# Patient Record
Sex: MALE | Race: OTHER | HISPANIC OR LATINO | URBAN - METROPOLITAN AREA
[De-identification: names, ages, dates, MRNs, and addresses within clinical notes are randomized per-mention and may not be internally consistent; named-entity substitution may affect disease eponyms.]

---

## 2017-04-18 ENCOUNTER — EMERGENCY (EMERGENCY)
Facility: HOSPITAL | Age: 62
LOS: 1 days | Discharge: HOME | End: 2017-04-18
Attending: EMERGENCY MEDICINE | Admitting: EMERGENCY MEDICINE
Payer: MEDICAID

## 2017-04-18 VITALS
DIASTOLIC BLOOD PRESSURE: 79 MMHG | RESPIRATION RATE: 19 BRPM | TEMPERATURE: 98 F | HEART RATE: 78 BPM | OXYGEN SATURATION: 97 % | SYSTOLIC BLOOD PRESSURE: 194 MMHG

## 2017-04-18 VITALS
RESPIRATION RATE: 17 BRPM | HEART RATE: 61 BPM | SYSTOLIC BLOOD PRESSURE: 187 MMHG | DIASTOLIC BLOOD PRESSURE: 89 MMHG | OXYGEN SATURATION: 98 % | TEMPERATURE: 99 F

## 2017-04-18 DIAGNOSIS — R10.13 EPIGASTRIC PAIN: ICD-10-CM

## 2017-04-18 DIAGNOSIS — E11.9 TYPE 2 DIABETES MELLITUS WITHOUT COMPLICATIONS: ICD-10-CM

## 2017-04-18 DIAGNOSIS — Z79.899 OTHER LONG TERM (CURRENT) DRUG THERAPY: ICD-10-CM

## 2017-04-18 DIAGNOSIS — R31.9 HEMATURIA, UNSPECIFIED: ICD-10-CM

## 2017-04-18 DIAGNOSIS — I10 ESSENTIAL (PRIMARY) HYPERTENSION: ICD-10-CM

## 2017-04-18 LAB
ALBUMIN SERPL ELPH-MCNC: 3.9 G/DL — SIGNIFICANT CHANGE UP (ref 3.4–5)
ALP SERPL-CCNC: 74 U/L — SIGNIFICANT CHANGE UP (ref 40–120)
ALT FLD-CCNC: 27 U/L — SIGNIFICANT CHANGE UP (ref 12–42)
ANION GAP SERPL CALC-SCNC: 7 MMOL/L — LOW (ref 9–16)
APPEARANCE UR: CLEAR — SIGNIFICANT CHANGE UP
AST SERPL-CCNC: 34 U/L — SIGNIFICANT CHANGE UP (ref 15–37)
BACTERIA # UR AUTO: PRESENT /HPF
BASOPHILS NFR BLD AUTO: 0.2 % — SIGNIFICANT CHANGE UP (ref 0–2)
BILIRUB SERPL-MCNC: 0.8 MG/DL — SIGNIFICANT CHANGE UP (ref 0.2–1.2)
BILIRUB UR-MCNC: NEGATIVE — SIGNIFICANT CHANGE UP
BUN SERPL-MCNC: 26 MG/DL — HIGH (ref 7–23)
CALCIUM SERPL-MCNC: 9.5 MG/DL — SIGNIFICANT CHANGE UP (ref 8.5–10.5)
CHLORIDE SERPL-SCNC: 100 MMOL/L — SIGNIFICANT CHANGE UP (ref 96–108)
CO2 SERPL-SCNC: 28 MMOL/L — SIGNIFICANT CHANGE UP (ref 22–31)
COLOR SPEC: YELLOW — SIGNIFICANT CHANGE UP
COMMENT - URINE: SIGNIFICANT CHANGE UP
COMMENT - URINE: SIGNIFICANT CHANGE UP
CREAT SERPL-MCNC: 1.03 MG/DL — SIGNIFICANT CHANGE UP (ref 0.5–1.3)
DIFF PNL FLD: (no result)
EOSINOPHIL NFR BLD AUTO: 0 % — SIGNIFICANT CHANGE UP (ref 0–6)
EPI CELLS # UR: SIGNIFICANT CHANGE UP /HPF
GLUCOSE SERPL-MCNC: 294 MG/DL — HIGH (ref 70–99)
GLUCOSE UR QL: >=1000
HCT VFR BLD CALC: 34.6 % — LOW (ref 39–50)
HGB BLD-MCNC: 11.7 G/DL — LOW (ref 13–17)
IMM GRANULOCYTES NFR BLD AUTO: 0.3 % — SIGNIFICANT CHANGE UP (ref 0–1.5)
KETONES UR-MCNC: NEGATIVE — SIGNIFICANT CHANGE UP
LACTATE SERPL-SCNC: 1.8 MMOL/L — SIGNIFICANT CHANGE UP (ref 0.4–2)
LEUKOCYTE ESTERASE UR-ACNC: NEGATIVE — SIGNIFICANT CHANGE UP
LIDOCAIN IGE QN: 46 U/L — LOW (ref 73–393)
LYMPHOCYTES # BLD AUTO: 12.1 % — LOW (ref 13–44)
MCHC RBC-ENTMCNC: 32.3 PG — SIGNIFICANT CHANGE UP (ref 27–34)
MCHC RBC-ENTMCNC: 33.8 G/DL — SIGNIFICANT CHANGE UP (ref 32–36)
MCV RBC AUTO: 95.6 FL — SIGNIFICANT CHANGE UP (ref 80–100)
MONOCYTES NFR BLD AUTO: 8.2 % — SIGNIFICANT CHANGE UP (ref 2–14)
NEUTROPHILS NFR BLD AUTO: 79.2 % — HIGH (ref 43–77)
NITRITE UR-MCNC: NEGATIVE — SIGNIFICANT CHANGE UP
PCO2 BLDV: 48 MMHG — SIGNIFICANT CHANGE UP (ref 41–51)
PH BLDV: 7.4 — SIGNIFICANT CHANGE UP (ref 7.32–7.43)
PH UR: 6 — SIGNIFICANT CHANGE UP (ref 5–8)
PLATELET # BLD AUTO: 131 K/UL — LOW (ref 150–400)
PO2 BLDV: 18 MMHG — LOW (ref 35–40)
POTASSIUM SERPL-MCNC: 4.1 MMOL/L — SIGNIFICANT CHANGE UP (ref 3.5–5.3)
POTASSIUM SERPL-SCNC: 4.1 MMOL/L — SIGNIFICANT CHANGE UP (ref 3.5–5.3)
PROT SERPL-MCNC: 8 G/DL — SIGNIFICANT CHANGE UP (ref 6.4–8.2)
PROT UR-MCNC: >=300 MG/DL
RBC # BLD: 3.62 M/UL — LOW (ref 4.2–5.8)
RBC # FLD: 12.9 % — SIGNIFICANT CHANGE UP (ref 10.3–16.9)
RBC CASTS # UR COMP ASSIST: (no result) /HPF
SAO2 % BLDV: 26 % — SIGNIFICANT CHANGE UP
SODIUM SERPL-SCNC: 135 MMOL/L — SIGNIFICANT CHANGE UP (ref 132–145)
SP GR SPEC: >=1.03 — SIGNIFICANT CHANGE UP (ref 1–1.03)
UROBILINOGEN FLD QL: 0.2 E.U./DL — SIGNIFICANT CHANGE UP
WBC # BLD: 12.4 K/UL — HIGH (ref 3.8–10.5)
WBC # FLD AUTO: 12.4 K/UL — HIGH (ref 3.8–10.5)

## 2017-04-18 PROCEDURE — 74177 CT ABD & PELVIS W/CONTRAST: CPT | Mod: 26

## 2017-04-18 PROCEDURE — 99285 EMERGENCY DEPT VISIT HI MDM: CPT

## 2017-04-18 RX ORDER — ONDANSETRON 8 MG/1
1 TABLET, FILM COATED ORAL
Qty: 9 | Refills: 0
Start: 2017-04-18 | End: 2017-04-21

## 2017-04-18 RX ORDER — CIPROFLOXACIN LACTATE 400MG/40ML
1 VIAL (ML) INTRAVENOUS
Qty: 10 | Refills: 0
Start: 2017-04-18 | End: 2017-04-23

## 2017-04-18 RX ORDER — SODIUM CHLORIDE 9 MG/ML
2000 INJECTION INTRAMUSCULAR; INTRAVENOUS; SUBCUTANEOUS ONCE
Qty: 0 | Refills: 0 | Status: COMPLETED | OUTPATIENT
Start: 2017-04-18 | End: 2017-04-18

## 2017-04-18 RX ORDER — MORPHINE SULFATE 50 MG/1
4 CAPSULE, EXTENDED RELEASE ORAL ONCE
Qty: 0 | Refills: 0 | Status: DISCONTINUED | OUTPATIENT
Start: 2017-04-18 | End: 2017-04-18

## 2017-04-18 RX ORDER — FAMOTIDINE 10 MG/ML
1 INJECTION INTRAVENOUS
Qty: 14 | Refills: 0
Start: 2017-04-18 | End: 2017-04-25

## 2017-04-18 RX ORDER — IOHEXOL 300 MG/ML
50 INJECTION, SOLUTION INTRAVENOUS ONCE
Qty: 0 | Refills: 0 | Status: COMPLETED | OUTPATIENT
Start: 2017-04-18 | End: 2017-04-18

## 2017-04-18 RX ORDER — GABAPENTIN 400 MG/1
800 CAPSULE ORAL ONCE
Qty: 0 | Refills: 0 | Status: COMPLETED | OUTPATIENT
Start: 2017-04-18 | End: 2017-04-18

## 2017-04-18 RX ORDER — METOCLOPRAMIDE HCL 10 MG
10 TABLET ORAL ONCE
Qty: 0 | Refills: 0 | Status: COMPLETED | OUTPATIENT
Start: 2017-04-18 | End: 2017-04-18

## 2017-04-18 RX ADMIN — SODIUM CHLORIDE 2000 MILLILITER(S): 9 INJECTION INTRAMUSCULAR; INTRAVENOUS; SUBCUTANEOUS at 15:20

## 2017-04-18 RX ADMIN — MORPHINE SULFATE 4 MILLIGRAM(S): 50 CAPSULE, EXTENDED RELEASE ORAL at 15:19

## 2017-04-18 RX ADMIN — Medication 10 MILLIGRAM(S): at 15:19

## 2017-04-18 RX ADMIN — MORPHINE SULFATE 4 MILLIGRAM(S): 50 CAPSULE, EXTENDED RELEASE ORAL at 15:46

## 2017-04-18 RX ADMIN — MORPHINE SULFATE 4 MILLIGRAM(S): 50 CAPSULE, EXTENDED RELEASE ORAL at 15:20

## 2017-04-18 RX ADMIN — IOHEXOL 50 MILLILITER(S): 300 INJECTION, SOLUTION INTRAVENOUS at 15:19

## 2017-04-18 NOTE — ED PROVIDER NOTE - OBJECTIVE STATEMENT
61y m with h/o neuropathy, HTN, DM, but no past abd surgeries presents with cramping epigastric abd pain since yesterday with n/v/d. Notes poor PO intake. As per fiance, abd has been intermittent for years but worsened since last night. Denies fever, chills. No sick contacts. On gabapentin, lisinopril and insulin injections.

## 2017-04-18 NOTE — ED ADULT TRIAGE NOTE - CHIEF COMPLAINT QUOTE
Pt. c/o abdominal pain, nausea, vomiting and diarrhea since yesterday. Pt. denies any blood in stool or vomit.

## 2017-04-18 NOTE — ED PROVIDER NOTE - NS ED MD SCRIBE ATTENDING SCRIBE SECTIONS
HIV/REVIEW OF SYSTEMS/RESULTS/VITAL SIGNS( Pullset)/PROGRESS NOTE/HISTORY OF PRESENT ILLNESS/PAST MEDICAL/SURGICAL/SOCIAL HISTORY/INTAKE ASSESSMENT/SCREENINGS/DISPOSITION/PHYSICAL EXAM

## 2017-04-18 NOTE — SBIRT NOTE. - NSSBIRTSERVICES_GEN_A_ED_FT
Provided SBIRT services. Full Screen Positive. Brief Intervention Performed. Screening results were reviewed with the patient and patient was provided information about healthy guidelines and potential negative consequences associated with level of risk.   Motivation and readiness to reduce or stop use was discussed and goals and activities to make changes were suggested/offered.    Audit Score :0    Dast Score :2    Duration : 15 minutes

## 2017-04-18 NOTE — ED PROVIDER NOTE - PROGRESS NOTE DETAILS
resolved abd pain, tolerating po, no distress, unremarkable CT and blood work. UA w some hematuria (has been told before by PMD he has it) w some bacteria so will cover with po abxs and encouraged f/u w PMD. No evidence of kidney stones on CT. Abd pain may be related to stomach dysfunction - recommend GI F/U

## 2017-06-22 ENCOUNTER — EMERGENCY (EMERGENCY)
Facility: HOSPITAL | Age: 62
LOS: 1 days | Discharge: HOME | End: 2017-06-22
Attending: EMERGENCY MEDICINE | Admitting: EMERGENCY MEDICINE
Payer: MEDICAID

## 2017-06-22 VITALS — RESPIRATION RATE: 21 BRPM | TEMPERATURE: 99 F | HEART RATE: 89 BPM | OXYGEN SATURATION: 99 %

## 2017-06-22 DIAGNOSIS — Z79.899 OTHER LONG TERM (CURRENT) DRUG THERAPY: ICD-10-CM

## 2017-06-22 DIAGNOSIS — Z79.2 LONG TERM (CURRENT) USE OF ANTIBIOTICS: ICD-10-CM

## 2017-06-22 DIAGNOSIS — R10.84 GENERALIZED ABDOMINAL PAIN: ICD-10-CM

## 2017-06-22 DIAGNOSIS — Z79.4 LONG TERM (CURRENT) USE OF INSULIN: ICD-10-CM

## 2017-06-22 DIAGNOSIS — K52.9 NONINFECTIVE GASTROENTERITIS AND COLITIS, UNSPECIFIED: ICD-10-CM

## 2017-06-22 LAB
ALBUMIN SERPL ELPH-MCNC: 4.2 G/DL — SIGNIFICANT CHANGE UP (ref 3.4–5)
ALP SERPL-CCNC: 103 U/L — SIGNIFICANT CHANGE UP (ref 40–120)
ALT FLD-CCNC: 25 U/L — SIGNIFICANT CHANGE UP (ref 12–42)
ANION GAP SERPL CALC-SCNC: 12 MMOL/L — SIGNIFICANT CHANGE UP (ref 9–16)
AST SERPL-CCNC: 23 U/L — SIGNIFICANT CHANGE UP (ref 15–37)
BASOPHILS NFR BLD AUTO: 0.3 % — SIGNIFICANT CHANGE UP (ref 0–2)
BILIRUB SERPL-MCNC: 0.8 MG/DL — SIGNIFICANT CHANGE UP (ref 0.2–1.2)
BUN SERPL-MCNC: 18 MG/DL — SIGNIFICANT CHANGE UP (ref 7–23)
CALCIUM SERPL-MCNC: 9.8 MG/DL — SIGNIFICANT CHANGE UP (ref 8.5–10.5)
CHLORIDE SERPL-SCNC: 105 MMOL/L — SIGNIFICANT CHANGE UP (ref 96–108)
CO2 SERPL-SCNC: 24 MMOL/L — SIGNIFICANT CHANGE UP (ref 22–31)
CREAT SERPL-MCNC: 1.04 MG/DL — SIGNIFICANT CHANGE UP (ref 0.5–1.3)
EOSINOPHIL NFR BLD AUTO: 0 % — SIGNIFICANT CHANGE UP (ref 0–6)
GLUCOSE SERPL-MCNC: 379 MG/DL — HIGH (ref 70–99)
HCT VFR BLD CALC: 37.9 % — LOW (ref 39–50)
HGB BLD-MCNC: 12.9 G/DL — LOW (ref 13–17)
IMM GRANULOCYTES NFR BLD AUTO: 0.3 % — SIGNIFICANT CHANGE UP (ref 0–1.5)
LIDOCAIN IGE QN: 91 U/L — SIGNIFICANT CHANGE UP (ref 73–393)
LYMPHOCYTES # BLD AUTO: 6.8 % — LOW (ref 13–44)
MCHC RBC-ENTMCNC: 32 PG — SIGNIFICANT CHANGE UP (ref 27–34)
MCHC RBC-ENTMCNC: 34 G/DL — SIGNIFICANT CHANGE UP (ref 32–36)
MCV RBC AUTO: 94 FL — SIGNIFICANT CHANGE UP (ref 80–100)
MONOCYTES NFR BLD AUTO: 3.7 % — SIGNIFICANT CHANGE UP (ref 2–14)
NEUTROPHILS NFR BLD AUTO: 88.9 % — HIGH (ref 43–77)
PLATELET # BLD AUTO: 145 K/UL — LOW (ref 150–400)
POTASSIUM SERPL-MCNC: 4 MMOL/L — SIGNIFICANT CHANGE UP (ref 3.5–5.3)
POTASSIUM SERPL-SCNC: 4 MMOL/L — SIGNIFICANT CHANGE UP (ref 3.5–5.3)
PROT SERPL-MCNC: 8.8 G/DL — HIGH (ref 6.4–8.2)
RBC # BLD: 4.03 M/UL — LOW (ref 4.2–5.8)
RBC # FLD: 12.2 % — SIGNIFICANT CHANGE UP (ref 10.3–16.9)
SODIUM SERPL-SCNC: 141 MMOL/L — SIGNIFICANT CHANGE UP (ref 132–145)
WBC # BLD: 14.7 K/UL — HIGH (ref 3.8–10.5)
WBC # FLD AUTO: 14.7 K/UL — HIGH (ref 3.8–10.5)

## 2017-06-22 PROCEDURE — 99284 EMERGENCY DEPT VISIT MOD MDM: CPT | Mod: 25

## 2017-06-22 PROCEDURE — 93010 ELECTROCARDIOGRAM REPORT: CPT

## 2017-06-22 RX ORDER — SODIUM CHLORIDE 9 MG/ML
1000 INJECTION INTRAMUSCULAR; INTRAVENOUS; SUBCUTANEOUS ONCE
Qty: 0 | Refills: 0 | Status: COMPLETED | OUTPATIENT
Start: 2017-06-22 | End: 2017-06-22

## 2017-06-22 RX ORDER — FAMOTIDINE 10 MG/ML
20 INJECTION INTRAVENOUS ONCE
Qty: 0 | Refills: 0 | Status: COMPLETED | OUTPATIENT
Start: 2017-06-22 | End: 2017-06-22

## 2017-06-22 RX ORDER — ONDANSETRON 8 MG/1
4 TABLET, FILM COATED ORAL ONCE
Qty: 0 | Refills: 0 | Status: COMPLETED | OUTPATIENT
Start: 2017-06-22 | End: 2017-06-22

## 2017-06-22 RX ADMIN — FAMOTIDINE 20 MILLIGRAM(S): 10 INJECTION INTRAVENOUS at 20:34

## 2017-06-22 RX ADMIN — Medication 2 MILLIGRAM(S): at 20:34

## 2017-06-22 RX ADMIN — Medication 204 MILLIGRAM(S): at 22:54

## 2017-06-22 RX ADMIN — SODIUM CHLORIDE 1000 MILLILITER(S): 9 INJECTION INTRAMUSCULAR; INTRAVENOUS; SUBCUTANEOUS at 20:35

## 2017-06-22 RX ADMIN — SODIUM CHLORIDE 1000 MILLILITER(S): 9 INJECTION INTRAMUSCULAR; INTRAVENOUS; SUBCUTANEOUS at 22:54

## 2017-06-22 RX ADMIN — ONDANSETRON 4 MILLIGRAM(S): 8 TABLET, FILM COATED ORAL at 20:34

## 2017-06-22 RX ADMIN — Medication 20 MILLIGRAM(S): at 23:29

## 2017-06-22 NOTE — ED PROVIDER NOTE - OBJECTIVE STATEMENT
Patient with pmhx dm, htn, presents with diffuse abdominal cramping, N/V/D after eating halal meat. denies headache, neck pain, denies cp, or sob. patient unable to speak and moaning secondary to diffuse abdominal cramping. denies drugs or etoh.

## 2017-06-22 NOTE — ED ADULT NURSE NOTE - OBJECTIVE STATEMENT
Patient is a 62 y/o male presenting to the ED with sudden onset of abdominal pain after halal food earlier today. Patient reports N/V/D with associated abdominal pain. Patient denies CP, SOB, trouble breathing, urinary s/s.

## 2017-06-23 VITALS
SYSTOLIC BLOOD PRESSURE: 132 MMHG | HEART RATE: 74 BPM | TEMPERATURE: 98 F | RESPIRATION RATE: 18 BRPM | DIASTOLIC BLOOD PRESSURE: 74 MMHG | OXYGEN SATURATION: 98 %

## 2017-06-23 LAB
APPEARANCE UR: CLEAR — SIGNIFICANT CHANGE UP
BILIRUB UR-MCNC: NEGATIVE — SIGNIFICANT CHANGE UP
COLOR SPEC: YELLOW — SIGNIFICANT CHANGE UP
DIFF PNL FLD: (no result)
GLUCOSE UR QL: >=1000
KETONES UR-MCNC: 40 MG/DL
LEUKOCYTE ESTERASE UR-ACNC: NEGATIVE — SIGNIFICANT CHANGE UP
NITRITE UR-MCNC: NEGATIVE — SIGNIFICANT CHANGE UP
PH UR: 6 — SIGNIFICANT CHANGE UP (ref 5–8)
PROT UR-MCNC: 100 MG/DL
SP GR SPEC: 1.02 — SIGNIFICANT CHANGE UP (ref 1–1.03)
UROBILINOGEN FLD QL: 0.2 E.U./DL — SIGNIFICANT CHANGE UP

## 2017-11-23 ENCOUNTER — EMERGENCY (EMERGENCY)
Facility: HOSPITAL | Age: 62
LOS: 1 days | Discharge: HOME | End: 2017-11-23
Admitting: EMERGENCY MEDICINE
Payer: MEDICAID

## 2017-11-23 VITALS
SYSTOLIC BLOOD PRESSURE: 199 MMHG | OXYGEN SATURATION: 98 % | RESPIRATION RATE: 16 BRPM | HEART RATE: 74 BPM | TEMPERATURE: 98 F | DIASTOLIC BLOOD PRESSURE: 98 MMHG

## 2017-11-23 VITALS
RESPIRATION RATE: 18 BRPM | DIASTOLIC BLOOD PRESSURE: 101 MMHG | TEMPERATURE: 98 F | HEART RATE: 88 BPM | OXYGEN SATURATION: 100 % | SYSTOLIC BLOOD PRESSURE: 207 MMHG

## 2017-11-23 DIAGNOSIS — I10 ESSENTIAL (PRIMARY) HYPERTENSION: ICD-10-CM

## 2017-11-23 DIAGNOSIS — Z79.2 LONG TERM (CURRENT) USE OF ANTIBIOTICS: ICD-10-CM

## 2017-11-23 DIAGNOSIS — R10.84 GENERALIZED ABDOMINAL PAIN: ICD-10-CM

## 2017-11-23 DIAGNOSIS — E11.9 TYPE 2 DIABETES MELLITUS WITHOUT COMPLICATIONS: ICD-10-CM

## 2017-11-23 DIAGNOSIS — Z79.4 LONG TERM (CURRENT) USE OF INSULIN: ICD-10-CM

## 2017-11-23 DIAGNOSIS — F17.200 NICOTINE DEPENDENCE, UNSPECIFIED, UNCOMPLICATED: ICD-10-CM

## 2017-11-23 DIAGNOSIS — R19.7 DIARRHEA, UNSPECIFIED: ICD-10-CM

## 2017-11-23 DIAGNOSIS — Z79.899 OTHER LONG TERM (CURRENT) DRUG THERAPY: ICD-10-CM

## 2017-11-23 LAB
ALBUMIN SERPL ELPH-MCNC: 3.7 G/DL — SIGNIFICANT CHANGE UP (ref 3.4–5)
ALP SERPL-CCNC: 80 U/L — SIGNIFICANT CHANGE UP (ref 40–120)
ALT FLD-CCNC: 19 U/L — SIGNIFICANT CHANGE UP (ref 12–42)
ANION GAP SERPL CALC-SCNC: 9 MMOL/L — SIGNIFICANT CHANGE UP (ref 9–16)
APPEARANCE UR: CLEAR — SIGNIFICANT CHANGE UP
AST SERPL-CCNC: 19 U/L — SIGNIFICANT CHANGE UP (ref 15–37)
BILIRUB SERPL-MCNC: 0.5 MG/DL — SIGNIFICANT CHANGE UP (ref 0.2–1.2)
BILIRUB UR-MCNC: NEGATIVE — SIGNIFICANT CHANGE UP
BUN SERPL-MCNC: 25 MG/DL — HIGH (ref 7–23)
CALCIUM SERPL-MCNC: 9.2 MG/DL — SIGNIFICANT CHANGE UP (ref 8.5–10.5)
CHLORIDE SERPL-SCNC: 106 MMOL/L — SIGNIFICANT CHANGE UP (ref 96–108)
CO2 SERPL-SCNC: 24 MMOL/L — SIGNIFICANT CHANGE UP (ref 22–31)
COLOR SPEC: YELLOW — SIGNIFICANT CHANGE UP
CREAT SERPL-MCNC: 0.9 MG/DL — SIGNIFICANT CHANGE UP (ref 0.5–1.3)
DIFF PNL FLD: (no result)
GLUCOSE SERPL-MCNC: 281 MG/DL — HIGH (ref 70–99)
GLUCOSE UR QL: 500
HCT VFR BLD CALC: 37 % — LOW (ref 39–50)
HGB BLD-MCNC: 12.2 G/DL — LOW (ref 13–17)
KETONES UR-MCNC: 15 MG/DL
LACTATE SERPL-SCNC: 1.4 MMOL/L — SIGNIFICANT CHANGE UP (ref 0.4–2)
LEUKOCYTE ESTERASE UR-ACNC: NEGATIVE — SIGNIFICANT CHANGE UP
LIDOCAIN IGE QN: 264 U/L — SIGNIFICANT CHANGE UP (ref 73–393)
MCHC RBC-ENTMCNC: 31.2 PG — SIGNIFICANT CHANGE UP (ref 27–34)
MCHC RBC-ENTMCNC: 33 G/DL — SIGNIFICANT CHANGE UP (ref 32–36)
MCV RBC AUTO: 94.6 FL — SIGNIFICANT CHANGE UP (ref 80–100)
NITRITE UR-MCNC: NEGATIVE — SIGNIFICANT CHANGE UP
PCP SPEC-MCNC: SIGNIFICANT CHANGE UP
PH UR: 5.5 — SIGNIFICANT CHANGE UP (ref 5–8)
PLATELET # BLD AUTO: 211 K/UL — SIGNIFICANT CHANGE UP (ref 150–400)
POTASSIUM SERPL-MCNC: 4 MMOL/L — SIGNIFICANT CHANGE UP (ref 3.5–5.3)
POTASSIUM SERPL-SCNC: 4 MMOL/L — SIGNIFICANT CHANGE UP (ref 3.5–5.3)
PROT SERPL-MCNC: 8 G/DL — SIGNIFICANT CHANGE UP (ref 6.4–8.2)
PROT UR-MCNC: 100 MG/DL
RBC # BLD: 3.91 M/UL — LOW (ref 4.2–5.8)
RBC # FLD: 12.3 % — SIGNIFICANT CHANGE UP (ref 10.3–16.9)
SODIUM SERPL-SCNC: 139 MMOL/L — SIGNIFICANT CHANGE UP (ref 132–145)
SP GR SPEC: >=1.03 — SIGNIFICANT CHANGE UP (ref 1–1.03)
UROBILINOGEN FLD QL: 0.2 E.U./DL — SIGNIFICANT CHANGE UP
WBC # BLD: 9 K/UL — SIGNIFICANT CHANGE UP (ref 3.8–10.5)
WBC # FLD AUTO: 9 K/UL — SIGNIFICANT CHANGE UP (ref 3.8–10.5)

## 2017-11-23 PROCEDURE — 74020: CPT | Mod: 26

## 2017-11-23 PROCEDURE — 99285 EMERGENCY DEPT VISIT HI MDM: CPT | Mod: 25

## 2017-11-23 PROCEDURE — 93010 ELECTROCARDIOGRAM REPORT: CPT

## 2017-11-23 PROCEDURE — 74177 CT ABD & PELVIS W/CONTRAST: CPT | Mod: 26

## 2017-11-23 RX ORDER — SODIUM CHLORIDE 9 MG/ML
1000 INJECTION INTRAMUSCULAR; INTRAVENOUS; SUBCUTANEOUS ONCE
Qty: 0 | Refills: 0 | Status: COMPLETED | OUTPATIENT
Start: 2017-11-23 | End: 2017-11-23

## 2017-11-23 RX ORDER — PANTOPRAZOLE SODIUM 20 MG/1
1 TABLET, DELAYED RELEASE ORAL
Qty: 15 | Refills: 0 | OUTPATIENT
Start: 2017-11-23

## 2017-11-23 RX ORDER — FAMOTIDINE 10 MG/ML
20 INJECTION INTRAVENOUS ONCE
Qty: 0 | Refills: 0 | Status: COMPLETED | OUTPATIENT
Start: 2017-11-23 | End: 2017-11-23

## 2017-11-23 RX ORDER — TRAMADOL HYDROCHLORIDE AND ACETAMINOPHEN 37.5; 325 MG/1; MG/1
1 TABLET ORAL
Qty: 10 | Refills: 0
Start: 2017-11-23

## 2017-11-23 RX ORDER — KETOROLAC TROMETHAMINE 30 MG/ML
30 SYRINGE (ML) INJECTION ONCE
Qty: 0 | Refills: 0 | Status: DISCONTINUED | OUTPATIENT
Start: 2017-11-23 | End: 2017-11-23

## 2017-11-23 RX ORDER — ONDANSETRON 8 MG/1
4 TABLET, FILM COATED ORAL ONCE
Qty: 0 | Refills: 0 | Status: COMPLETED | OUTPATIENT
Start: 2017-11-23 | End: 2017-11-23

## 2017-11-23 RX ORDER — MORPHINE SULFATE 50 MG/1
4 CAPSULE, EXTENDED RELEASE ORAL ONCE
Qty: 0 | Refills: 0 | Status: DISCONTINUED | OUTPATIENT
Start: 2017-11-23 | End: 2017-11-23

## 2017-11-23 RX ORDER — ACETAMINOPHEN 500 MG
650 TABLET ORAL ONCE
Qty: 0 | Refills: 0 | Status: COMPLETED | OUTPATIENT
Start: 2017-11-23 | End: 2017-11-23

## 2017-11-23 RX ORDER — ONDANSETRON 8 MG/1
1 TABLET, FILM COATED ORAL
Qty: 12 | Refills: 0
Start: 2017-11-23

## 2017-11-23 RX ORDER — METOCLOPRAMIDE HCL 10 MG
10 TABLET ORAL ONCE
Qty: 0 | Refills: 0 | Status: COMPLETED | OUTPATIENT
Start: 2017-11-23 | End: 2017-11-23

## 2017-11-23 RX ADMIN — ONDANSETRON 4 MILLIGRAM(S): 8 TABLET, FILM COATED ORAL at 14:42

## 2017-11-23 RX ADMIN — SODIUM CHLORIDE 2000 MILLILITER(S): 9 INJECTION INTRAMUSCULAR; INTRAVENOUS; SUBCUTANEOUS at 14:42

## 2017-11-23 RX ADMIN — Medication 650 MILLIGRAM(S): at 14:41

## 2017-11-23 RX ADMIN — Medication 1.25 MILLIGRAM(S): at 16:44

## 2017-11-23 RX ADMIN — FAMOTIDINE 20 MILLIGRAM(S): 10 INJECTION INTRAVENOUS at 14:41

## 2017-11-23 RX ADMIN — Medication 10 MILLIGRAM(S): at 16:07

## 2017-11-23 RX ADMIN — Medication 30 MILLIGRAM(S): at 14:41

## 2017-11-23 RX ADMIN — MORPHINE SULFATE 4 MILLIGRAM(S): 50 CAPSULE, EXTENDED RELEASE ORAL at 16:07

## 2017-11-23 NOTE — ED PROVIDER NOTE - PROGRESS NOTE DETAILS
started having a couple episodes of gagging and dry heaving in the ED, pain improved with toradol and IVF but still writhing around intermittently, xray with possible air fluid levels, abd soft and non rigid, will proceed with CT A/P to r/o obstruction/stones

## 2017-11-23 NOTE — ED PROVIDER NOTE - OBJECTIVE STATEMENT
63 yo M with PMHx of HTN, DM, BIBA c/o diffuse abdominal pain and diarrhea since yesterday.  Pt reports having some street food and noted diffuse abdominal cramps with associated loose watery diarrhea for the past 24hr.  Denies fever, chills, N/V/C, melena, hematochezia, hematuria, change in urinary function, dysuria, d/c, flank pain, HA, dizziness, focal weakness, CP, SOB, palpitations, cough, and malaise. No recent travel or sick contact noted

## 2017-11-23 NOTE — ED ADULT NURSE NOTE - GASTROINTESTINAL WDL
Abdomen soft, tender, nondistended, bowel sounds present in all 4 quadrants. c/o generalized abd pain and diarrhea

## 2017-11-23 NOTE — ED PROVIDER NOTE - PHYSICAL EXAMINATION
Gen - WDWN M, writhing in pain, speaking in full sentences   Skin - warm, dry, intact  HEENT - AT/NC, PERRL, EOMI, airway patent, neck supple, no JVD, no palpable nodes   CV - S1S2, R/R/R  Resp - respiration non-labored, CTAB, symmetric bs b/l, no r/r/w  GI - NABS, soft, ND, diffuse abd tenderness, no guarding or rebound, no CVAT b/l    MS - w/w/p, no c/c/e, calves supple and NT, distal pulses symmetric b/l   Neuro - AxOx3, ambulatory without gait disturbance

## 2017-11-23 NOTE — ED PROVIDER NOTE - MEDICAL DECISION MAKING DETAILS
AFVSS at time of d/c, pt non-toxic appearing and hemodynamically stable, results, ddx, and f/u plans discussed with pt at bedside, d/c'd home to f/u with PMD and GI, strict return precautions discussed, prompt return to ER for any worsening or new sx, pt verbalized understanding.

## 2019-03-17 ENCOUNTER — EMERGENCY (EMERGENCY)
Facility: HOSPITAL | Age: 64
LOS: 1 days | Discharge: HOME | End: 2019-03-17
Attending: EMERGENCY MEDICINE | Admitting: EMERGENCY MEDICINE
Payer: MEDICAID

## 2019-03-17 VITALS — SYSTOLIC BLOOD PRESSURE: 221 MMHG | HEART RATE: 71 BPM | DIASTOLIC BLOOD PRESSURE: 104 MMHG

## 2019-03-17 VITALS
TEMPERATURE: 99 F | DIASTOLIC BLOOD PRESSURE: 82 MMHG | RESPIRATION RATE: 20 BRPM | OXYGEN SATURATION: 96 % | SYSTOLIC BLOOD PRESSURE: 154 MMHG | HEART RATE: 73 BPM

## 2019-03-17 PROBLEM — E11.9 TYPE 2 DIABETES MELLITUS WITHOUT COMPLICATIONS: Chronic | Status: ACTIVE | Noted: 2017-04-18

## 2019-03-17 PROBLEM — I10 ESSENTIAL (PRIMARY) HYPERTENSION: Chronic | Status: ACTIVE | Noted: 2017-11-23

## 2019-03-17 LAB
ALBUMIN SERPL ELPH-MCNC: 3.9 G/DL — SIGNIFICANT CHANGE UP (ref 3.4–5)
ALP SERPL-CCNC: 93 U/L — SIGNIFICANT CHANGE UP (ref 40–120)
ALT FLD-CCNC: 28 U/L — SIGNIFICANT CHANGE UP (ref 12–42)
ANION GAP SERPL CALC-SCNC: 11 MMOL/L — SIGNIFICANT CHANGE UP (ref 9–16)
AST SERPL-CCNC: 34 U/L — SIGNIFICANT CHANGE UP (ref 15–37)
B-OH-BUTYR SERPL-SCNC: 0.6 MMOL/L — HIGH
BASOPHILS NFR BLD AUTO: 0.3 % — SIGNIFICANT CHANGE UP (ref 0–2)
BILIRUB SERPL-MCNC: 1.1 MG/DL — SIGNIFICANT CHANGE UP (ref 0.2–1.2)
BUN SERPL-MCNC: 33 MG/DL — HIGH (ref 7–23)
CALCIUM SERPL-MCNC: 9.4 MG/DL — SIGNIFICANT CHANGE UP (ref 8.5–10.5)
CHLORIDE SERPL-SCNC: 98 MMOL/L — SIGNIFICANT CHANGE UP (ref 96–108)
CO2 SERPL-SCNC: 26 MMOL/L — SIGNIFICANT CHANGE UP (ref 22–31)
CREAT SERPL-MCNC: 1.14 MG/DL — SIGNIFICANT CHANGE UP (ref 0.5–1.3)
EOSINOPHIL NFR BLD AUTO: 0 % — SIGNIFICANT CHANGE UP (ref 0–6)
GLUCOSE SERPL-MCNC: 250 MG/DL — HIGH (ref 70–99)
HCT VFR BLD CALC: 42.6 % — SIGNIFICANT CHANGE UP (ref 39–50)
HGB BLD-MCNC: 14 G/DL — SIGNIFICANT CHANGE UP (ref 13–17)
IMM GRANULOCYTES NFR BLD AUTO: 0.4 % — SIGNIFICANT CHANGE UP (ref 0–1.5)
LIDOCAIN IGE QN: 87 U/L — SIGNIFICANT CHANGE UP (ref 73–393)
LYMPHOCYTES # BLD AUTO: 12.5 % — LOW (ref 13–44)
MCHC RBC-ENTMCNC: 30.8 PG — SIGNIFICANT CHANGE UP (ref 27–34)
MCHC RBC-ENTMCNC: 32.9 G/DL — SIGNIFICANT CHANGE UP (ref 32–36)
MCV RBC AUTO: 93.8 FL — SIGNIFICANT CHANGE UP (ref 80–100)
MONOCYTES NFR BLD AUTO: 6.6 % — SIGNIFICANT CHANGE UP (ref 2–14)
NEUTROPHILS NFR BLD AUTO: 80.2 % — HIGH (ref 43–77)
PCO2 BLDV: 49 MMHG — SIGNIFICANT CHANGE UP (ref 41–51)
PH BLDV: 7.37 — SIGNIFICANT CHANGE UP (ref 7.32–7.43)
PLATELET # BLD AUTO: 191 K/UL — SIGNIFICANT CHANGE UP (ref 150–400)
PO2 BLDV: 28 MMHG — LOW (ref 35–40)
POTASSIUM SERPL-MCNC: 3.9 MMOL/L — SIGNIFICANT CHANGE UP (ref 3.5–5.3)
POTASSIUM SERPL-SCNC: 3.9 MMOL/L — SIGNIFICANT CHANGE UP (ref 3.5–5.3)
PROT SERPL-MCNC: 8.3 G/DL — HIGH (ref 6.4–8.2)
RBC # BLD: 4.54 M/UL — SIGNIFICANT CHANGE UP (ref 4.2–5.8)
RBC # FLD: 12.6 % — SIGNIFICANT CHANGE UP (ref 10.3–14.5)
SAO2 % BLDV: 50 % — SIGNIFICANT CHANGE UP
SODIUM SERPL-SCNC: 135 MMOL/L — SIGNIFICANT CHANGE UP (ref 132–145)
WBC # BLD: 10.8 K/UL — HIGH (ref 3.8–10.5)
WBC # FLD AUTO: 10.8 K/UL — HIGH (ref 3.8–10.5)

## 2019-03-17 PROCEDURE — 99284 EMERGENCY DEPT VISIT MOD MDM: CPT

## 2019-03-17 RX ORDER — LIDOCAINE 4 G/100G
10 CREAM TOPICAL ONCE
Qty: 0 | Refills: 0 | Status: COMPLETED | OUTPATIENT
Start: 2019-03-17 | End: 2019-03-17

## 2019-03-17 RX ORDER — FAMOTIDINE 10 MG/ML
20 INJECTION INTRAVENOUS ONCE
Qty: 0 | Refills: 0 | Status: COMPLETED | OUTPATIENT
Start: 2019-03-17 | End: 2019-03-17

## 2019-03-17 RX ORDER — PANTOPRAZOLE SODIUM 20 MG/1
1 TABLET, DELAYED RELEASE ORAL
Qty: 1 | Refills: 2
Start: 2019-03-17 | End: 2019-06-14

## 2019-03-17 RX ORDER — LISINOPRIL 2.5 MG/1
40 TABLET ORAL ONCE
Qty: 0 | Refills: 0 | Status: COMPLETED | OUTPATIENT
Start: 2019-03-17 | End: 2019-03-17

## 2019-03-17 RX ORDER — SODIUM CHLORIDE 9 MG/ML
2000 INJECTION INTRAMUSCULAR; INTRAVENOUS; SUBCUTANEOUS ONCE
Qty: 0 | Refills: 0 | Status: COMPLETED | OUTPATIENT
Start: 2019-03-17 | End: 2019-03-17

## 2019-03-17 RX ORDER — MORPHINE SULFATE 50 MG/1
4 CAPSULE, EXTENDED RELEASE ORAL ONCE
Qty: 0 | Refills: 0 | Status: DISCONTINUED | OUTPATIENT
Start: 2019-03-17 | End: 2019-03-17

## 2019-03-17 RX ORDER — ONDANSETRON 8 MG/1
4 TABLET, FILM COATED ORAL ONCE
Qty: 0 | Refills: 0 | Status: COMPLETED | OUTPATIENT
Start: 2019-03-17 | End: 2019-03-17

## 2019-03-17 RX ADMIN — MORPHINE SULFATE 4 MILLIGRAM(S): 50 CAPSULE, EXTENDED RELEASE ORAL at 15:28

## 2019-03-17 RX ADMIN — Medication 30 MILLILITER(S): at 15:27

## 2019-03-17 RX ADMIN — FAMOTIDINE 20 MILLIGRAM(S): 10 INJECTION INTRAVENOUS at 15:27

## 2019-03-17 RX ADMIN — SODIUM CHLORIDE 2000 MILLILITER(S): 9 INJECTION INTRAMUSCULAR; INTRAVENOUS; SUBCUTANEOUS at 15:28

## 2019-03-17 RX ADMIN — LIDOCAINE 10 MILLILITER(S): 4 CREAM TOPICAL at 15:27

## 2019-03-17 RX ADMIN — LISINOPRIL 40 MILLIGRAM(S): 2.5 TABLET ORAL at 17:45

## 2019-03-17 RX ADMIN — ONDANSETRON 4 MILLIGRAM(S): 8 TABLET, FILM COATED ORAL at 15:28

## 2019-03-17 NOTE — ED PROVIDER NOTE - CONSTITUTIONAL, MLM
normal... Distressed appearing, well nourished, awake, alert, oriented to person, place, time/situation.

## 2019-03-17 NOTE — ED ADULT NURSE REASSESSMENT NOTE - NS ED NURSE REASSESS COMMENT FT1
Pt states didn't take BP meds since Friday. Denies symptoms related to high BP. States will resume taking BP meds once he gets home. Dr Page informed and authorized pt's discharge.

## 2019-03-17 NOTE — ED ADULT NURSE NOTE - NSIMPLEMENTINTERV_GEN_ALL_ED
Implemented All Universal Safety Interventions:  [place] to call system. Call bell, personal items and telephone within reach. Instruct patient to call for assistance. Room bathroom lighting operational. Non-slip footwear when patient is off stretcher. Physically safe environment: no spills, clutter or unnecessary equipment. Stretcher in lowest position, wheels locked, appropriate side rails in place.

## 2019-03-17 NOTE — ED PROVIDER NOTE - NSFOLLOWUPINSTRUCTIONS_ED_ALL_ED_FT
Log Out.    [employer] CareNotes®     :  [facility]             GASTRITIS - AfterCare(R) Instructions(ER/ED)     Gastritis    WHAT YOU NEED TO KNOW:    Gastritis is inflammation or irritation of the lining of your stomach. Abdominal Organs         DISCHARGE INSTRUCTIONS:    Call 911 for any of the following:     You develop chest pain or shortness of breath.        Return to the emergency department if:     You vomit blood.      You have black or bloody bowel movements.      You have severe stomach or back pain.    Contact your healthcare provider if:     You have a fever.      You have new or worsening symptoms, even after treatment.      You have questions or concerns about your condition or care.    Medicines:     Medicines may be given to help treat a bacterial infection or decrease stomach acid.       Take your medicine as directed. Contact your healthcare provider if you think your medicine is not helping or if you have side effects. Tell him or her if you are allergic to any medicine. Keep a list of the medicines, vitamins, and herbs you take. Include the amounts, and when and why you take them. Bring the list or the pill bottles to follow-up visits. Carry your medicine list with you in case of an emergency.    Manage or prevent gastritis:     Do not smoke. Nicotine and other chemicals in cigarettes and cigars can make your symptoms worse and cause lung damage. Ask your healthcare provider for information if you currently smoke and need help to quit. E-cigarettes or smokeless tobacco still contain nicotine. Talk to your healthcare provider before you use these products.       Do not drink alcohol. Alcohol can prevent healing and make your gastritis worse. Talk to your healthcare provider if you need help to stop drinking.      Do not take NSAIDs or aspirin unless directed. These and similar medicines can cause irritation. If your healthcare provider says it is okay to take NSAIDs, take them with food.       Do not eat foods that cause irritation. Foods such as oranges and salsa can cause burning or pain. Eat a variety of healthy foods. Examples include fruits (not citrus), vegetables, low-fat dairy products, beans, whole-grain breads, and lean meats and fish. Try to eat small meals, and drink water with your meals. Do not eat for at least 3 hours before you go to bed.      Find ways to relax and decrease stress. Stress can increase stomach acid and make gastritis worse. Activities such as yoga, meditation, or listening to music can help you relax. Spend time with friends, or do things you enjoy.    Follow up with your healthcare provider as directed: You may need ongoing tests or treatment, or referral to a gastroenterologist. Write down your questions so you remember to ask them during your visits.

## 2019-03-17 NOTE — ED PROVIDER NOTE - OBJECTIVE STATEMENT
63 y o male with PMHx of IDDM and HTN presents to ED for worsening focal epigastric pain x3 days. States that on Friday he had onset of NBNB vomiting and watery diarrhea, unresolved throughout the night, followed by abdominal pain. Pt has performed f/o endoscopy with a GI specialist and denies dx of gastric ulcers. Pt last seen in ED several times in 2017 for abdominal cramping + N/V/D. Admits to not taking insulin today. Denies etoh use or smoking. No bloody stool, urinary sx, fever, chills, or other sx.

## 2019-03-17 NOTE — ED PROVIDER NOTE - CARE PROVIDER_API CALL
Kameron Zapata)  [facility] at [address] [place] [address], [address]  [place], [place] [address]  Phone: [phone]  Fax: [phone]  Follow Up Time:

## 2019-03-17 NOTE — ED PROVIDER NOTE - PRIOR HOSPITAL/ED VISITS SUMMARY FREE TEXT FOR MDM OBTAINED AND REVIEWED OLD RECORDS QUESTION
Pt seen several times in 2017 for similar complaints of abdominal pain/cramping, sometimes accompanied by N/V/D.

## 2019-03-17 NOTE — ED PROVIDER NOTE - CLINICAL SUMMARY MEDICAL DECISION MAKING FREE TEXT BOX
63 y o male presents with c/o epigastric pain + V/D x3 days. Suspected gastritis. Pt in acute distress on exam. Give IV fluids, morphine, and Zofran. Check labs. Reassess. 63 y o male presents with c/o epigastric pain + V/D x3 days. Suspected gastritis. Pt in acute distress on exam though with reassuring abd exam, soft ntnd. Give IV fluids, morphine, and Zofran. Check labs. Reassess.    Pain well controlled, tolerating PO (water, crackers), HDS. Abd soft ntnd on re-exam. Safe to d/c home on PPI, given GI f/u and return precautions.

## 2019-03-21 DIAGNOSIS — Z79.891 LONG TERM (CURRENT) USE OF OPIATE ANALGESIC: ICD-10-CM

## 2019-03-21 DIAGNOSIS — I10 ESSENTIAL (PRIMARY) HYPERTENSION: ICD-10-CM

## 2019-03-21 DIAGNOSIS — R10.9 UNSPECIFIED ABDOMINAL PAIN: ICD-10-CM

## 2019-03-21 DIAGNOSIS — E11.9 TYPE 2 DIABETES MELLITUS WITHOUT COMPLICATIONS: ICD-10-CM

## 2019-03-21 DIAGNOSIS — Z79.2 LONG TERM (CURRENT) USE OF ANTIBIOTICS: ICD-10-CM

## 2019-03-21 DIAGNOSIS — Z79.4 LONG TERM (CURRENT) USE OF INSULIN: ICD-10-CM

## 2019-03-21 DIAGNOSIS — Z79.899 OTHER LONG TERM (CURRENT) DRUG THERAPY: ICD-10-CM

## 2019-03-21 DIAGNOSIS — K29.50 UNSPECIFIED CHRONIC GASTRITIS WITHOUT BLEEDING: ICD-10-CM

## 2020-09-14 NOTE — ED PROVIDER NOTE - CROS ED MUSC ALL NEG
[Fully active, able to carry on all pre-disease performance without restriction] : Status 0 - Fully active, able to carry on all pre-disease performance without restriction [Normal] : grossly intact negative...

## 2023-06-07 NOTE — ED PROVIDER NOTE - MDM ORDERS SUBMITTED SELECTION
Instructions: This plan will send the code FBSE to the PM system.  DO NOT or CHANGE the price. Detail Level: Simple Price (Do Not Change): 0.00 Medications/Labs

## 2023-10-01 ENCOUNTER — INPATIENT (INPATIENT)
Facility: HOSPITAL | Age: 68
LOS: 1 days | Discharge: HOME HEALTH | DRG: 638 | End: 2023-10-03
Attending: INTERNAL MEDICINE | Admitting: STUDENT IN AN ORGANIZED HEALTH CARE EDUCATION/TRAINING PROGRAM
Payer: MEDICARE

## 2023-10-01 VITALS
SYSTOLIC BLOOD PRESSURE: 143 MMHG | DIASTOLIC BLOOD PRESSURE: 87 MMHG | RESPIRATION RATE: 16 BRPM | HEART RATE: 89 BPM | TEMPERATURE: 99 F | OXYGEN SATURATION: 98 %

## 2023-10-01 DIAGNOSIS — E11.9 TYPE 2 DIABETES MELLITUS WITHOUT COMPLICATIONS: ICD-10-CM

## 2023-10-01 DIAGNOSIS — T14.8XXA OTHER INJURY OF UNSPECIFIED BODY REGION, INITIAL ENCOUNTER: ICD-10-CM

## 2023-10-01 DIAGNOSIS — I10 ESSENTIAL (PRIMARY) HYPERTENSION: ICD-10-CM

## 2023-10-01 DIAGNOSIS — Z29.9 ENCOUNTER FOR PROPHYLACTIC MEASURES, UNSPECIFIED: ICD-10-CM

## 2023-10-01 LAB
ALBUMIN SERPL ELPH-MCNC: 2.8 G/DL — LOW (ref 3.4–5)
ALP SERPL-CCNC: 96 U/L — SIGNIFICANT CHANGE UP (ref 40–120)
ALT FLD-CCNC: 20 U/L — SIGNIFICANT CHANGE UP (ref 12–42)
ANION GAP SERPL CALC-SCNC: 2 MMOL/L — LOW (ref 9–16)
ANION GAP SERPL CALC-SCNC: 5 MMOL/L — LOW (ref 9–16)
AST SERPL-CCNC: 15 U/L — SIGNIFICANT CHANGE UP (ref 15–37)
BASOPHILS # BLD AUTO: 0.05 K/UL — SIGNIFICANT CHANGE UP (ref 0–0.2)
BASOPHILS NFR BLD AUTO: 0.3 % — SIGNIFICANT CHANGE UP (ref 0–2)
BILIRUB SERPL-MCNC: 0.4 MG/DL — SIGNIFICANT CHANGE UP (ref 0.2–1.2)
BUN SERPL-MCNC: 17 MG/DL — SIGNIFICANT CHANGE UP (ref 7–23)
BUN SERPL-MCNC: 18 MG/DL — SIGNIFICANT CHANGE UP (ref 7–23)
CALCIUM SERPL-MCNC: 8.7 MG/DL — SIGNIFICANT CHANGE UP (ref 8.5–10.5)
CALCIUM SERPL-MCNC: 8.8 MG/DL — SIGNIFICANT CHANGE UP (ref 8.5–10.5)
CHLORIDE SERPL-SCNC: 100 MMOL/L — SIGNIFICANT CHANGE UP (ref 96–108)
CHLORIDE SERPL-SCNC: 106 MMOL/L — SIGNIFICANT CHANGE UP (ref 96–108)
CO2 SERPL-SCNC: 31 MMOL/L — SIGNIFICANT CHANGE UP (ref 22–31)
CO2 SERPL-SCNC: 34 MMOL/L — HIGH (ref 22–31)
CREAT SERPL-MCNC: 1.01 MG/DL — SIGNIFICANT CHANGE UP (ref 0.5–1.3)
CREAT SERPL-MCNC: 1.25 MG/DL — SIGNIFICANT CHANGE UP (ref 0.5–1.3)
EGFR: 64 ML/MIN/1.73M2 — SIGNIFICANT CHANGE UP
EGFR: 83 ML/MIN/1.73M2 — SIGNIFICANT CHANGE UP
EOSINOPHIL # BLD AUTO: 0.02 K/UL — SIGNIFICANT CHANGE UP (ref 0–0.5)
EOSINOPHIL NFR BLD AUTO: 0.1 % — SIGNIFICANT CHANGE UP (ref 0–6)
GLUCOSE BLDC GLUCOMTR-MCNC: 113 MG/DL — HIGH (ref 70–99)
GLUCOSE BLDC GLUCOMTR-MCNC: 115 MG/DL — HIGH (ref 70–99)
GLUCOSE BLDC GLUCOMTR-MCNC: 155 MG/DL — HIGH (ref 70–99)
GLUCOSE BLDC GLUCOMTR-MCNC: 229 MG/DL — HIGH (ref 70–99)
GLUCOSE BLDC GLUCOMTR-MCNC: 377 MG/DL — HIGH (ref 70–99)
GLUCOSE BLDC GLUCOMTR-MCNC: 456 MG/DL — CRITICAL HIGH (ref 70–99)
GLUCOSE BLDC GLUCOMTR-MCNC: 75 MG/DL — SIGNIFICANT CHANGE UP (ref 70–99)
GLUCOSE BLDC GLUCOMTR-MCNC: 94 MG/DL — SIGNIFICANT CHANGE UP (ref 70–99)
GLUCOSE SERPL-MCNC: 42 MG/DL — CRITICAL LOW (ref 70–99)
GLUCOSE SERPL-MCNC: 442 MG/DL — HIGH (ref 70–99)
HCT VFR BLD CALC: 35 % — LOW (ref 39–50)
HGB BLD-MCNC: 11.5 G/DL — LOW (ref 13–17)
IMM GRANULOCYTES NFR BLD AUTO: 0.4 % — SIGNIFICANT CHANGE UP (ref 0–0.9)
LACTATE BLDV-MCNC: 1 MMOL/L — SIGNIFICANT CHANGE UP (ref 0.5–2)
LYMPHOCYTES # BLD AUTO: 1.32 K/UL — SIGNIFICANT CHANGE UP (ref 1–3.3)
LYMPHOCYTES # BLD AUTO: 8.2 % — LOW (ref 13–44)
MCHC RBC-ENTMCNC: 32.1 PG — SIGNIFICANT CHANGE UP (ref 27–34)
MCHC RBC-ENTMCNC: 32.9 GM/DL — SIGNIFICANT CHANGE UP (ref 32–36)
MCV RBC AUTO: 97.8 FL — SIGNIFICANT CHANGE UP (ref 80–100)
MONOCYTES # BLD AUTO: 1.07 K/UL — HIGH (ref 0–0.9)
MONOCYTES NFR BLD AUTO: 6.6 % — SIGNIFICANT CHANGE UP (ref 2–14)
NEUTROPHILS # BLD AUTO: 13.64 K/UL — HIGH (ref 1.8–7.4)
NEUTROPHILS NFR BLD AUTO: 84.4 % — HIGH (ref 43–77)
NRBC # BLD: 0 /100 WBCS — SIGNIFICANT CHANGE UP (ref 0–0)
PCO2 BLDV: 47 MMHG — SIGNIFICANT CHANGE UP (ref 42–55)
PH BLDV: 7.42 — SIGNIFICANT CHANGE UP (ref 7.32–7.43)
PLATELET # BLD AUTO: 191 K/UL — SIGNIFICANT CHANGE UP (ref 150–400)
PO2 BLDV: 38 MMHG — SIGNIFICANT CHANGE UP (ref 25–45)
POTASSIUM SERPL-MCNC: 3.6 MMOL/L — SIGNIFICANT CHANGE UP (ref 3.5–5.3)
POTASSIUM SERPL-MCNC: 3.9 MMOL/L — SIGNIFICANT CHANGE UP (ref 3.5–5.3)
POTASSIUM SERPL-SCNC: 3.6 MMOL/L — SIGNIFICANT CHANGE UP (ref 3.5–5.3)
POTASSIUM SERPL-SCNC: 3.9 MMOL/L — SIGNIFICANT CHANGE UP (ref 3.5–5.3)
PROT SERPL-MCNC: 7.1 G/DL — SIGNIFICANT CHANGE UP (ref 6.4–8.2)
RBC # BLD: 3.58 M/UL — LOW (ref 4.2–5.8)
RBC # FLD: 13.2 % — SIGNIFICANT CHANGE UP (ref 10.3–14.5)
SAO2 % BLDV: 69.3 % — SIGNIFICANT CHANGE UP (ref 67–88)
SODIUM SERPL-SCNC: 136 MMOL/L — SIGNIFICANT CHANGE UP (ref 132–145)
SODIUM SERPL-SCNC: 142 MMOL/L — SIGNIFICANT CHANGE UP (ref 132–145)
WBC # BLD: 16.16 K/UL — HIGH (ref 3.8–10.5)
WBC # FLD AUTO: 16.16 K/UL — HIGH (ref 3.8–10.5)

## 2023-10-01 PROCEDURE — 73660 X-RAY EXAM OF TOE(S): CPT | Mod: 26,RT

## 2023-10-01 PROCEDURE — 99222 1ST HOSP IP/OBS MODERATE 55: CPT | Mod: GC

## 2023-10-01 PROCEDURE — 71045 X-RAY EXAM CHEST 1 VIEW: CPT | Mod: 26

## 2023-10-01 PROCEDURE — 99285 EMERGENCY DEPT VISIT HI MDM: CPT

## 2023-10-01 RX ORDER — GLUCAGON INJECTION, SOLUTION 0.5 MG/.1ML
1 INJECTION, SOLUTION SUBCUTANEOUS ONCE
Refills: 0 | Status: DISCONTINUED | OUTPATIENT
Start: 2023-10-01 | End: 2023-10-03

## 2023-10-01 RX ORDER — ACETAMINOPHEN 500 MG
650 TABLET ORAL EVERY 6 HOURS
Refills: 0 | Status: DISCONTINUED | OUTPATIENT
Start: 2023-10-01 | End: 2023-10-03

## 2023-10-01 RX ORDER — INSULIN LISPRO 100/ML
VIAL (ML) SUBCUTANEOUS
Refills: 0 | Status: DISCONTINUED | OUTPATIENT
Start: 2023-10-01 | End: 2023-10-03

## 2023-10-01 RX ORDER — DEXTROSE 50 % IN WATER 50 %
25 SYRINGE (ML) INTRAVENOUS ONCE
Refills: 0 | Status: DISCONTINUED | OUTPATIENT
Start: 2023-10-01 | End: 2023-10-03

## 2023-10-01 RX ORDER — ASPIRIN/CALCIUM CARB/MAGNESIUM 324 MG
81 TABLET ORAL DAILY
Refills: 0 | Status: DISCONTINUED | OUTPATIENT
Start: 2023-10-01 | End: 2023-10-03

## 2023-10-01 RX ORDER — INSULIN LISPRO 100/ML
10 VIAL (ML) SUBCUTANEOUS
Refills: 0 | Status: DISCONTINUED | OUTPATIENT
Start: 2023-10-01 | End: 2023-10-02

## 2023-10-01 RX ORDER — OXYCODONE HYDROCHLORIDE 5 MG/1
5 TABLET ORAL EVERY 6 HOURS
Refills: 0 | Status: DISCONTINUED | OUTPATIENT
Start: 2023-10-01 | End: 2023-10-03

## 2023-10-01 RX ORDER — GABAPENTIN 400 MG/1
1600 CAPSULE ORAL THREE TIMES A DAY
Refills: 0 | Status: DISCONTINUED | OUTPATIENT
Start: 2023-10-01 | End: 2023-10-03

## 2023-10-01 RX ORDER — INSULIN HUMAN 100 [IU]/ML
10 INJECTION, SOLUTION SUBCUTANEOUS ONCE
Refills: 0 | Status: COMPLETED | OUTPATIENT
Start: 2023-10-01 | End: 2023-10-01

## 2023-10-01 RX ORDER — LISINOPRIL 2.5 MG/1
40 TABLET ORAL EVERY 24 HOURS
Refills: 0 | Status: DISCONTINUED | OUTPATIENT
Start: 2023-10-01 | End: 2023-10-03

## 2023-10-01 RX ORDER — LISINOPRIL 2.5 MG/1
0 TABLET ORAL
Qty: 0 | Refills: 0 | DISCHARGE

## 2023-10-01 RX ORDER — HYDROMORPHONE HYDROCHLORIDE 2 MG/ML
0.5 INJECTION INTRAMUSCULAR; INTRAVENOUS; SUBCUTANEOUS ONCE
Refills: 0 | Status: DISCONTINUED | OUTPATIENT
Start: 2023-10-01 | End: 2023-10-01

## 2023-10-01 RX ORDER — VANCOMYCIN HCL 1 G
1250 VIAL (EA) INTRAVENOUS ONCE
Refills: 0 | Status: COMPLETED | OUTPATIENT
Start: 2023-10-01 | End: 2023-10-01

## 2023-10-01 RX ORDER — GABAPENTIN 400 MG/1
2 CAPSULE ORAL
Qty: 0 | Refills: 0 | DISCHARGE

## 2023-10-01 RX ORDER — SODIUM CHLORIDE 9 MG/ML
1000 INJECTION, SOLUTION INTRAVENOUS
Refills: 0 | Status: DISCONTINUED | OUTPATIENT
Start: 2023-10-01 | End: 2023-10-03

## 2023-10-01 RX ORDER — MORPHINE SULFATE 50 MG/1
4 CAPSULE, EXTENDED RELEASE ORAL ONCE
Refills: 0 | Status: DISCONTINUED | OUTPATIENT
Start: 2023-10-01 | End: 2023-10-01

## 2023-10-01 RX ORDER — SODIUM CHLORIDE 9 MG/ML
1000 INJECTION INTRAMUSCULAR; INTRAVENOUS; SUBCUTANEOUS ONCE
Refills: 0 | Status: COMPLETED | OUTPATIENT
Start: 2023-10-01 | End: 2023-10-01

## 2023-10-01 RX ORDER — CEFAZOLIN SODIUM 1 G
1000 VIAL (EA) INJECTION EVERY 8 HOURS
Refills: 0 | Status: DISCONTINUED | OUTPATIENT
Start: 2023-10-01 | End: 2023-10-02

## 2023-10-01 RX ORDER — MORPHINE SULFATE 50 MG/1
2 CAPSULE, EXTENDED RELEASE ORAL ONCE
Refills: 0 | Status: DISCONTINUED | OUTPATIENT
Start: 2023-10-01 | End: 2023-10-01

## 2023-10-01 RX ORDER — DEXTROSE 50 % IN WATER 50 %
15 SYRINGE (ML) INTRAVENOUS ONCE
Refills: 0 | Status: DISCONTINUED | OUTPATIENT
Start: 2023-10-01 | End: 2023-10-03

## 2023-10-01 RX ORDER — DEXTROSE 50 % IN WATER 50 %
12.5 SYRINGE (ML) INTRAVENOUS ONCE
Refills: 0 | Status: DISCONTINUED | OUTPATIENT
Start: 2023-10-01 | End: 2023-10-03

## 2023-10-01 RX ORDER — ACETAMINOPHEN 500 MG
1000 TABLET ORAL ONCE
Refills: 0 | Status: COMPLETED | OUTPATIENT
Start: 2023-10-01 | End: 2023-10-01

## 2023-10-01 RX ADMIN — Medication 100 MILLIGRAM(S): at 12:54

## 2023-10-01 RX ADMIN — Medication 100 MILLIGRAM(S): at 21:17

## 2023-10-01 RX ADMIN — MORPHINE SULFATE 2 MILLIGRAM(S): 50 CAPSULE, EXTENDED RELEASE ORAL at 19:11

## 2023-10-01 RX ADMIN — Medication 166.67 MILLIGRAM(S): at 03:03

## 2023-10-01 RX ADMIN — HYDROMORPHONE HYDROCHLORIDE 0.5 MILLIGRAM(S): 2 INJECTION INTRAMUSCULAR; INTRAVENOUS; SUBCUTANEOUS at 08:55

## 2023-10-01 RX ADMIN — Medication 10: at 12:51

## 2023-10-01 RX ADMIN — MORPHINE SULFATE 2 MILLIGRAM(S): 50 CAPSULE, EXTENDED RELEASE ORAL at 23:38

## 2023-10-01 RX ADMIN — Medication 1000 MILLIGRAM(S): at 18:43

## 2023-10-01 RX ADMIN — Medication 10 UNIT(S): at 12:51

## 2023-10-01 RX ADMIN — GABAPENTIN 1600 MILLIGRAM(S): 400 CAPSULE ORAL at 13:07

## 2023-10-01 RX ADMIN — OXYCODONE HYDROCHLORIDE 5 MILLIGRAM(S): 5 TABLET ORAL at 22:17

## 2023-10-01 RX ADMIN — MORPHINE SULFATE 4 MILLIGRAM(S): 50 CAPSULE, EXTENDED RELEASE ORAL at 08:47

## 2023-10-01 RX ADMIN — Medication 10 UNIT(S): at 18:02

## 2023-10-01 RX ADMIN — Medication 400 MILLIGRAM(S): at 18:24

## 2023-10-01 RX ADMIN — GABAPENTIN 1600 MILLIGRAM(S): 400 CAPSULE ORAL at 21:17

## 2023-10-01 RX ADMIN — MORPHINE SULFATE 2 MILLIGRAM(S): 50 CAPSULE, EXTENDED RELEASE ORAL at 18:48

## 2023-10-01 RX ADMIN — MORPHINE SULFATE 4 MILLIGRAM(S): 50 CAPSULE, EXTENDED RELEASE ORAL at 03:02

## 2023-10-01 RX ADMIN — SODIUM CHLORIDE 1000 MILLILITER(S): 9 INJECTION INTRAMUSCULAR; INTRAVENOUS; SUBCUTANEOUS at 08:47

## 2023-10-01 RX ADMIN — OXYCODONE HYDROCHLORIDE 5 MILLIGRAM(S): 5 TABLET ORAL at 21:17

## 2023-10-01 RX ADMIN — Medication 1250 MILLIGRAM(S): at 08:47

## 2023-10-01 RX ADMIN — INSULIN HUMAN 10 UNIT(S): 100 INJECTION, SOLUTION SUBCUTANEOUS at 04:29

## 2023-10-01 RX ADMIN — LISINOPRIL 40 MILLIGRAM(S): 2.5 TABLET ORAL at 16:22

## 2023-10-01 RX ADMIN — MORPHINE SULFATE 4 MILLIGRAM(S): 50 CAPSULE, EXTENDED RELEASE ORAL at 05:40

## 2023-10-01 RX ADMIN — SODIUM CHLORIDE 1000 MILLILITER(S): 9 INJECTION INTRAMUSCULAR; INTRAVENOUS; SUBCUTANEOUS at 03:03

## 2023-10-01 NOTE — CONSULT NOTE ADULT - SUBJECTIVE AND OBJECTIVE BOX
Attending: Dr. Camacho     Patient is a 67y old  Male who presents with a chief complaint of infected toe wound (01 Oct 2023 11:00)      HPI:  67M PMH HTN, IDDM with peripheral neuropathy, CAD s/p PCI with 2 stents (3 yrs ago at [facility]) who presents to [facility] with right toe pain. Patient reports he sustained a mechanical fall on September 2nd where he fell down some stairs and struck his knees and his right foot, he subsequently went to [facility] where he had xrays done that confirmed no fractures however did sustain a laceration to his right 4th and 5th digits. Since then he reports the wound did not heal well and then he started to experience pain Thursday night (3 days prior to admission) in his right toes which got progressively worse to the point he was unable to ambulate and prompted him to go to the ED. He reports associated purplish discoloration on the 4th and 5th toes but denies any swelling. Also reports a fluid -filled blister that formed on the 4th toe last night which he popped himself and expressed clear-whitish discharge. He has been unable to bear weight on his right foot due to the pain. He has not taken any abx recently. He denies any fevers/chills at home, Reports associated numbness and tingling of his 4th and 5th right toes however no edema.   Podiatry addendum: Podiatry consulted to evaluate R toe wound. Pt states he noticed a blister between his 4th and 5th digits 3 days ago in the shower which he popped. He states he noticed clear drainage from the blister. Denies any purulence or malodor. Also states he noticed his 3rd and 4th interspace on the R looked macerated. Denies any other drainage. States he usually checks his feet every night and hadn't noticed any open wounds. Denies any pedal trauma or wounds to his R foot after his injury last month. Pt follows with podiatrist, Dr. Burrows at a clinic [place] for diabetic routine care when needed. States he last saw her 1 month ago. Denies any recent antibiotic use. Does complain of pain to R 4th digit and occasional numbness and tingling. Denies any redness or swelling.     Review of systems negative except per HPI and as stated below  General:	 no weakness; no fevers, no chills  Skin/Breast: no rash  Respiratory and Thorax: no SOB, no cough  Cardiovascular:	No chest pain  Gastrointestinal:	 no nausea, vomiting , diarrhea  Genitourinary:	no dysuria, no difficulty urinating, no hematuria  Musculoskeletal:	no weakness, no joint swelling/pain  Neurological:	no focal weakness/numbness  Endocrine:	no polyuria, no polydipsia    PAST MEDICAL & SURGICAL HISTORY:  IDDM (insulin dependent diabetes mellitus)      HTN (hypertension)      CAD S/P percutaneous coronary angioplasty      Neuropathy      No significant past surgical history        Home Medications:  lisinopril 40 mg oral tablet: 1 tab(s) orally once a day (01 Oct 2023 11:14)  Neurontin 800 mg oral tablet: 2 tab(s) orally 3 times a day (01 Oct 2023 11:14)  NovoLOG 100 units/mL subcutaneous solution: 11 unit(s) subcutaneous 3 times a day (01 Oct 2023 11:14)    Allergies    No Known Allergies    Intolerances      FAMILY HISTORY:    Social History:       LABS                        11.5   16.16 )-----------( 191      ( 01 Oct 2023 02:47 )             35.0     10-01    142  |  106  |  17  ----------------------------<  42<LL>  3.6   |  34<H>  |  1.01    Ca    8.7      01 Oct 2023 06:32    TPro  7.1  /  Alb  2.8<L>  /  TBili  0.4  /  DBili  x   /  AST  15  /  ALT  20  /  AlkPhos  96  10-01        Vital Signs Last 24 Hrs  T(C): 37.1 (01 Oct 2023 09:55), Max: 37.2 (01 Oct 2023 01:46)  T(F): 98.8 (01 Oct 2023 09:55), Max: 99 (01 Oct 2023 01:46)  HR: 71 (01 Oct 2023 09:55) (69 - 89)  BP: 153/86 (01 Oct 2023 09:55) (143/87 - 168/85)  BP(mean): --  RR: 16 (01 Oct 2023 09:55) (16 - 17)  SpO2: 97% (01 Oct 2023 09:55) (97% - 99%)    Parameters below as of 01 Oct 2023 09:55  Patient On (Oxygen Delivery Method): room air        PHYSICAL EXAM  General: NAD, AA0x3    Lower Extremity Focused:  Vasc: DP/PT 2/4 b/l, erythema to R 4th digit extending to forefoot, darkening of skin of R 4th digit around middle and proximal phalanx  Derm: Eschar on Right foot lateral 4th digit middle phalanx well-adhered to skin with overlying blister, no current drainage, does not probe, no tracking/tunneling, no fluctuance, Macerated 3rd & 4th interspaces on R foot, no open wounds left   Neuro: protective sensation slightly diminished  MSK: +TTP to R 4th proximal and middle phalanx     RADIOLOGY  < from: Xray Toes, Right Foot (10.01.23 @ 04:59) >  IMPRESSION: No evidence of acute fracture or dislocation. No radiographic   evidence of osteomyelitis  < end of copied text >

## 2023-10-01 NOTE — ED PROVIDER NOTE - PROGRESS NOTE DETAILS
called [facility] for admission to medicine, hospitalist will call me back. accepted for admission to medicine by dr Law

## 2023-10-01 NOTE — ED ADULT NURSE REASSESSMENT NOTE - NS ED NURSE REASSESS COMMENT FT1
rec'd report from RN Kirill-pt given breakfast requesting pain medication prior to leaving for transportation uptown to [facility]- pt VSS. Will monitor, requests nothing at this time

## 2023-10-01 NOTE — H&P ADULT - ATTENDING COMMENTS
67M PMH HTN, IDDM with peripheral neuropathy, CAD s/p PCI with 2 stents (3 yrs ago at [facility]) who presents to [facility] with right toe pain, admitted with right lower extremity toe wound.     Vital signs stable  Exam notable for ulcerated wound in between 1st and 2nd right lower extremity toes with swelling and tenderness without any purulence or any evidence of bone  Lab work noted  Xray without evidence of osteomyelitis    Plan:  #Right lower extremity toe wound, suspect diabetic foot ulcer - start IV Cefazolin, follow up cultures/ESR/CRP, follow up podiatry for local care recommendations including any need for debridement, continue pain control  #DM2 - continue pre-meal insulin and sliding scale, follow up A1c and if >10 would consider endocrinology consult   #CAD s/p PCI 3 years ago - add ASA 81mg daily     Dispo: pending podiatry recommendations, anticipate possible discharge in 24-48H

## 2023-10-01 NOTE — CONSULT NOTE ADULT - ASSESSMENT
67M PMH HTN, IDDM with peripheral neuropathy, CAD s/p PCI with 2 stents (3 yrs ago at [facility]) who presents to [facility] with right toe pain. Podiatry consulted to evaluate R toe wound. Pt states present for 3 days, denies any purulence or malodor. X-rays with no evidence of om or fracture. At time of consult, VSS, WBC 16.16, ESR/CRP pending. Clinically, macerated interspaces and stable eschar - erythema present, no other obvious signs of infection.     Plan:   - Recommend CT to evaluate for underlying abscess due to leukocytosis   - F/u ESR/CRP  - Local wound care - dressed wound with with betadine DSD  - C/w IV antibiotics   - Monitor erythema   - Pt can WBAT to RLE  - Rest of care per primary team     Plan d/w Attending. Podiatry following.  67M PMH HTN, IDDM with peripheral neuropathy, CAD s/p PCI with 2 stents (3 yrs ago at [facility]) who presents to [facility] with right toe pain. Podiatry consulted to evaluate R toe wound. Pt states present for 3 days, denies any purulence or malodor. X-rays with no evidence of om or fracture. At time of consult, VSS, WBC 16.16, ESR/CRP pending. Clinically, macerated interspaces and stable eschar - erythema present, no other obvious signs of infection.     Plan:   - Recommend CT w/ IV contrast to evaluate for underlying abscess due to leukocytosis   - F/u ESR/CRP  - Local wound care - dressed wound with with betadine DSD  - C/w IV antibiotics   - Monitor erythema   - Pt can WBAT to RLE  - Rest of care per primary team     Plan d/w Attending. Podiatry following.  67M PMH HTN, IDDM with peripheral neuropathy, CAD s/p PCI with 2 stents (3 yrs ago at [facility]) who presents to [facility] with right toe pain. Podiatry consulted to evaluate R toe wound. Pt states present for 3 days, denies any purulence or malodor. X-rays with no evidence of om or fracture. At time of consult, VSS, WBC 16.16, ESR/CRP pending. Clinically, macerated interspaces and stable eschar on lateral 4th digit - erythema present, no other obvious signs of infection.     Plan:   - Recommend CT w/ IV contrast to evaluate for underlying abscess due to leukocytosis   - F/u ESR/CRP  - Local wound care - dressed wound with with betadine DSD  - C/w IV antibiotics   - Monitor erythema   - Pt can WBAT to RLE  - Rest of care per primary team     Plan d/w Attending. Podiatry following.

## 2023-10-01 NOTE — H&P ADULT - NSICDXPASTMEDICALHX_GEN_ALL_CORE_FT
PAST MEDICAL HISTORY:  CAD S/P percutaneous coronary angioplasty     HTN (hypertension)     IDDM (insulin dependent diabetes mellitus)     Neuropathy

## 2023-10-01 NOTE — ED ADULT NURSE NOTE - OBJECTIVE STATEMENT
Pt is a 67y male c/o toe pain. pt c/o pain to R 4th and 5th toes with difficulty ambulating. R 4th appears to have blue discoloration with dark wound present. Pt states discoloration started last Monday. PMH neuropathy and diabetes.

## 2023-10-01 NOTE — H&P ADULT - NSHPLABSRESULTS_GEN_ALL_CORE
.  LABS:                         11.5   16.16 )-----------( 191      ( 01 Oct 2023 02:47 )             35.0     10-01    142  |  106  |  17  ----------------------------<  42<LL>  3.6   |  34<H>  |  1.01    Ca    8.7      01 Oct 2023 06:32    TPro  7.1  /  Alb  2.8<L>  /  TBili  0.4  /  DBili  x   /  AST  15  /  ALT  20  /  AlkPhos  96  10-01      Urinalysis Basic - ( 01 Oct 2023 06:32 )    Color: x / Appearance: x / SG: x / pH: x  Gluc: 42 mg/dL / Ketone: x  / Bili: x / Urobili: x   Blood: x / Protein: x / Nitrite: x   Leuk Esterase: x / RBC: x / WBC x   Sq Epi: x / Non Sq Epi: x / Bacteria: x            RADIOLOGY, EKG & ADDITIONAL TESTS: Reviewed.

## 2023-10-01 NOTE — ED PROVIDER NOTE - OBJECTIVE STATEMENT
67-year-old male with history of insulin-dependent diabetes, hypertension, with complaints of right fourth toe pain with ulceration and change in color, that he noticed over the last few days, he states he has a history of neuropathy related to his diabetes, pain has been getting worse and he is noted a color change in his with a nonhealing ulceration of both sides.  He had some purulent drainage from the right fourth toe as well along with pain with range of motion and redness of the dorsum of the right foot tracking proximally from the toe.  Subjective fever, pain is not relieved with over-the-counter medications. 67-year-old male with history of insulin-dependent diabetes, hypertension, with complaints of right fourth toe pain with ulceration and change in color, that he noticed over the last few days, he states he has a history of neuropathy related to his diabetes, pain has been getting worse and he is noted a color change in his with a nonhealing ulceration of both sides of the right 4th toe.  He had some purulent drainage from the right fourth toe as well along with pain with range of motion and redness of the dorsum of the right foot tracking proximally from the toe.  Subjective fever, pain is not relieved with over-the-counter medications.

## 2023-10-01 NOTE — ED ADULT NURSE REASSESSMENT NOTE - NS ED NURSE REASSESS COMMENT FT1
Pt blood glucose normalized after PO sugar intake. MD murrell made aware of improvement of blood glucose. Pt in NAD, denies complaints. Pending admission.

## 2023-10-01 NOTE — ED ADULT TRIAGE NOTE - CHIEF COMPLAINT QUOTE
Pt BIBA c/o R fourth and fifth toe pain. Hx of neuropathy. Normally takes gabapentin, has been having difficulty ambulating due to pain. Pt BIBA c/o R fourth and fifth toe pain. Hx of neuropathy. Normally takes gabapentin, has been having difficulty ambulating due to pain. Discoloration noted to R fourth toe.

## 2023-10-01 NOTE — PATIENT PROFILE ADULT - DEAF OR HARD OF HEARING?
Love Baldwin is a 24 year old female presenting for   Chief Complaint   Patient presents with   • Follow-up     axillary, right. Almost completly resolved     Denies Latex allergy or sensitivity.    Medication verified, no changes  Refills needed today: No    Health Maintenance Summary     Topic Due On Due Status Completed On    IMMUNIZATION - HPV  Completed Dec 29, 2014    PAP SMEAR - CERVICAL CANCER SCREENING Jul 29, 2019 Not Due Jul 29, 2016    Immunization - TDAP Pregnancy  Hidden     IMMUNIZATION - DTaP/Tdap/Td Oct 1, 2009 Overdue Sep 30, 2009    Immunization-Influenza Sep 1, 2018 Not Due     Depression Screening Apr 24, 2019 Not Due Apr 24, 2018          History   Smoking Status   • Never Smoker   Smokeless Tobacco   • Never Used                                no

## 2023-10-01 NOTE — ED ADULT NURSE REASSESSMENT NOTE - NS ED NURSE REASSESS COMMENT FT1
Received patient by previous RN, aonu resting comfortably in stretcher and in no acute pain or distress. Examined by MD and currently awaiting orders and further disposition.

## 2023-10-01 NOTE — H&P ADULT - NSHPPHYSICALEXAM_GEN_ALL_CORE
VITALS:   T(C): 37.1 (10-01-23 @ 09:55), Max: 37.2 (10-01-23 @ 01:46)  HR: 71 (10-01-23 @ 09:55) (69 - 89)  BP: 153/86 (10-01-23 @ 09:55) (143/87 - 168/85)  RR: 16 (10-01-23 @ 09:55) (16 - 17)  SpO2: 97% (10-01-23 @ 09:55) (97% - 99%)    GENERAL: NAD, lying in bed comfortably  HEAD:  Atraumatic, normocephalic  EYES: EOMI, PERRLA, conjunctiva and sclera clear  ENT: Moist mucous membranes  NECK: Supple, no JVD  HEART: Regular rate and rhythm, +systolic murmur   LUNGS: Unlabored respirations.  Clear to auscultation bilaterally, no crackles, wheezing, or rhonchi  ABDOMEN: Soft, nontender, nondistended, +BS  EXTREMITIES: 2+ peripheral pulses bilaterally. No clubbing, cyanosis, or edema. Right 4th and 5th toes with necrotic and ecchymotic wound on inside of lateral surfaces. No pus or drainable fluid collection. No erythema or edema. 2+ DP pulses b/l.   NERVOUS SYSTEM:  A&Ox3, no focal deficits

## 2023-10-01 NOTE — ED ADULT NURSE NOTE - NSFALLRISKINTERV_ED_ALL_ED

## 2023-10-01 NOTE — PATIENT PROFILE ADULT - FALL HARM RISK - HARM RISK INTERVENTIONS
Pt presents with N/V chills and fever. Pt placed in JC 1
Assistance with ambulation/Assistance OOB with selected safe patient handling equipment/Communicate Risk of Fall with Harm to all staff/Discuss with provider need for PT consult/Monitor gait and stability/Provide patient with walking aids - walker, cane, crutches/Reinforce activity limits and safety measures with patient and family/Tailored Fall Risk Interventions/Visual Cue: Yellow wristband and red socks/Bed in lowest position, wheels locked, appropriate side rails in place/Call bell, personal items and telephone in reach/Instruct patient to call for assistance before getting out of bed or chair/Non-slip footwear when patient is out of bed/[place] to call system/Physically safe environment - no spills, clutter or unnecessary equipment/Purposeful Proactive Rounding/Room/bathroom lighting operational, light cord in reach

## 2023-10-01 NOTE — H&P ADULT - HISTORY OF PRESENT ILLNESS
67M PMH HTN, IDDM with peripheral neuropathy, CAD s/p PCI with 2 stents (3 yrs ago at [facility]) who presents to [facility] with right toe pain. Patient reports he sustained a mechanical fall on September 2nd where he fell down some stairs and struck his knees and his right foot, he subsequently went to [facility] where he had xrays done that confirmed no fractures however did sustain a laceration to his right 4th and 5th digits. Since then he reports the wound did not heal well and then he started to experience pain Thursday night (3 days prior to admission) in his right toes which got progressively worse to the point he was unable to ambulate and prompted him to go to the ED. He reports associated purplish discoloration on the 4th and 5th toes but denies any swelling. Also reports a fluid -filled blister that formed on the 4th toe last night which he popped himself and expressed clear-whitish discharge. He has been unable to bear weight on his right foot due to the pain. He has not taken any abx recently. He denies any fevers/chills at home, Reports associated numbness and tingling of his 4th and 5th right toes however no edema.     In the ED:  Initial vitals: Temp 99F, HR 89, /87, RR 16, O2 98% RA  Labs significant for WBC 16.16, hgb 11.5, glucose 456  CXR clear   Right toes xray: No evidence of acute fracture or dislocation. No radiographic evidence of osteomyelitis.  Interventions: morphine 4mg IVP x2, dilaudid 0.5mg IVP x1, 10 units regular insulin, vancomycin 1250mg IVP x1, 1LNS

## 2023-10-01 NOTE — H&P ADULT - PROBLEM SELECTOR PLAN 1
Patient presenting with worsening right 4th and 5th toe pain and discoloration in setting of non-healing wound after sustaining fall. Afebrile and hemodynamically stable however with leukocytosis.   -right toe xrays done in ED showing no evidence of fracture or osteomyelitis   -s/p 1 dose vancomycin in ED   -tylenol prn fevers and mild pain   -podiatry consult   -c/w Patient presenting with worsening right 4th and 5th toe pain and discoloration in setting of non-healing wound after sustaining fall. Afebrile and hemodynamically stable however with leukocytosis.   -right toe xrays done in ED showing no evidence of fracture or osteomyelitis   -s/p 1 dose vancomycin in ED   -tylenol prn fevers and mild pain   -podiatry consult   -c/w  -f/u BCs Patient presenting with worsening right 4th and 5th toe pain and discoloration in setting of non-healing wound after sustaining fall. Afebrile and hemodynamically stable however with leukocytosis.   -right toe xrays done in ED showing no evidence of fracture or osteomyelitis   -s/p 1 dose vancomycin in ED   -tylenol prn fevers and mild pain   -oxycodone 5mg PO q8hrs prn severe pain  -podiatry consulted, f/u recs   -will treat with cefazolin IV 1g q8hrs , monitor for improvement   -f/u BCs

## 2023-10-01 NOTE — ED ADULT NURSE NOTE - CHIEF COMPLAINT QUOTE
Pt BIBA c/o R fourth and fifth toe pain. Hx of neuropathy. Normally takes gabapentin, has been having difficulty ambulating due to pain. Discoloration noted to R fourth toe.

## 2023-10-01 NOTE — ED PROVIDER NOTE - PHYSICAL EXAMINATION
VSS in NAD non toxic appearing   NCAT EOMI PERRL OP clear  heart RRR no murmur   lungs CTA no wheezing no rales no rhonchi   abd soft NT ND no CVAT no guarding no rebound   normal neuro exam CN I-XII grossly intact, no gross motor or sensory deficits  no peripheral c/c/e

## 2023-10-01 NOTE — H&P ADULT - PROBLEM SELECTOR PLAN 3
On home lisinopril 40mg qd   -c/w home lisinopril   -monitor BPs On home lisinopril 40mg qd   -c/w home lisinopril   -monitor BPs    #CAD s/p 2 stents   -patient reports he had 2 stents placed in his heart 3 years ago at [facility] but does not know location. Reports he does not take aspirin or plavix/brillinta.  -aspirin while inpatient   -collateral from PCP

## 2023-10-01 NOTE — H&P ADULT - PROBLEM SELECTOR PLAN 2
History of IDDM on home novolog 11 units TID pre-meal. Denies taking any basal insulin. Glucose 456 on admission without evidence of DKA as no AG. C/b neuropathy for which he reports taking gabapentin 1600mg TID.   -c/w home pre-meal insulin   -monitor blood sugars and adjust regimen/add basal insulin if needed   -f/u A1C  -collateral from PCP   -CC diet History of IDDM on home novolog 11 units TID pre-meal. Denies taking any basal insulin. Glucose 456 on admission without evidence of DKA as no AG. C/b neuropathy for which he reports taking gabapentin 1600mg TID (confirmed with pharmacy).  -c/w home pre-meal insulin   -monitor blood sugars and adjust regimen/add basal insulin if needed   -f/u A1C  -collateral from PCP   -CC diet  -c/w home gabapentin

## 2023-10-02 DIAGNOSIS — E11.628 TYPE 2 DIABETES MELLITUS WITH OTHER SKIN COMPLICATIONS: ICD-10-CM

## 2023-10-02 LAB
A1C WITH ESTIMATED AVERAGE GLUCOSE RESULT: 12.6 % — HIGH (ref 4–5.6)
ALBUMIN SERPL ELPH-MCNC: 2.5 G/DL — LOW (ref 3.3–5)
ALP SERPL-CCNC: 84 U/L — SIGNIFICANT CHANGE UP (ref 40–120)
ALT FLD-CCNC: 9 U/L — LOW (ref 10–45)
AST SERPL-CCNC: 17 U/L — SIGNIFICANT CHANGE UP (ref 10–40)
BASOPHILS # BLD AUTO: 0.03 K/UL — SIGNIFICANT CHANGE UP (ref 0–0.2)
BASOPHILS NFR BLD AUTO: 0.2 % — SIGNIFICANT CHANGE UP (ref 0–2)
BILIRUB SERPL-MCNC: 0.3 MG/DL — SIGNIFICANT CHANGE UP (ref 0.2–1.2)
BUN SERPL-MCNC: 19 MG/DL — SIGNIFICANT CHANGE UP (ref 7–23)
CALCIUM SERPL-MCNC: 8.6 MG/DL — SIGNIFICANT CHANGE UP (ref 8.4–10.5)
CHLORIDE SERPL-SCNC: 100 MMOL/L — SIGNIFICANT CHANGE UP (ref 96–108)
CO2 SERPL-SCNC: 22 MMOL/L — SIGNIFICANT CHANGE UP (ref 22–31)
CREAT SERPL-MCNC: 0.84 MG/DL — SIGNIFICANT CHANGE UP (ref 0.5–1.3)
CRP SERPL-MCNC: 49.6 MG/L — HIGH (ref 0–4)
EGFR: 97 ML/MIN/1.73M2 — SIGNIFICANT CHANGE UP
EOSINOPHIL # BLD AUTO: 0.03 K/UL — SIGNIFICANT CHANGE UP (ref 0–0.5)
EOSINOPHIL NFR BLD AUTO: 0.2 % — SIGNIFICANT CHANGE UP (ref 0–6)
ERYTHROCYTE [SEDIMENTATION RATE] IN BLOOD: 85 MM/HR — HIGH
ESTIMATED AVERAGE GLUCOSE: 315 MG/DL — HIGH (ref 68–114)
GLUCOSE BLDC GLUCOMTR-MCNC: 153 MG/DL — HIGH (ref 70–99)
GLUCOSE BLDC GLUCOMTR-MCNC: 261 MG/DL — HIGH (ref 70–99)
GLUCOSE BLDC GLUCOMTR-MCNC: 265 MG/DL — HIGH (ref 70–99)
GLUCOSE BLDC GLUCOMTR-MCNC: 77 MG/DL — SIGNIFICANT CHANGE UP (ref 70–99)
GLUCOSE SERPL-MCNC: 145 MG/DL — HIGH (ref 70–99)
HCT VFR BLD CALC: 32 % — LOW (ref 39–50)
HCV AB S/CO SERPL IA: 52.66 S/CO — HIGH
HCV AB SERPL-IMP: REACTIVE
HGB BLD-MCNC: 10.5 G/DL — LOW (ref 13–17)
IMM GRANULOCYTES NFR BLD AUTO: 0.5 % — SIGNIFICANT CHANGE UP (ref 0–0.9)
LYMPHOCYTES # BLD AUTO: 1.83 K/UL — SIGNIFICANT CHANGE UP (ref 1–3.3)
LYMPHOCYTES # BLD AUTO: 11.1 % — LOW (ref 13–44)
MAGNESIUM SERPL-MCNC: 1.8 MG/DL — SIGNIFICANT CHANGE UP (ref 1.6–2.6)
MCHC RBC-ENTMCNC: 32 PG — SIGNIFICANT CHANGE UP (ref 27–34)
MCHC RBC-ENTMCNC: 32.8 GM/DL — SIGNIFICANT CHANGE UP (ref 32–36)
MCV RBC AUTO: 97.6 FL — SIGNIFICANT CHANGE UP (ref 80–100)
MONOCYTES # BLD AUTO: 1.26 K/UL — HIGH (ref 0–0.9)
MONOCYTES NFR BLD AUTO: 7.7 % — SIGNIFICANT CHANGE UP (ref 2–14)
NEUTROPHILS # BLD AUTO: 13.23 K/UL — HIGH (ref 1.8–7.4)
NEUTROPHILS NFR BLD AUTO: 80.3 % — HIGH (ref 43–77)
NRBC # BLD: 0 /100 WBCS — SIGNIFICANT CHANGE UP (ref 0–0)
PHOSPHATE SERPL-MCNC: 2.5 MG/DL — SIGNIFICANT CHANGE UP (ref 2.5–4.5)
PLATELET # BLD AUTO: 163 K/UL — SIGNIFICANT CHANGE UP (ref 150–400)
POTASSIUM SERPL-MCNC: 4 MMOL/L — SIGNIFICANT CHANGE UP (ref 3.5–5.3)
POTASSIUM SERPL-SCNC: 4 MMOL/L — SIGNIFICANT CHANGE UP (ref 3.5–5.3)
PROT SERPL-MCNC: 6.2 G/DL — SIGNIFICANT CHANGE UP (ref 6–8.3)
RBC # BLD: 3.28 M/UL — LOW (ref 4.2–5.8)
RBC # FLD: 13.3 % — SIGNIFICANT CHANGE UP (ref 10.3–14.5)
SODIUM SERPL-SCNC: 132 MMOL/L — LOW (ref 135–145)
WBC # BLD: 16.47 K/UL — HIGH (ref 3.8–10.5)
WBC # FLD AUTO: 16.47 K/UL — HIGH (ref 3.8–10.5)

## 2023-10-02 PROCEDURE — 99232 SBSQ HOSP IP/OBS MODERATE 35: CPT

## 2023-10-02 PROCEDURE — 73701 CT LOWER EXTREMITY W/DYE: CPT | Mod: 26,RT

## 2023-10-02 PROCEDURE — 99222 1ST HOSP IP/OBS MODERATE 55: CPT | Mod: GC

## 2023-10-02 RX ORDER — VANCOMYCIN HCL 1 G
1000 VIAL (EA) INTRAVENOUS EVERY 12 HOURS
Refills: 0 | Status: DISCONTINUED | OUTPATIENT
Start: 2023-10-02 | End: 2023-10-03

## 2023-10-02 RX ORDER — SODIUM CHLORIDE 9 MG/ML
1000 INJECTION, SOLUTION INTRAVENOUS
Refills: 0 | Status: DISCONTINUED | OUTPATIENT
Start: 2023-10-02 | End: 2023-10-03

## 2023-10-02 RX ORDER — PIPERACILLIN AND TAZOBACTAM 4; .5 G/20ML; G/20ML
4.5 INJECTION, POWDER, LYOPHILIZED, FOR SOLUTION INTRAVENOUS ONCE
Refills: 0 | Status: COMPLETED | OUTPATIENT
Start: 2023-10-02 | End: 2023-10-02

## 2023-10-02 RX ORDER — MAGNESIUM SULFATE 500 MG/ML
2 VIAL (ML) INJECTION ONCE
Refills: 0 | Status: COMPLETED | OUTPATIENT
Start: 2023-10-02 | End: 2023-10-02

## 2023-10-02 RX ORDER — INSULIN LISPRO 100/ML
8 VIAL (ML) SUBCUTANEOUS
Refills: 0 | Status: DISCONTINUED | OUTPATIENT
Start: 2023-10-02 | End: 2023-10-03

## 2023-10-02 RX ORDER — INSULIN GLARGINE 100 [IU]/ML
15 INJECTION, SOLUTION SUBCUTANEOUS AT BEDTIME
Refills: 0 | Status: DISCONTINUED | OUTPATIENT
Start: 2023-10-02 | End: 2023-10-03

## 2023-10-02 RX ORDER — MORPHINE SULFATE 50 MG/1
2 CAPSULE, EXTENDED RELEASE ORAL ONCE
Refills: 0 | Status: DISCONTINUED | OUTPATIENT
Start: 2023-10-02 | End: 2023-10-02

## 2023-10-02 RX ORDER — HEPARIN SODIUM 5000 [USP'U]/ML
5000 INJECTION INTRAVENOUS; SUBCUTANEOUS EVERY 8 HOURS
Refills: 0 | Status: DISCONTINUED | OUTPATIENT
Start: 2023-10-02 | End: 2023-10-03

## 2023-10-02 RX ORDER — PIPERACILLIN AND TAZOBACTAM 4; .5 G/20ML; G/20ML
4.5 INJECTION, POWDER, LYOPHILIZED, FOR SOLUTION INTRAVENOUS EVERY 8 HOURS
Refills: 0 | Status: DISCONTINUED | OUTPATIENT
Start: 2023-10-02 | End: 2023-10-03

## 2023-10-02 RX ORDER — PIPERACILLIN AND TAZOBACTAM 4; .5 G/20ML; G/20ML
4.5 INJECTION, POWDER, LYOPHILIZED, FOR SOLUTION INTRAVENOUS EVERY 8 HOURS
Refills: 0 | Status: DISCONTINUED | OUTPATIENT
Start: 2023-10-02 | End: 2023-10-02

## 2023-10-02 RX ORDER — PIPERACILLIN AND TAZOBACTAM 4; .5 G/20ML; G/20ML
3.38 INJECTION, POWDER, LYOPHILIZED, FOR SOLUTION INTRAVENOUS ONCE
Refills: 0 | Status: DISCONTINUED | OUTPATIENT
Start: 2023-10-02 | End: 2023-10-02

## 2023-10-02 RX ORDER — PIPERACILLIN AND TAZOBACTAM 4; .5 G/20ML; G/20ML
3.38 INJECTION, POWDER, LYOPHILIZED, FOR SOLUTION INTRAVENOUS EVERY 6 HOURS
Refills: 0 | Status: DISCONTINUED | OUTPATIENT
Start: 2023-10-02 | End: 2023-10-02

## 2023-10-02 RX ADMIN — OXYCODONE HYDROCHLORIDE 5 MILLIGRAM(S): 5 TABLET ORAL at 04:31

## 2023-10-02 RX ADMIN — PIPERACILLIN AND TAZOBACTAM 200 GRAM(S): 4; .5 INJECTION, POWDER, LYOPHILIZED, FOR SOLUTION INTRAVENOUS at 10:21

## 2023-10-02 RX ADMIN — Medication 250 MILLIGRAM(S): at 19:30

## 2023-10-02 RX ADMIN — INSULIN GLARGINE 15 UNIT(S): 100 INJECTION, SOLUTION SUBCUTANEOUS at 22:43

## 2023-10-02 RX ADMIN — MORPHINE SULFATE 2 MILLIGRAM(S): 50 CAPSULE, EXTENDED RELEASE ORAL at 00:08

## 2023-10-02 RX ADMIN — OXYCODONE HYDROCHLORIDE 5 MILLIGRAM(S): 5 TABLET ORAL at 03:31

## 2023-10-02 RX ADMIN — Medication 650 MILLIGRAM(S): at 22:17

## 2023-10-02 RX ADMIN — Medication 8 UNIT(S): at 18:02

## 2023-10-02 RX ADMIN — GABAPENTIN 1600 MILLIGRAM(S): 400 CAPSULE ORAL at 14:27

## 2023-10-02 RX ADMIN — Medication 81 MILLIGRAM(S): at 13:01

## 2023-10-02 RX ADMIN — Medication 6: at 22:44

## 2023-10-02 RX ADMIN — OXYCODONE HYDROCHLORIDE 5 MILLIGRAM(S): 5 TABLET ORAL at 10:25

## 2023-10-02 RX ADMIN — GABAPENTIN 1600 MILLIGRAM(S): 400 CAPSULE ORAL at 05:11

## 2023-10-02 RX ADMIN — HEPARIN SODIUM 5000 UNIT(S): 5000 INJECTION INTRAVENOUS; SUBCUTANEOUS at 22:18

## 2023-10-02 RX ADMIN — LISINOPRIL 40 MILLIGRAM(S): 2.5 TABLET ORAL at 10:04

## 2023-10-02 RX ADMIN — MORPHINE SULFATE 2 MILLIGRAM(S): 50 CAPSULE, EXTENDED RELEASE ORAL at 14:09

## 2023-10-02 RX ADMIN — Medication 250 MILLIGRAM(S): at 10:21

## 2023-10-02 RX ADMIN — Medication 650 MILLIGRAM(S): at 12:54

## 2023-10-02 RX ADMIN — Medication 10 UNIT(S): at 10:02

## 2023-10-02 RX ADMIN — PIPERACILLIN AND TAZOBACTAM 25 GRAM(S): 4; .5 INJECTION, POWDER, LYOPHILIZED, FOR SOLUTION INTRAVENOUS at 19:45

## 2023-10-02 RX ADMIN — OXYCODONE HYDROCHLORIDE 5 MILLIGRAM(S): 5 TABLET ORAL at 19:29

## 2023-10-02 RX ADMIN — OXYCODONE HYDROCHLORIDE 5 MILLIGRAM(S): 5 TABLET ORAL at 09:32

## 2023-10-02 RX ADMIN — GABAPENTIN 1600 MILLIGRAM(S): 400 CAPSULE ORAL at 22:17

## 2023-10-02 RX ADMIN — Medication 2: at 10:02

## 2023-10-02 RX ADMIN — Medication 650 MILLIGRAM(S): at 14:09

## 2023-10-02 RX ADMIN — Medication 25 GRAM(S): at 12:46

## 2023-10-02 RX ADMIN — MORPHINE SULFATE 2 MILLIGRAM(S): 50 CAPSULE, EXTENDED RELEASE ORAL at 15:00

## 2023-10-02 RX ADMIN — SODIUM CHLORIDE 75 MILLILITER(S): 9 INJECTION, SOLUTION INTRAVENOUS at 10:21

## 2023-10-02 RX ADMIN — Medication 6: at 18:02

## 2023-10-02 RX ADMIN — Medication 100 MILLIGRAM(S): at 05:11

## 2023-10-02 NOTE — DIETITIAN INITIAL EVALUATION ADULT - OTHER INFO
- Currently ordered for lactated ringers   - ISS, A1C: 12.6%, POCT ,  - Nutritionally Pertinent labs 10/2 NA: 132 (L)  - Hyponatremic; received .9% IV NaCl   - Peripheral neuropathy

## 2023-10-02 NOTE — CONSULT NOTE ADULT - ATTENDING COMMENTS
Pt seen on rounds this afternoon.  67-yo man with insulin-requiring DM (says that he is type 1, but it appears that he thinks that all pts on insulin are type 1) admitted with diabetic foot infection involving the 4-5th toes of his R foot.  Infection apparently dates back to an injury he sustained at the beginning of September, which has progressed since that time and at some point involved drainage from the 4th toe.  CT scan was negative for collection, and no surgical intervention is planned by Podiatry.  He has been diabetic for 10 years, is on an unusual regimen of 70/30 insulin 3X/day but does not usually take more than one or two doses.  Describes fingersticks in the AM of "150" but readings later in the day of over 200.  A1c level markedly elevated at 12.6%  As noted by Dr. Alexis, almost impossible to get a complete diet recall from the pt, who Is extremely irritable, says because of the pain  Unable to examine the foot due to dressing.  It is Kerlex-wrapped, with serosanguinous drainage on the dressing.  He has no increased warmth above the ankle, but reports pain in the ankle.  Glucose was 456 on admission, but has come down to the 100-150 range with correction doses.  --Can start standing insulin regimen with Lantus 15 units/premeal lispro 8 units  --Check C-peptide level to see if pt a type 1 diabetic  --Would obtain MRI of the foot given the extremely high ESR and CRP  --Would also strongly consider VAscular Consult given his known atherosclerotic disease, plus the Podiatry description of a dusky appearance of the 4th toe and the eschar.

## 2023-10-02 NOTE — DIETITIAN INITIAL EVALUATION ADULT - PROBLEM SELECTOR PLAN 3
On home lisinopril 40mg qd   -c/w home lisinopril   -monitor BPs    #CAD s/p 2 stents   -patient reports he had 2 stents placed in his heart 3 years ago at [facility] but does not know location. Reports he does not take aspirin or plavix/brillinta.  -aspirin while inpatient   -collateral from PCP

## 2023-10-02 NOTE — DIETITIAN INITIAL EVALUATION ADULT - NSFNSADHERENCEPTAFT_GEN_A_CORE
Per H&P: Pt manages blood glucose with Novolog. A1C: 12.6% on 10/2. Indicates poor glycemic control at home.

## 2023-10-02 NOTE — DIETITIAN INITIAL EVALUATION ADULT - NSFNSGIASSESSMENTFT_GEN_A_CORE
No issues with nausea, vomiting, diarrhea, constipation reported at time of visit. Pt reports having a BM yesterday 10/1. No documented BM in flow sheets. Pt is not on a bowel regimen.

## 2023-10-02 NOTE — PROGRESS NOTE ADULT - ASSESSMENT
67M PMH HTN, IDDM with peripheral neuropathy, CAD s/p PCI with 2 stents (3 yrs ago at [facility]) who presents to [facility] with right toe pain. Podiatry consulted to evaluate R toe wound. Pt states present for 3 days, denies any purulence or malodor. X-rays with no evidence of om or fracture. At time of consult, VSS, WBC 16.16, ESR/CRP pending. Clinically, macerated interspaces and stable eschar on lateral 4th digit - erythema present, no other obvious signs of infection.     Plan:   - Recommend CT w/ IV contrast to evaluate for underlying abscess due to leukocytosis   - F/u ESR/CRP  - Local wound care - dressed wound with with betadine DSD  - C/w IV antibiotics   - Monitor erythema   - Pt can WBAT to RLE  - Rest of care per primary team     Plan d/w Attending. Podiatry following.    67M PMH HTN, IDDM with peripheral neuropathy, CAD s/p PCI with 2 stents (3 yrs ago at [facility]) who presents to [facility] with right toe pain. Podiatry consulted to evaluate R toe wound. Pt states present for 3 days, denies any purulence or malodor. X-rays with no evidence of om or fracture. At time of consult, VSS, WBC 16.16, ESR/CRP pending. Clinically, macerated interspaces and stable eschar on lateral 4th digit - erythema present, no other obvious signs of infection. CT with final read of no soft tissue abscess. ESR and CRP below threshold of OM. Low concern for OM.     Plan:   - Local wound care - dressed wound with betadine DSD  - C/w IV antibiotics, pt can be transitioned to PO abx for discharge.   - Monitor erythema   - Pt can WBAT to RLE  - Rest of care per primary team       Discharge Instructions   - Complete prescribed abx   - Local wound care: Change dressing daily. Cleanse with NS. Apply betadine DSD to 4th digit and 4rd interspace.   - Patient should follow up with Dr. Tom Camacho within 1 week of discharge.    Office information:          [place] Address- [address], [place] [address] Phone: [phone]         [place] Address- [address], [place] [address] Phone: [phone]    Plan d/w Attending. Podiatry following.

## 2023-10-02 NOTE — DIETITIAN INITIAL EVALUATION ADULT - OTHER CALCULATIONS
Based on Standards of Care pt within % IBW (87%) thus actual body weight (130 pounds) used for all calculations. Needs adjusted for advanced age and for healing toe wound.

## 2023-10-02 NOTE — PROGRESS NOTE ADULT - SUBJECTIVE AND OBJECTIVE BOX
*****INCOMPLETE NOTE*****    INTERVAL HPI/OVERNIGHT EVENTS:    SUBJECTIVE: Patient seen and examined at bedside, resting comfortably in bed, and does not appear to be in any acute distress. Patient states  Patient denies any recent fevers, chills, nausea, vomiting, headache, acute sob, chest pain, abdominal pain, genitourinary sx, extremity pain or swelling.     ANTIBIOTICS/RELEVANT:    MEDICATIONS  (STANDING):  aspirin enteric coated 81 milliGRAM(s) Oral daily  ceFAZolin   IVPB 1000 milliGRAM(s) IV Intermittent every 8 hours  dextrose 5%. 1000 milliLiter(s) (50 mL/Hr) IV Continuous <Continuous>  dextrose 5%. 1000 milliLiter(s) (100 mL/Hr) IV Continuous <Continuous>  dextrose 50% Injectable 25 Gram(s) IV Push once  dextrose 50% Injectable 12.5 Gram(s) IV Push once  dextrose 50% Injectable 25 Gram(s) IV Push once  gabapentin 1600 milliGRAM(s) Oral three times a day  glucagon  Injectable 1 milliGRAM(s) IntraMuscular once  insulin lispro (ADMELOG) corrective regimen sliding scale   SubCutaneous Before meals and at bedtime  insulin lispro Injectable (ADMELOG) 10 Unit(s) SubCutaneous three times a day before meals  lisinopril 40 milliGRAM(s) Oral every 24 hours    MEDICATIONS  (PRN):  acetaminophen     Tablet .. 650 milliGRAM(s) Oral every 6 hours PRN Temp greater or equal to 38C (100.4F), Mild Pain (1 - 3)  dextrose Oral Gel 15 Gram(s) Oral once PRN Blood Glucose LESS THAN 70 milliGRAM(s)/deciliter  oxyCODONE    IR 5 milliGRAM(s) Oral every 6 hours PRN Severe Pain (7 - 10)      Vital Signs Last 24 Hrs  T(C): 37.4 (02 Oct 2023 06:23), Max: 37.4 (01 Oct 2023 16:19)  T(F): 99.3 (02 Oct 2023 06:23), Max: 99.4 (01 Oct 2023 16:19)  HR: 64 (02 Oct 2023 06:23) (59 - 71)  BP: 168/73 (02 Oct 2023 06:23) (135/64 - 168/85)  BP(mean): 88 (01 Oct 2023 16:19) (88 - 88)  RR: 19 (02 Oct 2023 06:23) (16 - 19)  SpO2: 97% (02 Oct 2023 06:23) (97% - 99%)    Parameters below as of 02 Oct 2023 06:23  Patient On (Oxygen Delivery Method): room air        PHYSICAL EXAM:  General: in no acute distress  Eyes: EOMI intact bilaterally. Anicteric sclerae, moist conjunctivae  HENT: Moist mucous membranes  Neck: Trachea midline, supple  Lungs: CTA B/L. No wheezes, rales, or rhonchi  Cardiovascular: RRR. No murmurs, rubs, or gallops  Abdomen: Soft, non-tender non-distended; No rebound or guarding  Extremities: WWP, No clubbing, cyanosis or edema  Neurological: Alert and oriented  Skin: Warm and dry. No obvious rash     LABS:                        10.5   16.47 )-----------( 163      ( 02 Oct 2023 05:30 )             32.0     10-01    142  |  106  |  17  ----------------------------<  42<LL>  3.6   |  34<H>  |  1.01    Ca    8.7      01 Oct 2023 06:32    TPro  7.1  /  Alb  2.8<L>  /  TBili  0.4  /  DBili  x   /  AST  15  /  ALT  20  /  AlkPhos  96  10-01      Urinalysis Basic - ( 01 Oct 2023 06:32 )    Color: x / Appearance: x / SG: x / pH: x  Gluc: 42 mg/dL / Ketone: x  / Bili: x / Urobili: x   Blood: x / Protein: x / Nitrite: x   Leuk Esterase: x / RBC: x / WBC x   Sq Epi: x / Non Sq Epi: x / Bacteria: x        MICROBIOLOGY:    RADIOLOGY & ADDITIONAL STUDIES: INTERVAL HPI/OVERNIGHT EVENTS: breakthrough pain, given morphine 2mg IV x1    SUBJECTIVE: Patient seen and examined at bedside, resting comfortably in bed, and does not appear to be in any acute distress. Patient states he has continued pain of his right foot. States that he has mild nausea secondary to the morphine last night. Denies any shortness of breath, chest pain, abdominal pain, or gu sxs.    MEDICATIONS  (STANDING):  aspirin enteric coated 81 milliGRAM(s) Oral daily  ceFAZolin   IVPB 1000 milliGRAM(s) IV Intermittent every 8 hours  dextrose 5%. 1000 milliLiter(s) (50 mL/Hr) IV Continuous <Continuous>  dextrose 5%. 1000 milliLiter(s) (100 mL/Hr) IV Continuous <Continuous>  dextrose 50% Injectable 25 Gram(s) IV Push once  dextrose 50% Injectable 12.5 Gram(s) IV Push once  dextrose 50% Injectable 25 Gram(s) IV Push once  gabapentin 1600 milliGRAM(s) Oral three times a day  glucagon  Injectable 1 milliGRAM(s) IntraMuscular once  insulin lispro (ADMELOG) corrective regimen sliding scale   SubCutaneous Before meals and at bedtime  insulin lispro Injectable (ADMELOG) 10 Unit(s) SubCutaneous three times a day before meals  lisinopril 40 milliGRAM(s) Oral every 24 hours    MEDICATIONS  (PRN):  acetaminophen     Tablet .. 650 milliGRAM(s) Oral every 6 hours PRN Temp greater or equal to 38C (100.4F), Mild Pain (1 - 3)  dextrose Oral Gel 15 Gram(s) Oral once PRN Blood Glucose LESS THAN 70 milliGRAM(s)/deciliter  oxyCODONE    IR 5 milliGRAM(s) Oral every 6 hours PRN Severe Pain (7 - 10)      Vital Signs Last 24 Hrs  T(C): 37.4 (02 Oct 2023 06:23), Max: 37.4 (01 Oct 2023 16:19)  T(F): 99.3 (02 Oct 2023 06:23), Max: 99.4 (01 Oct 2023 16:19)  HR: 64 (02 Oct 2023 06:23) (59 - 71)  BP: 168/73 (02 Oct 2023 06:23) (135/64 - 168/85)  BP(mean): 88 (01 Oct 2023 16:19) (88 - 88)  RR: 19 (02 Oct 2023 06:23) (16 - 19)  SpO2: 97% (02 Oct 2023 06:23) (97% - 99%)    Parameters below as of 02 Oct 2023 06:23  Patient On (Oxygen Delivery Method): room air        PHYSICAL EXAM:  General: in no acute distress  Eyes: EOMI intact bilaterally  HENT: Moist mucous membranes  Neck: Trachea midline, supple  Lungs: CTA B/L  Cardiovascular: RRR  Abdomen: Firm, non-tender non-distended  Extremities: WWP, peripheral pulses intact bilateral, Right 4th and 5th toe with necrotic ecchymotic wound, without pus.  Neurological: Alert and oriented  Skin: Warm and dry    LABS:                        10.5   16.47 )-----------( 163      ( 02 Oct 2023 05:30 )             32.0     10-01    142  |  106  |  17  ----------------------------<  42<LL>  3.6   |  34<H>  |  1.01    Ca    8.7      01 Oct 2023 06:32    TPro  7.1  /  Alb  2.8<L>  /  TBili  0.4  /  DBili  x   /  AST  15  /  ALT  20  /  AlkPhos  96  10-01      Urinalysis Basic - ( 01 Oct 2023 06:32 )    Color: x / Appearance: x / SG: x / pH: x  Gluc: 42 mg/dL / Ketone: x  / Bili: x / Urobili: x   Blood: x / Protein: x / Nitrite: x   Leuk Esterase: x / RBC: x / WBC x   Sq Epi: x / Non Sq Epi: x / Bacteria: x        MICROBIOLOGY:    RADIOLOGY & ADDITIONAL STUDIES:

## 2023-10-02 NOTE — PROGRESS NOTE ADULT - SUBJECTIVE AND OBJECTIVE BOX
Patient is a 67y old  Male who presents with a chief complaint of infected toe wound (01 Oct 2023 12:48)      INTERVAL HPI/ OVERNIGHT EVENTS: Pt evaluated this morning with attending. Pt states that he removed the dressing overnight due to pain. Currently no other complaints. Pending CT scan.       LABS                        11.5   16.16 )-----------( 191      ( 01 Oct 2023 02:47 )             35.0     10-01    142  |  106  |  17  ----------------------------<  42<LL>  3.6   |  34<H>  |  1.01    Ca    8.7      01 Oct 2023 06:32    TPro  7.1  /  Alb  2.8<L>  /  TBili  0.4  /  DBili  x   /  AST  15  /  ALT  20  /  AlkPhos  96  10-01        ICU Vital Signs Last 24 Hrs  T(C): 37.4 (02 Oct 2023 06:23), Max: 37.4 (01 Oct 2023 16:19)  T(F): 99.3 (02 Oct 2023 06:23), Max: 99.4 (01 Oct 2023 16:19)  HR: 64 (02 Oct 2023 06:23) (59 - 71)  BP: 168/73 (02 Oct 2023 06:23) (135/64 - 168/85)  BP(mean): 88 (01 Oct 2023 16:19) (88 - 88)  ABP: --  ABP(mean): --  RR: 19 (02 Oct 2023 06:23) (16 - 19)  SpO2: 97% (02 Oct 2023 06:23) (97% - 99%)    O2 Parameters below as of 02 Oct 2023 06:23  Patient On (Oxygen Delivery Method): room air            Lower Extremity Focused:  Vasc: DP/PT 2/4 b/l, erythema to R 4th digit extending to forefoot, darkening of skin of R 4th digit around middle and proximal phalanx  Derm: Eschar on Right foot lateral 4th digit middle phalanx well-adhered to skin with overlying blister, no current drainage, does not probe, no tracking/tunneling, no fluctuance, Macerated 3rd & 4th interspaces on R foot, no open wounds left   Neuro: protective sensation slightly diminished  MSK: +TTP to R 4th proximal and middle phalanx     RADIOLOGY  < from: Xray Toes, Right Foot (10.01.23 @ 04:59) >  IMPRESSION: No evidence of acute fracture or dislocation. No radiographic   evidence of osteomyelitis  < end of copied text > Patient is a 67y old  Male who presents with a chief complaint of infected toe wound (01 Oct 2023 12:48)      INTERVAL HPI/ OVERNIGHT EVENTS: Pt evaluated this morning with attending. Pt states that he removed the dressing overnight due to pain. Currently no other complaints. Pending CT scan.       LABS                        11.5   16.16 )-----------( 191      ( 01 Oct 2023 02:47 )             35.0     10-01    142  |  106  |  17  ----------------------------<  42<LL>  3.6   |  34<H>  |  1.01    Ca    8.7      01 Oct 2023 06:32    TPro  7.1  /  Alb  2.8<L>  /  TBili  0.4  /  DBili  x   /  AST  15  /  ALT  20  /  AlkPhos  96  10-01        ICU Vital Signs Last 24 Hrs  T(C): 37.4 (02 Oct 2023 06:23), Max: 37.4 (01 Oct 2023 16:19)  T(F): 99.3 (02 Oct 2023 06:23), Max: 99.4 (01 Oct 2023 16:19)  HR: 64 (02 Oct 2023 06:23) (59 - 71)  BP: 168/73 (02 Oct 2023 06:23) (135/64 - 168/85)  BP(mean): 88 (01 Oct 2023 16:19) (88 - 88)  ABP: --  ABP(mean): --  RR: 19 (02 Oct 2023 06:23) (16 - 19)  SpO2: 97% (02 Oct 2023 06:23) (97% - 99%)    O2 Parameters below as of 02 Oct 2023 06:23  Patient On (Oxygen Delivery Method): room air            Lower Extremity Focused:  Vasc: DP/PT 2/4 b/l, erythema to R 4th digit extending to forefoot, darkening of skin of R 4th digit around middle and proximal phalanx  Derm: Eschar on Right foot lateral 4th digit middle phalanx well-adhered to skin , no current drainage, does not probe, no tracking/tunneling, no fluctuance, Macerated 3rd & 4th interspaces on R foot, no open wounds left   Neuro: protective sensation slightly diminished. Mild dusky appearance of the 4th digit.   MSK: +TTP to R 4th proximal and middle phalanx     RADIOLOGY  < from: Xray Toes, Right Foot (10.01.23 @ 04:59) >  IMPRESSION: No evidence of acute fracture or dislocation. No radiographic   evidence of osteomyelitis  < end of copied text >

## 2023-10-02 NOTE — DIETITIAN INITIAL EVALUATION ADULT - REASON FOR ADMISSION
"67M PMH HTN, IDDM with peripheral neuropathy, CAD s/p PCI with 2 stents (3 yrs ago at [facility]) who presents to [facility] with right toe pain, admitted for treatment of infected non-healing right toe wound."

## 2023-10-02 NOTE — DIETITIAN INITIAL EVALUATION ADULT - ORAL INTAKE PTA/DIET HISTORY
Visited pt at bedside on 4UR. Pt reports having a good appetite at home. States that he does not follow any therapeutic diet at home. When trying to obtain diet information/DM adherence/management PTA, pt show frustration and states that did not want to discuss any further as pt is in pain. No cultural, ethnic, L[employer]-day food preferences noted. Confirms No known food allergies.

## 2023-10-02 NOTE — CONSULT NOTE ADULT - SUBJECTIVE AND OBJECTIVE BOX
HISTORY OF PRESENT ILLNESS  JOE BRUNSON is a 67y Male with a past medical history of     67M PMH HTN, IDDM with peripheral neuropathy, CAD s/p PCI with 2 stents (3 yrs ago at [facility]) who presents to [facility] with right toe pain, hep c. Patient reports he sustained a mechanical fall on September 2nd where he fell down some stairs and struck his knees and his right foot, he subsequently went to [place] ED where he had xrays done that confirmed no fractures however did sustain a laceration to his right 4th and 5th digits. Since then he reports the wound did not heal well and then he started to experience pain Thursday night (3 days prior to admission) in his right toes which got progressively worse to the point he was unable to ambulate and prompted him to go to the ED. He reports associated purplish discoloration on the 4th and 5th toes but denies any swelling. Also reports a fluid -filled blister that formed on the 4th toe last night which he popped himself and expressed clear-whitish discharge. He has been unable to bear weight on his right foot due to the pain. He has not taken any abx recently. He denies any fevers/chills at home, Reports associated numbness and tingling of his 4th and 5th right toes however no edema.     On admission, pt has been hemodynamically stable.  Labs revealed WBC 16, Na 136, glucose 442,Cr 1.25, a1c 12.6.  CT right foot revealed   No tracking soft tissue emphysema drainable mature abscess of right foot      Endocrinology has been consulted for Diabetes Management.    DIABETES HISTORY  - Age at diagnosis:   - Symptoms at time of diagnosis:   - Current Therapy:  - History of other regimens:   - History of hypoglycemia:   - History of DKA/HHS:   - Diabetic Complications:   - Home FSG:        > Fasting: *** mg/dL.        > Before meals: *** mg/dL.        > Bedtime: *** mg/dL.  - Diet:          > Breakfast:         > Lunch:        > Dinner:        > Snacks:  - Physical activity:    - Diabetes managed outpatient by:    FAMILY HISTORY  - Diabetes:  - Thyroid:  - Autoimmune:  - Other:    SOCIAL HISTORY  - Work:  - Alcohol:  - Smoking:  - Recreational Drugs:    ALLERGIES  No Known Allergies      CURRENT MEDICATIONS  acetaminophen     Tablet .. 650 milliGRAM(s) Oral every 6 hours PRN  aspirin enteric coated 81 milliGRAM(s) Oral daily  dextrose 5%. 1000 milliLiter(s) IV Continuous <Continuous>  dextrose 5%. 1000 milliLiter(s) IV Continuous <Continuous>  dextrose 50% Injectable 25 Gram(s) IV Push once  dextrose 50% Injectable 12.5 Gram(s) IV Push once  dextrose 50% Injectable 25 Gram(s) IV Push once  dextrose Oral Gel 15 Gram(s) Oral once PRN  gabapentin 1600 milliGRAM(s) Oral three times a day  glucagon  Injectable 1 milliGRAM(s) IntraMuscular once  insulin lispro (ADMELOG) corrective regimen sliding scale   SubCutaneous Before meals and at bedtime  insulin lispro Injectable (ADMELOG) 10 Unit(s) SubCutaneous three times a day before meals  lactated ringers. 1000 milliLiter(s) IV Continuous <Continuous>  lisinopril 40 milliGRAM(s) Oral every 24 hours  magnesium sulfate  IVPB 2 Gram(s) IV Intermittent once  oxyCODONE    IR 5 milliGRAM(s) Oral every 6 hours PRN  piperacillin/tazobactam IVPB.- 4.5 Gram(s) IV Intermittent once  piperacillin/tazobactam IVPB.. 4.5 Gram(s) IV Intermittent every 8 hours  vancomycin  IVPB 1000 milliGRAM(s) IV Intermittent every 12 hours      REVIEW OF SYSTEMS  Constitutional:  Negative fever, chills or loss of appetite.  Eyes:  Negative blurry vision or double vision.  Cardiovascular:  Negative for chest pain or palpitations.  Respiratory:  Negative for cough, wheezing, or shortness of breath.   Gastrointestinal:  Negative for nausea, vomiting, diarrhea, constipation, or abdominal pain.  Genitourinary:  Negative frequency, urgency or dysuria.  Neurologic:  No headache, confusion, dizziness, lightheadedness.    PHYSICAL EXAM  Vital Signs Last 24 Hrs  T(C): 37.3 (02 Oct 2023 09:46), Max: 37.4 (01 Oct 2023 16:19)  T(F): 99.1 (02 Oct 2023 09:46), Max: 99.4 (01 Oct 2023 16:19)  HR: 65 (02 Oct 2023 09:46) (59 - 65)  BP: 178/86 (02 Oct 2023 09:46) (135/64 - 178/86)  BP(mean): 88 (01 Oct 2023 16:19) (88 - 88)  RR: 18 (02 Oct 2023 09:46) (16 - 19)  SpO2: 96% (02 Oct 2023 09:46) (96% - 99%)    Parameters below as of 02 Oct 2023 09:46  Patient On (Oxygen Delivery Method): room air    Constitutional: Awake, alert, in no acute distress.   HEENT: Normocephalic, atraumatic, RAGHAV, no proptosis or lid retraction.   Neck: supple, no acanthosis, no thyromegaly or palpable thyroid nodules.  Respiratory: Lungs clear to ausculation bilaterally.   Cardiovascular: regular rhythm, normal S1 and S2, no audible murmurs.   GI: soft, non-tender, non-distended, bowel sounds present, no masses appreciated.  Extremities: No lower extremity edema, peripheral pulses present.   Skin: no rashes.   Psychiatric: AAO x 3. Normal affect/mood.     LABS  CBC - WBC/HGB/HTC/PLT: 16.47/10.5/32.0/163 (10-02-23)  BMP: Na/K/Cl/Bicarb/BUN/Cr/Gluc: 132/4.0/100/22/19/0.84/145 (10-02-23)  Anion Gap: -- (10-02-23)  eGFR: 97 (10-02-23)  Calcium: 8.6 (10-02-23)  Phosphorus: 2.5 (10-02-23)  Magnesium: 1.8 (10-02-23)  LFT - Alb/Tprot/Tbili/Dbili/AlkPhos/ALT/AST: 2.5/--/0.3/--/84/9/17 (10-02-23)      CBC - WBC/HGB/HTC/PLT: 16.47/10.5/32.0/163 (10-02-23)BMP: Na/K/Cl/Bicarb/BUN/Cr/Gluc: 132/4.0/100/22/19/0.84/145 (10-02-23)  Anion Gap: -- (10-02-23)  eGFR: 97 (10-02-23)  Calcium: 8.6 (10-02-23)  Phosphorus: 2.5 (10-02-23)  Magnesium: 1.8 (10-02-23)    CAPILLARY BLOOD GLUCOSE & INSULIN RECEIVED  442 mg/dL (10-01 @ 02:47)  456 mg/dL (10-01 @ 04:28) - regular 10  94 mg/dL (10-01 @ 06:18)  42 mg/dL (10-01 @ 06:32)  75 mg/dL (10-01 @ 06:46)  155 mg/dL (10-01 @ 07:23)  229 mg/dL (10-01 @ 10:43)  377 mg/dL (10-01 @ 12:39) - lispro 10 + lispro 10  115 mg/dL (10-01 @ 17:33) - lispro 10  113 mg/dL (10-01 @ 22:22)  145 mg/dL (10-02 @ 05:30)  153 mg/dL (10-02 @ 09:26) - lispro 10 + 2u lispro            ASSESSMENT / RECOMMENDATIONS    A1C: 12.6 %  BUN: 19  Creatinine: 0.84  GFR: 97  Weight: 59  BMI: 20.4  EF:     # Type 2 diabetes mellitus with hyperglycemia  - Please keep lantus  units at bedtime.   - Keep lispro  units before each meal.  - Keep lispro moderate dose sliding scale before meals and at bedtime.  - Patient's fingerstick glucose goal is 100-180 mg/dL.    - Discharge recommendations to be discussed.   - Patient can follow up at discharge with [facility] by calling [phone] to make an appointment.      Case discussed with Dr. Huertas. Primary team updated.       Christa Alexis  Endocrinology Fellow    Service Pager: [phone]  HISTORY OF PRESENT ILLNESS  JOE BRUNSON is a 67y Male with a past medical history of Type 1 DM, peripheral neuropathy, CAD s/p stent placement, presented with worsening right toe lesion.   Pt reportedly had a mechanical fall on  and retained laceration on right 4th and 5th digits.   Xray after the wall did not show fractures.  The lesions got worsened that became fluid filled and the 4th toe lesion drained whitish discharge as he popped.  The pain prompted him to come to [facility].       On admission, pt has been hemodynamically stable.  Labs revealed WBC 16, Na 136, glucose 442,Cr 1.25, a1c 12.6, ESR 85, CRP 49.6.  CT right foot revealed no tracking soft tissue emphysema drainable mature abscess of right foot.    Endocrinology has been consulted for Diabetes Management.    DIABETES HISTORY  - Age at diagnosis: 10 years ago  - Current Therapy:  Novolin 70/30 11 u 3 times daily  - Diabetic Complications:  neuropathy  - Home FSG:        > Fastin mg/dL.        > Before meals: 200-300 mg/dL.        > Bedtime: 140 mg/dL.  - Diet:  Pt was frustrated when asked about diet history, claimed that diet is the same as here in the hospital.  He no longer wishes to discuss, claimed he was in alot of pain        > Snacks: eats bananas and oragnes and became frustrated when asked about the quantifiable amount  - Diabetes managed outpatient by: endocrinologist, unsure of the name    FAMILY HISTORY  - Diabetes: mother    SOCIAL HISTORY  - Work: not wroking  - Alcohol: denies  - Smoking: denies    ALLERGIES  No Known Allergies      CURRENT MEDICATIONS  acetaminophen     Tablet .. 650 milliGRAM(s) Oral every 6 hours PRN  aspirin enteric coated 81 milliGRAM(s) Oral daily  dextrose 5%. 1000 milliLiter(s) IV Continuous <Continuous>  dextrose 5%. 1000 milliLiter(s) IV Continuous <Continuous>  dextrose 50% Injectable 25 Gram(s) IV Push once  dextrose 50% Injectable 12.5 Gram(s) IV Push once  dextrose 50% Injectable 25 Gram(s) IV Push once  dextrose Oral Gel 15 Gram(s) Oral once PRN  gabapentin 1600 milliGRAM(s) Oral three times a day  glucagon  Injectable 1 milliGRAM(s) IntraMuscular once  insulin lispro (ADMELOG) corrective regimen sliding scale   SubCutaneous Before meals and at bedtime  insulin lispro Injectable (ADMELOG) 10 Unit(s) SubCutaneous three times a day before meals  lactated ringers. 1000 milliLiter(s) IV Continuous <Continuous>  lisinopril 40 milliGRAM(s) Oral every 24 hours  magnesium sulfate  IVPB 2 Gram(s) IV Intermittent once  oxyCODONE    IR 5 milliGRAM(s) Oral every 6 hours PRN  piperacillin/tazobactam IVPB.- 4.5 Gram(s) IV Intermittent once  piperacillin/tazobactam IVPB.. 4.5 Gram(s) IV Intermittent every 8 hours  vancomycin  IVPB 1000 milliGRAM(s) IV Intermittent every 12 hours      REVIEW OF SYSTEMS  Constitutional:  Negative fever, chills or loss of appetite.  Eyes:  Negative blurry vision or double vision.  Cardiovascular:  Negative for chest pain or palpitations.  Respiratory:  Negative for cough, wheezing, or shortness of breath.   Gastrointestinal:  Negative for nausea, vomiting, diarrhea, constipation, or abdominal pain.  Genitourinary:  Negative frequency, urgency or dysuria.  Neurologic:  No headache, confusion, dizziness, lightheadedness.    PHYSICAL EXAM  Vital Signs Last 24 Hrs  T(C): 37.3 (02 Oct 2023 09:46), Max: 37.4 (01 Oct 2023 16:19)  T(F): 99.1 (02 Oct 2023 09:46), Max: 99.4 (01 Oct 2023 16:19)  HR: 65 (02 Oct 2023 09:46) (59 - 65)  BP: 178/86 (02 Oct 2023 09:46) (135/64 - 178/86)  BP(mean): 88 (01 Oct 2023 16:19) (88 - 88)  RR: 18 (02 Oct 2023 09:46) (16 - 19)  SpO2: 96% (02 Oct 2023 09:46) (96% - 99%)    Parameters below as of 02 Oct 2023 09:46  Patient On (Oxygen Delivery Method): room air    Constitutional: Awake, alert, in no acute distress.   HEENT: Normocephalic, atraumatic, RAGHAV, no proptosis or lid retraction.   Neck: supple, no acanthosis, no thyromegaly or palpable thyroid nodules.  Respiratory: Lungs clear to ausculation bilaterally.   Cardiovascular: regular rhythm, normal S1 and S2, no audible murmurs.   GI: soft, non-tender, non-distended, bowel sounds present, no masses appreciated.  Extremities: No lower extremity edema, peripheral pulses present.   Skin: no rashes.   Psychiatric: AAO x 3. Normal affect/mood.     LABS  CBC - WBC/HGB/HTC/PLT: 16.47/10.5/32.0/163 (10-02-23)  BMP: Na/K/Cl/Bicarb/BUN/Cr/Gluc: 132/4.0/100/22/19/0.84/145 (10-02-23)  Anion Gap: -- (10-02-23)  eGFR: 97 (10-02-23)  Calcium: 8.6 (10-02-23)  Phosphorus: 2.5 (10-02-23)  Magnesium: 1.8 (10-02-23)  LFT - Alb/Tprot/Tbili/Dbili/AlkPhos/ALT/AST: 2.5/--/0.3/--/84/ (10-02-23)      CBC - WBC/HGB/HTC/PLT: 16.47/10.5/32.0/163 (10-02-23)BMP: Na/K/Cl/Bicarb/BUN/Cr/Gluc: 132/4.0/100/22/19/0.84/145 (10-02-23)  Anion Gap: -- (10-02-23)  eGFR: 97 (10-02-23)  Calcium: 8.6 (10-02-23)  Phosphorus: 2.5 (10-02-23)  Magnesium: 1.8 (10-02-23)    CAPILLARY BLOOD GLUCOSE & INSULIN RECEIVED  442 mg/dL (10-01 @ 02:47)  456 mg/dL (10-01 @ 04:28) - regular 10  94 mg/dL (10-01 @ 06:18)  42 mg/dL (10-01 @ 06:32)  75 mg/dL (10-01 @ 06:46)  155 mg/dL (10-01 @ 07:23)  229 mg/dL (10-01 @ 10:43)  377 mg/dL (10-01 @ 12:39) - lispro 10 + lispro 10  115 mg/dL (10-01 @ 17:33) - lispro 10  113 mg/dL (10-01 @ 22:22)  145 mg/dL (10-02 @ 05:30)  153 mg/dL (10-02 @ 09:26) - lispro 10 + 2u lispro            ASSESSMENT / RECOMMENDATIONS    67y Male with a past medical history of Type 1 DM, peripheral neuropathy, CAD s/p stent placement, presented with worsening right toe lesion.   Pt reportedly had a mechanical fall on  and retained laceration on right 4th and 5th digits. Pt admitted for cellulitis. Endocrinology has been consulted for Diabetes Management.    A1C: 12.6 %  BUN: 19  Creatinine: 0.84  GFR: 97  Weight: 59  BMI: 20.4  EF:     # Type 2 diabetes mellitus with hyperglycemia  - Please keep lantus  units at bedtime.   - Keep lispro  units before each meal.  - Keep lispro moderate dose sliding scale before meals and at bedtime.  - Patient's fingerstick glucose goal is 100-180 mg/dL.    - Discharge recommendations to be discussed.   - Patient can follow up at discharge with [facility] by calling [phone] to make an appointment.      Case discussed with Dr. Huertas. Primary team updated.       Christa Alexis  Endocrinology Fellow    Service Pager: [phone]  HISTORY OF PRESENT ILLNESS  JOE BRUNSON is a 67y Male with a past medical history of Type 1 DM, peripheral neuropathy, CAD s/p stent placement, presented with worsening right toe lesion.   Pt reportedly had a mechanical fall on  and retained laceration on right 4th and 5th digits.   Xray after the wall did not show fractures.  The lesions got worsened that became fluid filled and the 4th toe lesion drained whitish discharge as he popped.  The pain prompted him to come to [facility].       On admission, pt has been hemodynamically stable.  Labs revealed WBC 16, Na 136, glucose 442,Cr 1.25, a1c 12.6, ESR 85, CRP 49.6.  CT right foot revealed no tracking soft tissue emphysema drainable mature abscess of right foot.    Endocrinology has been consulted for Diabetes Management.    DIABETES HISTORY  - Age at diagnosis: 10 years ago  - Current Therapy:  Novolin 70/30 11 u 3 times daily  - Diabetic Complications:  neuropathy  - Home FSG:        > Fastin mg/dL.        > Before meals: 200-300 mg/dL.        > Bedtime: 140 mg/dL.  - Diet:  Pt was frustrated when asked about diet history, claimed that diet is the same as here in the hospital.  He no longer wishes to discuss, claimed he was in alot of pain        > Snacks: eats bananas and oragnes and became frustrated when asked about the quantifiable amount  - Diabetes managed outpatient by: endocrinologist, unsure of the name  - pt states sometimes he would forget to take insulin, instead of taking 3 times daily, sometimes would only take two times daily    FAMILY HISTORY  - Diabetes: mother    SOCIAL HISTORY  - Work: not wroking  - Alcohol: denies  - Smoking: denies    ALLERGIES  No Known Allergies      CURRENT MEDICATIONS  acetaminophen     Tablet .. 650 milliGRAM(s) Oral every 6 hours PRN  aspirin enteric coated 81 milliGRAM(s) Oral daily  dextrose 5%. 1000 milliLiter(s) IV Continuous <Continuous>  dextrose 5%. 1000 milliLiter(s) IV Continuous <Continuous>  dextrose 50% Injectable 25 Gram(s) IV Push once  dextrose 50% Injectable 12.5 Gram(s) IV Push once  dextrose 50% Injectable 25 Gram(s) IV Push once  dextrose Oral Gel 15 Gram(s) Oral once PRN  gabapentin 1600 milliGRAM(s) Oral three times a day  glucagon  Injectable 1 milliGRAM(s) IntraMuscular once  insulin lispro (ADMELOG) corrective regimen sliding scale   SubCutaneous Before meals and at bedtime  insulin lispro Injectable (ADMELOG) 10 Unit(s) SubCutaneous three times a day before meals  lactated ringers. 1000 milliLiter(s) IV Continuous <Continuous>  lisinopril 40 milliGRAM(s) Oral every 24 hours  magnesium sulfate  IVPB 2 Gram(s) IV Intermittent once  oxyCODONE    IR 5 milliGRAM(s) Oral every 6 hours PRN  piperacillin/tazobactam IVPB.- 4.5 Gram(s) IV Intermittent once  piperacillin/tazobactam IVPB.. 4.5 Gram(s) IV Intermittent every 8 hours  vancomycin  IVPB 1000 milliGRAM(s) IV Intermittent every 12 hours      REVIEW OF SYSTEMS  Constitutional:  Negative fever, chills or loss of appetite.  Eyes:  Negative blurry vision or double vision.  Cardiovascular:  Negative for chest pain or palpitations.  Respiratory:  Negative for cough, wheezing, or shortness of breath.   Gastrointestinal:  Negative for nausea, vomiting, diarrhea, constipation, or abdominal pain.  Genitourinary:  Negative frequency, urgency or dysuria.  Neurologic:  No headache, confusion, dizziness, lightheadedness.    PHYSICAL EXAM  Vital Signs Last 24 Hrs  T(C): 37.3 (02 Oct 2023 09:46), Max: 37.4 (01 Oct 2023 16:19)  T(F): 99.1 (02 Oct 2023 09:46), Max: 99.4 (01 Oct 2023 16:19)  HR: 65 (02 Oct 2023 09:46) (59 - 65)  BP: 178/86 (02 Oct 2023 09:46) (135/64 - 178/86)  BP(mean): 88 (01 Oct 2023 16:19) (88 - 88)  RR: 18 (02 Oct 2023 09:46) (16 - 19)  SpO2: 96% (02 Oct 2023 09:46) (96% - 99%)    Parameters below as of 02 Oct 2023 09:46  Patient On (Oxygen Delivery Method): room air    Constitutional: Awake, alert, in no acute distress.   HEENT: Normocephalic, atraumatic, RAGHAV, no proptosis or lid retraction.   Neck: supple, no acanthosis, no thyromegaly or palpable thyroid nodules.  Respiratory: Lungs clear to ausculation bilaterally.   Cardiovascular: regular rhythm, normal S1 and S2, no audible murmurs.   GI: soft, non-tender, non-distended, bowel sounds present, no masses appreciated.  Extremities: right foot wrapped in dressing  Skin: no rashes.   Psychiatric: AAO x 3. Normal affect/mood.     LABS  CBC - WBC/HGB/HTC/PLT: 16.47/10.5/32.0/163 (10-02-23)  BMP: Na/K/Cl/Bicarb/BUN/Cr/Gluc: 132/4.0/100/22/19/0.84/145 (10-02-23)  Anion Gap: -- (10-02-23)  eGFR: 97 (10-02-23)  Calcium: 8.6 (10-02-23)  Phosphorus: 2.5 (10-02-23)  Magnesium: 1.8 (10-02-23)  LFT - Alb/Tprot/Tbili/Dbili/AlkPhos/ALT/AST: 2.5/--/0.3/--/84/9/17 (10-02-23)      CBC - WBC/HGB/HTC/PLT: 16.47/10.5/32.0/163 (10-02-23)BMP: Na/K/Cl/Bicarb/BUN/Cr/Gluc: 132/4.0/100/22/19/0.84/145 (10-02-23)  Anion Gap: -- (10-02-23)  eGFR: 97 (10-02-23)  Calcium: 8.6 (10-02-23)  Phosphorus: 2.5 (10-02-23)  Magnesium: 1.8 (10-02-23)    CAPILLARY BLOOD GLUCOSE & INSULIN RECEIVED  442 mg/dL (10-01 @ 02:47)  456 mg/dL (10-01 @ 04:28) - regular 10  94 mg/dL (10-01 @ 06:18)  42 mg/dL (10-01 @ 06:32)  75 mg/dL (10-01 @ 06:46)  155 mg/dL (10-01 @ 07:23)  229 mg/dL (10-01 @ 10:43)  377 mg/dL (10-01 @ 12:39) - lispro 10 + lispro 10  115 mg/dL (10-01 @ 17:33) - lispro 10  113 mg/dL (10-01 @ 22:22)  145 mg/dL (10-02 @ 05:30)  153 mg/dL (10-02 @ 09:26) - lispro 10 + 2u lispro            ASSESSMENT / RECOMMENDATIONS    67y Male with a past medical history of Type 1 DM, peripheral neuropathy, CAD s/p stent placement, presented with worsening right toe lesion.   Pt reportedly had a mechanical fall on  and retained laceration on right 4th and 5th digits. Pt admitted for cellulitis. Endocrinology has been consulted for Diabetes Management.    A1C: 12.6 %  BUN: 19  Creatinine: 0.84  GFR: 97  Weight: 59  BMI: 20.4  EF:     # Type 2 diabetes mellitus with hyperglycemia  - suspicion for non-compliance given pt states he forget to take insulin sometimes  - Please keep lantus  units at bedtime.   - Keep lispro  units before each meal.  - Keep lispro moderate dose sliding scale before meals and at bedtime.  - Patient's fingerstick glucose goal is 100-180 mg/dL.    - Discharge recommendations to be discussed.   - Patient can follow up at discharge with [facility] by calling [phone] to make an appointment.      Case discussed with Dr. Huertas. Primary team updated.       Christa Alexis  Endocrinology Fellow    Service Pager: [phone]  HISTORY OF PRESENT ILLNESS  JOE BRUNSON is a 67y Male with a past medical history of Type 1 DM, peripheral neuropathy, CAD s/p stent placement, presented with worsening right toe lesion.   Pt reportedly had a mechanical fall on  and retained laceration on right 4th and 5th digits.   Xray after the wall did not show fractures.  The lesions got worsened that became fluid filled and the 4th toe lesion drained whitish discharge as he popped.  The pain prompted him to come to [facility].       On admission, pt has been hemodynamically stable.  Labs revealed WBC 16, Na 136, glucose 442,Cr 1.25, a1c 12.6, ESR 85, CRP 49.6.  CT right foot revealed no tracking soft tissue emphysema drainable mature abscess of right foot.    Endocrinology has been consulted for Diabetes Management.    DIABETES HISTORY  - Age at diagnosis: 10 years ago  - Current Therapy:  Novolin 70/30 11 u 3 times daily  - Diabetic Complications:  neuropathy  - Home FSG:        > Fastin mg/dL.        > Before meals: 200-300 mg/dL.        > Bedtime: 140 mg/dL.  - Diet:  Pt was frustrated when asked about diet history, claimed that diet is the same as here in the hospital.  He no longer wishes to discuss, claimed he was in alot of pain        > Snacks: eats bananas and oragnes and became frustrated when asked about the quantifiable amount  - Diabetes managed outpatient by: endocrinologist, unsure of the name  - pt states sometimes he would forget to take insulin, instead of taking 3 times daily, sometimes would only take two times daily    FAMILY HISTORY  - Diabetes: mother    SOCIAL HISTORY  - Work: not wroking  - Alcohol: denies  - Smoking: denies    ALLERGIES  No Known Allergies      CURRENT MEDICATIONS  acetaminophen     Tablet .. 650 milliGRAM(s) Oral every 6 hours PRN  aspirin enteric coated 81 milliGRAM(s) Oral daily  dextrose 5%. 1000 milliLiter(s) IV Continuous <Continuous>  dextrose 5%. 1000 milliLiter(s) IV Continuous <Continuous>  dextrose 50% Injectable 25 Gram(s) IV Push once  dextrose 50% Injectable 12.5 Gram(s) IV Push once  dextrose 50% Injectable 25 Gram(s) IV Push once  dextrose Oral Gel 15 Gram(s) Oral once PRN  gabapentin 1600 milliGRAM(s) Oral three times a day  glucagon  Injectable 1 milliGRAM(s) IntraMuscular once  insulin lispro (ADMELOG) corrective regimen sliding scale   SubCutaneous Before meals and at bedtime  insulin lispro Injectable (ADMELOG) 10 Unit(s) SubCutaneous three times a day before meals  lactated ringers. 1000 milliLiter(s) IV Continuous <Continuous>  lisinopril 40 milliGRAM(s) Oral every 24 hours  magnesium sulfate  IVPB 2 Gram(s) IV Intermittent once  oxyCODONE    IR 5 milliGRAM(s) Oral every 6 hours PRN  piperacillin/tazobactam IVPB.- 4.5 Gram(s) IV Intermittent once  piperacillin/tazobactam IVPB.. 4.5 Gram(s) IV Intermittent every 8 hours  vancomycin  IVPB 1000 milliGRAM(s) IV Intermittent every 12 hours      REVIEW OF SYSTEMS  Constitutional:  Negative fever, chills or loss of appetite.  Eyes:  Negative blurry vision or double vision.  Cardiovascular:  Negative for chest pain or palpitations.  Respiratory:  Negative for cough, wheezing, or shortness of breath.   Gastrointestinal:  Negative for nausea, vomiting, diarrhea, constipation, or abdominal pain.  Genitourinary:  Negative frequency, urgency or dysuria.  Neurologic:  No headache, confusion, dizziness, lightheadedness.    PHYSICAL EXAM  Vital Signs Last 24 Hrs  T(C): 37.3 (02 Oct 2023 09:46), Max: 37.4 (01 Oct 2023 16:19)  T(F): 99.1 (02 Oct 2023 09:46), Max: 99.4 (01 Oct 2023 16:19)  HR: 65 (02 Oct 2023 09:46) (59 - 65)  BP: 178/86 (02 Oct 2023 09:46) (135/64 - 178/86)  BP(mean): 88 (01 Oct 2023 16:19) (88 - 88)  RR: 18 (02 Oct 2023 09:46) (16 - 19)  SpO2: 96% (02 Oct 2023 09:46) (96% - 99%)    Parameters below as of 02 Oct 2023 09:46  Patient On (Oxygen Delivery Method): room air    Constitutional: Awake, alert, in no acute distress.   HEENT: Normocephalic, atraumatic, RAGHAV, no proptosis or lid retraction.   Neck: supple, no acanthosis, no thyromegaly or palpable thyroid nodules.  Respiratory: Lungs clear to ausculation bilaterally.   Cardiovascular: regular rhythm, normal S1 and S2, no audible murmurs.   GI: soft, non-tender, non-distended, bowel sounds present, no masses appreciated.  Extremities: right foot wrapped in dressing  Skin: no rashes.   Psychiatric: AAO x 3. Normal affect/mood.     LABS  CBC - WBC/HGB/HTC/PLT: 16.47/10.5/32.0/163 (10-02-23)  BMP: Na/K/Cl/Bicarb/BUN/Cr/Gluc: 132/4.0/100/22/19/0.84/145 (10-02-23)  Anion Gap: -- (10-02-23)  eGFR: 97 (10-02-23)  Calcium: 8.6 (10-02-23)  Phosphorus: 2.5 (10-02-23)  Magnesium: 1.8 (10-02-23)  LFT - Alb/Tprot/Tbili/Dbili/AlkPhos/ALT/AST: 2.5/--/0.3/--/84/9/17 (10-02-23)      CBC - WBC/HGB/HTC/PLT: 16.47/10.5/32.0/163 (10-02-23)BMP: Na/K/Cl/Bicarb/BUN/Cr/Gluc: 132/4.0/100/22/19/0.84/145 (10-02-23)  Anion Gap: -- (10-02-23)  eGFR: 97 (10-02-23)  Calcium: 8.6 (10-02-23)  Phosphorus: 2.5 (10-02-23)  Magnesium: 1.8 (10-02-23)    CAPILLARY BLOOD GLUCOSE & INSULIN RECEIVED  442 mg/dL (10-01 @ 02:47)  456 mg/dL (10-01 @ 04:28) - regular 10  94 mg/dL (10-01 @ 06:18)  42 mg/dL (10-01 @ 06:32)  75 mg/dL (10-01 @ 06:46)  155 mg/dL (10-01 @ 07:23)  229 mg/dL (10-01 @ 10:43)  377 mg/dL (10-01 @ 12:39) - lispro 10 + lispro 10  115 mg/dL (10-01 @ 17:33) - lispro 10  113 mg/dL (10-01 @ 22:22)  145 mg/dL (10-02 @ 05:30)  153 mg/dL (10-02 @ 09:26) - lispro 10 + 2u lispro            ASSESSMENT / RECOMMENDATIONS    67y Male with a past medical history of Type 1 DM, peripheral neuropathy, CAD s/p stent placement, presented with worsening right toe lesion.   Pt reportedly had a mechanical fall on  and retained laceration on right 4th and 5th digits. Pt admitted for cellulitis. Endocrinology has been consulted for Diabetes Management.    A1C: 12.6 %  BUN: 19  Creatinine: 0.84  GFR: 97  Weight: 59  BMI: 20.4  EF:     # Type 1 diabetes mellitus with hyperglycemia  - pt endorsed he is type 1, high suspicion that his diabetes course behaves more as type 2, please check c-peptide and antibodies for diabetes  - suspicion for non-compliance given pt states he forget to take insulin sometimes  - given some low glucose readings, Please keep lantus 15 units at bedtime.   - Keep lispro 8  units before each meal.  - Keep lispro moderate dose sliding scale before meals and at bedtime.  - Patient's fingerstick glucose goal is 100-180 mg/dL.    - Discharge recommendations to be discussed.   - Patient can follow up at discharge with [facility] by calling [phone] to make an appointment.      Case discussed with Dr. Huertas. Primary team updated.       Christa Alexis  Endocrinology Fellow    Service Pager: [phone]

## 2023-10-02 NOTE — DIETITIAN INITIAL EVALUATION ADULT - ADD RECOMMEND
1. Continue current diet as tolerated: Consistent Carbohydrate w/ No Evening Snack, DASH/TLC. Defer diet texture/fluid consistencies to team.   2. Recommend multivitamin for aid in wound healing unless otherwise contraindicated.   3. Provide ongoing education as needed.   4. Monitor PO intake, diet tolerance, weight trends, labs, and skin integrity and bowel movement regularity.   5. Encourage PO intake and honor food preferences as able unless otherwise contraindicated.   6. RD remains available upon request and will follow-up per protocol.  1. Continue current diet as tolerated: Consistent Carbohydrate w/ No Evening Snack, DASH/TLC. Defer diet texture/fluid consistencies to team.   2. Recommend multivitamin and Antoine (Provides 80 Kcal, 14g protein/packet) for aid in wound healing unless otherwise contraindicated.   3. Provide ongoing education as needed.   4. Monitor PO intake, diet tolerance, weight trends, labs, and skin integrity and bowel movement regularity.   5. Encourage PO intake and honor food preferences as able unless otherwise contraindicated.   6. RD remains available upon request and will follow-up per protocol.

## 2023-10-02 NOTE — DIETITIAN INITIAL EVALUATION ADULT - PROBLEM SELECTOR PLAN 2
History of IDDM on home novolog 11 units TID pre-meal. Denies taking any basal insulin. Glucose 456 on admission without evidence of DKA as no AG. C/b neuropathy for which he reports taking gabapentin 1600mg TID (confirmed with pharmacy).  -c/w home pre-meal insulin   -monitor blood sugars and adjust regimen/add basal insulin if needed   -f/u A1C  -collateral from PCP   -CC diet  -c/w home gabapentin

## 2023-10-02 NOTE — DIETITIAN INITIAL EVALUATION ADULT - NSFNSNUTRHOMESUPPLEMENTFT_GEN_A_CORE
Pt states that he takes a multivitamin at home. Reports no additional use of oral nutritional supplement.

## 2023-10-02 NOTE — PROGRESS NOTE ADULT - ASSESSMENT
67M PMH HTN, IDDM with peripheral neuropathy, CAD s/p PCI with 2 stents (3 yrs ago at [facility]) who presents to [facility] with right toe pain, admitted for treatment of infected non-healing right toe wound.

## 2023-10-02 NOTE — DIETITIAN INITIAL EVALUATION ADULT - EDUCATION DIETARY MODIFICATIONS
Pt not receptive to diet edu at this time. Unable to obtain information of baseline DM education as pt started to be frustrated and stated that he was in pain. RD to educate on follow-up or as able./(0) unable to meet; needs instruction

## 2023-10-02 NOTE — DIETITIAN INITIAL EVALUATION ADULT - PHYSCIAL ASSESSMENT
Weights:  10/1 130 pounds   UBW: 130 pounds per pt   IBW: 148 pounds    No new updated weights per flow sheets. Weights appear to be stable; RD to continue ot monitor weights.

## 2023-10-02 NOTE — DIETITIAN INITIAL EVALUATION ADULT - PERTINENT MEDS FT
MEDICATIONS  (STANDING):  aspirin enteric coated 81 milliGRAM(s) Oral daily  dextrose 5%. 1000 milliLiter(s) (50 mL/Hr) IV Continuous <Continuous>  dextrose 5%. 1000 milliLiter(s) (100 mL/Hr) IV Continuous <Continuous>  dextrose 50% Injectable 25 Gram(s) IV Push once  dextrose 50% Injectable 12.5 Gram(s) IV Push once  dextrose 50% Injectable 25 Gram(s) IV Push once  gabapentin 1600 milliGRAM(s) Oral three times a day  glucagon  Injectable 1 milliGRAM(s) IntraMuscular once  insulin lispro (ADMELOG) corrective regimen sliding scale   SubCutaneous Before meals and at bedtime  lactated ringers. 1000 milliLiter(s) (75 mL/Hr) IV Continuous <Continuous>  lisinopril 40 milliGRAM(s) Oral every 24 hours  vancomycin  IVPB 1000 milliGRAM(s) IV Intermittent every 12 hours    MEDICATIONS  (PRN):  acetaminophen     Tablet .. 650 milliGRAM(s) Oral every 6 hours PRN Temp greater or equal to 38C (100.4F), Mild Pain (1 - 3)  dextrose Oral Gel 15 Gram(s) Oral once PRN Blood Glucose LESS THAN 70 milliGRAM(s)/deciliter  oxyCODONE    IR 5 milliGRAM(s) Oral every 6 hours PRN Severe Pain (7 - 10)

## 2023-10-02 NOTE — PROGRESS NOTE ADULT - PROBLEM SELECTOR PLAN 2
History of IDDM on home novolog 11 units TID pre-meal. Denies taking any basal insulin. Glucose 456 on admission without evidence of DKA as no AG. C/b neuropathy for which he reports taking gabapentin 1600mg TID (confirmed with pharmacy).  -c/w home pre-meal insulin   -monitor blood sugars and adjust regimen/add basal insulin if needed   -f/u A1C  -collateral from PCP   -CC diet  -c/w home gabapentin History of IDDM on home novolog 11 units TID pre-meal. Denies taking any basal insulin. Glucose 456 on admission without evidence of DKA as no AG. C/b neuropathy for which he reports taking gabapentin 1600mg TID (confirmed with pharmacy).    - endocrinology consulted, f/u recs  - per endo, recommended 15 lantus, 8 u lispro premeal  - f/u c-peptide and type 1 dm antibodies    - CC diet  - c/w home gabapentin

## 2023-10-02 NOTE — DIETITIAN INITIAL EVALUATION ADULT - PERTINENT LABORATORY DATA
10-02    132<L>  |  100  |  19  ----------------------------<  145<H>  4.0   |  22  |  0.84    Ca    8.6      02 Oct 2023 05:30  Phos  2.5     10-02  Mg     1.8     10-02    TPro  6.2  /  Alb  2.5<L>  /  TBili  0.3  /  DBili  x   /  AST  17  /  ALT  9<L>  /  AlkPhos  84  10-02  POCT Blood Glucose.: 77 mg/dL (10-02-23 @ 12:59)  A1C with Estimated Average Glucose Result: 12.6 % (10-02-23 @ 05:30)

## 2023-10-02 NOTE — PROGRESS NOTE ADULT - PROBLEM SELECTOR PLAN 1
Patient presenting with worsening right 4th and 5th toe pain and discoloration in setting of non-healing wound after sustaining fall. Afebrile and hemodynamically stable however with leukocytosis.   -right toe xrays done in ED showing no evidence of fracture or osteomyelitis   -s/p 1 dose vancomycin in ED   -tylenol prn fevers and mild pain   -oxycodone 5mg PO q8hrs prn severe pain  -podiatry consulted, f/u recs   -will treat with cefazolin IV 1g q8hrs , monitor for improvement   -f/u BCs Patient presenting with worsening right 4th and 5th toe pain and discoloration in setting of non-healing wound after sustaining fall. Afebrile and hemodynamically stable however with leukocytosis.   Xrays done in ED showing no evidence of fracture or osteomyelitis  s/p 1 dose vancomycin in ED   Ct of the right foot: no evidence of soft tissue emphysema, or abscess    - c/w vancomycin 1mg q12 (end date 10/9)  - zosyn 4.5mg q4 (end date 10/12)  - tylenol prn fevers and mild pain   - oxycodone 5mg PO q8hrs prn severe pain  - consider oxycodone 10mg po q8 hrs for severe pain, if requiring more pain control  - morphine 1mg IV prn, one time dose if requiring more pain control  - podiatry consulted, f/u recs   - will treat with cefazolin IV 1g q8hrs , monitor for improvement   - f/u BCs Patient presenting with worsening right 4th and 5th toe pain and discoloration in setting of non-healing wound after sustaining fall. Afebrile and hemodynamically stable however with leukocytosis.   Xrays done in ED showing no evidence of fracture or osteomyelitis  s/p 1 dose vancomycin in ED   Ct of the right foot: no evidence of soft tissue emphysema, or abscess    - c/w vancomycin 1mg q12 (end date 10/9)  - c/w zosyn 4.5mg q4 (end date 10/12)  - tylenol prn fevers and mild pain   - oxycodone 5mg PO q8hrs prn severe pain  - consider oxycodone 10mg po q8 hrs for severe pain, if requiring more pain control  - morphine 2 mg IV prn, one time dose if requiring more pain control  - podiatry consulted, f/u recs   - will treat with cefazolin IV 1g q8hrs , monitor for improvement   - f/u BCs Patient presenting with worsening right 4th and 5th toe pain and discoloration in setting of non-healing wound after sustaining fall. Afebrile and hemodynamically stable however with leukocytosis.   Xrays done in ED showing no evidence of fracture or osteomyelitis  s/p 1 dose vancomycin in ED   Ct of the right foot: no evidence of soft tissue emphysema, or abscess    - podiatry consulted, f/u recs   - c/w vancomycin 1mg q12 (end date 10/9)  - c/w zosyn 4.5mg q4 (end date 10/12)  - tylenol prn fevers and mild pain   - oxycodone 5mg PO q8hrs prn severe pain  - consider oxycodone 10mg po q8 hrs for severe pain, if requiring more pain control  - morphine 2 mg IV prn, one time dose if requiring more pain control  - f/u BCs

## 2023-10-02 NOTE — DIETITIAN INITIAL EVALUATION ADULT - PROBLEM SELECTOR PLAN 1
Patient presenting with worsening right 4th and 5th toe pain and discoloration in setting of non-healing wound after sustaining fall. Afebrile and hemodynamically stable however with leukocytosis.   -right toe xrays done in ED showing no evidence of fracture or osteomyelitis   -s/p 1 dose vancomycin in ED   -tylenol prn fevers and mild pain   -oxycodone 5mg PO q8hrs prn severe pain  -podiatry consulted, f/u recs   -will treat with cefazolin IV 1g q8hrs , monitor for improvement   -f/u BCs

## 2023-10-02 NOTE — DIETITIAN INITIAL EVALUATION ADULT - NS FNS DIET ORDER
Diet, Consistent Carbohydrate/No Snacks:   DASH/TLC {Sodium & Cholesterol Restricted} (DASH) (10-01-23 @ 10:20) [Active]

## 2023-10-03 ENCOUNTER — TRANSCRIPTION ENCOUNTER (OUTPATIENT)
Age: 68
End: 2023-10-03

## 2023-10-03 VITALS — DIASTOLIC BLOOD PRESSURE: 77 MMHG | HEART RATE: 61 BPM | SYSTOLIC BLOOD PRESSURE: 132 MMHG

## 2023-10-03 LAB
ALBUMIN SERPL ELPH-MCNC: 2.7 G/DL — LOW (ref 3.3–5)
ALP SERPL-CCNC: 81 U/L — SIGNIFICANT CHANGE UP (ref 40–120)
ALT FLD-CCNC: 7 U/L — LOW (ref 10–45)
ANION GAP SERPL CALC-SCNC: 6 MMOL/L — SIGNIFICANT CHANGE UP (ref 5–17)
AST SERPL-CCNC: 17 U/L — SIGNIFICANT CHANGE UP (ref 10–40)
BASOPHILS # BLD AUTO: 0.04 K/UL — SIGNIFICANT CHANGE UP (ref 0–0.2)
BASOPHILS NFR BLD AUTO: 0.3 % — SIGNIFICANT CHANGE UP (ref 0–2)
BILIRUB SERPL-MCNC: 0.4 MG/DL — SIGNIFICANT CHANGE UP (ref 0.2–1.2)
BUN SERPL-MCNC: 20 MG/DL — SIGNIFICANT CHANGE UP (ref 7–23)
C PEPTIDE SERPL-MCNC: 0.5 NG/ML — LOW (ref 1.1–4.4)
CALCIUM SERPL-MCNC: 8.4 MG/DL — SIGNIFICANT CHANGE UP (ref 8.4–10.5)
CHLORIDE SERPL-SCNC: 100 MMOL/L — SIGNIFICANT CHANGE UP (ref 96–108)
CO2 SERPL-SCNC: 26 MMOL/L — SIGNIFICANT CHANGE UP (ref 22–31)
CREAT SERPL-MCNC: 0.93 MG/DL — SIGNIFICANT CHANGE UP (ref 0.5–1.3)
CRP SERPL-MCNC: 50 MG/L — HIGH (ref 0–4)
EGFR: 91 ML/MIN/1.73M2 — SIGNIFICANT CHANGE UP
EOSINOPHIL # BLD AUTO: 0.05 K/UL — SIGNIFICANT CHANGE UP (ref 0–0.5)
EOSINOPHIL NFR BLD AUTO: 0.4 % — SIGNIFICANT CHANGE UP (ref 0–6)
GLUCOSE BLDC GLUCOMTR-MCNC: 123 MG/DL — HIGH (ref 70–99)
GLUCOSE BLDC GLUCOMTR-MCNC: 137 MG/DL — HIGH (ref 70–99)
GLUCOSE BLDC GLUCOMTR-MCNC: 75 MG/DL — SIGNIFICANT CHANGE UP (ref 70–99)
GLUCOSE BLDC GLUCOMTR-MCNC: 78 MG/DL — SIGNIFICANT CHANGE UP (ref 70–99)
GLUCOSE SERPL-MCNC: 138 MG/DL — HIGH (ref 70–99)
HCT VFR BLD CALC: 29.9 % — LOW (ref 39–50)
HCV RNA FLD QL NAA+PROBE: SIGNIFICANT CHANGE UP
HCV RNA SPEC QL PROBE+SIG AMP: DETECTED
HGB BLD-MCNC: 9.7 G/DL — LOW (ref 13–17)
IMM GRANULOCYTES NFR BLD AUTO: 0.4 % — SIGNIFICANT CHANGE UP (ref 0–0.9)
LYMPHOCYTES # BLD AUTO: 1.37 K/UL — SIGNIFICANT CHANGE UP (ref 1–3.3)
LYMPHOCYTES # BLD AUTO: 9.9 % — LOW (ref 13–44)
MAGNESIUM SERPL-MCNC: 2.2 MG/DL — SIGNIFICANT CHANGE UP (ref 1.6–2.6)
MCHC RBC-ENTMCNC: 32 PG — SIGNIFICANT CHANGE UP (ref 27–34)
MCHC RBC-ENTMCNC: 32.4 GM/DL — SIGNIFICANT CHANGE UP (ref 32–36)
MCV RBC AUTO: 98.7 FL — SIGNIFICANT CHANGE UP (ref 80–100)
MONOCYTES # BLD AUTO: 1.48 K/UL — HIGH (ref 0–0.9)
MONOCYTES NFR BLD AUTO: 10.7 % — SIGNIFICANT CHANGE UP (ref 2–14)
NEUTROPHILS # BLD AUTO: 10.82 K/UL — HIGH (ref 1.8–7.4)
NEUTROPHILS NFR BLD AUTO: 78.3 % — HIGH (ref 43–77)
NRBC # BLD: 0 /100 WBCS — SIGNIFICANT CHANGE UP (ref 0–0)
PHOSPHATE SERPL-MCNC: 3.1 MG/DL — SIGNIFICANT CHANGE UP (ref 2.5–4.5)
PLATELET # BLD AUTO: 158 K/UL — SIGNIFICANT CHANGE UP (ref 150–400)
POTASSIUM SERPL-MCNC: 4 MMOL/L — SIGNIFICANT CHANGE UP (ref 3.5–5.3)
POTASSIUM SERPL-SCNC: 4 MMOL/L — SIGNIFICANT CHANGE UP (ref 3.5–5.3)
PROT SERPL-MCNC: 5.9 G/DL — LOW (ref 6–8.3)
RBC # BLD: 3.03 M/UL — LOW (ref 4.2–5.8)
RBC # FLD: 13.5 % — SIGNIFICANT CHANGE UP (ref 10.3–14.5)
SODIUM SERPL-SCNC: 132 MMOL/L — LOW (ref 135–145)
WBC # BLD: 13.82 K/UL — HIGH (ref 3.8–10.5)
WBC # FLD AUTO: 13.82 K/UL — HIGH (ref 3.8–10.5)

## 2023-10-03 PROCEDURE — 97161 PT EVAL LOW COMPLEX 20 MIN: CPT

## 2023-10-03 PROCEDURE — 82962 GLUCOSE BLOOD TEST: CPT

## 2023-10-03 PROCEDURE — 82803 BLOOD GASES ANY COMBINATION: CPT

## 2023-10-03 PROCEDURE — 96366 THER/PROPH/DIAG IV INF ADDON: CPT

## 2023-10-03 PROCEDURE — 86803 HEPATITIS C AB TEST: CPT

## 2023-10-03 PROCEDURE — 85025 COMPLETE CBC W/AUTO DIFF WBC: CPT

## 2023-10-03 PROCEDURE — 83036 HEMOGLOBIN GLYCOSYLATED A1C: CPT

## 2023-10-03 PROCEDURE — 99239 HOSP IP/OBS DSCHRG MGMT >30: CPT | Mod: GC

## 2023-10-03 PROCEDURE — 80053 COMPREHEN METABOLIC PANEL: CPT

## 2023-10-03 PROCEDURE — 96372 THER/PROPH/DIAG INJ SC/IM: CPT

## 2023-10-03 PROCEDURE — 73701 CT LOWER EXTREMITY W/DYE: CPT

## 2023-10-03 PROCEDURE — 87040 BLOOD CULTURE FOR BACTERIA: CPT

## 2023-10-03 PROCEDURE — 96376 TX/PRO/DX INJ SAME DRUG ADON: CPT

## 2023-10-03 PROCEDURE — 83605 ASSAY OF LACTIC ACID: CPT

## 2023-10-03 PROCEDURE — 87521 HEPATITIS C PROBE&RVRS TRNSC: CPT

## 2023-10-03 PROCEDURE — 73660 X-RAY EXAM OF TOE(S): CPT

## 2023-10-03 PROCEDURE — 36415 COLL VENOUS BLD VENIPUNCTURE: CPT

## 2023-10-03 PROCEDURE — 99232 SBSQ HOSP IP/OBS MODERATE 35: CPT | Mod: GC

## 2023-10-03 PROCEDURE — 96365 THER/PROPH/DIAG IV INF INIT: CPT

## 2023-10-03 PROCEDURE — 84100 ASSAY OF PHOSPHORUS: CPT

## 2023-10-03 PROCEDURE — 84681 ASSAY OF C-PEPTIDE: CPT

## 2023-10-03 PROCEDURE — 96375 TX/PRO/DX INJ NEW DRUG ADDON: CPT

## 2023-10-03 PROCEDURE — 80048 BASIC METABOLIC PNL TOTAL CA: CPT

## 2023-10-03 PROCEDURE — 86140 C-REACTIVE PROTEIN: CPT

## 2023-10-03 PROCEDURE — 99285 EMERGENCY DEPT VISIT HI MDM: CPT | Mod: 25

## 2023-10-03 PROCEDURE — 71045 X-RAY EXAM CHEST 1 VIEW: CPT

## 2023-10-03 PROCEDURE — 83735 ASSAY OF MAGNESIUM: CPT

## 2023-10-03 PROCEDURE — 85652 RBC SED RATE AUTOMATED: CPT

## 2023-10-03 PROCEDURE — 86341 ISLET CELL ANTIBODY: CPT

## 2023-10-03 RX ORDER — OXYCODONE HYDROCHLORIDE 5 MG/1
1 TABLET ORAL
Qty: 9 | Refills: 0
Start: 2023-10-03 | End: 2023-10-05

## 2023-10-03 RX ORDER — CEPHALEXIN 500 MG
1 CAPSULE ORAL
Qty: 0 | Refills: 0 | DISCHARGE
Start: 2023-10-03

## 2023-10-03 RX ORDER — GABAPENTIN 400 MG/1
1600 CAPSULE ORAL ONCE
Refills: 0 | Status: COMPLETED | OUTPATIENT
Start: 2023-10-03 | End: 2023-10-03

## 2023-10-03 RX ORDER — INSULIN LISPRO 100/ML
7 VIAL (ML) SUBCUTANEOUS
Qty: 1 | Refills: 0
Start: 2023-10-03 | End: 2023-11-01

## 2023-10-03 RX ORDER — CEPHALEXIN 500 MG
1 CAPSULE ORAL
Qty: 28 | Refills: 0
Start: 2023-10-03 | End: 2023-10-09

## 2023-10-03 RX ORDER — CEPHALEXIN 500 MG
500 CAPSULE ORAL
Refills: 0 | Status: DISCONTINUED | OUTPATIENT
Start: 2023-10-03 | End: 2023-10-03

## 2023-10-03 RX ORDER — INSULIN GLARGINE 100 [IU]/ML
11 INJECTION, SOLUTION SUBCUTANEOUS
Qty: 1 | Refills: 0
Start: 2023-10-03 | End: 2023-11-01

## 2023-10-03 RX ADMIN — Medication 500 MILLIGRAM(S): at 12:11

## 2023-10-03 RX ADMIN — PIPERACILLIN AND TAZOBACTAM 25 GRAM(S): 4; .5 INJECTION, POWDER, LYOPHILIZED, FOR SOLUTION INTRAVENOUS at 03:41

## 2023-10-03 RX ADMIN — LISINOPRIL 40 MILLIGRAM(S): 2.5 TABLET ORAL at 10:04

## 2023-10-03 RX ADMIN — GABAPENTIN 1600 MILLIGRAM(S): 400 CAPSULE ORAL at 14:12

## 2023-10-03 RX ADMIN — HEPARIN SODIUM 5000 UNIT(S): 5000 INJECTION INTRAVENOUS; SUBCUTANEOUS at 14:12

## 2023-10-03 RX ADMIN — HEPARIN SODIUM 5000 UNIT(S): 5000 INJECTION INTRAVENOUS; SUBCUTANEOUS at 07:01

## 2023-10-03 RX ADMIN — PIPERACILLIN AND TAZOBACTAM 25 GRAM(S): 4; .5 INJECTION, POWDER, LYOPHILIZED, FOR SOLUTION INTRAVENOUS at 11:08

## 2023-10-03 RX ADMIN — OXYCODONE HYDROCHLORIDE 5 MILLIGRAM(S): 5 TABLET ORAL at 07:12

## 2023-10-03 RX ADMIN — GABAPENTIN 1600 MILLIGRAM(S): 400 CAPSULE ORAL at 05:02

## 2023-10-03 RX ADMIN — Medication 8 UNIT(S): at 09:42

## 2023-10-03 RX ADMIN — OXYCODONE HYDROCHLORIDE 5 MILLIGRAM(S): 5 TABLET ORAL at 01:12

## 2023-10-03 RX ADMIN — Medication 250 MILLIGRAM(S): at 07:01

## 2023-10-03 RX ADMIN — Medication 81 MILLIGRAM(S): at 11:08

## 2023-10-03 NOTE — DISCHARGE NOTE PROVIDER - NSDCFUADDAPPT_GEN_ALL_CORE_FT
Please schedule an appointment with Dr. Tom Camacho, podiatry, within 1 week. Please schedule an appointment with Dr. Tom aCmacho, podiatry, within 1 week.    Please schedule an appointment with you primary care provider within 2 weeks.

## 2023-10-03 NOTE — DISCHARGE NOTE NURSING/CASE MANAGEMENT/SOCIAL WORK - PATIENT PORTAL LINK FT
You can access the FollowMyHealth Patient Portal offered by [facility] by registering at the following website: [email or web address]. By joining [employer]’s FollowMyHealth portal, you will also be able to view your health information using other applications (apps) compatible with our system.

## 2023-10-03 NOTE — PHYSICAL THERAPY INITIAL EVALUATION ADULT - GENERAL OBSERVATIONS, REHAB EVAL
Pt received semi supine in bed with +hep-lock, + dressing R foot, C/D/I, NAd. Pt left as found, NAD, call tong in reach, RN Merry elizalde.

## 2023-10-03 NOTE — PROGRESS NOTE ADULT - SUBJECTIVE AND OBJECTIVE BOX
SUBJECTIVE / INTERVAL HPI: Patient was seen and examined this morning.     Overnight events:    Endocrine course  10/2: lantus 15 + lispro 8    CAPILLARY BLOOD GLUCOSE & INSULIN RECEIVED  261 mg/dL (10-02 @ 17:28) - lispro 8 + lispro 6  265 mg/dL (10-02 @ 22:13) - lantus 15  78 mg/dL (10-03 @ 04:20)  137 mg/dL (10-03 @ 05:09)      REVIEW OF SYSTEMS  Constitutional:  Negative fever, chills or loss of appetite.  Eyes:  Negative blurry vision or double vision.  Cardiovascular:  Negative for chest pain or palpitations.  Respiratory:  Negative for cough, wheezing, or shortness of breath.   Gastrointestinal:  Negative for nausea, vomiting, diarrhea, constipation, or abdominal pain.  Genitourinary:  Negative frequency, urgency or dysuria.  Neurologic:  No headache, confusion, dizziness, lightheadedness.    PHYSICAL EXAM  Vital Signs Last 24 Hrs  T(C): 37.4 (03 Oct 2023 05:47), Max: 37.4 (03 Oct 2023 05:47)  T(F): 99.3 (03 Oct 2023 05:47), Max: 99.3 (03 Oct 2023 05:47)  HR: 63 (03 Oct 2023 05:47) (61 - 71)  BP: 165/79 (03 Oct 2023 05:47) (165/76 - 178/86)  BP(mean): --  RR: 17 (03 Oct 2023 05:47) (17 - 18)  SpO2: 93% (03 Oct 2023 05:47) (93% - 97%)    Parameters below as of 03 Oct 2023 05:47  Patient On (Oxygen Delivery Method): room air    Constitutional: Awake, alert, in no acute distress.   HEENT: Normocephalic, atraumatic, RAGHAV, no proptosis or lid retraction.   Neck: supple, no acanthosis, no thyromegaly or palpable thyroid nodules.  Respiratory: Lungs clear to ausculation bilaterally.   Cardiovascular: regular rhythm, normal S1 and S2, no audible murmurs.   GI: soft, non-tender, non-distended, bowel sounds present, no masses appreciated.  Extremities: right foot wrapped in dressing  Skin: no rashes.   Psychiatric: AAO x 3. Normal affect/mood.     LABS  CBC - WBC/HGB/HTC/PLT: 13.82/9.7/29.9/158 (10-03-23)  BMP - Na/K/Cl/Bicarb/BUN/Cr/Gluc/AG/eGFR: --/--/--/--/--/--/--/--/-- (10-03-23)  Ca - -- (10-03-23)  Phos - 3.1 (10-03-23)  Mg - 2.2 (10-03-23)  LFT - Alb/Tprot/Tbili/Dbili/AlkPhos/ALT/AST: 2.5/--/0.3/--/84/9/17 (10-02-23)            MEDICATIONS  MEDICATIONS  (STANDING):  aspirin enteric coated 81 milliGRAM(s) Oral daily  dextrose 5%. 1000 milliLiter(s) (50 mL/Hr) IV Continuous <Continuous>  dextrose 5%. 1000 milliLiter(s) (100 mL/Hr) IV Continuous <Continuous>  dextrose 50% Injectable 25 Gram(s) IV Push once  dextrose 50% Injectable 12.5 Gram(s) IV Push once  dextrose 50% Injectable 25 Gram(s) IV Push once  gabapentin 1600 milliGRAM(s) Oral three times a day  glucagon  Injectable 1 milliGRAM(s) IntraMuscular once  heparin   Injectable 5000 Unit(s) SubCutaneous every 8 hours  insulin glargine Injectable (LANTUS) 15 Unit(s) SubCutaneous at bedtime  insulin lispro (ADMELOG) corrective regimen sliding scale   SubCutaneous Before meals and at bedtime  insulin lispro Injectable (ADMELOG) 8 Unit(s) SubCutaneous three times a day before meals  lactated ringers. 1000 milliLiter(s) (75 mL/Hr) IV Continuous <Continuous>  lisinopril 40 milliGRAM(s) Oral every 24 hours  piperacillin/tazobactam IVPB.. 4.5 Gram(s) IV Intermittent every 8 hours  vancomycin  IVPB 1000 milliGRAM(s) IV Intermittent every 12 hours    MEDICATIONS  (PRN):  acetaminophen     Tablet .. 650 milliGRAM(s) Oral every 6 hours PRN Temp greater or equal to 38C (100.4F), Mild Pain (1 - 3)  dextrose Oral Gel 15 Gram(s) Oral once PRN Blood Glucose LESS THAN 70 milliGRAM(s)/deciliter  oxyCODONE    IR 5 milliGRAM(s) Oral every 6 hours PRN Severe Pain (7 - 10)    ASSESSMENT / RECOMMENDATIONS    67y Male with a past medical history of Type 1 DM, peripheral neuropathy, CAD s/p stent placement, presented with worsening right toe lesion.   Pt reportedly had a mechanical fall on 09/02 and retained laceration on right 4th and 5th digits. Pt admitted for cellulitis. Endocrinology has been consulted for Diabetes Management.    A1C: 12.6 %  BUN: 19  Creatinine: 0.84  GFR: 97  Weight: 59  BMI: 20.4    # Type 1 diabetes mellitus with hyperglycemia  - pt endorsed he is type 1, high suspicion that his diabetes course behaves more as type 2, please check c-peptide and antibodies for diabetes  - suspicion for non-compliance given pt states he forget to take insulin sometimes  - given some low glucose readings, Please keep lantus 15 units at bedtime.   - Keep lispro 8  units before each meal.  - Keep lispro moderate dose sliding scale before meals and at bedtime.  - Patient's fingerstick glucose goal is 100-180 mg/dL.    - Discharge recommendations to be discussed.   - Patient can follow up at discharge with [facility] by calling [phone] to make an appointment.       Case discussed with Dr. Huertas. Primary team updated.       Christa Alexis  Endocrinology Fellow    Service Pager: [phone]    SUBJECTIVE / INTERVAL HPI: Patient was seen and examined this morning.     Overnight events:  pt reports his pain is improving  he had chicken, mashed pototes, carrots for dinner  he had scrambled eggs, coffee, [place] toast for breakfast    Endocrine course  10/2: lantus 15 + lispro 8    CAPILLARY BLOOD GLUCOSE & INSULIN RECEIVED  261 mg/dL (10-02 @ 17:28) - lispro 8 + lispro 6  265 mg/dL (10-02 @ 22:13) - lantus 15  78 mg/dL (10-03 @ 04:20)  137 mg/dL (10-03 @ 05:09) - lispro 8  75 for noon        REVIEW OF SYSTEMS  Constitutional:  Negative fever, chills or loss of appetite.  Eyes:  Negative blurry vision or double vision.  Cardiovascular:  Negative for chest pain or palpitations.  Respiratory:  Negative for cough, wheezing, or shortness of breath.   Gastrointestinal:  Negative for nausea, vomiting, diarrhea, constipation, or abdominal pain.  Genitourinary:  Negative frequency, urgency or dysuria.  Neurologic:  No headache, confusion, dizziness, lightheadedness.    PHYSICAL EXAM  Vital Signs Last 24 Hrs  T(C): 37.4 (03 Oct 2023 05:47), Max: 37.4 (03 Oct 2023 05:47)  T(F): 99.3 (03 Oct 2023 05:47), Max: 99.3 (03 Oct 2023 05:47)  HR: 63 (03 Oct 2023 05:47) (61 - 71)  BP: 165/79 (03 Oct 2023 05:47) (165/76 - 178/86)  BP(mean): --  RR: 17 (03 Oct 2023 05:47) (17 - 18)  SpO2: 93% (03 Oct 2023 05:47) (93% - 97%)    Parameters below as of 03 Oct 2023 05:47  Patient On (Oxygen Delivery Method): room air    Constitutional: Awake, alert, in no acute distress.   HEENT: Normocephalic, atraumatic, RAGHAV, no proptosis or lid retraction.   Neck: supple, no acanthosis, no thyromegaly or palpable thyroid nodules.  Respiratory: Lungs clear to ausculation bilaterally.   Cardiovascular: regular rhythm, normal S1 and S2, no audible murmurs.   GI: soft, non-tender, non-distended, bowel sounds present, no masses appreciated.  Extremities: right foot wrapped in dressing  Skin: no rashes.   Psychiatric: AAO x 3. Normal affect/mood.     LABS  CBC - WBC/HGB/HTC/PLT: 13.82/9.7/29.9/158 (10-03-23)  BMP - Na/K/Cl/Bicarb/BUN/Cr/Gluc/AG/eGFR: --/--/--/--/--/--/--/--/-- (10-03-23)  Ca - -- (10-03-23)  Phos - 3.1 (10-03-23)  Mg - 2.2 (10-03-23)  LFT - Alb/Tprot/Tbili/Dbili/AlkPhos/ALT/AST: 2.5/--/0.3/--/84/9/17 (10-02-23)            MEDICATIONS  MEDICATIONS  (STANDING):  aspirin enteric coated 81 milliGRAM(s) Oral daily  dextrose 5%. 1000 milliLiter(s) (50 mL/Hr) IV Continuous <Continuous>  dextrose 5%. 1000 milliLiter(s) (100 mL/Hr) IV Continuous <Continuous>  dextrose 50% Injectable 25 Gram(s) IV Push once  dextrose 50% Injectable 12.5 Gram(s) IV Push once  dextrose 50% Injectable 25 Gram(s) IV Push once  gabapentin 1600 milliGRAM(s) Oral three times a day  glucagon  Injectable 1 milliGRAM(s) IntraMuscular once  heparin   Injectable 5000 Unit(s) SubCutaneous every 8 hours  insulin glargine Injectable (LANTUS) 15 Unit(s) SubCutaneous at bedtime  insulin lispro (ADMELOG) corrective regimen sliding scale   SubCutaneous Before meals and at bedtime  insulin lispro Injectable (ADMELOG) 8 Unit(s) SubCutaneous three times a day before meals  lactated ringers. 1000 milliLiter(s) (75 mL/Hr) IV Continuous <Continuous>  lisinopril 40 milliGRAM(s) Oral every 24 hours  piperacillin/tazobactam IVPB.. 4.5 Gram(s) IV Intermittent every 8 hours  vancomycin  IVPB 1000 milliGRAM(s) IV Intermittent every 12 hours    MEDICATIONS  (PRN):  acetaminophen     Tablet .. 650 milliGRAM(s) Oral every 6 hours PRN Temp greater or equal to 38C (100.4F), Mild Pain (1 - 3)  dextrose Oral Gel 15 Gram(s) Oral once PRN Blood Glucose LESS THAN 70 milliGRAM(s)/deciliter  oxyCODONE    IR 5 milliGRAM(s) Oral every 6 hours PRN Severe Pain (7 - 10)    ASSESSMENT / RECOMMENDATIONS    67y Male with a past medical history of Type 1 DM, peripheral neuropathy, CAD s/p stent placement, presented with worsening right toe lesion.   Pt reportedly had a mechanical fall on 09/02 and retained laceration on right 4th and 5th digits. Pt admitted for cellulitis. Endocrinology has been consulted for Diabetes Management.    A1C: 12.6 %  BUN: 19  Creatinine: 0.84  GFR: 97  Weight: 59  BMI: 20.4    # Type 1 diabetes mellitus with hyperglycemia  - c-peptide 10/03 0.5  - suspicion for non-compliance given pt states he forget to take insulin sometimes  - given some low glucose readings, Please keep lantus  units at bedtime.   - Keep lispro  units before each meal.  - Keep lispro moderate dose sliding scale before meals and at bedtime.  - Patient's fingerstick glucose goal is 100-180 mg/dL.    - Discharge recommendations to be discussed.   - Patient can follow up at discharge with [facility] by calling [phone] to make an appointment.       Case discussed with Dr. Huertas. Primary team updated.       Christa Alexis  Endocrinology Fellow    Service Pager: [phone]    SUBJECTIVE / INTERVAL HPI: Patient was seen and examined this morning.     Overnight events:  pt reports his pain is improving  he had chicken, mashed pototes, carrots for dinner  he had scrambled eggs, coffee, [place] toast for breakfast    Endocrine course  10/2: lantus 15 + lispro 8    CAPILLARY BLOOD GLUCOSE & INSULIN RECEIVED  261 mg/dL (10-02 @ 17:28) - lispro 8 + lispro 6  265 mg/dL (10-02 @ 22:13) - lantus 15  78 mg/dL (10-03 @ 04:20)  137 mg/dL (10-03 @ 05:09) - lispro 8  75 for noon        REVIEW OF SYSTEMS  Constitutional:  Negative fever, chills or loss of appetite.  Eyes:  Negative blurry vision or double vision.  Cardiovascular:  Negative for chest pain or palpitations.  Respiratory:  Negative for cough, wheezing, or shortness of breath.   Gastrointestinal:  Negative for nausea, vomiting, diarrhea, constipation, or abdominal pain.  Genitourinary:  Negative frequency, urgency or dysuria.  Neurologic:  No headache, confusion, dizziness, lightheadedness.    PHYSICAL EXAM  Vital Signs Last 24 Hrs  T(C): 37.4 (03 Oct 2023 05:47), Max: 37.4 (03 Oct 2023 05:47)  T(F): 99.3 (03 Oct 2023 05:47), Max: 99.3 (03 Oct 2023 05:47)  HR: 63 (03 Oct 2023 05:47) (61 - 71)  BP: 165/79 (03 Oct 2023 05:47) (165/76 - 178/86)  BP(mean): --  RR: 17 (03 Oct 2023 05:47) (17 - 18)  SpO2: 93% (03 Oct 2023 05:47) (93% - 97%)    Parameters below as of 03 Oct 2023 05:47  Patient On (Oxygen Delivery Method): room air    Constitutional: Awake, alert, in no acute distress.   HEENT: Normocephalic, atraumatic, RAGHAV, no proptosis or lid retraction.   Neck: supple, no acanthosis, no thyromegaly or palpable thyroid nodules.  Respiratory: Lungs clear to ausculation bilaterally.   Cardiovascular: regular rhythm, normal S1 and S2, no audible murmurs.   GI: soft, non-tender, non-distended, bowel sounds present, no masses appreciated.  Extremities: right foot wrapped in dressing  Skin: no rashes.   Psychiatric: AAO x 3. Normal affect/mood.     LABS  CBC - WBC/HGB/HTC/PLT: 13.82/9.7/29.9/158 (10-03-23)  BMP - Na/K/Cl/Bicarb/BUN/Cr/Gluc/AG/eGFR: --/--/--/--/--/--/--/--/-- (10-03-23)  Ca - -- (10-03-23)  Phos - 3.1 (10-03-23)  Mg - 2.2 (10-03-23)  LFT - Alb/Tprot/Tbili/Dbili/AlkPhos/ALT/AST: 2.5/--/0.3/--/84/9/17 (10-02-23)            MEDICATIONS  MEDICATIONS  (STANDING):  aspirin enteric coated 81 milliGRAM(s) Oral daily  dextrose 5%. 1000 milliLiter(s) (50 mL/Hr) IV Continuous <Continuous>  dextrose 5%. 1000 milliLiter(s) (100 mL/Hr) IV Continuous <Continuous>  dextrose 50% Injectable 25 Gram(s) IV Push once  dextrose 50% Injectable 12.5 Gram(s) IV Push once  dextrose 50% Injectable 25 Gram(s) IV Push once  gabapentin 1600 milliGRAM(s) Oral three times a day  glucagon  Injectable 1 milliGRAM(s) IntraMuscular once  heparin   Injectable 5000 Unit(s) SubCutaneous every 8 hours  insulin glargine Injectable (LANTUS) 15 Unit(s) SubCutaneous at bedtime  insulin lispro (ADMELOG) corrective regimen sliding scale   SubCutaneous Before meals and at bedtime  insulin lispro Injectable (ADMELOG) 8 Unit(s) SubCutaneous three times a day before meals  lactated ringers. 1000 milliLiter(s) (75 mL/Hr) IV Continuous <Continuous>  lisinopril 40 milliGRAM(s) Oral every 24 hours  piperacillin/tazobactam IVPB.. 4.5 Gram(s) IV Intermittent every 8 hours  vancomycin  IVPB 1000 milliGRAM(s) IV Intermittent every 12 hours    MEDICATIONS  (PRN):  acetaminophen     Tablet .. 650 milliGRAM(s) Oral every 6 hours PRN Temp greater or equal to 38C (100.4F), Mild Pain (1 - 3)  dextrose Oral Gel 15 Gram(s) Oral once PRN Blood Glucose LESS THAN 70 milliGRAM(s)/deciliter  oxyCODONE    IR 5 milliGRAM(s) Oral every 6 hours PRN Severe Pain (7 - 10)    ASSESSMENT / RECOMMENDATIONS    67y Male with a past medical history of Type DM (pt endorsed type 1 but appears more as type 2), peripheral neuropathy, CAD s/p stent placement, presented with worsening right toe lesion.   Pt reportedly had a mechanical fall on 09/02 and retained laceration on right 4th and 5th digits. Pt admitted for cellulitis. Endocrinology has been consulted for Diabetes Management.    A1C: 12.6 %  BUN: 19  Creatinine: 0.84  GFR: 97  Weight: 59  BMI: 20.4    # Type 2 diabetes mellitus with hyperglycemia  - c-peptide 10/03 0.5 - indicates pt has some insulin, unlikely type 1  - suspicion for non-compliance given pt states he forget to take insulin sometimes  - given some low glucose readings, Please keep lantus 11  units at bedtime.   - Keep lispro 7  units before each meal.  - Keep lispro moderate dose sliding scale before meals and at bedtime.  - Patient's fingerstick glucose goal is 100-180 mg/dL.    - Discharge recommendations: lantus 11 u nightly + lispro 7 u with meals  - Patient can follow up at discharge with [facility] by calling [phone] to make an appointment.       Case discussed with Dr. Huertas. Primary team updated.       Christa Alexis  Endocrinology Fellow    Service Pager: [phone]    SUBJECTIVE / INTERVAL HPI: Patient was seen and examined this morning.     Overnight events:  pt reports his pain is improving  he had chicken, mashed pototes, carrots for dinner  he had scrambled eggs, coffee, [place] toast for breakfast    Endocrine course  10/2: lantus 15 + lispro 8    CAPILLARY BLOOD GLUCOSE & INSULIN RECEIVED  261 mg/dL (10-02 @ 17:28) - lispro 8 + lispro 6  265 mg/dL (10-02 @ 22:13) - lantus 15  78 mg/dL (10-03 @ 04:20)  137 mg/dL (10-03 @ 05:09) - lispro 8  75 for noon        REVIEW OF SYSTEMS  Constitutional:  Negative fever, chills or loss of appetite.  Eyes:  Negative blurry vision or double vision.  Cardiovascular:  Negative for chest pain or palpitations.  Respiratory:  Negative for cough, wheezing, or shortness of breath.   Gastrointestinal:  Negative for nausea, vomiting, diarrhea, constipation, or abdominal pain.  Genitourinary:  Negative frequency, urgency or dysuria.  Neurologic:  No headache, confusion, dizziness, lightheadedness.    PHYSICAL EXAM  Vital Signs Last 24 Hrs  T(C): 37.4 (03 Oct 2023 05:47), Max: 37.4 (03 Oct 2023 05:47)  T(F): 99.3 (03 Oct 2023 05:47), Max: 99.3 (03 Oct 2023 05:47)  HR: 63 (03 Oct 2023 05:47) (61 - 71)  BP: 165/79 (03 Oct 2023 05:47) (165/76 - 178/86)  BP(mean): --  RR: 17 (03 Oct 2023 05:47) (17 - 18)  SpO2: 93% (03 Oct 2023 05:47) (93% - 97%)    Parameters below as of 03 Oct 2023 05:47  Patient On (Oxygen Delivery Method): room air    Constitutional: Awake, alert, in no acute distress.   HEENT: Normocephalic, atraumatic, RAGHAV, no proptosis or lid retraction.   Neck: supple, no acanthosis, no thyromegaly or palpable thyroid nodules.  Respiratory: Lungs clear to ausculation bilaterally.   Cardiovascular: regular rhythm, normal S1 and S2, no audible murmurs.   GI: soft, non-tender, non-distended, bowel sounds present, no masses appreciated.  Extremities: right foot wrapped in dressing  Skin: no rashes.   Psychiatric: AAO x 3. Normal affect/mood.     LABS  CBC - WBC/HGB/HTC/PLT: 13.82/9.7/29.9/158 (10-03-23)  BMP - Na/K/Cl/Bicarb/BUN/Cr/Gluc/AG/eGFR: --/--/--/--/--/--/--/--/-- (10-03-23)  Ca - -- (10-03-23)  Phos - 3.1 (10-03-23)  Mg - 2.2 (10-03-23)  LFT - Alb/Tprot/Tbili/Dbili/AlkPhos/ALT/AST: 2.5/--/0.3/--/84/9/17 (10-02-23)            MEDICATIONS  MEDICATIONS  (STANDING):  aspirin enteric coated 81 milliGRAM(s) Oral daily  dextrose 5%. 1000 milliLiter(s) (50 mL/Hr) IV Continuous <Continuous>  dextrose 5%. 1000 milliLiter(s) (100 mL/Hr) IV Continuous <Continuous>  dextrose 50% Injectable 25 Gram(s) IV Push once  dextrose 50% Injectable 12.5 Gram(s) IV Push once  dextrose 50% Injectable 25 Gram(s) IV Push once  gabapentin 1600 milliGRAM(s) Oral three times a day  glucagon  Injectable 1 milliGRAM(s) IntraMuscular once  heparin   Injectable 5000 Unit(s) SubCutaneous every 8 hours  insulin glargine Injectable (LANTUS) 15 Unit(s) SubCutaneous at bedtime  insulin lispro (ADMELOG) corrective regimen sliding scale   SubCutaneous Before meals and at bedtime  insulin lispro Injectable (ADMELOG) 8 Unit(s) SubCutaneous three times a day before meals  lactated ringers. 1000 milliLiter(s) (75 mL/Hr) IV Continuous <Continuous>  lisinopril 40 milliGRAM(s) Oral every 24 hours  piperacillin/tazobactam IVPB.. 4.5 Gram(s) IV Intermittent every 8 hours  vancomycin  IVPB 1000 milliGRAM(s) IV Intermittent every 12 hours    MEDICATIONS  (PRN):  acetaminophen     Tablet .. 650 milliGRAM(s) Oral every 6 hours PRN Temp greater or equal to 38C (100.4F), Mild Pain (1 - 3)  dextrose Oral Gel 15 Gram(s) Oral once PRN Blood Glucose LESS THAN 70 milliGRAM(s)/deciliter  oxyCODONE    IR 5 milliGRAM(s) Oral every 6 hours PRN Severe Pain (7 - 10)    ASSESSMENT / RECOMMENDATIONS    67y Male with a past medical history of Type DM (pt endorsed type 1 but appears more as type 2), peripheral neuropathy, CAD s/p stent placement, presented with worsening right toe lesion.   Pt reportedly had a mechanical fall on 09/02 and retained laceration on right 4th and 5th digits. Pt admitted for cellulitis. Endocrinology has been consulted for Diabetes Management.    A1C: 12.6 %  BUN: 19  Creatinine: 0.84  GFR: 97  Weight: 59  BMI: 20.4    # Type 2 diabetes mellitus with hyperglycemia  - c-peptide 10/03 0.5 - indicates pt has some insulin, unlikely type 1  - suspicion for non-compliance given pt states he forget to take insulin sometimes  - given some low glucose readings, Please keep lantus 11  units at bedtime.   - Keep lispro 7  units before each meal.  - Keep lispro low dose sliding scale before meals and at bedtime.  - Patient's fingerstick glucose goal is 100-180 mg/dL.    - Discharge recommendations: lantus 11 u nightly + lispro 7 u with meals  - Patient can follow up at discharge with [facility] by calling [phone] to make an appointment.       Case discussed with Dr. Huertas. Primary team updated.       Christa Alexis  Endocrinology Fellow    Service Pager: [phone]

## 2023-10-03 NOTE — PROGRESS NOTE ADULT - SUBJECTIVE AND OBJECTIVE BOX
*****INCOMPLETE NOTE*****    INTERVAL HPI/OVERNIGHT EVENTS:    SUBJECTIVE: Patient seen and examined at bedside, resting comfortably in bed, and does not appear to be in any acute distress. Patient states  Patient denies any recent fevers, chills, nausea, vomiting, headache, acute sob, chest pain, abdominal pain, genitourinary sx, extremity pain or swelling.     ANTIBIOTICS/RELEVANT:    MEDICATIONS  (STANDING):  aspirin enteric coated 81 milliGRAM(s) Oral daily  dextrose 5%. 1000 milliLiter(s) (50 mL/Hr) IV Continuous <Continuous>  dextrose 5%. 1000 milliLiter(s) (100 mL/Hr) IV Continuous <Continuous>  dextrose 50% Injectable 25 Gram(s) IV Push once  dextrose 50% Injectable 25 Gram(s) IV Push once  dextrose 50% Injectable 12.5 Gram(s) IV Push once  gabapentin 1600 milliGRAM(s) Oral three times a day  glucagon  Injectable 1 milliGRAM(s) IntraMuscular once  heparin   Injectable 5000 Unit(s) SubCutaneous every 8 hours  insulin glargine Injectable (LANTUS) 15 Unit(s) SubCutaneous at bedtime  insulin lispro (ADMELOG) corrective regimen sliding scale   SubCutaneous Before meals and at bedtime  insulin lispro Injectable (ADMELOG) 8 Unit(s) SubCutaneous three times a day before meals  lactated ringers. 1000 milliLiter(s) (75 mL/Hr) IV Continuous <Continuous>  lisinopril 40 milliGRAM(s) Oral every 24 hours  piperacillin/tazobactam IVPB.. 4.5 Gram(s) IV Intermittent every 8 hours  vancomycin  IVPB 1000 milliGRAM(s) IV Intermittent every 12 hours    MEDICATIONS  (PRN):  acetaminophen     Tablet .. 650 milliGRAM(s) Oral every 6 hours PRN Temp greater or equal to 38C (100.4F), Mild Pain (1 - 3)  dextrose Oral Gel 15 Gram(s) Oral once PRN Blood Glucose LESS THAN 70 milliGRAM(s)/deciliter  oxyCODONE    IR 5 milliGRAM(s) Oral every 6 hours PRN Severe Pain (7 - 10)      Vital Signs Last 24 Hrs  T(C): 37.2 (02 Oct 2023 21:16), Max: 37.4 (02 Oct 2023 06:23)  T(F): 99 (02 Oct 2023 21:16), Max: 99.3 (02 Oct 2023 06:23)  HR: 71 (02 Oct 2023 21:16) (61 - 71)  BP: 166/73 (02 Oct 2023 21:16) (165/76 - 178/86)  BP(mean): --  RR: 18 (02 Oct 2023 21:16) (17 - 19)  SpO2: 97% (02 Oct 2023 21:16) (96% - 97%)    Parameters below as of 02 Oct 2023 21:16  Patient On (Oxygen Delivery Method): room air        PHYSICAL EXAM:  General: in no acute distress  Eyes: EOMI intact bilaterally. Anicteric sclerae, moist conjunctivae  HENT: Moist mucous membranes  Neck: Trachea midline, supple  Lungs: CTA B/L. No wheezes, rales, or rhonchi  Cardiovascular: RRR. No murmurs, rubs, or gallops  Abdomen: Soft, non-tender non-distended; No rebound or guarding  Extremities: WWP, No clubbing, cyanosis or edema  Neurological: Alert and oriented  Skin: Warm and dry. No obvious rash     LABS:                        10.5   16.47 )-----------( 163      ( 02 Oct 2023 05:30 )             32.0     10-02    132<L>  |  100  |  19  ----------------------------<  145<H>  4.0   |  22  |  0.84    Ca    8.6      02 Oct 2023 05:30  Phos  2.5     10-02  Mg     1.8     10-02    TPro  6.2  /  Alb  2.5<L>  /  TBili  0.3  /  DBili  x   /  AST  17  /  ALT  9<L>  /  AlkPhos  84  10-02      Urinalysis Basic - ( 02 Oct 2023 05:30 )    Color: x / Appearance: x / SG: x / pH: x  Gluc: 145 mg/dL / Ketone: x  / Bili: x / Urobili: x   Blood: x / Protein: x / Nitrite: x   Leuk Esterase: x / RBC: x / WBC x   Sq Epi: x / Non Sq Epi: x / Bacteria: x        MICROBIOLOGY:    RADIOLOGY & ADDITIONAL STUDIES: *****INCOMPLETE NOTE*****  Hospital Course:  67M PMH HTN, IDDM with peripheral neuropathy, CAD s/p PCI with 2 stents (3 yrs ago at [facility]) who presents to [facility] with right toe pain. Podiatry consulted in the ED. CT ruled out soft tissue abscess, ESR and CRP below threshold of OM. Pt received vancomycin 1mg q12, and zosyn 4.5g q8 during admission. Pt initially require IV morphine for pain control, then transitioned to oral oxycodone. Leukocytes downtrending, pt clinically improved and stable for discharge. Pt will be discharged with XXX. Pt will follow up with PCP outpatient.     INTERVAL HPI/OVERNIGHT EVENTS:    SUBJECTIVE: Patient seen and examined at bedside, resting comfortably in bed, and does not appear to be in any acute distress. Patient states  Patient denies any recent fevers, chills, nausea, vomiting, headache, acute sob, chest pain, abdominal pain, genitourinary sx, extremity pain or swelling.     ANTIBIOTICS/RELEVANT:    MEDICATIONS  (STANDING):  aspirin enteric coated 81 milliGRAM(s) Oral daily  dextrose 5%. 1000 milliLiter(s) (50 mL/Hr) IV Continuous <Continuous>  dextrose 5%. 1000 milliLiter(s) (100 mL/Hr) IV Continuous <Continuous>  dextrose 50% Injectable 25 Gram(s) IV Push once  dextrose 50% Injectable 25 Gram(s) IV Push once  dextrose 50% Injectable 12.5 Gram(s) IV Push once  gabapentin 1600 milliGRAM(s) Oral three times a day  glucagon  Injectable 1 milliGRAM(s) IntraMuscular once  heparin   Injectable 5000 Unit(s) SubCutaneous every 8 hours  insulin glargine Injectable (LANTUS) 15 Unit(s) SubCutaneous at bedtime  insulin lispro (ADMELOG) corrective regimen sliding scale   SubCutaneous Before meals and at bedtime  insulin lispro Injectable (ADMELOG) 8 Unit(s) SubCutaneous three times a day before meals  lactated ringers. 1000 milliLiter(s) (75 mL/Hr) IV Continuous <Continuous>  lisinopril 40 milliGRAM(s) Oral every 24 hours  piperacillin/tazobactam IVPB.. 4.5 Gram(s) IV Intermittent every 8 hours  vancomycin  IVPB 1000 milliGRAM(s) IV Intermittent every 12 hours    MEDICATIONS  (PRN):  acetaminophen     Tablet .. 650 milliGRAM(s) Oral every 6 hours PRN Temp greater or equal to 38C (100.4F), Mild Pain (1 - 3)  dextrose Oral Gel 15 Gram(s) Oral once PRN Blood Glucose LESS THAN 70 milliGRAM(s)/deciliter  oxyCODONE    IR 5 milliGRAM(s) Oral every 6 hours PRN Severe Pain (7 - 10)      Vital Signs Last 24 Hrs  T(C): 37.2 (02 Oct 2023 21:16), Max: 37.4 (02 Oct 2023 06:23)  T(F): 99 (02 Oct 2023 21:16), Max: 99.3 (02 Oct 2023 06:23)  HR: 71 (02 Oct 2023 21:16) (61 - 71)  BP: 166/73 (02 Oct 2023 21:16) (165/76 - 178/86)  BP(mean): --  RR: 18 (02 Oct 2023 21:16) (17 - 19)  SpO2: 97% (02 Oct 2023 21:16) (96% - 97%)    Parameters below as of 02 Oct 2023 21:16  Patient On (Oxygen Delivery Method): room air        PHYSICAL EXAM:  General: in no acute distress  Eyes: EOMI intact bilaterally. Anicteric sclerae, moist conjunctivae  HENT: Moist mucous membranes  Neck: Trachea midline, supple  Lungs: CTA B/L. No wheezes, rales, or rhonchi  Cardiovascular: RRR. No murmurs, rubs, or gallops  Abdomen: Soft, non-tender non-distended; No rebound or guarding  Extremities: WWP, No clubbing, cyanosis or edema  Neurological: Alert and oriented  Skin: Warm and dry. No obvious rash     LABS:                        10.5   16.47 )-----------( 163      ( 02 Oct 2023 05:30 )             32.0     10-02    132<L>  |  100  |  19  ----------------------------<  145<H>  4.0   |  22  |  0.84    Ca    8.6      02 Oct 2023 05:30  Phos  2.5     10-02  Mg     1.8     10-02    TPro  6.2  /  Alb  2.5<L>  /  TBili  0.3  /  DBili  x   /  AST  17  /  ALT  9<L>  /  AlkPhos  84  10-02      Urinalysis Basic - ( 02 Oct 2023 05:30 )    Color: x / Appearance: x / SG: x / pH: x  Gluc: 145 mg/dL / Ketone: x  / Bili: x / Urobili: x   Blood: x / Protein: x / Nitrite: x   Leuk Esterase: x / RBC: x / WBC x   Sq Epi: x / Non Sq Epi: x / Bacteria: x        MICROBIOLOGY:    RADIOLOGY & ADDITIONAL STUDIES:

## 2023-10-03 NOTE — PROGRESS NOTE ADULT - ASSESSMENT
67M PMH HTN, IDDM with peripheral neuropathy, CAD s/p PCI with 2 stents (3 yrs ago at [facility]) who presents to [facility] with right toe pain, admitted for treatment of infected non-healing right toe wound.

## 2023-10-03 NOTE — CONSULT NOTE ADULT - ASSESSMENT
I M    67 y o M PMH HTN, IDDM with peripheral neuropathy, CAD s/p PCI with 2 stents (3 yrs ago at [facility]) who presents to [facility] with right toe pain, admitted for treatment of infected non-healing right toe wound.     Problem/Plan - 1:  ·  Problem: Diabetic foot infection.   ·  Plan: Patient presenting with worsening right 4th and 5th toe pain and discoloration in setting of non-healing wound after sustaining fall. Afebrile and hemodynamically stable however with leukocytosis.   Xrays done in ED showing no evidence of fracture or osteomyelitis  s/p 1 dose vancomycin in ED   Ct of the right foot: no evidence of soft tissue emphysema, or abscess    - podiatry consulted, f/u recs   - c/w vancomycin 1mg q12 (end date 10/9)  - c/w zosyn 4.5mg q4 (end date 10/12)  - tylenol prn fevers and mild pain   - oxycodone 5mg PO q8hrs prn severe pain  - consider oxycodone 10mg po q8 hrs for severe pain, if requiring more pain control  - morphine 2 mg IV prn, one time dose if requiring more pain control  - f/u BCs.    Problem/Plan - 2:  ·  Problem: Diabetes mellitus.   ·  Plan: History of IDDM on home novolog 11 units TID pre-meal. Denies taking any basal insulin. Glucose 456 on admission without evidence of DKA as no AG. C/b neuropathy for which he reports taking gabapentin 1600mg TID (confirmed with pharmacy).    - endocrinology consulted, f/u recs  - per endo, recommended 15 lantus, 8 u lispro premeal  - f/u c-peptide and type 1 dm antibodies    - CC diet  - c/w home gabapentin.    Problem/Plan - 3:  ·  Problem: HTN (hypertension).   ·  Plan: On home lisinopril 40mg qd   -c/w home lisinopril   -monitor BPs    #CAD s/p 2 stents   -patient reports he had 2 stents placed in his heart 3 years ago at [facility] but does not know location. Reports he does not take aspirin or plavix/brillinta.  -aspirin while inpatient   -collateral from PCP.    Problem/Plan - 4:  ·  Problem: Prophylactic measure.   ·  Plan: F: none   E: replete as needed  N: CC diet   DVT ppx: lovenox   Dispo: [facility].

## 2023-10-03 NOTE — PROGRESS NOTE ADULT - SUBJECTIVE AND OBJECTIVE BOX
Patient is a 67y old  Male who presents with a chief complaint of infected toe wound (03 Oct 2023 05:24)      INTERVAL HPI/ OVERNIGHT EVENTS: No acute events overnight. Pt states of mild pain on palpation but reduced from before.       LABS                        10.5   16.47 )-----------( 163      ( 02 Oct 2023 05:30 )             32.0     10-02    132<L>  |  100  |  19  ----------------------------<  145<H>  4.0   |  22  |  0.84    Ca    8.6      02 Oct 2023 05:30  Phos  2.5     10-02  Mg     1.8     10-02    TPro  6.2  /  Alb  2.5<L>  /  TBili  0.3  /  DBili  x   /  AST  17  /  ALT  9<L>  /  AlkPhos  84  10-02        ICU Vital Signs Last 24 Hrs  T(C): 37.4 (03 Oct 2023 05:47), Max: 37.4 (03 Oct 2023 05:47)  T(F): 99.3 (03 Oct 2023 05:47), Max: 99.3 (03 Oct 2023 05:47)  HR: 63 (03 Oct 2023 05:47) (61 - 71)  BP: 165/79 (03 Oct 2023 05:47) (165/76 - 178/86)  BP(mean): --  ABP: --  ABP(mean): --  RR: 17 (03 Oct 2023 05:47) (17 - 18)  SpO2: 93% (03 Oct 2023 05:47) (93% - 97%)    O2 Parameters below as of 03 Oct 2023 05:47  Patient On (Oxygen Delivery Method): room air        Lower Extremity Focused:  Vasc: DP/PT 2/4 b/l, erythema to R 4th digit extending to forefoot, darkening of skin of R 4th digit around middle and proximal phalanx  Derm: Eschar on Right foot lateral 4th digit middle phalanx well-adhered to skin , no current drainage, does not probe, no tracking/tunneling, no fluctuance, Macerated 3rd & 4th interspaces on R foot, no open wounds left   Neuro: protective sensation slightly diminished. Mild dusky appearance of the 4th digit.   MSK: +TTP to R 4th proximal and middle phalanx     RADIOLOGY  < from: CT Foot w/ IV Cont, Right (10.02.23 @ 11:17) >  IMPRESSION:    No tracking soft tissue emphysema drainable mature abscess of right foot.    < end of copied text >      < from: Xray Toes, Right Foot (10.01.23 @ 04:59) >  IMPRESSION: No evidence of acute fracture or dislocation. No radiographic   evidence of osteomyelitis  < end of copied text >'

## 2023-10-03 NOTE — DISCHARGE NOTE PROVIDER - NSDCCPCAREPLAN_GEN_ALL_CORE_FT
PRINCIPAL DISCHARGE DIAGNOSIS  Diagnosis: Toe infection  Assessment and Plan of Treatment: A puncture wound is an injury that is caused by a sharp, thin object that penetrates your skin. Usually, a puncture wound does not leave a large opening in your skin, so it may not bleed a lot. However, when you get a puncture wound, dirt or other materials (foreign bodies) can be forced into your wound and can break off inside. This increases the chance of infection, such as tetanus. The cause of your wound was not found. However imaging of your wound did not show any signs of osteomyleitis. You received antibiotics during your admission. You will need to complete your antibiotics at home. You will need to take doxycycline twice a day for 7 days. You will also need to take keflex four times a day for 7 days. Please follow up with your podiatry appointment within one week. If you have worsening pain, swelling or fevers please return to the emergency department immediately.      SECONDARY DISCHARGE DIAGNOSES  Diagnosis: Diabetes mellitus  Assessment and Plan of Treatment: Type 1 diabetes is a disease that affects how your body makes insulin and uses glucose (sugar). Normally, when the blood sugar level increases, the pancreas makes more insulin. Insulin helps move sugar out of the blood so it can be used for energy. Type 1 diabetes develops because the immune system destroys cells in the pancreas that make insulin. The pancreas cannot make enough insulin, so the blood sugar level continues to rise. A family history of type 1 diabetes may increase your risk. Type 1 diabetes is treated with insulin. You will need to take your 11 units of lantus in the morning, and 7 units of lispro three times a day before your meals. Please follow up with your primary care provider outpatient.

## 2023-10-03 NOTE — DISCHARGE NOTE NURSING/CASE MANAGEMENT/SOCIAL WORK - NSDCPEFALRISK_GEN_ALL_CORE
For information on Fall & Injury Prevention, visit: [email or web address] OR  [email or web address] OR  https://www.cdc.gov/steadi/patient.html

## 2023-10-03 NOTE — PROGRESS NOTE ADULT - PROBLEM SELECTOR PLAN 1
Patient presenting with worsening right 4th and 5th toe pain and discoloration in setting of non-healing wound after sustaining fall. Afebrile and hemodynamically stable however with leukocytosis.   Xrays done in ED showing no evidence of fracture or osteomyelitis  s/p 1 dose vancomycin in ED   Ct of the right foot: no evidence of soft tissue emphysema, or abscess    - podiatry consulted, f/u recs   - c/w vancomycin 1mg q12 (end date 10/9)  - c/w zosyn 4.5mg q4 (end date 10/12)  - tylenol prn fevers and mild pain   - oxycodone 5mg PO q8hrs prn severe pain  - consider oxycodone 10mg po q8 hrs for severe pain, if requiring more pain control  - morphine 2 mg IV prn, one time dose if requiring more pain control  - f/u BCs

## 2023-10-03 NOTE — PHYSICAL THERAPY INITIAL EVALUATION ADULT - PERTINENT HX OF CURRENT PROBLEM, REHAB EVAL
Pt is a 66 yo male who presents to [facility] with right toe pain, admitted for treatment of infected non-healing right toe wound.

## 2023-10-03 NOTE — PHYSICAL THERAPY INITIAL EVALUATION ADULT - GAIT DEVIATIONS NOTED, PT EVAL
fairly steady, slightly increased lateral sway, no lose of balance, decreased heel strike and hip flexion on RLE.

## 2023-10-03 NOTE — DISCHARGE NOTE PROVIDER - NSDCMRMEDTOKEN_GEN_ALL_CORE_FT
lisinopril 40 mg oral tablet: 1 tab(s) orally once a day  Neurontin 800 mg oral tablet: 2 tab(s) orally 3 times a day  NovoLOG 100 units/mL subcutaneous solution: 11 unit(s) subcutaneous 3 times a day   cephalexin 500 mg oral capsule: 1 cap(s) orally 4 times a day  cephalexin 500 mg oral capsule: 1 cap(s) orally 4 times a day Please take 1 capsule 4 times a day, starting tomorrow 10/4, and stop on 10/10.  doxycycline hyclate 100 mg oral tablet: 1 tab(s) orally 2 times a day Please begin taking one tablet twice a day beginning 10/4, and continue until 10/10 and stop.  doxycycline monohydrate 50 mg oral capsule: 2 cap(s) orally every 12 hours  lisinopril 40 mg oral tablet: 1 tab(s) orally once a day  Neurontin 800 mg oral tablet: 2 tab(s) orally 3 times a day  NovoLOG 100 units/mL subcutaneous solution: 11 unit(s) subcutaneous 3 times a day   cephalexin 500 mg oral capsule: 1 cap(s) orally 4 times a day Please take 1 capsule 4 times a day, starting tomorrow 10/4, and stop on 10/10.  cephalexin 500 mg oral capsule: 1 cap(s) orally 4 times a day  doxycycline hyclate 100 mg oral tablet: 1 tab(s) orally 2 times a day Please begin taking one tablet twice a day beginning 10/4, and continue until 10/10 and stop.  doxycycline monohydrate 50 mg oral capsule: 2 cap(s) orally every 12 hours  HumaLOG 100 units/mL injectable solution: 7 international unit(s) injectable 3 times a day Please take 7 units before meals every day.  Lantus 100 units/mL subcutaneous solution: 11 international unit(s) subcutaneous once a day Please take 11 units in the morning daily  lisinopril 40 mg oral tablet: 1 tab(s) orally once a day  Neurontin 800 mg oral tablet: 2 tab(s) orally 3 times a day  NovoLOG 100 units/mL subcutaneous solution: 11 unit(s) subcutaneous 3 times a day  oxyCODONE 5 mg oral tablet: 1 tab(s) orally every 8 hours as needed for  severe pain MDD: 3 tablet

## 2023-10-03 NOTE — PROGRESS NOTE ADULT - PROBLEM SELECTOR PLAN 2
History of IDDM on home novolog 11 units TID pre-meal. Denies taking any basal insulin. Glucose 456 on admission without evidence of DKA as no AG. C/b neuropathy for which he reports taking gabapentin 1600mg TID (confirmed with pharmacy).    - endocrinology consulted, f/u recs  - per endo, recommended 15 lantus, 8 u lispro premeal  - f/u c-peptide and type 1 dm antibodies    - CC diet  - c/w home gabapentin

## 2023-10-03 NOTE — PROGRESS NOTE ADULT - ATTENDING COMMENTS
Pt seen on rounds this afternoon.  Says that his pain has decreased, and was more willing to speak to us today about the diabetes.  Explained to him that his C-peptide of 0.5 ng/ml is above the range for a type 1 diabetic, but that he is very insulinopenic.  As a lean, insulinopenic pt, he likely has a disproportionate prandial insulin requirement relative to basal needs.  This was evident last night, where the 15 units of Lantus dropped him to 78 mg% this morning.  His post-prandials are improved, and he also dropped to 75 after breakfast today, which included a reasonable amount of carbohydrate.  Given this pattern, I emphasized to him that the 70/30 insulin regimen would be even more inappropriate than usual, since it would be providing much more long-acting insulin (tierra overnight) than meal-related insulin.  He accepted this, but I am not sure whether he will follow through with a basal/bolus regimen.  For now, would decrease the premeal lispro to 7 units, the Lantus to 11 units  His foot was wrapped in only a single layer of gauze, but I could not feel a DP pulse on exam.  I was unable to examine the toes, but I remain concerned about the vascular status of his foot.  I discussed this with Podiatry this morning and with the team when we made rounds this afternoon.  He would ideally be seen by Vascular surgery or at least have arterial Dopplers
Patient with improved pain, WBC improving. Denies other complaints.  General: AOX3, NAD, lying in bed, speaking in full sentences, no labored breathing on RA  HEENT: AT/NC, no facial asymmetry  Lungs: poor inspiration, no crackles, no wheezes  Heart: RRR  Abdomen: soft, non-tender  Extremities:  right foot in dressing, no edema, no tenderness, no focal deficit    Labs reviewed    Patient with improving leucocytosis, appreciate Podiatry, will transition to PO ATB. Visiting nurse for wound care, wound care per podiatry. Will follow-up within one week  Endocrinology follow-up for discharge regimen. Early PCP follow-up  Rest per above  DC time>30 minutes
Patient reports pain in right foot, reports morphine helping. Denies N/V, tolerating diet. CT leg done. No other complaints or events reported.  General: AOX3, NAD, lying in bed, speaking in full sentences, no labored breathing on RA  HEENT: AT/NC, no facial asymmetry  Lungs: no crackles, no wheezes  Heart: RRR  Abdomen: soft, non-tender, no guarding, + BS  Extremities:  right foot in dressing refusing to unwrap, no tenderness to palpation, no visible edema, no focal deficit    Labs, imaging reviewed    Diabetic foot infection  IDDM with neuropathy  HTN  CAD     Patient with persistent Leucocytosis, will broaden coverage to Vanc/zosyn. Trend ESR/CRP, podiatry follow-up, follow-up CT foot. No other source of infection. Wound care per podiatry  A1C 12 with fluctuating FS, endocrinology evaluation

## 2023-10-03 NOTE — PROGRESS NOTE ADULT - PROBLEM SELECTOR PLAN 4
F: none   E: replete as needed  N: CC diet   DVT ppx: lovenox   Dispo: F
F: none   E: replete as needed  N: CC diet   DVT ppx: lovenox   Dispo: F

## 2023-10-03 NOTE — DISCHARGE NOTE NURSING/CASE MANAGEMENT/SOCIAL WORK - NSDCFUADDAPPT_GEN_ALL_CORE_FT
Please schedule an appointment with Dr. Tom Camacho, podiatry, within 1 week.    Please schedule an appointment with you primary care provider within 2 weeks.

## 2023-10-03 NOTE — CONSULT NOTE ADULT - SUBJECTIVE AND OBJECTIVE BOX
Patient is a 67y old  Male who presents with a chief complaint of infected toe wound (03 Oct 2023 11:49)      HPI:  67M PMH HTN, IDDM with peripheral neuropathy, CAD s/p PCI with 2 stents (3 yrs ago at [facility]) who presents to [facility] with right toe pain. Patient reports he sustained a mechanical fall on September 2nd where he fell down some stairs and struck his knees and his right foot, he subsequently went to [facility] where he had xrays done that confirmed no fractures however did sustain a laceration to his right 4th and 5th digits. Since then he reports the wound did not heal well and then he started to experience pain Thursday night (3 days prior to admission) in his right toes which got progressively worse to the point he was unable to ambulate and prompted him to go to the ED. He reports associated purplish discoloration on the 4th and 5th toes but denies any swelling. Also reports a fluid -filled blister that formed on the 4th toe last night which he popped himself and expressed clear-whitish discharge. He has been unable to bear weight on his right foot due to the pain. He has not taken any abx recently. He denies any fevers/chills at home, Reports associated numbness and tingling of his 4th and 5th right toes however no edema.     In the ED:  Initial vitals: Temp 99F, HR 89, /87, RR 16, O2 98% RA  Labs significant for WBC 16.16, hgb 11.5, glucose 456  CXR clear   Right toes xray: No evidence of acute fracture or dislocation. No radiographic evidence of osteomyelitis.  Interventions: morphine 4mg IVP x2, dilaudid 0.5mg IVP x1, 10 units regular insulin, vancomycin 1250mg IVP x1, 1LNS    (01 Oct 2023 11:00)    PAST MEDICAL & SURGICAL HISTORY:  IDDM (insulin dependent diabetes mellitus)      HTN (hypertension)      CAD S/P percutaneous coronary angioplasty      Neuropathy      No significant past surgical history        MEDICATIONS  (STANDING):  aspirin enteric coated 81 milliGRAM(s) Oral daily  cephalexin 500 milliGRAM(s) Oral four times a day  dextrose 5%. 1000 milliLiter(s) (100 mL/Hr) IV Continuous <Continuous>  dextrose 5%. 1000 milliLiter(s) (50 mL/Hr) IV Continuous <Continuous>  dextrose 50% Injectable 12.5 Gram(s) IV Push once  dextrose 50% Injectable 25 Gram(s) IV Push once  dextrose 50% Injectable 25 Gram(s) IV Push once  doxycycline monohydrate Capsule 100 milliGRAM(s) Oral every 12 hours  gabapentin 1600 milliGRAM(s) Oral three times a day  glucagon  Injectable 1 milliGRAM(s) IntraMuscular once  heparin   Injectable 5000 Unit(s) SubCutaneous every 8 hours  insulin glargine Injectable (LANTUS) 15 Unit(s) SubCutaneous at bedtime  insulin lispro (ADMELOG) corrective regimen sliding scale   SubCutaneous Before meals and at bedtime  insulin lispro Injectable (ADMELOG) 8 Unit(s) SubCutaneous three times a day before meals  lactated ringers. 1000 milliLiter(s) (75 mL/Hr) IV Continuous <Continuous>  lisinopril 40 milliGRAM(s) Oral every 24 hours    MEDICATIONS  (PRN):  acetaminophen     Tablet .. 650 milliGRAM(s) Oral every 6 hours PRN Temp greater or equal to 38C (100.4F), Mild Pain (1 - 3)  dextrose Oral Gel 15 Gram(s) Oral once PRN Blood Glucose LESS THAN 70 milliGRAM(s)/deciliter  oxyCODONE    IR 5 milliGRAM(s) Oral every 6 hours PRN Severe Pain (7 - 10)          FAMILY HISTORY:      CBC Full  -  ( 03 Oct 2023 05:30 )  WBC Count : 13.82 K/uL  RBC Count : 3.03 M/uL  Hemoglobin : 9.7 g/dL  Hematocrit : 29.9 %  Platelet Count - Automated : 158 K/uL  Mean Cell Volume : 98.7 fl  Mean Cell Hemoglobin : 32.0 pg  Mean Cell Hemoglobin Concentration : 32.4 gm/dL  Auto Neutrophil # : 10.82 K/uL  Auto Lymphocyte # : 1.37 K/uL  Auto Monocyte # : 1.48 K/uL  Auto Eosinophil # : 0.05 K/uL  Auto Basophil # : 0.04 K/uL  Auto Neutrophil % : 78.3 %  Auto Lymphocyte % : 9.9 %  Auto Monocyte % : 10.7 %  Auto Eosinophil % : 0.4 %  Auto Basophil % : 0.3 %      10-03    132<L>  |  100  |  20  ----------------------------<  138<H>  4.0   |  26  |  0.93    Ca    8.4      03 Oct 2023 05:30  Phos  3.1     10-03  Mg     2.2     10-03    TPro  5.9<L>  /  Alb  2.7<L>  /  TBili  0.4  /  DBili  x   /  AST  17  /  ALT  7<L>  /  AlkPhos  81  10-03      Urinalysis Basic - ( 03 Oct 2023 05:30 )    Color: x / Appearance: x / SG: x / pH: x  Gluc: 138 mg/dL / Ketone: x  / Bili: x / Urobili: x   Blood: x / Protein: x / Nitrite: x   Leuk Esterase: x / RBC: x / WBC x   Sq Epi: x / Non Sq Epi: x / Bacteria: x        Radiology :     < from: CT Foot w/ IV Cont, Right (10.02.23 @ 11:17) >  ACC: [id number] EXAM:  CT FOOT ONLY IC RT   ORDERED BY: SERAFIN WORTHINGTON     PROCEDURE DATE:  10/02/2023          INTERPRETATION:  CT OF THE RIGHT FOOT    CLINICAL INFORMATION: Abscess evaluation.    COMPARISON: Radiograph dated 10/1/2023. No prior CT available    TECHNIQUE: Axial CT images were obtained of the right foot following   administration of 96 cc Isovue-370 intravenous contrast. Sagittal and   coronal reconstructions were provided.    FINDINGS:  Osseous: No acute fracture or dislocation. No definite focal cortical   erosion or periosteal reaction. Mild to moderate arthrosis, most advanced   at the MTP joint of the great toe Mineralization within normal limits. No   aggressive osseous neoplasm.    Soft tissues: No sizable hyperattenuating hematoma or drainable   encapsulated fluid collection. No tracking emphysema. No radiopaque   foreign body.    IMPRESSION:    No tracking soft tissue emphysema drainable mature abscess of right foot.    < end of copied text >          Review of Systems : per HPI         Vital Signs Last 24 Hrs  T(C): 37.4 (03 Oct 2023 05:47), Max: 37.4 (03 Oct 2023 05:47)  T(F): 99.3 (03 Oct 2023 05:47), Max: 99.3 (03 Oct 2023 05:47)  HR: 61 (03 Oct 2023 11:11) (61 - 71)  BP: 132/77 (03 Oct 2023 11:11) (132/77 - 182/88)  BP(mean): --  RR: 17 (03 Oct 2023 05:47) (17 - 18)  SpO2: 93% (03 Oct 2023 05:47) (93% - 97%)    Parameters below as of 03 Oct 2023 05:47  Patient On (Oxygen Delivery Method): room air            Physical Exam :  67 y o man lying comfortably in semi Mcclellan's position , awake , alert , no acute complaints     Head : normocephalic , atraumatic    Eyes : PERRLA , EOMI , no nystagmus , sclera anicteric    ENT / FACE : neg nasal discharge , uvula midline , no oropharyngeal erythema / exudate    Neck : supple , negative JVD , negative carotid bruits , no thyromegaly    Chest : CTA bilaterally , neg wheeze / rhonchi / rales / crackles / egophany    Cardiovascular : regular rate and rhythm , neg murmurs / rubs / gallops    Abdomen : soft , non distended , non tender to palpation in all 4 quadrants ,  normal bowel sounds     Extremities : R foot dressing    Neurologic Exam :     Alert and oriented  x 3        Motor Exam:                Right UE:                     no focal weakness ,  > 3+/5 throughout     Left UE:                       no focal weakness ,  > 3+/5 throughout          Right LE:          no focal weakness ,  > 3+/5 throughout          Left LE:          no focal weakness ,  > 3+/5 throughout           Sensation :         intact to light touch x 4 extremities                                 DTR :           biceps/brachioradialis : equal                            patella/ankle : equal               Gait :  not tested          PM&R Impression : admitted for  non-healing right toe wound.          Recommendations / Plan :       1) Physical / Occupational therapy focusing on therapeutic exercises , equipment evaluation , bed mobility/transfer out of bed evaluation , progressive ambulation with assistive devices prn .    2) Current disposition plan recommendation  :   probable d/c home

## 2023-10-03 NOTE — PROGRESS NOTE ADULT - ASSESSMENT
67M PMH HTN, IDDM with peripheral neuropathy, CAD s/p PCI with 2 stents (3 yrs ago at [facility]) who presents to [facility] with right toe pain. Podiatry consulted to evaluate R toe wound. Pt states present for 3 days, denies any purulence or malodor. X-rays with no evidence of om or fracture. At time of consult, VSS, WBC 16.16, ESR/CRP pending. Clinically, macerated interspaces and stable eschar on lateral 4th digit - erythema present, no other obvious signs of infection. CT with final read of no soft tissue abscess. ESR and CRP below threshold of OM. Low concern for OM. Concern for dusky dark discoloration of 4th digit.     Plan:   - Local wound care - dressed wound with betadine DSD  - C/w IV antibiotics, pt can be transitioned to PO abx for discharge. Will monitor dusky discoloration of R 4th digit outpt.   - Monitor erythema   - Pt can WBAT to RLE  - Rest of care per primary team       Discharge Instructions   - Complete prescribed abx   - Local wound care: Change dressing daily. Cleanse with NS. Apply betadine DSD to 4th digit and 4rd interspace.   - Patient should follow up with Dr. Tom Camacho within 1 week of discharge.    Office information:          [facility] Address- [address], [place] [address] Phone: [phone]         [place] Address- [address] [address] Phone: [phone]    Plan d/w Attending. Podiatry following.

## 2023-10-03 NOTE — PROGRESS NOTE ADULT - PROBLEM SELECTOR PLAN 3
On home lisinopril 40mg qd   -c/w home lisinopril   -monitor BPs    #CAD s/p 2 stents   -patient reports he had 2 stents placed in his heart 3 years ago at [facility] but does not know location. Reports he does not take aspirin or plavix/brillinta.  -aspirin while inpatient   -collateral from PCP
On home lisinopril 40mg qd   -c/w home lisinopril   -monitor BPs    #CAD s/p 2 stents   -patient reports he had 2 stents placed in his heart 3 years ago at [facility] but does not know location. Reports he does not take aspirin or plavix/brillinta.  -aspirin while inpatient   -collateral from PCP

## 2023-10-03 NOTE — PHYSICAL THERAPY INITIAL EVALUATION ADULT - ADDITIONAL COMMENTS
Pt lives with mother in an apt with elevator. Prior to admission, pt ambulated with cane if needed. Pt also has a rolling walker at home.

## 2023-10-05 LAB — GAD65 AB SER-MCNC: 0 NMOL/L — SIGNIFICANT CHANGE UP

## 2023-10-06 LAB
CULTURE RESULTS: SIGNIFICANT CHANGE UP
CULTURE RESULTS: SIGNIFICANT CHANGE UP
ISLET CELL512 AB SER-ACNC: SIGNIFICANT CHANGE UP
SPECIMEN SOURCE: SIGNIFICANT CHANGE UP
SPECIMEN SOURCE: SIGNIFICANT CHANGE UP

## 2023-10-12 DIAGNOSIS — L03.115 CELLULITIS OF RIGHT LOWER LIMB: ICD-10-CM

## 2023-10-12 DIAGNOSIS — I25.10 ATHEROSCLEROTIC HEART DISEASE OF NATIVE CORONARY ARTERY WITHOUT ANGINA PECTORIS: ICD-10-CM

## 2023-10-12 DIAGNOSIS — S91.114S: ICD-10-CM

## 2023-10-12 DIAGNOSIS — T38.3X6A UNDERDOSING OF INSULIN AND ORAL HYPOGLYCEMIC [ANTIDIABETIC] DRUGS, INITIAL ENCOUNTER: ICD-10-CM

## 2023-10-12 DIAGNOSIS — E11.65 TYPE 2 DIABETES MELLITUS WITH HYPERGLYCEMIA: ICD-10-CM

## 2023-10-12 DIAGNOSIS — L97.511 NON-PRESSURE CHRONIC ULCER OF OTHER PART OF RIGHT FOOT LIMITED TO BREAKDOWN OF SKIN: ICD-10-CM

## 2023-10-12 DIAGNOSIS — Z79.4 LONG TERM (CURRENT) USE OF INSULIN: ICD-10-CM

## 2023-10-12 DIAGNOSIS — F12.90 CANNABIS USE, UNSPECIFIED, UNCOMPLICATED: ICD-10-CM

## 2023-10-12 DIAGNOSIS — E11.42 TYPE 2 DIABETES MELLITUS WITH DIABETIC POLYNEUROPATHY: ICD-10-CM

## 2023-10-12 DIAGNOSIS — E11.621 TYPE 2 DIABETES MELLITUS WITH FOOT ULCER: ICD-10-CM

## 2023-10-12 DIAGNOSIS — B19.20 UNSPECIFIED VIRAL HEPATITIS C WITHOUT HEPATIC COMA: ICD-10-CM

## 2023-10-12 DIAGNOSIS — Z95.5 PRESENCE OF CORONARY ANGIOPLASTY IMPLANT AND GRAFT: ICD-10-CM

## 2023-10-12 DIAGNOSIS — L03.031 CELLULITIS OF RIGHT TOE: ICD-10-CM

## 2023-10-12 DIAGNOSIS — I10 ESSENTIAL (PRIMARY) HYPERTENSION: ICD-10-CM

## 2023-10-12 DIAGNOSIS — E11.628 TYPE 2 DIABETES MELLITUS WITH OTHER SKIN COMPLICATIONS: ICD-10-CM

## 2023-10-12 DIAGNOSIS — Z91.148 PATIENT'S OTHER NONCOMPLIANCE WITH MEDICATION REGIMEN FOR OTHER REASON: ICD-10-CM

## 2023-10-21 ENCOUNTER — INPATIENT (INPATIENT)
Facility: HOSPITAL | Age: 68
LOS: 9 days | Discharge: HOME HEALTH | DRG: 854 | End: 2023-10-31
Attending: HOSPITALIST | Admitting: STUDENT IN AN ORGANIZED HEALTH CARE EDUCATION/TRAINING PROGRAM
Payer: MEDICARE

## 2023-10-21 VITALS
WEIGHT: 143.3 LBS | HEART RATE: 82 BPM | TEMPERATURE: 100 F | SYSTOLIC BLOOD PRESSURE: 175 MMHG | DIASTOLIC BLOOD PRESSURE: 91 MMHG | OXYGEN SATURATION: 96 % | HEIGHT: 67 IN | RESPIRATION RATE: 18 BRPM

## 2023-10-21 DIAGNOSIS — E11.9 TYPE 2 DIABETES MELLITUS WITHOUT COMPLICATIONS: ICD-10-CM

## 2023-10-21 DIAGNOSIS — A41.9 SEPSIS, UNSPECIFIED ORGANISM: ICD-10-CM

## 2023-10-21 DIAGNOSIS — I10 ESSENTIAL (PRIMARY) HYPERTENSION: ICD-10-CM

## 2023-10-21 DIAGNOSIS — Z29.9 ENCOUNTER FOR PROPHYLACTIC MEASURES, UNSPECIFIED: ICD-10-CM

## 2023-10-21 DIAGNOSIS — R74.01 ELEVATION OF LEVELS OF LIVER TRANSAMINASE LEVELS: ICD-10-CM

## 2023-10-21 DIAGNOSIS — S91.109A UNSPECIFIED OPEN WOUND OF UNSPECIFIED TOE(S) WITHOUT DAMAGE TO NAIL, INITIAL ENCOUNTER: ICD-10-CM

## 2023-10-21 DIAGNOSIS — I16.0 HYPERTENSIVE URGENCY: ICD-10-CM

## 2023-10-21 PROBLEM — I25.10 ATHEROSCLEROTIC HEART DISEASE OF NATIVE CORONARY ARTERY WITHOUT ANGINA PECTORIS: Chronic | Status: ACTIVE | Noted: 2023-10-01

## 2023-10-21 PROBLEM — G62.9 POLYNEUROPATHY, UNSPECIFIED: Chronic | Status: ACTIVE | Noted: 2023-10-01

## 2023-10-21 LAB
ALBUMIN SERPL ELPH-MCNC: 2.3 G/DL — LOW (ref 3.4–5)
ALBUMIN SERPL ELPH-MCNC: 2.3 G/DL — LOW (ref 3.4–5)
ALP SERPL-CCNC: 137 U/L — HIGH (ref 40–120)
ALP SERPL-CCNC: 137 U/L — HIGH (ref 40–120)
ALT FLD-CCNC: 26 U/L — SIGNIFICANT CHANGE UP (ref 12–42)
ALT FLD-CCNC: 26 U/L — SIGNIFICANT CHANGE UP (ref 12–42)
ANION GAP SERPL CALC-SCNC: 6 MMOL/L — LOW (ref 9–16)
ANION GAP SERPL CALC-SCNC: 6 MMOL/L — LOW (ref 9–16)
APTT BLD: 31.2 SEC — SIGNIFICANT CHANGE UP (ref 24.5–35.6)
APTT BLD: 31.2 SEC — SIGNIFICANT CHANGE UP (ref 24.5–35.6)
AST SERPL-CCNC: 23 U/L — SIGNIFICANT CHANGE UP (ref 15–37)
AST SERPL-CCNC: 23 U/L — SIGNIFICANT CHANGE UP (ref 15–37)
BASOPHILS # BLD AUTO: 0.06 K/UL — SIGNIFICANT CHANGE UP (ref 0–0.2)
BASOPHILS # BLD AUTO: 0.06 K/UL — SIGNIFICANT CHANGE UP (ref 0–0.2)
BASOPHILS NFR BLD AUTO: 0.3 % — SIGNIFICANT CHANGE UP (ref 0–2)
BASOPHILS NFR BLD AUTO: 0.3 % — SIGNIFICANT CHANGE UP (ref 0–2)
BILIRUB SERPL-MCNC: 0.5 MG/DL — SIGNIFICANT CHANGE UP (ref 0.2–1.2)
BILIRUB SERPL-MCNC: 0.5 MG/DL — SIGNIFICANT CHANGE UP (ref 0.2–1.2)
BUN SERPL-MCNC: 26 MG/DL — HIGH (ref 7–23)
BUN SERPL-MCNC: 26 MG/DL — HIGH (ref 7–23)
CALCIUM SERPL-MCNC: 9.2 MG/DL — SIGNIFICANT CHANGE UP (ref 8.5–10.5)
CALCIUM SERPL-MCNC: 9.2 MG/DL — SIGNIFICANT CHANGE UP (ref 8.5–10.5)
CHLORIDE SERPL-SCNC: 101 MMOL/L — SIGNIFICANT CHANGE UP (ref 96–108)
CHLORIDE SERPL-SCNC: 101 MMOL/L — SIGNIFICANT CHANGE UP (ref 96–108)
CO2 SERPL-SCNC: 28 MMOL/L — SIGNIFICANT CHANGE UP (ref 22–31)
CO2 SERPL-SCNC: 28 MMOL/L — SIGNIFICANT CHANGE UP (ref 22–31)
CREAT SERPL-MCNC: 0.95 MG/DL — SIGNIFICANT CHANGE UP (ref 0.5–1.3)
CREAT SERPL-MCNC: 0.95 MG/DL — SIGNIFICANT CHANGE UP (ref 0.5–1.3)
CRP SERPL-MCNC: 113 MG/L — HIGH (ref 0–9)
CRP SERPL-MCNC: 113 MG/L — HIGH (ref 0–9)
EGFR: 88 ML/MIN/1.73M2 — SIGNIFICANT CHANGE UP
EGFR: 88 ML/MIN/1.73M2 — SIGNIFICANT CHANGE UP
EOSINOPHIL # BLD AUTO: 0.05 K/UL — SIGNIFICANT CHANGE UP (ref 0–0.5)
EOSINOPHIL # BLD AUTO: 0.05 K/UL — SIGNIFICANT CHANGE UP (ref 0–0.5)
EOSINOPHIL NFR BLD AUTO: 0.3 % — SIGNIFICANT CHANGE UP (ref 0–6)
EOSINOPHIL NFR BLD AUTO: 0.3 % — SIGNIFICANT CHANGE UP (ref 0–6)
FLUAV AG NPH QL: SIGNIFICANT CHANGE UP
FLUAV AG NPH QL: SIGNIFICANT CHANGE UP
FLUBV AG NPH QL: SIGNIFICANT CHANGE UP
FLUBV AG NPH QL: SIGNIFICANT CHANGE UP
GLUCOSE BLDC GLUCOMTR-MCNC: 280 MG/DL — HIGH (ref 70–99)
GLUCOSE BLDC GLUCOMTR-MCNC: 280 MG/DL — HIGH (ref 70–99)
GLUCOSE SERPL-MCNC: 144 MG/DL — HIGH (ref 70–99)
GLUCOSE SERPL-MCNC: 144 MG/DL — HIGH (ref 70–99)
HCT VFR BLD CALC: 29.9 % — LOW (ref 39–50)
HCT VFR BLD CALC: 29.9 % — LOW (ref 39–50)
HGB BLD-MCNC: 9.7 G/DL — LOW (ref 13–17)
HGB BLD-MCNC: 9.7 G/DL — LOW (ref 13–17)
IMM GRANULOCYTES NFR BLD AUTO: 0.5 % — SIGNIFICANT CHANGE UP (ref 0–0.9)
IMM GRANULOCYTES NFR BLD AUTO: 0.5 % — SIGNIFICANT CHANGE UP (ref 0–0.9)
INR BLD: 1 — SIGNIFICANT CHANGE UP (ref 0.85–1.18)
INR BLD: 1 — SIGNIFICANT CHANGE UP (ref 0.85–1.18)
LACTATE BLDV-MCNC: 0.6 MMOL/L — SIGNIFICANT CHANGE UP (ref 0.5–2)
LACTATE BLDV-MCNC: 0.6 MMOL/L — SIGNIFICANT CHANGE UP (ref 0.5–2)
LYMPHOCYTES # BLD AUTO: 1.19 K/UL — SIGNIFICANT CHANGE UP (ref 1–3.3)
LYMPHOCYTES # BLD AUTO: 1.19 K/UL — SIGNIFICANT CHANGE UP (ref 1–3.3)
LYMPHOCYTES # BLD AUTO: 6 % — LOW (ref 13–44)
LYMPHOCYTES # BLD AUTO: 6 % — LOW (ref 13–44)
MCHC RBC-ENTMCNC: 32 PG — SIGNIFICANT CHANGE UP (ref 27–34)
MCHC RBC-ENTMCNC: 32 PG — SIGNIFICANT CHANGE UP (ref 27–34)
MCHC RBC-ENTMCNC: 32.4 GM/DL — SIGNIFICANT CHANGE UP (ref 32–36)
MCHC RBC-ENTMCNC: 32.4 GM/DL — SIGNIFICANT CHANGE UP (ref 32–36)
MCV RBC AUTO: 98.7 FL — SIGNIFICANT CHANGE UP (ref 80–100)
MCV RBC AUTO: 98.7 FL — SIGNIFICANT CHANGE UP (ref 80–100)
MONOCYTES # BLD AUTO: 1.86 K/UL — HIGH (ref 0–0.9)
MONOCYTES # BLD AUTO: 1.86 K/UL — HIGH (ref 0–0.9)
MONOCYTES NFR BLD AUTO: 9.4 % — SIGNIFICANT CHANGE UP (ref 2–14)
MONOCYTES NFR BLD AUTO: 9.4 % — SIGNIFICANT CHANGE UP (ref 2–14)
NEUTROPHILS # BLD AUTO: 16.45 K/UL — HIGH (ref 1.8–7.4)
NEUTROPHILS # BLD AUTO: 16.45 K/UL — HIGH (ref 1.8–7.4)
NEUTROPHILS NFR BLD AUTO: 83.5 % — HIGH (ref 43–77)
NEUTROPHILS NFR BLD AUTO: 83.5 % — HIGH (ref 43–77)
NRBC # BLD: 0 /100 WBCS — SIGNIFICANT CHANGE UP (ref 0–0)
NRBC # BLD: 0 /100 WBCS — SIGNIFICANT CHANGE UP (ref 0–0)
PLATELET # BLD AUTO: 230 K/UL — SIGNIFICANT CHANGE UP (ref 150–400)
PLATELET # BLD AUTO: 230 K/UL — SIGNIFICANT CHANGE UP (ref 150–400)
POTASSIUM SERPL-MCNC: 3.6 MMOL/L — SIGNIFICANT CHANGE UP (ref 3.5–5.3)
POTASSIUM SERPL-MCNC: 3.6 MMOL/L — SIGNIFICANT CHANGE UP (ref 3.5–5.3)
POTASSIUM SERPL-SCNC: 3.6 MMOL/L — SIGNIFICANT CHANGE UP (ref 3.5–5.3)
POTASSIUM SERPL-SCNC: 3.6 MMOL/L — SIGNIFICANT CHANGE UP (ref 3.5–5.3)
PROT SERPL-MCNC: 7.1 G/DL — SIGNIFICANT CHANGE UP (ref 6.4–8.2)
PROT SERPL-MCNC: 7.1 G/DL — SIGNIFICANT CHANGE UP (ref 6.4–8.2)
PROTHROM AB SERPL-ACNC: 11.3 SEC — SIGNIFICANT CHANGE UP (ref 9.5–13)
PROTHROM AB SERPL-ACNC: 11.3 SEC — SIGNIFICANT CHANGE UP (ref 9.5–13)
RBC # BLD: 3.03 M/UL — LOW (ref 4.2–5.8)
RBC # BLD: 3.03 M/UL — LOW (ref 4.2–5.8)
RBC # FLD: 13.9 % — SIGNIFICANT CHANGE UP (ref 10.3–14.5)
RBC # FLD: 13.9 % — SIGNIFICANT CHANGE UP (ref 10.3–14.5)
RSV RNA NPH QL NAA+NON-PROBE: SIGNIFICANT CHANGE UP
RSV RNA NPH QL NAA+NON-PROBE: SIGNIFICANT CHANGE UP
SARS-COV-2 RNA SPEC QL NAA+PROBE: SIGNIFICANT CHANGE UP
SARS-COV-2 RNA SPEC QL NAA+PROBE: SIGNIFICANT CHANGE UP
SODIUM SERPL-SCNC: 135 MMOL/L — SIGNIFICANT CHANGE UP (ref 132–145)
SODIUM SERPL-SCNC: 135 MMOL/L — SIGNIFICANT CHANGE UP (ref 132–145)
WBC # BLD: 19.71 K/UL — HIGH (ref 3.8–10.5)
WBC # BLD: 19.71 K/UL — HIGH (ref 3.8–10.5)
WBC # FLD AUTO: 19.71 K/UL — HIGH (ref 3.8–10.5)
WBC # FLD AUTO: 19.71 K/UL — HIGH (ref 3.8–10.5)

## 2023-10-21 PROCEDURE — 99223 1ST HOSP IP/OBS HIGH 75: CPT

## 2023-10-21 PROCEDURE — 73630 X-RAY EXAM OF FOOT: CPT | Mod: 26,RT

## 2023-10-21 PROCEDURE — 99285 EMERGENCY DEPT VISIT HI MDM: CPT

## 2023-10-21 RX ORDER — LISINOPRIL 2.5 MG/1
40 TABLET ORAL DAILY
Refills: 0 | Status: DISCONTINUED | OUTPATIENT
Start: 2023-10-21 | End: 2023-10-24

## 2023-10-21 RX ORDER — LISINOPRIL 2.5 MG/1
40 TABLET ORAL DAILY
Refills: 0 | Status: DISCONTINUED | OUTPATIENT
Start: 2023-10-21 | End: 2023-10-21

## 2023-10-21 RX ORDER — MORPHINE SULFATE 50 MG/1
4 CAPSULE, EXTENDED RELEASE ORAL ONCE
Refills: 0 | Status: DISCONTINUED | OUTPATIENT
Start: 2023-10-21 | End: 2023-10-21

## 2023-10-21 RX ORDER — MORPHINE SULFATE 50 MG/1
2 CAPSULE, EXTENDED RELEASE ORAL ONCE
Refills: 0 | Status: DISCONTINUED | OUTPATIENT
Start: 2023-10-21 | End: 2023-10-21

## 2023-10-21 RX ORDER — INSULIN LISPRO 100/ML
VIAL (ML) SUBCUTANEOUS
Refills: 0 | Status: DISCONTINUED | OUTPATIENT
Start: 2023-10-21 | End: 2023-10-23

## 2023-10-21 RX ORDER — VANCOMYCIN HCL 1 G
1000 VIAL (EA) INTRAVENOUS EVERY 12 HOURS
Refills: 0 | Status: COMPLETED | OUTPATIENT
Start: 2023-10-22 | End: 2023-10-22

## 2023-10-21 RX ORDER — ACETAMINOPHEN 500 MG
975 TABLET ORAL ONCE
Refills: 0 | Status: COMPLETED | OUTPATIENT
Start: 2023-10-21 | End: 2023-10-21

## 2023-10-21 RX ORDER — VANCOMYCIN HCL 1 G
1000 VIAL (EA) INTRAVENOUS ONCE
Refills: 0 | Status: COMPLETED | OUTPATIENT
Start: 2023-10-21 | End: 2023-10-21

## 2023-10-21 RX ORDER — PIPERACILLIN AND TAZOBACTAM 4; .5 G/20ML; G/20ML
3.38 INJECTION, POWDER, LYOPHILIZED, FOR SOLUTION INTRAVENOUS ONCE
Refills: 0 | Status: COMPLETED | OUTPATIENT
Start: 2023-10-21 | End: 2023-10-21

## 2023-10-21 RX ORDER — SODIUM CHLORIDE 9 MG/ML
1000 INJECTION, SOLUTION INTRAVENOUS
Refills: 0 | Status: DISCONTINUED | OUTPATIENT
Start: 2023-10-21 | End: 2023-10-23

## 2023-10-21 RX ORDER — DEXTROSE 50 % IN WATER 50 %
15 SYRINGE (ML) INTRAVENOUS ONCE
Refills: 0 | Status: DISCONTINUED | OUTPATIENT
Start: 2023-10-21 | End: 2023-10-23

## 2023-10-21 RX ORDER — ACETAMINOPHEN 500 MG
650 TABLET ORAL EVERY 6 HOURS
Refills: 0 | Status: DISCONTINUED | OUTPATIENT
Start: 2023-10-21 | End: 2023-10-23

## 2023-10-21 RX ORDER — HYDROMORPHONE HYDROCHLORIDE 2 MG/ML
1 INJECTION INTRAMUSCULAR; INTRAVENOUS; SUBCUTANEOUS ONCE
Refills: 0 | Status: DISCONTINUED | OUTPATIENT
Start: 2023-10-21 | End: 2023-10-21

## 2023-10-21 RX ORDER — GLUCAGON INJECTION, SOLUTION 0.5 MG/.1ML
1 INJECTION, SOLUTION SUBCUTANEOUS ONCE
Refills: 0 | Status: DISCONTINUED | OUTPATIENT
Start: 2023-10-21 | End: 2023-10-23

## 2023-10-21 RX ORDER — SODIUM CHLORIDE 9 MG/ML
2000 INJECTION INTRAMUSCULAR; INTRAVENOUS; SUBCUTANEOUS ONCE
Refills: 0 | Status: COMPLETED | OUTPATIENT
Start: 2023-10-21 | End: 2023-10-21

## 2023-10-21 RX ORDER — DEXTROSE 50 % IN WATER 50 %
12.5 SYRINGE (ML) INTRAVENOUS ONCE
Refills: 0 | Status: DISCONTINUED | OUTPATIENT
Start: 2023-10-21 | End: 2023-10-23

## 2023-10-21 RX ORDER — OXYCODONE HYDROCHLORIDE 5 MG/1
5 TABLET ORAL EVERY 6 HOURS
Refills: 0 | Status: DISCONTINUED | OUTPATIENT
Start: 2023-10-21 | End: 2023-10-23

## 2023-10-21 RX ORDER — GABAPENTIN 400 MG/1
1600 CAPSULE ORAL THREE TIMES A DAY
Refills: 0 | Status: DISCONTINUED | OUTPATIENT
Start: 2023-10-21 | End: 2023-10-31

## 2023-10-21 RX ORDER — PIPERACILLIN AND TAZOBACTAM 4; .5 G/20ML; G/20ML
4.5 INJECTION, POWDER, LYOPHILIZED, FOR SOLUTION INTRAVENOUS EVERY 8 HOURS
Refills: 0 | Status: DISCONTINUED | OUTPATIENT
Start: 2023-10-21 | End: 2023-10-23

## 2023-10-21 RX ORDER — DEXTROSE 50 % IN WATER 50 %
25 SYRINGE (ML) INTRAVENOUS ONCE
Refills: 0 | Status: DISCONTINUED | OUTPATIENT
Start: 2023-10-21 | End: 2023-10-23

## 2023-10-21 RX ORDER — INSULIN ASPART 100 [IU]/ML
11 INJECTION, SOLUTION SUBCUTANEOUS
Refills: 0 | DISCHARGE

## 2023-10-21 RX ADMIN — HYDROMORPHONE HYDROCHLORIDE 1 MILLIGRAM(S): 2 INJECTION INTRAMUSCULAR; INTRAVENOUS; SUBCUTANEOUS at 21:43

## 2023-10-21 RX ADMIN — OXYCODONE HYDROCHLORIDE 5 MILLIGRAM(S): 5 TABLET ORAL at 23:35

## 2023-10-21 RX ADMIN — MORPHINE SULFATE 4 MILLIGRAM(S): 50 CAPSULE, EXTENDED RELEASE ORAL at 17:01

## 2023-10-21 RX ADMIN — MORPHINE SULFATE 2 MILLIGRAM(S): 50 CAPSULE, EXTENDED RELEASE ORAL at 20:03

## 2023-10-21 RX ADMIN — GABAPENTIN 1600 MILLIGRAM(S): 400 CAPSULE ORAL at 22:40

## 2023-10-21 RX ADMIN — MORPHINE SULFATE 2 MILLIGRAM(S): 50 CAPSULE, EXTENDED RELEASE ORAL at 13:08

## 2023-10-21 RX ADMIN — Medication 1000 MILLIGRAM(S): at 16:11

## 2023-10-21 RX ADMIN — Medication 975 MILLIGRAM(S): at 12:29

## 2023-10-21 RX ADMIN — PIPERACILLIN AND TAZOBACTAM 3.38 GRAM(S): 4; .5 INJECTION, POWDER, LYOPHILIZED, FOR SOLUTION INTRAVENOUS at 13:09

## 2023-10-21 RX ADMIN — SODIUM CHLORIDE 2000 MILLILITER(S): 9 INJECTION INTRAMUSCULAR; INTRAVENOUS; SUBCUTANEOUS at 12:59

## 2023-10-21 RX ADMIN — PIPERACILLIN AND TAZOBACTAM 200 GRAM(S): 4; .5 INJECTION, POWDER, LYOPHILIZED, FOR SOLUTION INTRAVENOUS at 12:26

## 2023-10-21 RX ADMIN — Medication 250 MILLIGRAM(S): at 12:58

## 2023-10-21 RX ADMIN — MORPHINE SULFATE 2 MILLIGRAM(S): 50 CAPSULE, EXTENDED RELEASE ORAL at 19:48

## 2023-10-21 RX ADMIN — HYDROMORPHONE HYDROCHLORIDE 1 MILLIGRAM(S): 2 INJECTION INTRAMUSCULAR; INTRAVENOUS; SUBCUTANEOUS at 21:58

## 2023-10-21 RX ADMIN — SODIUM CHLORIDE 2000 MILLILITER(S): 9 INJECTION INTRAMUSCULAR; INTRAVENOUS; SUBCUTANEOUS at 16:11

## 2023-10-21 RX ADMIN — PIPERACILLIN AND TAZOBACTAM 25 GRAM(S): 4; .5 INJECTION, POWDER, LYOPHILIZED, FOR SOLUTION INTRAVENOUS at 21:45

## 2023-10-21 RX ADMIN — MORPHINE SULFATE 4 MILLIGRAM(S): 50 CAPSULE, EXTENDED RELEASE ORAL at 11:40

## 2023-10-21 NOTE — H&P ADULT - HISTORY OF PRESENT ILLNESS
67M PMH HTN, IDDM with peripheral neuropathy, CAD s/p PCI with 2 stents (3 yrs ago at [facility]), recent admission 10/3 for diabetic ssti (CT r/o absecess, no OM) discharged on doxycycline and keflex, presenting with CC of r. foot pain.  After discharge patient endorses completing antibiotics but on Wednesday the pain started getting unbearable, not responding to his usual neurontin for nerve pain. H edid not notice drainage from the wound. He came into the hospital due ot the pain. He denies any fever, chills, shortness of breath, cough, diarrhea, constipation.    ED Course:   T98 HR 86 /80 RR 19 Sat 97% on RA.   Labs: WBC 19 Hb 9.7 BUN 26   Interventions: Vanc, zosyn, morphine x 14units, 2LNS.   XR R.Foot: 3 views of the right foot demonstrates no evidence of acute fracture or   dislocation. No radiographic evidence of osteomyelitis. Calcaneal   spurring. Vascular calcifications noted.   67M PMH HTN, IDDM with peripheral neuropathy, CAD s/p PCI with 2 stents (3 yrs ago at [facility]), recent admission 10/3 for diabetic ssti (CT r/o absecess, no OM) discharged on doxycycline and keflex, presenting with CC of r. foot pain.  After discharge patient endorses completing antibiotics but on Wednesday the pain started getting unbearable, not responding to his usual neurontin for nerve pain. H edid not notice drainage from the wound. He came into the hospital due ot the pain. He denies any fever, chills, shortness of breath, cough, diarrhea, constipation.    ED Course:   T101 HR 86 /80 RR 19 Sat 97% on RA.   Labs: WBC 19 Hb 9.7 BUN 26   Interventions: Vanc, zosyn, morphine x 14units, 2LNS.   XR R.Foot: 3 views of the right foot demonstrates no evidence of acute fracture or   dislocation. No radiographic evidence of osteomyelitis. Calcaneal   spurring. Vascular calcifications noted.

## 2023-10-21 NOTE — H&P ADULT - PROBLEM SELECTOR PLAN 6
Home regimen should be clarified, was discharged on lantus 11u, and humalog 7u premeal.  - ISS while inpatient  - FSG q6 hours  - A1C in AM.

## 2023-10-21 NOTE — ED PROVIDER NOTE - CLINICAL SUMMARY MEDICAL DECISION MAKING FREE TEXT BOX
67 yo male with right foot pain and 4th toe gangrene, diabetic foot, febrile, wbc 19k, IV antibiotics ordered. discussed with hospitalist Dr. Carpenter, he wanted me to discuss with podiatry, discussed with podiatry Dr. Barry, admit to medicine, podiatry will consult on floor. admit to medicine under Dr. Joy as per Dr. Carpenter. patient agrees with plan.

## 2023-10-21 NOTE — CONSULT NOTE ADULT - ASSESSMENT
68M PMH HTN, IDDM with peripheral neuropathy, CAD s/p PCI with 2 stents (3 yrs ago at [facility]) who presents to [facility] with right toe pain. Podiatry consulted to evaluate R toe wound. Pt with prior admission on 10/1-10/3 with early ischemic signs of R 4th digit, and was discharged with PO abx (keflex,doxy). States of completing medication but has not followed up out pt with any provider. In the ED, pt febrile with elevated WBC to 19.71. At time of consult on the floors, pt is now afebrile with VSS. 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 Pain on palpation of the R digit, but at times pt began to cry uncontrollably with no relation to the R foot palpation     Plan   - pt evaluated and chart reviewed  - Rec broad spec IV abx   - Rec trend ESR/CRP levels   - Rec MRI W/ contrast to r/o abscess, extent of OM  - Local wound care: Cleansed with NS. Betadine soaked gauze applied to R 4th digit, 3rd and 4th interspaces Advised pt to keep dressing intact dry and clean   - WBAT to b/l feet  - Due to pt being emotional and crying, will defer surgical intervention discussion until Wes 10/22 AM  - Pain control per primary team  - Rest of care per primary team     Podiatry Following, plan discussed with attending

## 2023-10-21 NOTE — H&P ADULT - ATTENDING COMMENTS
67 yo M with PMHx HTN, DM (c/b peripheral neuropathy), CAD (s/p PCI), recent admission for similar presentation diabetic foot infection (discharged 10/3 with PO abx)  BIBEMS from home to [facility] with 1-week hx of worsening R 4th toe pain admitted to [facility] for sepsis 2/2 suspected osteomyelitis vs gangrene.     #Sepsis – Meeting 2/4 SIRS (T, WBC). Likely in setting of known R toe ulcer with clinical concern for OM. R toe with necrosis. Peripheral pulses otherwise intact. . R foot XR showing no radiographic evidence of osteomyelitis. Podiatry consulted, will see pt in AM. Continue broad-spectrum abx. F/U blood cx. F/U MR R foot. Pain control PRN.     Agree with remainder of resident plan as above.

## 2023-10-21 NOTE — H&P ADULT - PROBLEM SELECTOR PLAN 7
F: s/p 2L NS  E: Replete as necessary K>4 Mg>2  N: CC diet   DVT Prophylaxis: Lovenox 40mg daily   GI prophylaxis: None   CODE STATUS: FULL  DISPO: [facility]

## 2023-10-21 NOTE — H&P ADULT - ASSESSMENT
67M PMH HTN, IDDM with peripheral neuropathy, CAD s/p PCI with 2 stents (3 yrs ago at [facility]), recent admission 10/3 for diabetic ssti (CT r/o absecess, no OM) discharged on doxycycline and keflex, presenting with CC of r. foot pain, a/f management of foot infection.  67M PMH HTN, IDDM with peripheral neuropathy, CAD s/p PCI with 2 stents (3 yrs ago at [facility]), recent admission 10/3 for diabetic ssti (CT r/o absecess, no OM) discharged on doxycycline and keflex, presenting with CC of r. foot pain, found to meet SIRS crtieria with suspected source infectious of r.toe, a/f management.

## 2023-10-21 NOTE — H&P ADULT - PROBLEM SELECTOR PLAN 2
Patient presenting with worsening right 4th toe pain and discoloratio s/p course of IV and oral antibtiocs: Keflex and doxycycline until 10/10 endorses compliance. On presentation, he is hemodynamically stable however with leukoctyosis to 19 and fever to 101.   -right toe xrays done in ED showing no evidence of fracture or osteomyelitis   -s/p 1 dose vancomycin in ED and zosyn in ED.   -podiatry consulted, f/u recs   -f/u BCxs.   Pain management  Tylenol 975 q6 hours for mild pain, Oxy 5 q6 hours for moderate, oxy [id number] for severe  - 1x 0.5 dilaudid for severe breakthrough pain. Patient presenting with worsening right 4th toe pain and discoloratio s/p course of IV and oral antibtiocs: Keflex and doxycycline until 10/10 endorses compliance. On presentation, he is hemodynamically stable however with leukoctyosis to 19 and fever to 101.   -right toe xrays done in ED showing no evidence of fracture or osteomyelitis   -s/p 1 dose vancomycin in ED and zosyn in ED.   -podiatry consulted, f/u recs   -f/u BCxs.   Pain management  Tylenol 975 q6 hours for mild pain, Oxy 5 q6 hours for moderate, oxy [id number] for severe  - 1x 0.5 dilaudid for severe breakthrough pain.  - Local wound care: Cleansed with NS. Betadine soaked gauze applied to R 4th digit, 3rd and 4th interspaces Advised pt to keep dressing intact dry and clean

## 2023-10-21 NOTE — ED PROVIDER NOTE - OBJECTIVE STATEMENT
69 yo male PMH HTN, IDDM with peripheral neuropathy, CAD s/p PCI with 2 stents (3 yrs ago at [facility]) c/o right foot pain worsening over the past week. c/o right 4th toe is black. admitted to [facility] early October 2023 for right diabetic foot s/p IV antibiotics, he reports he finished po antibiotics after discharge. denies fever/chills/drainage.

## 2023-10-21 NOTE — ED PROVIDER NOTE - INPATIENT RESIDENT/ACP NOTIFIED
Dr. Carpenter Nsaids Pregnancy And Lactation Text: These medications are considered safe up to 30 weeks gestation. It is excreted in breast milk.

## 2023-10-21 NOTE — ED PROVIDER NOTE - PHYSICAL EXAMINATION
CONSTITUTIONAL: Well-appearing; well-nourished; in no apparent distress.   	HEAD: Normocephalic; atraumatic.   	EYES:  conjunctiva and sclera clear  	ENT: normal nose; no rhinorrhea; normal pharynx with no erythema or lesions.   	NECK: Supple; non-tender;   	CARDIOVASCULAR: Normal S1, S2; no murmurs, rubs, or gallops. Regular rate and rhythm.   	RESPIRATORY: Breathing easily; breath sounds clear and equal bilaterally; no wheezes, rhonchi, or rales.  	GI: Soft; non-distended; non-tender  	RLE: 4th toe wrapped in gauze, gauze removed, small amount of dried yellow discharge on gauze when removed from toe, 4th toe necrosis with mild foot swelling and erythema to midfoot. dpi. soft compartments.   	SKIN: Normal for age and race; warm; dry; good turgor; no apparent lesions or rash.   	NEURO: A & O x 3; face symmetric; grossly unremarkable.   PSYCHOLOGICAL: The patient’s mood and manner are appropriate.

## 2023-10-21 NOTE — H&P ADULT - PROBLEM SELECTOR PLAN 3
Patient presenting with SBP to 191/80, Alkphos elevated at this time. Likely due to pain from foot wound, endorses compliance with medication.  - Restart lisinopril as below  - 1x hydralazine 5mg   - Pain control as above.   - Trend BP. Patient presenting with SBP to 191/80, Alkphos elevated at this time. Likely due to pain from foot wound, endorses compliance with medication.  - Restart lisinopril as below  - Pain control as above.   - Trend BP.

## 2023-10-21 NOTE — H&P ADULT - PROBLEM SELECTOR PLAN 1
Patient presenting meeting 2/4 SIRS (T101, Leukocytosis), source likely SSTI vs Osteomyelitis of R. foot wound. No purulence or erythema on examination however extremely tender to palpation and at rest. XR w/o evidence of osteo however lower sensivity. s/p 1 dose vanc and zosyn in ED. Prior was on Keflex and doxycycline x 10 days (finished 10/10), completed course. Last BCx w/o growth.   - s/p 2L NS in ED  - f/u BCx, UCx   - c/w vancoymycin - mrsa  - start ceftriaxone for empiric tx of osteo.  - c/w zosyn with pseudomonal dosing   - Tylenol q6 prn for fever.   - f/u podiatry recs as below. Patient presenting meeting 2/4 SIRS (T101, Leukocytosis), source likely Gangrenous toe vs Osteomyelitis of R. foot wound. No purulence or erythema on examination however extremely tender to palpation and at rest. XR w/o evidence of osteo however lower sensivity. s/p 1 dose vanc and zosyn in ED. Prior was on Keflex and doxycycline x 10 days (finished 10/10), completed course. Last BCx w/o growth.   - s/p 2L NS in ED  - f/u BCx, UCx   - c/w vancoymycin 1g q12 hours empirically  - c/w zosyn with pseudomonal dosing   - Tylenol q6 prn for fever.   - MRI with contrast to r.o abscess.   - f/u podiatry recs as below.

## 2023-10-21 NOTE — H&P ADULT - PROBLEM SELECTOR PLAN 4
Patient with isolated elevated alkphos, no RUQ pain, not seen on prior labs. Low suspicion for bile duct obstruction at this time.   - Trend in AM  - Obtain RUQ US if still elevated or uptrending. - c/w home lisinopril 40mgqd.

## 2023-10-21 NOTE — H&P ADULT - PROBLEM SELECTOR PLAN 5
- c/w home lisinopril 40mgqd. Patient with isolated elevated alkphos, no RUQ pain, not seen on prior labs. Low suspicion for bile duct obstruction at this time.   - Trend in AM  - Obtain RUQ US if still elevated or uptrending.

## 2023-10-21 NOTE — PATIENT PROFILE ADULT - SAFE PLACE TO LIVE
Frances presents today for her OB visit.    Patient would like communication of their results via:   Cell Phone:   Telephone Information:   Mobile [phone]     Okay to leave a message containing results? Yes    Patient's current [facility] status: Active.     Chaperone needed:  Yes    [employer] - Patient is on [employer] network Platform Scheduling.          
Pt reports fetal movement and she denies any problem. Repeat C/Section with BTL scheduled. Follow up in 2 weeks for OB check.   
no

## 2023-10-21 NOTE — H&P ADULT - NSHPPHYSICALEXAM_GEN_ALL_CORE
PHYSICAL EXAM:  Constitutional:SItting on edge of bed, restless, compalining and sobbing of pain of his foot. .  HEENT: NC/AT, PERRLA, EOMI, no conjunctival pallor or scleral icterus, DMM  Neck: Supple, no JVD  Respiratory: CTA B/L. No w/r/r.   Cardiovascular: RRR, normal S1 and S2, no m/r/g.   Gastrointestinal: +BS, soft NTND, no guarding or rebound tenderness, no palpable masses   Extremities: wwp; no cyanosis, clubbing or edema. R 4th toe blackedned, wound, but no drainable fluid, nonerythematous.   Neurological: AAOx3, no CN deficits, strength and sensation intact throughout.   Skin: No gross skin abnormalities or rashes PHYSICAL EXAM:  Constitutional:SItting on edge of bed, restless, complaining and sobbing of pain of his foot. .  HEENT: NC/AT, PERRLA, EOMI, no conjunctival pallor or scleral icterus, DMM  Neck: Supple, no JVD  Respiratory: CTA B/L. No w/r/r.   Cardiovascular: RRR, normal S1 and S2, no m/r/g.   Gastrointestinal: +BS, soft NTND, no guarding or rebound tenderness, no palpable masses   Extremities: wwp; no cyanosis, clubbing or edema. R 4th toe blackened, wound gangrenous appearing, but no drainable fluid, nonerythematous.   Neurological: AAOx3, no CN deficits, strength and sensation intact throughout.   Skin: No gross skin abnormalities or rashes

## 2023-10-21 NOTE — ED ADULT NURSE NOTE - OBJECTIVE STATEMENT
Pt BIBEMS from home complaining of R 4th toe pain. Toe appear black in triage. Pt with + edema to b/l feet. Pt was discharged 10/3.

## 2023-10-21 NOTE — ED ADULT TRIAGE NOTE - CHIEF COMPLAINT QUOTE
Pt BIBEMS from home complaining of R 4th toe pain. Toe appear black in triage. Pt with + edema to b/l feet. Pt was discharged 10/3

## 2023-10-21 NOTE — CONSULT NOTE ADULT - SUBJECTIVE AND OBJECTIVE BOX
Attending: Tom Stevens     Patient is a 68y old  Male who presents with a chief complaint of R foot infection     HPI:  67M PMH HTN, IDDM with peripheral neuropathy, CAD s/p PCI with 2 stents (3 yrs ago at [facility]), recent admission 10/3 for diabetic ssti (CT r/o absecess, no OM) discharged on doxycycline and keflex, presenting with CC of r. foot pain.  After discharge patient endorses completing antibiotics but on Wednesday the pain started getting unbearable, not responding to his usual neurontin for nerve pain. H edid not notice drainage from the wound. He came into the hospital due ot the pain. He denies any fever, chills, shortness of breath, cough, diarrhea, constipation.    ED Course:   T101 HR 86 /80 RR 19 Sat 97% on RA.   Labs: WBC 19 Hb 9.7 BUN 26   Interventions: Vanc, zosyn, morphine x 14units, 2LNS.   XR R.Foot: 3 views of the right foot demonstrates no evidence of acute fracture or   dislocation. No radiographic evidence of osteomyelitis. Calcaneal   spurring. Vascular calcifications noted.   (21 Oct 2023 20:24)    Podiatry Addendum: 67M PMH HTN, IDDM with peripheral neuropathy, CAD s/p PCI with 2 stents (3 yrs ago at [facility]), recent admission 10/3 for diabetic ssti (CT r/o absecess, no OM) discharged on doxycycline and keflex, presenting with CC of r. foot pain. Podiatry followed pt during prior stay(10/1-10/3) and rec PO abx on discharge and close outpt follow up for demarcation. Pt states that he completed the prescribed PO abx but did not follow up with any doctor. Denies keep the foot covered. Currently complains of continuous pain on palpation and ambulation.  Denies any other complaints or concerns.       Review of systems negative except per HPI   PAST MEDICAL & SURGICAL HISTORY:  IDDM (insulin dependent diabetes mellitus)      HTN (hypertension)      CAD S/P percutaneous coronary angioplasty      Neuropathy      No significant past surgical history        Home Medications:  lisinopril 40 mg oral tablet: 1 tab(s) orally once a day (21 Oct 2023 20:30)  Neurontin 800 mg oral tablet: 2 tab(s) orally 3 times a day (21 Oct 2023 20:30)    Allergies    No Known Allergies    Intolerances      FAMILY HISTORY:    Social History:       LABS                        9.7    19.71 )-----------( 230      ( 21 Oct 2023 11:31 )             29.9     10-21    135  |  101  |  26<H>  ----------------------------<  144<H>  3.6   |  28  |  0.95    Ca    9.2      21 Oct 2023 11:31    TPro  7.1  /  Alb  2.3<L>  /  TBili  0.5  /  DBili  x   /  AST  23  /  ALT  26  /  AlkPhos  137<H>  10-21    PT/INR - ( 21 Oct 2023 11:31 )   PT: 11.3 sec;   INR: 1.00          PTT - ( 21 Oct 2023 11:31 )  PTT:31.2 sec    Vital Signs Last 24 Hrs  T(C): 37.9 (21 Oct 2023 21:07), Max: 38.3 (21 Oct 2023 12:07)  T(F): 100.2 (21 Oct 2023 21:07), Max: 101 (21 Oct 2023 12:07)  HR: 80 (21 Oct 2023 21:07) (63 - 86)  BP: 161/79 (21 Oct 2023 21:07) (156/80 - 191/80)  BP(mean): --  RR: 18 (21 Oct 2023 21:07) (18 - 19)  SpO2: 97% (21 Oct 2023 21:07) (96% - 98%)    Parameters below as of 21 Oct 2023 21:07  Patient On (Oxygen Delivery Method): room air    PHYSICAL EXAM  General: NAD, AA0x3    Lower Extremity Focused:  Vasc: DP/PT 2/4 b/l, erythema and edema to the R forefoot and midfoot, darkening of skin of R 4th digit up to the MPJ.   Derm: R 4th digit with gangrenous dark discoloration largely dry except for the 3rd and 4th interspaces. Fibrotic slough noted in the 3rd and 4th interspace.  no current drainage, does not probe, no tracking/tunneling, no fluctuance no open wounds left   Neuro: protective sensation slightly diminished  MSK: +TTP to R 4th digit.     RADIOLOGY  < from: Xray Foot AP + Lateral + Oblique, Right (10.21.23 @ 13:09) >    IMPRESSION: No evidence of acute fracture. No radiographic evidence of   osteomyelitis.    < end of copied text >

## 2023-10-21 NOTE — PATIENT PROFILE ADULT - FALL HARM RISK - HARM RISK INTERVENTIONS

## 2023-10-21 NOTE — H&P ADULT - NSCORESITESY/N_GEN_A_CORE_RD
Problem: Infection  Goal: Will remain free from infection    Intervention: Assess signs and symptoms of infection  IV antibiotics given as ordered. Pending culture of eye drainage. Afebrile.       Problem: Bowel/Gastric:  Goal: Normal bowel function is maintained or improved  Denies n/v. Eating with good appetite.        No

## 2023-10-21 NOTE — ED ADULT NURSE NOTE - NSFALLUNIVINTERV_ED_ALL_ED
Bed/Stretcher in lowest position, wheels locked, appropriate side rails in place/Call bell, personal items and telephone in reach/Instruct patient to call for assistance before getting out of bed/chair/stretcher/Non-slip footwear applied when patient is off stretcher/[place] to call system/Physically safe environment - no spills, clutter or unnecessary equipment/Purposeful proactive rounding/Room/bathroom lighting operational, light cord in reach

## 2023-10-21 NOTE — H&P ADULT - NSHPLABSRESULTS_GEN_ALL_CORE
LABS:                         9.7    19.71 )-----------( 230      ( 21 Oct 2023 11:31 )             29.9     10-21    135  |  101  |  26<H>  ----------------------------<  144<H>  3.6   |  28  |  0.95    Ca    9.2      21 Oct 2023 11:31    TPro  7.1  /  Alb  2.3<L>  /  TBili  0.5  /  DBili  x   /  AST  23  /  ALT  26  /  AlkPhos  137<H>  10-21    PT/INR - ( 21 Oct 2023 11:31 )   PT: 11.3 sec;   INR: 1.00          PTT - ( 21 Oct 2023 11:31 )  PTT:31.2 sec  Urinalysis Basic - ( 21 Oct 2023 11:31 )    Color: x / Appearance: x / SG: x / pH: x  Gluc: 144 mg/dL / Ketone: x  / Bili: x / Urobili: x   Blood: x / Protein: x / Nitrite: x   Leuk Esterase: x / RBC: x / WBC x   Sq Epi: x / Non Sq Epi: x / Bacteria: x                RADIOLOGY, EKG & ADDITIONAL TESTS:

## 2023-10-22 LAB
ALBUMIN SERPL ELPH-MCNC: 2.1 G/DL — LOW (ref 3.3–5)
ALBUMIN SERPL ELPH-MCNC: 2.1 G/DL — LOW (ref 3.3–5)
ALP SERPL-CCNC: 129 U/L — HIGH (ref 40–120)
ALP SERPL-CCNC: 129 U/L — HIGH (ref 40–120)
ALT FLD-CCNC: 15 U/L — SIGNIFICANT CHANGE UP (ref 10–45)
ALT FLD-CCNC: 15 U/L — SIGNIFICANT CHANGE UP (ref 10–45)
ANION GAP SERPL CALC-SCNC: 8 MMOL/L — SIGNIFICANT CHANGE UP (ref 5–17)
ANION GAP SERPL CALC-SCNC: 8 MMOL/L — SIGNIFICANT CHANGE UP (ref 5–17)
ANISOCYTOSIS BLD QL: SLIGHT — SIGNIFICANT CHANGE UP
ANISOCYTOSIS BLD QL: SLIGHT — SIGNIFICANT CHANGE UP
APTT BLD: 29.2 SEC — SIGNIFICANT CHANGE UP (ref 24.5–35.6)
APTT BLD: 29.2 SEC — SIGNIFICANT CHANGE UP (ref 24.5–35.6)
AST SERPL-CCNC: 15 U/L — SIGNIFICANT CHANGE UP (ref 10–40)
AST SERPL-CCNC: 15 U/L — SIGNIFICANT CHANGE UP (ref 10–40)
BASOPHILS # BLD AUTO: 0 K/UL — SIGNIFICANT CHANGE UP (ref 0–0.2)
BASOPHILS # BLD AUTO: 0 K/UL — SIGNIFICANT CHANGE UP (ref 0–0.2)
BASOPHILS NFR BLD AUTO: 0 % — SIGNIFICANT CHANGE UP (ref 0–2)
BASOPHILS NFR BLD AUTO: 0 % — SIGNIFICANT CHANGE UP (ref 0–2)
BILIRUB SERPL-MCNC: 0.5 MG/DL — SIGNIFICANT CHANGE UP (ref 0.2–1.2)
BILIRUB SERPL-MCNC: 0.5 MG/DL — SIGNIFICANT CHANGE UP (ref 0.2–1.2)
BLD GP AB SCN SERPL QL: NEGATIVE — SIGNIFICANT CHANGE UP
BLD GP AB SCN SERPL QL: NEGATIVE — SIGNIFICANT CHANGE UP
BUN SERPL-MCNC: 22 MG/DL — SIGNIFICANT CHANGE UP (ref 7–23)
BUN SERPL-MCNC: 22 MG/DL — SIGNIFICANT CHANGE UP (ref 7–23)
CALCIUM SERPL-MCNC: 8.8 MG/DL — SIGNIFICANT CHANGE UP (ref 8.4–10.5)
CALCIUM SERPL-MCNC: 8.8 MG/DL — SIGNIFICANT CHANGE UP (ref 8.4–10.5)
CHLORIDE SERPL-SCNC: 101 MMOL/L — SIGNIFICANT CHANGE UP (ref 96–108)
CHLORIDE SERPL-SCNC: 101 MMOL/L — SIGNIFICANT CHANGE UP (ref 96–108)
CK SERPL-CCNC: 84 U/L — SIGNIFICANT CHANGE UP (ref 30–200)
CK SERPL-CCNC: 84 U/L — SIGNIFICANT CHANGE UP (ref 30–200)
CO2 SERPL-SCNC: 24 MMOL/L — SIGNIFICANT CHANGE UP (ref 22–31)
CO2 SERPL-SCNC: 24 MMOL/L — SIGNIFICANT CHANGE UP (ref 22–31)
CREAT SERPL-MCNC: 0.9 MG/DL — SIGNIFICANT CHANGE UP (ref 0.5–1.3)
CREAT SERPL-MCNC: 0.9 MG/DL — SIGNIFICANT CHANGE UP (ref 0.5–1.3)
CRP SERPL-MCNC: 160.4 MG/L — HIGH (ref 0–4)
CRP SERPL-MCNC: 160.4 MG/L — HIGH (ref 0–4)
EGFR: 94 ML/MIN/1.73M2 — SIGNIFICANT CHANGE UP
EGFR: 94 ML/MIN/1.73M2 — SIGNIFICANT CHANGE UP
EOSINOPHIL # BLD AUTO: 0 K/UL — SIGNIFICANT CHANGE UP (ref 0–0.5)
EOSINOPHIL # BLD AUTO: 0 K/UL — SIGNIFICANT CHANGE UP (ref 0–0.5)
EOSINOPHIL NFR BLD AUTO: 0 % — SIGNIFICANT CHANGE UP (ref 0–6)
EOSINOPHIL NFR BLD AUTO: 0 % — SIGNIFICANT CHANGE UP (ref 0–6)
GLUCOSE BLDC GLUCOMTR-MCNC: 276 MG/DL — HIGH (ref 70–99)
GLUCOSE BLDC GLUCOMTR-MCNC: 276 MG/DL — HIGH (ref 70–99)
GLUCOSE BLDC GLUCOMTR-MCNC: 294 MG/DL — HIGH (ref 70–99)
GLUCOSE BLDC GLUCOMTR-MCNC: 294 MG/DL — HIGH (ref 70–99)
GLUCOSE BLDC GLUCOMTR-MCNC: 380 MG/DL — HIGH (ref 70–99)
GLUCOSE BLDC GLUCOMTR-MCNC: 380 MG/DL — HIGH (ref 70–99)
GLUCOSE SERPL-MCNC: 384 MG/DL — HIGH (ref 70–99)
GLUCOSE SERPL-MCNC: 384 MG/DL — HIGH (ref 70–99)
HCT VFR BLD CALC: 28.3 % — LOW (ref 39–50)
HCT VFR BLD CALC: 28.3 % — LOW (ref 39–50)
HGB BLD-MCNC: 9 G/DL — LOW (ref 13–17)
HGB BLD-MCNC: 9 G/DL — LOW (ref 13–17)
HYPOCHROMIA BLD QL: SLIGHT — SIGNIFICANT CHANGE UP
HYPOCHROMIA BLD QL: SLIGHT — SIGNIFICANT CHANGE UP
INR BLD: 1.1 — SIGNIFICANT CHANGE UP (ref 0.85–1.18)
INR BLD: 1.1 — SIGNIFICANT CHANGE UP (ref 0.85–1.18)
LYMPHOCYTES # BLD AUTO: 1.86 K/UL — SIGNIFICANT CHANGE UP (ref 1–3.3)
LYMPHOCYTES # BLD AUTO: 1.86 K/UL — SIGNIFICANT CHANGE UP (ref 1–3.3)
LYMPHOCYTES # BLD AUTO: 7.8 % — LOW (ref 13–44)
LYMPHOCYTES # BLD AUTO: 7.8 % — LOW (ref 13–44)
MAGNESIUM SERPL-MCNC: 1.9 MG/DL — SIGNIFICANT CHANGE UP (ref 1.6–2.6)
MAGNESIUM SERPL-MCNC: 1.9 MG/DL — SIGNIFICANT CHANGE UP (ref 1.6–2.6)
MANUAL SMEAR VERIFICATION: SIGNIFICANT CHANGE UP
MANUAL SMEAR VERIFICATION: SIGNIFICANT CHANGE UP
MCHC RBC-ENTMCNC: 31.8 GM/DL — LOW (ref 32–36)
MCHC RBC-ENTMCNC: 31.8 GM/DL — LOW (ref 32–36)
MCHC RBC-ENTMCNC: 31.8 PG — SIGNIFICANT CHANGE UP (ref 27–34)
MCHC RBC-ENTMCNC: 31.8 PG — SIGNIFICANT CHANGE UP (ref 27–34)
MCV RBC AUTO: 100 FL — SIGNIFICANT CHANGE UP (ref 80–100)
MCV RBC AUTO: 100 FL — SIGNIFICANT CHANGE UP (ref 80–100)
MONOCYTES # BLD AUTO: 1.67 K/UL — HIGH (ref 0–0.9)
MONOCYTES # BLD AUTO: 1.67 K/UL — HIGH (ref 0–0.9)
MONOCYTES NFR BLD AUTO: 7 % — SIGNIFICANT CHANGE UP (ref 2–14)
MONOCYTES NFR BLD AUTO: 7 % — SIGNIFICANT CHANGE UP (ref 2–14)
NEUTROPHILS # BLD AUTO: 20.32 K/UL — HIGH (ref 1.8–7.4)
NEUTROPHILS # BLD AUTO: 20.32 K/UL — HIGH (ref 1.8–7.4)
NEUTROPHILS NFR BLD AUTO: 85.2 % — HIGH (ref 43–77)
NEUTROPHILS NFR BLD AUTO: 85.2 % — HIGH (ref 43–77)
OVALOCYTES BLD QL SMEAR: SLIGHT — SIGNIFICANT CHANGE UP
OVALOCYTES BLD QL SMEAR: SLIGHT — SIGNIFICANT CHANGE UP
PHOSPHATE SERPL-MCNC: 3.3 MG/DL — SIGNIFICANT CHANGE UP (ref 2.5–4.5)
PHOSPHATE SERPL-MCNC: 3.3 MG/DL — SIGNIFICANT CHANGE UP (ref 2.5–4.5)
PLAT MORPH BLD: NORMAL — SIGNIFICANT CHANGE UP
PLAT MORPH BLD: NORMAL — SIGNIFICANT CHANGE UP
PLATELET # BLD AUTO: 195 K/UL — SIGNIFICANT CHANGE UP (ref 150–400)
PLATELET # BLD AUTO: 195 K/UL — SIGNIFICANT CHANGE UP (ref 150–400)
POIKILOCYTOSIS BLD QL AUTO: SLIGHT — SIGNIFICANT CHANGE UP
POIKILOCYTOSIS BLD QL AUTO: SLIGHT — SIGNIFICANT CHANGE UP
POLYCHROMASIA BLD QL SMEAR: SLIGHT — SIGNIFICANT CHANGE UP
POLYCHROMASIA BLD QL SMEAR: SLIGHT — SIGNIFICANT CHANGE UP
POTASSIUM SERPL-MCNC: 4.5 MMOL/L — SIGNIFICANT CHANGE UP (ref 3.5–5.3)
POTASSIUM SERPL-MCNC: 4.5 MMOL/L — SIGNIFICANT CHANGE UP (ref 3.5–5.3)
POTASSIUM SERPL-SCNC: 4.5 MMOL/L — SIGNIFICANT CHANGE UP (ref 3.5–5.3)
POTASSIUM SERPL-SCNC: 4.5 MMOL/L — SIGNIFICANT CHANGE UP (ref 3.5–5.3)
PROT SERPL-MCNC: 6.2 G/DL — SIGNIFICANT CHANGE UP (ref 6–8.3)
PROT SERPL-MCNC: 6.2 G/DL — SIGNIFICANT CHANGE UP (ref 6–8.3)
PROTHROM AB SERPL-ACNC: 12.5 SEC — SIGNIFICANT CHANGE UP (ref 9.5–13)
PROTHROM AB SERPL-ACNC: 12.5 SEC — SIGNIFICANT CHANGE UP (ref 9.5–13)
RBC # BLD: 2.83 M/UL — LOW (ref 4.2–5.8)
RBC # BLD: 2.83 M/UL — LOW (ref 4.2–5.8)
RBC # FLD: 14 % — SIGNIFICANT CHANGE UP (ref 10.3–14.5)
RBC # FLD: 14 % — SIGNIFICANT CHANGE UP (ref 10.3–14.5)
RBC BLD AUTO: ABNORMAL
RBC BLD AUTO: ABNORMAL
RH IG SCN BLD-IMP: POSITIVE — SIGNIFICANT CHANGE UP
RH IG SCN BLD-IMP: POSITIVE — SIGNIFICANT CHANGE UP
SCHISTOCYTES BLD QL AUTO: SLIGHT — SIGNIFICANT CHANGE UP
SCHISTOCYTES BLD QL AUTO: SLIGHT — SIGNIFICANT CHANGE UP
SODIUM SERPL-SCNC: 133 MMOL/L — LOW (ref 135–145)
SODIUM SERPL-SCNC: 133 MMOL/L — LOW (ref 135–145)
WBC # BLD: 23.85 K/UL — HIGH (ref 3.8–10.5)
WBC # BLD: 23.85 K/UL — HIGH (ref 3.8–10.5)
WBC # FLD AUTO: 23.85 K/UL — HIGH (ref 3.8–10.5)
WBC # FLD AUTO: 23.85 K/UL — HIGH (ref 3.8–10.5)

## 2023-10-22 PROCEDURE — 73720 MRI LWR EXTREMITY W/O&W/DYE: CPT | Mod: 26,RT

## 2023-10-22 PROCEDURE — 99233 SBSQ HOSP IP/OBS HIGH 50: CPT | Mod: GC

## 2023-10-22 RX ORDER — MORPHINE SULFATE 50 MG/1
2 CAPSULE, EXTENDED RELEASE ORAL ONCE
Refills: 0 | Status: DISCONTINUED | OUTPATIENT
Start: 2023-10-22 | End: 2023-10-22

## 2023-10-22 RX ORDER — HEPARIN SODIUM 5000 [USP'U]/ML
5000 INJECTION INTRAVENOUS; SUBCUTANEOUS EVERY 8 HOURS
Refills: 0 | Status: DISCONTINUED | OUTPATIENT
Start: 2023-10-22 | End: 2023-10-22

## 2023-10-22 RX ORDER — MORPHINE SULFATE 50 MG/1
2 CAPSULE, EXTENDED RELEASE ORAL EVERY 6 HOURS
Refills: 0 | Status: DISCONTINUED | OUTPATIENT
Start: 2023-10-22 | End: 2023-10-23

## 2023-10-22 RX ADMIN — PIPERACILLIN AND TAZOBACTAM 25 GRAM(S): 4; .5 INJECTION, POWDER, LYOPHILIZED, FOR SOLUTION INTRAVENOUS at 06:24

## 2023-10-22 RX ADMIN — OXYCODONE HYDROCHLORIDE 5 MILLIGRAM(S): 5 TABLET ORAL at 22:30

## 2023-10-22 RX ADMIN — MORPHINE SULFATE 2 MILLIGRAM(S): 50 CAPSULE, EXTENDED RELEASE ORAL at 17:07

## 2023-10-22 RX ADMIN — OXYCODONE HYDROCHLORIDE 5 MILLIGRAM(S): 5 TABLET ORAL at 05:34

## 2023-10-22 RX ADMIN — GABAPENTIN 1600 MILLIGRAM(S): 400 CAPSULE ORAL at 13:13

## 2023-10-22 RX ADMIN — LISINOPRIL 40 MILLIGRAM(S): 2.5 TABLET ORAL at 07:17

## 2023-10-22 RX ADMIN — MORPHINE SULFATE 2 MILLIGRAM(S): 50 CAPSULE, EXTENDED RELEASE ORAL at 13:21

## 2023-10-22 RX ADMIN — MORPHINE SULFATE 2 MILLIGRAM(S): 50 CAPSULE, EXTENDED RELEASE ORAL at 23:25

## 2023-10-22 RX ADMIN — GABAPENTIN 1600 MILLIGRAM(S): 400 CAPSULE ORAL at 21:38

## 2023-10-22 RX ADMIN — OXYCODONE HYDROCHLORIDE 5 MILLIGRAM(S): 5 TABLET ORAL at 21:38

## 2023-10-22 RX ADMIN — OXYCODONE HYDROCHLORIDE 5 MILLIGRAM(S): 5 TABLET ORAL at 00:30

## 2023-10-22 RX ADMIN — MORPHINE SULFATE 2 MILLIGRAM(S): 50 CAPSULE, EXTENDED RELEASE ORAL at 13:40

## 2023-10-22 RX ADMIN — MORPHINE SULFATE 2 MILLIGRAM(S): 50 CAPSULE, EXTENDED RELEASE ORAL at 23:10

## 2023-10-22 RX ADMIN — Medication 250 MILLIGRAM(S): at 01:56

## 2023-10-22 RX ADMIN — MORPHINE SULFATE 2 MILLIGRAM(S): 50 CAPSULE, EXTENDED RELEASE ORAL at 17:35

## 2023-10-22 RX ADMIN — Medication 650 MILLIGRAM(S): at 18:38

## 2023-10-22 RX ADMIN — Medication 3: at 14:23

## 2023-10-22 RX ADMIN — Medication 250 MILLIGRAM(S): at 13:19

## 2023-10-22 RX ADMIN — GABAPENTIN 1600 MILLIGRAM(S): 400 CAPSULE ORAL at 06:25

## 2023-10-22 RX ADMIN — Medication 5: at 10:22

## 2023-10-22 RX ADMIN — OXYCODONE HYDROCHLORIDE 5 MILLIGRAM(S): 5 TABLET ORAL at 06:30

## 2023-10-22 RX ADMIN — Medication 650 MILLIGRAM(S): at 19:42

## 2023-10-22 RX ADMIN — Medication 100 MILLIGRAM(S): at 21:45

## 2023-10-22 RX ADMIN — PIPERACILLIN AND TAZOBACTAM 25 GRAM(S): 4; .5 INJECTION, POWDER, LYOPHILIZED, FOR SOLUTION INTRAVENOUS at 23:10

## 2023-10-22 NOTE — PROGRESS NOTE ADULT - PROBLEM SELECTOR PLAN 3
Patient presenting with SBP to 191/80, Alkphos elevated at this time. Likely due to pain from foot wound, endorses compliance with medication.  - Restart lisinopril as below  - Pain control as above.   - Trend BP. Patient presenting with SBP to 191/80, Alkphos elevated at this time. Likely due to pain from foot wound, endorses compliance with medication.    Plan:  - Restart lisinopril as below  - Pain control as above.   - Trend BP.

## 2023-10-22 NOTE — PROGRESS NOTE ADULT - PROBLEM SELECTOR PLAN 7
F: s/p 2L NS  E: Replete as necessary K>4 Mg>2  N: CC diet   DVT Prophylaxis: Lovenox 40mg daily   GI prophylaxis: None   CODE STATUS: FULL  DISPO: [facility] F: s/p 2L NS  E: Replete as necessary K>4 Mg>2  N: CC diet   DVT Prophylaxis: heparin sq  GI prophylaxis: None   CODE STATUS: FULL  DISPO: F

## 2023-10-22 NOTE — PROGRESS NOTE ADULT - ASSESSMENT
68M PMH HTN, IDDM with peripheral neuropathy, CAD s/p PCI with 2 stents (3 yrs ago at [facility]) who presents to [facility] with right toe pain. Podiatry consulted to evaluate R toe wound. Pt with prior admission on 10/1-10/3 with early ischemic signs of R 4th digit, and was discharged with PO abx (keflex,doxy). States of completing medication but has not followed up out pt with any provider. In the ED, pt febrile with elevated WBC to 19.71. At time of consult on the floors, pt is now afebrile with VSS. 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     Plan  - c/w broad spec IV abx   - Rec trend ESR/CRP levels   - Rec MRI W/ contrast to r/o abscess, extent of OM  - Local wound care: Cleansed with NS. Betadine soaked gauze applied to R 4th digit, 3rd and 4th interspaces Advised pt to keep dressing intact dry and clean   - WBAT to b/l feet  - Surgical intervention pending MRI findings   - Pain control per primary team  - Rest of care per primary team     Podiatry Following, plan discussed with attending

## 2023-10-22 NOTE — PROGRESS NOTE ADULT - SUBJECTIVE AND OBJECTIVE BOX
Patient is a 68y old  Male who presents with a chief complaint of R foot infection     INTERVAL HPI/ OVERNIGHT EVENTS: Pt admitted overnight. Pain controlled this morning. Dressing dry intact and clean to the R foot.       LABS                        9.7    19.71 )-----------( 230      ( 21 Oct 2023 11:31 )             29.9     10-21    135  |  101  |  26<H>  ----------------------------<  144<H>  3.6   |  28  |  0.95    Ca    9.2      21 Oct 2023 11:31    TPro  7.1  /  Alb  2.3<L>  /  TBili  0.5  /  DBili  x   /  AST  23  /  ALT  26  /  AlkPhos  137<H>  10-21    PT/INR - ( 21 Oct 2023 11:31 )   PT: 11.3 sec;   INR: 1.00          PTT - ( 21 Oct 2023 11:31 )  PTT:31.2 sec    ICU Vital Signs Last 24 Hrs  T(C): 37.5 (22 Oct 2023 07:09), Max: 38.3 (21 Oct 2023 12:07)  T(F): 99.5 (22 Oct 2023 07:09), Max: 101 (21 Oct 2023 12:07)  HR: 84 (22 Oct 2023 07:09) (63 - 86)  BP: 189/93 (22 Oct 2023 07:09) (156/80 - 191/80)  BP(mean): 124 (22 Oct 2023 07:09) (124 - 124)  ABP: --  ABP(mean): --  RR: 18 (22 Oct 2023 07:09) (18 - 19)  SpO2: 98% (22 Oct 2023 07:09) (96% - 98%)    O2 Parameters below as of 22 Oct 2023 07:09  Patient On (Oxygen Delivery Method): room air            RADIOLOGY  < from: Xray Foot AP + Lateral + Oblique, Right (10.21.23 @ 13:09) >    IMPRESSION: No evidence of acute fracture. No radiographic evidence of   osteomyelitis.    < end of copied text >    MICROBIOLOGY  Blood cx pending     PHYSICAL EXAM   Lower Extremity Focused:  Vasc: DP/PT 2/4 b/l, erythema and edema to the R forefoot and midfoot, darkening of skin of R 4th digit up to the MPJ.   Derm: R 4th digit with gangrenous dark discoloration largely dry except for the 3rd and 4th interspaces. Fibrotic slough noted in the 3rd and 4th interspace.  no current drainage, does not probe, no tracking/tunneling, no fluctuance no open wounds left   Neuro: protective sensation slightly diminished  MSK: +TTP to R 4th digit.

## 2023-10-22 NOTE — PROGRESS NOTE ADULT - PROBLEM SELECTOR PLAN 2
Patient presenting with worsening right 4th toe pain and discoloratio s/p course of IV and oral antibtiocs: Keflex and doxycycline until 10/10 endorses compliance. On presentation, he is hemodynamically stable however with leukoctyosis to 19 and fever to 101.   -right toe xrays done in ED showing no evidence of fracture or osteomyelitis   -s/p 1 dose vancomycin in ED and zosyn in ED.   -podiatry consulted, f/u recs   -f/u BCxs.   Pain management  Tylenol 975 q6 hours for mild pain, Oxy 5 q6 hours for moderate, oxy [id number] for severe  - 1x 0.5 dilaudid for severe breakthrough pain.  - Local wound care: Cleansed with NS. Betadine soaked gauze applied to R 4th digit, 3rd and 4th interspaces Advised pt to keep dressing intact dry and clean Patient presenting with worsening right 4th toe pain and discoloratio s/p course of IV and oral antibtiocs: Keflex and doxycycline until 10/10 endorses compliance. On presentation, he is hemodynamically stable however with leukoctyosis to 19 and fever to 101.   Right toe xrays done in ED showing no evidence of fracture or osteomyelitis   S/p 1 dose vancomycin in ED and zosyn in ED.   BCx: no growth at 1 day    Plan:  -Podiatry consulted, f/u recs   -f/u BCxs.  - Local wound care: Cleansed with NS. Betadine soaked gauze applied to R 4th digit, 3rd and 4th interspaces Advised pt to keep dressing intact dry and clean  - Pain management: Tylenol 975 q6 hours for mild pain, Oxy 5 q6 hours for moderate, morphine [id number] for severe  - 1x 0.5 dilaudid for severe breakthrough pain.

## 2023-10-22 NOTE — PROGRESS NOTE ADULT - ATTENDING COMMENTS
Patient was seen and examined at bedside on 10/22/2023 at 11 am. Patient reports terrible pain at his foot. Reports new cough. Denies SOB, CP. ROS is otherwise negative. Vitals, labwork and pertinent imaging reviewed. Exam - mild distress 2/2 pain, AAO x 4, PERRLA, EOMI, MMM, supple neck, chest - CTA b/l, CV - rrr, s1s2, no m/r/g, abd - soft, NTND, + BS, ext - wwp, + 2 pitting edema b/l, R foot bandaged, dry gangrene on 4th toe, psych - normal affect    Plan:  -C/w broad empiric coverage  -Pain control  -Endocrine consult  -MRI as per podiatry recommendations although xray without evidence of OM  -PT/OT Patient was seen and examined at bedside on 10/22/2023 at 11 am. Patient reports terrible pain at his foot. Reports new cough. Denies SOB, CP. ROS is otherwise negative. Vitals, labwork and pertinent imaging reviewed. Exam - mild distress 2/2 pain, AAO x 4, PERRLA, EOMI, MMM, supple neck, chest - CTA b/l, CV - rrr, s1s2, no m/r/g, abd - soft, NTND, + BS, ext - wwp, + 2 pitting edema b/l, R foot bandaged, dry gangrene on 4th toe, psych - normal affect    Plan:  -C/w broad empiric coverage, low grade fever persists and leukocytosis is uptrending, will consider adding Clindamycin  -Pain control, add IV Morphine PRN for severe pain  -Endocrine consult given recent HbA1C ~ 13  -MRI as per podiatry recommendations although xray without evidence of OM  -PT/OT  -BP is elevated, can c/w Lisinopril but probably also elevated in setting of pain  -Nutrition consult

## 2023-10-22 NOTE — PROGRESS NOTE ADULT - SUBJECTIVE AND OBJECTIVE BOX
O/N Events: no acute events over night    Subjective/ROS: Patient seen and examined at bedside. Patient complains of coughing he stated gets better with O2. Patient complains of foot, leg pain that feels sharp.    Denies Fever/Chills, HA, CP, SOB, n/v, changes in bowel/urinary habits.  12pt ROS otherwise negative.    VITALS  Vital Signs Last 24 Hrs  T(C): 37.5 (22 Oct 2023 07:09), Max: 38.3 (21 Oct 2023 12:07)  T(F): 99.5 (22 Oct 2023 07:09), Max: 101 (21 Oct 2023 12:07)  HR: 84 (22 Oct 2023 07:09) (63 - 86)  BP: 189/93 (22 Oct 2023 07:09) (156/80 - 191/80)  BP(mean): 124 (22 Oct 2023 07:09) (124 - 124)  RR: 18 (22 Oct 2023 07:09) (18 - 19)  SpO2: 98% (22 Oct 2023 07:09) (96% - 98%)    Parameters below as of 22 Oct 2023 07:09  Patient On (Oxygen Delivery Method): room air        CAPILLARY BLOOD GLUCOSE      POCT Blood Glucose.: 380 mg/dL (22 Oct 2023 09:34)  POCT Blood Glucose.: 280 mg/dL (21 Oct 2023 21:56)      PHYSICAL EXAM  General: NAD  Head: NC/AT; MMM; PERRL; EOMI;  Neck: Supple; no JVD  Respiratory: CTAB; no wheezes/rales/rhonchi  Cardiovascular: Regular rhythm/rate; S1/S2+, no murmurs, rubs gallops   Gastrointestinal: Soft; NTND; bowel sounds normal and present  Extremities: WWP; no edema/cyanosis  Neurological: A&Ox3, CNII-XII grossly intact; no obvious focal deficits    Antimicrobials:  MEDICATIONS  (STANDING):  piperacillin/tazobactam IVPB.. 4.5 Gram(s) IV Intermittent every 8 hours  vancomycin  IVPB 1000 milliGRAM(s) IV Intermittent every 12 hours      Standing Medications:  MEDICATIONS  (STANDING):  dextrose 5%. 1000 milliLiter(s) (100 mL/Hr) IV Continuous <Continuous>  dextrose 5%. 1000 milliLiter(s) (50 mL/Hr) IV Continuous <Continuous>  dextrose 50% Injectable 25 Gram(s) IV Push once  dextrose 50% Injectable 12.5 Gram(s) IV Push once  dextrose 50% Injectable 25 Gram(s) IV Push once  gabapentin 1600 milliGRAM(s) Oral three times a day  glucagon  Injectable 1 milliGRAM(s) IntraMuscular once  insulin lispro (ADMELOG) corrective regimen sliding scale   SubCutaneous three times a day before meals  lisinopril 40 milliGRAM(s) Oral daily  piperacillin/tazobactam IVPB.. 4.5 Gram(s) IV Intermittent every 8 hours  vancomycin  IVPB 1000 milliGRAM(s) IV Intermittent every 12 hours      PRN Medications:  MEDICATIONS  (PRN):  acetaminophen     Tablet .. 650 milliGRAM(s) Oral every 6 hours PRN Temp greater or equal to 38C (100.4F), Mild Pain (1 - 3)  dextrose Oral Gel 15 Gram(s) Oral once PRN Blood Glucose LESS THAN 70 milliGRAM(s)/deciliter  oxyCODONE    IR 5 milliGRAM(s) Oral every 6 hours PRN Moderate Pain (4 - 6)      No Known Allergies      LABS                        9.7    19.71 )-----------( 230      ( 21 Oct 2023 11:31 )             29.9     10-21    135  |  101  |  26<H>  ----------------------------<  144<H>  3.6   |  28  |  0.95    Ca    9.2      21 Oct 2023 11:31    TPro  7.1  /  Alb  2.3<L>  /  TBili  0.5  /  DBili  x   /  AST  23  /  ALT  26  /  AlkPhos  137<H>  10-21    PT/INR - ( 21 Oct 2023 11:31 )   PT: 11.3 sec;   INR: 1.00          PTT - ( 21 Oct 2023 11:31 )  PTT:31.2 sec  Urinalysis Basic - ( 21 Oct 2023 11:31 )    Color: x / Appearance: x / SG: x / pH: x  Gluc: 144 mg/dL / Ketone: x  / Bili: x / Urobili: x   Blood: x / Protein: x / Nitrite: x   Leuk Esterase: x / RBC: x / WBC x   Sq Epi: x / Non Sq Epi: x / Bacteria: x              IMAGING/EKG/ETC

## 2023-10-22 NOTE — PROGRESS NOTE ADULT - PROBLEM SELECTOR PLAN 6
Home regimen should be clarified, was discharged on lantus 11u, and humalog 7u premeal.  - ISS while inpatient  - FSG q6 hours  - A1C in AM. Home regimen should be clarified, was discharged on lantus 11u, and humalog 7u premeal.  A1c 12.6    Plan:  - ISS while inpatient  - FSG q6 hours

## 2023-10-22 NOTE — PROGRESS NOTE ADULT - PROBLEM SELECTOR PLAN 1
Patient presenting meeting 2/4 SIRS (T101, Leukocytosis), source likely Gangrenous toe vs Osteomyelitis of R. foot wound. No purulence or erythema on examination however extremely tender to palpation and at rest. XR w/o evidence of osteo however lower sensivity. s/p 1 dose vanc and zosyn in ED. Prior was on Keflex and doxycycline x 10 days (finished 10/10), completed course. Last BCx w/o growth.   - s/p 2L NS in ED  - f/u BCx, UCx   - c/w vancoymycin 1g q12 hours empirically  - c/w zosyn with pseudomonal dosing   - Tylenol q6 prn for fever.   - MRI with contrast to r.o abscess.   - f/u podiatry recs as below. Patient presenting meeting 2/4 SIRS (T101, Leukocytosis), source likely Gangrenous toe vs Osteomyelitis of R. foot wound. No purulence or erythema on examination however extremely tender to palpation and at rest. XR w/o evidence of osteo however lower sensivity. s/p 1 dose vanc and zosyn in ED. Prior was on Keflex and doxycycline x 10 days (finished 10/10), completed course. Last BCx w/o growth.   s/p 2L NS in ED    Plan:  - f/u BCx, UCx   - c/w vancoymycin 1g q12 hours empirically (re-dose 1 AM 10/23)  - c/w zosyn with pseudomonal dosing   - Tylenol q6 prn for fever.   - MRI with contrast to r.o abscess.   - f/u podiatry recs as below.

## 2023-10-22 NOTE — PROGRESS NOTE ADULT - PROBLEM SELECTOR PLAN 5
Patient with isolated elevated alkphos, no RUQ pain, not seen on prior labs. Low suspicion for bile duct obstruction at this time.   - Trend in AM  - Obtain RUQ US if still elevated or uptrending. Patient with isolated elevated alkphos, no RUQ pain, not seen on prior labs. Low suspicion for bile duct obstruction at this time.     Plan:  - Trend in AM  - Obtain RUQ US if still elevated or uptrending.

## 2023-10-22 NOTE — PROGRESS NOTE ADULT - ASSESSMENT
67M PMH HTN, IDDM with peripheral neuropathy, CAD s/p PCI with 2 stents (3 yrs ago at [facility]), recent admission 10/3 for diabetic ssti (CT r/o absecess, no OM) discharged on doxycycline and keflex, presenting with CC of r. foot pain, found to meet SIRS crtieria with suspected source infectious of r.toe, a/f management.

## 2023-10-23 ENCOUNTER — TRANSCRIPTION ENCOUNTER (OUTPATIENT)
Age: 68
End: 2023-10-23

## 2023-10-23 DIAGNOSIS — E11.65 TYPE 2 DIABETES MELLITUS WITH HYPERGLYCEMIA: ICD-10-CM

## 2023-10-23 DIAGNOSIS — E11.628 TYPE 2 DIABETES MELLITUS WITH OTHER SKIN COMPLICATIONS: ICD-10-CM

## 2023-10-23 LAB
ALBUMIN SERPL ELPH-MCNC: 2.4 G/DL — LOW (ref 3.3–5)
ALBUMIN SERPL ELPH-MCNC: 2.4 G/DL — LOW (ref 3.3–5)
ALP SERPL-CCNC: 156 U/L — HIGH (ref 40–120)
ALP SERPL-CCNC: 156 U/L — HIGH (ref 40–120)
ALT FLD-CCNC: 17 U/L — SIGNIFICANT CHANGE UP (ref 10–45)
ALT FLD-CCNC: 17 U/L — SIGNIFICANT CHANGE UP (ref 10–45)
ANION GAP SERPL CALC-SCNC: 9 MMOL/L — SIGNIFICANT CHANGE UP (ref 5–17)
ANION GAP SERPL CALC-SCNC: 9 MMOL/L — SIGNIFICANT CHANGE UP (ref 5–17)
AST SERPL-CCNC: 20 U/L — SIGNIFICANT CHANGE UP (ref 10–40)
AST SERPL-CCNC: 20 U/L — SIGNIFICANT CHANGE UP (ref 10–40)
BASOPHILS # BLD AUTO: 0.03 K/UL — SIGNIFICANT CHANGE UP (ref 0–0.2)
BASOPHILS # BLD AUTO: 0.03 K/UL — SIGNIFICANT CHANGE UP (ref 0–0.2)
BASOPHILS NFR BLD AUTO: 0.1 % — SIGNIFICANT CHANGE UP (ref 0–2)
BASOPHILS NFR BLD AUTO: 0.1 % — SIGNIFICANT CHANGE UP (ref 0–2)
BILIRUB SERPL-MCNC: 0.6 MG/DL — SIGNIFICANT CHANGE UP (ref 0.2–1.2)
BILIRUB SERPL-MCNC: 0.6 MG/DL — SIGNIFICANT CHANGE UP (ref 0.2–1.2)
BLD GP AB SCN SERPL QL: NEGATIVE — SIGNIFICANT CHANGE UP
BLD GP AB SCN SERPL QL: NEGATIVE — SIGNIFICANT CHANGE UP
BUN SERPL-MCNC: 22 MG/DL — SIGNIFICANT CHANGE UP (ref 7–23)
BUN SERPL-MCNC: 22 MG/DL — SIGNIFICANT CHANGE UP (ref 7–23)
CALCIUM SERPL-MCNC: 9 MG/DL — SIGNIFICANT CHANGE UP (ref 8.4–10.5)
CALCIUM SERPL-MCNC: 9 MG/DL — SIGNIFICANT CHANGE UP (ref 8.4–10.5)
CHLORIDE SERPL-SCNC: 98 MMOL/L — SIGNIFICANT CHANGE UP (ref 96–108)
CHLORIDE SERPL-SCNC: 98 MMOL/L — SIGNIFICANT CHANGE UP (ref 96–108)
CO2 SERPL-SCNC: 25 MMOL/L — SIGNIFICANT CHANGE UP (ref 22–31)
CO2 SERPL-SCNC: 25 MMOL/L — SIGNIFICANT CHANGE UP (ref 22–31)
CREAT SERPL-MCNC: 0.96 MG/DL — SIGNIFICANT CHANGE UP (ref 0.5–1.3)
CREAT SERPL-MCNC: 0.96 MG/DL — SIGNIFICANT CHANGE UP (ref 0.5–1.3)
EGFR: 87 ML/MIN/1.73M2 — SIGNIFICANT CHANGE UP
EGFR: 87 ML/MIN/1.73M2 — SIGNIFICANT CHANGE UP
EOSINOPHIL # BLD AUTO: 0.01 K/UL — SIGNIFICANT CHANGE UP (ref 0–0.5)
EOSINOPHIL # BLD AUTO: 0.01 K/UL — SIGNIFICANT CHANGE UP (ref 0–0.5)
EOSINOPHIL NFR BLD AUTO: 0 % — SIGNIFICANT CHANGE UP (ref 0–6)
EOSINOPHIL NFR BLD AUTO: 0 % — SIGNIFICANT CHANGE UP (ref 0–6)
GLUCOSE BLDC GLUCOMTR-MCNC: 134 MG/DL — HIGH (ref 70–99)
GLUCOSE BLDC GLUCOMTR-MCNC: 134 MG/DL — HIGH (ref 70–99)
GLUCOSE BLDC GLUCOMTR-MCNC: 141 MG/DL — HIGH (ref 70–99)
GLUCOSE BLDC GLUCOMTR-MCNC: 141 MG/DL — HIGH (ref 70–99)
GLUCOSE BLDC GLUCOMTR-MCNC: 161 MG/DL — HIGH (ref 70–99)
GLUCOSE BLDC GLUCOMTR-MCNC: 161 MG/DL — HIGH (ref 70–99)
GLUCOSE BLDC GLUCOMTR-MCNC: 339 MG/DL — HIGH (ref 70–99)
GLUCOSE BLDC GLUCOMTR-MCNC: 339 MG/DL — HIGH (ref 70–99)
GLUCOSE SERPL-MCNC: 275 MG/DL — HIGH (ref 70–99)
GLUCOSE SERPL-MCNC: 275 MG/DL — HIGH (ref 70–99)
HCT VFR BLD CALC: 28.6 % — LOW (ref 39–50)
HCT VFR BLD CALC: 28.6 % — LOW (ref 39–50)
HGB BLD-MCNC: 9.4 G/DL — LOW (ref 13–17)
HGB BLD-MCNC: 9.4 G/DL — LOW (ref 13–17)
IMM GRANULOCYTES NFR BLD AUTO: 0.8 % — SIGNIFICANT CHANGE UP (ref 0–0.9)
IMM GRANULOCYTES NFR BLD AUTO: 0.8 % — SIGNIFICANT CHANGE UP (ref 0–0.9)
LYMPHOCYTES # BLD AUTO: 0.85 K/UL — LOW (ref 1–3.3)
LYMPHOCYTES # BLD AUTO: 0.85 K/UL — LOW (ref 1–3.3)
LYMPHOCYTES # BLD AUTO: 3.7 % — LOW (ref 13–44)
LYMPHOCYTES # BLD AUTO: 3.7 % — LOW (ref 13–44)
MAGNESIUM SERPL-MCNC: 1.9 MG/DL — SIGNIFICANT CHANGE UP (ref 1.6–2.6)
MAGNESIUM SERPL-MCNC: 1.9 MG/DL — SIGNIFICANT CHANGE UP (ref 1.6–2.6)
MCHC RBC-ENTMCNC: 31.5 PG — SIGNIFICANT CHANGE UP (ref 27–34)
MCHC RBC-ENTMCNC: 31.5 PG — SIGNIFICANT CHANGE UP (ref 27–34)
MCHC RBC-ENTMCNC: 32.9 GM/DL — SIGNIFICANT CHANGE UP (ref 32–36)
MCHC RBC-ENTMCNC: 32.9 GM/DL — SIGNIFICANT CHANGE UP (ref 32–36)
MCV RBC AUTO: 96 FL — SIGNIFICANT CHANGE UP (ref 80–100)
MCV RBC AUTO: 96 FL — SIGNIFICANT CHANGE UP (ref 80–100)
MONOCYTES # BLD AUTO: 1.93 K/UL — HIGH (ref 0–0.9)
MONOCYTES # BLD AUTO: 1.93 K/UL — HIGH (ref 0–0.9)
MONOCYTES NFR BLD AUTO: 8.5 % — SIGNIFICANT CHANGE UP (ref 2–14)
MONOCYTES NFR BLD AUTO: 8.5 % — SIGNIFICANT CHANGE UP (ref 2–14)
NEUTROPHILS # BLD AUTO: 19.78 K/UL — HIGH (ref 1.8–7.4)
NEUTROPHILS # BLD AUTO: 19.78 K/UL — HIGH (ref 1.8–7.4)
NEUTROPHILS NFR BLD AUTO: 86.9 % — HIGH (ref 43–77)
NEUTROPHILS NFR BLD AUTO: 86.9 % — HIGH (ref 43–77)
NRBC # BLD: 0 /100 WBCS — SIGNIFICANT CHANGE UP (ref 0–0)
NRBC # BLD: 0 /100 WBCS — SIGNIFICANT CHANGE UP (ref 0–0)
PHOSPHATE SERPL-MCNC: 2.8 MG/DL — SIGNIFICANT CHANGE UP (ref 2.5–4.5)
PHOSPHATE SERPL-MCNC: 2.8 MG/DL — SIGNIFICANT CHANGE UP (ref 2.5–4.5)
PLATELET # BLD AUTO: 213 K/UL — SIGNIFICANT CHANGE UP (ref 150–400)
PLATELET # BLD AUTO: 213 K/UL — SIGNIFICANT CHANGE UP (ref 150–400)
POTASSIUM SERPL-MCNC: 3.8 MMOL/L — SIGNIFICANT CHANGE UP (ref 3.5–5.3)
POTASSIUM SERPL-MCNC: 3.8 MMOL/L — SIGNIFICANT CHANGE UP (ref 3.5–5.3)
POTASSIUM SERPL-SCNC: 3.8 MMOL/L — SIGNIFICANT CHANGE UP (ref 3.5–5.3)
POTASSIUM SERPL-SCNC: 3.8 MMOL/L — SIGNIFICANT CHANGE UP (ref 3.5–5.3)
PROT SERPL-MCNC: 6.6 G/DL — SIGNIFICANT CHANGE UP (ref 6–8.3)
PROT SERPL-MCNC: 6.6 G/DL — SIGNIFICANT CHANGE UP (ref 6–8.3)
RBC # BLD: 2.98 M/UL — LOW (ref 4.2–5.8)
RBC # BLD: 2.98 M/UL — LOW (ref 4.2–5.8)
RBC # FLD: 13.7 % — SIGNIFICANT CHANGE UP (ref 10.3–14.5)
RBC # FLD: 13.7 % — SIGNIFICANT CHANGE UP (ref 10.3–14.5)
RH IG SCN BLD-IMP: POSITIVE — SIGNIFICANT CHANGE UP
RH IG SCN BLD-IMP: POSITIVE — SIGNIFICANT CHANGE UP
SODIUM SERPL-SCNC: 132 MMOL/L — LOW (ref 135–145)
SODIUM SERPL-SCNC: 132 MMOL/L — LOW (ref 135–145)
VANCOMYCIN TROUGH SERPL-MCNC: 13.9 UG/ML — SIGNIFICANT CHANGE UP (ref 10–20)
VANCOMYCIN TROUGH SERPL-MCNC: 13.9 UG/ML — SIGNIFICANT CHANGE UP (ref 10–20)
WBC # BLD: 22.79 K/UL — HIGH (ref 3.8–10.5)
WBC # BLD: 22.79 K/UL — HIGH (ref 3.8–10.5)
WBC # FLD AUTO: 22.79 K/UL — HIGH (ref 3.8–10.5)
WBC # FLD AUTO: 22.79 K/UL — HIGH (ref 3.8–10.5)

## 2023-10-23 PROCEDURE — 99222 1ST HOSP IP/OBS MODERATE 55: CPT

## 2023-10-23 PROCEDURE — 99233 SBSQ HOSP IP/OBS HIGH 50: CPT | Mod: GC

## 2023-10-23 PROCEDURE — 93306 TTE W/DOPPLER COMPLETE: CPT | Mod: 26

## 2023-10-23 PROCEDURE — 71045 X-RAY EXAM CHEST 1 VIEW: CPT | Mod: 26

## 2023-10-23 PROCEDURE — 99221 1ST HOSP IP/OBS SF/LOW 40: CPT

## 2023-10-23 RX ORDER — DEXTROSE 50 % IN WATER 50 %
25 SYRINGE (ML) INTRAVENOUS ONCE
Refills: 0 | Status: DISCONTINUED | OUTPATIENT
Start: 2023-10-23 | End: 2023-10-25

## 2023-10-23 RX ORDER — DEXTROSE 50 % IN WATER 50 %
25 SYRINGE (ML) INTRAVENOUS ONCE
Refills: 0 | Status: DISCONTINUED | OUTPATIENT
Start: 2023-10-23 | End: 2023-10-23

## 2023-10-23 RX ORDER — INSULIN LISPRO 100/ML
5 VIAL (ML) SUBCUTANEOUS
Refills: 0 | Status: DISCONTINUED | OUTPATIENT
Start: 2023-10-23 | End: 2023-10-23

## 2023-10-23 RX ORDER — OXYCODONE HYDROCHLORIDE 5 MG/1
10 TABLET ORAL EVERY 6 HOURS
Refills: 0 | Status: DISCONTINUED | OUTPATIENT
Start: 2023-10-23 | End: 2023-10-26

## 2023-10-23 RX ORDER — DEXTROSE 50 % IN WATER 50 %
15 SYRINGE (ML) INTRAVENOUS ONCE
Refills: 0 | Status: DISCONTINUED | OUTPATIENT
Start: 2023-10-23 | End: 2023-10-25

## 2023-10-23 RX ORDER — SODIUM CHLORIDE 9 MG/ML
1000 INJECTION, SOLUTION INTRAVENOUS
Refills: 0 | Status: DISCONTINUED | OUTPATIENT
Start: 2023-10-23 | End: 2023-10-25

## 2023-10-23 RX ORDER — DEXTROSE 50 % IN WATER 50 %
12.5 SYRINGE (ML) INTRAVENOUS ONCE
Refills: 0 | Status: DISCONTINUED | OUTPATIENT
Start: 2023-10-23 | End: 2023-10-23

## 2023-10-23 RX ORDER — GLUCAGON INJECTION, SOLUTION 0.5 MG/.1ML
1 INJECTION, SOLUTION SUBCUTANEOUS ONCE
Refills: 0 | Status: DISCONTINUED | OUTPATIENT
Start: 2023-10-23 | End: 2023-10-23

## 2023-10-23 RX ORDER — INSULIN LISPRO 100/ML
5 VIAL (ML) SUBCUTANEOUS
Refills: 0 | Status: DISCONTINUED | OUTPATIENT
Start: 2023-10-23 | End: 2023-10-25

## 2023-10-23 RX ORDER — GLUCAGON INJECTION, SOLUTION 0.5 MG/.1ML
1 INJECTION, SOLUTION SUBCUTANEOUS ONCE
Refills: 0 | Status: DISCONTINUED | OUTPATIENT
Start: 2023-10-23 | End: 2023-10-25

## 2023-10-23 RX ORDER — INSULIN LISPRO 100/ML
VIAL (ML) SUBCUTANEOUS
Refills: 0 | Status: DISCONTINUED | OUTPATIENT
Start: 2023-10-23 | End: 2023-10-23

## 2023-10-23 RX ORDER — SODIUM CHLORIDE 9 MG/ML
1000 INJECTION, SOLUTION INTRAVENOUS
Refills: 0 | Status: DISCONTINUED | OUTPATIENT
Start: 2023-10-23 | End: 2023-10-23

## 2023-10-23 RX ORDER — INSULIN LISPRO 100/ML
VIAL (ML) SUBCUTANEOUS
Refills: 0 | Status: DISCONTINUED | OUTPATIENT
Start: 2023-10-23 | End: 2023-10-25

## 2023-10-23 RX ORDER — INSULIN LISPRO 100/ML
VIAL (ML) SUBCUTANEOUS AT BEDTIME
Refills: 0 | Status: DISCONTINUED | OUTPATIENT
Start: 2023-10-23 | End: 2023-10-23

## 2023-10-23 RX ORDER — INSULIN LISPRO 100/ML
VIAL (ML) SUBCUTANEOUS AT BEDTIME
Refills: 0 | Status: DISCONTINUED | OUTPATIENT
Start: 2023-10-23 | End: 2023-10-25

## 2023-10-23 RX ORDER — ONDANSETRON 8 MG/1
4 TABLET, FILM COATED ORAL ONCE
Refills: 0 | Status: COMPLETED | OUTPATIENT
Start: 2023-10-23 | End: 2023-10-23

## 2023-10-23 RX ORDER — MORPHINE SULFATE 50 MG/1
2 CAPSULE, EXTENDED RELEASE ORAL EVERY 4 HOURS
Refills: 0 | Status: DISCONTINUED | OUTPATIENT
Start: 2023-10-23 | End: 2023-10-26

## 2023-10-23 RX ORDER — VANCOMYCIN HCL 1 G
1250 VIAL (EA) INTRAVENOUS ONCE
Refills: 0 | Status: COMPLETED | OUTPATIENT
Start: 2023-10-23 | End: 2023-10-23

## 2023-10-23 RX ORDER — POTASSIUM CHLORIDE 20 MEQ
20 PACKET (EA) ORAL ONCE
Refills: 0 | Status: COMPLETED | OUTPATIENT
Start: 2023-10-23 | End: 2023-10-23

## 2023-10-23 RX ORDER — INSULIN GLARGINE 100 [IU]/ML
13 INJECTION, SOLUTION SUBCUTANEOUS AT BEDTIME
Refills: 0 | Status: DISCONTINUED | OUTPATIENT
Start: 2023-10-23 | End: 2023-10-23

## 2023-10-23 RX ORDER — VANCOMYCIN HCL 1 G
1250 VIAL (EA) INTRAVENOUS EVERY 12 HOURS
Refills: 0 | Status: DISCONTINUED | OUTPATIENT
Start: 2023-10-23 | End: 2023-10-23

## 2023-10-23 RX ORDER — DEXTROSE 50 % IN WATER 50 %
12.5 SYRINGE (ML) INTRAVENOUS ONCE
Refills: 0 | Status: DISCONTINUED | OUTPATIENT
Start: 2023-10-23 | End: 2023-10-25

## 2023-10-23 RX ORDER — ACETAMINOPHEN 500 MG
650 TABLET ORAL EVERY 6 HOURS
Refills: 0 | Status: DISCONTINUED | OUTPATIENT
Start: 2023-10-23 | End: 2023-10-31

## 2023-10-23 RX ORDER — DEXTROSE 50 % IN WATER 50 %
15 SYRINGE (ML) INTRAVENOUS ONCE
Refills: 0 | Status: DISCONTINUED | OUTPATIENT
Start: 2023-10-23 | End: 2023-10-23

## 2023-10-23 RX ORDER — PIPERACILLIN AND TAZOBACTAM 4; .5 G/20ML; G/20ML
4.5 INJECTION, POWDER, LYOPHILIZED, FOR SOLUTION INTRAVENOUS EVERY 8 HOURS
Refills: 0 | Status: DISCONTINUED | OUTPATIENT
Start: 2023-10-23 | End: 2023-10-26

## 2023-10-23 RX ORDER — VANCOMYCIN HCL 1 G
1000 VIAL (EA) INTRAVENOUS ONCE
Refills: 0 | Status: COMPLETED | OUTPATIENT
Start: 2023-10-24 | End: 2023-10-24

## 2023-10-23 RX ORDER — INSULIN GLARGINE 100 [IU]/ML
10 INJECTION, SOLUTION SUBCUTANEOUS AT BEDTIME
Refills: 0 | Status: DISCONTINUED | OUTPATIENT
Start: 2023-10-23 | End: 2023-10-25

## 2023-10-23 RX ORDER — AMLODIPINE BESYLATE 2.5 MG/1
10 TABLET ORAL EVERY 24 HOURS
Refills: 0 | Status: DISCONTINUED | OUTPATIENT
Start: 2023-10-23 | End: 2023-10-24

## 2023-10-23 RX ADMIN — OXYCODONE HYDROCHLORIDE 10 MILLIGRAM(S): 5 TABLET ORAL at 18:32

## 2023-10-23 RX ADMIN — GABAPENTIN 1600 MILLIGRAM(S): 400 CAPSULE ORAL at 13:34

## 2023-10-23 RX ADMIN — OXYCODONE HYDROCHLORIDE 10 MILLIGRAM(S): 5 TABLET ORAL at 13:00

## 2023-10-23 RX ADMIN — GABAPENTIN 1600 MILLIGRAM(S): 400 CAPSULE ORAL at 06:09

## 2023-10-23 RX ADMIN — ONDANSETRON 4 MILLIGRAM(S): 8 TABLET, FILM COATED ORAL at 09:22

## 2023-10-23 RX ADMIN — Medication 4: at 09:42

## 2023-10-23 RX ADMIN — Medication 100 MILLIGRAM(S): at 07:40

## 2023-10-23 RX ADMIN — PIPERACILLIN AND TAZOBACTAM 25 GRAM(S): 4; .5 INJECTION, POWDER, LYOPHILIZED, FOR SOLUTION INTRAVENOUS at 22:09

## 2023-10-23 RX ADMIN — Medication 650 MILLIGRAM(S): at 15:15

## 2023-10-23 RX ADMIN — Medication 166.67 MILLIGRAM(S): at 16:51

## 2023-10-23 RX ADMIN — PIPERACILLIN AND TAZOBACTAM 25 GRAM(S): 4; .5 INJECTION, POWDER, LYOPHILIZED, FOR SOLUTION INTRAVENOUS at 08:36

## 2023-10-23 RX ADMIN — Medication 650 MILLIGRAM(S): at 01:00

## 2023-10-23 RX ADMIN — OXYCODONE HYDROCHLORIDE 5 MILLIGRAM(S): 5 TABLET ORAL at 05:00

## 2023-10-23 RX ADMIN — MORPHINE SULFATE 2 MILLIGRAM(S): 50 CAPSULE, EXTENDED RELEASE ORAL at 06:03

## 2023-10-23 RX ADMIN — MORPHINE SULFATE 2 MILLIGRAM(S): 50 CAPSULE, EXTENDED RELEASE ORAL at 20:57

## 2023-10-23 RX ADMIN — PIPERACILLIN AND TAZOBACTAM 25 GRAM(S): 4; .5 INJECTION, POWDER, LYOPHILIZED, FOR SOLUTION INTRAVENOUS at 13:34

## 2023-10-23 RX ADMIN — Medication 166.67 MILLIGRAM(S): at 05:45

## 2023-10-23 RX ADMIN — Medication 20 MILLIEQUIVALENT(S): at 09:41

## 2023-10-23 RX ADMIN — LISINOPRIL 40 MILLIGRAM(S): 2.5 TABLET ORAL at 06:09

## 2023-10-23 RX ADMIN — OXYCODONE HYDROCHLORIDE 10 MILLIGRAM(S): 5 TABLET ORAL at 12:05

## 2023-10-23 RX ADMIN — INSULIN GLARGINE 10 UNIT(S): 100 INJECTION, SOLUTION SUBCUTANEOUS at 22:09

## 2023-10-23 RX ADMIN — MORPHINE SULFATE 2 MILLIGRAM(S): 50 CAPSULE, EXTENDED RELEASE ORAL at 14:55

## 2023-10-23 RX ADMIN — MORPHINE SULFATE 2 MILLIGRAM(S): 50 CAPSULE, EXTENDED RELEASE ORAL at 21:15

## 2023-10-23 RX ADMIN — Medication 650 MILLIGRAM(S): at 22:08

## 2023-10-23 RX ADMIN — MORPHINE SULFATE 2 MILLIGRAM(S): 50 CAPSULE, EXTENDED RELEASE ORAL at 06:18

## 2023-10-23 RX ADMIN — Medication 5 UNIT(S): at 18:37

## 2023-10-23 RX ADMIN — MORPHINE SULFATE 2 MILLIGRAM(S): 50 CAPSULE, EXTENDED RELEASE ORAL at 14:38

## 2023-10-23 RX ADMIN — OXYCODONE HYDROCHLORIDE 10 MILLIGRAM(S): 5 TABLET ORAL at 18:06

## 2023-10-23 RX ADMIN — Medication 5 UNIT(S): at 13:12

## 2023-10-23 RX ADMIN — Medication 650 MILLIGRAM(S): at 14:38

## 2023-10-23 RX ADMIN — OXYCODONE HYDROCHLORIDE 5 MILLIGRAM(S): 5 TABLET ORAL at 04:04

## 2023-10-23 RX ADMIN — Medication 650 MILLIGRAM(S): at 23:15

## 2023-10-23 RX ADMIN — Medication 1: at 13:13

## 2023-10-23 RX ADMIN — Medication 650 MILLIGRAM(S): at 09:22

## 2023-10-23 RX ADMIN — AMLODIPINE BESYLATE 10 MILLIGRAM(S): 2.5 TABLET ORAL at 16:58

## 2023-10-23 RX ADMIN — OXYCODONE HYDROCHLORIDE 10 MILLIGRAM(S): 5 TABLET ORAL at 23:44

## 2023-10-23 NOTE — DIETITIAN INITIAL EVALUATION ADULT - PROBLEM SELECTOR PLAN 5
Patient with isolated elevated alkphos, no RUQ pain, not seen on prior labs. Low suspicion for bile duct obstruction at this time.   - Trend in AM  - Obtain RUQ US if still elevated or uptrending.

## 2023-10-23 NOTE — CONSULT NOTE ADULT - SUBJECTIVE AND OBJECTIVE BOX
Vascular Attending:  Dr. Oneal      HPI:  67M PMH HTN, IDDM with peripheral neuropathy, CAD s/p PCI with 2 stents (3 yrs ago at [facility]), recent admission 10/3 for diabetic ssti (CT r/o absecess, no OM) discharged on doxycycline and keflex, presenting with complain of Right 4th toe pain. Podiatry followed pt during prior stay(10/1-10/3) and rec PO abx on discharge and close outpt follow up for demarcation. Pt states that he completed the prescribed PO abx but did not follow up with any doctor. Denies keep the foot covered. Currently complains of continuous pain on palpation and ambulation.  Denies any other complaints or concerns.     Vascular surgery consulted for an evaluation. Patient denies any history of claudication or rest pain. Never smoker.    ED Course:   T101 HR 86 /80 RR 19 Sat 97% on RA.   Labs: WBC 19 Hb 9.7 BUN 26   Interventions: Vanc, zosyn, morphine x 14units, 2LNS.   XR R.Foot: 3 views of the right foot demonstrates no evidence of acute fracture or   dislocation. No radiographic evidence of osteomyelitis. Calcaneal   spurring. Vascular calcifications noted.   (21 Oct 2023 20:24)      PAST MEDICAL & SURGICAL HISTORY:  IDDM (insulin dependent diabetes mellitus)      HTN (hypertension)      CAD S/P percutaneous coronary angioplasty      Neuropathy      No significant past surgical history      MEDICATIONS  (STANDING):  acetaminophen     Tablet .. 650 milliGRAM(s) Oral every 6 hours  amLODIPine   Tablet 10 milliGRAM(s) Oral every 24 hours  dextrose 5%. 1000 milliLiter(s) (50 mL/Hr) IV Continuous <Continuous>  dextrose 5%. 1000 milliLiter(s) (100 mL/Hr) IV Continuous <Continuous>  dextrose 50% Injectable 25 Gram(s) IV Push once  dextrose 50% Injectable 12.5 Gram(s) IV Push once  dextrose 50% Injectable 25 Gram(s) IV Push once  gabapentin 1600 milliGRAM(s) Oral three times a day  glucagon  Injectable 1 milliGRAM(s) IntraMuscular once  insulin glargine Injectable (LANTUS) 13 Unit(s) SubCutaneous at bedtime  insulin lispro (ADMELOG) corrective regimen sliding scale   SubCutaneous three times a day before meals  insulin lispro (ADMELOG) corrective regimen sliding scale   SubCutaneous at bedtime  insulin lispro Injectable (ADMELOG) 5 Unit(s) SubCutaneous three times a day before meals  lisinopril 40 milliGRAM(s) Oral daily  oxyCODONE    IR 10 milliGRAM(s) Oral every 6 hours  piperacillin/tazobactam IVPB.. 4.5 Gram(s) IV Intermittent every 8 hours  vancomycin  IVPB 1250 milliGRAM(s) IV Intermittent every 12 hours    MEDICATIONS  (PRN):  aluminum hydroxide/magnesium hydroxide/simethicone Suspension 30 milliLiter(s) Oral every 6 hours PRN Dyspepsia  dextrose Oral Gel 15 Gram(s) Oral once PRN Blood Glucose LESS THAN 70 milliGRAM(s)/deciliter  morphine  - Injectable 2 milliGRAM(s) IV Push every 4 hours PRN Moderate Pain (4 - 6)      Allergies    No Known Allergies    Intolerances        SOCIAL HISTORY:    FAMILY HISTORY:      Vital Signs Last 24 Hrs  T(C): 37.3 (23 Oct 2023 09:53), Max: 37.3 (23 Oct 2023 05:49)  T(F): 99.2 (23 Oct 2023 09:53), Max: 99.2 (23 Oct 2023 05:49)  HR: 89 (23 Oct 2023 13:00) (79 - 91)  BP: 192/98 (23 Oct 2023 13:00) (173/84 - 213/103)  BP(mean): 121 (22 Oct 2023 21:11) (121 - 121)  RR: 18 (23 Oct 2023 09:53) (18 - 18)  SpO2: 97% (23 Oct 2023 09:53) (96% - 97%)    Parameters below as of 23 Oct 2023 09:53  Patient On (Oxygen Delivery Method): room air        PHYSICAL EXAM:      Constitutional: alert and awake, NAD    Respiratory: no respiratory distress    Cardiovascular: RRR    Extremities: right 4th toe with dry gangrene, with surrounding erythema, no active drainage, no fluctuance, tender to palpation    Vascular: RLE: 2+FEM/2+POP/triphasic DP/biphasic PT. LLE: 2+FEM/2+POP/biphasic DP/biphasic PT      LABS:                        9.4    22.79 )-----------( 213      ( 23 Oct 2023 04:00 )             28.6     10-23    132<L>  |  98  |  22  ----------------------------<  275<H>  3.8   |  25  |  0.96    Ca    9.0      23 Oct 2023 04:00  Phos  2.8     10-23  Mg     1.9     10-23    TPro  6.6  /  Alb  2.4<L>  /  TBili  0.6  /  DBili  x   /  AST  20  /  ALT  17  /  AlkPhos  156<H>  10-23    PT/INR - ( 22 Oct 2023 20:36 )   PT: 12.5 sec;   INR: 1.10          PTT - ( 22 Oct 2023 20:36 )  PTT:29.2 sec  Urinalysis Basic - ( 23 Oct 2023 04:00 )    Color: x / Appearance: x / SG: x / pH: x  Gluc: 275 mg/dL / Ketone: x  / Bili: x / Urobili: x   Blood: x / Protein: x / Nitrite: x   Leuk Esterase: x / RBC: x / WBC x   Sq Epi: x / Non Sq Epi: x / Bacteria: x      RADIOLOGY & ADDITIONAL STUDIES

## 2023-10-23 NOTE — PROGRESS NOTE ADULT - PROBLEM SELECTOR PLAN 5
Patient with isolated elevated alkphos, no RUQ pain, not seen on prior labs. Low suspicion for bile duct obstruction at this time.     Plan:  - Trend in AM  - Obtain RUQ US if still elevated or uptrending.

## 2023-10-23 NOTE — PROGRESS NOTE ADULT - PROBLEM SELECTOR PLAN 1
Patient presenting meeting 2/4 SIRS (T101, Leukocytosis), source likely Gangrenous toe vs Osteomyelitis of R. foot wound. No purulence or erythema on examination however extremely tender to palpation and at rest. XR w/o evidence of osteo however lower sensivity. s/p 1 dose vanc and zosyn in ED. Prior was on Keflex and doxycycline x 10 days (finished 10/10), completed course. Last BCx w/o growth.   s/p 2L NS in ED    Plan:  - f/u BCx, UCx   - c/w vancoymycin 1g q12 hours empirically (re-dose 1 AM 10/23)  - c/w zosyn with pseudomonal dosing   - Tylenol q6 prn for fever.   - MRI with contrast to r.o abscess.   - f/u podiatry recs as below. Patient presenting meeting 2/4 SIRS (T101, Leukocytosis), source likely Gangrenous toe vs Osteomyelitis of R. foot wound. No purulence or erythema on examination however extremely tender to palpation and at rest. XR w/o evidence of osteo however lower sensivity. s/p 1 dose vanc and zosyn in ED. Prior was on Keflex and doxycycline x 10 days (finished 10/10), completed course. Last BCx w/o growth.   s/p 2L NS in ED  BCx: no growth at 1 day  MRI: Soft tissue atrophy/wound of the 4th toe with underlying marrow changes of the 4th proximal phalanx head, middle phalanx, and distal phalanx that may be reactive in the absence of significant T1 marrow replacement, however, early infection may also be considered.    Plan:  - f/u BCx, UCx   - c/w vancomycin 1g q12 hours empirically (re-dose 1 AM 10/23)  - c/w zosyn with pseudomonal dosing   - Tylenol q6 prn for fever.   - f/u podiatry recs as below.

## 2023-10-23 NOTE — CONSULT NOTE ADULT - ASSESSMENT
67M PMH HTN, IDDM with peripheral neuropathy, CAD s/p PCI with 2 stents (3 yrs ago at [facility]), admitted for worsening right 4th toe gangrene with cellulitis. Patient with good doppler signals in the foot, no acute intervention indicated at this time.    -Continue vanco and zosyn  -Wound care by podiatry  -Plan for OR with podiatry for toe amputation tomorrow 10/24/23    INCOMPLETE 67M PMH HTN, IDDM with peripheral neuropathy, CAD s/p PCI with 2 stents (3 yrs ago at [facility]), admitted for worsening right 4th toe gangrene with cellulitis and no palpable pulses.    -Continue vanco and zosyn  -Wound care by podiatry  -OR with podiatry for toe amputation tomorrow 10/24/23  -Some concern for PAD, will most likely need an angiogram on this admission, will discuss timing after podiatry's procedure.        Case d/w chief resident on call.

## 2023-10-23 NOTE — DIETITIAN INITIAL EVALUATION ADULT - PERTINENT MEDS FT
MEDICATIONS  (STANDING):  acetaminophen     Tablet .. 650 milliGRAM(s) Oral every 6 hours  amLODIPine   Tablet 10 milliGRAM(s) Oral every 24 hours  dextrose 5%. 1000 milliLiter(s) (50 mL/Hr) IV Continuous <Continuous>  dextrose 5%. 1000 milliLiter(s) (100 mL/Hr) IV Continuous <Continuous>  dextrose 50% Injectable 25 Gram(s) IV Push once  dextrose 50% Injectable 12.5 Gram(s) IV Push once  dextrose 50% Injectable 25 Gram(s) IV Push once  gabapentin 1600 milliGRAM(s) Oral three times a day  glucagon  Injectable 1 milliGRAM(s) IntraMuscular once  insulin glargine Injectable (LANTUS) 10 Unit(s) SubCutaneous at bedtime  insulin lispro (ADMELOG) corrective regimen sliding scale   SubCutaneous three times a day before meals  insulin lispro (ADMELOG) corrective regimen sliding scale   SubCutaneous at bedtime  insulin lispro Injectable (ADMELOG) 5 Unit(s) SubCutaneous three times a day before meals  lisinopril 40 milliGRAM(s) Oral daily  oxyCODONE    IR 10 milliGRAM(s) Oral every 6 hours  piperacillin/tazobactam IVPB.. 4.5 Gram(s) IV Intermittent every 8 hours  vancomycin  IVPB 1250 milliGRAM(s) IV Intermittent every 12 hours    MEDICATIONS  (PRN):  aluminum hydroxide/magnesium hydroxide/simethicone Suspension 30 milliLiter(s) Oral every 6 hours PRN Dyspepsia  dextrose Oral Gel 15 Gram(s) Oral once PRN Blood Glucose LESS THAN 70 milliGRAM(s)/deciliter  morphine  - Injectable 2 milliGRAM(s) IV Push every 4 hours PRN Moderate Pain (4 - 6)

## 2023-10-23 NOTE — DIETITIAN INITIAL EVALUATION ADULT - PERTINENT LABORATORY DATA
10-23    132<L>  |  98  |  22  ----------------------------<  275<H>  3.8   |  25  |  0.96    Ca    9.0      23 Oct 2023 04:00  Phos  2.8     10-23  Mg     1.9     10-23    TPro  6.6  /  Alb  2.4<L>  /  TBili  0.6  /  DBili  x   /  AST  20  /  ALT  17  /  AlkPhos  156<H>  10-23  POCT Blood Glucose.: 161 mg/dL (10-23-23 @ 12:22)  A1C with Estimated Average Glucose Result: 12.6 % (10-02-23 @ 05:30)

## 2023-10-23 NOTE — DIETITIAN INITIAL EVALUATION ADULT - ORAL INTAKE PTA/DIET HISTORY
Writer attempted to see patient but patient was at procedure for day, unable to converse with patient. Writer spoke extensively with nurse who reported no N/V/D/C and no intake at breakfast or dinner last night as he was reportedly concerned about his elevated BS levels. Skin intact per nurse except for 4th right toe with infection/wound. No chewing or swallowing problems known per nurse. Cannot confirm weight status or change but per record review, previous weight 10/5/23: 130# with noted edema at that admission. Ht: 67 inches, 10/21/23 weight: 143.5#. Has room service assist to obtain preferences at bedside. Writer will f/u with lucilat tomorrow after procedure to complete assessment with patient interview.

## 2023-10-23 NOTE — PROGRESS NOTE ADULT - PROBLEM SELECTOR PLAN 2
Patient presenting with worsening right 4th toe pain and discoloratio s/p course of IV and oral antibtiocs: Keflex and doxycycline until 10/10 endorses compliance. On presentation, he is hemodynamically stable however with leukoctyosis to 19 and fever to 101.   Right toe xrays done in ED showing no evidence of fracture or osteomyelitis   S/p 1 dose vancomycin in ED and zosyn in ED.   BCx: no growth at 1 day    Plan:  -Podiatry consulted, f/u recs   -f/u BCxs.  - Local wound care: Cleansed with NS. Betadine soaked gauze applied to R 4th digit, 3rd and 4th interspaces Advised pt to keep dressing intact dry and clean  - Pain management: Tylenol 975 q6 hours for mild pain, Oxy 5 q6 hours for moderate, morphine [id number] for severe  - 1x 0.5 dilaudid for severe breakthrough pain. Patient presenting with worsening right 4th toe pain and discoloratio s/p course of IV and oral antibtiocs: Keflex and doxycycline until 10/10 endorses compliance. On presentation, he is hemodynamically stable however with leukoctyosis to 19 and fever to 101.   Right toe xrays done in ED showing no evidence of fracture or osteomyelitis   S/p 1 dose vancomycin in ED and zosyn in ED.   BCx: no growth at 1 day    Plan:  - Podiatry consulted, f/u recs   - vascular consulted  - Local wound care: Cleansed with NS. Betadine soaked gauze applied to R 4th digit, 3rd and 4th interspaces Advised pt to keep dressing intact dry and clean  - Pain management: Tylenol 650 q6h standing, Oxy 10 q6h standing, morphine 2mg q6h PRN for severe  - 1x 0.5 dilaudid for severe breakthrough pain.

## 2023-10-23 NOTE — CONSULT NOTE ADULT - ASSESSMENT
I M    67 y o M PMH HTN, IDDM with peripheral neuropathy, CAD s/p PCI with 2 stents (3 yrs ago at [facility]), recent admission 10/3 for diabetic ssti (CT r/o absecess, no OM) discharged on doxycycline and keflex, presenting with CC of r. foot pain, found to meet SIRS crtieria with suspected source infectious of r.toe, a/f management.     Problem/Plan - 1:  ·  Problem: Sepsis.   ·  Plan: Patient presenting meeting 2/4 SIRS (T101, Leukocytosis), source likely Gangrenous toe vs Osteomyelitis of R. foot wound. No purulence or erythema on examination however extremely tender to palpation and at rest. XR w/o evidence of osteo however lower sensivity. s/p 1 dose vanc and zosyn in ED. Prior was on Keflex and doxycycline x 10 days (finished 10/10), completed course. Last BCx w/o growth.   s/p 2L NS in ED    Plan:  - f/u BCx, UCx   - c/w vancoymycin 1g q12 hours empirically (re-dose 1 AM 10/23)  - c/w zosyn with pseudomonal dosing   - Tylenol q6 prn for fever.   - MRI with contrast to r.o abscess.   - f/u podiatry recs as below.    Problem/Plan - 2:  ·  Problem: Open wound of toe of right foot.   ·  Plan: Patient presenting with worsening right 4th toe pain and discoloratio s/p course of IV and oral antibtiocs: Keflex and doxycycline until 10/10 endorses compliance. On presentation, he is hemodynamically stable however with leukoctyosis to 19 and fever to 101.   Right toe xrays done in ED showing no evidence of fracture or osteomyelitis   S/p 1 dose vancomycin in ED and zosyn in ED.   BCx: no growth at 1 day    Plan:  -Podiatry consulted, f/u recs   -f/u BCxs.  - Local wound care: Cleansed with NS. Betadine soaked gauze applied to R 4th digit, 3rd and 4th interspaces Advised pt to keep dressing intact dry and clean  - Pain management: Tylenol 975 q6 hours for mild pain, Oxy 5 q6 hours for moderate, morphine [id number] for severe  - 1x 0.5 dilaudid for severe breakthrough pain.    Problem/Plan - 3:  ·  Problem: Hypertensive urgency.   ·  Plan: Patient presenting with SBP to 191/80, Alkphos elevated at this time. Likely due to pain from foot wound, endorses compliance with medication.    Plan:  - Restart lisinopril as below  - Pain control as above.   - Trend BP.    Problem/Plan - 4:  ·  Problem: HTN (hypertension).   ·  Plan: Plan:  - c/w home lisinopril 40mgqd.    Problem/Plan - 5:  ·  Problem: Transaminitis.   ·  Plan: Patient with isolated elevated alkphos, no RUQ pain, not seen on prior labs. Low suspicion for bile duct obstruction at this time.     Plan:  - Trend in AM  - Obtain RUQ US if still elevated or uptrending.    Problem/Plan - 6:  ·  Problem: Diabetes.   ·  Plan: Home regimen should be clarified, was discharged on lantus 11u, and humalog 7u premeal.  A1c 12.6    Plan:  - ISS while inpatient  - FSG q6 hours.    Problem/Plan - 7:  ·  Problem: Prophylactic measure.   ·  Plan: F: s/p 2L NS  E: Replete as necessary K>4 Mg>2  N: CC diet   DVT Prophylaxis: heparin sq  GI prophylaxis: None   CODE STATUS: FULL  DISPO: RMF.

## 2023-10-23 NOTE — DIETITIAN INITIAL EVALUATION ADULT - NSFNSADHERENCEPTAFT_GEN_A_CORE
Per chart review (10/5/23) patient previously reported that he does not follow any particular diet restriction or plan.

## 2023-10-23 NOTE — PROGRESS NOTE ADULT - ATTENDING COMMENTS
Patient was seen and examined at bedside on 10/23/2023 at 1130 am. Patient reports pain at his foot has improved but is still present and currently rated 10/10. Denies SOB, CP. ROS is otherwise negative. Vitals, labwork and pertinent imaging reviewed. Exam - NAD, AAO x 4, PERRLA, EOMI, MMM, supple neck, chest - CTA b/l, CV - rrr, s1s2, no m/r/g, abd - soft, NTND, + BS, ext - wwp, + 2 pitting edema b/l, R foot bandaged, dry gangrene on 4th toe, psych - normal affect    Plan:  -C/w broad empiric coverage, low grade fever and leukocytosis persists, consult ID in AM  -Pain control, started on Oxycodone 10 mg PO q6 standing with IV Morphine PRN for severe pain  -Endocrine consult given recent HbA1C ~ 13, will start Lantus 10 units qHS and 5 units premeal  -MRI performed, plan for OR with Podiatry on 10/24  -Vascular consulted - pt will likely need angiogram   -Echo done showing hypokinesis and dysfunction, consult Cardiology for newly diagnosed HF and preoperative optimization  -PT/OT  -BP is elevated, c/w Lisinopril, control pain and start Norvasc 10 mg PO qd

## 2023-10-23 NOTE — PROGRESS NOTE ADULT - SUBJECTIVE AND OBJECTIVE BOX
O/N Events:    Subjective/ROS: Patient seen and examined at bedside.     Denies Fever/Chills, HA, CP, SOB, n/v, changes in bowel/urinary habits.  12pt ROS otherwise negative.    VITALS  Vital Signs Last 24 Hrs  T(C): 37.3 (23 Oct 2023 05:49), Max: 37.3 (23 Oct 2023 05:49)  T(F): 99.2 (23 Oct 2023 05:49), Max: 99.2 (23 Oct 2023 05:49)  HR: 90 (23 Oct 2023 09:53) (79 - 91)  BP: 213/103 (23 Oct 2023 09:53) (173/84 - 213/103)  BP(mean): 121 (22 Oct 2023 21:11) (121 - 121)  RR: 18 (23 Oct 2023 09:53) (18 - 18)  SpO2: 97% (23 Oct 2023 09:53) (96% - 97%)    Parameters below as of 23 Oct 2023 09:53  Patient On (Oxygen Delivery Method): room air        CAPILLARY BLOOD GLUCOSE      POCT Blood Glucose.: 339 mg/dL (23 Oct 2023 09:22)  POCT Blood Glucose.: 276 mg/dL (22 Oct 2023 21:57)  POCT Blood Glucose.: 294 mg/dL (22 Oct 2023 13:28)      PHYSICAL EXAM  General: NAD  Head: NC/AT; MMM; PERRL; EOMI;  Neck: Supple; no JVD  Respiratory: CTAB; no wheezes/rales/rhonchi  Cardiovascular: Regular rhythm/rate; S1/S2+, no murmurs, rubs gallops   Gastrointestinal: Soft; NTND; bowel sounds normal and present  Extremities: WWP; no edema/cyanosis  Neurological: A&Ox3, CNII-XII grossly intact; no obvious focal deficits    Antimicrobials:  MEDICATIONS  (STANDING):  clindamycin IVPB 900 milliGRAM(s) IV Intermittent every 8 hours  piperacillin/tazobactam IVPB.. 4.5 Gram(s) IV Intermittent every 8 hours  vancomycin  IVPB 1250 milliGRAM(s) IV Intermittent every 12 hours      Standing Medications:  MEDICATIONS  (STANDING):  acetaminophen     Tablet .. 650 milliGRAM(s) Oral every 6 hours  clindamycin IVPB 900 milliGRAM(s) IV Intermittent every 8 hours  dextrose 5%. 1000 milliLiter(s) (100 mL/Hr) IV Continuous <Continuous>  dextrose 5%. 1000 milliLiter(s) (50 mL/Hr) IV Continuous <Continuous>  dextrose 50% Injectable 25 Gram(s) IV Push once  dextrose 50% Injectable 25 Gram(s) IV Push once  dextrose 50% Injectable 12.5 Gram(s) IV Push once  gabapentin 1600 milliGRAM(s) Oral three times a day  glucagon  Injectable 1 milliGRAM(s) IntraMuscular once  insulin lispro (ADMELOG) corrective regimen sliding scale   SubCutaneous three times a day before meals  lisinopril 40 milliGRAM(s) Oral daily  piperacillin/tazobactam IVPB.. 4.5 Gram(s) IV Intermittent every 8 hours  vancomycin  IVPB 1250 milliGRAM(s) IV Intermittent every 12 hours      PRN Medications:  MEDICATIONS  (PRN):  aluminum hydroxide/magnesium hydroxide/simethicone Suspension 30 milliLiter(s) Oral every 6 hours PRN Dyspepsia  dextrose Oral Gel 15 Gram(s) Oral once PRN Blood Glucose LESS THAN 70 milliGRAM(s)/deciliter  morphine  - Injectable 2 milliGRAM(s) IV Push every 6 hours PRN Severe Pain (7 - 10)  oxyCODONE    IR 5 milliGRAM(s) Oral every 6 hours PRN Moderate Pain (4 - 6)      No Known Allergies      LABS                        9.4    22.79 )-----------( 213      ( 23 Oct 2023 04:00 )             28.6     10-23    132<L>  |  98  |  22  ----------------------------<  275<H>  3.8   |  25  |  0.96    Ca    9.0      23 Oct 2023 04:00  Phos  2.8     10-23  Mg     1.9     10-23    TPro  6.6  /  Alb  2.4<L>  /  TBili  0.6  /  DBili  x   /  AST  20  /  ALT  17  /  AlkPhos  156<H>  10-23    PT/INR - ( 22 Oct 2023 20:36 )   PT: 12.5 sec;   INR: 1.10          PTT - ( 22 Oct 2023 20:36 )  PTT:29.2 sec  Urinalysis Basic - ( 23 Oct 2023 04:00 )    Color: x / Appearance: x / SG: x / pH: x  Gluc: 275 mg/dL / Ketone: x  / Bili: x / Urobili: x   Blood: x / Protein: x / Nitrite: x   Leuk Esterase: x / RBC: x / WBC x   Sq Epi: x / Non Sq Epi: x / Bacteria: x      CARDIAC MARKERS ( 22 Oct 2023 12:00 )  x     / x     / 84 U/L / x     / x              IMAGING/EKG/ETC   O/N Events: in early AM patient htn 210 systolic before medication    Subjective/ROS: Patient seen and examined at bedside. Patient is requesting additional pain control. Patient complains of HA, cough (chest pain and SOB with cough).     Denies Fever/Chills, HA, CP, SOB, n/v, changes in bowel/urinary habits.  12pt ROS otherwise negative.    VITALS  Vital Signs Last 24 Hrs  T(C): 37.3 (23 Oct 2023 05:49), Max: 37.3 (23 Oct 2023 05:49)  T(F): 99.2 (23 Oct 2023 05:49), Max: 99.2 (23 Oct 2023 05:49)  HR: 90 (23 Oct 2023 09:53) (79 - 91)  BP: 213/103 (23 Oct 2023 09:53) (173/84 - 213/103)  BP(mean): 121 (22 Oct 2023 21:11) (121 - 121)  RR: 18 (23 Oct 2023 09:53) (18 - 18)  SpO2: 97% (23 Oct 2023 09:53) (96% - 97%)    Parameters below as of 23 Oct 2023 09:53  Patient On (Oxygen Delivery Method): room air        CAPILLARY BLOOD GLUCOSE      POCT Blood Glucose.: 339 mg/dL (23 Oct 2023 09:22)  POCT Blood Glucose.: 276 mg/dL (22 Oct 2023 21:57)  POCT Blood Glucose.: 294 mg/dL (22 Oct 2023 13:28)      PHYSICAL EXAM  General: NAD  Head: NC/AT; MMM; PERRL; EOMI;  Neck: Supple; no JVD  Respiratory: CTAB; no wheezes/rales/rhonchi  Cardiovascular: Regular rhythm/rate; S1/S2+, no murmurs, rubs gallops   Gastrointestinal: Soft; NTND; bowel sounds normal and present  Extremities: WWP; no edema/cyanosis  Neurological: A&Ox3, CNII-XII grossly intact; no obvious focal deficits    Antimicrobials:  MEDICATIONS  (STANDING):  clindamycin IVPB 900 milliGRAM(s) IV Intermittent every 8 hours  piperacillin/tazobactam IVPB.. 4.5 Gram(s) IV Intermittent every 8 hours  vancomycin  IVPB 1250 milliGRAM(s) IV Intermittent every 12 hours      Standing Medications:  MEDICATIONS  (STANDING):  acetaminophen     Tablet .. 650 milliGRAM(s) Oral every 6 hours  clindamycin IVPB 900 milliGRAM(s) IV Intermittent every 8 hours  dextrose 5%. 1000 milliLiter(s) (100 mL/Hr) IV Continuous <Continuous>  dextrose 5%. 1000 milliLiter(s) (50 mL/Hr) IV Continuous <Continuous>  dextrose 50% Injectable 25 Gram(s) IV Push once  dextrose 50% Injectable 25 Gram(s) IV Push once  dextrose 50% Injectable 12.5 Gram(s) IV Push once  gabapentin 1600 milliGRAM(s) Oral three times a day  glucagon  Injectable 1 milliGRAM(s) IntraMuscular once  insulin lispro (ADMELOG) corrective regimen sliding scale   SubCutaneous three times a day before meals  lisinopril 40 milliGRAM(s) Oral daily  piperacillin/tazobactam IVPB.. 4.5 Gram(s) IV Intermittent every 8 hours  vancomycin  IVPB 1250 milliGRAM(s) IV Intermittent every 12 hours      PRN Medications:  MEDICATIONS  (PRN):  aluminum hydroxide/magnesium hydroxide/simethicone Suspension 30 milliLiter(s) Oral every 6 hours PRN Dyspepsia  dextrose Oral Gel 15 Gram(s) Oral once PRN Blood Glucose LESS THAN 70 milliGRAM(s)/deciliter  morphine  - Injectable 2 milliGRAM(s) IV Push every 6 hours PRN Severe Pain (7 - 10)  oxyCODONE    IR 5 milliGRAM(s) Oral every 6 hours PRN Moderate Pain (4 - 6)      No Known Allergies      LABS                        9.4    22.79 )-----------( 213      ( 23 Oct 2023 04:00 )             28.6     10-23    132<L>  |  98  |  22  ----------------------------<  275<H>  3.8   |  25  |  0.96    Ca    9.0      23 Oct 2023 04:00  Phos  2.8     10-23  Mg     1.9     10-23    TPro  6.6  /  Alb  2.4<L>  /  TBili  0.6  /  DBili  x   /  AST  20  /  ALT  17  /  AlkPhos  156<H>  10-23    PT/INR - ( 22 Oct 2023 20:36 )   PT: 12.5 sec;   INR: 1.10          PTT - ( 22 Oct 2023 20:36 )  PTT:29.2 sec  Urinalysis Basic - ( 23 Oct 2023 04:00 )    Color: x / Appearance: x / SG: x / pH: x  Gluc: 275 mg/dL / Ketone: x  / Bili: x / Urobili: x   Blood: x / Protein: x / Nitrite: x   Leuk Esterase: x / RBC: x / WBC x   Sq Epi: x / Non Sq Epi: x / Bacteria: x      CARDIAC MARKERS ( 22 Oct 2023 12:00 )  x     / x     / 84 U/L / x     / x              IMAGING/EKG/ETC

## 2023-10-23 NOTE — DIETITIAN INITIAL EVALUATION ADULT - PROBLEM SELECTOR PLAN 2
Patient presenting with worsening right 4th toe pain and discoloratio s/p course of IV and oral antibtiocs: Keflex and doxycycline until 10/10 endorses compliance. On presentation, he is hemodynamically stable however with leukoctyosis to 19 and fever to 101.   -right toe xrays done in ED showing no evidence of fracture or osteomyelitis   -s/p 1 dose vancomycin in ED and zosyn in ED.   -podiatry consulted, f/u recs   -f/u BCxs.   Pain management  Tylenol 975 q6 hours for mild pain, Oxy 5 q6 hours for moderate, oxy [id number] for severe  - 1x 0.5 dilaudid for severe breakthrough pain.  - Local wound care: Cleansed with NS. Betadine soaked gauze applied to R 4th digit, 3rd and 4th interspaces Advised pt to keep dressing intact dry and clean

## 2023-10-23 NOTE — PROGRESS NOTE ADULT - PROBLEM SELECTOR PLAN 6
Home regimen should be clarified, was discharged on lantus 11u, and humalog 7u premeal.  A1c 12.6    Plan:  - ISS while inpatient  - FSG q6 hours Home regimen should be clarified, was discharged on lantus 11u, and humalog 7u premeal.  A1c 12.6    Plan:  - endocrine recs appreciated: Lantus 10 at night, lispro 5 before meals low iss  - FSG q6 hours

## 2023-10-23 NOTE — CONSULT NOTE ADULT - ASSESSMENT
67M PMH HTN, IDDM with peripheral neuropathy, CAD s/p PCI with 2 stents (3 yrs ago at [facility]), recent admission 10/3 for diabetic ssti (CT r/o absecess, no OM) discharged on doxycycline and keflex, presenting with CC of r. foot pain, found to meet SIRS crtieria with suspected source infectious of r.toe, a/f management.     A1C: 12.6 %  BUN: 22  Creatinine: 0.96  GFR: 87  Weight: 65  BMI: 22.4  EF:  67M PMH HTN, IDDM with peripheral neuropathy, CAD s/p PCI with 2 stents (3 yrs ago at [facility]), recent admission 10/3 for diabetic ssti (CT r/o absecess, no OM) discharged on doxycycline and keflex, presenting with CC of r. foot pain, found to meet SIRS crtieria with suspected source infectious of r.toe, a/f management.     A1C: 12.6 %  C-peptide 0.5 with , JAMIR and ICA negative (Oct 3, 2023)  BUN: 22  Creatinine: 0.96  GFR: 87  Weight: 65  BMI: 22.4   67M PMH HTN, IDDM with peripheral neuropathy, CAD s/p PCI with 2 stents (3 yrs ago at [facility]), recent admission 10/3 for diabetic ssti (CT r/o absecess, no OM) discharged on doxycycline and keflex, presenting with right foot pain scheduled for OR Tuesday 10/24 afternoon for amputation.    A1C: 12.6 %  C-peptide 0.5 with , JAMIR and ICA negative (Oct 3, 2023)  BUN: 22  Creatinine: 0.96  GFR: 87  Weight: 65  BMI: 22.4

## 2023-10-23 NOTE — PROGRESS NOTE ADULT - PROBLEM SELECTOR PLAN 7
F: s/p 2L NS  E: Replete as necessary K>4 Mg>2  N: CC diet   DVT Prophylaxis: heparin sq  GI prophylaxis: None   CODE STATUS: FULL  DISPO: F

## 2023-10-23 NOTE — DIETITIAN INITIAL EVALUATION ADULT - PROBLEM SELECTOR PLAN 1
Patient presenting meeting 2/4 SIRS (T101, Leukocytosis), source likely Gangrenous toe vs Osteomyelitis of R. foot wound. No purulence or erythema on examination however extremely tender to palpation and at rest. XR w/o evidence of osteo however lower sensivity. s/p 1 dose vanc and zosyn in ED. Prior was on Keflex and doxycycline x 10 days (finished 10/10), completed course. Last BCx w/o growth.   - s/p 2L NS in ED  - f/u BCx, UCx   - c/w vancoymycin 1g q12 hours empirically  - c/w zosyn with pseudomonal dosing   - Tylenol q6 prn for fever.   - MRI with contrast to r.o abscess.   - f/u podiatry recs as below.

## 2023-10-23 NOTE — DIETITIAN INITIAL EVALUATION ADULT - PROBLEM SELECTOR PLAN 3
Patient presenting with SBP to 191/80, Alkphos elevated at this time. Likely due to pain from foot wound, endorses compliance with medication.  - Restart lisinopril as below  - Pain control as above.   - Trend BP.

## 2023-10-23 NOTE — CONSULT NOTE ADULT - PROBLEM SELECTOR RECOMMENDATION 9
# Type 2 diabetes mellitus with hyperglycemia  - Please continue lantus *** units at bedtime.   - Continue lispro *** units before each meal.  - Continue lispro moderate / low dose sliding scale before meals and at bedtime.  - Patient's fingerstick glucose goal is 100-180 mg/dL.    - For discharge, patient can ***.    - Patient can follow up at discharge with [facility] by calling [phone] to make an appointment.      Case discussed with Dr. Huertas. Primary team updated. # Type 2 diabetes mellitus with hyperglycemia  - Please continue lantus *** units at bedtime.   - Continue lispro *** units before each meal.  - Continue lispro moderate  dose sliding scale before meals and at bedtime.  - Patient's fingerstick glucose goal is 100-180 mg/dL.    - For discharge, patient can ***.    - Patient can follow up at discharge with [facility] by calling [phone] to make an appointment.      Case discussed with Dr. Huertas. Primary team updated. # Type 2 diabetes mellitus with hyperglycemia  - Please give lantus 10 units at bedtime; even in the setting of NPO at [facility].  - Continue lispro 5 units before each meal.  - Continue lispro low dose sliding scale before meals and at bedtime.  - Patient's fingerstick glucose goal is 100-180 mg/dL.    - For discharge: TBD  - Patient can follow up at discharge with [facility] by calling [phone] to make an appointment.      Case discussed with Dr. Huertas. Primary team updated.

## 2023-10-23 NOTE — DIETITIAN INITIAL EVALUATION ADULT - PROBLEM SELECTOR PLAN 7
F: s/p 2L NS  E: Replete as necessary K>4 Mg>2  N: CC diet   DVT Prophylaxis: Lovenox 40mg daily   GI prophylaxis: None   CODE STATUS: FULL  DISPO: [facility]

## 2023-10-23 NOTE — CONSULT NOTE ADULT - SUBJECTIVE AND OBJECTIVE BOX
Patient is a 68y old  Male who presents with a chief complaint of Foot pain (23 Oct 2023 10:45)      HPI:  67M PMH HTN, IDDM with peripheral neuropathy, CAD s/p PCI with 2 stents (3 yrs ago at [facility]), recent admission 10/3 for diabetic ssti (CT r/o absecess, no OM) discharged on doxycycline and keflex, presenting with CC of r. foot pain.  After discharge patient endorses completing antibiotics but on Wednesday the pain started getting unbearable, not responding to his usual neurontin for nerve pain. H edid not notice drainage from the wound. He came into the hospital due ot the pain. He denies any fever, chills, shortness of breath, cough, diarrhea, constipation.    ED Course:   T101 HR 86 /80 RR 19 Sat 97% on RA.   Labs: WBC 19 Hb 9.7 BUN 26   Interventions: Vanc, zosyn, morphine x 14units, 2LNS.   XR R.Foot: 3 views of the right foot demonstrates no evidence of acute fracture or   dislocation. No radiographic evidence of osteomyelitis. Calcaneal   spurring. Vascular calcifications noted.   (21 Oct 2023 20:24)    PAST MEDICAL & SURGICAL HISTORY:  IDDM (insulin dependent diabetes mellitus)      HTN (hypertension)      CAD S/P percutaneous coronary angioplasty      Neuropathy      No significant past surgical history        MEDICATIONS  (STANDING):  acetaminophen     Tablet .. 650 milliGRAM(s) Oral every 6 hours  dextrose 5%. 1000 milliLiter(s) (50 mL/Hr) IV Continuous <Continuous>  dextrose 5%. 1000 milliLiter(s) (100 mL/Hr) IV Continuous <Continuous>  dextrose 50% Injectable 25 Gram(s) IV Push once  dextrose 50% Injectable 12.5 Gram(s) IV Push once  dextrose 50% Injectable 25 Gram(s) IV Push once  gabapentin 1600 milliGRAM(s) Oral three times a day  glucagon  Injectable 1 milliGRAM(s) IntraMuscular once  insulin glargine Injectable (LANTUS) 13 Unit(s) SubCutaneous at bedtime  insulin lispro (ADMELOG) corrective regimen sliding scale   SubCutaneous three times a day before meals  insulin lispro (ADMELOG) corrective regimen sliding scale   SubCutaneous at bedtime  insulin lispro Injectable (ADMELOG) 5 Unit(s) SubCutaneous three times a day before meals  lisinopril 40 milliGRAM(s) Oral daily  oxyCODONE    IR 10 milliGRAM(s) Oral every 6 hours  piperacillin/tazobactam IVPB.. 4.5 Gram(s) IV Intermittent every 8 hours  vancomycin  IVPB 1250 milliGRAM(s) IV Intermittent every 12 hours    MEDICATIONS  (PRN):  aluminum hydroxide/magnesium hydroxide/simethicone Suspension 30 milliLiter(s) Oral every 6 hours PRN Dyspepsia  dextrose Oral Gel 15 Gram(s) Oral once PRN Blood Glucose LESS THAN 70 milliGRAM(s)/deciliter  morphine  - Injectable 2 milliGRAM(s) IV Push every 4 hours PRN Moderate Pain (4 - 6)              FAMILY HISTORY:      CBC Full  -  ( 23 Oct 2023 04:00 )  WBC Count : 22.79 K/uL  RBC Count : 2.98 M/uL  Hemoglobin : 9.4 g/dL  Hematocrit : 28.6 %  Platelet Count - Automated : 213 K/uL  Mean Cell Volume : 96.0 fl  Mean Cell Hemoglobin : 31.5 pg  Mean Cell Hemoglobin Concentration : 32.9 gm/dL  Auto Neutrophil # : 19.78 K/uL  Auto Lymphocyte # : 0.85 K/uL  Auto Monocyte # : 1.93 K/uL  Auto Eosinophil # : 0.01 K/uL  Auto Basophil # : 0.03 K/uL  Auto Neutrophil % : 86.9 %  Auto Lymphocyte % : 3.7 %  Auto Monocyte % : 8.5 %  Auto Eosinophil % : 0.0 %  Auto Basophil % : 0.1 %      10-23    132<L>  |  98  |  22  ----------------------------<  275<H>  3.8   |  25  |  0.96    Ca    9.0      23 Oct 2023 04:00  Phos  2.8     10-23  Mg     1.9     10-23    TPro  6.6  /  Alb  2.4<L>  /  TBili  0.6  /  DBili  x   /  AST  20  /  ALT  17  /  AlkPhos  156<H>  10-23      Urinalysis Basic - ( 23 Oct 2023 04:00 )    Color: x / Appearance: x / SG: x / pH: x  Gluc: 275 mg/dL / Ketone: x  / Bili: x / Urobili: x   Blood: x / Protein: x / Nitrite: x   Leuk Esterase: x / RBC: x / WBC x   Sq Epi: x / Non Sq Epi: x / Bacteria: x        Radiology :     < from: Xray Foot AP + Lateral + Oblique, Right (10.21.23 @ 13:09) >  ACC: [id number] EXAM:  XR FOOT COMP MIN 3 VIEWS RT   ORDERED BY: JONATHAN DEGROOT     PROCEDURE DATE:  10/21/2023          INTERPRETATION:  Clinical History: Gangrene    3 views of the right foot demonstrates no evidence of acute fracture or   dislocation. No radiographic evidence of osteomyelitis. Calcaneal   spurring. Vascular calcifications noted.    IMPRESSION: No evidence of acute fracture. No radiographic evidence of   osteomyelitis.            Review of Systems : per HPI         Vital Signs Last 24 Hrs  T(C): 37.3 (23 Oct 2023 09:53), Max: 37.3 (23 Oct 2023 05:49)  T(F): 99.2 (23 Oct 2023 09:53), Max: 99.2 (23 Oct 2023 05:49)  HR: 90 (23 Oct 2023 09:53) (79 - 91)  BP: 213/103 (23 Oct 2023 09:53) (173/84 - 213/103)  BP(mean): 121 (22 Oct 2023 21:11) (121 - 121)  RR: 18 (23 Oct 2023 09:53) (18 - 18)  SpO2: 97% (23 Oct 2023 09:53) (96% - 97%)    Parameters below as of 23 Oct 2023 09:53  Patient On (Oxygen Delivery Method): room air            Physical Exam :   68 y o man lying comfortably in semi Mcclellan's position , awake , alert , no acute complaints     Head : normocephalic , atraumatic    Eyes : PERRLA , EOMI , no nystagmus , sclera anicteric    ENT / FACE : neg nasal discharge , uvula midline , no oropharyngeal erythema / exudate    Neck : supple , negative JVD , negative carotid bruits , no thyromegaly    Chest : CTA bilaterally , neg wheeze / rhonchi / rales / crackles / egophany    Cardiovascular : regular rate and rhythm , neg murmurs / rubs / gallops    Abdomen : soft , non distended , non tender to palpation in all 4 quadrants ,  normal bowel sounds     Extremities : R LE :   R 4th digit with mostly dry gangrenous dark discoloration     Neurologic Exam :     Alert and oriented x 3        Motor Exam:                Right UE:                     no focal weakness ,  > 3+/5 throughout      Left UE:                       no focal weakness ,  > 3+/5 throughout          Right LE:          no focal weakness ,  > 3+/5 throughout          Left LE:          no focal weakness ,  > 3+/5 throughout           Sensation :        dec LT distal LE's     DTR :           biceps/brachioradialis : equal                            patella/ankle : equal        Gait :  not tested          PM&R Impression : admitted for c/o R toe pain       Recommendations / Plan :       1) Physical / Occupational therapy focusing on therapeutic exercises , equipment evaluation , bed mobility/transfer out of bed evaluation , progressive ambulation with assistive devices prn .    2) Current disposition plan recommendation  :    pending functional progress

## 2023-10-23 NOTE — PROGRESS NOTE ADULT - SUBJECTIVE AND OBJECTIVE BOX
Patient is a 68y old  Male who presents with a chief complaint of Foot pain (22 Oct 2023 06:50)      INTERVAL HPI/ OVERNIGHT EVENTS: Pt evaluated this morning with attending. Discussed surgical intervention with pt, and pt is amenable. Will be scheduled for the OR for 10/24 for R foot partial 4th ray resection.       LABS                        9.4    22.79 )-----------( 213      ( 23 Oct 2023 04:00 )             28.6     10-23    132<L>  |  98  |  22  ----------------------------<  275<H>  3.8   |  25  |  0.96    Ca    9.0      23 Oct 2023 04:00  Phos  2.8     10-23  Mg     1.9     10-23    TPro  6.6  /  Alb  2.4<L>  /  TBili  0.6  /  DBili  x   /  AST  20  /  ALT  17  /  AlkPhos  156<H>  10-23    PT/INR - ( 22 Oct 2023 20:36 )   PT: 12.5 sec;   INR: 1.10          PTT - ( 22 Oct 2023 20:36 )  PTT:29.2 sec    ICU Vital Signs Last 24 Hrs  T(C): 37.3 (23 Oct 2023 05:49), Max: 37.3 (23 Oct 2023 05:49)  T(F): 99.2 (23 Oct 2023 05:49), Max: 99.2 (23 Oct 2023 05:49)  HR: 82 (23 Oct 2023 05:49) (79 - 91)  BP: 211/98 (23 Oct 2023 05:49) (173/84 - 211/98)  BP(mean): 121 (22 Oct 2023 21:11) (121 - 121)  ABP: --  ABP(mean): --  RR: 18 (23 Oct 2023 05:49) (18 - 18)  SpO2: 97% (23 Oct 2023 05:49) (96% - 97%)    O2 Parameters below as of 22 Oct 2023 21:11  Patient On (Oxygen Delivery Method): room air            RADIOLOGY  < from: MR Foot w/wo IV Cont, Right (10.22.23 @ 19:32) >  IMPRESSION:  1.  Soft tissue atrophy/wound of the 4th toe with underlying marrow   changes of the 4th proximal phalanx head, middle phalanx, and distal   phalanx that may be reactive in the absence of significant T1 marrow   replacement, however, early infection may also be considered.  2.  Consider follow-up MRI to assess stability as warranted.  3.  Preliminary report was provided by Lucille.    < end of copied text >      < from: Xray Foot AP + Lateral + Oblique, Right (10.21.23 @ 13:09) >    IMPRESSION: No evidence of acute fracture. No radiographic evidence of   osteomyelitis.    < end of copied text >    MICROBIOLOGY  Blood cx pending     PHYSICAL EXAM   Lower Extremity Focused:  Vasc: DP/PT 2/4 b/l, erythema and edema to the R forefoot and midfoot, darkening of skin of R 4th digit up to the MPJ.   Derm: R 4th digit with gangrenous dark discoloration largely dry except for the 3rd and 4th interspaces. Fibrotic slough noted in the 3rd and 4th interspace.  no current drainage, does not probe, no tracking/tunneling, no fluctuance no open wounds left   Neuro: protective sensation slightly diminished  MSK: +TTP to R 4th digit.

## 2023-10-23 NOTE — PROGRESS NOTE ADULT - ASSESSMENT
68M PMH HTN, IDDM with peripheral neuropathy, CAD s/p PCI with 2 stents (3 yrs ago at [facility]) who presents to [facility] with right toe pain. Podiatry consulted to evaluate R toe wound. Pt with prior admission on 10/1-10/3 with early ischemic signs of R 4th digit, and was discharged with PO abx (keflex,doxy). States of completing medication but has not followed up out pt with any provider. In the ED, pt febrile with elevated WBC to 19.71. At time of consult on the floors, pt is now afebrile with VSS. 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 Pt will be scheduled for OR, Tuesday 10/24 for R foot partial 4th ray resection.     Plan  - c/w broad spec IV abx   - Rec trend ESR/CRP levels   - Local wound care: Cleansed with NS. Betadine soaked gauze applied to R 4th digit, 3rd and 4th interspaces Advised pt to keep dressing intact dry and clean   - WBAT to b/l feet  - Pt will be scheduled for OR Tuesday 10/24 afternoon. Please optimize pt for OR. Pre-op labs (CBC, BMP, Coags, 2 T&S, EKG, CXR). NPO midnight Monday 10/23.   - Pain control per primary team  - Rest of care per primary team     Podiatry Following, plan discussed with attending

## 2023-10-23 NOTE — PROGRESS NOTE ADULT - PROBLEM SELECTOR PLAN 3
Patient presenting with SBP to 191/80, Alkphos elevated at this time. Likely due to pain from foot wound, endorses compliance with medication.    Plan:  - Restart lisinopril as below  - Pain control as above.   - Trend BP.

## 2023-10-24 ENCOUNTER — RESULT REVIEW (OUTPATIENT)
Age: 68
End: 2023-10-24

## 2023-10-24 ENCOUNTER — TRANSCRIPTION ENCOUNTER (OUTPATIENT)
Age: 68
End: 2023-10-24

## 2023-10-24 LAB
ALBUMIN SERPL ELPH-MCNC: 2.3 G/DL — LOW (ref 3.3–5)
ALBUMIN SERPL ELPH-MCNC: 2.3 G/DL — LOW (ref 3.3–5)
ALP SERPL-CCNC: 160 U/L — HIGH (ref 40–120)
ALP SERPL-CCNC: 160 U/L — HIGH (ref 40–120)
ALT FLD-CCNC: 16 U/L — SIGNIFICANT CHANGE UP (ref 10–45)
ALT FLD-CCNC: 16 U/L — SIGNIFICANT CHANGE UP (ref 10–45)
ANION GAP SERPL CALC-SCNC: 6 MMOL/L — SIGNIFICANT CHANGE UP (ref 5–17)
ANION GAP SERPL CALC-SCNC: 6 MMOL/L — SIGNIFICANT CHANGE UP (ref 5–17)
APTT BLD: 28.3 SEC — SIGNIFICANT CHANGE UP (ref 24.5–35.6)
APTT BLD: 28.3 SEC — SIGNIFICANT CHANGE UP (ref 24.5–35.6)
AST SERPL-CCNC: 18 U/L — SIGNIFICANT CHANGE UP (ref 10–40)
AST SERPL-CCNC: 18 U/L — SIGNIFICANT CHANGE UP (ref 10–40)
BASOPHILS # BLD AUTO: 0.04 K/UL — SIGNIFICANT CHANGE UP (ref 0–0.2)
BASOPHILS # BLD AUTO: 0.04 K/UL — SIGNIFICANT CHANGE UP (ref 0–0.2)
BASOPHILS NFR BLD AUTO: 0.2 % — SIGNIFICANT CHANGE UP (ref 0–2)
BASOPHILS NFR BLD AUTO: 0.2 % — SIGNIFICANT CHANGE UP (ref 0–2)
BILIRUB SERPL-MCNC: 0.4 MG/DL — SIGNIFICANT CHANGE UP (ref 0.2–1.2)
BILIRUB SERPL-MCNC: 0.4 MG/DL — SIGNIFICANT CHANGE UP (ref 0.2–1.2)
BUN SERPL-MCNC: 18 MG/DL — SIGNIFICANT CHANGE UP (ref 7–23)
BUN SERPL-MCNC: 18 MG/DL — SIGNIFICANT CHANGE UP (ref 7–23)
CALCIUM SERPL-MCNC: 9 MG/DL — SIGNIFICANT CHANGE UP (ref 8.4–10.5)
CALCIUM SERPL-MCNC: 9 MG/DL — SIGNIFICANT CHANGE UP (ref 8.4–10.5)
CHLORIDE SERPL-SCNC: 100 MMOL/L — SIGNIFICANT CHANGE UP (ref 96–108)
CHLORIDE SERPL-SCNC: 100 MMOL/L — SIGNIFICANT CHANGE UP (ref 96–108)
CO2 SERPL-SCNC: 27 MMOL/L — SIGNIFICANT CHANGE UP (ref 22–31)
CO2 SERPL-SCNC: 27 MMOL/L — SIGNIFICANT CHANGE UP (ref 22–31)
CREAT SERPL-MCNC: 1.02 MG/DL — SIGNIFICANT CHANGE UP (ref 0.5–1.3)
CREAT SERPL-MCNC: 1.02 MG/DL — SIGNIFICANT CHANGE UP (ref 0.5–1.3)
EGFR: 81 ML/MIN/1.73M2 — SIGNIFICANT CHANGE UP
EGFR: 81 ML/MIN/1.73M2 — SIGNIFICANT CHANGE UP
EOSINOPHIL # BLD AUTO: 0.03 K/UL — SIGNIFICANT CHANGE UP (ref 0–0.5)
EOSINOPHIL # BLD AUTO: 0.03 K/UL — SIGNIFICANT CHANGE UP (ref 0–0.5)
EOSINOPHIL NFR BLD AUTO: 0.1 % — SIGNIFICANT CHANGE UP (ref 0–6)
EOSINOPHIL NFR BLD AUTO: 0.1 % — SIGNIFICANT CHANGE UP (ref 0–6)
GLUCOSE BLDC GLUCOMTR-MCNC: 102 MG/DL — HIGH (ref 70–99)
GLUCOSE BLDC GLUCOMTR-MCNC: 102 MG/DL — HIGH (ref 70–99)
GLUCOSE BLDC GLUCOMTR-MCNC: 106 MG/DL — HIGH (ref 70–99)
GLUCOSE BLDC GLUCOMTR-MCNC: 106 MG/DL — HIGH (ref 70–99)
GLUCOSE BLDC GLUCOMTR-MCNC: 132 MG/DL — HIGH (ref 70–99)
GLUCOSE BLDC GLUCOMTR-MCNC: 132 MG/DL — HIGH (ref 70–99)
GLUCOSE BLDC GLUCOMTR-MCNC: 208 MG/DL — HIGH (ref 70–99)
GLUCOSE BLDC GLUCOMTR-MCNC: 208 MG/DL — HIGH (ref 70–99)
GLUCOSE BLDC GLUCOMTR-MCNC: 62 MG/DL — LOW (ref 70–99)
GLUCOSE BLDC GLUCOMTR-MCNC: 62 MG/DL — LOW (ref 70–99)
GLUCOSE BLDC GLUCOMTR-MCNC: 69 MG/DL — LOW (ref 70–99)
GLUCOSE BLDC GLUCOMTR-MCNC: 69 MG/DL — LOW (ref 70–99)
GLUCOSE BLDC GLUCOMTR-MCNC: 91 MG/DL — SIGNIFICANT CHANGE UP (ref 70–99)
GLUCOSE BLDC GLUCOMTR-MCNC: 91 MG/DL — SIGNIFICANT CHANGE UP (ref 70–99)
GLUCOSE SERPL-MCNC: 89 MG/DL — SIGNIFICANT CHANGE UP (ref 70–99)
GLUCOSE SERPL-MCNC: 89 MG/DL — SIGNIFICANT CHANGE UP (ref 70–99)
GRAM STN FLD: SIGNIFICANT CHANGE UP
GRAM STN FLD: SIGNIFICANT CHANGE UP
HCT VFR BLD CALC: 28.4 % — LOW (ref 39–50)
HCT VFR BLD CALC: 28.4 % — LOW (ref 39–50)
HGB BLD-MCNC: 9.2 G/DL — LOW (ref 13–17)
HGB BLD-MCNC: 9.2 G/DL — LOW (ref 13–17)
IMM GRANULOCYTES NFR BLD AUTO: 0.8 % — SIGNIFICANT CHANGE UP (ref 0–0.9)
IMM GRANULOCYTES NFR BLD AUTO: 0.8 % — SIGNIFICANT CHANGE UP (ref 0–0.9)
INR BLD: 1.04 — SIGNIFICANT CHANGE UP (ref 0.85–1.18)
INR BLD: 1.04 — SIGNIFICANT CHANGE UP (ref 0.85–1.18)
LYMPHOCYTES # BLD AUTO: 1.28 K/UL — SIGNIFICANT CHANGE UP (ref 1–3.3)
LYMPHOCYTES # BLD AUTO: 1.28 K/UL — SIGNIFICANT CHANGE UP (ref 1–3.3)
LYMPHOCYTES # BLD AUTO: 6.2 % — LOW (ref 13–44)
LYMPHOCYTES # BLD AUTO: 6.2 % — LOW (ref 13–44)
MAGNESIUM SERPL-MCNC: 2 MG/DL — SIGNIFICANT CHANGE UP (ref 1.6–2.6)
MAGNESIUM SERPL-MCNC: 2 MG/DL — SIGNIFICANT CHANGE UP (ref 1.6–2.6)
MCHC RBC-ENTMCNC: 30.8 PG — SIGNIFICANT CHANGE UP (ref 27–34)
MCHC RBC-ENTMCNC: 30.8 PG — SIGNIFICANT CHANGE UP (ref 27–34)
MCHC RBC-ENTMCNC: 32.4 GM/DL — SIGNIFICANT CHANGE UP (ref 32–36)
MCHC RBC-ENTMCNC: 32.4 GM/DL — SIGNIFICANT CHANGE UP (ref 32–36)
MCV RBC AUTO: 95 FL — SIGNIFICANT CHANGE UP (ref 80–100)
MCV RBC AUTO: 95 FL — SIGNIFICANT CHANGE UP (ref 80–100)
MONOCYTES # BLD AUTO: 2.21 K/UL — HIGH (ref 0–0.9)
MONOCYTES # BLD AUTO: 2.21 K/UL — HIGH (ref 0–0.9)
MONOCYTES NFR BLD AUTO: 10.7 % — SIGNIFICANT CHANGE UP (ref 2–14)
MONOCYTES NFR BLD AUTO: 10.7 % — SIGNIFICANT CHANGE UP (ref 2–14)
NEUTROPHILS # BLD AUTO: 16.93 K/UL — HIGH (ref 1.8–7.4)
NEUTROPHILS # BLD AUTO: 16.93 K/UL — HIGH (ref 1.8–7.4)
NEUTROPHILS NFR BLD AUTO: 82 % — HIGH (ref 43–77)
NEUTROPHILS NFR BLD AUTO: 82 % — HIGH (ref 43–77)
NRBC # BLD: 0 /100 WBCS — SIGNIFICANT CHANGE UP (ref 0–0)
NRBC # BLD: 0 /100 WBCS — SIGNIFICANT CHANGE UP (ref 0–0)
PHOSPHATE SERPL-MCNC: 2.5 MG/DL — SIGNIFICANT CHANGE UP (ref 2.5–4.5)
PHOSPHATE SERPL-MCNC: 2.5 MG/DL — SIGNIFICANT CHANGE UP (ref 2.5–4.5)
PLATELET # BLD AUTO: 212 K/UL — SIGNIFICANT CHANGE UP (ref 150–400)
PLATELET # BLD AUTO: 212 K/UL — SIGNIFICANT CHANGE UP (ref 150–400)
POTASSIUM SERPL-MCNC: 3.6 MMOL/L — SIGNIFICANT CHANGE UP (ref 3.5–5.3)
POTASSIUM SERPL-MCNC: 3.6 MMOL/L — SIGNIFICANT CHANGE UP (ref 3.5–5.3)
POTASSIUM SERPL-SCNC: 3.6 MMOL/L — SIGNIFICANT CHANGE UP (ref 3.5–5.3)
POTASSIUM SERPL-SCNC: 3.6 MMOL/L — SIGNIFICANT CHANGE UP (ref 3.5–5.3)
PROT SERPL-MCNC: 6.5 G/DL — SIGNIFICANT CHANGE UP (ref 6–8.3)
PROT SERPL-MCNC: 6.5 G/DL — SIGNIFICANT CHANGE UP (ref 6–8.3)
PROTHROM AB SERPL-ACNC: 11.8 SEC — SIGNIFICANT CHANGE UP (ref 9.5–13)
PROTHROM AB SERPL-ACNC: 11.8 SEC — SIGNIFICANT CHANGE UP (ref 9.5–13)
RBC # BLD: 2.99 M/UL — LOW (ref 4.2–5.8)
RBC # BLD: 2.99 M/UL — LOW (ref 4.2–5.8)
RBC # FLD: 13.7 % — SIGNIFICANT CHANGE UP (ref 10.3–14.5)
RBC # FLD: 13.7 % — SIGNIFICANT CHANGE UP (ref 10.3–14.5)
SODIUM SERPL-SCNC: 133 MMOL/L — LOW (ref 135–145)
SODIUM SERPL-SCNC: 133 MMOL/L — LOW (ref 135–145)
SPECIMEN SOURCE: SIGNIFICANT CHANGE UP
SPECIMEN SOURCE: SIGNIFICANT CHANGE UP
WBC # BLD: 20.65 K/UL — HIGH (ref 3.8–10.5)
WBC # BLD: 20.65 K/UL — HIGH (ref 3.8–10.5)
WBC # FLD AUTO: 20.65 K/UL — HIGH (ref 3.8–10.5)
WBC # FLD AUTO: 20.65 K/UL — HIGH (ref 3.8–10.5)

## 2023-10-24 PROCEDURE — 88305 TISSUE EXAM BY PATHOLOGIST: CPT | Mod: 26

## 2023-10-24 PROCEDURE — 99233 SBSQ HOSP IP/OBS HIGH 50: CPT | Mod: GC

## 2023-10-24 PROCEDURE — 88304 TISSUE EXAM BY PATHOLOGIST: CPT | Mod: 26

## 2023-10-24 PROCEDURE — 88311 DECALCIFY TISSUE: CPT | Mod: 26

## 2023-10-24 PROCEDURE — 99221 1ST HOSP IP/OBS SF/LOW 40: CPT

## 2023-10-24 PROCEDURE — 73620 X-RAY EXAM OF FOOT: CPT | Mod: 26,RT

## 2023-10-24 RX ORDER — DEXTROSE 50 % IN WATER 50 %
12.5 SYRINGE (ML) INTRAVENOUS ONCE
Refills: 0 | Status: DISCONTINUED | OUTPATIENT
Start: 2023-10-24 | End: 2023-10-25

## 2023-10-24 RX ORDER — POTASSIUM CHLORIDE 20 MEQ
40 PACKET (EA) ORAL ONCE
Refills: 0 | Status: COMPLETED | OUTPATIENT
Start: 2023-10-24 | End: 2023-10-24

## 2023-10-24 RX ADMIN — INSULIN GLARGINE 10 UNIT(S): 100 INJECTION, SOLUTION SUBCUTANEOUS at 22:51

## 2023-10-24 RX ADMIN — OXYCODONE HYDROCHLORIDE 10 MILLIGRAM(S): 5 TABLET ORAL at 11:16

## 2023-10-24 RX ADMIN — OXYCODONE HYDROCHLORIDE 10 MILLIGRAM(S): 5 TABLET ORAL at 07:43

## 2023-10-24 RX ADMIN — MORPHINE SULFATE 2 MILLIGRAM(S): 50 CAPSULE, EXTENDED RELEASE ORAL at 05:44

## 2023-10-24 RX ADMIN — MORPHINE SULFATE 2 MILLIGRAM(S): 50 CAPSULE, EXTENDED RELEASE ORAL at 21:31

## 2023-10-24 RX ADMIN — MORPHINE SULFATE 2 MILLIGRAM(S): 50 CAPSULE, EXTENDED RELEASE ORAL at 13:18

## 2023-10-24 RX ADMIN — MORPHINE SULFATE 2 MILLIGRAM(S): 50 CAPSULE, EXTENDED RELEASE ORAL at 13:33

## 2023-10-24 RX ADMIN — OXYCODONE HYDROCHLORIDE 10 MILLIGRAM(S): 5 TABLET ORAL at 06:45

## 2023-10-24 RX ADMIN — Medication 650 MILLIGRAM(S): at 22:30

## 2023-10-24 RX ADMIN — OXYCODONE HYDROCHLORIDE 10 MILLIGRAM(S): 5 TABLET ORAL at 19:09

## 2023-10-24 RX ADMIN — Medication 650 MILLIGRAM(S): at 21:31

## 2023-10-24 RX ADMIN — PIPERACILLIN AND TAZOBACTAM 25 GRAM(S): 4; .5 INJECTION, POWDER, LYOPHILIZED, FOR SOLUTION INTRAVENOUS at 07:19

## 2023-10-24 RX ADMIN — MORPHINE SULFATE 2 MILLIGRAM(S): 50 CAPSULE, EXTENDED RELEASE ORAL at 09:16

## 2023-10-24 RX ADMIN — MORPHINE SULFATE 2 MILLIGRAM(S): 50 CAPSULE, EXTENDED RELEASE ORAL at 09:31

## 2023-10-24 RX ADMIN — Medication 650 MILLIGRAM(S): at 03:28

## 2023-10-24 RX ADMIN — Medication 250 MILLIGRAM(S): at 05:44

## 2023-10-24 RX ADMIN — LISINOPRIL 40 MILLIGRAM(S): 2.5 TABLET ORAL at 06:45

## 2023-10-24 RX ADMIN — MORPHINE SULFATE 2 MILLIGRAM(S): 50 CAPSULE, EXTENDED RELEASE ORAL at 06:00

## 2023-10-24 RX ADMIN — Medication 650 MILLIGRAM(S): at 04:30

## 2023-10-24 RX ADMIN — Medication 650 MILLIGRAM(S): at 09:16

## 2023-10-24 RX ADMIN — PIPERACILLIN AND TAZOBACTAM 25 GRAM(S): 4; .5 INJECTION, POWDER, LYOPHILIZED, FOR SOLUTION INTRAVENOUS at 19:08

## 2023-10-24 RX ADMIN — OXYCODONE HYDROCHLORIDE 10 MILLIGRAM(S): 5 TABLET ORAL at 00:45

## 2023-10-24 RX ADMIN — GABAPENTIN 1600 MILLIGRAM(S): 400 CAPSULE ORAL at 06:46

## 2023-10-24 RX ADMIN — MORPHINE SULFATE 2 MILLIGRAM(S): 50 CAPSULE, EXTENDED RELEASE ORAL at 01:23

## 2023-10-24 RX ADMIN — MORPHINE SULFATE 2 MILLIGRAM(S): 50 CAPSULE, EXTENDED RELEASE ORAL at 21:45

## 2023-10-24 RX ADMIN — Medication 40 MILLIEQUIVALENT(S): at 19:09

## 2023-10-24 RX ADMIN — MORPHINE SULFATE 2 MILLIGRAM(S): 50 CAPSULE, EXTENDED RELEASE ORAL at 01:40

## 2023-10-24 NOTE — PROGRESS NOTE ADULT - SUBJECTIVE AND OBJECTIVE BOX
S: Patient     O: Patient tearful     ROS: Denies headache, blurred vision, chest pain, SOB, abdominal pain, nausea or vomiting.         piperacillin/tazobactam IVPB.. 4.5  amLODIPine   Tablet 10  lisinopril 40  piperacillin/tazobactam IVPB.. 4.5      Allergies    No Known Allergies    Intolerances        Vital Signs Last 24 Hrs  T(C): 37.5 (24 Oct 2023 10:08), Max: 37.5 (24 Oct 2023 10:08)  T(F): 99.5 (24 Oct 2023 10:08), Max: 99.5 (24 Oct 2023 10:08)  HR: 80 (24 Oct 2023 10:08) (62 - 80)  BP: 174/96 (24 Oct 2023 10:08) (134/80 - 174/96)  BP(mean): --  RR: 16 (24 Oct 2023 10:08) (16 - 17)  SpO2: 95% (24 Oct 2023 10:08) (95% - 97%)    Parameters below as of 24 Oct 2023 10:08  Patient On (Oxygen Delivery Method): room air      I&O's Summary      Physical Exam:  General: alert and awake, NAD  Pulmonary: no respiratory distress  Cardiovascular: RRR  Abdominal: soft  Extremities:   Pulses:       LABS:                        9.2    20.65 )-----------( 212      ( 24 Oct 2023 05:30 )             28.4     10-24    133<L>  |  100  |  18  ----------------------------<  89  3.6   |  27  |  1.02    Ca    9.0      24 Oct 2023 05:30  Phos  2.5     10-24  Mg     2.0     10-24    TPro  6.5  /  Alb  2.3<L>  /  TBili  0.4  /  DBili  x   /  AST  18  /  ALT  16  /  AlkPhos  160<H>  10-24    PT/INR - ( 24 Oct 2023 05:30 )   PT: 11.8 sec;   INR: 1.04          PTT - ( 24 Oct 2023 05:30 )  PTT:28.3 sec      A/P  67M PMH HTN, IDDM with peripheral neuropathy, CAD s/p PCI with 2 stents (3 yrs ago at [facility]), admitted for worsening right 4th toe gangrene with cellulitis and no palpable pulses.    -Continue vanco and zosyn  -Wound care by podiatry  -OR with podiatry for toe amputation today   -Plan for RLE angiogram tomorrow. Please obtain pre-op labs and NPO @ [facility].    S/O: Patient tearful about upcoming podiatry procedure.     ROS: Denies headache, blurred vision, chest pain, SOB, abdominal pain, nausea or vomiting.         piperacillin/tazobactam IVPB.. 4.5  amLODIPine   Tablet 10  lisinopril 40  piperacillin/tazobactam IVPB.. 4.5      Allergies    No Known Allergies    Intolerances        Vital Signs Last 24 Hrs  T(C): 37.5 (24 Oct 2023 10:08), Max: 37.5 (24 Oct 2023 10:08)  T(F): 99.5 (24 Oct 2023 10:08), Max: 99.5 (24 Oct 2023 10:08)  HR: 80 (24 Oct 2023 10:08) (62 - 80)  BP: 174/96 (24 Oct 2023 10:08) (134/80 - 174/96)  BP(mean): --  RR: 16 (24 Oct 2023 10:08) (16 - 17)  SpO2: 95% (24 Oct 2023 10:08) (95% - 97%)    Parameters below as of 24 Oct 2023 10:08  Patient On (Oxygen Delivery Method): room air      I&O's Summary      Physical Exam:  General: alert and awake, NAD  Pulmonary: no respiratory distress  Cardiovascular: RRR  Abdominal: soft  Extremities:   Pulses:       LABS:                        9.2    20.65 )-----------( 212      ( 24 Oct 2023 05:30 )             28.4     10-24    133<L>  |  100  |  18  ----------------------------<  89  3.6   |  27  |  1.02    Ca    9.0      24 Oct 2023 05:30  Phos  2.5     10-24  Mg     2.0     10-24    TPro  6.5  /  Alb  2.3<L>  /  TBili  0.4  /  DBili  x   /  AST  18  /  ALT  16  /  AlkPhos  160<H>  10-24    PT/INR - ( 24 Oct 2023 05:30 )   PT: 11.8 sec;   INR: 1.04          PTT - ( 24 Oct 2023 05:30 )  PTT:28.3 sec      A/P  67M PMH HTN, IDDM with peripheral neuropathy, CAD s/p PCI with 2 stents (3 yrs ago at [facility]), admitted for worsening right 4th toe gangrene with cellulitis and no palpable pulses.    -Continue vanco and zosyn  -Wound care by podiatry  -OR with podiatry for toe amputation today   -Plan for RLE angiogram tomorrow. Please obtain pre-op labs and NPO @ [facility].

## 2023-10-24 NOTE — PROGRESS NOTE ADULT - PROBLEM SELECTOR PLAN 6
Home regimen should be clarified, was discharged on lantus 11u, and humalog 7u premeal.  A1c 12.6    Plan:  - endocrine recs appreciated: Lantus 10 at night, lispro 5 before meals low iss  - FSG q6 hours

## 2023-10-24 NOTE — CHART NOTE - NSCHARTNOTEFT_GEN_A_CORE
Planned for OR this afternoon. NPO yesterday and today. Glucose and insulin admin reviewed. No changes to regimen.    CAPILLARY BLOOD GLUCOSE  POCT Blood Glucose.: 91 mg/dL (24 Oct 2023 12:04)  POCT Blood Glucose.: 132 mg/dL (24 Oct 2023 09:29)  POCT Blood Glucose.: 134 mg/dL (23 Oct 2023 22:04)  POCT Blood Glucose.: 141 mg/dL (23 Oct 2023 18:20)      Assessment and Recommendation:   · Assessment	  67M PMH HTN, IDDM with peripheral neuropathy, CAD s/p PCI with 2 stents (3 yrs ago at [facility]), recent admission 10/3 for diabetic ssti (CT r/o absecess, no OM) discharged on doxycycline and keflex, presenting with right foot pain scheduled for OR Tuesday 10/24 afternoon for amputation.    A1C: 12.6 %  C-peptide 0.5 with , JAMIR and ICA negative (Oct 3, 2023)  BUN: 22  Creatinine: 0.96  GFR: 87  Weight: 65  BMI: 22.4         Problem/Recommendation - 1:  ·  Problem: Diabetes.   ·  Recommendation: # Type 2 diabetes mellitus with hyperglycemia  - Please give lantus 10 units at bedtime; even in the setting of NPO at [facility].  - Continue lispro 5 units before each meal.  - Continue lispro low dose sliding scale before meals and at bedtime.  - Patient's fingerstick glucose goal is 100-180 mg/dL.    - For discharge: TBD  - Patient can follow up at discharge with [facility] by calling [phone] to make an appointment.      Case discussed with Dr. Huerats. Primary team updated.

## 2023-10-24 NOTE — CHART NOTE - NSCHARTNOTEFT_GEN_A_CORE
Writer attempted to assess patient again but patient at surgery/unavailable to interview or examine. Will f/u as able.

## 2023-10-24 NOTE — PROGRESS NOTE ADULT - SUBJECTIVE AND OBJECTIVE BOX
O/N Events: TTE completed over night    Subjective/ROS: Patient seen and examined at bedside. Patient states that his only complaint is foot pain. States that he has not have cough today.    Denies Fever/Chills, HA, CP, SOB, n/v, changes in bowel/urinary habits.  12pt ROS otherwise negative.    VITALS  Vital Signs Last 24 Hrs  T(C): 36.9 (24 Oct 2023 16:05), Max: 37.5 (24 Oct 2023 10:08)  T(F): 98.4 (24 Oct 2023 16:05), Max: 99.5 (24 Oct 2023 10:08)  HR: 68 (24 Oct 2023 17:05) (62 - 80)  BP: 137/70 (24 Oct 2023 17:05) (121/73 - 174/96)  BP(mean): 99 (24 Oct 2023 17:05) (90 - 121)  RR: 11 (24 Oct 2023 17:05) (10 - 17)  SpO2: 100% (24 Oct 2023 17:05) (95% - 100%)    Parameters below as of 24 Oct 2023 17:05  Patient On (Oxygen Delivery Method): mask, nonrebreather  O2 Flow (L/min): 2      CAPILLARY BLOOD GLUCOSE      POCT Blood Glucose.: 62 mg/dL (24 Oct 2023 17:06)  POCT Blood Glucose.: 91 mg/dL (24 Oct 2023 12:04)  POCT Blood Glucose.: 132 mg/dL (24 Oct 2023 09:29)  POCT Blood Glucose.: 134 mg/dL (23 Oct 2023 22:04)  POCT Blood Glucose.: 141 mg/dL (23 Oct 2023 18:20)      PHYSICAL EXAM  General: NAD  Head: NC/AT; MMM; EOMI;  Neck: Supple; no JVD  Respiratory: CTAB; no wheezes/rales/rhonchi  Cardiovascular: Regular rhythm/rate; S1/S2+, + murmur  Gastrointestinal: Soft; NTND;  Extremities: WWP; no edema/cyanosis, bandage in place on right foot with black 4th right toe visible  Neurological: A&Ox3, CNII-XII grossly intact; no obvious focal deficits    Antimicrobials:  MEDICATIONS  (STANDING):  piperacillin/tazobactam IVPB.. 4.5 Gram(s) IV Intermittent every 8 hours      Standing Medications:  MEDICATIONS  (STANDING):  acetaminophen     Tablet .. 650 milliGRAM(s) Oral every 6 hours  amLODIPine   Tablet 10 milliGRAM(s) Oral every 24 hours  dextrose 5%. 1000 milliLiter(s) (50 mL/Hr) IV Continuous <Continuous>  dextrose 5%. 1000 milliLiter(s) (100 mL/Hr) IV Continuous <Continuous>  dextrose 50% Injectable 25 Gram(s) IV Push once  dextrose 50% Injectable 12.5 Gram(s) IV Push once  dextrose 50% Injectable 25 Gram(s) IV Push once  gabapentin 1600 milliGRAM(s) Oral three times a day  glucagon  Injectable 1 milliGRAM(s) IntraMuscular once  insulin glargine Injectable (LANTUS) 10 Unit(s) SubCutaneous at bedtime  insulin lispro (ADMELOG) corrective regimen sliding scale   SubCutaneous three times a day before meals  insulin lispro (ADMELOG) corrective regimen sliding scale   SubCutaneous at bedtime  insulin lispro Injectable (ADMELOG) 5 Unit(s) SubCutaneous three times a day before meals  oxyCODONE    IR 10 milliGRAM(s) Oral every 6 hours  piperacillin/tazobactam IVPB.. 4.5 Gram(s) IV Intermittent every 8 hours      PRN Medications:  MEDICATIONS  (PRN):  aluminum hydroxide/magnesium hydroxide/simethicone Suspension 30 milliLiter(s) Oral every 6 hours PRN Dyspepsia  dextrose Oral Gel 15 Gram(s) Oral once PRN Blood Glucose LESS THAN 70 milliGRAM(s)/deciliter  morphine  - Injectable 2 milliGRAM(s) IV Push every 4 hours PRN Moderate Pain (4 - 6)      No Known Allergies      LABS                        9.2    20.65 )-----------( 212      ( 24 Oct 2023 05:30 )             28.4     10-24    133<L>  |  100  |  18  ----------------------------<  89  3.6   |  27  |  1.02    Ca    9.0      24 Oct 2023 05:30  Phos  2.5     10-24  Mg     2.0     10-24    TPro  6.5  /  Alb  2.3<L>  /  TBili  0.4  /  DBili  x   /  AST  18  /  ALT  16  /  AlkPhos  160<H>  10-24    PT/INR - ( 24 Oct 2023 05:30 )   PT: 11.8 sec;   INR: 1.04          PTT - ( 24 Oct 2023 05:30 )  PTT:28.3 sec  Urinalysis Basic - ( 24 Oct 2023 05:30 )    Color: x / Appearance: x / SG: x / pH: x  Gluc: 89 mg/dL / Ketone: x  / Bili: x / Urobili: x   Blood: x / Protein: x / Nitrite: x   Leuk Esterase: x / RBC: x / WBC x   Sq Epi: x / Non Sq Epi: x / Bacteria: x              IMAGING/EKG/ETC

## 2023-10-24 NOTE — CONSULT NOTE ADULT - SUBJECTIVE AND OBJECTIVE BOX
HPI:  67M PMH HTN, IDDM with peripheral neuropathy, CAD s/p PCI with 2 stents (3 yrs ago at [facility]), recent admission 10/3 for diabetic ssti (CT r/o absecess, no OM) discharged on doxycycline and keflex, presenting with CC of r. foot pain.  After discharge patient endorses completing antibiotics but on Wednesday the pain started getting unbearable, not responding to his usual neurontin for nerve pain. H edid not notice drainage from the wound. He came into the hospital due ot the pain. He denies any fever, chills, shortness of breath, cough, diarrhea, constipation.    ED Course:   T101 HR 86 /80 RR 19 Sat 97% on RA.   Labs: WBC 19 Hb 9.7 BUN 26   Interventions: Vanc, zosyn, morphine x 14units, 2LNS.   XR R.Foot: 3 views of the right foot demonstrates no evidence of acute fracture or   dislocation. No radiographic evidence of osteomyelitis. Calcaneal   spurring. Vascular calcifications noted.   (21 Oct 2023 20:24)      ROS: A 10-point review of systems was otherwise negative.    PAST MEDICAL & SURGICAL HISTORY:  IDDM (insulin dependent diabetes mellitus)      HTN (hypertension)      CAD S/P percutaneous coronary angioplasty      Neuropathy      No significant past surgical history          SOCIAL HISTORY:  FAMILY HISTORY:      ALLERGIES: 	  No Known Allergies            MEDICATIONS:  acetaminophen     Tablet .. 650 milliGRAM(s) Oral every 6 hours  aluminum hydroxide/magnesium hydroxide/simethicone Suspension 30 milliLiter(s) Oral every 6 hours PRN  amLODIPine   Tablet 10 milliGRAM(s) Oral every 24 hours  dextrose 5%. 1000 milliLiter(s) IV Continuous <Continuous>  dextrose 5%. 1000 milliLiter(s) IV Continuous <Continuous>  dextrose 50% Injectable 25 Gram(s) IV Push once  dextrose 50% Injectable 12.5 Gram(s) IV Push once  dextrose 50% Injectable 25 Gram(s) IV Push once  dextrose Oral Gel 15 Gram(s) Oral once PRN  gabapentin 1600 milliGRAM(s) Oral three times a day  glucagon  Injectable 1 milliGRAM(s) IntraMuscular once  insulin glargine Injectable (LANTUS) 10 Unit(s) SubCutaneous at bedtime  insulin lispro (ADMELOG) corrective regimen sliding scale   SubCutaneous at bedtime  insulin lispro (ADMELOG) corrective regimen sliding scale   SubCutaneous three times a day before meals  insulin lispro Injectable (ADMELOG) 5 Unit(s) SubCutaneous three times a day before meals  lisinopril 40 milliGRAM(s) Oral daily  morphine  - Injectable 2 milliGRAM(s) IV Push every 4 hours PRN  oxyCODONE    IR 10 milliGRAM(s) Oral every 6 hours  piperacillin/tazobactam IVPB.. 4.5 Gram(s) IV Intermittent every 8 hours      PHYSICAL EXAM:  T(C): 37.1 (10-24-23 @ 06:07), Max: 37.3 (10-23-23 @ 09:53)  HR: 80 (10-24-23 @ 06:07) (62 - 90)  BP: 149/86 (10-24-23 @ 06:07) (134/80 - 213/103)  RR: 17 (10-24-23 @ 06:07) (16 - 18)  SpO2: 97% (10-24-23 @ 06:07) (95% - 97%)  Wt(kg): --    GEN: Awake, comfortable. NAD.   HEENT: NCAT, PERRL, EOMI. Mucosa moist. No JVD.   RESP: CTA b/l  CV: RRR, normal s1/s2. No m/r/g.  ABD: Soft, NTND. BS+  EXT: Warm. No edema, clubbing, or cyanosis.   NEURO: AAOx3. No focal deficits.    I&O's Summary      	  LABS:	 	    CARDIAC MARKERS:                            9.4    22.79 )-----------( 213      ( 23 Oct 2023 04:00 )             28.6     10-23    132<L>  |  98  |  22  ----------------------------<  275<H>  3.8   |  25  |  0.96    Ca    9.0      23 Oct 2023 04:00  Phos  2.8     10-23  Mg     1.9     10-23    TPro  6.6  /  Alb  2.4<L>  /  TBili  0.6  /  DBili  x   /  AST  20  /  ALT  17  /  AlkPhos  156<H>  10-23    proBNP:   Lipid Profile:   HgA1c:   TSH:     TELEMETRY: 	    ECG:  	  RADIOLOGY:   ECHO:  STRESS:  CATH:   HPI:  67M PMH HTN, IDDM with peripheral neuropathy, CAD s/p PCI with 2 stents (3 yrs ago at [facility]), recent admission 10/3 for diabetic ssti (CT r/o absecess, no OM) discharged on doxycycline and keflex, presenting with CC of r. foot pain. fter discharge patient endorses completing antibiotics but on Wednesday the pain started getting unbearable, not responding to his usual neurontin for nerve pain. H edid not notice drainage from the wound. He came into the hospital due ot the pain. He denies any fever, chills, shortness of breath, cough, diarrhea, constipation.    ED Course:   T101 HR 86 /80 RR 19 Sat 97% on RA.   Labs: WBC 19 Hb 9.7 BUN 26   Interventions: Vanc, zosyn, morphine x 14units, 2LNS.   XR R.Foot: 3 views of the right foot demonstrates no evidence of acute fracture or dislocation. No radiographic evidence of osteomyelitis. Calcaneal spurring. Vascular calcifications noted.    Consultant subjective:  Patient evaluated at bedside where he was found stable and in NAD. Did not report previous MI; he reported that previous PCI performed in 2020 was done after an exercise stress test. Since PCI does not report episodes of chest pain, SOB, CANSECO, orthopnea, chronic cough, or palpitations. Of note he reported bilateral leg swelling of 2 months of onset     ROS: A 10-point review of systems was otherwise negative.    PAST MEDICAL & SURGICAL HISTORY:  IDDM (insulin dependent diabetes mellitus)      HTN (hypertension)      CAD S/P percutaneous coronary angioplasty      Neuropathy      No significant past surgical history          SOCIAL HISTORY:  FAMILY HISTORY:      ALLERGIES: 	  No Known Allergies            MEDICATIONS:  acetaminophen     Tablet .. 650 milliGRAM(s) Oral every 6 hours  aluminum hydroxide/magnesium hydroxide/simethicone Suspension 30 milliLiter(s) Oral every 6 hours PRN  amLODIPine   Tablet 10 milliGRAM(s) Oral every 24 hours  dextrose 5%. 1000 milliLiter(s) IV Continuous <Continuous>  dextrose 5%. 1000 milliLiter(s) IV Continuous <Continuous>  dextrose 50% Injectable 25 Gram(s) IV Push once  dextrose 50% Injectable 12.5 Gram(s) IV Push once  dextrose 50% Injectable 25 Gram(s) IV Push once  dextrose Oral Gel 15 Gram(s) Oral once PRN  gabapentin 1600 milliGRAM(s) Oral three times a day  glucagon  Injectable 1 milliGRAM(s) IntraMuscular once  insulin glargine Injectable (LANTUS) 10 Unit(s) SubCutaneous at bedtime  insulin lispro (ADMELOG) corrective regimen sliding scale   SubCutaneous at bedtime  insulin lispro (ADMELOG) corrective regimen sliding scale   SubCutaneous three times a day before meals  insulin lispro Injectable (ADMELOG) 5 Unit(s) SubCutaneous three times a day before meals  lisinopril 40 milliGRAM(s) Oral daily  morphine  - Injectable 2 milliGRAM(s) IV Push every 4 hours PRN  oxyCODONE    IR 10 milliGRAM(s) Oral every 6 hours  piperacillin/tazobactam IVPB.. 4.5 Gram(s) IV Intermittent every 8 hours      PHYSICAL EXAM:  T(C): 37.1 (10-24-23 @ 06:07), Max: 37.3 (10-23-23 @ 09:53)  HR: 80 (10-24-23 @ 06:07) (62 - 90)  BP: 149/86 (10-24-23 @ 06:07) (134/80 - 213/103)  RR: 17 (10-24-23 @ 06:07) (16 - 18)  SpO2: 97% (10-24-23 @ 06:07) (95% - 97%)  Wt(kg): --    GEN: Awake, comfortable. NAD.   HEENT: NCAT, PERRL, EOMI. Mucosa moist. No JVD.   RESP: CTA b/l  CV: RRR, normal s1/s2. No m/r/g.  ABD: Soft, NTND. BS+  EXT: Warm. No edema, clubbing, or cyanosis.   NEURO: AAOx3. No focal deficits.    I&O's Summary      	  LABS:	 	    CARDIAC MARKERS:                            9.4    22.79 )-----------( 213      ( 23 Oct 2023 04:00 )             28.6     10-23    132<L>  |  98  |  22  ----------------------------<  275<H>  3.8   |  25  |  0.96    Ca    9.0      23 Oct 2023 04:00  Phos  2.8     10-23  Mg     1.9     10-23    TPro  6.6  /  Alb  2.4<L>  /  TBili  0.6  /  DBili  x   /  AST  20  /  ALT  17  /  AlkPhos  156<H>  10-23    proBNP:   Lipid Profile:   HgA1c:   TSH:     TELEMETRY: 	    ECG:  	  RADIOLOGY:   ECHO:  STRESS:  CATH:   HPI:  67M PMH HTN, IDDM with peripheral neuropathy, CAD s/p PCI with 2 stents (3 yrs ago at [facility]), recent admission 10/3 for diabetic ssti (CT r/o absecess, no OM) discharged on doxycycline and keflex, presenting with CC of r. foot pain. fter discharge patient endorses completing antibiotics but on Wednesday the pain started getting unbearable, not responding to his usual neurontin for nerve pain. H edid not notice drainage from the wound. He came into the hospital due ot the pain. He denies any fever, chills, shortness of breath, cough, diarrhea, constipation.    ED Course:   T101 HR 86 /80 RR 19 Sat 97% on RA.   Labs: WBC 19 Hb 9.7 BUN 26   Interventions: Vanc, zosyn, morphine x 14units, 2LNS.   XR R.Foot: 3 views of the right foot demonstrates no evidence of acute fracture or dislocation. No radiographic evidence of osteomyelitis. Calcaneal spurring. Vascular calcifications noted.    Consultant subjective:  Patient evaluated at bedside where he was found stable and in NAD. Complaining of continued right foot pain; responding well to pain medications. Did not report previous MI; he reported that previous PCI performed in 2020 was done after an exercise stress test; reported 12 months of antiplatelet therapy which has since been discontinued. Follows outpatient with general cardiologist but does not remember the name and has had poor follow-up since. Since PCI patient reports being asymptomatic until approximately 2 months ago when he started to have bilateral pedal edema and chest pain with emotional stress; does not report episodes of SOB, CANSECO, orthopnea, chronic cough, or palpitations. Does not report known past history of structural heart disease.    ROS: A 10-point review of systems was otherwise negative.    PAST MEDICAL & SURGICAL HISTORY:  IDDM (insulin dependent diabetes mellitus)  HTN (hypertension)  CAD S/P percutaneous coronary angioplasty  Neuropathy  No significant past surgical history  SOCIAL HISTORY:  FAMILY HISTORY:  ALLERGIES: 	  No Known Allergies    MEDICATIONS:  acetaminophen     Tablet .. 650 milliGRAM(s) Oral every 6 hours  aluminum hydroxide/magnesium hydroxide/simethicone Suspension 30 milliLiter(s) Oral every 6 hours PRN  amLODIPine   Tablet 10 milliGRAM(s) Oral every 24 hours  dextrose 5%. 1000 milliLiter(s) IV Continuous <Continuous>  dextrose 5%. 1000 milliLiter(s) IV Continuous <Continuous>  dextrose 50% Injectable 25 Gram(s) IV Push once  dextrose 50% Injectable 12.5 Gram(s) IV Push once  dextrose 50% Injectable 25 Gram(s) IV Push once  dextrose Oral Gel 15 Gram(s) Oral once PRN  gabapentin 1600 milliGRAM(s) Oral three times a day  glucagon  Injectable 1 milliGRAM(s) IntraMuscular once  insulin glargine Injectable (LANTUS) 10 Unit(s) SubCutaneous at bedtime  insulin lispro (ADMELOG) corrective regimen sliding scale   SubCutaneous at bedtime  insulin lispro (ADMELOG) corrective regimen sliding scale   SubCutaneous three times a day before meals  insulin lispro Injectable (ADMELOG) 5 Unit(s) SubCutaneous three times a day before meals  lisinopril 40 milliGRAM(s) Oral daily  morphine  - Injectable 2 milliGRAM(s) IV Push every 4 hours PRN  oxyCODONE    IR 10 milliGRAM(s) Oral every 6 hours  piperacillin/tazobactam IVPB.. 4.5 Gram(s) IV Intermittent every 8 hours      PHYSICAL EXAM:  T(C): 37.1 (10-24-23 @ 06:07), Max: 37.3 (10-23-23 @ 09:53)  HR: 80 (10-24-23 @ 06:07) (62 - 90)  BP: 149/86 (10-24-23 @ 06:07) (134/80 - 213/103)  RR: 17 (10-24-23 @ 06:07) (16 - 18)  SpO2: 97% (10-24-23 @ 06:07) (95% - 97%)  Wt(kg): --    PHYSICAL EXAM:  Constitutional: Sitting on bed in NAD  HEENT: NC/AT, PERRLA, EOMI, no conjunctival pallor or scleral icterus, DMM  Neck: Supple, no JVD or HJR  Respiratory: CTA B/L. No w/r/r.   Cardiovascular: RRR, normal S1 and S2, 3/6 systolic crescendo-decresendo pereceptible at all cardiac foci but strongest in aortic focus  Gastrointestinal: +BS, soft NTND, no guarding or rebound tenderness, no palpable masses   Extremities: wwp; b/l pedal edema upt to mid tibia. R 4th toe blackened, wound gangrenous appearing, but no drainable fluid, nonerythematous.   Neurological: AAOx3, no CN deficits, strength and sensation intact throughout.   Skin: No gross skin abnormalities or rashes      I&O's Summary      	  LABS:	 	    CARDIAC MARKERS:                            9.4    22.79 )-----------( 213      ( 23 Oct 2023 04:00 )             28.6     10-23    132<L>  |  98  |  22  ----------------------------<  275<H>  3.8   |  25  |  0.96    Ca    9.0      23 Oct 2023 04:00  Phos  2.8     10-23  Mg     1.9     10-23    TPro  6.6  /  Alb  2.4<L>  /  TBili  0.6  /  DBili  x   /  AST  20  /  ALT  17  /  AlkPhos  156<H>  10-23    proBNP:   Lipid Profile:   HgA1c:   TSH:     TELEMETRY: 	    ECG:  	  RADIOLOGY:   ECHO:  STRESS:  CATH:

## 2023-10-24 NOTE — PROGRESS NOTE ADULT - ASSESSMENT
68M PMH HTN, IDDM with peripheral neuropathy, CAD s/p PCI with 2 stents (3 yrs ago at [facility]) who presents to [facility] with right toe pain. Podiatry consulted to evaluate R toe wound. Pt with prior admission on 10/1-10/3 with early ischemic signs of R 4th digit, and was discharged with PO abx (keflex,doxy). States of completing medication but has not followed up out pt with any provider. In the ED, pt febrile with elevated WBC to 19.71. At time of consult on the floors, pt is now afebrile with VSS. 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 Pt is post op day 0 for R foot partial 4th ray resection. Patient tolerated procedure well.       Plan:    -c/w IV abx  -f/u intra-operative cultures & path  -Please restart DVT ppx after AM podiatry rounds   -WB status: Pt is to be NWB to R lower extremity   -R lower extremity to remain elevated while in bed  -f/u post-op x-rays  -Please place pt on an adequate pain management regimen   -Daily dressing changes and surgical site monitoring  -Pt dressed with Packing ,4x8 gauze, and kerlix  -Rest of care up to primary team    Podiatry following, Plan d/w attending

## 2023-10-24 NOTE — PROGRESS NOTE ADULT - PROBLEM SELECTOR PLAN 4
Plan:  - c/w home lisinopril 40mg qd. Plan:  - holding home lisinopril 40mg qd for angio 10/25  - c/w amlodipine 10 mg qd Echo: Left ventricular systolic function is mildly reduced with a calculated ejection fraction of 45% with regional wall motion abnormalities.     Plan:  - holding home lisinopril 40mg qd for angio 10/25  - c/w amlodipine 10 mg qd

## 2023-10-24 NOTE — BRIEF OPERATIVE NOTE - OPERATION/FINDINGS
Pt brought to OR and placed on the OR table and prepped and drapped in Aspetic Manner.  Pt was placed under MAC by anesthesiologist. Pt was injected with pre-op local block of 10 CC of 1% Lidocaine plain and _0.5% Marcaine plain  1:1 mix in a local block fashion. Using a #15 blade, a raquet type incision was made at 4th MPJ down to bone and disarticulated at MPJ. Specimen then sent for Pathology. The 4th met head was removed via sagittal saw which was sent for culture and path.. The surgical area was then copiously irrigated via pulse lavage with  0.5 L  NS. A clean bone margin was then taken from 4th met using a rongeur and sent for culture and path. The surgical site was left open. Wound was packed and dressed with sterile dressing. Pt tolerated the procedure well. Pt's RLE were noted to have adequate vascular flow compared to baseline and transferred from the OR to the PACU. Post op block of 0.5% Marcaine 7CC was used as a post op local block. Pt brought to OR and placed on the OR table and prepped and drapped in Aspetic Manner.  Pt was placed under MAC by anesthesiologist. Pt was injected with pre-op local block of 10 CC of 1% Lidocaine plain and _0.5% Marcaine plain  1:1 mix in a local block fashion. Using a #15 blade, a raquet type incision was made at 4th MPJ down to bone and disarticulated at MPJ. Specimen then sent for Pathology. The 4th met head was removed via sagittal saw which was sent for culture and path.. The surgical area was then copiously irrigated via pulse lavage with  0.5 L NS. A clean bone margin was then taken from 4th met using a rongeur and sent for culture and path. The surgical site was left open. Wound was packed and dressed with sterile dressing. Pt tolerated the procedure well. Pt's RLE were noted to have adequate vascular flow compared to baseline and transferred from the OR to the PACU. Post op block of 0.5% Marcaine 7CC was used as a post op local block.

## 2023-10-24 NOTE — CONSULT NOTE ADULT - ASSESSMENT
67M PMH HTN, IDDM with peripheral neuropathy, CAD s/p PCI with 2 stents (3 yrs ago at [facility]), recent admission 10/3 for diabetic ssti (CT r/o absecess, no OM) discharged on doxycycline and keflex, presenting with CC of r. foot pain, found to meet SIRS crtieria with suspected source infectious of r.toe pending partial amputation. Cardiology team was consulted for pre-op clearance.    Review of studies:  EKG (10/24/2023): NSR; possible Q wave on aVL which could represent old lateral infarct in the context of past CAD s/p PCI  TTE (10/23/2023): EF 45%; Mild-to-moderate concentric left ventricular hypertrophy; Left ventricular systolic function is mildly reduced with regional wall motion abnormalities; indeterminate left ventricular diastolic function and filling pressure. Right ventricular normal size and function; TAPSE 2.38cm. Mild dilation of left atrium; Right atrium normal in size. Evidence of moderate aortic valve stenosis; AV area 1.27, peak transvalvular velocity 2.47, mean transvalvular gradient is 16.00 mmHg, and the LVOT/AV velocity ratio is 0.40.     #Pre-op clearance  - Patient is a 66 y/o male with IDDM and CAD s/p PCI with 2 stents; RCRI: 2 points/Class III risk/10.1% risk of 30-day mortality, MI, or cardiac arrest. Patient reports normal functional status with METs 4 or more; only recently limited due to right foot gangrene but no cardiorespiratory limitations.     -Since PCI asymptomatic until ~2 months ago when he presented bilateral pedal edema and chest pain with emotional stress.     - Physical exam remarkable for 3/6 systolic murmur and b/l pedal edema up to mid tibia. TTE on admission remarkable with moderate AS and EF 45% suggestive of HFmrEF; patient does not report know history of structural heart disease. EKG NSR with evidence of possible old lateral wall infarct.     - From a cardiology perspective patient is clear for intermediate risk procedure given benefits of amputation of gangrenous digit. There is evidence of structural heart disease with HFmrEF likely 2/2 to known CAD and newly evidenced moderate AS but patient currently compensated and in no need to delay procedure for further workup or optimization. Will benefit from outpatient follow-up for further evaluation of AS and optimization of HFmrEF.     #Aortic stenosis  - See above for clinical assessment  - F/u outpatient with cardiologist    #HFmrEF  - See above for clinical assessment  - F/u outpatient with cardiologist   67M PMH HTN, IDDM with peripheral neuropathy, CAD s/p PCI with 2 stents (3 yrs ago at [facility]), recent admission 10/3 for diabetic ssti (CT r/o absecess, no OM) discharged on doxycycline and keflex, presenting with CC of r. foot pain, found to meet SIRS crtieria with suspected source infectious of r.toe pending partial amputation. Cardiology team was consulted for pre-op clearance.    Review of studies:  EKG (10/24/2023): NSR; possible Q wave on aVL which could represent old lateral infarct in the context of past CAD s/p PCI  TTE (10/23/2023): EF 45%; Mild-to-moderate concentric left ventricular hypertrophy; Left ventricular systolic function is mildly reduced with regional wall motion abnormalities; indeterminate left ventricular diastolic function and filling pressure. Right ventricular normal size and function; TAPSE 2.38cm. Mild dilation of left atrium; Right atrium normal in size. Evidence of moderate aortic valve stenosis; AV area 1.27, peak transvalvular velocity 2.47, mean transvalvular gradient is 16.00 mmHg, and the LVOT/AV velocity ratio is 0.40. IVC compressible to <50%.    #Pre-op clearance  - Patient is a 66 y/o male with IDDM and CAD s/p PCI with 2 stents; RCRI: 2 points/Class III risk/10.1% risk of 30-day mortality, MI, or cardiac arrest. Patient reports normal functional status with METs 4 or more; only recently limited due to right foot gangrene but no cardiorespiratory limitations.     -Since PCI asymptomatic until ~2 months ago when he presented bilateral pedal edema and chest pain with emotional stress.     - Physical exam remarkable for 3/6 systolic murmur and b/l pedal edema up to mid tibia. TTE on admission remarkable with moderate AS and EF 45% suggestive of HFmrEF; patient does not report know history of structural heart disease. EKG NSR with evidence of possible old lateral wall infarct.     - From a cardiology perspective patient is clear for intermediate risk procedure given benefits of amputation of gangrenous digit. There is evidence of structural heart disease with HFmrEF likely 2/2 to known CAD and newly evidenced moderate AS but patient currently compensated and in no need to delay procedure for further workup or optimization. Will benefit from outpatient follow-up for further evaluation of AS and optimization of HFmrEF.     #Aortic stenosis  - See above for clinical assessment  - F/u outpatient with cardiologist    #HFmrEF  - See above for clinical assessment  - F/u outpatient with cardiologist    Recommendations above are preliminary pending discussion with an attending cardiologist  Plan was discussed with primary team  We'll continue to follow, thank you for the consultation      Petey Conteh  Medical Resident PGY-1

## 2023-10-24 NOTE — CONSULT NOTE ADULT - ATTENDING COMMENTS
Patient is a 67M PMH of CAD s/p PCI (2020), HTN, IDDM with peripheral neuropathy, recent admission 10/3 for diabetic Soft tissues infection, discharged on doxycycline and keflex, presenting with Complaint of right foot pain, found to have worsening right 4th toe gangrene with cellulitis and without palpable pulses.. Cardiology team  consulted for pre-operative Cardiovascular Optimization.    Review of studies:  - EKG (10/24/2023): NSR;   - TTE (10/23/2023): EF 45%; Mild-to-moderate concentric left ventricular hypertrophy; Left ventricular systolic function is mildly reduced with regional wall motion abnormalities; indeterminate left ventricular diastolic function and filling pressure. Right ventricular normal size and function; TAPSE 2.38cm. Mild dilation of left atrium; Right atrium normal in size. Evidence of moderate aortic valve stenosis; AV area 1.27, peak transvalvular velocity 2.47, mean transvalvular gradient is 16.00 mmHg, and the LVOT/AV velocity ratio is 0.40. IVC compressible to <50%.    #Pre-Operative Cardiovascular Optimization  - Patient with known ASCVD with CAD and PCI placed in setting of abnormal stress test.   - He reports excellent performance status, reportedly playing basketball a week prior to hospitalization. ROS is negative for chest pain, SOB or other anginal symptoms  - Clinically patient is well compensated, warm and well perfused. Physical exam is notable for 3/6 systolic murmur heard trough out the pericardium  - Echo reviewed with Moderate LVH, mildly reduced LV systolic function with RWMA. Baseline echo unknown  - Currently, there are no active CV contraindications to proceeding with amputation and Lower extremity angiogram. Patient not in decompensated HF, ACS or unstable angina  - He is considered at intermediate risk for an intermediate risk procedure  - No further CV testing needed  - Please resume ASAa 81 mg po daily and start with Lipitor 80 mg.   - Please send A1C, TSH and lipid profile with am labs  - Please obtain collaterals from [facility] about prior workup (Echo; [facility])    # Moderate Aortic stenosis  - Patient with Moderate Aortic stenosis with AV area 1.27, peak transvalvular velocity 2.47, mean transvalvular gradient is 16.00 mmHg, and the LVOT/AV velocity ratio is 0.40 with an EF of 45%  - Would like to obtain outpatient Echo for comparison, progression of disease  - Patient will need to obtain repeat Echo in 1 year or sooner if clinically indicated    #HFmrEF  - Known CAD with HFmrEF.  - Cont lisinopril 40 mg po daily  - Recommend discontinuing Norvasc 10 and initiating Coreg 12.5 mg PO BID post operatively on 10/25 am  with goal to uptitrate as tolerated for GDMT  - Plan to initiate Spironolactone starting 10/25 am    Cardiology will continue to follow, please call with any question

## 2023-10-24 NOTE — PROGRESS NOTE ADULT - PROBLEM SELECTOR PLAN 1
Patient presenting meeting 2/4 SIRS (T101, Leukocytosis), source likely Gangrenous toe vs Osteomyelitis of R. foot wound. No purulence or erythema on examination however extremely tender to palpation and at rest. XR w/o evidence of osteo however lower sensivity. s/p 1 dose vanc and zosyn in ED. Prior was on Keflex and doxycycline x 10 days (finished 10/10), completed course. Last BCx w/o growth.   s/p 2L NS in ED  BCx: no growth at 1 day  MRI: Soft tissue atrophy/wound of the 4th toe with underlying marrow changes of the 4th proximal phalanx head, middle phalanx, and distal phalanx that may be reactive in the absence of significant T1 marrow replacement, however, early infection may also be considered.    Plan:  - f/u BCx, UCx   - c/w vancomycin 1g q12 hours empirically (re-dose 1 AM 10/23)  - c/w zosyn with pseudomonal dosing   - Tylenol q6 prn for fever.   - f/u podiatry recs as below.

## 2023-10-24 NOTE — PROGRESS NOTE ADULT - ATTENDING COMMENTS
Patient was seen and examined at bedside on 10/24/2023 at 1130 am. Patient reports pain at his foot has improved but is still present and currently rated 10/10. Denies SOB, CP. ROS is otherwise negative. Vitals, labwork and pertinent imaging reviewed. Exam - NAD, AAO x 4, PERRLA, EOMI, MMM, supple neck, chest - CTA b/l, CV - rrr, s1s2, no m/r/g, abd - soft, NTND, + BS, ext - wwp, + 2 pitting edema b/l, R foot bandaged, dry gangrene on 4th toe, psych - normal affect    Plan:  -C/w broad empiric coverage, low grade fever and leukocytosis persists, consult ID in AM  -Pain control, c/w Oxycodone 10 mg PO q6 standing with IV Morphine PRN for severe pain  -Endocrine consult given recent HbA1C ~ 13, c/w Lantus 10 units qHS and 5 units premeal  -MRI performed, plan for OR with Podiatry on 10/24  -Vascular consulted - pt planned for angiogram 10/25  -Echo done showing hypokinesis and dysfunction, Cardiology consulted for HF and preoperative optimization  -PT/OT s/p OR  -BP is better controlled - c/w Lisinopril, will change Norvasc 10 mg PO qd to Coreg and Spironolactone given evidence of HF  -Will need to start ASA in AM if cleared by Podiatry/Vascular sx

## 2023-10-24 NOTE — PROGRESS NOTE ADULT - SUBJECTIVE AND OBJECTIVE BOX
PRE-OP NOTE    Pre-Op Diagnosis: R 4th digit Gangrene  Planned Procedure: R partial 4th ray amp  Scheduled Date / Time: 10/24/23 1:PM  Indication: R 4th digit gangrene  Labs:                       9.4    22.79 )-----------( 213      ( 23 Oct 2023 04:00 )             28.6   10-23    132<L>  |  98  |  22  ----------------------------<  275<H>  3.8   |  25  |  0.96    Ca    9.0      23 Oct 2023 04:00  Phos  2.8     10-23  Mg     1.9     10-23    TPro  6.6  /  Alb  2.4<L>  /  TBili  0.6  /  DBili  x   /  AST  20  /  ALT  17  /  AlkPhos  156<H>  10-23  LIVER FUNCTIONS - ( 23 Oct 2023 04:00 )  Alb: 2.4 g/dL / Pro: 6.6 g/dL / ALK PHOS: 156 U/L / ALT: 17 U/L / AST: 20 U/L / GGT: x           Vitals: Vital Signs Last 24 Hrs  T(C): 37.1 (24 Oct 2023 06:07), Max: 37.3 (23 Oct 2023 09:53)  T(F): 98.7 (24 Oct 2023 06:07), Max: 99.2 (23 Oct 2023 09:53)  HR: 80 (24 Oct 2023 06:07) (62 - 90)  BP: 149/86 (24 Oct 2023 06:07) (134/80 - 213/103)  BP(mean): --  RR: 17 (24 Oct 2023 06:07) (16 - 18)  SpO2: 97% (24 Oct 2023 06:07) (95% - 97%)    Parameters below as of 23 Oct 2023 20:47  Patient On (Oxygen Delivery Method): room air      PE:   Lower Extremity Focused:  Vasc: DP/PT 2/4 b/l, erythema and edema to the R forefoot and midfoot, darkening of skin of R 4th digit up to the MPJ.   Derm: R 4th digit with gangrenous dark discoloration largely dry except for the 3rd and 4th interspaces. Fibrotic slough noted in the 3rd and 4th interspace.  no current drainage, does not probe, no tracking/tunneling, no fluctuance no open wounds left   Neuro: protective sensation slightly diminished  MSK: +TTP to R 4th digit.       Official CXR reading: (On Chart)  Official EKG reading: (On Chart)  Type and Cross/Screen:   NPO after MN  Antibiotics: Vanc/Zosyn/Levo  Anesthesia evaluation (on chart)  Operative Consent (on chart)

## 2023-10-24 NOTE — PROGRESS NOTE ADULT - ASSESSMENT
67M PMH HTN, IDDM with peripheral neuropathy, CAD s/p PCI with 2 stents (3 yrs ago at [facility]), recent admission 10/3 for diabetic ssti (CT r/o absecess, no OM) discharged on doxycycline and keflex, presenting with CC of r. foot pain, found to meet SIRS crtieria with suspected source infectious of r.toe, a/f management.

## 2023-10-24 NOTE — PROGRESS NOTE ADULT - PROBLEM SELECTOR PLAN 2
Patient presenting with worsening right 4th toe pain and discoloratio s/p course of IV and oral antibtiocs: Keflex and doxycycline until 10/10 endorses compliance. On presentation, he is hemodynamically stable however with leukoctyosis to 19 and fever to 101.   Right toe xrays done in ED showing no evidence of fracture or osteomyelitis   S/p 1 dose vancomycin in ED and zosyn in ED.   BCx: no growth at 1 day    Plan:  - Podiatry consulted, f/u recs   - vascular consulted  - Local wound care: Cleansed with NS. Betadine soaked gauze applied to R 4th digit, 3rd and 4th interspaces Advised pt to keep dressing intact dry and clean  - Pain management: Tylenol 650 q6h standing, Oxy 10 q6h standing, morphine 2mg q6h PRN for severe  - 1x 0.5 dilaudid for severe breakthrough pain. Patient presenting with worsening right 4th toe pain and discoloratio s/p course of IV and oral antibtiocs: Keflex and doxycycline until 10/10 endorses compliance. On presentation, he is hemodynamically stable however with leukoctyosis to 19 and fever to 101.   Right toe xrays done in ED showing no evidence of fracture or osteomyelitis   S/p 1 dose vancomycin in ED and zosyn in ED.   BCx: no growth at 1 day  Patient went to surgery for toe amputation today    Plan:  - Podiatry consulted, f/u recs   - vascular consulted: rec angio tomorrow 10/25  - Local wound care: Cleansed with NS. Betadine soaked gauze applied to R 4th digit, 3rd and 4th interspaces Advised pt to keep dressing intact dry and clean  - Pain management: Tylenol 650 q6h standing, Oxy 10 q6h standing, morphine 2mg q6h PRN for severe  - 1x 0.5 dilaudid for severe breakthrough pain.

## 2023-10-24 NOTE — PRE-ANESTHESIA EVALUATION ADULT - NSANTHPMHFT_GEN_ALL_CORE
67M PMH HTN, IDDM with peripheral neuropathy, CAD s/p PCI with 2 stents (3 yrs ago at [facility]), recent admission 10/3 for diabetic ssti (CT r/o absecess, no OM) discharged on doxycycline and keflex, presenting with CC of r. foot pain.  After discharge patient endorses completing antibiotics but on Wednesday the pain started getting unbearable, not responding to his usual neurontin for nerve pain. H edid not notice drainage from the wound. He came into the hospital due ot the pain. He denies any fever, chills, shortness of breath, cough, diarrhea, constipation.

## 2023-10-24 NOTE — PROGRESS NOTE ADULT - SUBJECTIVE AND OBJECTIVE BOX
POST OP NOTE    JOE BRUNSON  MRN-[id number]    Procedure: Partial R 4th ray amputation  Surgeon: Dr. Camacho  Assistants: Dr. Lopez    Patient tolerated procedure well without incident.  Patient transferred to PACU in stable condition.       PE / Post Op Check:       GEN: NAD, resting comfortably with pain controlled      LE Focused: CFT shows adequate perfusion b/l with no signs of ischemic compromise.  No strikethrough is appreciated on surgical dressings.  Dressings were dry, clean, and intact.  No neuromuscular deficits appreciated.

## 2023-10-25 DIAGNOSIS — I25.10 ATHEROSCLEROTIC HEART DISEASE OF NATIVE CORONARY ARTERY WITHOUT ANGINA PECTORIS: ICD-10-CM

## 2023-10-25 LAB
ALBUMIN SERPL ELPH-MCNC: 2.2 G/DL — LOW (ref 3.3–5)
ALBUMIN SERPL ELPH-MCNC: 2.2 G/DL — LOW (ref 3.3–5)
ALP SERPL-CCNC: 206 U/L — HIGH (ref 40–120)
ALP SERPL-CCNC: 206 U/L — HIGH (ref 40–120)
ALT FLD-CCNC: 17 U/L — SIGNIFICANT CHANGE UP (ref 10–45)
ALT FLD-CCNC: 17 U/L — SIGNIFICANT CHANGE UP (ref 10–45)
ANION GAP SERPL CALC-SCNC: 9 MMOL/L — SIGNIFICANT CHANGE UP (ref 5–17)
ANION GAP SERPL CALC-SCNC: 9 MMOL/L — SIGNIFICANT CHANGE UP (ref 5–17)
ANISOCYTOSIS BLD QL: SLIGHT — SIGNIFICANT CHANGE UP
ANISOCYTOSIS BLD QL: SLIGHT — SIGNIFICANT CHANGE UP
APTT BLD: 28.9 SEC — SIGNIFICANT CHANGE UP (ref 24.5–35.6)
APTT BLD: 28.9 SEC — SIGNIFICANT CHANGE UP (ref 24.5–35.6)
AST SERPL-CCNC: 32 U/L — SIGNIFICANT CHANGE UP (ref 10–40)
AST SERPL-CCNC: 32 U/L — SIGNIFICANT CHANGE UP (ref 10–40)
BASOPHILS # BLD AUTO: 0 K/UL — SIGNIFICANT CHANGE UP (ref 0–0.2)
BASOPHILS # BLD AUTO: 0 K/UL — SIGNIFICANT CHANGE UP (ref 0–0.2)
BASOPHILS NFR BLD AUTO: 0 % — SIGNIFICANT CHANGE UP (ref 0–2)
BASOPHILS NFR BLD AUTO: 0 % — SIGNIFICANT CHANGE UP (ref 0–2)
BILIRUB SERPL-MCNC: 0.4 MG/DL — SIGNIFICANT CHANGE UP (ref 0.2–1.2)
BILIRUB SERPL-MCNC: 0.4 MG/DL — SIGNIFICANT CHANGE UP (ref 0.2–1.2)
BLD GP AB SCN SERPL QL: NEGATIVE — SIGNIFICANT CHANGE UP
BLD GP AB SCN SERPL QL: NEGATIVE — SIGNIFICANT CHANGE UP
BUN SERPL-MCNC: 16 MG/DL — SIGNIFICANT CHANGE UP (ref 7–23)
BUN SERPL-MCNC: 16 MG/DL — SIGNIFICANT CHANGE UP (ref 7–23)
CALCIUM SERPL-MCNC: 9.1 MG/DL — SIGNIFICANT CHANGE UP (ref 8.4–10.5)
CALCIUM SERPL-MCNC: 9.1 MG/DL — SIGNIFICANT CHANGE UP (ref 8.4–10.5)
CHLORIDE SERPL-SCNC: 101 MMOL/L — SIGNIFICANT CHANGE UP (ref 96–108)
CHLORIDE SERPL-SCNC: 101 MMOL/L — SIGNIFICANT CHANGE UP (ref 96–108)
CO2 SERPL-SCNC: 26 MMOL/L — SIGNIFICANT CHANGE UP (ref 22–31)
CO2 SERPL-SCNC: 26 MMOL/L — SIGNIFICANT CHANGE UP (ref 22–31)
CREAT SERPL-MCNC: 1.08 MG/DL — SIGNIFICANT CHANGE UP (ref 0.5–1.3)
CREAT SERPL-MCNC: 1.08 MG/DL — SIGNIFICANT CHANGE UP (ref 0.5–1.3)
EGFR: 76 ML/MIN/1.73M2 — SIGNIFICANT CHANGE UP
EGFR: 76 ML/MIN/1.73M2 — SIGNIFICANT CHANGE UP
EOSINOPHIL # BLD AUTO: 0 K/UL — SIGNIFICANT CHANGE UP (ref 0–0.5)
EOSINOPHIL # BLD AUTO: 0 K/UL — SIGNIFICANT CHANGE UP (ref 0–0.5)
EOSINOPHIL NFR BLD AUTO: 0 % — SIGNIFICANT CHANGE UP (ref 0–6)
EOSINOPHIL NFR BLD AUTO: 0 % — SIGNIFICANT CHANGE UP (ref 0–6)
GGT SERPL-CCNC: 99 U/L — HIGH (ref 9–50)
GGT SERPL-CCNC: 99 U/L — HIGH (ref 9–50)
GLUCOSE BLDC GLUCOMTR-MCNC: 119 MG/DL — HIGH (ref 70–99)
GLUCOSE BLDC GLUCOMTR-MCNC: 119 MG/DL — HIGH (ref 70–99)
GLUCOSE BLDC GLUCOMTR-MCNC: 165 MG/DL — HIGH (ref 70–99)
GLUCOSE BLDC GLUCOMTR-MCNC: 165 MG/DL — HIGH (ref 70–99)
GLUCOSE BLDC GLUCOMTR-MCNC: 64 MG/DL — LOW (ref 70–99)
GLUCOSE BLDC GLUCOMTR-MCNC: 64 MG/DL — LOW (ref 70–99)
GLUCOSE BLDC GLUCOMTR-MCNC: 69 MG/DL — LOW (ref 70–99)
GLUCOSE BLDC GLUCOMTR-MCNC: 69 MG/DL — LOW (ref 70–99)
GLUCOSE BLDC GLUCOMTR-MCNC: 80 MG/DL — SIGNIFICANT CHANGE UP (ref 70–99)
GLUCOSE BLDC GLUCOMTR-MCNC: 80 MG/DL — SIGNIFICANT CHANGE UP (ref 70–99)
GLUCOSE BLDC GLUCOMTR-MCNC: 91 MG/DL — SIGNIFICANT CHANGE UP (ref 70–99)
GLUCOSE BLDC GLUCOMTR-MCNC: 91 MG/DL — SIGNIFICANT CHANGE UP (ref 70–99)
GLUCOSE SERPL-MCNC: 55 MG/DL — LOW (ref 70–99)
GLUCOSE SERPL-MCNC: 55 MG/DL — LOW (ref 70–99)
GRAM STN FLD: ABNORMAL
GRAM STN FLD: ABNORMAL
HCT VFR BLD CALC: 29 % — LOW (ref 39–50)
HCT VFR BLD CALC: 29 % — LOW (ref 39–50)
HGB BLD-MCNC: 9.3 G/DL — LOW (ref 13–17)
HGB BLD-MCNC: 9.3 G/DL — LOW (ref 13–17)
HYPOCHROMIA BLD QL: SLIGHT — SIGNIFICANT CHANGE UP
HYPOCHROMIA BLD QL: SLIGHT — SIGNIFICANT CHANGE UP
INR BLD: 1.02 — SIGNIFICANT CHANGE UP (ref 0.85–1.18)
INR BLD: 1.02 — SIGNIFICANT CHANGE UP (ref 0.85–1.18)
LYMPHOCYTES # BLD AUTO: 1.54 K/UL — SIGNIFICANT CHANGE UP (ref 1–3.3)
LYMPHOCYTES # BLD AUTO: 1.54 K/UL — SIGNIFICANT CHANGE UP (ref 1–3.3)
LYMPHOCYTES # BLD AUTO: 6.1 % — LOW (ref 13–44)
LYMPHOCYTES # BLD AUTO: 6.1 % — LOW (ref 13–44)
MAGNESIUM SERPL-MCNC: 2 MG/DL — SIGNIFICANT CHANGE UP (ref 1.6–2.6)
MAGNESIUM SERPL-MCNC: 2 MG/DL — SIGNIFICANT CHANGE UP (ref 1.6–2.6)
MANUAL SMEAR VERIFICATION: SIGNIFICANT CHANGE UP
MANUAL SMEAR VERIFICATION: SIGNIFICANT CHANGE UP
MCHC RBC-ENTMCNC: 31 PG — SIGNIFICANT CHANGE UP (ref 27–34)
MCHC RBC-ENTMCNC: 31 PG — SIGNIFICANT CHANGE UP (ref 27–34)
MCHC RBC-ENTMCNC: 32.1 GM/DL — SIGNIFICANT CHANGE UP (ref 32–36)
MCHC RBC-ENTMCNC: 32.1 GM/DL — SIGNIFICANT CHANGE UP (ref 32–36)
MCV RBC AUTO: 96.7 FL — SIGNIFICANT CHANGE UP (ref 80–100)
MCV RBC AUTO: 96.7 FL — SIGNIFICANT CHANGE UP (ref 80–100)
MICROCYTES BLD QL: SLIGHT — SIGNIFICANT CHANGE UP
MICROCYTES BLD QL: SLIGHT — SIGNIFICANT CHANGE UP
MONOCYTES # BLD AUTO: 1.74 K/UL — HIGH (ref 0–0.9)
MONOCYTES # BLD AUTO: 1.74 K/UL — HIGH (ref 0–0.9)
MONOCYTES NFR BLD AUTO: 6.9 % — SIGNIFICANT CHANGE UP (ref 2–14)
MONOCYTES NFR BLD AUTO: 6.9 % — SIGNIFICANT CHANGE UP (ref 2–14)
NEUTROPHILS # BLD AUTO: 21.91 K/UL — HIGH (ref 1.8–7.4)
NEUTROPHILS # BLD AUTO: 21.91 K/UL — HIGH (ref 1.8–7.4)
NEUTROPHILS NFR BLD AUTO: 87 % — HIGH (ref 43–77)
NEUTROPHILS NFR BLD AUTO: 87 % — HIGH (ref 43–77)
PHOSPHATE SERPL-MCNC: 2.7 MG/DL — SIGNIFICANT CHANGE UP (ref 2.5–4.5)
PHOSPHATE SERPL-MCNC: 2.7 MG/DL — SIGNIFICANT CHANGE UP (ref 2.5–4.5)
PLAT MORPH BLD: NORMAL — SIGNIFICANT CHANGE UP
PLAT MORPH BLD: NORMAL — SIGNIFICANT CHANGE UP
PLATELET # BLD AUTO: 239 K/UL — SIGNIFICANT CHANGE UP (ref 150–400)
PLATELET # BLD AUTO: 239 K/UL — SIGNIFICANT CHANGE UP (ref 150–400)
POTASSIUM SERPL-MCNC: 3.8 MMOL/L — SIGNIFICANT CHANGE UP (ref 3.5–5.3)
POTASSIUM SERPL-MCNC: 3.8 MMOL/L — SIGNIFICANT CHANGE UP (ref 3.5–5.3)
POTASSIUM SERPL-SCNC: 3.8 MMOL/L — SIGNIFICANT CHANGE UP (ref 3.5–5.3)
POTASSIUM SERPL-SCNC: 3.8 MMOL/L — SIGNIFICANT CHANGE UP (ref 3.5–5.3)
PROT SERPL-MCNC: 6.6 G/DL — SIGNIFICANT CHANGE UP (ref 6–8.3)
PROT SERPL-MCNC: 6.6 G/DL — SIGNIFICANT CHANGE UP (ref 6–8.3)
PROTHROM AB SERPL-ACNC: 11.6 SEC — SIGNIFICANT CHANGE UP (ref 9.5–13)
PROTHROM AB SERPL-ACNC: 11.6 SEC — SIGNIFICANT CHANGE UP (ref 9.5–13)
RBC # BLD: 3 M/UL — LOW (ref 4.2–5.8)
RBC # BLD: 3 M/UL — LOW (ref 4.2–5.8)
RBC # FLD: 13.7 % — SIGNIFICANT CHANGE UP (ref 10.3–14.5)
RBC # FLD: 13.7 % — SIGNIFICANT CHANGE UP (ref 10.3–14.5)
RBC BLD AUTO: ABNORMAL
RBC BLD AUTO: ABNORMAL
RH IG SCN BLD-IMP: POSITIVE — SIGNIFICANT CHANGE UP
RH IG SCN BLD-IMP: POSITIVE — SIGNIFICANT CHANGE UP
SODIUM SERPL-SCNC: 136 MMOL/L — SIGNIFICANT CHANGE UP (ref 135–145)
SODIUM SERPL-SCNC: 136 MMOL/L — SIGNIFICANT CHANGE UP (ref 135–145)
SPHEROCYTES BLD QL SMEAR: SLIGHT — SIGNIFICANT CHANGE UP
SPHEROCYTES BLD QL SMEAR: SLIGHT — SIGNIFICANT CHANGE UP
WBC # BLD: 25.18 K/UL — HIGH (ref 3.8–10.5)
WBC # BLD: 25.18 K/UL — HIGH (ref 3.8–10.5)
WBC # FLD AUTO: 25.18 K/UL — HIGH (ref 3.8–10.5)
WBC # FLD AUTO: 25.18 K/UL — HIGH (ref 3.8–10.5)

## 2023-10-25 PROCEDURE — 99233 SBSQ HOSP IP/OBS HIGH 50: CPT | Mod: GC

## 2023-10-25 PROCEDURE — 99232 SBSQ HOSP IP/OBS MODERATE 35: CPT

## 2023-10-25 PROCEDURE — 99232 SBSQ HOSP IP/OBS MODERATE 35: CPT | Mod: GC

## 2023-10-25 PROCEDURE — 76705 ECHO EXAM OF ABDOMEN: CPT | Mod: 26

## 2023-10-25 PROCEDURE — 99222 1ST HOSP IP/OBS MODERATE 55: CPT

## 2023-10-25 RX ORDER — INSULIN LISPRO 100/ML
5 VIAL (ML) SUBCUTANEOUS
Refills: 0 | Status: DISCONTINUED | OUTPATIENT
Start: 2023-10-25 | End: 2023-10-26

## 2023-10-25 RX ORDER — ATORVASTATIN CALCIUM 80 MG/1
80 TABLET, FILM COATED ORAL AT BEDTIME
Refills: 0 | Status: DISCONTINUED | OUTPATIENT
Start: 2023-10-25 | End: 2023-10-31

## 2023-10-25 RX ORDER — DEXTROSE 50 % IN WATER 50 %
15 SYRINGE (ML) INTRAVENOUS ONCE
Refills: 0 | Status: DISCONTINUED | OUTPATIENT
Start: 2023-10-25 | End: 2023-10-26

## 2023-10-25 RX ORDER — DEXTROSE 50 % IN WATER 50 %
12.5 SYRINGE (ML) INTRAVENOUS ONCE
Refills: 0 | Status: DISCONTINUED | OUTPATIENT
Start: 2023-10-25 | End: 2023-10-26

## 2023-10-25 RX ORDER — SODIUM CHLORIDE 9 MG/ML
1000 INJECTION, SOLUTION INTRAVENOUS
Refills: 0 | Status: DISCONTINUED | OUTPATIENT
Start: 2023-10-25 | End: 2023-10-26

## 2023-10-25 RX ORDER — LISINOPRIL 2.5 MG/1
40 TABLET ORAL EVERY 24 HOURS
Refills: 0 | Status: DISCONTINUED | OUTPATIENT
Start: 2023-10-25 | End: 2023-10-25

## 2023-10-25 RX ORDER — DEXTROSE 50 % IN WATER 50 %
25 SYRINGE (ML) INTRAVENOUS ONCE
Refills: 0 | Status: DISCONTINUED | OUTPATIENT
Start: 2023-10-25 | End: 2023-10-26

## 2023-10-25 RX ORDER — INSULIN GLARGINE 100 [IU]/ML
6 INJECTION, SOLUTION SUBCUTANEOUS AT BEDTIME
Refills: 0 | Status: DISCONTINUED | OUTPATIENT
Start: 2023-10-25 | End: 2023-10-26

## 2023-10-25 RX ORDER — DEXTROSE 50 % IN WATER 50 %
25 SYRINGE (ML) INTRAVENOUS ONCE
Refills: 0 | Status: COMPLETED | OUTPATIENT
Start: 2023-10-25 | End: 2023-10-25

## 2023-10-25 RX ORDER — GLUCAGON INJECTION, SOLUTION 0.5 MG/.1ML
1 INJECTION, SOLUTION SUBCUTANEOUS ONCE
Refills: 0 | Status: DISCONTINUED | OUTPATIENT
Start: 2023-10-25 | End: 2023-10-26

## 2023-10-25 RX ORDER — POTASSIUM CHLORIDE 20 MEQ
20 PACKET (EA) ORAL ONCE
Refills: 0 | Status: COMPLETED | OUTPATIENT
Start: 2023-10-25 | End: 2023-10-25

## 2023-10-25 RX ORDER — INSULIN LISPRO 100/ML
4 VIAL (ML) SUBCUTANEOUS ONCE
Refills: 0 | Status: COMPLETED | OUTPATIENT
Start: 2023-10-25 | End: 2023-10-25

## 2023-10-25 RX ORDER — INSULIN LISPRO 100/ML
VIAL (ML) SUBCUTANEOUS
Refills: 0 | Status: DISCONTINUED | OUTPATIENT
Start: 2023-10-25 | End: 2023-10-26

## 2023-10-25 RX ORDER — LISINOPRIL 2.5 MG/1
40 TABLET ORAL EVERY 24 HOURS
Refills: 0 | Status: DISCONTINUED | OUTPATIENT
Start: 2023-10-26 | End: 2023-10-31

## 2023-10-25 RX ORDER — INSULIN LISPRO 100/ML
VIAL (ML) SUBCUTANEOUS AT BEDTIME
Refills: 0 | Status: DISCONTINUED | OUTPATIENT
Start: 2023-10-25 | End: 2023-10-26

## 2023-10-25 RX ORDER — SPIRONOLACTONE 25 MG/1
25 TABLET, FILM COATED ORAL DAILY
Refills: 0 | Status: DISCONTINUED | OUTPATIENT
Start: 2023-10-25 | End: 2023-10-30

## 2023-10-25 RX ORDER — ASPIRIN/CALCIUM CARB/MAGNESIUM 324 MG
81 TABLET ORAL DAILY
Refills: 0 | Status: DISCONTINUED | OUTPATIENT
Start: 2023-10-25 | End: 2023-10-31

## 2023-10-25 RX ORDER — CARVEDILOL PHOSPHATE 80 MG/1
12.5 CAPSULE, EXTENDED RELEASE ORAL EVERY 12 HOURS
Refills: 0 | Status: DISCONTINUED | OUTPATIENT
Start: 2023-10-25 | End: 2023-10-31

## 2023-10-25 RX ADMIN — CARVEDILOL PHOSPHATE 12.5 MILLIGRAM(S): 80 CAPSULE, EXTENDED RELEASE ORAL at 17:37

## 2023-10-25 RX ADMIN — Medication 20 MILLIEQUIVALENT(S): at 11:25

## 2023-10-25 RX ADMIN — PIPERACILLIN AND TAZOBACTAM 25 GRAM(S): 4; .5 INJECTION, POWDER, LYOPHILIZED, FOR SOLUTION INTRAVENOUS at 19:23

## 2023-10-25 RX ADMIN — ATORVASTATIN CALCIUM 80 MILLIGRAM(S): 80 TABLET, FILM COATED ORAL at 22:36

## 2023-10-25 RX ADMIN — Medication 650 MILLIGRAM(S): at 03:15

## 2023-10-25 RX ADMIN — Medication 650 MILLIGRAM(S): at 14:36

## 2023-10-25 RX ADMIN — Medication 650 MILLIGRAM(S): at 22:36

## 2023-10-25 RX ADMIN — MORPHINE SULFATE 2 MILLIGRAM(S): 50 CAPSULE, EXTENDED RELEASE ORAL at 13:42

## 2023-10-25 RX ADMIN — OXYCODONE HYDROCHLORIDE 10 MILLIGRAM(S): 5 TABLET ORAL at 07:30

## 2023-10-25 RX ADMIN — Medication 650 MILLIGRAM(S): at 09:13

## 2023-10-25 RX ADMIN — Medication 4 UNIT(S): at 14:31

## 2023-10-25 RX ADMIN — Medication 81 MILLIGRAM(S): at 17:34

## 2023-10-25 RX ADMIN — PIPERACILLIN AND TAZOBACTAM 25 GRAM(S): 4; .5 INJECTION, POWDER, LYOPHILIZED, FOR SOLUTION INTRAVENOUS at 11:23

## 2023-10-25 RX ADMIN — GABAPENTIN 1600 MILLIGRAM(S): 400 CAPSULE ORAL at 13:41

## 2023-10-25 RX ADMIN — MORPHINE SULFATE 2 MILLIGRAM(S): 50 CAPSULE, EXTENDED RELEASE ORAL at 19:18

## 2023-10-25 RX ADMIN — OXYCODONE HYDROCHLORIDE 10 MILLIGRAM(S): 5 TABLET ORAL at 23:43

## 2023-10-25 RX ADMIN — OXYCODONE HYDROCHLORIDE 10 MILLIGRAM(S): 5 TABLET ORAL at 01:30

## 2023-10-25 RX ADMIN — OXYCODONE HYDROCHLORIDE 10 MILLIGRAM(S): 5 TABLET ORAL at 00:25

## 2023-10-25 RX ADMIN — MORPHINE SULFATE 2 MILLIGRAM(S): 50 CAPSULE, EXTENDED RELEASE ORAL at 08:15

## 2023-10-25 RX ADMIN — MORPHINE SULFATE 2 MILLIGRAM(S): 50 CAPSULE, EXTENDED RELEASE ORAL at 07:52

## 2023-10-25 RX ADMIN — Medication 650 MILLIGRAM(S): at 02:19

## 2023-10-25 RX ADMIN — MORPHINE SULFATE 2 MILLIGRAM(S): 50 CAPSULE, EXTENDED RELEASE ORAL at 02:19

## 2023-10-25 RX ADMIN — OXYCODONE HYDROCHLORIDE 10 MILLIGRAM(S): 5 TABLET ORAL at 17:35

## 2023-10-25 RX ADMIN — Medication 650 MILLIGRAM(S): at 22:42

## 2023-10-25 RX ADMIN — MORPHINE SULFATE 2 MILLIGRAM(S): 50 CAPSULE, EXTENDED RELEASE ORAL at 13:55

## 2023-10-25 RX ADMIN — GABAPENTIN 1600 MILLIGRAM(S): 400 CAPSULE ORAL at 22:36

## 2023-10-25 RX ADMIN — LISINOPRIL 40 MILLIGRAM(S): 2.5 TABLET ORAL at 13:42

## 2023-10-25 RX ADMIN — MORPHINE SULFATE 2 MILLIGRAM(S): 50 CAPSULE, EXTENDED RELEASE ORAL at 02:35

## 2023-10-25 RX ADMIN — Medication 650 MILLIGRAM(S): at 10:00

## 2023-10-25 RX ADMIN — GABAPENTIN 1600 MILLIGRAM(S): 400 CAPSULE ORAL at 06:36

## 2023-10-25 RX ADMIN — OXYCODONE HYDROCHLORIDE 10 MILLIGRAM(S): 5 TABLET ORAL at 23:32

## 2023-10-25 RX ADMIN — OXYCODONE HYDROCHLORIDE 10 MILLIGRAM(S): 5 TABLET ORAL at 12:44

## 2023-10-25 RX ADMIN — OXYCODONE HYDROCHLORIDE 10 MILLIGRAM(S): 5 TABLET ORAL at 06:28

## 2023-10-25 RX ADMIN — SPIRONOLACTONE 25 MILLIGRAM(S): 25 TABLET, FILM COATED ORAL at 17:37

## 2023-10-25 RX ADMIN — PIPERACILLIN AND TAZOBACTAM 25 GRAM(S): 4; .5 INJECTION, POWDER, LYOPHILIZED, FOR SOLUTION INTRAVENOUS at 02:20

## 2023-10-25 RX ADMIN — OXYCODONE HYDROCHLORIDE 10 MILLIGRAM(S): 5 TABLET ORAL at 13:30

## 2023-10-25 RX ADMIN — OXYCODONE HYDROCHLORIDE 10 MILLIGRAM(S): 5 TABLET ORAL at 18:30

## 2023-10-25 RX ADMIN — Medication 650 MILLIGRAM(S): at 15:15

## 2023-10-25 RX ADMIN — MORPHINE SULFATE 2 MILLIGRAM(S): 50 CAPSULE, EXTENDED RELEASE ORAL at 19:35

## 2023-10-25 NOTE — PROGRESS NOTE ADULT - ASSESSMENT
67M PMH HTN, IDDM with peripheral neuropathy, CAD s/p PCI with 2 stents (3 yrs ago at [facility]), recent admission 10/3 for diabetic ssti (CT r/o absecess, no OM) discharged on doxycycline and keflex, presenting with CC of r. foot pain, found to meet SIRS crtieria with suspected source infectious of r.toe, a/f management.

## 2023-10-25 NOTE — PROGRESS NOTE ADULT - PROBLEM SELECTOR PLAN 6
Home regimen should be clarified, was discharged on lantus 11u, and humalog 7u premeal.  A1c 12.6    Plan:  - endocrine recs appreciated: Lantus 10 at night, lispro 5 before meals low iss  - FSG q6 hours Home regimen should be clarified, was discharged on lantus 11u, and humalog 7u premeal.  A1c 12.6    Plan:  - endocrine recs appreciated: Lantus 6 at night, lispro 5 before meals low iss  - FSG q6 hours

## 2023-10-25 NOTE — PROGRESS NOTE ADULT - PROBLEM SELECTOR PLAN 3
Patient presenting with SBP to 191/80, Alkphos elevated at this time. Likely due to pain from foot wound, endorses compliance with medication.    Plan:  - Restart lisinopril as below  - Pain control as above.   - Trend BP. Patient presenting with SBP to 191/80, Alkphos elevated at this time. Likely due to pain from foot wound, endorses compliance with medication.    Plan:  - Cards rec: Lisinopril 40 mg qd, spironolactone 25 mg qd, coreg 12.5 BID uptitrate as tolerated per GDMT  - Pain control as above.   - Trend BP.

## 2023-10-25 NOTE — PHYSICAL THERAPY INITIAL EVALUATION ADULT - ADDITIONAL COMMENTS
Pt currently resides alone in elevator apt, ramp access to enter. Primarily amb indep w/o AD however has SC in home. Indep w/ showering and dressing tasks.

## 2023-10-25 NOTE — OCCUPATIONAL THERAPY INITIAL EVALUATION ADULT - PERTINENT HX OF CURRENT PROBLEM, REHAB EVAL
67M PMH HTN, IDDM with peripheral neuropathy, CAD s/p PCI with 2 stents (3 yrs ago at [facility]), recent admission 10/3 for diabetic ssti (CT r/o absecess, no OM) discharged on doxycycline and keflex, presenting with CC of r. foot pain      Pt. s/p R partial 4th toe amputation on 10/24/23

## 2023-10-25 NOTE — PROGRESS NOTE ADULT - ASSESSMENT
67M PMH HTN, IDDM with peripheral neuropathy, CAD s/p PCI with 2 stents (3 yrs ago at [facility]), recent admission 10/3 for diabetic ssti (CT r/o absecess, no OM) discharged on doxycycline and keflex, presenting with CC of r. foot pain, found to meet SIRS crtieria with suspected source infectious of r.toe pending partial amputation. Cardiology team was consulted for pre-op clearance.    Review of studies:  EKG (10/24/2023): NSR; possible Q wave on aVL which could represent old lateral infarct in the context of past CAD s/p PCI  TTE (10/23/2023): EF 45%; Mild-to-moderate concentric left ventricular hypertrophy; Left ventricular systolic function is mildly reduced with regional wall motion abnormalities; indeterminate left ventricular diastolic function and filling pressure. Right ventricular normal size and function; TAPSE 2.38cm. Mild dilation of left atrium; Right atrium normal in size. Evidence of moderate aortic valve stenosis; AV area 1.27, peak transvalvular velocity 2.47, mean transvalvular gradient is 16.00 mmHg, and the LVOT/AV velocity ratio is 0.40. IVC compressible to <50%.    #Pre-op clearance  - Patient tolerating procedure well with no post-operative complaints of episodes of SOB, CANSECO, orthopnea, chronic cough, or palpitations.    #HFmrEF  - CAD s/p PCI x2 in 2020; TTE evidencing HFmrEF  - Start spironolactone 25mg PO QD  - C/w lisinopril 50mg PO QD  - C/w coreg 12.5mg PO BID uptitrate as tolerated per GDMT     #CAD  - Patient reports 12 months of antiplatelet therapy s/p PCI in 2020 but discontinued after  - Start aspirin 81mg PO QD  - Start lipitor 80mg PO QD    #Aortic stenosis  - Patient currently clinically stable; would benefit from comparison of TTE performed during admission with outpatient echo for assessment of progression  - Outpatient follow-up with established cardiologist; should obtain TTE in the next 12 months for assessment of progression    Recommendations above are preliminary pending discussion with an attending cardiologist  Plan was discussed with primary team  We'll continue to follow, thank you for the consultation    Petey Conteh  Medical Resident PGY-1   67M PMH HTN, IDDM with peripheral neuropathy, CAD s/p PCI with 2 stents (3 yrs ago at [facility]), recent admission 10/3 for diabetic ssti (CT r/o absecess, no OM) discharged on doxycycline and keflex, presenting with CC of r. foot pain, found to meet SIRS crtieria with suspected source infectious of r.toe pending partial amputation. Cardiology team was consulted for pre-op clearance.    Review of studies:  EKG (10/24/2023): NSR; possible Q wave on aVL which could represent old lateral infarct in the context of past CAD s/p PCI  TTE (10/23/2023): EF 45%; Mild-to-moderate concentric left ventricular hypertrophy; Left ventricular systolic function is mildly reduced with regional wall motion abnormalities; indeterminate left ventricular diastolic function and filling pressure. Right ventricular normal size and function; TAPSE 2.38cm. Mild dilation of left atrium; Right atrium normal in size. Evidence of moderate aortic valve stenosis; AV area 1.27, peak transvalvular velocity 2.47, mean transvalvular gradient is 16.00 mmHg, and the LVOT/AV velocity ratio is 0.40. IVC compressible to <50%.    #Pre-op clearance  - Patient tolerating procedure well with no post-operative complaints of episodes of SOB, CANSECO, orthopnea, chronic cough, or palpitations.    #HFmrEF  - CAD s/p PCI x2 in 2020; TTE evidencing HFmrEF  - Start spironolactone 25mg PO QD  - C/w lisinopril 50mg PO QD  - C/w coreg 12.5mg PO BID uptitrate as tolerated per GDMT  - Encourage mobilization to improve b/l LE edema     #CAD  - Patient reports 12 months of antiplatelet therapy s/p PCI in 2020 but discontinued after  - Start aspirin 81mg PO QD  - Start lipitor 80mg PO QD    #Aortic stenosis  - Patient currently clinically stable; would benefit from comparison of TTE performed during admission with outpatient echo for assessment of progression  - Outpatient follow-up with established cardiologist; should obtain TTE in the next 12 months for assessment of progression    Recommendations above are preliminary pending discussion with an attending cardiologist  Plan was discussed with primary team  We'll continue to follow, thank you for the consultation    Peety Conteh  Medical Resident PGY-1

## 2023-10-25 NOTE — PROGRESS NOTE ADULT - SUBJECTIVE AND OBJECTIVE BOX
HPI:  67M PMH HTN, IDDM with peripheral neuropathy, CAD s/p PCI with 2 stents (3 yrs ago at [facility]), recent admission 10/3 for diabetic ssti (CT r/o absecess, no OM) discharged on doxycycline and keflex, presenting with CC of r. foot pain. fter discharge patient endorses completing antibiotics but on Wednesday the pain started getting unbearable, not responding to his usual neurontin for nerve pain. He did not notice drainage from the wound. He came into the hospital due ot the pain. He denies any fever, chills, shortness of breath, cough, diarrhea, constipation.    Consultant subjective:  Patient evaluated at bedside where he was found stable and in NAD. Complaining of continued but improved right foot pain after procedure. Post-operatively does not report episodes of SOB, CANSECO, orthopnea, chronic cough, or palpitations.     ROS: A 10-point review of systems was otherwise negative.    PAST MEDICAL & SURGICAL HISTORY:  IDDM (insulin dependent diabetes mellitus)  HTN (hypertension)  CAD S/P percutaneous coronary angioplasty  Neuropathy  No significant past surgical history  SOCIAL HISTORY:  FAMILY HISTORY:  ALLERGIES: 	  No Known Allergies    MEDICATIONS:  acetaminophen     Tablet .. 650 milliGRAM(s) Oral every 6 hours  aluminum hydroxide/magnesium hydroxide/simethicone Suspension 30 milliLiter(s) Oral every 6 hours PRN  amLODIPine   Tablet 10 milliGRAM(s) Oral every 24 hours  dextrose 5%. 1000 milliLiter(s) IV Continuous <Continuous>  dextrose 5%. 1000 milliLiter(s) IV Continuous <Continuous>  dextrose 50% Injectable 25 Gram(s) IV Push once  dextrose 50% Injectable 12.5 Gram(s) IV Push once  dextrose 50% Injectable 25 Gram(s) IV Push once  dextrose Oral Gel 15 Gram(s) Oral once PRN  gabapentin 1600 milliGRAM(s) Oral three times a day  glucagon  Injectable 1 milliGRAM(s) IntraMuscular once  insulin glargine Injectable (LANTUS) 10 Unit(s) SubCutaneous at bedtime  insulin lispro (ADMELOG) corrective regimen sliding scale   SubCutaneous at bedtime  insulin lispro (ADMELOG) corrective regimen sliding scale   SubCutaneous three times a day before meals  insulin lispro Injectable (ADMELOG) 5 Unit(s) SubCutaneous three times a day before meals  lisinopril 40 milliGRAM(s) Oral daily  morphine  - Injectable 2 milliGRAM(s) IV Push every 4 hours PRN  oxyCODONE    IR 10 milliGRAM(s) Oral every 6 hours  piperacillin/tazobactam IVPB.. 4.5 Gram(s) IV Intermittent every 8 hours      PHYSICAL EXAM:  T(C): 37.1 (10-24-23 @ 06:07), Max: 37.3 (10-23-23 @ 09:53)  HR: 80 (10-24-23 @ 06:07) (62 - 90)  BP: 149/86 (10-24-23 @ 06:07) (134/80 - 213/103)  RR: 17 (10-24-23 @ 06:07) (16 - 18)  SpO2: 97% (10-24-23 @ 06:07) (95% - 97%)  Wt(kg): --    PHYSICAL EXAM:  Constitutional: Sitting on bed in NAD  HEENT: NC/AT, PERRLA, EOMI, no conjunctival pallor or scleral icterus, DMM  Neck: Supple, no JVD or HJR  Respiratory: CTA B/L. No w/r/r.   Cardiovascular: RRR, normal S1 and S2, 3/6 systolic crescendo-decresendo pereceptible at all cardiac foci but strongest in aortic focus  Gastrointestinal: +BS, soft NTND, no guarding or rebound tenderness, no palpable masses   Extremities: wwp; b/l pedal edema upt to mid tibia. R 4th toe blackened, wound gangrenous appearing, but no drainable fluid, nonerythematous.   Neurological: AAOx3, no CN deficits, strength and sensation intact throughout.   Skin: No gross skin abnormalities or rashes      I&O's Summary      	  LABS:	 	    CARDIAC MARKERS:                            9.4    22.79 )-----------( 213      ( 23 Oct 2023 04:00 )             28.6     10-23    132<L>  |  98  |  22  ----------------------------<  275<H>  3.8   |  25  |  0.96    Ca    9.0      23 Oct 2023 04:00  Phos  2.8     10-23  Mg     1.9     10-23    TPro  6.6  /  Alb  2.4<L>  /  TBili  0.6  /  DBili  x   /  AST  20  /  ALT  17  /  AlkPhos  156<H>  10-23    proBNP:   Lipid Profile:   HgA1c:   TSH:     TELEMETRY: 	    ECG:  	  RADIOLOGY:   ECHO:  STRESS:  CATH:   HPI:  67M PMH HTN, IDDM with peripheral neuropathy, CAD s/p PCI with 2 stents (3 yrs ago at [facility]), recent admission 10/3 for diabetic ssti (CT r/o absecess, no OM) discharged on doxycycline and keflex, presenting with CC of r. foot pain. fter discharge patient endorses completing antibiotics but on Wednesday the pain started getting unbearable, not responding to his usual neurontin for nerve pain. He did not notice drainage from the wound. He came into the hospital due ot the pain. He denies any fever, chills, shortness of breath, cough, diarrhea, constipation.    Consultant subjective:  Patient evaluated at bedside where he was found stable and in NAD. Complaining of continued but improved right foot pain after procedure. Post-operatively does not report episodes of SOB, CANSECO, orthopnea, chronic cough, or palpitations.     ROS: A 10-point review of systems was otherwise negative.    PAST MEDICAL & SURGICAL HISTORY:  IDDM (insulin dependent diabetes mellitus)  HTN (hypertension)  CAD S/P percutaneous coronary angioplasty  Neuropathy  No significant past surgical history  SOCIAL HISTORY:  FAMILY HISTORY:  ALLERGIES: 	  No Known Allergies    MEDICATIONS:  acetaminophen     Tablet .. 650 milliGRAM(s) Oral every 6 hours  aluminum hydroxide/magnesium hydroxide/simethicone Suspension 30 milliLiter(s) Oral every 6 hours PRN  amLODIPine   Tablet 10 milliGRAM(s) Oral every 24 hours  dextrose 5%. 1000 milliLiter(s) IV Continuous <Continuous>  dextrose 5%. 1000 milliLiter(s) IV Continuous <Continuous>  dextrose 50% Injectable 25 Gram(s) IV Push once  dextrose 50% Injectable 12.5 Gram(s) IV Push once  dextrose 50% Injectable 25 Gram(s) IV Push once  dextrose Oral Gel 15 Gram(s) Oral once PRN  gabapentin 1600 milliGRAM(s) Oral three times a day  glucagon  Injectable 1 milliGRAM(s) IntraMuscular once  insulin glargine Injectable (LANTUS) 10 Unit(s) SubCutaneous at bedtime  insulin lispro (ADMELOG) corrective regimen sliding scale   SubCutaneous at bedtime  insulin lispro (ADMELOG) corrective regimen sliding scale   SubCutaneous three times a day before meals  insulin lispro Injectable (ADMELOG) 5 Unit(s) SubCutaneous three times a day before meals  lisinopril 40 milliGRAM(s) Oral daily  morphine  - Injectable 2 milliGRAM(s) IV Push every 4 hours PRN  oxyCODONE    IR 10 milliGRAM(s) Oral every 6 hours  piperacillin/tazobactam IVPB.. 4.5 Gram(s) IV Intermittent every 8 hours      PHYSICAL EXAM:  T(C): 37.1 (10-24-23 @ 06:07), Max: 37.3 (10-23-23 @ 09:53)  HR: 80 (10-24-23 @ 06:07) (62 - 90)  BP: 149/86 (10-24-23 @ 06:07) (134/80 - 213/103)  RR: 17 (10-24-23 @ 06:07) (16 - 18)  SpO2: 97% (10-24-23 @ 06:07) (95% - 97%)  Wt(kg): --    PHYSICAL EXAM:  Constitutional: Sitting on bed in NAD  HEENT: NC/AT, PERRLA, EOMI, no conjunctival pallor or scleral icterus, DMM  Neck: Supple, no JVD or HJR  Respiratory: CTA B/L. No w/r/r.   Cardiovascular: RRR, normal S1 and S2, 3/6 systolic crescendo-decresendo pereceptible at all cardiac foci but strongest in aortic focus  Gastrointestinal: +BS, soft NTND, no guarding or rebound tenderness, no palpable masses   Extremities: wwp; b/l lower extremity edema up to proximal tibia. Right foot bandaged post-amputation with no serosanguineous or purulent saturation on dressing  Neurological: AAOx3, no CN deficits, strength and sensation intact throughout.   Skin: No gross skin abnormalities or rashes      I&O's Summary      	  LABS:	 	    CARDIAC MARKERS:                            9.4    22.79 )-----------( 213      ( 23 Oct 2023 04:00 )             28.6     10-23    132<L>  |  98  |  22  ----------------------------<  275<H>  3.8   |  25  |  0.96    Ca    9.0      23 Oct 2023 04:00  Phos  2.8     10-23  Mg     1.9     10-23    TPro  6.6  /  Alb  2.4<L>  /  TBili  0.6  /  DBili  x   /  AST  20  /  ALT  17  /  AlkPhos  156<H>  10-23    proBNP:   Lipid Profile:   HgA1c:   TSH:     TELEMETRY: 	    ECG:  	  RADIOLOGY:   ECHO:  STRESS:  CATH:

## 2023-10-25 NOTE — OCCUPATIONAL THERAPY INITIAL EVALUATION ADULT - ADDITIONAL COMMENTS
Pt. resides alone in an apartment with 4 AMY and ramp access. Pt. reports independence prior in ADLs and functional mob, owns a SC and RW.

## 2023-10-25 NOTE — PROGRESS NOTE ADULT - ASSESSMENT
68M PMH HTN, IDDM with peripheral neuropathy, CAD s/p PCI with 2 stents (3 yrs ago at [facility]) who presents to [facility] with right toe pain. Podiatry consulted to evaluate R toe wound. Pt with prior admission on 10/1-10/3 with early ischemic signs of R 4th digit, and was discharged with PO abx (keflex,doxy). States of completing medication but has not followed up out pt with any provider. In the ED, pt febrile with elevated WBC to 19.71. At time of consult on the floors, pt is now afebrile with VSS. 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 Pt is s/p R foot partial 4th ray resection (DOS 10/24). Patient tolerated procedure well.       Plan:    -c/w IV abx  -f/u intra-operative cultures & path  - can restart DVT prophylaxis   -WB status: WBAT to the R heel  -R lower extremity to remain elevated while in bed  -Please place pt on an adequate pain management regimen   -Surgical site cleansed with NS. Applied 1/4 inch plain packing. Betadine soaked gauze applied w/ kerlix  -Rest of care up to primary team    Podiatry following, Plan d/w attending

## 2023-10-25 NOTE — PROGRESS NOTE ADULT - SUBJECTIVE AND OBJECTIVE BOX
Patient is a 68y old  Male who presents with a chief complaint of Foot pain (25 Oct 2023 07:07)      INTERVAL HPI/ OVERNIGHT EVENTS: No acute events. Pt is s/p R partial 4th ray amputation. Dressing noted to be dry intact and clean.       LABS                        9.3    25.18 )-----------( 239      ( 25 Oct 2023 05:30 )             29.0     10-24    133<L>  |  100  |  18  ----------------------------<  89  3.6   |  27  |  1.02    Ca    9.0      24 Oct 2023 05:30  Phos  2.5     10-24  Mg     2.0     10-24    TPro  6.5  /  Alb  2.3<L>  /  TBili  0.4  /  DBili  x   /  AST  18  /  ALT  16  /  AlkPhos  160<H>  10-24    PT/INR - ( 25 Oct 2023 05:30 )   PT: 11.6 sec;   INR: 1.02          PTT - ( 25 Oct 2023 05:30 )  PTT:28.9 sec    ICU Vital Signs Last 24 Hrs  T(C): 37.5 (25 Oct 2023 06:10), Max: 37.6 (24 Oct 2023 18:46)  T(F): 99.5 (25 Oct 2023 06:10), Max: 99.6 (24 Oct 2023 18:46)  HR: 86 (25 Oct 2023 06:10) (68 - 100)  BP: 165/85 (25 Oct 2023 06:10) (116/77 - 174/96)  BP(mean): 102 (24 Oct 2023 18:00) (90 - 121)  ABP: --  ABP(mean): --  RR: 18 (25 Oct 2023 06:10) (10 - 23)  SpO2: 98% (25 Oct 2023 06:10) (92% - 100%)    O2 Parameters below as of 24 Oct 2023 21:00  Patient On (Oxygen Delivery Method): room air      MICROBIOLOGY   Culture - Tissue with Gram Stain (10.24.23 @ 15:30)   Gram Stain:   No organisms seen   No White blood cells  Specimen Source: .Tissue right 4th metatarsal clean margin or spec      RADIOLOGY  < from: MR Foot w/wo IV Cont, Right (10.22.23 @ 19:32) >  IMPRESSION:  1.  Soft tissue atrophy/wound of the 4th toe with underlying marrow   changes of the 4th proximal phalanx head, middle phalanx, and distal   phalanx that may be reactive in the absence of significant T1 marrow   replacement, however, early infection may also be considered.  2.  Consider follow-up MRI to assess stability as warranted.  3.  Preliminary report was provided by [facility].    < end of copied text >      < from: Xray Foot AP + Lateral + Oblique, Right (10.21.23 @ 13:09) >    IMPRESSION: No evidence of acute fracture. No radiographic evidence of   osteomyelitis.    < end of copied text >      PHYSICAL EXAM   Lower Extremity Focused:  Vasc: DP/PT 2/4 b/l, erythema and edema to the R forefoot and midfoot, darkening of skin of R 4th digit up to the MPJ.   Derm:  R foot surgical site wound at the distal 4th ray. Minimal sanguinous drainage. Granular wound bed. Plantar skin just proximal to the surgical with eraly signs of dark discoloration. R 5th digits, plantar 4th skin and 3rd digit cold on palpation.    Neuro: protective sensation slightly diminished  MSK: 5/5 muscle strength in all compartments

## 2023-10-25 NOTE — PROGRESS NOTE ADULT - ATTENDING COMMENTS
Patient is a 67M PMH of CAD s/p PCI (2020), HTN, IDDM with peripheral neuropathy, recent admission 10/3 for diabetic Soft tissues infection, discharged on doxycycline and keflex, presenting with Complaint of right foot pain, found to have worsening right 4th toe gangrene with cellulitis and without palpable pulses. Cardiology team  originally consulted for pre-operative Cardiovascular Optimization.    Review of studies:  - EKG (10/24/2023): NSR;   - TTE (10/23/2023): EF 45%; Mild-to-moderate concentric left ventricular hypertrophy; Left ventricular systolic function is mildly reduced with regional wall motion abnormalities; indeterminate left ventricular diastolic function and filling pressure. Right ventricular normal size and function; TAPSE 2.38cm. Mild dilation of left atrium; Right atrium normal in size. Evidence of moderate aortic valve stenosis; AV area 1.27, peak transvalvular velocity 2.47, mean transvalvular gradient is 16.00 mmHg, and the LVOT/AV velocity ratio is 0.40. IVC compressible to <50%.    #CAD s/p PCI   - Patient with known ASCVD with CAD and PCI placed in setting of abnormal stress test.   - He reports excellent performance status, reportedly playing basketball a week prior to hospitalization. ROS is negative for chest pain, SOB or other anginal symptoms  - Per outside [facility] records, patient underwent LHC in 2020 had 2 Vessel CAD with discussion for PCI vs CABG; unclear if patient was subsequent revascularized. He later underwent PTCA and atherectomy in 2021 of the Right Ant Tibial atery after which he was started on DAPT (ASA and brilinta)  - Clinically patient remains well compensated, warm and well perfused. Physical exam is notable for 3/6 systolic murmur heard trough out the pericardium  - Echo reviewed with Moderate LVH, mildly reduced LV systolic function with RWMA. Last known EF was 60% in 12/2020  - Please resume ASA 81 mg po daily and cont with Lipitor 80 mg.   - Please obtain official collaterals from [facility] about prior workup (Echo; LHC) as ideally would prefer plavix monotherapy over ASA monotherapy given concurrent PAD and CAD and unclear if patient was revascularized  - Cont Coreg 12.5 mg po BID    # Moderate Aortic stenosis  - Patient with Moderate Aortic stenosis with AV area 1.27, peak transvalvular velocity 2.47, mean transvalvular gradient is 16.00 mmHg, and the LVOT/AV velocity ratio is 0.40 with an EF of 45%  - Would like to obtain outpatient Echo for comparison, progression of disease  - Patient will need to obtain repeat Echo in 1 year or sooner if clinically indicated  - Cont Coreg as avove    #HFmrEF  - Known CAD with HFmrEF.  - Cont lisinopril 40 mg po daily  - Cont  Coreg 12.5 mg PO BID  with goal to uptitrate as tolerated for GDMT  - Cont Spironolactone  - Will defer SGLT2 given PAD with poor wound healing   - no need for diuretics for now    Please ensure patient has outpatient follow up with Cardiologist Dr Anni Taylor ([phone]) at [facility] within 2 weeks of DC

## 2023-10-25 NOTE — OCCUPATIONAL THERAPY INITIAL EVALUATION ADULT - GENERAL OBSERVATIONS, REHAB EVAL
OT IE complete. RN Osmar clearing pt. for session. Pt. received semi-supine in bed, +IV (heplocked), + R foot gauze dressing C/D/I in NAD agreeable to therapy session

## 2023-10-25 NOTE — PROGRESS NOTE ADULT - ATTENDING COMMENTS
Pt seen on rounds this afternoon.  Underwent partial R 4th ray amputation yesterday.  Cultures taken, bone sent for pathology to determine if the margins were clear of infection.  There was apparently almost no bleeding at the time of the amputation, suggesting that there may well be arterial insufficiency contributing to non-response to antibiotics.  Wound was apparently not closed.  Pt reports only mild pain at the surgical site.  Foot is wrapped in Kerlex.  There is 1+ edema above the ankle but no increased warmth.    Glucose spiked to 208 at bedtime, almost certainly due to a late supper without any premeal insulin.  He then dropped to 64 this morning with only 10 units Lantus, no coverage.  Will decrease the Lantus to 6 units, keep the premeal at 5 units. 6 MONTHS

## 2023-10-25 NOTE — PROGRESS NOTE ADULT - ASSESSMENT
I M    67 y o M PMH HTN, IDDM with peripheral neuropathy, CAD s/p PCI with 2 stents (3 yrs ago at [facility]), recent admission 10/3 for diabetic ssti (CT r/o absecess, no OM) discharged on doxycycline and keflex, presenting with CC of r. foot pain, found to meet SIRS crtieria with suspected source infectious of r.toe, a/f management.     Problem/Plan - 1:  ·  Problem: Sepsis.   ·  Plan: Patient presenting meeting 2/4 SIRS (T101, Leukocytosis), source likely Gangrenous toe vs Osteomyelitis of R. foot wound. No purulence or erythema on examination however extremely tender to palpation and at rest. XR w/o evidence of osteo however lower sensivity. s/p 1 dose vanc and zosyn in ED. Prior was on Keflex and doxycycline x 10 days (finished 10/10), completed course. Last BCx w/o growth.   s/p 2L NS in ED  BCx: no growth at 1 day  MRI: Soft tissue atrophy/wound of the 4th toe with underlying marrow changes of the 4th proximal phalanx head, middle phalanx, and distal phalanx that may be reactive in the absence of significant T1 marrow replacement, however, early infection may also be considered.    Plan:  - f/u BCx, UCx   - c/w vancomycin 1g q12 hours empirically (re-dose 1 AM 10/23)  - c/w zosyn with pseudomonal dosing   - Tylenol q6 prn for fever.   - f/u podiatry recs as below.    Problem/Plan - 2:  ·  Problem: Diabetic foot infection.   ·  Plan: Patient presenting with worsening right 4th toe pain and discoloratio s/p course of IV and oral antibtiocs: Keflex and doxycycline until 10/10 endorses compliance. On presentation, he is hemodynamically stable however with leukoctyosis to 19 and fever to 101.   Right toe xrays done in ED showing no evidence of fracture or osteomyelitis   S/p 1 dose vancomycin in ED and zosyn in ED.   BCx: no growth at 1 day  Patient went to surgery for toe amputation today    Plan:  - Podiatry consulted, f/u recs   - vascular consulted: rec angio tomorrow 10/25  - Local wound care: Cleansed with NS. Betadine soaked gauze applied to R 4th digit, 3rd and 4th interspaces Advised pt to keep dressing intact dry and clean  - Pain management: Tylenol 650 q6h standing, Oxy 10 q6h standing, morphine 2mg q6h PRN for severe  - 1x 0.5 dilaudid for severe breakthrough pain.    Problem/Plan - 3:  ·  Problem: Hypertensive urgency.   ·  Plan: Patient presenting with SBP to 191/80, Alkphos elevated at this time. Likely due to pain from foot wound, endorses compliance with medication.    Plan:  - Restart lisinopril as below  - Pain control as above.   - Trend BP.    Problem/Plan - 4:  ·  Problem: HTN (hypertension).   ·  Plan: Echo: Left ventricular systolic function is mildly reduced with a calculated ejection fraction of 45% with regional wall motion abnormalities.     Plan:  - holding home lisinopril 40mg qd for angio 10/25  - c/w amlodipine 10 mg qd.    Problem/Plan - 5:  ·  Problem: Transaminitis.   ·  Plan: Patient with isolated elevated alkphos, no RUQ pain, not seen on prior labs. Low suspicion for bile duct obstruction at this time.     Plan:  - Trend in AM  - Obtain RUQ US if still elevated or uptrending.    Problem/Plan - 6:  ·  Problem: Type 2 diabetes mellitus with hyperglycemia.   ·  Plan: Home regimen should be clarified, was discharged on lantus 11u, and humalog 7u premeal.  A1c 12.6    Plan:  - endocrine recs appreciated: Lantus 10 at night, lispro 5 before meals low iss  - FSG q6 hours.    Problem/Plan - 7:  ·  Problem: Prophylactic measure.   ·  Plan: F: s/p 2L NS  E: Replete as necessary K>4 Mg>2  N: CC diet   DVT Prophylaxis: heparin sq  GI prophylaxis: None   CODE STATUS: FULL  DISPO: RMF.

## 2023-10-25 NOTE — PROGRESS NOTE ADULT - SUBJECTIVE AND OBJECTIVE BOX
S/O: Patient examined in bed. Admits to mild R foot soreness post-op    ROS: Denies headache, blurred vision, chest pain, SOB, abdominal pain, nausea or vomiting.         piperacillin/tazobactam IVPB.. 4.5  carvedilol 12.5  lisinopril 40  piperacillin/tazobactam IVPB.. 4.5      Allergies    No Known Allergies    Intolerances        Vital Signs Last 24 Hrs  T(C): 37.6 (25 Oct 2023 09:16), Max: 37.6 (24 Oct 2023 18:46)  T(F): 99.7 (25 Oct 2023 09:16), Max: 99.7 (25 Oct 2023 09:16)  HR: 89 (25 Oct 2023 09:16) (68 - 100)  BP: 179/87 (25 Oct 2023 09:16) (116/77 - 179/87)  BP(mean): 102 (24 Oct 2023 18:00) (90 - 121)  RR: 18 (25 Oct 2023 09:16) (10 - 23)  SpO2: 94% (25 Oct 2023 09:16) (92% - 100%)    Parameters below as of 25 Oct 2023 09:16  Patient On (Oxygen Delivery Method): room air      I&O's Summary      PHYSICAL EXAM:    Constitutional: alert and awake, NAD  Respiratory: no respiratory distress  Cardiovascular: RRR  Extremities: right foot sp 4th ray amputation  Vascular: RLE: 2+FEM/2+POP/triphasic DP/biphasic PT. LLE: 2+FEM/2+POP/biphasic DP/biphasic PT      LABS:                        9.3    25.18 )-----------( 239      ( 25 Oct 2023 05:30 )             29.0     10-25    136  |  101  |  16  ----------------------------<  55<L>  3.8   |  26  |  1.08    Ca    9.1      25 Oct 2023 05:30  Phos  2.7     10-25  Mg     2.0     10-25    TPro  6.6  /  Alb  2.2<L>  /  TBili  0.4  /  DBili  x   /  AST  32  /  ALT  17  /  AlkPhos  206<H>  10-25    PT/INR - ( 25 Oct 2023 05:30 )   PT: 11.6 sec;   INR: 1.02          PTT - ( 25 Oct 2023 05:30 )  PTT:28.9 sec      A/P  67M PMH HTN, IDDM with peripheral neuropathy, CAD s/p PCI with 2 stents (3 yrs ago at [facility]), admitted for worsening right 4th toe gangrene with cellulitis and no palpable pulses.    -Continue vanco and zosyn  -S/p R 4th digit amputation. Wound care by podiatry  - RLE angiogram rescheduled for Friday. Please obtain pre-op labs (CBC, BMP, Mg, Phos, Coags, Type & Screen) and NPO @ [facility] on Thursday.

## 2023-10-25 NOTE — PROGRESS NOTE ADULT - SUBJECTIVE AND OBJECTIVE BOX
O/N Events: no acute events over night    Subjective/ROS: Patient seen and examined at bedside. Patient complains that he is hungry after being NPO for procedure. States that he will not go for angio and that he would rather eat. Of note open package of biscuits near bed. States that he has diarrhea due to abx    Denies Fever/Chills, HA, CP, SOB, n/v, changes in urinary habits.  12pt ROS otherwise negative.    VITALS  Vital Signs Last 24 Hrs  T(C): 37.7 (25 Oct 2023 16:49), Max: 37.7 (25 Oct 2023 16:49)  T(F): 99.8 (25 Oct 2023 16:49), Max: 99.8 (25 Oct 2023 16:49)  HR: 83 (25 Oct 2023 16:49) (70 - 100)  BP: 173/94 (25 Oct 2023 16:49) (116/77 - 180/89)  BP(mean): 102 (24 Oct 2023 18:00) (100 - 107)  RR: 18 (25 Oct 2023 16:49) (13 - 23)  SpO2: 97% (25 Oct 2023 16:49) (92% - 100%)    Parameters below as of 25 Oct 2023 16:49  Patient On (Oxygen Delivery Method): room air        CAPILLARY BLOOD GLUCOSE      POCT Blood Glucose.: 91 mg/dL (25 Oct 2023 14:12)  POCT Blood Glucose.: 165 mg/dL (25 Oct 2023 11:21)  POCT Blood Glucose.: 64 mg/dL (25 Oct 2023 09:25)  POCT Blood Glucose.: 208 mg/dL (24 Oct 2023 22:15)  POCT Blood Glucose.: 106 mg/dL (24 Oct 2023 18:53)  POCT Blood Glucose.: 102 mg/dL (24 Oct 2023 18:01)  POCT Blood Glucose.: 69 mg/dL (24 Oct 2023 17:30)      PHYSICAL EXAM  General: NAD, lying comfortably in bed  Head: NC/AT; MMM; EOMI;  Neck: Supple; no JVD  Respiratory: CTAB; no wheezes/rales/rhonchi  Cardiovascular: Regular rhythm/rate; S1/S2+, + murmur  Gastrointestinal: Soft; NTND;  Extremities: WWP; no edema/cyanosis, clean bandage in place on right foot.  Neurological: A&Ox3, CNII-XII grossly intact; no obvious focal deficits    Antimicrobials:  MEDICATIONS  (STANDING):  piperacillin/tazobactam IVPB.. 4.5 Gram(s) IV Intermittent every 8 hours      Standing Medications:  MEDICATIONS  (STANDING):  acetaminophen     Tablet .. 650 milliGRAM(s) Oral every 6 hours  aspirin enteric coated 81 milliGRAM(s) Oral daily  atorvastatin 80 milliGRAM(s) Oral at bedtime  carvedilol 12.5 milliGRAM(s) Oral every 12 hours  dextrose 5%. 1000 milliLiter(s) (50 mL/Hr) IV Continuous <Continuous>  dextrose 5%. 1000 milliLiter(s) (100 mL/Hr) IV Continuous <Continuous>  dextrose 50% Injectable 25 Gram(s) IV Push once  dextrose 50% Injectable 25 Gram(s) IV Push once  dextrose 50% Injectable 12.5 Gram(s) IV Push once  gabapentin 1600 milliGRAM(s) Oral three times a day  glucagon  Injectable 1 milliGRAM(s) IntraMuscular once  insulin glargine Injectable (LANTUS) 6 Unit(s) SubCutaneous at bedtime  insulin lispro (ADMELOG) corrective regimen sliding scale   SubCutaneous three times a day before meals  insulin lispro (ADMELOG) corrective regimen sliding scale   SubCutaneous at bedtime  insulin lispro Injectable (ADMELOG) 5 Unit(s) SubCutaneous three times a day before meals  oxyCODONE    IR 10 milliGRAM(s) Oral every 6 hours  piperacillin/tazobactam IVPB.. 4.5 Gram(s) IV Intermittent every 8 hours  spironolactone 25 milliGRAM(s) Oral daily      PRN Medications:  MEDICATIONS  (PRN):  aluminum hydroxide/magnesium hydroxide/simethicone Suspension 30 milliLiter(s) Oral every 6 hours PRN Dyspepsia  dextrose Oral Gel 15 Gram(s) Oral once PRN Blood Glucose LESS THAN 70 milliGRAM(s)/deciliter  morphine  - Injectable 2 milliGRAM(s) IV Push every 4 hours PRN Moderate Pain (4 - 6)      No Known Allergies      LABS                        9.3    25.18 )-----------( 239      ( 25 Oct 2023 05:30 )             29.0     10-25    136  |  101  |  16  ----------------------------<  55<L>  3.8   |  26  |  1.08    Ca    9.1      25 Oct 2023 05:30  Phos  2.7     10-25  Mg     2.0     10-25    TPro  6.6  /  Alb  2.2<L>  /  TBili  0.4  /  DBili  x   /  AST  32  /  ALT  17  /  AlkPhos  206<H>  10-25    PT/INR - ( 25 Oct 2023 05:30 )   PT: 11.6 sec;   INR: 1.02          PTT - ( 25 Oct 2023 05:30 )  PTT:28.9 sec  Urinalysis Basic - ( 25 Oct 2023 05:30 )    Color: x / Appearance: x / SG: x / pH: x  Gluc: 55 mg/dL / Ketone: x  / Bili: x / Urobili: x   Blood: x / Protein: x / Nitrite: x   Leuk Esterase: x / RBC: x / WBC x   Sq Epi: x / Non Sq Epi: x / Bacteria: x              IMAGING/EKG/ETC

## 2023-10-25 NOTE — PHYSICAL THERAPY INITIAL EVALUATION ADULT - PERTINENT HX OF CURRENT PROBLEM, REHAB EVAL
67M PMH HTN, IDDM with peripheral neuropathy, CAD s/p PCI with 2 stents (3 yrs ago at [facility]), recent admission 10/3 for diabetic ssti (CT r/o absecess, no OM) discharged on doxycycline and keflex, presenting with CC of r. foot pain.  After discharge patient endorses completing antibiotics but on Wednesday the pain started getting unbearable, not responding to his usual neurontin for nerve pain. H edid not notice drainage from the wound. He came into the hospital due ot the pain. He denies any fever, chills, shortness of breath, cough, diarrhea, constipation.

## 2023-10-25 NOTE — CHART NOTE - NSCHARTNOTEFT_GEN_A_CORE
Writer met with patient at bedside; patient  presented as alert and able to easily feed and express self. Intake per his report is 100% , no weight change or decline in intake reported prior ot admission. No N/V/C a bit of D per antibiotics per his report but he informed writer that nurse was made aware. No specific food preferences, receives room service assist ot obtain preferences and he expressed satisfaction with that. No chewing or swallowing problems reported, no allergies confirmed. Writer briefly educated patient per diet with handout (My Plate for diabetes) given to reinforce education; patient reports being very tired and did not want more education at the time of interview. No pressure ulcers. Needs as estimated are appropriate:  Height for BMI (FEET)	5 Feet  Height for BMI (INCHES)	7 Inch(s)  Height for BMI (CENTIMETERS)	170.18 Centimeter(s)  Weight for BMI (lbs)	143.5 lb  Weight for BMI (kg)	65.1 kg  Body Mass Index	22.4  GI/IO	no N/V/D/C per nurse's report   ASSESSMENT:  Nutriton Focused Physical Exam	no...  Reason	patient not available as in procedure. will f/u tomorrow  Fluid Accumulation	unknown  Skin	no pressure ulcers per chart review  Weight used for calculations	IBW  Estimated Energy Needs Weight (lbs)	135 lb  Estimated Energy Needs Weight (kg)	61.2 kg  Estimated Energy Needs From (carrie/kg)	30  Estimated Energy Needs To (carrie/kg)	35  Estimated Energy Needs Calculated From (carrie/kg)	1836  Estimated Energy Needs Calculated To (carrie/kg)	2142  Weight used for calculations	IBW  Estimated Protein Needs Weight (lbs)	135 lb  Estimated Protein Needs Weight (kg)	61.2 kg  Estimated Protein Needs From (g/kg)	1.25  Estimated Protein Needs To (g/kg)	1.6  Estimated Protein Needs Calculated From (g/kg)	76.5  Estimated Protein Needs Calculated To (g/kg)	97.92  Estimated Fluid Needs Weight (lbs)	135 lb  Estimated Fluid Needs Weight (kg)	61.2 kg  Estimated Fluid Needs From (ml/kg)	25  Estimated Fluid Needs To (ml/kg)	30  Estimated Fluid Needs Calculated From (ml/kg)	1530  Estimated Fluid Needs Calculated To (ml/kg)	1836  Other Calculations	estimated per IBW as >120% IBW  Recommend continue POC and patient expressed agreement to continue POC. Writer  remains available and will f/u.

## 2023-10-25 NOTE — PHYSICAL THERAPY INITIAL EVALUATION ADULT - GAIT DEVIATIONS NOTED, PT EVAL
Pt w/ appropriate bisi/step length, steady gait, no LOB/knee buckling noted; good negotiation through hallway obstacles without gait disturbances noted. Good aerobic endurance, no SOB observed.

## 2023-10-25 NOTE — OCCUPATIONAL THERAPY INITIAL EVALUATION ADULT - DIAGNOSIS, OT EVAL
Pt. is POD#1 (10/24/23) s/p R partial 4th toe ambulation. Upon assessment, pt. has returned to his functional baseline of independent and MI in ADLs and functional mob/transfers. No further skilled OT needs at this time, f/u if change in status is noted

## 2023-10-25 NOTE — PROGRESS NOTE ADULT - SUBJECTIVE AND OBJECTIVE BOX
SUBJECTIVE / INTERVAL HPI: Patient was seen and examined this morning.     Overnight events: Pt is s/p Day 1 Partial R 4th ray amputation    CAPILLARY BLOOD GLUCOSE & INSULIN RECEIVED  132 mg/dL (10-24 @ 09:29)  91 mg/dL (10-24 @ 12:04)  62 mg/dL (10-24 @ 17:06)  69 mg/dL (10-24 @ 17:30)  102 mg/dL (10-24 @ 18:01)  106 mg/dL (10-24 @ 18:53)  208 mg/dL (10-24 @ 22:15)- Lantus 10  64 mg/dL (10-25 @ 09:25)  165 mg/dL (10-25 @ 11:21)      REVIEW OF SYSTEMS  Constitutional:  Negative fever, chills or loss of appetite.  Eyes:  Negative blurry vision or double vision.  Cardiovascular:  Negative for chest pain or palpitations.  Respiratory:  Negative for cough, wheezing, or shortness of breath.    Gastrointestinal:  Negative for nausea, vomiting, diarrhea, constipation, or abdominal pain.  Genitourinary:  Negative frequency, urgency or dysuria.  Neurologic:  No headache, confusion, dizziness, lightheadedness.    PHYSICAL EXAM  HEENT: NC/AT, PERRLA, EOMI, no conjunctival pallor or scleral icterus, DMM  Neck: Supple, no JVD  Respiratory: CTA B/L. No w/r/r.   Cardiovascular: RRR, normal S1 and S2, no m/r/g.   Gastrointestinal: +BS, soft NTND, no guarding or rebound tenderness, no palpable masses   Extremities: wwp; no cyanosis, clubbing or edema. R 4th toe amputated with dressing  Neurological: AAOx3, no CN deficits, strength and sensation intact throughout.   Skin: No gross skin abnormalities or rashes        Vital Signs Last 24 Hrs  T(C): 37.6 (25 Oct 2023 09:16), Max: 37.6 (24 Oct 2023 18:46)  T(F): 99.7 (25 Oct 2023 09:16), Max: 99.7 (25 Oct 2023 09:16)  HR: 89 (25 Oct 2023 09:16) (68 - 100)  BP: 179/87 (25 Oct 2023 09:16) (116/77 - 179/87)  BP(mean): 102 (24 Oct 2023 18:00) (90 - 121)  RR: 18 (25 Oct 2023 09:16) (10 - 23)  SpO2: 94% (25 Oct 2023 09:16) (92% - 100%)    Parameters below as of 25 Oct 2023 09:16  Patient On (Oxygen Delivery Method): room air        Constitutional: Awake, alert, in no acute distress.   HEENT: Normocephalic, atraumatic, RAGHAV.  Respiratory: Lungs clear to ausculation bilaterally.   Cardiovascular: regular rhythm, normal S1 and S2, no audible murmurs.   GI: soft, non-tender, non-distended, bowel sounds present.  Extremities: No lower extremity edema.  Psychiatric: AAO x 3. Normal affect/mood.     LABS  CBC - WBC/HGB/HTC/PLT: 25.18/9.3/29.0/239 (10-25-23)  BMP - Na/K/Cl/Bicarb/BUN/Cr/Gluc/AG/eGFR: 136/3.8/101/26/16/1.08/55/9/76 (10-25-23)  Ca - 9.1 (10-25-23)  Phos - 2.7 (10-25-23)  Mg - 2.0 (10-25-23)  LFT - Alb/Tprot/Tbili/Dbili/AlkPhos/ALT/AST: 2.2/--/0.4/--/206/17/32 (10-25-23)  PT/aPTT/INR: 11.6/28.9/1.02 (10-25-23)           MEDICATIONS  MEDICATIONS  (STANDING):  acetaminophen     Tablet .. 650 milliGRAM(s) Oral every 6 hours  dextrose 5%. 1000 milliLiter(s) (50 mL/Hr) IV Continuous <Continuous>  dextrose 5%. 1000 milliLiter(s) (100 mL/Hr) IV Continuous <Continuous>  dextrose 50% Injectable 12.5 Gram(s) IV Push once  dextrose 50% Injectable 25 Gram(s) IV Push once  dextrose 50% Injectable 12.5 Gram(s) IV Push once  dextrose 50% Injectable 25 Gram(s) IV Push once  gabapentin 1600 milliGRAM(s) Oral three times a day  glucagon  Injectable 1 milliGRAM(s) IntraMuscular once  insulin glargine Injectable (LANTUS) 10 Unit(s) SubCutaneous at bedtime  insulin lispro (ADMELOG) corrective regimen sliding scale   SubCutaneous three times a day before meals  insulin lispro (ADMELOG) corrective regimen sliding scale   SubCutaneous at bedtime  insulin lispro Injectable (ADMELOG) 5 Unit(s) SubCutaneous three times a day before meals  oxyCODONE    IR 10 milliGRAM(s) Oral every 6 hours  piperacillin/tazobactam IVPB.. 4.5 Gram(s) IV Intermittent every 8 hours    MEDICATIONS  (PRN):  aluminum hydroxide/magnesium hydroxide/simethicone Suspension 30 milliLiter(s) Oral every 6 hours PRN Dyspepsia  dextrose Oral Gel 15 Gram(s) Oral once PRN Blood Glucose LESS THAN 70 milliGRAM(s)/deciliter  morphine  - Injectable 2 milliGRAM(s) IV Push every 4 hours PRN Moderate Pain (4 - 6)    ASSESSMENT / RECOMMENDATIONS    A1C: 12.6 %  BUN: 16  Creatinine: 1.08  GFR: 76  Weight: 65  BMI: 22.4    # Type 2 diabetes mellitus with hyperglycemia  - Please keep lantus  units at bedtime.   - keep lispro   units before each meal.  - Continue lispro moderate dose sliding scale before meals and at bedtime.  - Patient's fingerstick glucose goal is 100-180 mg/dL.    - Discharge recommendations to be discussed.   - Patient can follow up at discharge with [facility] by calling [phone] to make an appointment.      Case discussed with Dr. Huertas. Primary team updated.      SUBJECTIVE / INTERVAL HPI: Patient was seen and examined this morning.     Overnight events: Pt is s/p Day 1 Partial R 4th ray amputation    CAPILLARY BLOOD GLUCOSE & INSULIN RECEIVED  132 mg/dL (10-24 @ 09:29)  91 mg/dL (10-24 @ 12:04)  62 mg/dL (10-24 @ 17:06)  69 mg/dL (10-24 @ 17:30)  102 mg/dL (10-24 @ 18:01)  106 mg/dL (10-24 @ 18:53)  208 mg/dL (10-24 @ 22:15)- Lantus 10  64 mg/dL (10-25 @ 09:25)  165 mg/dL (10-25 @ 11:21)      REVIEW OF SYSTEMS  Constitutional:  Negative fever, chills or loss of appetite.  Eyes:  Negative blurry vision or double vision.  Cardiovascular:  Negative for chest pain or palpitations.  Respiratory:  Negative for cough, wheezing, or shortness of breath.    Gastrointestinal:  Negative for nausea, vomiting, diarrhea, constipation, or abdominal pain.  Genitourinary:  Negative frequency, urgency or dysuria.  Neurologic:  No headache, confusion, dizziness, lightheadedness.    PHYSICAL EXAM  HEENT: NC/AT, PERRLA, EOMI, no conjunctival pallor or scleral icterus, DMM  Neck: Supple, no JVD  Respiratory: CTA B/L. No w/r/r.   Cardiovascular: RRR, normal S1 and S2, no m/r/g.   Gastrointestinal: +BS, soft NTND, no guarding or rebound tenderness, no palpable masses   Extremities: wwp; no cyanosis, clubbing or edema. R 4th toe amputated with dressing  Neurological: AAOx3, no CN deficits, strength and sensation intact throughout.   Skin: No gross skin abnormalities or rashes        Vital Signs Last 24 Hrs  T(C): 37.6 (25 Oct 2023 09:16), Max: 37.6 (24 Oct 2023 18:46)  T(F): 99.7 (25 Oct 2023 09:16), Max: 99.7 (25 Oct 2023 09:16)  HR: 89 (25 Oct 2023 09:16) (68 - 100)  BP: 179/87 (25 Oct 2023 09:16) (116/77 - 179/87)  BP(mean): 102 (24 Oct 2023 18:00) (90 - 121)  RR: 18 (25 Oct 2023 09:16) (10 - 23)  SpO2: 94% (25 Oct 2023 09:16) (92% - 100%)    Parameters below as of 25 Oct 2023 09:16  Patient On (Oxygen Delivery Method): room air        Constitutional: Awake, alert, in no acute distress.   HEENT: Normocephalic, atraumatic, RAGHAV.  Respiratory: Lungs clear to ausculation bilaterally.   Cardiovascular: regular rhythm, normal S1 and S2, no audible murmurs.   GI: soft, non-tender, non-distended, bowel sounds present.  Extremities: No lower extremity edema.  Psychiatric: AAO x 3. Normal affect/mood.     LABS  CBC - WBC/HGB/HTC/PLT: 25.18/9.3/29.0/239 (10-25-23)  BMP - Na/K/Cl/Bicarb/BUN/Cr/Gluc/AG/eGFR: 136/3.8/101/26/16/1.08/55/9/76 (10-25-23)  Ca - 9.1 (10-25-23)  Phos - 2.7 (10-25-23)  Mg - 2.0 (10-25-23)  LFT - Alb/Tprot/Tbili/Dbili/AlkPhos/ALT/AST: 2.2/--/0.4/--/206/17/32 (10-25-23)  PT/aPTT/INR: 11.6/28.9/1.02 (10-25-23)           MEDICATIONS  MEDICATIONS  (STANDING):  acetaminophen     Tablet .. 650 milliGRAM(s) Oral every 6 hours  dextrose 5%. 1000 milliLiter(s) (50 mL/Hr) IV Continuous <Continuous>  dextrose 5%. 1000 milliLiter(s) (100 mL/Hr) IV Continuous <Continuous>  dextrose 50% Injectable 12.5 Gram(s) IV Push once  dextrose 50% Injectable 25 Gram(s) IV Push once  dextrose 50% Injectable 12.5 Gram(s) IV Push once  dextrose 50% Injectable 25 Gram(s) IV Push once  gabapentin 1600 milliGRAM(s) Oral three times a day  glucagon  Injectable 1 milliGRAM(s) IntraMuscular once  insulin glargine Injectable (LANTUS) 10 Unit(s) SubCutaneous at bedtime  insulin lispro (ADMELOG) corrective regimen sliding scale   SubCutaneous three times a day before meals  insulin lispro (ADMELOG) corrective regimen sliding scale   SubCutaneous at bedtime  insulin lispro Injectable (ADMELOG) 5 Unit(s) SubCutaneous three times a day before meals  oxyCODONE    IR 10 milliGRAM(s) Oral every 6 hours  piperacillin/tazobactam IVPB.. 4.5 Gram(s) IV Intermittent every 8 hours    MEDICATIONS  (PRN):  aluminum hydroxide/magnesium hydroxide/simethicone Suspension 30 milliLiter(s) Oral every 6 hours PRN Dyspepsia  dextrose Oral Gel 15 Gram(s) Oral once PRN Blood Glucose LESS THAN 70 milliGRAM(s)/deciliter  morphine  - Injectable 2 milliGRAM(s) IV Push every 4 hours PRN Moderate Pain (4 - 6)    ASSESSMENT / RECOMMENDATIONS    A1C: 12.6 %  BUN: 16  Creatinine: 1.08  GFR: 76  Weight: 65  BMI: 22.4    # Type 2 diabetes mellitus with hyperglycemia  - Please keep lantus 6 units at bedtime.   - keep lispro 5 units before each meal.  - Continue lispro moderate dose sliding scale before meals and at bedtime.  - Patient's fingerstick glucose goal is 100-180 mg/dL.    - Discharge recommendations to be discussed.   - Patient can follow up at discharge with [facility] by calling [phone] to make an appointment.      Case discussed with Dr. Huertas. Primary team updated.

## 2023-10-25 NOTE — PROGRESS NOTE ADULT - PROBLEM SELECTOR PLAN 7
F: s/p 2L NS  E: Replete as necessary K>4 Mg>2  N: CC diet   DVT Prophylaxis: heparin sq  GI prophylaxis: None   CODE STATUS: FULL  DISPO: F Patient follow with outpatient cards Dr. Li on [address] Next appointment 11/7/23.  Patient reports 12 months of antiplatelet therapy s/p PCI in 2020 but discontinued after    Plan:  - Start aspirin 81mg PO QD  - Start lipitor 80mg PO QD

## 2023-10-25 NOTE — CONSULT NOTE ADULT - SUBJECTIVE AND OBJECTIVE BOX
HPI:  67M PMH HTN, IDDM with peripheral neuropathy, CAD s/p PCI with 2 stents (3 yrs ago at [facility]), recent admission 10/3 for diabetic ssti (CT r/o absecess, no OM) discharged on doxycycline and keflex, presenting with CC of r. foot pain.  After discharge patient endorses completing antibiotics but on Wednesday the pain started getting unbearable, not responding to his usual neurontin for nerve pain. H edid not notice drainage from the wound. He came into the hospital due ot the pain. He denies any fever, chills, shortness of breath, cough, diarrhea, constipation.    ED Course:   T101 HR 86 /80 RR 19 Sat 97% on RA.   Labs: WBC 19 Hb 9.7 BUN 26   Interventions: Vanc, zosyn, morphine x 14units, 2LNS.   XR R.Foot: 3 views of the right foot demonstrates no evidence of acute fracture or   dislocation. No radiographic evidence of osteomyelitis. Calcaneal   spurring. Vascular calcifications noted.    Patient went to the OR on 10/24 for removal of 4th metatarsal head.  He has been on zosyn since 10/21.  Patient reports continued pain at the amputation site.        PAST MEDICAL & SURGICAL HISTORY:  IDDM (insulin dependent diabetes mellitus)      HTN (hypertension)      CAD S/P percutaneous coronary angioplasty      Neuropathy      No significant past surgical history            REVIEW OF SYSTEMS:    General:	 no weakness; no fevers, no chills  Skin/Breast: no rash  Respiratory and Thorax: no SOB, no cough  Cardiovascular:	No chest pain  Gastrointestinal:	 no nausea, vomiting , diarrhea  Genitourinary:	no dysuria, no difficulty urinating, no hematuria  Musculoskeletal:	no weakness, no joint swelling/pain  Neurological:	no focal weakness/numbness  Endocrine:	no polyuria, no polydipsia      ANTIBIOTICS:  MEDICATIONS  (STANDING):  acetaminophen     Tablet .. 650 milliGRAM(s) Oral every 6 hours  carvedilol 12.5 milliGRAM(s) Oral every 12 hours  dextrose 5%. 1000 milliLiter(s) (50 mL/Hr) IV Continuous <Continuous>  dextrose 5%. 1000 milliLiter(s) (100 mL/Hr) IV Continuous <Continuous>  dextrose 50% Injectable 25 Gram(s) IV Push once  dextrose 50% Injectable 25 Gram(s) IV Push once  dextrose 50% Injectable 12.5 Gram(s) IV Push once  gabapentin 1600 milliGRAM(s) Oral three times a day  glucagon  Injectable 1 milliGRAM(s) IntraMuscular once  insulin glargine Injectable (LANTUS) 6 Unit(s) SubCutaneous at bedtime  insulin lispro (ADMELOG) corrective regimen sliding scale   SubCutaneous three times a day before meals  insulin lispro (ADMELOG) corrective regimen sliding scale   SubCutaneous at bedtime  insulin lispro Injectable (ADMELOG) 5 Unit(s) SubCutaneous three times a day before meals  lisinopril 40 milliGRAM(s) Oral every 24 hours  oxyCODONE    IR 10 milliGRAM(s) Oral every 6 hours  piperacillin/tazobactam IVPB.. 4.5 Gram(s) IV Intermittent every 8 hours  spironolactone 25 milliGRAM(s) Oral daily    MEDICATIONS  (PRN):  aluminum hydroxide/magnesium hydroxide/simethicone Suspension 30 milliLiter(s) Oral every 6 hours PRN Dyspepsia  dextrose Oral Gel 15 Gram(s) Oral once PRN Blood Glucose LESS THAN 70 milliGRAM(s)/deciliter  morphine  - Injectable 2 milliGRAM(s) IV Push every 4 hours PRN Moderate Pain (4 - 6)      Allergies    No Known Allergies    Intolerances        SOCIAL HISTORY:    FAMILY HISTORY:      Vital Signs Last 24 Hrs  T(C): 37.6 (25 Oct 2023 09:16), Max: 37.6 (24 Oct 2023 18:46)  T(F): 99.7 (25 Oct 2023 09:16), Max: 99.7 (25 Oct 2023 09:16)  HR: 86 (25 Oct 2023 13:40) (68 - 100)  BP: 180/89 (25 Oct 2023 13:40) (116/77 - 180/89)  BP(mean): 102 (24 Oct 2023 18:00) (96 - 107)  RR: 18 (25 Oct 2023 09:16) (10 - 23)  SpO2: 94% (25 Oct 2023 09:16) (92% - 100%)    Parameters below as of 25 Oct 2023 09:16  Patient On (Oxygen Delivery Method): room air        PHYSICAL EXAM:  Constitutional:Well-developed, well nourished  Eyes:RAGHAV, EOMI  Ear/Nose/Throat: no oral lesion, no sinus tenderness on percussion	  Neck:no JVD, no lymphadenopathy, supple  Respiratory: CTA gabbie  Cardiovascular: S1S2 RRR, no murmurs  Gastrointestinal:soft, (+) BS, no HSM  Extremities:  right foot s/p 4th toe amputation, + erythema, warmth, tenderness on dorsum of the foot   Vascular: DP Pulse:	right normal; left normal            LABS:                        9.3    25.18 )-----------( 239      ( 25 Oct 2023 05:30 )             29.0     10-25    136  |  101  |  16  ----------------------------<  55<L>  3.8   |  26  |  1.08    Ca    9.1      25 Oct 2023 05:30  Phos  2.7     10-25  Mg     2.0     10-25    TPro  6.6  /  Alb  2.2<L>  /  TBili  0.4  /  DBili  x   /  AST  32  /  ALT  17  /  AlkPhos  206<H>  10-25    PT/INR - ( 25 Oct 2023 05:30 )   PT: 11.6 sec;   INR: 1.02          PTT - ( 25 Oct 2023 05:30 )  PTT:28.9 sec  Urinalysis Basic - ( 25 Oct 2023 05:30 )    Color: x / Appearance: x / SG: x / pH: x  Gluc: 55 mg/dL / Ketone: x  / Bili: x / Urobili: x   Blood: x / Protein: x / Nitrite: x   Leuk Esterase: x / RBC: x / WBC x   Sq Epi: x / Non Sq Epi: x / Bacteria: x        MICROBIOLOGY:    Culture - Tissue with Gram Stain (10.24.23 @ 15:30)    Gram Stain:   No organisms seen  No White blood cells   Specimen Source: .Tissue right 4th metatarsal clean margin  or spec   Culture Results:   Few Escherichia coli  Few Pluralibacter gergoviae  Culture in progress      RADIOLOGY & ADDITIONAL STUDIES:    Culture - Blood (10.21.23 @ 11:33)    Specimen Source: .Blood Blood   Culture Results:   No growth at 72 Hours     HPI:  67M PMH HTN, IDDM with peripheral neuropathy, CAD s/p PCI with 2 stents (3 yrs ago at [facility]), recent admission 10/3 for diabetic ssti (CT r/o absecess, no OM) discharged on doxycycline and keflex, presenting with CC of r. foot pain.  After discharge patient endorses completing antibiotics but on Wednesday the pain started getting unbearable, not responding to his usual neurontin for nerve pain. H edid not notice drainage from the wound. He came into the hospital due ot the pain. He denies any fever, chills, shortness of breath, cough, diarrhea, constipation.    ED Course:   T101 HR 86 /80 RR 19 Sat 97% on RA.   Labs: WBC 19 Hb 9.7 BUN 26   Interventions: Vanc, zosyn, morphine x 14units, 2LNS.   XR R.Foot: 3 views of the right foot demonstrates no evidence of acute fracture or   dislocation. No radiographic evidence of osteomyelitis. Calcaneal   spurring. Vascular calcifications noted.    Patient went to the OR on 10/24 for removal of 4th metatarsal head.  He has been on zosyn since 10/21.  Patient reports continued pain at the amputation site.        PAST MEDICAL & SURGICAL HISTORY:  IDDM (insulin dependent diabetes mellitus)      HTN (hypertension)      CAD S/P percutaneous coronary angioplasty      Neuropathy      No significant past surgical history            REVIEW OF SYSTEMS:    General:	 no weakness; no fevers, no chills  Skin/Breast: no rash  Respiratory and Thorax: no SOB, no cough  Cardiovascular:	No chest pain  Gastrointestinal:	 no nausea, vomiting , diarrhea  Genitourinary:	no dysuria, no difficulty urinating, no hematuria  Musculoskeletal:	no weakness, no joint swelling/pain  Neurological:	no focal weakness/numbness  Endocrine:	no polyuria, no polydipsia      ANTIBIOTICS:  MEDICATIONS  (STANDING):  acetaminophen     Tablet .. 650 milliGRAM(s) Oral every 6 hours  carvedilol 12.5 milliGRAM(s) Oral every 12 hours  dextrose 5%. 1000 milliLiter(s) (50 mL/Hr) IV Continuous <Continuous>  dextrose 5%. 1000 milliLiter(s) (100 mL/Hr) IV Continuous <Continuous>  dextrose 50% Injectable 25 Gram(s) IV Push once  dextrose 50% Injectable 25 Gram(s) IV Push once  dextrose 50% Injectable 12.5 Gram(s) IV Push once  gabapentin 1600 milliGRAM(s) Oral three times a day  glucagon  Injectable 1 milliGRAM(s) IntraMuscular once  insulin glargine Injectable (LANTUS) 6 Unit(s) SubCutaneous at bedtime  insulin lispro (ADMELOG) corrective regimen sliding scale   SubCutaneous three times a day before meals  insulin lispro (ADMELOG) corrective regimen sliding scale   SubCutaneous at bedtime  insulin lispro Injectable (ADMELOG) 5 Unit(s) SubCutaneous three times a day before meals  lisinopril 40 milliGRAM(s) Oral every 24 hours  oxyCODONE    IR 10 milliGRAM(s) Oral every 6 hours  piperacillin/tazobactam IVPB.. 4.5 Gram(s) IV Intermittent every 8 hours  spironolactone 25 milliGRAM(s) Oral daily    MEDICATIONS  (PRN):  aluminum hydroxide/magnesium hydroxide/simethicone Suspension 30 milliLiter(s) Oral every 6 hours PRN Dyspepsia  dextrose Oral Gel 15 Gram(s) Oral once PRN Blood Glucose LESS THAN 70 milliGRAM(s)/deciliter  morphine  - Injectable 2 milliGRAM(s) IV Push every 4 hours PRN Moderate Pain (4 - 6)      Allergies    No Known Allergies    Intolerances        SOCIAL HISTORY:    FAMILY HISTORY:      Vital Signs Last 24 Hrs  T(C): 37.6 (25 Oct 2023 09:16), Max: 37.6 (24 Oct 2023 18:46)  T(F): 99.7 (25 Oct 2023 09:16), Max: 99.7 (25 Oct 2023 09:16)  HR: 86 (25 Oct 2023 13:40) (68 - 100)  BP: 180/89 (25 Oct 2023 13:40) (116/77 - 180/89)  BP(mean): 102 (24 Oct 2023 18:00) (96 - 107)  RR: 18 (25 Oct 2023 09:16) (10 - 23)  SpO2: 94% (25 Oct 2023 09:16) (92% - 100%)    Parameters below as of 25 Oct 2023 09:16  Patient On (Oxygen Delivery Method): room air        PHYSICAL EXAM:  Constitutional:Well-developed, well nourished  Eyes:RAGHAV, EOMI  Ear/Nose/Throat: no oral lesion, no sinus tenderness on percussion	  Neck:no JVD, no lymphadenopathy, supple  Respiratory: CTA gabbie  Cardiovascular: S1S2 RRR, no murmurs  Gastrointestinal:soft, (+) BS, no HSM  Extremities:  right foot s/p 4th toe amputation, + erythema, warmth, tenderness on dorsum of the foot   Vascular: DP Pulse:	right normal; left normal            LABS:                        9.3    25.18 )-----------( 239      ( 25 Oct 2023 05:30 )             29.0     10-25    136  |  101  |  16  ----------------------------<  55<L>  3.8   |  26  |  1.08    Ca    9.1      25 Oct 2023 05:30  Phos  2.7     10-25  Mg     2.0     10-25    TPro  6.6  /  Alb  2.2<L>  /  TBili  0.4  /  DBili  x   /  AST  32  /  ALT  17  /  AlkPhos  206<H>  10-25    PT/INR - ( 25 Oct 2023 05:30 )   PT: 11.6 sec;   INR: 1.02          PTT - ( 25 Oct 2023 05:30 )  PTT:28.9 sec  Urinalysis Basic - ( 25 Oct 2023 05:30 )    Color: x / Appearance: x / SG: x / pH: x  Gluc: 55 mg/dL / Ketone: x  / Bili: x / Urobili: x   Blood: x / Protein: x / Nitrite: x   Leuk Esterase: x / RBC: x / WBC x   Sq Epi: x / Non Sq Epi: x / Bacteria: x        MICROBIOLOGY:    Culture - Tissue with Gram Stain (10.24.23 @ 15:30)    Gram Stain:   No organisms seen  No White blood cells   Specimen Source: .Tissue right 4th metatarsal clean margin  or spec   Culture Results:   Few Escherichia coli  Few Pluralibacter gergoviae  Culture in progress      RADIOLOGY & ADDITIONAL STUDIES:    < from: MR Foot w/wo IV Cont, Right (10.22.23 @ 19:32) >  IMPRESSION:  1.  Soft tissue atrophy/wound of the 4th toe with underlying marrow   changes of the 4th proximal phalanx head, middle phalanx, and distal   phalanx that may be reactive in the absence of significant T1 marrow   replacement, however, early infection may also be considered.  2.  Consider follow-up MRI to assess stability as warranted.  3.  Preliminary report was provided by Lucille.    --- End of Report ---    < end of copied text >

## 2023-10-25 NOTE — CONSULT NOTE ADULT - CONSULT REQUESTED DATE/TIME
23-Oct-2023 10:10
24-Oct-2023 07:27
21-Oct-2023 22:32
23-Oct-2023 10:21
23-Oct-2023 15:03
25-Oct-2023 16:25

## 2023-10-25 NOTE — CONSULT NOTE ADULT - CONSULT REASON
R foot infection
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
Pre-operative consult
PM&R evaluation
type 2 diabetes with hyperglycemia
Diabetic foot Infection

## 2023-10-25 NOTE — PROGRESS NOTE ADULT - PROBLEM SELECTOR PLAN 4
Echo: Left ventricular systolic function is mildly reduced with a calculated ejection fraction of 45% with regional wall motion abnormalities.     Plan:  - holding home lisinopril 40mg qd for angio 10/25  - c/w amlodipine 10 mg qd Echo: Left ventricular systolic function is mildly reduced with a calculated ejection fraction of 45% with regional wall motion abnormalities.     Plan:  - Cards rec: Lisinopril 40 mg qd, spironolactone 25 mg qd, coreg 12.5 BID uptitrate as tolerated per GDMT

## 2023-10-25 NOTE — PROGRESS NOTE ADULT - PROBLEM SELECTOR PLAN 2
Patient presenting with worsening right 4th toe pain and discoloratio s/p course of IV and oral antibtiocs: Keflex and doxycycline until 10/10 endorses compliance. On presentation, he is hemodynamically stable however with leukoctyosis to 19 and fever to 101.   Right toe xrays done in ED showing no evidence of fracture or osteomyelitis   S/p 1 dose vancomycin in ED and zosyn in ED.   BCx: no growth at 1 day  Patient went to surgery for toe amputation today    Plan:  - Podiatry consulted, f/u recs   - vascular consulted: rec angio tomorrow 10/25  - Local wound care: Cleansed with NS. Betadine soaked gauze applied to R 4th digit, 3rd and 4th interspaces Advised pt to keep dressing intact dry and clean  - Pain management: Tylenol 650 q6h standing, Oxy 10 q6h standing, morphine 2mg q6h PRN for severe  - 1x 0.5 dilaudid for severe breakthrough pain. Patient presenting with worsening right 4th toe pain and discoloratio s/p course of IV and oral antibtiocs: Keflex and doxycycline until 10/10 endorses compliance. On presentation, he is hemodynamically stable however with leukoctyosis to 19 and fever to 101.   Right toe xrays done in ED showing no evidence of fracture or osteomyelitis   S/p 1 dose vancomycin in ED and zosyn in ED.   BCx: no growth at 1 day  Patient went to surgery for toe amputation 10/24    Plan:  - Podiatry consulted, f/u recs   - vascular consulted: rec angio 10/27  - Local wound care: Cleansed with NS. Betadine soaked gauze applied to R 4th digit, 3rd and 4th interspaces Advised pt to keep dressing intact dry and clean  - Pain management: Tylenol 650 q6h standing, Oxy 10 q6h standing, morphine 2mg q6h PRN for severe  - 1x 0.5 dilaudid for severe breakthrough pain.

## 2023-10-25 NOTE — PROGRESS NOTE ADULT - SUBJECTIVE AND OBJECTIVE BOX
Physical Medicine and Rehabilitation Progress Note :       Patient is a 68y old  Male who presents with a chief complaint of Foot pain (25 Oct 2023 07:07)      HPI:  67M PMH HTN, IDDM with peripheral neuropathy, CAD s/p PCI with 2 stents (3 yrs ago at [facility]), recent admission 10/3 for diabetic ssti (CT r/o absecess, no OM) discharged on doxycycline and keflex, presenting with CC of r. foot pain.  After discharge patient endorses completing antibiotics but on Wednesday the pain started getting unbearable, not responding to his usual neurontin for nerve pain. H edid not notice drainage from the wound. He came into the hospital due ot the pain. He denies any fever, chills, shortness of breath, cough, diarrhea, constipation.    ED Course:   T101 HR 86 /80 RR 19 Sat 97% on RA.   Labs: WBC 19 Hb 9.7 BUN 26   Interventions: Vanc, zosyn, morphine x 14units, 2LNS.   XR R.Foot: 3 views of the right foot demonstrates no evidence of acute fracture or   dislocation. No radiographic evidence of osteomyelitis. Calcaneal   spurring. Vascular calcifications noted.   (21 Oct 2023 20:24)                            9.3    25.18 )-----------( 239      ( 25 Oct 2023 05:30 )             29.0       10-25    136  |  101  |  16  ----------------------------<  55<L>  3.8   |  26  |  1.08    Ca    9.1      25 Oct 2023 05:30  Phos  2.7     10-25  Mg     2.0     10-25    TPro  6.6  /  Alb  2.2<L>  /  TBili  0.4  /  DBili  x   /  AST  32  /  ALT  17  /  AlkPhos  206<H>  10-25    Vital Signs Last 24 Hrs  T(C): 37.6 (25 Oct 2023 09:16), Max: 37.6 (24 Oct 2023 18:46)  T(F): 99.7 (25 Oct 2023 09:16), Max: 99.7 (25 Oct 2023 09:16)  HR: 89 (25 Oct 2023 09:16) (68 - 100)  BP: 179/87 (25 Oct 2023 09:16) (116/77 - 179/87)  BP(mean): 102 (24 Oct 2023 18:00) (90 - 121)  RR: 18 (25 Oct 2023 09:16) (10 - 23)  SpO2: 94% (25 Oct 2023 09:16) (92% - 100%)    Parameters below as of 25 Oct 2023 09:16  Patient On (Oxygen Delivery Method): room air        MEDICATIONS  (STANDING):  acetaminophen     Tablet .. 650 milliGRAM(s) Oral every 6 hours  carvedilol 12.5 milliGRAM(s) Oral every 12 hours  dextrose 5%. 1000 milliLiter(s) (50 mL/Hr) IV Continuous <Continuous>  dextrose 5%. 1000 milliLiter(s) (100 mL/Hr) IV Continuous <Continuous>  dextrose 50% Injectable 25 Gram(s) IV Push once  dextrose 50% Injectable 12.5 Gram(s) IV Push once  dextrose 50% Injectable 25 Gram(s) IV Push once  dextrose 50% Injectable 12.5 Gram(s) IV Push once  gabapentin 1600 milliGRAM(s) Oral three times a day  glucagon  Injectable 1 milliGRAM(s) IntraMuscular once  insulin glargine Injectable (LANTUS) 10 Unit(s) SubCutaneous at bedtime  insulin lispro (ADMELOG) corrective regimen sliding scale   SubCutaneous at bedtime  insulin lispro (ADMELOG) corrective regimen sliding scale   SubCutaneous three times a day before meals  insulin lispro Injectable (ADMELOG) 5 Unit(s) SubCutaneous three times a day before meals  lisinopril 40 milliGRAM(s) Oral every 24 hours  oxyCODONE    IR 10 milliGRAM(s) Oral every 6 hours  piperacillin/tazobactam IVPB.. 4.5 Gram(s) IV Intermittent every 8 hours    MEDICATIONS  (PRN):  aluminum hydroxide/magnesium hydroxide/simethicone Suspension 30 milliLiter(s) Oral every 6 hours PRN Dyspepsia  dextrose Oral Gel 15 Gram(s) Oral once PRN Blood Glucose LESS THAN 70 milliGRAM(s)/deciliter  morphine  - Injectable 2 milliGRAM(s) IV Push every 4 hours PRN Moderate Pain (4 - 6)          Initial Functional Status Assessment :       Previous Level of Function:     · Bed Mobility/Transfers	independent  · Bathing	independent  · Upper Body Dressing	independent  · Lower Body Dressing	independent  · Grooming	independent  · Toileting	independent  · Eating	independent  · Home Management Skills	independent  · Additional Comments	Pt. resides alone in an apartment with 4 AMY and ramp access. Pt. reports independence prior in ADLs and functional mob, owns a SC and RW.    · Ambulatory Devices Needed	no  · Adaptive Equipment Needed	no    Cognitive Status Examination:   · Level of Consciousness	alert  · Orientation	oriented to person, place, time and situation  · Follow Commands/Answers Questions	100% of the time  · Personal Safety and Judgment	intact  · Short Term Memory	intact  · Long Term Memory	intact    Range of Motion Exam:   · Range of Motion Examination, Upper Extremity	bilateral UE Active ROM was WNL (within normal limits)  · Range of Motion Examination, Lower Extremity	bilateral LE Active ROM was WNL (within normal limits); bilateral LE Passive ROM was WNL (within normal limits)    Manual Muscle Testing:   · Manual Muscle Testing Results	grossly assessed due to    Muscle Tone Assessment:   · Muscle Tone Assessment	normal    Bed Mobility: Scooting/Bridging:     · Level of Rock	independent    Transfer: Sit to Stand:     · Level of [place]	independent  · Weight-Bearing Restrictions	heel WB-RLE  · Assistive Device	axillary crutches    Transfer: Stand to Sit:     · Level of [place]	independent  · Weight-Bearing Restrictions	heel WB- RLE  · Assistive Device	axillary crutches    Transfer: Toilet Transfer:     · Level of [place]	independent  · Assistive Device	grab bars    Balance Skills Assessment:     · Sitting Balance: Static	normal balance  · Sitting Balance: Dynamic	normal balance  · Sit-to-Stand Balance	good balance  · Standing Balance: Static	good balance  · Standing Balance: Dynamic	good balance    Sensory Examination:     Grossly Intact:   · Gross Sensory Examination	Grossly Intact    · Balance Skills	Pt. performed functional mob in hallway with MI (use of axillary crutches), no LOB      Lower Body Dressing Training:     · Level of [place]	independent    Toilet Hygiene Training:     · Level of [place]	independent        PM&R Impression : as above    Current disposition plan recommendation :     d/c home with home with no PT/OT needs

## 2023-10-25 NOTE — CONSULT NOTE ADULT - ASSESSMENT
IMPRESSION:  Diabetic foot infection here with sepsis secondary to infected 4th toe s/p amputation.  He still has a marked leukocytosis (on chart review, he's had a leukocytosis many times in the past suggesting chronic leukocytosis, however his current value is much higher now).  His foot is also red, tender and warm.  This suggest the amputation was not curative    Recommend:  1.  Continue Zosyn 4.5 grams IV q8hrs.  If no pseudomonas in the cultures, it's ok to deescalate this to 3.375 grams IV q8hrs  2. Follow up tissue culture results  3.  Monitor WBC    ID team 2 will follow.  Dr. Jiménez will take over tomorrow  IMPRESSION:  Diabetic foot infection here with sepsis secondary to infected 4th toe s/p amputation.  He still has a marked leukocytosis (on chart review, he's had a leukocytosis many times in the past suggesting chronic leukocytosis, however his current value is much higher now).  His foot is also red, tender and warm.  This suggest the amputation was not curative    Recommend:  1.  Continue Zosyn 4.5 grams IV q8hrs.  If no pseudomonas in the cultures, it's ok to deescalate this to 3.375 grams IV q8hrs  2. Follow up tissue culture results  3.  Monitor WBC  4. Follow up pathology report     ID team 2 will follow.  Dr. Jiménez will take over tomorrow

## 2023-10-25 NOTE — PROGRESS NOTE ADULT - PROBLEM SELECTOR PLAN 1
Patient presenting meeting 2/4 SIRS (T101, Leukocytosis), source likely Gangrenous toe vs Osteomyelitis of R. foot wound. No purulence or erythema on examination however extremely tender to palpation and at rest. XR w/o evidence of osteo however lower sensivity. s/p 1 dose vanc and zosyn in ED. Prior was on Keflex and doxycycline x 10 days (finished 10/10), completed course. Last BCx w/o growth.   s/p 2L NS in ED  BCx: no growth at 1 day  MRI: Soft tissue atrophy/wound of the 4th toe with underlying marrow changes of the 4th proximal phalanx head, middle phalanx, and distal phalanx that may be reactive in the absence of significant T1 marrow replacement, however, early infection may also be considered.    Plan:  - f/u BCx, UCx   - c/w vancomycin 1g q12 hours empirically (re-dose 1 AM 10/23)  - c/w zosyn with pseudomonal dosing   - Tylenol q6 prn for fever.   - f/u podiatry recs as below. Patient presenting meeting 2/4 SIRS (T101, Leukocytosis), source likely Gangrenous toe vs Osteomyelitis of R. foot wound. No purulence or erythema on examination however extremely tender to palpation and at rest. XR w/o evidence of osteo however lower sensivity. s/p 1 dose vanc and zosyn in ED. Prior was on Keflex and doxycycline x 10 days (finished 10/10), completed course. Last BCx w/o growth.   s/p 2L NS in ED  BCx: no growth at 1 day  MRI: Soft tissue atrophy/wound of the 4th toe with underlying marrow changes of the 4th proximal phalanx head, middle phalanx, and distal phalanx that may be reactive in the absence of significant T1 marrow replacement, however, early infection may also be considered.    Plan:  - f/u BCx, UCx   - c/w zosyn with pseudomonal dosing   - Tylenol q6 prn for fever.   - f/u podiatry recs as below.

## 2023-10-25 NOTE — PROGRESS NOTE ADULT - ATTENDING COMMENTS
Patient was seen and examined at bedside on 10/25/2023 at 1130 am. Patient reports pain at his foot has improved currently 0/10. Denies SOB, CP. ROS is otherwise negative. Vitals, labwork and pertinent imaging reviewed. Exam - NAD, AAO x 4, PERRLA, EOMI, MMM, supple neck, chest - CTA b/l, CV - rrr, s1s2, no m/r/g, abd - soft, NTND, + BS, ext - wwp, + 2 pitting edema b/l, R foot bandaged, s/p amputation of 4th toe, psych - normal affect    Plan:  -C/w broad empiric coverage, leukocytosis persists, bone culture + for bacteria, ID consulted  -Pain control, c/w Oxycodone 10 mg PO q6 standing with IV Morphine PRN for severe pain  -Endocrine consult given recent HbA1C ~ 13, c/w Lantus 6 units qHS and 5 units premeal given relative hypoglycemia  -Vascular consulted - pt planned for angiogram 10/25 but refused and now tentatively scheduled for 10/27  -Echo done showing hypokinesis and dysfunction, Cardiology consulted for HF and preoperative optimization  -PT/OT rec no skilled PT needs  -BP is better controlled - c/w Lisinopril, will change Norvasc 10 mg PO qd to Coreg and Spironolactone given evidence of HF  -Started on ASA

## 2023-10-26 ENCOUNTER — TRANSCRIPTION ENCOUNTER (OUTPATIENT)
Age: 68
End: 2023-10-26

## 2023-10-26 LAB
-  AMPICILLIN/SULBACTAM: SIGNIFICANT CHANGE UP
-  AMPICILLIN: SIGNIFICANT CHANGE UP
-  CEFAZOLIN: SIGNIFICANT CHANGE UP
-  CEFTRIAXONE: SIGNIFICANT CHANGE UP
-  CIPROFLOXACIN: SIGNIFICANT CHANGE UP
-  ERTAPENEM: SIGNIFICANT CHANGE UP
-  GENTAMICIN: SIGNIFICANT CHANGE UP
-  PIPERACILLIN/TAZOBACTAM: SIGNIFICANT CHANGE UP
-  TOBRAMYCIN: SIGNIFICANT CHANGE UP
-  TRIMETHOPRIM/SULFAMETHOXAZOLE: SIGNIFICANT CHANGE UP
ALBUMIN SERPL ELPH-MCNC: 2.1 G/DL — LOW (ref 3.3–5)
ALBUMIN SERPL ELPH-MCNC: 2.1 G/DL — LOW (ref 3.3–5)
ALP SERPL-CCNC: 193 U/L — HIGH (ref 40–120)
ALP SERPL-CCNC: 193 U/L — HIGH (ref 40–120)
ALT FLD-CCNC: 16 U/L — SIGNIFICANT CHANGE UP (ref 10–45)
ALT FLD-CCNC: 16 U/L — SIGNIFICANT CHANGE UP (ref 10–45)
ANION GAP SERPL CALC-SCNC: 8 MMOL/L — SIGNIFICANT CHANGE UP (ref 5–17)
ANION GAP SERPL CALC-SCNC: 8 MMOL/L — SIGNIFICANT CHANGE UP (ref 5–17)
APTT BLD: 29.2 SEC — SIGNIFICANT CHANGE UP (ref 24.5–35.6)
APTT BLD: 29.2 SEC — SIGNIFICANT CHANGE UP (ref 24.5–35.6)
AST SERPL-CCNC: 25 U/L — SIGNIFICANT CHANGE UP (ref 10–40)
AST SERPL-CCNC: 25 U/L — SIGNIFICANT CHANGE UP (ref 10–40)
BASOPHILS # BLD AUTO: 0.03 K/UL — SIGNIFICANT CHANGE UP (ref 0–0.2)
BASOPHILS # BLD AUTO: 0.03 K/UL — SIGNIFICANT CHANGE UP (ref 0–0.2)
BASOPHILS NFR BLD AUTO: 0.1 % — SIGNIFICANT CHANGE UP (ref 0–2)
BASOPHILS NFR BLD AUTO: 0.1 % — SIGNIFICANT CHANGE UP (ref 0–2)
BILIRUB SERPL-MCNC: 0.4 MG/DL — SIGNIFICANT CHANGE UP (ref 0.2–1.2)
BILIRUB SERPL-MCNC: 0.4 MG/DL — SIGNIFICANT CHANGE UP (ref 0.2–1.2)
BLD GP AB SCN SERPL QL: NEGATIVE — SIGNIFICANT CHANGE UP
BLD GP AB SCN SERPL QL: NEGATIVE — SIGNIFICANT CHANGE UP
BUN SERPL-MCNC: 17 MG/DL — SIGNIFICANT CHANGE UP (ref 7–23)
BUN SERPL-MCNC: 17 MG/DL — SIGNIFICANT CHANGE UP (ref 7–23)
CALCIUM SERPL-MCNC: 8.7 MG/DL — SIGNIFICANT CHANGE UP (ref 8.4–10.5)
CALCIUM SERPL-MCNC: 8.7 MG/DL — SIGNIFICANT CHANGE UP (ref 8.4–10.5)
CHLORIDE SERPL-SCNC: 100 MMOL/L — SIGNIFICANT CHANGE UP (ref 96–108)
CHLORIDE SERPL-SCNC: 100 MMOL/L — SIGNIFICANT CHANGE UP (ref 96–108)
CO2 SERPL-SCNC: 27 MMOL/L — SIGNIFICANT CHANGE UP (ref 22–31)
CO2 SERPL-SCNC: 27 MMOL/L — SIGNIFICANT CHANGE UP (ref 22–31)
CREAT SERPL-MCNC: 1.12 MG/DL — SIGNIFICANT CHANGE UP (ref 0.5–1.3)
CREAT SERPL-MCNC: 1.12 MG/DL — SIGNIFICANT CHANGE UP (ref 0.5–1.3)
CULTURE RESULTS: SIGNIFICANT CHANGE UP
EGFR: 72 ML/MIN/1.73M2 — SIGNIFICANT CHANGE UP
EGFR: 72 ML/MIN/1.73M2 — SIGNIFICANT CHANGE UP
EOSINOPHIL # BLD AUTO: 0.03 K/UL — SIGNIFICANT CHANGE UP (ref 0–0.5)
EOSINOPHIL # BLD AUTO: 0.03 K/UL — SIGNIFICANT CHANGE UP (ref 0–0.5)
EOSINOPHIL NFR BLD AUTO: 0.1 % — SIGNIFICANT CHANGE UP (ref 0–6)
EOSINOPHIL NFR BLD AUTO: 0.1 % — SIGNIFICANT CHANGE UP (ref 0–6)
GLUCOSE BLDC GLUCOMTR-MCNC: 105 MG/DL — HIGH (ref 70–99)
GLUCOSE BLDC GLUCOMTR-MCNC: 105 MG/DL — HIGH (ref 70–99)
GLUCOSE BLDC GLUCOMTR-MCNC: 125 MG/DL — HIGH (ref 70–99)
GLUCOSE BLDC GLUCOMTR-MCNC: 125 MG/DL — HIGH (ref 70–99)
GLUCOSE BLDC GLUCOMTR-MCNC: 152 MG/DL — HIGH (ref 70–99)
GLUCOSE BLDC GLUCOMTR-MCNC: 152 MG/DL — HIGH (ref 70–99)
GLUCOSE BLDC GLUCOMTR-MCNC: 156 MG/DL — HIGH (ref 70–99)
GLUCOSE BLDC GLUCOMTR-MCNC: 156 MG/DL — HIGH (ref 70–99)
GLUCOSE BLDC GLUCOMTR-MCNC: 90 MG/DL — SIGNIFICANT CHANGE UP (ref 70–99)
GLUCOSE BLDC GLUCOMTR-MCNC: 90 MG/DL — SIGNIFICANT CHANGE UP (ref 70–99)
GLUCOSE SERPL-MCNC: 153 MG/DL — HIGH (ref 70–99)
GLUCOSE SERPL-MCNC: 153 MG/DL — HIGH (ref 70–99)
HCT VFR BLD CALC: 28 % — LOW (ref 39–50)
HCT VFR BLD CALC: 28 % — LOW (ref 39–50)
HGB BLD-MCNC: 9.1 G/DL — LOW (ref 13–17)
HGB BLD-MCNC: 9.1 G/DL — LOW (ref 13–17)
IMM GRANULOCYTES NFR BLD AUTO: 0.7 % — SIGNIFICANT CHANGE UP (ref 0–0.9)
IMM GRANULOCYTES NFR BLD AUTO: 0.7 % — SIGNIFICANT CHANGE UP (ref 0–0.9)
INR BLD: 1.08 — SIGNIFICANT CHANGE UP (ref 0.85–1.18)
INR BLD: 1.08 — SIGNIFICANT CHANGE UP (ref 0.85–1.18)
LYMPHOCYTES # BLD AUTO: 1.26 K/UL — SIGNIFICANT CHANGE UP (ref 1–3.3)
LYMPHOCYTES # BLD AUTO: 1.26 K/UL — SIGNIFICANT CHANGE UP (ref 1–3.3)
LYMPHOCYTES # BLD AUTO: 5.9 % — LOW (ref 13–44)
LYMPHOCYTES # BLD AUTO: 5.9 % — LOW (ref 13–44)
MAGNESIUM SERPL-MCNC: 2.2 MG/DL — SIGNIFICANT CHANGE UP (ref 1.6–2.6)
MAGNESIUM SERPL-MCNC: 2.2 MG/DL — SIGNIFICANT CHANGE UP (ref 1.6–2.6)
MCHC RBC-ENTMCNC: 31.5 PG — SIGNIFICANT CHANGE UP (ref 27–34)
MCHC RBC-ENTMCNC: 31.5 PG — SIGNIFICANT CHANGE UP (ref 27–34)
MCHC RBC-ENTMCNC: 32.5 GM/DL — SIGNIFICANT CHANGE UP (ref 32–36)
MCHC RBC-ENTMCNC: 32.5 GM/DL — SIGNIFICANT CHANGE UP (ref 32–36)
MCV RBC AUTO: 96.9 FL — SIGNIFICANT CHANGE UP (ref 80–100)
MCV RBC AUTO: 96.9 FL — SIGNIFICANT CHANGE UP (ref 80–100)
METHOD TYPE: SIGNIFICANT CHANGE UP
MONOCYTES # BLD AUTO: 1.77 K/UL — HIGH (ref 0–0.9)
MONOCYTES # BLD AUTO: 1.77 K/UL — HIGH (ref 0–0.9)
MONOCYTES NFR BLD AUTO: 8.3 % — SIGNIFICANT CHANGE UP (ref 2–14)
MONOCYTES NFR BLD AUTO: 8.3 % — SIGNIFICANT CHANGE UP (ref 2–14)
NEUTROPHILS # BLD AUTO: 18.15 K/UL — HIGH (ref 1.8–7.4)
NEUTROPHILS # BLD AUTO: 18.15 K/UL — HIGH (ref 1.8–7.4)
NEUTROPHILS NFR BLD AUTO: 84.9 % — HIGH (ref 43–77)
NEUTROPHILS NFR BLD AUTO: 84.9 % — HIGH (ref 43–77)
NRBC # BLD: 0 /100 WBCS — SIGNIFICANT CHANGE UP (ref 0–0)
NRBC # BLD: 0 /100 WBCS — SIGNIFICANT CHANGE UP (ref 0–0)
PHOSPHATE SERPL-MCNC: 3.5 MG/DL — SIGNIFICANT CHANGE UP (ref 2.5–4.5)
PHOSPHATE SERPL-MCNC: 3.5 MG/DL — SIGNIFICANT CHANGE UP (ref 2.5–4.5)
PLATELET # BLD AUTO: 237 K/UL — SIGNIFICANT CHANGE UP (ref 150–400)
PLATELET # BLD AUTO: 237 K/UL — SIGNIFICANT CHANGE UP (ref 150–400)
POTASSIUM SERPL-MCNC: 4 MMOL/L — SIGNIFICANT CHANGE UP (ref 3.5–5.3)
POTASSIUM SERPL-MCNC: 4 MMOL/L — SIGNIFICANT CHANGE UP (ref 3.5–5.3)
POTASSIUM SERPL-SCNC: 4 MMOL/L — SIGNIFICANT CHANGE UP (ref 3.5–5.3)
POTASSIUM SERPL-SCNC: 4 MMOL/L — SIGNIFICANT CHANGE UP (ref 3.5–5.3)
PROT SERPL-MCNC: 6 G/DL — SIGNIFICANT CHANGE UP (ref 6–8.3)
PROT SERPL-MCNC: 6 G/DL — SIGNIFICANT CHANGE UP (ref 6–8.3)
PROTHROM AB SERPL-ACNC: 12.3 SEC — SIGNIFICANT CHANGE UP (ref 9.5–13)
PROTHROM AB SERPL-ACNC: 12.3 SEC — SIGNIFICANT CHANGE UP (ref 9.5–13)
RBC # BLD: 2.89 M/UL — LOW (ref 4.2–5.8)
RBC # BLD: 2.89 M/UL — LOW (ref 4.2–5.8)
RBC # FLD: 13.8 % — SIGNIFICANT CHANGE UP (ref 10.3–14.5)
RBC # FLD: 13.8 % — SIGNIFICANT CHANGE UP (ref 10.3–14.5)
RH IG SCN BLD-IMP: POSITIVE — SIGNIFICANT CHANGE UP
RH IG SCN BLD-IMP: POSITIVE — SIGNIFICANT CHANGE UP
SODIUM SERPL-SCNC: 135 MMOL/L — SIGNIFICANT CHANGE UP (ref 135–145)
SODIUM SERPL-SCNC: 135 MMOL/L — SIGNIFICANT CHANGE UP (ref 135–145)
SPECIMEN SOURCE: SIGNIFICANT CHANGE UP
WBC # BLD: 21.39 K/UL — HIGH (ref 3.8–10.5)
WBC # BLD: 21.39 K/UL — HIGH (ref 3.8–10.5)
WBC # FLD AUTO: 21.39 K/UL — HIGH (ref 3.8–10.5)
WBC # FLD AUTO: 21.39 K/UL — HIGH (ref 3.8–10.5)

## 2023-10-26 PROCEDURE — 99232 SBSQ HOSP IP/OBS MODERATE 35: CPT

## 2023-10-26 PROCEDURE — 99232 SBSQ HOSP IP/OBS MODERATE 35: CPT | Mod: GC

## 2023-10-26 PROCEDURE — 99232 SBSQ HOSP IP/OBS MODERATE 35: CPT | Mod: GC,57,25

## 2023-10-26 RX ORDER — SODIUM CHLORIDE 9 MG/ML
1000 INJECTION, SOLUTION INTRAVENOUS
Refills: 0 | Status: DISCONTINUED | OUTPATIENT
Start: 2023-10-26 | End: 2023-10-31

## 2023-10-26 RX ORDER — DAPTOMYCIN 500 MG/10ML
500 INJECTION, POWDER, LYOPHILIZED, FOR SOLUTION INTRAVENOUS EVERY 24 HOURS
Refills: 0 | Status: DISCONTINUED | OUTPATIENT
Start: 2023-10-26 | End: 2023-10-30

## 2023-10-26 RX ORDER — INSULIN LISPRO 100/ML
4 VIAL (ML) SUBCUTANEOUS
Refills: 0 | Status: DISCONTINUED | OUTPATIENT
Start: 2023-10-26 | End: 2023-10-30

## 2023-10-26 RX ORDER — OXYCODONE HYDROCHLORIDE 5 MG/1
10 TABLET ORAL EVERY 6 HOURS
Refills: 0 | Status: DISCONTINUED | OUTPATIENT
Start: 2023-10-26 | End: 2023-10-26

## 2023-10-26 RX ORDER — GLUCAGON INJECTION, SOLUTION 0.5 MG/.1ML
1 INJECTION, SOLUTION SUBCUTANEOUS ONCE
Refills: 0 | Status: DISCONTINUED | OUTPATIENT
Start: 2023-10-26 | End: 2023-10-31

## 2023-10-26 RX ORDER — INSULIN GLARGINE 100 [IU]/ML
4 INJECTION, SOLUTION SUBCUTANEOUS AT BEDTIME
Refills: 0 | Status: DISCONTINUED | OUTPATIENT
Start: 2023-10-26 | End: 2023-10-27

## 2023-10-26 RX ORDER — MORPHINE SULFATE 50 MG/1
2 CAPSULE, EXTENDED RELEASE ORAL EVERY 4 HOURS
Refills: 0 | Status: DISCONTINUED | OUTPATIENT
Start: 2023-10-26 | End: 2023-10-27

## 2023-10-26 RX ORDER — PIPERACILLIN AND TAZOBACTAM 4; .5 G/20ML; G/20ML
3.38 INJECTION, POWDER, LYOPHILIZED, FOR SOLUTION INTRAVENOUS EVERY 8 HOURS
Refills: 0 | Status: DISCONTINUED | OUTPATIENT
Start: 2023-10-26 | End: 2023-10-30

## 2023-10-26 RX ORDER — INSULIN LISPRO 100/ML
VIAL (ML) SUBCUTANEOUS AT BEDTIME
Refills: 0 | Status: DISCONTINUED | OUTPATIENT
Start: 2023-10-26 | End: 2023-10-30

## 2023-10-26 RX ORDER — DEXTROSE 50 % IN WATER 50 %
15 SYRINGE (ML) INTRAVENOUS ONCE
Refills: 0 | Status: DISCONTINUED | OUTPATIENT
Start: 2023-10-26 | End: 2023-10-31

## 2023-10-26 RX ORDER — SODIUM CHLORIDE 9 MG/ML
1000 INJECTION, SOLUTION INTRAVENOUS
Refills: 0 | Status: DISCONTINUED | OUTPATIENT
Start: 2023-10-26 | End: 2023-10-27

## 2023-10-26 RX ORDER — INSULIN LISPRO 100/ML
VIAL (ML) SUBCUTANEOUS
Refills: 0 | Status: DISCONTINUED | OUTPATIENT
Start: 2023-10-26 | End: 2023-10-30

## 2023-10-26 RX ORDER — DEXTROSE 50 % IN WATER 50 %
25 SYRINGE (ML) INTRAVENOUS ONCE
Refills: 0 | Status: DISCONTINUED | OUTPATIENT
Start: 2023-10-26 | End: 2023-10-31

## 2023-10-26 RX ORDER — DEXTROSE 50 % IN WATER 50 %
12.5 SYRINGE (ML) INTRAVENOUS ONCE
Refills: 0 | Status: DISCONTINUED | OUTPATIENT
Start: 2023-10-26 | End: 2023-10-31

## 2023-10-26 RX ORDER — OXYCODONE HYDROCHLORIDE 5 MG/1
10 TABLET ORAL EVERY 6 HOURS
Refills: 0 | Status: DISCONTINUED | OUTPATIENT
Start: 2023-10-26 | End: 2023-10-31

## 2023-10-26 RX ORDER — ENOXAPARIN SODIUM 100 MG/ML
40 INJECTION SUBCUTANEOUS ONCE
Refills: 0 | Status: COMPLETED | OUTPATIENT
Start: 2023-10-26 | End: 2023-10-26

## 2023-10-26 RX ADMIN — ENOXAPARIN SODIUM 40 MILLIGRAM(S): 100 INJECTION SUBCUTANEOUS at 12:07

## 2023-10-26 RX ADMIN — Medication 5 UNIT(S): at 09:48

## 2023-10-26 RX ADMIN — MORPHINE SULFATE 2 MILLIGRAM(S): 50 CAPSULE, EXTENDED RELEASE ORAL at 01:07

## 2023-10-26 RX ADMIN — OXYCODONE HYDROCHLORIDE 10 MILLIGRAM(S): 5 TABLET ORAL at 05:57

## 2023-10-26 RX ADMIN — LISINOPRIL 40 MILLIGRAM(S): 2.5 TABLET ORAL at 12:32

## 2023-10-26 RX ADMIN — MORPHINE SULFATE 2 MILLIGRAM(S): 50 CAPSULE, EXTENDED RELEASE ORAL at 22:34

## 2023-10-26 RX ADMIN — GABAPENTIN 1600 MILLIGRAM(S): 400 CAPSULE ORAL at 22:33

## 2023-10-26 RX ADMIN — OXYCODONE HYDROCHLORIDE 10 MILLIGRAM(S): 5 TABLET ORAL at 06:03

## 2023-10-26 RX ADMIN — MORPHINE SULFATE 2 MILLIGRAM(S): 50 CAPSULE, EXTENDED RELEASE ORAL at 18:38

## 2023-10-26 RX ADMIN — MORPHINE SULFATE 2 MILLIGRAM(S): 50 CAPSULE, EXTENDED RELEASE ORAL at 18:19

## 2023-10-26 RX ADMIN — Medication 650 MILLIGRAM(S): at 03:25

## 2023-10-26 RX ADMIN — SODIUM CHLORIDE 50 MILLILITER(S): 9 INJECTION, SOLUTION INTRAVENOUS at 19:06

## 2023-10-26 RX ADMIN — Medication 650 MILLIGRAM(S): at 03:00

## 2023-10-26 RX ADMIN — Medication 650 MILLIGRAM(S): at 23:19

## 2023-10-26 RX ADMIN — Medication 650 MILLIGRAM(S): at 22:33

## 2023-10-26 RX ADMIN — PIPERACILLIN AND TAZOBACTAM 25 GRAM(S): 4; .5 INJECTION, POWDER, LYOPHILIZED, FOR SOLUTION INTRAVENOUS at 05:57

## 2023-10-26 RX ADMIN — Medication 1: at 19:02

## 2023-10-26 RX ADMIN — MORPHINE SULFATE 2 MILLIGRAM(S): 50 CAPSULE, EXTENDED RELEASE ORAL at 13:58

## 2023-10-26 RX ADMIN — OXYCODONE HYDROCHLORIDE 10 MILLIGRAM(S): 5 TABLET ORAL at 13:05

## 2023-10-26 RX ADMIN — Medication 1: at 09:48

## 2023-10-26 RX ADMIN — MORPHINE SULFATE 2 MILLIGRAM(S): 50 CAPSULE, EXTENDED RELEASE ORAL at 08:58

## 2023-10-26 RX ADMIN — INSULIN GLARGINE 4 UNIT(S): 100 INJECTION, SOLUTION SUBCUTANEOUS at 22:34

## 2023-10-26 RX ADMIN — GABAPENTIN 1600 MILLIGRAM(S): 400 CAPSULE ORAL at 05:58

## 2023-10-26 RX ADMIN — OXYCODONE HYDROCHLORIDE 10 MILLIGRAM(S): 5 TABLET ORAL at 12:33

## 2023-10-26 RX ADMIN — Medication 5 UNIT(S): at 12:35

## 2023-10-26 RX ADMIN — MORPHINE SULFATE 2 MILLIGRAM(S): 50 CAPSULE, EXTENDED RELEASE ORAL at 08:43

## 2023-10-26 RX ADMIN — DAPTOMYCIN 120 MILLIGRAM(S): 500 INJECTION, POWDER, LYOPHILIZED, FOR SOLUTION INTRAVENOUS at 19:49

## 2023-10-26 RX ADMIN — MORPHINE SULFATE 2 MILLIGRAM(S): 50 CAPSULE, EXTENDED RELEASE ORAL at 22:47

## 2023-10-26 RX ADMIN — Medication 4 UNIT(S): at 19:02

## 2023-10-26 RX ADMIN — OXYCODONE HYDROCHLORIDE 10 MILLIGRAM(S): 5 TABLET ORAL at 19:49

## 2023-10-26 RX ADMIN — CARVEDILOL PHOSPHATE 12.5 MILLIGRAM(S): 80 CAPSULE, EXTENDED RELEASE ORAL at 18:19

## 2023-10-26 RX ADMIN — ATORVASTATIN CALCIUM 80 MILLIGRAM(S): 80 TABLET, FILM COATED ORAL at 22:33

## 2023-10-26 RX ADMIN — Medication 81 MILLIGRAM(S): at 12:32

## 2023-10-26 RX ADMIN — GABAPENTIN 1600 MILLIGRAM(S): 400 CAPSULE ORAL at 13:44

## 2023-10-26 RX ADMIN — MORPHINE SULFATE 2 MILLIGRAM(S): 50 CAPSULE, EXTENDED RELEASE ORAL at 13:43

## 2023-10-26 RX ADMIN — PIPERACILLIN AND TAZOBACTAM 25 GRAM(S): 4; .5 INJECTION, POWDER, LYOPHILIZED, FOR SOLUTION INTRAVENOUS at 13:58

## 2023-10-26 RX ADMIN — SPIRONOLACTONE 25 MILLIGRAM(S): 25 TABLET, FILM COATED ORAL at 05:58

## 2023-10-26 RX ADMIN — MORPHINE SULFATE 2 MILLIGRAM(S): 50 CAPSULE, EXTENDED RELEASE ORAL at 00:52

## 2023-10-26 RX ADMIN — PIPERACILLIN AND TAZOBACTAM 25 GRAM(S): 4; .5 INJECTION, POWDER, LYOPHILIZED, FOR SOLUTION INTRAVENOUS at 22:33

## 2023-10-26 RX ADMIN — CARVEDILOL PHOSPHATE 12.5 MILLIGRAM(S): 80 CAPSULE, EXTENDED RELEASE ORAL at 05:57

## 2023-10-26 RX ADMIN — OXYCODONE HYDROCHLORIDE 10 MILLIGRAM(S): 5 TABLET ORAL at 20:27

## 2023-10-26 NOTE — PROGRESS NOTE ADULT - SUBJECTIVE AND OBJECTIVE BOX
INFECTIOUS DISEASES CONSULT FOLLOW-UP NOTE    INTERVAL HPI/OVERNIGHT EVENTS:  Afebrile, WBC down to 21.4k  No new symptoms, improving R foot pain. Tolerating abx without any side effects other than very mild loose BMs    ROS:   Constitutional, eyes, ENT, cardiovascular, respiratory, gastrointestinal, genitourinary, integumentary, neurological, psychiatric and heme/lymph are otherwise negative other than noted above       ANTIBIOTICS/RELEVANT:    MEDICATIONS  (STANDING):  acetaminophen     Tablet .. 650 milliGRAM(s) Oral every 6 hours  aspirin enteric coated 81 milliGRAM(s) Oral daily  atorvastatin 80 milliGRAM(s) Oral at bedtime  carvedilol 12.5 milliGRAM(s) Oral every 12 hours  dextrose 5%. 1000 milliLiter(s) (50 mL/Hr) IV Continuous <Continuous>  dextrose 5%. 1000 milliLiter(s) (100 mL/Hr) IV Continuous <Continuous>  dextrose 50% Injectable 25 Gram(s) IV Push once  dextrose 50% Injectable 25 Gram(s) IV Push once  dextrose 50% Injectable 12.5 Gram(s) IV Push once  gabapentin 1600 milliGRAM(s) Oral three times a day  glucagon  Injectable 1 milliGRAM(s) IntraMuscular once  insulin glargine Injectable (LANTUS) 6 Unit(s) SubCutaneous at bedtime  insulin lispro (ADMELOG) corrective regimen sliding scale   SubCutaneous three times a day before meals  insulin lispro (ADMELOG) corrective regimen sliding scale   SubCutaneous at bedtime  insulin lispro Injectable (ADMELOG) 5 Unit(s) SubCutaneous three times a day before meals  lisinopril 40 milliGRAM(s) Oral every 24 hours  piperacillin/tazobactam IVPB.. 3.375 Gram(s) IV Intermittent every 8 hours  spironolactone 25 milliGRAM(s) Oral daily    MEDICATIONS  (PRN):  aluminum hydroxide/magnesium hydroxide/simethicone Suspension 30 milliLiter(s) Oral every 6 hours PRN Dyspepsia  dextrose Oral Gel 15 Gram(s) Oral once PRN Blood Glucose LESS THAN 70 milliGRAM(s)/deciliter  morphine  - Injectable 2 milliGRAM(s) IV Push every 4 hours PRN Severe Pain (7 - 10)  oxyCODONE    IR 10 milliGRAM(s) Oral every 6 hours PRN Moderate Pain (4 - 6)        Vital Signs Last 24 Hrs  T(C): 37.2 (26 Oct 2023 08:40), Max: 37.7 (25 Oct 2023 16:49)  T(F): 98.9 (26 Oct 2023 08:40), Max: 99.8 (25 Oct 2023 16:49)  HR: 73 (26 Oct 2023 08:40) (72 - 88)  BP: 159/88 (26 Oct 2023 08:40) (152/83 - 182/87)  BP(mean): --  RR: 17 (26 Oct 2023 08:40) (17 - 18)  SpO2: 99% (26 Oct 2023 08:40) (96% - 99%)    Parameters below as of 26 Oct 2023 08:40  Patient On (Oxygen Delivery Method): room air        PHYSICAL EXAM:  Constitutional: alert, NAD  Eyes: the sclera and conjunctiva were normal.   ENT: the ears and nose were normal in appearance.   Neck: the appearance of the neck was normal and the neck was supple.   Pulmonary: no respiratory distress and lungs were clear to auscultation bilaterally.   Heart: heart rate was normal and rhythm regular, normal S1 and S2  Vascular:. there was no peripheral edema  Abdomen: normal bowel sounds, soft, non-tender  Neurological: no focal deficits.   Psychiatric: the affect was normal  MSK: R foot dressing c/d/i, in boot        LABS:                        9.1    21.39 )-----------( 237      ( 26 Oct 2023 05:30 )             28.0     10-26    135  |  100  |  17  ----------------------------<  153<H>  4.0   |  27  |  1.12    Ca    8.7      26 Oct 2023 05:30  Phos  3.5     10-26  Mg     2.2     10-26    TPro  6.0  /  Alb  2.1<L>  /  TBili  0.4  /  DBili  x   /  AST  25  /  ALT  16  /  AlkPhos  193<H>  10-26    PT/INR - ( 26 Oct 2023 05:30 )   PT: 12.3 sec;   INR: 1.08          PTT - ( 26 Oct 2023 05:30 )  PTT:29.2 sec  Urinalysis Basic - ( 26 Oct 2023 05:30 )    Color: x / Appearance: x / SG: x / pH: x  Gluc: 153 mg/dL / Ketone: x  / Bili: x / Urobili: x   Blood: x / Protein: x / Nitrite: x   Leuk Esterase: x / RBC: x / WBC x   Sq Epi: x / Non Sq Epi: x / Bacteria: x        MICROBIOLOGY:  Reviewed    RADIOLOGY & ADDITIONAL STUDIES:  Reviewed

## 2023-10-26 NOTE — PROGRESS NOTE ADULT - SUBJECTIVE AND OBJECTIVE BOX
SUBJECTIVE / INTERVAL HPI: Patient was seen and examined this morning.     Overnight events: No acute overnight events. Pt refused to participate in medicines ROS and Physical examination today. Also removed bandage last night. Pt also refusing RLE angiogram at this time after he had complaints of being NPO.     CAPILLARY BLOOD GLUCOSE & INSULIN RECEIVED  64 mg/dL (10-25 @ 09:25)  165 mg/dL (10-25 @ 11:21)  91 mg/dL (10-25 @ 14:12)  69 mg/dL (10-25 @ 17:35)  119 mg/dL (10-25 @ 18:41)  80 mg/dL (10-25 @ 22:07)  152 mg/dL (10-26 @ 09:25)- 5+1      REVIEW OF SYSTEMS  Constitutional:  Negative fever, chills or loss of appetite.  Eyes:  Negative blurry vision or double vision.  Cardiovascular:  Negative for chest pain or palpitations.  Respiratory:  Negative for cough, wheezing, or shortness of breath.    Gastrointestinal:  Negative for nausea, vomiting, diarrhea, constipation, or abdominal pain.  Genitourinary:  Negative frequency, urgency or dysuria.  Neurologic:  No headache, confusion, dizziness, lightheadedness.    PHYSICAL EXAM      Vital Signs Last 24 Hrs  T(C): 37.2 (26 Oct 2023 08:40), Max: 37.7 (25 Oct 2023 16:49)  T(F): 98.9 (26 Oct 2023 08:40), Max: 99.8 (25 Oct 2023 16:49)  HR: 73 (26 Oct 2023 08:40) (72 - 88)  BP: 159/88 (26 Oct 2023 08:40) (152/83 - 182/87)  BP(mean): --  RR: 17 (26 Oct 2023 08:40) (17 - 18)  SpO2: 99% (26 Oct 2023 08:40) (96% - 99%)    Parameters below as of 26 Oct 2023 08:40  Patient On (Oxygen Delivery Method): room air        Constitutional: Awake, alert, in no acute distress.   HEENT: Normocephalic, atraumatic, RAGHAV.  Respiratory: Lungs clear to ausculation bilaterally.   Cardiovascular: regular rhythm, normal S1 and S2, no audible murmurs.   GI: soft, non-tender, non-distended, bowel sounds present.  Extremities: No lower extremity edema.  Psychiatric: AAO x 3. Normal affect/mood.     LABS  CBC - WBC/HGB/HTC/PLT: 21.39/9.1/28.0/237 (10-26-23)  BMP - Na/K/Cl/Bicarb/BUN/Cr/Gluc/AG/eGFR: 135/4.0/100/27/17/1.12/153/8/72 (10-26-23)  Ca - 8.7 (10-26-23)  Phos - 3.5 (10-26-23)  Mg - 2.2 (10-26-23)  LFT - Alb/Tprot/Tbili/Dbili/AlkPhos/ALT/AST: 2.1/--/0.4/--/193/16/25 (10-26-23)  PT/aPTT/INR: 12.3/29.2/1.08 (10-26-23)           MEDICATIONS  MEDICATIONS  (STANDING):  acetaminophen     Tablet .. 650 milliGRAM(s) Oral every 6 hours  aspirin enteric coated 81 milliGRAM(s) Oral daily  atorvastatin 80 milliGRAM(s) Oral at bedtime  carvedilol 12.5 milliGRAM(s) Oral every 12 hours  dextrose 5%. 1000 milliLiter(s) (100 mL/Hr) IV Continuous <Continuous>  dextrose 5%. 1000 milliLiter(s) (50 mL/Hr) IV Continuous <Continuous>  dextrose 50% Injectable 25 Gram(s) IV Push once  dextrose 50% Injectable 25 Gram(s) IV Push once  dextrose 50% Injectable 12.5 Gram(s) IV Push once  gabapentin 1600 milliGRAM(s) Oral three times a day  glucagon  Injectable 1 milliGRAM(s) IntraMuscular once  insulin glargine Injectable (LANTUS) 6 Unit(s) SubCutaneous at bedtime  insulin lispro (ADMELOG) corrective regimen sliding scale   SubCutaneous three times a day before meals  insulin lispro (ADMELOG) corrective regimen sliding scale   SubCutaneous at bedtime  insulin lispro Injectable (ADMELOG) 5 Unit(s) SubCutaneous three times a day before meals  lisinopril 40 milliGRAM(s) Oral every 24 hours  piperacillin/tazobactam IVPB.. 3.375 Gram(s) IV Intermittent every 8 hours  spironolactone 25 milliGRAM(s) Oral daily    MEDICATIONS  (PRN):  aluminum hydroxide/magnesium hydroxide/simethicone Suspension 30 milliLiter(s) Oral every 6 hours PRN Dyspepsia  dextrose Oral Gel 15 Gram(s) Oral once PRN Blood Glucose LESS THAN 70 milliGRAM(s)/deciliter  morphine  - Injectable 2 milliGRAM(s) IV Push every 4 hours PRN Severe Pain (7 - 10)  oxyCODONE    IR 10 milliGRAM(s) Oral every 6 hours PRN Moderate Pain (4 - 6)    ASSESSMENT / RECOMMENDATIONS    A1C: 12.6 %  BUN: 17  Creatinine: 1.12  GFR: 72  Weight: 65  BMI: 22.4    # Type 2 diabetes mellitus with hyperglycemia  - Please keep lantus  units at bedtime.   - keep lispro   units before each meal.  - Continue lispro moderate dose sliding scale before meals and at bedtime.  - Patient's fingerstick glucose goal is 100-180 mg/dL.    - Discharge recommendations to be discussed.   - Patient can follow up at discharge with [facility] by calling [phone] to make an appointment.         SUBJECTIVE / INTERVAL HPI: Patient was seen and examined this morning.     Overnight events: No acute overnight events. Pt refused to participate in medicines ROS and Physical examination today. Also removed bandage last night. Pt also refusing RLE angiogram at this time after he had complaints of being NPO.     CAPILLARY BLOOD GLUCOSE & INSULIN RECEIVED  64 mg/dL (10-25 @ 09:25)  165 mg/dL (10-25 @ 11:21)  91 mg/dL (10-25 @ 14:12)  69 mg/dL (10-25 @ 17:35)  119 mg/dL (10-25 @ 18:41)  80 mg/dL (10-25 @ 22:07)  152 mg/dL (10-26 @ 09:25)- 5+1      REVIEW OF SYSTEMS  Constitutional:  Negative fever, chills or loss of appetite.  Eyes:  Negative blurry vision or double vision.  Cardiovascular:  Negative for chest pain or palpitations.  Respiratory:  Negative for cough, wheezing, or shortness of breath.    Gastrointestinal:  Negative for nausea, vomiting, diarrhea, constipation, or abdominal pain.  Genitourinary:  Negative frequency, urgency or dysuria.  Neurologic:  No headache, confusion, dizziness, lightheadedness.    PHYSICAL EXAM      Vital Signs Last 24 Hrs  T(C): 37.2 (26 Oct 2023 08:40), Max: 37.7 (25 Oct 2023 16:49)  T(F): 98.9 (26 Oct 2023 08:40), Max: 99.8 (25 Oct 2023 16:49)  HR: 73 (26 Oct 2023 08:40) (72 - 88)  BP: 159/88 (26 Oct 2023 08:40) (152/83 - 182/87)  BP(mean): --  RR: 17 (26 Oct 2023 08:40) (17 - 18)  SpO2: 99% (26 Oct 2023 08:40) (96% - 99%)    Parameters below as of 26 Oct 2023 08:40  Patient On (Oxygen Delivery Method): room air        Constitutional: Awake, alert, in no acute distress.   HEENT: Normocephalic, atraumatic, RAGHAV.  Respiratory: Lungs clear to ausculation bilaterally.   Cardiovascular: regular rhythm, normal S1 and S2, no audible murmurs.   GI: soft, non-tender, non-distended, bowel sounds present.  Extremities: No lower extremity edema.  Psychiatric: AAO x 3. Normal affect/mood.     LABS  CBC - WBC/HGB/HTC/PLT: 21.39/9.1/28.0/237 (10-26-23)  BMP - Na/K/Cl/Bicarb/BUN/Cr/Gluc/AG/eGFR: 135/4.0/100/27/17/1.12/153/8/72 (10-26-23)  Ca - 8.7 (10-26-23)  Phos - 3.5 (10-26-23)  Mg - 2.2 (10-26-23)  LFT - Alb/Tprot/Tbili/Dbili/AlkPhos/ALT/AST: 2.1/--/0.4/--/193/16/25 (10-26-23)  PT/aPTT/INR: 12.3/29.2/1.08 (10-26-23)           MEDICATIONS  MEDICATIONS  (STANDING):  acetaminophen     Tablet .. 650 milliGRAM(s) Oral every 6 hours  aspirin enteric coated 81 milliGRAM(s) Oral daily  atorvastatin 80 milliGRAM(s) Oral at bedtime  carvedilol 12.5 milliGRAM(s) Oral every 12 hours  dextrose 5%. 1000 milliLiter(s) (100 mL/Hr) IV Continuous <Continuous>  dextrose 5%. 1000 milliLiter(s) (50 mL/Hr) IV Continuous <Continuous>  dextrose 50% Injectable 25 Gram(s) IV Push once  dextrose 50% Injectable 25 Gram(s) IV Push once  dextrose 50% Injectable 12.5 Gram(s) IV Push once  gabapentin 1600 milliGRAM(s) Oral three times a day  glucagon  Injectable 1 milliGRAM(s) IntraMuscular once  insulin glargine Injectable (LANTUS) 6 Unit(s) SubCutaneous at bedtime  insulin lispro (ADMELOG) corrective regimen sliding scale   SubCutaneous three times a day before meals  insulin lispro (ADMELOG) corrective regimen sliding scale   SubCutaneous at bedtime  insulin lispro Injectable (ADMELOG) 5 Unit(s) SubCutaneous three times a day before meals  lisinopril 40 milliGRAM(s) Oral every 24 hours  piperacillin/tazobactam IVPB.. 3.375 Gram(s) IV Intermittent every 8 hours  spironolactone 25 milliGRAM(s) Oral daily    MEDICATIONS  (PRN):  aluminum hydroxide/magnesium hydroxide/simethicone Suspension 30 milliLiter(s) Oral every 6 hours PRN Dyspepsia  dextrose Oral Gel 15 Gram(s) Oral once PRN Blood Glucose LESS THAN 70 milliGRAM(s)/deciliter  morphine  - Injectable 2 milliGRAM(s) IV Push every 4 hours PRN Severe Pain (7 - 10)  oxyCODONE    IR 10 milliGRAM(s) Oral every 6 hours PRN Moderate Pain (4 - 6)    ASSESSMENT / RECOMMENDATIONS    A1C: 12.6 %  BUN: 17  Creatinine: 1.12  GFR: 72  Weight: 65  BMI: 22.4    # Type 2 diabetes mellitus with hyperglycemia  - Please keep lantus 5 units at bedtime.   - keep lispro 4 units before each meal.  - Continue lispro moderate dose sliding scale before meals and at bedtime.  - Patient's fingerstick glucose goal is 100-180 mg/dL.    - Discharge recommendations to be discussed.   - Patient can follow up at discharge with [facility] by calling [phone] to make an appointment.         SUBJECTIVE / INTERVAL HPI: Patient was seen and examined this morning.     Overnight events: No acute overnight events. Pt refused to participate in medicines ROS and Physical examination today. Also removed bandage last night. Pt also refusing RLE angiogram at this time after he had complaints of being NPO.     CAPILLARY BLOOD GLUCOSE & INSULIN RECEIVED  64 mg/dL (10-25 @ 09:25)  165 mg/dL (10-25 @ 11:21)  91 mg/dL (10-25 @ 14:12)  69 mg/dL (10-25 @ 17:35)  119 mg/dL (10-25 @ 18:41)  80 mg/dL (10-25 @ 22:07)  152 mg/dL (10-26 @ 09:25)- 5+1      REVIEW OF SYSTEMS  Constitutional:  Negative fever, chills or loss of appetite.  Eyes:  Negative blurry vision or double vision.  Cardiovascular:  Negative for chest pain or palpitations.  Respiratory:  Negative for cough, wheezing, or shortness of breath.    Gastrointestinal:  Negative for nausea, vomiting, diarrhea, constipation, or abdominal pain.  Genitourinary:  Negative frequency, urgency or dysuria.  Neurologic:  No headache, confusion, dizziness, lightheadedness.    PHYSICAL EXAM  HEENT: NC/AT, PERRLA, EOMI, no conjunctival pallor or scleral icterus, DMM  Neck: Supple, no JVD  Respiratory: CTA B/L. No w/r/r.   Cardiovascular: RRR, normal S1 and S2, no m/r/g.   Gastrointestinal: +BS, soft NTND, no guarding or rebound tenderness, no palpable masses   Extremities: wwp; no cyanosis, clubbing or edema. R 4th toe amputated with dressing  Neurological: AAOx3, no CN deficits, strength and sensation intact throughout.   Skin: No gross skin abnormalities or rashe    Vital Signs Last 24 Hrs  T(C): 37.2 (26 Oct 2023 08:40), Max: 37.7 (25 Oct 2023 16:49)  T(F): 98.9 (26 Oct 2023 08:40), Max: 99.8 (25 Oct 2023 16:49)  HR: 73 (26 Oct 2023 08:40) (72 - 88)  BP: 159/88 (26 Oct 2023 08:40) (152/83 - 182/87)  BP(mean): --  RR: 17 (26 Oct 2023 08:40) (17 - 18)  SpO2: 99% (26 Oct 2023 08:40) (96% - 99%)    Parameters below as of 26 Oct 2023 08:40  Patient On (Oxygen Delivery Method): room air          LABS  CBC - WBC/HGB/HTC/PLT: 21.39/9.1/28.0/237 (10-26-23)  BMP - Na/K/Cl/Bicarb/BUN/Cr/Gluc/AG/eGFR: 135/4.0/100/27/17/1.12/153/8/72 (10-26-23)  Ca - 8.7 (10-26-23)  Phos - 3.5 (10-26-23)  Mg - 2.2 (10-26-23)  LFT - Alb/Tprot/Tbili/Dbili/AlkPhos/ALT/AST: 2.1/--/0.4/--/193/16/25 (10-26-23)  PT/aPTT/INR: 12.3/29.2/1.08 (10-26-23)           MEDICATIONS  MEDICATIONS  (STANDING):  acetaminophen     Tablet .. 650 milliGRAM(s) Oral every 6 hours  aspirin enteric coated 81 milliGRAM(s) Oral daily  atorvastatin 80 milliGRAM(s) Oral at bedtime  carvedilol 12.5 milliGRAM(s) Oral every 12 hours  dextrose 5%. 1000 milliLiter(s) (100 mL/Hr) IV Continuous <Continuous>  dextrose 5%. 1000 milliLiter(s) (50 mL/Hr) IV Continuous <Continuous>  dextrose 50% Injectable 25 Gram(s) IV Push once  dextrose 50% Injectable 25 Gram(s) IV Push once  dextrose 50% Injectable 12.5 Gram(s) IV Push once  gabapentin 1600 milliGRAM(s) Oral three times a day  glucagon  Injectable 1 milliGRAM(s) IntraMuscular once  insulin glargine Injectable (LANTUS) 6 Unit(s) SubCutaneous at bedtime  insulin lispro (ADMELOG) corrective regimen sliding scale   SubCutaneous three times a day before meals  insulin lispro (ADMELOG) corrective regimen sliding scale   SubCutaneous at bedtime  insulin lispro Injectable (ADMELOG) 5 Unit(s) SubCutaneous three times a day before meals  lisinopril 40 milliGRAM(s) Oral every 24 hours  piperacillin/tazobactam IVPB.. 3.375 Gram(s) IV Intermittent every 8 hours  spironolactone 25 milliGRAM(s) Oral daily    MEDICATIONS  (PRN):  aluminum hydroxide/magnesium hydroxide/simethicone Suspension 30 milliLiter(s) Oral every 6 hours PRN Dyspepsia  dextrose Oral Gel 15 Gram(s) Oral once PRN Blood Glucose LESS THAN 70 milliGRAM(s)/deciliter  morphine  - Injectable 2 milliGRAM(s) IV Push every 4 hours PRN Severe Pain (7 - 10)  oxyCODONE    IR 10 milliGRAM(s) Oral every 6 hours PRN Moderate Pain (4 - 6)    ASSESSMENT / RECOMMENDATIONS    A1C: 12.6 %  BUN: 17  Creatinine: 1.12  GFR: 72  Weight: 65  BMI: 22.4    # Type 2 diabetes mellitus with hyperglycemia  - Please keep lantus 5 units at bedtime.   - keep lispro 4 units before each meal.  - Continue lispro moderate dose sliding scale before meals and at bedtime.  - Patient's fingerstick glucose goal is 100-180 mg/dL.    - Discharge recommendations to be discussed.   - Patient can follow up at discharge with [facility] by calling [phone] to make an appointment.         SUBJECTIVE / INTERVAL HPI: Patient was seen and examined this morning.     Overnight events: No acute overnight events. Pt refused to participate in medicines ROS and Physical examination today. Also removed bandage last night. Pt also refusing RLE angiogram at this time after he had complaints of being NPO.     CAPILLARY BLOOD GLUCOSE & INSULIN RECEIVED  64 mg/dL (10-25 @ 09:25)  165 mg/dL (10-25 @ 11:21)  91 mg/dL (10-25 @ 14:12)  69 mg/dL (10-25 @ 17:35)  119 mg/dL (10-25 @ 18:41)  80 mg/dL (10-25 @ 22:07)  152 mg/dL (10-26 @ 09:25)- 5+1      REVIEW OF SYSTEMS  Constitutional:  Negative fever, chills or loss of appetite.  Eyes:  Negative blurry vision or double vision.  Cardiovascular:  Negative for chest pain or palpitations.  Respiratory:  Negative for cough, wheezing, or shortness of breath.    Gastrointestinal:  Negative for nausea, vomiting, diarrhea, constipation, or abdominal pain.  Genitourinary:  Negative frequency, urgency or dysuria.  Neurologic:  No headache, confusion, dizziness, lightheadedness.    PHYSICAL EXAM  HEENT: NC/AT, PERRLA, EOMI, no conjunctival pallor or scleral icterus, DMM  Neck: Supple, no JVD  Respiratory: CTA B/L. No w/r/r.   Cardiovascular: RRR, normal S1 and S2, no m/r/g.   Gastrointestinal: +BS, soft NTND, no guarding or rebound tenderness, no palpable masses   Extremities: wwp; no cyanosis, clubbing or edema. R 4th toe amputated with dressing  Neurological: AAOx3, no CN deficits, strength and sensation intact throughout.   Skin: No gross skin abnormalities or rashe    Vital Signs Last 24 Hrs  T(C): 37.2 (26 Oct 2023 08:40), Max: 37.7 (25 Oct 2023 16:49)  T(F): 98.9 (26 Oct 2023 08:40), Max: 99.8 (25 Oct 2023 16:49)  HR: 73 (26 Oct 2023 08:40) (72 - 88)  BP: 159/88 (26 Oct 2023 08:40) (152/83 - 182/87)  BP(mean): --  RR: 17 (26 Oct 2023 08:40) (17 - 18)  SpO2: 99% (26 Oct 2023 08:40) (96% - 99%)    Parameters below as of 26 Oct 2023 08:40  Patient On (Oxygen Delivery Method): room air          LABS  CBC - WBC/HGB/HTC/PLT: 21.39/9.1/28.0/237 (10-26-23)  BMP - Na/K/Cl/Bicarb/BUN/Cr/Gluc/AG/eGFR: 135/4.0/100/27/17/1.12/153/8/72 (10-26-23)  Ca - 8.7 (10-26-23)  Phos - 3.5 (10-26-23)  Mg - 2.2 (10-26-23)  LFT - Alb/Tprot/Tbili/Dbili/AlkPhos/ALT/AST: 2.1/--/0.4/--/193/16/25 (10-26-23)  PT/aPTT/INR: 12.3/29.2/1.08 (10-26-23)           MEDICATIONS  MEDICATIONS  (STANDING):  acetaminophen     Tablet .. 650 milliGRAM(s) Oral every 6 hours  aspirin enteric coated 81 milliGRAM(s) Oral daily  atorvastatin 80 milliGRAM(s) Oral at bedtime  carvedilol 12.5 milliGRAM(s) Oral every 12 hours  dextrose 5%. 1000 milliLiter(s) (100 mL/Hr) IV Continuous <Continuous>  dextrose 5%. 1000 milliLiter(s) (50 mL/Hr) IV Continuous <Continuous>  dextrose 50% Injectable 25 Gram(s) IV Push once  dextrose 50% Injectable 25 Gram(s) IV Push once  dextrose 50% Injectable 12.5 Gram(s) IV Push once  gabapentin 1600 milliGRAM(s) Oral three times a day  glucagon  Injectable 1 milliGRAM(s) IntraMuscular once  insulin glargine Injectable (LANTUS) 6 Unit(s) SubCutaneous at bedtime  insulin lispro (ADMELOG) corrective regimen sliding scale   SubCutaneous three times a day before meals  insulin lispro (ADMELOG) corrective regimen sliding scale   SubCutaneous at bedtime  insulin lispro Injectable (ADMELOG) 5 Unit(s) SubCutaneous three times a day before meals  lisinopril 40 milliGRAM(s) Oral every 24 hours  piperacillin/tazobactam IVPB.. 3.375 Gram(s) IV Intermittent every 8 hours  spironolactone 25 milliGRAM(s) Oral daily    MEDICATIONS  (PRN):  aluminum hydroxide/magnesium hydroxide/simethicone Suspension 30 milliLiter(s) Oral every 6 hours PRN Dyspepsia  dextrose Oral Gel 15 Gram(s) Oral once PRN Blood Glucose LESS THAN 70 milliGRAM(s)/deciliter  morphine  - Injectable 2 milliGRAM(s) IV Push every 4 hours PRN Severe Pain (7 - 10)  oxyCODONE    IR 10 milliGRAM(s) Oral every 6 hours PRN Moderate Pain (4 - 6)    ASSESSMENT / RECOMMENDATIONS    A1C: 12.6 %  BUN: 17  Creatinine: 1.12  GFR: 72  Weight: 65  BMI: 22.4    # Type 2 diabetes mellitus with hyperglycemia  - Please keep lantus 4 units at bedtime.   - keep lispro 4 units before each meal.  - Continue lispro moderate dose sliding scale before meals and at bedtime.  - Patient's fingerstick glucose goal is 100-180 mg/dL.    - Discharge recommendations to be discussed.   - Patient can follow up at discharge with [facility] by calling [phone] to make an appointment.

## 2023-10-26 NOTE — PROGRESS NOTE ADULT - PROBLEM SELECTOR PLAN 7
Patient follow with outpatient cards Dr. Li on [address] Next appointment 11/7/23.  Patient reports 12 months of antiplatelet therapy s/p PCI in 2020 but discontinued after    Plan:  - Start aspirin 81mg PO QD  - Start lipitor 80mg PO QD Patient follow with outpatient cards Dr. Li on [address] Next appointment 11/7/23.  Patient reports 12 months of antiplatelet therapy s/p PCI in 2020 but discontinued after    Plan:  - c/w aspirin 81mg PO QD  - c/w lipitor 80mg PO QD

## 2023-10-26 NOTE — PROGRESS NOTE ADULT - PROBLEM SELECTOR PLAN 3
Patient presenting with SBP to 191/80, Alkphos elevated at this time. Likely due to pain from foot wound, endorses compliance with medication.    Plan:  - Cards rec: Lisinopril 40 mg qd, spironolactone 25 mg qd, coreg 12.5 BID uptitrate as tolerated per GDMT  - Pain control as above.   - Trend BP.

## 2023-10-26 NOTE — PROGRESS NOTE ADULT - ASSESSMENT
68M w/  DM (A1c 12.6%), HTN and CAD w/ PCI x2, with recent admission 10/1 - 10/3 for R 4th/5th toe cellulitis treated with vanc/zosyn -> doxy/keflex to complete 7 days, then presented 10/21 with worsening R fourth toe pain, was febrile 101, WBC 19.7k, MRI with possible 4th digit early OM up to prox phalanx s/p partial R 4th ray amputation on 10/24, proximal bone cx with E.coli, Pluralibacter gergoviae, S.epi, and Corynebacterium amycolatum. Pending RLE angiogram if patient is amenable. I stresed the importance of this with patient, for both wound healing and antibiotic delivery.    Recommendations:  -Decrease zosyn to 3.375g IV q8h EI. Will likely switch this to ceftriaxone on discharge pending cultures. Note that pluralibacter was previously classified as enterobacter, and some isolates may be highly resistant, but it does not appear that this organism has an inducible amp-C ([email or web address])  -Start daptomycin 500mg IV q24h. Please check CK. Noted concomitant statin but this is for secondary prevention and would not recommend interruption of this.  -Plan will be for 6 weeks IV abx, although may consider stopping gram positive coverage after 3 weeks given emerging data that 3 weeks for residual OM post-debridement is adequate, and the gram positive orgs only grew in rare quantity ([email or web address]).  -Follow pending cultures and path  -Please place PICC  -Please check HCV quant and genotype. Will require outpatient management for HCV    ID Team 1 will follow.  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 Pending RLE angiogram if patient is amenable. I stresed the importance of this with patient, for both wound healing and antibiotic delivery.    Recommendations:  -Decrease zosyn to 3.375g IV q8h EI. Will likely switch this to ceftriaxone on discharge pending cultures. Note that pluralibacter was previously classified as enterobacter, and some isolates may be highly resistant, but it does not appear that this organism has an inducible amp-C ([email or web address])  -Start daptomycin 500mg IV q24h. Please check CK. Noted concomitant statin but this is for secondary prevention and would not recommend interruption of this.  -Plan will be for 6 weeks IV abx, although may consider stopping gram positive coverage after 3 weeks given emerging data that 3 weeks for residual OM post-debridement is adequate, and the gram positive orgs only grew in rare quantity ([email or web address]).  -Follow pending cultures and path  -Please place PICC  -Please check HCV quant and genotype. Will require outpatient management for HCV    ID Team 1 will follow.  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 Pending RLE angiogram if patient is amenable. I stresed the importance of this with patient, for both wound healing and antibiotic delivery.    Recommendations:  -Decrease zosyn to 3.375g IV q8h EI. Will likely switch this to ceftriaxone on discharge pending cultures. Note that pluralibacter was previously classified as enterobacter, and some isolates may be highly resistant, but it does not appear that this organism has an inducible amp-C ([id number])  -Start daptomycin 500mg IV q24h. Please check CK. Noted concomitant statin but this is for secondary prevention and would not recommend interruption of this.  -Plan will be for 6 weeks IV abx, although may consider stopping gram positive coverage after 3 weeks given emerging data that 3 weeks for residual OM post-debridement is adequate, and the gram positive orgs only grew in rare quantity ([email or web address]).  -Follow pending cultures and path  -Please place PICC  -Please check HCV quant and genotype. Will require outpatient management for HCV    ID Team 2 will follow.

## 2023-10-26 NOTE — PROGRESS NOTE ADULT - SUBJECTIVE AND OBJECTIVE BOX
O/N Events: no acute events overngiht    Subjective/ROS: Patient seen and examined at bedside. Patient's right foot bandage was removed by patient due to saturation. Patient complained about being NPO for procedure. Refused to participated in ROS and physical exam.      VITALS  Vital Signs Last 24 Hrs  T(C): 37.2 (26 Oct 2023 08:40), Max: 37.7 (25 Oct 2023 16:49)  T(F): 98.9 (26 Oct 2023 08:40), Max: 99.8 (25 Oct 2023 16:49)  HR: 73 (26 Oct 2023 08:40) (72 - 89)  BP: 159/88 (26 Oct 2023 08:40) (152/83 - 182/87)  BP(mean): --  RR: 17 (26 Oct 2023 08:40) (17 - 18)  SpO2: 99% (26 Oct 2023 08:40) (94% - 99%)    Parameters below as of 26 Oct 2023 08:40  Patient On (Oxygen Delivery Method): room air        CAPILLARY BLOOD GLUCOSE      POCT Blood Glucose.: 80 mg/dL (25 Oct 2023 22:07)  POCT Blood Glucose.: 119 mg/dL (25 Oct 2023 18:41)  POCT Blood Glucose.: 69 mg/dL (25 Oct 2023 17:35)  POCT Blood Glucose.: 91 mg/dL (25 Oct 2023 14:12)  POCT Blood Glucose.: 165 mg/dL (25 Oct 2023 11:21)  POCT Blood Glucose.: 64 mg/dL (25 Oct 2023 09:25)      PHYSICAL EXAM- patient refused examination  General: NAD  Head: NC/AT; MMM; EOMI;  Neck: Supple; no JVD  Respiratory: full sentences room air  Extremities: bandage on right foot removed by patient  Neurological: A&Ox3, CNII-XII grossly intact; no obvious focal deficits    Antimicrobials:  MEDICATIONS  (STANDING):  piperacillin/tazobactam IVPB.. 4.5 Gram(s) IV Intermittent every 8 hours      Standing Medications:  MEDICATIONS  (STANDING):  acetaminophen     Tablet .. 650 milliGRAM(s) Oral every 6 hours  aspirin enteric coated 81 milliGRAM(s) Oral daily  atorvastatin 80 milliGRAM(s) Oral at bedtime  carvedilol 12.5 milliGRAM(s) Oral every 12 hours  dextrose 5%. 1000 milliLiter(s) (100 mL/Hr) IV Continuous <Continuous>  dextrose 5%. 1000 milliLiter(s) (50 mL/Hr) IV Continuous <Continuous>  dextrose 50% Injectable 25 Gram(s) IV Push once  dextrose 50% Injectable 25 Gram(s) IV Push once  dextrose 50% Injectable 12.5 Gram(s) IV Push once  gabapentin 1600 milliGRAM(s) Oral three times a day  glucagon  Injectable 1 milliGRAM(s) IntraMuscular once  insulin glargine Injectable (LANTUS) 6 Unit(s) SubCutaneous at bedtime  insulin lispro (ADMELOG) corrective regimen sliding scale   SubCutaneous three times a day before meals  insulin lispro (ADMELOG) corrective regimen sliding scale   SubCutaneous at bedtime  insulin lispro Injectable (ADMELOG) 5 Unit(s) SubCutaneous three times a day before meals  lisinopril 40 milliGRAM(s) Oral every 24 hours  oxyCODONE    IR 10 milliGRAM(s) Oral every 6 hours  piperacillin/tazobactam IVPB.. 4.5 Gram(s) IV Intermittent every 8 hours  spironolactone 25 milliGRAM(s) Oral daily      PRN Medications:  MEDICATIONS  (PRN):  aluminum hydroxide/magnesium hydroxide/simethicone Suspension 30 milliLiter(s) Oral every 6 hours PRN Dyspepsia  dextrose Oral Gel 15 Gram(s) Oral once PRN Blood Glucose LESS THAN 70 milliGRAM(s)/deciliter  morphine  - Injectable 2 milliGRAM(s) IV Push every 4 hours PRN Moderate Pain (4 - 6)      No Known Allergies      LABS                        9.1    21.39 )-----------( 237      ( 26 Oct 2023 05:30 )             28.0     10-26    135  |  100  |  x   ----------------------------<  x   4.0   |  x   |  x     Ca    9.1      25 Oct 2023 05:30  Phos  3.5     10-26  Mg     2.2     10-26    TPro  6.6  /  Alb  2.2<L>  /  TBili  0.4  /  DBili  x   /  AST  32  /  ALT  17  /  AlkPhos  206<H>  10-25    PT/INR - ( 26 Oct 2023 05:30 )   PT: 12.3 sec;   INR: 1.08          PTT - ( 26 Oct 2023 05:30 )  PTT:29.2 sec  Urinalysis Basic - ( 25 Oct 2023 05:30 )    Color: x / Appearance: x / SG: x / pH: x  Gluc: 55 mg/dL / Ketone: x  / Bili: x / Urobili: x   Blood: x / Protein: x / Nitrite: x   Leuk Esterase: x / RBC: x / WBC x   Sq Epi: x / Non Sq Epi: x / Bacteria: x              IMAGING/EKG/ETC   O/N Events: no acute events over night    Subjective/ROS: Patient seen and examined at bedside. Patient's right foot bandage was removed by patient due to saturation. Patient complained about being NPO for procedure. Refused to participated in ROS and physical exam.      VITALS  Vital Signs Last 24 Hrs  T(C): 37.2 (26 Oct 2023 08:40), Max: 37.7 (25 Oct 2023 16:49)  T(F): 98.9 (26 Oct 2023 08:40), Max: 99.8 (25 Oct 2023 16:49)  HR: 73 (26 Oct 2023 08:40) (72 - 89)  BP: 159/88 (26 Oct 2023 08:40) (152/83 - 182/87)  BP(mean): --  RR: 17 (26 Oct 2023 08:40) (17 - 18)  SpO2: 99% (26 Oct 2023 08:40) (94% - 99%)    Parameters below as of 26 Oct 2023 08:40  Patient On (Oxygen Delivery Method): room air        CAPILLARY BLOOD GLUCOSE      POCT Blood Glucose.: 80 mg/dL (25 Oct 2023 22:07)  POCT Blood Glucose.: 119 mg/dL (25 Oct 2023 18:41)  POCT Blood Glucose.: 69 mg/dL (25 Oct 2023 17:35)  POCT Blood Glucose.: 91 mg/dL (25 Oct 2023 14:12)  POCT Blood Glucose.: 165 mg/dL (25 Oct 2023 11:21)  POCT Blood Glucose.: 64 mg/dL (25 Oct 2023 09:25)      PHYSICAL EXAM- patient refused examination  General: NAD  Head: NC/AT; MMM; EOMI;  Neck: Supple; no JVD  Respiratory: full sentences room air  Extremities: bandage on right foot removed by patient  Neurological: A&Ox3, CNII-XII grossly intact; no obvious focal deficits    Antimicrobials:  MEDICATIONS  (STANDING):  piperacillin/tazobactam IVPB.. 4.5 Gram(s) IV Intermittent every 8 hours      Standing Medications:  MEDICATIONS  (STANDING):  acetaminophen     Tablet .. 650 milliGRAM(s) Oral every 6 hours  aspirin enteric coated 81 milliGRAM(s) Oral daily  atorvastatin 80 milliGRAM(s) Oral at bedtime  carvedilol 12.5 milliGRAM(s) Oral every 12 hours  dextrose 5%. 1000 milliLiter(s) (100 mL/Hr) IV Continuous <Continuous>  dextrose 5%. 1000 milliLiter(s) (50 mL/Hr) IV Continuous <Continuous>  dextrose 50% Injectable 25 Gram(s) IV Push once  dextrose 50% Injectable 25 Gram(s) IV Push once  dextrose 50% Injectable 12.5 Gram(s) IV Push once  gabapentin 1600 milliGRAM(s) Oral three times a day  glucagon  Injectable 1 milliGRAM(s) IntraMuscular once  insulin glargine Injectable (LANTUS) 6 Unit(s) SubCutaneous at bedtime  insulin lispro (ADMELOG) corrective regimen sliding scale   SubCutaneous three times a day before meals  insulin lispro (ADMELOG) corrective regimen sliding scale   SubCutaneous at bedtime  insulin lispro Injectable (ADMELOG) 5 Unit(s) SubCutaneous three times a day before meals  lisinopril 40 milliGRAM(s) Oral every 24 hours  oxyCODONE    IR 10 milliGRAM(s) Oral every 6 hours  piperacillin/tazobactam IVPB.. 4.5 Gram(s) IV Intermittent every 8 hours  spironolactone 25 milliGRAM(s) Oral daily      PRN Medications:  MEDICATIONS  (PRN):  aluminum hydroxide/magnesium hydroxide/simethicone Suspension 30 milliLiter(s) Oral every 6 hours PRN Dyspepsia  dextrose Oral Gel 15 Gram(s) Oral once PRN Blood Glucose LESS THAN 70 milliGRAM(s)/deciliter  morphine  - Injectable 2 milliGRAM(s) IV Push every 4 hours PRN Moderate Pain (4 - 6)      No Known Allergies      LABS                        9.1    21.39 )-----------( 237      ( 26 Oct 2023 05:30 )             28.0     10-26    135  |  100  |  x   ----------------------------<  x   4.0   |  x   |  x     Ca    9.1      25 Oct 2023 05:30  Phos  3.5     10-26  Mg     2.2     10-26    TPro  6.6  /  Alb  2.2<L>  /  TBili  0.4  /  DBili  x   /  AST  32  /  ALT  17  /  AlkPhos  206<H>  10-25    PT/INR - ( 26 Oct 2023 05:30 )   PT: 12.3 sec;   INR: 1.08          PTT - ( 26 Oct 2023 05:30 )  PTT:29.2 sec  Urinalysis Basic - ( 25 Oct 2023 05:30 )    Color: x / Appearance: x / SG: x / pH: x  Gluc: 55 mg/dL / Ketone: x  / Bili: x / Urobili: x   Blood: x / Protein: x / Nitrite: x   Leuk Esterase: x / RBC: x / WBC x   Sq Epi: x / Non Sq Epi: x / Bacteria: x              IMAGING/EKG/ETC

## 2023-10-26 NOTE — PROGRESS NOTE ADULT - SUBJECTIVE AND OBJECTIVE BOX
S: Patient does not have any complaints    O: Examined in bed resting comfortably     ROS: Denies headache, blurred vision, chest pain, SOB, abdominal pain, nausea or vomiting.         piperacillin/tazobactam IVPB.. 4.5  aspirin enteric coated 81  carvedilol 12.5  lisinopril 40  piperacillin/tazobactam IVPB.. 4.5  spironolactone 25      Allergies    No Known Allergies    Intolerances        Vital Signs Last 24 Hrs  T(C): 36.9 (26 Oct 2023 06:29), Max: 37.7 (25 Oct 2023 16:49)  T(F): 98.4 (26 Oct 2023 06:29), Max: 99.8 (25 Oct 2023 16:49)  HR: 78 (26 Oct 2023 06:29) (72 - 89)  BP: 182/87 (26 Oct 2023 06:29) (152/83 - 182/87)  BP(mean): --  RR: 18 (26 Oct 2023 06:29) (18 - 18)  SpO2: 98% (26 Oct 2023 06:29) (94% - 99%)    Parameters below as of 25 Oct 2023 22:40  Patient On (Oxygen Delivery Method): room air      I&O's Summary      Physical Exam:  General: alert and awake, NAD  Pulmonary: no respiratory distress  Cardiovascular: RRR  Abdominal: soft  Extremities: R foot sp 4th digit amputation, mild dependent edema on foot, warm  Pulses: R- triphasic PT, biphasic DP      LABS:                        9.3    25.18 )-----------( 239      ( 25 Oct 2023 05:30 )             29.0     10-25    136  |  101  |  16  ----------------------------<  55<L>  3.8   |  26  |  1.08    Ca    9.1      25 Oct 2023 05:30  Phos  2.7     10-25  Mg     2.0     10-25    TPro  6.6  /  Alb  2.2<L>  /  TBili  0.4  /  DBili  x   /  AST  32  /  ALT  17  /  AlkPhos  206<H>  10-25    PT/INR - ( 25 Oct 2023 05:30 )   PT: 11.6 sec;   INR: 1.02          PTT - ( 25 Oct 2023 05:30 )  PTT:28.9 sec        A/P  67M PMH HTN, IDDM with peripheral neuropathy, CAD s/p PCI with 2 stents (3 yrs ago at [facility]), admitted for worsening right 4th toe gangrene with cellulitis and no palpable pulses. Now sp R fourth digit amputation by podiatry. At this time patient is deferring a RLE angiogram. Patient states that he has already underwent RLE angiogram at [facility] two years ago. Patient was explained the risk of his incision not healing if there is not adequate blood flow supplying the foot. Patient understood risks and would still like to defer at this time.        S: Patient does not have any complaints    O: Examined in bed resting comfortably     ROS: Denies headache, blurred vision, chest pain, SOB, abdominal pain, nausea or vomiting.         piperacillin/tazobactam IVPB.. 4.5  aspirin enteric coated 81  carvedilol 12.5  lisinopril 40  piperacillin/tazobactam IVPB.. 4.5  spironolactone 25      Allergies    No Known Allergies    Intolerances        Vital Signs Last 24 Hrs  T(C): 36.9 (26 Oct 2023 06:29), Max: 37.7 (25 Oct 2023 16:49)  T(F): 98.4 (26 Oct 2023 06:29), Max: 99.8 (25 Oct 2023 16:49)  HR: 78 (26 Oct 2023 06:29) (72 - 89)  BP: 182/87 (26 Oct 2023 06:29) (152/83 - 182/87)  BP(mean): --  RR: 18 (26 Oct 2023 06:29) (18 - 18)  SpO2: 98% (26 Oct 2023 06:29) (94% - 99%)    Parameters below as of 25 Oct 2023 22:40  Patient On (Oxygen Delivery Method): room air      I&O's Summary      Physical Exam:  General: alert and awake, NAD  Pulmonary: no respiratory distress  Cardiovascular: RRR  Abdominal: soft  Extremities: R foot sp 4th digit amputation, mild dependent edema on foot, warm  Pulses: R- triphasic PT, biphasic DP      LABS:                        9.3    25.18 )-----------( 239      ( 25 Oct 2023 05:30 )             29.0     10-25    136  |  101  |  16  ----------------------------<  55<L>  3.8   |  26  |  1.08    Ca    9.1      25 Oct 2023 05:30  Phos  2.7     10-25  Mg     2.0     10-25    TPro  6.6  /  Alb  2.2<L>  /  TBili  0.4  /  DBili  x   /  AST  32  /  ALT  17  /  AlkPhos  206<H>  10-25    PT/INR - ( 25 Oct 2023 05:30 )   PT: 11.6 sec;   INR: 1.02          PTT - ( 25 Oct 2023 05:30 )  PTT:28.9 sec        A/P  67M PMH HTN, IDDM with peripheral neuropathy, CAD s/p PCI with 2 stents (3 yrs ago at [facility]), admitted for worsening right 4th toe gangrene with cellulitis and no palpable pulses. Now sp R fourth digit amputation by podiatry. At this time patient is deferring a RLE angiogram. Patient states that he has already underwent RLE angiogram at [facility] two years ago. Patient was explained the risk of his incision not healing if there is not adequate blood flow supplying the foot, including the risk of future infection and further amputations. Patient understood risks and would like time to think about intervention.    -Cont IV abx  -Wound care per podiatry  -Team 3c will continue to follow

## 2023-10-26 NOTE — PROGRESS NOTE ADULT - ASSESSMENT
68M PMH HTN, IDDM with peripheral neuropathy, CAD s/p PCI with 2 stents (3 yrs ago at [facility]) who presents to [facility] with right toe pain. Podiatry consulted to evaluate R toe wound. Pt with prior admission on 10/1-10/3 with early ischemic signs of R 4th digit, and was discharged with PO abx (keflex,doxy). States of completing medication but has not followed up out pt with any provider. In the ED, pt febrile with elevated WBC to 19.71. At time of consult on the floors, pt is now afebrile with VSS. 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 Pt is s/p R foot partial 4th ray resection (DOS 10/24). Patient tolerated procedure well.     10/26: Pt expressed verbally that he no longer wants any more surgery or procedures done by any service. Explained to pt the importance of adequate blood flow for R foot wound healing and the delivery of IV abx. Pt understands risks and states he will only agree to further intervention if hes symptomatic       Plan:  -c/w IV abx  -f/u intra-operative cultures & path  -WB status: WBAT to the R heel  -R lower extremity to remain elevated while in bed  -Please place pt on an adequate pain management regimen   -Surgical site cleansed with NS. Applied 1/4 inch plain packing. Betadine soaked gauze applied w/ kerlix  -Rest of care up to primary team    Podiatry following, Plan d/w attending   68M PMH HTN, IDDM with peripheral neuropathy, CAD s/p PCI with 2 stents (3 yrs ago at [facility]) who presents to [facility] with right toe pain. Podiatry consulted to evaluate R toe wound. Pt with prior admission on 10/1-10/3 with early ischemic signs of R 4th digit, and was discharged with PO abx (keflex,doxy). States of completing medication but has not followed up out pt with any provider. In the ED, pt febrile with elevated WBC to 19.71. At time of consult on the floors, pt is now afebrile with VSS. 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 Pt is s/p R foot partial 4th ray resection (DOS 10/24). Patient tolerated procedure well.     10/26: Pt expressed verbally that he no longer wants any more surgery or procedures done by any service. Explained to pt the importance of adequate blood flow for R foot wound healing and the delivery of IV abx. The plantar aspect of the R foot is showing some early signs of ischemia, with cool digits. Pt is aware that if blood flow is not restored he may need a TMA or a BKA in the future. Pt understands and stated that he will think of any intervention when time comes.        Plan:  -c/w IV abx  -f/u intra-operative cultures & path  -WB status: WBAT to the R heel  -R lower extremity to remain elevated while in bed  -Please place pt on an adequate pain management regimen   -Surgical site cleansed with NS. Applied 1/4 inch plain packing. Betadine soaked gauze applied w/ kerlix  -Rest of care up to primary team    Podiatry following, Plan d/w attending

## 2023-10-26 NOTE — PROGRESS NOTE ADULT - PROBLEM SELECTOR PLAN 2
Patient presenting with worsening right 4th toe pain and discoloratio s/p course of IV and oral antibtiocs: Keflex and doxycycline until 10/10 endorses compliance. On presentation, he is hemodynamically stable however with leukoctyosis to 19 and fever to 101.   Right toe xrays done in ED showing no evidence of fracture or osteomyelitis   S/p 1 dose vancomycin in ED and zosyn in ED.   BCx: no growth at 1 day  Patient went to surgery for toe amputation 10/24    Plan:  - Podiatry consulted, f/u recs   - vascular consulted: rec angio 10/27  - Local wound care: Cleansed with NS. Betadine soaked gauze applied to R 4th digit, 3rd and 4th interspaces Advised pt to keep dressing intact dry and clean  - Pain management: Tylenol 650 q6h standing, Oxy 10 q6h standing, morphine 2mg q6h PRN for severe  - 1x 0.5 dilaudid for severe breakthrough pain. Patient presenting with worsening right 4th toe pain and discoloratio s/p course of IV and oral antibtiocs: Keflex and doxycycline until 10/10 endorses compliance. On presentation, he is hemodynamically stable however with leukoctyosis to 19 and fever to 101.   Right toe xrays done in ED showing no evidence of fracture or osteomyelitis   S/p 1 dose vancomycin in ED and zosyn in ED.   Patient went to surgery for toe amputation 10/24    Plan:  - Podiatry consulted, f/u recs   - vascular consulted: rec angio 10/27  - Local wound care: Cleansed with NS. Betadine soaked gauze applied to R 4th digit, 3rd and 4th interspaces Advised pt to keep dressing intact dry and clean  - Pain management: Tylenol 650 q6h standing, Oxy 10 q6h standing, morphine 2mg q6h PRN for severe  - 1x 0.5 dilaudid for severe breakthrough pain. Patient presenting with worsening right 4th toe pain and discoloratio s/p course of IV and oral antibtiocs: Keflex and doxycycline until 10/10 endorses compliance. On presentation, he is hemodynamically stable however with leukoctyosis to 19 and fever to 101.   Right toe xrays done in ED showing no evidence of fracture or osteomyelitis   S/p 1 dose vancomycin in ED and zosyn in ED.   Patient went to surgery for toe amputation 10/24    Plan:  - Podiatry consulted, f/u recs   - vascular consulted: rec angio 10/27  - Local wound care: Cleansed with NS. Betadine soaked gauze applied to R 4th digit, 3rd and 4th interspaces Advised pt to keep dressing intact dry and clean  - Pain management: Tylenol 650 q6h standing, Oxy 10 q6h PRN, morphine 2mg q6h PRN for severe  - 1x 0.5 dilaudid for severe breakthrough pain.

## 2023-10-26 NOTE — PROGRESS NOTE ADULT - PROBLEM SELECTOR PLAN 4
Echo: Left ventricular systolic function is mildly reduced with a calculated ejection fraction of 45% with regional wall motion abnormalities.     Plan:  - Cards rec: Lisinopril 40 mg qd, spironolactone 25 mg qd, coreg 12.5 BID uptitrate as tolerated per GDMT

## 2023-10-26 NOTE — PROGRESS NOTE ADULT - ATTENDING COMMENTS
Pt seen on rounds this afternoon.  Although he was irritable and refusing angio when seen by Dr. Lopez this morning, he had changed his mind by the time we arrived on rounds in the late afternoon.  The change may have been provoked by increased pain in the foot and ankle, which is almost certainly ischemic, and requiring morphine for relief.  Explained to him that the pain was likely ischemic, and reminded him that he had actually complained of ankle pain during his last admission.  Glucoses have been quite low overall, though jose luis from 80 at bedtime to 152 this morning (without any Lantus last night)  Pre-lunch was 90 (after total of 6 units lispro at breakfast).  Foot was again dressed.  The lower leg above the ankles with mildly tender but warm  Will decrease the Lantus to 4 units, the premeal lispro to 4 units

## 2023-10-26 NOTE — PROGRESS NOTE ADULT - ASSESSMENT
67M PMH HTN, IDDM with peripheral neuropathy, CAD s/p PCI with 2 stents (3 yrs ago at [facility]), recent admission 10/3 for diabetic ssti (CT r/o absecess, no OM) discharged on doxycycline and keflex, presenting with CC of r. foot pain, found to meet SIRS crtieria with suspected source infectious of r.toe, a/f management.

## 2023-10-26 NOTE — PROGRESS NOTE ADULT - PROBLEM SELECTOR PLAN 6
Home regimen should be clarified, was discharged on lantus 11u, and humalog 7u premeal.  A1c 12.6    Plan:  - endocrine recs appreciated: Lantus 6 at night, lispro 5 before meals low iss  - FSG q6 hours

## 2023-10-26 NOTE — PROGRESS NOTE ADULT - PROBLEM SELECTOR PLAN 1
Patient presenting meeting 2/4 SIRS (T101, Leukocytosis), source likely Gangrenous toe vs Osteomyelitis of R. foot wound. No purulence or erythema on examination however extremely tender to palpation and at rest. XR w/o evidence of osteo however lower sensivity. s/p 1 dose vanc and zosyn in ED. Prior was on Keflex and doxycycline x 10 days (finished 10/10), completed course. Last BCx w/o growth.   s/p 2L NS in ED  BCx: no growth at 1 day  MRI: Soft tissue atrophy/wound of the 4th toe with underlying marrow changes of the 4th proximal phalanx head, middle phalanx, and distal phalanx that may be reactive in the absence of significant T1 marrow replacement, however, early infection may also be considered.    Plan:  - f/u BCx, UCx   - c/w zosyn with pseudomonal dosing   - Tylenol q6 prn for fever.   - f/u podiatry recs as below. Patient presenting meeting 2/4 SIRS (T101, Leukocytosis), source likely Gangrenous toe vs Osteomyelitis of R. foot wound. No purulence or erythema on examination however extremely tender to palpation and at rest. XR w/o evidence of osteo however lower sensivity. s/p 1 dose vanc and zosyn in ED. Prior was on Keflex and doxycycline x 10 days (finished 10/10), completed course. Last BCx w/o growth.   s/p 2L NS in ED  BCx: no growth at 4 day  MRI: Soft tissue atrophy/wound of the 4th toe with underlying marrow changes of the 4th proximal phalanx head, middle phalanx, and distal phalanx that may be reactive in the absence of significant T1 marrow replacement, however, early infection may also be considered.    Plan:  - f/u BCx  - c/w zosyn with pseudomonal dosing   - Tylenol q6 prn for fever.   - f/u podiatry recs as below. Patient presenting meeting 2/4 SIRS (T101, Leukocytosis), source likely Gangrenous toe vs Osteomyelitis of R. foot wound. No purulence or erythema on examination however extremely tender to palpation and at rest. XR w/o evidence of osteo however lower sensivity. s/p 1 dose vanc and zosyn in ED. Prior was on Keflex and doxycycline x 10 days (finished 10/10), completed course. Last BCx w/o growth.   s/p 2L NS in ED  BCx: no growth at 4 day  MRI: Soft tissue atrophy/wound of the 4th toe with underlying marrow changes of the 4th proximal phalanx head, middle phalanx, and distal phalanx that may be reactive in the absence of significant T1 marrow replacement, however, early infection may also be considered.  Tissue culture: e.coli, pluralibacter    Plan:  - f/u BCx  - c/w zosyn with pseudomonal dosing   - Tylenol q6 prn for fever.   - f/u podiatry recs as below. Patient presenting meeting 2/4 SIRS (T101, Leukocytosis), source likely Gangrenous toe vs Osteomyelitis of R. foot wound. No purulence or erythema on examination however extremely tender to palpation and at rest. XR w/o evidence of osteo however lower sensivity. s/p 1 dose vanc and zosyn in ED. Prior was on Keflex and doxycycline x 10 days (finished 10/10), completed course. Last BCx w/o growth.   s/p 2L NS in ED  BCx: no growth at 4 day  MRI: Soft tissue atrophy/wound of the 4th toe with underlying marrow changes of the 4th proximal phalanx head, middle phalanx, and distal phalanx that may be reactive in the absence of significant T1 marrow replacement, however, early infection may also be considered.  Tissue culture: e.coli, pluralibacter    Plan:  - f/u BCx  - zosyn 3.375 mg q8h and daptomycin 500 q24h  - f/u CK level  - Tylenol q6 prn for fever.   - f/u podiatry recs as below.

## 2023-10-26 NOTE — PROGRESS NOTE ADULT - ATTENDING COMMENTS
Patient was seen and examined at bedside on 10/26/2023 at 1130 am. Patient reports pain at his foot has improved currently 0/10. Denies SOB, CP. ROS is otherwise negative. Vitals, labwork and pertinent imaging reviewed. Exam - NAD, AAO x 4, PERRLA, EOMI, MMM, supple neck, chest - CTA b/l, CV - rrr, s1s2, no m/r/g, abd - soft, NTND, + BS, ext - wwp, + 2 pitting edema b/l, R foot bandaged, s/p amputation of 4th toe, psych - normal affect    Plan:  -C/w broad empiric coverage, leukocytosis persists, bone culture + for bacteria, ID consulted  -Pain control, c/w Oxycodone 10 mg PO q6 PRN with IV Morphine PRN for severe pain  -Endocrine consult given recent HbA1C ~ 13, c/w Lantus 6 units qHS and 5 units premeal given relative hypoglycemia  -Vascular consulted - pt planned for angiogram 10/25 but refused and now tentatively scheduled for 10/27  -Echo done showing hypokinesis and dysfunction, Cardiology consulted for HF and preoperative optimization  -PT/OT rec no skilled PT needs  -BP is better controlled - c/w Lisinopril, will change Norvasc 10 mg PO qd to Coreg and Spironolactone given evidence of HF  -Started on ASA

## 2023-10-26 NOTE — PROGRESS NOTE ADULT - PROBLEM SELECTOR PLAN 8
F: s/p 2L NS  E: Replete as necessary K>4 Mg>2  N: CC diet   DVT Prophylaxis: heparin sq  GI prophylaxis: None   CODE STATUS: FULL  DISPO: F F: s/p 2L NS  E: Replete as necessary K>4 Mg>2  N: CC diet   DVT Prophylaxis: aspirin  GI prophylaxis: None   CODE STATUS: FULL  DISPO: F

## 2023-10-26 NOTE — PROGRESS NOTE ADULT - SUBJECTIVE AND OBJECTIVE BOX
Patient is a 68y old  Male who presents with a chief complaint of Foot pain (26 Oct 2023 06:36)      INTERVAL HPI/ OVERNIGHT EVENTS: No acute events overnight. Pain well controlled. Dressing noted to be reinforced by nursing staff.       LABS                        9.1    21.39 )-----------( 237      ( 26 Oct 2023 05:30 )             28.0     10-25    136  |  101  |  16  ----------------------------<  55<L>  3.8   |  26  |  1.08    Ca    9.1      25 Oct 2023 05:30  Phos  2.7     10-25  Mg     2.0     10-25    TPro  6.6  /  Alb  2.2<L>  /  TBili  0.4  /  DBili  x   /  AST  32  /  ALT  17  /  AlkPhos  206<H>  10-25    PT/INR - ( 26 Oct 2023 05:30 )   PT: 12.3 sec;   INR: 1.08          PTT - ( 26 Oct 2023 05:30 )  PTT:29.2 sec    ICU Vital Signs Last 24 Hrs  T(C): 37.2 (26 Oct 2023 08:40), Max: 37.7 (25 Oct 2023 16:49)  T(F): 98.9 (26 Oct 2023 08:40), Max: 99.8 (25 Oct 2023 16:49)  HR: 73 (26 Oct 2023 08:40) (72 - 89)  BP: 159/88 (26 Oct 2023 08:40) (152/83 - 182/87)  BP(mean): --  ABP: --  ABP(mean): --  RR: 17 (26 Oct 2023 08:40) (17 - 18)  SpO2: 99% (26 Oct 2023 08:40) (94% - 99%)    O2 Parameters below as of 26 Oct 2023 08:40  Patient On (Oxygen Delivery Method): room air    MICROBIOLOGY  Culture - Tissue with Gram Stain (10.24.23 @ 15:30)   Gram Stain:   No organisms seen   No White blood cells  Specimen Source: .Tissue right 4th metatarsal clean margin or spec  Culture Results:   Few Escherichia coli   Few Pluralibacter gergoviae   Culture in progress      RADIOLOGY  < from: MR Foot w/wo IV Cont, Right (10.22.23 @ 19:32) >  IMPRESSION:  1.  Soft tissue atrophy/wound of the 4th toe with underlying marrow   changes of the 4th proximal phalanx head, middle phalanx, and distal   phalanx that may be reactive in the absence of significant T1 marrow   replacement, however, early infection may also be considered.  2.  Consider follow-up MRI to assess stability as warranted.  3.  Preliminary report was provided by [facility].    < end of copied text >      < from: Xray Foot AP + Lateral + Oblique, Right (10.21.23 @ 13:09) >    IMPRESSION: No evidence of acute fracture. No radiographic evidence of   osteomyelitis.    < end of copied text >      PHYSICAL EXAM   Lower Extremity Focused:  Vasc: DP/PT 2/4 b/l, erythema and edema to the R forefoot and midfoot, darkening of skin of R 4th digit up to the MPJ.   Derm:  R foot surgical site wound at the distal 4th ray. Minimal sanguinous drainage. Granular wound bed. Plantar skin just proximal to the surgical with eraly signs of dark discoloration. R 5th digits, plantar 4th skin and 3rd digit cold on palpation.    Neuro: protective sensation slightly diminished  MSK: 5/5 muscle strength in all compartments  Patient is a 68y old  Male who presents with a chief complaint of Foot pain (26 Oct 2023 06:36)      INTERVAL HPI/ OVERNIGHT EVENTS: No acute events overnight. Pain well controlled. Dressing noted to be reinforced by nursing staff.       LABS                        9.1    21.39 )-----------( 237      ( 26 Oct 2023 05:30 )             28.0     10-25    136  |  101  |  16  ----------------------------<  55<L>  3.8   |  26  |  1.08    Ca    9.1      25 Oct 2023 05:30  Phos  2.7     10-25  Mg     2.0     10-25    TPro  6.6  /  Alb  2.2<L>  /  TBili  0.4  /  DBili  x   /  AST  32  /  ALT  17  /  AlkPhos  206<H>  10-25    PT/INR - ( 26 Oct 2023 05:30 )   PT: 12.3 sec;   INR: 1.08          PTT - ( 26 Oct 2023 05:30 )  PTT:29.2 sec    ICU Vital Signs Last 24 Hrs  T(C): 37.2 (26 Oct 2023 08:40), Max: 37.7 (25 Oct 2023 16:49)  T(F): 98.9 (26 Oct 2023 08:40), Max: 99.8 (25 Oct 2023 16:49)  HR: 73 (26 Oct 2023 08:40) (72 - 89)  BP: 159/88 (26 Oct 2023 08:40) (152/83 - 182/87)  BP(mean): --  ABP: --  ABP(mean): --  RR: 17 (26 Oct 2023 08:40) (17 - 18)  SpO2: 99% (26 Oct 2023 08:40) (94% - 99%)    O2 Parameters below as of 26 Oct 2023 08:40  Patient On (Oxygen Delivery Method): room air    MICROBIOLOGY  Culture - Tissue with Gram Stain (10.24.23 @ 15:30)   Gram Stain:   No organisms seen   No White blood cells  Specimen Source: .Tissue right 4th metatarsal clean margin or spec  Culture Results:   Few Escherichia coli   Few Pluralibacter gergoviae   Culture in progress      RADIOLOGY  < from: MR Foot w/wo IV Cont, Right (10.22.23 @ 19:32) >  IMPRESSION:  1.  Soft tissue atrophy/wound of the 4th toe with underlying marrow   changes of the 4th proximal phalanx head, middle phalanx, and distal   phalanx that may be reactive in the absence of significant T1 marrow   replacement, however, early infection may also be considered.  2.  Consider follow-up MRI to assess stability as warranted.  3.  Preliminary report was provided by [facility].    < end of copied text >      < from: Xray Foot AP + Lateral + Oblique, Right (10.21.23 @ 13:09) >    IMPRESSION: No evidence of acute fracture. No radiographic evidence of   osteomyelitis.    < end of copied text >      PHYSICAL EXAM   Lower Extremity Focused:  Vasc: DP/PT 2/4 b/l, erythema and edema to the R forefoot and midfoot, darkening of skin of R 4th digit up to the MPJ.   Derm:  R foot surgical site wound at the distal 4th ray. Minimal sanguinous drainage. Fibrotic 60% granular 40% wound bed. Plantar skin just proximal to the surgical with early signs of dark discoloration. R 5th digits, plantar 4th skin and 3rd digit cold on palpation.    Neuro: protective sensation slightly diminished  MSK: 5/5 muscle strength in all compartments

## 2023-10-26 NOTE — PROGRESS NOTE ADULT - ASSESSMENT
67M PMH HTN, IDDM with peripheral neuropathy, CAD s/p PCI with 2 stents (3 yrs ago at [facility]), recent admission 10/3 for diabetic ssti (CT r/o absecess, no OM) discharged on doxycycline and keflex, presenting with right foot 4th digit gangrene. Pt is s/p Partial 4th ray amp and Angiogram

## 2023-10-27 ENCOUNTER — TRANSCRIPTION ENCOUNTER (OUTPATIENT)
Age: 68
End: 2023-10-27

## 2023-10-27 LAB
-  CLINDAMYCIN: SIGNIFICANT CHANGE UP
-  CLINDAMYCIN: SIGNIFICANT CHANGE UP
-  ERYTHROMYCIN: SIGNIFICANT CHANGE UP
-  ERYTHROMYCIN: SIGNIFICANT CHANGE UP
-  LINEZOLID: SIGNIFICANT CHANGE UP
-  LINEZOLID: SIGNIFICANT CHANGE UP
-  OXACILLIN: SIGNIFICANT CHANGE UP
-  OXACILLIN: SIGNIFICANT CHANGE UP
-  RIFAMPIN: SIGNIFICANT CHANGE UP
-  RIFAMPIN: SIGNIFICANT CHANGE UP
-  TRIMETHOPRIM/SULFAMETHOXAZOLE: SIGNIFICANT CHANGE UP
-  TRIMETHOPRIM/SULFAMETHOXAZOLE: SIGNIFICANT CHANGE UP
-  VANCOMYCIN: SIGNIFICANT CHANGE UP
-  VANCOMYCIN: SIGNIFICANT CHANGE UP
ANION GAP SERPL CALC-SCNC: 9 MMOL/L — SIGNIFICANT CHANGE UP (ref 5–17)
ANION GAP SERPL CALC-SCNC: 9 MMOL/L — SIGNIFICANT CHANGE UP (ref 5–17)
APTT BLD: 30.5 SEC — SIGNIFICANT CHANGE UP (ref 24.5–35.6)
APTT BLD: 30.5 SEC — SIGNIFICANT CHANGE UP (ref 24.5–35.6)
BUN SERPL-MCNC: 24 MG/DL — HIGH (ref 7–23)
BUN SERPL-MCNC: 24 MG/DL — HIGH (ref 7–23)
CALCIUM SERPL-MCNC: 8.7 MG/DL — SIGNIFICANT CHANGE UP (ref 8.4–10.5)
CALCIUM SERPL-MCNC: 8.7 MG/DL — SIGNIFICANT CHANGE UP (ref 8.4–10.5)
CHLORIDE SERPL-SCNC: 100 MMOL/L — SIGNIFICANT CHANGE UP (ref 96–108)
CHLORIDE SERPL-SCNC: 100 MMOL/L — SIGNIFICANT CHANGE UP (ref 96–108)
CK SERPL-CCNC: 111 U/L — SIGNIFICANT CHANGE UP (ref 30–200)
CK SERPL-CCNC: 111 U/L — SIGNIFICANT CHANGE UP (ref 30–200)
CO2 SERPL-SCNC: 26 MMOL/L — SIGNIFICANT CHANGE UP (ref 22–31)
CO2 SERPL-SCNC: 26 MMOL/L — SIGNIFICANT CHANGE UP (ref 22–31)
CREAT SERPL-MCNC: 1.08 MG/DL — SIGNIFICANT CHANGE UP (ref 0.5–1.3)
CREAT SERPL-MCNC: 1.08 MG/DL — SIGNIFICANT CHANGE UP (ref 0.5–1.3)
EGFR: 76 ML/MIN/1.73M2 — SIGNIFICANT CHANGE UP
EGFR: 76 ML/MIN/1.73M2 — SIGNIFICANT CHANGE UP
GLUCOSE BLDC GLUCOMTR-MCNC: 146 MG/DL — HIGH (ref 70–99)
GLUCOSE BLDC GLUCOMTR-MCNC: 146 MG/DL — HIGH (ref 70–99)
GLUCOSE BLDC GLUCOMTR-MCNC: 203 MG/DL — HIGH (ref 70–99)
GLUCOSE BLDC GLUCOMTR-MCNC: 203 MG/DL — HIGH (ref 70–99)
GLUCOSE BLDC GLUCOMTR-MCNC: 86 MG/DL — SIGNIFICANT CHANGE UP (ref 70–99)
GLUCOSE BLDC GLUCOMTR-MCNC: 86 MG/DL — SIGNIFICANT CHANGE UP (ref 70–99)
GLUCOSE BLDC GLUCOMTR-MCNC: 88 MG/DL — SIGNIFICANT CHANGE UP (ref 70–99)
GLUCOSE BLDC GLUCOMTR-MCNC: 88 MG/DL — SIGNIFICANT CHANGE UP (ref 70–99)
GLUCOSE SERPL-MCNC: 94 MG/DL — SIGNIFICANT CHANGE UP (ref 70–99)
GLUCOSE SERPL-MCNC: 94 MG/DL — SIGNIFICANT CHANGE UP (ref 70–99)
HCT VFR BLD CALC: 31 % — LOW (ref 39–50)
HCT VFR BLD CALC: 31 % — LOW (ref 39–50)
HCV RNA SERPL NAA DL=5-ACNC: SIGNIFICANT CHANGE UP IU/ML
HCV RNA SERPL NAA DL=5-ACNC: SIGNIFICANT CHANGE UP IU/ML
HCV RNA SPEC NAA+PROBE-LOG IU: 6.17 LOGIU/ML — SIGNIFICANT CHANGE UP
HCV RNA SPEC NAA+PROBE-LOG IU: 6.17 LOGIU/ML — SIGNIFICANT CHANGE UP
HCV RNA SPEC NAA+PROBE-LOG IU: DETECTED
HCV RNA SPEC NAA+PROBE-LOG IU: DETECTED
HGB BLD-MCNC: 9.7 G/DL — LOW (ref 13–17)
HGB BLD-MCNC: 9.7 G/DL — LOW (ref 13–17)
INR BLD: 0.99 — SIGNIFICANT CHANGE UP (ref 0.85–1.18)
INR BLD: 0.99 — SIGNIFICANT CHANGE UP (ref 0.85–1.18)
MAGNESIUM SERPL-MCNC: 2.2 MG/DL — SIGNIFICANT CHANGE UP (ref 1.6–2.6)
MAGNESIUM SERPL-MCNC: 2.2 MG/DL — SIGNIFICANT CHANGE UP (ref 1.6–2.6)
MCHC RBC-ENTMCNC: 30.7 PG — SIGNIFICANT CHANGE UP (ref 27–34)
MCHC RBC-ENTMCNC: 30.7 PG — SIGNIFICANT CHANGE UP (ref 27–34)
MCHC RBC-ENTMCNC: 31.3 GM/DL — LOW (ref 32–36)
MCHC RBC-ENTMCNC: 31.3 GM/DL — LOW (ref 32–36)
MCV RBC AUTO: 98.1 FL — SIGNIFICANT CHANGE UP (ref 80–100)
MCV RBC AUTO: 98.1 FL — SIGNIFICANT CHANGE UP (ref 80–100)
METHOD TYPE: SIGNIFICANT CHANGE UP
METHOD TYPE: SIGNIFICANT CHANGE UP
NRBC # BLD: 0 /100 WBCS — SIGNIFICANT CHANGE UP (ref 0–0)
NRBC # BLD: 0 /100 WBCS — SIGNIFICANT CHANGE UP (ref 0–0)
PHOSPHATE SERPL-MCNC: 3.7 MG/DL — SIGNIFICANT CHANGE UP (ref 2.5–4.5)
PHOSPHATE SERPL-MCNC: 3.7 MG/DL — SIGNIFICANT CHANGE UP (ref 2.5–4.5)
PLATELET # BLD AUTO: 292 K/UL — SIGNIFICANT CHANGE UP (ref 150–400)
PLATELET # BLD AUTO: 292 K/UL — SIGNIFICANT CHANGE UP (ref 150–400)
POTASSIUM SERPL-MCNC: 4.4 MMOL/L — SIGNIFICANT CHANGE UP (ref 3.5–5.3)
POTASSIUM SERPL-MCNC: 4.4 MMOL/L — SIGNIFICANT CHANGE UP (ref 3.5–5.3)
POTASSIUM SERPL-SCNC: 4.4 MMOL/L — SIGNIFICANT CHANGE UP (ref 3.5–5.3)
POTASSIUM SERPL-SCNC: 4.4 MMOL/L — SIGNIFICANT CHANGE UP (ref 3.5–5.3)
PROTHROM AB SERPL-ACNC: 11.3 SEC — SIGNIFICANT CHANGE UP (ref 9.5–13)
PROTHROM AB SERPL-ACNC: 11.3 SEC — SIGNIFICANT CHANGE UP (ref 9.5–13)
RBC # BLD: 3.16 M/UL — LOW (ref 4.2–5.8)
RBC # BLD: 3.16 M/UL — LOW (ref 4.2–5.8)
RBC # FLD: 13.9 % — SIGNIFICANT CHANGE UP (ref 10.3–14.5)
RBC # FLD: 13.9 % — SIGNIFICANT CHANGE UP (ref 10.3–14.5)
SODIUM SERPL-SCNC: 135 MMOL/L — SIGNIFICANT CHANGE UP (ref 135–145)
SODIUM SERPL-SCNC: 135 MMOL/L — SIGNIFICANT CHANGE UP (ref 135–145)
WBC # BLD: 19.87 K/UL — HIGH (ref 3.8–10.5)
WBC # BLD: 19.87 K/UL — HIGH (ref 3.8–10.5)
WBC # FLD AUTO: 19.87 K/UL — HIGH (ref 3.8–10.5)
WBC # FLD AUTO: 19.87 K/UL — HIGH (ref 3.8–10.5)

## 2023-10-27 PROCEDURE — 75710 ARTERY X-RAYS ARM/LEG: CPT | Mod: 26,GC,59

## 2023-10-27 PROCEDURE — 99232 SBSQ HOSP IP/OBS MODERATE 35: CPT

## 2023-10-27 PROCEDURE — 76937 US GUIDE VASCULAR ACCESS: CPT | Mod: 26

## 2023-10-27 PROCEDURE — 75625 CONTRAST EXAM ABDOMINL AORTA: CPT | Mod: 26,GC

## 2023-10-27 PROCEDURE — 99232 SBSQ HOSP IP/OBS MODERATE 35: CPT | Mod: GC

## 2023-10-27 PROCEDURE — 36246 INS CATH ABD/L-EXT ART 2ND: CPT | Mod: 59

## 2023-10-27 PROCEDURE — 37232: CPT | Mod: GC,RT

## 2023-10-27 PROCEDURE — 37228: CPT | Mod: GC,RT

## 2023-10-27 RX ORDER — INSULIN GLARGINE 100 [IU]/ML
3 INJECTION, SOLUTION SUBCUTANEOUS AT BEDTIME
Refills: 0 | Status: DISCONTINUED | OUTPATIENT
Start: 2023-10-27 | End: 2023-10-30

## 2023-10-27 RX ORDER — CLOPIDOGREL BISULFATE 75 MG/1
75 TABLET, FILM COATED ORAL DAILY
Refills: 0 | Status: DISCONTINUED | OUTPATIENT
Start: 2023-10-28 | End: 2023-10-31

## 2023-10-27 RX ORDER — CLOPIDOGREL BISULFATE 75 MG/1
75 TABLET, FILM COATED ORAL DAILY
Refills: 0 | Status: DISCONTINUED | OUTPATIENT
Start: 2023-10-27 | End: 2023-10-27

## 2023-10-27 RX ORDER — CLOPIDOGREL BISULFATE 75 MG/1
300 TABLET, FILM COATED ORAL ONCE
Refills: 0 | Status: COMPLETED | OUTPATIENT
Start: 2023-10-27 | End: 2023-10-27

## 2023-10-27 RX ORDER — CLOPIDOGREL BISULFATE 75 MG/1
300 TABLET, FILM COATED ORAL ONCE
Refills: 0 | Status: DISCONTINUED | OUTPATIENT
Start: 2023-10-27 | End: 2023-10-27

## 2023-10-27 RX ADMIN — GABAPENTIN 1600 MILLIGRAM(S): 400 CAPSULE ORAL at 06:32

## 2023-10-27 RX ADMIN — PIPERACILLIN AND TAZOBACTAM 25 GRAM(S): 4; .5 INJECTION, POWDER, LYOPHILIZED, FOR SOLUTION INTRAVENOUS at 21:38

## 2023-10-27 RX ADMIN — CARVEDILOL PHOSPHATE 12.5 MILLIGRAM(S): 80 CAPSULE, EXTENDED RELEASE ORAL at 06:32

## 2023-10-27 RX ADMIN — GABAPENTIN 1600 MILLIGRAM(S): 400 CAPSULE ORAL at 21:38

## 2023-10-27 RX ADMIN — SPIRONOLACTONE 25 MILLIGRAM(S): 25 TABLET, FILM COATED ORAL at 06:32

## 2023-10-27 RX ADMIN — SODIUM CHLORIDE 50 MILLILITER(S): 9 INJECTION, SOLUTION INTRAVENOUS at 06:31

## 2023-10-27 RX ADMIN — MORPHINE SULFATE 2 MILLIGRAM(S): 50 CAPSULE, EXTENDED RELEASE ORAL at 06:47

## 2023-10-27 RX ADMIN — PIPERACILLIN AND TAZOBACTAM 25 GRAM(S): 4; .5 INJECTION, POWDER, LYOPHILIZED, FOR SOLUTION INTRAVENOUS at 15:32

## 2023-10-27 RX ADMIN — Medication 650 MILLIGRAM(S): at 09:36

## 2023-10-27 RX ADMIN — Medication 650 MILLIGRAM(S): at 10:12

## 2023-10-27 RX ADMIN — CARVEDILOL PHOSPHATE 12.5 MILLIGRAM(S): 80 CAPSULE, EXTENDED RELEASE ORAL at 17:51

## 2023-10-27 RX ADMIN — OXYCODONE HYDROCHLORIDE 10 MILLIGRAM(S): 5 TABLET ORAL at 21:37

## 2023-10-27 RX ADMIN — DAPTOMYCIN 120 MILLIGRAM(S): 500 INJECTION, POWDER, LYOPHILIZED, FOR SOLUTION INTRAVENOUS at 18:16

## 2023-10-27 RX ADMIN — OXYCODONE HYDROCHLORIDE 10 MILLIGRAM(S): 5 TABLET ORAL at 16:30

## 2023-10-27 RX ADMIN — INSULIN GLARGINE 3 UNIT(S): 100 INJECTION, SOLUTION SUBCUTANEOUS at 22:56

## 2023-10-27 RX ADMIN — OXYCODONE HYDROCHLORIDE 10 MILLIGRAM(S): 5 TABLET ORAL at 15:31

## 2023-10-27 RX ADMIN — CLOPIDOGREL BISULFATE 300 MILLIGRAM(S): 75 TABLET, FILM COATED ORAL at 14:44

## 2023-10-27 RX ADMIN — Medication 650 MILLIGRAM(S): at 22:08

## 2023-10-27 RX ADMIN — Medication 650 MILLIGRAM(S): at 21:38

## 2023-10-27 RX ADMIN — PIPERACILLIN AND TAZOBACTAM 25 GRAM(S): 4; .5 INJECTION, POWDER, LYOPHILIZED, FOR SOLUTION INTRAVENOUS at 06:31

## 2023-10-27 RX ADMIN — OXYCODONE HYDROCHLORIDE 10 MILLIGRAM(S): 5 TABLET ORAL at 08:44

## 2023-10-27 RX ADMIN — ATORVASTATIN CALCIUM 80 MILLIGRAM(S): 80 TABLET, FILM COATED ORAL at 21:37

## 2023-10-27 RX ADMIN — GABAPENTIN 1600 MILLIGRAM(S): 400 CAPSULE ORAL at 15:31

## 2023-10-27 RX ADMIN — Medication 4 UNIT(S): at 18:17

## 2023-10-27 RX ADMIN — OXYCODONE HYDROCHLORIDE 10 MILLIGRAM(S): 5 TABLET ORAL at 22:08

## 2023-10-27 RX ADMIN — MORPHINE SULFATE 2 MILLIGRAM(S): 50 CAPSULE, EXTENDED RELEASE ORAL at 06:32

## 2023-10-27 RX ADMIN — LISINOPRIL 40 MILLIGRAM(S): 2.5 TABLET ORAL at 15:31

## 2023-10-27 RX ADMIN — OXYCODONE HYDROCHLORIDE 10 MILLIGRAM(S): 5 TABLET ORAL at 09:00

## 2023-10-27 RX ADMIN — Medication 81 MILLIGRAM(S): at 09:36

## 2023-10-27 NOTE — PRE-ANESTHESIA EVALUATION ADULT - NSANTHPMHFT_GEN_ALL_CORE
67M PMH HTN, IDDM with peripheral neuropathy, CAD s/p PCI with 2 stents (3 yrs ago at [facility]), recent admission 10/3 for diabetic ssti (CT r/o absecess, no OM) Partial amputation of fourth ray of right foot by open approach 24-Oct-2023 67M PMH HTN, IDDM with peripheral neuropathy, CAD s/p PCI with 2 stents (3 yrs ago at [facility]), recent admission 10/3 for diabetic ssti (CT r/o absecess, no OM) Partial amputation of fourth ray of right foot by open approach 24-Oct-2023    cervical neck surgery

## 2023-10-27 NOTE — BRIEF OPERATIVE NOTE - NSICDXBRIEFPREOP_GEN_ALL_CORE_FT
PRE-OP DIAGNOSIS:  Gangrene of digit 24-Oct-2023 15:57:48  Carter Lopez  
PRE-OP DIAGNOSIS:  PAD (peripheral artery disease) 27-Oct-2023 12:22:34  Rajat Lyons

## 2023-10-27 NOTE — PROGRESS NOTE ADULT - SUBJECTIVE AND OBJECTIVE BOX
INFECTIOUS DISEASES CONSULT FOLLOW-UP NOTE    INTERVAL HPI/OVERNIGHT EVENTS:      ROS:   Constitutional, eyes, ENT, cardiovascular, respiratory, gastrointestinal, genitourinary, integumentary, neurological, psychiatric and heme/lymph are otherwise negative other than noted above       ANTIBIOTICS/RELEVANT:    MEDICATIONS  (STANDING):  acetaminophen     Tablet .. 650 milliGRAM(s) Oral every 6 hours  aspirin enteric coated 81 milliGRAM(s) Oral daily  atorvastatin 80 milliGRAM(s) Oral at bedtime  carvedilol 12.5 milliGRAM(s) Oral every 12 hours  clopidogrel Tablet 300 milliGRAM(s) Oral once  DAPTOmycin IVPB 500 milliGRAM(s) IV Intermittent every 24 hours  dextrose 5%. 1000 milliLiter(s) (100 mL/Hr) IV Continuous <Continuous>  dextrose 5%. 1000 milliLiter(s) (50 mL/Hr) IV Continuous <Continuous>  dextrose 50% Injectable 12.5 Gram(s) IV Push once  dextrose 50% Injectable 25 Gram(s) IV Push once  dextrose 50% Injectable 25 Gram(s) IV Push once  gabapentin 1600 milliGRAM(s) Oral three times a day  glucagon  Injectable 1 milliGRAM(s) IntraMuscular once  insulin glargine Injectable (LANTUS) 4 Unit(s) SubCutaneous at bedtime  insulin lispro (ADMELOG) corrective regimen sliding scale   SubCutaneous at bedtime  insulin lispro (ADMELOG) corrective regimen sliding scale   SubCutaneous three times a day before meals  insulin lispro Injectable (ADMELOG) 4 Unit(s) SubCutaneous three times a day before meals  lactated ringers. 1000 milliLiter(s) (50 mL/Hr) IV Continuous <Continuous>  lisinopril 40 milliGRAM(s) Oral every 24 hours  piperacillin/tazobactam IVPB.. 3.375 Gram(s) IV Intermittent every 8 hours  spironolactone 25 milliGRAM(s) Oral daily    MEDICATIONS  (PRN):  aluminum hydroxide/magnesium hydroxide/simethicone Suspension 30 milliLiter(s) Oral every 6 hours PRN Dyspepsia  dextrose Oral Gel 15 Gram(s) Oral once PRN Blood Glucose LESS THAN 70 milliGRAM(s)/deciliter  oxyCODONE    IR 10 milliGRAM(s) Oral every 6 hours PRN Moderate Pain (4 - 6)        Vital Signs Last 24 Hrs  T(C): 36.8 (27 Oct 2023 09:38), Max: 37.6 (26 Oct 2023 16:08)  T(F): 98.2 (27 Oct 2023 08:33), Max: 99.6 (26 Oct 2023 16:08)  HR: 61 (27 Oct 2023 09:38) (61 - 77)  BP: 175/90 (27 Oct 2023 09:38) (128/78 - 187/95)  BP(mean): 102 (27 Oct 2023 09:38) (102 - 102)  RR: 18 (27 Oct 2023 09:38) (18 - 18)  SpO2: 96% (27 Oct 2023 09:38) (96% - 97%)    Parameters below as of 27 Oct 2023 09:38    O2 Flow (L/min): 2      PHYSICAL EXAM:  Constitutional: alert, NAD  Eyes: the sclera and conjunctiva were normal.   ENT: the ears and nose were normal in appearance.   Neck: the appearance of the neck was normal and the neck was supple.   Pulmonary: no respiratory distress and lungs were clear to auscultation bilaterally.   Heart: heart rate was normal and rhythm regular, normal S1 and S2  Vascular:. there was no peripheral edema  Abdomen: normal bowel sounds, soft, non-tender  Neurological: no focal deficits.   Psychiatric: the affect was normal        LABS:                        9.7    19.87 )-----------( 292      ( 27 Oct 2023 07:36 )             31.0     10-27    135  |  100  |  24<H>  ----------------------------<  94  4.4   |  26  |  1.08    Ca    8.7      27 Oct 2023 07:36  Phos  3.7     10-27  Mg     2.2     10-27    TPro  6.0  /  Alb  2.1<L>  /  TBili  0.4  /  DBili  x   /  AST  25  /  ALT  16  /  AlkPhos  193<H>  10-26    PT/INR - ( 27 Oct 2023 07:36 )   PT: 11.3 sec;   INR: 0.99          PTT - ( 27 Oct 2023 07:36 )  PTT:30.5 sec  Urinalysis Basic - ( 27 Oct 2023 07:36 )    Color: x / Appearance: x / SG: x / pH: x  Gluc: 94 mg/dL / Ketone: x  / Bili: x / Urobili: x   Blood: x / Protein: x / Nitrite: x   Leuk Esterase: x / RBC: x / WBC x   Sq Epi: x / Non Sq Epi: x / Bacteria: x        MICROBIOLOGY:      RADIOLOGY & ADDITIONAL STUDIES:  Reviewed INFECTIOUS DISEASES CONSULT FOLLOW-UP NOTE    INTERVAL HPI/OVERNIGHT EVENTS:    Patient seen and examined at bedside. YEISON. Afebrile. S/p RLE angiogram and shockwave lithotripsy performed of AT and peroneal ballooned. States R foot pain is well controlled. Having 1-2 soft stools daily       ROS:   Constitutional, eyes, ENT, cardiovascular, respiratory, gastrointestinal, genitourinary, integumentary, neurological, psychiatric and heme/lymph are otherwise negative other than noted above       ANTIBIOTICS/RELEVANT:    MEDICATIONS  (STANDING):  acetaminophen     Tablet .. 650 milliGRAM(s) Oral every 6 hours  aspirin enteric coated 81 milliGRAM(s) Oral daily  atorvastatin 80 milliGRAM(s) Oral at bedtime  carvedilol 12.5 milliGRAM(s) Oral every 12 hours  clopidogrel Tablet 300 milliGRAM(s) Oral once  DAPTOmycin IVPB 500 milliGRAM(s) IV Intermittent every 24 hours  dextrose 5%. 1000 milliLiter(s) (100 mL/Hr) IV Continuous <Continuous>  dextrose 5%. 1000 milliLiter(s) (50 mL/Hr) IV Continuous <Continuous>  dextrose 50% Injectable 12.5 Gram(s) IV Push once  dextrose 50% Injectable 25 Gram(s) IV Push once  dextrose 50% Injectable 25 Gram(s) IV Push once  gabapentin 1600 milliGRAM(s) Oral three times a day  glucagon  Injectable 1 milliGRAM(s) IntraMuscular once  insulin glargine Injectable (LANTUS) 4 Unit(s) SubCutaneous at bedtime  insulin lispro (ADMELOG) corrective regimen sliding scale   SubCutaneous at bedtime  insulin lispro (ADMELOG) corrective regimen sliding scale   SubCutaneous three times a day before meals  insulin lispro Injectable (ADMELOG) 4 Unit(s) SubCutaneous three times a day before meals  lactated ringers. 1000 milliLiter(s) (50 mL/Hr) IV Continuous <Continuous>  lisinopril 40 milliGRAM(s) Oral every 24 hours  piperacillin/tazobactam IVPB.. 3.375 Gram(s) IV Intermittent every 8 hours  spironolactone 25 milliGRAM(s) Oral daily    MEDICATIONS  (PRN):  aluminum hydroxide/magnesium hydroxide/simethicone Suspension 30 milliLiter(s) Oral every 6 hours PRN Dyspepsia  dextrose Oral Gel 15 Gram(s) Oral once PRN Blood Glucose LESS THAN 70 milliGRAM(s)/deciliter  oxyCODONE    IR 10 milliGRAM(s) Oral every 6 hours PRN Moderate Pain (4 - 6)        Vital Signs Last 24 Hrs  T(C): 36.8 (27 Oct 2023 09:38), Max: 37.6 (26 Oct 2023 16:08)  T(F): 98.2 (27 Oct 2023 08:33), Max: 99.6 (26 Oct 2023 16:08)  HR: 61 (27 Oct 2023 09:38) (61 - 77)  BP: 175/90 (27 Oct 2023 09:38) (128/78 - 187/95)  BP(mean): 102 (27 Oct 2023 09:38) (102 - 102)  RR: 18 (27 Oct 2023 09:38) (18 - 18)  SpO2: 96% (27 Oct 2023 09:38) (96% - 97%)    Parameters below as of 27 Oct 2023 09:38    O2 Flow (L/min): 2      PHYSICAL EXAM:  Constitutional: alert, NAD  Eyes: the sclera and conjunctiva were normal.   ENT: the ears and nose were normal in appearance.   Neck: the appearance of the neck was normal and the neck was supple.   Pulmonary: no respiratory distress and lungs were clear to auscultation bilaterally.   Heart: heart rate was normal and rhythm regular, normal S1 and S2  Vascular:. there was no peripheral edema  Abdomen: normal bowel sounds, soft, non-tender  RLE: Foot wrapped in gauze and in surgical shoe  Neurological: no focal deficits.   Psychiatric: the affect was normal        LABS:                        9.7    19.87 )-----------( 292      ( 27 Oct 2023 07:36 )             31.0     10-27    135  |  100  |  24<H>  ----------------------------<  94  4.4   |  26  |  1.08    Ca    8.7      27 Oct 2023 07:36  Phos  3.7     10-27  Mg     2.2     10-27    TPro  6.0  /  Alb  2.1<L>  /  TBili  0.4  /  DBili  x   /  AST  25  /  ALT  16  /  AlkPhos  193<H>  10-26    PT/INR - ( 27 Oct 2023 07:36 )   PT: 11.3 sec;   INR: 0.99          PTT - ( 27 Oct 2023 07:36 )  PTT:30.5 sec  Urinalysis Basic - ( 27 Oct 2023 07:36 )    Color: x / Appearance: x / SG: x / pH: x  Gluc: 94 mg/dL / Ketone: x  / Bili: x / Urobili: x   Blood: x / Protein: x / Nitrite: x   Leuk Esterase: x / RBC: x / WBC x   Sq Epi: x / Non Sq Epi: x / Bacteria: x        MICROBIOLOGY:  reviewed    RADIOLOGY & ADDITIONAL STUDIES:  Reviewed INFECTIOUS DISEASES CONSULT FOLLOW-UP NOTE    INTERVAL HPI/OVERNIGHT EVENTS:    Patient seen and examined at bedside. YEISON. Afebrile. S/p RLE angiogram with AT and peroneal lithotripsy and balloon angioplasty. States R foot pain is well controlled. Having 1-2 soft stools daily       ROS:   Constitutional, eyes, ENT, cardiovascular, respiratory, gastrointestinal, genitourinary, integumentary, neurological, psychiatric and heme/lymph are otherwise negative other than noted above       ANTIBIOTICS/RELEVANT:    MEDICATIONS  (STANDING):  acetaminophen     Tablet .. 650 milliGRAM(s) Oral every 6 hours  aspirin enteric coated 81 milliGRAM(s) Oral daily  atorvastatin 80 milliGRAM(s) Oral at bedtime  carvedilol 12.5 milliGRAM(s) Oral every 12 hours  clopidogrel Tablet 300 milliGRAM(s) Oral once  DAPTOmycin IVPB 500 milliGRAM(s) IV Intermittent every 24 hours  dextrose 5%. 1000 milliLiter(s) (100 mL/Hr) IV Continuous <Continuous>  dextrose 5%. 1000 milliLiter(s) (50 mL/Hr) IV Continuous <Continuous>  dextrose 50% Injectable 12.5 Gram(s) IV Push once  dextrose 50% Injectable 25 Gram(s) IV Push once  dextrose 50% Injectable 25 Gram(s) IV Push once  gabapentin 1600 milliGRAM(s) Oral three times a day  glucagon  Injectable 1 milliGRAM(s) IntraMuscular once  insulin glargine Injectable (LANTUS) 4 Unit(s) SubCutaneous at bedtime  insulin lispro (ADMELOG) corrective regimen sliding scale   SubCutaneous at bedtime  insulin lispro (ADMELOG) corrective regimen sliding scale   SubCutaneous three times a day before meals  insulin lispro Injectable (ADMELOG) 4 Unit(s) SubCutaneous three times a day before meals  lactated ringers. 1000 milliLiter(s) (50 mL/Hr) IV Continuous <Continuous>  lisinopril 40 milliGRAM(s) Oral every 24 hours  piperacillin/tazobactam IVPB.. 3.375 Gram(s) IV Intermittent every 8 hours  spironolactone 25 milliGRAM(s) Oral daily    MEDICATIONS  (PRN):  aluminum hydroxide/magnesium hydroxide/simethicone Suspension 30 milliLiter(s) Oral every 6 hours PRN Dyspepsia  dextrose Oral Gel 15 Gram(s) Oral once PRN Blood Glucose LESS THAN 70 milliGRAM(s)/deciliter  oxyCODONE    IR 10 milliGRAM(s) Oral every 6 hours PRN Moderate Pain (4 - 6)        Vital Signs Last 24 Hrs  T(C): 36.8 (27 Oct 2023 09:38), Max: 37.6 (26 Oct 2023 16:08)  T(F): 98.2 (27 Oct 2023 08:33), Max: 99.6 (26 Oct 2023 16:08)  HR: 61 (27 Oct 2023 09:38) (61 - 77)  BP: 175/90 (27 Oct 2023 09:38) (128/78 - 187/95)  BP(mean): 102 (27 Oct 2023 09:38) (102 - 102)  RR: 18 (27 Oct 2023 09:38) (18 - 18)  SpO2: 96% (27 Oct 2023 09:38) (96% - 97%)    Parameters below as of 27 Oct 2023 09:38    O2 Flow (L/min): 2      PHYSICAL EXAM:  Constitutional: alert, NAD  Eyes: the sclera and conjunctiva were normal.   ENT: the ears and nose were normal in appearance.   Neck: the appearance of the neck was normal and the neck was supple.   Pulmonary: no respiratory distress and lungs were clear to auscultation bilaterally.   Heart: heart rate was normal and rhythm regular, normal S1 and S2  Vascular:. there was no peripheral edema  Abdomen: normal bowel sounds, soft, non-tender  RLE: Foot wrapped in gauze and in surgical shoe  Neurological: no focal deficits.   Psychiatric: the affect was normal        LABS:                        9.7    19.87 )-----------( 292      ( 27 Oct 2023 07:36 )             31.0     10-27    135  |  100  |  24<H>  ----------------------------<  94  4.4   |  26  |  1.08    Ca    8.7      27 Oct 2023 07:36  Phos  3.7     10-27  Mg     2.2     10-27    TPro  6.0  /  Alb  2.1<L>  /  TBili  0.4  /  DBili  x   /  AST  25  /  ALT  16  /  AlkPhos  193<H>  10-26    PT/INR - ( 27 Oct 2023 07:36 )   PT: 11.3 sec;   INR: 0.99          PTT - ( 27 Oct 2023 07:36 )  PTT:30.5 sec  Urinalysis Basic - ( 27 Oct 2023 07:36 )    Color: x / Appearance: x / SG: x / pH: x  Gluc: 94 mg/dL / Ketone: x  / Bili: x / Urobili: x   Blood: x / Protein: x / Nitrite: x   Leuk Esterase: x / RBC: x / WBC x   Sq Epi: x / Non Sq Epi: x / Bacteria: x        MICROBIOLOGY:  reviewed    RADIOLOGY & ADDITIONAL STUDIES:  Reviewed

## 2023-10-27 NOTE — DISCHARGE NOTE PROVIDER - NPI NUMBER (FOR [employer] USE ONLY) :
[[id number]] [[id number]],[[id number]] [[id number]],[UNKNOWN],[[id number]] [[id number]],[[id number]],[UNKNOWN] [[id number]],[[id number]],[[id number]],[UNKNOWN],[[id number]] [[id number]],[[id number]],[[id number]],[UNKNOWN],[[id number]] [[id number]],[[id number]],[[id number]],[[id number]],[UNKNOWN],[UNKNOWN]

## 2023-10-27 NOTE — DISCHARGE NOTE PROVIDER - NSDCMRMEDTOKEN_GEN_ALL_CORE_FT
lisinopril 40 mg oral tablet: 1 tab(s) orally once a day  Neurontin 800 mg oral tablet: 2 tab(s) orally 3 times a day   Ceftriaxone 2g IV Q24H: For 6 weeks, 10/24/2023 to 12/5/2023  Daptomycin 500mg IV Q24H: for 6 weeks, 10/24/2023 to 12/5/2023  lisinopril 40 mg oral tablet: 1 tab(s) orally once a day  Normal Saline 0.9% 10mL pre/post infusion: Heparin 10units/3mL post infusion  Weekly CMP, CBC with diff, ESR, CRP, total CK: Faxed to Dr. Jiménez [phone]   aspirin 81 mg oral delayed release tablet: 1 tab(s) orally once a day  atorvastatin 80 mg oral tablet: 1 tab(s) orally once a day (at bedtime)  carvedilol 12.5 mg oral tablet: 1 tab(s) orally every 12 hours  Ceftriaxone 2g IV Q24H: For 6 weeks, 10/24/2023 to 12/5/2023  clopidogrel 75 mg oral tablet: 1 tab(s) orally once a day  Daptomycin 500mg IV Q24H: for 6 weeks, 10/24/2023 to 12/5/2023  gabapentin 800 mg oral tablet: 2 tab(s) orally 3 times a day  hydrALAZINE 25 mg oral tablet: 1 tab(s) orally every 8 hours  insulin glargine 100 units/mL subcutaneous solution: 6 unit(s) subcutaneous once a day (at bedtime)  lisinopril 40 mg oral tablet: 1 tab(s) orally once a day  Normal Saline 0.9% 10mL pre/post infusion: Heparin 10units/3mL post infusion  oxyCODONE 10 mg oral tablet: 1 tab(s) orally every 6 hours as needed for Moderate Pain (4 - 6) MDD: 4  spironolactone 25 mg oral tablet: 1 tab(s) orally once a day  Weekly CMP, CBC with diff, ESR, CRP, total CK: Faxed to Dr. Jiménez [phone]   aspirin 81 mg oral delayed release tablet: 1 tab(s) orally once a day  atorvastatin 80 mg oral tablet: 1 tab(s) orally once a day (at bedtime)  carvedilol 12.5 mg oral tablet: 1 tab(s) orally every 12 hours  Ceftriaxone 2g IV Q24H: For 6 weeks, 10/24/2023 to 12/5/2023  clopidogrel 75 mg oral tablet: 1 tab(s) orally once a day  Daptomycin 500mg IV Q24H: for 6 weeks, 10/24/2023 to 12/5/2023  gabapentin 800 mg oral tablet: 2 tab(s) orally 3 times a day  hydrALAZINE 25 mg oral tablet: 1 tab(s) orally every 8 hours  insulin glargine 100 units/mL subcutaneous solution: 6 unit(s) subcutaneous once a day (at bedtime)  insulin lispro 100 units/mL injectable solution: 5 injectable 3 times a day (before meals)  lisinopril 40 mg oral tablet: 1 tab(s) orally once a day  Normal Saline 0.9% 10mL pre/post infusion: Heparin 10units/3mL post infusion  oxyCODONE 10 mg oral tablet: 1 tab(s) orally every 6 hours as needed for Moderate Pain (4 - 6) MDD: 4  spironolactone 25 mg oral tablet: 1 tab(s) orally once a day  Weekly CMP, CBC with diff, ESR, CRP, total CK: Faxed to Dr. Jiménez [phone]

## 2023-10-27 NOTE — PROGRESS NOTE ADULT - ASSESSMENT
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 Patient undergoing RLE angiogram today    Recommendations:  -Continue zosyn 3.375g IV q8h EI. Will likely switch this to ceftriaxone on discharge pending cultures. Note that pluralibacter was previously classified as enterobacter, and some isolates may be highly resistant, but it does not appear that this organism has an inducible amp-C ([email or web address])  -Continue daptomycin 500mg IV q24h. Please check CK. Noted concomitant statin but this is for secondary prevention and would not recommend interruption of this.  -Plan will be for 6 weeks IV abx, although may consider stopping gram positive coverage after 3 weeks given emerging data that 3 weeks for residual OM post-debridement is adequate, and the gram positive orgs only grew in rare quantity ([email or web address]).  -Follow pending cultures and path  -Please place PICC  -F/u HCV quant and genotype. 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 S/p RLE angiogram and shockwave lithotripsy performed of AT and peroneal ballooned. States R foot pain is well controlled. Having 1-2 soft stools daily     Recommendations:  -Continue zosyn 3.375g IV q8h EI. Will likely switch this to ceftriaxone on discharge pending cultures. Note that pluralibacter was previously classified as enterobacter, and some isolates may be highly resistant, but it does not appear that this organism has an inducible amp-C ([id number])  -Continue daptomycin 500mg IV q24h. Please check CK. Noted concomitant statin but this is for secondary prevention and would not recommend interruption of this.  -Plan will be for 6 weeks IV abx, although may consider stopping gram positive coverage after 3 weeks given emerging data that 3 weeks for residual OM post-debridement is adequate, and the gram positive orgs only grew in rare quantity ([email or web address]).  -Follow pending cultures and path  -Please place PICC  -F/u HCV quant and genotype. Will require outpatient management for HCV    Team 2 will follow you.    Case d/w primary team.  Final recommendation pending attending note.    Josiane Boyle, Infectious Diseases PA  Please reach out for any questions 9 am-5pm. For evenings and weekends, please call the ID physician on call.  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 S/p RLE angiogram with AT and peroneal lithotripsy and balloon angioplasty today    Suggest:  -Continue zosyn 3.375g IV q8h EI.          -Will likely switch this to ceftriaxone on discharge pending cultures. Note that pluralibacter was previously classified as enterobacter, and some isolates may be highly resistant, but it does not appear that this organism has an inducible amp-C ([email or web address]/[id number])  -Continue daptomycin 500mg IV q24h. Please check CK.          -Noted concomitant statin but this is for secondary prevention and would not recommend interruption of this.  -Plan will be for 6 weeks IV abx, although may consider stopping gram positive coverage after 3 weeks given emerging data that 3 weeks for residual OM post-debridement is adequate, and the gram positive orgs only grew in rare quantity ([email or web address]).  -Follow pending cultures and path  -Please place PICC  -F/u HCV quant and genotype. Will require outpatient management for HCV    Team 2 will follow you.    Case d/w primary team.  Final recommendation pending attending note.    Josiane Boyle, Infectious Diseases PA  Please reach out for any questions 9 am-5pm. For evenings and weekends, please call the ID physician on call.  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 S/p RLE angiogram with AT and peroneal lithotripsy and balloon angioplasty today    Suggest:  -Continue zosyn 3.375g IV q8h EI.          -Will likely switch this to ceftriaxone on discharge pending cultures. Note that pluralibacter was previously classified as enterobacter, and some isolates may be highly resistant, but it does not appear that this organism has an inducible amp-C ([id number])  -Continue daptomycin 500mg IV q24h. Please check CK.          -Noted concomitant statin but this is for secondary prevention and would not recommend interruption of this.  -Plan will be for 6 weeks IV abx, although may consider stopping gram positive coverage after 3 weeks given emerging data that 3 weeks for residual OM post-debridement is adequate, and the gram positive orgs only grew in rare quantity ([email or web address]).  -Follow pending cultures and path  -Please place PICC  -F/u HCV quant and genotype. Will require outpatient management for HCV    Team 2 will follow you.  Dr. Covington will cover over the weekend.  Case d/w primary team.      Josiane Boyle, Infectious Diseases PA  Please reach out for any questions 9 am-5pm. For evenings and weekends, please call the ID physician on call.  Work cell: [phone]

## 2023-10-27 NOTE — DISCHARGE NOTE PROVIDER - NSDCCPCAREPLAN_GEN_ALL_CORE_FT
PRINCIPAL DISCHARGE DIAGNOSIS  Diagnosis: Gangrene of toe  Assessment and Plan of Treatment: Gangrene is a dangerous and potentially deadly health problem. It happens when the blood flow to an area of tissue is cut off. This causes the tissue to break down and die. Gangrene often turns the affected skin a greenish-black color. The word gangrene is not related to the color green but to the condition itself.  Treatment of gangrene will often consist of one or more of these steps:  1. Antibiotics. These medicines can be used to kill bacteria in the affected area. They are used only to treat wet gangrene.  2. Surgery to remove the dead tissue. When dead tissue is limited to a specific part of the body, removing the dead tissue and leaving healthy surrounding tissue is called debridement. It can help keep the gangrene from spreading to healthy tissues nearby and from getting infected. In cases where the gangrene is advanced, widespread, and not able to be cured otherwise, a finger, toe, foot, or more may need to be amputated.  3. Vascular surgery. If your gangrene is caused by poor blood flow, your healthcare provider may recommend surgery to improve your circulation. People whose gangrene is a result of a blocked artery, for example, may have bypass surgery or an angioplasty to fix the problem.      SECONDARY DISCHARGE DIAGNOSES  Diagnosis: HTN (hypertension)  Assessment and Plan of Treatment: You have a known history of high blood pressure prior to your admission. To manage this you are on a medication called ____. High blood pressure can cause damage to your heart and kidneys and increases your risk of heart attack and stroke. To avoid this, It is important that you continue to take this medication when you are discharged so that you can continue to control your blood pressure. Additionally be sure to follow up with your primary care physician on a regular basis to make sure your blood pressure continues to be well controlled. If you experience symptoms such as but not limited to: sudden onset blurry vision, nausea, vomiting, chest pain, shortness of breath, or palpitations, please go to the nearest emergency room.

## 2023-10-27 NOTE — BRIEF OPERATIVE NOTE - NSICDXBRIEFPROCEDURE_GEN_ALL_CORE_FT
PROCEDURES:  Partial amputation of fourth ray of right foot by open approach 24-Oct-2023 15:58:08  Carter Lopez  
PROCEDURES:  Angiogram, extremity, right 27-Oct-2023 12:21:54  Rajat Lyons  Shockwave lithotripsy 27-Oct-2023 12:22:09  Rajat Lyons

## 2023-10-27 NOTE — BRIEF OPERATIVE NOTE - NSICDXBRIEFPOSTOP_GEN_ALL_CORE_FT
POST-OP DIAGNOSIS:  PAD (peripheral artery disease) 27-Oct-2023 12:22:41  Rajat Lyons  
POST-OP DIAGNOSIS:  Gangrene 24-Oct-2023 19:04:56  Carter Lopez

## 2023-10-27 NOTE — PROGRESS NOTE ADULT - PROBLEM SELECTOR PLAN 7
Patient follow with outpatient cards Dr. Li on [address] Next appointment 11/7/23.  Patient reports 12 months of antiplatelet therapy s/p PCI in 2020 but discontinued after    Plan:  - c/w aspirin 81mg PO QD  - c/w lipitor 80mg PO QD

## 2023-10-27 NOTE — PROGRESS NOTE ADULT - ASSESSMENT
67M PMH HTN, IDDM with peripheral neuropathy, CAD s/p PCI with 2 stents (3 yrs ago at [facility]), recent admission 10/3 for diabetic ssti (CT r/o absecess, no OM) discharged on doxycycline and keflex, presenting with CC of r. foot pain, found to meet SIRS crtieria with suspected source infectious of r.toe, a/f management.

## 2023-10-27 NOTE — PRE-ANESTHESIA EVALUATION ADULT - NSANTHPEFT_GEN_ALL_CORE
NAD  CTABL  RRR  +BS
General: Appearance is consistent with chronological age. No abnormal facies.  EENT: Anicteric sclera; oropharynx clear, moist mucus membranes  Cardiovascular:  Rate and rhythm evaluated  Respiratory: Unlabored breathing  Neurological: Awake and alert, moves all extremities  Constitutional: ambulatory

## 2023-10-27 NOTE — PROGRESS NOTE ADULT - SUBJECTIVE AND OBJECTIVE BOX
O/N Events: was NPO over night for angio in the AM    Subjective/ROS: Patient seen and examined at bedside. Denies any swelling in the groin or pain post procedure. Patient non-cooperative with ROS but allowed limited physical examination. States that he gets all his out patient care at a comprehensive clinic on [address]      VITALS  Vital Signs Last 24 Hrs  T(C): 36.6 (27 Oct 2023 15:15), Max: 36.8 (27 Oct 2023 08:33)  T(F): 97.9 (27 Oct 2023 15:15), Max: 98.2 (27 Oct 2023 08:33)  HR: 60 (27 Oct 2023 15:15) (60 - 70)  BP: 164/93 (27 Oct 2023 15:15) (128/78 - 187/95)  BP(mean): 102 (27 Oct 2023 09:38) (102 - 102)  RR: 17 (27 Oct 2023 15:15) (17 - 18)  SpO2: 98% (27 Oct 2023 15:15) (95% - 98%)    Parameters below as of 27 Oct 2023 15:15  Patient On (Oxygen Delivery Method): room air        CAPILLARY BLOOD GLUCOSE      POCT Blood Glucose.: 88 mg/dL (27 Oct 2023 12:24)  POCT Blood Glucose.: 86 mg/dL (27 Oct 2023 08:57)  POCT Blood Glucose.: 125 mg/dL (26 Oct 2023 21:58)  POCT Blood Glucose.: 105 mg/dL (26 Oct 2023 21:13)  POCT Blood Glucose.: 156 mg/dL (26 Oct 2023 18:20)      PHYSICAL EXAM  General: NAD  Head: NC/AT; MMM; EOMI;  Neck: Supple; no JVD  Respiratory: CTAB; no wheezes  Cardiovascular: Regular rhythm/rate; S1/S2+, + murmur  Extremities: right foot bandage in place  Neurological: A&Ox3,no obvious focal deficits    Antimicrobials:  MEDICATIONS  (STANDING):  DAPTOmycin IVPB 500 milliGRAM(s) IV Intermittent every 24 hours  piperacillin/tazobactam IVPB.. 3.375 Gram(s) IV Intermittent every 8 hours      Standing Medications:  MEDICATIONS  (STANDING):  acetaminophen     Tablet .. 650 milliGRAM(s) Oral every 6 hours  aspirin enteric coated 81 milliGRAM(s) Oral daily  atorvastatin 80 milliGRAM(s) Oral at bedtime  carvedilol 12.5 milliGRAM(s) Oral every 12 hours  DAPTOmycin IVPB 500 milliGRAM(s) IV Intermittent every 24 hours  dextrose 5%. 1000 milliLiter(s) (50 mL/Hr) IV Continuous <Continuous>  dextrose 5%. 1000 milliLiter(s) (100 mL/Hr) IV Continuous <Continuous>  dextrose 50% Injectable 12.5 Gram(s) IV Push once  dextrose 50% Injectable 25 Gram(s) IV Push once  dextrose 50% Injectable 25 Gram(s) IV Push once  gabapentin 1600 milliGRAM(s) Oral three times a day  glucagon  Injectable 1 milliGRAM(s) IntraMuscular once  insulin glargine Injectable (LANTUS) 4 Unit(s) SubCutaneous at bedtime  insulin lispro (ADMELOG) corrective regimen sliding scale   SubCutaneous three times a day before meals  insulin lispro (ADMELOG) corrective regimen sliding scale   SubCutaneous at bedtime  insulin lispro Injectable (ADMELOG) 4 Unit(s) SubCutaneous three times a day before meals  lactated ringers. 1000 milliLiter(s) (50 mL/Hr) IV Continuous <Continuous>  lisinopril 40 milliGRAM(s) Oral every 24 hours  piperacillin/tazobactam IVPB.. 3.375 Gram(s) IV Intermittent every 8 hours  spironolactone 25 milliGRAM(s) Oral daily      PRN Medications:  MEDICATIONS  (PRN):  aluminum hydroxide/magnesium hydroxide/simethicone Suspension 30 milliLiter(s) Oral every 6 hours PRN Dyspepsia  dextrose Oral Gel 15 Gram(s) Oral once PRN Blood Glucose LESS THAN 70 milliGRAM(s)/deciliter  oxyCODONE    IR 10 milliGRAM(s) Oral every 6 hours PRN Moderate Pain (4 - 6)      No Known Allergies      LABS                        9.7    19.87 )-----------( 292      ( 27 Oct 2023 07:36 )             31.0     10-27    135  |  100  |  24<H>  ----------------------------<  94  4.4   |  26  |  1.08    Ca    8.7      27 Oct 2023 07:36  Phos  3.7     10-27  Mg     2.2     10-27    TPro  6.0  /  Alb  2.1<L>  /  TBili  0.4  /  DBili  x   /  AST  25  /  ALT  16  /  AlkPhos  193<H>  10-26    PT/INR - ( 27 Oct 2023 07:36 )   PT: 11.3 sec;   INR: 0.99          PTT - ( 27 Oct 2023 07:36 )  PTT:30.5 sec  Urinalysis Basic - ( 27 Oct 2023 07:36 )    Color: x / Appearance: x / SG: x / pH: x  Gluc: 94 mg/dL / Ketone: x  / Bili: x / Urobili: x   Blood: x / Protein: x / Nitrite: x   Leuk Esterase: x / RBC: x / WBC x   Sq Epi: x / Non Sq Epi: x / Bacteria: x      CARDIAC MARKERS ( 27 Oct 2023 07:36 )  x     / x     / 111 U/L / x     / x              IMAGING/EKG/ETC

## 2023-10-27 NOTE — PROGRESS NOTE ADULT - PROBLEM SELECTOR PLAN 8
F: s/p 2L NS  E: Replete as necessary K>4 Mg>2  N: CC diet   DVT Prophylaxis: aspirin  GI prophylaxis: None   CODE STATUS: FULL  DISPO: F

## 2023-10-27 NOTE — DISCHARGE NOTE PROVIDER - CARE PROVIDER_API CALL
Anni Taylor  [facility]  [address], [address],   [place], [address]  Alternate phone: [phone]  Phone: [phone]  Fax: (   )    -  Follow Up Time: 2 weeks   Anni Taylor  [facility]  [address], [address],   [place], [address]  Alternate phone: [phone]  Phone: [phone]  Fax: (   )    -  Follow Up Time: 2 weeks    Tom Camacho  Podiatric Medicine and Surgery  [address], [facility] [address]  [place] [address]  Phone: [phone]  Fax: [phone]  Established Patient  Follow Up Time: 2 weeks   Tom Camacho  Podiatric Medicine and Surgery  [address], [address]  [place] [address]  Phone: [phone]  Fax: [phone]  Established Patient  Follow Up Time: 2 weeks    Anni Taylor  [facility]  [address], [address],   [place], [address]  Alternate phone: [phone]  Phone: [phone]  Fax: (   )    -  Follow Up Time: 2 weeks    Gordo Jiménez  Infectious Disease  [address] [address]  Phone: [phone]  Fax: [phone]  Follow Up Time: 1 week   Tom Camacho  Podiatric Medicine and Surgery  [address] [facility], [address]  [place] [address]  Phone: [phone]  Fax: [phone]  Established Patient  Scheduled Appointment: 11/06/2023 10:30 AM    Gordo Jiménez  Infectious Disease  [address] [address]  Phone: [phone]  Fax: [phone]  Follow Up Time: 1 week    Anni Taylor  [address], [facility] [address],   [place], [address]  Alternate phone: [phone]  Phone: [phone]  Fax: (   )    -  Follow Up Time: 2 weeks   Tom Camacho  Podiatric Medicine and Surgery  [address] [facility], [address]  [place] [address]  Phone: [phone]  Fax: [phone]  Scheduled Appointment: 11/06/2023 10:30 AM    Gordo Jiménez  Infectious Disease  [address] [address]  Phone: [phone]  Fax: [phone]  Follow Up Time: 1 week    Gary Eduardo)  Internal Medicine  [address], 2nd [place] [address]  Phone: [phone]  Fax: [phone]  Scheduled Appointment: 11/06/2023 01:30 PM    Anni Taylor  [facility]  [address] [facility], 12th Floor  (Between 26th and 27[address])  [place] [address]  Phone: [phone]  Fax: (   )    -  Established Patient  Scheduled Appointment: 11/08/2023 09:30 AM    Perico Whitt  Endocrinology/Metab/Diabetes  [address], [address]  [place] [address]  Phone: [phone]  Fax: [phone]  Scheduled Appointment: 11/14/2023 11:00 AM   Tom Camacho  Podiatric Medicine and Surgery  [address] [facility], [address]  [place] [address]  Phone: [phone]  Fax: [phone]  Scheduled Appointment: 11/06/2023 10:30 AM    Gordo Jiménez  Infectious Disease  [address] [address]  Phone: [phone]  Fax: [phone]  Follow Up Time: 1 week    Gary Eduardo)  Internal Medicine  [address], 2nd [place] [address]  Phone: [phone]  Fax: [phone]  Scheduled Appointment: 11/06/2023 01:30 PM    Perico Whitt  Endocrinology/Metab/Diabetes  [address], [address]  [place] [address]  Phone: [phone]  Fax: [phone]  Scheduled Appointment: 11/14/2023 11:00 AM    Anni Taylor  [facility]  [address] [facility], 12th Floor  (Between 26th and 27th[place] [address]  Phone: [phone]  Fax: (   )    -  Established Patient  Scheduled Appointment: 11/08/2023 09:30 AM    Corky Oneal  Vascular Surgery  [address]  [place] [address]  Phone: [phone]  Fax: [phone]  Scheduled Appointment: 11/13/2023 10:15 AM   Tom Camacho  Podiatric Medicine and Surgery  [address] [facility], [address]  [place] [address]  Phone: [phone]  Fax: [phone]  Scheduled Appointment: 11/06/2023 10:30 AM    Gary Eduardo)  Internal Medicine  [address], 2nd [place] [address]  Phone: [phone]  Fax: [phone]  Scheduled Appointment: 11/06/2023 01:30 PM    Perico Whitt  Endocrinology/Metab/Diabetes  [address], [address]  [place] [address]  Phone: [phone]  Fax: [phone]  Scheduled Appointment: 11/14/2023 11:00 AM    Corky Oneal  Vascular Surgery  [address]  [place] [address]  Phone: [phone]  Fax: [phone]  Scheduled Appointment: 11/13/2023 10:15 AM    Anni Taylor  [facility]  [address] [place] [address], 12th Floor  (Between 26th and 27th)  [place] [address]  Phone: [phone]  Fax: (   )    -  Scheduled Appointment: 11/08/2023 09:30 AM    Gordo Jiménez  Infectious Disease  [address], 4th [place] [address]  Phone: [phone]  Fax: (   )    -  Scheduled Appointment: 11/17/2023 04:00 PM

## 2023-10-27 NOTE — DISCHARGE NOTE PROVIDER - NSDCFUADDAPPT_GEN_ALL_CORE_FT
Halina Mcgraw  Comprehensive Health Program  [facility]  T: [phone]  Email: [email or web address]    Comprehensive [facility] Program at [facility]  Address:  [address] [place], [place] [address]     [facility] [phone]     [facility] for primary care [facility] [phone]     Please bring your Insurance card, Photo ID and Discharge paperwork to the following appointments:    (1) Please follow up with your Podiatry Provider, Dr. Tom Camacho at [address] [facility], [facility] [address] [id number] on 11/06/2023 at 10:30am.    Appointment was scheduled by Ms. BRAULIO Ventura, Referral Coordinator.    (2) Please follow up with [facility] with Dr. Elsy Lopez on behalf of Dr. Gary Eduardo at [address] [place] [address], [address] [place] [address] on 11/06/2023 at 1:30pm.    Appointment was scheduled by Ms. BRAULIO Ventura, Referral Coordinator.    (3) Please follow up with your Cardiology Provider, Dr. Anni Taylor at [address] [place] [address], [address] [place] [address] on 11/08/2023 at 9:30am.    Appointment was scheduled by Ms. BRAULIO Ventura, Referral Coordinator.   Please bring your Insurance card, Photo ID and Discharge paperwork to the following appointments:    (1) Please follow up with your Podiatry Provider, Dr. Tmo Camacho at [address] [facility], [facility] [place] [address] on 11/06/2023 at 10:30am.    Appointment was scheduled by Ms. BRAULIO Ventura, Referral Coordinator.    (2) Please follow up with [facility] with Dr. Elsy Lopez on behalf of Dr. Gary Eduardo at [address], [place] [address] on 11/06/2023 at 1:30pm.    Appointment was scheduled by Ms. BRAULIO Ventura, Referral Coordinator.    (3) Please follow up with your Cardiology Provider, Dr. Anni Taylor at [address] [facility], [address] [place] [address] on 11/08/2023 at 9:30am.    Appointment was scheduled by Ms. BRAULIO Ventura, Referral Coordinator.    (4) Please follow up with your Endocrinology Provider, Dr. Perico Stewart at [address] [place] [address], [address] [address] on 11/14/2023 at 11:00am.    Appointment was scheduled by Ms. BRAULIO Ventura, Referral Coordinator.    (5) Please note that the office of your Infectious Disease Provider, Dr. Gordo Jiménez will call you from [phone] to schedule an appointment following your discharge.    Appointment was facilitated by Ms. BRAULIO Ventura, Referral Coordinator.   Please bring your Insurance card, Photo ID and Discharge paperwork to the following appointments:    (1) Please follow up with your Podiatry Provider, Dr. Tom Camacho at [address] [facility], [facility] [place] [address] on 11/06/2023 at 10:30am.    Appointment was scheduled by Ms. BRAULIO Ventura, Referral Coordinator.    (2) Please follow up with [facility] with Dr. Elsy Lopez on behalf of Dr. Gary Eduardo at [address], [place] [address] on 11/06/2023 at 1:30pm.    Appointment was scheduled by Ms. BRAULIO Ventura, Referral Coordinator.    (3) Please follow up with your Cardiology Provider, Dr. Anni Taylor at [address] [place] [address], [address] [place] [address] on 11/08/2023 at 9:30am.    Appointment was scheduled by Ms. BRAULIO Ventura, Referral Coordinator.    (4) Please follow up with your Endocrinology Provider, Dr. Perico Stewart at [address], [facility] [place] [address] on 11/14/2023 at 11:00am.    Appointment was scheduled by Ms. BRAULIO Ventura, Referral Coordinator.    (5) Please follow up your Infectious Disease Provider, Dr. Gordo Jiménez at [address], [address] [place] [address] on 11/17/2023 at 2:00pm.    Appointment was scheduled by Ms. BRAUILO Ventura, Referral Coordinator.    Appointment was facilitated by Ms. BRAULIO Ventura Referral Coordinator.

## 2023-10-27 NOTE — PROGRESS NOTE ADULT - ASSESSMENT
67M PMH HTN, T2DM with peripheral neuropathy, CAD s/p PCI with 2 stents (3 yrs ago at [facility]), recent admission 10/3 for diabetic ssti (CT r/o absecess, no OM) discharged on doxycycline and keflex, presenting with right foot 4th digit gangrene. Pt is s/p Partial 4th ray amp on 10/26 and Angiogram on 10/27.    A1C: 12.6 %  BUN: 24  Creatinine: 1.08  GFR: 76  Weight: 65  BMI: 22.4

## 2023-10-27 NOTE — DISCHARGE NOTE PROVIDER - NSDCHHNEEDSERVICE_GEN_ALL_CORE
Central venous access care/Observation and assessment/Teaching and training/Wound care and assessment

## 2023-10-27 NOTE — PROGRESS NOTE ADULT - ATTENDING COMMENTS
Patient was seen and examined at bedside on 10/27/2023 at 630 am. Patient reports pain at his foot has improved currently 0/10. Denies SOB, CP. ROS is otherwise negative. Vitals, labwork and pertinent imaging reviewed. Exam - NAD, AAO x 4, PERRLA, EOMI, MMM, supple neck, chest - CTA b/l, CV - rrr, s1s2, no m/r/g, abd - soft, NTND, + BS, ext - wwp, + 2 pitting edema b/l, R foot bandaged, s/p amputation of 4th toe, psych - normal affect    Plan:  -C/w Daptomycin and Zosyn, leukocytosis persists, bone culture + for bacteria, ID following  -Pain control, c/w Oxycodone 10 mg PO q6 PRN with IV Morphine PRN for severe pain  -Endocrine consult given recent HbA1C ~ 13, c/w Lantus 6 units qHS and 5 units premeal  -Vascular consulted - pt planned for angiogram 10/25 but refused and now tentatively scheduled for today 10/27  -Echo done showing hypokinesis and dysfunction, Cardiology consulted for HF and preoperative optimization  -PT/OT rec no skilled PT needs  -BP is better controlled - c/w Lisinopril, will change Norvasc 10 mg PO qd to Coreg and Spironolactone given evidence of HF  -C/w ASA, Plavix s/p angio  -Place PICC line, awaiting final cultures

## 2023-10-27 NOTE — DISCHARGE NOTE PROVIDER - ATTENDING DISCHARGE PHYSICAL EXAMINATION:
Patient was seen and examined at bedside on 10/31/2023 at 930 am. Patient reports that his b/l LE are very swollen. Denies SOB, CP. ROS is otherwise negative. Vitals, labwork and pertinent imaging reviewed. Exam - NAD, AAO x 4, PERRLA, EOMI, MMM, supple neck, chest - CTA b/l, CV - rrr, s1s2, no m/r/g, abd - soft, NTND, + BS, ext - wwp, + 2 pitting edema b/l (decreased), R foot bandaged, s/p amputation of 4th toe, psych - normal affect    Plan:  -C/w Daptomycin and CTX, bone culture + for bacteria, ID following - will need 6 weeks of IV abx  -Pain control, c/w Oxycodone 10 mg PO q6 PRN with IV Morphine PRN for severe pain  -Endocrine consult given recent HbA1C ~ 13, c/w Lantus 6 units qHS and 5 units premeal  -Vascular consulted - s/p angiogram on 10/27 w/ balloon angioplasty  -Echo done showing hypokinesis and dysfunction, Cardiology consulted for HF and preoperative optimization  -PT/OT rec no skilled PT needs  -BP is better controlled - c/w Lisinopril, will change Norvasc 10 mg PO qd to Coreg and Spironolactone given evidence of HF  -C/w ASA, Plavix s/p angio  -Place PICC line, awaiting final cultures\  B/l LE dopplers and CT A/P unremarkable  -Swelling has improved w/ albumin  -Patient is medically ready for d/c

## 2023-10-27 NOTE — PROGRESS NOTE ADULT - SUBJECTIVE AND OBJECTIVE BOX
Patient is a 68y old  Male who presents with a chief complaint of Foot infection (26 Oct 2023 13:33)      INTERVAL HPI/ OVERNIGHT EVENTS: Pt evaluated with attending this morning. Pt is now amenable to vascular intervention and has been NPO since midnight. Denies any pain to the R foot. Dressing dry intact and clean.       LABS                        9.1    21.39 )-----------( 237      ( 26 Oct 2023 05:30 )             28.0     10-26    135  |  100  |  17  ----------------------------<  153<H>  4.0   |  27  |  1.12    Ca    8.7      26 Oct 2023 05:30  Phos  3.5     10-26  Mg     2.2     10-26    TPro  6.0  /  Alb  2.1<L>  /  TBili  0.4  /  DBili  x   /  AST  25  /  ALT  16  /  AlkPhos  193<H>  10-26    PT/INR - ( 26 Oct 2023 05:30 )   PT: 12.3 sec;   INR: 1.08          PTT - ( 26 Oct 2023 05:30 )  PTT:29.2 sec    ICU Vital Signs Last 24 Hrs  T(C): 36.7 (27 Oct 2023 05:51), Max: 37.6 (26 Oct 2023 16:08)  T(F): 98 (27 Oct 2023 05:51), Max: 99.6 (26 Oct 2023 16:08)  HR: 70 (27 Oct 2023 05:51) (65 - 77)  BP: 187/95 (27 Oct 2023 05:51) (128/78 - 187/95)  BP(mean): --  ABP: --  ABP(mean): --  RR: 18 (27 Oct 2023 05:51) (17 - 18)  SpO2: 96% (27 Oct 2023 05:51) (96% - 99%)    O2 Parameters below as of 26 Oct 2023 22:30  Patient On (Oxygen Delivery Method): room air          MICROBIOLOGY  Culture - Tissue with Gram Stain (10.24.23 @ 15:30)   Gram Stain:   No organisms seen   No White blood cells  Specimen Source: .Tissue right 4th metatarsal clean margin or spec  Culture Results:   Few Escherichia coli   Few Pluralibacter gergoviae   Culture in progress      RADIOLOGY  < from: MR Foot w/wo IV Cont, Right (10.22.23 @ 19:32) >  IMPRESSION:  1.  Soft tissue atrophy/wound of the 4th toe with underlying marrow   changes of the 4th proximal phalanx head, middle phalanx, and distal   phalanx that may be reactive in the absence of significant T1 marrow   replacement, however, early infection may also be considered.  2.  Consider follow-up MRI to assess stability as warranted.  3.  Preliminary report was provided by Lucille.    < end of copied text >      < from: Xray Foot AP + Lateral + Oblique, Right (10.21.23 @ 13:09) >    IMPRESSION: No evidence of acute fracture. No radiographic evidence of   osteomyelitis.    < end of copied text >      PHYSICAL EXAM   Lower Extremity Focused:  Vasc: DP/PT 2/4 b/l, erythema and edema to the R forefoot and midfoot, darkening of skin of R 4th digit up to the MPJ.   Derm:  R foot surgical site wound at the distal 4th ray. Minimal sanguinous drainage. Fibrotic 60% granular 40% wound bed. Plantar skin just proximal to the surgical site with early signs of ischemia, with overlying serous fluid filled blister. R 5th digits, plantar 4th skin and 3rd digit cold on palpation.    Neuro: protective sensation slightly diminished  MSK: 5/5 muscle strength in all compartments

## 2023-10-27 NOTE — PROGRESS NOTE ADULT - SUBJECTIVE AND OBJECTIVE BOX
SUBJECTIVE / INTERVAL HPI: Patient was seen and examined this morning. NPO for angiogram today with Shockwave lithotripsy performed of AT and and AT and peroneal ballooned. He reports pain in ankle and foot have mostly resolved. He was NPO till this afternoon, and ate a very good late lunch.     CAPILLARY BLOOD GLUCOSE & INSULIN RECEIVED  156 mg/dL (10-26 @ 18:20) - Lispro 4+1  105 mg/dL (10-26 @ 21:13) - Lantus 4  125 mg/dL (10-26 @ 21:58)  94 mg/dL (10-27 @ 07:36)  86 mg/dL (10-27 @ 08:57)  88 mg/dL (10-27 @ 12:24)    REVIEW OF SYSTEMS  Constitutional:  Negative fever, chills or loss of appetite.  Eyes:  Negative blurry vision or double vision.  Cardiovascular:  Negative for chest pain or palpitations.  Respiratory:  Negative for cough, wheezing, or shortness of breath.    Gastrointestinal:  Negative for nausea, vomiting, diarrhea, constipation, or abdominal pain.  Genitourinary:  Negative frequency, urgency or dysuria.  Neurologic:  No headache, confusion, dizziness, lightheadedness.    PHYSICAL EXAM  Vital Signs Last 24 Hrs  T(C): 36.6 (27 Oct 2023 15:15), Max: 36.8 (27 Oct 2023 08:33)  T(F): 97.9 (27 Oct 2023 15:15), Max: 98.2 (27 Oct 2023 08:33)  HR: 60 (27 Oct 2023 15:15) (60 - 70)  BP: 164/93 (27 Oct 2023 15:15) (128/78 - 187/95)  BP(mean): 102 (27 Oct 2023 09:38) (102 - 102)  RR: 17 (27 Oct 2023 15:15) (17 - 18)  SpO2: 98% (27 Oct 2023 15:15) (95% - 98%)    Parameters below as of 27 Oct 2023 15:15  Patient On (Oxygen Delivery Method): room air      PHYSICAL EXAM  HEENT: NC/AT, PERRLA, EOMI, no conjunctival pallor or scleral icterus, DMM  Neck: Supple, no JVD  Respiratory: CTA B/L. No w/r/r.   Cardiovascular: RRR, normal S1 and S2, no m/r/g.   Gastrointestinal: +BS, soft NTND, no guarding or rebound tenderness, no palpable masses   Extremities: wwp; no cyanosis, clubbing or edema. R 4th toe amputated with dressing  Neurological: AAOx3, no CN deficits, strength and sensation intact throughout.   Skin: No gross skin abnormalities or rashe      LABS  CBC - WBC/HGB/HTC/PLT: 19.87/9.7/31.0/292 (10-27-23)  BMP - Na/K/Cl/Bicarb/BUN/Cr/Gluc/AG/eGFR: 135/4.4/100/26/24/1.08/94/9/76 (10-27-23)  Ca - 8.7 (10-27-23)  Phos - 3.7 (10-27-23)  Mg - 2.2 (10-27-23)  LFT - Alb/Tprot/Tbili/Dbili/AlkPhos/ALT/AST: 2.1/--/0.4/--/193/16/25 (10-26-23)  PT/aPTT/INR: 11.3/30.5/0.99 (10-27-23)       MEDICATIONS  MEDICATIONS  (STANDING):  acetaminophen     Tablet .. 650 milliGRAM(s) Oral every 6 hours  aspirin enteric coated 81 milliGRAM(s) Oral daily  atorvastatin 80 milliGRAM(s) Oral at bedtime  carvedilol 12.5 milliGRAM(s) Oral every 12 hours  DAPTOmycin IVPB 500 milliGRAM(s) IV Intermittent every 24 hours  dextrose 5%. 1000 milliLiter(s) (50 mL/Hr) IV Continuous <Continuous>  dextrose 5%. 1000 milliLiter(s) (100 mL/Hr) IV Continuous <Continuous>  dextrose 50% Injectable 12.5 Gram(s) IV Push once  dextrose 50% Injectable 25 Gram(s) IV Push once  dextrose 50% Injectable 25 Gram(s) IV Push once  gabapentin 1600 milliGRAM(s) Oral three times a day  glucagon  Injectable 1 milliGRAM(s) IntraMuscular once  insulin glargine Injectable (LANTUS) 4 Unit(s) SubCutaneous at bedtime  insulin lispro (ADMELOG) corrective regimen sliding scale   SubCutaneous three times a day before meals  insulin lispro (ADMELOG) corrective regimen sliding scale   SubCutaneous at bedtime  insulin lispro Injectable (ADMELOG) 4 Unit(s) SubCutaneous three times a day before meals  lisinopril 40 milliGRAM(s) Oral every 24 hours  piperacillin/tazobactam IVPB.. 3.375 Gram(s) IV Intermittent every 8 hours  spironolactone 25 milliGRAM(s) Oral daily    MEDICATIONS  (PRN):  aluminum hydroxide/magnesium hydroxide/simethicone Suspension 30 milliLiter(s) Oral every 6 hours PRN Dyspepsia  dextrose Oral Gel 15 Gram(s) Oral once PRN Blood Glucose LESS THAN 70 milliGRAM(s)/deciliter  oxyCODONE    IR 10 milliGRAM(s) Oral every 6 hours PRN Moderate Pain (4 - 6)

## 2023-10-27 NOTE — BRIEF OPERATIVE NOTE - OPERATION/FINDINGS
Patient placed under sedation with local anesthesia. RLE angiogram via L groin access. Max Sheath: 6Fr. Aortogram performed demonstrating patent aortoiliac system. Patent profunda/ SFA with minimal disease. Severe diseae of TP trunk, AT and peroneal. PT occluded proximally. 2 vessel runoff to foot via diseased AT and Peroneal. Shockwave lithotripsy performed of AT and ballooned with 3mm x 220cm [place]. Peroneal ballooned with 3mm x 150 balloon. Completion angiogram demonstrating better filling of AT and peroneal. Mynx closure device used at end of case for hemostasis.

## 2023-10-27 NOTE — PROGRESS NOTE ADULT - ASSESSMENT
68M PMH HTN, IDDM with peripheral neuropathy, CAD s/p PCI with 2 stents (3 yrs ago at [facility]) who presents to [facility] with right toe pain. Podiatry consulted to evaluate R toe wound. Pt with prior admission on 10/1-10/3 with early ischemic signs of R 4th digit, and was discharged with PO abx (keflex,doxy). States of completing medication but has not followed up out pt with any provider. In the ED, pt febrile with elevated WBC to 19.71. At time of consult on the floors, pt is now afebrile with VSS. 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 Pt is s/p R foot partial 4th ray resection (DOS 10/24). Patient tolerated procedure well.     10/27: Pt now amenable to vascular surgery intervention.       Plan:  -c/w IV abx  -f/u intra-operative cultures & path  -WB status: WBAT to the R heel  -R lower extremity to remain elevated while in bed  -Please place pt on an adequate pain management regimen   -Surgical site cleansed with NS. Applied 1/4 inch plain packing. Betadine soaked gauze applied w/ kerlix  -Rest of care up to primary team    Podiatry following, Plan d/w attending 68M PMH HTN, IDDM with peripheral neuropathy, CAD s/p PCI with 2 stents (3 yrs ago at [facility]) who presents to [facility] with right toe pain. Podiatry consulted to evaluate R toe wound. Pt with prior admission on 10/1-10/3 with early ischemic signs of R 4th digit, and was discharged with PO abx (keflex,doxy). States of completing medication but has not followed up out pt with any provider. In the ED, pt febrile with elevated WBC to 19.71. At time of consult on the floors, pt is now afebrile with VSS. 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 Pt is s/p R foot partial 4th ray resection (DOS 10/24). Patient tolerated procedure well.     10/27: Pt now amenable to vascular surgery intervention. With poor plantar skin quality, and elevated white count, a proximal amputation may be needed for adequate source control.  Will discuss intervention with patient upon findings from angiogram to be done today with Vascular surgery. Pt currently indicated that he would not be amenable to ROR for any additional amputation or washout. Stated that he has not symptoms and will only agree with vascular intervention in attempts to save the foot.       Plan:  -c/w IV abx  -f/u intra-operative cultures & path  -WB status: WBAT to the R heel  -R lower extremity to remain elevated while in bed  -Please place pt on an adequate pain management regimen   -Surgical site cleansed with NS. Applied 1/4 inch plain packing. Betadine soaked gauze applied w/ kerlix  -Rest of care up to primary team    Podiatry following, Plan d/w attending

## 2023-10-27 NOTE — PROGRESS NOTE ADULT - SUBJECTIVE AND OBJECTIVE BOX
Vascular Surgery Post-Op Note    Procedure: right lower extremity angiogram with AT and peroneal lithotripsy and balloon angioplasty    Diagnosis/Indication: PAD with ischemic right foot pain    Surgeon: Dr. Oneal    S: Pt has no complaints, states his foot feels much better, without pain. Denies CP, SOB.     O:  T(C): 36.6 (10-27-23 @ 15:15), Max: 36.6 (10-27-23 @ 15:15)  T(F): 97.9 (10-27-23 @ 15:15), Max: 97.9 (10-27-23 @ 15:15)  HR: 60 (10-27-23 @ 15:15) (60 - 60)  BP: 164/93 (10-27-23 @ 15:15) (164/93 - 164/93)  RR: 17 (10-27-23 @ 15:15) (17 - 17)  SpO2: 98% (10-27-23 @ 15:15) (98% - 98%)  Wt(kg): --                        9.7    19.87 )-----------( 292      ( 27 Oct 2023 07:36 )             31.0     10-27    135  |  100  |  24<H>  ----------------------------<  94  4.4   |  26  |  1.08    Ca    8.7      27 Oct 2023 07:36  Phos  3.7     10-27  Mg     2.2     10-27    TPro  6.0  /  Alb  2.1<L>  /  TBili  0.4  /  DBili  x   /  AST  25  /  ALT  16  /  AlkPhos  193<H>  10-26      Gen: NAD, resting comfortably in bed  C/V: NSR  Pulm: Nonlabored breathing, no respiratory distress  Abd: soft, NT/ND  Groi: left groin soft, no hematoma, no bleeding   Extrem: RLE warm, well perfused, triphasic AT and monophasic PT    A/P: 68yMale s/p above procedure  Diet: advance per primary team  Bedrest til 230pm  Started Plavix, please make sure patient has script for Plavix on discharge

## 2023-10-27 NOTE — DISCHARGE NOTE PROVIDER - CARE PROVIDERS DIRECT ADDRESSES
,DirectAddress_Unknown ,DirectAddress_Unknown,DirectAddress_Unknown ,DirectAddress_Unknown,DirectAddress_Unknown,DirectAddress_Unknown ,DirectAddress_Unknown,DirectAddress_Unknown,[email or web address],DirectAddress_Unknown,[email or web address] ,DirectAddress_Unknown,DirectAddress_Unknown,[email or web address],DirectAddress_Unknown,DirectAddress_Unknown ,DirectAddress_Unknown,[email or web address],DirectAddress_Unknown,DirectAddress_Unknown,DirectAddress_Unknown

## 2023-10-27 NOTE — DISCHARGE NOTE PROVIDER - NSDCCPTREATMENT_GEN_ALL_CORE_FT
PRINCIPAL PROCEDURE  Procedure: Partial amputation of fourth ray of right foot by open approach  Findings and Treatment: You had an amputation of the 4th toe of your right foot.   Please continue to follow the wound care instructions:      SECONDARY PROCEDURE  Procedure: Angiogram, extremity, right  Findings and Treatment: You had an angiogram of your right lower extremity.

## 2023-10-27 NOTE — PROGRESS NOTE ADULT - PROBLEM SELECTOR PLAN 2
Patient presenting with worsening right 4th toe pain and discoloratio s/p course of IV and oral antibtiocs: Keflex and doxycycline until 10/10 endorses compliance. On presentation, he is hemodynamically stable however with leukoctyosis to 19 and fever to 101.   Right toe xrays done in ED showing no evidence of fracture or osteomyelitis   S/p 1 dose vancomycin in ED and zosyn in ED.   Patient went to surgery for toe amputation 10/24    Plan:  - Podiatry consulted, f/u recs   - vascular consulted: rec angio 10/27  - Local wound care: Cleansed with NS. Betadine soaked gauze applied to R 4th digit, 3rd and 4th interspaces Advised pt to keep dressing intact dry and clean  - Pain management: Tylenol 650 q6h standing, Oxy 10 q6h PRN, morphine 2mg q6h PRN for severe  - 1x 0.5 dilaudid for severe breakthrough pain.

## 2023-10-27 NOTE — DISCHARGE NOTE PROVIDER - HOSPITAL COURSE
#Discharge: do not delete    Patient is __ yo M/F with past medical history of _____ presented with _____, found to have _____ (one liner)    Hospital course (by problem):     Patient was discharged to: (home/KERRY/acute rehab/hospice, etc, and with what services – home health PT/RN? Home O2?)    New medications:   Changes to old medications:  Medications that were stopped:    Items to follow up as outpatient:    Physical exam at the time of discharge:       #Discharge: do not delete    67M PMH HTN, IDDM with peripheral neuropathy, CAD s/p PCI with 2 stents (3 yrs ago at [facility]), recent admission 10/3 for diabetic ssti (CT r/o absecess, no OM) discharged on doxycycline and keflex, presented with CC of r. foot pain, found to meet SIRS crtieria with suspected source infectious of r.toe, a/f management.       # Sepsis.   Patient presenting meeting 2/4 SIRS (T101, Leukocytosis), source likely Gangrenous toe vs Osteomyelitis of R. foot wound. No purulence or erythema on examination however extremely tender to palpation and at rest. XR w/o evidence of osteo however lower sensivity. s/p 1 dose vanc and zosyn in ED. Prior was on Keflex and doxycycline x 10 days (finished 10/10), completed course. Last BCx w/o growth.   s/p 2L NS in ED  BCx: no growth at 5 day  MRI: Soft tissue atrophy/wound of the 4th toe with underlying marrow changes of the 4th proximal phalanx head, middle phalanx, and distal phalanx that may be reactive in the absence of significant T1 marrow replacement, however, early infection may also be considered.  Tissue culture: e.coli, pluralibacter, stap epi, and campylobacter  - s/p  single lumen PICC line placement  - ID meds for discharge: Daptomycin 500mg IV q24h plus ceftriaxone 2g IV Q24   - Duration of antibiotics is 6 weeks ( 10/24 - 12/5 )  - Weekly labs: CMP, CBC, ESR, CRP, CK faxed to ID office at [phone]  - Patient to follow up with Dr. Jiménez in 2 weeks ([address] [place] [address], [phone]), ID office will call patient to schedule   - Patient to follow up with PCP for HCV care    # Diabetic foot infection.   Patient presenting with worsening right 4th toe pain and discoloratio s/p course of IV and oral antibtiocs: Keflex and doxycycline until 10/10 endorses compliance. On presentation, he is hemodynamically stable however with leukoctyosis to 19 and fever to 101.   Right toe xrays done in ED showing no evidence of fracture or osteomyelitis   S/p 1 dose vancomycin in ED and zosyn in ED.   Patient went to surgery for toe amputation 10/24  S/p angio 10/27  - Local wound care: Applied 1/4 inch plain packing. Betadine soaked gauze applied w/ kerlix  - Pain management: Tylenol 650 q6h standing, Oxy 10 q6h PRN, gabapentin 1600 mg TID.    # Leg edema.   2/2 to third spacing i/s/o infection vs HF vs ascending infection  Check b/l LE ultrasounds (-) DVT  Check CT A/P w/ IV contrast as well as CT LE w/ IV contrast (-) for abscess, hematoma  Improved after Albumin q8h.    # Hypertensive urgency with history of HFmrEF  Patient presenting with SBP to 191/80, Alkphos elevated at this time. Likely due to pain from foot wound, endorses compliance with medication.  - Cards rec: Lisinopril 40 mg qd, spironolactone 25 mg qd, coreg 12.5 BID  - Cards rec: Hydralazine 25 mg TID    # HTN (hypertension).   Echo: Left ventricular systolic function is mildly reduced with a calculated ejection fraction of 45% with regional wall motion abnormalities.   - Cards rec: Lisinopril 40 mg qd, spironolactone 25 mg qd, coreg 12.5 BID  - Cards rec: Hydralazine 25 mg TID    # Transaminitis.   Patient with isolated elevated alkphos, no RUQ pain, not seen on prior labs. Low suspicion for bile duct obstruction at this time.     # Type 2 diabetes mellitus with hyperglycemia.   Home regimen should be clarified, was discharged on lantus 11u, and humalog 7u premeal.  A1c 12.6  - endocrine recs Lantus 6 at night, lispro 5 before meals low iss    # CAD (coronary artery disease).   Patient follow with outpatient cards Dr. Li on [address] Next appointment 11/7/23.  Patient reports 12 months of antiplatelet therapy s/p PCI in 2020 but discontinued after  - c/w aspirin 81mg PO QD  - c/w lipitor 80mg PO QD.        Patient was discharged to: home    New medications: Daptomycin 500mg IV q24h plus ceftriaxone 2g IV Q24 until 12/5. Lisinopril 40 mg qd, spironolactone 25 mg qd, coreg 12.5 BID, Hydralazine 25 mg TID  Changes to old medications: increased home gabapentin to gabapentin 1600 mg TID  Medications that were stopped: none    Items to follow up as outpatient: ID (Tino), PCP (YUDITH), endocrine, cardiology (Claudia), podiatry (Doug)    Physical exam at the time of discharge:  General: NAD  Head: NC/AT; MMM; EOMI;  Neck: Supple; no JVD  Respiratory: CTAB; no wheezes  Cardiovascular: Regular rhythm/rate; S1/S2+, + murmur  Gastrointestinal: Soft; NTND;  Extremities: WWP; +1 pitting edema improved  Neurological: A&Ox3, no obvious focal deficits

## 2023-10-27 NOTE — DISCHARGE NOTE PROVIDER - PROVIDER TOKENS
FREE:[LAST:[Claudia],FIRST:[Anni],PHONE:[phone]],FAX:[(   )    -],ADDRESS:[[address], [facility] [address],   [place], [place], [address]  Alternate phone: [phone]],FOLLOWUP:[2 weeks]] FREE:[LAST:[Namaliou],FIRST:[Anni],PHONE:[phone]],FAX:[(   )    -],ADDRESS:[[address], [facility] [address],   [place], [address]  Alternate phone: [phone]],FOLLOWUP:[2 weeks]],PROVIDER:[TOKEN:[[id number]],FOLLOWUP:[2 weeks],ESTABLISHEDPATIENT:[T]] PROVIDER:[TOKEN:[[id number]],FOLLOWUP:[2 weeks],ESTABLISHEDPATIENT:[T]],FREE:[LAST:[Claudia],FIRST:[Anni],PHONE:[phone]],FAX:[(   )    -],ADDRESS:[[address],   [place], [place], [address]  Alternate phone: [phone]],FOLLOWUP:[2 weeks]],PROVIDER:[TOKEN:[[id number]],FOLLOWUP:[1 week]] PROVIDER:[TOKEN:[[id number]],SCHEDULEDAPPT:[11/06/2023],SCHEDULEDAPPTTIME:[10:30 AM],ESTABLISHEDPATIENT:[T]],PROVIDER:[TOKEN:[[id number]],FOLLOWUP:[1 week]],FREE:[LAST:[Naoulou],FIRST:[Anni],PHONE:[phone]],FAX:[(   )    -],ADDRESS:[[address], [facility] [address],   [place], [address]  Alternate phone: [phone]],FOLLOWUP:[2 weeks]] PROVIDER:[TOKEN:[[id number]],SCHEDULEDAPPT:[11/06/2023],SCHEDULEDAPPTTIME:[10:30 AM]],PROVIDER:[TOKEN:[[id number]],FOLLOWUP:[1 week]],PROVIDER:[TOKEN:[[id number]],SCHEDULEDAPPT:[11/06/2023],SCHEDULEDAPPTTIME:[01:30 PM]],FREE:[LAST:[Claudia],FIRST:[Anni],PHONE:[phone]],FAX:[(   )    -],ADDRESS:[[address]  ([address])  [place] [address]],SCHEDULEDAPPT:[11/08/2023],SCHEDULEDAPPTTIME:[09:30 AM],ESTABLISHEDPATIENT:[T]],PROVIDER:[TOKEN:[[id number]],SCHEDULEDAPPT:[11/14/2023],SCHEDULEDAPPTTIME:[11:00 AM]] PROVIDER:[TOKEN:[[id number]],SCHEDULEDAPPT:[11/06/2023],SCHEDULEDAPPTTIME:[10:30 AM]],PROVIDER:[TOKEN:[[id number]],FOLLOWUP:[1 week]],PROVIDER:[TOKEN:[[id number]],SCHEDULEDAPPT:[11/06/2023],SCHEDULEDAPPTTIME:[01:30 PM]],PROVIDER:[TOKEN:[[id number]],SCHEDULEDAPPT:[11/14/2023],SCHEDULEDAPPTTIME:[11:00 AM]],FREE:[LAST:[Claudia],FIRST:[Anni],PHONE:[phone]],FAX:[(   )    -],ADDRESS:[[address]  ([address])  [place] [address]],SCHEDULEDAPPT:[11/08/2023],SCHEDULEDAPPTTIME:[09:30 AM],ESTABLISHEDPATIENT:[T]],PROVIDER:[TOKEN:[[id number]],SCHEDULEDAPPT:[11/13/2023],SCHEDULEDAPPTTIME:[10:15 AM]] PROVIDER:[TOKEN:[[id number]],SCHEDULEDAPPT:[11/06/2023],SCHEDULEDAPPTTIME:[10:30 AM]],PROVIDER:[TOKEN:[[id number]],SCHEDULEDAPPT:[11/06/2023],SCHEDULEDAPPTTIME:[01:30 PM]],PROVIDER:[TOKEN:[[id number]],SCHEDULEDAPPT:[11/14/2023],SCHEDULEDAPPTTIME:[11:00 AM]],PROVIDER:[TOKEN:[[id number]],SCHEDULEDAPPT:[11/13/2023],SCHEDULEDAPPTTIME:[10:15 AM]],FREE:[LAST:[Claudia],FIRST:[Anni],PHONE:[phone]],FAX:[(   )    -],ADDRESS:[[address]  ([address])  [place] [address]],SCHEDULEDAPPT:[11/08/2023],SCHEDULEDAPPTTIME:[09:30 AM]],FREE:[LAST:[Tino],FIRST:[Gordo],PHONE:[phone]],FAX:[(   )    -],ADDRESS:[Infectious Disease  [address]  [place] [id number]],SCHEDULEDAPPT:[11/17/2023],SCHEDULEDAPPTTIME:[04:00 PM]]

## 2023-10-27 NOTE — PROGRESS NOTE ADULT - PROBLEM SELECTOR PLAN 1
Patient presenting meeting 2/4 SIRS (T101, Leukocytosis), source likely Gangrenous toe vs Osteomyelitis of R. foot wound. No purulence or erythema on examination however extremely tender to palpation and at rest. XR w/o evidence of osteo however lower sensivity. s/p 1 dose vanc and zosyn in ED. Prior was on Keflex and doxycycline x 10 days (finished 10/10), completed course. Last BCx w/o growth.   s/p 2L NS in ED  BCx: no growth at 4 day  MRI: Soft tissue atrophy/wound of the 4th toe with underlying marrow changes of the 4th proximal phalanx head, middle phalanx, and distal phalanx that may be reactive in the absence of significant T1 marrow replacement, however, early infection may also be considered.  Tissue culture: e.coli, pluralibacter    Plan:  - f/u BCx  - zosyn 3.375 mg q8h and daptomycin 500 q24h  - f/u CK level  - Tylenol q6 prn for fever.   - f/u podiatry recs as below.

## 2023-10-27 NOTE — PROGRESS NOTE ADULT - PROBLEM SELECTOR PLAN 1
Type 2 diabetes mellitus with hyperglycemia  - Please decrease lantus to 3 units at bedtime.   - keep lispro 4 units before each meal.  - Continue lispro moderate dose sliding scale before meals and at bedtime.  - Patient's fingerstick glucose goal is 100-180 mg/dL.    - Discharge recommendations to be discussed.   - Patient can follow up at discharge with [facility] by calling [phone] to make an appointment.

## 2023-10-27 NOTE — DISCHARGE NOTE PROVIDER - NSDCFUSCHEDAPPT_GEN_ALL_CORE_FT
[facility] Physician [facility]  INTMED [address]  Scheduled Appointment: 11/06/2023    Corky Oneal  [facility] Physician [facility]  VASCULAR [address]  Scheduled Appointment: 11/13/2023     [facility] Physician [facility]  INTMED [address]  Scheduled Appointment: 11/06/2023    Corky Oneal  [facility] Physician [facility]  VASCULAR [address]  Scheduled Appointment: 11/13/2023    Perico Whitt  [facility] Physician [facility]  ENDOCRIN [address]  Scheduled Appointment: 11/14/2023     [facility] Physician [facility]  INTMED [address]  Scheduled Appointment: 11/06/2023    Corky Oneal  [facility] Physician [facility]  VASCULAR [address]  Scheduled Appointment: 11/13/2023    Perico Whitt  [facility] Physician [facility]  ENDOCRIN [address]  Scheduled Appointment: 11/14/2023    Gordo Jiménez  [facility] Physician [facility]  INFDISEASE [address]  Scheduled Appointment: 11/17/2023

## 2023-10-28 LAB
ALBUMIN SERPL ELPH-MCNC: 1.9 G/DL — LOW (ref 3.3–5)
ALBUMIN SERPL ELPH-MCNC: 1.9 G/DL — LOW (ref 3.3–5)
ALP SERPL-CCNC: 229 U/L — HIGH (ref 40–120)
ALP SERPL-CCNC: 229 U/L — HIGH (ref 40–120)
ALT FLD-CCNC: 18 U/L — SIGNIFICANT CHANGE UP (ref 10–45)
ALT FLD-CCNC: 18 U/L — SIGNIFICANT CHANGE UP (ref 10–45)
ANION GAP SERPL CALC-SCNC: 9 MMOL/L — SIGNIFICANT CHANGE UP (ref 5–17)
ANION GAP SERPL CALC-SCNC: 9 MMOL/L — SIGNIFICANT CHANGE UP (ref 5–17)
ANISOCYTOSIS BLD QL: SLIGHT — SIGNIFICANT CHANGE UP
ANISOCYTOSIS BLD QL: SLIGHT — SIGNIFICANT CHANGE UP
AST SERPL-CCNC: 22 U/L — SIGNIFICANT CHANGE UP (ref 10–40)
AST SERPL-CCNC: 22 U/L — SIGNIFICANT CHANGE UP (ref 10–40)
BASOPHILS # BLD AUTO: 0 K/UL — SIGNIFICANT CHANGE UP (ref 0–0.2)
BASOPHILS # BLD AUTO: 0 K/UL — SIGNIFICANT CHANGE UP (ref 0–0.2)
BASOPHILS NFR BLD AUTO: 0 % — SIGNIFICANT CHANGE UP (ref 0–2)
BASOPHILS NFR BLD AUTO: 0 % — SIGNIFICANT CHANGE UP (ref 0–2)
BILIRUB SERPL-MCNC: 0.2 MG/DL — SIGNIFICANT CHANGE UP (ref 0.2–1.2)
BILIRUB SERPL-MCNC: 0.2 MG/DL — SIGNIFICANT CHANGE UP (ref 0.2–1.2)
BUN SERPL-MCNC: 22 MG/DL — SIGNIFICANT CHANGE UP (ref 7–23)
BUN SERPL-MCNC: 22 MG/DL — SIGNIFICANT CHANGE UP (ref 7–23)
BURR CELLS BLD QL SMEAR: PRESENT — SIGNIFICANT CHANGE UP
BURR CELLS BLD QL SMEAR: PRESENT — SIGNIFICANT CHANGE UP
CALCIUM SERPL-MCNC: 8.4 MG/DL — SIGNIFICANT CHANGE UP (ref 8.4–10.5)
CALCIUM SERPL-MCNC: 8.4 MG/DL — SIGNIFICANT CHANGE UP (ref 8.4–10.5)
CHLORIDE SERPL-SCNC: 101 MMOL/L — SIGNIFICANT CHANGE UP (ref 96–108)
CHLORIDE SERPL-SCNC: 101 MMOL/L — SIGNIFICANT CHANGE UP (ref 96–108)
CK SERPL-CCNC: 84 U/L — SIGNIFICANT CHANGE UP (ref 30–200)
CK SERPL-CCNC: 84 U/L — SIGNIFICANT CHANGE UP (ref 30–200)
CO2 SERPL-SCNC: 24 MMOL/L — SIGNIFICANT CHANGE UP (ref 22–31)
CO2 SERPL-SCNC: 24 MMOL/L — SIGNIFICANT CHANGE UP (ref 22–31)
CREAT SERPL-MCNC: 1.26 MG/DL — SIGNIFICANT CHANGE UP (ref 0.5–1.3)
CREAT SERPL-MCNC: 1.26 MG/DL — SIGNIFICANT CHANGE UP (ref 0.5–1.3)
EGFR: 63 ML/MIN/1.73M2 — SIGNIFICANT CHANGE UP
EGFR: 63 ML/MIN/1.73M2 — SIGNIFICANT CHANGE UP
EOSINOPHIL # BLD AUTO: 0 K/UL — SIGNIFICANT CHANGE UP (ref 0–0.5)
EOSINOPHIL # BLD AUTO: 0 K/UL — SIGNIFICANT CHANGE UP (ref 0–0.5)
EOSINOPHIL NFR BLD AUTO: 0 % — SIGNIFICANT CHANGE UP (ref 0–6)
EOSINOPHIL NFR BLD AUTO: 0 % — SIGNIFICANT CHANGE UP (ref 0–6)
GIANT PLATELETS BLD QL SMEAR: PRESENT — SIGNIFICANT CHANGE UP
GIANT PLATELETS BLD QL SMEAR: PRESENT — SIGNIFICANT CHANGE UP
GLUCOSE BLDC GLUCOMTR-MCNC: 127 MG/DL — HIGH (ref 70–99)
GLUCOSE BLDC GLUCOMTR-MCNC: 127 MG/DL — HIGH (ref 70–99)
GLUCOSE BLDC GLUCOMTR-MCNC: 170 MG/DL — HIGH (ref 70–99)
GLUCOSE BLDC GLUCOMTR-MCNC: 170 MG/DL — HIGH (ref 70–99)
GLUCOSE BLDC GLUCOMTR-MCNC: 200 MG/DL — HIGH (ref 70–99)
GLUCOSE BLDC GLUCOMTR-MCNC: 200 MG/DL — HIGH (ref 70–99)
GLUCOSE BLDC GLUCOMTR-MCNC: 232 MG/DL — HIGH (ref 70–99)
GLUCOSE BLDC GLUCOMTR-MCNC: 232 MG/DL — HIGH (ref 70–99)
GLUCOSE BLDC GLUCOMTR-MCNC: 75 MG/DL — SIGNIFICANT CHANGE UP (ref 70–99)
GLUCOSE BLDC GLUCOMTR-MCNC: 75 MG/DL — SIGNIFICANT CHANGE UP (ref 70–99)
GLUCOSE SERPL-MCNC: 206 MG/DL — HIGH (ref 70–99)
GLUCOSE SERPL-MCNC: 206 MG/DL — HIGH (ref 70–99)
HCT VFR BLD CALC: 28.9 % — LOW (ref 39–50)
HCT VFR BLD CALC: 28.9 % — LOW (ref 39–50)
HGB BLD-MCNC: 8.9 G/DL — LOW (ref 13–17)
HGB BLD-MCNC: 8.9 G/DL — LOW (ref 13–17)
LYMPHOCYTES # BLD AUTO: 1.26 K/UL — SIGNIFICANT CHANGE UP (ref 1–3.3)
LYMPHOCYTES # BLD AUTO: 1.26 K/UL — SIGNIFICANT CHANGE UP (ref 1–3.3)
LYMPHOCYTES # BLD AUTO: 9.6 % — LOW (ref 13–44)
LYMPHOCYTES # BLD AUTO: 9.6 % — LOW (ref 13–44)
MACROCYTES BLD QL: SLIGHT — SIGNIFICANT CHANGE UP
MACROCYTES BLD QL: SLIGHT — SIGNIFICANT CHANGE UP
MAGNESIUM SERPL-MCNC: 2.2 MG/DL — SIGNIFICANT CHANGE UP (ref 1.6–2.6)
MAGNESIUM SERPL-MCNC: 2.2 MG/DL — SIGNIFICANT CHANGE UP (ref 1.6–2.6)
MANUAL SMEAR VERIFICATION: SIGNIFICANT CHANGE UP
MANUAL SMEAR VERIFICATION: SIGNIFICANT CHANGE UP
MCHC RBC-ENTMCNC: 30.6 PG — SIGNIFICANT CHANGE UP (ref 27–34)
MCHC RBC-ENTMCNC: 30.6 PG — SIGNIFICANT CHANGE UP (ref 27–34)
MCHC RBC-ENTMCNC: 30.8 GM/DL — LOW (ref 32–36)
MCHC RBC-ENTMCNC: 30.8 GM/DL — LOW (ref 32–36)
MCV RBC AUTO: 99.3 FL — SIGNIFICANT CHANGE UP (ref 80–100)
MCV RBC AUTO: 99.3 FL — SIGNIFICANT CHANGE UP (ref 80–100)
METAMYELOCYTES # FLD: 0.9 % — HIGH (ref 0–0)
METAMYELOCYTES # FLD: 0.9 % — HIGH (ref 0–0)
MONOCYTES # BLD AUTO: 1.16 K/UL — HIGH (ref 0–0.9)
MONOCYTES # BLD AUTO: 1.16 K/UL — HIGH (ref 0–0.9)
MONOCYTES NFR BLD AUTO: 8.8 % — SIGNIFICANT CHANGE UP (ref 2–14)
MONOCYTES NFR BLD AUTO: 8.8 % — SIGNIFICANT CHANGE UP (ref 2–14)
NEUTROPHILS # BLD AUTO: 10.61 K/UL — HIGH (ref 1.8–7.4)
NEUTROPHILS # BLD AUTO: 10.61 K/UL — HIGH (ref 1.8–7.4)
NEUTROPHILS NFR BLD AUTO: 80.7 % — HIGH (ref 43–77)
NEUTROPHILS NFR BLD AUTO: 80.7 % — HIGH (ref 43–77)
OVALOCYTES BLD QL SMEAR: SLIGHT — SIGNIFICANT CHANGE UP
OVALOCYTES BLD QL SMEAR: SLIGHT — SIGNIFICANT CHANGE UP
PHOSPHATE SERPL-MCNC: 3.2 MG/DL — SIGNIFICANT CHANGE UP (ref 2.5–4.5)
PHOSPHATE SERPL-MCNC: 3.2 MG/DL — SIGNIFICANT CHANGE UP (ref 2.5–4.5)
PLAT MORPH BLD: ABNORMAL
PLAT MORPH BLD: ABNORMAL
PLATELET # BLD AUTO: 291 K/UL — SIGNIFICANT CHANGE UP (ref 150–400)
PLATELET # BLD AUTO: 291 K/UL — SIGNIFICANT CHANGE UP (ref 150–400)
POIKILOCYTOSIS BLD QL AUTO: SIGNIFICANT CHANGE UP
POIKILOCYTOSIS BLD QL AUTO: SIGNIFICANT CHANGE UP
POLYCHROMASIA BLD QL SMEAR: SLIGHT — SIGNIFICANT CHANGE UP
POLYCHROMASIA BLD QL SMEAR: SLIGHT — SIGNIFICANT CHANGE UP
POTASSIUM SERPL-MCNC: 4.4 MMOL/L — SIGNIFICANT CHANGE UP (ref 3.5–5.3)
POTASSIUM SERPL-MCNC: 4.4 MMOL/L — SIGNIFICANT CHANGE UP (ref 3.5–5.3)
POTASSIUM SERPL-SCNC: 4.4 MMOL/L — SIGNIFICANT CHANGE UP (ref 3.5–5.3)
POTASSIUM SERPL-SCNC: 4.4 MMOL/L — SIGNIFICANT CHANGE UP (ref 3.5–5.3)
PROT SERPL-MCNC: 6.3 G/DL — SIGNIFICANT CHANGE UP (ref 6–8.3)
PROT SERPL-MCNC: 6.3 G/DL — SIGNIFICANT CHANGE UP (ref 6–8.3)
RBC # BLD: 2.91 M/UL — LOW (ref 4.2–5.8)
RBC # BLD: 2.91 M/UL — LOW (ref 4.2–5.8)
RBC # FLD: 14 % — SIGNIFICANT CHANGE UP (ref 10.3–14.5)
RBC # FLD: 14 % — SIGNIFICANT CHANGE UP (ref 10.3–14.5)
RBC BLD AUTO: ABNORMAL
RBC BLD AUTO: ABNORMAL
SCHISTOCYTES BLD QL AUTO: SLIGHT — SIGNIFICANT CHANGE UP
SCHISTOCYTES BLD QL AUTO: SLIGHT — SIGNIFICANT CHANGE UP
SODIUM SERPL-SCNC: 134 MMOL/L — LOW (ref 135–145)
SODIUM SERPL-SCNC: 134 MMOL/L — LOW (ref 135–145)
WBC # BLD: 13.15 K/UL — HIGH (ref 3.8–10.5)
WBC # BLD: 13.15 K/UL — HIGH (ref 3.8–10.5)
WBC # FLD AUTO: 13.15 K/UL — HIGH (ref 3.8–10.5)
WBC # FLD AUTO: 13.15 K/UL — HIGH (ref 3.8–10.5)

## 2023-10-28 PROCEDURE — 99232 SBSQ HOSP IP/OBS MODERATE 35: CPT

## 2023-10-28 PROCEDURE — 99233 SBSQ HOSP IP/OBS HIGH 50: CPT | Mod: GC

## 2023-10-28 RX ORDER — SENNA PLUS 8.6 MG/1
2 TABLET ORAL AT BEDTIME
Refills: 0 | Status: DISCONTINUED | OUTPATIENT
Start: 2023-10-28 | End: 2023-10-31

## 2023-10-28 RX ORDER — OXYCODONE HYDROCHLORIDE 5 MG/1
10 TABLET ORAL ONCE
Refills: 0 | Status: DISCONTINUED | OUTPATIENT
Start: 2023-10-28 | End: 2023-10-28

## 2023-10-28 RX ADMIN — OXYCODONE HYDROCHLORIDE 10 MILLIGRAM(S): 5 TABLET ORAL at 17:41

## 2023-10-28 RX ADMIN — Medication 1: at 09:33

## 2023-10-28 RX ADMIN — Medication 4 UNIT(S): at 09:31

## 2023-10-28 RX ADMIN — OXYCODONE HYDROCHLORIDE 10 MILLIGRAM(S): 5 TABLET ORAL at 07:14

## 2023-10-28 RX ADMIN — CARVEDILOL PHOSPHATE 12.5 MILLIGRAM(S): 80 CAPSULE, EXTENDED RELEASE ORAL at 18:16

## 2023-10-28 RX ADMIN — OXYCODONE HYDROCHLORIDE 10 MILLIGRAM(S): 5 TABLET ORAL at 23:53

## 2023-10-28 RX ADMIN — Medication 81 MILLIGRAM(S): at 13:22

## 2023-10-28 RX ADMIN — Medication 650 MILLIGRAM(S): at 15:19

## 2023-10-28 RX ADMIN — OXYCODONE HYDROCHLORIDE 10 MILLIGRAM(S): 5 TABLET ORAL at 12:20

## 2023-10-28 RX ADMIN — Medication 650 MILLIGRAM(S): at 09:39

## 2023-10-28 RX ADMIN — Medication 650 MILLIGRAM(S): at 22:47

## 2023-10-28 RX ADMIN — Medication 650 MILLIGRAM(S): at 16:19

## 2023-10-28 RX ADMIN — SPIRONOLACTONE 25 MILLIGRAM(S): 25 TABLET, FILM COATED ORAL at 06:43

## 2023-10-28 RX ADMIN — GABAPENTIN 1600 MILLIGRAM(S): 400 CAPSULE ORAL at 13:23

## 2023-10-28 RX ADMIN — CLOPIDOGREL BISULFATE 75 MILLIGRAM(S): 75 TABLET, FILM COATED ORAL at 13:22

## 2023-10-28 RX ADMIN — DAPTOMYCIN 120 MILLIGRAM(S): 500 INJECTION, POWDER, LYOPHILIZED, FOR SOLUTION INTRAVENOUS at 18:16

## 2023-10-28 RX ADMIN — ATORVASTATIN CALCIUM 80 MILLIGRAM(S): 80 TABLET, FILM COATED ORAL at 21:47

## 2023-10-28 RX ADMIN — GABAPENTIN 1600 MILLIGRAM(S): 400 CAPSULE ORAL at 06:42

## 2023-10-28 RX ADMIN — Medication 2: at 18:21

## 2023-10-28 RX ADMIN — Medication 4 UNIT(S): at 18:20

## 2023-10-28 RX ADMIN — INSULIN GLARGINE 3 UNIT(S): 100 INJECTION, SOLUTION SUBCUTANEOUS at 22:03

## 2023-10-28 RX ADMIN — LISINOPRIL 40 MILLIGRAM(S): 2.5 TABLET ORAL at 13:23

## 2023-10-28 RX ADMIN — PIPERACILLIN AND TAZOBACTAM 25 GRAM(S): 4; .5 INJECTION, POWDER, LYOPHILIZED, FOR SOLUTION INTRAVENOUS at 06:42

## 2023-10-28 RX ADMIN — OXYCODONE HYDROCHLORIDE 10 MILLIGRAM(S): 5 TABLET ORAL at 11:20

## 2023-10-28 RX ADMIN — OXYCODONE HYDROCHLORIDE 10 MILLIGRAM(S): 5 TABLET ORAL at 06:43

## 2023-10-28 RX ADMIN — PIPERACILLIN AND TAZOBACTAM 25 GRAM(S): 4; .5 INJECTION, POWDER, LYOPHILIZED, FOR SOLUTION INTRAVENOUS at 21:48

## 2023-10-28 RX ADMIN — SENNA PLUS 2 TABLET(S): 8.6 TABLET ORAL at 21:47

## 2023-10-28 RX ADMIN — Medication 650 MILLIGRAM(S): at 21:47

## 2023-10-28 RX ADMIN — GABAPENTIN 1600 MILLIGRAM(S): 400 CAPSULE ORAL at 21:47

## 2023-10-28 RX ADMIN — PIPERACILLIN AND TAZOBACTAM 25 GRAM(S): 4; .5 INJECTION, POWDER, LYOPHILIZED, FOR SOLUTION INTRAVENOUS at 13:23

## 2023-10-28 RX ADMIN — OXYCODONE HYDROCHLORIDE 10 MILLIGRAM(S): 5 TABLET ORAL at 18:41

## 2023-10-28 RX ADMIN — Medication 650 MILLIGRAM(S): at 10:39

## 2023-10-28 RX ADMIN — CARVEDILOL PHOSPHATE 12.5 MILLIGRAM(S): 80 CAPSULE, EXTENDED RELEASE ORAL at 06:43

## 2023-10-28 NOTE — PROGRESS NOTE ADULT - PROBLEM SELECTOR PLAN 1
Patient presenting meeting 2/4 SIRS (T101, Leukocytosis), source likely Gangrenous toe vs Osteomyelitis of R. foot wound. No purulence or erythema on examination however extremely tender to palpation and at rest. XR w/o evidence of osteo however lower sensivity. s/p 1 dose vanc and zosyn in ED. Prior was on Keflex and doxycycline x 10 days (finished 10/10), completed course. Last BCx w/o growth.   s/p 2L NS in ED  BCx: no growth at 4 day  MRI: Soft tissue atrophy/wound of the 4th toe with underlying marrow changes of the 4th proximal phalanx head, middle phalanx, and distal phalanx that may be reactive in the absence of significant T1 marrow replacement, however, early infection may also be considered.  Tissue culture: e.coli, pluralibacter    Plan:  - f/u BCx  - zosyn 3.375 mg q8h and daptomycin 500 q24h  - f/u CK level  - Tylenol q6 prn for fever.   - f/u podiatry recs as below. Patient presenting meeting 2/4 SIRS (T101, Leukocytosis), source likely Gangrenous toe vs Osteomyelitis of R. foot wound. No purulence or erythema on examination however extremely tender to palpation and at rest. XR w/o evidence of osteo however lower sensivity. s/p 1 dose vanc and zosyn in ED. Prior was on Keflex and doxycycline x 10 days (finished 10/10), completed course. Last BCx w/o growth.   s/p 2L NS in ED  BCx: no growth at 4 day  MRI: Soft tissue atrophy/wound of the 4th toe with underlying marrow changes of the 4th proximal phalanx head, middle phalanx, and distal phalanx that may be reactive in the absence of significant T1 marrow replacement, however, early infection may also be considered.  Tissue culture: e.coli, pluralibacter, stap epi, and campylobacter    Plan:  - f/u BCx  - zosyn 3.375 mg q8h and daptomycin 500 q24h  - f/u CK level  - Tylenol q6 prn for fever.   - f/u podiatry recs as below.

## 2023-10-28 NOTE — PROGRESS NOTE ADULT - ASSESSMENT
68M PMH HTN, IDDM with peripheral neuropathy, CAD s/p PCI with 2 stents (3 yrs ago at [facility]) who presents to [facility] with right toe pain. Podiatry consulted to evaluate R toe wound. Pt with prior admission on 10/1-10/3 with early ischemic signs of R 4th digit, and was discharged with PO abx (keflex,doxy). States of completing medication but has not followed up out pt with any provider. In the ED, pt febrile with elevated WBC to 19.71. At time of consult on the floors, pt is now afebrile with VSS. 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 Pt is s/p R foot partial 4th ray resection (DOS 10/24). Patient tolerated procedure well.   10/27: Pt now amenable to vascular surgery intervention. With poor plantar skin quality, and elevated white count, a proximal amputation may be needed for adequate source control.  Will discuss intervention with patient upon findings from angiogram to be done today with Vascular surgery. Pt currently indicated that he would not be amenable to ROR for any additional amputation or washout. Stated that he has not symptoms and will only agree with vascular intervention in attempts to save the foot.   Pt underwent angiogram on 10/27 which showed severe disease of TP trunk, AT, and peroneal; AT and peroneal lithotripsy and balloon angioplasty. Completion angiogram showed better filling of AT and peronea  10/28: White count downtrending to 13 from 19, VSS. Clinically, improvement from darkening skin changes to dorsal foot. Plantar skin quality remains poor.     Plan.   -c/w IV abx  -f/u intra-operative cultures & path  - monitor for demarcation   -WB status: WBAT to the R heel  -R lower extremity to remain elevated while in bed  -Surgical site cleansed with NS. Applied 1/4 inch plain packing. Betadine soaked gauze applied w/ kerlix  -Rest of care up to primary team    Podiatry following, Plan d/w attending 68M PMH HTN, IDDM with peripheral neuropathy, CAD s/p PCI with 2 stents (3 yrs ago at [facility]) who presents to [facility] with right toe pain. Podiatry consulted to evaluate R toe wound. Pt with prior admission on 10/1-10/3 with early ischemic signs of R 4th digit, and was discharged with PO abx (keflex,doxy). States of completing medication but has not followed up out pt with any provider. In the ED, pt febrile with elevated WBC to 19.71. At time of consult on the floors, pt is now afebrile with VSS. 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 Pt is s/p R foot partial 4th ray resection (DOS 10/24). Patient tolerated procedure well.   10/27: Pt now amenable to vascular surgery intervention. With poor plantar skin quality, and elevated white count, a proximal amputation may be needed for adequate source control.  Will discuss intervention with patient upon findings from angiogram to be done today with Vascular surgery. Pt currently indicated that he would not be amenable to ROR for any additional amputation or washout. Stated that he has not symptoms and will only agree with vascular intervention in attempts to save the foot.   Pt underwent angiogram on 10/27 which showed severe disease of TP trunk, AT, and peroneal; AT and peroneal lithotripsy and balloon angioplasty. Completion angiogram showed better filling of AT and peroneal  10/28: White count downtrending to 13 from 19, VSS. Clinically, improvement from darkening skin changes to dorsal foot. Plantar skin quality remains poor.     Plan.   -c/w IV abx  -f/u intra-operative cultures & path  - monitor for demarcation   -WB status: WBAT to the R heel  -R lower extremity to remain elevated while in bed  -Surgical site cleansed with NS. Applied 1/4 inch plain packing. Betadine soaked gauze applied w/ kerlix  -Rest of care up to primary team    Podiatry following, Plan d/w attending 68M PMH HTN, IDDM with peripheral neuropathy, CAD s/p PCI with 2 stents (3 yrs ago at [facility]) who presents to [facility] with right toe pain. Podiatry consulted to evaluate R toe wound. Pt with prior admission on 10/1-10/3 with early ischemic signs of R 4th digit, and was discharged with PO abx (keflex,doxy). States of completing medication but has not followed up out pt with any provider. In the ED, pt febrile with elevated WBC to 19.71. At time of consult on the floors, pt is now afebrile with VSS. 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 Pt is s/p R foot partial 4th ray resection (DOS 10/24). Patient tolerated procedure well.   10/27: Pt now amenable to vascular surgery intervention. With poor plantar skin quality, and elevated white count, a proximal amputation may be needed for adequate source control.  Will discuss intervention with patient upon findings from angiogram to be done today with Vascular surgery. Pt currently indicated that he would not be amenable to ROR for any additional amputation or washout. Stated that he has not symptoms and will only agree with vascular intervention in attempts to save the foot.   Pt underwent angiogram on 10/27 which showed severe disease of TP trunk, AT, and peroneal; AT and peroneal lithotripsy and balloon angioplasty. Completion angiogram showed better filling of AT and peroneal  10/28: White count downtrending to 13 from 19, VSS. Clinically, improvement from darkening skin changes to dorsal foot. Plantar skin quality remains poor.     Plan.   -c/w IV abx  -f/u intra-operative cultures & path  -WB status: WBAT to the R heel  -R lower extremity to remain elevated while in bed  -Surgical site cleansed with NS. Applied 1/4 inch plain packing. Betadine soaked gauze applied w/ kerlix  -Rest of care up to primary team    Podiatry following, Plan d/w attending

## 2023-10-28 NOTE — PROGRESS NOTE ADULT - PROBLEM SELECTOR PLAN 2
Patient presenting with worsening right 4th toe pain and discoloratio s/p course of IV and oral antibtiocs: Keflex and doxycycline until 10/10 endorses compliance. On presentation, he is hemodynamically stable however with leukoctyosis to 19 and fever to 101.   Right toe xrays done in ED showing no evidence of fracture or osteomyelitis   S/p 1 dose vancomycin in ED and zosyn in ED.   Patient went to surgery for toe amputation 10/24    Plan:  - Podiatry consulted, f/u recs   - vascular consulted: rec angio 10/27  - Local wound care: Cleansed with NS. Betadine soaked gauze applied to R 4th digit, 3rd and 4th interspaces Advised pt to keep dressing intact dry and clean  - Pain management: Tylenol 650 q6h standing, Oxy 10 q6h PRN, morphine 2mg q6h PRN for severe  - 1x 0.5 dilaudid for severe breakthrough pain. Patient presenting with worsening right 4th toe pain and discoloratio s/p course of IV and oral antibtiocs: Keflex and doxycycline until 10/10 endorses compliance. On presentation, he is hemodynamically stable however with leukoctyosis to 19 and fever to 101.   Right toe xrays done in ED showing no evidence of fracture or osteomyelitis   S/p 1 dose vancomycin in ED and zosyn in ED.   Patient went to surgery for toe amputation 10/24    Plan:  - Podiatry consulted, f/u recs   - vascular consulted: rec angio 10/27  - Local wound care: Applied 1/4 inch plain packing. Betadine soaked gauze applied w/ kerlix  - Pain management: Tylenol 650 q6h standing, Oxy 10 q6h PRN, morphine 2mg q6h PRN for severe  - 1x 0.5 dilaudid for severe breakthrough pain.

## 2023-10-28 NOTE — PROGRESS NOTE ADULT - ASSESSMENT
68M w/  DM (A1c 12.6%), HTN and CAD w/ PCI x2, and PAD w/ remote RLE angioplasty, presented 10/21 with R fourth toe pain (after recent short course of abx for R toe SSTI), MRI with possible 4th digit early OM up to prox phalanx s/p partial R 4th ray amputation on 10/24, proximal cx with E.coli, Pluralibacter gergoviae, MRSE, and Corynebacterium amycolatum (requested sensis). S/p RLE angiogram with AT lithotripsy and AT/peroneal balloon angioplasty 10/27.  He is HCV RNA positive log 6.17.  He knew of diagnosis previously and had plan to treat with his PCP prior to foot issues.  He plans to return for care after discharge.  Suggest:  -Continue zosyn 3.375g IV q8h EI.          -Will likely switch this to ceftriaxone on discharge pending cultures.   -Continue daptomycin 500mg IV q24h.  -Plan will be for 6 weeks IV abx, please place PICC  I will cover through 10/29, Dr Jiménez will resume care 10/30.

## 2023-10-28 NOTE — PROGRESS NOTE ADULT - SUBJECTIVE AND OBJECTIVE BOX
INTERVAL HPI/OVERNIGHT EVENTS:  Coverage for Dr. Jiménez.  Afebrile.  R foot pain 4/10.  He is concerned about bilateral LE edema    CONSTITUTIONAL:  No fever, chills, night sweats  EYES:  No photophobia or visual changes  CARDIOVASCULAR:  No chest pain  RESPIRATORY:  No cough, wheezing, or SOB   GASTROINTESTINAL:  No nausea, vomiting, diarrhea, constipation, or abdominal pain  GENITOURINARY:  No frequency, urgency, dysuria or hematuria  NEUROLOGIC:  No headache, lightheadedness      ANTIBIOTICS/RELEVANT:          Vital Signs Last 24 Hrs  T(C): 37 (28 Oct 2023 16:17), Max: 37.1 (27 Oct 2023 20:55)  T(F): 98.6 (28 Oct 2023 16:17), Max: 98.8 (27 Oct 2023 20:55)  HR: 74 (28 Oct 2023 18:16) (64 - 74)  BP: 179/90 (28 Oct 2023 18:16) (121/71 - 179/90)  BP(mean): --  RR: 18 (28 Oct 2023 16:17) (18 - 18)  SpO2: 98% (28 Oct 2023 16:17) (97% - 99%)    Parameters below as of 28 Oct 2023 16:17  Patient On (Oxygen Delivery Method): room air        PHYSICAL EXAM:  Constitutional:  Alert  Eyes:  Sclerae anicteric, conjunctivae clear, PERRL  Ear/Nose/Throat:  No nasal exudate or sinus tenderness;  No buccal mucosal lesions, no pharyngeal erythema or exudate	  Neck:  Supple, no adenopathy  Respiratory:  Clear bilaterally  Cardiovascular:  RRR, S1S2, no murmur appreciated  Gastrointestinal:  Symmetric, normoactive BS, soft, NT, no masses, guarding or rebound.  No HSM  Extremities:  2+ pitting pretibial edema bilaterally.  R foot wrapped by Podiatry, warm proximal to dressing      LABS:                        8.9    13.15 )-----------( 291      ( 28 Oct 2023 07:42 )             28.9         10-28    134<L>  |  101  |  22  ----------------------------<  206<H>  4.4   |  24  |  1.26    Ca    8.4      28 Oct 2023 07:42  Phos  3.2     10-28  Mg     2.2     10-28    Creatine Kinase, Serum: 84 U/L (10.28.23 @ 07:42)      TPro  6.3  /  Alb  1.9<L>  /  TBili  0.2  /  DBili  x   /  AST  22  /  ALT  18  /  AlkPhos  229<H>  10-28      Urinalysis Basic - ( 28 Oct 2023 07:42 )    Color: x / Appearance: x / SG: x / pH: x  Gluc: 206 mg/dL / Ketone: x  / Bili: x / Urobili: x   Blood: x / Protein: x / Nitrite: x   Leuk Esterase: x / RBC: x / WBC x   Sq Epi: x / Non Sq Epi: x / Bacteria: x        MICROBIOLOGY:        RADIOLOGY & ADDITIONAL STUDIES:

## 2023-10-28 NOTE — PROGRESS NOTE ADULT - ATTENDING COMMENTS
Patient was seen and examined at bedside on 10/28/2023 at 1030 am. Patient reports that his b/l LE are very swollen. Denies SOB, CP. ROS is otherwise negative. Vitals, labwork and pertinent imaging reviewed. Exam - NAD, AAO x 4, PERRLA, EOMI, MMM, supple neck, chest - CTA b/l, CV - rrr, s1s2, no m/r/g, abd - soft, NTND, + BS, ext - wwp, + 2 pitting edema b/l (increased), R foot bandaged, s/p amputation of 4th toe, psych - normal affect    Plan:  -C/w Daptomycin and Zosyn, leukocytosis persists, bone culture + for bacteria, ID following  -Pain control, c/w Oxycodone 10 mg PO q6 PRN with IV Morphine PRN for severe pain  -Endocrine consult given recent HbA1C ~ 13, c/w Lantus 6 units qHS and 5 units premeal  -Vascular consulted - s/p angiogram on 10/27 w/ balloon angioplasty  -Echo done showing hypokinesis and dysfunction, Cardiology consulted for HF and preoperative optimization  -PT/OT rec no skilled PT needs  -BP is better controlled - c/w Lisinopril, will change Norvasc 10 mg PO qd to Coreg and Spironolactone given evidence of HF  -C/w ASA, Plavix s/p angio  -Place PICC line, awaiting final cultures\  -Check b/l LE dopplers, unclear etiology of leg swelling

## 2023-10-28 NOTE — PROGRESS NOTE ADULT - SUBJECTIVE AND OBJECTIVE BOX
O/N Events: no acute events overnight    Subjective/ROS: Patient seen and examined at bedside. Patient initially deferred examination until after breakfast. Later complained of leg swelling that was increased since yesterday. Patient complained of constipation and was given night time senna.     Denies Fever/Chills, HA, CP, SOB, n/v, changes in urinary habits.  12pt ROS otherwise negative.    VITALS  Vital Signs Last 24 Hrs  T(C): 37.1 (28 Oct 2023 21:16), Max: 37.1 (28 Oct 2023 06:19)  T(F): 98.7 (28 Oct 2023 21:16), Max: 98.7 (28 Oct 2023 06:19)  HR: 64 (28 Oct 2023 21:16) (64 - 74)  BP: 158/88 (28 Oct 2023 21:16) (148/71 - 179/90)  BP(mean): --  RR: 17 (28 Oct 2023 21:16) (17 - 18)  SpO2: 100% (28 Oct 2023 21:16) (98% - 100%)    Parameters below as of 28 Oct 2023 21:16  Patient On (Oxygen Delivery Method): room air        CAPILLARY BLOOD GLUCOSE      POCT Blood Glucose.: 127 mg/dL (28 Oct 2023 21:58)  POCT Blood Glucose.: 232 mg/dL (28 Oct 2023 17:31)  POCT Blood Glucose.: 170 mg/dL (28 Oct 2023 15:18)  POCT Blood Glucose.: 75 mg/dL (28 Oct 2023 13:32)  POCT Blood Glucose.: 200 mg/dL (28 Oct 2023 09:22)      PHYSICAL EXAM  General: NAD  Head: NC/AT; MMM; EOMI;  Neck: Supple; no JVD  Respiratory: CTAB; no wheezes  Cardiovascular: Regular rhythm/rate; S1/S2+, murmur +  Gastrointestinal: Soft; NTND; bowel sounds normal and present  Extremities: WWP; +1 pitting edema to the knee  Neurological: A&Ox3, CNII-XII grossly intact; no obvious focal deficits    Antimicrobials:  MEDICATIONS  (STANDING):  DAPTOmycin IVPB 500 milliGRAM(s) IV Intermittent every 24 hours  piperacillin/tazobactam IVPB.. 3.375 Gram(s) IV Intermittent every 8 hours      Standing Medications:  MEDICATIONS  (STANDING):  acetaminophen     Tablet .. 650 milliGRAM(s) Oral every 6 hours  aspirin enteric coated 81 milliGRAM(s) Oral daily  atorvastatin 80 milliGRAM(s) Oral at bedtime  carvedilol 12.5 milliGRAM(s) Oral every 12 hours  clopidogrel Tablet 75 milliGRAM(s) Oral daily  DAPTOmycin IVPB 500 milliGRAM(s) IV Intermittent every 24 hours  dextrose 5%. 1000 milliLiter(s) (100 mL/Hr) IV Continuous <Continuous>  dextrose 5%. 1000 milliLiter(s) (50 mL/Hr) IV Continuous <Continuous>  dextrose 50% Injectable 12.5 Gram(s) IV Push once  dextrose 50% Injectable 25 Gram(s) IV Push once  dextrose 50% Injectable 25 Gram(s) IV Push once  gabapentin 1600 milliGRAM(s) Oral three times a day  glucagon  Injectable 1 milliGRAM(s) IntraMuscular once  insulin glargine Injectable (LANTUS) 3 Unit(s) SubCutaneous at bedtime  insulin lispro (ADMELOG) corrective regimen sliding scale   SubCutaneous at bedtime  insulin lispro (ADMELOG) corrective regimen sliding scale   SubCutaneous three times a day before meals  insulin lispro Injectable (ADMELOG) 4 Unit(s) SubCutaneous three times a day before meals  lisinopril 40 milliGRAM(s) Oral every 24 hours  piperacillin/tazobactam IVPB.. 3.375 Gram(s) IV Intermittent every 8 hours  senna 2 Tablet(s) Oral at bedtime  spironolactone 25 milliGRAM(s) Oral daily      PRN Medications:  MEDICATIONS  (PRN):  aluminum hydroxide/magnesium hydroxide/simethicone Suspension 30 milliLiter(s) Oral every 6 hours PRN Dyspepsia  dextrose Oral Gel 15 Gram(s) Oral once PRN Blood Glucose LESS THAN 70 milliGRAM(s)/deciliter  oxyCODONE    IR 10 milliGRAM(s) Oral every 6 hours PRN Moderate Pain (4 - 6)      No Known Allergies      LABS                        8.9    13.15 )-----------( 291      ( 28 Oct 2023 07:42 )             28.9     10-28    134<L>  |  101  |  22  ----------------------------<  206<H>  4.4   |  24  |  1.26    Ca    8.4      28 Oct 2023 07:42  Phos  3.2     10-28  Mg     2.2     10-28    TPro  6.3  /  Alb  1.9<L>  /  TBili  0.2  /  DBili  x   /  AST  22  /  ALT  18  /  AlkPhos  229<H>  10-28    PT/INR - ( 27 Oct 2023 07:36 )   PT: 11.3 sec;   INR: 0.99          PTT - ( 27 Oct 2023 07:36 )  PTT:30.5 sec  Urinalysis Basic - ( 28 Oct 2023 07:42 )    Color: x / Appearance: x / SG: x / pH: x  Gluc: 206 mg/dL / Ketone: x  / Bili: x / Urobili: x   Blood: x / Protein: x / Nitrite: x   Leuk Esterase: x / RBC: x / WBC x   Sq Epi: x / Non Sq Epi: x / Bacteria: x      CARDIAC MARKERS ( 28 Oct 2023 07:42 )  x     / x     / 84 U/L / x     / x      CARDIAC MARKERS ( 27 Oct 2023 07:36 )  x     / x     / 111 U/L / x     / x              IMAGING/EKG/ETC

## 2023-10-28 NOTE — PROGRESS NOTE ADULT - SUBJECTIVE AND OBJECTIVE BOX
Patient is a 68y old  Male who presents with a chief complaint of Foot pain (28 Oct 2023 07:53)      INTERVAL HPI/ OVERNIGHT EVENTS. Underwent angiogram with vascular surgery 10/27. Pt seen and examined bedside. Complaining of increased drainage to foot. Denies pain to foot at this time.       LABS                        8.9    13.15 )-----------( 291      ( 28 Oct 2023 07:42 )             28.9     10-28    134<L>  |  101  |  22  ----------------------------<  206<H>  4.4   |  24  |  1.26    Ca    8.4      28 Oct 2023 07:42  Phos  3.2     10-28  Mg     2.2     10-28    TPro  6.3  /  Alb  1.9<L>  /  TBili  0.2  /  DBili  x   /  AST  22  /  ALT  18  /  AlkPhos  229<H>  10-28    PT/INR - ( 27 Oct 2023 07:36 )   PT: 11.3 sec;   INR: 0.99          PTT - ( 27 Oct 2023 07:36 )  PTT:30.5 sec    ICU Vital Signs Last 24 Hrs  T(C): 36.8 (28 Oct 2023 09:14), Max: 37.1 (27 Oct 2023 20:55)  T(F): 98.2 (28 Oct 2023 09:14), Max: 98.8 (27 Oct 2023 20:55)  HR: 64 (28 Oct 2023 09:14) (60 - 70)  BP: 157/88 (28 Oct 2023 09:14) (121/71 - 164/93)  BP(mean): --  ABP: --  ABP(mean): --  RR: 18 (28 Oct 2023 09:14) (17 - 18)  SpO2: 98% (28 Oct 2023 09:14) (97% - 99%)    O2 Parameters below as of 28 Oct 2023 09:14  Patient On (Oxygen Delivery Method): room air        RADIOLOGY  < from: Xray Foot AP + Lateral, Right (10.24.23 @ 19:48) >  There has been interval amputation of the fourth ray at the level of the   fourth metatarsal neck. There are associated postsurgical changes in the   stump soft tissues. No other significant interval change.  IMPRESSION:  Postsurgical changes of the right foot.  < end of copied text >    MICROBIOLOGY  Culture - Tissue with Gram Stain (10.24.23 @ 15:30)    Gram Stain:   No organisms seen  No White blood cells   -  Ampicillin: S <=8 These ampicillin results predict results for amoxicillin   -  Ampicillin: S <=8 These ampicillin results predict results for amoxicillin   -  Ampicillin/Sulbactam: S <=4/2   -  Ampicillin/Sulbactam: S <=4/2   -  Cefazolin: S <=2   -  Cefazolin: R 16   -  Ceftriaxone: S <=1   -  Ceftriaxone: S <=1   -  Ciprofloxacin: S <=0.25   -  Ciprofloxacin: S <=0.25   -  Clindamycin: S 0.5   -  Ertapenem: S <=0.5   -  Ertapenem: S <=0.5   -  Erythromycin: R >4   -  Gentamicin: S <=2   -  Gentamicin: S <=2   -  Linezolid: S 1   -  Oxacillin: R >2   -  Piperacillin/Tazobactam: S <=8   -  Piperacillin/Tazobactam: S <=8   -  Rifampin: S <=1 Should not be used as monotherapy   -  Tobramycin: S <=2   -  Tobramycin: S <=2   -  Trimethoprim/Sulfamethoxazole: S <=0.5/9.5   -  Trimethoprim/Sulfamethoxazole: S <=0.5/9.5   -  Trimethoprim/Sulfamethoxazole: R >2/38   -  Vancomycin: S 2   Specimen Source: .Tissue right 4th metatarsal clean margin  or spec   Culture Results:   Few Escherichia coli  Few Pluralibacter gergoviae  Rare Staphylococcus epidermidis  Rare Corynebacterium amycolatum  Culture in progress   Organism Identification: Escherichia coli  Pluralibacter gergoviae  Staphylococcus epidermidis   Organism: Staphylococcus epidermidis   Organism: Escherichia coli   Organism: Pluralibacter gergoviae   Method Type: ARCHANA   Method Type: ARCHANA   Method Type: ARCHANA      PHYSICAL EXAM  Lower Extremity Focused  Vasc: DP/PT 2/4 b/l, erythema and edema to the R forefoot and midfoot,  Derm:  R foot surgical site wound at the distal 4th ray. Serosanguinous drainage. Fibrotic 60% granular 40% wound bed. Plantar skin just proximal to the surgical site with early signs of ischemia, with overlying serous fluid filled blister. Macerated 2nd interspace R  Neuro: protective sensation slightly diminished  MSK: 5/5 muscle strength in all compartments

## 2023-10-28 NOTE — PROGRESS NOTE ADULT - ASSESSMENT
67M PMH HTN, IDDM with peripheral neuropathy, CAD s/p PCI with 2 stents (3 yrs ago at [facility]), recent admission 10/3 for diabetic ssti (CT r/o absecess, no OM) discharged on doxycycline and keflex, presenting with CC of r. foot pain, found to meet SIRS crtieria with suspected source infectious of r.toe, a/f management.

## 2023-10-28 NOTE — PROGRESS NOTE ADULT - SUBJECTIVE AND OBJECTIVE BOX
DAILY PROGRESS NOTE    S: Patient states he feels well. No groin pain. Leg/foot pain stable. Swelling of his bilateral legs has increased over the last couple of days and now his sweatpants are tight.     O:     T(C): 37 (10-28-23 @ 16:17), Max: 37.1 (10-27-23 @ 20:55)  HR: 68 (10-28-23 @ 16:17) (64 - 70)  BP: 148/71 (10-28-23 @ 16:17) (121/71 - 173/88)  RR: 18 (10-28-23 @ 16:17) (18 - 18)  SpO2: 98% (10-28-23 @ 16:17) (97% - 99%)    Physical Exam:     General: Awake, alert.   CV: HDS, WWP  Pulm: On room air, no distress  Abd: Non-distended, left groin soft w/o mass.   Extremity: B/L lower extremities w/ 3+ pitting edema to thighs. Wound of left foot covered in gauze.   Vascular: Triphasic DP and biphasic PT on left.     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   - Started Plavix, please make sure patient has script for Plavix on discharge  - Continue care per primary team  - Patient should follow up with Dr. Oneal on discharge.

## 2023-10-29 DIAGNOSIS — R60.0 LOCALIZED EDEMA: ICD-10-CM

## 2023-10-29 LAB
ALBUMIN SERPL ELPH-MCNC: 1.8 G/DL — LOW (ref 3.3–5)
ALBUMIN SERPL ELPH-MCNC: 1.8 G/DL — LOW (ref 3.3–5)
ALP SERPL-CCNC: 204 U/L — HIGH (ref 40–120)
ALP SERPL-CCNC: 204 U/L — HIGH (ref 40–120)
ALT FLD-CCNC: 17 U/L — SIGNIFICANT CHANGE UP (ref 10–45)
ALT FLD-CCNC: 17 U/L — SIGNIFICANT CHANGE UP (ref 10–45)
ANION GAP SERPL CALC-SCNC: 8 MMOL/L — SIGNIFICANT CHANGE UP (ref 5–17)
ANION GAP SERPL CALC-SCNC: 8 MMOL/L — SIGNIFICANT CHANGE UP (ref 5–17)
AST SERPL-CCNC: 20 U/L — SIGNIFICANT CHANGE UP (ref 10–40)
AST SERPL-CCNC: 20 U/L — SIGNIFICANT CHANGE UP (ref 10–40)
BASOPHILS # BLD AUTO: 0.04 K/UL — SIGNIFICANT CHANGE UP (ref 0–0.2)
BASOPHILS # BLD AUTO: 0.04 K/UL — SIGNIFICANT CHANGE UP (ref 0–0.2)
BASOPHILS NFR BLD AUTO: 0.3 % — SIGNIFICANT CHANGE UP (ref 0–2)
BASOPHILS NFR BLD AUTO: 0.3 % — SIGNIFICANT CHANGE UP (ref 0–2)
BILIRUB SERPL-MCNC: 0.3 MG/DL — SIGNIFICANT CHANGE UP (ref 0.2–1.2)
BILIRUB SERPL-MCNC: 0.3 MG/DL — SIGNIFICANT CHANGE UP (ref 0.2–1.2)
BUN SERPL-MCNC: 23 MG/DL — SIGNIFICANT CHANGE UP (ref 7–23)
BUN SERPL-MCNC: 23 MG/DL — SIGNIFICANT CHANGE UP (ref 7–23)
CALCIUM SERPL-MCNC: 8.3 MG/DL — LOW (ref 8.4–10.5)
CALCIUM SERPL-MCNC: 8.3 MG/DL — LOW (ref 8.4–10.5)
CHLORIDE SERPL-SCNC: 100 MMOL/L — SIGNIFICANT CHANGE UP (ref 96–108)
CHLORIDE SERPL-SCNC: 100 MMOL/L — SIGNIFICANT CHANGE UP (ref 96–108)
CK SERPL-CCNC: 95 U/L — SIGNIFICANT CHANGE UP (ref 30–200)
CK SERPL-CCNC: 95 U/L — SIGNIFICANT CHANGE UP (ref 30–200)
CO2 SERPL-SCNC: 26 MMOL/L — SIGNIFICANT CHANGE UP (ref 22–31)
CO2 SERPL-SCNC: 26 MMOL/L — SIGNIFICANT CHANGE UP (ref 22–31)
CREAT SERPL-MCNC: 1.23 MG/DL — SIGNIFICANT CHANGE UP (ref 0.5–1.3)
CREAT SERPL-MCNC: 1.23 MG/DL — SIGNIFICANT CHANGE UP (ref 0.5–1.3)
EGFR: 65 ML/MIN/1.73M2 — SIGNIFICANT CHANGE UP
EGFR: 65 ML/MIN/1.73M2 — SIGNIFICANT CHANGE UP
EOSINOPHIL # BLD AUTO: 0.09 K/UL — SIGNIFICANT CHANGE UP (ref 0–0.5)
EOSINOPHIL # BLD AUTO: 0.09 K/UL — SIGNIFICANT CHANGE UP (ref 0–0.5)
EOSINOPHIL NFR BLD AUTO: 0.7 % — SIGNIFICANT CHANGE UP (ref 0–6)
EOSINOPHIL NFR BLD AUTO: 0.7 % — SIGNIFICANT CHANGE UP (ref 0–6)
GLUCOSE BLDC GLUCOMTR-MCNC: 107 MG/DL — HIGH (ref 70–99)
GLUCOSE BLDC GLUCOMTR-MCNC: 107 MG/DL — HIGH (ref 70–99)
GLUCOSE BLDC GLUCOMTR-MCNC: 159 MG/DL — HIGH (ref 70–99)
GLUCOSE BLDC GLUCOMTR-MCNC: 159 MG/DL — HIGH (ref 70–99)
GLUCOSE BLDC GLUCOMTR-MCNC: 189 MG/DL — HIGH (ref 70–99)
GLUCOSE BLDC GLUCOMTR-MCNC: 189 MG/DL — HIGH (ref 70–99)
GLUCOSE BLDC GLUCOMTR-MCNC: 247 MG/DL — HIGH (ref 70–99)
GLUCOSE BLDC GLUCOMTR-MCNC: 247 MG/DL — HIGH (ref 70–99)
GLUCOSE SERPL-MCNC: 209 MG/DL — HIGH (ref 70–99)
GLUCOSE SERPL-MCNC: 209 MG/DL — HIGH (ref 70–99)
HCT VFR BLD CALC: 29 % — LOW (ref 39–50)
HCT VFR BLD CALC: 29 % — LOW (ref 39–50)
HGB BLD-MCNC: 9.2 G/DL — LOW (ref 13–17)
HGB BLD-MCNC: 9.2 G/DL — LOW (ref 13–17)
IMM GRANULOCYTES NFR BLD AUTO: 1.4 % — HIGH (ref 0–0.9)
IMM GRANULOCYTES NFR BLD AUTO: 1.4 % — HIGH (ref 0–0.9)
LYMPHOCYTES # BLD AUTO: 1.49 K/UL — SIGNIFICANT CHANGE UP (ref 1–3.3)
LYMPHOCYTES # BLD AUTO: 1.49 K/UL — SIGNIFICANT CHANGE UP (ref 1–3.3)
LYMPHOCYTES # BLD AUTO: 11.9 % — LOW (ref 13–44)
LYMPHOCYTES # BLD AUTO: 11.9 % — LOW (ref 13–44)
MAGNESIUM SERPL-MCNC: 2.2 MG/DL — SIGNIFICANT CHANGE UP (ref 1.6–2.6)
MAGNESIUM SERPL-MCNC: 2.2 MG/DL — SIGNIFICANT CHANGE UP (ref 1.6–2.6)
MCHC RBC-ENTMCNC: 31.1 PG — SIGNIFICANT CHANGE UP (ref 27–34)
MCHC RBC-ENTMCNC: 31.1 PG — SIGNIFICANT CHANGE UP (ref 27–34)
MCHC RBC-ENTMCNC: 31.7 GM/DL — LOW (ref 32–36)
MCHC RBC-ENTMCNC: 31.7 GM/DL — LOW (ref 32–36)
MCV RBC AUTO: 98 FL — SIGNIFICANT CHANGE UP (ref 80–100)
MCV RBC AUTO: 98 FL — SIGNIFICANT CHANGE UP (ref 80–100)
MONOCYTES # BLD AUTO: 1.15 K/UL — HIGH (ref 0–0.9)
MONOCYTES # BLD AUTO: 1.15 K/UL — HIGH (ref 0–0.9)
MONOCYTES NFR BLD AUTO: 9.2 % — SIGNIFICANT CHANGE UP (ref 2–14)
MONOCYTES NFR BLD AUTO: 9.2 % — SIGNIFICANT CHANGE UP (ref 2–14)
NEUTROPHILS # BLD AUTO: 9.56 K/UL — HIGH (ref 1.8–7.4)
NEUTROPHILS # BLD AUTO: 9.56 K/UL — HIGH (ref 1.8–7.4)
NEUTROPHILS NFR BLD AUTO: 76.5 % — SIGNIFICANT CHANGE UP (ref 43–77)
NEUTROPHILS NFR BLD AUTO: 76.5 % — SIGNIFICANT CHANGE UP (ref 43–77)
NRBC # BLD: 0 /100 WBCS — SIGNIFICANT CHANGE UP (ref 0–0)
NRBC # BLD: 0 /100 WBCS — SIGNIFICANT CHANGE UP (ref 0–0)
PHOSPHATE SERPL-MCNC: 3.3 MG/DL — SIGNIFICANT CHANGE UP (ref 2.5–4.5)
PHOSPHATE SERPL-MCNC: 3.3 MG/DL — SIGNIFICANT CHANGE UP (ref 2.5–4.5)
PLATELET # BLD AUTO: 320 K/UL — SIGNIFICANT CHANGE UP (ref 150–400)
PLATELET # BLD AUTO: 320 K/UL — SIGNIFICANT CHANGE UP (ref 150–400)
POTASSIUM SERPL-MCNC: 4.4 MMOL/L — SIGNIFICANT CHANGE UP (ref 3.5–5.3)
POTASSIUM SERPL-MCNC: 4.4 MMOL/L — SIGNIFICANT CHANGE UP (ref 3.5–5.3)
POTASSIUM SERPL-SCNC: 4.4 MMOL/L — SIGNIFICANT CHANGE UP (ref 3.5–5.3)
POTASSIUM SERPL-SCNC: 4.4 MMOL/L — SIGNIFICANT CHANGE UP (ref 3.5–5.3)
PROT SERPL-MCNC: 6.4 G/DL — SIGNIFICANT CHANGE UP (ref 6–8.3)
PROT SERPL-MCNC: 6.4 G/DL — SIGNIFICANT CHANGE UP (ref 6–8.3)
RBC # BLD: 2.96 M/UL — LOW (ref 4.2–5.8)
RBC # BLD: 2.96 M/UL — LOW (ref 4.2–5.8)
RBC # FLD: 13.6 % — SIGNIFICANT CHANGE UP (ref 10.3–14.5)
RBC # FLD: 13.6 % — SIGNIFICANT CHANGE UP (ref 10.3–14.5)
SODIUM SERPL-SCNC: 134 MMOL/L — LOW (ref 135–145)
SODIUM SERPL-SCNC: 134 MMOL/L — LOW (ref 135–145)
WBC # BLD: 12.5 K/UL — HIGH (ref 3.8–10.5)
WBC # BLD: 12.5 K/UL — HIGH (ref 3.8–10.5)
WBC # FLD AUTO: 12.5 K/UL — HIGH (ref 3.8–10.5)
WBC # FLD AUTO: 12.5 K/UL — HIGH (ref 3.8–10.5)

## 2023-10-29 PROCEDURE — 99233 SBSQ HOSP IP/OBS HIGH 50: CPT | Mod: GC

## 2023-10-29 RX ORDER — ENOXAPARIN SODIUM 100 MG/ML
40 INJECTION SUBCUTANEOUS EVERY 24 HOURS
Refills: 0 | Status: DISCONTINUED | OUTPATIENT
Start: 2023-10-29 | End: 2023-10-31

## 2023-10-29 RX ORDER — SPIRONOLACTONE 25 MG/1
50 TABLET, FILM COATED ORAL DAILY
Refills: 0 | Status: DISCONTINUED | OUTPATIENT
Start: 2023-10-29 | End: 2023-10-31

## 2023-10-29 RX ADMIN — ENOXAPARIN SODIUM 40 MILLIGRAM(S): 100 INJECTION SUBCUTANEOUS at 19:02

## 2023-10-29 RX ADMIN — LISINOPRIL 40 MILLIGRAM(S): 2.5 TABLET ORAL at 12:58

## 2023-10-29 RX ADMIN — Medication 1: at 12:59

## 2023-10-29 RX ADMIN — Medication 650 MILLIGRAM(S): at 03:47

## 2023-10-29 RX ADMIN — SENNA PLUS 2 TABLET(S): 8.6 TABLET ORAL at 21:55

## 2023-10-29 RX ADMIN — GABAPENTIN 1600 MILLIGRAM(S): 400 CAPSULE ORAL at 05:42

## 2023-10-29 RX ADMIN — OXYCODONE HYDROCHLORIDE 10 MILLIGRAM(S): 5 TABLET ORAL at 12:59

## 2023-10-29 RX ADMIN — OXYCODONE HYDROCHLORIDE 10 MILLIGRAM(S): 5 TABLET ORAL at 00:53

## 2023-10-29 RX ADMIN — PIPERACILLIN AND TAZOBACTAM 25 GRAM(S): 4; .5 INJECTION, POWDER, LYOPHILIZED, FOR SOLUTION INTRAVENOUS at 05:42

## 2023-10-29 RX ADMIN — Medication 650 MILLIGRAM(S): at 21:55

## 2023-10-29 RX ADMIN — CARVEDILOL PHOSPHATE 12.5 MILLIGRAM(S): 80 CAPSULE, EXTENDED RELEASE ORAL at 05:43

## 2023-10-29 RX ADMIN — Medication 81 MILLIGRAM(S): at 12:58

## 2023-10-29 RX ADMIN — OXYCODONE HYDROCHLORIDE 10 MILLIGRAM(S): 5 TABLET ORAL at 06:42

## 2023-10-29 RX ADMIN — Medication 1: at 10:15

## 2023-10-29 RX ADMIN — SPIRONOLACTONE 25 MILLIGRAM(S): 25 TABLET, FILM COATED ORAL at 05:43

## 2023-10-29 RX ADMIN — OXYCODONE HYDROCHLORIDE 10 MILLIGRAM(S): 5 TABLET ORAL at 20:00

## 2023-10-29 RX ADMIN — OXYCODONE HYDROCHLORIDE 10 MILLIGRAM(S): 5 TABLET ORAL at 05:42

## 2023-10-29 RX ADMIN — GABAPENTIN 1600 MILLIGRAM(S): 400 CAPSULE ORAL at 13:02

## 2023-10-29 RX ADMIN — DAPTOMYCIN 120 MILLIGRAM(S): 500 INJECTION, POWDER, LYOPHILIZED, FOR SOLUTION INTRAVENOUS at 19:02

## 2023-10-29 RX ADMIN — OXYCODONE HYDROCHLORIDE 10 MILLIGRAM(S): 5 TABLET ORAL at 19:09

## 2023-10-29 RX ADMIN — PIPERACILLIN AND TAZOBACTAM 25 GRAM(S): 4; .5 INJECTION, POWDER, LYOPHILIZED, FOR SOLUTION INTRAVENOUS at 21:56

## 2023-10-29 RX ADMIN — GABAPENTIN 1600 MILLIGRAM(S): 400 CAPSULE ORAL at 21:55

## 2023-10-29 RX ADMIN — INSULIN GLARGINE 3 UNIT(S): 100 INJECTION, SOLUTION SUBCUTANEOUS at 21:55

## 2023-10-29 RX ADMIN — CARVEDILOL PHOSPHATE 12.5 MILLIGRAM(S): 80 CAPSULE, EXTENDED RELEASE ORAL at 19:02

## 2023-10-29 RX ADMIN — Medication 4 UNIT(S): at 19:03

## 2023-10-29 RX ADMIN — Medication 4 UNIT(S): at 10:15

## 2023-10-29 RX ADMIN — ATORVASTATIN CALCIUM 80 MILLIGRAM(S): 80 TABLET, FILM COATED ORAL at 21:55

## 2023-10-29 RX ADMIN — CLOPIDOGREL BISULFATE 75 MILLIGRAM(S): 75 TABLET, FILM COATED ORAL at 12:58

## 2023-10-29 RX ADMIN — Medication 4 UNIT(S): at 12:59

## 2023-10-29 RX ADMIN — Medication 650 MILLIGRAM(S): at 04:47

## 2023-10-29 RX ADMIN — SPIRONOLACTONE 50 MILLIGRAM(S): 25 TABLET, FILM COATED ORAL at 16:42

## 2023-10-29 RX ADMIN — Medication 650 MILLIGRAM(S): at 22:55

## 2023-10-29 RX ADMIN — PIPERACILLIN AND TAZOBACTAM 25 GRAM(S): 4; .5 INJECTION, POWDER, LYOPHILIZED, FOR SOLUTION INTRAVENOUS at 12:58

## 2023-10-29 NOTE — PROGRESS NOTE ADULT - PROBLEM SELECTOR PLAN 2
Patient presenting with worsening right 4th toe pain and discoloratio s/p course of IV and oral antibtiocs: Keflex and doxycycline until 10/10 endorses compliance. On presentation, he is hemodynamically stable however with leukoctyosis to 19 and fever to 101.   Right toe xrays done in ED showing no evidence of fracture or osteomyelitis   S/p 1 dose vancomycin in ED and zosyn in ED.   Patient went to surgery for toe amputation 10/24    Plan:  - Podiatry consulted, f/u recs   - vascular consulted: rec angio 10/27  - Local wound care: Applied 1/4 inch plain packing. Betadine soaked gauze applied w/ kerlix  - Pain management: Tylenol 650 q6h standing, Oxy 10 q6h PRN, morphine 2mg q6h PRN for severe  - 1x 0.5 dilaudid for severe breakthrough pain.

## 2023-10-29 NOTE — PROGRESS NOTE ADULT - ASSESSMENT
67M PMH HTN, IDDM with peripheral neuropathy, CAD s/p PCI with 2 stents (3 yrs ago at [facility]), recent admission 10/3 for diabetic ssti (CT r/o absecess, no OM) discharged on doxycycline and keflex, presenting with CC of r. foot pain, found to meet SIRS crtieria with suspected source infectious of r.toe, a/f management.

## 2023-10-29 NOTE — PROGRESS NOTE ADULT - NS ATTEST RISK PROBLEM GEN_ALL_CORE FT
Inadequate glycemic control in pt with diabetic foot infection, AM hypoglycemia
Sharp drop in insulin requirements
Worsening b/l LE swelling requiring urgent imaging
hyperglycemia, hypertensive urgency, IV medication - Vancomycin

## 2023-10-29 NOTE — PROGRESS NOTE ADULT - SUBJECTIVE AND OBJECTIVE BOX
Patient is a 68y old  Male who presents with a chief complaint of Foot pain (28 Oct 2023 07:53)      INTERVAL HPI/ OVERNIGHT EVENTS. Pt seen and examined bedside. Complaining of b/l leg swelling and pain. Complaining of chills. Denies n/v/f/sob.       LABS                        9.2    12.50 )-----------( 320      ( 29 Oct 2023 09:10 )             29.0     10-29    134<L>  |  100  |  23  ----------------------------<  209<H>  4.4   |  26  |  1.23    Ca    8.3<L>      29 Oct 2023 09:10  Phos  3.3     10-29  Mg     2.2     10-29    TPro  6.4  /  Alb  1.8<L>  /  TBili  0.3  /  DBili  x   /  AST  20  /  ALT  17  /  AlkPhos  204<H>  10-29        ICU Vital Signs Last 24 Hrs  T(C): 36.7 (29 Oct 2023 09:56), Max: 37.1 (28 Oct 2023 21:16)  T(F): 98.1 (29 Oct 2023 09:56), Max: 98.7 (28 Oct 2023 21:16)  HR: 58 (29 Oct 2023 09:56) (58 - 74)  BP: 193/89 (29 Oct 2023 09:56) (148/71 - 194/86)  BP(mean): --  ABP: --  ABP(mean): --  RR: 20 (29 Oct 2023 09:56) (17 - 20)  SpO2: 95% (29 Oct 2023 09:56) (95% - 100%)    O2 Parameters below as of 29 Oct 2023 09:56  Patient On (Oxygen Delivery Method): room air      RADIOLOGY  < from: Xray Foot AP + Lateral, Right (10.24.23 @ 19:48) >  FINDINGS:  There has been interval amputation of the fourth ray at the level of the   fourth metatarsal neck. There are associated postsurgical changes in the   stump soft tissues. No other significant interval change.  IMPRESSION:  Postsurgical changes of the right foot.  < end of copied text >    MICROBIOLOGY  Culture - Tissue with Gram Stain (10.24.23 @ 15:30)    Gram Stain:   No organisms seen  No White blood cells   -  Ampicillin: S <=8 These ampicillin results predict results for amoxicillin   -  Ampicillin: S <=8 These ampicillin results predict results for amoxicillin   -  Ampicillin/Sulbactam: S <=4/2   -  Ampicillin/Sulbactam: S <=4/2   -  Cefazolin: S <=2   -  Cefazolin: R 16   -  Ceftriaxone: S <=1   -  Ceftriaxone: S <=1   -  Ciprofloxacin: S <=0.25   -  Ciprofloxacin: S <=0.25   -  Clindamycin: S 0.5   -  Ertapenem: S <=0.5   -  Ertapenem: S <=0.5   -  Erythromycin: R >4   -  Gentamicin: S <=2   -  Gentamicin: S <=2   -  Linezolid: S 1   -  Oxacillin: R >2   -  Piperacillin/Tazobactam: S <=8   -  Piperacillin/Tazobactam: S <=8   -  Rifampin: S <=1 Should not be used as monotherapy   -  Tobramycin: S <=2   -  Tobramycin: S <=2   -  Trimethoprim/Sulfamethoxazole: S <=0.5/9.5   -  Trimethoprim/Sulfamethoxazole: S <=0.5/9.5   -  Trimethoprim/Sulfamethoxazole: R >2/38   -  Vancomycin: S 2   Specimen Source: .Tissue right 4th metatarsal clean margin  or spec   Culture Results:   Few Escherichia coli  Few Pluralibacter gergoviae  Rare Staphylococcus epidermidis  Rare Corynebacterium amycolatum  Culture in progress   Organism Identification: Escherichia coli  Pluralibacter gergoviae  Staphylococcus epidermidis   Organism: Staphylococcus epidermidis   Organism: Escherichia coli   Organism: Pluralibacter gergoviae   Method Type: ARCHANA   Method Type: ARCHANA   Method Type: ARCHANA      PHYSICAL EXAM  Lower Extremity Focused  Vasc: DP/PT 2/4 b/l, erythema and edema to the R forefoot and midfoot, b/l leg edema (R >L), (+2) pitting edema R, (+1) pitting edema L, Increased warmth to RLE  Derm:  R foot surgical site wound at the distal 4th ray. Serosanguinous drainage. Fibrotic 60% granular 40% wound bed. Plantar skin just proximal to the surgical site with early signs of ischemia, with overlying serous fluid filled blister - proximally debrided to reveal only serous drainage; Macerated 2nd interspace R  Neuro: protective sensation slightly diminished  MSK: 5/5 muscle strength in all compartments

## 2023-10-29 NOTE — PROGRESS NOTE ADULT - PROBLEM SELECTOR PLAN 1
Patient presenting meeting 2/4 SIRS (T101, Leukocytosis), source likely Gangrenous toe vs Osteomyelitis of R. foot wound. No purulence or erythema on examination however extremely tender to palpation and at rest. XR w/o evidence of osteo however lower sensivity. s/p 1 dose vanc and zosyn in ED. Prior was on Keflex and doxycycline x 10 days (finished 10/10), completed course. Last BCx w/o growth.   s/p 2L NS in ED  BCx: no growth at 4 day  MRI: Soft tissue atrophy/wound of the 4th toe with underlying marrow changes of the 4th proximal phalanx head, middle phalanx, and distal phalanx that may be reactive in the absence of significant T1 marrow replacement, however, early infection may also be considered.  Tissue culture: e.coli, pluralibacter, stap epi, and campylobacter    Plan:  - f/u BCx  - zosyn 3.375 mg q8h and daptomycin 500 q24h  - f/u CK level  - Tylenol q6 prn for fever.   - f/u podiatry recs as below.

## 2023-10-29 NOTE — PROGRESS NOTE ADULT - PROBLEM SELECTOR PLAN 7
Home regimen should be clarified, was discharged on lantus 11u, and humalog 7u premeal.  A1c 12.6    Plan:  - endocrine recs appreciated: Lantus 6 at night, lispro 5 before meals low iss  - FSG q6 hours DVT ppx: on Eliquis for DVT  - DNR

## 2023-10-29 NOTE — PROGRESS NOTE ADULT - NSPROGADDITIONALINFOA_GEN_ALL_CORE
Of note patient has been non compliant at times throughout his stay. Today he is refusing all further imaging - U/S and CT

## 2023-10-29 NOTE — PROGRESS NOTE ADULT - ASSESSMENT
68M PMH HTN, IDDM with peripheral neuropathy, CAD s/p PCI with 2 stents (3 yrs ago at [facility]) who presents to [facility] with right toe pain. Podiatry consulted to evaluate R toe wound. Pt with prior admission on 10/1-10/3 with early ischemic signs of R 4th digit, and was discharged with PO abx (keflex,doxy). States of completing medication but has not followed up out pt with any provider. In the ED, pt febrile with elevated WBC to 19.71. At time of consult on the floors, pt is now afebrile with VSS. 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 Pt is s/p R foot partial 4th ray resection (DOS 10/24). Pt currently indicated he would not be amenable to RTOR for any additional amputation or washout although worsening leukocytosis and poor plantar skin quality noted on 10/27. Underwent angio on 10/27 which showed severe disease of TP trunk, AT, and peroneal; AT and peroneal lithotripsy and balloon angioplasty. Completion angiogram showed better filling of AT and peronel. On   10/28, WBC downtrending to 13 from 19, Clinically, improvement from darkening skin changes to dorsal foot. Plantar skin quality remains poor. On 10/29 pt complaining of b/l leg swelling although clinically R>L Mild suspicion for abscess due to increased swelling and warmth to RLE, but improving WBC, worsening skin quality to plantar foot.     Plan.   - Recommend CT w/wo IV contrast of Right foot and leg to r/o abscess.   -c/w IV abx  -f/u intra-operative cultures & path  -WB status: WBAT to the R heel  -R lower extremity to remain elevated while in bed  -Surgical site cleansed with NS. Applied 1/4 inch plain packing. Betadine soaked gauze applied w/ kerlix  -Rest of care up to primary team    Podiatry following, Plan d/w attending   68M PMH HTN, IDDM with peripheral neuropathy, CAD s/p PCI with 2 stents (3 yrs ago at [facility]) who presents to [facility] with right toe pain. Podiatry consulted to evaluate R toe wound. Pt with prior admission on 10/1-10/3 with early ischemic signs of R 4th digit, and was discharged with PO abx (keflex,doxy). States of completing medication but has not followed up out pt with any provider. In the ED, pt febrile with elevated WBC to 19.71. At time of consult on the floors, pt is now afebrile with VSS. 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 Pt is s/p R foot partial 4th ray resection (DOS 10/24). Pt currently indicated he would not be amenable to RTOR for any additional amputation or washout although worsening leukocytosis and poor plantar skin quality noted on 10/27. Underwent angio on 10/27 which showed severe disease of TP trunk, AT, and peroneal; AT and peroneal lithotripsy and balloon angioplasty. Completion angiogram showed better filling of AT and peronel. On 10/28, WBC downtrending to 13 from 19, Clinically, improvement from darkening skin changes to dorsal foot. Plantar skin quality remains poor. On 10/29 pt complaining of b/l leg swelling although clinically R>L Mild suspicion for abscess due to increased swelling and warmth to RLE, but improving WBC, worsening skin quality to plantar foot.     Plan.   - Recommend CT w/wo IV contrast of Right foot and leg to r/o abscess.   -c/w IV abx  -f/u intra-operative cultures & path  -WB status: WBAT to the R heel  -R lower extremity to remain elevated while in bed  -Surgical site cleansed with NS. Applied 1/4 inch plain packing. Betadine soaked gauze applied w/ kerlix  -Rest of care up to primary team    Podiatry following, Plan d/w attending   68M PMH HTN, IDDM with peripheral neuropathy, CAD s/p PCI with 2 stents (3 yrs ago at [facility]) who presents to [facility] with right toe pain. Podiatry consulted to evaluate R toe wound. Pt with prior admission on 10/1-10/3 with early ischemic signs of R 4th digit, and was discharged with PO abx (keflex,doxy). States of completing medication but has not followed up out pt with any provider. In the ED, pt febrile with elevated WBC to 19.71. At time of consult on the floors, pt is now afebrile with VSS. 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 Pt is s/p R foot partial 4th ray resection (DOS 10/24). Pt currently indicated he would not be amenable to RTOR for any additional amputation or washout although worsening leukocytosis and poor plantar skin quality noted on 10/27. Underwent angio on 10/27 which showed severe disease of TP trunk, AT, and peroneal; AT and peroneal lithotripsy and balloon angioplasty. Completion angiogram showed better filling of AT and peronel. On 10/28, WBC downtrending to 13 from 19, Clinically, improvement from darkening skin changes to dorsal foot. Plantar skin quality remains poor. On 10/29 pt complaining of b/l leg swelling although clinically R>L Mild suspicion for abscess due to increased swelling and warmth to RLE, but improving WBC, worsening skin quality to plantar foot.     Plan.   - Recommend CT w/wo IV contrast of Right foot and leg to r/o abscess. Pt is refusing any further imaging at this time despite risk of further infection that can lead to further amputation and him losing his leg.    -c/w IV abx  -f/u intra-operative cultures & path  -WB status: WBAT to the R heel  -R lower extremity to remain elevated while in bed  -Surgical site cleansed with NS. Applied 1/4 inch plain packing. Betadine soaked gauze applied w/ kerlix  -Rest of care up to primary team    Podiatry following, Plan d/w attending

## 2023-10-29 NOTE — PROGRESS NOTE ADULT - SUBJECTIVE AND OBJECTIVE BOX
O/N Events: YEISON    Subjective/ROS: Patient seen and examined at bedside. Patient reports continued LE swelling.     Denies Fever/Chills, HA, CP, SOB, n/v, changes in urinary habits.  12pt ROS otherwise negative.      Vital Signs Last 24 Hrs  T(C): 36.7 (29 Oct 2023 09:56), Max: 37.1 (28 Oct 2023 21:16)  T(F): 98.1 (29 Oct 2023 09:56), Max: 98.7 (28 Oct 2023 21:16)  HR: 68 (29 Oct 2023 12:57) (58 - 74)  BP: 170/88 (29 Oct 2023 12:57) (148/71 - 194/86)  BP(mean): --  RR: 20 (29 Oct 2023 09:56) (17 - 20)  SpO2: 95% (29 Oct 2023 09:56) (95% - 100%)    Parameters below as of 29 Oct 2023 09:56  Patient On (Oxygen Delivery Method): room air            CAPILLARY BLOOD GLUCOSE      POCT Blood Glucose.: 127 mg/dL (28 Oct 2023 21:58)  POCT Blood Glucose.: 232 mg/dL (28 Oct 2023 17:31)  POCT Blood Glucose.: 170 mg/dL (28 Oct 2023 15:18)  POCT Blood Glucose.: 75 mg/dL (28 Oct 2023 13:32)  POCT Blood Glucose.: 200 mg/dL (28 Oct 2023 09:22)      PHYSICAL EXAM  General: NAD  Head: NC/AT; MMM; EOMI;  Neck: Supple; no JVD  Respiratory: CTAB; no wheezes  Cardiovascular: Regular rhythm/rate; S1/S2+, murmur +  Gastrointestinal: Soft; NTND; bowel sounds normal and present  Extremities: WWP; b/l LE pitting edema to the knee  Neurological: A&Ox3, CNII-XII grossly intact; no obvious focal deficits  Psych: normal affect          MEDICATIONS  (STANDING):  acetaminophen     Tablet .. 650 milliGRAM(s) Oral every 6 hours  aspirin enteric coated 81 milliGRAM(s) Oral daily  atorvastatin 80 milliGRAM(s) Oral at bedtime  carvedilol 12.5 milliGRAM(s) Oral every 12 hours  clopidogrel Tablet 75 milliGRAM(s) Oral daily  DAPTOmycin IVPB 500 milliGRAM(s) IV Intermittent every 24 hours  dextrose 5%. 1000 milliLiter(s) (100 mL/Hr) IV Continuous <Continuous>  dextrose 5%. 1000 milliLiter(s) (50 mL/Hr) IV Continuous <Continuous>  dextrose 50% Injectable 12.5 Gram(s) IV Push once  dextrose 50% Injectable 25 Gram(s) IV Push once  dextrose 50% Injectable 25 Gram(s) IV Push once  enoxaparin Injectable 40 milliGRAM(s) SubCutaneous every 24 hours  gabapentin 1600 milliGRAM(s) Oral three times a day  glucagon  Injectable 1 milliGRAM(s) IntraMuscular once  insulin glargine Injectable (LANTUS) 3 Unit(s) SubCutaneous at bedtime  insulin lispro (ADMELOG) corrective regimen sliding scale   SubCutaneous at bedtime  insulin lispro (ADMELOG) corrective regimen sliding scale   SubCutaneous three times a day before meals  insulin lispro Injectable (ADMELOG) 4 Unit(s) SubCutaneous three times a day before meals  lisinopril 40 milliGRAM(s) Oral every 24 hours  piperacillin/tazobactam IVPB.. 3.375 Gram(s) IV Intermittent every 8 hours  senna 2 Tablet(s) Oral at bedtime  spironolactone 25 milliGRAM(s) Oral daily  spironolactone 50 milliGRAM(s) Oral daily    MEDICATIONS  (PRN):  aluminum hydroxide/magnesium hydroxide/simethicone Suspension 30 milliLiter(s) Oral every 6 hours PRN Dyspepsia  dextrose Oral Gel 15 Gram(s) Oral once PRN Blood Glucose LESS THAN 70 milliGRAM(s)/deciliter  oxyCODONE    IR 10 milliGRAM(s) Oral every 6 hours PRN Moderate Pain (4 - 6)          No Known Allergies      LABS                                     9.2    12.50 )-----------( 320      ( 29 Oct 2023 09:10 )             29.0   10-29    134<L>  |  100  |  23  ----------------------------<  209<H>  4.4   |  26  |  1.23    Ca    8.3<L>      29 Oct 2023 09:10  Phos  3.3     10-29  Mg     2.2     10-29    TPro  6.4  /  Alb  1.8<L>  /  TBili  0.3  /  DBili  x   /  AST  20  /  ALT  17  /  AlkPhos  204<H>  10-29          Color: x / Appearance: x / SG: x / pH: x  Gluc: 206 mg/dL / Ketone: x  / Bili: x / Urobili: x   Blood: x / Protein: x / Nitrite: x   Leuk Esterase: x / RBC: x / WBC x   Sq Epi: x / Non Sq Epi: x / Bacteria: x      CARDIAC MARKERS ( 28 Oct 2023 07:42 )  x     / x     / 84 U/L / x     / x      CARDIAC MARKERS ( 27 Oct 2023 07:36 )  x     / x     / 111 U/L / x     / x              IMAGING/EKG/ETC

## 2023-10-29 NOTE — PROGRESS NOTE ADULT - PROBLEM SELECTOR PLAN 6
You were brought here for removal of the large amounts of fluid around the lung for which a Pleurx catheter placed. You should drain upto 1 L 3 times a week or until any chest pain or cough occurs. Please follow up with Dr. Ortega in approximately October 3 for suture removal. Please no heavy lifting until sutures removed.  [facility]. [address] [place] [address]. Phone: [phone]. Fax: [phone] Patient with isolated elevated alkphos, no RUQ pain, not seen on prior labs. Low suspicion for bile duct obstruction at this time.     Plan:  - Trend in AM  - Obtain RUQ US if still elevated or uptrending.

## 2023-10-29 NOTE — PROGRESS NOTE ADULT - SUBJECTIVE AND OBJECTIVE BOX
S: Patient c/o bl pitting edema in lower extremities, patient denies pain or difficulty ambulating.    O: Examined in bed resting comfortably     ROS: Denies headache, blurred vision, chest pain, SOB, abdominal pain, nausea or vomiting.         DAPTOmycin IVPB 500  piperacillin/tazobactam IVPB.. 3.375  aspirin enteric coated 81  carvedilol 12.5  clopidogrel Tablet 75  DAPTOmycin IVPB 500  enoxaparin Injectable 40  lisinopril 40  piperacillin/tazobactam IVPB.. 3.375  spironolactone 25  spironolactone 50      Allergies    No Known Allergies    Intolerances        Vital Signs Last 24 Hrs  T(C): 37.1 (29 Oct 2023 15:50), Max: 37.1 (28 Oct 2023 21:16)  T(F): 98.8 (29 Oct 2023 15:50), Max: 98.8 (29 Oct 2023 15:50)  HR: 69 (29 Oct 2023 18:59) (58 - 69)  BP: 196/99 (29 Oct 2023 18:59) (156/84 - 196/99)  BP(mean): --  RR: 19 (29 Oct 2023 18:59) (17 - 20)  SpO2: 98% (29 Oct 2023 18:59) (95% - 100%)    Parameters below as of 29 Oct 2023 18:59  Patient On (Oxygen Delivery Method): room air      I&O's Summary      Physical Exam:  General: alert and awake, NAD  Pulmonary: no respiratory distress  Cardiovascular: RRR  Abdominal: soft  Extremities: bl 1+ pitting edema of LE, R foot sp 4th toe amputation covered with kerlix & abd pad, groin soft  Pulses: R- triphasic DP/PT      LABS:                        9.2    12.50 )-----------( 320      ( 29 Oct 2023 09:10 )             29.0     10-29    134<L>  |  100  |  23  ----------------------------<  209<H>  4.4   |  26  |  1.23    Ca    8.3<L>      29 Oct 2023 09:10  Phos  3.3     10-29  Mg     2.2     10-29    TPro  6.4  /  Alb  1.8<L>  /  TBili  0.3  /  DBili  x   /  AST  20  /  ALT  17  /  AlkPhos  204<H>  10-29        A/P    67M PMH HTN, IDDM with peripheral neuropathy, CAD s/p PCI with 2 stents (3 yrs ago at [facility]), admitted for worsening right 4th toe gangrene with cellulitis and no palpable pulses. Now sp R fourth digit amputation by podiatry. Now sp RLE angiogram, shockwave lithotripsy & balooning of AT, and balloon angioplasty of peroneal.     Continue neurovascular checks  Monitor bl pitting edema, patient refused ultrasound of bl LE  Cont plavix, patient has script for DC  Patient to f/u w Dr. Oneal 1-2 weeks after discharge  Team 3c to continue to follow

## 2023-10-29 NOTE — CHART NOTE - NSCHARTNOTEFT_GEN_A_CORE
Patient is refusing further imaging to work-up the severe swelling in his lower extremities B/L. Explained to patient the indications that warrant further imagine and the benefits and risks of the imaging, as well as consequences and risks in refusal. Expressed that risk of further imaging is not being able to detect possible DVTs vs. abscess in the legs, which can in turn effect further management, including PE, infection, sepsis, and death. Patient has capacity, expressed understanding, and refused all imaging nonetheless. Patient is refusing further imaging to work-up the severe swelling in his lower extremities B/L. Explained to patient the indications that warrant further imagine and the benefits and risks of the imaging, as well as consequences and risks in refusal. Expressed that risk of further imaging is not being able to detect possible DVTs vs. abscess in the legs, which can in turn effect further management. Expressed risks of no further imaging including but not limited to possible PE, infection, sepsis, and death. Patient has capacity, expressed understanding, and refused all imaging nonetheless. Patient is refusing further imaging to work-up the severe swelling in his lower extremities B/L. Explained to patient the indications that warrant further imagine and the benefits and risks of the imaging, as well as consequences and risks in refusal. Expressed that risk of not obtaining further imaging is not being able to detect possible DVTs vs. abscess in the legs, which can in turn impede effective further management. Expressed risks of no further imaging including but not limited to possible PE, infection, sepsis, and death. Patient has capacity, expressed understanding, and refused all imaging nonetheless.

## 2023-10-30 LAB
ALBUMIN SERPL ELPH-MCNC: 2 G/DL — LOW (ref 3.3–5)
ALBUMIN SERPL ELPH-MCNC: 2 G/DL — LOW (ref 3.3–5)
ALP SERPL-CCNC: 187 U/L — HIGH (ref 40–120)
ALP SERPL-CCNC: 187 U/L — HIGH (ref 40–120)
ALT FLD-CCNC: 17 U/L — SIGNIFICANT CHANGE UP (ref 10–45)
ALT FLD-CCNC: 17 U/L — SIGNIFICANT CHANGE UP (ref 10–45)
ANION GAP SERPL CALC-SCNC: 9 MMOL/L — SIGNIFICANT CHANGE UP (ref 5–17)
ANION GAP SERPL CALC-SCNC: 9 MMOL/L — SIGNIFICANT CHANGE UP (ref 5–17)
APTT BLD: 31.9 SEC — SIGNIFICANT CHANGE UP (ref 24.5–35.6)
APTT BLD: 31.9 SEC — SIGNIFICANT CHANGE UP (ref 24.5–35.6)
AST SERPL-CCNC: 21 U/L — SIGNIFICANT CHANGE UP (ref 10–40)
AST SERPL-CCNC: 21 U/L — SIGNIFICANT CHANGE UP (ref 10–40)
BASOPHILS # BLD AUTO: 0.06 K/UL — SIGNIFICANT CHANGE UP (ref 0–0.2)
BASOPHILS # BLD AUTO: 0.06 K/UL — SIGNIFICANT CHANGE UP (ref 0–0.2)
BASOPHILS NFR BLD AUTO: 0.5 % — SIGNIFICANT CHANGE UP (ref 0–2)
BASOPHILS NFR BLD AUTO: 0.5 % — SIGNIFICANT CHANGE UP (ref 0–2)
BILIRUB SERPL-MCNC: 0.2 MG/DL — SIGNIFICANT CHANGE UP (ref 0.2–1.2)
BILIRUB SERPL-MCNC: 0.2 MG/DL — SIGNIFICANT CHANGE UP (ref 0.2–1.2)
BLD GP AB SCN SERPL QL: NEGATIVE — SIGNIFICANT CHANGE UP
BLD GP AB SCN SERPL QL: NEGATIVE — SIGNIFICANT CHANGE UP
BUN SERPL-MCNC: 22 MG/DL — SIGNIFICANT CHANGE UP (ref 7–23)
BUN SERPL-MCNC: 22 MG/DL — SIGNIFICANT CHANGE UP (ref 7–23)
CALCIUM SERPL-MCNC: 8.4 MG/DL — SIGNIFICANT CHANGE UP (ref 8.4–10.5)
CALCIUM SERPL-MCNC: 8.4 MG/DL — SIGNIFICANT CHANGE UP (ref 8.4–10.5)
CHLORIDE SERPL-SCNC: 100 MMOL/L — SIGNIFICANT CHANGE UP (ref 96–108)
CHLORIDE SERPL-SCNC: 100 MMOL/L — SIGNIFICANT CHANGE UP (ref 96–108)
CO2 SERPL-SCNC: 24 MMOL/L — SIGNIFICANT CHANGE UP (ref 22–31)
CO2 SERPL-SCNC: 24 MMOL/L — SIGNIFICANT CHANGE UP (ref 22–31)
CREAT SERPL-MCNC: 1.28 MG/DL — SIGNIFICANT CHANGE UP (ref 0.5–1.3)
CREAT SERPL-MCNC: 1.28 MG/DL — SIGNIFICANT CHANGE UP (ref 0.5–1.3)
CULTURE RESULTS: ABNORMAL
CULTURE RESULTS: ABNORMAL
EGFR: 62 ML/MIN/1.73M2 — SIGNIFICANT CHANGE UP
EGFR: 62 ML/MIN/1.73M2 — SIGNIFICANT CHANGE UP
EOSINOPHIL # BLD AUTO: 0.05 K/UL — SIGNIFICANT CHANGE UP (ref 0–0.5)
EOSINOPHIL # BLD AUTO: 0.05 K/UL — SIGNIFICANT CHANGE UP (ref 0–0.5)
EOSINOPHIL NFR BLD AUTO: 0.4 % — SIGNIFICANT CHANGE UP (ref 0–6)
EOSINOPHIL NFR BLD AUTO: 0.4 % — SIGNIFICANT CHANGE UP (ref 0–6)
GLUCOSE BLDC GLUCOMTR-MCNC: 118 MG/DL — HIGH (ref 70–99)
GLUCOSE BLDC GLUCOMTR-MCNC: 118 MG/DL — HIGH (ref 70–99)
GLUCOSE BLDC GLUCOMTR-MCNC: 148 MG/DL — HIGH (ref 70–99)
GLUCOSE BLDC GLUCOMTR-MCNC: 148 MG/DL — HIGH (ref 70–99)
GLUCOSE BLDC GLUCOMTR-MCNC: 235 MG/DL — HIGH (ref 70–99)
GLUCOSE BLDC GLUCOMTR-MCNC: 235 MG/DL — HIGH (ref 70–99)
GLUCOSE BLDC GLUCOMTR-MCNC: 293 MG/DL — HIGH (ref 70–99)
GLUCOSE BLDC GLUCOMTR-MCNC: 293 MG/DL — HIGH (ref 70–99)
GLUCOSE SERPL-MCNC: 279 MG/DL — HIGH (ref 70–99)
GLUCOSE SERPL-MCNC: 279 MG/DL — HIGH (ref 70–99)
HCT VFR BLD CALC: 30.3 % — LOW (ref 39–50)
HCT VFR BLD CALC: 30.3 % — LOW (ref 39–50)
HGB BLD-MCNC: 9.7 G/DL — LOW (ref 13–17)
HGB BLD-MCNC: 9.7 G/DL — LOW (ref 13–17)
IMM GRANULOCYTES NFR BLD AUTO: 1 % — HIGH (ref 0–0.9)
IMM GRANULOCYTES NFR BLD AUTO: 1 % — HIGH (ref 0–0.9)
INR BLD: 0.95 — SIGNIFICANT CHANGE UP (ref 0.85–1.18)
INR BLD: 0.95 — SIGNIFICANT CHANGE UP (ref 0.85–1.18)
LYMPHOCYTES # BLD AUTO: 1.58 K/UL — SIGNIFICANT CHANGE UP (ref 1–3.3)
LYMPHOCYTES # BLD AUTO: 1.58 K/UL — SIGNIFICANT CHANGE UP (ref 1–3.3)
LYMPHOCYTES # BLD AUTO: 12.4 % — LOW (ref 13–44)
LYMPHOCYTES # BLD AUTO: 12.4 % — LOW (ref 13–44)
MAGNESIUM SERPL-MCNC: 2.1 MG/DL — SIGNIFICANT CHANGE UP (ref 1.6–2.6)
MAGNESIUM SERPL-MCNC: 2.1 MG/DL — SIGNIFICANT CHANGE UP (ref 1.6–2.6)
MCHC RBC-ENTMCNC: 31.3 PG — SIGNIFICANT CHANGE UP (ref 27–34)
MCHC RBC-ENTMCNC: 31.3 PG — SIGNIFICANT CHANGE UP (ref 27–34)
MCHC RBC-ENTMCNC: 32 GM/DL — SIGNIFICANT CHANGE UP (ref 32–36)
MCHC RBC-ENTMCNC: 32 GM/DL — SIGNIFICANT CHANGE UP (ref 32–36)
MCV RBC AUTO: 97.7 FL — SIGNIFICANT CHANGE UP (ref 80–100)
MCV RBC AUTO: 97.7 FL — SIGNIFICANT CHANGE UP (ref 80–100)
MONOCYTES # BLD AUTO: 1.12 K/UL — HIGH (ref 0–0.9)
MONOCYTES # BLD AUTO: 1.12 K/UL — HIGH (ref 0–0.9)
MONOCYTES NFR BLD AUTO: 8.8 % — SIGNIFICANT CHANGE UP (ref 2–14)
MONOCYTES NFR BLD AUTO: 8.8 % — SIGNIFICANT CHANGE UP (ref 2–14)
NEUTROPHILS # BLD AUTO: 9.82 K/UL — HIGH (ref 1.8–7.4)
NEUTROPHILS # BLD AUTO: 9.82 K/UL — HIGH (ref 1.8–7.4)
NEUTROPHILS NFR BLD AUTO: 76.9 % — SIGNIFICANT CHANGE UP (ref 43–77)
NEUTROPHILS NFR BLD AUTO: 76.9 % — SIGNIFICANT CHANGE UP (ref 43–77)
NRBC # BLD: 0 /100 WBCS — SIGNIFICANT CHANGE UP (ref 0–0)
NRBC # BLD: 0 /100 WBCS — SIGNIFICANT CHANGE UP (ref 0–0)
ORGANISM # SPEC MICROSCOPIC CNT: ABNORMAL
ORGANISM # SPEC MICROSCOPIC CNT: SIGNIFICANT CHANGE UP
ORGANISM # SPEC MICROSCOPIC CNT: SIGNIFICANT CHANGE UP
PHOSPHATE SERPL-MCNC: 3.2 MG/DL — SIGNIFICANT CHANGE UP (ref 2.5–4.5)
PHOSPHATE SERPL-MCNC: 3.2 MG/DL — SIGNIFICANT CHANGE UP (ref 2.5–4.5)
PLATELET # BLD AUTO: 325 K/UL — SIGNIFICANT CHANGE UP (ref 150–400)
PLATELET # BLD AUTO: 325 K/UL — SIGNIFICANT CHANGE UP (ref 150–400)
POTASSIUM SERPL-MCNC: 4.7 MMOL/L — SIGNIFICANT CHANGE UP (ref 3.5–5.3)
POTASSIUM SERPL-MCNC: 4.7 MMOL/L — SIGNIFICANT CHANGE UP (ref 3.5–5.3)
POTASSIUM SERPL-SCNC: 4.7 MMOL/L — SIGNIFICANT CHANGE UP (ref 3.5–5.3)
POTASSIUM SERPL-SCNC: 4.7 MMOL/L — SIGNIFICANT CHANGE UP (ref 3.5–5.3)
PROT SERPL-MCNC: 6.6 G/DL — SIGNIFICANT CHANGE UP (ref 6–8.3)
PROT SERPL-MCNC: 6.6 G/DL — SIGNIFICANT CHANGE UP (ref 6–8.3)
PROTHROM AB SERPL-ACNC: 10.8 SEC — SIGNIFICANT CHANGE UP (ref 9.5–13)
PROTHROM AB SERPL-ACNC: 10.8 SEC — SIGNIFICANT CHANGE UP (ref 9.5–13)
RBC # BLD: 3.1 M/UL — LOW (ref 4.2–5.8)
RBC # BLD: 3.1 M/UL — LOW (ref 4.2–5.8)
RBC # FLD: 13.8 % — SIGNIFICANT CHANGE UP (ref 10.3–14.5)
RBC # FLD: 13.8 % — SIGNIFICANT CHANGE UP (ref 10.3–14.5)
RH IG SCN BLD-IMP: POSITIVE — SIGNIFICANT CHANGE UP
RH IG SCN BLD-IMP: POSITIVE — SIGNIFICANT CHANGE UP
SODIUM SERPL-SCNC: 133 MMOL/L — LOW (ref 135–145)
SODIUM SERPL-SCNC: 133 MMOL/L — LOW (ref 135–145)
SPECIMEN SOURCE: SIGNIFICANT CHANGE UP
SPECIMEN SOURCE: SIGNIFICANT CHANGE UP
SURGICAL PATHOLOGY STUDY: SIGNIFICANT CHANGE UP
SURGICAL PATHOLOGY STUDY: SIGNIFICANT CHANGE UP
WBC # BLD: 12.76 K/UL — HIGH (ref 3.8–10.5)
WBC # BLD: 12.76 K/UL — HIGH (ref 3.8–10.5)
WBC # FLD AUTO: 12.76 K/UL — HIGH (ref 3.8–10.5)
WBC # FLD AUTO: 12.76 K/UL — HIGH (ref 3.8–10.5)

## 2023-10-30 PROCEDURE — 74177 CT ABD & PELVIS W/CONTRAST: CPT | Mod: 26

## 2023-10-30 PROCEDURE — 99232 SBSQ HOSP IP/OBS MODERATE 35: CPT

## 2023-10-30 PROCEDURE — 36569 INSJ PICC 5 YR+ W/O IMAGING: CPT

## 2023-10-30 PROCEDURE — 93970 EXTREMITY STUDY: CPT | Mod: 26

## 2023-10-30 PROCEDURE — 73701 CT LOWER EXTREMITY W/DYE: CPT | Mod: 26,50

## 2023-10-30 PROCEDURE — 99232 SBSQ HOSP IP/OBS MODERATE 35: CPT | Mod: GC

## 2023-10-30 RX ORDER — ALBUMIN HUMAN 25 %
50 VIAL (ML) INTRAVENOUS EVERY 8 HOURS
Refills: 0 | Status: COMPLETED | OUTPATIENT
Start: 2023-10-30 | End: 2023-10-31

## 2023-10-30 RX ORDER — OXYCODONE HYDROCHLORIDE 5 MG/1
10 TABLET ORAL ONCE
Refills: 0 | Status: DISCONTINUED | OUTPATIENT
Start: 2023-10-30 | End: 2023-10-30

## 2023-10-30 RX ORDER — SODIUM CHLORIDE 9 MG/ML
10 INJECTION INTRAMUSCULAR; INTRAVENOUS; SUBCUTANEOUS
Refills: 0 | Status: DISCONTINUED | OUTPATIENT
Start: 2023-10-30 | End: 2023-10-31

## 2023-10-30 RX ORDER — CHLORHEXIDINE GLUCONATE 213 G/1000ML
1 SOLUTION TOPICAL
Refills: 0 | Status: DISCONTINUED | OUTPATIENT
Start: 2023-10-30 | End: 2023-10-31

## 2023-10-30 RX ORDER — INSULIN GLARGINE 100 [IU]/ML
6 INJECTION, SOLUTION SUBCUTANEOUS AT BEDTIME
Refills: 0 | Status: DISCONTINUED | OUTPATIENT
Start: 2023-10-30 | End: 2023-10-31

## 2023-10-30 RX ORDER — DAPTOMYCIN 500 MG/10ML
500 INJECTION, POWDER, LYOPHILIZED, FOR SOLUTION INTRAVENOUS EVERY 24 HOURS
Refills: 0 | Status: DISCONTINUED | OUTPATIENT
Start: 2023-10-31 | End: 2023-10-31

## 2023-10-30 RX ORDER — PIPERACILLIN AND TAZOBACTAM 4; .5 G/20ML; G/20ML
3.38 INJECTION, POWDER, LYOPHILIZED, FOR SOLUTION INTRAVENOUS EVERY 8 HOURS
Refills: 0 | Status: COMPLETED | OUTPATIENT
Start: 2023-10-30 | End: 2023-10-30

## 2023-10-30 RX ORDER — INSULIN LISPRO 100/ML
VIAL (ML) SUBCUTANEOUS
Refills: 0 | Status: DISCONTINUED | OUTPATIENT
Start: 2023-10-30 | End: 2023-10-31

## 2023-10-30 RX ORDER — CEFTRIAXONE 500 MG/1
2000 INJECTION, POWDER, FOR SOLUTION INTRAMUSCULAR; INTRAVENOUS EVERY 24 HOURS
Refills: 0 | Status: DISCONTINUED | OUTPATIENT
Start: 2023-10-31 | End: 2023-10-31

## 2023-10-30 RX ORDER — ALBUMIN HUMAN 25 %
50 VIAL (ML) INTRAVENOUS ONCE
Refills: 0 | Status: COMPLETED | OUTPATIENT
Start: 2023-10-30 | End: 2023-10-30

## 2023-10-30 RX ORDER — INSULIN LISPRO 100/ML
5 VIAL (ML) SUBCUTANEOUS
Refills: 0 | Status: DISCONTINUED | OUTPATIENT
Start: 2023-10-30 | End: 2023-10-31

## 2023-10-30 RX ADMIN — CARVEDILOL PHOSPHATE 12.5 MILLIGRAM(S): 80 CAPSULE, EXTENDED RELEASE ORAL at 05:50

## 2023-10-30 RX ADMIN — LISINOPRIL 40 MILLIGRAM(S): 2.5 TABLET ORAL at 13:22

## 2023-10-30 RX ADMIN — Medication 50 MILLILITER(S): at 13:20

## 2023-10-30 RX ADMIN — OXYCODONE HYDROCHLORIDE 10 MILLIGRAM(S): 5 TABLET ORAL at 13:51

## 2023-10-30 RX ADMIN — CARVEDILOL PHOSPHATE 12.5 MILLIGRAM(S): 80 CAPSULE, EXTENDED RELEASE ORAL at 18:14

## 2023-10-30 RX ADMIN — OXYCODONE HYDROCHLORIDE 10 MILLIGRAM(S): 5 TABLET ORAL at 18:48

## 2023-10-30 RX ADMIN — Medication 4 UNIT(S): at 09:43

## 2023-10-30 RX ADMIN — PIPERACILLIN AND TAZOBACTAM 25 GRAM(S): 4; .5 INJECTION, POWDER, LYOPHILIZED, FOR SOLUTION INTRAVENOUS at 05:50

## 2023-10-30 RX ADMIN — OXYCODONE HYDROCHLORIDE 10 MILLIGRAM(S): 5 TABLET ORAL at 01:38

## 2023-10-30 RX ADMIN — OXYCODONE HYDROCHLORIDE 10 MILLIGRAM(S): 5 TABLET ORAL at 02:38

## 2023-10-30 RX ADMIN — Medication 4 UNIT(S): at 13:22

## 2023-10-30 RX ADMIN — SPIRONOLACTONE 50 MILLIGRAM(S): 25 TABLET, FILM COATED ORAL at 06:16

## 2023-10-30 RX ADMIN — ENOXAPARIN SODIUM 40 MILLIGRAM(S): 100 INJECTION SUBCUTANEOUS at 18:14

## 2023-10-30 RX ADMIN — GABAPENTIN 1600 MILLIGRAM(S): 400 CAPSULE ORAL at 05:50

## 2023-10-30 RX ADMIN — GABAPENTIN 1600 MILLIGRAM(S): 400 CAPSULE ORAL at 22:18

## 2023-10-30 RX ADMIN — Medication 4 UNIT(S): at 18:26

## 2023-10-30 RX ADMIN — Medication 50 MILLILITER(S): at 22:45

## 2023-10-30 RX ADMIN — CLOPIDOGREL BISULFATE 75 MILLIGRAM(S): 75 TABLET, FILM COATED ORAL at 13:22

## 2023-10-30 RX ADMIN — INSULIN GLARGINE 6 UNIT(S): 100 INJECTION, SOLUTION SUBCUTANEOUS at 23:04

## 2023-10-30 RX ADMIN — Medication 3: at 09:43

## 2023-10-30 RX ADMIN — ATORVASTATIN CALCIUM 80 MILLIGRAM(S): 80 TABLET, FILM COATED ORAL at 22:18

## 2023-10-30 RX ADMIN — OXYCODONE HYDROCHLORIDE 10 MILLIGRAM(S): 5 TABLET ORAL at 07:46

## 2023-10-30 RX ADMIN — Medication 650 MILLIGRAM(S): at 21:41

## 2023-10-30 RX ADMIN — Medication 650 MILLIGRAM(S): at 03:15

## 2023-10-30 RX ADMIN — PIPERACILLIN AND TAZOBACTAM 25 GRAM(S): 4; .5 INJECTION, POWDER, LYOPHILIZED, FOR SOLUTION INTRAVENOUS at 13:30

## 2023-10-30 RX ADMIN — GABAPENTIN 1600 MILLIGRAM(S): 400 CAPSULE ORAL at 13:21

## 2023-10-30 RX ADMIN — Medication 81 MILLIGRAM(S): at 13:21

## 2023-10-30 RX ADMIN — OXYCODONE HYDROCHLORIDE 10 MILLIGRAM(S): 5 TABLET ORAL at 14:50

## 2023-10-30 RX ADMIN — Medication 2: at 18:27

## 2023-10-30 RX ADMIN — OXYCODONE HYDROCHLORIDE 10 MILLIGRAM(S): 5 TABLET ORAL at 19:38

## 2023-10-30 RX ADMIN — PIPERACILLIN AND TAZOBACTAM 25 GRAM(S): 4; .5 INJECTION, POWDER, LYOPHILIZED, FOR SOLUTION INTRAVENOUS at 22:18

## 2023-10-30 RX ADMIN — SENNA PLUS 2 TABLET(S): 8.6 TABLET ORAL at 22:18

## 2023-10-30 NOTE — PROGRESS NOTE ADULT - SUBJECTIVE AND OBJECTIVE BOX
INTERVAL EVENTS:    PAST MEDICAL & SURGICAL HISTORY:  IDDM (insulin dependent diabetes mellitus)    HTN (hypertension)    CAD S/P percutaneous coronary angioplasty    Neuropathy    No significant past surgical history        MEDICATIONS  (STANDING):  acetaminophen     Tablet .. 650 milliGRAM(s) Oral every 6 hours  aspirin enteric coated 81 milliGRAM(s) Oral daily  atorvastatin 80 milliGRAM(s) Oral at bedtime  carvedilol 12.5 milliGRAM(s) Oral every 12 hours  chlorhexidine 2% Cloths 1 Application(s) Topical <User Schedule>  clopidogrel Tablet 75 milliGRAM(s) Oral daily  DAPTOmycin IVPB 500 milliGRAM(s) IV Intermittent every 24 hours  dextrose 5%. 1000 milliLiter(s) (100 mL/Hr) IV Continuous <Continuous>  dextrose 5%. 1000 milliLiter(s) (50 mL/Hr) IV Continuous <Continuous>  dextrose 50% Injectable 12.5 Gram(s) IV Push once  dextrose 50% Injectable 25 Gram(s) IV Push once  dextrose 50% Injectable 25 Gram(s) IV Push once  enoxaparin Injectable 40 milliGRAM(s) SubCutaneous every 24 hours  gabapentin 1600 milliGRAM(s) Oral three times a day  glucagon  Injectable 1 milliGRAM(s) IntraMuscular once  insulin glargine Injectable (LANTUS) 3 Unit(s) SubCutaneous at bedtime  insulin lispro (ADMELOG) corrective regimen sliding scale   SubCutaneous Before meals and at bedtime  insulin lispro Injectable (ADMELOG) 4 Unit(s) SubCutaneous three times a day before meals  lisinopril 40 milliGRAM(s) Oral every 24 hours  piperacillin/tazobactam IVPB.. 3.375 Gram(s) IV Intermittent every 8 hours  senna 2 Tablet(s) Oral at bedtime  spironolactone 50 milliGRAM(s) Oral daily    MEDICATIONS  (PRN):  aluminum hydroxide/magnesium hydroxide/simethicone Suspension 30 milliLiter(s) Oral every 6 hours PRN Dyspepsia  dextrose Oral Gel 15 Gram(s) Oral once PRN Blood Glucose LESS THAN 70 milliGRAM(s)/deciliter  oxyCODONE    IR 10 milliGRAM(s) Oral every 6 hours PRN Moderate Pain (4 - 6)  sodium chloride 0.9% lock flush 10 milliLiter(s) IV Push every 1 hour PRN Pre/post blood products, medications, blood draw, and to maintain line patency      Vital Signs Last 24 Hrs  T(C): 37 (30 Oct 2023 09:35), Max: 37.1 (29 Oct 2023 15:50)  T(F): 98.6 (30 Oct 2023 09:35), Max: 98.8 (29 Oct 2023 15:50)  HR: 65 (30 Oct 2023 09:35) (58 - 69)  BP: 178/78 (30 Oct 2023 09:35) (144/75 - 196/99)  BP(mean): --  RR: 20 (30 Oct 2023 09:35) (17 - 20)  SpO2: 97% (30 Oct 2023 09:35) (96% - 100%)    Parameters below as of 30 Oct 2023 09:35  Patient On (Oxygen Delivery Method): room air         PHYSICAL EXAM:  GEN: Awake, alert. NAD.   HEENT: NCAT, PERRL, EOMI. Mucosa moist. No JVD.  RESP: CTA b/l  CV: RRR. Normal S1/S2. No m/r/g.  ABD: Soft. NT/ND. BS+  EXT: Warm. No edema, clubbing, or cyanosis.   NEURO: AAOx3. No focal deficits.     LABS:                        9.7    12.76 )-----------( 325      ( 30 Oct 2023 05:30 )             30.3     10-30    133<L>  |  100  |  22  ----------------------------<  279<H>  4.7   |  24  |  1.28    Ca    8.4      30 Oct 2023 05:30  Phos  3.2     10-30  Mg     2.1     10-30    TPro  6.6  /  Alb  2.0<L>  /  TBili  0.2  /  DBili  x   /  AST  21  /  ALT  17  /  AlkPhos  187<H>  10-30    CARDIAC MARKERS ( 29 Oct 2023 09:10 )  x     / x     / 95 U/L / x     / x          PT/INR - ( 30 Oct 2023 05:30 )   PT: 10.8 sec;   INR: 0.95          PTT - ( 30 Oct 2023 05:30 )  PTT:31.9 sec  Urinalysis Basic - ( 30 Oct 2023 05:30 )    Color: x / Appearance: x / SG: x / pH: x  Gluc: 279 mg/dL / Ketone: x  / Bili: x / Urobili: x   Blood: x / Protein: x / Nitrite: x   Leuk Esterase: x / RBC: x / WBC x   Sq Epi: x / Non Sq Epi: x / Bacteria: x      I&O's Summary    BNP  RADIOLOGY & ADDITIONAL STUDIES:    TELEMETRY:    EKG:           INTERVAL EVENTS:  Worsening LE edema. Denies shortness of breath or chest pain.   PAST MEDICAL & SURGICAL HISTORY:  IDDM (insulin dependent diabetes mellitus)    HTN (hypertension)    CAD S/P percutaneous coronary angioplasty    Neuropathy    No significant past surgical history        MEDICATIONS  (STANDING):  acetaminophen     Tablet .. 650 milliGRAM(s) Oral every 6 hours  aspirin enteric coated 81 milliGRAM(s) Oral daily  atorvastatin 80 milliGRAM(s) Oral at bedtime  carvedilol 12.5 milliGRAM(s) Oral every 12 hours  chlorhexidine 2% Cloths 1 Application(s) Topical <User Schedule>  clopidogrel Tablet 75 milliGRAM(s) Oral daily  DAPTOmycin IVPB 500 milliGRAM(s) IV Intermittent every 24 hours  dextrose 5%. 1000 milliLiter(s) (100 mL/Hr) IV Continuous <Continuous>  dextrose 5%. 1000 milliLiter(s) (50 mL/Hr) IV Continuous <Continuous>  dextrose 50% Injectable 12.5 Gram(s) IV Push once  dextrose 50% Injectable 25 Gram(s) IV Push once  dextrose 50% Injectable 25 Gram(s) IV Push once  enoxaparin Injectable 40 milliGRAM(s) SubCutaneous every 24 hours  gabapentin 1600 milliGRAM(s) Oral three times a day  glucagon  Injectable 1 milliGRAM(s) IntraMuscular once  insulin glargine Injectable (LANTUS) 3 Unit(s) SubCutaneous at bedtime  insulin lispro (ADMELOG) corrective regimen sliding scale   SubCutaneous Before meals and at bedtime  insulin lispro Injectable (ADMELOG) 4 Unit(s) SubCutaneous three times a day before meals  lisinopril 40 milliGRAM(s) Oral every 24 hours  piperacillin/tazobactam IVPB.. 3.375 Gram(s) IV Intermittent every 8 hours  senna 2 Tablet(s) Oral at bedtime  spironolactone 50 milliGRAM(s) Oral daily    MEDICATIONS  (PRN):  aluminum hydroxide/magnesium hydroxide/simethicone Suspension 30 milliLiter(s) Oral every 6 hours PRN Dyspepsia  dextrose Oral Gel 15 Gram(s) Oral once PRN Blood Glucose LESS THAN 70 milliGRAM(s)/deciliter  oxyCODONE    IR 10 milliGRAM(s) Oral every 6 hours PRN Moderate Pain (4 - 6)  sodium chloride 0.9% lock flush 10 milliLiter(s) IV Push every 1 hour PRN Pre/post blood products, medications, blood draw, and to maintain line patency      Vital Signs Last 24 Hrs  T(C): 37 (30 Oct 2023 09:35), Max: 37.1 (29 Oct 2023 15:50)  T(F): 98.6 (30 Oct 2023 09:35), Max: 98.8 (29 Oct 2023 15:50)  HR: 65 (30 Oct 2023 09:35) (58 - 69)  BP: 178/78 (30 Oct 2023 09:35) (144/75 - 196/99)  BP(mean): --  RR: 20 (30 Oct 2023 09:35) (17 - 20)  SpO2: 97% (30 Oct 2023 09:35) (96% - 100%)    Parameters below as of 30 Oct 2023 09:35  Patient On (Oxygen Delivery Method): room air         PHYSICAL EXAM:  GEN: Awake, alert. NAD.   HEENT: NCAT, Mucosa moist. No JVD.  RESP: CTA b/l  CV: +systolic murmur loudest at RUSB. Normal S1/S2. No m/r/g.  ABD: Soft. NT/ND. BS+  EXT: Warm. 2+ bilateral LE edema. R foot wrapped in dressing.  NEURO: AAOx3. No focal deficits.     LABS:                        9.7    12.76 )-----------( 325      ( 30 Oct 2023 05:30 )             30.3     10-30    133<L>  |  100  |  22  ----------------------------<  279<H>  4.7   |  24  |  1.28    Ca    8.4      30 Oct 2023 05:30  Phos  3.2     10-30  Mg     2.1     10-30    TPro  6.6  /  Alb  2.0<L>  /  TBili  0.2  /  DBili  x   /  AST  21  /  ALT  17  /  AlkPhos  187<H>  10-30    CARDIAC MARKERS ( 29 Oct 2023 09:10 )  x     / x     / 95 U/L / x     / x          PT/INR - ( 30 Oct 2023 05:30 )   PT: 10.8 sec;   INR: 0.95          PTT - ( 30 Oct 2023 05:30 )  PTT:31.9 sec  Urinalysis Basic - ( 30 Oct 2023 05:30 )    Color: x / Appearance: x / SG: x / pH: x  Gluc: 279 mg/dL / Ketone: x  / Bili: x / Urobili: x   Blood: x / Protein: x / Nitrite: x   Leuk Esterase: x / RBC: x / WBC x   Sq Epi: x / Non Sq Epi: x / Bacteria: x

## 2023-10-30 NOTE — PROGRESS NOTE ADULT - NS ATTEND AMEND GEN_ALL_CORE FT
Afebrile, WBC down slightly to 19.8k  S/p RLE angio today with shockwave lithotripsy of AT, and AT and peroneal balloon.  Continue dapto/zosyn as above  Pending PICC. Plan for 6 weeks IV abx as above
Cultures finalized (although still waiting on Corynebacterium sensis- sent out).  -Continue daptomycin 500mg IV q24h  -Stop zosyn and start ceftriaxone 2g IV q24h  -Weekly safety labs as above  -Plan will be for 6 weeks IV abx as above, although may consider stopping gram positive coverage after 3 weeks given emerging data that 3 weeks for residual OM post-debridement is adequate, and the gram positive orgs only grew in rare quantity   -Patient feels confident that he can manage the two daily antibiotic infusions at his home, and reports having social support in his building to help with this if needed. He is motivated to take antibiotics as directed to achieve best possible chance of cure of infection.   -Will require outpatient management for HCV which patient plans to receive through PCP

## 2023-10-30 NOTE — PROGRESS NOTE ADULT - ATTENDING COMMENTS
Patient was seen and examined at bedside on 10/30/2023 at 930 am. Patient reports that his b/l LE are very swollen. Denies SOB, CP. ROS is otherwise negative. Vitals, labwork and pertinent imaging reviewed. Exam - NAD, AAO x 4, PERRLA, EOMI, MMM, supple neck, chest - CTA b/l, CV - rrr, s1s2, no m/r/g, abd - soft, NTND, + BS, ext - wwp, + 2 pitting edema b/l (increased), R foot bandaged, s/p amputation of 4th toe, psych - normal affect    Plan:  -C/w Daptomycin and CTX, bone culture + for bacteria, ID following - will need 6 weeks of IV abx  -Pain control, c/w Oxycodone 10 mg PO q6 PRN with IV Morphine PRN for severe pain  -Endocrine consult given recent HbA1C ~ 13, c/w Lantus 6 units qHS and 5 units premeal  -Vascular consulted - s/p angiogram on 10/27 w/ balloon angioplasty  -Echo done showing hypokinesis and dysfunction, Cardiology consulted for HF and preoperative optimization  -PT/OT rec no skilled PT needs  -BP is better controlled - c/w Lisinopril, will change Norvasc 10 mg PO qd to Coreg and Spironolactone given evidence of HF  -C/w ASA, Plavix s/p angio  -Place PICC line, awaiting final cultures\  -Check b/l LE dopplers, CT A/P, unclear etiology of leg swelling

## 2023-10-30 NOTE — PROGRESS NOTE ADULT - ASSESSMENT
68M w/  DM (A1c 12.6%), HTN and CAD w/ PCI x2, and PAD w/ remote RLE angioplasty, presented 10/21 with R fourth toe pain (after recent short course of abx for R toe SSTI), MRI with possible 4th digit early OM up to prox phalanx s/p partial R 4th ray amputation on 10/24, proximal cx with E.coli, Pluralibacter gergoviae, MRSE, and Corynebacterium amycolatum (requested sensis). S/p RLE angiogram with AT lithotripsy and AT/peroneal balloon angioplasty 10/27.  He is HCV RNA positive log 6.17.  He knew of diagnosis previously and had plan to treat with his PCP prior to foot issues.  He plans to return for care after discharge.  Suggest:  - Place single lumen PICC line   - Discharge patient with Daptomycin 500mg IV q24h plus CTX 2g IV Q24   - Duration of antibiotics is 6 weeks ( 10/24 - 12/5 )  - Weekly labs: CMP, CBC, ESR, CRP, CK faxed to ID office at [phone]  - Patient to follow up with Dr. Jiménez in 2 weeks ([address] [place] [address], [phone]), ID office will call patient to schedule     Team 2 will follow you.    Case d/w primary team.  Final recommendation pending attending note.    Josiane Boyle, Infectious Diseases PA  Please reach out for any questions 9 am-5pm. For evenings and weekends, please call the ID physician on call.  Work cell: [phone]           68M w/  DM (A1c 12.6%), HTN and CAD w/ PCI x2, and PAD w/ remote RLE angioplasty, presented 10/21 with R fourth toe pain (after recent short course of abx for R toe SSTI), MRI with possible 4th digit early OM up to prox phalanx s/p partial R 4th ray amputation on 10/24, proximal cx with E.coli, Pluralibacter gergoviae, MRSE, and Corynebacterium amycolatum (requested sensis). S/p RLE angiogram with AT lithotripsy and AT/peroneal balloon angioplasty 10/27.  He is HCV RNA positive log 6.17.  He knew of diagnosis previously and had plan to treat with his PCP prior to foot issues.  He plans to return for care after discharge. Patient pending further imaging to eval edema (b/l LE duplex and  CT A/P w/ IV contrast as well as CT LE w/ IV contrast)- he has been refusing     Suggest:  - Place single lumen PICC line   - Discharge patient with Daptomycin 500mg IV q24h plus CTX 2g IV Q24   - Duration of antibiotics is 6 weeks ( 10/24 - 12/5 )  - Weekly labs: CMP, CBC, ESR, CRP, CK faxed to ID office at [phone]  - Patient to follow up with Dr. Jiménez in 2 weeks ([address] [place] [address], [phone]), ID office will call patient to schedule   - Patient to follow up with PCP for HCV care    Team 2 signing off. Thank you for your consultation   Please reconsult with questions or concerns.   Case d/w primary team.  Final recommendation pending attending note.    Josiane Boyle, Infectious Diseases PA  Please reach out for any questions 9 am-5pm. For evenings and weekends, please call the ID physician on call.  Work cell: [phone]           68M w/  DM (A1c 12.6%), HTN and CAD w/ PCI x2, and PAD w/ remote RLE angioplasty, presented 10/21 with R fourth toe pain (after recent short course of abx for R toe SSTI), MRI with possible 4th digit early OM up to prox phalanx s/p partial R 4th ray amputation on 10/24, proximal cx with E.coli, Pluralibacter gergoviae, MRSE, and Corynebacterium amycolatum (requested sensis). S/p RLE angiogram with AT lithotripsy and AT/peroneal balloon angioplasty 10/27.  He is HCV RNA positive log 6.17.  He knew of diagnosis previously and had plan to treat with his PCP prior to foot issues.  He plans to return for care after discharge. Patient pending further imaging to eval edema (b/l LE duplex and  CT A/P w/ IV contrast as well as CT LE w/ IV contrast)- he has been refusing.    Suggest:  - Place single lumen PICC line   - Discharge patient with Daptomycin 500mg IV q24h plus ceftriaxone 2g IV Q24   - Duration of antibiotics is 6 weeks ( 10/24 - 12/5 )  - Weekly labs: CMP, CBC, ESR, CRP, CK faxed to ID office at [phone]  - Patient to follow up with Dr. Jiménez in 2 weeks ([address] [place] [address], [phone]), ID office will call patient to schedule   - Patient to follow up with PCP for HCV care    Team 2 signing off. Thank you for your consultation   Please reconsult with questions or concerns.   Case d/w primary team.     Josiane Boyle, Infectious Diseases PA  Please reach out for any questions 9 am-5pm. For evenings and weekends, please call the ID physician on call.  Work cell: [phone]

## 2023-10-30 NOTE — PROCEDURE NOTE - NSPOSTPRCRAD_GEN_A_CORE
depth of insertion/line adjusted to depth of insertion/line in appropriate postion/postion of catheter/ultrasound tip visualized in RA-svc junction; 40cm/depth of insertion/fluoroscopy/line adjusted to depth of insertion/line in appropriate postion/postion of catheter/ultrasound

## 2023-10-30 NOTE — PROGRESS NOTE ADULT - PROBLEM SELECTOR PLAN 9
F: s/p 2L NS  E: Replete as necessary K>4 Mg>2  N: CC diet   DVT Prophylaxis: aspirin  GI prophylaxis: None   CODE STATUS: FULL  DISPO: F
F: s/p 2L NS  E: Replete as necessary K>4 Mg>2  N: CC diet   DVT Prophylaxis: aspirin  GI prophylaxis: None   CODE STATUS: FULL  DISPO: F

## 2023-10-30 NOTE — PROGRESS NOTE ADULT - SUBJECTIVE AND OBJECTIVE BOX
DAILY PROGRESS NOTE    S: Patient is upset and uncooperative w/ interview/exam. Reporting frustration that he is having progressive lower extremity edema, now to his scrotum. He denies shortness of breath or chest pain. His albumin has been steadily declining and he is HCV+. dsHe is having some 4/10 RLE foot pain. No left groin pain. Patient pending CT scan to reevaluate right foot; podiatry considering reevaluation.     O:     T(C): 36.7 (10-30-23 @ 05:31), Max: 37.1 (10-29-23 @ 15:50)  HR: 65 (10-30-23 @ 07:30) (58 - 69)  BP: 150/82 (10-30-23 @ 07:30) (144/75 - 196/99)  RR: 18 (10-30-23 @ 05:31) (17 - 20)  SpO2: 100% (10-30-23 @ 05:31) (95% - 100%)    Physical Exam:     General: Awake, alert. Uncooperative  HEENT: No JVD  CV: HDS, WWP  Pulm: On room air, no distress  Abd: Non-distended, left groin soft.   Extremity: 3+ edema, edematous scrotum. Photo of wound from this morning reviewed; 4th toe amputation w/ fibrinous slough in the wound bed.   Vascular:  Triphasic DP/PT on left    Labs:                       9.2    12.50 )-----------( 320      ( 29 Oct 2023 09:10 )             29.0     10-29    134<L>  |  100  |  23  ----------------------------<  209<H>  4.4   |  26  |  1.23    Ca    8.3<L>      29 Oct 2023 09:10  Phos  3.3     10-29  Mg     2.2     10-29    TPro  6.4  /  Alb  1.8<L>  /  TBili  0.3  /  DBili  x   /  AST  20  /  ALT  17  /  AlkPhos  204<H>  10-29    Other studies:     A/P: 67M PMH HTN, HCV, IDDM with peripheral neuropathy, CAD s/p PCI with 2 stents (3 yrs ago at [facility]) admitted for R 4th toe OM s/p R fourth digit amputation by podiatry and RLE angiogram w/ shockwave lithotripsy & ballooning of AT, and balloon angioplasty of peroneal artery on 10/27.     Local wound care per podiatry.   Continue neurovascular checks  Cont Plavix - patient has script for DC  Patient to f/u w Dr. Oneal 1-2 weeks after discharge  Team 3c to continue to follow

## 2023-10-30 NOTE — PROGRESS NOTE ADULT - PROBLEM SELECTOR PLAN 2
Patient presenting with worsening right 4th toe pain and discoloratio s/p course of IV and oral antibtiocs: Keflex and doxycycline until 10/10 endorses compliance. On presentation, he is hemodynamically stable however with leukoctyosis to 19 and fever to 101.   Right toe xrays done in ED showing no evidence of fracture or osteomyelitis   S/p 1 dose vancomycin in ED and zosyn in ED.   Patient went to surgery for toe amputation 10/24    Plan:  - Podiatry consulted, f/u recs   - vascular consulted: rec angio 10/27  - Local wound care: Applied 1/4 inch plain packing. Betadine soaked gauze applied w/ kerlix  - Pain management: Tylenol 650 q6h standing, Oxy 10 q6h PRN, morphine 2mg q6h PRN for severe  - 1x 0.5 dilaudid for severe breakthrough pain. Patient presenting with worsening right 4th toe pain and discoloratio s/p course of IV and oral antibtiocs: Keflex and doxycycline until 10/10 endorses compliance. On presentation, he is hemodynamically stable however with leukoctyosis to 19 and fever to 101.   Right toe xrays done in ED showing no evidence of fracture or osteomyelitis   S/p 1 dose vancomycin in ED and zosyn in ED.   Patient went to surgery for toe amputation 10/24  S/p angio 10/27    Plan:  - Podiatry consulted, f/u recs   - Local wound care: Applied 1/4 inch plain packing. Betadine soaked gauze applied w/ kerlix  - Pain management: Tylenol 650 q6h standing, Oxy 10 q6h PRN, gabapentin 1600 mg TID

## 2023-10-30 NOTE — PROGRESS NOTE ADULT - ASSESSMENT
67M PMH HTN, IDDM with peripheral neuropathy, CAD s/p PCI with 2 stents (3 yrs ago at [facility]), recent admission 10/3 for diabetic ssti (CT r/o absecess, no OM) discharged on doxycycline and keflex, presenting with CC of r. foot pain, found to meet SIRS crtieria with suspected source infectious of r.toe pending partial amputation. Cardiology team was consulted for pre-op clearance.    Review of studies:  EKG (10/24/2023): NSR; possible Q wave on aVL which could represent old lateral infarct in the context of past CAD s/p PCI  TTE (10/23/2023): EF 45%; Mild-to-moderate concentric left ventricular hypertrophy; Left ventricular systolic function is mildly reduced with regional wall motion abnormalities; indeterminate left ventricular diastolic function and filling pressure. Right ventricular normal size and function; TAPSE 2.38cm. Mild dilation of left atrium; Right atrium normal in size. Evidence of moderate aortic valve stenosis; AV area 1.27, peak transvalvular velocity 2.47, mean transvalvular gradient is 16.00 mmHg, and the LVOT/AV velocity ratio is 0.40. IVC compressible to <50%.    #HFmrEF  - CAD s/p PCI x2 in 2020; TTE evidencing HFmrEF  - Start spironolactone 25mg PO QD  - C/w lisinopril 40mg PO QD  - C/w coreg 12.5mg PO BID  - Start 25mg hydralazine TID for elevated BP  - Intravascular euvolemic (JVP at clavicle), would hold off on any loop diuretics  - suspect LE edema 2/2 third spacing 2/2 low albumin  - Encourage mobilization to improve b/l LE edema     #CAD  #PAD  - aspirin 81mg PO QD and Plavix 75mg po qd  - Lipitor 80mg PO QD    #Aortic stenosis  - Patient currently clinically stable; would benefit from comparison of TTE performed during admission with outpatient echo for assessment of progression  - Outpatient follow-up with established cardiologist; should obtain TTE in the next 12 months for assessment of progression    Please ensure patient has outpatient follow up with Cardiologist Dr Anni Taylor ([phone]) at [facility] within 2 weeks of DC .

## 2023-10-30 NOTE — PROGRESS NOTE ADULT - SUBJECTIVE AND OBJECTIVE BOX
INFECTIOUS DISEASES CONSULT FOLLOW-UP NOTE    INTERVAL HPI/OVERNIGHT EVENTS:    Patient was seen and examined at bedside. YEISON. Patient complains of b/l LE and groin swelling/discomfort. He has been refusing further imaging to assess. He is wanting to go home today.      ROS:   Constitutional, eyes, ENT, cardiovascular, respiratory, gastrointestinal, genitourinary, integumentary, neurological, psychiatric and heme/lymph are otherwise negative other than noted above     ANTIBIOTICS/RELEVANT:    MEDICATIONS  (STANDING):  acetaminophen     Tablet .. 650 milliGRAM(s) Oral every 6 hours  albumin human 25% IVPB 50 milliLiter(s) IV Intermittent once  aspirin enteric coated 81 milliGRAM(s) Oral daily  atorvastatin 80 milliGRAM(s) Oral at bedtime  carvedilol 12.5 milliGRAM(s) Oral every 12 hours  chlorhexidine 2% Cloths 1 Application(s) Topical <User Schedule>  clopidogrel Tablet 75 milliGRAM(s) Oral daily  DAPTOmycin IVPB 500 milliGRAM(s) IV Intermittent every 24 hours  dextrose 5%. 1000 milliLiter(s) (100 mL/Hr) IV Continuous <Continuous>  dextrose 5%. 1000 milliLiter(s) (50 mL/Hr) IV Continuous <Continuous>  dextrose 50% Injectable 25 Gram(s) IV Push once  dextrose 50% Injectable 12.5 Gram(s) IV Push once  dextrose 50% Injectable 25 Gram(s) IV Push once  enoxaparin Injectable 40 milliGRAM(s) SubCutaneous every 24 hours  gabapentin 1600 milliGRAM(s) Oral three times a day  glucagon  Injectable 1 milliGRAM(s) IntraMuscular once  insulin glargine Injectable (LANTUS) 3 Unit(s) SubCutaneous at bedtime  insulin lispro (ADMELOG) corrective regimen sliding scale   SubCutaneous Before meals and at bedtime  insulin lispro Injectable (ADMELOG) 4 Unit(s) SubCutaneous three times a day before meals  lisinopril 40 milliGRAM(s) Oral every 24 hours  piperacillin/tazobactam IVPB.. 3.375 Gram(s) IV Intermittent every 8 hours  senna 2 Tablet(s) Oral at bedtime  spironolactone 50 milliGRAM(s) Oral daily    MEDICATIONS  (PRN):  aluminum hydroxide/magnesium hydroxide/simethicone Suspension 30 milliLiter(s) Oral every 6 hours PRN Dyspepsia  dextrose Oral Gel 15 Gram(s) Oral once PRN Blood Glucose LESS THAN 70 milliGRAM(s)/deciliter  oxyCODONE    IR 10 milliGRAM(s) Oral every 6 hours PRN Moderate Pain (4 - 6)  sodium chloride 0.9% lock flush 10 milliLiter(s) IV Push every 1 hour PRN Pre/post blood products, medications, blood draw, and to maintain line patency        Vital Signs Last 24 Hrs  T(C): 37 (30 Oct 2023 09:35), Max: 37.1 (29 Oct 2023 15:50)  T(F): 98.6 (30 Oct 2023 09:35), Max: 98.8 (29 Oct 2023 15:50)  HR: 65 (30 Oct 2023 09:35) (58 - 69)  BP: 178/78 (30 Oct 2023 09:35) (144/75 - 196/99)  BP(mean): --  RR: 20 (30 Oct 2023 09:35) (17 - 20)  SpO2: 97% (30 Oct 2023 09:35) (96% - 100%)    Parameters below as of 30 Oct 2023 09:35  Patient On (Oxygen Delivery Method): room air      PHYSICAL EXAM:  Constitutional: alert, NAD  Eyes: the sclera and conjunctiva were normal.   ENT: the ears and nose were normal in appearance.   Neck: the appearance of the neck was normal and the neck was supple.   Pulmonary: no respiratory distress and lungs were clear to auscultation bilaterally.   Heart: heart rate was normal and rhythm regular, normal S1 and S2  Vascular: b/l LE edema 2+; no erythema or warmth present on exam. Mildly ttp    Abdomen: normal bowel sounds, soft, non-tender  : scrotal edema noted. nttp, no erythema or warmth    Neurological: no focal deficits.   Psychiatric: the affect was normal      LABS:                        9.7    12.76 )-----------( 325      ( 30 Oct 2023 05:30 )             30.3     10-30    133<L>  |  100  |  22  ----------------------------<  279<H>  4.7   |  24  |  1.28    Ca    8.4      30 Oct 2023 05:30  Phos  3.2     10-30  Mg     2.1     10-30    TPro  6.6  /  Alb  2.0<L>  /  TBili  0.2  /  DBili  x   /  AST  21  /  ALT  17  /  AlkPhos  187<H>  10-30    PT/INR - ( 30 Oct 2023 05:30 )   PT: 10.8 sec;   INR: 0.95          PTT - ( 30 Oct 2023 05:30 )  PTT:31.9 sec  Urinalysis Basic - ( 30 Oct 2023 05:30 )    Color: x / Appearance: x / SG: x / pH: x  Gluc: 279 mg/dL / Ketone: x  / Bili: x / Urobili: x   Blood: x / Protein: x / Nitrite: x   Leuk Esterase: x / RBC: x / WBC x   Sq Epi: x / Non Sq Epi: x / Bacteria: x        MICROBIOLOGY:  reviewed     RADIOLOGY & ADDITIONAL STUDIES:  Reviewed

## 2023-10-30 NOTE — PROGRESS NOTE ADULT - PROBLEM SELECTOR PROBLEM 4
HTN (hypertension)
Hypertensive urgency
HTN (hypertension)
Hypertensive urgency
HTN (hypertension)

## 2023-10-30 NOTE — PROGRESS NOTE ADULT - SUBJECTIVE AND OBJECTIVE BOX
O/N Events: No acute events over night.    Subjective/ROS: Patient seen and examined at bedside. Patient did not want to participate in ROS. Minimally cooperative with physical exam. States that he will go for imaging studies today and will get the PICC line placed.      VITALS  Vital Signs Last 24 Hrs  T(C): 37.1 (30 Oct 2023 21:14), Max: 37.1 (30 Oct 2023 21:14)  T(F): 98.8 (30 Oct 2023 21:14), Max: 98.8 (30 Oct 2023 21:14)  HR: 61 (30 Oct 2023 21:14) (58 - 71)  BP: 160/88 (30 Oct 2023 21:14) (141/71 - 196/97)  BP(mean): --  RR: 19 (30 Oct 2023 21:14) (17 - 20)  SpO2: 98% (30 Oct 2023 21:14) (95% - 100%)    Parameters below as of 30 Oct 2023 21:14  Patient On (Oxygen Delivery Method): room air        CAPILLARY BLOOD GLUCOSE      POCT Blood Glucose.: 235 mg/dL (30 Oct 2023 18:22)  POCT Blood Glucose.: 118 mg/dL (30 Oct 2023 13:10)  POCT Blood Glucose.: 293 mg/dL (30 Oct 2023 09:27)  POCT Blood Glucose.: 247 mg/dL (29 Oct 2023 21:49)      PHYSICAL EXAM  General: NAD  Head: NC/AT; MMM; EOMI;  Neck: Supple; no JVD  Respiratory: CTAB; no wheezes  Cardiovascular: Regular rhythm/rate; S1/S2+, + murmur  Gastrointestinal: Soft; NTND;  Extremities: WWP; no edema/cyanosis  Neurological: A&Ox3, no obvious focal deficits    Antimicrobials:  MEDICATIONS  (STANDING):  piperacillin/tazobactam IVPB.. 3.375 Gram(s) IV Intermittent every 8 hours      Standing Medications:  MEDICATIONS  (STANDING):  acetaminophen     Tablet .. 650 milliGRAM(s) Oral every 6 hours  albumin human 25% IVPB 50 milliLiter(s) IV Intermittent every 8 hours  aspirin enteric coated 81 milliGRAM(s) Oral daily  atorvastatin 80 milliGRAM(s) Oral at bedtime  carvedilol 12.5 milliGRAM(s) Oral every 12 hours  chlorhexidine 2% Cloths 1 Application(s) Topical <User Schedule>  clopidogrel Tablet 75 milliGRAM(s) Oral daily  dextrose 5%. 1000 milliLiter(s) (50 mL/Hr) IV Continuous <Continuous>  dextrose 5%. 1000 milliLiter(s) (100 mL/Hr) IV Continuous <Continuous>  dextrose 50% Injectable 12.5 Gram(s) IV Push once  dextrose 50% Injectable 25 Gram(s) IV Push once  dextrose 50% Injectable 25 Gram(s) IV Push once  enoxaparin Injectable 40 milliGRAM(s) SubCutaneous every 24 hours  gabapentin 1600 milliGRAM(s) Oral three times a day  glucagon  Injectable 1 milliGRAM(s) IntraMuscular once  insulin glargine Injectable (LANTUS) 6 Unit(s) SubCutaneous at bedtime  insulin lispro (ADMELOG) corrective regimen sliding scale   SubCutaneous Before meals and at bedtime  insulin lispro Injectable (ADMELOG) 5 Unit(s) SubCutaneous three times a day before meals  lisinopril 40 milliGRAM(s) Oral every 24 hours  piperacillin/tazobactam IVPB.. 3.375 Gram(s) IV Intermittent every 8 hours  senna 2 Tablet(s) Oral at bedtime  spironolactone 50 milliGRAM(s) Oral daily      PRN Medications:  MEDICATIONS  (PRN):  aluminum hydroxide/magnesium hydroxide/simethicone Suspension 30 milliLiter(s) Oral every 6 hours PRN Dyspepsia  dextrose Oral Gel 15 Gram(s) Oral once PRN Blood Glucose LESS THAN 70 milliGRAM(s)/deciliter  oxyCODONE    IR 10 milliGRAM(s) Oral every 6 hours PRN Moderate Pain (4 - 6)  sodium chloride 0.9% lock flush 10 milliLiter(s) IV Push every 1 hour PRN Pre/post blood products, medications, blood draw, and to maintain line patency      No Known Allergies      LABS                        9.7    12.76 )-----------( 325      ( 30 Oct 2023 05:30 )             30.3     10-30    133<L>  |  100  |  22  ----------------------------<  279<H>  4.7   |  24  |  1.28    Ca    8.4      30 Oct 2023 05:30  Phos  3.2     10-30  Mg     2.1     10-30    TPro  6.6  /  Alb  2.0<L>  /  TBili  0.2  /  DBili  x   /  AST  21  /  ALT  17  /  AlkPhos  187<H>  10-30    PT/INR - ( 30 Oct 2023 05:30 )   PT: 10.8 sec;   INR: 0.95          PTT - ( 30 Oct 2023 05:30 )  PTT:31.9 sec  Urinalysis Basic - ( 30 Oct 2023 05:30 )    Color: x / Appearance: x / SG: x / pH: x  Gluc: 279 mg/dL / Ketone: x  / Bili: x / Urobili: x   Blood: x / Protein: x / Nitrite: x   Leuk Esterase: x / RBC: x / WBC x   Sq Epi: x / Non Sq Epi: x / Bacteria: x      CARDIAC MARKERS ( 29 Oct 2023 09:10 )  x     / x     / 95 U/L / x     / x              IMAGING/EKG/ETC

## 2023-10-30 NOTE — PROGRESS NOTE ADULT - PROBLEM SELECTOR PLAN 5
Echo: Left ventricular systolic function is mildly reduced with a calculated ejection fraction of 45% with regional wall motion abnormalities.     Plan:  - Cards rec: Lisinopril 40 mg qd, spironolactone 25 mg qd, coreg 12.5 BID uptitrate as tolerated per GDMT 03-Feb-2018

## 2023-10-30 NOTE — PROGRESS NOTE ADULT - PROBLEM SELECTOR PROBLEM 8
Prophylactic measure
CAD (coronary artery disease)
Prophylactic measure
CAD (coronary artery disease)

## 2023-10-30 NOTE — PROGRESS NOTE ADULT - ASSESSMENT
67M PMH HTN, T2DM with peripheral neuropathy, CAD s/p PCI with 2 stents (3 yrs ago at [facility]), recent admission 10/3 for diabetic ssti (CT r/o absecess, no OM) discharged on doxycycline and keflex, presenting with right foot 4th digit gangrene. Pt is s/p Partial 4th ray amp on 10/26 and Angiogram on 10/27.    A1C: 12.6 %  BUN: 22  Creatinine: 1.28  GFR: 62  Weight: 65  BMI: 22.4

## 2023-10-30 NOTE — PROGRESS NOTE ADULT - PROBLEM SELECTOR PROBLEM 3
Hypertensive urgency
Leg edema
Hypertensive urgency
Leg edema

## 2023-10-30 NOTE — PROGRESS NOTE ADULT - SUBJECTIVE AND OBJECTIVE BOX
SUBJECTIVE / INTERVAL HPI: Patient was seen and examined this morning. Events of weekend reviewed. No changes to insulin regimen. New b/l LES edema to groin. Pt initially refused imaging but now amenable. Albumin 2.0 + hx of untreated HCV. Ordered for IV albumin.    CAPILLARY BLOOD GLUCOSE & INSULIN RECEIVED  209 mg/dL (10-29 @ 09:10) - Lispro 4+1  189 mg/dL (10-29 @ 10:05)  159 mg/dL (10-29 @ 12:25) - Lispro 4+1  107 mg/dL (10-29 @ 18:16) - Lispro 4. Ate chicken tenders and sw pot.  247 mg/dL (10-29 @ 21:49) - Lantus 3  279 mg/dL (10-30 @ 05:30)  293 mg/dL (10-30 @ 09:27) - Lispro 4+3. Ate eggs and  home fries.  118 mg/dL (10-30 @ 13:10)      REVIEW OF SYSTEMS  Constitutional:  Negative fever, chills or loss of appetite.  Eyes:  Negative blurry vision or double vision.  Cardiovascular:  Negative for chest pain or palpitations.  Respiratory:  Negative for cough, wheezing, or shortness of breath.    Gastrointestinal:  Negative for nausea, vomiting, diarrhea, constipation, or abdominal pain.  Genitourinary:  Negative frequency, urgency or dysuria.  Neurologic:  No headache, confusion, dizziness, lightheadedness.    PHYSICAL EXAM  Vital Signs Last 24 Hrs  T(C): 37 (30 Oct 2023 09:35), Max: 37.1 (29 Oct 2023 15:50)  T(F): 98.6 (30 Oct 2023 09:35), Max: 98.8 (29 Oct 2023 15:50)  HR: 65 (30 Oct 2023 09:35) (58 - 69)  BP: 178/78 (30 Oct 2023 09:35) (144/75 - 196/99)  BP(mean): --  RR: 20 (30 Oct 2023 09:35) (17 - 20)  SpO2: 97% (30 Oct 2023 09:35) (96% - 100%)    Parameters below as of 30 Oct 2023 09:35  Patient On (Oxygen Delivery Method): room air      HEENT: NC/AT, PERRLA, EOMI, no conjunctival pallor or scleral icterus, DMM  Neck: Supple, no JVD  Respiratory: CTA B/L. No w/r/r.   Cardiovascular: RRR, normal S1 and S2, no m/r/g.   Gastrointestinal: +BS, soft NTND, no guarding or rebound tenderness, no palpable masses   Extremities: wwp; no cyanosis, clubbing or edema. R 4th toe amputated with dressing, b/l LES pitting edema to groin  Neurological: AAOx3, no CN deficits, strength and sensation intact throughout.   Skin: No gross skin abnormalities or rashe    LABS  CBC - WBC/HGB/HTC/PLT: 12.76/9.7/30.3/325 (10-30-23)  BMP - Na/K/Cl/Bicarb/BUN/Cr/Gluc/AG/eGFR: 133/4.7/100/24/22/1.28/279/9/62 (10-30-23)  Ca - 8.4 (10-30-23)  Phos - 3.2 (10-30-23)  Mg - 2.1 (10-30-23)  LFT - Alb/Tprot/Tbili/Dbili/AlkPhos/ALT/AST: 2.0/--/0.2/--/187/17/21 (10-30-23)  PT/aPTT/INR: 10.8/31.9/0.95 (10-30-23)       MEDICATIONS  MEDICATIONS  (STANDING):  acetaminophen     Tablet .. 650 milliGRAM(s) Oral every 6 hours  albumin human 25% IVPB 50 milliLiter(s) IV Intermittent once  aspirin enteric coated 81 milliGRAM(s) Oral daily  atorvastatin 80 milliGRAM(s) Oral at bedtime  carvedilol 12.5 milliGRAM(s) Oral every 12 hours  chlorhexidine 2% Cloths 1 Application(s) Topical <User Schedule>  clopidogrel Tablet 75 milliGRAM(s) Oral daily  DAPTOmycin IVPB 500 milliGRAM(s) IV Intermittent every 24 hours  dextrose 5%. 1000 milliLiter(s) (100 mL/Hr) IV Continuous <Continuous>  dextrose 5%. 1000 milliLiter(s) (50 mL/Hr) IV Continuous <Continuous>  dextrose 50% Injectable 12.5 Gram(s) IV Push once  dextrose 50% Injectable 25 Gram(s) IV Push once  dextrose 50% Injectable 25 Gram(s) IV Push once  enoxaparin Injectable 40 milliGRAM(s) SubCutaneous every 24 hours  gabapentin 1600 milliGRAM(s) Oral three times a day  glucagon  Injectable 1 milliGRAM(s) IntraMuscular once  insulin glargine Injectable (LANTUS) 3 Unit(s) SubCutaneous at bedtime  insulin lispro (ADMELOG) corrective regimen sliding scale   SubCutaneous Before meals and at bedtime  insulin lispro Injectable (ADMELOG) 4 Unit(s) SubCutaneous three times a day before meals  lisinopril 40 milliGRAM(s) Oral every 24 hours  piperacillin/tazobactam IVPB.. 3.375 Gram(s) IV Intermittent every 8 hours  senna 2 Tablet(s) Oral at bedtime  spironolactone 50 milliGRAM(s) Oral daily    MEDICATIONS  (PRN):  aluminum hydroxide/magnesium hydroxide/simethicone Suspension 30 milliLiter(s) Oral every 6 hours PRN Dyspepsia  dextrose Oral Gel 15 Gram(s) Oral once PRN Blood Glucose LESS THAN 70 milliGRAM(s)/deciliter  oxyCODONE    IR 10 milliGRAM(s) Oral every 6 hours PRN Moderate Pain (4 - 6)  sodium chloride 0.9% lock flush 10 milliLiter(s) IV Push every 1 hour PRN Pre/post blood products, medications, blood draw, and to maintain line patency

## 2023-10-30 NOTE — PROGRESS NOTE ADULT - REASON FOR ADMISSION
Foot infection
Pharmacy requesting 90 day supply of paroxetine 40 mg  
toe pain
Foot pain
Toe OM
Foot pain
Foot pain
toe pain
Foot pain

## 2023-10-30 NOTE — PROGRESS NOTE ADULT - ATTENDING COMMENTS
Patient is a 67M PMH of CAD s/p PCI (2020), HTN, IDDM with peripheral neuropathy, recent admission 10/3 for diabetic Soft tissues infection, discharged on doxycycline and keflex, presenting with Complaint of right foot pain, found to have worsening right 4th toe gangrene with cellulitis and without palpable pulse, S/p RLE angiogram w/ shockwave lithotripsy & ballooning of AT, and balloon angioplasty of peroneal artery on 10/27.   . Cardiology team  originally consulted for pre-operative Cardiovascular Optimization.    Review of studies:  - EKG (10/24/2023): NSR;   - TTE (10/23/2023): EF 45%; Mild-to-moderate concentric left ventricular hypertrophy; Left ventricular systolic function is mildly reduced with regional wall motion abnormalities; indeterminate left ventricular diastolic function and filling pressure. Right ventricular normal size and function; TAPSE 2.38cm. Mild dilation of left atrium; Right atrium normal in size. Evidence of moderate aortic valve stenosis; AV area 1.27, peak transvalvular velocity 2.47, mean transvalvular gradient is 16.00 mmHg, and the LVOT/AV velocity ratio is 0.40. IVC compressible to <50%.    #CAD s/p PCI   - Patient with known ASCVD with CAD and PCI placed in setting of abnormal stress test.   - He reports excellent performance status, reportedly playing basketball a week prior to hospitalization. ROS is negative for chest pain, SOB or other anginal symptoms  - Per outside [facility] records, patient underwent LHC in 2020 had 2 Vessel CAD with discussion for PCI vs CABG; unclear if patient was subsequent revascularized. He later underwent PTCA and atherectomy in 2021 of the Right Ant Tibial artery after which he was started on DAPT (ASA and brilinta)  - Clinically patient remains well compensated, warm and well perfused. Physical exam is notable for 3/6 systolic murmur heard trough out the pericardium and lower extremity edema  - Echo reviewed with Moderate LVH, mildly reduced LV systolic function with RWMA. Last known EF was 60% in 12/2020  - Please resume ASA 81 mg po daily and plavix 75 mg  daily and cont with Lipitor 80 mg.   - Please obtain official collaterals from [facility] about prior workup (Echo; LHC) as ideally would prefer plavix monotherapy over ASA monotherapy given concurrent PAD and CAD and unclear if patient was revascularized  - Cont Coreg 12.5 mg po BID    # Moderate Aortic stenosis  - Patient with Moderate Aortic stenosis with AV area 1.27, peak transvalvular velocity 2.47, mean transvalvular gradient is 16.00 mmHg, and the LVOT/AV velocity ratio is 0.40 with an EF of 45%  - Would like to obtain outpatient Echo for comparison, progression of disease  - Patient will need to obtain repeat Echo in 1 year or sooner if clinically indicated  - Cont Coreg as above    #HFmrEF  - Known CAD with HFmrEF.  - Cont lisinopril 40 mg po daily  - Cont  Coreg 12.5 mg PO BID   - Cont Spironolactone 50 mg po daily  - Please initiate Hydralazine 25 mg po TID for better BP control  - Will defer SGLT2 given PAD with poor wound healing   - Given Lower extremity swelling in post procedure patient who is deferring dopplers and CT of the lower extremities, recommend mobilization, ambulation and OOBTC  - Will reassess need for diuresis on the daily     Please ensure patient has outpatient follow up with Cardiologist Dr Anni Taylor ([phone]) at [facility] within 2 weeks of DC .

## 2023-10-30 NOTE — PROGRESS NOTE ADULT - SUBJECTIVE AND OBJECTIVE BOX
Patient is a 68y old  Male who presents with a chief complaint of Foot pain (29 Oct 2023 14:47)      INTERVAL HPI/ OVERNIGHT EVENTS: Pt evaluated this morning. Pt complained about groin swelling and was noted to be verbally abusive. Numerous times resorted to cursing and yelling during evaluation. After thoroughly explaining reasoning of needed advanced imaging, pt stated that is now amenable to the US and CT scan that he denied the day before. Dressing noted to be intact and clean.        LABS                        9.2    12.50 )-----------( 320      ( 29 Oct 2023 09:10 )             29.0     10-29    134<L>  |  100  |  23  ----------------------------<  209<H>  4.4   |  26  |  1.23    Ca    8.3<L>      29 Oct 2023 09:10  Phos  3.3     10-29  Mg     2.2     10-29    TPro  6.4  /  Alb  1.8<L>  /  TBili  0.3  /  DBili  x   /  AST  20  /  ALT  17  /  AlkPhos  204<H>  10-29        ICU Vital Signs Last 24 Hrs  T(C): 36.7 (30 Oct 2023 05:31), Max: 37.1 (29 Oct 2023 15:50)  T(F): 98 (30 Oct 2023 05:31), Max: 98.8 (29 Oct 2023 15:50)  HR: 58 (30 Oct 2023 05:31) (58 - 69)  BP: 196/94 (30 Oct 2023 05:31) (144/75 - 196/99)  BP(mean): --  ABP: --  ABP(mean): --  RR: 18 (30 Oct 2023 05:31) (17 - 20)  SpO2: 100% (30 Oct 2023 05:31) (95% - 100%)    O2 Parameters below as of 30 Oct 2023 05:31  Patient On (Oxygen Delivery Method): room air            RADIOLOGY  < from: Xray Foot AP + Lateral, Right (10.24.23 @ 19:48) >  FINDINGS:  There has been interval amputation of the fourth ray at the level of the   fourth metatarsal neck. There are associated postsurgical changes in the   stump soft tissues. No other significant interval change.  IMPRESSION:  Postsurgical changes of the right foot.  < end of copied text >    MICROBIOLOGY  Culture - Tissue with Gram Stain (10.24.23 @ 15:30)    Gram Stain:   No organisms seen  No White blood cells   -  Ampicillin: S <=8 These ampicillin results predict results for amoxicillin   -  Ampicillin: S <=8 These ampicillin results predict results for amoxicillin   -  Ampicillin/Sulbactam: S <=4/2   -  Ampicillin/Sulbactam: S <=4/2   -  Cefazolin: S <=2   -  Cefazolin: R 16   -  Ceftriaxone: S <=1   -  Ceftriaxone: S <=1   -  Ciprofloxacin: S <=0.25   -  Ciprofloxacin: S <=0.25   -  Clindamycin: S 0.5   -  Ertapenem: S <=0.5   -  Ertapenem: S <=0.5   -  Erythromycin: R >4   -  Gentamicin: S <=2   -  Gentamicin: S <=2   -  Linezolid: S 1   -  Oxacillin: R >2   -  Piperacillin/Tazobactam: S <=8   -  Piperacillin/Tazobactam: S <=8   -  Rifampin: S <=1 Should not be used as monotherapy   -  Tobramycin: S <=2   -  Tobramycin: S <=2   -  Trimethoprim/Sulfamethoxazole: S <=0.5/9.5   -  Trimethoprim/Sulfamethoxazole: S <=0.5/9.5   -  Trimethoprim/Sulfamethoxazole: R >2/38   -  Vancomycin: S 2   Specimen Source: .Tissue right 4th metatarsal clean margin  or spec   Culture Results:   Few Escherichia coli  Few Pluralibacter gergoviae  Rare Staphylococcus epidermidis  Rare Corynebacterium amycolatum  Culture in progress   Organism Identification: Escherichia coli  Pluralibacter gergoviae  Staphylococcus epidermidis   Organism: Staphylococcus epidermidis   Organism: Escherichia coli   Organism: Pluralibacter gergoviae   Method Type: ARCHANA   Method Type: ARCHANA   Method Type: ARCHANA      PHYSICAL EXAM  Lower Extremity Focused  Vasc: DP/PT 2/4 b/l, erythema and edema to the R forefoot and midfoot, b/l leg edema (R >L), (+2) pitting edema R, (+1) pitting edema L, Increased warmth to RLE  Derm:  R foot surgical site wound at the distal 4th ray. Serosanguinous drainage. Fibrotic 60% granular 40% wound bed. Plantar skin just proximal to the surgical site with early signs of ischemia, with overlying serous fluid filled blister - proximally debrided to reveal only serous drainage; Macerated 2nd interspace R  Neuro: protective sensation slightly diminished  MSK: 5/5 muscle strength in all compartments

## 2023-10-30 NOTE — PROGRESS NOTE ADULT - PROBLEM SELECTOR PROBLEM 5
HTN (hypertension)
Transaminitis
HTN (hypertension)

## 2023-10-30 NOTE — PROGRESS NOTE ADULT - PROBLEM SELECTOR PLAN 1
Type 2 diabetes mellitus with hyperglycemia  - Please increase lantus to units at bedtime.   - keep lispro  units before each meal.  - Continue lispro moderate dose sliding scale before meals and at bedtime.  - Patient's fingerstick glucose goal is 100-180 mg/dL.    - Discharge recommendations to be discussed.   - Patient can follow up at discharge with [facility] by calling [phone] to make an appointment. Type 2 diabetes mellitus with hyperglycemia  - Please increase lantus to 6 units at bedtime.   - Increase lispro to 5  units before each meal.  - Continue lispro moderate dose sliding scale before meals and at bedtime.  - Patient's fingerstick glucose goal is 100-180 mg/dL.    - Discharge recommendations to be discussed.   - Patient can follow up at discharge with [facility] by calling [phone] to make an appointment.

## 2023-10-30 NOTE — PROGRESS NOTE ADULT - SUBJECTIVE AND OBJECTIVE BOX
Patient discussed in PM rounds.     - Follow up appointment with Dr. Oneal has been requested. Please reach out to our consult phone in the event that follow up is not scheduled at the time of discharge. [phone]  - Please continue aspirin, plavix and statin on discharge.   - Vascular surgery (Team 3) will now sign off. Please call if we can be of assistance. The phone number is [phone] and we can be reached there 24/7.

## 2023-10-30 NOTE — PROGRESS NOTE ADULT - TIME BILLING
As above
Insulin adjustment
As above
Managing DFI with osteomyelitis
Managing OM
Decrease in insulin
Managing OM

## 2023-10-30 NOTE — PROGRESS NOTE ADULT - ASSESSMENT
68M PMH HTN, IDDM with peripheral neuropathy, CAD s/p PCI with 2 stents (3 yrs ago at [facility]) who presents to [facility] with right toe pain. Podiatry consulted to evaluate R toe wound. Pt with prior admission on 10/1-10/3 with early ischemic signs of R 4th digit, and was discharged with PO abx (keflex,doxy). States of completing medication but has not followed up out pt with any provider. In the ED, pt febrile with elevated WBC to 19.71. At time of consult on the floors, pt is now afebrile with VSS. 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 Pt is s/p R foot partial 4th ray resection (DOS 10/24). Pt currently indicated he would not be amenable to RTOR for any additional amputation or washout although worsening leukocytosis and poor plantar skin quality noted on 10/27. Underwent angio on 10/27 which showed severe disease of TP trunk, AT, and peroneal; AT and peroneal lithotripsy and balloon angioplasty. Completion angiogram showed better filling of AT and peroneal     10/30: Pt verbally abusive during rounds this morning and noted to be yelling. Attempted to explain reasoning of needed scans and pt is now amenable. Pt still continues to refuse any surgical intervention. Explained to pt the potential of worsening R foot infection that may spread more proximally and/or into the blood. Pt is aware and is still refusing any surgical intervention.        Plan.   - Recommend CT w/wo IV contrast of Right foot and leg to r/o abscess.   -c/w IV abx per id recs  -WB status: WBAT to the R heel  -R lower extremity to remain elevated while in bed  -Surgical site cleansed with NS. Applied 1/4 inch plain packing. Betadine soaked gauze applied w/ kerlix  -Rest of care up to primary team    Podiatry following, Plan d/w

## 2023-10-30 NOTE — PROGRESS NOTE ADULT - PROBLEM SELECTOR PLAN 3
Check b/l LE ultrasounds to r/o DVT  -Check CT A/P w/ IV contrast as well as CT LE w/ IV contrast 2/2 to third spacing i/s/o infection vs HF vs ascending infection  Check b/l LE ultrasounds (-) DVT  Check CT A/P w/ IV contrast as well as CT LE w/ IV contrast (-) for abscess, hematoma    Plan:   - Albumin q8h

## 2023-10-31 ENCOUNTER — TRANSCRIPTION ENCOUNTER (OUTPATIENT)
Age: 68
End: 2023-10-31

## 2023-10-31 VITALS
DIASTOLIC BLOOD PRESSURE: 91 MMHG | HEART RATE: 64 BPM | OXYGEN SATURATION: 97 % | TEMPERATURE: 98 F | RESPIRATION RATE: 18 BRPM | SYSTOLIC BLOOD PRESSURE: 184 MMHG

## 2023-10-31 PROBLEM — Z00.00 ENCOUNTER FOR PREVENTIVE HEALTH EXAMINATION: Status: ACTIVE | Noted: 2023-10-31

## 2023-10-31 LAB
ALBUMIN SERPL ELPH-MCNC: 2.4 G/DL — LOW (ref 3.3–5)
ALBUMIN SERPL ELPH-MCNC: 2.4 G/DL — LOW (ref 3.3–5)
ALP SERPL-CCNC: 158 U/L — HIGH (ref 40–120)
ALP SERPL-CCNC: 158 U/L — HIGH (ref 40–120)
ALT FLD-CCNC: SIGNIFICANT CHANGE UP U/L (ref 10–45)
ALT FLD-CCNC: SIGNIFICANT CHANGE UP U/L (ref 10–45)
ANION GAP SERPL CALC-SCNC: 7 MMOL/L — SIGNIFICANT CHANGE UP (ref 5–17)
ANION GAP SERPL CALC-SCNC: 7 MMOL/L — SIGNIFICANT CHANGE UP (ref 5–17)
AST SERPL-CCNC: SIGNIFICANT CHANGE UP U/L (ref 10–40)
AST SERPL-CCNC: SIGNIFICANT CHANGE UP U/L (ref 10–40)
BASOPHILS # BLD AUTO: 0.06 K/UL — SIGNIFICANT CHANGE UP (ref 0–0.2)
BASOPHILS # BLD AUTO: 0.06 K/UL — SIGNIFICANT CHANGE UP (ref 0–0.2)
BASOPHILS NFR BLD AUTO: 0.4 % — SIGNIFICANT CHANGE UP (ref 0–2)
BASOPHILS NFR BLD AUTO: 0.4 % — SIGNIFICANT CHANGE UP (ref 0–2)
BILIRUB SERPL-MCNC: 0.3 MG/DL — SIGNIFICANT CHANGE UP (ref 0.2–1.2)
BILIRUB SERPL-MCNC: 0.3 MG/DL — SIGNIFICANT CHANGE UP (ref 0.2–1.2)
BUN SERPL-MCNC: 17 MG/DL — SIGNIFICANT CHANGE UP (ref 7–23)
BUN SERPL-MCNC: 17 MG/DL — SIGNIFICANT CHANGE UP (ref 7–23)
CALCIUM SERPL-MCNC: 8.7 MG/DL — SIGNIFICANT CHANGE UP (ref 8.4–10.5)
CALCIUM SERPL-MCNC: 8.7 MG/DL — SIGNIFICANT CHANGE UP (ref 8.4–10.5)
CHLORIDE SERPL-SCNC: 103 MMOL/L — SIGNIFICANT CHANGE UP (ref 96–108)
CHLORIDE SERPL-SCNC: 103 MMOL/L — SIGNIFICANT CHANGE UP (ref 96–108)
CK SERPL-CCNC: SIGNIFICANT CHANGE UP U/L (ref 30–200)
CK SERPL-CCNC: SIGNIFICANT CHANGE UP U/L (ref 30–200)
CO2 SERPL-SCNC: 24 MMOL/L — SIGNIFICANT CHANGE UP (ref 22–31)
CO2 SERPL-SCNC: 24 MMOL/L — SIGNIFICANT CHANGE UP (ref 22–31)
CREAT SERPL-MCNC: 1.22 MG/DL — SIGNIFICANT CHANGE UP (ref 0.5–1.3)
CREAT SERPL-MCNC: 1.22 MG/DL — SIGNIFICANT CHANGE UP (ref 0.5–1.3)
EGFR: 65 ML/MIN/1.73M2 — SIGNIFICANT CHANGE UP
EGFR: 65 ML/MIN/1.73M2 — SIGNIFICANT CHANGE UP
EOSINOPHIL # BLD AUTO: 0.13 K/UL — SIGNIFICANT CHANGE UP (ref 0–0.5)
EOSINOPHIL # BLD AUTO: 0.13 K/UL — SIGNIFICANT CHANGE UP (ref 0–0.5)
EOSINOPHIL NFR BLD AUTO: 0.9 % — SIGNIFICANT CHANGE UP (ref 0–6)
EOSINOPHIL NFR BLD AUTO: 0.9 % — SIGNIFICANT CHANGE UP (ref 0–6)
GLUCOSE BLDC GLUCOMTR-MCNC: 139 MG/DL — HIGH (ref 70–99)
GLUCOSE BLDC GLUCOMTR-MCNC: 139 MG/DL — HIGH (ref 70–99)
GLUCOSE BLDC GLUCOMTR-MCNC: 148 MG/DL — HIGH (ref 70–99)
GLUCOSE BLDC GLUCOMTR-MCNC: 148 MG/DL — HIGH (ref 70–99)
GLUCOSE SERPL-MCNC: 124 MG/DL — HIGH (ref 70–99)
GLUCOSE SERPL-MCNC: 124 MG/DL — HIGH (ref 70–99)
HCT VFR BLD CALC: 28.5 % — LOW (ref 39–50)
HCT VFR BLD CALC: 28.5 % — LOW (ref 39–50)
HGB BLD-MCNC: 9 G/DL — LOW (ref 13–17)
HGB BLD-MCNC: 9 G/DL — LOW (ref 13–17)
IMM GRANULOCYTES NFR BLD AUTO: 0.6 % — SIGNIFICANT CHANGE UP (ref 0–0.9)
IMM GRANULOCYTES NFR BLD AUTO: 0.6 % — SIGNIFICANT CHANGE UP (ref 0–0.9)
ISTAT ACTK (ACTIVATED CLOTTING TIME KAOLIN): 217 SEC — HIGH (ref 74–137)
ISTAT ACTK (ACTIVATED CLOTTING TIME KAOLIN): 217 SEC — HIGH (ref 74–137)
ISTAT ACTK (ACTIVATED CLOTTING TIME KAOLIN): 244 SEC — HIGH (ref 74–137)
ISTAT ACTK (ACTIVATED CLOTTING TIME KAOLIN): 244 SEC — HIGH (ref 74–137)
LYMPHOCYTES # BLD AUTO: 1.59 K/UL — SIGNIFICANT CHANGE UP (ref 1–3.3)
LYMPHOCYTES # BLD AUTO: 1.59 K/UL — SIGNIFICANT CHANGE UP (ref 1–3.3)
LYMPHOCYTES # BLD AUTO: 11.6 % — LOW (ref 13–44)
LYMPHOCYTES # BLD AUTO: 11.6 % — LOW (ref 13–44)
MAGNESIUM SERPL-MCNC: 2.2 MG/DL — SIGNIFICANT CHANGE UP (ref 1.6–2.6)
MAGNESIUM SERPL-MCNC: 2.2 MG/DL — SIGNIFICANT CHANGE UP (ref 1.6–2.6)
MCHC RBC-ENTMCNC: 31.1 PG — SIGNIFICANT CHANGE UP (ref 27–34)
MCHC RBC-ENTMCNC: 31.1 PG — SIGNIFICANT CHANGE UP (ref 27–34)
MCHC RBC-ENTMCNC: 31.6 GM/DL — LOW (ref 32–36)
MCHC RBC-ENTMCNC: 31.6 GM/DL — LOW (ref 32–36)
MCV RBC AUTO: 98.6 FL — SIGNIFICANT CHANGE UP (ref 80–100)
MCV RBC AUTO: 98.6 FL — SIGNIFICANT CHANGE UP (ref 80–100)
MONOCYTES # BLD AUTO: 1.14 K/UL — HIGH (ref 0–0.9)
MONOCYTES # BLD AUTO: 1.14 K/UL — HIGH (ref 0–0.9)
MONOCYTES NFR BLD AUTO: 8.3 % — SIGNIFICANT CHANGE UP (ref 2–14)
MONOCYTES NFR BLD AUTO: 8.3 % — SIGNIFICANT CHANGE UP (ref 2–14)
NEUTROPHILS # BLD AUTO: 10.72 K/UL — HIGH (ref 1.8–7.4)
NEUTROPHILS # BLD AUTO: 10.72 K/UL — HIGH (ref 1.8–7.4)
NEUTROPHILS NFR BLD AUTO: 78.2 % — HIGH (ref 43–77)
NEUTROPHILS NFR BLD AUTO: 78.2 % — HIGH (ref 43–77)
NRBC # BLD: 0 /100 WBCS — SIGNIFICANT CHANGE UP (ref 0–0)
NRBC # BLD: 0 /100 WBCS — SIGNIFICANT CHANGE UP (ref 0–0)
PHOSPHATE SERPL-MCNC: 3.6 MG/DL — SIGNIFICANT CHANGE UP (ref 2.5–4.5)
PHOSPHATE SERPL-MCNC: 3.6 MG/DL — SIGNIFICANT CHANGE UP (ref 2.5–4.5)
PLATELET # BLD AUTO: 325 K/UL — SIGNIFICANT CHANGE UP (ref 150–400)
PLATELET # BLD AUTO: 325 K/UL — SIGNIFICANT CHANGE UP (ref 150–400)
POTASSIUM SERPL-MCNC: SIGNIFICANT CHANGE UP MMOL/L (ref 3.5–5.3)
POTASSIUM SERPL-MCNC: SIGNIFICANT CHANGE UP MMOL/L (ref 3.5–5.3)
POTASSIUM SERPL-SCNC: SIGNIFICANT CHANGE UP MMOL/L (ref 3.5–5.3)
POTASSIUM SERPL-SCNC: SIGNIFICANT CHANGE UP MMOL/L (ref 3.5–5.3)
PROT SERPL-MCNC: 7 G/DL — SIGNIFICANT CHANGE UP (ref 6–8.3)
PROT SERPL-MCNC: 7 G/DL — SIGNIFICANT CHANGE UP (ref 6–8.3)
RBC # BLD: 2.89 M/UL — LOW (ref 4.2–5.8)
RBC # BLD: 2.89 M/UL — LOW (ref 4.2–5.8)
RBC # FLD: 13.8 % — SIGNIFICANT CHANGE UP (ref 10.3–14.5)
RBC # FLD: 13.8 % — SIGNIFICANT CHANGE UP (ref 10.3–14.5)
SODIUM SERPL-SCNC: 134 MMOL/L — LOW (ref 135–145)
SODIUM SERPL-SCNC: 134 MMOL/L — LOW (ref 135–145)
WBC # BLD: 13.72 K/UL — HIGH (ref 3.8–10.5)
WBC # BLD: 13.72 K/UL — HIGH (ref 3.8–10.5)
WBC # FLD AUTO: 13.72 K/UL — HIGH (ref 3.8–10.5)
WBC # FLD AUTO: 13.72 K/UL — HIGH (ref 3.8–10.5)

## 2023-10-31 PROCEDURE — 99231 SBSQ HOSP IP/OBS SF/LOW 25: CPT

## 2023-10-31 PROCEDURE — 99239 HOSP IP/OBS DSCHRG MGMT >30: CPT | Mod: GC

## 2023-10-31 RX ORDER — INSULIN GLARGINE 100 [IU]/ML
6 INJECTION, SOLUTION SUBCUTANEOUS
Qty: 1 | Refills: 3
Start: 2023-10-31 | End: 2024-02-27

## 2023-10-31 RX ORDER — HYDRALAZINE HCL 50 MG
25 TABLET ORAL EVERY 8 HOURS
Refills: 0 | Status: DISCONTINUED | OUTPATIENT
Start: 2023-10-31 | End: 2023-10-31

## 2023-10-31 RX ORDER — GABAPENTIN 400 MG/1
2 CAPSULE ORAL
Refills: 0 | DISCHARGE

## 2023-10-31 RX ORDER — SPIRONOLACTONE 25 MG/1
1 TABLET, FILM COATED ORAL
Qty: 30 | Refills: 3
Start: 2023-10-31 | End: 2024-02-27

## 2023-10-31 RX ORDER — GABAPENTIN 400 MG/1
2 CAPSULE ORAL
Qty: 180 | Refills: 3
Start: 2023-10-31 | End: 2024-02-27

## 2023-10-31 RX ORDER — LISINOPRIL 2.5 MG/1
1 TABLET ORAL
Qty: 30 | Refills: 3
Start: 2023-10-31 | End: 2024-02-27

## 2023-10-31 RX ORDER — OXYCODONE HYDROCHLORIDE 5 MG/1
1 TABLET ORAL
Qty: 28 | Refills: 0
Start: 2023-10-31 | End: 2023-11-06

## 2023-10-31 RX ORDER — CLOPIDOGREL BISULFATE 75 MG/1
1 TABLET, FILM COATED ORAL
Qty: 30 | Refills: 3
Start: 2023-10-31 | End: 2024-02-27

## 2023-10-31 RX ORDER — DAPTOMYCIN 500 MG/10ML
500 INJECTION, POWDER, LYOPHILIZED, FOR SOLUTION INTRAVENOUS EVERY 24 HOURS
Refills: 0 | Status: DISCONTINUED | OUTPATIENT
Start: 2023-10-31 | End: 2023-10-31

## 2023-10-31 RX ORDER — HYDRALAZINE HCL 50 MG
1 TABLET ORAL
Qty: 90 | Refills: 3
Start: 2023-10-31 | End: 2024-02-27

## 2023-10-31 RX ORDER — DAPTOMYCIN 500 MG/10ML
400 INJECTION, POWDER, LYOPHILIZED, FOR SOLUTION INTRAVENOUS EVERY 24 HOURS
Refills: 0 | Status: DISCONTINUED | OUTPATIENT
Start: 2023-10-31 | End: 2023-10-31

## 2023-10-31 RX ORDER — LISINOPRIL 2.5 MG/1
1 TABLET ORAL
Refills: 0 | DISCHARGE

## 2023-10-31 RX ORDER — CARVEDILOL PHOSPHATE 80 MG/1
1 CAPSULE, EXTENDED RELEASE ORAL
Qty: 60 | Refills: 3
Start: 2023-10-31 | End: 2024-02-27

## 2023-10-31 RX ORDER — ATORVASTATIN CALCIUM 80 MG/1
1 TABLET, FILM COATED ORAL
Qty: 30 | Refills: 3
Start: 2023-10-31 | End: 2024-02-27

## 2023-10-31 RX ORDER — INSULIN LISPRO 100/ML
5 VIAL (ML) SUBCUTANEOUS
Qty: 1 | Refills: 3
Start: 2023-10-31 | End: 2024-02-27

## 2023-10-31 RX ORDER — ASPIRIN/CALCIUM CARB/MAGNESIUM 324 MG
1 TABLET ORAL
Qty: 30 | Refills: 3
Start: 2023-10-31 | End: 2024-02-27

## 2023-10-31 RX ADMIN — Medication 30 MILLILITER(S): at 18:43

## 2023-10-31 RX ADMIN — Medication 5 UNIT(S): at 10:10

## 2023-10-31 RX ADMIN — OXYCODONE HYDROCHLORIDE 10 MILLIGRAM(S): 5 TABLET ORAL at 12:13

## 2023-10-31 RX ADMIN — Medication 650 MILLIGRAM(S): at 16:34

## 2023-10-31 RX ADMIN — OXYCODONE HYDROCHLORIDE 10 MILLIGRAM(S): 5 TABLET ORAL at 13:01

## 2023-10-31 RX ADMIN — Medication 25 MILLIGRAM(S): at 12:13

## 2023-10-31 RX ADMIN — Medication 81 MILLIGRAM(S): at 09:20

## 2023-10-31 RX ADMIN — Medication 650 MILLIGRAM(S): at 16:49

## 2023-10-31 RX ADMIN — OXYCODONE HYDROCHLORIDE 10 MILLIGRAM(S): 5 TABLET ORAL at 06:10

## 2023-10-31 RX ADMIN — Medication 650 MILLIGRAM(S): at 10:00

## 2023-10-31 RX ADMIN — Medication 650 MILLIGRAM(S): at 02:11

## 2023-10-31 RX ADMIN — CHLORHEXIDINE GLUCONATE 1 APPLICATION(S): 213 SOLUTION TOPICAL at 05:52

## 2023-10-31 RX ADMIN — DAPTOMYCIN 120 MILLIGRAM(S): 500 INJECTION, POWDER, LYOPHILIZED, FOR SOLUTION INTRAVENOUS at 16:35

## 2023-10-31 RX ADMIN — Medication 5 UNIT(S): at 13:51

## 2023-10-31 RX ADMIN — Medication 650 MILLIGRAM(S): at 09:21

## 2023-10-31 RX ADMIN — OXYCODONE HYDROCHLORIDE 10 MILLIGRAM(S): 5 TABLET ORAL at 17:48

## 2023-10-31 RX ADMIN — CARVEDILOL PHOSPHATE 12.5 MILLIGRAM(S): 80 CAPSULE, EXTENDED RELEASE ORAL at 17:24

## 2023-10-31 RX ADMIN — GABAPENTIN 1600 MILLIGRAM(S): 400 CAPSULE ORAL at 13:51

## 2023-10-31 RX ADMIN — GABAPENTIN 1600 MILLIGRAM(S): 400 CAPSULE ORAL at 05:52

## 2023-10-31 RX ADMIN — CEFTRIAXONE 100 MILLIGRAM(S): 500 INJECTION, POWDER, FOR SOLUTION INTRAMUSCULAR; INTRAVENOUS at 05:52

## 2023-10-31 RX ADMIN — Medication 50 MILLILITER(S): at 08:36

## 2023-10-31 RX ADMIN — LISINOPRIL 40 MILLIGRAM(S): 2.5 TABLET ORAL at 12:13

## 2023-10-31 RX ADMIN — CLOPIDOGREL BISULFATE 75 MILLIGRAM(S): 75 TABLET, FILM COATED ORAL at 09:20

## 2023-10-31 RX ADMIN — SPIRONOLACTONE 50 MILLIGRAM(S): 25 TABLET, FILM COATED ORAL at 05:52

## 2023-10-31 RX ADMIN — CARVEDILOL PHOSPHATE 12.5 MILLIGRAM(S): 80 CAPSULE, EXTENDED RELEASE ORAL at 05:51

## 2023-10-31 NOTE — PROGRESS NOTE ADULT - SUBJECTIVE AND OBJECTIVE BOX
SUBJECTIVE / INTERVAL HPI: Patient was seen and examined this morning. Events of weekend reviewed. No changes to insulin regimen. LES edema improved. No DVT. CTAP with normal liver and mod abd wall anasarca. Right would debrided today at bedside by podiatry. PICC placed yesterday. Eating well.    CAPILLARY BLOOD GLUCOSE & INSULIN RECEIVED  235 mg/dL (10-30 @ 18:22) - Lispro 4+2  148 mg/dL (10-30 @ 22:09) - Lantus 6  139 mg/dL (10-31 @ 09:29) - Lispro 5  148 mg/dL (10-31 @ 13:44)      REVIEW OF SYSTEMS  Constitutional:  Negative fever, chills or loss of appetite.  Eyes:  Negative blurry vision or double vision.  Cardiovascular:  Negative for chest pain or palpitations.  Respiratory:  Negative for cough, wheezing, or shortness of breath.    Gastrointestinal:  Negative for nausea, vomiting, diarrhea, constipation, or abdominal pain.  Genitourinary:  Negative frequency, urgency or dysuria.  Neurologic:  No headache, confusion, dizziness, lightheadedness.    PHYSICAL EXAM  Vital Signs Last 24 Hrs  T(C): 36.8 (31 Oct 2023 11:54), Max: 37.1 (30 Oct 2023 21:14)  T(F): 98.3 (31 Oct 2023 11:54), Max: 98.8 (30 Oct 2023 21:14)  HR: 64 (31 Oct 2023 11:54) (61 - 66)  BP: 183/87 (31 Oct 2023 11:54) (160/88 - 196/97)  BP(mean): --  RR: 16 (31 Oct 2023 11:54) (16 - 20)  SpO2: 97% (31 Oct 2023 11:54) (95% - 99%)    Parameters below as of 31 Oct 2023 11:54  Patient On (Oxygen Delivery Method): room air    HEENT: NC/AT, PERRLA, EOMI, no conjunctival pallor or scleral icterus, DMM  Neck: Supple, no JVD  Respiratory: CTA B/L. No w/r/r.   Cardiovascular: RRR, normal S1 and S2, no m/r/g.   Gastrointestinal: +BS, soft NTND, no guarding or rebound tenderness, no palpable masses   Extremities: wwp; no cyanosis, clubbing or edema. R 4th toe amputated with dressing, b/l LES pitting edema to groin  Neurological: AAOx3, no CN deficits, strength and sensation intact throughout.   Skin: No gross skin abnormalities or rashe    LABS  CBC - WBC/HGB/HTC/PLT: 13.72/9.0/28.5/325 (10-31-23)  BMP - Na/K/Cl/Bicarb/BUN/Cr/Gluc/AG/eGFR: 134/see note/103/24/17/1.22/124/7/65 (10-31-23)  Ca - 8.7 (10-31-23)  Phos - 3.6 (10-31-23)  Mg - 2.2 (10-31-23)  LFT - Alb/Tprot/Tbili/Dbili/AlkPhos/ALT/AST: 2.4/--/0.3/--/158/see note/see note (10-31-23)  PT/aPTT/INR: 10.8/31.9/0.95 (10-30-23)       MEDICATIONS  MEDICATIONS  (STANDING):  acetaminophen     Tablet .. 650 milliGRAM(s) Oral every 6 hours  aspirin enteric coated 81 milliGRAM(s) Oral daily  atorvastatin 80 milliGRAM(s) Oral at bedtime  carvedilol 12.5 milliGRAM(s) Oral every 12 hours  cefTRIAXone   IVPB 2000 milliGRAM(s) IV Intermittent every 24 hours  chlorhexidine 2% Cloths 1 Application(s) Topical <User Schedule>  clopidogrel Tablet 75 milliGRAM(s) Oral daily  DAPTOmycin IVPB 500 milliGRAM(s) IV Intermittent every 24 hours  dextrose 5%. 1000 milliLiter(s) (50 mL/Hr) IV Continuous <Continuous>  dextrose 5%. 1000 milliLiter(s) (100 mL/Hr) IV Continuous <Continuous>  dextrose 50% Injectable 12.5 Gram(s) IV Push once  dextrose 50% Injectable 25 Gram(s) IV Push once  dextrose 50% Injectable 25 Gram(s) IV Push once  enoxaparin Injectable 40 milliGRAM(s) SubCutaneous every 24 hours  gabapentin 1600 milliGRAM(s) Oral three times a day  glucagon  Injectable 1 milliGRAM(s) IntraMuscular once  hydrALAZINE 25 milliGRAM(s) Oral every 8 hours  insulin glargine Injectable (LANTUS) 6 Unit(s) SubCutaneous at bedtime  insulin lispro (ADMELOG) corrective regimen sliding scale   SubCutaneous Before meals and at bedtime  insulin lispro Injectable (ADMELOG) 5 Unit(s) SubCutaneous three times a day before meals  lisinopril 40 milliGRAM(s) Oral every 24 hours  senna 2 Tablet(s) Oral at bedtime  spironolactone 50 milliGRAM(s) Oral daily    MEDICATIONS  (PRN):  aluminum hydroxide/magnesium hydroxide/simethicone Suspension 30 milliLiter(s) Oral every 6 hours PRN Dyspepsia  dextrose Oral Gel 15 Gram(s) Oral once PRN Blood Glucose LESS THAN 70 milliGRAM(s)/deciliter  oxyCODONE    IR 10 milliGRAM(s) Oral every 6 hours PRN Moderate Pain (4 - 6)  sodium chloride 0.9% lock flush 10 milliLiter(s) IV Push every 1 hour PRN Pre/post blood products, medications, blood draw, and to maintain line patency   SUBJECTIVE / INTERVAL HPI: Patient was seen and examined this morning. Events of weekend reviewed. No changes to insulin regimen. LES edema improved. No DVT. CTAP with normal liver and mod abd wall anasarca. Right foot wound debrided today at bedside by podiatry. PICC placed yesterday. Eating well.    CAPILLARY BLOOD GLUCOSE & INSULIN RECEIVED  235 mg/dL (10-30 @ 18:22) - Lispro 4+2  148 mg/dL (10-30 @ 22:09) - Lantus 6  139 mg/dL (10-31 @ 09:29) - Lispro 5  148 mg/dL (10-31 @ 13:44)      REVIEW OF SYSTEMS  Constitutional:  Negative fever, chills or loss of appetite.  Eyes:  Negative blurry vision or double vision.  Cardiovascular:  Negative for chest pain or palpitations.  Respiratory:  Negative for cough, wheezing, or shortness of breath.    Gastrointestinal:  Negative for nausea, vomiting, diarrhea, constipation, or abdominal pain.  Genitourinary:  Negative frequency, urgency or dysuria.  Neurologic:  No headache, confusion, dizziness, lightheadedness.    PHYSICAL EXAM  Vital Signs Last 24 Hrs  T(C): 36.8 (31 Oct 2023 11:54), Max: 37.1 (30 Oct 2023 21:14)  T(F): 98.3 (31 Oct 2023 11:54), Max: 98.8 (30 Oct 2023 21:14)  HR: 64 (31 Oct 2023 11:54) (61 - 66)  BP: 183/87 (31 Oct 2023 11:54) (160/88 - 196/97)  BP(mean): --  RR: 16 (31 Oct 2023 11:54) (16 - 20)  SpO2: 97% (31 Oct 2023 11:54) (95% - 99%)    Parameters below as of 31 Oct 2023 11:54  Patient On (Oxygen Delivery Method): room air    HEENT: NC/AT, PERRLA, EOMI, no conjunctival pallor or scleral icterus, DMM  Neck: Supple, no JVD  Respiratory: CTA B/L. No w/r/r.   Cardiovascular: RRR, normal S1 and S2, no m/r/g.   Gastrointestinal: +BS, soft NTND, no guarding or rebound tenderness, no palpable masses   Extremities: wwp; no cyanosis, clubbing or edema. R 4th toe amputated with dressing, b/l LES pitting edema to groin  Neurological: AAOx3, no CN deficits, strength and sensation intact throughout.   Skin: No gross skin abnormalities or rashe    LABS  CBC - WBC/HGB/HTC/PLT: 13.72/9.0/28.5/325 (10-31-23)  BMP - Na/K/Cl/Bicarb/BUN/Cr/Gluc/AG/eGFR: 134/see note/103/24/17/1.22/124/7/65 (10-31-23)  Ca - 8.7 (10-31-23)  Phos - 3.6 (10-31-23)  Mg - 2.2 (10-31-23)  LFT - Alb/Tprot/Tbili/Dbili/AlkPhos/ALT/AST: 2.4/--/0.3/--/158/see note/see note (10-31-23)  PT/aPTT/INR: 10.8/31.9/0.95 (10-30-23)       MEDICATIONS  MEDICATIONS  (STANDING):  acetaminophen     Tablet .. 650 milliGRAM(s) Oral every 6 hours  aspirin enteric coated 81 milliGRAM(s) Oral daily  atorvastatin 80 milliGRAM(s) Oral at bedtime  carvedilol 12.5 milliGRAM(s) Oral every 12 hours  cefTRIAXone   IVPB 2000 milliGRAM(s) IV Intermittent every 24 hours  chlorhexidine 2% Cloths 1 Application(s) Topical <User Schedule>  clopidogrel Tablet 75 milliGRAM(s) Oral daily  DAPTOmycin IVPB 500 milliGRAM(s) IV Intermittent every 24 hours  dextrose 5%. 1000 milliLiter(s) (50 mL/Hr) IV Continuous <Continuous>  dextrose 5%. 1000 milliLiter(s) (100 mL/Hr) IV Continuous <Continuous>  dextrose 50% Injectable 12.5 Gram(s) IV Push once  dextrose 50% Injectable 25 Gram(s) IV Push once  dextrose 50% Injectable 25 Gram(s) IV Push once  enoxaparin Injectable 40 milliGRAM(s) SubCutaneous every 24 hours  gabapentin 1600 milliGRAM(s) Oral three times a day  glucagon  Injectable 1 milliGRAM(s) IntraMuscular once  hydrALAZINE 25 milliGRAM(s) Oral every 8 hours  insulin glargine Injectable (LANTUS) 6 Unit(s) SubCutaneous at bedtime  insulin lispro (ADMELOG) corrective regimen sliding scale   SubCutaneous Before meals and at bedtime  insulin lispro Injectable (ADMELOG) 5 Unit(s) SubCutaneous three times a day before meals  lisinopril 40 milliGRAM(s) Oral every 24 hours  senna 2 Tablet(s) Oral at bedtime  spironolactone 50 milliGRAM(s) Oral daily    MEDICATIONS  (PRN):  aluminum hydroxide/magnesium hydroxide/simethicone Suspension 30 milliLiter(s) Oral every 6 hours PRN Dyspepsia  dextrose Oral Gel 15 Gram(s) Oral once PRN Blood Glucose LESS THAN 70 milliGRAM(s)/deciliter  oxyCODONE    IR 10 milliGRAM(s) Oral every 6 hours PRN Moderate Pain (4 - 6)  sodium chloride 0.9% lock flush 10 milliLiter(s) IV Push every 1 hour PRN Pre/post blood products, medications, blood draw, and to maintain line patency

## 2023-10-31 NOTE — PROGRESS NOTE ADULT - PROBLEM SELECTOR PLAN 1
Type 2 diabetes mellitus with hyperglycemia  - Please continue lantus 6 units at bedtime.   - Continue lispro 5  units before each meal.  - Continue lispro moderate dose sliding scale before meals and at bedtime.  - Patient's fingerstick glucose goal is 100-180 mg/dL.    - Discharge recommendations to be discussed.   - Patient can follow up at discharge with [facility] by calling [phone] to make an appointment.

## 2023-10-31 NOTE — DISCHARGE NOTE NURSING/CASE MANAGEMENT/SOCIAL WORK - PATIENT PORTAL LINK FT
You can access the FollowMyHealth Patient Portal offered by [facility] by registering at the following website: [email or web address]. By joining [employer]’s FollowMyHealth portal, you will also be able to view your health information using other applications (apps) compatible with our system.

## 2023-10-31 NOTE — CHART NOTE - NSCHARTNOTEFT_GEN_A_CORE
Writer met with patient as he was preparing for discharge. He reports eating very well at the time of discharge. Education provided per DASH CST CHO diet, sources of carbohydrates, proteins and healthy fats, low sodium and emergency measures per low BS reviewed with verbalized understanding and agreement to continue POC afterdischarge. Handout per [employer] for diabetes given to reinforce education provided.

## 2023-10-31 NOTE — CHART NOTE - NSCHARTNOTEFT_GEN_A_CORE
Pt was instructed regarding wound care by bedside nursing on 10/30/23. Pt was then able to self-perform wound care and subsequently verbalized understanding of the wound care process. He was provided supplies to take home when discharged and understands how to utilize them upon teach back.     RE: PICC line insertion, which was successfully performed on 10/30/23, it was inserted at the L. basilic vein and terminates at the cavoatrial junction. Pt was instructed regarding wound care by bedside nursing on 10/30/23. Pt was then able to self-perform wound care and subsequently verbalized understanding of the wound care process. He was provided supplies to take home when discharged and understands how to utilize them upon teach back.     RE: PICC line insertion, which was successfully performed on 10/30/23, it was inserted at the L. basilic vein and terminates at the RA-SVC junction.

## 2023-10-31 NOTE — PROGRESS NOTE ADULT - SUBJECTIVE AND OBJECTIVE BOX
Patient is a 68y old  Male who presents with a chief complaint of toe pain (30 Oct 2023 11:04)      INTERVAL HPI/ OVERNIGHT EVENTS: Pt evaluated this morning. Resting comfortably in bed. Anticipating discharge today. Dressing dry intact and clean.       LABS                        9.7    12.76 )-----------( 325      ( 30 Oct 2023 05:30 )             30.3     10-30    133<L>  |  100  |  22  ----------------------------<  279<H>  4.7   |  24  |  1.28    Ca    8.4      30 Oct 2023 05:30  Phos  3.2     10-30  Mg     2.1     10-30    TPro  6.6  /  Alb  2.0<L>  /  TBili  0.2  /  DBili  x   /  AST  21  /  ALT  17  /  AlkPhos  187<H>  10-30    PT/INR - ( 30 Oct 2023 05:30 )   PT: 10.8 sec;   INR: 0.95          PTT - ( 30 Oct 2023 05:30 )  PTT:31.9 sec    ICU Vital Signs Last 24 Hrs  T(C): 36.9 (31 Oct 2023 05:06), Max: 37.1 (30 Oct 2023 21:14)  T(F): 98.4 (31 Oct 2023 05:06), Max: 98.8 (30 Oct 2023 21:14)  HR: 65 (31 Oct 2023 05:06) (61 - 71)  BP: 190/91 (31 Oct 2023 05:06) (141/71 - 196/97)  BP(mean): --  ABP: --  ABP(mean): --  RR: 20 (31 Oct 2023 05:06) (19 - 20)  SpO2: 99% (31 Oct 2023 05:06) (95% - 99%)    O2 Parameters below as of 31 Oct 2023 05:06  Patient On (Oxygen Delivery Method): room air              RADIOLOGY  < from: CT Lower Extremity w/ IV Cont, Bilateral (10.30.23 @ 19:51) >  IMPRESSION:  1.  No CT evidence of abscess or advanced osteomyelitis.  2.  Post surgical changes status post amputation of the right fourth toe.  3.  Osteoarthritis of the bilateral hips and knees.    < end of copied text >      < from: Xray Foot AP + Lateral, Right (10.24.23 @ 19:48) >  FINDINGS:  There has been interval amputation of the fourth ray at the level of the   fourth metatarsal neck. There are associated postsurgical changes in the   stump soft tissues. No other significant interval change.  IMPRESSION:  Postsurgical changes of the right foot.  < end of copied text >    MICROBIOLOGY  Culture - Tissue with Gram Stain (10.24.23 @ 15:30)    Gram Stain:   No organisms seen  No White blood cells   -  Ampicillin: S <=8 These ampicillin results predict results for amoxicillin   -  Ampicillin: S <=8 These ampicillin results predict results for amoxicillin   -  Ampicillin/Sulbactam: S <=4/2   -  Ampicillin/Sulbactam: S <=4/2   -  Cefazolin: S <=2   -  Cefazolin: R 16   -  Ceftriaxone: S <=1   -  Ceftriaxone: S <=1   -  Ciprofloxacin: S <=0.25   -  Ciprofloxacin: S <=0.25   -  Clindamycin: S 0.5   -  Ertapenem: S <=0.5   -  Ertapenem: S <=0.5   -  Erythromycin: R >4   -  Gentamicin: S <=2   -  Gentamicin: S <=2   -  Linezolid: S 1   -  Oxacillin: R >2   -  Piperacillin/Tazobactam: S <=8   -  Piperacillin/Tazobactam: S <=8   -  Rifampin: S <=1 Should not be used as monotherapy   -  Tobramycin: S <=2   -  Tobramycin: S <=2   -  Trimethoprim/Sulfamethoxazole: S <=0.5/9.5   -  Trimethoprim/Sulfamethoxazole: S <=0.5/9.5   -  Trimethoprim/Sulfamethoxazole: R >2/38   -  Vancomycin: S 2   Specimen Source: .Tissue right 4th metatarsal clean margin  or spec   Culture Results:   Few Escherichia coli  Few Pluralibacter gergoviae  Rare Staphylococcus epidermidis  Rare Corynebacterium amycolatum  Culture in progress   Organism Identification: Escherichia coli  Pluralibacter gergoviae  Staphylococcus epidermidis   Organism: Staphylococcus epidermidis   Organism: Escherichia coli   Organism: Pluralibacter gergoviae   Method Type: ARCHANA   Method Type: ARCHANA   Method Type: ARCHANA      PHYSICAL EXAM  Lower Extremity Focused  Vasc: DP/PT 2/4 b/l, erythema and edema to the R forefoot and midfoot, b/l leg edema (R >L), (+2) pitting edema R, (+1) pitting edema L, Increased warmth to RLE  Derm:  R foot surgical site wound at the distal 4th ray. Serosanguinous drainage. Fibrotic 60% granular 40% wound bed. Plantar skin just proximal to the surgical site with early signs of ischemia, with overlying serous fluid filled blister - proximally debrided to reveal only serous drainage; Macerated 2nd interspace R  Neuro: protective sensation slightly diminished  MSK: 5/5 muscle strength in all compartments

## 2023-10-31 NOTE — PROGRESS NOTE ADULT - ASSESSMENT
67M PMH HTN, T2DM with peripheral neuropathy, CAD s/p PCI with 2 stents (3 yrs ago at [facility]), recent admission 10/3 for diabetic ssti (CT r/o absecess, no OM) discharged on doxycycline and keflex, presenting with right foot 4th digit gangrene. Pt is s/p Partial 4th ray amp on 10/26 and Angiogram on 10/27.    A1C: 12.6 %  BUN: 17  Creatinine: 1.22  GFR: 65  Weight: 65  BMI: 22.4

## 2023-10-31 NOTE — PROGRESS NOTE ADULT - ASSESSMENT
68M PMH HTN, IDDM with peripheral neuropathy, CAD s/p PCI with 2 stents (3 yrs ago at [facility]) who presents to [facility] with right toe pain. Podiatry consulted to evaluate R toe wound. Pt with prior admission on 10/1-10/3 with early ischemic signs of R 4th digit, and was discharged with PO abx (keflex,doxy). States of completing medication but has not followed up out pt with any provider. In the ED, pt febrile with elevated WBC to 19.71. At time of consult on the floors, pt is now afebrile with VSS. 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 Pt is s/p R foot partial 4th ray resection (DOS 10/24). Pt currently indicated he would not be amenable to RTOR for any additional amputation or washout although worsening leukocytosis and poor plantar skin quality noted on 10/27. Underwent angio on 10/27 which showed severe disease of TP trunk, AT, and peroneal; AT and peroneal lithotripsy and balloon angioplasty. Completion angiogram showed better filling of AT and peroneal     10/31: reduced swelling and erythema. Soft tissue non-adhered skin tissue removed bedside.        Plan.   -c/w IV abx per id recs  -WB status: WBAT to the R heel  -R lower extremity to remain elevated while in bed  - Sharp excisional debridement of plantar non-adhered skin with sterile #10 blade, down to and including dermis. Pt tolerated well   -Surgical site cleansed with NS. Applied 1/4 inch plain packing. Betadine soaked gauze applied w/ kerlix  -Rest of care up to primary team      Discharge Instructions   - Local wound care: Cleanse with NS. Apply packing into the R 4th surgical site. Covered with betadine soaked gauze and Kerlix     Pt can follow up within 1-2 weeks at the following clinic.   [facility] ([facility]) at the [employer]   [address] [place] [address] [phone]    [facility]   Address: [address] [place] [id number]  Phone: [phone]    Podiatry following, Plan d/w with attending  68M PMH HTN, IDDM with peripheral neuropathy, CAD s/p PCI with 2 stents (3 yrs ago at [facility]) who presents to [facility] with right toe pain. Podiatry consulted to evaluate R toe wound. Pt with prior admission on 10/1-10/3 with early ischemic signs of R 4th digit, and was discharged with PO abx (keflex,doxy). States of completing medication but has not followed up out pt with any provider. In the ED, pt febrile with elevated WBC to 19.71. At time of consult on the floors, pt is now afebrile with VSS. 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 Pt is s/p R foot partial 4th ray resection (DOS 10/24). Pt currently indicated he would not be amenable to RTOR for any additional amputation or washout although worsening leukocytosis and poor plantar skin quality noted on 10/27. Underwent angio on 10/27 which showed severe disease of TP trunk, AT, and peroneal; AT and peroneal lithotripsy and balloon angioplasty. Completion angiogram showed better filling of AT and peroneal     10/31: Discussed with pt the need for further debridement at minimum, due to the fibrotic nature fo the R foot wound. Pt adamantly refusing any surgical intervention and states he would like to leave today. Explained to pt that any delay in intervention will likely lead to worsening of infection and further gangrenous changes may occur. This may lead to a more proximal amputation and or septicemia Pt is aware and states the would like to defer any and all surgical intervention and would like to leave. The right foot today does have a reduction of swelling and erythema. Soft tissue non-adhered skin tissue removed bedside.        Plan.   -c/w IV abx per id recs  -WB status: WBAT to the R heel  -R lower extremity to remain elevated while in bed  - Sharp excisional debridement of plantar non-adhered skin with sterile #10 blade, down to and including dermis. Pt tolerated well   -Surgical site cleansed with NS. Applied 1/4 inch plain packing. Betadine soaked gauze applied w/ kerlix  -Rest of care up to primary team      Discharge Instructions   - Local wound care: Cleanse with NS. Apply packing into the R 4th surgical site. Covered with betadine soaked gauze and Kerlix   - IV abx per ID recs     Pt can follow up within 1-2 weeks at the following clinic.   [facility] ([facility]) at the [employer]  [address] [place] [address]  [phone]    [facility] Podiatry clinic   Address: [address] [place] [address]  Phone: [phone]    Podiatry following, Plan d/w with attending

## 2023-10-31 NOTE — PROGRESS NOTE ADULT - PROVIDER SPECIALTY LIST ADULT
Cardiology
Cardiology
Endocrinology
Infectious Disease
Podiatry
Vascular Surgery
Endocrinology
Infectious Disease
Infectious Disease
Podiatry
Vascular Surgery
Endocrinology
Podiatry
Rehab Medicine
Infectious Disease
Endocrinology
Endocrinology
Internal Medicine
Hospitalist

## 2023-10-31 NOTE — DISCHARGE NOTE NURSING/CASE MANAGEMENT/SOCIAL WORK - NSDCFUADDAPPT_GEN_ALL_CORE_FT
Please bring your Insurance card, Photo ID and Discharge paperwork to the following appointments:    (1) Please follow up with your Podiatry Provider, Dr. Tom Camacho at [address] [facility], [facility] [place] [address] on 11/06/2023 at 10:30am.    Appointment was scheduled by Ms. BRAULIO Ventura, Referral Coordinator.    (2) Please follow up with [facility] with Dr. Elsy Lopez on behalf of Dr. Gary Eduardo at [address], [place] [address] on 11/06/2023 at 1:30pm.    Appointment was scheduled by Ms. BRAULIO Ventura, Referral Coordinator.    (3) Please follow up with your Cardiology Provider, Dr. Anni Taylor at [address] [place] [address], [address] [place] [address] on 11/08/2023 at 9:30am.    Appointment was scheduled by Ms. BRAULIO Ventura, Referral Coordinator.    (4) Please follow up with your Endocrinology Provider, Dr. Perico Stewart at [address], [facility] [place] [address] on 11/14/2023 at 11:00am.    Appointment was scheduled by Ms. BRAULIO Ventura, Referral Coordinator.    (5) Please follow up your Infectious Disease Provider, Dr. Gordo Jiménez at [address], [place] [address] on 11/17/2023 at 2:00pm.    Appointment was scheduled by Ms. BRALUIO Ventura, Referral Coordinator.    Appointment was facilitated by Ms. BRAULIO Ventura Referral Coordinator.

## 2023-11-01 LAB
HCV GENTYP BLD NAA+PROBE: SIGNIFICANT CHANGE UP
HCV GENTYP BLD NAA+PROBE: SIGNIFICANT CHANGE UP

## 2023-11-06 ENCOUNTER — APPOINTMENT (OUTPATIENT)
Dept: INTERNAL MEDICINE | Facility: CLINIC | Age: 68
End: 2023-11-06

## 2023-11-06 ENCOUNTER — NON-APPOINTMENT (OUTPATIENT)
Age: 68
End: 2023-11-06

## 2023-11-06 LAB
MISCELLANEOUS TEST NAME: SIGNIFICANT CHANGE UP
MISCELLANEOUS TEST NAME: SIGNIFICANT CHANGE UP

## 2023-11-07 ENCOUNTER — INPATIENT (INPATIENT)
Facility: HOSPITAL | Age: 68
LOS: 2 days | Discharge: LEFT BEFORE DISCHARGE | DRG: 299 | End: 2023-11-10
Attending: INTERNAL MEDICINE | Admitting: STUDENT IN AN ORGANIZED HEALTH CARE EDUCATION/TRAINING PROGRAM
Payer: MEDICARE

## 2023-11-07 VITALS
HEART RATE: 85 BPM | OXYGEN SATURATION: 99 % | RESPIRATION RATE: 17 BRPM | HEIGHT: 67 IN | DIASTOLIC BLOOD PRESSURE: 113 MMHG | SYSTOLIC BLOOD PRESSURE: 222 MMHG | TEMPERATURE: 99 F

## 2023-11-07 DIAGNOSIS — Z53.09 PROCEDURE AND TREATMENT NOT CARRIED OUT BECAUSE OF OTHER CONTRAINDICATION: ICD-10-CM

## 2023-11-07 DIAGNOSIS — N50.89 OTHER SPECIFIED DISORDERS OF THE MALE GENITAL ORGANS: ICD-10-CM

## 2023-11-07 DIAGNOSIS — I35.0 NONRHEUMATIC AORTIC (VALVE) STENOSIS: ICD-10-CM

## 2023-11-07 DIAGNOSIS — Z79.4 LONG TERM (CURRENT) USE OF INSULIN: ICD-10-CM
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
## 2023-11-07 DIAGNOSIS — B96.89 OTHER SPECIFIED BACTERIAL AGENTS AS THE CAUSE OF DISEASES CLASSIFIED ELSEWHERE: ICD-10-CM

## 2023-11-07 DIAGNOSIS — I50.9 HEART FAILURE, UNSPECIFIED: Chronic | ICD-10-CM

## 2023-11-07 DIAGNOSIS — I96 GANGRENE, NOT ELSEWHERE CLASSIFIED: Chronic | ICD-10-CM

## 2023-11-07 DIAGNOSIS — I25.10 ATHEROSCLEROTIC HEART DISEASE OF NATIVE CORONARY ARTERY WITHOUT ANGINA PECTORIS: ICD-10-CM
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
## 2023-11-07 DIAGNOSIS — A41.9 SEPSIS, UNSPECIFIED ORGANISM: ICD-10-CM

## 2023-11-07 DIAGNOSIS — R60.0 LOCALIZED EDEMA: ICD-10-CM

## 2023-11-07 DIAGNOSIS — J18.9 PNEUMONIA, UNSPECIFIED ORGANISM: Chronic | ICD-10-CM

## 2023-11-07 DIAGNOSIS — L03.115 CELLULITIS OF RIGHT LOWER LIMB: ICD-10-CM

## 2023-11-07 DIAGNOSIS — R74.01 ELEVATION OF LEVELS OF LIVER TRANSAMINASE LEVELS: ICD-10-CM

## 2023-11-07 DIAGNOSIS — I50.22 CHRONIC SYSTOLIC (CONGESTIVE) HEART FAILURE: ICD-10-CM

## 2023-11-07 DIAGNOSIS — S90.821A BLISTER (NONTHERMAL), RIGHT FOOT, INITIAL ENCOUNTER: ICD-10-CM

## 2023-11-07 DIAGNOSIS — L97.519 NON-PRESSURE CHRONIC ULCER OF OTHER PART OF RIGHT FOOT WITH UNSPECIFIED SEVERITY: ICD-10-CM

## 2023-11-07 DIAGNOSIS — Z95.5 PRESENCE OF CORONARY ANGIOPLASTY IMPLANT AND GRAFT: ICD-10-CM

## 2023-11-07 DIAGNOSIS — B95.7 OTHER STAPHYLOCOCCUS AS THE CAUSE OF DISEASES CLASSIFIED ELSEWHERE: ICD-10-CM

## 2023-11-07 DIAGNOSIS — B96.20 UNSPECIFIED ESCHERICHIA COLI [E. COLI] AS THE CAUSE OF DISEASES CLASSIFIED ELSEWHERE: ICD-10-CM

## 2023-11-07 DIAGNOSIS — E11.52 TYPE 2 DIABETES MELLITUS WITH DIABETIC PERIPHERAL ANGIOPATHY WITH GANGRENE: ICD-10-CM

## 2023-11-07 DIAGNOSIS — I16.0 HYPERTENSIVE URGENCY: ICD-10-CM

## 2023-11-07 DIAGNOSIS — Z16.11 RESISTANCE TO PENICILLINS: ICD-10-CM
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
## 2023-11-07 DIAGNOSIS — I11.0 HYPERTENSIVE HEART DISEASE WITH HEART FAILURE: ICD-10-CM

## 2023-11-07 DIAGNOSIS — E11.65 TYPE 2 DIABETES MELLITUS WITH HYPERGLYCEMIA: ICD-10-CM

## 2023-11-07 DIAGNOSIS — E11.42 TYPE 2 DIABETES MELLITUS WITH DIABETIC POLYNEUROPATHY: ICD-10-CM

## 2023-11-07 DIAGNOSIS — Z11.52 ENCOUNTER FOR SCREENING FOR COVID-19: ICD-10-CM

## 2023-11-07 DIAGNOSIS — I96 GANGRENE, NOT ELSEWHERE CLASSIFIED: ICD-10-CM

## 2023-11-07 DIAGNOSIS — E11.621 TYPE 2 DIABETES MELLITUS WITH FOOT ULCER: ICD-10-CM

## 2023-11-07 DIAGNOSIS — I70.261 ATHEROSCLEROSIS OF NATIVE ARTERIES OF EXTREMITIES WITH GANGRENE, RIGHT LEG: ICD-10-CM

## 2023-11-07 DIAGNOSIS — E11.649 TYPE 2 DIABETES MELLITUS WITH HYPOGLYCEMIA WITHOUT COMA: ICD-10-CM

## 2023-11-07 DIAGNOSIS — Z89.421 ACQUIRED ABSENCE OF OTHER RIGHT TOE(S): Chronic | ICD-10-CM

## 2023-11-07 LAB
ALBUMIN SERPL ELPH-MCNC: 2 G/DL — LOW (ref 3.4–5)
ALBUMIN SERPL ELPH-MCNC: 2 G/DL — LOW (ref 3.4–5)
ALP SERPL-CCNC: 177 U/L — HIGH (ref 40–120)
ALP SERPL-CCNC: 177 U/L — HIGH (ref 40–120)
ALT FLD-CCNC: 81 U/L — HIGH (ref 12–42)
ALT FLD-CCNC: 81 U/L — HIGH (ref 12–42)
ANION GAP SERPL CALC-SCNC: 6 MMOL/L — LOW (ref 9–16)
ANION GAP SERPL CALC-SCNC: 6 MMOL/L — LOW (ref 9–16)
APPEARANCE UR: CLEAR — SIGNIFICANT CHANGE UP
APPEARANCE UR: CLEAR — SIGNIFICANT CHANGE UP
AST SERPL-CCNC: 75 U/L — HIGH (ref 15–37)
AST SERPL-CCNC: 75 U/L — HIGH (ref 15–37)
BASOPHILS # BLD AUTO: 0.04 K/UL — SIGNIFICANT CHANGE UP (ref 0–0.2)
BASOPHILS # BLD AUTO: 0.04 K/UL — SIGNIFICANT CHANGE UP (ref 0–0.2)
BASOPHILS NFR BLD AUTO: 0.2 % — SIGNIFICANT CHANGE UP (ref 0–2)
BASOPHILS NFR BLD AUTO: 0.2 % — SIGNIFICANT CHANGE UP (ref 0–2)
BILIRUB SERPL-MCNC: 0.4 MG/DL — SIGNIFICANT CHANGE UP (ref 0.2–1.2)
BILIRUB SERPL-MCNC: 0.4 MG/DL — SIGNIFICANT CHANGE UP (ref 0.2–1.2)
BILIRUB UR-MCNC: NEGATIVE — SIGNIFICANT CHANGE UP
BILIRUB UR-MCNC: NEGATIVE — SIGNIFICANT CHANGE UP
BUN SERPL-MCNC: 25 MG/DL — HIGH (ref 7–23)
BUN SERPL-MCNC: 25 MG/DL — HIGH (ref 7–23)
CALCIUM SERPL-MCNC: 9.1 MG/DL — SIGNIFICANT CHANGE UP (ref 8.5–10.5)
CALCIUM SERPL-MCNC: 9.1 MG/DL — SIGNIFICANT CHANGE UP (ref 8.5–10.5)
CHLORIDE SERPL-SCNC: 102 MMOL/L — SIGNIFICANT CHANGE UP (ref 96–108)
CHLORIDE SERPL-SCNC: 102 MMOL/L — SIGNIFICANT CHANGE UP (ref 96–108)
CO2 SERPL-SCNC: 27 MMOL/L — SIGNIFICANT CHANGE UP (ref 22–31)
CO2 SERPL-SCNC: 27 MMOL/L — SIGNIFICANT CHANGE UP (ref 22–31)
COLOR SPEC: YELLOW — SIGNIFICANT CHANGE UP
COLOR SPEC: YELLOW — SIGNIFICANT CHANGE UP
CREAT SERPL-MCNC: 1.28 MG/DL — SIGNIFICANT CHANGE UP (ref 0.5–1.3)
CREAT SERPL-MCNC: 1.28 MG/DL — SIGNIFICANT CHANGE UP (ref 0.5–1.3)
DIFF PNL FLD: ABNORMAL
DIFF PNL FLD: ABNORMAL
EGFR: 62 ML/MIN/1.73M2 — SIGNIFICANT CHANGE UP
EGFR: 62 ML/MIN/1.73M2 — SIGNIFICANT CHANGE UP
EOSINOPHIL # BLD AUTO: 0.01 K/UL — SIGNIFICANT CHANGE UP (ref 0–0.5)
EOSINOPHIL # BLD AUTO: 0.01 K/UL — SIGNIFICANT CHANGE UP (ref 0–0.5)
EOSINOPHIL NFR BLD AUTO: 0.1 % — SIGNIFICANT CHANGE UP (ref 0–6)
EOSINOPHIL NFR BLD AUTO: 0.1 % — SIGNIFICANT CHANGE UP (ref 0–6)
FLUAV AG NPH QL: SIGNIFICANT CHANGE UP
FLUAV AG NPH QL: SIGNIFICANT CHANGE UP
FLUBV AG NPH QL: SIGNIFICANT CHANGE UP
FLUBV AG NPH QL: SIGNIFICANT CHANGE UP
GLUCOSE BLDC GLUCOMTR-MCNC: 393 MG/DL — HIGH (ref 70–99)
GLUCOSE BLDC GLUCOMTR-MCNC: 393 MG/DL — HIGH (ref 70–99)
GLUCOSE BLDC GLUCOMTR-MCNC: 409 MG/DL — HIGH (ref 70–99)
GLUCOSE BLDC GLUCOMTR-MCNC: 409 MG/DL — HIGH (ref 70–99)
GLUCOSE SERPL-MCNC: 421 MG/DL — HIGH (ref 70–99)
GLUCOSE SERPL-MCNC: 421 MG/DL — HIGH (ref 70–99)
GLUCOSE UR QL: >=1000 MG/DL
GLUCOSE UR QL: >=1000 MG/DL
HCT VFR BLD CALC: 28.4 % — LOW (ref 39–50)
HCT VFR BLD CALC: 28.4 % — LOW (ref 39–50)
HGB BLD-MCNC: 8.9 G/DL — LOW (ref 13–17)
HGB BLD-MCNC: 8.9 G/DL — LOW (ref 13–17)
IMM GRANULOCYTES NFR BLD AUTO: 0.5 % — SIGNIFICANT CHANGE UP (ref 0–0.9)
IMM GRANULOCYTES NFR BLD AUTO: 0.5 % — SIGNIFICANT CHANGE UP (ref 0–0.9)
KETONES UR-MCNC: NEGATIVE MG/DL — SIGNIFICANT CHANGE UP
KETONES UR-MCNC: NEGATIVE MG/DL — SIGNIFICANT CHANGE UP
LACTATE BLDV-MCNC: 1.7 MMOL/L — SIGNIFICANT CHANGE UP (ref 0.5–2)
LACTATE BLDV-MCNC: 1.7 MMOL/L — SIGNIFICANT CHANGE UP (ref 0.5–2)
LACTATE BLDV-MCNC: 1.9 MMOL/L — SIGNIFICANT CHANGE UP (ref 0.5–2)
LACTATE BLDV-MCNC: 1.9 MMOL/L — SIGNIFICANT CHANGE UP (ref 0.5–2)
LEUKOCYTE ESTERASE UR-ACNC: NEGATIVE — SIGNIFICANT CHANGE UP
LEUKOCYTE ESTERASE UR-ACNC: NEGATIVE — SIGNIFICANT CHANGE UP
LIDOCAIN IGE QN: 24 U/L — SIGNIFICANT CHANGE UP (ref 16–77)
LIDOCAIN IGE QN: 24 U/L — SIGNIFICANT CHANGE UP (ref 16–77)
LYMPHOCYTES # BLD AUTO: 1.33 K/UL — SIGNIFICANT CHANGE UP (ref 1–3.3)
LYMPHOCYTES # BLD AUTO: 1.33 K/UL — SIGNIFICANT CHANGE UP (ref 1–3.3)
LYMPHOCYTES # BLD AUTO: 7.7 % — LOW (ref 13–44)
LYMPHOCYTES # BLD AUTO: 7.7 % — LOW (ref 13–44)
MAGNESIUM SERPL-MCNC: 2.1 MG/DL — SIGNIFICANT CHANGE UP (ref 1.6–2.6)
MAGNESIUM SERPL-MCNC: 2.1 MG/DL — SIGNIFICANT CHANGE UP (ref 1.6–2.6)
MCHC RBC-ENTMCNC: 30.8 PG — SIGNIFICANT CHANGE UP (ref 27–34)
MCHC RBC-ENTMCNC: 30.8 PG — SIGNIFICANT CHANGE UP (ref 27–34)
MCHC RBC-ENTMCNC: 31.3 GM/DL — LOW (ref 32–36)
MCHC RBC-ENTMCNC: 31.3 GM/DL — LOW (ref 32–36)
MCV RBC AUTO: 98.3 FL — SIGNIFICANT CHANGE UP (ref 80–100)
MCV RBC AUTO: 98.3 FL — SIGNIFICANT CHANGE UP (ref 80–100)
MONOCYTES # BLD AUTO: 1.13 K/UL — HIGH (ref 0–0.9)
MONOCYTES # BLD AUTO: 1.13 K/UL — HIGH (ref 0–0.9)
MONOCYTES NFR BLD AUTO: 6.6 % — SIGNIFICANT CHANGE UP (ref 2–14)
MONOCYTES NFR BLD AUTO: 6.6 % — SIGNIFICANT CHANGE UP (ref 2–14)
NEUTROPHILS # BLD AUTO: 14.62 K/UL — HIGH (ref 1.8–7.4)
NEUTROPHILS # BLD AUTO: 14.62 K/UL — HIGH (ref 1.8–7.4)
NEUTROPHILS NFR BLD AUTO: 84.9 % — HIGH (ref 43–77)
NEUTROPHILS NFR BLD AUTO: 84.9 % — HIGH (ref 43–77)
NITRITE UR-MCNC: NEGATIVE — SIGNIFICANT CHANGE UP
NITRITE UR-MCNC: NEGATIVE — SIGNIFICANT CHANGE UP
NRBC # BLD: 0 /100 WBCS — SIGNIFICANT CHANGE UP (ref 0–0)
NRBC # BLD: 0 /100 WBCS — SIGNIFICANT CHANGE UP (ref 0–0)
NT-PROBNP SERPL-SCNC: HIGH PG/ML
NT-PROBNP SERPL-SCNC: HIGH PG/ML
PCO2 BLDV: 49 MMHG — SIGNIFICANT CHANGE UP (ref 42–55)
PCO2 BLDV: 49 MMHG — SIGNIFICANT CHANGE UP (ref 42–55)
PCP SPEC-MCNC: SIGNIFICANT CHANGE UP
PCP SPEC-MCNC: SIGNIFICANT CHANGE UP
PH BLDV: 7.34 — SIGNIFICANT CHANGE UP (ref 7.32–7.43)
PH BLDV: 7.34 — SIGNIFICANT CHANGE UP (ref 7.32–7.43)
PH UR: 5.5 — SIGNIFICANT CHANGE UP (ref 5–8)
PH UR: 5.5 — SIGNIFICANT CHANGE UP (ref 5–8)
PLATELET # BLD AUTO: 347 K/UL — SIGNIFICANT CHANGE UP (ref 150–400)
PLATELET # BLD AUTO: 347 K/UL — SIGNIFICANT CHANGE UP (ref 150–400)
PO2 BLDV: <35 MMHG — SIGNIFICANT CHANGE UP (ref 25–45)
PO2 BLDV: <35 MMHG — SIGNIFICANT CHANGE UP (ref 25–45)
POTASSIUM SERPL-MCNC: 4.6 MMOL/L — SIGNIFICANT CHANGE UP (ref 3.5–5.3)
POTASSIUM SERPL-MCNC: 4.6 MMOL/L — SIGNIFICANT CHANGE UP (ref 3.5–5.3)
POTASSIUM SERPL-SCNC: 4.6 MMOL/L — SIGNIFICANT CHANGE UP (ref 3.5–5.3)
POTASSIUM SERPL-SCNC: 4.6 MMOL/L — SIGNIFICANT CHANGE UP (ref 3.5–5.3)
PROT SERPL-MCNC: 7.6 G/DL — SIGNIFICANT CHANGE UP (ref 6.4–8.2)
PROT SERPL-MCNC: 7.6 G/DL — SIGNIFICANT CHANGE UP (ref 6.4–8.2)
PROT UR-MCNC: >=1000 MG/DL
PROT UR-MCNC: >=1000 MG/DL
RBC # BLD: 2.89 M/UL — LOW (ref 4.2–5.8)
RBC # BLD: 2.89 M/UL — LOW (ref 4.2–5.8)
RBC # FLD: 14.5 % — SIGNIFICANT CHANGE UP (ref 10.3–14.5)
RBC # FLD: 14.5 % — SIGNIFICANT CHANGE UP (ref 10.3–14.5)
RSV RNA NPH QL NAA+NON-PROBE: SIGNIFICANT CHANGE UP
RSV RNA NPH QL NAA+NON-PROBE: SIGNIFICANT CHANGE UP
SAO2 % BLDV: 58.9 % — LOW (ref 67–88)
SAO2 % BLDV: 58.9 % — LOW (ref 67–88)
SARS-COV-2 RNA SPEC QL NAA+PROBE: SIGNIFICANT CHANGE UP
SARS-COV-2 RNA SPEC QL NAA+PROBE: SIGNIFICANT CHANGE UP
SODIUM SERPL-SCNC: 135 MMOL/L — SIGNIFICANT CHANGE UP (ref 132–145)
SODIUM SERPL-SCNC: 135 MMOL/L — SIGNIFICANT CHANGE UP (ref 132–145)
SP GR SPEC: 1.03 — HIGH (ref 1–1.03)
SP GR SPEC: 1.03 — HIGH (ref 1–1.03)
TROPONIN I, HIGH SENSITIVITY RESULT: 82.3 NG/L — HIGH
TROPONIN I, HIGH SENSITIVITY RESULT: 82.3 NG/L — HIGH
TROPONIN I, HIGH SENSITIVITY RESULT: 89.7 NG/L — HIGH
TROPONIN I, HIGH SENSITIVITY RESULT: 89.7 NG/L — HIGH
UROBILINOGEN FLD QL: 0.2 MG/DL — SIGNIFICANT CHANGE UP (ref 0.2–1)
UROBILINOGEN FLD QL: 0.2 MG/DL — SIGNIFICANT CHANGE UP (ref 0.2–1)
WBC # BLD: 17.21 K/UL — HIGH (ref 3.8–10.5)
WBC # BLD: 17.21 K/UL — HIGH (ref 3.8–10.5)
WBC # FLD AUTO: 17.21 K/UL — HIGH (ref 3.8–10.5)
WBC # FLD AUTO: 17.21 K/UL — HIGH (ref 3.8–10.5)

## 2023-11-07 PROCEDURE — 71045 X-RAY EXAM CHEST 1 VIEW: CPT | Mod: 26

## 2023-11-07 PROCEDURE — 99291 CRITICAL CARE FIRST HOUR: CPT

## 2023-11-07 PROCEDURE — 99292 CRITICAL CARE ADDL 30 MIN: CPT

## 2023-11-07 PROCEDURE — 70450 CT HEAD/BRAIN W/O DYE: CPT | Mod: 26

## 2023-11-07 PROCEDURE — 71275 CT ANGIOGRAPHY CHEST: CPT | Mod: 26

## 2023-11-07 RX ORDER — ASPIRIN/CALCIUM CARB/MAGNESIUM 324 MG
81 TABLET ORAL DAILY
Refills: 0 | Status: DISCONTINUED | OUTPATIENT
Start: 2023-11-07 | End: 2023-11-10

## 2023-11-07 RX ORDER — FUROSEMIDE 40 MG
20 TABLET ORAL ONCE
Refills: 0 | Status: COMPLETED | OUTPATIENT
Start: 2023-11-07 | End: 2023-11-07

## 2023-11-07 RX ORDER — HYDRALAZINE HCL 50 MG
25 TABLET ORAL EVERY 8 HOURS
Refills: 0 | Status: DISCONTINUED | OUTPATIENT
Start: 2023-11-07 | End: 2023-11-07

## 2023-11-07 RX ORDER — VANCOMYCIN HCL 1 G
1000 VIAL (EA) INTRAVENOUS ONCE
Refills: 0 | Status: COMPLETED | OUTPATIENT
Start: 2023-11-07 | End: 2023-11-07

## 2023-11-07 RX ORDER — INSULIN LISPRO 100/ML
5 VIAL (ML) SUBCUTANEOUS
Refills: 0 | Status: DISCONTINUED | OUTPATIENT
Start: 2023-11-07 | End: 2023-11-10

## 2023-11-07 RX ORDER — DEXTROSE 50 % IN WATER 50 %
25 SYRINGE (ML) INTRAVENOUS ONCE
Refills: 0 | Status: DISCONTINUED | OUTPATIENT
Start: 2023-11-07 | End: 2023-11-10

## 2023-11-07 RX ORDER — NITROGLYCERIN 6.5 MG
0.4 CAPSULE, EXTENDED RELEASE ORAL ONCE
Refills: 0 | Status: COMPLETED | OUTPATIENT
Start: 2023-11-07 | End: 2023-11-07

## 2023-11-07 RX ORDER — GABAPENTIN 400 MG/1
1600 CAPSULE ORAL THREE TIMES A DAY
Refills: 0 | Status: DISCONTINUED | OUTPATIENT
Start: 2023-11-07 | End: 2023-11-08

## 2023-11-07 RX ORDER — GLUCAGON INJECTION, SOLUTION 0.5 MG/.1ML
1 INJECTION, SOLUTION SUBCUTANEOUS ONCE
Refills: 0 | Status: DISCONTINUED | OUTPATIENT
Start: 2023-11-07 | End: 2023-11-10

## 2023-11-07 RX ORDER — ASPIRIN/CALCIUM CARB/MAGNESIUM 324 MG
324 TABLET ORAL ONCE
Refills: 0 | Status: COMPLETED | OUTPATIENT
Start: 2023-11-07 | End: 2023-11-07

## 2023-11-07 RX ORDER — DEXTROSE 50 % IN WATER 50 %
12.5 SYRINGE (ML) INTRAVENOUS ONCE
Refills: 0 | Status: DISCONTINUED | OUTPATIENT
Start: 2023-11-07 | End: 2023-11-10

## 2023-11-07 RX ORDER — FUROSEMIDE 40 MG
40 TABLET ORAL ONCE
Refills: 0 | Status: COMPLETED | OUTPATIENT
Start: 2023-11-07 | End: 2023-11-07

## 2023-11-07 RX ORDER — NITROGLYCERIN 6.5 MG
50 CAPSULE, EXTENDED RELEASE ORAL
Qty: 50 | Refills: 0 | Status: DISCONTINUED | OUTPATIENT
Start: 2023-11-07 | End: 2023-11-07

## 2023-11-07 RX ORDER — INSULIN LISPRO 100/ML
VIAL (ML) SUBCUTANEOUS
Refills: 0 | Status: DISCONTINUED | OUTPATIENT
Start: 2023-11-07 | End: 2023-11-10

## 2023-11-07 RX ORDER — SODIUM CHLORIDE 9 MG/ML
1000 INJECTION, SOLUTION INTRAVENOUS
Refills: 0 | Status: DISCONTINUED | OUTPATIENT
Start: 2023-11-07 | End: 2023-11-10

## 2023-11-07 RX ORDER — PIPERACILLIN AND TAZOBACTAM 4; .5 G/20ML; G/20ML
3.38 INJECTION, POWDER, LYOPHILIZED, FOR SOLUTION INTRAVENOUS ONCE
Refills: 0 | Status: DISCONTINUED | OUTPATIENT
Start: 2023-11-07 | End: 2023-11-08

## 2023-11-07 RX ORDER — DEXTROSE 50 % IN WATER 50 %
15 SYRINGE (ML) INTRAVENOUS ONCE
Refills: 0 | Status: DISCONTINUED | OUTPATIENT
Start: 2023-11-07 | End: 2023-11-10

## 2023-11-07 RX ORDER — HALOPERIDOL DECANOATE 100 MG/ML
5 INJECTION INTRAMUSCULAR ONCE
Refills: 0 | Status: DISCONTINUED | OUTPATIENT
Start: 2023-11-07 | End: 2023-11-08

## 2023-11-07 RX ORDER — PIPERACILLIN AND TAZOBACTAM 4; .5 G/20ML; G/20ML
3.38 INJECTION, POWDER, LYOPHILIZED, FOR SOLUTION INTRAVENOUS ONCE
Refills: 0 | Status: COMPLETED | OUTPATIENT
Start: 2023-11-07 | End: 2023-11-07

## 2023-11-07 RX ORDER — NITROGLYCERIN 6.5 MG
25 CAPSULE, EXTENDED RELEASE ORAL
Qty: 50 | Refills: 0 | Status: DISCONTINUED | OUTPATIENT
Start: 2023-11-07 | End: 2023-11-08

## 2023-11-07 RX ORDER — CLOPIDOGREL BISULFATE 75 MG/1
75 TABLET, FILM COATED ORAL DAILY
Refills: 0 | Status: DISCONTINUED | OUTPATIENT
Start: 2023-11-08 | End: 2023-11-10

## 2023-11-07 RX ORDER — ACETAMINOPHEN 500 MG
975 TABLET ORAL ONCE
Refills: 0 | Status: COMPLETED | OUTPATIENT
Start: 2023-11-07 | End: 2023-11-07

## 2023-11-07 RX ORDER — INSULIN GLARGINE 100 [IU]/ML
6 INJECTION, SOLUTION SUBCUTANEOUS AT BEDTIME
Refills: 0 | Status: DISCONTINUED | OUTPATIENT
Start: 2023-11-08 | End: 2023-11-10

## 2023-11-07 RX ORDER — IPRATROPIUM BROMIDE 0.2 MG/ML
500 SOLUTION, NON-ORAL INHALATION EVERY 6 HOURS
Refills: 0 | Status: DISCONTINUED | OUTPATIENT
Start: 2023-11-07 | End: 2023-11-08

## 2023-11-07 RX ORDER — CARVEDILOL PHOSPHATE 80 MG/1
12.5 CAPSULE, EXTENDED RELEASE ORAL EVERY 12 HOURS
Refills: 0 | Status: DISCONTINUED | OUTPATIENT
Start: 2023-11-07 | End: 2023-11-07

## 2023-11-07 RX ORDER — LISINOPRIL 2.5 MG/1
40 TABLET ORAL DAILY
Refills: 0 | Status: DISCONTINUED | OUTPATIENT
Start: 2023-11-07 | End: 2023-11-08

## 2023-11-07 RX ORDER — MORPHINE SULFATE 50 MG/1
4 CAPSULE, EXTENDED RELEASE ORAL ONCE
Refills: 0 | Status: DISCONTINUED | OUTPATIENT
Start: 2023-11-07 | End: 2023-11-07

## 2023-11-07 RX ORDER — SPIRONOLACTONE 25 MG/1
25 TABLET, FILM COATED ORAL DAILY
Refills: 0 | Status: DISCONTINUED | OUTPATIENT
Start: 2023-11-07 | End: 2023-11-08

## 2023-11-07 RX ORDER — CARVEDILOL PHOSPHATE 80 MG/1
12.5 CAPSULE, EXTENDED RELEASE ORAL ONCE
Refills: 0 | Status: COMPLETED | OUTPATIENT
Start: 2023-11-07 | End: 2023-11-07

## 2023-11-07 RX ADMIN — Medication 0.4 MILLIGRAM(S): at 17:03

## 2023-11-07 RX ADMIN — Medication 20 MILLIGRAM(S): at 15:40

## 2023-11-07 RX ADMIN — Medication 0.5 MILLIGRAM(S): at 21:31

## 2023-11-07 RX ADMIN — Medication 0.5 MILLIGRAM(S): at 14:08

## 2023-11-07 RX ADMIN — Medication 15 MICROGRAM(S)/MIN: at 17:47

## 2023-11-07 RX ADMIN — Medication 7.5 MICROGRAM(S)/MIN: at 23:37

## 2023-11-07 RX ADMIN — MORPHINE SULFATE 4 MILLIGRAM(S): 50 CAPSULE, EXTENDED RELEASE ORAL at 19:35

## 2023-11-07 RX ADMIN — Medication 250 MILLIGRAM(S): at 16:35

## 2023-11-07 RX ADMIN — Medication 50 MICROGRAM(S)/MIN: at 22:23

## 2023-11-07 RX ADMIN — MORPHINE SULFATE 4 MILLIGRAM(S): 50 CAPSULE, EXTENDED RELEASE ORAL at 14:37

## 2023-11-07 RX ADMIN — Medication 2 MILLIGRAM(S): at 14:18

## 2023-11-07 RX ADMIN — Medication 1 MILLIGRAM(S): at 19:35

## 2023-11-07 RX ADMIN — PIPERACILLIN AND TAZOBACTAM 200 GRAM(S): 4; .5 INJECTION, POWDER, LYOPHILIZED, FOR SOLUTION INTRAVENOUS at 15:40

## 2023-11-07 RX ADMIN — Medication 40 MILLIGRAM(S): at 19:32

## 2023-11-07 NOTE — ED ADULT NURSE REASSESSMENT NOTE - NS ED NURSE REASSESS COMMENT FT1
when EMS transferred pt to their stretcher, pt's BP increased to a systolic of 200-210. eICU consulted with MD Strong. plan is now to d/c transfer to Tele and admit pt to ICU. Nitro drip will be restarted at a lower dose. pt resting, stable, not in any acute distress. care ongoing.

## 2023-11-07 NOTE — PROVIDER CONTACT NOTE (EICU) - ACTION/TREATMENT ORDERED:
eICU eval requested to pt prior to transfer to  at pt was admitted for APE/HF and htn emergency was on nitro gtt then d/c after BP improved and pt was admitted to [facility]. Pt was being prepped for transfer to [facility] when repeat VS showed SBP >200. Recommend to restart nitro gtt and titrate to SBP of 160-180 would cancel transfer given he has poor MS at this time as he was just given ativan/haldol for agitation thus unable to further eval neurologically and consult critical care for possible ICU admission while on nitro gtt. Plan d/w bedside RN, transport team and bedside ED attending.

## 2023-11-07 NOTE — ED ADULT NURSE REASSESSMENT NOTE - NS ED NURSE REASSESS COMMENT FT1
Pt is arrousable to touch at this time after receiving Ativan. Pt is resting comfortably in bed. Respirations even and unlabored. On ccm/o2 monitoring. Pt is not alert enough to take oral medications at this time. Dr Odell made aware and states to hold on nitroglycerin and tylenol at this time until pt is more alert.

## 2023-11-07 NOTE — PROVIDER CONTACT NOTE (CRITICAL VALUE NOTIFICATION) - NAME OF MD/NP/PA/DO NOTIFIED:
Before Your Surgery      Call your surgeon if there is any change in your health. This includes signs of a cold or flu (such as a sore throat, runny nose, cough, rash or fever).    Do not smoke, drink alcohol or take over the counter medicine (unless your surgeon or primary care doctor tells you to) for the 24 hours before and after surgery.    If you take prescribed drugs: Follow your doctor s orders about which medicines to take and which to stop until after surgery.    Eating and drinking prior to surgery: follow the instructions from your surgeon    Take a shower or bath the night before surgery. Use the soap your surgeon gave you to gently clean your skin. If you do not have soap from your surgeon, use your regular soap. Do not shave or scrub the surgery site.  Wear clean pajamas and have clean sheets on your bed.   
Dr Odell
Dr Odell

## 2023-11-07 NOTE — H&P ADULT - NSICDXPASTMEDICALHX_GEN_ALL_CORE_FT
PAST MEDICAL HISTORY:  Acute on chronic diastolic congestive heart failure     CAD S/P percutaneous coronary angioplasty     Gangrene of toe of right foot     HTN (hypertension)     Hyperlipidemia     IDDM (insulin dependent diabetes mellitus)     Moderate aortic stenosis     Neuropathy     PAD (peripheral artery disease)     PNA (pneumonia)

## 2023-11-07 NOTE — ED ADULT NURSE REASSESSMENT NOTE - NS ED NURSE REASSESS COMMENT FT1
Pt taken to CT on portable cardiac monitor, VSWNL. Pt on Nitro drip, zacarias drained and upon return, pt on cardiaqc monitor.

## 2023-11-07 NOTE — ED ADULT NURSE NOTE - NS ED NURSE TRANSPORT WITH
Cardiac Monitor/Defib/ACLS/Rescue Kit/O2/BVM Cardiac Monitor/Defib/ACLS/Rescue Kit/O2/BVM/oxygen/IV pump/pulse ox

## 2023-11-07 NOTE — ED PROVIDER NOTE - PHYSICAL EXAMINATION
Gen: Patient is anxious-appearing, AAOx3, able to ambulate without assistance  HEENT: NCAT, normal conjunctiva, tongue midline, oral mucosa moist  Lung: no respiratory distress, rales B/L, speaking in full sentences  CV: RRR, no murmurs, rubs or gallops, distal pulses 2+ b/l  Abd: soft, NT, ND, no guarding, no rigidity, no rebound tenderness  MSK: no visible deformities, ROM normal in UE/LE  Neuro: No focal sensory or motor deficits  Skin: Warm, well perfused, R foot diabetic wound noted, packed likely by podiatrist, dry gangrene of RLE 4th digit with sign of cellulitis (likely chronic per chart review), pitting edema bilaterally   Psych: normal affect, calm

## 2023-11-07 NOTE — H&P ADULT - HISTORY OF PRESENT ILLNESS
67M PMH HTN, IDDM with peripheral neuropathy, CAD s/p PCI with 2 stents (3 yrs ago at [facility]), recent admission 10/3 for diabetic ssti (CT r/o absecess, no OM) discharged on doxycycline and keflex, presented with CC of r. foot pain, found to meet SIRS crtieria with suspected source infectious of r.toe, a/f management.       # Sepsis.   Patient presenting meeting 2/4 SIRS (T101, Leukocytosis), source likely Gangrenous toe vs Osteomyelitis of R. foot wound. No purulence or erythema on examination however extremely tender to palpation and at rest. XR w/o evidence of osteo however lower sensivity. s/p 1 dose vanc and zosyn in ED. Prior was on Keflex and doxycycline x 10 days (finished 10/10), completed course. Last BCx w/o growth.   s/p 2L NS in ED  BCx: no growth at 5 day  MRI: Soft tissue atrophy/wound of the 4th toe with underlying marrow changes of the 4th proximal phalanx head, middle phalanx, and distal phalanx that may be reactive in the absence of significant T1 marrow replacement, however, early infection may also be considered.  Tissue culture: e.coli, pluralibacter, stap epi, and campylobacter  - s/p  single lumen PICC line placement  - ID meds for discharge: Daptomycin 500mg IV q24h plus ceftriaxone 2g IV Q24   - Duration of antibiotics is 6 weeks ( 10/24 - 12/5 )  - Weekly labs: CMP, CBC, ESR, CRP, CK faxed to ID office at [phone]  - Patient to follow up with Dr. Jiménez in 2 weeks ([address] [place] [address], [phone]), ID office will call patient to schedule   - Patient to follow up with PCP for HCV care    # Diabetic foot infection.   Patient presenting with worsening right 4th toe pain and discoloratio s/p course of IV and oral antibtiocs: Keflex and doxycycline until 10/10 endorses compliance. On presentation, he is hemodynamically stable however with leukoctyosis to 19 and fever to 101.   Right toe xrays done in ED showing no evidence of fracture or osteomyelitis   S/p 1 dose vancomycin in ED and zosyn in ED.   Patient went to surgery for toe amputation 10/24  S/p angio 10/27  - Local wound care: Applied 1/4 inch plain packing. Betadine soaked gauze applied w/ kerlix  - Pain management: Tylenol 650 q6h standing, Oxy 10 q6h PRN, gabapentin 1600 mg TID.    # Leg edema.   2/2 to third spacing i/s/o infection vs HF vs ascending infection  Check b/l LE ultrasounds (-) DVT  Check CT A/P w/ IV contrast as well as CT LE w/ IV contrast (-) for abscess, hematoma  Improved after Albumin q8h.    # Hypertensive urgency with history of HFmrEF  Patient presenting with SBP to 191/80, Alkphos elevated at this time. Likely due to pain from foot wound, endorses compliance with medication.  - Cards rec: Lisinopril 40 mg qd, spironolactone 25 mg qd, coreg 12.5 BID  - Cards rec: Hydralazine 25 mg TID    # HTN (hypertension).   Echo: Left ventricular systolic function is mildly reduced with a calculated ejection fraction of 45% with regional wall motion abnormalities.   - Cards rec: Lisinopril 40 mg qd, spironolactone 25 mg qd, coreg 12.5 BID  - Cards rec: Hydralazine 25 mg TID    # Transaminitis.   Patient with isolated elevated alkphos, no RUQ pain, not seen on prior labs. Low suspicion for bile duct obstruction at this time.     # Type 2 diabetes mellitus with hyperglycemia.   Home regimen should be clarified, was discharged on lantus 11u, and humalog 7u premeal.  A1c 12.6  - endocrine recs Lantus 6 at night, lispro 5 before meals low iss    # CAD (coronary artery disease).   Patient follow with outpatient cards Dr. Li on [address] Next appointment 11/7/23.  Patient reports 12 months of antiplatelet therapy s/p PCI in 2020 but discontinued after  - c/w aspirin 81mg PO QD  - c/w lipitor 80mg PO QD.        Patient was discharged to: home    New medications: Daptomycin 500mg IV q24h plus ceftriaxone 2g IV Q24 until 12/5. Lisinopril 40 mg qd, spironolactone 25 mg qd, coreg 12.5 BID, Hydralazine 25 mg TID  Changes to old medications: increased home gabapentin to gabapentin 1600 mg TID  Medications that were stopped: none   67M PMH HTN, IDDM with peripheral neuropathy, CAD s/p PCI with 2 stents (3 yrs ago at [facility]), recent admission 10/3 for diabetic ssti (CT r/o absecess, no OM) discharged on doxycycline and keflex, presented with CC of r. foot pain, found to meet SIRS crtieria with suspected source infectious of r.toe, a/f management.       # Sepsis.   Patient presenting meeting 2/4 SIRS (T101, Leukocytosis), source likely Gangrenous toe vs Osteomyelitis of R. foot wound. No purulence or erythema on examination however extremely tender to palpation and at rest. XR w/o evidence of osteo however lower sensivity. s/p 1 dose vanc and zosyn in ED. Prior was on Keflex and doxycycline x 10 days (finished 10/10), completed course. Last BCx w/o growth.   s/p 2L NS in ED  BCx: no growth at 5 day  MRI: Soft tissue atrophy/wound of the 4th toe with underlying marrow changes of the 4th proximal phalanx head, middle phalanx, and distal phalanx that may be reactive in the absence of significant T1 marrow replacement, however, early infection may also be considered.  Tissue culture: e.coli, pluralibacter, stap epi, and campylobacter  - s/p  single lumen PICC line placement  - ID meds for discharge: Daptomycin 500mg IV q24h plus ceftriaxone 2g IV Q24   - Duration of antibiotics is 6 weeks ( 10/24 - 12/5 )  - Weekly labs: CMP, CBC, ESR, CRP, CK faxed to ID office at [phone]  - Patient to follow up with Dr. Jiménez in 2 weeks ([address] [place] [address], [phone]), ID office will call patient to schedule   - Patient to follow up with PCP for HCV care    # Diabetic foot infection.   Patient presenting with worsening right 4th toe pain and discoloratio s/p course of IV and oral antibtiocs: Keflex and doxycycline until 10/10 endorses compliance. On presentation, he is hemodynamically stable however with leukoctyosis to 19 and fever to 101.   Right toe xrays done in ED showing no evidence of fracture or osteomyelitis   S/p 1 dose vancomycin in ED and zosyn in ED.   Patient went to surgery for toe amputation 10/24  S/p angio 10/27  - Local wound care: Applied 1/4 inch plain packing. Betadine soaked gauze applied w/ kerlix  - Pain management: Tylenol 650 q6h standing, Oxy 10 q6h PRN, gabapentin 1600 mg TID.    # Leg edema.   2/2 to third spacing i/s/o infection vs HF vs ascending infection  Check b/l LE ultrasounds (-) DVT  Check CT A/P w/ IV contrast as well as CT LE w/ IV contrast (-) for abscess, hematoma  Improved after Albumin q8h.    # Hypertensive urgency with history of HFmrEF  Patient presenting with SBP to 191/80, Alkphos elevated at this time. Likely due to pain from foot wound, endorses compliance with medication.  - Cards rec: Lisinopril 40 mg qd, spironolactone 25 mg qd, coreg 12.5 BID  - Cards rec: Hydralazine 25 mg TID    # HTN (hypertension).   Echo: Left ventricular systolic function is mildly reduced with a calculated ejection fraction of 45% with regional wall motion abnormalities.   - Cards rec: Lisinopril 40 mg qd, spironolactone 25 mg qd, coreg 12.5 BID  - Cards rec: Hydralazine 25 mg TID    # Transaminitis.   Patient with isolated elevated alkphos, no RUQ pain, not seen on prior labs. Low suspicion for bile duct obstruction at this time.     # Type 2 diabetes mellitus with hyperglycemia.   Home regimen should be clarified, was discharged on lantus 11u, and humalog 7u premeal.  A1c 12.6  - endocrine recs Lantus 6 at night, lispro 5 before meals low iss    # CAD (coronary artery disease).   Patient follow with outpatient cards Dr. Li on [address] Next appointment 11/7/23.  Patient reports 12 months of antiplatelet therapy s/p PCI in 2020 but discontinued after  - c/w aspirin 81mg PO QD  - c/w lipitor 80mg PO QD.    67M PMH HTN, IDDM with peripheral neuropathy, CAD s/p PCI with 2 stents (3 yrs ago at [facility]), recent admission 10/3 for diabetic ssti (CT r/o absecess, no OM), p.w 1-day onset of shortness of breath. Wife at bedside. Reports associated symptoms of fever yesterday and cough, b/ leg swelling, R foot pain. Wife reports that patient was evaluated by the podiatrist yesterday - was told that R foot wound looks fine. Denies chest pain.    Patient was discharged to: home    New medications: Daptomycin 500mg IV q24h plus ceftriaxone 2g IV Q24 until 12/5. Lisinopril 40 mg qd, spironolactone 25 mg qd, coreg 12.5 BID, Hydralazine 25 mg TID  Changes to old medications: increased home gabapentin to gabapentin 1600 mg TID  Medications that were stopped: none   66 y/o male with PMHx of  HTN, IDDM with peripheral neuropathy, CAD s/p PCI with 2 stents (3 yrs ago at [facility]), Chronic Diastolic CHF (TTE10/23/23 EF 45% with moderate AS), recent admission 10/3 for diabetic ssti (CT r/o absecess, no OM) s/p right toot partial 4th toe ray resection 10/24/23 by Podiatry, and  s/p Peripheral Angiogram 10/27/23 Angiogram with AT & Peroneal lithotripsy and balloon  (severe disease of TP trunk, AT and peroneal) pt. was discharged with PICC line 11/1/23  to continue Daptomycin and Ceftriaxone  10/24-12/5/23.  Pt. presents this evening, 11/7/23, as a transfer from [facility] in Acute on Chronic Diastolic CHF exacerbation and PNA.      # Sepsis.   Patient presenting meeting 2/4 SIRS (T101, Leukocytosis), source likely Gangrenous toe vs Osteomyelitis of R. foot wound. No purulence or erythema on examination however extremely tender to palpation and at rest. XR w/o evidence of osteo however lower sensivity. s/p 1 dose vanc and zosyn in ED. Prior was on Keflex and doxycycline x 10 days (finished 10/10), completed course. Last BCx w/o growth.   s/p 2L NS in ED  BCx: no growth at 5 day  MRI: Soft tissue atrophy/wound of the 4th toe with underlying marrow changes of the 4th proximal phalanx head, middle phalanx, and distal phalanx that may be reactive in the absence of significant T1 marrow replacement, however, early infection may also be considered.  Tissue culture: e.coli, pluralibacter, stap epi, and campylobacter  - s/p  single lumen PICC line placement  - ID meds for discharge: Daptomycin 500mg IV q24h plus ceftriaxone 2g IV Q24   - Duration of antibiotics is 6 weeks ( 10/24 - 12/5 )  - Weekly labs: CMP, CBC, ESR, CRP, CK faxed to ID office at [phone]  - Patient to follow up with Dr. Jiménez in 2 weeks ([address] [place] [address], [phone]), ID office will call patient to schedule   - Patient to follow up with PCP for HCV care    # Diabetic foot infection.   Patient presenting with worsening right 4th toe pain and discoloratio s/p course of IV and oral antibtiocs: Keflex and doxycycline until 10/10 endorses compliance. On presentation, he is hemodynamically stable however with leukoctyosis to 19 and fever to 101.   Right toe xrays done in ED showing no evidence of fracture or osteomyelitis   S/p 1 dose vancomycin in ED and zosyn in ED.   Patient went to surgery for toe amputation 10/24  S/p angio 10/27  - Local wound care: Applied 1/4 inch plain packing. Betadine soaked gauze applied w/ kerlix  - Pain management: Tylenol 650 q6h standing, Oxy 10 q6h PRN, gabapentin 1600 mg TID.    # Leg edema.   2/2 to third spacing i/s/o infection vs HF vs ascending infection  Check b/l LE ultrasounds (-) DVT  Check CT A/P w/ IV contrast as well as CT LE w/ IV contrast (-) for abscess, hematoma  Improved after Albumin q8h.    # Hypertensive urgency with history of HFmrEF  Patient presenting with SBP to 191/80, Alkphos elevated at this time. Likely due to pain from foot wound, endorses compliance with medication.  - Cards rec: Lisinopril 40 mg qd, spironolactone 25 mg qd, coreg 12.5 BID  - Cards rec: Hydralazine 25 mg TID    # HTN (hypertension).   Echo: Left ventricular systolic function is mildly reduced with a calculated ejection fraction of 45% with regional wall motion abnormalities.   - Cards rec: Lisinopril 40 mg qd, spironolactone 25 mg qd, coreg 12.5 BID  - Cards rec: Hydralazine 25 mg TID    # Transaminitis.   Patient with isolated elevated alkphos, no RUQ pain, not seen on prior labs. Low suspicion for bile duct obstruction at this time.     # Type 2 diabetes mellitus with hyperglycemia.   Home regimen should be clarified, was discharged on lantus 11u, and humalog 7u premeal.  A1c 12.6  - endocrine recs Lantus 6 at night, lispro 5 before meals low iss    # CAD (coronary artery disease).   Patient follow with outpatient cards Dr. Li on [address] Next appointment 11/7/23.  Patient reports 12 months of antiplatelet therapy s/p PCI in 2020 but discontinued after  - c/w aspirin 81mg PO QD  - c/w lipitor 80mg PO QD.    67M PMH HTN, IDDM with peripheral neuropathy, CAD s/p PCI with 2 stents (3 yrs ago at [facility]), recent admission 10/3 for diabetic ssti (CT r/o absecess, no OM), p.w 1-day onset of shortness of breath. Wife at bedside. Reports associated symptoms of fever yesterday and cough, b/ leg swelling, R foot pain. Wife reports that patient was evaluated by the podiatrist yesterday - was told that R foot wound looks fine. Denies chest pain.    Patient was discharged to: home    New medications: Daptomycin 500mg IV q24h plus ceftriaxone 2g IV Q24 until 12/5. Lisinopril 40 mg qd, spironolactone 25 mg qd, coreg 12.5 BID, Hydralazine 25 mg TID  Changes to old medications: increased home gabapentin to gabapentin 1600 mg TID  Medications that were stopped: none   66 y/o male with PMHx of  HTN, IDDM with peripheral neuropathy, CAD s/p PCI with 2 stents (3 yrs ago at [facility]), Chronic Diastolic CHF (TTE10/23/23 EF 45% with moderate AS), recent admission 10/3 for diabetic ssti (CT r/o absecess, no OM) s/p right toot partial 4th toe ray resection 10/24/23 by Podiatry, and  s/p Peripheral Angiogram 10/27/23 Angiogram with AT & Peroneal lithotripsy and balloon  (severe disease of TP trunk, AT and peroneal) pt. was discharged with PICC line 10/31/23  to continue Daptomycin and Ceftriaxone  10/24-12/5/23.  Pt. presents this evening, 11/7/23, as a transfer from [facility] in Acute on Chronic Diastolic CHF exacerbation and PNA.      # Sepsis.   Patient presenting meeting 2/4 SIRS (T101, Leukocytosis), source likely Gangrenous toe vs Osteomyelitis of R. foot wound. No purulence or erythema on examination however extremely tender to palpation and at rest. XR w/o evidence of osteo however lower sensivity. s/p 1 dose vanc and zosyn in ED. Prior was on Keflex and doxycycline x 10 days (finished 10/10), completed course. Last BCx w/o growth.   s/p 2L NS in ED  BCx: no growth at 5 day  MRI: Soft tissue atrophy/wound of the 4th toe with underlying marrow changes of the 4th proximal phalanx head, middle phalanx, and distal phalanx that may be reactive in the absence of significant T1 marrow replacement, however, early infection may also be considered.  Tissue culture: e.coli, pluralibacter, stap epi, and campylobacter  - s/p  single lumen PICC line placement  - ID meds for discharge: Daptomycin 500mg IV q24h plus ceftriaxone 2g IV Q24   - Duration of antibiotics is 6 weeks ( 10/24 - 12/5 )  - Weekly labs: CMP, CBC, ESR, CRP, CK faxed to ID office at [phone]  - Patient to follow up with Dr. Jiménez in 2 weeks ([address] [place] [address], [phone]), ID office will call patient to schedule   - Patient to follow up with PCP for HCV care    # Diabetic foot infection.   Patient presenting with worsening right 4th toe pain and discoloratio s/p course of IV and oral antibtiocs: Keflex and doxycycline until 10/10 endorses compliance. On presentation, he is hemodynamically stable however with leukoctyosis to 19 and fever to 101.   Right toe xrays done in ED showing no evidence of fracture or osteomyelitis   S/p 1 dose vancomycin in ED and zosyn in ED.   Patient went to surgery for toe amputation 10/24  S/p angio 10/27  - Local wound care: Applied 1/4 inch plain packing. Betadine soaked gauze applied w/ kerlix  - Pain management: Tylenol 650 q6h standing, Oxy 10 q6h PRN, gabapentin 1600 mg TID.    # Leg edema.   2/2 to third spacing i/s/o infection vs HF vs ascending infection  Check b/l LE ultrasounds (-) DVT  Check CT A/P w/ IV contrast as well as CT LE w/ IV contrast (-) for abscess, hematoma  Improved after Albumin q8h.    # Hypertensive urgency with history of HFmrEF  Patient presenting with SBP to 191/80, Alkphos elevated at this time. Likely due to pain from foot wound, endorses compliance with medication.  - Cards rec: Lisinopril 40 mg qd, spironolactone 25 mg qd, coreg 12.5 BID  - Cards rec: Hydralazine 25 mg TID    # HTN (hypertension).   Echo: Left ventricular systolic function is mildly reduced with a calculated ejection fraction of 45% with regional wall motion abnormalities.   - Cards rec: Lisinopril 40 mg qd, spironolactone 25 mg qd, coreg 12.5 BID  - Cards rec: Hydralazine 25 mg TID    # Transaminitis.   Patient with isolated elevated alkphos, no RUQ pain, not seen on prior labs. Low suspicion for bile duct obstruction at this time.     # Type 2 diabetes mellitus with hyperglycemia.   Home regimen should be clarified, was discharged on lantus 11u, and humalog 7u premeal.  A1c 12.6  - endocrine recs Lantus 6 at night, lispro 5 before meals low iss    # CAD (coronary artery disease).   Patient follow with outpatient cards Dr. Li on [address] Next appointment 11/7/23.  Patient reports 12 months of antiplatelet therapy s/p PCI in 2020 but discontinued after  - c/w aspirin 81mg PO QD  - c/w lipitor 80mg PO QD.    67M PMH HTN, IDDM with peripheral neuropathy, CAD s/p PCI with 2 stents (3 yrs ago at [facility]), recent admission 10/3 for diabetic ssti (CT r/o absecess, no OM), p.w 1-day onset of shortness of breath. Wife at bedside. Reports associated symptoms of fever yesterday and cough, b/ leg swelling, R foot pain. Wife reports that patient was evaluated by the podiatrist yesterday - was told that R foot wound looks fine. Denies chest pain.    Patient was discharged to: home    New medications: Daptomycin 500mg IV q24h plus ceftriaxone 2g IV Q24 until 12/5. Lisinopril 40 mg qd, spironolactone 25 mg qd, coreg 12.5 BID, Hydralazine 25 mg TID  Changes to old medications: increased home gabapentin to gabapentin 1600 mg TID  Medications that were stopped: none   66 y/o male with PMHx of  HTN, IDDM with peripheral neuropathy, CAD s/p PCI with 2 stents (3 yrs ago at [facility]), Chronic Diastolic CHF (TTE10/23/23 EF 45% with moderate AS), recent admission 10/3 for diabetic ssti (CT r/o absecess, no OM) s/p right toot partial 4th toe ray resection 10/24/23 by Podiatry, and  s/p Peripheral Angiogram 10/27/23 Angiogram with AT & Peroneal lithotripsy and balloon  (severe disease of TP trunk, AT and peroneal) pt. was discharged with PICC line 10/31/23  to continue Daptomycin and Ceftriaxone  10/24-12/5/23.  Pt. presents this evening, 11/7/23, as a transfer from [facility] in Acute on Chronic Diastolic CHF exacerbation and PNA.    In speaking with patient, pt. reports 1-day onset of shortness of breath. Wife at bedside. Reports associated symptoms of fever yesterday and cough, b/ leg swelling, R foot pain. Wife reports that patient was evaluated by the podiatrist yesterday - was told that R foot wound looks fine. Denies chest pain          New medications patient was discharged with on 10/31/23: Daptomycin 500mg IV q24h plus ceftriaxone 2g IV Q24 until 12/5. Lisinopril 40 mg qd, spironolactone 25 mg qd, coreg 12.5 BID, Hydralazine 25 mg TID  Changes to old medications: increased home gabapentin to gabapentin 1600 mg TID  Medications that were stopped: none

## 2023-11-07 NOTE — PROVIDER CONTACT NOTE (CRITICAL VALUE NOTIFICATION) - TEST AND RESULT REPORTED:
Patient here for ultrasound guided biopsy of right breast.  Procedure reviewed with patient by writer and radiologist, questions answered.  Time out performed prior to biopsy.  Biopsy completed by radiologist, clip placed.  Pressure held to biopsy site for 10 minutes.  Medipore dressing was applied.   Post clip mammogram completed.  Discharge instructions reviewed with patient, patient verbalizes understanding of instructions.  Discharged to home in stable condition with no evidence of bleeding from biopsy site.   Maude Santos RN      
Troponin 98.3
Trop 89.7

## 2023-11-07 NOTE — ED PROVIDER NOTE - NS ED ROS FT
Constitutional:  (+) fever, (-) chills, (-) lethargy  Eyes:  (-) eye pain (-) visual changes  ENMT: (-) nasal discharge, (-) sore throat. (-) neck pain or stiffness  Cardiac: (-) chest pain (-) palpitations  Respiratory:  (+) cough (+) respiratory distress.   GI:  (-) nausea (-) vomiting (-) diarrhea (-) abdominal pain.  :  (-) dysuria (-) frequency (-) burning.  MS:  (-) back pain (-) joint pain (+) foot pain   Neuro:  (-) headache (-) numbness (-) tingling (-) focal weakness  Skin:  (-) rash  Except as documented in the HPI,  all other systems are negative

## 2023-11-07 NOTE — ED ADULT NURSE REASSESSMENT NOTE - NS ED NURSE REASSESS COMMENT FT1
Pt received from KATE Stoner. Pt on ccm/o2 monitoring. Pt was medicated with ativan by previous rn. Pt currently resting comfortably in bed. Respirations even and unlabored. Plan of care on going

## 2023-11-07 NOTE — H&P ADULT - NSHPREVIEWOFSYSTEMS_GEN_ALL_CORE
GENERAL, CONSTITUTIONAL : denies recent weight loss, fever, chills  EYES, VISION: denies changes in vision   EARS, NOSE, THROAT: denies hearing loss  HEART, CARDIOVASCULAR: denies chest pain, arrhythmia, palpitations, SOB,  LE edema, claudication  RESPIRATORY:Admits to  cough, SOB, wheezing, PND, orthopnea  GASTROINTESTINAL: Denies abdominal pain, heartburn, bloody stool, dark tarry stool  GENITOURINARY: Denies frequent urination, urgency  MUSCULOSKELETAL Admits to  swelling, restricted motion, musculoskeletal pain.  s/p right 4th toe partial resection  SKIN & INTEGUMENTARY Admits sores, , growths.s/p right 4th toe partial resection  NEUROLOGICAL: Admits to  numbness or tingling sensations, sensation loss, burning.   PSYCHIATRIC: Denies nervousness, anxiety, depression  ENDOCRINE Denies heat or cold intolerance, excessive thirst  HEMATOLOGIC/LYMPHATIC: Denies abnormal bleeding, bleeding of any kind

## 2023-11-07 NOTE — ED ADULT TRIAGE NOTE - CHIEF COMPLAINT QUOTE
patient BIBA due to complications from surgery 1 week ago. Patients c/o chest pain and unable to take care of BP

## 2023-11-07 NOTE — H&P ADULT - NSHPPHYSICALEXAM_GEN_ALL_CORE
T(C): 37.1 (11-07-23 @ 13:22), Max: 37.1 (11-07-23 @ 13:22)  HR: 70 (11-07-23 @ 22:54) (62 - 85)  BP: 157/71 (11-07-23 @ 22:54) (132/76 - 222/113)  RR: 20 (11-07-23 @ 22:54) (13 - 25)  SpO2: 99% (11-07-23 @ 22:54) (96% - 100%)  Wt(kg): --    Appearance: NAD  HEENT:   Normal oral mucosa,, EOMI	  Neck: Supple, + JVD/ - JVD; No Carotid Bruit and 2+ pulses B/L  Cardiovascular: Normal S1 S2, No JVD, No murmurs  Respiratory: Lungs clear to auscultation/Decreased Breath Sounds/No Rales, Rhonchi, Wheezing	  Gastrointestinal:  Soft, Non-tender, + BS	  Skin: No rashes, No ecchymoses, No cyanosis  Extremities: Normal range of motion, No clubbing, cyanosis or b/l +2 pitting edemaedema  Vascular: Femoral pulses 2+ b/l without bruit, DP 1+ b/l, PT 1+ b/l  Neurologic: Non-focal  Psychiatry: A & O x 4, Mood & affect appropriate

## 2023-11-07 NOTE — ED PROVIDER NOTE - CLINICAL SUMMARY MEDICAL DECISION MAKING FREE TEXT BOX
67M PMH HTN, IDDM with peripheral neuropathy, CAD s/p PCI with 2 stents (3 yrs ago at [facility]), recent admission 10/3 for diabetic ssti (CT r/o absecess, no OM), p.w 1-day onset of shortness of breath. Wife at bedside. Reports associated symptoms of fever yesterday and cough, b/ leg swelling, R foot pain. Wife reports that patient was evaluated by the podiatrist yesterday - was told that R foot wound looks fine. Denies chest pain. /113, otherwise VSWNL on arrival. PE as noted above. Patient is very anxious appearing, normal respiratory effort, bilateral rales appreciated, b/l LE pitting edema noted, R foot diabetic foot wounds (likely chronic). DDx include but not limited to PE vs new onset of CHF exacerbation. Will get labs, cardiac markers, CTA, reassess.

## 2023-11-07 NOTE — ED ADULT NURSE REASSESSMENT NOTE - NS ED NURSE REASSESS COMMENT FT1
EICU doing q 15 checks. MD told RN to dc nitro drip. Drip currentyly at 10mcg/min, dc at 0925. EICU to round q15 for nect 30 min. VSS and pt askeep in bed

## 2023-11-07 NOTE — ED PROVIDER NOTE - ATTENDING CONTRIBUTION TO CARE
68yo M hx of HTN, DM w peripheral neuropathy, CAD s/p stents, recent admission for cellulitis of R foot with wound, presents with SOB x1d with progressively worsening BLE swelling over the past 2 days with fever yesterday as well.  On exam febrile, severely hypertensive, with rales b/l, but satting well on RA.  3+ pitting edema throughout BLE to hips and cellulitis of R foot.  Workup consistent with acute CHF with pulm edema.  Pt started on lasix and nitro gtt.  Pt given broad spectrum IV abx for cellulitis vs poss PNA.  Pt agitated in ED requiring sedation and physical restraints.  CT head neg.  No hx of etoh or drug abuse.  Wife states pt has been intermittently agitated for the past week since recent hospital admission.  Plan to admit to cardiology at [facility] for further mgmt; will admit to CCU if requiring nitro gtt > 50mcg/ min, tele if <50mcg/min.

## 2023-11-07 NOTE — ED ADULT NURSE REASSESSMENT NOTE - NS ED NURSE REASSESS COMMENT FT1
Care assumed, pt in bed asleep, responsive to verbal stimuli. Vancomycin intiiated. No acute changes to pt condition and pt in bed connected to monitor

## 2023-11-07 NOTE — PROVIDER CONTACT NOTE (EICU) - DATE AND TIME:
07-Nov-2023 23:34 [Alert] : alert [Well Nourished] : well nourished [Well Developed] : well developed [Sclera] : the sclera and conjunctiva were normal [EOMI] : extraocular movements were intact [PERRL] : pupils were equal in size, round, and reactive to light [Normal Oral Mucosa] : normal oral mucosa [No Oral Pallor] : no oral pallor [No Respiratory Distress] : no respiratory distress [No Acc Muscle Use] : no accessory muscle use [Respiration, Rhythm And Depth] : normal respiratory rhythm and effort [Normal S1, S2] : normal S1 and S2 [Auscultation Breath Sounds / Voice Sounds] : lungs were clear to auscultation bilaterally [Heart Rate And Rhythm] : heart rate was normal and rhythm regular [Bowel Sounds] : normal bowel sounds [Abdomen Tenderness] : non-tender [Abdomen Soft] : soft [Cervical Lymph Nodes Enlarged Posterior Bilaterally] : posterior cervical [Cervical Lymph Nodes Enlarged Anterior Bilaterally] : anterior cervical, supraclavicular [No CVA Tenderness] : no CVA  tenderness [No Spinal Tenderness] : no spinal tenderness [Normal Color / Pigmentation] : normal skin color and pigmentation [] : no rash [Normal Turgor] : normal skin turgor [Sensation] : the sensory exam was normal to light touch and pinprick [No Focal Deficits] : no focal deficits [Motor Exam] : the motor exam was normal [Normal Affect] : the affect was normal [Normal Mood] : the mood was normal

## 2023-11-07 NOTE — ED PROVIDER NOTE - OBJECTIVE STATEMENT
67M PMH HTN, IDDM with peripheral neuropathy, CAD s/p PCI with 2 stents (3 yrs ago at [facility]), recent admission 10/3 for diabetic ssti (CT r/o absecess, no OM), p.w 1-day onset of shortness of breath. Wife at bedside. Reports associated symptoms of fever yesterday and cough, b/ leg swelling, R foot pain. Wife reports that patient was evaluated by the podiatrist yesterday - was told that R foot wound looks fine. Denies chest pain.

## 2023-11-07 NOTE — ED ADULT NURSE REASSESSMENT NOTE - NS ED NURSE REASSESS COMMENT FT1
Pt in bed, on monitor, monitor showed htn. SL nitro given as ordered and 14 minutes later pt began exibiting aggressive and self destructive behavior such as pulling out IV and trying to get out of bed as pt reported he needed top urinate. Patient severely diaphoretic and sugar checked. Pt redirected back to bed and urina; offered. Patient fall prevented as pt fell forward. Pt redirected back to bed and safety education reinforced. Provider called to bed and 5 haldol im ordered and initiated. 1mf ativan given in pt PICC line and zacarias catheter inserted using sterile techniqueand pt had 900cc urine output from zacarias. Pt still noted to be HTN and nitro drip initiated at 15ml/hr. Patient restrained via 2 poinbt soft restraints as ordered by MD with cms intact and good strong distal pulses with enouigh room for 2 fingers underneath. EICU consulted and MD recommended VBG and nitro titration of around 180 sbp. Patient cleaned up and blaced on monitor. EICU doing q 15 min vs checks. 20grarm initiated

## 2023-11-08 DIAGNOSIS — G93.41 METABOLIC ENCEPHALOPATHY: ICD-10-CM

## 2023-11-08 DIAGNOSIS — E11.9 TYPE 2 DIABETES MELLITUS WITHOUT COMPLICATIONS: ICD-10-CM

## 2023-11-08 DIAGNOSIS — I96 GANGRENE, NOT ELSEWHERE CLASSIFIED: ICD-10-CM

## 2023-11-08 DIAGNOSIS — I10 ESSENTIAL (PRIMARY) HYPERTENSION: ICD-10-CM

## 2023-11-08 DIAGNOSIS — I25.10 ATHEROSCLEROTIC HEART DISEASE OF NATIVE CORONARY ARTERY WITHOUT ANGINA PECTORIS: ICD-10-CM

## 2023-11-08 DIAGNOSIS — I16.0 HYPERTENSIVE URGENCY: ICD-10-CM

## 2023-11-08 DIAGNOSIS — R79.89 OTHER SPECIFIED ABNORMAL FINDINGS OF BLOOD CHEMISTRY: ICD-10-CM

## 2023-11-08 DIAGNOSIS — I73.9 PERIPHERAL VASCULAR DISEASE, UNSPECIFIED: ICD-10-CM

## 2023-11-08 DIAGNOSIS — G62.9 POLYNEUROPATHY, UNSPECIFIED: ICD-10-CM

## 2023-11-08 DIAGNOSIS — I50.33 ACUTE ON CHRONIC DIASTOLIC (CONGESTIVE) HEART FAILURE: ICD-10-CM

## 2023-11-08 DIAGNOSIS — E78.5 HYPERLIPIDEMIA, UNSPECIFIED: ICD-10-CM

## 2023-11-08 DIAGNOSIS — I35.0 NONRHEUMATIC AORTIC (VALVE) STENOSIS: ICD-10-CM

## 2023-11-08 LAB
ALBUMIN SERPL ELPH-MCNC: 1.9 G/DL — LOW (ref 3.3–5)
ALBUMIN SERPL ELPH-MCNC: 1.9 G/DL — LOW (ref 3.3–5)
ALP SERPL-CCNC: 146 U/L — HIGH (ref 40–120)
ALP SERPL-CCNC: 146 U/L — HIGH (ref 40–120)
ALT FLD-CCNC: 52 U/L — HIGH (ref 10–45)
ALT FLD-CCNC: 52 U/L — HIGH (ref 10–45)
ANION GAP SERPL CALC-SCNC: 9 MMOL/L — SIGNIFICANT CHANGE UP (ref 5–17)
ANION GAP SERPL CALC-SCNC: 9 MMOL/L — SIGNIFICANT CHANGE UP (ref 5–17)
APTT BLD: 26.6 SEC — SIGNIFICANT CHANGE UP (ref 24.5–35.6)
APTT BLD: 26.6 SEC — SIGNIFICANT CHANGE UP (ref 24.5–35.6)
AST SERPL-CCNC: 40 U/L — SIGNIFICANT CHANGE UP (ref 10–40)
AST SERPL-CCNC: 40 U/L — SIGNIFICANT CHANGE UP (ref 10–40)
BASOPHILS # BLD AUTO: 0.05 K/UL — SIGNIFICANT CHANGE UP (ref 0–0.2)
BASOPHILS # BLD AUTO: 0.05 K/UL — SIGNIFICANT CHANGE UP (ref 0–0.2)
BASOPHILS NFR BLD AUTO: 0.4 % — SIGNIFICANT CHANGE UP (ref 0–2)
BASOPHILS NFR BLD AUTO: 0.4 % — SIGNIFICANT CHANGE UP (ref 0–2)
BILIRUB SERPL-MCNC: 0.3 MG/DL — SIGNIFICANT CHANGE UP (ref 0.2–1.2)
BILIRUB SERPL-MCNC: 0.3 MG/DL — SIGNIFICANT CHANGE UP (ref 0.2–1.2)
BLD GP AB SCN SERPL QL: NEGATIVE — SIGNIFICANT CHANGE UP
BLD GP AB SCN SERPL QL: NEGATIVE — SIGNIFICANT CHANGE UP
BUN SERPL-MCNC: 24 MG/DL — HIGH (ref 7–23)
BUN SERPL-MCNC: 24 MG/DL — HIGH (ref 7–23)
CALCIUM SERPL-MCNC: 8.6 MG/DL — SIGNIFICANT CHANGE UP (ref 8.4–10.5)
CALCIUM SERPL-MCNC: 8.6 MG/DL — SIGNIFICANT CHANGE UP (ref 8.4–10.5)
CHLORIDE SERPL-SCNC: 105 MMOL/L — SIGNIFICANT CHANGE UP (ref 96–108)
CHLORIDE SERPL-SCNC: 105 MMOL/L — SIGNIFICANT CHANGE UP (ref 96–108)
CK SERPL-CCNC: 399 U/L — HIGH (ref 30–200)
CK SERPL-CCNC: 399 U/L — HIGH (ref 30–200)
CO2 SERPL-SCNC: 25 MMOL/L — SIGNIFICANT CHANGE UP (ref 22–31)
CO2 SERPL-SCNC: 25 MMOL/L — SIGNIFICANT CHANGE UP (ref 22–31)
CREAT SERPL-MCNC: 1.01 MG/DL — SIGNIFICANT CHANGE UP (ref 0.5–1.3)
CREAT SERPL-MCNC: 1.01 MG/DL — SIGNIFICANT CHANGE UP (ref 0.5–1.3)
CRP SERPL-MCNC: 70.4 MG/L — HIGH (ref 0–4)
CRP SERPL-MCNC: 70.4 MG/L — HIGH (ref 0–4)
CULTURE RESULTS: NO GROWTH — SIGNIFICANT CHANGE UP
CULTURE RESULTS: NO GROWTH — SIGNIFICANT CHANGE UP
EGFR: 82 ML/MIN/1.73M2 — SIGNIFICANT CHANGE UP
EGFR: 82 ML/MIN/1.73M2 — SIGNIFICANT CHANGE UP
EOSINOPHIL # BLD AUTO: 0.06 K/UL — SIGNIFICANT CHANGE UP (ref 0–0.5)
EOSINOPHIL # BLD AUTO: 0.06 K/UL — SIGNIFICANT CHANGE UP (ref 0–0.5)
EOSINOPHIL NFR BLD AUTO: 0.4 % — SIGNIFICANT CHANGE UP (ref 0–6)
EOSINOPHIL NFR BLD AUTO: 0.4 % — SIGNIFICANT CHANGE UP (ref 0–6)
ERYTHROCYTE [SEDIMENTATION RATE] IN BLOOD: 121 MM/HR — HIGH
ERYTHROCYTE [SEDIMENTATION RATE] IN BLOOD: 121 MM/HR — HIGH
GLUCOSE BLDC GLUCOMTR-MCNC: 118 MG/DL — HIGH (ref 70–99)
GLUCOSE BLDC GLUCOMTR-MCNC: 118 MG/DL — HIGH (ref 70–99)
GLUCOSE BLDC GLUCOMTR-MCNC: 199 MG/DL — HIGH (ref 70–99)
GLUCOSE BLDC GLUCOMTR-MCNC: 199 MG/DL — HIGH (ref 70–99)
GLUCOSE BLDC GLUCOMTR-MCNC: 267 MG/DL — HIGH (ref 70–99)
GLUCOSE BLDC GLUCOMTR-MCNC: 267 MG/DL — HIGH (ref 70–99)
GLUCOSE BLDC GLUCOMTR-MCNC: 384 MG/DL — HIGH (ref 70–99)
GLUCOSE BLDC GLUCOMTR-MCNC: 384 MG/DL — HIGH (ref 70–99)
GLUCOSE BLDC GLUCOMTR-MCNC: 97 MG/DL — SIGNIFICANT CHANGE UP (ref 70–99)
GLUCOSE BLDC GLUCOMTR-MCNC: 97 MG/DL — SIGNIFICANT CHANGE UP (ref 70–99)
GLUCOSE SERPL-MCNC: 379 MG/DL — HIGH (ref 70–99)
GLUCOSE SERPL-MCNC: 379 MG/DL — HIGH (ref 70–99)
HCT VFR BLD CALC: 25.9 % — LOW (ref 39–50)
HCT VFR BLD CALC: 25.9 % — LOW (ref 39–50)
HGB BLD-MCNC: 8.4 G/DL — LOW (ref 13–17)
HGB BLD-MCNC: 8.4 G/DL — LOW (ref 13–17)
IMM GRANULOCYTES NFR BLD AUTO: 0.4 % — SIGNIFICANT CHANGE UP (ref 0–0.9)
IMM GRANULOCYTES NFR BLD AUTO: 0.4 % — SIGNIFICANT CHANGE UP (ref 0–0.9)
INR BLD: 0.98 — SIGNIFICANT CHANGE UP (ref 0.85–1.18)
INR BLD: 0.98 — SIGNIFICANT CHANGE UP (ref 0.85–1.18)
LACTATE SERPL-SCNC: 1.5 MMOL/L — SIGNIFICANT CHANGE UP (ref 0.5–2)
LACTATE SERPL-SCNC: 1.5 MMOL/L — SIGNIFICANT CHANGE UP (ref 0.5–2)
LACTATE SERPL-SCNC: 1.8 MMOL/L — SIGNIFICANT CHANGE UP (ref 0.5–2)
LACTATE SERPL-SCNC: 1.8 MMOL/L — SIGNIFICANT CHANGE UP (ref 0.5–2)
LYMPHOCYTES # BLD AUTO: 1.35 K/UL — SIGNIFICANT CHANGE UP (ref 1–3.3)
LYMPHOCYTES # BLD AUTO: 1.35 K/UL — SIGNIFICANT CHANGE UP (ref 1–3.3)
LYMPHOCYTES # BLD AUTO: 10 % — LOW (ref 13–44)
LYMPHOCYTES # BLD AUTO: 10 % — LOW (ref 13–44)
MAGNESIUM SERPL-MCNC: 1.9 MG/DL — SIGNIFICANT CHANGE UP (ref 1.6–2.6)
MAGNESIUM SERPL-MCNC: 1.9 MG/DL — SIGNIFICANT CHANGE UP (ref 1.6–2.6)
MCHC RBC-ENTMCNC: 31.1 PG — SIGNIFICANT CHANGE UP (ref 27–34)
MCHC RBC-ENTMCNC: 31.1 PG — SIGNIFICANT CHANGE UP (ref 27–34)
MCHC RBC-ENTMCNC: 32.4 GM/DL — SIGNIFICANT CHANGE UP (ref 32–36)
MCHC RBC-ENTMCNC: 32.4 GM/DL — SIGNIFICANT CHANGE UP (ref 32–36)
MCV RBC AUTO: 95.9 FL — SIGNIFICANT CHANGE UP (ref 80–100)
MCV RBC AUTO: 95.9 FL — SIGNIFICANT CHANGE UP (ref 80–100)
MONOCYTES # BLD AUTO: 1.09 K/UL — HIGH (ref 0–0.9)
MONOCYTES # BLD AUTO: 1.09 K/UL — HIGH (ref 0–0.9)
MONOCYTES NFR BLD AUTO: 8 % — SIGNIFICANT CHANGE UP (ref 2–14)
MONOCYTES NFR BLD AUTO: 8 % — SIGNIFICANT CHANGE UP (ref 2–14)
NEUTROPHILS # BLD AUTO: 10.95 K/UL — HIGH (ref 1.8–7.4)
NEUTROPHILS # BLD AUTO: 10.95 K/UL — HIGH (ref 1.8–7.4)
NEUTROPHILS NFR BLD AUTO: 80.8 % — HIGH (ref 43–77)
NEUTROPHILS NFR BLD AUTO: 80.8 % — HIGH (ref 43–77)
NRBC # BLD: 0 /100 WBCS — SIGNIFICANT CHANGE UP (ref 0–0)
NRBC # BLD: 0 /100 WBCS — SIGNIFICANT CHANGE UP (ref 0–0)
NT-PROBNP SERPL-SCNC: HIGH PG/ML (ref 0–300)
NT-PROBNP SERPL-SCNC: HIGH PG/ML (ref 0–300)
PHOSPHATE SERPL-MCNC: 2.8 MG/DL — SIGNIFICANT CHANGE UP (ref 2.5–4.5)
PHOSPHATE SERPL-MCNC: 2.8 MG/DL — SIGNIFICANT CHANGE UP (ref 2.5–4.5)
PHOSPHATE SERPL-MCNC: 3.3 MG/DL — SIGNIFICANT CHANGE UP (ref 2.5–4.5)
PHOSPHATE SERPL-MCNC: 3.3 MG/DL — SIGNIFICANT CHANGE UP (ref 2.5–4.5)
PLATELET # BLD AUTO: 291 K/UL — SIGNIFICANT CHANGE UP (ref 150–400)
PLATELET # BLD AUTO: 291 K/UL — SIGNIFICANT CHANGE UP (ref 150–400)
POTASSIUM SERPL-MCNC: 4.6 MMOL/L — SIGNIFICANT CHANGE UP (ref 3.5–5.3)
POTASSIUM SERPL-MCNC: 4.6 MMOL/L — SIGNIFICANT CHANGE UP (ref 3.5–5.3)
POTASSIUM SERPL-SCNC: 4.6 MMOL/L — SIGNIFICANT CHANGE UP (ref 3.5–5.3)
POTASSIUM SERPL-SCNC: 4.6 MMOL/L — SIGNIFICANT CHANGE UP (ref 3.5–5.3)
PROCALCITONIN SERPL-MCNC: 0.09 NG/ML — SIGNIFICANT CHANGE UP (ref 0.02–0.1)
PROCALCITONIN SERPL-MCNC: 0.09 NG/ML — SIGNIFICANT CHANGE UP (ref 0.02–0.1)
PROT SERPL-MCNC: 6.3 G/DL — SIGNIFICANT CHANGE UP (ref 6–8.3)
PROT SERPL-MCNC: 6.3 G/DL — SIGNIFICANT CHANGE UP (ref 6–8.3)
PROTHROM AB SERPL-ACNC: 11.2 SEC — SIGNIFICANT CHANGE UP (ref 9.5–13)
PROTHROM AB SERPL-ACNC: 11.2 SEC — SIGNIFICANT CHANGE UP (ref 9.5–13)
RBC # BLD: 2.7 M/UL — LOW (ref 4.2–5.8)
RBC # BLD: 2.7 M/UL — LOW (ref 4.2–5.8)
RBC # FLD: 14.6 % — HIGH (ref 10.3–14.5)
RBC # FLD: 14.6 % — HIGH (ref 10.3–14.5)
RH IG SCN BLD-IMP: POSITIVE — SIGNIFICANT CHANGE UP
RH IG SCN BLD-IMP: POSITIVE — SIGNIFICANT CHANGE UP
SODIUM SERPL-SCNC: 139 MMOL/L — SIGNIFICANT CHANGE UP (ref 135–145)
SODIUM SERPL-SCNC: 139 MMOL/L — SIGNIFICANT CHANGE UP (ref 135–145)
SPECIMEN SOURCE: SIGNIFICANT CHANGE UP
SPECIMEN SOURCE: SIGNIFICANT CHANGE UP
TROPONIN T, HIGH SENSITIVITY RESULT: 102 NG/L — CRITICAL HIGH (ref 0–51)
TROPONIN T, HIGH SENSITIVITY RESULT: 102 NG/L — CRITICAL HIGH (ref 0–51)
TROPONIN T, HIGH SENSITIVITY RESULT: 109 NG/L — CRITICAL HIGH (ref 0–51)
TROPONIN T, HIGH SENSITIVITY RESULT: 109 NG/L — CRITICAL HIGH (ref 0–51)
WBC # BLD: 13.56 K/UL — HIGH (ref 3.8–10.5)
WBC # BLD: 13.56 K/UL — HIGH (ref 3.8–10.5)
WBC # FLD AUTO: 13.56 K/UL — HIGH (ref 3.8–10.5)
WBC # FLD AUTO: 13.56 K/UL — HIGH (ref 3.8–10.5)

## 2023-11-08 PROCEDURE — 99222 1ST HOSP IP/OBS MODERATE 55: CPT

## 2023-11-08 PROCEDURE — 99291 CRITICAL CARE FIRST HOUR: CPT

## 2023-11-08 PROCEDURE — 99292 CRITICAL CARE ADDL 30 MIN: CPT

## 2023-11-08 PROCEDURE — 99233 SBSQ HOSP IP/OBS HIGH 50: CPT

## 2023-11-08 PROCEDURE — 93010 ELECTROCARDIOGRAM REPORT: CPT

## 2023-11-08 PROCEDURE — 71045 X-RAY EXAM CHEST 1 VIEW: CPT | Mod: 26

## 2023-11-08 PROCEDURE — 73630 X-RAY EXAM OF FOOT: CPT | Mod: 26,RT

## 2023-11-08 RX ORDER — ENOXAPARIN SODIUM 100 MG/ML
40 INJECTION SUBCUTANEOUS EVERY 24 HOURS
Refills: 0 | Status: DISCONTINUED | OUTPATIENT
Start: 2023-11-08 | End: 2023-11-10

## 2023-11-08 RX ORDER — FUROSEMIDE 40 MG
10 TABLET ORAL
Qty: 500 | Refills: 0 | Status: DISCONTINUED | OUTPATIENT
Start: 2023-11-08 | End: 2023-11-09

## 2023-11-08 RX ORDER — SODIUM NITROPRUSSIDE 50 MG/2ML
1 INJECTION INTRAVENOUS
Qty: 100 | Refills: 0 | Status: DISCONTINUED | OUTPATIENT
Start: 2023-11-08 | End: 2023-11-08

## 2023-11-08 RX ORDER — CHLORHEXIDINE GLUCONATE 213 G/1000ML
1 SOLUTION TOPICAL
Refills: 0 | Status: DISCONTINUED | OUTPATIENT
Start: 2023-11-08 | End: 2023-11-10

## 2023-11-08 RX ORDER — FUROSEMIDE 40 MG
40 TABLET ORAL ONCE
Refills: 0 | Status: COMPLETED | OUTPATIENT
Start: 2023-11-08 | End: 2023-11-08

## 2023-11-08 RX ORDER — INSULIN LISPRO 100/ML
10 VIAL (ML) SUBCUTANEOUS ONCE
Refills: 0 | Status: COMPLETED | OUTPATIENT
Start: 2023-11-08 | End: 2023-11-08

## 2023-11-08 RX ORDER — MAGNESIUM SULFATE 500 MG/ML
1 VIAL (ML) INJECTION ONCE
Refills: 0 | Status: COMPLETED | OUTPATIENT
Start: 2023-11-08 | End: 2023-11-08

## 2023-11-08 RX ORDER — HYDRALAZINE HCL 50 MG
25 TABLET ORAL EVERY 8 HOURS
Refills: 0 | Status: DISCONTINUED | OUTPATIENT
Start: 2023-11-08 | End: 2023-11-10

## 2023-11-08 RX ORDER — DAPTOMYCIN 500 MG/10ML
500 INJECTION, POWDER, LYOPHILIZED, FOR SOLUTION INTRAVENOUS EVERY 24 HOURS
Refills: 0 | Status: DISCONTINUED | OUTPATIENT
Start: 2023-11-08 | End: 2023-11-10

## 2023-11-08 RX ORDER — CEFTRIAXONE 500 MG/1
2000 INJECTION, POWDER, FOR SOLUTION INTRAMUSCULAR; INTRAVENOUS EVERY 24 HOURS
Refills: 0 | Status: DISCONTINUED | OUTPATIENT
Start: 2023-11-08 | End: 2023-11-10

## 2023-11-08 RX ORDER — HYDRALAZINE HCL 50 MG
25 TABLET ORAL EVERY 8 HOURS
Refills: 0 | Status: DISCONTINUED | OUTPATIENT
Start: 2023-11-08 | End: 2023-11-08

## 2023-11-08 RX ORDER — GABAPENTIN 400 MG/1
800 CAPSULE ORAL EVERY 8 HOURS
Refills: 0 | Status: DISCONTINUED | OUTPATIENT
Start: 2023-11-08 | End: 2023-11-10

## 2023-11-08 RX ORDER — ACETAMINOPHEN 500 MG
650 TABLET ORAL EVERY 6 HOURS
Refills: 0 | Status: DISCONTINUED | OUTPATIENT
Start: 2023-11-08 | End: 2023-11-10

## 2023-11-08 RX ORDER — ATORVASTATIN CALCIUM 80 MG/1
80 TABLET, FILM COATED ORAL AT BEDTIME
Refills: 0 | Status: DISCONTINUED | OUTPATIENT
Start: 2023-11-08 | End: 2023-11-10

## 2023-11-08 RX ORDER — HUMAN INSULIN 100 [IU]/ML
10 INJECTION, SUSPENSION SUBCUTANEOUS ONCE
Refills: 0 | Status: COMPLETED | OUTPATIENT
Start: 2023-11-08 | End: 2023-11-08

## 2023-11-08 RX ADMIN — INSULIN GLARGINE 6 UNIT(S): 100 INJECTION, SOLUTION SUBCUTANEOUS at 22:21

## 2023-11-08 RX ADMIN — SODIUM NITROPRUSSIDE 11.9 MICROGRAM(S)/KG/MIN: 50 INJECTION INTRAVENOUS at 02:22

## 2023-11-08 RX ADMIN — Medication 10 UNIT(S): at 01:50

## 2023-11-08 RX ADMIN — CLOPIDOGREL BISULFATE 75 MILLIGRAM(S): 75 TABLET, FILM COATED ORAL at 11:04

## 2023-11-08 RX ADMIN — DAPTOMYCIN 120 MILLIGRAM(S): 500 INJECTION, POWDER, LYOPHILIZED, FOR SOLUTION INTRAVENOUS at 10:10

## 2023-11-08 RX ADMIN — Medication 25 MILLIGRAM(S): at 14:36

## 2023-11-08 RX ADMIN — Medication 4: at 22:24

## 2023-11-08 RX ADMIN — Medication 5 MG/HR: at 02:12

## 2023-11-08 RX ADMIN — Medication 5 UNIT(S): at 15:31

## 2023-11-08 RX ADMIN — GABAPENTIN 800 MILLIGRAM(S): 400 CAPSULE ORAL at 17:20

## 2023-11-08 RX ADMIN — Medication 100 GRAM(S): at 03:20

## 2023-11-08 RX ADMIN — Medication 2: at 15:30

## 2023-11-08 RX ADMIN — ENOXAPARIN SODIUM 40 MILLIGRAM(S): 100 INJECTION SUBCUTANEOUS at 07:17

## 2023-11-08 RX ADMIN — Medication 650 MILLIGRAM(S): at 21:16

## 2023-11-08 RX ADMIN — Medication 40 MILLIGRAM(S): at 01:49

## 2023-11-08 RX ADMIN — Medication 25 MILLIGRAM(S): at 21:16

## 2023-11-08 RX ADMIN — Medication 5 UNIT(S): at 10:15

## 2023-11-08 RX ADMIN — HUMAN INSULIN 10 UNIT(S): 100 INJECTION, SUSPENSION SUBCUTANEOUS at 03:07

## 2023-11-08 RX ADMIN — CEFTRIAXONE 100 MILLIGRAM(S): 500 INJECTION, POWDER, FOR SOLUTION INTRAMUSCULAR; INTRAVENOUS at 09:01

## 2023-11-08 RX ADMIN — Medication 650 MILLIGRAM(S): at 23:00

## 2023-11-08 RX ADMIN — ATORVASTATIN CALCIUM 80 MILLIGRAM(S): 80 TABLET, FILM COATED ORAL at 21:16

## 2023-11-08 RX ADMIN — GABAPENTIN 800 MILLIGRAM(S): 400 CAPSULE ORAL at 21:16

## 2023-11-08 RX ADMIN — Medication 81 MILLIGRAM(S): at 11:04

## 2023-11-08 NOTE — H&P ADULT - NSHPLABSRESULTS_GEN_ALL_CORE
8.9    17.21 )-----------( 347      ( 2023 14:09 )             28.4           135  |  102  |  25<H>  ----------------------------<  421<H>  4.6   |  27  |  1.28    Ca    9.1      2023 14:09  Mg     2.1         TPro  7.6  /  Alb  2.0<L>  /  TBili  0.4  /  DBili  x   /  AST  75<H>  /  ALT  81<H>  /  AlkPhos  177<H>                  Urinalysis Basic - ( 2023 14:40 )    Color: Yellow / Appearance: Clear / S.035 / pH: x  Gluc: x / Ketone: Negative mg/dL  / Bili: Negative / Urobili: 0.2 mg/dL   Blood: x / Protein: >=1000 mg/dL / Nitrite: Negative   Leuk Esterase: Negative / RBC: 30 /HPF / WBC 1 /HPF   Sq Epi: x / Non Sq Epi: x / Bacteria: Few /HPF        EKG: NSR@93bpm with non specific ST-T wave changes
LABS    POCT Blood Glucose.: 267 mg/dL (08 Nov 2023 02:55)  POCT Blood Glucose.: 384 mg/dL (08 Nov 2023 01:31)  POCT Blood Glucose.: 393 mg/dL (07 Nov 2023 23:57)  POCT Blood Glucose.: 409 mg/dL (07 Nov 2023 17:14)  POCT Blood Glucose.: 386 mg/dL (07 Nov 2023 13:33)                        8.4    13.56 )-----------( 291      ( 08 Nov 2023 01:35 )             25.9     11-08    139  |  105  |  24<H>  ----------------------------<  379<H>  4.6   |  25  |  1.01    Ca    8.6      08 Nov 2023 01:35  Phos  3.3     11-08  Mg     1.9     11-08    TPro  6.3  /  Alb  1.9<L>  /  TBili  0.3  /  DBili  x   /  AST  40  /  ALT  52<H>  /  AlkPhos  146<H>  11-08    PT/INR - ( 08 Nov 2023 01:35 )   PT: 11.2 sec;   INR: 0.98          PTT - ( 08 Nov 2023 01:35 )  PTT:26.6 sec  Urinalysis Basic - ( 08 Nov 2023 01:35 )

## 2023-11-08 NOTE — H&P ADULT - ATTENDING COMMENTS
Pt is a 68 y/o man c HTN, IDDM w peripheral neuropathy, HCV, HFrEF (EF 45%mod AS), CAD s/p PCI, toe resxn, who presents with dyspnea, agitation and SBP 200s.    Pt placed on nitro gtt to 65mcg/min    afeb, HR 76, 116/68, 95%    WBC 17.2 --> 13.2  Hgb 8.4  Plt 291    ALT 52    Na 131  Cr 1      - pt with htn emergency  - agree for nitro, now d/c'd start PO meds  - pt given ativan/morphine agitiation, much resolved  - CXR/CT with pleural effusion and CT with patch opacities and consolidations  - CXR: RUl opacity, consider proCal to see if pt with concomitant PNA  - also follow CXR to see if infiltrate quickly improves (fluid), cont abx for now  - cont outpt BP meds

## 2023-11-08 NOTE — CONSULT NOTE ADULT - SUBJECTIVE AND OBJECTIVE BOX
Attending: Dr. Camacho     Patient is a 68y old  Male who presents with a chief complaint of HF exacerbation (08 Nov 2023 08:48)      HPI:  67M PMH HTN, IDDM with peripheral neuropathy, HCV, HFmrEF (TTE 10/23/23 EF 45% with moderate AS), CAD s/p PCI with 2 stents (2020 at [facility]), recent admission x2 for diabetic SSTI (s/p partial 4th toe ray resection 10/24), presented to [facility] complaining of shortness of breath x1 day. Also reported fever, cough, b/l LE swelling, and R foot pain. Pt most recently discharged 10/31/23 with PICC line for continued management of SSTI with daptomycin and CTX (10/24-12/5)  Podiatry addendum:  67M PMH HTN, IDDM with peripheral neuropathy, HCV, HFmrEF, CAD, previously admitted for diabetic foot infection (10/21-10/31) for which he underwent partial 4th ray resection on 10/24/23. Presented to [facility] for sob and admitted to CCU for hypertension and acute CHF exacerbation. Podiatry consulted to evaluate the surgical site. Pt states he has been compliant in changing his dressing every day with packing and betadine. Denies any purulence or malodor. Only states he's noticed serous drainage. Pt has been receiving 6 weeks IV antibiotics (dapto/vanc) for residual osteomyelitis. States he has not yet followed up with podiatrist, but has an appointment his month. Pt states he is unsure who he is following up with. On previous admission, pt refusing surgical intervention. Pt is continuing to refuse further surgical intervention despite poor healing potential of surgical site wound as well as possible further infection of wound. Denies n/v/f/c.     Review of systems negative except per HPI and as stated below  General:	 no weakness; no fevers, no chills  Skin/Breast: no rash  Respiratory and Thorax: no SOB, no cough  Cardiovascular:	No chest pain  Gastrointestinal:	 no nausea, vomiting , diarrhea  Genitourinary:	no dysuria, no difficulty urinating, no hematuria  Musculoskeletal:	no weakness, no joint swelling/pain  Neurological:	no focal weakness/numbness  Endocrine:	no polyuria, no polydipsia    PAST MEDICAL & SURGICAL HISTORY:  IDDM (insulin dependent diabetes mellitus)      HTN (hypertension)      CAD S/P percutaneous coronary angioplasty      Neuropathy      PAD (peripheral artery disease)      PNA (pneumonia)      Gangrene of toe of right foot      Moderate aortic stenosis      Acute on chronic diastolic congestive heart failure      Hyperlipidemia      History of partial ray amputation of fourth toe of right foot        Home Medications:    Allergies    No Known Allergies    Intolerances      FAMILY HISTORY:    Social History:       LABS                        8.4    13.56 )-----------( 291      ( 08 Nov 2023 01:35 )             25.9     11-08    139  |  105  |  24<H>  ----------------------------<  379<H>  4.6   |  25  |  1.01    Ca    8.6      08 Nov 2023 01:35  Phos  2.8     11-08  Mg     1.9     11-08    TPro  6.3  /  Alb  1.9<L>  /  TBili  0.3  /  DBili  x   /  AST  40  /  ALT  52<H>  /  AlkPhos  146<H>  11-08    PT/INR - ( 08 Nov 2023 01:35 )   PT: 11.2 sec;   INR: 0.98          PTT - ( 08 Nov 2023 01:35 )  PTT:26.6 sec  ESR: 121  CRP: --  11-08 @ 01:35    Vital Signs Last 24 Hrs  T(C): 36.8 (08 Nov 2023 10:00), Max: 37.1 (07 Nov 2023 13:22)  T(F): 98.3 (08 Nov 2023 10:00), Max: 98.8 (07 Nov 2023 13:22)  HR: 66 (08 Nov 2023 09:00) (62 - 92)  BP: 136/66 (08 Nov 2023 09:00) (122/89 - 222/113)  BP(mean): 94 (08 Nov 2023 09:00) (89 - 145)  RR: 17 (08 Nov 2023 09:00) (13 - 25)  SpO2: 93% (08 Nov 2023 09:00) (93% - 100%)    Parameters below as of 08 Nov 2023 09:00  Patient On (Oxygen Delivery Method): room air        PHYSICAL EXAM  General: NAD, AA0x3    Lower Extremity Focused:  Vasc: DP/PT biphasic waveforms heard on doppler b/l, no edema or erythema. CFT <3 x 9   Derm:  R foot surgical site wound at the distal 4th ray. Serous drainage. Fibrotic 90% granular 10% wound bed. Plantar skin just proximal to the surgical site with ischemic central area  Macerated 2nd interspace  Neuro: protective sensation slightly diminished  MSK: 5/5 muscle strength in all compartments     RADIOLOGY  < from: Xray Foot AP + Lateral + Oblique, Right (11.08.23 @ 10:01) >  FINDINGS/  IMPRESSION:    There is redemonstration of partial amputation of the fourth ray at the   level of the metatarsal head. There is mild cortical irregularity of the   fourth metatarsal stump margin, for which sequelae of acute osteomyelitis   cannot be entirely excluded.  There is no focal soft tissue abnormality.  < end of copied text >                      
INFECTIOUS DISEASES INITIAL CONSULT NOTE    HPI:  67M PMH HTN, IDDM with peripheral neuropathy, HCV, HFmrEF (TTE 10/23/23 EF 45% with moderate AS), CAD s/p PCI with 2 stents (2020 at [facility]), recent admission x2 for diabetic SSTI (s/p partial 4th toe ray resection 10/24), presented to [facility] complaining of shortness of breath x1 day. ID consulted for c/f PNA.    Pt also reported fever, cough, b/l LE swelling, and R foot pain. Pt most recently discharged 10/31/23 with PICC line for continued management of SSTI with daptomycin and CTX (10/24-12/5). In the ED, afebrile, initial /113, pt agitated, requiring multiple ativan pushes. Labs notable for WBC 17.21, elevated LFTs, RVP neg, UTOX +cocaine. CTA chest w/ b/l interlobular septal thickening, b/l UL GGO and consolidation, LLL atelectasis, LVH, CXR s/f pulm congestion and b/l pleural effusions Patient was admitted to CCU and was diuresed for acute decompensated heart failure, now improved and transferred to cards service. Patient states chest pain, SOB, and cough have resolved. He admits to using cocaine PTA but does not recall when. States he only snorts cocaine, denies IVDU or hx of IVDU. Podiatry following- advising further surgical intervention at surgical site for poor wound healing. He is currently on dapto and CTX. Patient states he has been adherent with IV antibiotic regimen at home- only missing one dose because he presented to the hospital.        PAST MEDICAL & SURGICAL HISTORY:  IDDM (insulin dependent diabetes mellitus)      HTN (hypertension)      CAD S/P percutaneous coronary angioplasty      Neuropathy      PAD (peripheral artery disease)      PNA (pneumonia)      Gangrene of toe of right foot      Moderate aortic stenosis      Acute on chronic diastolic congestive heart failure      Hyperlipidemia      History of partial ray amputation of fourth toe of right foot        Review of Systems:   Constitutional, eyes, ENT, cardiovascular, respiratory, gastrointestinal, genitourinary, integumentary, neurological, psychiatric and heme/lymph are otherwise negative other than noted above       ANTIBIOTICS:  MEDICATIONS  (STANDING):  aspirin enteric coated 81 milliGRAM(s) Oral daily  atorvastatin 80 milliGRAM(s) Oral at bedtime  cefTRIAXone   IVPB 2000 milliGRAM(s) IV Intermittent every 24 hours  chlorhexidine 2% Cloths 1 Application(s) Topical <User Schedule>  clopidogrel Tablet 75 milliGRAM(s) Oral daily  DAPTOmycin IVPB 500 milliGRAM(s) IV Intermittent every 24 hours  dextrose 5%. 1000 milliLiter(s) (100 mL/Hr) IV Continuous <Continuous>  dextrose 5%. 1000 milliLiter(s) (50 mL/Hr) IV Continuous <Continuous>  dextrose 50% Injectable 12.5 Gram(s) IV Push once  dextrose 50% Injectable 25 Gram(s) IV Push once  dextrose 50% Injectable 25 Gram(s) IV Push once  enoxaparin Injectable 40 milliGRAM(s) SubCutaneous every 24 hours  furosemide Infusion 10 mG/Hr (5 mL/Hr) IV Continuous <Continuous>  gabapentin 800 milliGRAM(s) Oral every 8 hours  glucagon  Injectable 1 milliGRAM(s) IntraMuscular once  hydrALAZINE 25 milliGRAM(s) Oral every 8 hours  insulin glargine Injectable (LANTUS) 6 Unit(s) SubCutaneous at bedtime  insulin lispro (ADMELOG) corrective regimen sliding scale   SubCutaneous Before meals and at bedtime  insulin lispro Injectable (ADMELOG) 5 Unit(s) SubCutaneous three times a day before meals    MEDICATIONS  (PRN):  acetaminophen     Tablet .. 650 milliGRAM(s) Oral every 6 hours PRN Temp greater or equal to 38C (100.4F), Mild Pain (1 - 3)  dextrose Oral Gel 15 Gram(s) Oral once PRN Blood Glucose LESS THAN 70 milliGRAM(s)/deciliter      Allergies    No Known Allergies    Intolerances        SOCIAL HISTORY:    FAMILY HISTORY:   no FH leading to current infection    Vital Signs Last 24 Hrs  T(C): 36.8 (08 Nov 2023 13:00), Max: 36.8 (08 Nov 2023 10:00)  T(F): 98.3 (08 Nov 2023 13:00), Max: 98.3 (08 Nov 2023 10:00)  HR: 79 (08 Nov 2023 15:35) (62 - 92)  BP: 151/96 (08 Nov 2023 15:35) (116/68 - 216/104)  BP(mean): 115 (08 Nov 2023 15:35) (87 - 145)  RR: 18 (08 Nov 2023 15:35) (13 - 25)  SpO2: 94% (08 Nov 2023 15:35) (93% - 100%)    Parameters below as of 08 Nov 2023 15:35  Patient On (Oxygen Delivery Method): room air        11-07-23 @ 07:01  -  11-08-23 @ 07:00  --------------------------------------------------------  IN: 180.9 mL / OUT: 3075 mL / NET: -2894.1 mL    11-08-23 @ 07:01  -  11-08-23 @ 16:50  --------------------------------------------------------  IN: 920 mL / OUT: 3250 mL / NET: -2330 mL        PHYSICAL EXAM:  Constitutional: alert, NAD  Eyes: the sclera and conjunctiva were normal.   ENT: the ears and nose were normal in appearance.   Neck: the appearance of the neck was normal and the neck was supple.   Pulmonary: no respiratory distress and lungs were clear to auscultation bilaterally.   Heart: heart rate was normal and rhythm regular, normal S1 and S2  Vascular:. B/l LE pitting edema. Scrotal edema appears to be improved compared to prior admission   Abdomen: normal bowel sounds, soft, non-tender  Extremities: R foot wrapped   Neurological: no focal deficits.   Psychiatric: the affect was normal  LUE: PICC in place without signs of infection        LABS:                        8.4    13.56 )-----------( 291      ( 08 Nov 2023 01:35 )             25.9     11-08    139  |  105  |  24<H>  ----------------------------<  379<H>  4.6   |  25  |  1.01    Ca    8.6      08 Nov 2023 01:35  Phos  2.8     11-08  Mg     1.9     11-08    TPro  6.3  /  Alb  1.9<L>  /  TBili  0.3  /  DBili  x   /  AST  40  /  ALT  52<H>  /  AlkPhos  146<H>  11-08    PT/INR - ( 08 Nov 2023 01:35 )   PT: 11.2 sec;   INR: 0.98          PTT - ( 08 Nov 2023 01:35 )  PTT:26.6 sec  Urinalysis Basic - ( 08 Nov 2023 01:35 )    Color: x / Appearance: x / SG: x / pH: x  Gluc: 379 mg/dL / Ketone: x  / Bili: x / Urobili: x   Blood: x / Protein: x / Nitrite: x   Leuk Esterase: x / RBC: x / WBC x   Sq Epi: x / Non Sq Epi: x / Bacteria: x        MICROBIOLOGY:  reviewed   RADIOLOGY & ADDITIONAL STUDIES:  reviewed
REASON FOR CONSULT: ***    Chief Complaint: Pt c/o chest pain and unable to take care of BP    HPI: Pt is a 66 yo HM with h/o CAD s/p PCI with 2 stents (3 yrs ago at [facility]), HTN, IDDM with peripheral neuropathy and recent admission 10/3 for diabetic SSTI presented 2 to 1 day onset of SOB. Wife at bedside and reported associated symptoms of fever, cough, b/l leg swelling and R foot pain. Pt was evaluated by the podiatrist yesterday - was told that R foot wound looks fine.  Initial /113, pt agitated and on CT chest pulm edema with b/l pleural effusions; pt Rx'd with Lasix + Nitro and Haldol    PAST MEDICAL & SURGICAL HISTORY:  IDDM (insulin dependent diabetes mellitus)      HTN (hypertension)      CAD S/P percutaneous coronary angioplasty      Neuropathy      No significant past surgical history        Allergies    No Known Allergies    Intolerances      FAMILY HISTORY:      Review of Systems:  CONSTITUTIONAL: No fever, chills, or fatigue  EYES: No eye pain, visual disturbances, or discharge  ENMT:  No difficulty hearing, tinnitus, vertigo; No sinus or throat pain  NECK: No pain or stiffness  RESPIRATORY: No cough, wheezing, chills or hemoptysis; No shortness of breath  CARDIOVASCULAR: No chest pain, palpitations, dizziness, or leg swelling  GASTROINTESTINAL: No abdominal or epigastric pain. No nausea, vomiting, or hematemesis; No diarrhea or constipation. No melena or hematochezia.  GENITOURINARY: No dysuria, frequency, hematuria, or incontinence  NEUROLOGICAL: No headaches, memory loss, loss of strength, numbness, or tremors  SKIN: No itching, burning, rashes, or lesions   MUSCULOSKELETAL: No joint pain or swelling; No muscle, back, or extremity pain  PSYCHIATRIC: No depression, anxiety, mood swings, or difficulty sleeping      Medications:    carvedilol 12.5 milliGRAM(s) Oral once  nitroglycerin  Infusion 50 MICROgram(s)/Min IV Continuous <Continuous>      haloperidol    Injectable 5 milliGRAM(s) IntraMuscular once  LORazepam   Injectable 1 milliGRAM(s) IV Push once                            ICU Vital Signs Last 24 Hrs  T(C): 37.1 (2023 13:22), Max: 37.1 (2023 13:22)  T(F): 98.8 (2023 13:22), Max: 98.8 (2023 13:22)  HR: 70 (2023 18:13) (65 - 85)  BP: 188/97 (2023 18:13) (181/89 - 222/113)  BP(mean): 135 (2023 18:13) (135 - 135)  ABP: --  ABP(mean): --  RR: 24 (2023 18:13) (16 - 24)  SpO2: 96% (2023 18:) (96% - 100%)    O2 Parameters below as of 2023 18:13  Patient On (Oxygen Delivery Method): nasal cannula  O2 Flow (L/min): 2        Vital Signs Last 24 Hrs  T(C): 37.1 (2023 13:22), Max: 37.1 (2023 13:22)  T(F): 98.8 (2023 13:22), Max: 98.8 (2023 13:22)  HR: 70 (2023 18:13) (65 - 85)  BP: 188/97 (2023 18:13) (181/89 - 222/113)  BP(mean): 135 (:13) (135 - 135)  RR: 24 (:) (16 - 24)  SpO2: 96% (:) (96% - 100%)    Parameters below as of 2023 18:13  Patient On (Oxygen Delivery Method): nasal cannula  O2 Flow (L/min): 2          I&O's Detail        LABS:                        8.9    17.21 )-----------( 347      ( 2023 14:09 )             28.4         135  |  102  |  25<H>  ----------------------------<  421<H>  4.6   |  27  |  1.28    Ca    9.1      2023 14:09  Mg     2.1         TPro  7.6  /  Alb  2.0<L>  /  TBili  0.4  /  DBili  x   /  AST  75<H>  /  ALT  81<H>  /  AlkPhos  177<H>            CAPILLARY BLOOD GLUCOSE      POCT Blood Glucose.: 409 mg/dL (2023 17:14)      Urinalysis Basic - ( 2023 14:40 )    Color: Yellow / Appearance: Clear / S.035 / pH: x  Gluc: x / Ketone: Negative mg/dL  / Bili: Negative / Urobili: 0.2 mg/dL   Blood: x / Protein: >=1000 mg/dL / Nitrite: Negative   Leuk Esterase: Negative / RBC: 30 /HPF / WBC 1 /HPF   Sq Epi: x / Non Sq Epi: x / Bacteria: Few /HPF      CULTURES:      Physical Examination:  Physical exam as per bedside MD (direct physical examination could not be performed because the visit was provided via Telemedicine):       RADIOLOGY: ***      CRITICAL CARE TIME SPENT: ***    This visit was provided via telemedicine. Patient was located at [facility]      Provider was located at [facility] [place], [place] for the visit.      
Bactrim Pregnancy And Lactation Text: This medication is Pregnancy Category D and is known to cause fetal risk.  It is also excreted in breast milk.

## 2023-11-08 NOTE — PROGRESS NOTE ADULT - ASSESSMENT
67M PMH HTN, IDDM with peripheral neuropathy, HCV, HFmrEF (TTE 10/23/23 EF 45% with moderate AS), CAD s/p PCI with 2 stents (2020 at [facility]), recent admission x2 for diabetic SSTI (s/p partial 4th toe ray resection 10/24), presented to [facility] complaining of shortness of breath, found to be hypertensive to 212/133 and was acutely altered. Started on nitroglycerine gtt in ED. Admitted to CCU for further management of hypertensive urgency and acute CHF exacerbation.    NEURO  #Acute metabolic encephalopathy  On arrival to ED, pt became acutely agitated, requiring multiple pushes of ativan. Pt also presented with elevated /113, Utox +cocaine and THC. CTH negative. AMS likely 2/2 hypertensive crisis vs drug intoxication. On arrival to CCU, pt AAOx1-2, tearful, attempting to get out of hospital bed.  - management of hypertensive emergency as below    #Peripheral neuropathy  Home meds: gabapentin 1600 TID, tylenol 650 q6hr, oxycodone 10mg prn  - holding gabapentin i/s/o AMS  - will restart PO tylenol pending bedside dysphagia  - holding oxycodone for now, will restart as appropriate    PULM  #Acute respiratory failure 2/2 flash pulmonary edema and acute decompensated heart failure  Pt presenting to ED in hypertensive crisis, CT chest s/f b/l UL GGO, pulmonary congestion, and LLL atelectasis. Complaining of SOB, fever, and cough x1 day. Physical exam pertinent for b/l rales, 2+ b/l LE edema extending to thighs, significant scrotal edema. s/p lasix 40mg IVP x1, lasix 20mg IVP x1 in ED, arrived to CCU on nitroglycerin gtt. Of note, pt recently admitted to [facility] x2 in 10/2023 for management of SSTI, discharged home with PICC line for continued IV abx.  - wean O2 as tolerated  - pt currently breathing comfortably on NC, however will transition to CPAP/BiPAP if no improvement on NC for higher positive pressure  - nitroglycerin --> c/w nitroprusside gtt for afterload reduction   - c/w lasix gtt, net negative 2 to 2.5L    CARDIO  #Acute decompensated heart failure  #HFmrEF  Pt presenting to ED complaining of worsening SOB, CXR and CTA chest s/f b/l GGO, pleural effusions, pulm congestion. CTA also notable for reflux of contrast material into dilated IVC and hepatic veins suggesting right-sided cardiac decompensation. Exam s/f remarkable scrotal and lower extremity edema. Clinical and imaging findings suggestive of worsening central and systemic congestion. Trop 89>101, pro-BNP >52832. Per prior cardiology note, pt reporting b/l pedal edema and chest pain w/ emotional stress ~2 months prior to 10/23 admission.   TTE 10/23/23: mild to mod LVH, LVEF 45%, dilated LA, moderate AS, trace MR, mild TR  Home meds: lisinopril 40mg qd, coreg 12.5mg BID, spironolactone 50mg qd, hydralazine 25mg TID  - nitroglycerin --> c/w nitroprusside gtt for afterload reduction   - c/w lasix gtt, net negative 2 to 2.5L  - restart home GDMT as appropriate   - maintain SpO2 >92%  - obtain collateral from outpatient cardiologist (Dr Anni Taylor @[facility]) and wife/family    #Moderate aortic stenosis  Per 10/23 TTE, pt w moderate AS with AV area 1.27, peak transvalvular velocity 2.47, mean transvalvular gradient is 16.00 mmHg, and the LVOT/AV velocity ratio is 0.40 with an EF of 45%  - f/u repeat TTE  - will restart coreg as appropriate    #CAD  h/o PCI with 2 stents placed at [facility] ~2020. Cardiology consulted during 10/2023 for pre-op clearance for R toe amputation. Per cardiology note, pt reported 12 months of antiplatelet therapy post-stent placement. Follows outpatient with general cardiologist but does not remember the name and has had poor follow-up since. Since PCI patient reports being asymptomatic until ~2 months prior to 10/23 admission when he started to have bilateral pedal edema and chest pain with emotional stress  - f/u repeat TTE  - c/w ASA 81mg qd  - c/w plavix 75mg qd  - c/w lipitor 80mg qd    #Hypertensive urgency  At [facility], pt hypertensive to 212/133 and acutely agitated requiring ~4mg ativan IVP. Pt received sublingual nitro and started on nitroglycerin gtt. Upon arrival to CCU, pt hypertensive to 196/90 and transitioned to nipride gtt.  - c/w nipride gtt  - restart home BP meds as appropriate      #PAD  #R foot wound  Seen by vascular and podiatry last admission. S/p RLE angiogram with AT lithotripsy and AT/peroneal balloon angioplasty 10/27. Completion angiogram showed better filling of AT and peroneal.  On exam, pt with gangrenous changes of RLE, b/l LE WWP, and dopplerable pulses. Pt s/p R 4th toe amputation with podiatry on 10/24, discharged with PICC and IV abx.  - regular neurovascular checks   - c/w ASA, plavix, lipitor  - consult podiatry in AM    GI  #Transaminitis  Elevated ALP and liver enzymes. Likely congestive hepatopathy i/s/o CHF exacerbation. CTA s/f reflux of contrast material into dilated IVC and hepatic veins suggesting right-sided cardiac decompensation.  - continue to monitor    RENAL  KEARA    ENDO  #Insulin dependent diabetes mellitus  #Hyperglycemia  Glucose elevated to 400s on admission. A1c 12.6. Endocrinology consulted during prior admission for management, per endo documentation, pt reported having T1DM however more likely has T2DM as was diagnosed ~10 years ago. s/p 10u lispro, 10u NPH on admissiom.  Home meds: 5u lispro TID with meals, 6u lantus at bedtime  - fsg goal 100-180  - c/w home meds    ID  #Diabetic foot infection  Pt is s/p R foot partial 4th ray resection (10/24) by podiatry. Pt hospitalized twice in 10/2023 related to R food wound. Single lumen PICC placed last admission. Tissue cx: e.coli, pluralibacter, stap epi, and campylobacter. MRI: Soft tissue atrophy/wound of the 4th toe with underlying marrow changes of the 4th proximal phalanx head, middle phalanx, and distal phalanx . ID consulted last admission, recommended Daptomycin 500mg IV q24h plus ceftriaxone 2g IV Q24.   - c/w Daptomycin 500mg IV q24h and ceftriaxone 2g IV q24 (10/24-12/5)  - f/u BCx  - f/u UCx  - tylenol prn for pain    PROPHYLAXIS  F: NONE  E: K>4, Mg>2  N: DASH/TLC  GI: None  DVT: Lovenox  Dispo: CCU   67M PMH HTN, IDDM with peripheral neuropathy, HCV, HFmrEF (TTE 10/23/23 EF 45% with moderate AS), CAD s/p PCI with 2 stents (2020 at [facility]), recent admission x2 for diabetic SSTI (s/p partial 4th toe ray resection 10/24), presented to [facility] complaining of shortness of breath, found to be hypertensive to 212/133 and was acutely altered. Started on nitroglycerine gtt in ED. Admitted to CCU for further management of hypertensive urgency and acute CHF exacerbation.    NEURO  #Acute metabolic encephalopathy  On arrival to ED, pt became acutely agitated, requiring multiple pushes of ativan. Pt also presented with elevated /113, Utox +cocaine and THC. CTH negative. AMS likely 2/2 hypertensive crisis vs drug intoxication. On arrival to CCU, pt AAOx1-2, tearful, attempting to get out of hospital bed.  - management of hypertensive emergency as below    #Peripheral neuropathy  Home meds: gabapentin 1600 TID, tylenol 650 q6hr, oxycodone 10mg prn  - holding gabapentin i/s/o AMS  - restarted PO tylenol   - holding oxycodone for now, will restart as appropriate    PULM  #Acute respiratory failure 2/2 flash pulmonary edema and acute decompensated heart failure  Pt presenting to ED in hypertensive crisis, CT chest s/f b/l UL GGO, pulmonary congestion, and LLL atelectasis. s/p lasix 40mg IVP x1, lasix 20mg IVP x1 in ED, arrived to CCU on nitroglycerin gtt. Dc'd nitro gtt. Weaned off O2.  - c/w lasix gtt, net negative 2 to 2.5L    #Consolidation  CT chest with b/l UL GGO, pulm congestion, and LLL atelectasis. Procal wnl. ID consulted.  - F/u ID recs    CARDIO  #Acute decompensated heart failure  #HFmrEF  Pt presenting to ED complaining of worsening SOB, CXR and CTA chest s/f b/l GGO, pleural effusions, pulm congestion.  PE with scrotal and lower extremity edema. Trop 89>101, pro-BNP >99338.   TTE 10/23/23: mild to mod LVH, LVEF 45%, dilated LA, moderate AS, trace MR, mild TR  Home meds: lisinopril 40mg qd, coreg 12.5mg BID, spironolactone 50mg qd, hydralazine 25mg TID  - c/w lasix gtt, net negative 2 to 2.5L  - restart home GDMT as appropriate   - maintain SpO2 >92%  - obtain collateral from outpatient cardiologist (Dr Anni Taylor @[facility]) and wife/family    #Moderate aortic stenosis  Per 10/23 TTE, pt w moderate AS with AV area 1.27, peak transvalvular velocity 2.47, mean transvalvular gradient is 16.00 mmHg, and the LVOT/AV velocity ratio is 0.40 with an EF of 45%  - f/u repeat TTE  - will restart coreg as appropriate    #CAD  h/o PCI with 2 stents placed at [facility] ~2020. Follows outpatient with general cardiologist but does not remember the name and has had poor follow-up since. Since PCI patient reports being asymptomatic until ~2 months prior to 10/23 admission when he started to have bilateral pedal edema and chest pain with emotional stress  - f/u repeat TTE  - c/w ASA 81mg qd  - c/w plavix 75mg qd  - c/w lipitor 80mg qd    #Hypertensive urgency  At [facility], pt hypertensive to 212/133 and acutely agitated requiring ~4mg ativan IVP. Pt received sublingual nitro and started on nitroglycerin gtt. Upon arrival to CCU, pt hypertensive to 196/90 and transitioned to nipride gtt. dc'd.  - restart home BP meds as appropriate      #PAD  #R foot wound  Seen by vascular and podiatry last admission. S/p RLE angiogram with AT lithotripsy and AT/peroneal balloon angioplasty 10/27. Completion angiogram showed better filling of AT and peroneal.  On exam, pt with gangrenous changes of RLE, b/l LE WWP, and dopplerable pulses. Pt s/p R 4th toe amputation with podiatry on 10/24, discharged with PICC and IV abx.  - regular neurovascular checks   - c/w ASA, plavix, lipitor  - appreciate podiatry recs    GI  #Transaminitis  Elevated ALP and liver enzymes. Likely congestive hepatopathy i/s/o CHF exacerbation. CTA s/f reflux of contrast material into dilated IVC and hepatic veins suggesting right-sided cardiac decompensation.  - continue to monitor    RENAL  KEARA    ENDO  #Insulin dependent diabetes mellitus  #Hyperglycemia  Glucose elevated to 400s on admission. A1c 12.6. Endocrinology consulted during prior admission for management, per endo documentation, pt reported having T1DM however more likely has T2DM as was diagnosed ~10 years ago. s/p 10u lispro, 10u NPH on admission  Home meds: 5u lispro TID with meals, 6u lantus at bedtime  - fsg goal 100-180  - c/w home meds    ID  #Diabetic foot infection  Pt is s/p R foot partial 4th ray resection (10/24) by podiatry. Pt hospitalized twice in 10/2023 related to R food wound. Single lumen PICC placed last admission. Tissue cx: e.coli, pluralibacter, stap epi, and campylobacter. MRI: Soft tissue atrophy/wound of the 4th toe with underlying marrow changes of the 4th proximal phalanx head, middle phalanx, and distal phalanx . ID consulted last admission, recommended Daptomycin 500mg IV q24h plus ceftriaxone 2g IV Q24.   - c/w Daptomycin 500mg IV q24h and ceftriaxone 2g IV q24 (10/24-12/5)  - f/u BCx  - f/u UCx  - tylenol prn for pain  - f/u ID recs    #HCV  Pt knows he has hx of HCV RNA positivity 6.17 but has not followed up  - F/u outpatient    PROPHYLAXIS  F: NONE  E: K>4, Mg>2  N: DASH/TLC  GI: None  DVT: Lovenox  Dispo: CCU   67M PMH HTN, IDDM with peripheral neuropathy, HCV, HFmrEF (TTE 10/23/23 EF 45% with moderate AS), CAD s/p PCI with 2 stents (2020 at [facility]), recent admission x2 for diabetic SSTI (s/p partial 4th toe ray resection 10/24), presented to [facility] complaining of shortness of breath, found to be hypertensive to 212/133 and was acutely altered. Started on nitroglycerine gtt in ED. Admitted to CCU for further management of hypertensive urgency and acute CHF exacerbation.    NEURO  #Acute metabolic encephalopathy  On arrival to ED, pt became acutely agitated, requiring multiple pushes of ativan. Pt also presented with elevated /113, Utox +cocaine and THC. CTH negative. AMS likely 2/2 hypertensive crisis vs drug intoxication. On arrival to CCU, pt AAOx1-2, tearful, attempting to get out of hospital bed.  - management of hypertensive emergency as below    #Peripheral neuropathy  Home meds: gabapentin 1600 TID, tylenol 650 q6hr, oxycodone 10mg prn  - holding gabapentin i/s/o AMS  - restarted PO tylenol   - holding oxycodone for now, will restart as appropriate    PULM  #Acute respiratory failure 2/2 flash pulmonary edema and acute decompensated heart failure  Pt presenting to ED in hypertensive crisis, CT chest s/f b/l UL GGO, pulmonary congestion, and LLL atelectasis. s/p lasix 40mg IVP x1, lasix 20mg IVP x1 in ED, arrived to CCU on nitroglycerin gtt. Dc'd nitro gtt. Weaned off O2.  - c/w lasix gtt, net negative 2 to 2.5L    #Consolidation  CT chest with b/l UL GGO, pulm congestion, and LLL atelectasis. Procal wnl. ID consulted.  - F/u ID recs    CARDIO  #Acute decompensated heart failure  #HFmrEF  Pt presenting to ED complaining of worsening SOB, CXR and CTA chest s/f b/l GGO, pleural effusions, pulm congestion.  PE with scrotal and lower extremity edema. Trop 89>101, pro-BNP >88868.   TTE 10/23/23: mild to mod LVH, LVEF 45%, dilated LA, moderate AS, trace MR, mild TR  Home meds: lisinopril 40mg qd, coreg 12.5mg BID, spironolactone 50mg qd, hydralazine 25mg TID  - c/w lasix gtt, net negative 2 to 2.5L  - restart home GDMT as appropriate   - maintain SpO2 >92%  - obtain collateral from outpatient cardiologist (Dr Anni Taylor @[facility]) and wife/family    #Moderate aortic stenosis  Per 10/23 TTE, pt w moderate AS with AV area 1.27, peak transvalvular velocity 2.47, mean transvalvular gradient is 16.00 mmHg, and the LVOT/AV velocity ratio is 0.40 with an EF of 45%  - f/u repeat TTE  - will restart coreg as appropriate    #CAD  h/o PCI with 2 stents placed at [facility] ~2020. Follows outpatient with general cardiologist but does not remember the name and has had poor follow-up since. Since PCI patient reports being asymptomatic until ~2 months prior to 10/23 admission when he started to have bilateral pedal edema and chest pain with emotional stress  - f/u repeat TTE  - c/w ASA 81mg qd  - c/w plavix 75mg qd  - c/w lipitor 80mg qd    #Hypertensive urgency  At [facility], pt hypertensive to 212/133 and acutely agitated requiring ~4mg ativan IVP. Pt received sublingual nitro and started on nitroglycerin gtt. Upon arrival to CCU, pt hypertensive to 196/90 and transitioned to nipride gtt. dc'd.  - restart home BP meds as appropriate      #PAD  #R foot wound  Seen by vascular and podiatry last admission. S/p RLE angiogram with AT lithotripsy and AT/peroneal balloon angioplasty 10/27. Completion angiogram showed better filling of AT and peroneal.  On exam, pt with gangrenous changes of RLE, b/l LE WWP, and dopplerable pulses. Pt s/p R 4th toe amputation with podiatry on 10/24, discharged with PICC and IV abx.  - regular neurovascular checks   - c/w ASA, plavix, lipitor  - appreciate podiatry recs    GI  #Transaminitis  Elevated ALP and liver enzymes. Likely congestive hepatopathy i/s/o CHF exacerbation. CTA s/f reflux of contrast material into dilated IVC and hepatic veins suggesting right-sided cardiac decompensation.  - continue to monitor    RENAL  KEARA    ENDO  #Insulin dependent diabetes mellitus  #Hyperglycemia  Glucose elevated to 400s on admission. A1c 12.6. Endocrinology consulted during prior admission for management, per endo documentation, pt reported having T1DM however more likely has T2DM as was diagnosed ~10 years ago. s/p 10u lispro, 10u NPH on admission  Home meds: 5u lispro TID with meals, 6u lantus at bedtime  - fsg goal 100-180  - c/w home meds    ID  #Diabetic foot infection  Pt is s/p R foot partial 4th ray resection (10/24) by podiatry. Pt hospitalized twice in 10/2023 related to R food wound. Single lumen PICC placed last admission. Tissue cx: e.coli, pluralibacter, stap epi, and campylobacter. MRI: Soft tissue atrophy/wound of the 4th toe with underlying marrow changes of the 4th proximal phalanx head, middle phalanx, and distal phalanx . ID consulted last admission, recommended Daptomycin 500mg IV q24h plus ceftriaxone 2g IV Q24.   - c/w Daptomycin 500mg IV q24h and ceftriaxone 2g IV q24 (10/24-12/5)  - F/u CK q48hr  - f/u BCx  - f/u UCx  - tylenol prn for pain  - f/u ID recs    #HCV  Pt knows he has hx of HCV RNA positivity 6.17 but has not followed up  - F/u outpatient    PROPHYLAXIS  F: NONE  E: K>4, Mg>2  N: DASH/TLC  GI: None  DVT: Lovenox  Dispo: CCU   67M PMH HTN, IDDM with peripheral neuropathy, HCV, HFmrEF (TTE 10/23/23 EF 45% with moderate AS), CAD s/p PCI with 2 stents (2020 at [facility]), recent admission x2 for diabetic SSTI (s/p partial 4th toe ray resection 10/24), presented to [facility] complaining of shortness of breath, found to be hypertensive to 212/133 and was acutely altered. Started on nitroglycerine gtt in ED. Admitted to CCU for further management of hypertensive urgency and acute CHF exacerbation.    NEURO  #Acute metabolic encephalopathy  On arrival to ED, pt became acutely agitated, requiring multiple pushes of ativan. Pt also presented with elevated /113, Utox +cocaine and THC. CTH negative. AMS likely 2/2 hypertensive crisis vs drug intoxication. On arrival to CCU, pt AAOx1-2, tearful, attempting to get out of hospital bed.  - management of hypertensive emergency as below    #Peripheral neuropathy  Home meds: gabapentin 1600 TID, tylenol 650 q6hr, oxycodone 10mg prn  - holding gabapentin i/s/o AMS  - restarted PO tylenol   - holding oxycodone for now, will restart as appropriate    PULM  #Acute respiratory failure 2/2 flash pulmonary edema and acute decompensated heart failure  Pt presenting to ED in hypertensive crisis, CT chest s/f b/l UL GGO, pulmonary congestion, and LLL atelectasis. s/p lasix 40mg IVP x1, lasix 20mg IVP x1 in ED, arrived to CCU on nitroglycerin gtt. Dc'd nitro gtt. Weaned off O2.  - c/w lasix gtt, net negative 2 to 2.5L    #Consolidation  CT chest with b/l UL GGO, pulm congestion, and LLL atelectasis. Procal wnl. ID consulted.  - F/u ID recs    CARDIO  #Acute decompensated heart failure  #HFmrEF  Pt presenting to ED complaining of worsening SOB, CXR and CTA chest s/f b/l GGO, pleural effusions, pulm congestion.  PE with scrotal and lower extremity edema. Trop 89>101, pro-BNP >07820.   TTE 10/23/23: mild to mod LVH, LVEF 45%, dilated LA, moderate AS, trace MR, mild TR  Home meds: lisinopril 40mg qd, coreg 12.5mg BID, spironolactone 50mg qd, hydralazine 25mg TID  - c/w lasix gtt, net negative 2 to 2.5L  - restart home GDMT as appropriate   - maintain SpO2 >92%  - obtain collateral from outpatient cardiologist (Dr Anni Taylor @[facility]) and wife/family    #Moderate aortic stenosis  Per 10/23 TTE, pt w moderate AS with AV area 1.27, peak transvalvular velocity 2.47, mean transvalvular gradient is 16.00 mmHg, and the LVOT/AV velocity ratio is 0.40 with an EF of 45%  - f/u repeat TTE  - will restart coreg as appropriate    #CAD  h/o PCI with 2 stents placed at [facility] ~2020. Follows outpatient with general cardiologist but does not remember the name and has had poor follow-up since. Since PCI patient reports being asymptomatic until ~2 months prior to 10/23 admission when he started to have bilateral pedal edema and chest pain with emotional stress  - f/u repeat TTE  - c/w ASA 81mg qd  - c/w plavix 75mg qd  - c/w lipitor 80mg qd    #Hypertensive urgency  At [facility], pt hypertensive to 212/133 and acutely agitated requiring ~4mg ativan IVP. Pt received sublingual nitro and started on nitroglycerin gtt. Upon arrival to CCU, pt hypertensive to 196/90 and transitioned to nipride gtt. dc'd.  - Restarted hydral 25 q8hrs  - restart home BP meds as appropriate      #PAD  #R foot wound  Seen by vascular and podiatry last admission. S/p RLE angiogram with AT lithotripsy and AT/peroneal balloon angioplasty 10/27. Completion angiogram showed better filling of AT and peroneal.  On exam, pt with gangrenous changes of RLE, b/l LE WWP, and dopplerable pulses. Pt s/p R 4th toe amputation with podiatry on 10/24, discharged with PICC and IV abx.  - regular neurovascular checks   - c/w ASA, plavix, lipitor  - appreciate podiatry recs    GI  #Transaminitis  Elevated ALP and liver enzymes. Likely congestive hepatopathy i/s/o CHF exacerbation. CTA s/f reflux of contrast material into dilated IVC and hepatic veins suggesting right-sided cardiac decompensation.  - continue to monitor    RENAL  KEARA    ENDO  #Insulin dependent diabetes mellitus  #Hyperglycemia  Glucose elevated to 400s on admission. A1c 12.6. Endocrinology consulted during prior admission for management, per endo documentation, pt reported having T1DM however more likely has T2DM as was diagnosed ~10 years ago. s/p 10u lispro, 10u NPH on admission  Home meds: 5u lispro TID with meals, 6u lantus at bedtime  - fsg goal 100-180  - c/w home meds    ID  #Diabetic foot infection  Pt is s/p R foot partial 4th ray resection (10/24) by podiatry. Pt hospitalized twice in 10/2023 related to R food wound. Single lumen PICC placed last admission. Tissue cx: e.coli, pluralibacter, stap epi, and campylobacter. MRI: Soft tissue atrophy/wound of the 4th toe with underlying marrow changes of the 4th proximal phalanx head, middle phalanx, and distal phalanx . ID consulted last admission, recommended Daptomycin 500mg IV q24h plus ceftriaxone 2g IV Q24.   - c/w Daptomycin 500mg IV q24h and ceftriaxone 2g IV q24 (10/24-12/5)  - F/u CK q48hr  - f/u BCx  - f/u UCx  - tylenol prn for pain  - f/u ID recs    #HCV  Pt knows he has hx of HCV RNA positivity 6.17 but has not followed up  - F/u outpatient    PROPHYLAXIS  F: NONE  E: K>4, Mg>2  N: DASH/TLC  GI: None  DVT: Lovenox  Dispo: CCU

## 2023-11-08 NOTE — PATIENT PROFILE ADULT - NSPROGENOTHERPROVIDER_GEN_A_NUR
Problem: MOBILITY - ADULT  Goal: Maintain or return to baseline ADL function  Description: INTERVENTIONS:  -  Assess patient's ability to carry out ADLs; assess patient's baseline for ADL function and identify physical deficits which impact ability to perform ADLs (bathing, care of mouth/teeth, toileting, grooming, dressing, etc )  - Assess/evaluate cause of self-care deficits   - Assess range of motion  - Assess patient's mobility; develop plan if impaired  - Assess patient's need for assistive devices and provide as appropriate  - Encourage maximum independence but intervene and supervise when necessary  - Involve family in performance of ADLs  - Assess for home care needs following discharge   - Consider OT consult to assist with ADL evaluation and planning for discharge  - Provide patient education as appropriate  Outcome: Progressing  Goal: Maintains/Returns to pre admission functional level  Description: INTERVENTIONS:  - Perform BMAT or MOVE assessment daily    - Set and communicate daily mobility goal to care team and patient/family/caregiver  - Collaborate with rehabilitation services on mobility goals if consulted  - Perform Range of Motion 3 times a day  - Reposition patient every 2 hours    - Dangle patient 3 times a day  - Stand patient 3 times a day  - Ambulate patient 3 times a day  - Out of bed to chair 3 times a day   - Out of bed for meals 3 times a day  - Out of bed for toileting  - Record patient progress and toleration of activity level   Outcome: Progressing     Problem: Prexisting or High Potential for Compromised Skin Integrity  Goal: Skin integrity is maintained or improved  Description: INTERVENTIONS:  - Identify patients at risk for skin breakdown  - Assess and monitor skin integrity  - Assess and monitor nutrition and hydration status  - Monitor labs   - Assess for incontinence   - Turn and reposition patient  - Assist with mobility/ambulation  - Relieve pressure over bony prominences  - Avoid friction and shearing  - Provide appropriate hygiene as needed including keeping skin clean and dry  - Evaluate need for skin moisturizer/barrier cream  - Collaborate with interdisciplinary team   - Patient/family teaching  - Consider wound care consult   Outcome: Progressing     Problem: Potential for Falls  Goal: Patient will remain free of falls  Description: INTERVENTIONS:  - Educate patient/family on patient safety including physical limitations  - Instruct patient to call for assistance with activity   - Consult OT/PT to assist with strengthening/mobility   - Keep Call bell within reach  - Keep bed low and locked with side rails adjusted as appropriate  - Keep care items and personal belongings within reach  - Initiate and maintain comfort rounds  - Make Fall Risk Sign visible to staff  - Offer Toileting every  Hours, in advance of need  - Initiate/Maintain alarm  - Obtain necessary fall risk management equipment:   - Apply yellow socks and bracelet for high fall risk patients  - Consider moving patient to room near nurses station  Outcome: Progressing     Problem: PAIN - ADULT  Goal: Verbalizes/displays adequate comfort level or baseline comfort level  Description: Interventions:  - Encourage patient to monitor pain and request assistance  - Assess pain using appropriate pain scale  - Administer analgesics based on type and severity of pain and evaluate response  - Implement non-pharmacological measures as appropriate and evaluate response  - Consider cultural and social influences on pain and pain management  - Notify physician/advanced practitioner if interventions unsuccessful or patient reports new pain  Outcome: Progressing     Problem: INFECTION - ADULT  Goal: Absence or prevention of progression during hospitalization  Description: INTERVENTIONS:  - Assess and monitor for signs and symptoms of infection  - Monitor lab/diagnostic results  - Monitor all insertion sites, i e  indwelling lines, tubes, and drains  - Monitor endotracheal if appropriate and nasal secretions for changes in amount and color  - [place] appropriate cooling/warming therapies per order  - Administer medications as ordered  - Instruct and encourage patient and family to use good hand hygiene technique  - Identify and instruct in appropriate isolation precautions for identified infection/condition  Outcome: Progressing  Goal: Absence of fever/infection during neutropenic period  Description: INTERVENTIONS:  - Monitor WBC    Outcome: Progressing     Problem: DISCHARGE PLANNING  Goal: Discharge to home or other facility with appropriate resources  Description: INTERVENTIONS:  - Identify barriers to discharge w/patient and caregiver  - Arrange for needed discharge resources and transportation as appropriate  - Identify discharge learning needs (meds, wound care, etc )  - Arrange for interpretive services to assist at discharge as needed  - Refer to Case Management Department for coordinating discharge planning if the patient needs post-hospital services based on physician/advanced practitioner order or complex needs related to functional status, cognitive ability, or social support system  Outcome: Progressing     Problem: Knowledge Deficit  Goal: Patient/family/caregiver demonstrates understanding of disease process, treatment plan, medications, and discharge instructions  Description: Complete learning assessment and assess knowledge base    Interventions:  - Provide teaching at level of understanding  - Provide teaching via preferred learning methods  Outcome: Progressing     Problem: RESPIRATORY - ADULT  Goal: Achieves optimal ventilation and oxygenation  Description: INTERVENTIONS:  - Assess for changes in respiratory status  - Assess for changes in mentation and behavior  - Position to facilitate oxygenation and minimize respiratory effort  - Oxygen administered by appropriate delivery if ordered  - Initiate smoking cessation education as indicated  - Encourage broncho-pulmonary hygiene including cough, deep breathe, Incentive Spirometry  - Assess the need for suctioning and aspirate as needed  - Assess and instruct to report SOB or any respiratory difficulty  - Respiratory Therapy support as indicated  Outcome: Progressing none Attending Assessment: 10-year-old male status post adenoidectomy and tonsillectomy on 828 presents now with coughing up and vomiting blood and blood clots patient had hemoglobin obtained and noted to be 10 with no significant tachycardia so no concern for acute blood loss.  ENT immediately at bedside for evaluation and clot noted on right tonsil patient given inhaled TXA Labs obtained and will admit to ENT service for further evaluation and monitoring for bleeding, Enrique Franco MD

## 2023-11-08 NOTE — ED ADULT NURSE REASSESSMENT NOTE - NS ED NURSE REASSESS COMMENT FT1
Pt being transferred to [facility] ICU. Pt currently on Nitro drip at 75mcg/hour. Pt stable, not in any acute distress. EMS took over care of patient.

## 2023-11-08 NOTE — PROGRESS NOTE ADULT - SUBJECTIVE AND OBJECTIVE BOX
Hospital course: 67M PMH HTN, IDDM with peripheral neuropathy, HCV, HFmrEF (TTE 10/23/23 EF 45% with moderate AS), CAD s/p PCI with 2 stents ( at [facility]), recent admission x2 for diabetic SSTI (s/p partial 4th toe ray resection 10/24), presented to [facility] complaining of shortness of breath x1 day, with fever, cough, b/l LE swelling, and R foot pain. Pt most recently discharged 10/31/23 with PICC line for continued management of SSTI with daptomycin and CTX (10/24-). In ED BP elevated 222/113 and pt was agitated, given multiple ativan pushes, morphine, lasix gtt, and nitro gtt (now dc'd).  Admitted to CCU for closer monitoring. Good UOP (3L). Restarted dapto and vanc for diabetic foot infection (10/24-). Repeat TTE ordered. Troponins stable.        Subjective: Patient seen at bedside, found sleeping and somnolent to arousal. Currently receiving lasix 10mg/min. ROS limited by pt's mental status.     ICU Vital Signs Last 24 Hrs  T(C): 36.5 (2023 05:15), Max: 37.1 (2023 13:22)  T(F): 97.7 (2023 05:15), Max: 98.8 (2023 13:22)  HR: 65 (2023 08:00) (62 - 92)  BP: 166/84 (2023 08:00) (122/89 - 222/113)  BP(mean): 116 (2023 08:00) (89 - 145)  ABP: --  ABP(mean): --  RR: 14 (2023 08:00) (13 - 25)  SpO2: 97% (2023 08:00) (96% - 100%)    O2 Parameters below as of 2023 08:00  Patient On (Oxygen Delivery Method): nasal cannula  O2 Flow (L/min): 3        I&O's Summary    2023 07:01  -  2023 07:00  --------------------------------------------------------  IN: 180.9 mL / OUT: 3075 mL / NET: -2894.1 mL    2023 07:01  -  2023 08:49  --------------------------------------------------------  IN: 5 mL / OUT: 400 mL / NET: -395 mL          Daily Height in cm: 170.2 (2023 01:19)    Daily Weight in k.4 (2023 01:19)    Adult Advanced Hemodynamics Last 24 Hrs  CVP(mm Hg): --  CVP(cm H2O): --  CO: --  CI: --  PA: --  PA(mean): --  PCWP: --  SVR: --  SVRI: --  PVR: --  PVRI: --    EKG/Telemetry Events:    MEDICATIONS  (STANDING):  aspirin enteric coated 81 milliGRAM(s) Oral daily  atorvastatin 80 milliGRAM(s) Oral at bedtime  cefTRIAXone   IVPB 2000 milliGRAM(s) IV Intermittent every 24 hours  clopidogrel Tablet 75 milliGRAM(s) Oral daily  DAPTOmycin IVPB 500 milliGRAM(s) IV Intermittent every 24 hours  dextrose 5%. 1000 milliLiter(s) (100 mL/Hr) IV Continuous <Continuous>  dextrose 5%. 1000 milliLiter(s) (50 mL/Hr) IV Continuous <Continuous>  dextrose 50% Injectable 12.5 Gram(s) IV Push once  dextrose 50% Injectable 25 Gram(s) IV Push once  dextrose 50% Injectable 25 Gram(s) IV Push once  enoxaparin Injectable 40 milliGRAM(s) SubCutaneous every 24 hours  furosemide Infusion 10 mG/Hr (5 mL/Hr) IV Continuous <Continuous>  glucagon  Injectable 1 milliGRAM(s) IntraMuscular once  insulin glargine Injectable (LANTUS) 6 Unit(s) SubCutaneous at bedtime  insulin lispro (ADMELOG) corrective regimen sliding scale   SubCutaneous Before meals and at bedtime  insulin lispro Injectable (ADMELOG) 5 Unit(s) SubCutaneous three times a day before meals    MEDICATIONS  (PRN):  dextrose Oral Gel 15 Gram(s) Oral once PRN Blood Glucose LESS THAN 70 milliGRAM(s)/deciliter      PHYSICAL EXAM:  GENERAL: NAD, somnolent, stirs to stimulation but does not waken  HEAD: Atraumatic, normocephalic  EYES: EOMI, conjunctiva and sclera clear  NECK: Supple, no JVD  CHEST/LUNG: b/l crackles; normal WOB on 3L NC  HEART: Regular rate and rhythm; S1 S2 normal, no S3; +systolic murmur, rubs, or gallops  ABDOMEN: Soft, nontender, nondistended; bowel sounds present  EXTREMITIES: No clubbing, cyanosis, 2-3+ pitting edema extending to thigh, +scrotal edema. R foot wrapped w/ posterior gangrenous changes with R posterior foot eschar  NEURO: Grossly nonfocal  SKIN: No rashes or lesions    LABS:                        8.4    13.56 )-----------( 291      ( 2023 01:35 )             25.9         139  |  105  |  24<H>  ----------------------------<  379<H>  4.6   |  25  |  1.01    Ca    8.6      2023 01:35  Phos  2.8       Mg     1.9         TPro  6.3  /  Alb  1.9<L>  /  TBili  0.3  /  DBili  x   /  AST  40  /  ALT  52<H>  /  AlkPhos  146<H>      LIVER FUNCTIONS - ( 2023 01:35 )  Alb: 1.9 g/dL / Pro: 6.3 g/dL / ALK PHOS: 146 U/L / ALT: 52 U/L / AST: 40 U/L / GGT: x           PT/INR - ( 2023 01:35 )   PT: 11.2 sec;   INR: 0.98          PTT - ( 2023 01:35 )  PTT:26.6 sec  CAPILLARY BLOOD GLUCOSE      POCT Blood Glucose.: 118 mg/dL (2023 05:57)  POCT Blood Glucose.: 267 mg/dL (2023 02:55)  POCT Blood Glucose.: 384 mg/dL (2023 01:31)  POCT Blood Glucose.: 393 mg/dL (2023 23:57)  POCT Blood Glucose.: 409 mg/dL (2023 17:14)  POCT Blood Glucose.: 386 mg/dL (2023 13:33)      Creatine Kinase, Serum: 399 U/L ( @ 05:30)    CARDIAC MARKERS ( 2023 05:30 )  x     / x     / 399 U/L / x     / x          Urinalysis Basic - ( 2023 01:35 )    Color: x / Appearance: x / SG: x / pH: x  Gluc: 379 mg/dL / Ketone: x  / Bili: x / Urobili: x   Blood: x / Protein: x / Nitrite: x   Leuk Esterase: x / RBC: x / WBC x   Sq Epi: x / Non Sq Epi: x / Bacteria: x        RADIOLOGY & ADDITIONAL TESTS:  CXR:    Care Discussed with Consultants/Other Providers [ x] YES  [ ] NO Hospital course: 67M PMH HTN, IDDM with peripheral neuropathy, HCV, HFmrEF (TTE 10/23/23 EF 45% with moderate AS), CAD s/p PCI with 2 stents ( at [facility]), recent admission x2 for diabetic SSTI (s/p partial 4th toe ray resection 10/24), presented to [facility] complaining of shortness of breath x1 day, with fever, cough, b/l LE swelling, and R foot pain. Pt most recently discharged 10/31/23 with PICC line for continued management of SSTI with daptomycin and CTX (10/24-). In ED BP elevated 222/113 and pt was agitated, given multiple ativan pushes, morphine, lasix gtt, and nitro gtt (now dc'd).  Admitted to CCU for closer monitoring. Good UOP (3L). Restarted dapto and vanc for diabetic foot infection (10/24-). Repeat TTE ordered. Troponins stable. Consulted podiatry and ID.        Subjective: Patient seen at bedside, found sleeping and somnolent to arousal. Currently receiving lasix 10mg/min. ROS limited by pt's mental status.     ICU Vital Signs Last 24 Hrs  T(C): 36.5 (2023 05:15), Max: 37.1 (2023 13:22)  T(F): 97.7 (2023 05:15), Max: 98.8 (2023 13:22)  HR: 65 (2023 08:00) (62 - 92)  BP: 166/84 (2023 08:00) (122/89 - 222/113)  BP(mean): 116 (2023 08:00) (89 - 145)  ABP: --  ABP(mean): --  RR: 14 (2023 08:00) (13 - 25)  SpO2: 97% (2023 08:00) (96% - 100%)    O2 Parameters below as of 2023 08:00  Patient On (Oxygen Delivery Method): nasal cannula  O2 Flow (L/min): 3        I&O's Summary    2023 07:01  -  2023 07:00  --------------------------------------------------------  IN: 180.9 mL / OUT: 3075 mL / NET: -2894.1 mL    2023 07:01  -  2023 08:49  --------------------------------------------------------  IN: 5 mL / OUT: 400 mL / NET: -395 mL          Daily Height in cm: 170.2 (2023 01:19)    Daily Weight in k.4 (2023 01:19)    Adult Advanced Hemodynamics Last 24 Hrs  CVP(mm Hg): --  CVP(cm H2O): --  CO: --  CI: --  PA: --  PA(mean): --  PCWP: --  SVR: --  SVRI: --  PVR: --  PVRI: --    EKG/Telemetry Events:    MEDICATIONS  (STANDING):  aspirin enteric coated 81 milliGRAM(s) Oral daily  atorvastatin 80 milliGRAM(s) Oral at bedtime  cefTRIAXone   IVPB 2000 milliGRAM(s) IV Intermittent every 24 hours  clopidogrel Tablet 75 milliGRAM(s) Oral daily  DAPTOmycin IVPB 500 milliGRAM(s) IV Intermittent every 24 hours  dextrose 5%. 1000 milliLiter(s) (100 mL/Hr) IV Continuous <Continuous>  dextrose 5%. 1000 milliLiter(s) (50 mL/Hr) IV Continuous <Continuous>  dextrose 50% Injectable 12.5 Gram(s) IV Push once  dextrose 50% Injectable 25 Gram(s) IV Push once  dextrose 50% Injectable 25 Gram(s) IV Push once  enoxaparin Injectable 40 milliGRAM(s) SubCutaneous every 24 hours  furosemide Infusion 10 mG/Hr (5 mL/Hr) IV Continuous <Continuous>  glucagon  Injectable 1 milliGRAM(s) IntraMuscular once  insulin glargine Injectable (LANTUS) 6 Unit(s) SubCutaneous at bedtime  insulin lispro (ADMELOG) corrective regimen sliding scale   SubCutaneous Before meals and at bedtime  insulin lispro Injectable (ADMELOG) 5 Unit(s) SubCutaneous three times a day before meals    MEDICATIONS  (PRN):  dextrose Oral Gel 15 Gram(s) Oral once PRN Blood Glucose LESS THAN 70 milliGRAM(s)/deciliter      PHYSICAL EXAM:  GENERAL: NAD, somnolent, stirs to stimulation but does not waken  HEAD: Atraumatic, normocephalic  EYES: EOMI, conjunctiva and sclera clear  NECK: Supple, no JVD  CHEST/LUNG: b/l crackles; normal WOB on 3L NC  HEART: Regular rate and rhythm; S1 S2 normal, no S3; +systolic murmur, rubs, or gallops  ABDOMEN: Soft, nontender, nondistended; bowel sounds present  EXTREMITIES: No clubbing, cyanosis, 2-3+ pitting edema extending to thigh, +scrotal edema. R foot wrapped w/ posterior gangrenous changes with R posterior foot eschar  NEURO: Grossly nonfocal  SKIN: No rashes or lesions    LABS:                        8.4    13.56 )-----------( 291      ( 2023 01:35 )             25.9     11-08    139  |  105  |  24<H>  ----------------------------<  379<H>  4.6   |  25  |  1.01    Ca    8.6      2023 01:35  Phos  2.8     11-08  Mg     1.9     11-08    TPro  6.3  /  Alb  1.9<L>  /  TBili  0.3  /  DBili  x   /  AST  40  /  ALT  52<H>  /  AlkPhos  146<H>  11-08    LIVER FUNCTIONS - ( 2023 01:35 )  Alb: 1.9 g/dL / Pro: 6.3 g/dL / ALK PHOS: 146 U/L / ALT: 52 U/L / AST: 40 U/L / GGT: x           PT/INR - ( 2023 01:35 )   PT: 11.2 sec;   INR: 0.98          PTT - ( 2023 01:35 )  PTT:26.6 sec  CAPILLARY BLOOD GLUCOSE      POCT Blood Glucose.: 118 mg/dL (2023 05:57)  POCT Blood Glucose.: 267 mg/dL (2023 02:55)  POCT Blood Glucose.: 384 mg/dL (2023 01:31)  POCT Blood Glucose.: 393 mg/dL (2023 23:57)  POCT Blood Glucose.: 409 mg/dL (2023 17:14)  POCT Blood Glucose.: 386 mg/dL (2023 13:33)      Creatine Kinase, Serum: 399 U/L ( @ 05:30)    CARDIAC MARKERS ( 2023 05:30 )  x     / x     / 399 U/L / x     / x          Urinalysis Basic - ( 2023 01:35 )    Color: x / Appearance: x / SG: x / pH: x  Gluc: 379 mg/dL / Ketone: x  / Bili: x / Urobili: x   Blood: x / Protein: x / Nitrite: x   Leuk Esterase: x / RBC: x / WBC x   Sq Epi: x / Non Sq Epi: x / Bacteria: x        RADIOLOGY & ADDITIONAL TESTS:  CXR:    Care Discussed with Consultants/Other Providers [ x] YES  [ ] NO ******STEPDOWN FROM CCU TO CARDIAC TELE******  Hospital course: 67M PMH HTN, IDDM with peripheral neuropathy, HCV, HFmrEF (TTE 10/23/23 EF 45% with moderate AS), CAD s/p PCI with 2 stents (2020 at [facility]), recent admission x2 for diabetic SSTI (s/p partial 4th toe ray resection 10/24), presented to [facility] complaining of shortness of breath x1 day, with fever, cough, b/l LE swelling, and R foot pain. Pt most recently discharged 10/31/23 with PICC line for continued management of SSTI with daptomycin and CTX (10/24-).  Admitted to CCU for CHF exacerbation with hypertensive urgency. BP managed with nitro gtt, now dc'd Good UOP (3L). Restarted dapto and vanc for diabetic foot infection (10/24-). Repeat TTE ordered. Troponins stable. Consulted podiatry and ID. Stable for stepdown to cardiac tele.      Subjective: Patient seen at bedside, found sleeping and somnolent to arousal. Currently receiving lasix 10mg/min. ROS limited by pt's mental status.     ICU Vital Signs Last 24 Hrs  T(C): 36.5 (2023 05:15), Max: 37.1 (2023 13:22)  T(F): 97.7 (2023 05:15), Max: 98.8 (2023 13:22)  HR: 65 (2023 08:00) (62 - 92)  BP: 166/84 (2023 08:00) (122/89 - 222/113)  BP(mean): 116 (2023 08:00) (89 - 145)  ABP: --  ABP(mean): --  RR: 14 (2023 08:00) (13 - 25)  SpO2: 97% (2023 08:00) (96% - 100%)    O2 Parameters below as of 2023 08:00  Patient On (Oxygen Delivery Method): nasal cannula  O2 Flow (L/min): 3        I&O's Summary    2023 07:01  -  2023 07:00  --------------------------------------------------------  IN: 180.9 mL / OUT: 3075 mL / NET: -2894.1 mL    2023 07:01  -  2023 08:49  --------------------------------------------------------  IN: 5 mL / OUT: 400 mL / NET: -395 mL          Daily Height in cm: 170.2 (2023 01:19)    Daily Weight in k.4 (2023 01:19)    Adult Advanced Hemodynamics Last 24 Hrs  CVP(mm Hg): --  CVP(cm H2O): --  CO: --  CI: --  PA: --  PA(mean): --  PCWP: --  SVR: --  SVRI: --  PVR: --  PVRI: --    EKG/Telemetry Events:    MEDICATIONS  (STANDING):  aspirin enteric coated 81 milliGRAM(s) Oral daily  atorvastatin 80 milliGRAM(s) Oral at bedtime  cefTRIAXone   IVPB 2000 milliGRAM(s) IV Intermittent every 24 hours  clopidogrel Tablet 75 milliGRAM(s) Oral daily  DAPTOmycin IVPB 500 milliGRAM(s) IV Intermittent every 24 hours  dextrose 5%. 1000 milliLiter(s) (100 mL/Hr) IV Continuous <Continuous>  dextrose 5%. 1000 milliLiter(s) (50 mL/Hr) IV Continuous <Continuous>  dextrose 50% Injectable 12.5 Gram(s) IV Push once  dextrose 50% Injectable 25 Gram(s) IV Push once  dextrose 50% Injectable 25 Gram(s) IV Push once  enoxaparin Injectable 40 milliGRAM(s) SubCutaneous every 24 hours  furosemide Infusion 10 mG/Hr (5 mL/Hr) IV Continuous <Continuous>  glucagon  Injectable 1 milliGRAM(s) IntraMuscular once  insulin glargine Injectable (LANTUS) 6 Unit(s) SubCutaneous at bedtime  insulin lispro (ADMELOG) corrective regimen sliding scale   SubCutaneous Before meals and at bedtime  insulin lispro Injectable (ADMELOG) 5 Unit(s) SubCutaneous three times a day before meals    MEDICATIONS  (PRN):  dextrose Oral Gel 15 Gram(s) Oral once PRN Blood Glucose LESS THAN 70 milliGRAM(s)/deciliter      PHYSICAL EXAM:  GENERAL: NAD, somnolent, stirs to stimulation but does not waken  HEAD: Atraumatic, normocephalic  EYES: EOMI, conjunctiva and sclera clear  NECK: Supple, no JVD  CHEST/LUNG: b/l crackles; normal WOB on 3L NC  HEART: Regular rate and rhythm; S1 S2 normal, no S3; +systolic murmur, rubs, or gallops  ABDOMEN: Soft, nontender, nondistended; bowel sounds present  EXTREMITIES: No clubbing, cyanosis, 2-3+ pitting edema extending to thigh, +scrotal edema. R foot wrapped w/ posterior gangrenous changes with R posterior foot eschar  NEURO: Grossly nonfocal  SKIN: No rashes or lesions    LABS:                        8.4    13.56 )-----------( 291      ( 2023 01:35 )             25.9     11-08    139  |  105  |  24<H>  ----------------------------<  379<H>  4.6   |  25  |  1.01    Ca    8.6      2023 01:35  Phos  2.8     11-  Mg     1.9     11-08    TPro  6.3  /  Alb  1.9<L>  /  TBili  0.3  /  DBili  x   /  AST  40  /  ALT  52<H>  /  AlkPhos  146<H>  11-08    LIVER FUNCTIONS - ( 2023 01:35 )  Alb: 1.9 g/dL / Pro: 6.3 g/dL / ALK PHOS: 146 U/L / ALT: 52 U/L / AST: 40 U/L / GGT: x           PT/INR - ( 2023 01:35 )   PT: 11.2 sec;   INR: 0.98          PTT - ( 2023 01:35 )  PTT:26.6 sec  CAPILLARY BLOOD GLUCOSE      POCT Blood Glucose.: 118 mg/dL (2023 05:57)  POCT Blood Glucose.: 267 mg/dL (2023 02:55)  POCT Blood Glucose.: 384 mg/dL (2023 01:31)  POCT Blood Glucose.: 393 mg/dL (2023 23:57)  POCT Blood Glucose.: 409 mg/dL (2023 17:14)  POCT Blood Glucose.: 386 mg/dL (2023 13:33)      Creatine Kinase, Serum: 399 U/L ( @ 05:30)    CARDIAC MARKERS ( 2023 05:30 )  x     / x     / 399 U/L / x     / x          Urinalysis Basic - ( 2023 01:35 )    Color: x / Appearance: x / SG: x / pH: x  Gluc: 379 mg/dL / Ketone: x  / Bili: x / Urobili: x   Blood: x / Protein: x / Nitrite: x   Leuk Esterase: x / RBC: x / WBC x   Sq Epi: x / Non Sq Epi: x / Bacteria: x        RADIOLOGY & ADDITIONAL TESTS:  CXR:    Care Discussed with Consultants/Other Providers [ x] YES  [ ] NO ******STEPDOWN FROM CCU TO CARDIAC TELE******  Hospital course: 67M PMH HTN, IDDM with peripheral neuropathy, HCV, HFmrEF (TTE 10/23/23 EF 45% with moderate AS), CAD s/p PCI with 2 stents (2020 at [facility]), recent admission x2 for diabetic SSTI (s/p partial 4th toe ray resection 10/24), presented to [facility] complaining of shortness of breath x1 day, with fever, cough, b/l LE swelling, and R foot pain. Pt most recently discharged 10/31/23 with PICC line for continued management of SSTI with daptomycin and CTX (10/24-).  Admitted to CCU for CHF exacerbation with hypertensive urgency. BP managed with nitro gtt, now dc'd Good UOP (3L). Restarted dapto and CTX for diabetic foot infection (10/24-). Repeat TTE ordered. Troponins stable. Consulted podiatry and ID. Stable for stepdown to cardiac tele.      Subjective: Patient seen at bedside, found sleeping and somnolent to arousal. Currently receiving lasix 10mg/min. ROS limited by pt's mental status.     ICU Vital Signs Last 24 Hrs  T(C): 36.5 (2023 05:15), Max: 37.1 (2023 13:22)  T(F): 97.7 (2023 05:15), Max: 98.8 (2023 13:22)  HR: 65 (2023 08:00) (62 - 92)  BP: 166/84 (2023 08:00) (122/89 - 222/113)  BP(mean): 116 (2023 08:00) (89 - 145)  ABP: --  ABP(mean): --  RR: 14 (2023 08:00) (13 - 25)  SpO2: 97% (2023 08:00) (96% - 100%)    O2 Parameters below as of 2023 08:00  Patient On (Oxygen Delivery Method): nasal cannula  O2 Flow (L/min): 3        I&O's Summary    2023 07:01  -  2023 07:00  --------------------------------------------------------  IN: 180.9 mL / OUT: 3075 mL / NET: -2894.1 mL    2023 07:01  -  2023 08:49  --------------------------------------------------------  IN: 5 mL / OUT: 400 mL / NET: -395 mL          Daily Height in cm: 170.2 (2023 01:19)    Daily Weight in k.4 (2023 01:19)    Adult Advanced Hemodynamics Last 24 Hrs  CVP(mm Hg): --  CVP(cm H2O): --  CO: --  CI: --  PA: --  PA(mean): --  PCWP: --  SVR: --  SVRI: --  PVR: --  PVRI: --    EKG/Telemetry Events:    MEDICATIONS  (STANDING):  aspirin enteric coated 81 milliGRAM(s) Oral daily  atorvastatin 80 milliGRAM(s) Oral at bedtime  cefTRIAXone   IVPB 2000 milliGRAM(s) IV Intermittent every 24 hours  clopidogrel Tablet 75 milliGRAM(s) Oral daily  DAPTOmycin IVPB 500 milliGRAM(s) IV Intermittent every 24 hours  dextrose 5%. 1000 milliLiter(s) (100 mL/Hr) IV Continuous <Continuous>  dextrose 5%. 1000 milliLiter(s) (50 mL/Hr) IV Continuous <Continuous>  dextrose 50% Injectable 12.5 Gram(s) IV Push once  dextrose 50% Injectable 25 Gram(s) IV Push once  dextrose 50% Injectable 25 Gram(s) IV Push once  enoxaparin Injectable 40 milliGRAM(s) SubCutaneous every 24 hours  furosemide Infusion 10 mG/Hr (5 mL/Hr) IV Continuous <Continuous>  glucagon  Injectable 1 milliGRAM(s) IntraMuscular once  insulin glargine Injectable (LANTUS) 6 Unit(s) SubCutaneous at bedtime  insulin lispro (ADMELOG) corrective regimen sliding scale   SubCutaneous Before meals and at bedtime  insulin lispro Injectable (ADMELOG) 5 Unit(s) SubCutaneous three times a day before meals    MEDICATIONS  (PRN):  dextrose Oral Gel 15 Gram(s) Oral once PRN Blood Glucose LESS THAN 70 milliGRAM(s)/deciliter      PHYSICAL EXAM:  GENERAL: NAD, somnolent, stirs to stimulation but does not waken  HEAD: Atraumatic, normocephalic  EYES: EOMI, conjunctiva and sclera clear  NECK: Supple, no JVD  CHEST/LUNG: b/l crackles; normal WOB on 3L NC  HEART: Regular rate and rhythm; S1 S2 normal, no S3; +systolic murmur, rubs, or gallops  ABDOMEN: Soft, nontender, nondistended; bowel sounds present  EXTREMITIES: No clubbing, cyanosis, 2-3+ pitting edema extending to thigh, +scrotal edema. R foot wrapped w/ posterior gangrenous changes with R posterior foot eschar  NEURO: Grossly nonfocal  SKIN: No rashes or lesions    LABS:                        8.4    13.56 )-----------( 291      ( 2023 01:35 )             25.9     11-08    139  |  105  |  24<H>  ----------------------------<  379<H>  4.6   |  25  |  1.01    Ca    8.6      2023 01:35  Phos  2.8     11-  Mg     1.9     11-08    TPro  6.3  /  Alb  1.9<L>  /  TBili  0.3  /  DBili  x   /  AST  40  /  ALT  52<H>  /  AlkPhos  146<H>  11-08    LIVER FUNCTIONS - ( 2023 01:35 )  Alb: 1.9 g/dL / Pro: 6.3 g/dL / ALK PHOS: 146 U/L / ALT: 52 U/L / AST: 40 U/L / GGT: x           PT/INR - ( 2023 01:35 )   PT: 11.2 sec;   INR: 0.98          PTT - ( 2023 01:35 )  PTT:26.6 sec  CAPILLARY BLOOD GLUCOSE      POCT Blood Glucose.: 118 mg/dL (2023 05:57)  POCT Blood Glucose.: 267 mg/dL (2023 02:55)  POCT Blood Glucose.: 384 mg/dL (2023 01:31)  POCT Blood Glucose.: 393 mg/dL (2023 23:57)  POCT Blood Glucose.: 409 mg/dL (2023 17:14)  POCT Blood Glucose.: 386 mg/dL (2023 13:33)      Creatine Kinase, Serum: 399 U/L ( @ 05:30)    CARDIAC MARKERS ( 2023 05:30 )  x     / x     / 399 U/L / x     / x          Urinalysis Basic - ( 2023 01:35 )    Color: x / Appearance: x / SG: x / pH: x  Gluc: 379 mg/dL / Ketone: x  / Bili: x / Urobili: x   Blood: x / Protein: x / Nitrite: x   Leuk Esterase: x / RBC: x / WBC x   Sq Epi: x / Non Sq Epi: x / Bacteria: x        RADIOLOGY & ADDITIONAL TESTS:  CXR:    Care Discussed with Consultants/Other Providers [ x] YES  [ ] NO

## 2023-11-08 NOTE — CONSULT NOTE ADULT - ASSESSMENT
68M w/  DM (A1c 12.6%), HTN and CAD w/ PCI x2, and PAD w/ remote RLE angioplasty, R 4th toe OM s/p partial R 4th ray amputation on 10/24, proximal cx with E.coli, Pluralibacter gergoviae, MRSE, and Corynebacterium amycolatum (requested sensis). S/p RLE angiogram with AT lithotripsy and AT/peroneal balloon angioplasty 10/27 discharged with 6 weeks of Dapto/Ceftriaxone (10/24-12/5) p/w SOB and chest pain x 1 day.     CTA chest findings appear to be c/w pulmonary congestion 2/2 acute decompensated heart failure likely triggered by recent cocaine use. His symptoms have resolved with diuresis. Low suspicion for acute PNA. Could also consider eosinophilic pneumonia 2/2 dapto however eosinophils are wnl and again symptoms have resolved while dapto has been continued so this is unlikely. Patient denies IVDU; had discussion with patient emphasizing the importance of not using cocaine while on antibiotics. Pt expressed understanding.     Suggest:  - Continue with Daptomycin 500mg IV q24h plus ceftriaxone 2g IV Q24 as planned   - Duration of antibiotics is still 6 weeks ( 10/24 - 12/5 )  - Weekly labs: CMP, CBC, ESR, CRP, CK faxed to ID office at [phone]  - Patient to follow up with Dr. Jiménez in 2 weeks ([address] [place] [address], [phone]), ID office will call patient to schedule   - Patient to follow up with PCP for HCV care    Team 2 signing off. Thank you for your consultation   Please reconsult with questions or concerns.   Case d/w primary team.     Josiane Boyle, Infectious Diseases PA  Please reach out for any questions 9 am-5pm. For evenings and weekends, please call the ID physician on call.  Work cell: [phone]    
Pt is a 68 yo HM with h/o CAD s/p PCI with 2 stents (3 yrs ago at [facility]), HTN, IDDM with peripheral neuropathy and recent admission 10/3 for diabetic SSTI presented 2 to 1 day onset of SOB. Wife at bedside and reported associated symptoms of fever, cough, b/l leg swelling and R foot pain. Pt was evaluated by the podiatrist yesterday - was told that R foot wound looks fine.  Initial /113, pt agitated and on CT chest pulm edema with b/l pleural effusions; pt Rx'd with Lasix + Nitro and Haldol. Currently pt is calm. Admitting dx: 1) ? flash pulm edema 2 to HTN emergency 2) Agitation probable HTN encephalopathy    Resp: Supplemental O2 to maintain O2 sat >92%/ Would check ABG to make sure pt now lethargic 2 to sedatives and not retaining Co2  ID: Given empiric Abx  CVS/Renal: Cont Nitro drip with goal -180/ As BP stabilizes may resume pt's anti HTN meds (1st start with Coreg)/ Diuresis with Lasix and follow UO (Goal neg fluid balance)/ Check Trop, EKG and ECho  Heme: Cont pt's Asa + Plavix  FEN: NPO  Endo: Rx hyperglycemia with Lantus + Lispro  Neuro: Reassess MS ? 2 to sedation  If BP better controlled and MS intact may admit to telemetry  
67M PMH HTN, IDDM with peripheral neuropathy, HCV, HFmrEF, CAD, previously admitted for diabetic foot infection (10/21-10/31) for which he underwent partial 4th ray resection on 10/24/23 and was discharged on 6 weeks daptomycin/vancomycin (through 12/5) admitted to CCU for hypertension and acute CHF exacerbation. At time of consult, VSS, WBC 13.56, , CRP 70.4. X-rays with no evidence of gas, cortical irregularity of 4th met stump - possible osteomyelitis. Clinically, wound fibrotic, no signs of infection, thin plantar skin with ischemic area.     Plan:   - Pt educated on poor healing potential of surgical site. Pt continues to refuse further surgical intervention. Pt educated that he is at risk of further infection. Pt continues to refuse further intervention.   - Wound cleansed with normal saline. Packed with 1/2" plain packing, betadine gauze, kerlix, and ACE.  - C/w 6 weeks of IV dapto/vanc for residual OM through 12/5  - X-rays reviewed and d/w pt   - Pt can heel WBAT to RLE   - Rest of care per primary team.     Plan d/w Attending. Podiatry following.

## 2023-11-08 NOTE — PROGRESS NOTE ADULT - ASSESSMENT
67M cocaine and THC user, with a pmhx HTN, IDDM with peripheral neuropathy, HCV, HFmrEF (TTE 10/23/23 EF 45% with moderate AS), CAD s/p PCI with 2 stents (2020 at [facility]), recent admission x2 for diabetic SSTI (s/p partial 4th toe ray resection 10/24), presented to [facility] complaining of shortness of breath, found to be hypertensive to 212/133 and was acutely altered. Started on nitroglycerine gtt for blood pressure management and transitioned to NiPri gtt for better control. Found to be in flash pulmonary edema and started on agressive diuresis with a lasix gtt. course c/b acute metabolic encephalopathy i/s/o positive cocaine and THC utox requiring multiple pushes of ativan and transamnitis likely i/s/o hepatic congestion 2/2 CHF exacerbation and +HCV. Currently being treated for osteomyelitis with OPAT,  aggressive diuresis for acute CHF exacerbation, and monitoring blood pressure for resolving hypertensive emergency.

## 2023-11-08 NOTE — H&P ADULT - NSHPPHYSICALEXAM_GEN_ALL_CORE
PHYSICAL EXAM  General: appears older than stated age, disheveled, tearful  Head: pupils equal and reactive, sclera anicteric, mucous membranes dry  Neck: Supple; no JVD  Respiratory: b/l crackles  Cardiovascular: Regular rhythm/rate; S1/S2+, +systolic murmur  Gastrointestinal: abd soft  Extremities: WWP; 2-3+ edema extending to thigh, +scrotal edema, RLE gangrenous changes + foot eschar  Neurological: arousable to sternal rub PHYSICAL EXAM  General: appears older than stated age, disheveled, tearful  Head: pupils equal and reactive, sclera anicteric, mucous membranes dry  Neck: Supple; no JVD  Respiratory: b/l crackles  Cardiovascular: Regular rhythm/rate; S1/S2+, +systolic murmur  Gastrointestinal: abd soft  Extremities: WWP; 2-3+ edema extending to thigh, +scrotal edema, Right foot w/ posterior gangrenous changes/ +right posterior foot eschar  Neurological: arousable to sternal rub, AOx1 to self

## 2023-11-08 NOTE — CONSULT NOTE ADULT - NS ATTEND AMEND GEN_ALL_CORE FT
68M w/  DM (A1c 12.6%), HTN and CAD w/ PCI x2, chronic HCV (not yet treated) cocaine use disorder, with recent admission 10/1 - 10/3/23 for R 4th/5th toe cellulitis treated with vanc/zosyn -> doxy/keflex to complete 7 days, then presented 10/21/23 with worsening R fourth toe pain, was febrile 101, WBC 19.7k, MRI with possible 4th digit early OM up to prox phalanx s/p partial R 4th ray amputation on 10/24, proximal bone cx with E.coli, Pluralibacter gergoviae, S.epi, and Corynebacterium amycolatum (requested sensis). Further debridement was offered by podiatry on last admission but patient declined. Then s/p 10/27 RLE angiogram with AT lithotripsy and AT/peroneal balloon angioplasty. He was ultimately discharged on 10/31 with a LUE PICC to complete 6 weeks therapy with dapto + ceftriaxone (EOT 12/5). Patient then presented to [facility] ED on 11/7 with acute onset shortness of breath, cough, BLE swelling. He was afebrile, but WBC up to 17.2k, and /113 with UTox+ cocaine/THC, and CT chest with diffuse interlobular septal thickening and patchy GGOs in BUL, with moderate b/l pleural effusions, and findings of RV overload, and BNP 19k, all suggestive of heart failure with pulmonary edema. He initially required 3L NC, but with afterload reduction and diuresis his BP improved and he is back on RA now. His initial WBC 17k downtrended to 13k, and initial AST/ALT elevations normalizing as well. This was all likely 2/2 hypertensive emergency with ADHF from cocaine use (snort/smoking, never IVDU). Regarding R foot, XR showed mild cortical irregularity of the fourth metatarsal stump margin, likely representing known OM. No signs of acute infection there on Podiatry exam 11/8, and patient again declined further surgical debridement.      # R 4th toe OM (proximal bone cx with E.coli, Pluralibacter gergoviae, S.epi, and Corynebacterium amycolatum) - plan for dapto/ as above    #Lung opacities on CT chest- likely volume overload. Back on RA after diuresis. Doubt CAP or eosinophilic PNA from dapto (normal peripheral eos and again sx resolved with diuresis)     # Elevated CK – of note patient had elevated CK 1038 on outpatient labs 11/7, improved to 399 on re-check here. No myalgias.     #Leukocytosis – resolving. May be stress response from cocaine. Doubt infectious cause    #Abnormal LFTs – likely a component of congestion as this is improving with management of ADHF. Continue to monitor. Doubt LISSET to abx.    #HCV- plans to follow with PCP for this issue

## 2023-11-08 NOTE — PROGRESS NOTE ADULT - SUBJECTIVE AND OBJECTIVE BOX
Cardiology PA Adult Progress Note  ***CCU step down acceptance note****  SUBJECTIVE ASSESSMENT:  67M PMH HTN, IDDM with peripheral neuropathy, HCV, HFmrEF (TTE 10/23/23 EF 45% with moderate AS), CAD s/p PCI with 2 stents (2020 at [facility]), recent admission x2 for diabetic SSTI (s/p partial 4th toe ray resection 10/24), presented to [facility] complaining of shortness of breath x1 day, with fever, cough, b/l LE swelling, and R foot pain. Pt most recently discharged 10/31/23 with PICC line for continued management of SSTI with daptomycin and CTX (10/24-12/5).  Admitted to CCU for CHF exacerbation with hypertensive urgency. BP managed with nitro gtt, now dc'd Good UOP (3L). Restarted dapto and CTX for diabetic foot infection (10/24-12/5). Repeat TTE ordered. Troponins stable. Consulted podiatry and ID. Stable for stepdown to cardiac tele.   Subjective: Patient seen at bedside, found sleeping and somnolent to arousal. Currently receiving lasix 10mg/min. ROS limited by pt's mental status.     	  MEDICATIONS:  furosemide Infusion 10 mG/Hr IV Continuous <Continuous>  hydrALAZINE 25 milliGRAM(s) Oral every 8 hours  cefTRIAXone   IVPB 2000 milliGRAM(s) IV Intermittent every 24 hours  DAPTOmycin IVPB 500 milliGRAM(s) IV Intermittent every 24 hours  acetaminophen     Tablet .. 650 milliGRAM(s) Oral every 6 hours PRN  gabapentin 800 milliGRAM(s) Oral every 8 hours  atorvastatin 80 milliGRAM(s) Oral at bedtime  dextrose 50% Injectable 12.5 Gram(s) IV Push once  dextrose 50% Injectable 25 Gram(s) IV Push once  dextrose 50% Injectable 25 Gram(s) IV Push once  dextrose Oral Gel 15 Gram(s) Oral once PRN  glucagon  Injectable 1 milliGRAM(s) IntraMuscular once  insulin glargine Injectable (LANTUS) 6 Unit(s) SubCutaneous at bedtime  insulin lispro (ADMELOG) corrective regimen sliding scale   SubCutaneous Before meals and at bedtime  insulin lispro Injectable (ADMELOG) 5 Unit(s) SubCutaneous three times a day before meals    aspirin enteric coated 81 milliGRAM(s) Oral daily  chlorhexidine 2% Cloths 1 Application(s) Topical <User Schedule>  clopidogrel Tablet 75 milliGRAM(s) Oral daily  dextrose 5%. 1000 milliLiter(s) IV Continuous <Continuous>  dextrose 5%. 1000 milliLiter(s) IV Continuous <Continuous>  enoxaparin Injectable 40 milliGRAM(s) SubCutaneous every 24 hours    	  VITAL SIGNS:  T(C): 36.8 (11-08-23 @ 13:00), Max: 36.8 (11-08-23 @ 10:00)  HR: 79 (11-08-23 @ 15:35) (62 - 92)  BP: 151/96 (11-08-23 @ 15:35) (116/68 - 216/104)  RR: 18 (11-08-23 @ 15:35) (13 - 25)  SpO2: 94% (11-08-23 @ 15:35) (93% - 100%)  Wt(kg): --    I&O's Summary    07 Nov 2023 07:01  -  08 Nov 2023 07:00  --------------------------------------------------------  IN: 180.9 mL / OUT: 3075 mL / NET: -2894.1 mL    08 Nov 2023 07:01  -  08 Nov 2023 16:11  --------------------------------------------------------  IN: 920 mL / OUT: 3250 mL / NET: -2330 mL      Height (cm): 170.2 (11-08 @ 01:19)  Weight (kg): 79.4 (11-08 @ 01:58)  BMI (kg/m2): 27.4 (11-08 @ 01:58)  BSA (m2): 1.91 (11-08 @ 01:58)                                     PHYSICAL EXAM:  Appearance: Normal	  HEENT: Normal oral mucosa, PERRL, EOMI	  Neck: Supple, + JVD   Cardiovascular: Normal S1 S2, +systolic murmur  Respiratory:  Decreased Breath Sounds with bilateral basilar crackles No Rales, Rhonchi, Wheezing	  Gastrointestinal:  Soft, Non-tender, + BS	  Skin: No rashes, No ecchymoses, No cyanosis  Extremities: No clubbing, cyanosis, 2-3+ pitting edema extending to thigh, +scrotal edema. R foot wrapped w/ posterior gangrenous changes with R posterior foot escha  Vascular: Peripheral pulses palpable 2+ bilaterally  Neurologic: Non-focal  Psychiatry: A & O x 2-3, Mood & affect appropriate    LABS:	 	             8.4    13.56 )-----------( 291      ( 08 Nov 2023 01:35 )             25.9     11-08    139  |  105  |  24<H>  ----------------------------<  379<H>  4.6   |  25  |  1.01    Ca    8.6      08 Nov 2023 01:35  Phos  2.8     11-08  Mg     1.9     11-08    TPro  6.3  /  Alb  1.9<L>  /  TBili  0.3  /  DBili  x   /  AST  40  /  ALT  52<H>  /  AlkPhos  146<H>  11-08       PT/INR - ( 08 Nov 2023 01:35 )   PT: 11.2 sec;   INR: 0.98          PTT - ( 08 Nov 2023 01:35 )  PTT:26.6 sec

## 2023-11-08 NOTE — H&P ADULT - ASSESSMENT
67M PMH HTN, IDDM with peripheral neuropathy, HCV, HFmrEF (TTE 10/23/23 EF 45% with moderate AS), CAD s/p PCI with 2 stents (2020 at [facility]), recent admission x2 for diabetic SSTI (s/p partial 4th toe ray resection 10/24), presented to [facility] complaining of shortness of breath, found to be hypertensive to 212/133 and was acutely altered. Started on nitroglycerine gtt in ED. Admitted to CCU for further management of hypertensive urgency and acute CHF exacerbation.    NEURO  #Acute metabolic encephalopathy  On arrival to ED, pt became acutely agitated, requiring multiple pushes of ativan. Pt also presented with elevated /113, Utox +cocaine and THC. CTH negative. AMS likely 2/2 hypertensive crisis vs drug intoxication. On arrival to CCU, pt AAOx1-2, tearful, attempting to get out of hospital bed.  - management of hypertensive emergency as below    #Peripheral neuropathy  Home meds: gabapentin 1600 TID, tylenol 650 q6hr, oxycodone 10mg prn  - holding gabapentin i/s/o AMS  - will restart PO tylenol pending bedside dysphagia  - holding oxycodone for now, will restart as appropriate    PULM  #Acute respiratory failure 2/2 flash pulmonary edema and acute decompensated heart failure  Pt presenting to ED in hypertensive crisis, CT chest s/f b/l UL GGO, pulmonary congestion, and LLL atelectasis. Complaining of SOB, fever, and cough x1 day. Physical exam pertinent for b/l rales, 2+ b/l LE edema extending to thighs, significant scrotal edema. s/p lasix 40mg IVP x1, lasix 20mg IVP x1 in ED, arrived to CCU on nitroglycerin gtt. Of note, pt recently admitted to [facility] x2 in 10/2023 for management of SSTI, discharged home with PICC line for continued IV abx.  - wean O2 as tolerated  - pt currently breathing comfortably on NC, however will transition to CPAP/BiPAP if no improvement on NC for higher positive pressure  - nitroglycerin --> c/w nitroprusside gtt for afterload reduction   - c/w lasix gtt, net negative 2 to 2.5L    CARDIO  #Acute decompensated heart failure  #HFmrEF  Pt presenting to ED complaining of worsening SOB, CXR and CTA chest s/f b/l GGO, pleural effusions, pulm congestion. CTA also notable for reflux of contrast material into dilated IVC and hepatic veins suggesting right-sided cardiac decompensation. Exam s/f remarkable scrotal and lower extremity edema. Clinical and imaging findings suggestive of worsening central and systemic congestion. Trop 89>101, pro-BNP >23153. Per prior cardiology note, pt reporting b/l pedal edema and chest pain w/ emotional stress ~2 months prior to 10/23 admission.   TTE 10/23/23: mild to mod LVH, LVEF 45%, dilated LA, moderate AS, trace MR, mild TR  Home meds: lisinopril 40mg qd, coreg 12.5mg BID, spironolactone 50mg qd, hydralazine 25mg TID  - nitroglycerin --> c/w nitroprusside gtt for afterload reduction   - c/w lasix gtt, net negative 2 to 2.5L  - restart home GDMT as appropriate   - maintain SpO2 >92%  - obtain collateral from outpatient cardiologist (Dr Anni Taylor @[facility]) and wife/family    #Moderate aortic stenosis  Per 10/23 TTE, pt w moderate AS with AV area 1.27, peak transvalvular velocity 2.47, mean transvalvular gradient is 16.00 mmHg, and the LVOT/AV velocity ratio is 0.40 with an EF of 45%  - f/u repeat TTE  - will restart coreg as appropriate    #CAD  h/o PCI with 2 stents placed at [facility] ~2020. Cardiology consulted during 10/2023 for pre-op clearance for R toe amputation. Per cardiology note, pt reported 12 months of antiplatelet therapy post-stent placement. Follows outpatient with general cardiologist but does not remember the name and has had poor follow-up since. Since PCI patient reports being asymptomatic until ~2 months prior to 10/23 admission when he started to have bilateral pedal edema and chest pain with emotional stress  - f/u repeat TTE  - c/w ASA 81mg qd  - c/w plavix 75mg qd  - c/w lipitor 80mg qd    #Hypertensive urgency  At [facility], pt hypertensive to 212/133 and acutely agitated requiring ~4mg ativan IVP. Pt received sublingual nitro and started on nitroglycerin gtt. Upon arrival to CCU, pt hypertensive to 196/90 and transitioned to nipride gtt.  - c/w nipride gtt  - restart home BP meds as appropriate      #PAD  #R foot wound  Seen by vascular and podiatry last admission. S/p RLE angiogram with AT lithotripsy and AT/peroneal balloon angioplasty 10/27. Completion angiogram showed better filling of AT and peroneal.  On exam, pt with gangrenous changes of RLE, b/l LE WWP, and dopplerable pulses. Pt s/p R 4th toe amputation with podiatry on 10/24, discharged with PICC and IV abx.  - regular neurovascular checks   - c/w ASA, plavix, lipitor  - consult podiatry in AM    GI  #Transaminitis  Elevated ALP and liver enzymes. Likely congestive hepatopathy i/s/o CHF exacerbation. CTA s/f reflux of contrast material into dilated IVC and hepatic veins suggesting right-sided cardiac decompensation.  - continue to monitor    RENAL  KEARA    ENDO  #Insulin dependent diabetes mellitus  #Hyperglycemia  Glucose elevated to 400s on admission. A1c 12.6. Endocrinology consulted during prior admission for management, per endo documentation, pt reported having T1DM however more likely has T2DM as was diagnosed ~10 years ago. s/p 10u lispro, 10u NPH on admissiom.  Home meds: 5u lispro TID with meals, 6u lantus at bedtime  - fsg goal 100-180  - c/w home meds    ID  #Diabetic foot infection  Pt is s/p R foot partial 4th ray resection (10/24) by podiatry. Pt hospitalized twice in 10/2023 related to R food wound. Single lumen PICC placed last admission. Tissue cx: e.coli, pluralibacter, stap epi, and campylobacter. MRI: Soft tissue atrophy/wound of the 4th toe with underlying marrow changes of the 4th proximal phalanx head, middle phalanx, and distal phalanx . ID consulted last admission, recommended Daptomycin 500mg IV q24h plus ceftriaxone 2g IV Q24.   - c/w Daptomycin 500mg IV q24h and ceftriaxone 2g IV q24 (10/24-12/5)  - f/u BCx  - f/u UCx  - tylenol prn for pain    PROPHYLAXIS  F: NONE  E: K>4, Mg>2  N: DASH/TLC  GI: None  DVT: Lovenox  Dispo: CCU

## 2023-11-08 NOTE — H&P ADULT - HISTORY OF PRESENT ILLNESS
67M PMH HTN, IDDM with peripheral neuropathy, HCV, HFmrEF (TTE 10/23/23 EF 45% with moderate AS), CAD s/p PCI with 2 stents (2020 at [facility]), recent admission x2 for diabetic SSTI (s/p partial 4th toe ray resection 10/24), presented to [facility] complaining of shortness of breath x1 day. Also reported fever, cough, b/l LE swelling, and R foot pain. Pt most recently discharged 10/31/23 with PICC line for continued management of SSTI with daptomycin and CTX (10/24-12/5)    In the ED, initial /113, pt agitated, requiring multiple ativan pushes. On arrival to CCU, pt tearful, tremulous, non-reorientable. Unable to elicit further hx. Per prior records, pt seems to have had b/l LE and scrotal edema x2-3 months as well as SOB with stress.    ED COURSE  VS T 98.8 HR 85 /113 SpO2 96% on 2L NC  LABS WBC 17.21 Hgb 8.9 Trop 89>82 BUN 25  AST 75 ALT 81 Pro-BNP 12547 Lactate 1.7  Utox +cocaine, THC, RVP neg   IMAGING CTH no acute changes, CTA Chest b/l interlobular septal thickening, b/l UL GGO and consolidation, LLL atelectasis, LVH, CXR s/f pulm congestion and b/l pleural effusions  EKG NSR@93bpm with non specific ST-T wave changes  INTERVENTIONS lasix 40mg IVP x1, lasix 20mg IVP x1, ativan 0.5mg IVP x2, ativan 1mg IVP x1, ativan 2mg IVP x1, morphine 4mg IVP x1, nitro sublingual x1, zosyn 3.375 x1, vanc 1g x1, nitro gtt   67M PMH HTN, IDDM with peripheral neuropathy, HCV, HFmrEF (TTE 10/23/23 EF 45% with moderate AS), CAD s/p PCI with 2 stents (2020 at [facility]), recent admission x2 for diabetic SSTI (s/p partial 4th toe ray resection 10/24), presented to [facility] complaining of shortness of breath x1 day. Also reported fever, cough, b/l LE swelling, and R foot pain. Pt most recently discharged 10/31/23 with PICC line for continued management of SSTI with daptomycin and CTX (10/24-12/5)    In the ED, initial /113, pt agitated, requiring multiple ativan pushes. On arrival to CCU, pt tearful, tremulous, non-reorientable. Unable to elicit further hx. Per prior records, pt seems to have had b/l LE and scrotal edema x2-3 months.    ED COURSE  VS T 98.8 HR 85 /113 SpO2 96% on 2L NC  LABS WBC 17.21 Hgb 8.9 Trop 89>82 BUN 25  AST 75 ALT 81 Pro-BNP 12403 Lactate 1.7  Utox +cocaine, THC, RVP neg   IMAGING CTH no acute changes, CTA Chest b/l interlobular septal thickening, b/l UL GGO and consolidation, LLL atelectasis, LVH, CXR s/f pulm congestion and b/l pleural effusions  EKG NSR@93bpm with non specific ST-T wave changes  INTERVENTIONS lasix 40mg IVP x1, lasix 20mg IVP x1, ativan 0.5mg IVP x2, ativan 1mg IVP x1, ativan 2mg IVP x1, morphine 4mg IVP x1, nitro sublingual x1, zosyn 3.375 x1, vanc 1g x1, nitro gtt

## 2023-11-09 LAB
ALBUMIN SERPL ELPH-MCNC: 2.3 G/DL — LOW (ref 3.3–5)
ALBUMIN SERPL ELPH-MCNC: 2.3 G/DL — LOW (ref 3.3–5)
ALP SERPL-CCNC: 149 U/L — HIGH (ref 40–120)
ALP SERPL-CCNC: 149 U/L — HIGH (ref 40–120)
ALT FLD-CCNC: 49 U/L — HIGH (ref 10–45)
ALT FLD-CCNC: 49 U/L — HIGH (ref 10–45)
ANION GAP SERPL CALC-SCNC: 10 MMOL/L — SIGNIFICANT CHANGE UP (ref 5–17)
ANION GAP SERPL CALC-SCNC: 10 MMOL/L — SIGNIFICANT CHANGE UP (ref 5–17)
AST SERPL-CCNC: 40 U/L — SIGNIFICANT CHANGE UP (ref 10–40)
AST SERPL-CCNC: 40 U/L — SIGNIFICANT CHANGE UP (ref 10–40)
BILIRUB SERPL-MCNC: 0.3 MG/DL — SIGNIFICANT CHANGE UP (ref 0.2–1.2)
BILIRUB SERPL-MCNC: 0.3 MG/DL — SIGNIFICANT CHANGE UP (ref 0.2–1.2)
BUN SERPL-MCNC: 22 MG/DL — SIGNIFICANT CHANGE UP (ref 7–23)
BUN SERPL-MCNC: 22 MG/DL — SIGNIFICANT CHANGE UP (ref 7–23)
CALCIUM SERPL-MCNC: 8.7 MG/DL — SIGNIFICANT CHANGE UP (ref 8.4–10.5)
CALCIUM SERPL-MCNC: 8.7 MG/DL — SIGNIFICANT CHANGE UP (ref 8.4–10.5)
CHLORIDE SERPL-SCNC: 96 MMOL/L — SIGNIFICANT CHANGE UP (ref 96–108)
CHLORIDE SERPL-SCNC: 96 MMOL/L — SIGNIFICANT CHANGE UP (ref 96–108)
CO2 SERPL-SCNC: 31 MMOL/L — SIGNIFICANT CHANGE UP (ref 22–31)
CO2 SERPL-SCNC: 31 MMOL/L — SIGNIFICANT CHANGE UP (ref 22–31)
CREAT SERPL-MCNC: 1.23 MG/DL — SIGNIFICANT CHANGE UP (ref 0.5–1.3)
CREAT SERPL-MCNC: 1.23 MG/DL — SIGNIFICANT CHANGE UP (ref 0.5–1.3)
EGFR: 65 ML/MIN/1.73M2 — SIGNIFICANT CHANGE UP
EGFR: 65 ML/MIN/1.73M2 — SIGNIFICANT CHANGE UP
GLUCOSE BLDC GLUCOMTR-MCNC: 270 MG/DL — HIGH (ref 70–99)
GLUCOSE BLDC GLUCOMTR-MCNC: 270 MG/DL — HIGH (ref 70–99)
GLUCOSE SERPL-MCNC: 96 MG/DL — SIGNIFICANT CHANGE UP (ref 70–99)
GLUCOSE SERPL-MCNC: 96 MG/DL — SIGNIFICANT CHANGE UP (ref 70–99)
HCT VFR BLD CALC: 27.7 % — LOW (ref 39–50)
HCT VFR BLD CALC: 27.7 % — LOW (ref 39–50)
HGB BLD-MCNC: 9.2 G/DL — LOW (ref 13–17)
HGB BLD-MCNC: 9.2 G/DL — LOW (ref 13–17)
LACTATE SERPL-SCNC: 0.7 MMOL/L — SIGNIFICANT CHANGE UP (ref 0.5–2)
LACTATE SERPL-SCNC: 0.7 MMOL/L — SIGNIFICANT CHANGE UP (ref 0.5–2)
MAGNESIUM SERPL-MCNC: 2.1 MG/DL — SIGNIFICANT CHANGE UP (ref 1.6–2.6)
MAGNESIUM SERPL-MCNC: 2.1 MG/DL — SIGNIFICANT CHANGE UP (ref 1.6–2.6)
MCHC RBC-ENTMCNC: 31 PG — SIGNIFICANT CHANGE UP (ref 27–34)
MCHC RBC-ENTMCNC: 31 PG — SIGNIFICANT CHANGE UP (ref 27–34)
MCHC RBC-ENTMCNC: 33.2 GM/DL — SIGNIFICANT CHANGE UP (ref 32–36)
MCHC RBC-ENTMCNC: 33.2 GM/DL — SIGNIFICANT CHANGE UP (ref 32–36)
MCV RBC AUTO: 93.3 FL — SIGNIFICANT CHANGE UP (ref 80–100)
MCV RBC AUTO: 93.3 FL — SIGNIFICANT CHANGE UP (ref 80–100)
NRBC # BLD: 0 /100 WBCS — SIGNIFICANT CHANGE UP (ref 0–0)
NRBC # BLD: 0 /100 WBCS — SIGNIFICANT CHANGE UP (ref 0–0)
PLATELET # BLD AUTO: 301 K/UL — SIGNIFICANT CHANGE UP (ref 150–400)
PLATELET # BLD AUTO: 301 K/UL — SIGNIFICANT CHANGE UP (ref 150–400)
POTASSIUM SERPL-MCNC: 3.6 MMOL/L — SIGNIFICANT CHANGE UP (ref 3.5–5.3)
POTASSIUM SERPL-MCNC: 3.6 MMOL/L — SIGNIFICANT CHANGE UP (ref 3.5–5.3)
POTASSIUM SERPL-SCNC: 3.6 MMOL/L — SIGNIFICANT CHANGE UP (ref 3.5–5.3)
POTASSIUM SERPL-SCNC: 3.6 MMOL/L — SIGNIFICANT CHANGE UP (ref 3.5–5.3)
PROT SERPL-MCNC: 7 G/DL — SIGNIFICANT CHANGE UP (ref 6–8.3)
PROT SERPL-MCNC: 7 G/DL — SIGNIFICANT CHANGE UP (ref 6–8.3)
RBC # BLD: 2.97 M/UL — LOW (ref 4.2–5.8)
RBC # BLD: 2.97 M/UL — LOW (ref 4.2–5.8)
RBC # FLD: 14.4 % — SIGNIFICANT CHANGE UP (ref 10.3–14.5)
RBC # FLD: 14.4 % — SIGNIFICANT CHANGE UP (ref 10.3–14.5)
SODIUM SERPL-SCNC: 137 MMOL/L — SIGNIFICANT CHANGE UP (ref 135–145)
SODIUM SERPL-SCNC: 137 MMOL/L — SIGNIFICANT CHANGE UP (ref 135–145)
WBC # BLD: 11.65 K/UL — HIGH (ref 3.8–10.5)
WBC # BLD: 11.65 K/UL — HIGH (ref 3.8–10.5)
WBC # FLD AUTO: 11.65 K/UL — HIGH (ref 3.8–10.5)
WBC # FLD AUTO: 11.65 K/UL — HIGH (ref 3.8–10.5)

## 2023-11-09 PROCEDURE — 93306 TTE W/DOPPLER COMPLETE: CPT | Mod: 26

## 2023-11-09 PROCEDURE — 99239 HOSP IP/OBS DSCHRG MGMT >30: CPT

## 2023-11-09 PROCEDURE — 71045 X-RAY EXAM CHEST 1 VIEW: CPT | Mod: 26

## 2023-11-09 PROCEDURE — 99233 SBSQ HOSP IP/OBS HIGH 50: CPT

## 2023-11-09 RX ORDER — CARVEDILOL PHOSPHATE 80 MG/1
12.5 CAPSULE, EXTENDED RELEASE ORAL EVERY 12 HOURS
Refills: 0 | Status: DISCONTINUED | OUTPATIENT
Start: 2023-11-09 | End: 2023-11-10

## 2023-11-09 RX ORDER — FUROSEMIDE 40 MG
40 TABLET ORAL ONCE
Refills: 0 | Status: COMPLETED | OUTPATIENT
Start: 2023-11-10 | End: 2023-11-10

## 2023-11-09 RX ORDER — COLLAGENASE CLOSTRIDIUM HIST. 250 UNIT/G
1 OINTMENT (GRAM) TOPICAL DAILY
Refills: 0 | Status: DISCONTINUED | OUTPATIENT
Start: 2023-11-09 | End: 2023-11-10

## 2023-11-09 RX ORDER — POTASSIUM CHLORIDE 20 MEQ
40 PACKET (EA) ORAL ONCE
Refills: 0 | Status: COMPLETED | OUTPATIENT
Start: 2023-11-09 | End: 2023-11-09

## 2023-11-09 RX ADMIN — GABAPENTIN 800 MILLIGRAM(S): 400 CAPSULE ORAL at 06:15

## 2023-11-09 RX ADMIN — Medication 1 APPLICATION(S): at 14:03

## 2023-11-09 RX ADMIN — Medication 25 MILLIGRAM(S): at 22:19

## 2023-11-09 RX ADMIN — Medication 650 MILLIGRAM(S): at 20:49

## 2023-11-09 RX ADMIN — Medication 40 MILLIEQUIVALENT(S): at 13:05

## 2023-11-09 RX ADMIN — GABAPENTIN 800 MILLIGRAM(S): 400 CAPSULE ORAL at 22:19

## 2023-11-09 RX ADMIN — CARVEDILOL PHOSPHATE 12.5 MILLIGRAM(S): 80 CAPSULE, EXTENDED RELEASE ORAL at 17:10

## 2023-11-09 RX ADMIN — Medication 650 MILLIGRAM(S): at 02:31

## 2023-11-09 RX ADMIN — Medication 650 MILLIGRAM(S): at 03:09

## 2023-11-09 RX ADMIN — ENOXAPARIN SODIUM 40 MILLIGRAM(S): 100 INJECTION SUBCUTANEOUS at 09:17

## 2023-11-09 RX ADMIN — Medication 5 UNIT(S): at 17:24

## 2023-11-09 RX ADMIN — INSULIN GLARGINE 6 UNIT(S): 100 INJECTION, SOLUTION SUBCUTANEOUS at 22:19

## 2023-11-09 RX ADMIN — Medication 81 MILLIGRAM(S): at 13:04

## 2023-11-09 RX ADMIN — CHLORHEXIDINE GLUCONATE 1 APPLICATION(S): 213 SOLUTION TOPICAL at 06:15

## 2023-11-09 RX ADMIN — GABAPENTIN 800 MILLIGRAM(S): 400 CAPSULE ORAL at 13:04

## 2023-11-09 RX ADMIN — CLOPIDOGREL BISULFATE 75 MILLIGRAM(S): 75 TABLET, FILM COATED ORAL at 13:05

## 2023-11-09 RX ADMIN — CEFTRIAXONE 100 MILLIGRAM(S): 500 INJECTION, POWDER, FOR SOLUTION INTRAMUSCULAR; INTRAVENOUS at 09:16

## 2023-11-09 RX ADMIN — Medication 25 MILLIGRAM(S): at 06:15

## 2023-11-09 RX ADMIN — Medication 650 MILLIGRAM(S): at 21:30

## 2023-11-09 RX ADMIN — Medication 25 MILLIGRAM(S): at 13:04

## 2023-11-09 RX ADMIN — Medication 6: at 22:19

## 2023-11-09 RX ADMIN — Medication 2: at 13:56

## 2023-11-09 RX ADMIN — DAPTOMYCIN 120 MILLIGRAM(S): 500 INJECTION, POWDER, LYOPHILIZED, FOR SOLUTION INTRAVENOUS at 11:15

## 2023-11-09 RX ADMIN — Medication 5 UNIT(S): at 09:16

## 2023-11-09 RX ADMIN — Medication 5 UNIT(S): at 13:58

## 2023-11-09 RX ADMIN — ATORVASTATIN CALCIUM 80 MILLIGRAM(S): 80 TABLET, FILM COATED ORAL at 22:18

## 2023-11-09 NOTE — PROGRESS NOTE ADULT - PROBLEM SELECTOR PLAN 4
Seen by vascular and podiatry last admission. S/p RLE angiogram with AT lithotripsy and AT/peroneal balloon angioplasty 10/27. Completion angiogram showed better filling of AT and peroneal.  On exam, pt with gangrenous changes of RLE, b/l LE WWP, and dopplerable pulses. Pt s/p R 4th toe amputation with podiatry on 10/24, discharged with PICC and IV abx.  - regular neurovascular checks   - c/w ASA, plavix, lipitor  - appreciate podiatry recs
Seen by vascular and podiatry last admission. S/p RLE angiogram with AT lithotripsy and AT/peroneal balloon angioplasty 10/27. Completion angiogram showed better filling of AT and peroneal.  On exam, pt with gangrenous changes of RLE, b/l LE WWP, and dopplerable pulses. Pt s/p R 4th toe amputation with podiatry on 10/24, discharged with PICC and IV abx.  - regular neurovascular checks   - c/w ASA, plavix, lipitor  - appreciate podiatry recs

## 2023-11-09 NOTE — DIETITIAN INITIAL EVALUATION ADULT - ADD RECOMMEND
1. Consider changing diet to DASH/TLC CTSCHO Diet (monitor need for ONS) 2. Encourage pt to meet nutritional needs as able 3. RD to provide diet educationand reinforce as able 4. Pain and bowel regimen per team 5. Will assess/honor preferences as able

## 2023-11-09 NOTE — PROGRESS NOTE ADULT - PROBLEM SELECTOR PLAN 5
At [facility], pt hypertensive to 212/133 and acutely agitated requiring ~4mg ativan IVP. Pt received sublingual nitro and started on nitroglycerin gtt. Upon arrival to CCU, pt hypertensive to 196/90 and transitioned to nipride gtt. dc'd.  - Restarted hydral 25 q8hrs  - restart home BP meds as appropriate
At [facility], pt hypertensive to 212/133 and acutely agitated requiring ~4mg ativan IVP. Pt received sublingual nitro and started on nitroglycerin gtt. Upon arrival to CCU, pt hypertensive to 196/90 and transitioned to nipride gtt. dc'd.  - Restarted hydral 25 q8hrs  - restart home BP meds as appropriate

## 2023-11-09 NOTE — CHART NOTE - NSCHARTNOTEFT_GEN_A_CORE
Attestation of CVC Checklist review      Type of CVC: PICC   Location of CVC: L arm    Patient was discharged w/ PICC (placed on 10/30/2023 by [facility] PICC team)  for IV AB.     This patient was admitted with the above CVC.  I reviewed the CVC checklist and determined that the CVC can be maintained and OK to use*.     *If BCx was obtained upon admission and it becomes positive, then the CVC should be removed promptly.

## 2023-11-09 NOTE — PROGRESS NOTE ADULT - PROBLEM SELECTOR PLAN 2
On arrival to ED, pt became acutely agitated, requiring multiple pushes of ativan. Pt also presented with elevated /113, Utox +cocaine and THC. CTH negative. AMS likely 2/2 hypertensive crisis vs drug intoxication. On arrival to CCU, pt AAOx1-2, tearful, attempting to get out of hospital bed.  - management of hypertensive emergency as below
On arrival to ED, pt became acutely agitated, requiring multiple pushes of ativan. Pt also presented with elevated /113, Utox +cocaine and THC. CTH negative. AMS likely 2/2 hypertensive crisis vs drug intoxication. On arrival to CCU, pt AAOx1-2, tearful, attempting to get out of hospital bed.  - management of hypertensive emergency as below

## 2023-11-09 NOTE — PROGRESS NOTE ADULT - PROBLEM SELECTOR PLAN 3
Pt is s/p R foot partial 4th ray resection (10/24) by podiatry. Pt hospitalized twice in 10/2023 related to R food wound. Single lumen PICC placed last admission. Tissue cx: e.coli, pluralibacter, stap epi, and campylobacter. MRI: Soft tissue atrophy/wound of the 4th toe with underlying marrow changes of the 4th proximal phalanx head, middle phalanx, and distal phalanx . ID consulted last admission, recommended Daptomycin 500mg IV q24h plus ceftriaxone 2g IV Q24.   - c/w Daptomycin 500mg IV q24h and ceftriaxone 2g IV q24 (10/24-12/5)  - F/u CK q48hr  - f/u BCx  - f/u UCx  - tylenol prn for pain  - f/u ID recs
Pt is s/p R foot partial 4th ray resection (10/24) by podiatry. Pt hospitalized twice in 10/2023 related to R food wound. Single lumen PICC placed last admission. Tissue cx: e.coli, pluralibacter, stap epi, and campylobacter. MRI: Soft tissue atrophy/wound of the 4th toe with underlying marrow changes of the 4th proximal phalanx head, middle phalanx, and distal phalanx . ID consulted last admission, recommended Daptomycin 500mg IV q24h plus ceftriaxone 2g IV Q24.   - c/w Daptomycin 500mg IV q24h and ceftriaxone 2g IV q24 (10/24-12/5)  - F/u CK q48hr  - f/u BCx  - f/u UCx  - tylenol prn for pain  - f/u ID recs

## 2023-11-09 NOTE — PROGRESS NOTE ADULT - SUBJECTIVE AND OBJECTIVE BOX
Patient is a 68y old  Male who presents with a chief complaint of hypertensive urgency (08 Nov 2023 16:43)      INTERVAL HPI/ OVERNIGHT EVENTS. Pt seen and examined bedside. Denies any pedal pain at this time. dressing C/D/I.       LABS                        9.2    11.65 )-----------( 301      ( 09 Nov 2023 05:30 )             27.7     11-09    137  |  96  |  22  ----------------------------<  96  3.6   |  31  |  1.23    Ca    8.7      09 Nov 2023 05:30  Phos  2.8     11-08  Mg     2.1     11-09    TPro  7.0  /  Alb  2.3<L>  /  TBili  0.3  /  DBili  x   /  AST  40  /  ALT  49<H>  /  AlkPhos  149<H>  11-09    PT/INR - ( 08 Nov 2023 01:35 )   PT: 11.2 sec;   INR: 0.98          PTT - ( 08 Nov 2023 01:35 )  PTT:26.6 sec    ICU Vital Signs Last 24 Hrs  T(C): 36.7 (09 Nov 2023 05:00), Max: 37.1 (08 Nov 2023 21:00)  T(F): 98 (09 Nov 2023 05:00), Max: 98.8 (08 Nov 2023 21:00)  HR: 78 (09 Nov 2023 05:00) (66 - 86)  BP: 157/89 (09 Nov 2023 05:00) (116/68 - 182/96)  BP(mean): 117 (09 Nov 2023 05:00) (87 - 131)  ABP: --  ABP(mean): --  RR: 18 (09 Nov 2023 05:00) (14 - 21)  SpO2: 96% (09 Nov 2023 05:00) (93% - 97%)    O2 Parameters below as of 09 Nov 2023 00:00  Patient On (Oxygen Delivery Method): room air        RADIOLOGY  < from: Xray Foot AP + Lateral + Oblique, Right (11.08.23 @ 10:01) >  FINDINGS/  IMPRESSION:    There is redemonstration of partial amputation of the fourth ray at the   level of the metatarsal head. There is mild cortical irregularity of the   fourth metatarsal stump margin, for which sequelae of acute osteomyelitis   cannot be entirely excluded.  There is no focal soft tissue abnormality.  < end of copied text >          PHYSICAL EXAM  Lower Extremity Focused  Vasc: DP/PT biphasic waveforms heard on doppler b/l, no edema or erythema. CFT <3 x 9   Derm:  R foot surgical site wound at the distal 4th ray. Serous drainage. Fibrotic 90% granular 10% wound bed. Plantar skin just proximal to the surgical site with ischemic central area  Macerated 2nd interspace  Neuro: protective sensation slightly diminished  MSK: 5/5 muscle strength in all compartments

## 2023-11-09 NOTE — DIETITIAN INITIAL EVALUATION ADULT - OTHER INFO
67M PMH HTN, IDDM with peripheral neuropathy, HCV, HFmrEF, CAD, previously admitted for diabetic foot infection (10/21-10/31) for which he underwent partial 4th ray resection on 10/24/23 and was discharged on 6 weeks daptomycin/vancomycin (through 12/5) admitted to CCU for hypertension and acute CHF exacerbation. At time of consult, VSS, WBC 13.56, , CRP 70.4. X-rays with no evidence of gas, cortical irregularity of 4th met stump - possible osteomyelitis. Clinically, wound fibrotic, no signs of infection, thin plantar skin with ischemic area.     Attempted to visit patient at bedside, however, patient out of room thus information obtained via EMR at this time. No n/v/d/c documented at this time. Last documented bowel movement 11/8. NKFA documented. Unable to assess PO intake PTA. Dosing weight 175 pounds. B/L leg nonpitting edema. No pressure ulcers documented at this time. Wilder score=18. Labs reviewed 11/9; electrolytes within normal limits at this time. Fingersticks 11/8-11/9:  mg/dL; insulin regimen ordered. Unable to perform nutrition focused physical exam at this time. Based on ASPEN guidelines, pt does not meet criteria for malnutrition at this time. Patient currently ordered for DASH/TLC diet. Observed breakfast tray 100% completed at bedside. RD to follow up. See nutrition recommendations below.

## 2023-11-09 NOTE — PROGRESS NOTE ADULT - PROBLEM SELECTOR PLAN 10
Glucose elevated to 400s on admission. A1c 12.6. Endocrinology consulted during prior admission for management, per endo documentation, pt reported having T1DM however more likely has T2DM as was diagnosed ~10 years ago. s/p 10u lispro, 10u NPH on admission  Home meds: 5u lispro TID with meals, 6u lantus at bedtime  - fsg goal 100-180  - c/w home meds    PROPHYLAXIS  F: NONE  E: K>4, Mg>2  N: DASH/TLC  GI: None  DVT: Lovenox  Dispo: pending clinical correlation
Glucose elevated to 400s on admission. A1c 12.6. Endocrinology consulted during prior admission for management, per endo documentation, pt reported having T1DM however more likely has T2DM as was diagnosed ~10 years ago. s/p 10u lispro, 10u NPH on admission  Home meds: 5u lispro TID with meals, 6u lantus at bedtime  - fsg goal 100-180  - c/w home meds    PROPHYLAXIS  F: NONE  E: K>4, Mg>2  N: DASH/TLC  GI: None  DVT: Lovenox  Dispo: pending clinical correlation

## 2023-11-09 NOTE — DIETITIAN INITIAL EVALUATION ADULT - OTHER CALCULATIONS
Fluids per team. Based on Standards of Care pt within % IBW thus actual body weight used for all calculations. Needs adjusted for advanced age and HF.

## 2023-11-09 NOTE — PROGRESS NOTE ADULT - ASSESSMENT
67M cocaine and THC user, with a pmhx HTN, IDDM with peripheral neuropathy, HCV, HFmrEF (TTE 10/23/23 EF 45% with moderate AS), CAD s/p PCI with 2 stents (2020 at [facility]), recent admission x2 for diabetic SSTI (s/p partial 4th toe ray resection 10/24), presented to [facility] complaining of shortness of breath, found to be hypertensive to 212/133 and was acutely altered. Started on nitroglycerine gtt for blood pressure management and transitioned to NiPri gtt for better control. Found to be in flash pulmonary edema and started on agressive diuresis with a lasix gtt. course c/b acute metabolic encephalopathy i/s/o positive cocaine and THC utox requiring multiple pushes of ativan and transamnitis likely i/s/o hepatic congestion 2/2 CHF exacerbation and +HCV. Currently being treated for osteomyelitis with OPAT,  aggressive diuresis for acute CHF exacerbation, and monitoring blood pressure for resolving hypertensive emergency likely i/s/o cocaine use.

## 2023-11-09 NOTE — PROGRESS NOTE ADULT - SUBJECTIVE AND OBJECTIVE BOX
Interventional Cardiology PA Adult Progress Note      Subjective Assessment:  Patient seen and examined at bedside this morning, appears comfortable. Denies CP, palpitations, SOB, CANSECO, abd pain, N/V, LE swelling, melena, hematochezia, hematuria, or BRBPR. Patient states last BM was this morning and it was normal.     	  MEDICATIONS:  carvedilol 12.5 milliGRAM(s) Oral every 12 hours  furosemide Infusion 10 mG/Hr IV Continuous <Continuous>  hydrALAZINE 25 milliGRAM(s) Oral every 8 hours  cefTRIAXone   IVPB 2000 milliGRAM(s) IV Intermittent every 24 hours  DAPTOmycin IVPB 500 milliGRAM(s) IV Intermittent every 24 hours  acetaminophen     Tablet .. 650 milliGRAM(s) Oral every 6 hours PRN  gabapentin 800 milliGRAM(s) Oral every 8 hours  atorvastatin 80 milliGRAM(s) Oral at bedtime  dextrose 50% Injectable 12.5 Gram(s) IV Push once  dextrose 50% Injectable 25 Gram(s) IV Push once  dextrose 50% Injectable 25 Gram(s) IV Push once  dextrose Oral Gel 15 Gram(s) Oral once PRN  glucagon  Injectable 1 milliGRAM(s) IntraMuscular once  insulin glargine Injectable (LANTUS) 6 Unit(s) SubCutaneous at bedtime  insulin lispro (ADMELOG) corrective regimen sliding scale   SubCutaneous Before meals and at bedtime  insulin lispro Injectable (ADMELOG) 5 Unit(s) SubCutaneous three times a day before meals  aspirin enteric coated 81 milliGRAM(s) Oral daily  chlorhexidine 2% Cloths 1 Application(s) Topical <User Schedule>  clopidogrel Tablet 75 milliGRAM(s) Oral daily  collagenase Ointment 1 Application(s) Topical daily  dextrose 5%. 1000 milliLiter(s) IV Continuous <Continuous>  dextrose 5%. 1000 milliLiter(s) IV Continuous <Continuous>  enoxaparin Injectable 40 milliGRAM(s) SubCutaneous every 24 hours      [PHYSICAL EXAM: TELEMETRY:  T(C): 36.9 (11-09-23 @ 15:51), Max: 37.1 (11-08-23 @ 21:00)  HR: 81 (11-09-23 @ 17:10) (63 - 82)  BP: 138/82 (11-09-23 @ 17:10) (138/82 - 182/96)  RR: 18 (11-09-23 @ 15:51) (18 - 18)  SpO2: 96% (11-09-23 @ 15:51) (96% - 98%)  Wt(kg): --  I&O's Summary    08 Nov 2023 07:01  -  09 Nov 2023 07:00  --------------------------------------------------------  IN: 1150 mL / OUT: 8275 mL / NET: -7125 mL    09 Nov 2023 07:01  -  09 Nov 2023 17:28  --------------------------------------------------------  IN: 420 mL / OUT: 1260 mL / NET: -840 mL                                      Appearance: Sitting comfortably in bed   Cardiovascular: Normal S1 S2 +systolic murmur   Respiratory: Lungs clear to auscultation   Gastrointestinal:  Soft, Non-tender, + BS	  Skin: No rashes, No ecchymoses, No cyanosis  Extremities: Normal range of motion, No clubbing, cyanosis or edema  Vascular: Peripheral pulses palpable 2+ bilaterally  Neurologic: Non-focal  Psychiatry: Mood & affect appropriate    	  	  DIAGNOSTIC TESTING:  [ x] Echocardiogram 11/09/2023:    1. Normal left ventricular size.   2. Mild to moderate symmetric left ventricular hypertrophy.   3. Mildly reduced left ventricular systolic function.   4. Grade I left ventricular diastolic dysfunction.   5. Normal right ventricular size and systolic function.   6. Biatrial enlargement.   7. Mild-to-moderate aortic stenosis. The peak transvalvular velocity is   2.92 m/s, the mean transvalvular gradient is 15.00 mmHg, and the LVOT/AV   velocity ratio is 0.50. The aortic valve area (estimated via the   continuity method) is 1.28 cm².   8. No evidence of pulmonary hypertension.   9. No pericardial effusion  10. Compared to the previous TTE performed on 10/23/2023, there have been   no significant interval changes.    [ ]  Catheterization:  [ ] Stress Test:    [ ] ALETHEA  OTHER: 	    LABS:	 	  CARDIAC MARKERS:                        9.2    11.65 )-----------( 301      ( 09 Nov 2023 05:30 )             27.7     11-09    137  |  96  |  22  ----------------------------<  96  3.6   |  31  |  1.23    Ca    8.7      09 Nov 2023 05:30  Phos  2.8     11-08  Mg     2.1     11-09    TPro  7.0  /  Alb  2.3<L>  /  TBili  0.3  /  DBili  x   /  AST  40  /  ALT  49<H>  /  AlkPhos  149<H>  11-09    proBNP:   Lipid Profile:   HgA1c:   TSH:   PT/INR - ( 08 Nov 2023 01:35 )   PT: 11.2 sec;   INR: 0.98          PTT - ( 08 Nov 2023 01:35 )  PTT:26.6 sec    ASSESSMENT/PLAN: 	        DVT ppx:  Dispo:     Interventional Cardiology PA Adult Progress Note      Subjective Assessment:  Patient seen and examined at bedside this morning, appears comfortable. Denies CP, palpitations, SOB, CANSECO, abd pain, N/V, LE swelling, melena, hematochezia, hematuria, or BRBPR. Patient states last BM was this morning and it was normal.     	  MEDICATIONS:  carvedilol 12.5 milliGRAM(s) Oral every 12 hours  furosemide Infusion 10 mG/Hr IV Continuous <Continuous>  hydrALAZINE 25 milliGRAM(s) Oral every 8 hours  cefTRIAXone   IVPB 2000 milliGRAM(s) IV Intermittent every 24 hours  DAPTOmycin IVPB 500 milliGRAM(s) IV Intermittent every 24 hours  acetaminophen     Tablet .. 650 milliGRAM(s) Oral every 6 hours PRN  gabapentin 800 milliGRAM(s) Oral every 8 hours  atorvastatin 80 milliGRAM(s) Oral at bedtime  dextrose 50% Injectable 12.5 Gram(s) IV Push once  dextrose 50% Injectable 25 Gram(s) IV Push once  dextrose 50% Injectable 25 Gram(s) IV Push once  dextrose Oral Gel 15 Gram(s) Oral once PRN  glucagon  Injectable 1 milliGRAM(s) IntraMuscular once  insulin glargine Injectable (LANTUS) 6 Unit(s) SubCutaneous at bedtime  insulin lispro (ADMELOG) corrective regimen sliding scale   SubCutaneous Before meals and at bedtime  insulin lispro Injectable (ADMELOG) 5 Unit(s) SubCutaneous three times a day before meals  aspirin enteric coated 81 milliGRAM(s) Oral daily  chlorhexidine 2% Cloths 1 Application(s) Topical <User Schedule>  clopidogrel Tablet 75 milliGRAM(s) Oral daily  collagenase Ointment 1 Application(s) Topical daily  dextrose 5%. 1000 milliLiter(s) IV Continuous <Continuous>  dextrose 5%. 1000 milliLiter(s) IV Continuous <Continuous>  enoxaparin Injectable 40 milliGRAM(s) SubCutaneous every 24 hours      [PHYSICAL EXAM: TELEMETRY:  T(C): 36.9 (11-09-23 @ 15:51), Max: 37.1 (11-08-23 @ 21:00)  HR: 81 (11-09-23 @ 17:10) (63 - 82)  BP: 138/82 (11-09-23 @ 17:10) (138/82 - 182/96)  RR: 18 (11-09-23 @ 15:51) (18 - 18)  SpO2: 96% (11-09-23 @ 15:51) (96% - 98%)  Wt(kg): --  I&O's Summary    08 Nov 2023 07:01  -  09 Nov 2023 07:00  --------------------------------------------------------  IN: 1150 mL / OUT: 8275 mL / NET: -7125 mL    09 Nov 2023 07:01  -  09 Nov 2023 17:28  --------------------------------------------------------  IN: 420 mL / OUT: 1260 mL / NET: -840 mL                                      Appearance: Sitting comfortably in bed   Cardiovascular: Normal S1 S2 +systolic murmur   Respiratory: Lungs clear to auscultation   Gastrointestinal:  Soft, Non-tender, + BS	  Skin: No rashes, No ecchymoses, No cyanosis  Extremities: Normal range of motion, No clubbing, cyanosis or edema  Vascular: Peripheral pulses palpable 2+ bilaterally  Neurologic: Non-focal  Psychiatry: Mood & affect appropriate    	  	  DIAGNOSTIC TESTING:  [ x] Echocardiogram 11/09/2023:    1. Normal left ventricular size.   2. Mild to moderate symmetric left ventricular hypertrophy.   3. Mildly reduced left ventricular systolic function.   4. Grade I left ventricular diastolic dysfunction.   5. Normal right ventricular size and systolic function.   6. Biatrial enlargement.   7. Mild-to-moderate aortic stenosis. The peak transvalvular velocity is   2.92 m/s, the mean transvalvular gradient is 15.00 mmHg, and the LVOT/AV   velocity ratio is 0.50. The aortic valve area (estimated via the   continuity method) is 1.28 cm².   8. No evidence of pulmonary hypertension.   9. No pericardial effusion  10. Compared to the previous TTE performed on 10/23/2023, there have been   no significant interval changes.    [ ]  Catheterization:  [ ] Stress Test:    [ ] ALETHEA  OTHER: 	    LABS:	 	  CARDIAC MARKERS:                        9.2    11.65 )-----------( 301      ( 09 Nov 2023 05:30 )             27.7     11-09    137  |  96  |  22  ----------------------------<  96  3.6   |  31  |  1.23    Ca    8.7      09 Nov 2023 05:30  Phos  2.8     11-08  Mg     2.1     11-09    TPro  7.0  /  Alb  2.3<L>  /  TBili  0.3  /  DBili  x   /  AST  40  /  ALT  49<H>  /  AlkPhos  149<H>  11-09    proBNP:   Lipid Profile:   HgA1c:   TSH:   PT/INR - ( 08 Nov 2023 01:35 )   PT: 11.2 sec;   INR: 0.98          PTT - ( 08 Nov 2023 01:35 )  PTT:26.6 sec    ASSESSMENT/PLAN:

## 2023-11-09 NOTE — DIETITIAN INITIAL EVALUATION ADULT - PERTINENT LABORATORY DATA
11-09    137  |  96  |  22  ----------------------------<  96  3.6   |  31  |  1.23    Ca    8.7      09 Nov 2023 05:30  Phos  2.8     11-08  Mg     2.1     11-09    TPro  7.0  /  Alb  2.3<L>  /  TBili  0.3  /  DBili  x   /  AST  40  /  ALT  49<H>  /  AlkPhos  149<H>  11-09  POCT Blood Glucose.: 119 mg/dL (11-09-23 @ 07:45)  A1C with Estimated Average Glucose Result: 12.6 % (10-02-23 @ 05:30)

## 2023-11-09 NOTE — PROGRESS NOTE ADULT - PROBLEM SELECTOR PLAN 9
Elevated ALP and liver enzymes. Likely congestive hepatopathy i/s/o CHF exacerbation. CTA s/f reflux of contrast material into dilated IVC and hepatic veins suggesting right-sided cardiac decompensation.  - continue to monitor  - daily cmp
Elevated ALP and liver enzymes. Likely congestive hepatopathy i/s/o CHF exacerbation. CTA s/f reflux of contrast material into dilated IVC and hepatic veins suggesting right-sided cardiac decompensation.  - continue to monitor  - daily cmp

## 2023-11-09 NOTE — PROGRESS NOTE ADULT - PROBLEM SELECTOR PLAN 6
h/o PCI with 2 stents placed at [facility] ~2020. Follows outpatient with general cardiologist but does not remember the name and has had poor follow-up since. Since PCI patient reports being asymptomatic until ~2 months prior to 10/23 admission when he started to have bilateral pedal edema and chest pain with emotional stress  - f/u repeat TTE  - c/w ASA 81mg qd  - c/w plavix 75mg qd  - c/w lipitor 80mg qd
h/o PCI with 2 stents placed at [facility] ~2020. Follows outpatient with general cardiologist but does not remember the name and has had poor follow-up since. Since PCI patient reports being asymptomatic until ~2 months prior to 10/23 admission when he started to have bilateral pedal edema and chest pain with emotional stress  - f/u repeat TTE  - c/w ASA 81mg qd  - c/w plavix 75mg qd  - c/w lipitor 80mg qd

## 2023-11-09 NOTE — PROGRESS NOTE ADULT - PROBLEM SELECTOR PLAN 1
Pt presenting to ED complaining of worsening SOB, CXR and CTA chest s/f b/l GGO, pleural effusions, pulm congestion.  PE with scrotal and lower extremity edema. Trop 89>101, pro-BNP >19050.   TTE 10/23/23: mild to mod LVH, LVEF 45%, dilated LA, moderate AS, trace MR, mild TR  Home meds: lisinopril 40mg qd, coreg 12.5mg BID, spironolactone 50mg qd, hydralazine 25mg TID  - c/w lasix gtt, net negative 2 to 2.5L  - restart home GDMT as appropriate   - maintain SpO2 >92%  - obtain collateral from outpatient cardiologist (Dr Anni Taylor @[facility]) and wife/family
Pt presenting to ED complaining of worsening SOB, CXR and CTA chest s/f b/l GGO, pleural effusions, pulm congestion.  PE with scrotal and lower extremity edema. Trop 89>101, pro-BNP >85623. This AM breathing comfortably on RA, lungs clear, no B/L LE edema   - TTE 11/09/23L LVEF 45-50% w/ RWMA, mild to moderate LVH, biatrial enlargement, mild to moderate AS, peak trans velocity 2.92 m/s, MG 15 mmHg, LVOT/AV 0.50, aortic valve area 1.28 cm2  - TTE 10/23/23: mild to mod LVH, LVEF 45%, dilated LA, moderate AS, trace MR, mild TR  Home meds: lisinopril 40mg qd, coreg 12.5mg BID, spironolactone 50mg qd, hydralazine 25mg TID  - Transitioned from Lasix gtt to Lasix 40mg BID  - restart home GDMT as appropriate   - maintain SpO2 >92%  - obtain collateral from outpatient cardiologist (Dr Anni Taylor @[facility]) and wife/family

## 2023-11-09 NOTE — PROGRESS NOTE ADULT - ASSESSMENT
67M PMH HTN, IDDM with peripheral neuropathy, HCV, HFmrEF, CAD, previously admitted for diabetic foot infection (10/21-10/31) for which he underwent partial 4th ray resection on 10/24/23 and was discharged on 6 weeks daptomycin/vancomycin (through 12/5) admitted to CCU for hypertension and acute CHF exacerbation. At time of consult, VSS, WBC 13.56, , CRP 70.4. X-rays with no evidence of gas, cortical irregularity of 4th met stump - possible osteomyelitis. Clinically, wound fibrotic, no signs of infection, thin plantar skin with ischemic area.     Plan:   - Pt educated on poor healing potential of surgical site. Pt continues to refuse further surgical intervention. Pt educated that he is at risk of further infection. Pt continues to refuse further intervention.   - Wound cleansed with normal saline. Packed with 1/2" plain packing, betadine gauze, kerlix, and ACE.  - C/w 6 weeks of IV dapto/vanc for residual OM through 12/5  - X-rays reviewed and d/w pt   - Pt can heel WBAT to RLE   - Rest of care per primary team.     Plan d/w Attending. Podiatry following.     Discharge recommendations:   - Discharge dressing instructions: Cleanse wound with normal saline daily, pat dry with sterile guaze, pack wound defect with 1/4 inch iodoform packing, apply betadine to macerated edges, cover with saline-soaked sterile gauze, wrap with Kerlix and light ACE bandage.   - Follow up: Patient should follow up with Dr. Tom Camacho within 1 week of discharge.    Office information:          [place] Address- [address] [facility] [place], [place] [address] Phone: [phone]         [place] Address- [address] [facility], [place] [address] Phone: [phone]   67M PMH HTN, IDDM with peripheral neuropathy, HCV, HFmrEF, CAD, previously admitted for diabetic foot infection (10/21-10/31) for which he underwent partial 4th ray resection on 10/24/23 and was discharged on 6 weeks daptomycin/vancomycin (through 12/5) admitted to CCU for hypertension and acute CHF exacerbation. At time of consult, VSS, WBC 13.56, , CRP 70.4. X-rays with no evidence of gas, cortical irregularity of 4th met stump - possible osteomyelitis. Clinically, wound fibrotic, no signs of infection, thin plantar skin with ischemic area.     Plan:   - Pt educated on poor healing potential of surgical site. Pt continues to refuse further surgical intervention. Pt educated that he is at risk of further infection. Pt continues to refuse further intervention.   - Wound cleansed with normal saline. Packed with 1/2" plain packing, betadine gauze, kerlix, and ACE.  - C/w 6 weeks of IV dapto/vanc for residual OM through 12/5  - X-rays reviewed and d/w pt   - Pt can heel WBAT to RLE   - Rest of care per primary team.     Plan d/w Attending. Podiatry following.     Discharge recommendations:   - Discharge dressing instructions: Cleanse wound with normal saline daily, pat dry with sterile guaze, pack wound defect with 1/4 inch iodoform packing, apply betadine to macerated edges, cover with betadine-soaked sterile gauze, wrap with Kerlix and light ACE bandage.   - Follow up: Patient should follow up with Dr. Tom Camacho within 1 week of discharge.    Office information:          [place] Address- [address] [facility] [address] Phone: [phone]         [place] Address- [address] [facility] [address], [place] [address] Phone: [phone]   67M PMH HTN, IDDM with peripheral neuropathy, HCV, HFmrEF, CAD, previously admitted for diabetic foot infection (10/21-10/31) for which he underwent partial 4th ray resection on 10/24/23 and was discharged on 6 weeks daptomycin/vancomycin (through 12/5) admitted to CCU for hypertension and acute CHF exacerbation. At time of consult, VSS, WBC 13.56, , CRP 70.4. X-rays with no evidence of gas, cortical irregularity of 4th met stump - possible osteomyelitis. Clinically, wound fibrotic, no signs of infection, thin plantar skin with ischemic area.     Plan:   - Pt educated on poor healing potential of surgical site. Pt continues to refuse further surgical intervention. Pt educated that he is at risk of further infection. Pt continues to refuse further intervention.   - Wound cleansed with normal saline. Packed with 1/2" plain packing, betadine gauze, kerlix, and ACE.  - Will begin dressing changes with Santyl tomorrow.   - C/w 6 weeks of IV dapto/vanc for residual OM through 12/5  - X-rays reviewed and d/w pt   - Pt can heel WBAT to RLE   - Rest of care per primary team.     Plan d/w Attending. Podiatry following.     Discharge recommendations:   - Discharge dressing instructions: Cleanse wound with normal saline daily, pat dry with sterile guaze, Apply Santyl to gauze, wet with saline, Place in wound base, wrap with kerlix and a light ACE   - Follow up: Patient should follow up with Dr. Tom Camacho within 1 week of discharge.    Office information:          [place] Address- [address] [facility], [place] [address] Phone: [phone]         [place] Address- [address], [place] [address] Phone: [phone]

## 2023-11-09 NOTE — PROGRESS NOTE ADULT - PROBLEM SELECTOR PLAN 8
Home meds: gabapentin 1600 TID, tylenol 650 q6hr, oxycodone 10mg prn  - holding gabapentin i/s/o AMS  - restarted PO tylenol   - holding oxycodone for now, will restart as appropriate
Home meds: gabapentin 1600 TID, tylenol 650 q6hr, oxycodone 10mg prn  - holding gabapentin i/s/o AMS  - restarted PO tylenol   - holding oxycodone for now, will restart as appropriate

## 2023-11-09 NOTE — PROGRESS NOTE ADULT - PROBLEM SELECTOR PLAN 7
Per 10/23 TTE, pt w moderate AS with AV area 1.27, peak transvalvular velocity 2.47, mean transvalvular gradient is 16.00 mmHg, and the LVOT/AV velocity ratio is 0.40 with an EF of 45%  - f/u repeat TTE  - will restart coreg as appropriate
Per 10/23 TTE, pt w moderate AS with AV area 1.27, peak transvalvular velocity 2.47, mean transvalvular gradient is 16.00 mmHg, and the LVOT/AV velocity ratio is 0.40 with an EF of 45%  - f/u repeat TTE  - will restart coreg as appropriate

## 2023-11-10 ENCOUNTER — NON-APPOINTMENT (OUTPATIENT)
Age: 68
End: 2023-11-10

## 2023-11-10 ENCOUNTER — TRANSCRIPTION ENCOUNTER (OUTPATIENT)
Age: 68
End: 2023-11-10

## 2023-11-10 VITALS
DIASTOLIC BLOOD PRESSURE: 85 MMHG | OXYGEN SATURATION: 100 % | SYSTOLIC BLOOD PRESSURE: 167 MMHG | HEART RATE: 71 BPM | TEMPERATURE: 98 F | RESPIRATION RATE: 17 BRPM

## 2023-11-10 LAB
ALBUMIN SERPL ELPH-MCNC: 2 G/DL — LOW (ref 3.3–5)
ALBUMIN SERPL ELPH-MCNC: 2 G/DL — LOW (ref 3.3–5)
ALP SERPL-CCNC: 130 U/L — HIGH (ref 40–120)
ALP SERPL-CCNC: 130 U/L — HIGH (ref 40–120)
ALT FLD-CCNC: 39 U/L — SIGNIFICANT CHANGE UP (ref 10–45)
ALT FLD-CCNC: 39 U/L — SIGNIFICANT CHANGE UP (ref 10–45)
ANION GAP SERPL CALC-SCNC: 9 MMOL/L — SIGNIFICANT CHANGE UP (ref 5–17)
ANION GAP SERPL CALC-SCNC: 9 MMOL/L — SIGNIFICANT CHANGE UP (ref 5–17)
AST SERPL-CCNC: 31 U/L — SIGNIFICANT CHANGE UP (ref 10–40)
AST SERPL-CCNC: 31 U/L — SIGNIFICANT CHANGE UP (ref 10–40)
BILIRUB SERPL-MCNC: 0.2 MG/DL — SIGNIFICANT CHANGE UP (ref 0.2–1.2)
BILIRUB SERPL-MCNC: 0.2 MG/DL — SIGNIFICANT CHANGE UP (ref 0.2–1.2)
BUN SERPL-MCNC: 23 MG/DL — SIGNIFICANT CHANGE UP (ref 7–23)
BUN SERPL-MCNC: 23 MG/DL — SIGNIFICANT CHANGE UP (ref 7–23)
CALCIUM SERPL-MCNC: 8.8 MG/DL — SIGNIFICANT CHANGE UP (ref 8.4–10.5)
CALCIUM SERPL-MCNC: 8.8 MG/DL — SIGNIFICANT CHANGE UP (ref 8.4–10.5)
CHLORIDE SERPL-SCNC: 97 MMOL/L — SIGNIFICANT CHANGE UP (ref 96–108)
CHLORIDE SERPL-SCNC: 97 MMOL/L — SIGNIFICANT CHANGE UP (ref 96–108)
CK SERPL-CCNC: 179 U/L — SIGNIFICANT CHANGE UP (ref 30–200)
CK SERPL-CCNC: 179 U/L — SIGNIFICANT CHANGE UP (ref 30–200)
CO2 SERPL-SCNC: 30 MMOL/L — SIGNIFICANT CHANGE UP (ref 22–31)
CO2 SERPL-SCNC: 30 MMOL/L — SIGNIFICANT CHANGE UP (ref 22–31)
CREAT SERPL-MCNC: 1.16 MG/DL — SIGNIFICANT CHANGE UP (ref 0.5–1.3)
CREAT SERPL-MCNC: 1.16 MG/DL — SIGNIFICANT CHANGE UP (ref 0.5–1.3)
EGFR: 69 ML/MIN/1.73M2 — SIGNIFICANT CHANGE UP
EGFR: 69 ML/MIN/1.73M2 — SIGNIFICANT CHANGE UP
GLUCOSE BLDC GLUCOMTR-MCNC: 173 MG/DL — HIGH (ref 70–99)
GLUCOSE BLDC GLUCOMTR-MCNC: 173 MG/DL — HIGH (ref 70–99)
GLUCOSE BLDC GLUCOMTR-MCNC: 216 MG/DL — HIGH (ref 70–99)
GLUCOSE BLDC GLUCOMTR-MCNC: 216 MG/DL — HIGH (ref 70–99)
GLUCOSE SERPL-MCNC: 154 MG/DL — HIGH (ref 70–99)
GLUCOSE SERPL-MCNC: 154 MG/DL — HIGH (ref 70–99)
HCT VFR BLD CALC: 26.9 % — LOW (ref 39–50)
HCT VFR BLD CALC: 26.9 % — LOW (ref 39–50)
HGB BLD-MCNC: 8.5 G/DL — LOW (ref 13–17)
HGB BLD-MCNC: 8.5 G/DL — LOW (ref 13–17)
LIDOCAIN IGE QN: 23 U/L — SIGNIFICANT CHANGE UP (ref 7–60)
LIDOCAIN IGE QN: 23 U/L — SIGNIFICANT CHANGE UP (ref 7–60)
MAGNESIUM SERPL-MCNC: 2 MG/DL — SIGNIFICANT CHANGE UP (ref 1.6–2.6)
MAGNESIUM SERPL-MCNC: 2 MG/DL — SIGNIFICANT CHANGE UP (ref 1.6–2.6)
MCHC RBC-ENTMCNC: 30.4 PG — SIGNIFICANT CHANGE UP (ref 27–34)
MCHC RBC-ENTMCNC: 30.4 PG — SIGNIFICANT CHANGE UP (ref 27–34)
MCHC RBC-ENTMCNC: 31.6 GM/DL — LOW (ref 32–36)
MCHC RBC-ENTMCNC: 31.6 GM/DL — LOW (ref 32–36)
MCV RBC AUTO: 96.1 FL — SIGNIFICANT CHANGE UP (ref 80–100)
MCV RBC AUTO: 96.1 FL — SIGNIFICANT CHANGE UP (ref 80–100)
NRBC # BLD: 0 /100 WBCS — SIGNIFICANT CHANGE UP (ref 0–0)
NRBC # BLD: 0 /100 WBCS — SIGNIFICANT CHANGE UP (ref 0–0)
PLATELET # BLD AUTO: 273 K/UL — SIGNIFICANT CHANGE UP (ref 150–400)
PLATELET # BLD AUTO: 273 K/UL — SIGNIFICANT CHANGE UP (ref 150–400)
POTASSIUM SERPL-MCNC: 4 MMOL/L — SIGNIFICANT CHANGE UP (ref 3.5–5.3)
POTASSIUM SERPL-MCNC: 4 MMOL/L — SIGNIFICANT CHANGE UP (ref 3.5–5.3)
POTASSIUM SERPL-SCNC: 4 MMOL/L — SIGNIFICANT CHANGE UP (ref 3.5–5.3)
POTASSIUM SERPL-SCNC: 4 MMOL/L — SIGNIFICANT CHANGE UP (ref 3.5–5.3)
PROT SERPL-MCNC: 6.6 G/DL — SIGNIFICANT CHANGE UP (ref 6–8.3)
PROT SERPL-MCNC: 6.6 G/DL — SIGNIFICANT CHANGE UP (ref 6–8.3)
RBC # BLD: 2.8 M/UL — LOW (ref 4.2–5.8)
RBC # BLD: 2.8 M/UL — LOW (ref 4.2–5.8)
RBC # FLD: 14.3 % — SIGNIFICANT CHANGE UP (ref 10.3–14.5)
RBC # FLD: 14.3 % — SIGNIFICANT CHANGE UP (ref 10.3–14.5)
SODIUM SERPL-SCNC: 136 MMOL/L — SIGNIFICANT CHANGE UP (ref 135–145)
SODIUM SERPL-SCNC: 136 MMOL/L — SIGNIFICANT CHANGE UP (ref 135–145)
WBC # BLD: 9.44 K/UL — SIGNIFICANT CHANGE UP (ref 3.8–10.5)
WBC # BLD: 9.44 K/UL — SIGNIFICANT CHANGE UP (ref 3.8–10.5)
WBC # FLD AUTO: 9.44 K/UL — SIGNIFICANT CHANGE UP (ref 3.8–10.5)
WBC # FLD AUTO: 9.44 K/UL — SIGNIFICANT CHANGE UP (ref 3.8–10.5)

## 2023-11-10 PROCEDURE — 85610 PROTHROMBIN TIME: CPT

## 2023-11-10 PROCEDURE — 82550 ASSAY OF CK (CPK): CPT

## 2023-11-10 PROCEDURE — 99233 SBSQ HOSP IP/OBS HIGH 50: CPT

## 2023-11-10 PROCEDURE — 82803 BLOOD GASES ANY COMBINATION: CPT

## 2023-11-10 PROCEDURE — 70450 CT HEAD/BRAIN W/O DYE: CPT

## 2023-11-10 PROCEDURE — 96375 TX/PRO/DX INJ NEW DRUG ADDON: CPT

## 2023-11-10 PROCEDURE — 99291 CRITICAL CARE FIRST HOUR: CPT

## 2023-11-10 PROCEDURE — 86850 RBC ANTIBODY SCREEN: CPT

## 2023-11-10 PROCEDURE — 96374 THER/PROPH/DIAG INJ IV PUSH: CPT

## 2023-11-10 PROCEDURE — 71045 X-RAY EXAM CHEST 1 VIEW: CPT

## 2023-11-10 PROCEDURE — 97161 PT EVAL LOW COMPLEX 20 MIN: CPT

## 2023-11-10 PROCEDURE — 85730 THROMBOPLASTIN TIME PARTIAL: CPT

## 2023-11-10 PROCEDURE — 87040 BLOOD CULTURE FOR BACTERIA: CPT

## 2023-11-10 PROCEDURE — 93005 ELECTROCARDIOGRAM TRACING: CPT

## 2023-11-10 PROCEDURE — 83880 ASSAY OF NATRIURETIC PEPTIDE: CPT

## 2023-11-10 PROCEDURE — 83690 ASSAY OF LIPASE: CPT

## 2023-11-10 PROCEDURE — 85652 RBC SED RATE AUTOMATED: CPT

## 2023-11-10 PROCEDURE — 86900 BLOOD TYPING SEROLOGIC ABO: CPT

## 2023-11-10 PROCEDURE — 82962 GLUCOSE BLOOD TEST: CPT

## 2023-11-10 PROCEDURE — 93306 TTE W/DOPPLER COMPLETE: CPT

## 2023-11-10 PROCEDURE — 81001 URINALYSIS AUTO W/SCOPE: CPT

## 2023-11-10 PROCEDURE — 84100 ASSAY OF PHOSPHORUS: CPT

## 2023-11-10 PROCEDURE — 71275 CT ANGIOGRAPHY CHEST: CPT

## 2023-11-10 PROCEDURE — 80307 DRUG TEST PRSMV CHEM ANLYZR: CPT

## 2023-11-10 PROCEDURE — 83605 ASSAY OF LACTIC ACID: CPT

## 2023-11-10 PROCEDURE — 36415 COLL VENOUS BLD VENIPUNCTURE: CPT

## 2023-11-10 PROCEDURE — 83735 ASSAY OF MAGNESIUM: CPT

## 2023-11-10 PROCEDURE — 86140 C-REACTIVE PROTEIN: CPT

## 2023-11-10 PROCEDURE — 73630 X-RAY EXAM OF FOOT: CPT

## 2023-11-10 PROCEDURE — 85025 COMPLETE CBC W/AUTO DIFF WBC: CPT

## 2023-11-10 PROCEDURE — 87637 SARSCOV2&INF A&B&RSV AMP PRB: CPT

## 2023-11-10 PROCEDURE — 84145 PROCALCITONIN (PCT): CPT

## 2023-11-10 PROCEDURE — 86901 BLOOD TYPING SEROLOGIC RH(D): CPT

## 2023-11-10 PROCEDURE — 84484 ASSAY OF TROPONIN QUANT: CPT

## 2023-11-10 PROCEDURE — 85027 COMPLETE CBC AUTOMATED: CPT

## 2023-11-10 PROCEDURE — 80053 COMPREHEN METABOLIC PANEL: CPT

## 2023-11-10 PROCEDURE — 87086 URINE CULTURE/COLONY COUNT: CPT

## 2023-11-10 PROCEDURE — 96376 TX/PRO/DX INJ SAME DRUG ADON: CPT

## 2023-11-10 RX ORDER — LEVOFLOXACIN 5 MG/ML
1 INJECTION, SOLUTION INTRAVENOUS
Qty: 25 | Refills: 0
Start: 2023-11-10 | End: 2023-12-04

## 2023-11-10 RX ORDER — COLLAGENASE CLOSTRIDIUM HIST. 250 UNIT/G
1 OINTMENT (GRAM) TOPICAL
Qty: 30 | Refills: 0
Start: 2023-11-10 | End: 2023-12-09

## 2023-11-10 RX ADMIN — CEFTRIAXONE 100 MILLIGRAM(S): 500 INJECTION, POWDER, FOR SOLUTION INTRAMUSCULAR; INTRAVENOUS at 09:19

## 2023-11-10 RX ADMIN — CHLORHEXIDINE GLUCONATE 1 APPLICATION(S): 213 SOLUTION TOPICAL at 05:43

## 2023-11-10 RX ADMIN — GABAPENTIN 800 MILLIGRAM(S): 400 CAPSULE ORAL at 13:22

## 2023-11-10 RX ADMIN — Medication 5 UNIT(S): at 08:48

## 2023-11-10 RX ADMIN — DAPTOMYCIN 120 MILLIGRAM(S): 500 INJECTION, POWDER, LYOPHILIZED, FOR SOLUTION INTRAVENOUS at 13:22

## 2023-11-10 RX ADMIN — GABAPENTIN 800 MILLIGRAM(S): 400 CAPSULE ORAL at 05:43

## 2023-11-10 RX ADMIN — Medication 2: at 08:48

## 2023-11-10 RX ADMIN — CLOPIDOGREL BISULFATE 75 MILLIGRAM(S): 75 TABLET, FILM COATED ORAL at 11:51

## 2023-11-10 RX ADMIN — Medication 4: at 11:53

## 2023-11-10 RX ADMIN — Medication 40 MILLIGRAM(S): at 06:26

## 2023-11-10 RX ADMIN — Medication 25 MILLIGRAM(S): at 05:43

## 2023-11-10 RX ADMIN — Medication 81 MILLIGRAM(S): at 11:51

## 2023-11-10 RX ADMIN — Medication 1 APPLICATION(S): at 11:55

## 2023-11-10 RX ADMIN — ENOXAPARIN SODIUM 40 MILLIGRAM(S): 100 INJECTION SUBCUTANEOUS at 09:18

## 2023-11-10 RX ADMIN — Medication 5 UNIT(S): at 11:54

## 2023-11-10 RX ADMIN — Medication 25 MILLIGRAM(S): at 13:22

## 2023-11-10 RX ADMIN — CARVEDILOL PHOSPHATE 12.5 MILLIGRAM(S): 80 CAPSULE, EXTENDED RELEASE ORAL at 05:43

## 2023-11-10 NOTE — DISCHARGE NOTE PROVIDER - HOSPITAL COURSE
67M PMH HTN, IDDM with peripheral neuropathy, HCV, HFmrEF (TTE 10/23/23 EF 45% with moderate AS), CAD s/p PCI with 2 stents (2020 at [facility]), recent admission x2 for diabetic SSTI (s/p partial 4th toe ray resection 10/24), presented to [facility] complaining of shortness of breath x1 day. Also reported fever, cough, b/l LE swelling, and R foot pain. Pt most recently discharged 10/31/23 with PICC line for continued management of SSTI with daptomycin and CTX (10/24-12/5)    In the ED, initial /113, pt agitated, requiring multiple ativan pushes. On arrival to CCU, pt tearful, tremulous, non-reorientable. Unable to elicit further hx. Per prior records, pt seems to have had b/l LE and scrotal edema x2-3 months.    ED COURSE  VS T 98.8 HR 85 /113 SpO2 96% on 2L NC  LABS WBC 17.21 Hgb 8.9 Trop 89>82 BUN 25  AST 75 ALT 81 Pro-BNP 46265 Lactate 1.7  Utox +cocaine, THC, RVP neg   IMAGING CTH no acute changes, CTA Chest b/l interlobular septal thickening, b/l UL GGO and consolidation, LLL atelectasis, LVH, CXR s/f pulm congestion and b/l pleural effusions  EKG NSR@93bpm with non specific ST-T wave changes  INTERVENTIONS lasix 40mg IVP x1, lasix 20mg IVP x1, ativan 0.5mg IVP x2, ativan 1mg IVP x1, ativan 2mg IVP x1, morphine 4mg IVP x1, nitro sublingual x1, zosyn 3.375 x1, vanc 1g x1, nitro gtt    Pt arrived to CCU on nitroglycerin gtt- which he was weaned off on the same day. Of note, pt recently admitted to [facility] x2 in 10/2023 for management of SSTI, discharged home with PICC line for continued IV abx. Pt was started on a lasix gtt and eventually transitioned off diuretics. Pt was then transferred to cardiac tele within the same day of admission.    TTE 11/9/23: Normal left ventricular size, Mild to moderate symmetric LVH, Grade I left ventricular diastolic dysfunction, biatrial enlargement, Mild-to-moderate aortic stenosis, No pericardial effusion    Pt admitted overnight for observation and telemetry monitoring. Pt seen and examined at bedside this AM without any complaints or events overnight, VSS, labs and telemetry reviewed and pt stable for discharge as discussed with Dr. Polk. Pt has received appropriate discharge instructions, including medication regimen, and follow up with Dr. Li in 1-2 weeks.    Discharge medications:      67M PMH HTN, IDDM with peripheral neuropathy, HCV, HFmrEF (TTE 10/23/23 EF 45% with moderate AS), CAD s/p PCI with 2 stents (2020 at [facility]), recent admission x2 for diabetic SSTI (s/p partial 4th toe ray resection 10/24), presented to [facility] complaining of shortness of breath x1 day. Also reported fever, cough, b/l LE swelling, and R foot pain. Pt most recently discharged 10/31/23 with PICC line for continued management of SSTI with daptomycin and CTX (10/24-12/5)    In the ED, initial /113, pt agitated, requiring multiple ativan pushes. On arrival to CCU, pt tearful, tremulous, non-reorientable. Unable to elicit further hx. Per prior records, pt seems to have had b/l LE and scrotal edema x2-3 months.    ED COURSE  VS T 98.8 HR 85 /113 SpO2 96% on 2L NC  LABS WBC 17.21 Hgb 8.9 Trop 89>82 BUN 25  AST 75 ALT 81 Pro-BNP 76702 Lactate 1.7  Utox +cocaine, THC, RVP neg   IMAGING CTH no acute changes, CTA Chest b/l interlobular septal thickening, b/l UL GGO and consolidation, LLL atelectasis, LVH, CXR s/f pulm congestion and b/l pleural effusions  EKG NSR@93bpm with non specific ST-T wave changes  INTERVENTIONS lasix 40mg IVP x1, lasix 20mg IVP x1, ativan 0.5mg IVP x2, ativan 1mg IVP x1, ativan 2mg IVP x1, morphine 4mg IVP x1, nitro sublingual x1, zosyn 3.375 x1, vanc 1g x1, nitro gtt    Pt arrived to CCU on nitroglycerin gtt- which he was weaned off on the same day. Of note, pt recently admitted to [facility] x2 in 10/2023 for management of SSTI, discharged home with PICC line for continued IV abx. Pt was started on a lasix gtt and eventually transitioned off diuretics. Pt was then transferred to cardiac tele within the same day of admission.    TTE 11/9/23: Normal left ventricular size, Mild to moderate symmetric LVH, Grade I left ventricular diastolic dysfunction, biatrial enlargement, Mild-to-moderate aortic stenosis, No pericardial effusion    Pt admitted overnight for observation and telemetry monitoring. Pt seen and examined at bedside this AM without any complaints or events overnight, VSS, labs and telemetry reviewed and pt stable for discharge as discussed with Dr. Polk. Pt has received appropriate discharge instructions, including medication regimen, and follow up with Dr. Li in 1-2 weeks.       67M PMH HTN, IDDM with peripheral neuropathy, HCV, HFmrEF (TTE 10/23/23 EF 45% with moderate AS), CAD s/p PCI with 2 stents (2020 at [facility]), recent admission x2 for diabetic SSTI (s/p partial 4th toe ray resection 10/24), presented to [facility] complaining of shortness of breath x1 day. Also reported fever, cough, b/l LE swelling, and R foot pain. Pt most recently discharged 10/31/23 with PICC line for continued management of SSTI with daptomycin and CTX (10/24-12/5)    In the ED, initial /113, pt agitated, requiring multiple ativan pushes. On arrival to CCU, pt tearful, tremulous, non-reorientable. Unable to elicit further hx. Per prior records, pt seems to have had b/l LE and scrotal edema x2-3 months.    ED COURSE  VS T 98.8 HR 85 /113 SpO2 96% on 2L NC  LABS WBC 17.21 Hgb 8.9 Trop 89>82 BUN 25  AST 75 ALT 81 Pro-BNP 42212 Lactate 1.7  Utox +cocaine, THC, RVP neg   IMAGING CTH no acute changes, CTA Chest b/l interlobular septal thickening, b/l UL GGO and consolidation, LLL atelectasis, LVH, CXR s/f pulm congestion and b/l pleural effusions  EKG NSR@93bpm with non specific ST-T wave changes  INTERVENTIONS lasix 40mg IVP x1, lasix 20mg IVP x1, ativan 0.5mg IVP x2, ativan 1mg IVP x1, ativan 2mg IVP x1, morphine 4mg IVP x1, nitro sublingual x1, zosyn 3.375 x1, vanc 1g x1, nitro gtt    Pt arrived to CCU on nitroglycerin gtt- which he was weaned off on the same day. Of note, pt recently admitted to [facility] x2 in 10/2023 for management of SSTI, discharged home with PICC line for continued IV abx. Pt was started on a lasix gtt and eventually transitioned off diuretics. Pt was then transferred to cardiac tele within the same day of admission.    TTE 11/9/23: Normal left ventricular size, Mild to moderate symmetric LVH, Grade I left ventricular diastolic dysfunction, biatrial enlargement, Mild-to-moderate aortic stenosis, No pericardial effusion    Pt admitted overnight for observation and telemetry monitoring. Pt seen and examined at bedside this AM without any complaints or events overnight, VSS, labs and telemetry reviewed and pt stable for discharge as discussed with Dr. Polk. Pt has received appropriate discharge instructions, including medication regimen, and follow up with Dr. Li in 1-2 weeks.     Discharge dressing instructions: Cleanse wound with normal saline daily, pat dry with sterile guaze, If any macerated tissue - please apply betadine to this area. Apply Santyl to gauze, wet with saline, Place in wound base, place betadine soaked gauze plantarly, cover with dry gauze, wrap with kerlix and a light ACE   - Follow up: Patient should follow up with Dr. Tom Camacho within 1 week of discharge.    Office information:          [place] Address- [address], [place] [address] Phone: [phone]         [place] Address- [address] [facility] [address] [facility] [address], [place] [address] Phone: [phone]           67M PMH HTN, IDDM with peripheral neuropathy, HCV, HFmrEF (TTE 10/23/23 EF 45% with moderate AS), CAD s/p PCI with 2 stents (2020 at [facility]), recent admission x2 for diabetic SSTI (s/p partial 4th toe ray resection 10/24), presented to [facility] complaining of shortness of breath x1 day. Also reported fever, cough, b/l LE swelling, and R foot pain. Pt most recently discharged 10/31/23 with PICC line for continued management of SSTI with daptomycin and CTX (10/24-12/5)    In the ED, initial /113, pt agitated, requiring multiple ativan pushes. On arrival to CCU, pt tearful, tremulous, non-reorientable. Unable to elicit further hx. Per prior records, pt seems to have had b/l LE and scrotal edema x2-3 months.    ED COURSE  VS T 98.8 HR 85 /113 SpO2 96% on 2L NC  LABS WBC 17.21 Hgb 8.9 Trop 89>82 BUN 25  AST 75 ALT 81 Pro-BNP 38679 Lactate 1.7  Utox +cocaine, THC, RVP neg   IMAGING CTH no acute changes, CTA Chest b/l interlobular septal thickening, b/l UL GGO and consolidation, LLL atelectasis, LVH, CXR s/f pulm congestion and b/l pleural effusions  EKG NSR@93bpm with non specific ST-T wave changes  INTERVENTIONS lasix 40mg IVP x1, lasix 20mg IVP x1, ativan 0.5mg IVP x2, ativan 1mg IVP x1, ativan 2mg IVP x1, morphine 4mg IVP x1, nitro sublingual x1, zosyn 3.375 x1, vanc 1g x1, nitro gtt    Pt arrived to CCU on nitroglycerin gtt- which he was weaned off on the same day. Of note, pt recently admitted to [facility] x2 in 10/2023 for management of SSTI, discharged home with PICC line for continued IV abx. Pt was started on a lasix gtt and eventually transitioned off diuretics. Pt was then transferred to cardiac tele within the same day of admission.    TTE 11/9/23: Normal left ventricular size, Mild to moderate symmetric LVH, Grade I left ventricular diastolic dysfunction, biatrial enlargement, Mild-to-moderate aortic stenosis, No pericardial effusion    Pt admitted overnight for observation and telemetry monitoring. Pt seen and examined at bedside this AM without any complaints or events overnight, VSS, labs and telemetry reviewed and pt stable for discharge as discussed with Dr. Polk. Pt has received appropriate discharge instructions, including medication regimen, and follow up with Dr. Li in 1-2 weeks.     Discharge dressing instructions: Cleanse wound with normal saline daily, pat dry with sterile guaze, If any macerated tissue - please apply betadine to this area. Apply Santyl to gauze, wet with saline, Place in wound base, place betadine soaked gauze plantarly, cover with dry gauze, wrap with kerlix and a light ACE   - Follow up: Patient should follow up with Dr. Tom Camacho within 1 week of discharge.    Office information:          [place] Address- [address], [place] [address] Phone: [phone]         [place] Address- [address] [address] Phone: [phone]    Pt left AMA on 11/10/23. Pt was told that due to his + Utox screening he would no longer be able to get IV abx as an outpt with [facility]. Pt said he did not want to wait for a new infusion center approval. Pt was alerted about the risks of leaving AMA including the possibility of death. Pt still wanted to leave and did not fill out any AMA paperwork. PICC line was removed prior to pt leaving the hospital.           67M PMH HTN, IDDM with peripheral neuropathy, HCV, HFmrEF (TTE 10/23/23 EF 45% with moderate AS), CAD s/p PCI with 2 stents (2020 at [facility]), recent admission x2 for diabetic SSTI (s/p partial 4th toe ray resection 10/24), presented to [facility] complaining of shortness of breath x1 day. Also reported fever, cough, b/l LE swelling, and R foot pain. Pt most recently discharged 10/31/23 with PICC line for continued management of SSTI with daptomycin and CTX (10/24-12/5)    In the ED, initial /113, pt agitated, requiring multiple ativan pushes. On arrival to CCU, pt tearful, tremulous, non-reorientable. Unable to elicit further hx. Per prior records, pt seems to have had b/l LE and scrotal edema x2-3 months.    ED COURSE  VS T 98.8 HR 85 /113 SpO2 96% on 2L NC  LABS WBC 17.21 Hgb 8.9 Trop 89>82 BUN 25  AST 75 ALT 81 Pro-BNP 83640 Lactate 1.7  Utox +cocaine, THC, RVP neg   IMAGING CTH no acute changes, CTA Chest b/l interlobular septal thickening, b/l UL GGO and consolidation, LLL atelectasis, LVH, CXR s/f pulm congestion and b/l pleural effusions  EKG NSR@93bpm with non specific ST-T wave changes  INTERVENTIONS lasix 40mg IVP x1, lasix 20mg IVP x1, ativan 0.5mg IVP x2, ativan 1mg IVP x1, ativan 2mg IVP x1, morphine 4mg IVP x1, nitro sublingual x1, zosyn 3.375 x1, vanc 1g x1, nitro gtt    Pt arrived to CCU on nitroglycerin gtt- which he was weaned off on the same day. Of note, pt recently admitted to [facility] x2 in 10/2023 for management of SSTI, discharged home with PICC line for continued IV abx. Pt was started on a lasix gtt and eventually transitioned off diuretics. Pt was then transferred to cardiac tele within the same day of admission.    TTE 11/9/23: Normal left ventricular size, Mild to moderate symmetric LVH, Grade I left ventricular diastolic dysfunction, biatrial enlargement, Mild-to-moderate aortic stenosis, No pericardial effusion    Pt admitted overnight for observation and telemetry monitoring. Pt seen and examined at bedside this AM without any complaints or events overnight, VSS, labs and telemetry reviewed and pt stable for discharge as discussed with Dr. Polk. Pt has received appropriate discharge instructions, including medication regimen, and follow up with Dr. Li in 1-2 weeks.     Discharge dressing instructions: Cleanse wound with normal saline daily, pat dry with sterile guaze, If any macerated tissue - please apply betadine to this area. Apply Santyl to gauze, wet with saline, Place in wound base, place betadine soaked gauze plantarly, cover with dry gauze, wrap with kerlix and a light ACE   - Follow up: Patient should follow up with Dr. Tom Camacho within 1 week of discharge.    Office information:          [place] Address- [address] [address] Phone: [phone]         [place] Address- [address], [place] [address] Phone: [phone]    Infusion center refused to reinstate home infusion in setting of + utox on arrival. Pt adamant about leaving today despite not having infusion center set up. Pt left AMA on 11/10/23.  Pt was alerted about the risks of leaving AMA including the possibility of death. Pt still wanted to leave and did not fill out any AMA paperwork. PICC line was removed by PA prior to pt leaving the hospital. ID was consulted and recommended pt continue on Levaquin 750mg PO QD for 25 days, antibiotic was sent to pts pharmacy and was educated on the importance of picking it up.

## 2023-11-10 NOTE — DISCHARGE NOTE NURSING/CASE MANAGEMENT/SOCIAL WORK - NSDCPEFALRISK_GEN_ALL_CORE
For information on Fall & Injury Prevention, visit: [email or web address] OR  [email or web address] OR  https://www.cdc.gov/steadi/patient.html

## 2023-11-10 NOTE — PROGRESS NOTE ADULT - SUBJECTIVE AND OBJECTIVE BOX
Patient is a 68y old  Male who presents with a chief complaint of POS TOP     (09 Nov 2023 11:47)      INTERVAL HPI/ OVERNIGHT EVENTS. Pt seen and examined bedside. Denies any pedal complaints at this time. Pt continues to state he refuses any more surgical intervention. Dressing C/D/I.       LABS                        8.5    9.44  )-----------( 273      ( 10 Nov 2023 07:48 )             26.9     11-09    137  |  96  |  22  ----------------------------<  96  3.6   |  31  |  1.23    Ca    8.7      09 Nov 2023 05:30  Mg     2.1     11-09    TPro  7.0  /  Alb  2.3<L>  /  TBili  0.3  /  DBili  x   /  AST  40  /  ALT  49<H>  /  AlkPhos  149<H>  11-09        ICU Vital Signs Last 24 Hrs  T(C): 36.2 (10 Nov 2023 05:41), Max: 36.9 (09 Nov 2023 15:51)  T(F): 97.1 (10 Nov 2023 05:41), Max: 98.4 (09 Nov 2023 15:51)  HR: 64 (10 Nov 2023 06:20) (60 - 82)  BP: 145/81 (10 Nov 2023 06:20) (109/64 - 180/89)  BP(mean): 87 (09 Nov 2023 19:00) (80 - 126)  ABP: --  ABP(mean): --  RR: 18 (10 Nov 2023 06:20) (18 - 18)  SpO2: 100% (10 Nov 2023 06:20) (96% - 100%)    O2 Parameters below as of 10 Nov 2023 06:20  Patient On (Oxygen Delivery Method): room air            RADIOLOGY  < from: Xray Foot AP + Lateral + Oblique, Right (11.08.23 @ 10:01) >  There is redemonstration of partial amputation of the fourth ray at the   level of the metatarsal head. There is mild cortical irregularity of the   fourth metatarsal stump margin, for which sequelae of acute osteomyelitis   cannot be entirely excluded.  There is no focal soft tissue abnormality.    < end of copied text >      PHYSICAL EXAM  LOWER EXTREMITY FOCUSED  Vasc: DP/PT biphasic waveforms heard on doppler b/l, no edema or erythema. CFT <3 x 9   Derm:  R foot surgical site wound at the distal 4th ray. Serous drainage. Fibrotic 90% granular 10% wound bed. Plantar skin just proximal to the surgical site with ischemic central area  Macerated 2nd interspace  Neuro: protective sensation slightly diminished  MSK: 5/5 muscle strength in all compartments

## 2023-11-10 NOTE — PROGRESS NOTE ADULT - ASSESSMENT
67M PMH HTN, IDDM with peripheral neuropathy, HCV, HFmrEF, CAD, previously admitted for diabetic foot infection (10/21-10/31) for which he underwent partial 4th ray resection on 10/24/23 and was discharged on 6 weeks daptomycin/vancomycin (through 12/5) admitted to CCU for hypertension and acute CHF exacerbation. At time of consult, VSS, WBC 13.56, , CRP 70.4. X-rays with no evidence of gas, cortical irregularity of 4th met stump - possible osteomyelitis. Clinically, wound fibrotic, no signs of infection, thin plantar skin with ischemic area.     Plan:   - Pt educated on poor healing potential of surgical site. Pt continues to refuse further surgical intervention. Pt educated that he is at risk of further infection. Pt continues to refuse further intervention.   - Wound cleansed with normal saline. Betadine applied to macerated tissue and plantarly. Applied Santyl to wound and packed wound with WTD dressing with betadine plantarly.   - Will begin dressing changes with Santyl tomorrow.   - C/w 6 weeks of IV dapto/vanc for residual OM through 12/5  - X-rays reviewed and d/w pt   - Pt can heel WBAT to RLE   - Rest of care per primary team.     Plan d/w Attending. Podiatry following.     Discharge recommendations:   - Discharge dressing instructions: Cleanse wound with normal saline daily, pat dry with sterile guaze, Apply Santyl to gauze, wet with saline, Place in wound base, wrap with kerlix and a light ACE   - Follow up: Patient should follow up with Dr. Tom Camacho within 1 week of discharge.    Office information:          [place] Address- [address] [facility] [address], [place] [address] Phone: [phone]         [place] Address- [address] [facility] [address], [place] [address] Phone: [phone] 67M PMH HTN, IDDM with peripheral neuropathy, HCV, HFmrEF, CAD, previously admitted for diabetic foot infection (10/21-10/31) for which he underwent partial 4th ray resection on 10/24/23 and was discharged on 6 weeks daptomycin/vancomycin (through 12/5) admitted to CCU for hypertension and acute CHF exacerbation. At time of consult, VSS, WBC 13.56, , CRP 70.4. X-rays with no evidence of gas, cortical irregularity of 4th met stump - possible osteomyelitis. Clinically, wound fibrotic, no signs of infection, thin plantar skin with ischemic area.     Plan:   - Pt educated on poor healing potential of surgical site. Pt continues to refuse further surgical intervention. Pt educated that he is at risk of further infection. Pt continues to refuse further intervention.   - Wound cleansed with normal saline. Betadine applied to macerated tissue and plantarly. Applied Santyl to wound and packed wound with WTD dressing with betadine plantarly.   - Will begin dressing changes with Santyl tomorrow.   - C/w 6 weeks of IV dapto/vanc for residual OM through 12/5  - X-rays reviewed and d/w pt   - Pt can heel WBAT to RLE   - Rest of care per primary team.     Plan d/w Attending. Podiatry following.     Discharge recommendations:   - Discharge dressing instructions: Cleanse wound with normal saline daily, pat dry with sterile guaze, If any macerated tissue - please apply betadine to this area. Apply Santyl to gauze, wet with saline, Place in wound base, place betadine soaked gauze plantarly, cover with dry gauze, wrap with kerlix and a light ACE   - Follow up: Patient should follow up with Dr. Tom Camacho within 1 week of discharge.    Office information:          [place] Address- [address] [facility] [address], [place] [address] Phone: [phone]         [place] Address- [address] [facility] [address], [place] [address] Phone: [phone]

## 2023-11-10 NOTE — DISCHARGE NOTE PROVIDER - PROVIDER TOKENS
PROVIDER:[TOKEN:[[id number]]] PROVIDER:[TOKEN:[[id number]]],FREE:[LAST:[Coicou],FIRST:[Nat],PHONE:[(   )    -],FAX:[(   )    -],SCHEDULEDAPPT:[11/13/2023],SCHEDULEDAPPTTIME:[02:00 PM]]

## 2023-11-10 NOTE — PHYSICAL THERAPY INITIAL EVALUATION ADULT - PERTINENT HX OF CURRENT PROBLEM, REHAB EVAL
67M PMH HTN, IDDM with peripheral neuropathy, HCV, HFmrEF (TTE 10/23/23 EF 45% with moderate AS), CAD s/p PCI with 2 stents (2020 at [facility]), recent admission x2 for diabetic SSTI (s/p partial 4th toe ray resection 10/24), presented to [facility] complaining of shortness of breath x1 day. Also reported fever, cough, b/l LE swelling, and R foot pain. Pt most recently discharged 10/31/23 with PICC line for continued management of SSTI with daptomycin and CTX

## 2023-11-10 NOTE — DISCHARGE NOTE PROVIDER - NSDCFUSCHEDAPPT_GEN_ALL_CORE_FT
Corky Oneal  [facility] Physician [facility]  VASCULAR [address]  Scheduled Appointment: 11/13/2023    Perico Whitt  [facility] Physician [facility]  ENDOCRIN [address]  Scheduled Appointment: 11/14/2023    Gordo Jiménez  [facility] Physician [facility]  INFDISEASE [address]  Scheduled Appointment: 11/17/2023

## 2023-11-10 NOTE — DISCHARGE NOTE NURSING/CASE MANAGEMENT/SOCIAL WORK - PATIENT PORTAL LINK FT
You can access the FollowMyHealth Patient Portal offered by [facility] by registering at the following website: [email or web address]. By joining [facility]’s FollowMyHealth portal, you will also be able to view your health information using other applications (apps) compatible with our system.

## 2023-11-10 NOTE — DISCHARGE NOTE PROVIDER - NSDCMRMEDTOKEN_GEN_ALL_CORE_FT
aspirin 81 mg oral delayed release tablet: 1 tab(s) orally once a day  atorvastatin 80 mg oral tablet: 1 tab(s) orally once a day (at bedtime)  carvedilol 12.5 mg oral tablet: 1 tab(s) orally every 12 hours  Ceftriaxone 2g IV Q24H: For 6 weeks, 10/24/2023 to 12/5/2023  clopidogrel 75 mg oral tablet: 1 tab(s) orally once a day  Daptomycin 500mg IV Q24H: for 6 weeks, 10/24/2023 to 12/5/2023  gabapentin 800 mg oral tablet: 2 tab(s) orally 3 times a day  hydrALAZINE 25 mg oral tablet: 1 tab(s) orally every 8 hours  insulin glargine 100 units/mL subcutaneous solution: 6 unit(s) subcutaneous once a day (at bedtime)  insulin lispro 100 units/mL injectable solution: 5 injectable 3 times a day (before meals)  lisinopril 40 mg oral tablet: 1 tab(s) orally once a day  Normal Saline 0.9% 10mL pre/post infusion: Heparin 10units/3mL post infusion  oxyCODONE 10 mg oral tablet: 1 tab(s) orally every 6 hours as needed for Moderate Pain (4 - 6) MDD: 4  spironolactone 25 mg oral tablet: 1 tab(s) orally once a day  Weekly CMP, CBC with diff, ESR, CRP, total CK: Faxed to Dr. Jiménez [phone]   aspirin 81 mg oral delayed release tablet: 1 tab(s) orally once a day  atorvastatin 80 mg oral tablet: 1 tab(s) orally once a day (at bedtime)  carvedilol 12.5 mg oral tablet: 1 tab(s) orally every 12 hours  Ceftriaxone 2g IV Q24H: For 6 weeks, 10/24/2023 to 12/5/2023  clopidogrel 75 mg oral tablet: 1 tab(s) orally once a day  Daptomycin 500mg IV Q24H: for 6 weeks, 10/24/2023 to 12/5/2023  gabapentin 800 mg oral tablet: 2 tab(s) orally 3 times a day  hydrALAZINE 25 mg oral tablet: 1 tab(s) orally every 8 hours  insulin glargine 100 units/mL subcutaneous solution: 6 unit(s) subcutaneous once a day (at bedtime)  insulin lispro 100 units/mL injectable solution: 5 injectable 3 times a day (before meals)  lisinopril 40 mg oral tablet: 1 tab(s) orally once a day  Normal Saline 0.9% 10mL pre/post infusion: Heparin 10units/3mL post infusion  oxyCODONE 10 mg oral tablet: 1 tab(s) orally every 6 hours as needed for Moderate Pain (4 - 6) MDD: 4  Santyl 250 units/g topical ointment: Apply topically to affected area once a day  Weekly CMP, CBC with diff, ESR, CRP, total CK: Faxed to Dr. Jiménez [phone]   aspirin 81 mg oral delayed release tablet: 1 tab(s) orally once a day  atorvastatin 80 mg oral tablet: 1 tab(s) orally once a day (at bedtime)  carvedilol 12.5 mg oral tablet: 1 tab(s) orally every 12 hours  Ceftriaxone 2g IV Q24H: For 6 weeks, 10/24/2023 to 12/5/2023  clopidogrel 75 mg oral tablet: 1 tab(s) orally once a day  Daptomycin 500mg IV Q24H: for 6 weeks, 10/24/2023 to 12/5/2023  gabapentin 800 mg oral tablet: 2 tab(s) orally 3 times a day  hydrALAZINE 25 mg oral tablet: 1 tab(s) orally every 8 hours  insulin glargine 100 units/mL subcutaneous solution: 6 unit(s) subcutaneous once a day (at bedtime)  insulin lispro 100 units/mL injectable solution: 5 injectable 3 times a day (before meals)  levoFLOXacin 750 mg oral tablet: 1 tab(s) orally once a day Please take from 11/11/23 to 12/5/23  lisinopril 40 mg oral tablet: 1 tab(s) orally once a day  Normal Saline 0.9% 10mL pre/post infusion: Heparin 10units/3mL post infusion  oxyCODONE 10 mg oral tablet: 1 tab(s) orally every 6 hours as needed for Moderate Pain (4 - 6) MDD: 4  Santyl 250 units/g topical ointment: Apply topically to affected area once a day  Weekly CMP, CBC with diff, ESR, CRP, total CK: Faxed to Dr. Jiménez [phone]

## 2023-11-10 NOTE — DISCHARGE NOTE PROVIDER - CARE PROVIDER_API CALL
Tom Camacho  Podiatric Medicine and Surgery  [address] [place] [address], [address]  [place] [address]  Phone: [phone]  Fax: [phone]  Follow Up Time:    Tom Camacho  Podiatric Medicine and Surgery  [address] [place] [address], [address]  [place] [address]  Phone: [phone]  Fax: [phone]  Follow Up Time:     Nat Carr  Phone: (   )    -  Fax: (   )    -  Scheduled Appointment: 11/13/2023 02:00 PM

## 2023-11-12 LAB
CULTURE RESULTS: SIGNIFICANT CHANGE UP
SPECIMEN SOURCE: SIGNIFICANT CHANGE UP

## 2023-11-13 ENCOUNTER — APPOINTMENT (OUTPATIENT)
Dept: VASCULAR SURGERY | Facility: CLINIC | Age: 68
End: 2023-11-13

## 2023-11-13 PROCEDURE — 85730 THROMBOPLASTIN TIME PARTIAL: CPT

## 2023-11-13 PROCEDURE — 76000 FLUOROSCOPY <1 HR PHYS/QHP: CPT

## 2023-11-13 PROCEDURE — 85610 PROTHROMBIN TIME: CPT

## 2023-11-13 PROCEDURE — C1887: CPT

## 2023-11-13 PROCEDURE — 73720 MRI LWR EXTREMITY W/O&W/DYE: CPT

## 2023-11-13 PROCEDURE — 80048 BASIC METABOLIC PNL TOTAL CA: CPT

## 2023-11-13 PROCEDURE — 96365 THER/PROPH/DIAG IV INF INIT: CPT

## 2023-11-13 PROCEDURE — 96366 THER/PROPH/DIAG IV INF ADDON: CPT

## 2023-11-13 PROCEDURE — 87040 BLOOD CULTURE FOR BACTERIA: CPT

## 2023-11-13 PROCEDURE — 73701 CT LOWER EXTREMITY W/DYE: CPT

## 2023-11-13 PROCEDURE — 93970 EXTREMITY STUDY: CPT

## 2023-11-13 PROCEDURE — 74177 CT ABD & PELVIS W/CONTRAST: CPT

## 2023-11-13 PROCEDURE — 36415 COLL VENOUS BLD VENIPUNCTURE: CPT

## 2023-11-13 PROCEDURE — 88304 TISSUE EXAM BY PATHOLOGIST: CPT

## 2023-11-13 PROCEDURE — 82962 GLUCOSE BLOOD TEST: CPT

## 2023-11-13 PROCEDURE — 85027 COMPLETE CBC AUTOMATED: CPT

## 2023-11-13 PROCEDURE — 86140 C-REACTIVE PROTEIN: CPT

## 2023-11-13 PROCEDURE — 87075 CULTR BACTERIA EXCEPT BLOOD: CPT

## 2023-11-13 PROCEDURE — C1769: CPT

## 2023-11-13 PROCEDURE — 85347 COAGULATION TIME ACTIVATED: CPT

## 2023-11-13 PROCEDURE — 96367 TX/PROPH/DG ADDL SEQ IV INF: CPT

## 2023-11-13 PROCEDURE — 82550 ASSAY OF CK (CPK): CPT

## 2023-11-13 PROCEDURE — 73630 X-RAY EXAM OF FOOT: CPT

## 2023-11-13 PROCEDURE — 87902 NFCT AGT GNTYP ALYS HEP C: CPT

## 2023-11-13 PROCEDURE — 36573 INSJ PICC RS&I 5 YR+: CPT

## 2023-11-13 PROCEDURE — 76705 ECHO EXAM OF ABDOMEN: CPT

## 2023-11-13 PROCEDURE — 87186 SC STD MICRODIL/AGAR DIL: CPT

## 2023-11-13 PROCEDURE — 96375 TX/PRO/DX INJ NEW DRUG ADDON: CPT

## 2023-11-13 PROCEDURE — C8929: CPT

## 2023-11-13 PROCEDURE — 88311 DECALCIFY TISSUE: CPT

## 2023-11-13 PROCEDURE — P9047: CPT

## 2023-11-13 PROCEDURE — 80053 COMPREHEN METABOLIC PANEL: CPT

## 2023-11-13 PROCEDURE — 86901 BLOOD TYPING SEROLOGIC RH(D): CPT

## 2023-11-13 PROCEDURE — 83605 ASSAY OF LACTIC ACID: CPT

## 2023-11-13 PROCEDURE — 97165 OT EVAL LOW COMPLEX 30 MIN: CPT

## 2023-11-13 PROCEDURE — 86850 RBC ANTIBODY SCREEN: CPT

## 2023-11-13 PROCEDURE — 86900 BLOOD TYPING SEROLOGIC ABO: CPT

## 2023-11-13 PROCEDURE — 88305 TISSUE EXAM BY PATHOLOGIST: CPT

## 2023-11-13 PROCEDURE — 99285 EMERGENCY DEPT VISIT HI MDM: CPT

## 2023-11-13 PROCEDURE — C1725: CPT

## 2023-11-13 PROCEDURE — A9585: CPT

## 2023-11-13 PROCEDURE — 87637 SARSCOV2&INF A&B&RSV AMP PRB: CPT

## 2023-11-13 PROCEDURE — 96376 TX/PRO/DX INJ SAME DRUG ADON: CPT

## 2023-11-13 PROCEDURE — 71045 X-RAY EXAM CHEST 1 VIEW: CPT

## 2023-11-13 PROCEDURE — 87070 CULTURE OTHR SPECIMN AEROBIC: CPT

## 2023-11-13 PROCEDURE — 87522 HEPATITIS C REVRS TRNSCRPJ: CPT

## 2023-11-13 PROCEDURE — C1894: CPT

## 2023-11-13 PROCEDURE — 82977 ASSAY OF GGT: CPT

## 2023-11-13 PROCEDURE — 84100 ASSAY OF PHOSPHORUS: CPT

## 2023-11-13 PROCEDURE — 73620 X-RAY EXAM OF FOOT: CPT

## 2023-11-13 PROCEDURE — 85025 COMPLETE CBC W/AUTO DIFF WBC: CPT

## 2023-11-13 PROCEDURE — C1760: CPT

## 2023-11-13 PROCEDURE — 83735 ASSAY OF MAGNESIUM: CPT

## 2023-11-13 PROCEDURE — 80202 ASSAY OF VANCOMYCIN: CPT

## 2023-11-13 PROCEDURE — 97161 PT EVAL LOW COMPLEX 20 MIN: CPT

## 2023-11-14 ENCOUNTER — APPOINTMENT (OUTPATIENT)
Dept: ENDOCRINOLOGY | Facility: CLINIC | Age: 68
End: 2023-11-14

## 2023-11-15 DIAGNOSIS — Z89.421 ACQUIRED ABSENCE OF OTHER RIGHT TOE(S): ICD-10-CM

## 2023-11-15 DIAGNOSIS — I11.0 HYPERTENSIVE HEART DISEASE WITH HEART FAILURE: ICD-10-CM

## 2023-11-15 DIAGNOSIS — J98.11 ATELECTASIS: ICD-10-CM

## 2023-11-15 DIAGNOSIS — I35.0 NONRHEUMATIC AORTIC (VALVE) STENOSIS: ICD-10-CM

## 2023-11-15 DIAGNOSIS — I25.10 ATHEROSCLEROTIC HEART DISEASE OF NATIVE CORONARY ARTERY WITHOUT ANGINA PECTORIS: ICD-10-CM

## 2023-11-15 DIAGNOSIS — E11.52 TYPE 2 DIABETES MELLITUS WITH DIABETIC PERIPHERAL ANGIOPATHY WITH GANGRENE: ICD-10-CM

## 2023-11-15 DIAGNOSIS — I16.1 HYPERTENSIVE EMERGENCY: ICD-10-CM

## 2023-11-15 DIAGNOSIS — Z79.4 LONG TERM (CURRENT) USE OF INSULIN: ICD-10-CM

## 2023-11-15 DIAGNOSIS — M86.9 OSTEOMYELITIS, UNSPECIFIED: ICD-10-CM

## 2023-11-15 DIAGNOSIS — Z79.02 LONG TERM (CURRENT) USE OF ANTITHROMBOTICS/ANTIPLATELETS: ICD-10-CM

## 2023-11-15 DIAGNOSIS — D72.829 ELEVATED WHITE BLOOD CELL COUNT, UNSPECIFIED: ICD-10-CM

## 2023-11-15 DIAGNOSIS — E11.65 TYPE 2 DIABETES MELLITUS WITH HYPERGLYCEMIA: ICD-10-CM

## 2023-11-15 DIAGNOSIS — E11.69 TYPE 2 DIABETES MELLITUS WITH OTHER SPECIFIED COMPLICATION: ICD-10-CM

## 2023-11-15 DIAGNOSIS — F14.10 COCAINE ABUSE, UNCOMPLICATED: ICD-10-CM

## 2023-11-15 DIAGNOSIS — Z79.82 LONG TERM (CURRENT) USE OF ASPIRIN: ICD-10-CM

## 2023-11-15 DIAGNOSIS — I50.23 ACUTE ON CHRONIC SYSTOLIC (CONGESTIVE) HEART FAILURE: ICD-10-CM

## 2023-11-15 DIAGNOSIS — E11.40 TYPE 2 DIABETES MELLITUS WITH DIABETIC NEUROPATHY, UNSPECIFIED: ICD-10-CM

## 2023-11-15 DIAGNOSIS — Z95.5 PRESENCE OF CORONARY ANGIOPLASTY IMPLANT AND GRAFT: ICD-10-CM

## 2023-11-15 DIAGNOSIS — G93.41 METABOLIC ENCEPHALOPATHY: ICD-10-CM

## 2023-11-17 ENCOUNTER — APPOINTMENT (OUTPATIENT)
Dept: INFECTIOUS DISEASE | Facility: CLINIC | Age: 68
End: 2023-11-17

## 2023-12-01 ENCOUNTER — INPATIENT (INPATIENT)
Facility: HOSPITAL | Age: 68
LOS: 24 days | Discharge: ANOTHER HOSPITAL | DRG: 239 | End: 2023-12-26
Attending: STUDENT IN AN ORGANIZED HEALTH CARE EDUCATION/TRAINING PROGRAM | Admitting: STUDENT IN AN ORGANIZED HEALTH CARE EDUCATION/TRAINING PROGRAM
Payer: MEDICARE

## 2023-12-01 VITALS
HEART RATE: 70 BPM | OXYGEN SATURATION: 98 % | TEMPERATURE: 98 F | DIASTOLIC BLOOD PRESSURE: 87 MMHG | RESPIRATION RATE: 15 BRPM | SYSTOLIC BLOOD PRESSURE: 193 MMHG | HEIGHT: 67 IN

## 2023-12-01 DIAGNOSIS — Z89.421 ACQUIRED ABSENCE OF OTHER RIGHT TOE(S): Chronic | ICD-10-CM

## 2023-12-01 DIAGNOSIS — R73.9 HYPERGLYCEMIA, UNSPECIFIED: ICD-10-CM

## 2023-12-01 PROBLEM — I96 GANGRENE, NOT ELSEWHERE CLASSIFIED: Chronic | Status: ACTIVE | Noted: 2023-11-07

## 2023-12-01 PROBLEM — I35.0 NONRHEUMATIC AORTIC (VALVE) STENOSIS: Chronic | Status: ACTIVE | Noted: 2023-11-07

## 2023-12-01 PROBLEM — I73.9 PERIPHERAL VASCULAR DISEASE, UNSPECIFIED: Chronic | Status: ACTIVE | Noted: 2023-11-07

## 2023-12-01 PROBLEM — J18.9 PNEUMONIA, UNSPECIFIED ORGANISM: Chronic | Status: ACTIVE | Noted: 2023-11-07

## 2023-12-01 PROBLEM — E78.5 HYPERLIPIDEMIA, UNSPECIFIED: Chronic | Status: ACTIVE | Noted: 2023-11-07

## 2023-12-01 PROBLEM — I50.33 ACUTE ON CHRONIC DIASTOLIC (CONGESTIVE) HEART FAILURE: Chronic | Status: ACTIVE | Noted: 2023-11-07

## 2023-12-01 LAB
ALBUMIN SERPL ELPH-MCNC: 1.8 G/DL — LOW (ref 3.4–5)
ALBUMIN SERPL ELPH-MCNC: 1.8 G/DL — LOW (ref 3.4–5)
ALP SERPL-CCNC: 201 U/L — HIGH (ref 40–120)
ALP SERPL-CCNC: 201 U/L — HIGH (ref 40–120)
ALT FLD-CCNC: 19 U/L — SIGNIFICANT CHANGE UP (ref 12–42)
ALT FLD-CCNC: 19 U/L — SIGNIFICANT CHANGE UP (ref 12–42)
ANION GAP SERPL CALC-SCNC: 6 MMOL/L — LOW (ref 9–16)
ANION GAP SERPL CALC-SCNC: 6 MMOL/L — LOW (ref 9–16)
APPEARANCE UR: CLEAR — SIGNIFICANT CHANGE UP
APPEARANCE UR: CLEAR — SIGNIFICANT CHANGE UP
APTT BLD: 25.2 SEC — SIGNIFICANT CHANGE UP (ref 24.5–35.6)
APTT BLD: 25.2 SEC — SIGNIFICANT CHANGE UP (ref 24.5–35.6)
AST SERPL-CCNC: 25 U/L — SIGNIFICANT CHANGE UP (ref 15–37)
AST SERPL-CCNC: 25 U/L — SIGNIFICANT CHANGE UP (ref 15–37)
BACTERIA # UR AUTO: NEGATIVE /HPF — SIGNIFICANT CHANGE UP
BACTERIA # UR AUTO: NEGATIVE /HPF — SIGNIFICANT CHANGE UP
BASOPHILS # BLD AUTO: 0.03 K/UL — SIGNIFICANT CHANGE UP (ref 0–0.2)
BASOPHILS # BLD AUTO: 0.03 K/UL — SIGNIFICANT CHANGE UP (ref 0–0.2)
BASOPHILS NFR BLD AUTO: 0.2 % — SIGNIFICANT CHANGE UP (ref 0–2)
BASOPHILS NFR BLD AUTO: 0.2 % — SIGNIFICANT CHANGE UP (ref 0–2)
BILIRUB SERPL-MCNC: 0.5 MG/DL — SIGNIFICANT CHANGE UP (ref 0.2–1.2)
BILIRUB SERPL-MCNC: 0.5 MG/DL — SIGNIFICANT CHANGE UP (ref 0.2–1.2)
BILIRUB UR-MCNC: NEGATIVE — SIGNIFICANT CHANGE UP
BILIRUB UR-MCNC: NEGATIVE — SIGNIFICANT CHANGE UP
BUN SERPL-MCNC: 19 MG/DL — SIGNIFICANT CHANGE UP (ref 7–23)
BUN SERPL-MCNC: 19 MG/DL — SIGNIFICANT CHANGE UP (ref 7–23)
CALCIUM SERPL-MCNC: 8.7 MG/DL — SIGNIFICANT CHANGE UP (ref 8.5–10.5)
CALCIUM SERPL-MCNC: 8.7 MG/DL — SIGNIFICANT CHANGE UP (ref 8.5–10.5)
CHLORIDE SERPL-SCNC: 97 MMOL/L — SIGNIFICANT CHANGE UP (ref 96–108)
CHLORIDE SERPL-SCNC: 97 MMOL/L — SIGNIFICANT CHANGE UP (ref 96–108)
CO2 SERPL-SCNC: 27 MMOL/L — SIGNIFICANT CHANGE UP (ref 22–31)
CO2 SERPL-SCNC: 27 MMOL/L — SIGNIFICANT CHANGE UP (ref 22–31)
COD CRY URNS QL: SIGNIFICANT CHANGE UP
COD CRY URNS QL: SIGNIFICANT CHANGE UP
COLOR SPEC: YELLOW — SIGNIFICANT CHANGE UP
COLOR SPEC: YELLOW — SIGNIFICANT CHANGE UP
CREAT SERPL-MCNC: 1.29 MG/DL — SIGNIFICANT CHANGE UP (ref 0.5–1.3)
CREAT SERPL-MCNC: 1.29 MG/DL — SIGNIFICANT CHANGE UP (ref 0.5–1.3)
DIFF PNL FLD: ABNORMAL
DIFF PNL FLD: ABNORMAL
EGFR: 61 ML/MIN/1.73M2 — SIGNIFICANT CHANGE UP
EGFR: 61 ML/MIN/1.73M2 — SIGNIFICANT CHANGE UP
EOSINOPHIL # BLD AUTO: 0.01 K/UL — SIGNIFICANT CHANGE UP (ref 0–0.5)
EOSINOPHIL # BLD AUTO: 0.01 K/UL — SIGNIFICANT CHANGE UP (ref 0–0.5)
EOSINOPHIL NFR BLD AUTO: 0.1 % — SIGNIFICANT CHANGE UP (ref 0–6)
EOSINOPHIL NFR BLD AUTO: 0.1 % — SIGNIFICANT CHANGE UP (ref 0–6)
EPI CELLS # UR: 1 — SIGNIFICANT CHANGE UP
EPI CELLS # UR: 1 — SIGNIFICANT CHANGE UP
GLUCOSE BLDC GLUCOMTR-MCNC: 256 MG/DL — HIGH (ref 70–99)
GLUCOSE BLDC GLUCOMTR-MCNC: 256 MG/DL — HIGH (ref 70–99)
GLUCOSE BLDC GLUCOMTR-MCNC: 350 MG/DL — HIGH (ref 70–99)
GLUCOSE BLDC GLUCOMTR-MCNC: 350 MG/DL — HIGH (ref 70–99)
GLUCOSE BLDC GLUCOMTR-MCNC: 476 MG/DL — CRITICAL HIGH (ref 70–99)
GLUCOSE BLDC GLUCOMTR-MCNC: 476 MG/DL — CRITICAL HIGH (ref 70–99)
GLUCOSE BLDC GLUCOMTR-MCNC: 487 MG/DL — CRITICAL HIGH (ref 70–99)
GLUCOSE BLDC GLUCOMTR-MCNC: 487 MG/DL — CRITICAL HIGH (ref 70–99)
GLUCOSE BLDC GLUCOMTR-MCNC: 527 MG/DL — CRITICAL HIGH (ref 70–99)
GLUCOSE BLDC GLUCOMTR-MCNC: 527 MG/DL — CRITICAL HIGH (ref 70–99)
GLUCOSE SERPL-MCNC: 476 MG/DL — CRITICAL HIGH (ref 70–99)
GLUCOSE SERPL-MCNC: 476 MG/DL — CRITICAL HIGH (ref 70–99)
GLUCOSE UR QL: 500 MG/DL
GLUCOSE UR QL: 500 MG/DL
GRAN CASTS # UR COMP ASSIST: SIGNIFICANT CHANGE UP
GRAN CASTS # UR COMP ASSIST: SIGNIFICANT CHANGE UP
HCT VFR BLD CALC: 21.8 % — LOW (ref 39–50)
HCT VFR BLD CALC: 21.8 % — LOW (ref 39–50)
HCT VFR BLD CALC: 25.5 % — LOW (ref 39–50)
HCT VFR BLD CALC: 25.5 % — LOW (ref 39–50)
HCT VFR BLD CALC: 25.7 % — LOW (ref 39–50)
HCT VFR BLD CALC: 25.7 % — LOW (ref 39–50)
HGB BLD-MCNC: 7 G/DL — CRITICAL LOW (ref 13–17)
HGB BLD-MCNC: 7 G/DL — CRITICAL LOW (ref 13–17)
HGB BLD-MCNC: 8 G/DL — LOW (ref 13–17)
HGB BLD-MCNC: 8 G/DL — LOW (ref 13–17)
HGB BLD-MCNC: 8.1 G/DL — LOW (ref 13–17)
HGB BLD-MCNC: 8.1 G/DL — LOW (ref 13–17)
HYALINE CASTS # UR AUTO: SIGNIFICANT CHANGE UP
HYALINE CASTS # UR AUTO: SIGNIFICANT CHANGE UP
IMM GRANULOCYTES NFR BLD AUTO: 0.6 % — SIGNIFICANT CHANGE UP (ref 0–0.9)
IMM GRANULOCYTES NFR BLD AUTO: 0.6 % — SIGNIFICANT CHANGE UP (ref 0–0.9)
INR BLD: 1.03 — SIGNIFICANT CHANGE UP (ref 0.85–1.18)
INR BLD: 1.03 — SIGNIFICANT CHANGE UP (ref 0.85–1.18)
KETONES UR-MCNC: NEGATIVE MG/DL — SIGNIFICANT CHANGE UP
KETONES UR-MCNC: NEGATIVE MG/DL — SIGNIFICANT CHANGE UP
LACTATE BLDV-MCNC: 1.7 MMOL/L — SIGNIFICANT CHANGE UP (ref 0.5–2)
LACTATE BLDV-MCNC: 1.7 MMOL/L — SIGNIFICANT CHANGE UP (ref 0.5–2)
LEUKOCYTE ESTERASE UR-ACNC: NEGATIVE — SIGNIFICANT CHANGE UP
LEUKOCYTE ESTERASE UR-ACNC: NEGATIVE — SIGNIFICANT CHANGE UP
LG PLATELETS BLD QL AUTO: SLIGHT — SIGNIFICANT CHANGE UP
LG PLATELETS BLD QL AUTO: SLIGHT — SIGNIFICANT CHANGE UP
LYMPHOCYTES # BLD AUTO: 0.91 K/UL — LOW (ref 1–3.3)
LYMPHOCYTES # BLD AUTO: 0.91 K/UL — LOW (ref 1–3.3)
LYMPHOCYTES # BLD AUTO: 6.6 % — LOW (ref 13–44)
LYMPHOCYTES # BLD AUTO: 6.6 % — LOW (ref 13–44)
MAGNESIUM SERPL-MCNC: 2 MG/DL — SIGNIFICANT CHANGE UP (ref 1.6–2.6)
MAGNESIUM SERPL-MCNC: 2 MG/DL — SIGNIFICANT CHANGE UP (ref 1.6–2.6)
MANUAL SMEAR VERIFICATION: SIGNIFICANT CHANGE UP
MANUAL SMEAR VERIFICATION: SIGNIFICANT CHANGE UP
MCHC RBC-ENTMCNC: 29.7 PG — SIGNIFICANT CHANGE UP (ref 27–34)
MCHC RBC-ENTMCNC: 29.7 PG — SIGNIFICANT CHANGE UP (ref 27–34)
MCHC RBC-ENTMCNC: 30 PG — SIGNIFICANT CHANGE UP (ref 27–34)
MCHC RBC-ENTMCNC: 30 PG — SIGNIFICANT CHANGE UP (ref 27–34)
MCHC RBC-ENTMCNC: 30.3 PG — SIGNIFICANT CHANGE UP (ref 27–34)
MCHC RBC-ENTMCNC: 30.3 PG — SIGNIFICANT CHANGE UP (ref 27–34)
MCHC RBC-ENTMCNC: 31.1 GM/DL — LOW (ref 32–36)
MCHC RBC-ENTMCNC: 31.1 GM/DL — LOW (ref 32–36)
MCHC RBC-ENTMCNC: 31.8 GM/DL — LOW (ref 32–36)
MCHC RBC-ENTMCNC: 31.8 GM/DL — LOW (ref 32–36)
MCHC RBC-ENTMCNC: 32.1 GM/DL — SIGNIFICANT CHANGE UP (ref 32–36)
MCHC RBC-ENTMCNC: 32.1 GM/DL — SIGNIFICANT CHANGE UP (ref 32–36)
MCV RBC AUTO: 94.4 FL — SIGNIFICANT CHANGE UP (ref 80–100)
MCV RBC AUTO: 95.5 FL — SIGNIFICANT CHANGE UP (ref 80–100)
MCV RBC AUTO: 95.5 FL — SIGNIFICANT CHANGE UP (ref 80–100)
MONOCYTES # BLD AUTO: 1.36 K/UL — HIGH (ref 0–0.9)
MONOCYTES # BLD AUTO: 1.36 K/UL — HIGH (ref 0–0.9)
MONOCYTES NFR BLD AUTO: 9.8 % — SIGNIFICANT CHANGE UP (ref 2–14)
MONOCYTES NFR BLD AUTO: 9.8 % — SIGNIFICANT CHANGE UP (ref 2–14)
NEUTROPHILS # BLD AUTO: 11.42 K/UL — HIGH (ref 1.8–7.4)
NEUTROPHILS # BLD AUTO: 11.42 K/UL — HIGH (ref 1.8–7.4)
NEUTROPHILS NFR BLD AUTO: 82.7 % — HIGH (ref 43–77)
NEUTROPHILS NFR BLD AUTO: 82.7 % — HIGH (ref 43–77)
NITRITE UR-MCNC: NEGATIVE — SIGNIFICANT CHANGE UP
NITRITE UR-MCNC: NEGATIVE — SIGNIFICANT CHANGE UP
NRBC # BLD: 0 /100 WBCS — SIGNIFICANT CHANGE UP (ref 0–0)
NT-PROBNP SERPL-SCNC: HIGH PG/ML
NT-PROBNP SERPL-SCNC: HIGH PG/ML
PCO2 BLDV: 45 MMHG — SIGNIFICANT CHANGE UP (ref 42–55)
PCO2 BLDV: 45 MMHG — SIGNIFICANT CHANGE UP (ref 42–55)
PH BLDV: 7.41 — SIGNIFICANT CHANGE UP (ref 7.32–7.43)
PH BLDV: 7.41 — SIGNIFICANT CHANGE UP (ref 7.32–7.43)
PH UR: 5 — SIGNIFICANT CHANGE UP (ref 5–8)
PH UR: 5 — SIGNIFICANT CHANGE UP (ref 5–8)
PHOSPHATE SERPL-MCNC: 3.5 MG/DL — SIGNIFICANT CHANGE UP (ref 2.5–4.5)
PHOSPHATE SERPL-MCNC: 3.5 MG/DL — SIGNIFICANT CHANGE UP (ref 2.5–4.5)
PLAT MORPH BLD: ABNORMAL
PLAT MORPH BLD: ABNORMAL
PLATELET # BLD AUTO: 155 K/UL — SIGNIFICANT CHANGE UP (ref 150–400)
PLATELET # BLD AUTO: 155 K/UL — SIGNIFICANT CHANGE UP (ref 150–400)
PLATELET # BLD AUTO: 160 K/UL — SIGNIFICANT CHANGE UP (ref 150–400)
PLATELET # BLD AUTO: 160 K/UL — SIGNIFICANT CHANGE UP (ref 150–400)
PLATELET # BLD AUTO: 172 K/UL — SIGNIFICANT CHANGE UP (ref 150–400)
PLATELET # BLD AUTO: 172 K/UL — SIGNIFICANT CHANGE UP (ref 150–400)
PO2 BLDV: 50 MMHG — HIGH (ref 25–45)
PO2 BLDV: 50 MMHG — HIGH (ref 25–45)
POLYCHROMASIA BLD QL SMEAR: SLIGHT — SIGNIFICANT CHANGE UP
POLYCHROMASIA BLD QL SMEAR: SLIGHT — SIGNIFICANT CHANGE UP
POTASSIUM SERPL-MCNC: 4.1 MMOL/L — SIGNIFICANT CHANGE UP (ref 3.5–5.3)
POTASSIUM SERPL-MCNC: 4.1 MMOL/L — SIGNIFICANT CHANGE UP (ref 3.5–5.3)
POTASSIUM SERPL-SCNC: 4.1 MMOL/L — SIGNIFICANT CHANGE UP (ref 3.5–5.3)
POTASSIUM SERPL-SCNC: 4.1 MMOL/L — SIGNIFICANT CHANGE UP (ref 3.5–5.3)
PROT SERPL-MCNC: 6.7 G/DL — SIGNIFICANT CHANGE UP (ref 6.4–8.2)
PROT SERPL-MCNC: 6.7 G/DL — SIGNIFICANT CHANGE UP (ref 6.4–8.2)
PROT UR-MCNC: 100 MG/DL
PROT UR-MCNC: 100 MG/DL
PROTHROM AB SERPL-ACNC: 11.6 SEC — SIGNIFICANT CHANGE UP (ref 9.5–13)
PROTHROM AB SERPL-ACNC: 11.6 SEC — SIGNIFICANT CHANGE UP (ref 9.5–13)
RBC # BLD: 2.31 M/UL — LOW (ref 4.2–5.8)
RBC # BLD: 2.31 M/UL — LOW (ref 4.2–5.8)
RBC # BLD: 2.69 M/UL — LOW (ref 4.2–5.8)
RBC # BLD: 2.69 M/UL — LOW (ref 4.2–5.8)
RBC # BLD: 2.7 M/UL — LOW (ref 4.2–5.8)
RBC # BLD: 2.7 M/UL — LOW (ref 4.2–5.8)
RBC # FLD: 14.1 % — SIGNIFICANT CHANGE UP (ref 10.3–14.5)
RBC # FLD: 14.2 % — SIGNIFICANT CHANGE UP (ref 10.3–14.5)
RBC # FLD: 14.2 % — SIGNIFICANT CHANGE UP (ref 10.3–14.5)
RBC BLD AUTO: NORMAL — SIGNIFICANT CHANGE UP
RBC BLD AUTO: NORMAL — SIGNIFICANT CHANGE UP
RBC CASTS # UR COMP ASSIST: 3 /HPF — SIGNIFICANT CHANGE UP (ref 0–4)
RBC CASTS # UR COMP ASSIST: 3 /HPF — SIGNIFICANT CHANGE UP (ref 0–4)
SAO2 % BLDV: 82 % — SIGNIFICANT CHANGE UP (ref 67–88)
SAO2 % BLDV: 82 % — SIGNIFICANT CHANGE UP (ref 67–88)
SODIUM SERPL-SCNC: 130 MMOL/L — LOW (ref 132–145)
SODIUM SERPL-SCNC: 130 MMOL/L — LOW (ref 132–145)
SP GR SPEC: 1.01 — SIGNIFICANT CHANGE UP (ref 1–1.03)
SP GR SPEC: 1.01 — SIGNIFICANT CHANGE UP (ref 1–1.03)
TRI-PHOS CRY UR QL COMP ASSIST: SIGNIFICANT CHANGE UP
TRI-PHOS CRY UR QL COMP ASSIST: SIGNIFICANT CHANGE UP
TROPONIN I, HIGH SENSITIVITY RESULT: 32.8 NG/L — SIGNIFICANT CHANGE UP
TROPONIN I, HIGH SENSITIVITY RESULT: 32.8 NG/L — SIGNIFICANT CHANGE UP
URATE CRY FLD QL MICRO: SIGNIFICANT CHANGE UP
URATE CRY FLD QL MICRO: SIGNIFICANT CHANGE UP
UROBILINOGEN FLD QL: 0.2 MG/DL — SIGNIFICANT CHANGE UP (ref 0.2–1)
UROBILINOGEN FLD QL: 0.2 MG/DL — SIGNIFICANT CHANGE UP (ref 0.2–1)
WBC # BLD: 12.87 K/UL — HIGH (ref 3.8–10.5)
WBC # BLD: 12.87 K/UL — HIGH (ref 3.8–10.5)
WBC # BLD: 13.54 K/UL — HIGH (ref 3.8–10.5)
WBC # BLD: 13.54 K/UL — HIGH (ref 3.8–10.5)
WBC # BLD: 13.81 K/UL — HIGH (ref 3.8–10.5)
WBC # BLD: 13.81 K/UL — HIGH (ref 3.8–10.5)
WBC # FLD AUTO: 12.87 K/UL — HIGH (ref 3.8–10.5)
WBC # FLD AUTO: 12.87 K/UL — HIGH (ref 3.8–10.5)
WBC # FLD AUTO: 13.54 K/UL — HIGH (ref 3.8–10.5)
WBC # FLD AUTO: 13.54 K/UL — HIGH (ref 3.8–10.5)
WBC # FLD AUTO: 13.81 K/UL — HIGH (ref 3.8–10.5)
WBC # FLD AUTO: 13.81 K/UL — HIGH (ref 3.8–10.5)
WBC UR QL: 2 /HPF — SIGNIFICANT CHANGE UP (ref 0–5)
WBC UR QL: 2 /HPF — SIGNIFICANT CHANGE UP (ref 0–5)

## 2023-12-01 PROCEDURE — 73706 CT ANGIO LWR EXTR W/O&W/DYE: CPT | Mod: 26,50

## 2023-12-01 PROCEDURE — 71045 X-RAY EXAM CHEST 1 VIEW: CPT | Mod: 26

## 2023-12-01 PROCEDURE — 99285 EMERGENCY DEPT VISIT HI MDM: CPT

## 2023-12-01 PROCEDURE — 73562 X-RAY EXAM OF KNEE 3: CPT | Mod: 26,50

## 2023-12-01 RX ORDER — MORPHINE SULFATE 50 MG/1
2 CAPSULE, EXTENDED RELEASE ORAL ONCE
Refills: 0 | Status: DISCONTINUED | OUTPATIENT
Start: 2023-12-01 | End: 2023-12-01

## 2023-12-01 RX ORDER — PIPERACILLIN AND TAZOBACTAM 4; .5 G/20ML; G/20ML
3.38 INJECTION, POWDER, LYOPHILIZED, FOR SOLUTION INTRAVENOUS ONCE
Refills: 0 | Status: COMPLETED | OUTPATIENT
Start: 2023-12-01 | End: 2023-12-01

## 2023-12-01 RX ORDER — ACETAMINOPHEN 500 MG
1000 TABLET ORAL ONCE
Refills: 0 | Status: COMPLETED | OUTPATIENT
Start: 2023-12-01 | End: 2023-12-01

## 2023-12-01 RX ORDER — FUROSEMIDE 40 MG
40 TABLET ORAL ONCE
Refills: 0 | Status: COMPLETED | OUTPATIENT
Start: 2023-12-01 | End: 2023-12-01

## 2023-12-01 RX ORDER — VANCOMYCIN HCL 1 G
1000 VIAL (EA) INTRAVENOUS ONCE
Refills: 0 | Status: COMPLETED | OUTPATIENT
Start: 2023-12-01 | End: 2023-12-01

## 2023-12-01 RX ORDER — AMLODIPINE BESYLATE 2.5 MG/1
10 TABLET ORAL ONCE
Refills: 0 | Status: COMPLETED | OUTPATIENT
Start: 2023-12-01 | End: 2023-12-01

## 2023-12-01 RX ORDER — INSULIN HUMAN 100 [IU]/ML
7 INJECTION, SOLUTION SUBCUTANEOUS ONCE
Refills: 0 | Status: DISCONTINUED | OUTPATIENT
Start: 2023-12-01 | End: 2023-12-01

## 2023-12-01 RX ORDER — INSULIN HUMAN 100 [IU]/ML
7 INJECTION, SOLUTION SUBCUTANEOUS ONCE
Refills: 0 | Status: COMPLETED | OUTPATIENT
Start: 2023-12-01 | End: 2023-12-01

## 2023-12-01 RX ORDER — INSULIN HUMAN 100 [IU]/ML
3 INJECTION, SOLUTION SUBCUTANEOUS ONCE
Refills: 0 | Status: COMPLETED | OUTPATIENT
Start: 2023-12-01 | End: 2023-12-01

## 2023-12-01 RX ADMIN — MORPHINE SULFATE 2 MILLIGRAM(S): 50 CAPSULE, EXTENDED RELEASE ORAL at 21:48

## 2023-12-01 RX ADMIN — MORPHINE SULFATE 2 MILLIGRAM(S): 50 CAPSULE, EXTENDED RELEASE ORAL at 14:14

## 2023-12-01 RX ADMIN — Medication 400 MILLIGRAM(S): at 12:36

## 2023-12-01 RX ADMIN — Medication 40 MILLIGRAM(S): at 12:50

## 2023-12-01 RX ADMIN — PIPERACILLIN AND TAZOBACTAM 200 GRAM(S): 4; .5 INJECTION, POWDER, LYOPHILIZED, FOR SOLUTION INTRAVENOUS at 19:43

## 2023-12-01 RX ADMIN — PIPERACILLIN AND TAZOBACTAM 3.38 GRAM(S): 4; .5 INJECTION, POWDER, LYOPHILIZED, FOR SOLUTION INTRAVENOUS at 21:49

## 2023-12-01 RX ADMIN — INSULIN HUMAN 7 UNIT(S): 100 INJECTION, SOLUTION SUBCUTANEOUS at 14:01

## 2023-12-01 RX ADMIN — INSULIN HUMAN 3 UNIT(S): 100 INJECTION, SOLUTION SUBCUTANEOUS at 17:23

## 2023-12-01 RX ADMIN — AMLODIPINE BESYLATE 10 MILLIGRAM(S): 2.5 TABLET ORAL at 22:36

## 2023-12-01 RX ADMIN — MORPHINE SULFATE 2 MILLIGRAM(S): 50 CAPSULE, EXTENDED RELEASE ORAL at 21:29

## 2023-12-01 RX ADMIN — Medication 250 MILLIGRAM(S): at 21:15

## 2023-12-01 NOTE — ED ADULT NURSE NOTE - OBJECTIVE STATEMENT
Pt to ED for bilateral knee pain s/p fall onto ground last night. BLE swelling noted. Hx diabetes w/ toe amputation, HTN.

## 2023-12-01 NOTE — ED ADULT NURSE NOTE - NSFALLUNIVINTERV_ED_ALL_ED
Bed/Stretcher in lowest position, wheels locked, appropriate side rails in place/Call bell, personal items and telephone in reach/Instruct patient to call for assistance before getting out of bed/chair/stretcher/Non-slip footwear applied when patient is off stretcher/[place] to call system/Physically safe environment - no spills, clutter or unnecessary equipment/Purposeful proactive rounding/Room/bathroom lighting operational, light cord in reach

## 2023-12-01 NOTE — ED ADULT NURSE REASSESSMENT NOTE - NS ED NURSE REASSESS COMMENT FT1
Pt reporting large amt of pain from right leg, provider made aware  given morphine IVP for pain  plan of care explained

## 2023-12-01 NOTE — ED PROVIDER NOTE - OBJECTIVE STATEMENT
67 yo, pmh CAD with PCI 3 years ago, IDDM, diabetic foot infection, Presents this emergency department for bilateral leg swelling and pain after a fall yesterday.  Patient states that he was playing basketball when he did a spin move and fell directly on bilateral knees.  Patient states that he takes all medications as prescribed.  Was just discharged 20 days ago from [facility] for a diabetic foot wound that required a PICC line and IV antibiotics.  States that he finished all his antibiotics.  Patient has not been able to ambulate since the fall yesterday.  Denies any head injury.  States that his neighbor has been helping him out, however he cannot get out of bed usually.  Patient states that he usually ambulates with a walker.

## 2023-12-01 NOTE — PATIENT PROFILE ADULT - FALL HARM RISK - HARM RISK INTERVENTIONS

## 2023-12-01 NOTE — ED PROVIDER NOTE - PROGRESS NOTE3
Dexcom G6 Approved    Prior Authorization Number: [id number]  Dates of approval: 04/26/2022-04/25/2023  Filling Pharmacy: [id number]  Additional Information: Pharmacy contacted - received paid claims (Transmitter $75.00, Sensor $60.00).  Pharmacy will contact patient when these supplies are ready to be picked up.     The authorization is effective for a maximum of 4 fills from 04/26/2022 to 04/25/2023, as long as the member is enrolled in their current health plan. The request was reviewed and approved by a licensed clinical pharmacist. This request has been approved for 1 transmitter per 90 days. A second prior authorization, [id number], has been approved for Dexcom G6  with a quantity limit of 1  per 12 months for 1 fill. The authorization is valid from 04/26/2022 to 04/25/2023.A third prior authorization, [id number], has been approved for Dexcom G6 Sensor with a quantity limit of 3 sensors per 30 days for 12 fills. The authorization is valid from 04/26/2022 to 04/25/2023.       
Dexcom G6 Pending    Insurance response  Prescription Drug Insurance: [employer]  Notes: Prior authorization submitted - will update provider when decision has been made by insurance.             
Stable.

## 2023-12-01 NOTE — ED PROVIDER NOTE - CLINICAL SUMMARY MEDICAL DECISION MAKING FREE TEXT BOX
69 yo M presents to this ED for b/l leg swelling the extends from ankle to knee after a fall yesterday  On arrival VS were signficant for BP of 190/80  Labs, BNP, trop, EKG, CXR and b/l knee xrays ordered  IV Tylneol and Lasix ordered  Will continue to monitor pt

## 2023-12-01 NOTE — ED ADULT TRIAGE NOTE - CHIEF COMPLAINT QUOTE
patient BIBA from home c/o bilateral knee pain s/p fall onto ground last night; both knees swollen as per EMS; HTN noted with vitals and takes lisinopril

## 2023-12-01 NOTE — ED PROVIDER NOTE - PROGRESS NOTE DETAILS
Labs are significant for a WBC of 58355, hemaglobin of 8  Trop WNL  BNP is 04501  CXR shows pleural effusions  Knee Xrays shows b/l hemarthosis  Repeat BP is 178/80  Again pt denies cp or SOB. Received tylenol for symptomatic relief, states that it worked minimally. 2 mg of Morphine ordered  Pt has been urinating since receiving lasix.   Spoke with Cardiology, Dr. Carreon, who recommends admission to hospitalist  Spoke with Hospitalist, Dr. Mendes, who would like pt's pain to be better controlled, urine output to be monitored, and re-eval.   Will continue to monitor pt. Labs are significant [place] WBC of 91128, hemaglobin of 8  Trop WNL  BNP is 12274  Glucose is 487. Pt is fluid overloaded, no IV fluid ordered, therefore Insulin ordered. Will continue to recheck glucose.  CXR shows pleural effusions  Knee Xrays shows b/l hemarthosis  Repeat BP is 178/80  Again pt denies cp or SOB. Received tylenol for symptomatic relief, states that it worked minimally. 2 mg of Morphine ordered  Pt has been urinating since receiving lasix.   Spoke with Cardiology, Dr. Carreon, who recommends admission to hospitalist  Spoke with Hospitalist, Dr. Mendes, who would like pt's pain to be better controlled, urine output to be monitored, and re-eval.   Will continue to monitor pt. Labs are significant for a WBC of 33040, hemaglobin of 8, will repeat   Trop WNL  BNP is 14250  Glucose is 487. Pt is fluid overloaded, no IV fluid ordered, therefore Insulin ordered.  VBG ordered. Will continue to recheck glucose.  CXR shows pleural effusions  Knee Xrays shows b/l hemarthosis  Repeat BP is 178/80  Again pt denies cp or SOB. Received tylenol for symptomatic relief, states that it worked minimally. 2 mg of Morphine ordered  Pt has been urinating since receiving lasix.   Spoke with Cardiology, Dr. Carreon, who recommends admission to hospitalist  Spoke with Hospitalist, Dr. Mendes, who would like pt's pain to be better controlled, urine output to be monitored, and re-eval.   Will continue to monitor pt. Patient is sitting comfortably in room, no acute distress  Repeat CBC shows a drop in hemoglobin one-point  Patient received another 7 units of insulin as the blood sugar did not go down  Spoke with Dr. Chairez, intensivist, states that patient does not require a monitored bed.  Dr. Cosby, orthopedist, made aware of pt.  CT ordered, Dr. Mendes, hospitalist made aware. Will await CT prior to admission Patient sitting comfortably in room, no acute distress  CTA readings were discussed with me by radiologist.  States that increased soft tissue gas appreciated from his prior CT and October, and necrotizing fasciitis is on the differential.  Wound evaluated, and while there is slight erythema surrounding the actual wound, no crepitus or significant pain appreciated.  Called transfer center to be connected to vascular surgery.  Awaiting callback.  Vancomycin and Zosyn ordered    In addition, pt's repeat finger stick is 350.   Will continue to monitor pt Patient is sitting comfortably room, no acute distress   CTA shows increased gas at the site of the wound on the foot.  Differential includes necrotizing fasciitis.  However patient does not have crepitus, and there is no significant tenderness to palpation appreciated.  Spoke with vascular surgeon, Dr. Boyle, who recommends consultation when patient [facility] at [facility].  Spoke with Dr. Law, hospitalist who is agreeable to admission.  All results reviewed with pt. Pt understands and agrees with paln.      Repeat CBC ordered

## 2023-12-01 NOTE — ED PROVIDER NOTE - PHYSICAL EXAMINATION
General: well developed, well nourished, no distress  Eye: bilateral: PERRL, EOMI  Ears, Nose, Throat: normal pharynx, TMs normal, membranes moist.  Neck: non-tender, full range of motion, supple.  Negative For: lymphadenopathy (R), lymphadenopathy (L)  Respiratory: rales noted on exam  Cardiovascular: S1-S2 normal, regular rate, regular rhythm.  Abdomen: normal bowel sounds, non tender, soft.    Genitourinary: Negative For: CVA tenderness  Musculoskeletal: unable to ambulate  Extremities: b/l 2+ pitting edema appreciated from ankle to inguinal area. ROM limited due to swelling and pain of hips, knees. NVI, cap refill < 2 sec.  Neurologic: alert, oriented to person, oriented to place, oriented to time.    Skin: Ulcer appreciated under the L foot without obvious signs of infection noted.   Psychiatric: normal affect, normal insight, normal concentration

## 2023-12-02 DIAGNOSIS — Z29.9 ENCOUNTER FOR PROPHYLACTIC MEASURES, UNSPECIFIED: ICD-10-CM

## 2023-12-02 DIAGNOSIS — S91.309A UNSPECIFIED OPEN WOUND, UNSPECIFIED FOOT, INITIAL ENCOUNTER: ICD-10-CM

## 2023-12-02 DIAGNOSIS — I50.22 CHRONIC SYSTOLIC (CONGESTIVE) HEART FAILURE: ICD-10-CM

## 2023-12-02 DIAGNOSIS — E11.9 TYPE 2 DIABETES MELLITUS WITHOUT COMPLICATIONS: ICD-10-CM

## 2023-12-02 DIAGNOSIS — I25.10 ATHEROSCLEROTIC HEART DISEASE OF NATIVE CORONARY ARTERY WITHOUT ANGINA PECTORIS: ICD-10-CM

## 2023-12-02 DIAGNOSIS — I73.9 PERIPHERAL VASCULAR DISEASE, UNSPECIFIED: ICD-10-CM

## 2023-12-02 DIAGNOSIS — I10 ESSENTIAL (PRIMARY) HYPERTENSION: ICD-10-CM

## 2023-12-02 DIAGNOSIS — M25.561 PAIN IN RIGHT KNEE: ICD-10-CM

## 2023-12-02 LAB
A1C WITH ESTIMATED AVERAGE GLUCOSE RESULT: 13.5 % — HIGH (ref 4–5.6)
A1C WITH ESTIMATED AVERAGE GLUCOSE RESULT: 13.5 % — HIGH (ref 4–5.6)
ANION GAP SERPL CALC-SCNC: 8 MMOL/L — SIGNIFICANT CHANGE UP (ref 5–17)
ANION GAP SERPL CALC-SCNC: 8 MMOL/L — SIGNIFICANT CHANGE UP (ref 5–17)
ANISOCYTOSIS BLD QL: SLIGHT — SIGNIFICANT CHANGE UP
ANISOCYTOSIS BLD QL: SLIGHT — SIGNIFICANT CHANGE UP
APTT BLD: 28.1 SEC — SIGNIFICANT CHANGE UP (ref 24.5–35.6)
APTT BLD: 28.1 SEC — SIGNIFICANT CHANGE UP (ref 24.5–35.6)
BASOPHILS # BLD AUTO: 0.13 K/UL — SIGNIFICANT CHANGE UP (ref 0–0.2)
BASOPHILS # BLD AUTO: 0.13 K/UL — SIGNIFICANT CHANGE UP (ref 0–0.2)
BASOPHILS NFR BLD AUTO: 0.9 % — SIGNIFICANT CHANGE UP (ref 0–2)
BASOPHILS NFR BLD AUTO: 0.9 % — SIGNIFICANT CHANGE UP (ref 0–2)
BLD GP AB SCN SERPL QL: NEGATIVE — SIGNIFICANT CHANGE UP
BLD GP AB SCN SERPL QL: NEGATIVE — SIGNIFICANT CHANGE UP
BUN SERPL-MCNC: 16 MG/DL — SIGNIFICANT CHANGE UP (ref 7–23)
BUN SERPL-MCNC: 16 MG/DL — SIGNIFICANT CHANGE UP (ref 7–23)
CALCIUM SERPL-MCNC: 8.3 MG/DL — LOW (ref 8.4–10.5)
CALCIUM SERPL-MCNC: 8.3 MG/DL — LOW (ref 8.4–10.5)
CHLORIDE SERPL-SCNC: 97 MMOL/L — SIGNIFICANT CHANGE UP (ref 96–108)
CHLORIDE SERPL-SCNC: 97 MMOL/L — SIGNIFICANT CHANGE UP (ref 96–108)
CO2 SERPL-SCNC: 27 MMOL/L — SIGNIFICANT CHANGE UP (ref 22–31)
CO2 SERPL-SCNC: 27 MMOL/L — SIGNIFICANT CHANGE UP (ref 22–31)
CREAT SERPL-MCNC: 1.02 MG/DL — SIGNIFICANT CHANGE UP (ref 0.5–1.3)
CREAT SERPL-MCNC: 1.02 MG/DL — SIGNIFICANT CHANGE UP (ref 0.5–1.3)
CRP SERPL-MCNC: 95.6 MG/L — HIGH (ref 0–4)
CRP SERPL-MCNC: 95.6 MG/L — HIGH (ref 0–4)
EGFR: 81 ML/MIN/1.73M2 — SIGNIFICANT CHANGE UP
EGFR: 81 ML/MIN/1.73M2 — SIGNIFICANT CHANGE UP
EOSINOPHIL # BLD AUTO: 0 K/UL — SIGNIFICANT CHANGE UP (ref 0–0.5)
EOSINOPHIL # BLD AUTO: 0 K/UL — SIGNIFICANT CHANGE UP (ref 0–0.5)
EOSINOPHIL NFR BLD AUTO: 0 % — SIGNIFICANT CHANGE UP (ref 0–6)
EOSINOPHIL NFR BLD AUTO: 0 % — SIGNIFICANT CHANGE UP (ref 0–6)
ERYTHROCYTE [SEDIMENTATION RATE] IN BLOOD: 117 MM/HR — HIGH
ERYTHROCYTE [SEDIMENTATION RATE] IN BLOOD: 117 MM/HR — HIGH
ESTIMATED AVERAGE GLUCOSE: 341 MG/DL — HIGH (ref 68–114)
ESTIMATED AVERAGE GLUCOSE: 341 MG/DL — HIGH (ref 68–114)
GIANT PLATELETS BLD QL SMEAR: PRESENT — SIGNIFICANT CHANGE UP
GIANT PLATELETS BLD QL SMEAR: PRESENT — SIGNIFICANT CHANGE UP
GLUCOSE BLDC GLUCOMTR-MCNC: 228 MG/DL — HIGH (ref 70–99)
GLUCOSE BLDC GLUCOMTR-MCNC: 228 MG/DL — HIGH (ref 70–99)
GLUCOSE BLDC GLUCOMTR-MCNC: 230 MG/DL — HIGH (ref 70–99)
GLUCOSE BLDC GLUCOMTR-MCNC: 230 MG/DL — HIGH (ref 70–99)
GLUCOSE BLDC GLUCOMTR-MCNC: 270 MG/DL — HIGH (ref 70–99)
GLUCOSE BLDC GLUCOMTR-MCNC: 270 MG/DL — HIGH (ref 70–99)
GLUCOSE BLDC GLUCOMTR-MCNC: 99 MG/DL — SIGNIFICANT CHANGE UP (ref 70–99)
GLUCOSE BLDC GLUCOMTR-MCNC: 99 MG/DL — SIGNIFICANT CHANGE UP (ref 70–99)
GLUCOSE SERPL-MCNC: 212 MG/DL — HIGH (ref 70–99)
GLUCOSE SERPL-MCNC: 212 MG/DL — HIGH (ref 70–99)
GRAM STN FLD: ABNORMAL
GRAM STN FLD: ABNORMAL
HCT VFR BLD CALC: 25.2 % — LOW (ref 39–50)
HCT VFR BLD CALC: 25.2 % — LOW (ref 39–50)
HGB BLD-MCNC: 8.1 G/DL — LOW (ref 13–17)
HGB BLD-MCNC: 8.1 G/DL — LOW (ref 13–17)
HYPOCHROMIA BLD QL: SLIGHT — SIGNIFICANT CHANGE UP
HYPOCHROMIA BLD QL: SLIGHT — SIGNIFICANT CHANGE UP
INR BLD: 1.01 — SIGNIFICANT CHANGE UP (ref 0.85–1.18)
INR BLD: 1.01 — SIGNIFICANT CHANGE UP (ref 0.85–1.18)
LYMPHOCYTES # BLD AUTO: 1.59 K/UL — SIGNIFICANT CHANGE UP (ref 1–3.3)
LYMPHOCYTES # BLD AUTO: 1.59 K/UL — SIGNIFICANT CHANGE UP (ref 1–3.3)
LYMPHOCYTES # BLD AUTO: 11.3 % — LOW (ref 13–44)
LYMPHOCYTES # BLD AUTO: 11.3 % — LOW (ref 13–44)
MACROCYTES BLD QL: SLIGHT — SIGNIFICANT CHANGE UP
MACROCYTES BLD QL: SLIGHT — SIGNIFICANT CHANGE UP
MANUAL SMEAR VERIFICATION: SIGNIFICANT CHANGE UP
MANUAL SMEAR VERIFICATION: SIGNIFICANT CHANGE UP
MCHC RBC-ENTMCNC: 30 PG — SIGNIFICANT CHANGE UP (ref 27–34)
MCHC RBC-ENTMCNC: 30 PG — SIGNIFICANT CHANGE UP (ref 27–34)
MCHC RBC-ENTMCNC: 32.1 GM/DL — SIGNIFICANT CHANGE UP (ref 32–36)
MCHC RBC-ENTMCNC: 32.1 GM/DL — SIGNIFICANT CHANGE UP (ref 32–36)
MCV RBC AUTO: 93.3 FL — SIGNIFICANT CHANGE UP (ref 80–100)
MCV RBC AUTO: 93.3 FL — SIGNIFICANT CHANGE UP (ref 80–100)
MONOCYTES # BLD AUTO: 0.6 K/UL — SIGNIFICANT CHANGE UP (ref 0–0.9)
MONOCYTES # BLD AUTO: 0.6 K/UL — SIGNIFICANT CHANGE UP (ref 0–0.9)
MONOCYTES NFR BLD AUTO: 4.3 % — SIGNIFICANT CHANGE UP (ref 2–14)
MONOCYTES NFR BLD AUTO: 4.3 % — SIGNIFICANT CHANGE UP (ref 2–14)
NEUTROPHILS # BLD AUTO: 11.73 K/UL — HIGH (ref 1.8–7.4)
NEUTROPHILS # BLD AUTO: 11.73 K/UL — HIGH (ref 1.8–7.4)
NEUTROPHILS NFR BLD AUTO: 83.5 % — HIGH (ref 43–77)
NEUTROPHILS NFR BLD AUTO: 83.5 % — HIGH (ref 43–77)
PLAT MORPH BLD: ABNORMAL
PLAT MORPH BLD: ABNORMAL
PLATELET # BLD AUTO: 168 K/UL — SIGNIFICANT CHANGE UP (ref 150–400)
PLATELET # BLD AUTO: 168 K/UL — SIGNIFICANT CHANGE UP (ref 150–400)
POLYCHROMASIA BLD QL SMEAR: SLIGHT — SIGNIFICANT CHANGE UP
POLYCHROMASIA BLD QL SMEAR: SLIGHT — SIGNIFICANT CHANGE UP
POTASSIUM SERPL-MCNC: 3.9 MMOL/L — SIGNIFICANT CHANGE UP (ref 3.5–5.3)
POTASSIUM SERPL-MCNC: 3.9 MMOL/L — SIGNIFICANT CHANGE UP (ref 3.5–5.3)
POTASSIUM SERPL-SCNC: 3.9 MMOL/L — SIGNIFICANT CHANGE UP (ref 3.5–5.3)
POTASSIUM SERPL-SCNC: 3.9 MMOL/L — SIGNIFICANT CHANGE UP (ref 3.5–5.3)
PROTHROM AB SERPL-ACNC: 11.5 SEC — SIGNIFICANT CHANGE UP (ref 9.5–13)
PROTHROM AB SERPL-ACNC: 11.5 SEC — SIGNIFICANT CHANGE UP (ref 9.5–13)
RBC # BLD: 2.7 M/UL — LOW (ref 4.2–5.8)
RBC # BLD: 2.7 M/UL — LOW (ref 4.2–5.8)
RBC # FLD: 14 % — SIGNIFICANT CHANGE UP (ref 10.3–14.5)
RBC # FLD: 14 % — SIGNIFICANT CHANGE UP (ref 10.3–14.5)
RBC BLD AUTO: ABNORMAL
RBC BLD AUTO: ABNORMAL
RH IG SCN BLD-IMP: POSITIVE — SIGNIFICANT CHANGE UP
RH IG SCN BLD-IMP: POSITIVE — SIGNIFICANT CHANGE UP
SODIUM SERPL-SCNC: 132 MMOL/L — LOW (ref 135–145)
SODIUM SERPL-SCNC: 132 MMOL/L — LOW (ref 135–145)
SPECIMEN SOURCE: SIGNIFICANT CHANGE UP
SPECIMEN SOURCE: SIGNIFICANT CHANGE UP
WBC # BLD: 14.05 K/UL — HIGH (ref 3.8–10.5)
WBC # BLD: 14.05 K/UL — HIGH (ref 3.8–10.5)
WBC # FLD AUTO: 14.05 K/UL — HIGH (ref 3.8–10.5)
WBC # FLD AUTO: 14.05 K/UL — HIGH (ref 3.8–10.5)

## 2023-12-02 PROCEDURE — 93925 LOWER EXTREMITY STUDY: CPT | Mod: 26

## 2023-12-02 PROCEDURE — 99222 1ST HOSP IP/OBS MODERATE 55: CPT

## 2023-12-02 PROCEDURE — 73610 X-RAY EXAM OF ANKLE: CPT | Mod: 26,RT

## 2023-12-02 PROCEDURE — 99223 1ST HOSP IP/OBS HIGH 75: CPT

## 2023-12-02 PROCEDURE — 73590 X-RAY EXAM OF LOWER LEG: CPT | Mod: 26,RT

## 2023-12-02 PROCEDURE — 73630 X-RAY EXAM OF FOOT: CPT | Mod: 26,RT

## 2023-12-02 RX ORDER — ASPIRIN/CALCIUM CARB/MAGNESIUM 324 MG
81 TABLET ORAL EVERY 24 HOURS
Refills: 0 | Status: DISCONTINUED | OUTPATIENT
Start: 2023-12-02 | End: 2023-12-11

## 2023-12-02 RX ORDER — PIPERACILLIN AND TAZOBACTAM 4; .5 G/20ML; G/20ML
3.38 INJECTION, POWDER, LYOPHILIZED, FOR SOLUTION INTRAVENOUS EVERY 8 HOURS
Refills: 0 | Status: COMPLETED | OUTPATIENT
Start: 2023-12-02 | End: 2023-12-09

## 2023-12-02 RX ORDER — ENOXAPARIN SODIUM 100 MG/ML
40 INJECTION SUBCUTANEOUS EVERY 24 HOURS
Refills: 0 | Status: DISCONTINUED | OUTPATIENT
Start: 2023-12-02 | End: 2023-12-02

## 2023-12-02 RX ORDER — DEXTROSE 50 % IN WATER 50 %
25 SYRINGE (ML) INTRAVENOUS ONCE
Refills: 0 | Status: DISCONTINUED | OUTPATIENT
Start: 2023-12-02 | End: 2023-12-11

## 2023-12-02 RX ORDER — OXYCODONE HYDROCHLORIDE 5 MG/1
10 TABLET ORAL ONCE
Refills: 0 | Status: DISCONTINUED | OUTPATIENT
Start: 2023-12-02 | End: 2023-12-02

## 2023-12-02 RX ORDER — VANCOMYCIN HCL 1 G
1000 VIAL (EA) INTRAVENOUS EVERY 12 HOURS
Refills: 0 | Status: COMPLETED | OUTPATIENT
Start: 2023-12-02 | End: 2023-12-02

## 2023-12-02 RX ORDER — DEXTROSE 50 % IN WATER 50 %
12.5 SYRINGE (ML) INTRAVENOUS ONCE
Refills: 0 | Status: DISCONTINUED | OUTPATIENT
Start: 2023-12-02 | End: 2023-12-11

## 2023-12-02 RX ORDER — GABAPENTIN 400 MG/1
800 CAPSULE ORAL THREE TIMES A DAY
Refills: 0 | Status: DISCONTINUED | OUTPATIENT
Start: 2023-12-02 | End: 2023-12-11

## 2023-12-02 RX ORDER — GLUCAGON INJECTION, SOLUTION 0.5 MG/.1ML
1 INJECTION, SOLUTION SUBCUTANEOUS ONCE
Refills: 0 | Status: DISCONTINUED | OUTPATIENT
Start: 2023-12-02 | End: 2023-12-11

## 2023-12-02 RX ORDER — ATORVASTATIN CALCIUM 80 MG/1
80 TABLET, FILM COATED ORAL AT BEDTIME
Refills: 0 | Status: DISCONTINUED | OUTPATIENT
Start: 2023-12-02 | End: 2023-12-11

## 2023-12-02 RX ORDER — FUROSEMIDE 40 MG
40 TABLET ORAL ONCE
Refills: 0 | Status: COMPLETED | OUTPATIENT
Start: 2023-12-02 | End: 2023-12-02

## 2023-12-02 RX ORDER — OXYCODONE HYDROCHLORIDE 5 MG/1
5 TABLET ORAL EVERY 6 HOURS
Refills: 0 | Status: DISCONTINUED | OUTPATIENT
Start: 2023-12-02 | End: 2023-12-03

## 2023-12-02 RX ORDER — DEXTROSE 50 % IN WATER 50 %
15 SYRINGE (ML) INTRAVENOUS ONCE
Refills: 0 | Status: DISCONTINUED | OUTPATIENT
Start: 2023-12-02 | End: 2023-12-11

## 2023-12-02 RX ORDER — SODIUM CHLORIDE 9 MG/ML
1000 INJECTION, SOLUTION INTRAVENOUS
Refills: 0 | Status: DISCONTINUED | OUTPATIENT
Start: 2023-12-02 | End: 2023-12-11

## 2023-12-02 RX ORDER — INSULIN GLARGINE 100 [IU]/ML
14 INJECTION, SOLUTION SUBCUTANEOUS AT BEDTIME
Refills: 0 | Status: DISCONTINUED | OUTPATIENT
Start: 2023-12-02 | End: 2023-12-05

## 2023-12-02 RX ORDER — ACETAMINOPHEN 500 MG
650 TABLET ORAL EVERY 6 HOURS
Refills: 0 | Status: DISCONTINUED | OUTPATIENT
Start: 2023-12-02 | End: 2023-12-11

## 2023-12-02 RX ORDER — HYDRALAZINE HCL 50 MG
25 TABLET ORAL EVERY 8 HOURS
Refills: 0 | Status: DISCONTINUED | OUTPATIENT
Start: 2023-12-02 | End: 2023-12-02

## 2023-12-02 RX ORDER — LISINOPRIL 2.5 MG/1
40 TABLET ORAL EVERY 24 HOURS
Refills: 0 | Status: DISCONTINUED | OUTPATIENT
Start: 2023-12-02 | End: 2023-12-11

## 2023-12-02 RX ORDER — CARVEDILOL PHOSPHATE 80 MG/1
12.5 CAPSULE, EXTENDED RELEASE ORAL EVERY 12 HOURS
Refills: 0 | Status: DISCONTINUED | OUTPATIENT
Start: 2023-12-02 | End: 2023-12-11

## 2023-12-02 RX ORDER — INSULIN LISPRO 100/ML
VIAL (ML) SUBCUTANEOUS
Refills: 0 | Status: DISCONTINUED | OUTPATIENT
Start: 2023-12-02 | End: 2023-12-09

## 2023-12-02 RX ORDER — HYDRALAZINE HCL 50 MG
50 TABLET ORAL EVERY 8 HOURS
Refills: 0 | Status: DISCONTINUED | OUTPATIENT
Start: 2023-12-02 | End: 2023-12-11

## 2023-12-02 RX ORDER — CLOPIDOGREL BISULFATE 75 MG/1
75 TABLET, FILM COATED ORAL AT BEDTIME
Refills: 0 | Status: DISCONTINUED | OUTPATIENT
Start: 2023-12-02 | End: 2023-12-02

## 2023-12-02 RX ADMIN — LISINOPRIL 40 MILLIGRAM(S): 2.5 TABLET ORAL at 12:49

## 2023-12-02 RX ADMIN — OXYCODONE HYDROCHLORIDE 10 MILLIGRAM(S): 5 TABLET ORAL at 02:31

## 2023-12-02 RX ADMIN — GABAPENTIN 800 MILLIGRAM(S): 400 CAPSULE ORAL at 23:21

## 2023-12-02 RX ADMIN — OXYCODONE HYDROCHLORIDE 5 MILLIGRAM(S): 5 TABLET ORAL at 15:08

## 2023-12-02 RX ADMIN — Medication 250 MILLIGRAM(S): at 10:27

## 2023-12-02 RX ADMIN — Medication 250 MILLIGRAM(S): at 23:20

## 2023-12-02 RX ADMIN — PIPERACILLIN AND TAZOBACTAM 25 GRAM(S): 4; .5 INJECTION, POWDER, LYOPHILIZED, FOR SOLUTION INTRAVENOUS at 03:37

## 2023-12-02 RX ADMIN — OXYCODONE HYDROCHLORIDE 5 MILLIGRAM(S): 5 TABLET ORAL at 21:21

## 2023-12-02 RX ADMIN — OXYCODONE HYDROCHLORIDE 5 MILLIGRAM(S): 5 TABLET ORAL at 16:08

## 2023-12-02 RX ADMIN — INSULIN GLARGINE 14 UNIT(S): 100 INJECTION, SOLUTION SUBCUTANEOUS at 23:19

## 2023-12-02 RX ADMIN — PIPERACILLIN AND TAZOBACTAM 25 GRAM(S): 4; .5 INJECTION, POWDER, LYOPHILIZED, FOR SOLUTION INTRAVENOUS at 12:49

## 2023-12-02 RX ADMIN — Medication 81 MILLIGRAM(S): at 10:27

## 2023-12-02 RX ADMIN — Medication 4: at 09:06

## 2023-12-02 RX ADMIN — OXYCODONE HYDROCHLORIDE 10 MILLIGRAM(S): 5 TABLET ORAL at 01:48

## 2023-12-02 RX ADMIN — Medication 50 MILLIGRAM(S): at 13:57

## 2023-12-02 RX ADMIN — Medication 6: at 13:33

## 2023-12-02 RX ADMIN — Medication 50 MILLIGRAM(S): at 23:21

## 2023-12-02 RX ADMIN — CARVEDILOL PHOSPHATE 12.5 MILLIGRAM(S): 80 CAPSULE, EXTENDED RELEASE ORAL at 17:49

## 2023-12-02 RX ADMIN — ATORVASTATIN CALCIUM 80 MILLIGRAM(S): 80 TABLET, FILM COATED ORAL at 23:21

## 2023-12-02 RX ADMIN — Medication 40 MILLIGRAM(S): at 16:09

## 2023-12-02 RX ADMIN — GABAPENTIN 800 MILLIGRAM(S): 400 CAPSULE ORAL at 06:25

## 2023-12-02 RX ADMIN — Medication 25 MILLIGRAM(S): at 06:24

## 2023-12-02 RX ADMIN — OXYCODONE HYDROCHLORIDE 5 MILLIGRAM(S): 5 TABLET ORAL at 22:21

## 2023-12-02 RX ADMIN — CARVEDILOL PHOSPHATE 12.5 MILLIGRAM(S): 80 CAPSULE, EXTENDED RELEASE ORAL at 06:24

## 2023-12-02 RX ADMIN — PIPERACILLIN AND TAZOBACTAM 25 GRAM(S): 4; .5 INJECTION, POWDER, LYOPHILIZED, FOR SOLUTION INTRAVENOUS at 23:20

## 2023-12-02 RX ADMIN — Medication 4: at 18:32

## 2023-12-02 RX ADMIN — GABAPENTIN 800 MILLIGRAM(S): 400 CAPSULE ORAL at 15:09

## 2023-12-02 NOTE — CONSULT NOTE ADULT - SUBJECTIVE AND OBJECTIVE BOX
HPI:  69 yo M with HTN, IDDM, CAD, mod AS, HCV (not treated), PAD s/p remote RLE angioplasty, R 4th toe OM s/p partial R 4th ray amputation 10/24 admitted 12/1 with necrotizing infection R foot. ID consult for assistance with antibiotics.  He had been admitted on 10/1 with R 4th and 5th toe pain.  Was treated for SSTI with vanc and pip-tazo X 2 d while admitted, then d/jasen on doxy and cephalexin for additional 7 d course, which he reported completing.  He had increasing pain.  He was readmitted 10/21 with fever, leukocytosis.  He ws given a dose of vancomycin and treated with pip-tazo.  MRI (10/23) showed Soft tissue atrophy/wound of the 4th toe with underlying marrow changes of the 4th proximal phalanx head, middle phalanx, and distal   phalanx that may be reactive in the absence of significant T1 marrow replacement, however, early infection may also be considered.  On 10/24, he underwent partial amputation of 4th ray of R foot.  Prox bone culture grew E.coli, Pluralibacter gergoviae, S.epi, and Corynebacterium amycolatum.   On 10/27, he underwent angiogram and shockwave lithotripsy and ballooning of AT, peroneal ballooning.  ID recommended 6 week course of dapto and ceftriaxone via PICC (10/24-12/5).  Plantar skin just proximal to surgical site showed early signs of ischemia and wound base was significantly fibrotic.   He declined further debridement.  He was d/jasen on 10/31 for home OPAT.  He was readmitted on 11/8 for hypertensive urgency and acute CHF exacerbation related to cocaine use.  He was seen by Podiatry – wound fibrotic with no signs of infection, poor healing.  He refused further surgical intervention.  He was seen by ID – reported adherence to IV antibiotics.  On 11/10, he left AMA.  PICC line was removed and he was given levofloxacin to complete course.  Unclear that he took this medication.  He returned on 12/1 with fall on knee, reported increasing pain and drainage R foot.  He was afebrile.  Labs with WBC 13.81 with 83% PMNs, Na 130, glc 476, alb 1.8, alk phos 201.  He was started on vanc and pip-tazo.  He was seen by Podiatry – noted serous drainage from R foot surgical wound at distal 4th ray, fibrotic wound bed with malodor. R prox medial midfoot wound with fibrotic slough.  Tunneling noted prox toward navicular bone with serous drainage, positive PTB and malodor.  CTA showed increased ST gasa in the forefoot with no drainable abscess.  He is adamantly refusing TMA and he is thought to need BKA.      PAST MEDICAL & SURGICAL HISTORY:  IDDM (insulin dependent diabetes mellitus)      HTN (hypertension)      CAD S/P percutaneous coronary angioplasty      Neuropathy      PAD (peripheral artery disease)      PNA (pneumonia)      Gangrene of toe of right foot      Moderate aortic stenosis      Acute on chronic diastolic congestive heart failure      Hyperlipidemia      History of partial ray amputation of fourth toe of right foot              MEDICATIONS  (STANDING):  aspirin enteric coated 81 milliGRAM(s) Oral every 24 hours  atorvastatin 80 milliGRAM(s) Oral at bedtime  carvedilol 12.5 milliGRAM(s) Oral every 12 hours  dextrose 5%. 1000 milliLiter(s) (100 mL/Hr) IV Continuous <Continuous>  dextrose 5%. 1000 milliLiter(s) (50 mL/Hr) IV Continuous <Continuous>  dextrose 50% Injectable 12.5 Gram(s) IV Push once  dextrose 50% Injectable 25 Gram(s) IV Push once  dextrose 50% Injectable 25 Gram(s) IV Push once  gabapentin 800 milliGRAM(s) Oral three times a day  glucagon  Injectable 1 milliGRAM(s) IntraMuscular once  hydrALAZINE 50 milliGRAM(s) Oral every 8 hours  insulin glargine Injectable (LANTUS) 14 Unit(s) SubCutaneous at bedtime  insulin lispro (ADMELOG) corrective regimen sliding scale   SubCutaneous Before meals and at bedtime  lisinopril 40 milliGRAM(s) Oral every 24 hours  piperacillin/tazobactam IVPB.. 3.375 Gram(s) IV Intermittent every 8 hours  vancomycin  IVPB 1000 milliGRAM(s) IV Intermittent every 12 hours    MEDICATIONS  (PRN):  acetaminophen     Tablet .. 650 milliGRAM(s) Oral every 6 hours PRN Temp greater or equal to 38C (100.4F), Mild Pain (1 - 3)  dextrose Oral Gel 15 Gram(s) Oral once PRN Blood Glucose LESS THAN 70 milliGRAM(s)/deciliter  oxyCODONE    IR 5 milliGRAM(s) Oral every 6 hours PRN Severe Pain (7 - 10)      Allergies    No Known Allergies    Intolerances        SOCIAL HISTORY:  Single, no children, doesn't work, no pets.  Denies tobacco, alcohol or recreational drug use.    FAMILY HISTORY:  No pertinent family history in first degree relatives.     ROS:  No HA, photophobia, neck stiffness, rhinorrhea, sore throat, cough, CP, SOB, N, V, diarrhea, abd pain, dysuria, hematuria, frequency, bruising/bleeding       Vital Signs Last 24 Hrs  T(C): 37.5 (02 Dec 2023 21:08), Max: 37.6 (01 Dec 2023 23:41)  T(F): 99.5 (02 Dec 2023 21:08), Max: 99.7 (01 Dec 2023 23:41)  HR: 73 (02 Dec 2023 21:08) (64 - 73)  BP: 167/75 (02 Dec 2023 21:08) (160/82 - 183/84)  BP(mean): 105 (02 Dec 2023 21:08) (105 - 105)  RR: 18 (02 Dec 2023 21:08) (18 - 20)  SpO2: 100% (02 Dec 2023 21:08) (95% - 100%)    Parameters below as of 02 Dec 2023 21:08  Patient On (Oxygen Delivery Method): room air        PE:  Alert, in no distress  HEENT:  NC, PERRL, sclerae anicteric, conjunctivae clear.  Sinuses nontender, no nasal exudate.  No buccal or pharyngeal lesions, erythema or exudate  Neck:  Supple, no adenopathy  Lungs:  Clear to auscultation  Cor:  RRR, S1, S2, no murmur appreciated  Abd:  Symmetric, normoactive BS.  Soft, nontender, no masses, guarding or rebound.  Liver and spleen not enlarged  Extrem:  No cyanosis or edema.  R foot wrapped by Podiatry - images viewed.    Skin:  No rashes.    LABS:                        8.1    14.05 )-----------( 168      ( 02 Dec 2023 06:10 )             25.2     12-02    132<L>  |  97  |  16  ----------------------------<  212<H>  3.9   |  27  |  1.02    Ca    8.3<L>      02 Dec 2023 06:10  Phos  3.5     12-01  Mg     2.0     12-01    TPro  6.7  /  Alb  1.8<L>  /  TBili  0.5  /  DBili  x   /  AST  25  /  ALT  19  /  AlkPhos  201<H>  12-01    Sedimentation Rate, Erythrocyte: 117 mm/Hr (12.02.23 @ 06:10)    C-Reactive Protein, Serum: 95.6 mg/L (12.02.23 @ 06:10)    MICROBIOLOGY:    Wound culture 12/2:  rare WBCs, few GPR, rare GNRs        Urinalysis Basic - ( 02 Dec 2023 06:10 )    Color: x / Appearance: x / SG: x / pH: x  Gluc: 212 mg/dL / Ketone: x  / Bili: x / Urobili: x   Blood: x / Protein: x / Nitrite: x   Leuk Esterase: x / RBC: x / WBC x   Sq Epi: x / Non Sq Epi: x / Bacteria: x        RADIOLOGY & ADDITIONAL STUDIES:    < from: CT Angio Lower Extremity w/ IV Cont, Bilateral (12.01.23 @ 17:13) >  FINDINGS:    CENTRAL ARTERIAL SYSTEM:    Atherosclerotic changes. No abdominal aortic aneurysm. Celiac axis, SMA   and EMIR are widely patent as are bilateral main renal arteries and   accessory left renal artery. Atherosclerotic changes.      RIGHT LOWER EXTREMITY:    No aneurysm or evidence of hemodynamically significant narrowing in the   bilateral common, internal or external iliac arteries.  Widely patent common femoral artery and proximal profunda femoris.   Femoral artery and popliteal artery demonstrate mild intermittent   atherosclerotic changes without significant stenosis. There is 2 vessel   runoff belowthe knee via the posterior tibial and peroneal arteries.  Increased soft tissue gas in the foot predominantly forefoot. No   drainable abscess. Amputated fourth toe and fourth metatarsal head. No   aggressive cortical erosive changes compared to the prior study.    LEFT LOWER EXTREMITY:    Widely patent common femoral artery and proximal profunda femoris.   Femoral artery and popliteal artery demonstrate mild intermittent   atherosclerotic changes without significant stenosis. There is 2 vessel   runoff below the knee via the posterior tibial and peroneal arteries.    ADDITIONAL FINDINGS:    LOWER CHEST: Small bilateral pleural effusions, mild adjacent atelectasis.    LIVER: Within normal limits.  BILE DUCTS: Normal caliber.  GALLBLADDER: Within normal limits.  SPLEEN: Within normal limits.  PANCREAS: Within normal limits.  ADRENALS: Within normal limits.  KIDNEYS/URETERS: Within normal limits.    BLADDER: Within normal limits.  REPRODUCTIVE ORGANS: Prostate within normal limits. Diffuse scrotal wall   edema.    BOWEL: No bowel obstruction.  PERITONEUM: No ascites.  RETROPERITONEUM/LYMPH NODES: No lymphadenopathy.  ABDOMINAL WALL: Diffuse edema about the body wall and bilateral lower   extremities.  BONES: Degenerative changes bilateral hips and spine.    IMPRESSION:  Increased soft tissue gas in the forefoot. No drainable abscess.  Patent two vessel runoff in both lower extremities.  Marked diffuse body wall and lower extremity edema.  Small bilateral pleural effusions.  No significant knee joint effusion.    < end of copied text >

## 2023-12-02 NOTE — CONSULT NOTE ADULT - ASSESSMENT
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 Pt with most recent admission from 11/01-11/10 for hypertensive emergency 2/2 to cocaine use and left AMA. PICC line removed as pt left AMA and was given 25 days of PO levaquin. Podiatry consulted to evaluate R foot infection.  At time of consult, afebrile, hypertensive, WBC 13.54, ESR and CRP pending. CT scan with findings of soft tissue air of the forefoot and possibly the midfoot.. Clinically, R 4th ray wound fibrotic an extensive poor skin quality of the plantar foot. Proximal medial midfoot wound with tracking 4-5cm proximally towards the Talo-navicular area. R foot infection 2/2 to non-healing foot wound and poor pt compliance.     Discussed with pt at time of consult with regards to surgical intervention for source control. Pt at this time is denying any surgical intervention. Pt expresses full understanding of risks of delaying surgical intervention in the setting of gas gangrene. Explained to pt, the infection may spread proximally towards the ankle and beyond and pt may become septic and lead to death. Pt continues to refuse. Will revisit conversation pt again later today. Advised pt to maintain NPO status, and pt states he will be non-compliant and will have food. Currently is requesting breakfast. During the entire conversation, pt noted to slump and sleep mid sentence.     Plan   - Pt evaluated and chart reviewed  - Sharp excisional debridement of R foot surgical site wound, Plantar proximal midfoot ulceration and hyperkeratotic skin with sterile #15 blade, down to and including deep dermis   Local wound care: cleansed with NS. Applied betadine soaked packing to plantar midfoot wound. Covered all wounds with betadine soaked gauze, and kerlix.   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 Ensure pre-op labs are completed (CBC, BMP, Coags, T&S x 2, EKG, CXR). Keep pt NPO. Currently pt refusing to adhere.   - Rest of care per primary team     Podiatry following     Plan:   - Pt educated on poor healing potential of surgical site. Pt continues to refuse further surgical intervention. Pt educated that he is at risk of further infection. Pt continues to refuse further intervention.   - Wound cleansed with normal saline. Packed with 1/2" plain packing, betadine gauze, kerlix, and ACE.  - C/w 6 weeks of IV dapto/vanc for residual OM through 12/5  - X-rays reviewed and d/w pt   - Pt can heel WBAT to RLE   - Rest of care per primary team.     Plan d/w Attending. Podiatry following. 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 Pt with most recent admission from 11/01-11/10 for hypertensive emergency 2/2 to cocaine use and left AMA. PICC line removed as pt left AMA and was given 25 days of PO levaquin. Podiatry consulted to evaluate R foot infection.  At time of consult, afebrile, hypertensive, WBC 13.54, ESR and CRP pending. CT scan with findings of soft tissue air of the forefoot and possibly the midfoot.. Clinically, R 4th ray wound fibrotic an extensive poor skin quality of the plantar foot. Proximal medial midfoot wound with tracking 4-5cm proximally towards the Talo-navicular area. R foot infection 2/2 to non-healing foot wound and poor pt compliance.     Discussed with pt at time of consult with regards to surgical intervention for source control. Pt at this time is denying any surgical intervention. Pt expresses full understanding of risks of delaying surgical intervention in the setting of gas gangrene. Explained to pt, the infection may spread proximally towards the ankle and beyond and pt may become septic and lead to death. Pt continues to refuse. Will revisit conversation pt again later today. Advised pt to maintain NPO status, and pt states he will be non-compliant and will have food. Currently is requesting breakfast. During the entire conversation, pt noted to slump and sleep mid sentence.     Plan   - Pt evaluated and chart reviewed  - Sharp excisional debridement of R foot surgical site wound, Plantar proximal midfoot ulceration and hyperkeratotic skin with sterile #15 blade, down to and including deep dermis   Local wound care: cleansed with NS. Applied betadine soaked packing to plantar midfoot wound. Covered all wounds with betadine soaked gauze, and kerlix.   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 Ensure pre-op labs are completed (CBC, BMP, Coags, T&S x 2, EKG, CXR). Keep pt NPO. Currently pt refusing to adhere.   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 Pt with most recent admission from 11/01-11/10 for hypertensive emergency 2/2 to cocaine use and left AMA. PICC line removed as pt left AMA and was given 25 days of PO levaquin. Podiatry consulted to evaluate R foot infection.  At time of consult, afebrile, hypertensive, WBC 13.54, ESR and CRP pending. CT scan with findings of soft tissue air of the forefoot and possibly the midfoot.. Clinically, R 4th ray wound fibrotic an extensive poor skin quality of the plantar foot. Proximal medial midfoot wound with tracking 4-5cm proximally towards the Talo-navicular area. R foot infection 2/2 to non-healing foot wound and poor pt compliance.     Discussed with pt at time of consult with regards to surgical intervention for source control. Pt at this time is denying any surgical intervention. Pt expresses full understanding of risks of delaying surgical intervention in the setting of gas gangrene. Explained to pt, the infection may spread proximally towards the ankle and beyond and pt may become septic and lead to death. Pt continues to refuse. Will revisit conversation pt again later today. Advised pt to maintain NPO status, and pt states he will be non-compliant and will have food. Currently is requesting breakfast. During the entire conversation, pt noted to slump and sleep mid sentence.     Plan   - Pt evaluated and chart reviewed  - Sharp excisional debridement of R foot surgical site wound, Plantar proximal midfoot ulceration and hyperkeratotic skin with sterile #15 blade, down to and including deep dermis   Local wound care: cleansed with NS. Applied betadine soaked packing to plantar midfoot wound. Covered all wounds with betadine soaked gauze, and kerlix.   - Rec ID consult for abx management   - f/u deep wound cx from [facility] wound sinus tract   - C/w with IV abx, rec additional of clindamycin for toxin coverage in setting of gas gangrene   - f/u ESR and CRP   - Rec urgent plain film XR of the R foot, ankle, and Tib fib   - WBAT to R heel   - Please keep pt optimized for OR. Ensure pre-op labs are completed (CBC, BMP, Coags, T&S x 2, EKG, CXR). Keep pt NPO. Currently pt refusing to adhere.   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 Pt with most recent admission from 11/01-11/10 for hypertensive emergency 2/2 to cocaine use and left AMA. PICC line removed as pt left AMA and was given 25 days of PO levaquin. Podiatry consulted to evaluate R foot infection.  At time of consult, afebrile, hypertensive, WBC 13.54, ESR and CRP pending. CT scan with findings of soft tissue air of the forefoot and possibly the midfoot.. Clinically, R 4th ray wound fibrotic an extensive poor skin quality of the plantar foot. Proximal medial midfoot wound with tracking 4-5cm proximally towards the Talo-navicular area. R foot infection 2/2 to non-healing foot wound and poor pt compliance.     Discussed with pt at time of consult with regards to surgical intervention for source control. Podiatry recommended TMA vs Chopart amputation of the R foot. Plantar foot skin is of poor quality and  pt will likely require BKA amputation for functionality and/or adequate healing post op. Pt at this time is adamantly refusing any surgical intervention. Pt expresses full understanding of risks of delaying surgical intervention in the setting of gas gangrene. Explained to pt, the infection may spread proximally towards the ankle and beyond, may need a more proximal amputation, and/or pt may become septic and be life threatening  Pt continues to refuse. Advised pt to maintain NPO status, and pt states he will be non-compliant and will have food. Currently is requesting breakfast. During the entire conversation, pt noted to slump and sleep mid sentence. Pt resorted to verbal abusive language.     Plan   - Pt evaluated and chart reviewed  - Sharp excisional debridement of R foot surgical site wound, Plantar proximal midfoot ulceration and hyperkeratotic skin with sterile #15 blade, down to and including deep dermis   Local wound care: cleansed with NS. Applied betadine soaked packing to plantar midfoot wound. Covered all wounds with betadine soaked gauze, and kerlix.   - Rec ID consult for abx management   - f/u deep wound cx from [facility] wound sinus tract   - C/w with IV abx, rec additional of clindamycin for toxin coverage in setting of gas gangrene   - f/u ESR and CRP   - Rec urgent plain film XR of the R foot, ankle, and Tib fib   - WBAT to R heel   - Please keep pt optimized for OR. Ensure pre-op labs are completed (CBC, BMP, Coags, T&S x 2, EKG, CXR). Keep pt NPO. Currently pt refusing to adhere.   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 Pt with most recent admission from 11/01-11/10 for hypertensive emergency 2/2 to cocaine use and left AMA. PICC line removed as pt left AMA and was given 25 days of PO levaquin. Podiatry consulted to evaluate R foot infection.  At time of consult, afebrile, hypertensive, WBC 13.54, ESR and CRP pending. CT scan with findings of soft tissue air of the forefoot and possibly the midfoot.. Clinically, R 4th ray wound fibrotic an extensive poor skin quality of the plantar foot. Proximal medial midfoot wound with tracking 4-5cm proximally towards the Talo-navicular area. R foot infection 2/2 to non-healing foot wound and poor pt compliance.     Discussed with pt at time of consult with regards to surgical intervention for source control. Podiatry recommended TMA vs Chopart amputation of the R foot. Plantar foot skin is of poor quality and  pt will likely require BKA amputation for functionality and/or adequate healing post op. Pt at this time is adamantly refusing any surgical intervention. Pt expresses full understanding of risks of delaying surgical intervention in the setting of gas gangrene. Explained to pt, the infection may spread proximally towards the ankle and beyond, may need a more proximal amputation, and/or pt may become septic and be life threatening  Pt continues to refuse. Advised pt to maintain NPO status, and pt states he will be non-compliant and will have food. Currently is requesting breakfast. During the entire conversation, pt noted to slump and sleep mid sentence. Pt resorted to verbal abusive language.     Plan   - Pt evaluated and chart reviewed  - Sharp excisional debridement of R foot surgical site wound, Plantar proximal midfoot ulceration and hyperkeratotic skin with sterile #15 blade, down to and including deep dermis   Local wound care: cleansed with NS. Applied betadine soaked packing to plantar midfoot wound. Covered all wounds with betadine soaked gauze, and kerlix.   - Rec ID consult for abx management   - f/u deep wound cx from [facility] wound sinus tract   - C/w with IV abx, rec additional of clindamycin for toxin coverage in setting of gas gangrene   - f/u ESR and CRP   - Rec urgent plain film XR of the R foot, ankle, and Tib fib   - WBAT to R heel   - Please keep pt optimized for OR. Ensure pre-op labs are completed (CBC, BMP, Coags, T&S x 2, EKG, CXR). Keep pt NPO. Currently pt refusing to adhere.   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 Pt with most recent admission from 11/01-11/10 for hypertensive emergency 2/2 to cocaine use and left AMA. PICC line removed as pt left AMA and was given 25 days of PO levaquin. Podiatry consulted to evaluate R foot infection.  At time of consult, afebrile, hypertensive, WBC 13.54, ESR and CRP pending. CT scan with findings of soft tissue air of the forefoot and possibly the midfoot.. Clinically, R 4th ray wound fibrotic an extensive poor skin quality of the plantar foot. Proximal medial midfoot wound with tracking 4-5cm proximally towards the Talo-navicular area. R foot infection 2/2 to non-healing foot wound and poor pt compliance.     Discussed with pt at time of consult with regards to surgical intervention for source control. Podiatry recommended TMA vs Chopart amputation of the R foot. Plantar foot skin is of poor quality and  pt will likely require BKA amputation for functionality and/or adequate healing post op. Pt at this time is adamantly refusing any surgical intervention. Pt expresses full understanding of risks of delaying surgical intervention in the setting of gas gangrene. Explained to pt, the infection may spread proximally towards the ankle and beyond, may need a more proximal amputation, and/or pt may become septic and be life threatening  Pt continues to refuse. Advised pt to maintain NPO status, and pt states he will be non-compliant and will have food. Currently is requesting breakfast. During the entire conversation, pt noted to slump and sleep mid sentence. Pt resorted to verbal abusive language.     Plan   - Pt evaluated and chart reviewed  - Sharp excisional debridement of R foot surgical site wound, Plantar proximal midfoot ulceration and hyperkeratotic skin with sterile #15 blade, down to and including deep dermis   Local wound care: cleansed with NS. Applied betadine soaked packing to plantar midfoot wound. Covered all wounds with betadine soaked gauze, and kerlix.   - Rec ID consult for abx management   - f/u deep wound cx from [facility] wound sinus tract   - C/w with IV abx, rec additional of clindamycin for toxin coverage in setting of gas gangrene   - f/u ESR and CRP   - Rec urgent plain film XR of the R foot, ankle, and Tib fib   - WBAT to R heel   - Please keep pt optimized for OR. Ensure pre-op labs are completed (CBC, BMP, Coags, T&S x 2, EKG, CXR). Keep pt NPO. Currently pt refusing to adhere.   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 Pt with most recent admission from 11/01-11/10 for hypertensive emergency 2/2 to cocaine use and left AMA. PICC line removed as pt left AMA and was given 25 days of PO levaquin. Podiatry consulted to evaluate R foot infection.  At time of consult, afebrile, hypertensive, WBC 13.54, ESR and CRP pending. CT scan with findings of soft tissue air of the forefoot and possibly the midfoot.. Clinically, R 4th ray wound fibrotic an extensive poor skin quality of the plantar foot. Proximal medial midfoot wound with tracking 4-5cm proximally towards the Talo-navicular area. R foot infection 2/2 to non-healing foot wound and poor pt compliance.     Discussed with pt at time of consult with regards to surgical intervention for source control. Podiatry recommended, at minimum, TMA vs Chopart amputation of the R foot. Plantar foot skin is of poor quality and pt will likely require BKA amputation for functionality and/or adequate healing post op. Pt at this time is adamantly refusing any surgical intervention. Pt expresses full understanding of risks of delaying surgical intervention in the setting of gas gangrene. Explained to pt, the infection may spread proximally towards the ankle and beyond, may need a more proximal amputation, and/or pt may become septic and be life threatening  Pt continues to refuse. Advised pt to maintain NPO status, and pt states he will be non-compliant and will have food. Currently is requesting breakfast. During the entire conversation, pt noted to slump and sleep mid sentence. Pt resorted to verbal abusive language.     Plan   - Pt evaluated and chart reviewed  - Sharp excisional debridement of R foot surgical site wound, Plantar proximal midfoot ulceration and hyperkeratotic skin with sterile #15 blade, down to and including deep dermis   Local wound care: cleansed with NS. Applied betadine soaked packing to plantar midfoot wound. Covered all wounds with betadine soaked gauze, and kerlix.   - Rec ID consult for abx management   - f/u deep wound cx from [facility] wound sinus tract   - C/w with IV abx, rec additional of clindamycin for toxin coverage in setting of gas gangrene   - f/u ESR and CRP   - Rec urgent plain film XR of the R foot, ankle, and Tib fib   - WBAT to R heel   - Please keep pt optimized for OR. Ensure pre-op labs are completed (CBC, BMP, Coags, T&S x 2, EKG, CXR). Keep pt NPO. Currently pt refusing to adhere.   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 Pt with most recent admission from 11/01-11/10 for hypertensive emergency 2/2 to cocaine use and left AMA. PICC line removed as pt left AMA and was given 25 days of PO levaquin. Podiatry consulted to evaluate R foot infection.  At time of consult, afebrile, hypertensive, WBC 13.54, ESR and CRP pending. CT scan with findings of soft tissue air of the forefoot and possibly the midfoot.. Clinically, R 4th ray wound fibrotic an extensive poor skin quality of the plantar foot. Proximal medial midfoot wound with tracking 4-5cm proximally towards the Talo-navicular area. R foot infection 2/2 to non-healing foot wound and poor pt compliance.     Discussed with pt at time of consult with regards to surgical intervention for source control. Podiatry recommended, at minimum, TMA vs Chopart amputation of the R foot. Plantar foot skin is of poor quality and pt will likely require BKA amputation for functionality and/or adequate healing post op. Pt at this time is adamantly refusing any surgical intervention. Pt expresses full understanding of risks of delaying surgical intervention in the setting of gas gangrene. Explained to pt, the infection may spread proximally towards the ankle and beyond, may need a more proximal amputation, and/or pt may become septic and be life threatening  Pt continues to refuse. Advised pt to maintain NPO status, and pt states he will be non-compliant and will have food. Currently is requesting breakfast. During the entire conversation, pt noted to slump and sleep mid sentence. Pt resorted to verbal abusive language.     Plan   - Pt evaluated and chart reviewed  - Sharp excisional debridement of R foot surgical site wound, Plantar proximal midfoot ulceration and hyperkeratotic skin with sterile #15 blade, down to and including deep dermis   Local wound care: cleansed with NS. Applied betadine soaked packing to plantar midfoot wound. Covered all wounds with betadine soaked gauze, and kerlix.   - Rec ID consult for abx management   - f/u deep wound cx from [facility] wound sinus tract   - C/w with IV abx, rec additional of clindamycin for toxin coverage in setting of gas gangrene   - f/u ESR and CRP   - Rec urgent plain film XR of the R foot, ankle, and Tib fib   - WBAT to R heel   - Please keep pt optimized for OR. Ensure pre-op labs are completed (CBC, BMP, Coags, T&S x 2, EKG, CXR). Keep pt NPO. Currently pt refusing to adhere.   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 Pt with most recent admission from 11/01-11/10 for hypertensive emergency 2/2 to cocaine use and left AMA. PICC line removed as pt left AMA and was given 25 days of PO levaquin. Podiatry consulted to evaluate R foot infection.  At time of consult, afebrile, hypertensive, WBC 13.54, ESR and CRP pending. CT scan with findings of soft tissue air of the forefoot and possibly the midfoot.. Clinically, R 4th ray wound fibrotic an extensive poor skin quality of the plantar foot. Proximal medial midfoot wound with tracking 4-5cm proximally towards the Talo-navicular area. R foot infection 2/2 to non-healing foot wound and poor pt compliance.     Discussed with pt at time of consult with regards to surgical intervention for source control. Podiatry recommended, at minimum, TMA vs Chopart amputation of the R foot. Plantar foot skin is of poor quality and pt will likely require BKA amputation for functionality and/or adequate healing post op. Pt at this time is adamantly refusing any surgical intervention. Pt expresses full understanding of risks of delaying surgical intervention in the setting of gas gangrene. Explained to pt, the infection may spread proximally towards the ankle and beyond, may need a more proximal amputation, and/or pt may become septic and be life threatening  Pt continues to refuse. Advised pt to maintain NPO status, and pt states he will be non-compliant and will have food. Currently is requesting breakfast. During the entire conversation, pt noted to slump and sleep mid sentence. Pt resorted to verbal abusive language.     Plan   - Pt evaluated and chart reviewed  - Sharp excisional debridement of R foot surgical site wound, Plantar proximal midfoot ulceration and hyperkeratotic skin with sterile #15 blade, down to and including deep dermis   Local wound care: cleansed with NS. Applied betadine soaked packing to plantar midfoot wound. Covered all wounds with betadine soaked gauze, and kerlix.   - Rec ID consult for abx management   - f/u deep wound cx from [facility] wound sinus tract   - C/w with IV abx, rec additional of clindamycin for toxin coverage in setting of gas gangrene   - f/u ESR and CRP   - Rec urgent plain film XR of the R foot, ankle, and Tib fib   - WBAT to R heel   - Please keep pt optimized for OR. Ensure pre-op labs are completed (CBC, BMP, Coags, T&S x 2, EKG, CXR). Keep pt NPO. Currently pt refusing to adhere.   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 Pt with most recent admission from 11/01-11/10 for hypertensive emergency 2/2 to cocaine use and left AMA. PICC line removed as pt left AMA and was given 25 days of PO levaquin. Podiatry consulted to evaluate R foot infection.  At time of consult, afebrile, hypertensive, WBC 13.54, ESR and CRP pending. CT scan with findings of soft tissue air of the forefoot and possibly the midfoot.. Clinically, R 4th ray wound fibrotic an extensive poor skin quality of the plantar foot. Proximal medial midfoot wound with tracking 4-5cm proximally towards the Talo-navicular area. R foot infection 2/2 to non-healing foot wound and poor pt compliance.     Discussed with pt at time of consult with regards to surgical intervention for source control. Podiatry recommended, at minimum, a Chopart amputation of the R foot. Plantar foot skin is of poor quality and pt will likely require BKA amputation for functionality and/or adequate healing post op. Pt at this time is adamantly refusing any surgical intervention. Pt expresses full understanding of risks of delaying surgical intervention in the setting of gas gangrene. Explained to pt, the infection may spread proximally towards the ankle and beyond, may need a more proximal amputation, and/or pt may become septic and be life threatening  Pt continues to refuse. Advised pt to maintain NPO status, and pt states he will be non-compliant and will have food. Currently is requesting breakfast. During the entire conversation, pt noted to slump and sleep mid sentence. Pt resorted to verbal abusive language.     Plan   - Pt evaluated and chart reviewed  - Sharp excisional debridement of R foot surgical site wound, Plantar proximal midfoot ulceration and hyperkeratotic skin with sterile #15 blade, down to and including deep dermis   Local wound care: cleansed with NS. Applied betadine soaked packing to plantar midfoot wound. Covered all wounds with betadine soaked gauze, and kerlix.   - Rec ID consult for abx management   - f/u deep wound cx from [facility] medical wound sinus tract   - C/w with IV abx, rec additional of clindamycin for toxin coverage in setting of gas gangrene   - f/u ESR and CRP   - Rec urgent plain film XR of the R foot, ankle, and Tib fib   - WBAT to R heel   - Please keep pt optimized for OR. Ensure pre-op labs are completed (CBC, BMP, Coags, T&S x 2, EKG, CXR). Keep pt NPO. Currently pt refusing to adhere.   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 Pt with most recent admission from 11/01-11/10 for hypertensive emergency 2/2 to cocaine use and left AMA. PICC line removed as pt left AMA and was given 25 days of PO levaquin. Podiatry consulted to evaluate R foot infection.  At time of consult, afebrile, hypertensive, WBC 13.54, ESR and CRP pending. CT scan with findings of soft tissue air of the forefoot and possibly the midfoot.. Clinically, R 4th ray wound fibrotic an extensive poor skin quality of the plantar foot. Proximal medial midfoot wound with tracking 4-5cm proximally towards the Talo-navicular area. R foot infection 2/2 to non-healing foot wound and poor pt compliance.     Discussed with pt at time of consult with regards to surgical intervention for source control. Podiatry recommended, at minimum, a Chopart amputation of the R foot. Plantar foot skin is of poor quality and pt will likely require BKA amputation for functionality and/or adequate healing post op. Pt at this time is adamantly refusing any surgical intervention. Pt expresses full understanding of risks of delaying surgical intervention in the setting of gas gangrene. Explained to pt, the infection may spread proximally towards the ankle and beyond, may need a more proximal amputation, and/or pt may become septic and be life threatening  Pt continues to refuse. Advised pt to maintain NPO status, and pt states he will be non-compliant and will have food. Currently is requesting breakfast. During the entire conversation, pt noted to slump and sleep mid sentence. Pt resorted to verbal abusive language.     Plan   - Pt evaluated and chart reviewed  - Sharp excisional debridement of R foot surgical site wound, Plantar proximal midfoot ulceration and hyperkeratotic skin with sterile #15 blade, down to and including deep dermis   Local wound care: cleansed with NS. Applied betadine soaked packing to plantar midfoot wound. Covered all wounds with betadine soaked gauze, and kerlix.   - Rec ID consult for abx management   - f/u deep wound cx from [facility] medical wound sinus tract   - C/w with IV abx, rec additional of clindamycin for toxin coverage in setting of gas gangrene   - f/u ESR and CRP   - Rec urgent plain film XR of the R foot, ankle, and Tib fib   - WBAT to R heel   - Please keep pt optimized for OR. Ensure pre-op labs are completed (CBC, BMP, Coags, T&S x 2, EKG, CXR). Keep pt NPO. Currently pt refusing to adhere.   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 Pt with most recent admission from 11/01-11/10 for hypertensive emergency 2/2 to cocaine use and left AMA. PICC line removed as pt left AMA and was given 25 days of PO levaquin. Podiatry consulted to evaluate R foot infection.  At time of consult, afebrile, hypertensive, WBC 13.54, ESR and CRP pending. CT scan with findings of soft tissue air of the forefoot and possibly the midfoot.. Clinically, R 4th ray wound fibrotic an extensive poor skin quality of the plantar foot. Proximal medial midfoot wound with tracking 4-5cm proximally towards the Talo-navicular area. R foot infection 2/2 to non-healing foot wound and poor pt compliance.     Discussed with pt at time of consult with regards to surgical intervention for source control. Podiatry recommended, at minimum, a Chopart amputation of the R foot. Plantar foot skin is of poor quality and pt will likely require BKA amputation for functionality and/or adequate healing post op. Pt at this time is adamantly refusing any surgical intervention. Pt expresses full understanding of risks of delaying surgical intervention in the setting of gas gangrene. Explained to pt, the infection may spread proximally towards the ankle and beyond, may need a more proximal amputation, and/or pt may become septic and be life threatening  Pt continues to refuse. Advised pt to maintain NPO status, and pt states he will be non-compliant and will have food. Currently is requesting breakfast. During the entire conversation, pt noted to slump and sleep mid sentence. Pt resorted to verbal abusive language.     Plan   - Pt evaluated and chart reviewed  - Sharp excisional debridement of R foot surgical site wound, Plantar proximal midfoot ulceration and hyperkeratotic skin with sterile #15 blade, down to and including deep dermis   Local wound care: cleansed with NS. Applied betadine soaked packing to plantar midfoot wound. Covered all wounds with betadine soaked gauze, and kerlix.   - Rec ID consult for abx management   - f/u deep wound cx from [facility] medical wound sinus tract   - C/w with IV abx, rec additional of clindamycin for toxin coverage in setting of gas gangrene   - f/u ESR and CRP   - Rec urgent plain film XR of the R foot, ankle, and Tib fib   - WBAT to R heel   - Please keep pt optimized for OR. Ensure pre-op labs are completed (CBC, BMP, Coags, T&S x 2, EKG, CXR). Keep pt NPO. Currently pt refusing to adhere.   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 Pt with most recent admission from 11/01-11/10 for hypertensive emergency 2/2 to cocaine use and left AMA. PICC line removed as pt left AMA and was given 25 days of PO levaquin. Podiatry consulted to evaluate R foot infection.  At time of consult, afebrile, hypertensive, WBC 13.54, ESR and CRP pending. CT scan with findings of soft tissue air of the forefoot and possibly the midfoot.. Clinically, R 4th ray wound fibrotic an extensive poor skin quality of the plantar foot. Proximal medial midfoot wound with tracking 4-5cm proximally towards the Talo-navicular area. R foot infection 2/2 to non-healing foot wound and poor pt compliance.     Discussed with pt at time of consult with regards to surgical intervention for source control. Podiatry recommended, at minimum, a Chopart amputation of the R foot. Plantar foot skin is of poor quality and pt will likely require BKA amputation for functionality and/or adequate healing post op. Pt at this time is adamantly refusing any surgical intervention. Pt expresses full understanding of risks of delaying surgical intervention in the setting of gas gangrene. Explained to pt, the infection may spread proximally towards the ankle and beyond, may need a more proximal amputation, and/or pt may become septic and be life threatening  Pt continues to refuse. Advised pt to maintain NPO status, and pt states he will be non-compliant and will have food. Currently is requesting breakfast. During the entire conversation, pt noted to slump and sleep mid sentence. Pt resorted to verbal abusive language.     Plan   - Pt evaluated and chart reviewed  - Sharp excisional debridement of R foot surgical site wound, Plantar proximal midfoot ulceration and hyperkeratotic skin with sterile #15 blade, down to and including deep dermis   Local wound care: cleansed with NS. Applied betadine soaked packing to plantar midfoot wound. Covered all wounds with betadine soaked gauze, and kerlix.   - Rec ID consult for abx management   - f/u deep wound cx from proximal medial wound sinus tract   - C/w with IV abx, rec additional of clindamycin for toxin coverage in setting of gas gangrene   - f/u ESR and CRP   - Rec urgent plain film XR of the R foot, ankle, and Tib fib   - WBAT to R heel   - Please keep pt optimized for OR. Ensure pre-op labs are completed (CBC, BMP, Coags, T&S x 2, EKG, CXR). Keep pt NPO. Currently pt refusing to adhere.   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 Pt with most recent admission from 11/01-11/10 for hypertensive emergency 2/2 to cocaine use and left AMA. PICC line removed as pt left AMA and was given 25 days of PO levaquin. Podiatry consulted to evaluate R foot infection.  At time of consult, afebrile, hypertensive, WBC 13.54, ESR and CRP pending. CT scan with findings of soft tissue air of the forefoot and possibly the midfoot.. Clinically, R 4th ray wound fibrotic an extensive poor skin quality of the plantar foot. Proximal medial midfoot wound with tracking 4-5cm proximally towards the Talo-navicular area. R foot infection 2/2 to non-healing foot wound and poor pt compliance.     Discussed with pt at time of consult with regards to surgical intervention for source control. Podiatry recommended to pt, at minimum, a Chopart amputation of the R foot for initial source control. Pt will likely require R BKA as the plantar foot skin/soft tissue is poor, hx of post op non-compliance, and poor ambulatory functionality of a Chopart amputation. Pt at this time is adamantly refusing any surgical intervention. Pt expresses full understanding of risks of delaying surgical intervention in the setting of gas gangrene. Explained to pt, the infection may spread proximally towards the ankle and beyond, may need a more proximal amputation, and/or pt may become septic and be life threatening  Pt continues to refuse. Advised pt to maintain NPO status, and pt states he will be non-compliant and will have food. Currently is requesting breakfast. During the entire conversation, pt noted to slump and sleep mid sentence. Pt resorted to verbal abusive language.     Plan   - Pt evaluated and chart reviewed  - Sharp excisional debridement of R foot surgical site wound, Plantar proximal midfoot ulceration and hyperkeratotic skin with sterile #15 blade, down to and including deep dermis   Local wound care: cleansed with NS. Applied betadine soaked packing to plantar midfoot wound. Covered all wounds with betadine soaked gauze, and kerlix.   - Rec ID consult for abx management   - f/u deep wound cx from proximal medial wound sinus tract   - C/w with IV abx, rec additional of clindamycin for toxin coverage in setting of gas gangrene   - f/u ESR and CRP   - Rec urgent plain film XR of the R foot, ankle, and Tib fib   - WBAT to R heel   - Please keep pt optimized for OR. Ensure pre-op labs are completed (CBC, BMP, Coags, T&S x 2, EKG, CXR). Keep pt NPO. Currently pt refusing to adhere.   - Rest of care per primary team     Podiatry following

## 2023-12-02 NOTE — H&P ADULT - PROBLEM SELECTOR PLAN 2
s/p mechanical fall and hit both knees.   xray b/l showed Moderate to severe left worse than right knee arthrosis without acute displaced fracture.  CT b/l LE did not show any significant knee pathology or effusion of knee    - tylenol standing  - pain control for right foot wound as above

## 2023-12-02 NOTE — H&P ADULT - PROBLEM SELECTOR PLAN 8
F: none  E: replenish as appropriate  N: regular, DASH, consistent carb    VTE Prophylaxis: Lovenox 40mg q24h  GI: not needed  C: Full Code  D: CHONG F: none  E: replenish as appropriate  N: regular, DASH, consistent carb; NPO until Podiatry eval    VTE Prophylaxis: Lovenox 40mg q24h  GI: not needed  C: Full Code  D: CHONG

## 2023-12-02 NOTE — H&P ADULT - PROBLEM SELECTOR PLAN 7
PAD w/ remote RLE angioplasty, R 4th toe OM s/p partial R 4th ray amputation on 10/24, RLE angiogram with AT lithotripsy and AT/peroneal balloon angioplasty 10/27.  Has been seen by Vascular in past.  - c/w home plavix

## 2023-12-02 NOTE — CONSULT NOTE ADULT - SUBJECTIVE AND OBJECTIVE BOX
Vascular Attending:  PADMINI      HPI:  69yo M PMH CAD (2 stents ), grade 1 diastolic dysfunction, HTN, PAD w/ remote RLE angioplasty, R 4th toe OM s/p partial R 4th ray amputation on 10/24, RLE angiogram with AT lithotripsy and AT/peroneal balloon angioplasty 10/27, uncontrolled IDDM (a1c 12 in Oct 2023), recent admission -11/10 to cardiology service for hypertensive emergency (left AMA, was given levaquin when he left for right foot wound that pt had started treatment for back in October) who presented to [facility] after a fall onto his knees and was having b/l knee pain. The initial pain was severe and he felt like they were swollen so he came to the ED. Knee pain is now getting better. Reports having right foot pain for which he takes oxy 10 q8h at home. He does not wrap the foot or put any topical medications on it. Denies fever, chills, SOB, CP.      ED course:  vitals: afebrile, HR 70, /87, RR 16, O2sat 98 on room air  labs: wbc 13, Na 130, glucose 476, BNP 30892  CXR la/ap: persistent small left and a new small right pleural effusion  b/l knee xray: Moderate to severe left worse than right knee arthrosis without acute displaced fracture  CT angio b/l LE: Increased soft tissue gas in the forefoot. No drainable abscess. Patent two vessel runoff in both lower extremities. No significant knee joint effusion.  ECG: regular, sinus, isolated Q wave AVF, borderline LVH  Interventions: regular insulin 10u total, Lasix 40 IV, norvasc 10, morphine 2 IV x2, tylenol 1g IV. Vanc 1g, zosyn 3.375  Intensivist was called in ED for hyperglycemia but since not in DKA no telemetry was needed.  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 1000 milliLiter(s) (50 mL/Hr) IV Continuous <Continuous>  dextrose 5%. 1000 milliLiter(s) (100 mL/Hr) IV Continuous <Continuous>  dextrose 50% Injectable 25 Gram(s) IV Push once  dextrose 50% Injectable 12.5 Gram(s) IV Push once  dextrose 50% Injectable 25 Gram(s) IV Push once  enoxaparin Injectable 40 milliGRAM(s) SubCutaneous every 24 hours  gabapentin 800 milliGRAM(s) Oral three times a day  glucagon  Injectable 1 milliGRAM(s) IntraMuscular once  hydrALAZINE 25 milliGRAM(s) Oral every 8 hours  insulin glargine Injectable (LANTUS) 14 Unit(s) SubCutaneous at bedtime  insulin lispro (ADMELOG) corrective regimen sliding scale   SubCutaneous Before meals and at bedtime  lisinopril 40 milliGRAM(s) Oral every 24 hours  piperacillin/tazobactam IVPB.. 3.375 Gram(s) IV Intermittent every 8 hours  vancomycin  IVPB 1000 milliGRAM(s) IV Intermittent every 12 hours    MEDICATIONS  (PRN):  acetaminophen     Tablet .. 650 milliGRAM(s) Oral every 6 hours PRN Temp greater or equal to 38C (100.4F), Mild Pain (1 - 3)  dextrose Oral Gel 15 Gram(s) Oral once PRN Blood Glucose LESS THAN 70 milliGRAM(s)/deciliter  oxyCODONE    IR 5 milliGRAM(s) Oral every 6 hours PRN Severe Pain (7 - 10)      Allergies    No Known Allergies    Intolerances        SOCIAL HISTORY:    FAMILY HISTORY:      Vital Signs Last 24 Hrs  T(C): 37.6 (01 Dec 2023 23:41), Max: 37.6 (01 Dec 2023 23:41)  T(F): 99.7 (01 Dec 2023 23:41), Max: 99.7 (01 Dec 2023 23:41)  HR: 65 (01 Dec 2023 23:41) (60 - 70)  BP: 183/84 (01 Dec 2023 23:41) (175/88 - 193/87)  BP(mean): --  RR: 18 (01 Dec 2023 23:41) (15 - 20)  SpO2: 96% (01 Dec 2023 23:41) (95% - 98%)    Parameters below as of 01 Dec 2023 23:41  Patient On (Oxygen Delivery Method): room air        PHYSICAL EXAM:      Constitutional: Pt AXOX3 in NAD    Respiratory: unlabored    Cardiovascular: S1S2    Extremities:/l 2+ pitting edema appreciated from ankle to inguinal area. ROM limited due to swelling and pain of hips, knees. NVI, cap refill < 2 sec.      LABS:                        8.1    13.54 )-----------( 172      ( 01 Dec 2023 19:15 )             25.5     12-    130<L>  |  97  |  19  ----------------------------<  476<HH>  4.1   |  27  |  1.29    Ca    8.7      01 Dec 2023 11:51  Phos  3.5     12  Mg     2.0     12    TPro  6.7  /  Alb  1.8<L>  /  TBili  0.5  /  DBili  x   /  AST  25  /  ALT  19  /  AlkPhos  201<H>  12-    PT/INR - ( 01 Dec 2023 15:40 )   PT: 11.6 sec;   INR: 1.03          PTT - ( 01 Dec 2023 15:40 )  PTT:25.2 sec  Urinalysis Basic - ( 01 Dec 2023 12:57 )    Color: Yellow / Appearance: Clear / S.010 / pH: x  Gluc: x / Ketone: Negative mg/dL  / Bili: Negative / Urobili: 0.2 mg/dL   Blood: x / Protein: 100 mg/dL / Nitrite: Negative   Leuk Esterase: Negative / RBC: 3 /HPF / WBC 2 /HPF   Sq Epi: x / Non Sq Epi: x / Bacteria: Negative /HPF        RADIOLOGY & ADDITIONAL STUDIES Vascular Attending:  PADMINI      HPI:  67yo M PMH CAD (2 stents ), grade 1 diastolic dysfunction, HTN, PAD w/ remote RLE angioplasty, R 4th toe OM s/p partial R 4th ray amputation on 10/24, RLE angiogram with AT lithotripsy and AT/peroneal balloon angioplasty 10/27, uncontrolled IDDM (a1c 12 in Oct 2023), recent admission -11/10 to cardiology service for hypertensive emergency (left AMA, was given levaquin when he left for right foot wound that pt had started treatment for back in October) who presented to [facility] after a fall onto his knees and was having b/l knee pain. The initial pain was severe and he felt like they were swollen so he came to the ED. Knee pain is now getting better. Reports having right foot pain for which he takes oxy 10 q8h at home. He does not wrap the foot or put any topical medications on it. Denies fever, chills, SOB, CP.      ED course:  vitals: afebrile, HR 70, /87, RR 16, O2sat 98 on room air  labs: wbc 13, Na 130, glucose 476, BNP 65020  CXR la/ap: persistent small left and a new small right pleural effusion  b/l knee xray: Moderate to severe left worse than right knee arthrosis without acute displaced fracture  CT angio b/l LE: Increased soft tissue gas in the forefoot. No drainable abscess. Patent two vessel runoff in both lower extremities. No significant knee joint effusion.  ECG: regular, sinus, isolated Q wave AVF, borderline LVH  Interventions: regular insulin 10u total, Lasix 40 IV, norvasc 10, morphine 2 IV x2, tylenol 1g IV. Vanc 1g, zosyn 3.375  Intensivist was called in ED for hyperglycemia but since not in DKA no telemetry was needed.  [facility] also contacted ortho for b/l knee pain and Vascular for R foot wound (02 Dec 2023 01:15)      PAST MEDICAL & SURGICAL HISTORY:  IDDM (insulin dependent diabetes mellitus)      HTN (hypertension)  CAD S/P percutaneous coronary angioplasty      Neuropathy      PAD (peripheral artery disease)      PNA (pneumonia)      Gangrene of toe of right foot      Moderate aortic stenosis      Acute on chronic diastolic congestive heart failure      Hyperlipidemia      History of partial ray amputation of fourth toe of right foot          REVIEW OF SYSTEMS  Neg except as in HPI	    MEDICATIONS  (STANDING):  aspirin enteric coated 81 milliGRAM(s) Oral every 24 hours  atorvastatin 80 milliGRAM(s) Oral at bedtime  carvedilol 12.5 milliGRAM(s) Oral every 12 hours  clopidogrel Tablet 75 milliGRAM(s) Oral at bedtime  dextrose 5%. 1000 milliLiter(s) (50 mL/Hr) IV Continuous <Continuous>  dextrose 5%. 1000 milliLiter(s) (100 mL/Hr) IV Continuous <Continuous>  dextrose 50% Injectable 25 Gram(s) IV Push once  dextrose 50% Injectable 12.5 Gram(s) IV Push once  dextrose 50% Injectable 25 Gram(s) IV Push once  enoxaparin Injectable 40 milliGRAM(s) SubCutaneous every 24 hours  gabapentin 800 milliGRAM(s) Oral three times a day  glucagon  Injectable 1 milliGRAM(s) IntraMuscular once  hydrALAZINE 25 milliGRAM(s) Oral every 8 hours  insulin glargine Injectable (LANTUS) 14 Unit(s) SubCutaneous at bedtime  insulin lispro (ADMELOG) corrective regimen sliding scale   SubCutaneous Before meals and at bedtime  lisinopril 40 milliGRAM(s) Oral every 24 hours  piperacillin/tazobactam IVPB.. 3.375 Gram(s) IV Intermittent every 8 hours  vancomycin  IVPB 1000 milliGRAM(s) IV Intermittent every 12 hours    MEDICATIONS  (PRN):  acetaminophen     Tablet .. 650 milliGRAM(s) Oral every 6 hours PRN Temp greater or equal to 38C (100.4F), Mild Pain (1 - 3)  dextrose Oral Gel 15 Gram(s) Oral once PRN Blood Glucose LESS THAN 70 milliGRAM(s)/deciliter  oxyCODONE    IR 5 milliGRAM(s) Oral every 6 hours PRN Severe Pain (7 - 10)      Allergies    No Known Allergies    Intolerances        SOCIAL HISTORY:    FAMILY HISTORY:      Vital Signs Last 24 Hrs  T(C): 37.6 (01 Dec 2023 23:41), Max: 37.6 (01 Dec 2023 23:41)  T(F): 99.7 (01 Dec 2023 23:41), Max: 99.7 (01 Dec 2023 23:41)  HR: 65 (01 Dec 2023 23:41) (60 - 70)  BP: 183/84 (01 Dec 2023 23:41) (175/88 - 193/87)  BP(mean): --  RR: 18 (01 Dec 2023 23:41) (15 - 20)  SpO2: 96% (01 Dec 2023 23:41) (95% - 98%)    Parameters below as of 01 Dec 2023 23:41  Patient On (Oxygen Delivery Method): room air        PHYSICAL EXAM:      Constitutional: Pt AXOX3 in NAD    Respiratory: unlabored    Cardiovascular: S1S2    Extremities:/l 2+ pitting edema appreciated from ankle to inguinal area. ROM limited due to swelling and pain of hips, knees. NVI, cap refill < 2 sec.      LABS:                        8.1    13.54 )-----------( 172      ( 01 Dec 2023 19:15 )             25.5     12-    130<L>  |  97  |  19  ----------------------------<  476<HH>  4.1   |  27  |  1.29    Ca    8.7      01 Dec 2023 11:51  Phos  3.5     12  Mg     2.0     12    TPro  6.7  /  Alb  1.8<L>  /  TBili  0.5  /  DBili  x   /  AST  25  /  ALT  19  /  AlkPhos  201<H>  12-    PT/INR - ( 01 Dec 2023 15:40 )   PT: 11.6 sec;   INR: 1.03          PTT - ( 01 Dec 2023 15:40 )  PTT:25.2 sec  Urinalysis Basic - ( 01 Dec 2023 12:57 )    Color: Yellow / Appearance: Clear / S.010 / pH: x  Gluc: x / Ketone: Negative mg/dL  / Bili: Negative / Urobili: 0.2 mg/dL   Blood: x / Protein: 100 mg/dL / Nitrite: Negative   Leuk Esterase: Negative / RBC: 3 /HPF / WBC 2 /HPF   Sq Epi: x / Non Sq Epi: x / Bacteria: Negative /HPF        RADIOLOGY & ADDITIONAL STUDIES Vascular Attending:  PADMINI      HPI:  67yo M PMH CAD (2 stents ), grade 1 diastolic dysfunction, HTN, PAD w/ remote RLE angioplasty, R 4th toe OM s/p partial R 4th ray amputation on 10/24, RLE angiogram with AT lithotripsy and AT/peroneal balloon angioplasty 10/27, uncontrolled IDDM (a1c 12 in Oct 2023), recent admission -11/10 to cardiology service for hypertensive emergency (left AMA, was given levaquin when he left for right foot wound that pt had started treatment for back in October) who presented to [facility] after a fall onto his knees and was having b/l knee pain. The initial pain was severe and he felt like they were swollen so he came to the ED. Knee pain is now getting better. Reports having right foot pain for which he takes oxy 10 q8h at home. He does not wrap the foot or put any topical medications on it. Denies fever, chills, SOB, CP.      ED course:  vitals: afebrile, HR 70, /87, RR 16, O2sat 98 on room air  labs: wbc 13, Na 130, glucose 476, BNP 15700  CXR la/ap: persistent small left and a new small right pleural effusion  b/l knee xray: Moderate to severe left worse than right knee arthrosis without acute displaced fracture  CT angio b/l LE: Increased soft tissue gas in the forefoot. No drainable abscess. Patent two vessel runoff in both lower extremities. No significant knee joint effusion.  ECG: regular, sinus, isolated Q wave AVF, borderline LVH  Interventions: regular insulin 10u total, Lasix 40 IV, norvasc 10, morphine 2 IV x2, tylenol 1g IV. Vanc 1g, zosyn 3.375  Intensivist was called in ED for hyperglycemia but since not in DKA no telemetry was needed.  [facility] also contacted ortho for b/l knee pain and Vascular for R foot wound (02 Dec 2023 01:15)      PAST MEDICAL & SURGICAL HISTORY:  IDDM (insulin dependent diabetes mellitus)      HTN (hypertension)  CAD S/P percutaneous coronary angioplasty      Neuropathy      PAD (peripheral artery disease)      PNA (pneumonia)      Gangrene of toe of right foot      Moderate aortic stenosis      Acute on chronic diastolic congestive heart failure      Hyperlipidemia      History of partial ray amputation of fourth toe of right foot          REVIEW OF SYSTEMS  Neg except as in HPI	    MEDICATIONS  (STANDING):  aspirin enteric coated 81 milliGRAM(s) Oral every 24 hours  atorvastatin 80 milliGRAM(s) Oral at bedtime  carvedilol 12.5 milliGRAM(s) Oral every 12 hours  clopidogrel Tablet 75 milliGRAM(s) Oral at bedtime  dextrose 5%. 1000 milliLiter(s) (50 mL/Hr) IV Continuous <Continuous>  dextrose 5%. 1000 milliLiter(s) (100 mL/Hr) IV Continuous <Continuous>  dextrose 50% Injectable 25 Gram(s) IV Push once  dextrose 50% Injectable 12.5 Gram(s) IV Push once  dextrose 50% Injectable 25 Gram(s) IV Push once  enoxaparin Injectable 40 milliGRAM(s) SubCutaneous every 24 hours  gabapentin 800 milliGRAM(s) Oral three times a day  glucagon  Injectable 1 milliGRAM(s) IntraMuscular once  hydrALAZINE 25 milliGRAM(s) Oral every 8 hours  insulin glargine Injectable (LANTUS) 14 Unit(s) SubCutaneous at bedtime  insulin lispro (ADMELOG) corrective regimen sliding scale   SubCutaneous Before meals and at bedtime  lisinopril 40 milliGRAM(s) Oral every 24 hours  piperacillin/tazobactam IVPB.. 3.375 Gram(s) IV Intermittent every 8 hours  vancomycin  IVPB 1000 milliGRAM(s) IV Intermittent every 12 hours    MEDICATIONS  (PRN):  acetaminophen     Tablet .. 650 milliGRAM(s) Oral every 6 hours PRN Temp greater or equal to 38C (100.4F), Mild Pain (1 - 3)  dextrose Oral Gel 15 Gram(s) Oral once PRN Blood Glucose LESS THAN 70 milliGRAM(s)/deciliter  oxyCODONE    IR 5 milliGRAM(s) Oral every 6 hours PRN Severe Pain (7 - 10)      Allergies    No Known Allergies    Intolerances        SOCIAL HISTORY:    FAMILY HISTORY:      Vital Signs Last 24 Hrs  T(C): 37.6 (01 Dec 2023 23:41), Max: 37.6 (01 Dec 2023 23:41)  T(F): 99.7 (01 Dec 2023 23:41), Max: 99.7 (01 Dec 2023 23:41)  HR: 65 (01 Dec 2023 23:41) (60 - 70)  BP: 183/84 (01 Dec 2023 23:41) (175/88 - 193/87)  BP(mean): --  RR: 18 (01 Dec 2023 23:41) (15 - 20)  SpO2: 96% (01 Dec 2023 23:41) (95% - 98%)    Parameters below as of 01 Dec 2023 23:41  Patient On (Oxygen Delivery Method): room air        PHYSICAL EXAM:      Constitutional: Pt AXOX3 in NAD    Respiratory: unlabored    Cardiovascular: S1S2    Extremities:/l 2+ pitting edema appreciated from ankle to inguinal area. ROM limited due to swelling and pain of hips, knees. NVI, cap refill < 2 sec.      LABS:                        8.1    13.54 )-----------( 172      ( 01 Dec 2023 19:15 )             25.5     12-    130<L>  |  97  |  19  ----------------------------<  476<HH>  4.1   |  27  |  1.29    Ca    8.7      01 Dec 2023 11:51  Phos  3.5     12  Mg     2.0     12    TPro  6.7  /  Alb  1.8<L>  /  TBili  0.5  /  DBili  x   /  AST  25  /  ALT  19  /  AlkPhos  201<H>  12-    PT/INR - ( 01 Dec 2023 15:40 )   PT: 11.6 sec;   INR: 1.03          PTT - ( 01 Dec 2023 15:40 )  PTT:25.2 sec  Urinalysis Basic - ( 01 Dec 2023 12:57 )    Color: Yellow / Appearance: Clear / S.010 / pH: x  Gluc: x / Ketone: Negative mg/dL  / Bili: Negative / Urobili: 0.2 mg/dL   Blood: x / Protein: 100 mg/dL / Nitrite: Negative   Leuk Esterase: Negative / RBC: 3 /HPF / WBC 2 /HPF   Sq Epi: x / Non Sq Epi: x / Bacteria: Negative /HPF        RADIOLOGY & ADDITIONAL STUDIES Vascular Attending:  PADMINI      HPI:  69yo M PMH CAD (2 stents ), grade 1 diastolic dysfunction, HTN, PAD w/ remote RLE angioplasty, R 4th toe OM s/p partial R 4th ray amputation on 10/24, RLE angiogram with AT lithotripsy and AT/peroneal balloon angioplasty 10/27, uncontrolled IDDM (a1c 12 in Oct 2023), recent admission -11/10 to cardiology service for hypertensive emergency (left AMA, was given levaquin when he left for right foot wound that pt had started treatment for back in October) who presented to [facility] after a fall onto his knees and was having b/l knee pain. The initial pain was severe and he felt like they were swollen so he came to the ED. Knee pain is now getting better. Reports having right foot pain for which he takes oxy 10 q8h at home. He does not wrap the foot or put any topical medications on it. Denies fever, chills, SOB, CP.      ED course:  vitals: afebrile, HR 70, /87, RR 16, O2sat 98 on room air  labs: wbc 13, Na 130, glucose 476, BNP 75483  CXR la/ap: persistent small left and a new small right pleural effusion  b/l knee xray: Moderate to severe left worse than right knee arthrosis without acute displaced fracture  CT angio b/l LE: Increased soft tissue gas in the forefoot. No drainable abscess. Patent two vessel runoff in both lower extremities. No significant knee joint effusion.  ECG: regular, sinus, isolated Q wave AVF, borderline LVH  Interventions: regular insulin 10u total, Lasix 40 IV, norvasc 10, morphine 2 IV x2, tylenol 1g IV. Vanc 1g, zosyn 3.375  Intensivist was called in ED for hyperglycemia but since not in DKA no telemetry was needed.  [facility] also contacted ortho for b/l knee pain and Vascular for R foot wound (02 Dec 2023 01:15)      PAST MEDICAL & SURGICAL HISTORY:  IDDM (insulin dependent diabetes mellitus)      HTN (hypertension)  CAD S/P percutaneous coronary angioplasty      Neuropathy      PAD (peripheral artery disease)      PNA (pneumonia)      Gangrene of toe of right foot      Moderate aortic stenosis      Acute on chronic diastolic congestive heart failure      Hyperlipidemia      History of partial ray amputation of fourth toe of right foot          REVIEW OF SYSTEMS  Neg except as in HPI	    MEDICATIONS  (STANDING):  aspirin enteric coated 81 milliGRAM(s) Oral every 24 hours  atorvastatin 80 milliGRAM(s) Oral at bedtime  carvedilol 12.5 milliGRAM(s) Oral every 12 hours  clopidogrel Tablet 75 milliGRAM(s) Oral at bedtime  dextrose 5%. 1000 milliLiter(s) (50 mL/Hr) IV Continuous <Continuous>  dextrose 5%. 1000 milliLiter(s) (100 mL/Hr) IV Continuous <Continuous>  dextrose 50% Injectable 25 Gram(s) IV Push once  dextrose 50% Injectable 12.5 Gram(s) IV Push once  dextrose 50% Injectable 25 Gram(s) IV Push once  enoxaparin Injectable 40 milliGRAM(s) SubCutaneous every 24 hours  gabapentin 800 milliGRAM(s) Oral three times a day  glucagon  Injectable 1 milliGRAM(s) IntraMuscular once  hydrALAZINE 25 milliGRAM(s) Oral every 8 hours  insulin glargine Injectable (LANTUS) 14 Unit(s) SubCutaneous at bedtime  insulin lispro (ADMELOG) corrective regimen sliding scale   SubCutaneous Before meals and at bedtime  lisinopril 40 milliGRAM(s) Oral every 24 hours  piperacillin/tazobactam IVPB.. 3.375 Gram(s) IV Intermittent every 8 hours  vancomycin  IVPB 1000 milliGRAM(s) IV Intermittent every 12 hours    MEDICATIONS  (PRN):  acetaminophen     Tablet .. 650 milliGRAM(s) Oral every 6 hours PRN Temp greater or equal to 38C (100.4F), Mild Pain (1 - 3)  dextrose Oral Gel 15 Gram(s) Oral once PRN Blood Glucose LESS THAN 70 milliGRAM(s)/deciliter  oxyCODONE    IR 5 milliGRAM(s) Oral every 6 hours PRN Severe Pain (7 - 10)      Allergies    No Known Allergies    Intolerances        SOCIAL HISTORY:    FAMILY HISTORY:      Vital Signs Last 24 Hrs  T(C): 37.6 (01 Dec 2023 23:41), Max: 37.6 (01 Dec 2023 23:41)  T(F): 99.7 (01 Dec 2023 23:41), Max: 99.7 (01 Dec 2023 23:41)  HR: 65 (01 Dec 2023 23:41) (60 - 70)  BP: 183/84 (01 Dec 2023 23:41) (175/88 - 193/87)  BP(mean): --  RR: 18 (01 Dec 2023 23:41) (15 - 20)  SpO2: 96% (01 Dec 2023 23:41) (95% - 98%)    Parameters below as of 01 Dec 2023 23:41  Patient On (Oxygen Delivery Method): room air        PHYSICAL EXAM:      Constitutional: Pt AXOX3 in NAD    Respiratory: unlabored    Cardiovascular: S1S2    Extremities:/l 2+ pitting edema appreciated from ankle to inguinal area. ROM limited due to swelling and pain of hips, Biphasic signals      LABS:                        8.1    13.54 )-----------( 172      ( 01 Dec 2023 19:15 )             25.5     12-    130<L>  |  97  |  19  ----------------------------<  476<HH>  4.1   |  27  |  1.29    Ca    8.7      01 Dec 2023 11:51  Phos  3.5     12  Mg     2.0     12    TPro  6.7  /  Alb  1.8<L>  /  TBili  0.5  /  DBili  x   /  AST  25  /  ALT  19  /  AlkPhos  201<H>  12-01    PT/INR - ( 01 Dec 2023 15:40 )   PT: 11.6 sec;   INR: 1.03          PTT - ( 01 Dec 2023 15:40 )  PTT:25.2 sec  Urinalysis Basic - ( 01 Dec 2023 12:57 )    Color: Yellow / Appearance: Clear / S.010 / pH: x  Gluc: x / Ketone: Negative mg/dL  / Bili: Negative / Urobili: 0.2 mg/dL   Blood: x / Protein: 100 mg/dL / Nitrite: Negative   Leuk Esterase: Negative / RBC: 3 /HPF / WBC 2 /HPF   Sq Epi: x / Non Sq Epi: x / Bacteria: Negative /HPF        RADIOLOGY & ADDITIONAL STUDIES Vascular Attending:  PADMINI      HPI:  69yo M PMH CAD (2 stents ), grade 1 diastolic dysfunction, HTN, PAD w/ remote RLE angioplasty, R 4th toe OM s/p partial R 4th ray amputation on 10/24, RLE angiogram with AT lithotripsy and AT/peroneal balloon angioplasty 10/27, uncontrolled IDDM (a1c 12 in Oct 2023), recent admission -11/10 to cardiology service for hypertensive emergency (left AMA, was given levaquin when he left for right foot wound that pt had started treatment for back in October) who presented to [facility] after a fall onto his knees and was having b/l knee pain. The initial pain was severe and he felt like they were swollen so he came to the ED. Knee pain is now getting better. Reports having right foot pain for which he takes oxy 10 q8h at home. He does not wrap the foot or put any topical medications on it. Denies fever, chills, SOB, CP.      ED course:  vitals: afebrile, HR 70, /87, RR 16, O2sat 98 on room air  labs: wbc 13, Na 130, glucose 476, BNP 59400  CXR la/ap: persistent small left and a new small right pleural effusion  b/l knee xray: Moderate to severe left worse than right knee arthrosis without acute displaced fracture  CT angio b/l LE: Increased soft tissue gas in the forefoot. No drainable abscess. Patent two vessel runoff in both lower extremities. No significant knee joint effusion.  ECG: regular, sinus, isolated Q wave AVF, borderline LVH  Interventions: regular insulin 10u total, Lasix 40 IV, norvasc 10, morphine 2 IV x2, tylenol 1g IV. Vanc 1g, zosyn 3.375  Intensivist was called in ED for hyperglycemia but since not in DKA no telemetry was needed.  [facility] also contacted ortho for b/l knee pain and Vascular for R foot wound (02 Dec 2023 01:15)      PAST MEDICAL & SURGICAL HISTORY:  IDDM (insulin dependent diabetes mellitus)      HTN (hypertension)  CAD S/P percutaneous coronary angioplasty      Neuropathy      PAD (peripheral artery disease)      PNA (pneumonia)      Gangrene of toe of right foot      Moderate aortic stenosis      Acute on chronic diastolic congestive heart failure      Hyperlipidemia      History of partial ray amputation of fourth toe of right foot          REVIEW OF SYSTEMS  Neg except as in HPI	    MEDICATIONS  (STANDING):  aspirin enteric coated 81 milliGRAM(s) Oral every 24 hours  atorvastatin 80 milliGRAM(s) Oral at bedtime  carvedilol 12.5 milliGRAM(s) Oral every 12 hours  clopidogrel Tablet 75 milliGRAM(s) Oral at bedtime  dextrose 5%. 1000 milliLiter(s) (50 mL/Hr) IV Continuous <Continuous>  dextrose 5%. 1000 milliLiter(s) (100 mL/Hr) IV Continuous <Continuous>  dextrose 50% Injectable 25 Gram(s) IV Push once  dextrose 50% Injectable 12.5 Gram(s) IV Push once  dextrose 50% Injectable 25 Gram(s) IV Push once  enoxaparin Injectable 40 milliGRAM(s) SubCutaneous every 24 hours  gabapentin 800 milliGRAM(s) Oral three times a day  glucagon  Injectable 1 milliGRAM(s) IntraMuscular once  hydrALAZINE 25 milliGRAM(s) Oral every 8 hours  insulin glargine Injectable (LANTUS) 14 Unit(s) SubCutaneous at bedtime  insulin lispro (ADMELOG) corrective regimen sliding scale   SubCutaneous Before meals and at bedtime  lisinopril 40 milliGRAM(s) Oral every 24 hours  piperacillin/tazobactam IVPB.. 3.375 Gram(s) IV Intermittent every 8 hours  vancomycin  IVPB 1000 milliGRAM(s) IV Intermittent every 12 hours    MEDICATIONS  (PRN):  acetaminophen     Tablet .. 650 milliGRAM(s) Oral every 6 hours PRN Temp greater or equal to 38C (100.4F), Mild Pain (1 - 3)  dextrose Oral Gel 15 Gram(s) Oral once PRN Blood Glucose LESS THAN 70 milliGRAM(s)/deciliter  oxyCODONE    IR 5 milliGRAM(s) Oral every 6 hours PRN Severe Pain (7 - 10)      Allergies    No Known Allergies    Intolerances        SOCIAL HISTORY:    FAMILY HISTORY:      Vital Signs Last 24 Hrs  T(C): 37.6 (01 Dec 2023 23:41), Max: 37.6 (01 Dec 2023 23:41)  T(F): 99.7 (01 Dec 2023 23:41), Max: 99.7 (01 Dec 2023 23:41)  HR: 65 (01 Dec 2023 23:41) (60 - 70)  BP: 183/84 (01 Dec 2023 23:41) (175/88 - 193/87)  BP(mean): --  RR: 18 (01 Dec 2023 23:41) (15 - 20)  SpO2: 96% (01 Dec 2023 23:41) (95% - 98%)    Parameters below as of 01 Dec 2023 23:41  Patient On (Oxygen Delivery Method): room air        PHYSICAL EXAM:      Constitutional: Pt AXOX3 in NAD    Respiratory: unlabored    Cardiovascular: S1S2    Extremities:/l 2+ pitting edema appreciated from ankle to inguinal area. ROM limited due to swelling and pain of hips, Biphasic signals      LABS:                        8.1    13.54 )-----------( 172      ( 01 Dec 2023 19:15 )             25.5     12-    130<L>  |  97  |  19  ----------------------------<  476<HH>  4.1   |  27  |  1.29    Ca    8.7      01 Dec 2023 11:51  Phos  3.5     12  Mg     2.0     12    TPro  6.7  /  Alb  1.8<L>  /  TBili  0.5  /  DBili  x   /  AST  25  /  ALT  19  /  AlkPhos  201<H>  12-01    PT/INR - ( 01 Dec 2023 15:40 )   PT: 11.6 sec;   INR: 1.03          PTT - ( 01 Dec 2023 15:40 )  PTT:25.2 sec  Urinalysis Basic - ( 01 Dec 2023 12:57 )    Color: Yellow / Appearance: Clear / S.010 / pH: x  Gluc: x / Ketone: Negative mg/dL  / Bili: Negative / Urobili: 0.2 mg/dL   Blood: x / Protein: 100 mg/dL / Nitrite: Negative   Leuk Esterase: Negative / RBC: 3 /HPF / WBC 2 /HPF   Sq Epi: x / Non Sq Epi: x / Bacteria: Negative /HPF        RADIOLOGY & ADDITIONAL STUDIES Vascular Attending:  PADMINI      HPI:  69yo M PMH CAD (2 stents ), grade 1 diastolic dysfunction, HTN, PAD w/ remote RLE angioplasty, R 4th toe OM s/p partial R 4th ray amputation on 10/24, RLE angiogram with AT lithotripsy and AT/peroneal balloon angioplasty 10/27, uncontrolled IDDM (a1c 12 in Oct 2023), recent admission -11/10 to cardiology service for hypertensive emergency (left AMA, was given levaquin when he left for right foot wound that pt had started treatment for back in October) who presented to [place] after a fall onto his knees and was having b/l knee pain. The initial pain was severe and he felt like they were swollen so he came to the ED. Knee pain is now getting better. Reports having right foot pain for which he takes oxy 10 q8h at home. He does not wrap the foot or put any topical medications on it. Denies fever, chills, SOB, CP.      ED course:  vitals: afebrile, HR 70, /87, RR 16, O2sat 98 on room air  labs: wbc 13, Na 130, glucose 476, BNP 03691  CXR la/ap: persistent small left and a new small right pleural effusion  b/l knee xray: Moderate to severe left worse than right knee arthrosis without acute displaced fracture  CT angio b/l LE: Increased soft tissue gas in the forefoot. No drainable abscess. Patent two vessel runoff in both lower extremities. No significant knee joint effusion.  ECG: regular, sinus, isolated Q wave AVF, borderline LVH  Interventions: regular insulin 10u total, Lasix 40 IV, norvasc 10, morphine 2 IV x2, tylenol 1g IV. Vanc 1g, zosyn 3.375  Intensivist was called in ED for hyperglycemia but since not in DKA no telemetry was needed.  [facility] also contacted ortho for b/l knee pain and Vascular for R foot wound (02 Dec 2023 01:15)      PAST MEDICAL & SURGICAL HISTORY:  IDDM (insulin dependent diabetes mellitus)      HTN (hypertension)  CAD S/P percutaneous coronary angioplasty      Neuropathy      PAD (peripheral artery disease)      PNA (pneumonia)      Gangrene of toe of right foot      Moderate aortic stenosis      Acute on chronic diastolic congestive heart failure      Hyperlipidemia      History of partial ray amputation of fourth toe of right foot          REVIEW OF SYSTEMS  Neg except as in HPI	    MEDICATIONS  (STANDING):  aspirin enteric coated 81 milliGRAM(s) Oral every 24 hours  atorvastatin 80 milliGRAM(s) Oral at bedtime  carvedilol 12.5 milliGRAM(s) Oral every 12 hours  clopidogrel Tablet 75 milliGRAM(s) Oral at bedtime  dextrose 5%. 1000 milliLiter(s) (50 mL/Hr) IV Continuous <Continuous>  dextrose 5%. 1000 milliLiter(s) (100 mL/Hr) IV Continuous <Continuous>  dextrose 50% Injectable 25 Gram(s) IV Push once  dextrose 50% Injectable 12.5 Gram(s) IV Push once  dextrose 50% Injectable 25 Gram(s) IV Push once  enoxaparin Injectable 40 milliGRAM(s) SubCutaneous every 24 hours  gabapentin 800 milliGRAM(s) Oral three times a day  glucagon  Injectable 1 milliGRAM(s) IntraMuscular once  hydrALAZINE 25 milliGRAM(s) Oral every 8 hours  insulin glargine Injectable (LANTUS) 14 Unit(s) SubCutaneous at bedtime  insulin lispro (ADMELOG) corrective regimen sliding scale   SubCutaneous Before meals and at bedtime  lisinopril 40 milliGRAM(s) Oral every 24 hours  piperacillin/tazobactam IVPB.. 3.375 Gram(s) IV Intermittent every 8 hours  vancomycin  IVPB 1000 milliGRAM(s) IV Intermittent every 12 hours    MEDICATIONS  (PRN):  acetaminophen     Tablet .. 650 milliGRAM(s) Oral every 6 hours PRN Temp greater or equal to 38C (100.4F), Mild Pain (1 - 3)  dextrose Oral Gel 15 Gram(s) Oral once PRN Blood Glucose LESS THAN 70 milliGRAM(s)/deciliter  oxyCODONE    IR 5 milliGRAM(s) Oral every 6 hours PRN Severe Pain (7 - 10)      Allergies    No Known Allergies    Intolerances        SOCIAL HISTORY:    FAMILY HISTORY:      Vital Signs Last 24 Hrs  T(C): 37.6 (01 Dec 2023 23:41), Max: 37.6 (01 Dec 2023 23:41)  T(F): 99.7 (01 Dec 2023 23:41), Max: 99.7 (01 Dec 2023 23:41)  HR: 65 (01 Dec 2023 23:41) (60 - 70)  BP: 183/84 (01 Dec 2023 23:41) (175/88 - 193/87)  BP(mean): --  RR: 18 (01 Dec 2023 23:41) (15 - 20)  SpO2: 96% (01 Dec 2023 23:41) (95% - 98%)    Parameters below as of 01 Dec 2023 23:41  Patient On (Oxygen Delivery Method): room air        PHYSICAL EXAM:      Constitutional: Pt AXOX3 in NAD    Respiratory: unlabored    Cardiovascular: S1S2    Extremities:/l 2+ pitting edema appreciated from ankle to inguinal area. ROM limited due to swelling and pain of hips, Biphasic signals      LABS:                        8.1    13.54 )-----------( 172      ( 01 Dec 2023 19:15 )             25.5     12-    130<L>  |  97  |  19  ----------------------------<  476<HH>  4.1   |  27  |  1.29    Ca    8.7      01 Dec 2023 11:51  Phos  3.5     12  Mg     2.0     12    TPro  6.7  /  Alb  1.8<L>  /  TBili  0.5  /  DBili  x   /  AST  25  /  ALT  19  /  AlkPhos  201<H>  12-01    PT/INR - ( 01 Dec 2023 15:40 )   PT: 11.6 sec;   INR: 1.03          PTT - ( 01 Dec 2023 15:40 )  PTT:25.2 sec  Urinalysis Basic - ( 01 Dec 2023 12:57 )    Color: Yellow / Appearance: Clear / S.010 / pH: x  Gluc: x / Ketone: Negative mg/dL  / Bili: Negative / Urobili: 0.2 mg/dL   Blood: x / Protein: 100 mg/dL / Nitrite: Negative   Leuk Esterase: Negative / RBC: 3 /HPF / WBC 2 /HPF   Sq Epi: x / Non Sq Epi: x / Bacteria: Negative /HPF        RADIOLOGY & ADDITIONAL STUDIES

## 2023-12-02 NOTE — H&P ADULT - PROBLEM SELECTOR PLAN 4
A1c 12 in October. Has been seen by Endocrine in past.  Home meds: glargine 14U qhs    - lantus 14U qhs  - mISS  - FSG QID  - FSG goal 140-180  - consistent carb diet  - c/w home gabapentin for neuropathic pain

## 2023-12-02 NOTE — H&P ADULT - PROBLEM SELECTOR PLAN 1
Past hx of PAD w/ diabetic foot wound. Had prior admission in October where he was seen by ID and Podiatry and was supposed to complete 6 wks of broad spectrum antimicrobials. At that time MRI with possible 4th digit early OM up to prox phalanx s/p partial R 4th ray amputation on 10/24, proximal cx with E.coli, Pluralibacter gergoviae, MRSE, and Corynebacterium amycolatum.   Then had an admission this month for unrelated issue, had left AMA and PICC line was taken out bc pt was leaving AMA, was prescribed levaquin to complete his 6wk course however pt unsure if he actually picked up the levaquin.  CT angio LE today shows increased soft tissue gas in the right forefoot. Vascular surgery was contacted by [facility].  On exam right foot has open wound between 4th and 5th digits with purulence coming out of wound, foot is erythematous on dorsal aspect and warm to touch.  Ddx includes SSTI vs osteomyelitis vs less likely nec fasc in setting of possibly incompletely treated infection.  - Vascular Surgery consult  - Podiatry consult in AM  - ID consult in AM  - trend CBC w/ diff, ESR, CRP  - Daptomycin 500mg IV q24h plus ceftriaxone 2g IV Q24  - Oxy 10 prn for mod pain  - tylenol standing  - morphine prn for severe pain Past hx of PAD w/ diabetic foot wound. Had prior admission in October where he was seen by ID and Podiatry and was supposed to complete 6 wks of broad spectrum antimicrobials. At that time MRI with possible 4th digit early OM up to prox phalanx s/p partial R 4th ray amputation on 10/24, proximal cx with E.coli, Pluralibacter gergoviae, stap epi, and Corynebacterium amycolatum.   Then had an admission this month for unrelated issue, had left AMA and PICC line was taken out bc pt was leaving AMA, was prescribed levaquin to complete his 6wk course however pt unsure if he actually picked up the levaquin.  CT angio LE today shows increased soft tissue gas in the right forefoot. Vascular surgery was contacted by [facility].  On exam right foot has open wound between 4th and 5th digits with purulence coming out of wound, foot is erythematous on dorsal aspect and warm to touch.  Ddx includes SSTI vs osteomyelitis vs less likely nec fasc in setting of possibly incompletely treated infection.  - Vascular Surgery consult  - Podiatry consult in AM  - ID consult in AM  - trend CBC w/ diff, ESR, CRP  - send wound culture  - start Vanc and Zosyn for empiric broad spectrum therapy  - Oxy 10 prn for mod pain  - tylenol standing  - morphine prn for severe pain Past hx of PAD w/ diabetic foot wound. Had prior admission in October where he was seen by ID and Podiatry and was supposed to complete 6 wks of broad spectrum antimicrobials. At that time MRI with possible 4th digit early OM up to prox phalanx s/p partial R 4th ray amputation on 10/24, proximal cx with E.coli, Pluralibacter gergoviae, stap epi, and Corynebacterium amycolatum.   Then had an admission this month for unrelated issue, had left AMA and PICC line was taken out bc pt was leaving AMA, was prescribed levaquin to complete his 6wk course however pt unsure if he actually picked up the levaquin.  CT angio LE today shows increased soft tissue gas in the right forefoot. Vascular surgery was contacted by [facility].  On exam right foot has open wound between 4th and 5th digits with purulence coming out of wound, foot is erythematous on dorsal aspect and warm to touch.  Ddx includes SSTI vs osteomyelitis vs less likely nec fasc in setting of possibly incompletely treated infection.  - Vascular Surgery consult  - Podiatry consult in AM  - ID consult in AM  - trend CBC w/ diff, ESR, CRP  - send wound culture  - start Vanc and Zosyn for empiric broad spectrum therapy  - Oxy 5 prn for severe pain  - tylenol prn for mild pain  - toradol sparingly for mod pain

## 2023-12-02 NOTE — CONSULT NOTE ADULT - ASSESSMENT
67 yo M with HTN, IDDM, CAD, mod AS, HCV (not treated), PAD s/p remote RLE angioplasty, R 4th toe OM s/p partial R 4th ray amputation 10/24 admitted 12/1 with osteomyelitis foot, severe infection, refusing amputation.  Unclear how compliant he had been with dapto and ceftriaxone at time of d/c on 10/31, or with levofloxacin.  Cannot rely on prior microbiology.  He is refusing surgery and would not be a future candidate for home OPAT.  Suggest:  - Blood cultures X 2 sets if he will allow.   - Follow up wound culture from debridement 12/2  - Continue vancomycin 1 g IV q12h.  Trough prior to 4th dose (tomorrow morning)  - Continue pip-tazo 3.375 g IV q8h via EI.  Will follow with you, team 2.

## 2023-12-02 NOTE — H&P ADULT - PROBLEM SELECTOR PLAN 3
Upon arrival BP 190s, asymptomatic.  Home meds: lisinopril 40 qd, hydral 25 TID, norvasc 10 qd, coreg 12.5 bid  - c/w all home anti hypertensives

## 2023-12-02 NOTE — H&P ADULT - NSHPPHYSICALEXAM_GEN_ALL_CORE
.  VITAL SIGNS:  T(F): 99.7 (12-01-23 @ 23:41), Max: 99.7 (12-01-23 @ 23:41)  HR: 65 (12-01-23 @ 23:41) (60 - 70)  BP: 183/84 (12-01-23 @ 23:41) (175/88 - 193/87)  BP(mean): --  RR: 18 (12-01-23 @ 23:41) (15 - 20)  SpO2: 96% (12-01-23 @ 23:41) (95% - 98%)    PHYSICAL EXAM:    Constitutional: WDWN resting comfortably in bed; NAD  HEENT: NC/AT, PERRL, EOMI, anicteric sclera, no nasal discharge; uvula midline, no oropharyngeal erythema or exudates; MMM  Neck: supple, no thyromegaly  Respiratory: CTA B/L; no W/R/R, no retractions  Cardiac: +S1/S2; RRR; no M/R/G; PMI non-displaced, no JVD  Gastrointestinal: soft, NT/ND; no rebound or guarding; +BSx4  Genitourinary: normal external genitalia  Back: spine midline, no bony tenderness or step-offs; no CVAT B/L  Extremities: WWP, no clubbing or cyanosis; no peripheral edema  Musculoskeletal: NROM x4; no joint swelling, tenderness or erythema  Vascular: 2+ radial, femoral, DP/PT pulses B/L  Dermatologic: skin warm, dry and intact; no rashes, wounds, or scars  Lymphatic: no submandibular or cervical LAD  Neurologic: AAOx3; CNII-XII grossly intact; no focal deficits  Psychiatric: affect and characteristics of appearance, verbalizations, behaviors are appropriate, denies SI/HI/AH/VH

## 2023-12-02 NOTE — H&P ADULT - ATTENDING COMMENTS
69 yo M with PMHx CAD (s/p PCI ~2020), DM c/b diabetic foot ulcer, recent admission for sepsis 2/2 R toe OM (discharged 10/31 with CTX/Dapto via PICC until 12/5), recent CCU admission for HTN emergency 2/2 cocaine (11/7-11/10, left AMA), HFmrEF (LVEF 45-50% in 11/2023) BIBEMS from home with bilateral knee pain s/p mechanical fall while watching basketball found to have findings concerning for soft tissue edema/gas with ongoing skin-soft tissue infection and volume overload admitted for further management.      #Bilateral knee pain - Bilateral knee XR showing moderate to severe left worse than right knee arthrosis without acute displaced fracture. CTA lower extremity showing increased soft tissue gas without drainable abscess, marked diffuse body wall and LE edema. No knee effusions on imaging. Initial radiology read concerning for possible necrotizing fasciitis in differential. Imaging reviewed from [facility] with ortho/vascular, deemed no acute interventions. On my exam ~12AM, diffuse pitting bilateral LE edema with erythema, no palpable crepitus. No visible bullae/ulcers apart from R toe ulcer between 4th-5th digits with visible granulation tissue, purulent drainage, foul smell. F/U further ortho/vascular recommendations. Continue broad-spectrum abx. F/U blood cx.      #R toe osteomyelitis – Discharged 10/31 with total 6-week course of abx with CTX/Daptomycin until 12/5 however pt left AMA from CCU hospitalization and PICC line was removed prior to AMA. Imaging as above concerning for possible necrotizing fasciitis. Vascular consulted. F/U ESR/CRP. Continue broad-spectrum abx.      #HFmrEF - Troponin I negative. Pro-BNP 14k. CXR with cardiomegaly, bilateral pleural effusions, pulmonary edema. Last TTE 11/9 showing grade I diastolic dysfunction with mildly reduced LVEF 45-50%, mild-mod AS. s/p Lasix 40mg IVP x1 in ED. Cardiology initially consulted from [facility], no further recommendations at this time. ICU consulted from [facility], no indications for telemetry/ICU level of care at this time.      #Uncontrolled DM with hyperglycemia –  on initial BMP. No anion gap or evidence to suggest DKA. FS 400s-500s. s/p total regular insulin 10U in ED. Continue ISS. Continue Lantus 14U Qhs.     #Normocytic anemia - Hb 8 on initial CBC, repeat 7 with MCV 94.4. No signs/sx to suggest bleed. Baseline Hb 8-9 per chart review. F/U iron studies. May be confounded by active infection as above.     Agree with remainder of resident plan as above.

## 2023-12-02 NOTE — H&P ADULT - HISTORY OF PRESENT ILLNESS
69yo M PMH CAD (2 stents 2020), grade 1 diastolic dysfunction, HTN, PAD w/ remote RLE angioplasty, R 4th toe OM s/p partial R 4th ray amputation on 10/24, RLE angiogram with AT lithotripsy and AT/peroneal balloon angioplasty 10/27, uncontrolled IDDM (a1c 12 in Oct 2023), recent admission 11/8-11/10 to cardiology service for hypertensive emergency (left AMA, was given levaquin when he left for right foot wound that pt had started treatment for back in October) who presented to [facility] after a fall onto his knees and was having b/l knee pain. The initial pain was severe and he felt like they were swollen so he came to the ED. Knee pain is now getting better. Reports having right foot pain for which he takes oxy 10 q8h at home. He does not wrap the foot or put any topical medications on it. Denies fever, chills, SOB, CP.      ED course:  vitals: afebrile, HR 70, /87, RR 16, O2sat 98 on room air  labs: wbc 13, Na 130, glucose 476, BNP 78925  CXR la/ap: persistent small left and a new small right pleural effusion  b/l knee xray: Moderate to severe left worse than right knee arthrosis without acute displaced fracture  CT angio b/l LE: Increased soft tissue gas in the forefoot. No drainable abscess. Patent two vessel runoff in both lower extremities. No significant knee joint effusion.  ECG: regular, sinus, isolated Q wave AVF, borderline LVH  Interventions: regular insulin 10u total, Lasix 40 IV, norvasc 10, morphine 2 IV x2, tylenol 1g IV. Vanc 1g, zosyn 3.375  Intensivist was called in ED for hyperglycemia but since not in DKA no telemetry was needed.  [facility] also contacted ortho for b/l knee pain and Vascular for R foot wound

## 2023-12-02 NOTE — CONSULT NOTE ADULT - ASSESSMENT
A/P RLE foot infection      -Recommend urgent eval with podiatry for gas in R forefoot on CT Scan  -Recommend Arterial Duplex to assess blood flow to LE   A/P RLE foot infection      -Recommend urgent eval with podiatry for gas in R forefoot on CT Scan  -Recommend Arterial Duplex to assess blood flow to LE      CHIEF ADDENDUM  - Spoke with patient at length this morning who is A/OX3, mentating appropriately, and understands all decisions, risks, and benefits.  - Patient is currently refusing all operative interventions with podiatry team, who offered a TMA for gas gangrene of the foot. Patient reports that he would "rather die" than undergo any form of amputation, even if it is limited to the transmetatarsal area. Informed patient that antibiotics alone are usually not sufficient to treat his infection and that without operative intervention, he will likely become septic, leading to more proximal amputation and/or death. Patient is adamant that he understands these poor prognostic outcomes.  A/P RLE foot infection      -Recommend urgent eval with podiatry for gas in R forefoot on CT Scan  -Recommend Arterial Duplex to assess blood flow to LE      CHIEF RESIDENT ADDENDUM  - Spoke with patient at length this morning who is A/OX3, mentating appropriately, and understands all decisions, risks, and benefits.  - Patient is currently refusing all operative interventions with podiatry team, who offered a TMA for gas gangrene of the foot. Patient reports that he would "rather die" than undergo any form of amputation, even if it is limited to the transmetatarsal area. Informed patient that antibiotics alone are usually not sufficient to treat his infection and that without operative intervention, he will likely become septic, leading to ultimately requiring an even more proximal amputation and/or death. Patient is adamant that he understands these poor prognostic outcomes and still does not want to undergo operative management at this time.  - Vascular will sign off. Please reconsult at [phone] if needed. Thank you for the consult    Giovanna Boyle MD PGY4  Vascular Surgery Resident

## 2023-12-02 NOTE — H&P ADULT - ASSESSMENT
67yo M PMH CAD (2 stents 2020), grade 1 diastolic dysfunction, HTN, PAD w/ remote RLE angioplasty, R 4th toe OM s/p partial R 4th ray amputation on 10/24, RLE angiogram with AT lithotripsy and AT/peroneal balloon angioplasty 10/27, uncontrolled IDDM (a1c 12 in Oct 2023), recent admission 11/8-11/10 to cardiology service for hypertensive emergency (left AMA, was given levaquin when he left for right foot wound that pt had started treatment for back in October) who presented to [facility] after a fall onto his knees and was having b/l knee pain. Found to have increased soft tissue gas in R foot on CT scan. Admit to F for further management. 69yo M PMH CAD (2 stents 2020), HFmrEF (45-50%), HTN, PAD w/ remote RLE angioplasty, R 4th toe OM s/p partial R 4th ray amputation on 10/24, RLE angiogram with AT lithotripsy and AT/peroneal balloon angioplasty 10/27, uncontrolled IDDM (a1c 12 in Oct 2023), recent admission 11/8-11/10 to cardiology service for hypertensive emergency (left AMA, was given levaquin when he left for right foot wound that pt had started treatment for back in October) who presented to [facility] after a fall onto his knees and was having b/l knee pain. Found to have increased soft tissue gas in R foot on CT scan. Admit to [facility] for further management.

## 2023-12-02 NOTE — H&P ADULT - PROBLEM SELECTOR PLAN 6
#HFmrEF  #CAD  s/p 2 stents in 2020 at [facility]  TTE 11/9/23: EF 45-50%, Mild to moderate symmetric LVH, Grade I left ventricular diastolic dysfunction. Biatrial enlargement. Mild-to-moderate aortic stenosis.  Home meds: lisinopril, coreg  - c/w home meds  - c/w aspirin

## 2023-12-02 NOTE — CONSULT NOTE ADULT - SUBJECTIVE AND OBJECTIVE BOX
Attending: Tom Stevens     Patient is a 68y old  Male who presents with a chief complaint of soft tissue and skin infection of right foot (02 Dec 2023 03:25)      HPI:  67yo M PMH CAD (2 stents 2020), grade 1 diastolic dysfunction, HTN, PAD w/ remote RLE angioplasty, R 4th toe OM s/p partial R 4th ray amputation on 10/24, RLE angiogram with AT lithotripsy and AT/peroneal balloon angioplasty 10/27, uncontrolled IDDM (a1c 12 in Oct 2023), recent admission 11/8-11/10 to cardiology service for hypertensive emergency (left AMA, was given levaquin when he left for right foot wound that pt had started treatment for back in October) who presented to [facility] after a fall onto his knees and was having b/l knee pain. The initial pain was severe and he felt like they were swollen so he came to the ED. Knee pain is now getting better. Reports having right foot pain for which he takes oxy 10 q8h at home. He does not wrap the foot or put any topical medications on it. Denies fever, chills, SOB, CP.      ED course:  vitals: afebrile, HR 70, /87, RR 16, O2sat 98 on room air  labs: wbc 13, Na 130, glucose 476, BNP 96569  CXR la/ap: persistent small left and a new small right pleural effusion  b/l knee xray: Moderate to severe left worse than right knee arthrosis without acute displaced fracture  CT angio b/l LE: Increased soft tissue gas in the forefoot. No drainable abscess. Patent two vessel runoff in both lower extremities. No significant knee joint effusion.  ECG: regular, sinus, isolated Q wave AVF, borderline LVH  Interventions: regular insulin 10u total, Lasix 40 IV, norvasc 10, morphine 2 IV x2, tylenol 1g IV. Vanc 1g, zosyn 3.375  Intensivist was called in ED for hyperglycemia but since not in DKA no telemetry was needed.  [facility] also contacted ortho for b/l knee pain and Vascular for R foot wound (02 Dec 2023 01:15)    Podiatry Addendum: 68M PMH HTN, IDDM with peripheral neuropathy, HCV, HFmrEF, CAD, previously admitted for diabetic foot infection (10/21-10/31) for which he underwent partial 4th ray resection on 10/24/23. Presented to [facility] for b/l knee pain and admitted to medicine for R foot infection.  Podiatry consulted to evaluate the R foot infection. Pt known to podiatry service form prior visit. Pt states of R foot worsening infection. Denies seeing any podiatrist or infectious disease doctor outpt since last discharge from the hospital. States that he has been cleaning the foot. Denies any pain or discomfort to the R foot. States that he did not complete his IV abx and doesn't know why the PICC line was removed in prior hospital stay. Currently only complaining of breakfast. On previous admission, pt refusing surgical intervention. Pt is continuing to refuse further surgical intervention despite poor healing potential of surgical site wound as well as possible further infection of wound. Denies n/v/f/c.         Review of systems negative except per HPI     PAST MEDICAL & SURGICAL HISTORY:  IDDM (insulin dependent diabetes mellitus)      HTN (hypertension)      CAD S/P percutaneous coronary angioplasty      Neuropathy      PAD (peripheral artery disease)      PNA (pneumonia)      Gangrene of toe of right foot      Moderate aortic stenosis      Acute on chronic diastolic congestive heart failure      Hyperlipidemia      History of partial ray amputation of fourth toe of right foot        Home Medications:  insulin glargine 100 units/mL subcutaneous solution: 14 unit(s) subcutaneous once a day (at bedtime) (02 Dec 2023 01:47)    Allergies    No Known Allergies    Intolerances      FAMILY HISTORY:    Social History: Denies any recreational drug use or tobacco/nicotine use.       LABS                        8.1    13.54 )-----------( 172      ( 01 Dec 2023 19:15 )             25.5     12-01    130<L>  |  97  |  19  ----------------------------<  476<HH>  4.1   |  27  |  1.29    Ca    8.7      01 Dec 2023 11:51  Phos  3.5     12-01  Mg     2.0     12-01    TPro  6.7  /  Alb  1.8<L>  /  TBili  0.5  /  DBili  x   /  AST  25  /  ALT  19  /  AlkPhos  201<H>  12-01    PT/INR - ( 01 Dec 2023 15:40 )   PT: 11.6 sec;   INR: 1.03          PTT - ( 01 Dec 2023 15:40 )  PTT:25.2 sec    Vital Signs Last 24 Hrs  T(C): 37.6 (01 Dec 2023 23:41), Max: 37.6 (01 Dec 2023 23:41)  T(F): 99.7 (01 Dec 2023 23:41), Max: 99.7 (01 Dec 2023 23:41)  HR: 65 (01 Dec 2023 23:41) (60 - 70)  BP: 183/84 (01 Dec 2023 23:41) (175/88 - 193/87)  BP(mean): --  RR: 18 (01 Dec 2023 23:41) (15 - 20)  SpO2: 96% (01 Dec 2023 23:41) (95% - 98%)    Parameters below as of 01 Dec 2023 23:41  Patient On (Oxygen Delivery Method): room air      PHYSICAL EXAM  General: NAD, AA0x3    Lower Extremity Focused:  Vasc: DP/PT biphasic waveforms heard on doppler b/l, b/l LE +2 pitting edema. Erythema present to the R forefoot and midfoot.  CFT <3 x 9   Derm:  R foot surgical site wound at the distal 4th ray. Serous drainage. Fibrotic 100% wound bed, negative PTB, + malodor.  Plantar skin just proximal to the surgical site with extensive hyperkeratotic skin and associated ischemic discoloration.   Right proximal medial midfoot wound with fibrotic slough. Tunneling noted proximally towards the navicular bone area. Serous drainage. Positive PTB, positive malodor. Negative for fluctuance or crepitus  Macerated 2nd and 3rd interspace  Neuro: protective sensation slightly diminished  MSK: 5/5 muscle strength in all compartments     RADIOLOGY  < from: CT Angio Lower Extremity w/ IV Cont, Bilateral (12.01.23 @ 17:13) >  IMPRESSION:  Increased soft tissue gas in the forefoot. No drainable abscess.  Patent two vessel runoff in both lower extremities.  Marked diffuse body wall and lower extremity edema.  Small bilateral pleural effusions.  No significant knee joint effusion.    < end of copied text >                         Attending: Tom Stevens     Patient is a 68y old  Male who presents with a chief complaint of soft tissue and skin infection of right foot (02 Dec 2023 03:25)      HPI:  67yo M PMH CAD (2 stents 2020), grade 1 diastolic dysfunction, HTN, PAD w/ remote RLE angioplasty, R 4th toe OM s/p partial R 4th ray amputation on 10/24, RLE angiogram with AT lithotripsy and AT/peroneal balloon angioplasty 10/27, uncontrolled IDDM (a1c 12 in Oct 2023), recent admission 11/8-11/10 to cardiology service for hypertensive emergency (left AMA, was given levaquin when he left for right foot wound that pt had started treatment for back in October) who presented to [facility] after a fall onto his knees and was having b/l knee pain. The initial pain was severe and he felt like they were swollen so he came to the ED. Knee pain is now getting better. Reports having right foot pain for which he takes oxy 10 q8h at home. He does not wrap the foot or put any topical medications on it. Denies fever, chills, SOB, CP.      ED course:  vitals: afebrile, HR 70, /87, RR 16, O2sat 98 on room air  labs: wbc 13, Na 130, glucose 476, BNP 62389  CXR la/ap: persistent small left and a new small right pleural effusion  b/l knee xray: Moderate to severe left worse than right knee arthrosis without acute displaced fracture  CT angio b/l LE: Increased soft tissue gas in the forefoot. No drainable abscess. Patent two vessel runoff in both lower extremities. No significant knee joint effusion.  ECG: regular, sinus, isolated Q wave AVF, borderline LVH  Interventions: regular insulin 10u total, Lasix 40 IV, norvasc 10, morphine 2 IV x2, tylenol 1g IV. Vanc 1g, zosyn 3.375  Intensivist was called in ED for hyperglycemia but since not in DKA no telemetry was needed.  [facility] also contacted ortho for b/l knee pain and Vascular for R foot wound (02 Dec 2023 01:15)    Podiatry Addendum: 68M PMH HTN, IDDM with peripheral neuropathy, HCV, HFmrEF, CAD, previously admitted for diabetic foot infection (10/21-10/31) for which he underwent partial 4th ray resection on 10/24/23. Presented to [facility] for b/l knee pain and admitted to medicine for R foot infection.  Podiatry consulted to evaluate the R foot infection. Pt known to podiatry service form prior visit. Pt states of R foot worsening infection. Denies seeing any podiatrist or infectious disease doctor outpt since last discharge from the hospital. States that he has been cleaning the foot. Denies any pain or discomfort to the R foot. States that he did not complete his IV abx and doesn't know why the PICC line was removed in prior hospital stay. Currently only complaining of breakfast. On previous admission, pt refusing surgical intervention. Pt is continuing to refuse further surgical intervention despite poor healing potential of surgical site wound as well as possible further infection of wound. Denies n/v/f/c.         Review of systems negative except per HPI     PAST MEDICAL & SURGICAL HISTORY:  IDDM (insulin dependent diabetes mellitus)      HTN (hypertension)      CAD S/P percutaneous coronary angioplasty      Neuropathy      PAD (peripheral artery disease)      PNA (pneumonia)      Gangrene of toe of right foot      Moderate aortic stenosis      Acute on chronic diastolic congestive heart failure      Hyperlipidemia      History of partial ray amputation of fourth toe of right foot        Home Medications:  insulin glargine 100 units/mL subcutaneous solution: 14 unit(s) subcutaneous once a day (at bedtime) (02 Dec 2023 01:47)    Allergies    No Known Allergies    Intolerances      FAMILY HISTORY:    Social History: Denies any recreational drug use or tobacco/nicotine use.       LABS                        8.1    13.54 )-----------( 172      ( 01 Dec 2023 19:15 )             25.5     12-01    130<L>  |  97  |  19  ----------------------------<  476<HH>  4.1   |  27  |  1.29    Ca    8.7      01 Dec 2023 11:51  Phos  3.5     12-01  Mg     2.0     12-01    TPro  6.7  /  Alb  1.8<L>  /  TBili  0.5  /  DBili  x   /  AST  25  /  ALT  19  /  AlkPhos  201<H>  12-01    PT/INR - ( 01 Dec 2023 15:40 )   PT: 11.6 sec;   INR: 1.03          PTT - ( 01 Dec 2023 15:40 )  PTT:25.2 sec    Vital Signs Last 24 Hrs  T(C): 37.6 (01 Dec 2023 23:41), Max: 37.6 (01 Dec 2023 23:41)  T(F): 99.7 (01 Dec 2023 23:41), Max: 99.7 (01 Dec 2023 23:41)  HR: 65 (01 Dec 2023 23:41) (60 - 70)  BP: 183/84 (01 Dec 2023 23:41) (175/88 - 193/87)  BP(mean): --  RR: 18 (01 Dec 2023 23:41) (15 - 20)  SpO2: 96% (01 Dec 2023 23:41) (95% - 98%)    Parameters below as of 01 Dec 2023 23:41  Patient On (Oxygen Delivery Method): room air      PHYSICAL EXAM  General: NAD, AA0x3    Lower Extremity Focused:  Vasc: DP/PT biphasic waveforms heard on doppler b/l, b/l LE +2 pitting edema. Erythema present to the R forefoot and midfoot.  CFT <3 x 9   Derm:  R foot surgical site wound at the distal 4th ray. Serous drainage. Fibrotic 100% wound bed, negative PTB, + malodor.  Plantar skin just proximal to the surgical site with extensive hyperkeratotic skin and associated ischemic discoloration.   Right proximal medial midfoot wound with fibrotic slough. Tunneling noted proximally towards the navicular bone area. Serous drainage. Positive PTB, positive malodor. Negative for fluctuance or crepitus  Macerated 2nd and 3rd interspace  Neuro: protective sensation slightly diminished  MSK: 5/5 muscle strength in all compartments     RADIOLOGY  < from: CT Angio Lower Extremity w/ IV Cont, Bilateral (12.01.23 @ 17:13) >  IMPRESSION:  Increased soft tissue gas in the forefoot. No drainable abscess.  Patent two vessel runoff in both lower extremities.  Marked diffuse body wall and lower extremity edema.  Small bilateral pleural effusions.  No significant knee joint effusion.    < end of copied text >                         Attending: Tom Stevens     Patient is a 68y old  Male who presents with a chief complaint of soft tissue and skin infection of right foot (02 Dec 2023 03:25)      HPI:  67yo M PMH CAD (2 stents 2020), grade 1 diastolic dysfunction, HTN, PAD w/ remote RLE angioplasty, R 4th toe OM s/p partial R 4th ray amputation on 10/24, RLE angiogram with AT lithotripsy and AT/peroneal balloon angioplasty 10/27, uncontrolled IDDM (a1c 12 in Oct 2023), recent admission 11/8-11/10 to cardiology service for hypertensive emergency (left AMA, was given levaquin when he left for right foot wound that pt had started treatment for back in October) who presented to [facility] after a fall onto his knees and was having b/l knee pain. The initial pain was severe and he felt like they were swollen so he came to the ED. Knee pain is now getting better. Reports having right foot pain for which he takes oxy 10 q8h at home. He does not wrap the foot or put any topical medications on it. Denies fever, chills, SOB, CP.      ED course:  vitals: afebrile, HR 70, /87, RR 16, O2sat 98 on room air  labs: wbc 13, Na 130, glucose 476, BNP 20603  CXR la/ap: persistent small left and a new small right pleural effusion  b/l knee xray: Moderate to severe left worse than right knee arthrosis without acute displaced fracture  CT angio b/l LE: Increased soft tissue gas in the forefoot. No drainable abscess. Patent two vessel runoff in both lower extremities. No significant knee joint effusion.  ECG: regular, sinus, isolated Q wave AVF, borderline LVH  Interventions: regular insulin 10u total, Lasix 40 IV, norvasc 10, morphine 2 IV x2, tylenol 1g IV. Vanc 1g, zosyn 3.375  Intensivist was called in ED for hyperglycemia but since not in DKA no telemetry was needed.  [facility] also contacted ortho for b/l knee pain and Vascular for R foot wound (02 Dec 2023 01:15)    Podiatry Addendum: 68M PMH HTN, IDDM with peripheral neuropathy, HCV, HFmrEF, CAD, previously admitted for diabetic foot infection (10/21-10/31) for which he underwent partial 4th ray resection on 10/24/23. Presented to [facility] for b/l knee pain and admitted to medicine for R foot infection.  Podiatry consulted to evaluate the R foot infection. Pt known to podiatry service form prior visit. Pt states of R foot worsening infection. Denies seeing any podiatrist or infectious disease doctor outpt since last discharge from the hospital. States that he has been cleaning the foot. Denies any pain or discomfort to the R foot. States that he did not complete his IV abx and doesn't know why the PICC line was removed in prior hospital stay. Currently only complaining of breakfast. On previous admission, pt refusing surgical intervention. Pt is continuing to refuse further surgical intervention despite poor healing potential of surgical site wound as well as possible further infection of wound. Denies n/v/f/c.         Review of systems negative except per HPI     PAST MEDICAL & SURGICAL HISTORY:  IDDM (insulin dependent diabetes mellitus)      HTN (hypertension)      CAD S/P percutaneous coronary angioplasty      Neuropathy      PAD (peripheral artery disease)      PNA (pneumonia)      Gangrene of toe of right foot      Moderate aortic stenosis      Acute on chronic diastolic congestive heart failure      Hyperlipidemia      History of partial ray amputation of fourth toe of right foot        Home Medications:  insulin glargine 100 units/mL subcutaneous solution: 14 unit(s) subcutaneous once a day (at bedtime) (02 Dec 2023 01:47)    Allergies    No Known Allergies    Intolerances      FAMILY HISTORY:    Social History: Denies any recreational drug use or tobacco/nicotine use.       LABS                        8.1    13.54 )-----------( 172      ( 01 Dec 2023 19:15 )             25.5     12-01    130<L>  |  97  |  19  ----------------------------<  476<HH>  4.1   |  27  |  1.29    Ca    8.7      01 Dec 2023 11:51  Phos  3.5     12-01  Mg     2.0     12-01    TPro  6.7  /  Alb  1.8<L>  /  TBili  0.5  /  DBili  x   /  AST  25  /  ALT  19  /  AlkPhos  201<H>  12-01    PT/INR - ( 01 Dec 2023 15:40 )   PT: 11.6 sec;   INR: 1.03          PTT - ( 01 Dec 2023 15:40 )  PTT:25.2 sec    Vital Signs Last 24 Hrs  T(C): 37.6 (01 Dec 2023 23:41), Max: 37.6 (01 Dec 2023 23:41)  T(F): 99.7 (01 Dec 2023 23:41), Max: 99.7 (01 Dec 2023 23:41)  HR: 65 (01 Dec 2023 23:41) (60 - 70)  BP: 183/84 (01 Dec 2023 23:41) (175/88 - 193/87)  BP(mean): --  RR: 18 (01 Dec 2023 23:41) (15 - 20)  SpO2: 96% (01 Dec 2023 23:41) (95% - 98%)    Parameters below as of 01 Dec 2023 23:41  Patient On (Oxygen Delivery Method): room air      PHYSICAL EXAM  General: NAD, AA0x3    Lower Extremity Focused:  Vasc: DP/PT biphasic waveforms heard on doppler b/l, b/l LE +2 pitting edema. Erythema present to the R forefoot and midfoot.  CFT <3 x 9   Derm:  R foot surgical site wound at the distal 4th ray. Serous drainage. Fibrotic 100% wound bed, negative PTB, + malodor.  Plantar skin just proximal to the surgical site with extensive hyperkeratotic skin and associated ischemic discoloration.   Right proximal medial midfoot wound with fibrotic slough. Tunneling noted proximally towards the navicular bone area. Serous drainage. Positive PTB, positive malodor. Negative for fluctuance or crepitus  Macerated 2nd and 3rd interspace  Neuro: protective sensation slightly diminished  MSK: 5/5 muscle strength in all compartments     RADIOLOGY  < from: CT Angio Lower Extremity w/ IV Cont, Bilateral (12.01.23 @ 17:13) >  IMPRESSION:  Increased soft tissue gas in the forefoot. No drainable abscess.  Patent two vessel runoff in both lower extremities.  Marked diffuse body wall and lower extremity edema.  Small bilateral pleural effusions.  No significant knee joint effusion.    < end of copied text >

## 2023-12-02 NOTE — CHART NOTE - NSCHARTNOTEFT_GEN_A_CORE
Patient Demographic Information (PDI)       PDI	First Name	Last Name	Birth Date	Gender	Street Address	[facility] [place]	Zip Code    A	Ward Avina	1955	Male	[address] [facility]	[address]    Prescription Information      PDI Filter:      PDI	Current Rx	Drug Type	Rx Written	Rx Dispensed	Drug	Quantity	Days Supply	Prescriber Name	Prescriber JAS #	Payment Method	Dispenser    A	N	O	10/03/2023	10/04/2023	oxycodone hcl (ir) 5 mg tablet	9	3	Salvador Collins	[id number]	[facility]

## 2023-12-02 NOTE — H&P ADULT - PROBLEM/PLAN-8
Patient was seen 8/29/17 for cardiac clearance and had noted mild fluid overload. Her diuretics were adjusted and she was advised to follow up in one week. Cardiac clearance was not addressed per the note. Will discuss further with Dr Ziegler.    DISPLAY PLAN FREE TEXT

## 2023-12-02 NOTE — H&P ADULT - NSHPLABSRESULTS_GEN_ALL_CORE
.  LABS:                         8.1    13.54 )-----------( 172      ( 01 Dec 2023 19:15 )             25.5     12-    130<L>  |  97  |  19  ----------------------------<  476<HH>  4.1   |  27  |  1.29    Ca    8.7      01 Dec 2023 11:51  Phos  3.5       Mg     2.0         TPro  6.7  /  Alb  1.8<L>  /  TBili  0.5  /  DBili  x   /  AST  25  /  ALT  19  /  AlkPhos  201<H>  12    PT/INR - ( 01 Dec 2023 15:40 )   PT: 11.6 sec;   INR: 1.03          PTT - ( 01 Dec 2023 15:40 )  PTT:25.2 sec  Urinalysis Basic - ( 01 Dec 2023 12:57 )    Color: Yellow / Appearance: Clear / S.010 / pH: x  Gluc: x / Ketone: Negative mg/dL  / Bili: Negative / Urobili: 0.2 mg/dL   Blood: x / Protein: 100 mg/dL / Nitrite: Negative   Leuk Esterase: Negative / RBC: 3 /HPF / WBC 2 /HPF   Sq Epi: x / Non Sq Epi: x / Bacteria: Negative /HPF                RADIOLOGY, EKG & ADDITIONAL TESTS: Reviewed.

## 2023-12-03 LAB
ANION GAP SERPL CALC-SCNC: 5 MMOL/L — SIGNIFICANT CHANGE UP (ref 5–17)
ANION GAP SERPL CALC-SCNC: 5 MMOL/L — SIGNIFICANT CHANGE UP (ref 5–17)
BUN SERPL-MCNC: 17 MG/DL — SIGNIFICANT CHANGE UP (ref 7–23)
BUN SERPL-MCNC: 17 MG/DL — SIGNIFICANT CHANGE UP (ref 7–23)
CALCIUM SERPL-MCNC: 7.9 MG/DL — LOW (ref 8.4–10.5)
CALCIUM SERPL-MCNC: 7.9 MG/DL — LOW (ref 8.4–10.5)
CHLORIDE SERPL-SCNC: 98 MMOL/L — SIGNIFICANT CHANGE UP (ref 96–108)
CHLORIDE SERPL-SCNC: 98 MMOL/L — SIGNIFICANT CHANGE UP (ref 96–108)
CO2 SERPL-SCNC: 28 MMOL/L — SIGNIFICANT CHANGE UP (ref 22–31)
CO2 SERPL-SCNC: 28 MMOL/L — SIGNIFICANT CHANGE UP (ref 22–31)
CREAT SERPL-MCNC: 1.02 MG/DL — SIGNIFICANT CHANGE UP (ref 0.5–1.3)
CREAT SERPL-MCNC: 1.02 MG/DL — SIGNIFICANT CHANGE UP (ref 0.5–1.3)
EGFR: 81 ML/MIN/1.73M2 — SIGNIFICANT CHANGE UP
EGFR: 81 ML/MIN/1.73M2 — SIGNIFICANT CHANGE UP
GLUCOSE BLDC GLUCOMTR-MCNC: 166 MG/DL — HIGH (ref 70–99)
GLUCOSE BLDC GLUCOMTR-MCNC: 166 MG/DL — HIGH (ref 70–99)
GLUCOSE BLDC GLUCOMTR-MCNC: 265 MG/DL — HIGH (ref 70–99)
GLUCOSE BLDC GLUCOMTR-MCNC: 265 MG/DL — HIGH (ref 70–99)
GLUCOSE BLDC GLUCOMTR-MCNC: 331 MG/DL — HIGH (ref 70–99)
GLUCOSE BLDC GLUCOMTR-MCNC: 331 MG/DL — HIGH (ref 70–99)
GLUCOSE BLDC GLUCOMTR-MCNC: 66 MG/DL — LOW (ref 70–99)
GLUCOSE BLDC GLUCOMTR-MCNC: 66 MG/DL — LOW (ref 70–99)
GLUCOSE SERPL-MCNC: 66 MG/DL — LOW (ref 70–99)
GLUCOSE SERPL-MCNC: 66 MG/DL — LOW (ref 70–99)
HCT VFR BLD CALC: 22.8 % — LOW (ref 39–50)
HCT VFR BLD CALC: 22.8 % — LOW (ref 39–50)
HGB BLD-MCNC: 7.2 G/DL — LOW (ref 13–17)
HGB BLD-MCNC: 7.2 G/DL — LOW (ref 13–17)
MAGNESIUM SERPL-MCNC: 2 MG/DL — SIGNIFICANT CHANGE UP (ref 1.6–2.6)
MAGNESIUM SERPL-MCNC: 2 MG/DL — SIGNIFICANT CHANGE UP (ref 1.6–2.6)
MCHC RBC-ENTMCNC: 29.8 PG — SIGNIFICANT CHANGE UP (ref 27–34)
MCHC RBC-ENTMCNC: 29.8 PG — SIGNIFICANT CHANGE UP (ref 27–34)
MCHC RBC-ENTMCNC: 31.6 GM/DL — LOW (ref 32–36)
MCHC RBC-ENTMCNC: 31.6 GM/DL — LOW (ref 32–36)
MCV RBC AUTO: 94.2 FL — SIGNIFICANT CHANGE UP (ref 80–100)
MCV RBC AUTO: 94.2 FL — SIGNIFICANT CHANGE UP (ref 80–100)
NRBC # BLD: 0 /100 WBCS — SIGNIFICANT CHANGE UP (ref 0–0)
NRBC # BLD: 0 /100 WBCS — SIGNIFICANT CHANGE UP (ref 0–0)
PHOSPHATE SERPL-MCNC: 2.9 MG/DL — SIGNIFICANT CHANGE UP (ref 2.5–4.5)
PHOSPHATE SERPL-MCNC: 2.9 MG/DL — SIGNIFICANT CHANGE UP (ref 2.5–4.5)
PLATELET # BLD AUTO: 173 K/UL — SIGNIFICANT CHANGE UP (ref 150–400)
PLATELET # BLD AUTO: 173 K/UL — SIGNIFICANT CHANGE UP (ref 150–400)
POTASSIUM SERPL-MCNC: 3.7 MMOL/L — SIGNIFICANT CHANGE UP (ref 3.5–5.3)
POTASSIUM SERPL-MCNC: 3.7 MMOL/L — SIGNIFICANT CHANGE UP (ref 3.5–5.3)
POTASSIUM SERPL-SCNC: 3.7 MMOL/L — SIGNIFICANT CHANGE UP (ref 3.5–5.3)
POTASSIUM SERPL-SCNC: 3.7 MMOL/L — SIGNIFICANT CHANGE UP (ref 3.5–5.3)
RBC # BLD: 2.42 M/UL — LOW (ref 4.2–5.8)
RBC # BLD: 2.42 M/UL — LOW (ref 4.2–5.8)
RBC # FLD: 14.2 % — SIGNIFICANT CHANGE UP (ref 10.3–14.5)
RBC # FLD: 14.2 % — SIGNIFICANT CHANGE UP (ref 10.3–14.5)
SODIUM SERPL-SCNC: 131 MMOL/L — LOW (ref 135–145)
SODIUM SERPL-SCNC: 131 MMOL/L — LOW (ref 135–145)
WBC # BLD: 14.04 K/UL — HIGH (ref 3.8–10.5)
WBC # BLD: 14.04 K/UL — HIGH (ref 3.8–10.5)
WBC # FLD AUTO: 14.04 K/UL — HIGH (ref 3.8–10.5)
WBC # FLD AUTO: 14.04 K/UL — HIGH (ref 3.8–10.5)

## 2023-12-03 PROCEDURE — 99233 SBSQ HOSP IP/OBS HIGH 50: CPT

## 2023-12-03 RX ORDER — FUROSEMIDE 40 MG
40 TABLET ORAL ONCE
Refills: 0 | Status: COMPLETED | OUTPATIENT
Start: 2023-12-03 | End: 2023-12-03

## 2023-12-03 RX ORDER — OXYCODONE HYDROCHLORIDE 5 MG/1
5 TABLET ORAL EVERY 6 HOURS
Refills: 0 | Status: DISCONTINUED | OUTPATIENT
Start: 2023-12-03 | End: 2023-12-07

## 2023-12-03 RX ORDER — POTASSIUM CHLORIDE 20 MEQ
40 PACKET (EA) ORAL ONCE
Refills: 0 | Status: COMPLETED | OUTPATIENT
Start: 2023-12-03 | End: 2023-12-03

## 2023-12-03 RX ADMIN — INSULIN GLARGINE 14 UNIT(S): 100 INJECTION, SOLUTION SUBCUTANEOUS at 22:23

## 2023-12-03 RX ADMIN — Medication 40 MILLIEQUIVALENT(S): at 12:06

## 2023-12-03 RX ADMIN — CARVEDILOL PHOSPHATE 12.5 MILLIGRAM(S): 80 CAPSULE, EXTENDED RELEASE ORAL at 18:00

## 2023-12-03 RX ADMIN — Medication 650 MILLIGRAM(S): at 17:05

## 2023-12-03 RX ADMIN — OXYCODONE HYDROCHLORIDE 5 MILLIGRAM(S): 5 TABLET ORAL at 13:25

## 2023-12-03 RX ADMIN — GABAPENTIN 800 MILLIGRAM(S): 400 CAPSULE ORAL at 14:25

## 2023-12-03 RX ADMIN — PIPERACILLIN AND TAZOBACTAM 25 GRAM(S): 4; .5 INJECTION, POWDER, LYOPHILIZED, FOR SOLUTION INTRAVENOUS at 22:23

## 2023-12-03 RX ADMIN — Medication 6: at 18:03

## 2023-12-03 RX ADMIN — Medication 8: at 22:28

## 2023-12-03 RX ADMIN — PIPERACILLIN AND TAZOBACTAM 25 GRAM(S): 4; .5 INJECTION, POWDER, LYOPHILIZED, FOR SOLUTION INTRAVENOUS at 14:22

## 2023-12-03 RX ADMIN — CARVEDILOL PHOSPHATE 12.5 MILLIGRAM(S): 80 CAPSULE, EXTENDED RELEASE ORAL at 06:55

## 2023-12-03 RX ADMIN — Medication 50 MILLIGRAM(S): at 22:23

## 2023-12-03 RX ADMIN — OXYCODONE HYDROCHLORIDE 5 MILLIGRAM(S): 5 TABLET ORAL at 07:56

## 2023-12-03 RX ADMIN — Medication 2: at 13:30

## 2023-12-03 RX ADMIN — Medication 50 MILLIGRAM(S): at 14:19

## 2023-12-03 RX ADMIN — GABAPENTIN 800 MILLIGRAM(S): 400 CAPSULE ORAL at 06:57

## 2023-12-03 RX ADMIN — ATORVASTATIN CALCIUM 80 MILLIGRAM(S): 80 TABLET, FILM COATED ORAL at 22:23

## 2023-12-03 RX ADMIN — PIPERACILLIN AND TAZOBACTAM 25 GRAM(S): 4; .5 INJECTION, POWDER, LYOPHILIZED, FOR SOLUTION INTRAVENOUS at 06:56

## 2023-12-03 RX ADMIN — Medication 650 MILLIGRAM(S): at 18:05

## 2023-12-03 RX ADMIN — OXYCODONE HYDROCHLORIDE 5 MILLIGRAM(S): 5 TABLET ORAL at 21:40

## 2023-12-03 RX ADMIN — OXYCODONE HYDROCHLORIDE 5 MILLIGRAM(S): 5 TABLET ORAL at 12:25

## 2023-12-03 RX ADMIN — Medication 40 MILLIGRAM(S): at 14:19

## 2023-12-03 RX ADMIN — OXYCODONE HYDROCHLORIDE 5 MILLIGRAM(S): 5 TABLET ORAL at 20:40

## 2023-12-03 RX ADMIN — LISINOPRIL 40 MILLIGRAM(S): 2.5 TABLET ORAL at 12:07

## 2023-12-03 RX ADMIN — Medication 50 MILLIGRAM(S): at 06:55

## 2023-12-03 RX ADMIN — GABAPENTIN 800 MILLIGRAM(S): 400 CAPSULE ORAL at 22:24

## 2023-12-03 RX ADMIN — OXYCODONE HYDROCHLORIDE 5 MILLIGRAM(S): 5 TABLET ORAL at 06:56

## 2023-12-03 NOTE — DIETITIAN INITIAL EVALUATION ADULT - PERTINENT LABORATORY DATA
12-03    131<L>  |  98  |  17  ----------------------------<  66<L>  3.7   |  28  |  1.02    Ca    7.9<L>      03 Dec 2023 08:30  Phos  2.9     12-03  Mg     2.0     12-03    POCT Blood Glucose.: 166 mg/dL (12-03-23 @ 12:48)  A1C with Estimated Average Glucose Result: 13.5 % (12-02-23 @ 06:10)  A1C with Estimated Average Glucose Result: 12.6 % (10-02-23 @ 05:30)

## 2023-12-03 NOTE — DIETITIAN INITIAL EVALUATION ADULT - OTHER INFO
69yo M PMH CAD (2 stents 2020), HFmrEF (45-50%), HTN, PAD w/ remote RLE angioplasty, R 4th toe OM s/p partial R 4th ray amputation on 10/24, RLE angiogram with AT lithotripsy and AT/peroneal balloon angioplasty 10/27, uncontrolled IDDM (a1c 12 in Oct 2023), recent admission 11/8-11/10 to cardiology service for hypertensive emergency (left AMA, was given levaquin when he left for right foot wound that pt had started treatment for back in October) who presented to [facility] after a fall onto his knees and was having b/l knee pain. Found to have increased soft tissue gas in R foot on CT scan. Admit to [facility] for further management.    Pt assessed at bedside on 7UR. Rx and labs reviewed. Pt presents with no overt signs of nutritional wasting. At first attempt to visit pt, pt was fully covering face and body with bedding; not responsive to cues. At second attempt, pt working with RN; able to appreciate body habitus, but still unable to gather subjective nutrition hx. Pt presents for worsening diabetic foot wound and uncontrolled DM. Pt with previous A1c of 10% in Oct 2023, now presents at 13.5%. Pt currently ordered for a DASH diet; sodium levels appear WNL and no reports of edema, recommend consistent carbohydrate diet with Glucerna TID to support wound healing. Podiatry and surgery following. No other reports GI distress or further nutritional concerns at this time. RDN will continue to reassess, intervene, and monitor as appropriate.     Pain: 0 per chart review   GI: Abdomen non-distended/non-tender, +BS x4, last bowel movement PTA   Skin: R diabetic foot ulcer, no edema assessed 69yo M PMH CAD (2 stents 2020), HFmrEF (45-50%), HTN, PAD w/ remote RLE angioplasty, R 4th toe OM s/p partial R 4th ray amputation on 10/24, RLE angiogram with AT lithotripsy and AT/peroneal balloon angioplasty 10/27, uncontrolled IDDM (a1c 12 in Oct 2023), recent admission 11/8-11/10 to cardiology service for hypertensive emergency (left AMA, was given levaquin when he left for right foot wound that pt had started treatment for back in October) who presented to [facility] after a fall onto his knees and was having b/l knee pain. Found to have increased soft tissue gas in R foot on CT scan. Admit to [facility] for further management.    Pt assessed at bedside on 7UR. Rx and labs reviewed. Pt presents with no overt signs of nutritional wasting. At first attempt to visit pt, pt was fully covering face and body with bedding; not responsive to cues. At second attempt, pt working with RN; able to appreciate body habitus, but still unable to gather subjective nutrition hx. Pt presents for worsening diabetic foot wound and uncontrolled DM. Pt with previous A1c of 10% in Oct 2023, now presents at 13.5%. Pt currently ordered for a DASH diet; sodium levels appear WNL and no reports of edema, recommend consistent carbohydrate diet with Glucerna TID to support wound healing. Podiatry and surgery following. No other reports GI distress or further nutritional concerns at this time. RDN will continue to reassess, intervene, and monitor as appropriate.     Pain: 0 per chart review   GI: Abdomen non-distended/non-tender, +BS x4, last bowel movement PTA   Skin: R diabetic foot ulcer, no edema assessed

## 2023-12-03 NOTE — PROGRESS NOTE ADULT - ASSESSMENT
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 Pt with most recent admission from 11/01-11/10 for hypertensive emergency 2/2 to cocaine use and left AMA. PICC line removed as pt left AMA and was given 25 days of PO levaquin. Podiatry consulted to evaluate R foot infection.  At time of consult, afebrile, hypertensive, WBC 13.54, ESR and CRP pending. CT scan with findings of soft tissue air of the forefoot and possibly the midfoot.. Clinically, R 4th ray wound fibrotic an extensive poor skin quality of the plantar foot. Proximal medial midfoot wound with tracking 4-5cm proximally towards the Talo-navicular area. R foot infection 2/2 to non-healing foot wound and poor pt compliance.     12/3: Discussed with pt, in length,  with regards to surgical intervention for source control. Podiatry recommended to pt, at minimum, a Chopart amputation of the R foot for initial source control. Pt will likely require R BKA as the plantar foot skin/soft tissue is poor, hx of post op non-compliance, and poor ambulatory functionality of a Chopart amputation. Pt at this time, once again,  is adamantly refusing any surgical intervention. Pt expresses full understanding of risks of delaying surgical intervention in the setting of gas gangrene. Explained to pt, the infection may spread proximally towards the ankle and beyond, may need a more proximal amputation, and/or pt may become septic and be life threatening  Pt continues to refuse.  food. Currently is requesting breakfast. During the entire conversation, pt noted to slump and sleep mid sentence. Pt resorted to verbal abusive language.     Plan   - Pt evaluated and chart reviewed  - Local wound care: cleansed with NS. Applied betadine soaked packing to plantar midfoot wound. Covered all wounds with betadine soaked gauze, and kerlix.   -  IV abx per ID recs   - f/u deep wound cx from proximal medial wound sinus tract   - WBAT to R heel   - Rest of care per primary team     Podiatry following, plan discusssed with attending  68M PMH HTN, IDDM with peripheral neuropathy, HCV, HFmrEF, CAD, with prior hospitalization 10/21-10/31 for DFU and is s/p R partial 4th ray amputation (DOS 10/24), and was discharged on 6 weeks daptomycin/vancomycin (through 12/5) admitted to medicine for R foot infection and b/l knee pain. Pt with most recent admission from 11/01-11/10 for hypertensive emergency 2/2 to cocaine use and left AMA. PICC line removed as pt left AMA and was given 25 days of PO Levaquin Podiatry consulted to evaluate R foot infection.  At time of consult, afebrile, hypertensive, WBC 13.54, ESR and CRP pending. CT scan with findings of soft tissue air of the forefoot and possibly the midfoot.. Clinically, R 4th ray wound fibrotic an extensive poor skin quality of the plantar foot. Proximal medial midfoot wound with tracking 4-5cm proximally towards the Talo-navicular area. R foot infection 2/2 to non-healing foot wound and poor pt compliance.     12/3: Discussed with pt, in length,  with regards to surgical intervention for source control. Podiatry recommended to pt, at minimum, a Chopart amputation of the R foot for initial source control. Pt will likely require R BKA as the plantar foot skin/soft tissue is poor, hx of post op non-compliance, and poor ambulatory functionality of a Chopart amputation. Pt at this time, once again,  is adamantly refusing any surgical intervention. Pt expresses full understanding of risks of delaying surgical intervention in the setting of gas gangrene. Explained to pt, the infection may spread proximally towards the ankle and beyond, may need a more proximal amputation, and/or pt may become septic and be life threatening  Pt continues to refuse.  food. Currently is requesting breakfast. During the entire conversation, pt noted to slump and sleep mid sentence. Pt resorted to verbal abusive language.     Plan   - Pt evaluated and chart reviewed  - Local wound care: cleansed with NS. Applied betadine soaked packing to plantar midfoot wound. Covered all wounds with betadine soaked gauze, and kerlix.   -  IV abx per ID recs   - f/u deep wound cx from proximal medial wound sinus tract   - WBAT to R heel   - Rest of care per primary team     Podiatry following, plan discusssed with attending

## 2023-12-03 NOTE — PROGRESS NOTE ADULT - PROBLEM SELECTOR PLAN 1
Past hx of PAD w/ diabetic foot wound. Had prior admission in October where he was seen by ID and Podiatry and was supposed to complete 6 wks of broad spectrum antimicrobials. At that time MRI with possible 4th digit early OM up to prox phalanx s/p partial R 4th ray amputation on 10/24, proximal cx with E.coli, Pluralibacter gergoviae, stap epi, and Corynebacterium amycolatum.   Then had an admission this month for unrelated issue, had left AMA and PICC line was taken out bc pt was leaving AMA, was prescribed levaquin to complete his 6wk course however pt unsure if he actually picked up the levaquin.  CT angio LE today shows increased soft tissue gas in the right forefoot. Vascular surgery was contacted by [facility].  On exam right foot has open wound between 4th and 5th digits with purulence coming out of wound, foot is erythematous on dorsal aspect and warm to touch.  Ddx includes SSTI vs osteomyelitis vs less likely nec fasc in setting of possibly incompletely treated infection.   Vascular signed off d/t pt refusing interventions  - Podiatry recs local wound care, IV abx per ID recs   - f/u wound culture   - c/w vancomycin 1g IV q12   - c/w zosyn 3.375g IV q8   - tylenol prn for mild pain, toradol sparingly for mod pain, Oxy 5 prn for severe pain Past hx of PAD w/ diabetic foot wound. Had prior admission in October where he was seen by ID and Podiatry and was supposed to complete 6 wks of broad spectrum antimicrobials. At that time MRI with possible 4th digit early OM up to prox phalanx s/p partial R 4th ray amputation on 10/24, proximal cx with E.coli, Pluralibacter gergoviae, stap epi, and Corynebacterium amycolatum.   Then had an admission this month for unrelated issue, had left AMA and PICC line was taken out bc pt was leaving AMA, was prescribed levaquin to complete his 6wk course however pt unsure if he actually picked up the levaquin.  CT angio LE today shows increased soft tissue gas in the right forefoot. Vascular surgery was contacted by [facility].  On exam right foot has open wound between 4th and 5th digits with purulence coming out of wound, foot is erythematous on dorsal aspect and warm to touch.  Ddx includes SSTI vs osteomyelitis vs less likely nec fasc in setting of possibly incompletely treated infection.   Vascular signed off d/t pt refusing interventions  - Podiatry recs local wound care, IV abx per ID recs   - f/u wound culture   - c/w vancomycin 1g IV q12   - c/w zosyn 3.375g IV q8   - tylenol prn for mild pain, toradol sparingly for mod pain, Oxy 5 prn for severe pain Past hx of PAD w/ diabetic foot wound. Had prior admission in October where he was seen by ID and Podiatry and was supposed to complete 6 wks of broad spectrum antimicrobials. At that time MRI with possible 4th digit early OM up to prox phalanx s/p partial R 4th ray amputation on 10/24, proximal cx with E.coli, Pluralibacter gergoviae, stap epi, and Corynebacterium amycolatum.   Then had an admission this month for unrelated issue, had left AMA and PICC line was taken out bc pt was leaving AMA, was prescribed levaquin to complete his 6wk course however pt unsure if he actually picked up the levaquin.  CT angio LE today shows increased soft tissue gas in the right forefoot. Vascular surgery was contacted by [facility].  On exam right foot has open wound between 4th and 5th digits with purulence coming out of wound, foot is erythematous on dorsal aspect and warm to touch.  Ddx includes SSTI vs osteomyelitis vs less likely nec fasc in setting of possibly incompletely treated infection.   Vascular signed off d/t pt refusing interventions  - Podiatry recs local wound care, IV abx per ID recs   - f/u wound culture   - c/w vancomycin 1g IV q12   - c/w zosyn 3.375g IV q8   - tylenol prn for mild pain, toradol sparingly for mod pain, Oxy 5 prn for severe pain

## 2023-12-03 NOTE — PROGRESS NOTE ADULT - PROBLEM SELECTOR PLAN 8
F: none  E: replenish as appropriate  N: regular, DASH, consistent carb; NPO until Podiatry eval    VTE Prophylaxis: Lovenox 40mg q24h  GI: not needed  C: Full Code  D: CHONG

## 2023-12-03 NOTE — DIETITIAN INITIAL EVALUATION ADULT - PROBLEM SELECTOR PLAN 6
#HFmrEF  #CAD  s/p 2 stents in 2020 at [facility]  TTE 11/9/23: EF 45-50%, Mild to moderate symmetric LVH, Grade I left ventricular diastolic dysfunction. Biatrial enlargement. Mild-to-moderate aortic stenosis.  Home meds: lisinopril, coreg  - c/w home meds  - c/w aspirin #HFmrEF  #CAD  s/p 2 stents in 2020 at [facility]  TTE 11/9/23: EF 45-50%, Mild to moderate symmetric LVH, Grade I left ventricular diastolic dysfunction. Biatrial enlargement. Mild-to-moderate aortic stenosis.  Home meds: lisinopril, coreg  - c/w home meds  - c/w aspirin

## 2023-12-03 NOTE — DIETITIAN INITIAL EVALUATION ADULT - PERTINENT MEDS FT
MEDICATIONS  (STANDING):  aspirin enteric coated 81 milliGRAM(s) Oral every 24 hours  atorvastatin 80 milliGRAM(s) Oral at bedtime  carvedilol 12.5 milliGRAM(s) Oral every 12 hours  dextrose 5%. 1000 milliLiter(s) (100 mL/Hr) IV Continuous <Continuous>  dextrose 5%. 1000 milliLiter(s) (50 mL/Hr) IV Continuous <Continuous>  dextrose 50% Injectable 12.5 Gram(s) IV Push once  dextrose 50% Injectable 25 Gram(s) IV Push once  dextrose 50% Injectable 25 Gram(s) IV Push once  gabapentin 800 milliGRAM(s) Oral three times a day  glucagon  Injectable 1 milliGRAM(s) IntraMuscular once  hydrALAZINE 50 milliGRAM(s) Oral every 8 hours  insulin glargine Injectable (LANTUS) 14 Unit(s) SubCutaneous at bedtime  insulin lispro (ADMELOG) corrective regimen sliding scale   SubCutaneous Before meals and at bedtime  lisinopril 40 milliGRAM(s) Oral every 24 hours  piperacillin/tazobactam IVPB.. 3.375 Gram(s) IV Intermittent every 8 hours    MEDICATIONS  (PRN):  acetaminophen     Tablet .. 650 milliGRAM(s) Oral every 6 hours PRN Temp greater or equal to 38C (100.4F), Mild Pain (1 - 3)  dextrose Oral Gel 15 Gram(s) Oral once PRN Blood Glucose LESS THAN 70 milliGRAM(s)/deciliter  oxyCODONE    IR 5 milliGRAM(s) Oral every 6 hours PRN Severe Pain (7 - 10)

## 2023-12-03 NOTE — DIETITIAN INITIAL EVALUATION ADULT - PROBLEM SELECTOR PLAN 1
Past hx of PAD w/ diabetic foot wound. Had prior admission in October where he was seen by ID and Podiatry and was supposed to complete 6 wks of broad spectrum antimicrobials. At that time MRI with possible 4th digit early OM up to prox phalanx s/p partial R 4th ray amputation on 10/24, proximal cx with E.coli, Pluralibacter gergoviae, stap epi, and Corynebacterium amycolatum.   Then had an admission this month for unrelated issue, had left AMA and PICC line was taken out bc pt was leaving AMA, was prescribed levaquin to complete his 6wk course however pt unsure if he actually picked up the levaquin.  CT angio LE today shows increased soft tissue gas in the right forefoot. Vascular surgery was contacted by [facility].  On exam right foot has open wound between 4th and 5th digits with purulence coming out of wound, foot is erythematous on dorsal aspect and warm to touch.  Ddx includes SSTI vs osteomyelitis vs less likely nec fasc in setting of possibly incompletely treated infection.  - Vascular Surgery consult  - Podiatry consult in AM  - ID consult in AM  - trend CBC w/ diff, ESR, CRP  - send wound culture  - start Vanc and Zosyn for empiric broad spectrum therapy  - Oxy 5 prn for severe pain  - tylenol prn for mild pain  - toradol sparingly for mod pain Past hx of PAD w/ diabetic foot wound. Had prior admission in October where he was seen by ID and Podiatry and was supposed to complete 6 wks of broad spectrum antimicrobials. At that time MRI with possible 4th digit early OM up to prox phalanx s/p partial R 4th ray amputation on 10/24, proximal cx with E.coli, Pluralibacter gergoviae, stap epi, and Corynebacterium amycolatum.   Then had an admission this month for unrelated issue, had left AMA and PICC line was taken out bc pt was leaving AMA, was prescribed levaquin to complete his 6wk course however pt unsure if he actually picked up the levaquin.  CT angio LE today shows increased soft tissue gas in the right forefoot. Vascular surgery was contacted by [facility].  On exam right foot has open wound between 4th and 5th digits with purulence coming out of wound, foot is erythematous on dorsal aspect and warm to touch.  Ddx includes SSTI vs osteomyelitis vs less likely nec fasc in setting of possibly incompletely treated infection.  - Vascular Surgery consult  - Podiatry consult in AM  - ID consult in AM  - trend CBC w/ diff, ESR, CRP  - send wound culture  - start Vanc and Zosyn for empiric broad spectrum therapy  - Oxy 5 prn for severe pain  - tylenol prn for mild pain  - toradol sparingly for mod pain

## 2023-12-03 NOTE — PROGRESS NOTE ADULT - ATTENDING COMMENTS
Continues to refuse surgical intervention as well as occasional labs and vital monitoring. Will continue broad spectrum antibiotics and follow up cultures. ID, podiatry, and vascular surgery consulted. Overloaded on exam, give another 40mg IV lasix x1.

## 2023-12-03 NOTE — PROGRESS NOTE ADULT - PROBLEM SELECTOR PLAN 5
s/p 2 stents in 2020 at [facility]. s/p 2 stents in 2020 at [facility]. s/p 2 stents in 2020 at [facility].

## 2023-12-03 NOTE — PROGRESS NOTE ADULT - ASSESSMENT
69yo M PMH CAD (2 stents 2020), HFmrEF (45-50%), HTN, PAD w/ remote RLE angioplasty, R 4th toe OM s/p partial R 4th ray amputation on 10/24, RLE angiogram with AT lithotripsy and AT/peroneal balloon angioplasty 10/27, uncontrolled IDDM (a1c 12 in Oct 2023), recent admission 11/8-11/10 to cardiology service for hypertensive emergency (left AMA, was given levaquin when he left for right foot wound that pt had started treatment for back in October) who presented to [facility] after a fall onto his knees and was having b/l knee pain. Found to have increased soft tissue gas in R foot on CT scan. Admit to [facility] for further management. 67yo M PMH CAD (2 stents 2020), HFmrEF (45-50%), HTN, PAD w/ remote RLE angioplasty, R 4th toe OM s/p partial R 4th ray amputation on 10/24, RLE angiogram with AT lithotripsy and AT/peroneal balloon angioplasty 10/27, uncontrolled IDDM (a1c 12 in Oct 2023), recent admission 11/8-11/10 to cardiology service for hypertensive emergency (left AMA, was given levaquin when he left for right foot wound that pt had started treatment for back in October) who presented to [facility] after a fall onto his knees and was having b/l knee pain. Found to have increased soft tissue gas in R foot on CT scan. Admit to [facility] for further management. 67yo M PMH CAD (2 stents 2020), HFmrEF (45-50%), HTN, PAD w/ remote RLE angioplasty, R 4th toe OM s/p partial R 4th ray amputation on 10/24, RLE angiogram with AT lithotripsy and AT/peroneal balloon angioplasty 10/27, uncontrolled IDDM (a1c 12 in Oct 2023), recent admission 11/8-11/10 to cardiology service for hypertensive emergency (left AMA, was given levaquin when he left for right foot wound that pt had started treatment for back in October) who presented to [facility] after a fall onto his knees and was having b/l knee pain. Found to have increased soft tissue gas in R foot on CT scan. Admit to [facility] for further management.

## 2023-12-03 NOTE — DIETITIAN INITIAL EVALUATION ADULT - ADD RECOMMEND
-Change diet to consistent carbohydrate diet with Glucerna TID  -Encourage good PO intake   -Honor food preferences as able   -Monitor chemistry, GI function, and skin integrity     **Paged [facility] team, no response. RDN will f/u with recommendations** -Change diet to consistent carbohydrate diet with Glucerna TID  -Encourage good PO intake   -Honor food preferences as able   -Monitor chemistry, GI function, and skin integrity     **Recommendations communicated with 7[facility] team**

## 2023-12-03 NOTE — PROGRESS NOTE ADULT - SUBJECTIVE AND OBJECTIVE BOX
**INCOMPLETE NOTE    OVERNIGHT EVENTS:    SUBJECTIVE:  Patient seen and examined at bedside.    Vital Signs Last 12 Hrs  T(F): 98.7 (12-03-23 @ 05:40), Max: 99.5 (12-02-23 @ 21:08)  HR: 62 (12-03-23 @ 05:40) (62 - 73)  BP: 148/74 (12-03-23 @ 05:40) (148/74 - 167/75)  BP(mean): 105 (12-02-23 @ 21:08) (105 - 105)  RR: 19 (12-03-23 @ 05:40) (18 - 19)  SpO2: 95% (12-03-23 @ 05:40) (95% - 100%)  I&O's Summary      PHYSICAL EXAM:  Constitutional: NAD, comfortable in bed.  HEENT: NC/AT, PERRLA, EOMI, no conjunctival pallor or scleral icterus, MMM  Neck: Supple, no JVD  Respiratory: CTA B/L. No w/r/r.   Cardiovascular: RRR, normal S1 and S2, no m/r/g.   Gastrointestinal: +BS, soft NTND, no guarding or rebound tenderness, no palpable masses   Extremities: wwp; no cyanosis, clubbing or edema.   Vascular: Pulses equal and strong throughout.   Neurological: AAOx3, no CN deficits, strength and sensation intact throughout.   Skin: No gross skin abnormalities or rashes        LABS:                        8.1    14.05 )-----------( 168      ( 02 Dec 2023 06:10 )             25.2     12-02    132<L>  |  97  |  16  ----------------------------<  212<H>  3.9   |  27  |  1.02    Ca    8.3<L>      02 Dec 2023 06:10  Phos  3.5     12-01  Mg     2.0     12-01    TPro  6.7  /  Alb  1.8<L>  /  TBili  0.5  /  DBili  x   /  AST  25  /  ALT  19  /  AlkPhos  201<H>  12-01    PT/INR - ( 02 Dec 2023 06:10 )   PT: 11.5 sec;   INR: 1.01          PTT - ( 02 Dec 2023 06:10 )  PTT:28.1 sec  Urinalysis Basic - ( 02 Dec 2023 06:10 )    Color: x / Appearance: x / SG: x / pH: x  Gluc: 212 mg/dL / Ketone: x  / Bili: x / Urobili: x   Blood: x / Protein: x / Nitrite: x   Leuk Esterase: x / RBC: x / WBC x   Sq Epi: x / Non Sq Epi: x / Bacteria: x          RADIOLOGY & ADDITIONAL TESTS:    MEDICATIONS  (STANDING):  aspirin enteric coated 81 milliGRAM(s) Oral every 24 hours  atorvastatin 80 milliGRAM(s) Oral at bedtime  carvedilol 12.5 milliGRAM(s) Oral every 12 hours  dextrose 5%. 1000 milliLiter(s) (100 mL/Hr) IV Continuous <Continuous>  dextrose 5%. 1000 milliLiter(s) (50 mL/Hr) IV Continuous <Continuous>  dextrose 50% Injectable 12.5 Gram(s) IV Push once  dextrose 50% Injectable 25 Gram(s) IV Push once  dextrose 50% Injectable 25 Gram(s) IV Push once  gabapentin 800 milliGRAM(s) Oral three times a day  glucagon  Injectable 1 milliGRAM(s) IntraMuscular once  hydrALAZINE 50 milliGRAM(s) Oral every 8 hours  insulin glargine Injectable (LANTUS) 14 Unit(s) SubCutaneous at bedtime  insulin lispro (ADMELOG) corrective regimen sliding scale   SubCutaneous Before meals and at bedtime  lisinopril 40 milliGRAM(s) Oral every 24 hours  piperacillin/tazobactam IVPB.. 3.375 Gram(s) IV Intermittent every 8 hours    MEDICATIONS  (PRN):  acetaminophen     Tablet .. 650 milliGRAM(s) Oral every 6 hours PRN Temp greater or equal to 38C (100.4F), Mild Pain (1 - 3)  dextrose Oral Gel 15 Gram(s) Oral once PRN Blood Glucose LESS THAN 70 milliGRAM(s)/deciliter  oxyCODONE    IR 5 milliGRAM(s) Oral every 6 hours PRN Severe Pain (7 - 10)   OVERNIGHT EVENTS: NAEO     SUBJECTIVE: Patient seen and examined at bedside. Denies CP, SOB, abdominal pain. Reports pain in site of R foot wound.     Vital Signs Last 12 Hrs  T(F): 98.7 (12-03-23 @ 05:40), Max: 99.5 (12-02-23 @ 21:08)  HR: 62 (12-03-23 @ 05:40) (62 - 73)  BP: 148/74 (12-03-23 @ 05:40) (148/74 - 167/75)  BP(mean): 105 (12-02-23 @ 21:08) (105 - 105)  RR: 19 (12-03-23 @ 05:40) (18 - 19)  SpO2: 95% (12-03-23 @ 05:40) (95% - 100%)  I&O's Summary      PHYSICAL EXAM:  Constitutional: NAD, comfortable in bed.  HEENT: NC/AT, PERRLA, EOMI, MMM  Neck: Supple, no JVD  Respiratory: CTA B/L. No w/r/r.   Cardiovascular: RRR, normal S1 and S2, no m/r/g.   Gastrointestinal: +BS, soft NTND, no guarding or rebound tenderness  Extremities: wwp; R foot wrapped in bandages.   Vascular: Pulses equal and strong throughout.   Neurological: AAOx3, no CN deficits, strength and sensation intact throughout.   Skin: No gross skin abnormalities or rashes        LABS:                        8.1    14.05 )-----------( 168      ( 02 Dec 2023 06:10 )             25.2     12-02    132<L>  |  97  |  16  ----------------------------<  212<H>  3.9   |  27  |  1.02    Ca    8.3<L>      02 Dec 2023 06:10  Phos  3.5     12-01  Mg     2.0     12-01    TPro  6.7  /  Alb  1.8<L>  /  TBili  0.5  /  DBili  x   /  AST  25  /  ALT  19  /  AlkPhos  201<H>  12-01    PT/INR - ( 02 Dec 2023 06:10 )   PT: 11.5 sec;   INR: 1.01          PTT - ( 02 Dec 2023 06:10 )  PTT:28.1 sec  Urinalysis Basic - ( 02 Dec 2023 06:10 )    Color: x / Appearance: x / SG: x / pH: x  Gluc: 212 mg/dL / Ketone: x  / Bili: x / Urobili: x   Blood: x / Protein: x / Nitrite: x   Leuk Esterase: x / RBC: x / WBC x   Sq Epi: x / Non Sq Epi: x / Bacteria: x          RADIOLOGY & ADDITIONAL TESTS:    MEDICATIONS  (STANDING):  aspirin enteric coated 81 milliGRAM(s) Oral every 24 hours  atorvastatin 80 milliGRAM(s) Oral at bedtime  carvedilol 12.5 milliGRAM(s) Oral every 12 hours  dextrose 5%. 1000 milliLiter(s) (100 mL/Hr) IV Continuous <Continuous>  dextrose 5%. 1000 milliLiter(s) (50 mL/Hr) IV Continuous <Continuous>  dextrose 50% Injectable 12.5 Gram(s) IV Push once  dextrose 50% Injectable 25 Gram(s) IV Push once  dextrose 50% Injectable 25 Gram(s) IV Push once  gabapentin 800 milliGRAM(s) Oral three times a day  glucagon  Injectable 1 milliGRAM(s) IntraMuscular once  hydrALAZINE 50 milliGRAM(s) Oral every 8 hours  insulin glargine Injectable (LANTUS) 14 Unit(s) SubCutaneous at bedtime  insulin lispro (ADMELOG) corrective regimen sliding scale   SubCutaneous Before meals and at bedtime  lisinopril 40 milliGRAM(s) Oral every 24 hours  piperacillin/tazobactam IVPB.. 3.375 Gram(s) IV Intermittent every 8 hours    MEDICATIONS  (PRN):  acetaminophen     Tablet .. 650 milliGRAM(s) Oral every 6 hours PRN Temp greater or equal to 38C (100.4F), Mild Pain (1 - 3)  dextrose Oral Gel 15 Gram(s) Oral once PRN Blood Glucose LESS THAN 70 milliGRAM(s)/deciliter  oxyCODONE    IR 5 milliGRAM(s) Oral every 6 hours PRN Severe Pain (7 - 10)

## 2023-12-03 NOTE — PROGRESS NOTE ADULT - PROBLEM SELECTOR PLAN 6
#HFmrEF  #CAD  s/p 2 stents in 2020 at [facility]  TTE 11/9/23: EF 45-50%, Mild to moderate symmetric LVH, Grade I left ventricular diastolic dysfunction. Biatrial enlargement. Mild-to-moderate aortic stenosis.  Home meds: lisinopril, coreg  - c/w home meds  - c/w aspirin #HFmrEF  #CAD  s/p 2 stents in 2020 at [facility]  TTE 11/9/23: EF 45-50%, Mild to moderate symmetric LVH, Grade I left ventricular diastolic dysfunction. Biatrial enlargement. Mild-to-moderate aortic stenosis.  Home meds: lisinopril, coreg  - c/w home meds  - c/w aspirin #HFmrEF  #CAD  s/p 2 stents in 2020 at [facility]  TTE 11/9/23: EF 45-50%, Mild to moderate symmetric LVH, Grade I left ventricular diastolic dysfunction. Biatrial enlargement. Mild-to-moderate aortic stenosis.  Home meds: lisinopril, coreg  - c/w home meds  - c/w aspirin

## 2023-12-03 NOTE — PROGRESS NOTE ADULT - SUBJECTIVE AND OBJECTIVE BOX
Patient is a 68y old  Male who presents with a chief complaint of soft tissue and skin infection of right foot (03 Dec 2023 06:39)      INTERVAL HPI/ OVERNIGHT EVENTS: afebrile overnight. Denies any pain to the R foot. Dressing dry intact and clean. Pt once again adamantly refusing any surgical intervention.       LABS                        7.2    14.04 )-----------( 173      ( 03 Dec 2023 08:30 )             22.8     12-03    131<L>  |  98  |  17  ----------------------------<  66<L>  3.7   |  28  |  1.02    Ca    7.9<L>      03 Dec 2023 08:30  Phos  2.9     12-03  Mg     2.0     12-03    TPro  6.7  /  Alb  1.8<L>  /  TBili  0.5  /  DBili  x   /  AST  25  /  ALT  19  /  AlkPhos  201<H>  12-01    PT/INR - ( 02 Dec 2023 06:10 )   PT: 11.5 sec;   INR: 1.01          PTT - ( 02 Dec 2023 06:10 )  PTT:28.1 sec    ICU Vital Signs Last 24 Hrs  T(C): 37.1 (03 Dec 2023 05:40), Max: 37.5 (02 Dec 2023 21:08)  T(F): 98.7 (03 Dec 2023 05:40), Max: 99.5 (02 Dec 2023 21:08)  HR: 63 (03 Dec 2023 06:50) (62 - 73)  BP: 162/75 (03 Dec 2023 06:50) (148/74 - 174/88)  BP(mean): 104 (03 Dec 2023 06:50) (104 - 105)  ABP: --  ABP(mean): --  RR: 19 (03 Dec 2023 05:40) (18 - 19)  SpO2: 95% (03 Dec 2023 05:40) (95% - 100%)    O2 Parameters below as of 03 Dec 2023 05:40  Patient On (Oxygen Delivery Method): room air            PHYSICAL EXAM  General: NAD, AA0x3    Lower Extremity Focused:  Vasc: DP/PT biphasic waveforms heard on doppler b/l, b/l LE +2 pitting edema. Erythema present to the R forefoot and midfoot.  CFT <3 x 9   Derm:  R foot surgical site wound at the distal 4th ray. Serous drainage. Fibrotic 100% wound bed, negative PTB, + malodor.  Plantar skin just proximal to the surgical site with extensive hyperkeratotic skin and associated ischemic discoloration.   Right proximal medial midfoot wound with fibrotic slough. Tunneling noted proximally towards the navicular bone area. Serous drainage. Positive PTB, positive malodor. Negative for fluctuance or crepitus  Macerated 2nd and 3rd interspace  Neuro: protective sensation slightly diminished  MSK: 5/5 muscle strength in all compartments     RADIOLOGY  < from: CT Angio Lower Extremity w/ IV Cont, Bilateral (12.01.23 @ 17:13) >  IMPRESSION:  Increased soft tissue gas in the forefoot. No drainable abscess.  Patent two vessel runoff in both lower extremities.  Marked diffuse body wall and lower extremity edema.  Small bilateral pleural effusions.  No significant knee joint effusion.    < end of copied text >    MICROBIOLOGY  Culture - Other (12.02.23 @ 05:09)   Gram Stain:   Rare White blood cells   Few Gram Positive Rods   Rare Gram Negative Rods  Specimen Source: Wound Wound

## 2023-12-04 LAB
ANION GAP SERPL CALC-SCNC: 4 MMOL/L — LOW (ref 5–17)
ANION GAP SERPL CALC-SCNC: 4 MMOL/L — LOW (ref 5–17)
BASOPHILS # BLD AUTO: 0.02 K/UL — SIGNIFICANT CHANGE UP (ref 0–0.2)
BASOPHILS # BLD AUTO: 0.02 K/UL — SIGNIFICANT CHANGE UP (ref 0–0.2)
BASOPHILS NFR BLD AUTO: 0.1 % — SIGNIFICANT CHANGE UP (ref 0–2)
BASOPHILS NFR BLD AUTO: 0.1 % — SIGNIFICANT CHANGE UP (ref 0–2)
BUN SERPL-MCNC: 20 MG/DL — SIGNIFICANT CHANGE UP (ref 7–23)
BUN SERPL-MCNC: 20 MG/DL — SIGNIFICANT CHANGE UP (ref 7–23)
CALCIUM SERPL-MCNC: 8.2 MG/DL — LOW (ref 8.4–10.5)
CALCIUM SERPL-MCNC: 8.2 MG/DL — LOW (ref 8.4–10.5)
CHLORIDE SERPL-SCNC: 98 MMOL/L — SIGNIFICANT CHANGE UP (ref 96–108)
CHLORIDE SERPL-SCNC: 98 MMOL/L — SIGNIFICANT CHANGE UP (ref 96–108)
CO2 SERPL-SCNC: 34 MMOL/L — HIGH (ref 22–31)
CO2 SERPL-SCNC: 34 MMOL/L — HIGH (ref 22–31)
CREAT SERPL-MCNC: 1.24 MG/DL — SIGNIFICANT CHANGE UP (ref 0.5–1.3)
CREAT SERPL-MCNC: 1.24 MG/DL — SIGNIFICANT CHANGE UP (ref 0.5–1.3)
CULTURE RESULTS: ABNORMAL
CULTURE RESULTS: ABNORMAL
EGFR: 64 ML/MIN/1.73M2 — SIGNIFICANT CHANGE UP
EGFR: 64 ML/MIN/1.73M2 — SIGNIFICANT CHANGE UP
EOSINOPHIL # BLD AUTO: 0.09 K/UL — SIGNIFICANT CHANGE UP (ref 0–0.5)
EOSINOPHIL # BLD AUTO: 0.09 K/UL — SIGNIFICANT CHANGE UP (ref 0–0.5)
EOSINOPHIL NFR BLD AUTO: 0.6 % — SIGNIFICANT CHANGE UP (ref 0–6)
EOSINOPHIL NFR BLD AUTO: 0.6 % — SIGNIFICANT CHANGE UP (ref 0–6)
GLUCOSE BLDC GLUCOMTR-MCNC: 102 MG/DL — HIGH (ref 70–99)
GLUCOSE BLDC GLUCOMTR-MCNC: 102 MG/DL — HIGH (ref 70–99)
GLUCOSE BLDC GLUCOMTR-MCNC: 133 MG/DL — HIGH (ref 70–99)
GLUCOSE BLDC GLUCOMTR-MCNC: 133 MG/DL — HIGH (ref 70–99)
GLUCOSE BLDC GLUCOMTR-MCNC: 144 MG/DL — HIGH (ref 70–99)
GLUCOSE BLDC GLUCOMTR-MCNC: 144 MG/DL — HIGH (ref 70–99)
GLUCOSE BLDC GLUCOMTR-MCNC: 259 MG/DL — HIGH (ref 70–99)
GLUCOSE BLDC GLUCOMTR-MCNC: 259 MG/DL — HIGH (ref 70–99)
GLUCOSE BLDC GLUCOMTR-MCNC: 383 MG/DL — HIGH (ref 70–99)
GLUCOSE BLDC GLUCOMTR-MCNC: 383 MG/DL — HIGH (ref 70–99)
GLUCOSE SERPL-MCNC: 71 MG/DL — SIGNIFICANT CHANGE UP (ref 70–99)
GLUCOSE SERPL-MCNC: 71 MG/DL — SIGNIFICANT CHANGE UP (ref 70–99)
HCT VFR BLD CALC: 24.5 % — LOW (ref 39–50)
HCT VFR BLD CALC: 24.5 % — LOW (ref 39–50)
HGB BLD-MCNC: 7.5 G/DL — LOW (ref 13–17)
HGB BLD-MCNC: 7.5 G/DL — LOW (ref 13–17)
IMM GRANULOCYTES NFR BLD AUTO: 0.6 % — SIGNIFICANT CHANGE UP (ref 0–0.9)
IMM GRANULOCYTES NFR BLD AUTO: 0.6 % — SIGNIFICANT CHANGE UP (ref 0–0.9)
LYMPHOCYTES # BLD AUTO: 0.89 K/UL — LOW (ref 1–3.3)
LYMPHOCYTES # BLD AUTO: 0.89 K/UL — LOW (ref 1–3.3)
LYMPHOCYTES # BLD AUTO: 6.3 % — LOW (ref 13–44)
LYMPHOCYTES # BLD AUTO: 6.3 % — LOW (ref 13–44)
MAGNESIUM SERPL-MCNC: 2.1 MG/DL — SIGNIFICANT CHANGE UP (ref 1.6–2.6)
MAGNESIUM SERPL-MCNC: 2.1 MG/DL — SIGNIFICANT CHANGE UP (ref 1.6–2.6)
MCHC RBC-ENTMCNC: 29.6 PG — SIGNIFICANT CHANGE UP (ref 27–34)
MCHC RBC-ENTMCNC: 29.6 PG — SIGNIFICANT CHANGE UP (ref 27–34)
MCHC RBC-ENTMCNC: 30.6 GM/DL — LOW (ref 32–36)
MCHC RBC-ENTMCNC: 30.6 GM/DL — LOW (ref 32–36)
MCV RBC AUTO: 96.8 FL — SIGNIFICANT CHANGE UP (ref 80–100)
MCV RBC AUTO: 96.8 FL — SIGNIFICANT CHANGE UP (ref 80–100)
MONOCYTES # BLD AUTO: 1.33 K/UL — HIGH (ref 0–0.9)
MONOCYTES # BLD AUTO: 1.33 K/UL — HIGH (ref 0–0.9)
MONOCYTES NFR BLD AUTO: 9.4 % — SIGNIFICANT CHANGE UP (ref 2–14)
MONOCYTES NFR BLD AUTO: 9.4 % — SIGNIFICANT CHANGE UP (ref 2–14)
NEUTROPHILS # BLD AUTO: 11.76 K/UL — HIGH (ref 1.8–7.4)
NEUTROPHILS # BLD AUTO: 11.76 K/UL — HIGH (ref 1.8–7.4)
NEUTROPHILS NFR BLD AUTO: 83 % — HIGH (ref 43–77)
NEUTROPHILS NFR BLD AUTO: 83 % — HIGH (ref 43–77)
NRBC # BLD: 0 /100 WBCS — SIGNIFICANT CHANGE UP (ref 0–0)
NRBC # BLD: 0 /100 WBCS — SIGNIFICANT CHANGE UP (ref 0–0)
PHOSPHATE SERPL-MCNC: 3.1 MG/DL — SIGNIFICANT CHANGE UP (ref 2.5–4.5)
PHOSPHATE SERPL-MCNC: 3.1 MG/DL — SIGNIFICANT CHANGE UP (ref 2.5–4.5)
PLATELET # BLD AUTO: 199 K/UL — SIGNIFICANT CHANGE UP (ref 150–400)
PLATELET # BLD AUTO: 199 K/UL — SIGNIFICANT CHANGE UP (ref 150–400)
POTASSIUM SERPL-MCNC: 4 MMOL/L — SIGNIFICANT CHANGE UP (ref 3.5–5.3)
POTASSIUM SERPL-MCNC: 4 MMOL/L — SIGNIFICANT CHANGE UP (ref 3.5–5.3)
POTASSIUM SERPL-SCNC: 4 MMOL/L — SIGNIFICANT CHANGE UP (ref 3.5–5.3)
POTASSIUM SERPL-SCNC: 4 MMOL/L — SIGNIFICANT CHANGE UP (ref 3.5–5.3)
RBC # BLD: 2.53 M/UL — LOW (ref 4.2–5.8)
RBC # BLD: 2.53 M/UL — LOW (ref 4.2–5.8)
RBC # FLD: 14 % — SIGNIFICANT CHANGE UP (ref 10.3–14.5)
RBC # FLD: 14 % — SIGNIFICANT CHANGE UP (ref 10.3–14.5)
SODIUM SERPL-SCNC: 136 MMOL/L — SIGNIFICANT CHANGE UP (ref 135–145)
SODIUM SERPL-SCNC: 136 MMOL/L — SIGNIFICANT CHANGE UP (ref 135–145)
SPECIMEN SOURCE: SIGNIFICANT CHANGE UP
SPECIMEN SOURCE: SIGNIFICANT CHANGE UP
WBC # BLD: 14.17 K/UL — HIGH (ref 3.8–10.5)
WBC # BLD: 14.17 K/UL — HIGH (ref 3.8–10.5)
WBC # FLD AUTO: 14.17 K/UL — HIGH (ref 3.8–10.5)
WBC # FLD AUTO: 14.17 K/UL — HIGH (ref 3.8–10.5)

## 2023-12-04 PROCEDURE — 99232 SBSQ HOSP IP/OBS MODERATE 35: CPT

## 2023-12-04 PROCEDURE — 99233 SBSQ HOSP IP/OBS HIGH 50: CPT | Mod: GC

## 2023-12-04 RX ORDER — OXYCODONE HYDROCHLORIDE 5 MG/1
2.5 TABLET ORAL ONCE
Refills: 0 | Status: DISCONTINUED | OUTPATIENT
Start: 2023-12-04 | End: 2023-12-04

## 2023-12-04 RX ADMIN — OXYCODONE HYDROCHLORIDE 5 MILLIGRAM(S): 5 TABLET ORAL at 19:35

## 2023-12-04 RX ADMIN — LISINOPRIL 40 MILLIGRAM(S): 2.5 TABLET ORAL at 11:59

## 2023-12-04 RX ADMIN — GABAPENTIN 800 MILLIGRAM(S): 400 CAPSULE ORAL at 22:16

## 2023-12-04 RX ADMIN — Medication 50 MILLIGRAM(S): at 22:16

## 2023-12-04 RX ADMIN — OXYCODONE HYDROCHLORIDE 5 MILLIGRAM(S): 5 TABLET ORAL at 03:40

## 2023-12-04 RX ADMIN — Medication 6: at 18:35

## 2023-12-04 RX ADMIN — OXYCODONE HYDROCHLORIDE 5 MILLIGRAM(S): 5 TABLET ORAL at 09:12

## 2023-12-04 RX ADMIN — OXYCODONE HYDROCHLORIDE 2.5 MILLIGRAM(S): 5 TABLET ORAL at 05:23

## 2023-12-04 RX ADMIN — CARVEDILOL PHOSPHATE 12.5 MILLIGRAM(S): 80 CAPSULE, EXTENDED RELEASE ORAL at 18:35

## 2023-12-04 RX ADMIN — Medication 50 MILLIGRAM(S): at 05:23

## 2023-12-04 RX ADMIN — Medication 50 MILLIGRAM(S): at 12:15

## 2023-12-04 RX ADMIN — Medication 10: at 22:23

## 2023-12-04 RX ADMIN — OXYCODONE HYDROCHLORIDE 2.5 MILLIGRAM(S): 5 TABLET ORAL at 06:23

## 2023-12-04 RX ADMIN — ATORVASTATIN CALCIUM 80 MILLIGRAM(S): 80 TABLET, FILM COATED ORAL at 22:16

## 2023-12-04 RX ADMIN — OXYCODONE HYDROCHLORIDE 5 MILLIGRAM(S): 5 TABLET ORAL at 02:40

## 2023-12-04 RX ADMIN — Medication 81 MILLIGRAM(S): at 09:41

## 2023-12-04 RX ADMIN — PIPERACILLIN AND TAZOBACTAM 25 GRAM(S): 4; .5 INJECTION, POWDER, LYOPHILIZED, FOR SOLUTION INTRAVENOUS at 05:24

## 2023-12-04 RX ADMIN — Medication 650 MILLIGRAM(S): at 12:11

## 2023-12-04 RX ADMIN — INSULIN GLARGINE 14 UNIT(S): 100 INJECTION, SOLUTION SUBCUTANEOUS at 22:20

## 2023-12-04 RX ADMIN — CARVEDILOL PHOSPHATE 12.5 MILLIGRAM(S): 80 CAPSULE, EXTENDED RELEASE ORAL at 05:23

## 2023-12-04 RX ADMIN — Medication 650 MILLIGRAM(S): at 11:11

## 2023-12-04 RX ADMIN — OXYCODONE HYDROCHLORIDE 5 MILLIGRAM(S): 5 TABLET ORAL at 08:12

## 2023-12-04 RX ADMIN — PIPERACILLIN AND TAZOBACTAM 25 GRAM(S): 4; .5 INJECTION, POWDER, LYOPHILIZED, FOR SOLUTION INTRAVENOUS at 12:15

## 2023-12-04 RX ADMIN — OXYCODONE HYDROCHLORIDE 5 MILLIGRAM(S): 5 TABLET ORAL at 18:35

## 2023-12-04 RX ADMIN — GABAPENTIN 800 MILLIGRAM(S): 400 CAPSULE ORAL at 05:38

## 2023-12-04 RX ADMIN — PIPERACILLIN AND TAZOBACTAM 25 GRAM(S): 4; .5 INJECTION, POWDER, LYOPHILIZED, FOR SOLUTION INTRAVENOUS at 22:16

## 2023-12-04 RX ADMIN — GABAPENTIN 800 MILLIGRAM(S): 400 CAPSULE ORAL at 15:09

## 2023-12-04 NOTE — PROGRESS NOTE ADULT - PROBLEM SELECTOR PLAN 1
Past hx of PAD w/ diabetic foot wound. Had prior admission in October where he was seen by ID and Podiatry and was supposed to complete 6 wks of broad spectrum antimicrobials. At that time MRI with possible 4th digit early OM up to prox phalanx s/p partial R 4th ray amputation on 10/24, proximal cx with E.coli, Pluralibacter gergoviae, stap epi, and Corynebacterium amycolatum.   Then had an admission this month for unrelated issue, had left AMA and PICC line was taken out bc pt was leaving AMA, was prescribed levaquin to complete his 6wk course however pt unsure if he actually picked up the levaquin.  CT angio LE today shows increased soft tissue gas in the right forefoot. Vascular surgery was contacted by [facility].  On exam right foot has open wound between 4th and 5th digits with purulence coming out of wound, foot is erythematous on dorsal aspect and warm to touch.  Ddx includes SSTI vs osteomyelitis vs less likely nec fasc in setting of possibly incompletely treated infection.   Vascular signed off d/t pt refusing interventions  Wound culture shows few staph haemolyticus, lactobacillus gasseri   Started on vancomycin, refused vanc trough- vanc d'jasen   - Podiatry recs local wound care, IV abx per ID recs   - c/w zosyn 3.375g IV q8   - Pain med PRN- tylenol (mild pain), toradol (mod pain), Oxy 5 prn (severe pain)  - f/u blood cx Past hx of PAD w/ diabetic foot wound. Had prior admission in October where he was seen by ID and Podiatry and was supposed to complete 6 wks of broad spectrum antimicrobials. At that time MRI with possible 4th digit early OM up to prox phalanx s/p partial R 4th ray amputation on 10/24, proximal cx with E.coli, Pluralibacter gergoviae, stap epi, and Corynebacterium amycolatum.   Then had an admission this month for unrelated issue, had left AMA and PICC line was taken out bc pt was leaving AMA, was prescribed levaquin to complete his 6wk course however pt unsure if he actually picked up the levaquin.  CT angio LE today shows increased soft tissue gas in the right forefoot. Vascular surgery was contacted by [facility].  On exam right foot has open wound between 4th and 5th digits with purulence coming out of wound, foot is erythematous on dorsal aspect and warm to touch.  Ddx includes SSTI vs osteomyelitis vs less likely nec fasc in setting of possibly incompletely treated infection.   Vascular signed off d/t pt refusing interventions  Wound culture shows few staph haemolyticus, lactobacillus gasseri   Started on vancomycin, refused vanc trough- vanc d'jasen   - Podiatry recs local wound care, IV abx per ID recs   - c/w zosyn 3.375g IV q8   - Pain med PRN- tylenol (mild pain), toradol (mod pain), Oxy 5 prn (severe pain)  - f/u blood cx

## 2023-12-04 NOTE — PROGRESS NOTE ADULT - SUBJECTIVE AND OBJECTIVE BOX
INFECTIOUS DISEASES CONSULT FOLLOW-UP NOTE    INTERVAL HPI/OVERNIGHT EVENTS:    Patient seen and examined at bedside. YEISON. Afebrile. Irritated. Continuing to refuse surgical intervention. Endorses R foot pain.       ROS:   Constitutional, eyes, ENT, cardiovascular, respiratory, gastrointestinal, genitourinary, integumentary, neurological, psychiatric and heme/lymph are otherwise negative other than noted above       ANTIBIOTICS/RELEVANT:    MEDICATIONS  (STANDING):  aspirin enteric coated 81 milliGRAM(s) Oral every 24 hours  atorvastatin 80 milliGRAM(s) Oral at bedtime  carvedilol 12.5 milliGRAM(s) Oral every 12 hours  dextrose 5%. 1000 milliLiter(s) (100 mL/Hr) IV Continuous <Continuous>  dextrose 5%. 1000 milliLiter(s) (50 mL/Hr) IV Continuous <Continuous>  dextrose 50% Injectable 12.5 Gram(s) IV Push once  dextrose 50% Injectable 25 Gram(s) IV Push once  dextrose 50% Injectable 25 Gram(s) IV Push once  gabapentin 800 milliGRAM(s) Oral three times a day  glucagon  Injectable 1 milliGRAM(s) IntraMuscular once  hydrALAZINE 50 milliGRAM(s) Oral every 8 hours  insulin glargine Injectable (LANTUS) 14 Unit(s) SubCutaneous at bedtime  insulin lispro (ADMELOG) corrective regimen sliding scale   SubCutaneous Before meals and at bedtime  lisinopril 40 milliGRAM(s) Oral every 24 hours  piperacillin/tazobactam IVPB.. 3.375 Gram(s) IV Intermittent every 8 hours    MEDICATIONS  (PRN):  acetaminophen     Tablet .. 650 milliGRAM(s) Oral every 6 hours PRN Temp greater or equal to 38C (100.4F), Mild Pain (1 - 3)  dextrose Oral Gel 15 Gram(s) Oral once PRN Blood Glucose LESS THAN 70 milliGRAM(s)/deciliter  oxyCODONE    IR 5 milliGRAM(s) Oral every 6 hours PRN Severe Pain (7 - 10)        Vital Signs Last 24 Hrs  T(C): 36.9 (04 Dec 2023 11:34), Max: 37.5 (04 Dec 2023 05:09)  T(F): 98.4 (04 Dec 2023 11:34), Max: 99.5 (04 Dec 2023 05:09)  HR: 68 (04 Dec 2023 11:34) (62 - 71)  BP: 134/74 (04 Dec 2023 11:34) (134/74 - 155/78)  BP(mean): --  RR: 18 (04 Dec 2023 11:34) (18 - 18)  SpO2: 95% (04 Dec 2023 11:34) (95% - 96%)    Parameters below as of 04 Dec 2023 11:34  Patient On (Oxygen Delivery Method): room air        PHYSICAL EXAM:  Constitutional: alert, NAD  Eyes: the sclera and conjunctiva were normal.   ENT: the ears and nose were normal in appearance.   Neck: the appearance of the neck was normal and the neck was supple.   Pulmonary: no respiratory distress and lungs were clear to auscultation bilaterally.   Heart: heart rate was normal and rhythm regular, normal S1 and S2  Vascular:. there was no peripheral edema  Abdomen: normal bowel sounds, soft, non-tender  Extremities: R foot wrapped   Neurological: no focal deficits.   Psychiatric: the affect was normal        LABS:                        7.5    14.17 )-----------( 199      ( 04 Dec 2023 05:30 )             24.5     12-04    136  |  98  |  20  ----------------------------<  71  4.0   |  34<H>  |  1.24    Ca    8.2<L>      04 Dec 2023 05:30  Phos  3.1     12-04  Mg     2.1     12-04        Urinalysis Basic - ( 04 Dec 2023 05:30 )    Color: x / Appearance: x / SG: x / pH: x  Gluc: 71 mg/dL / Ketone: x  / Bili: x / Urobili: x   Blood: x / Protein: x / Nitrite: x   Leuk Esterase: x / RBC: x / WBC x   Sq Epi: x / Non Sq Epi: x / Bacteria: x        MICROBIOLOGY:      RADIOLOGY & ADDITIONAL STUDIES:  Reviewed

## 2023-12-04 NOTE — PROGRESS NOTE ADULT - NS ATTEND AMEND GEN_ALL_CORE FT
Severe OM R foot - his infection will not be cured by antibiotics alone.  He is refusing surgical intervention and he is not a candidate for home OPAT - would not be safe discharge.  For now, would continue pip-tazo as above.  Discussed with Dr. Soares need for a plan to move forward.  Will follow with you, team 2.

## 2023-12-04 NOTE — PROGRESS NOTE ADULT - ASSESSMENT
67 yo M with HTN, IDDM, CAD, mod AS, HCV (not treated), PAD s/p remote RLE angioplasty, R 4th toe OM s/p partial R 4th ray amputation 10/24 admitted 12/1 with osteomyelitis foot, severe infection, refusing amputation.  Unclear how compliant he had been with dapto and ceftriaxone at time of d/c on 10/31, or with levofloxacin.  Cannot rely on prior microbiology.  He is refusing surgery and would not be a future candidate for home OPAT. Vanc discontinued as patient was refusing blood draws/troughs.       Suggest:  - Blood cultures X 2 sets if he will allow --still refusing.   - Follow up deep wound culture from debridement 12/2 -- growing staph haemolyticus and lactobacillus     - Continue pip-tazo 3.375 g IV q8h via EI.    Team 2 will follow you.    Case d/w primary team.  Final recommendation pending attending note.    Josiane Boyle, Infectious Diseases PA  Please reach out for any questions 9 am-5pm. For evenings and weekends, please call the ID physician on call.  Work cell: [phone]   67 yo M with HTN, IDDM, CAD, mod AS, HCV (not treated), PAD s/p remote RLE angioplasty, R 4th toe OM s/p partial R 4th ray amputation 10/24 admitted 12/1 with osteomyelitis foot, severe infection, refusing amputation.  Unclear how compliant he had been with dapto and ceftriaxone at time of d/c on 10/31, or with levofloxacin.  Cannot rely on prior microbiology.  He is refusing surgery and would not be a future candidate for home OPAT. Vanc discontinued as patient was refusing blood draws/troughs.       Suggest:  - Blood cultures X 2 sets if he will allow --still refusing.   - Follow up deep wound culture from debridement 12/2 -- growing staph haemolyticus and lactobacillus     - Continue pip-tazo 3.375 g IV q8h via EI.    Team 2 will follow you.    Case d/w primary team.  Final recommendation pending attending note.    Josiane Boyle, Infectious Diseases PA  Please reach out for any questions 9 am-5pm. For evenings and weekends, please call the ID physician on call.  Work cell: [phone]   69 yo M with HTN, IDDM, CAD, mod AS, HCV (not treated), PAD s/p remote RLE angioplasty, R 4th toe OM s/p partial R 4th ray amputation 10/24 admitted 12/1 with osteomyelitis foot, severe infection, refusing amputation.  Unclear how compliant he had been with dapto and ceftriaxone at time of d/c on 10/31, or with levofloxacin.  Cannot rely on prior microbiology.  He is refusing surgery and would not be a future candidate for home OPAT. Vanc discontinued as patient was refusing blood draws/troughs.       Suggest:  - Blood cultures X 2 sets if he will allow --still refusing.   - Follow up deep wound culture from debridement 12/2 -- growing staph haemolyticus and lactobacillus   - As patient is refusing blood draws, cannot safely monitor vancomycin. Would not restart vancomycin.   - Continue pip-tazo 3.375 g IV q8h via EI.  - f/u podiatry plan    Team 2 will follow you.    Case d/w primary team.  Final recommendation pending attending note.    Josiane Boyle, Infectious Diseases PA  Please reach out for any questions 9 am-5pm. For evenings and weekends, please call the ID physician on call.  Work cell: [phone]   69 yo M with HTN, IDDM, CAD, mod AS, HCV (not treated), PAD s/p remote RLE angioplasty, R 4th toe OM s/p partial R 4th ray amputation 10/24 admitted 12/1 with osteomyelitis foot, severe infection, refusing amputation.  Unclear how compliant he had been with dapto and ceftriaxone at time of d/c on 10/31, or with levofloxacin.  Cannot rely on prior microbiology.  He is refusing surgery and would not be a future candidate for home OPAT. Vanc discontinued as patient was refusing blood draws/troughs.       Suggest:  - Blood cultures X 2 sets if he will allow --still refusing.   - Follow up deep wound culture from debridement 12/2 -- growing staph haemolyticus and lactobacillus   - As patient is refusing blood draws, cannot safely monitor vancomycin. Would not restart vancomycin.   - Continue pip-tazo 3.375 g IV q8h via EI.  - f/u podiatry plan    Team 2 will follow you.    Case d/w primary team.  Final recommendation pending attending note.    Josiane Boyle, Infectious Diseases PA  Please reach out for any questions 9 am-5pm. For evenings and weekends, please call the ID physician on call.  Work cell: [phone]

## 2023-12-04 NOTE — PROGRESS NOTE ADULT - SUBJECTIVE AND OBJECTIVE BOX
**INCOMPLETE NOTE    OVERNIGHT EVENTS:    SUBJECTIVE:  Patient seen and examined at bedside.    Vital Signs Last 12 Hrs  T(F): 99.5 (12-04-23 @ 05:09), Max: 99.5 (12-04-23 @ 05:09)  HR: 71 (12-04-23 @ 05:09) (62 - 71)  BP: 155/78 (12-04-23 @ 05:09) (144/77 - 155/78)  BP(mean): --  RR: 18 (12-04-23 @ 05:09) (18 - 18)  SpO2: 96% (12-04-23 @ 05:09) (96% - 96%)  I&O's Summary      PHYSICAL EXAM:  Constitutional: NAD, comfortable in bed.  HEENT: NC/AT, PERRLA, EOMI, no conjunctival pallor or scleral icterus, MMM  Neck: Supple, no JVD  Respiratory: CTA B/L. No w/r/r.   Cardiovascular: RRR, normal S1 and S2, no m/r/g.   Gastrointestinal: +BS, soft NTND, no guarding or rebound tenderness, no palpable masses   Extremities: wwp; no cyanosis, clubbing or edema.   Vascular: Pulses equal and strong throughout.   Neurological: AAOx3, no CN deficits, strength and sensation intact throughout.   Skin: No gross skin abnormalities or rashes        LABS:                        7.2    14.04 )-----------( 173      ( 03 Dec 2023 08:30 )             22.8     12-03    131<L>  |  98  |  17  ----------------------------<  66<L>  3.7   |  28  |  1.02    Ca    7.9<L>      03 Dec 2023 08:30  Phos  2.9     12-03  Mg     2.0     12-03        Urinalysis Basic - ( 03 Dec 2023 08:30 )    Color: x / Appearance: x / SG: x / pH: x  Gluc: 66 mg/dL / Ketone: x  / Bili: x / Urobili: x   Blood: x / Protein: x / Nitrite: x   Leuk Esterase: x / RBC: x / WBC x   Sq Epi: x / Non Sq Epi: x / Bacteria: x          RADIOLOGY & ADDITIONAL TESTS:    MEDICATIONS  (STANDING):  aspirin enteric coated 81 milliGRAM(s) Oral every 24 hours  atorvastatin 80 milliGRAM(s) Oral at bedtime  carvedilol 12.5 milliGRAM(s) Oral every 12 hours  dextrose 5%. 1000 milliLiter(s) (100 mL/Hr) IV Continuous <Continuous>  dextrose 5%. 1000 milliLiter(s) (50 mL/Hr) IV Continuous <Continuous>  dextrose 50% Injectable 12.5 Gram(s) IV Push once  dextrose 50% Injectable 25 Gram(s) IV Push once  dextrose 50% Injectable 25 Gram(s) IV Push once  gabapentin 800 milliGRAM(s) Oral three times a day  glucagon  Injectable 1 milliGRAM(s) IntraMuscular once  hydrALAZINE 50 milliGRAM(s) Oral every 8 hours  insulin glargine Injectable (LANTUS) 14 Unit(s) SubCutaneous at bedtime  insulin lispro (ADMELOG) corrective regimen sliding scale   SubCutaneous Before meals and at bedtime  lisinopril 40 milliGRAM(s) Oral every 24 hours  piperacillin/tazobactam IVPB.. 3.375 Gram(s) IV Intermittent every 8 hours    MEDICATIONS  (PRN):  acetaminophen     Tablet .. 650 milliGRAM(s) Oral every 6 hours PRN Temp greater or equal to 38C (100.4F), Mild Pain (1 - 3)  dextrose Oral Gel 15 Gram(s) Oral once PRN Blood Glucose LESS THAN 70 milliGRAM(s)/deciliter  oxyCODONE    IR 5 milliGRAM(s) Oral every 6 hours PRN Severe Pain (7 - 10)   OVERNIGHT EVENTS: NAEO    SUBJECTIVE: Patient seen and examined at bedside. Resting in bed. Refuses frequent blood draws. Easily agitated. Denies CP, SOB, abdominal pain.     Vital Signs Last 12 Hrs  T(F): 99.5 (12-04-23 @ 05:09), Max: 99.5 (12-04-23 @ 05:09)  HR: 71 (12-04-23 @ 05:09) (62 - 71)  BP: 155/78 (12-04-23 @ 05:09) (144/77 - 155/78)  BP(mean): --  RR: 18 (12-04-23 @ 05:09) (18 - 18)  SpO2: 96% (12-04-23 @ 05:09) (96% - 96%)  I&O's Summary      PHYSICAL EXAM:  Constitutional: NAD, comfortable in bed.  HEENT: NC/AT, PERRLA, EOMI, MMM  Neck: Supple, no JVD  Respiratory: CTA B/L. No w/r/r.   Cardiovascular: RRR, systolic murmur   Gastrointestinal: +BS, soft NTND, no guarding or rebound tenderness  Extremities: wwp; =2 pitting edema b/l   Vascular: Pulses equal and strong throughout.   Neurological: AAOx3, no CN deficits, strength and sensation intact throughout.   Skin: No gross skin abnormalities or rashes        LABS:                        7.2    14.04 )-----------( 173      ( 03 Dec 2023 08:30 )             22.8     12-03    131<L>  |  98  |  17  ----------------------------<  66<L>  3.7   |  28  |  1.02    Ca    7.9<L>      03 Dec 2023 08:30  Phos  2.9     12-03  Mg     2.0     12-03        Urinalysis Basic - ( 03 Dec 2023 08:30 )    Color: x / Appearance: x / SG: x / pH: x  Gluc: 66 mg/dL / Ketone: x  / Bili: x / Urobili: x   Blood: x / Protein: x / Nitrite: x   Leuk Esterase: x / RBC: x / WBC x   Sq Epi: x / Non Sq Epi: x / Bacteria: x          RADIOLOGY & ADDITIONAL TESTS:    MEDICATIONS  (STANDING):  aspirin enteric coated 81 milliGRAM(s) Oral every 24 hours  atorvastatin 80 milliGRAM(s) Oral at bedtime  carvedilol 12.5 milliGRAM(s) Oral every 12 hours  dextrose 5%. 1000 milliLiter(s) (100 mL/Hr) IV Continuous <Continuous>  dextrose 5%. 1000 milliLiter(s) (50 mL/Hr) IV Continuous <Continuous>  dextrose 50% Injectable 12.5 Gram(s) IV Push once  dextrose 50% Injectable 25 Gram(s) IV Push once  dextrose 50% Injectable 25 Gram(s) IV Push once  gabapentin 800 milliGRAM(s) Oral three times a day  glucagon  Injectable 1 milliGRAM(s) IntraMuscular once  hydrALAZINE 50 milliGRAM(s) Oral every 8 hours  insulin glargine Injectable (LANTUS) 14 Unit(s) SubCutaneous at bedtime  insulin lispro (ADMELOG) corrective regimen sliding scale   SubCutaneous Before meals and at bedtime  lisinopril 40 milliGRAM(s) Oral every 24 hours  piperacillin/tazobactam IVPB.. 3.375 Gram(s) IV Intermittent every 8 hours    MEDICATIONS  (PRN):  acetaminophen     Tablet .. 650 milliGRAM(s) Oral every 6 hours PRN Temp greater or equal to 38C (100.4F), Mild Pain (1 - 3)  dextrose Oral Gel 15 Gram(s) Oral once PRN Blood Glucose LESS THAN 70 milliGRAM(s)/deciliter  oxyCODONE    IR 5 milliGRAM(s) Oral every 6 hours PRN Severe Pain (7 - 10)

## 2023-12-04 NOTE — PROGRESS NOTE ADULT - SUBJECTIVE AND OBJECTIVE BOX
Patient is a 68y old  Male who presents with a chief complaint of KNEE PAIN     (03 Dec 2023 16:31)      INTERVAL HPI/ OVERNIGHT EVENTS: afebrile overnight. Denies any pain to the R foot. Mild serous drainage striking through dressing. Pt once again adamantly refusing any surgical intervention.       LABS                        7.2    14.04 )-----------( 173      ( 03 Dec 2023 08:30 )             22.8     12-03    131<L>  |  98  |  17  ----------------------------<  66<L>  3.7   |  28  |  1.02    Ca    7.9<L>      03 Dec 2023 08:30  Phos  2.9     12-03  Mg     2.0     12-03          ICU Vital Signs Last 24 Hrs  T(C): 37.5 (04 Dec 2023 05:09), Max: 37.5 (04 Dec 2023 05:09)  T(F): 99.5 (04 Dec 2023 05:09), Max: 99.5 (04 Dec 2023 05:09)  HR: 71 (04 Dec 2023 05:09) (62 - 92)  BP: 155/78 (04 Dec 2023 05:09) (136/70 - 166/87)  BP(mean): 104 (03 Dec 2023 06:50) (104 - 104)  ABP: --  ABP(mean): --  RR: 18 (04 Dec 2023 05:09) (18 - 19)  SpO2: 96% (04 Dec 2023 05:09) (96% - 98%)    O2 Parameters below as of 04 Dec 2023 05:09  Patient On (Oxygen Delivery Method): room air            PHYSICAL EXAM  General: NAD, AA0x3    Lower Extremity Focused:  Vasc: DP/PT biphasic waveforms heard on doppler b/l, b/l LE +2 pitting edema. Erythema present to the R forefoot and midfoot.  CFT <3 x 9   Derm:  R foot surgical site wound at the distal 4th ray. Serous drainage. Fibrotic 100% wound bed, negative PTB, + malodor.  Plantar skin just proximal to the surgical site with extensive hyperkeratotic skin and associated ischemic discoloration. Increase in maceration of plantar skin and area around the 4th digit surgical site.   Right proximal medial midfoot wound with fibrotic slough. Tunneling noted proximally towards the navicular bone area. Serous drainage. Positive PTB, positive malodor. Negative for fluctuance or crepitus  Macerated 2nd and 3rd interspace  Neuro: protective sensation slightly diminished  MSK: 5/5 muscle strength in all compartments     RADIOLOGY  < from: CT Angio Lower Extremity w/ IV Cont, Bilateral (12.01.23 @ 17:13) >  IMPRESSION:  Increased soft tissue gas in the forefoot. No drainable abscess.  Patent two vessel runoff in both lower extremities.  Marked diffuse body wall and lower extremity edema.  Small bilateral pleural effusions.  No significant knee joint effusion.    < end of copied text >    MICROBIOLOGY  Culture - Other (12.02.23 @ 05:09)   Gram Stain:   Rare White blood cells   Few Gram Positive Rods   Rare Gram Negative Rods  Specimen Source: Wound Wound  Culture Results:   Few Staphylococcus haemolyticus   Moderate Lactobacillus gasseri   Culture in progress

## 2023-12-04 NOTE — PROGRESS NOTE ADULT - ASSESSMENT
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 Found to have increased soft tissue gas in R foot on CT scan. Admit to [facility] for further management. 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 Found to have increased soft tissue gas in R foot on CT scan. Admit to [facility] for further management. 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 Found to have increased soft tissue gas in R foot on CT scan. Admit to [facility] for further management. 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 Found to have increased soft tissue gas in R foot on CT scan. Admit to [facility] for further management.

## 2023-12-04 NOTE — PROGRESS NOTE ADULT - PROBLEM SELECTOR PLAN 5
s/p 2 stents in 2020 at [facility]. s/p 2 stents in 2020 at [facility].

## 2023-12-04 NOTE — PROGRESS NOTE ADULT - ATTENDING COMMENTS
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 Found to have increased soft tissue gas in R foot on CT scan. Admit to [facility] for further management.    Spoke with podiatry Dr. Blanton, patient is adamantly refusing BKA to him and myself. He refused vascular intervention as well, vascular signed off. Refused blood cultures over the weekend but agreed this am so will follow blood cx. Wound cx + for staph haemolyticus and lactobacillus.  ID recs for abx.     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 Found to have increased soft tissue gas in R foot on CT scan. Admit to [facility] for further management.    Spoke with podiatry Dr. Blanton, patient is adamantly refusing BKA to him and myself. He refused vascular intervention as well, vascular signed off. Refused blood cultures over the weekend but agreed this am so will follow blood cx. Wound cx + for staph haemolyticus and lactobacillus.  ID recs for abx.     Rest as per resident note

## 2023-12-04 NOTE — PROGRESS NOTE ADULT - PROBLEM SELECTOR PLAN 6
#HFmrEF  #CAD  s/p 2 stents in 2020 at [facility]  TTE 11/9/23: EF 45-50%, Mild to moderate symmetric LVH, Grade I left ventricular diastolic dysfunction. Biatrial enlargement. Mild-to-moderate aortic stenosis.  Home meds: lisinopril, coreg  - c/w home meds  - c/w aspirin #HFmrEF  #CAD  s/p 2 stents in 2020 at [facility]  TTE 11/9/23: EF 45-50%, Mild to moderate symmetric LVH, Grade I left ventricular diastolic dysfunction. Biatrial enlargement. Mild-to-moderate aortic stenosis.  Home meds: lisinopril, coreg  - c/w home meds  - c/w aspirin

## 2023-12-04 NOTE — PROGRESS NOTE ADULT - PROBLEM SELECTOR PLAN 2
s/p mechanical fall and hit both knees.   xray b/l showed Moderate to severe left worse than right knee arthrosis without acute displaced fracture.  CT b/l LE did not show any significant knee pathology or effusion of knee    - pain regimen as above

## 2023-12-04 NOTE — PROGRESS NOTE ADULT - ASSESSMENT
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 Pt with most recent admission from 11/01-11/10 for hypertensive emergency 2/2 to cocaine use and left AMA. PICC line removed as pt left AMA and was given 25 days of PO Levaquin Podiatry consulted to evaluate R foot infection.  At time of consult, afebrile, hypertensive, WBC 13.54, ESR and CRP pending. CT scan with findings of soft tissue air of the forefoot and possibly the midfoot.. Clinically, R 4th ray wound fibrotic an extensive poor skin quality of the plantar foot. Proximal medial midfoot wound with tracking 4-5cm proximally towards the Talo-navicular area. R foot infection 2/2 to non-healing foot wound and poor pt compliance.     12/4: Worsening maceration to the R forefoot and plantar medial foot. Edema and erythema now extending towards the ankle. Explained to pt that progression of infection and risk for a more aggressive amputation to salvage the limb. Discussed with pt, in length, with regards to surgical intervention for source control. Recommended a BKA amputation for adequate source control. Pt at this time, once again,  is adamantly refusing any surgical intervention. Pt is AAOx3 and  expresses full understanding  of risks of delaying surgical intervention in the setting of gas gangrene. Explained to pt, the infection may spread proximally towards the ankle and beyond, may need a more proximal amputation, and/or pt may become septic and be limb/life threatening.  Pt continues to refuse.  food.     Plan   - Pt evaluated and chart reviewed  - Local wound care: cleansed with NS. Applied betadine soaked packing to plantar midfoot wound. Covered all wounds with betadine soaked gauze, and kerlix.   -  IV abx per ID recs   - f/u deep wound cx from proximal medial wound sinus tract   - WBAT to R heel   - Rest of care per primary team     Podiatry following, plan discusssed with attending

## 2023-12-05 ENCOUNTER — TRANSCRIPTION ENCOUNTER (OUTPATIENT)
Age: 68
End: 2023-12-05

## 2023-12-05 DIAGNOSIS — R53.81 OTHER MALAISE: ICD-10-CM

## 2023-12-05 DIAGNOSIS — Z51.5 ENCOUNTER FOR PALLIATIVE CARE: ICD-10-CM

## 2023-12-05 DIAGNOSIS — Z71.89 OTHER SPECIFIED COUNSELING: ICD-10-CM

## 2023-12-05 LAB
ALBUMIN SERPL ELPH-MCNC: 2.1 G/DL — LOW (ref 3.3–5)
ALBUMIN SERPL ELPH-MCNC: 2.1 G/DL — LOW (ref 3.3–5)
ALP SERPL-CCNC: 269 U/L — HIGH (ref 40–120)
ALP SERPL-CCNC: 269 U/L — HIGH (ref 40–120)
ALT FLD-CCNC: 23 U/L — SIGNIFICANT CHANGE UP (ref 10–45)
ALT FLD-CCNC: 23 U/L — SIGNIFICANT CHANGE UP (ref 10–45)
ANION GAP SERPL CALC-SCNC: 7 MMOL/L — SIGNIFICANT CHANGE UP (ref 5–17)
ANION GAP SERPL CALC-SCNC: 7 MMOL/L — SIGNIFICANT CHANGE UP (ref 5–17)
AST SERPL-CCNC: 30 U/L — SIGNIFICANT CHANGE UP (ref 10–40)
AST SERPL-CCNC: 30 U/L — SIGNIFICANT CHANGE UP (ref 10–40)
BASOPHILS # BLD AUTO: 0.02 K/UL — SIGNIFICANT CHANGE UP (ref 0–0.2)
BASOPHILS # BLD AUTO: 0.02 K/UL — SIGNIFICANT CHANGE UP (ref 0–0.2)
BASOPHILS NFR BLD AUTO: 0.2 % — SIGNIFICANT CHANGE UP (ref 0–2)
BASOPHILS NFR BLD AUTO: 0.2 % — SIGNIFICANT CHANGE UP (ref 0–2)
BILIRUB SERPL-MCNC: 0.2 MG/DL — SIGNIFICANT CHANGE UP (ref 0.2–1.2)
BILIRUB SERPL-MCNC: 0.2 MG/DL — SIGNIFICANT CHANGE UP (ref 0.2–1.2)
BUN SERPL-MCNC: 19 MG/DL — SIGNIFICANT CHANGE UP (ref 7–23)
BUN SERPL-MCNC: 19 MG/DL — SIGNIFICANT CHANGE UP (ref 7–23)
CALCIUM SERPL-MCNC: 8.2 MG/DL — LOW (ref 8.4–10.5)
CALCIUM SERPL-MCNC: 8.2 MG/DL — LOW (ref 8.4–10.5)
CHLORIDE SERPL-SCNC: 98 MMOL/L — SIGNIFICANT CHANGE UP (ref 96–108)
CHLORIDE SERPL-SCNC: 98 MMOL/L — SIGNIFICANT CHANGE UP (ref 96–108)
CO2 SERPL-SCNC: 29 MMOL/L — SIGNIFICANT CHANGE UP (ref 22–31)
CO2 SERPL-SCNC: 29 MMOL/L — SIGNIFICANT CHANGE UP (ref 22–31)
CREAT SERPL-MCNC: 1.12 MG/DL — SIGNIFICANT CHANGE UP (ref 0.5–1.3)
CREAT SERPL-MCNC: 1.12 MG/DL — SIGNIFICANT CHANGE UP (ref 0.5–1.3)
EGFR: 72 ML/MIN/1.73M2 — SIGNIFICANT CHANGE UP
EGFR: 72 ML/MIN/1.73M2 — SIGNIFICANT CHANGE UP
EOSINOPHIL # BLD AUTO: 0.08 K/UL — SIGNIFICANT CHANGE UP (ref 0–0.5)
EOSINOPHIL # BLD AUTO: 0.08 K/UL — SIGNIFICANT CHANGE UP (ref 0–0.5)
EOSINOPHIL NFR BLD AUTO: 0.7 % — SIGNIFICANT CHANGE UP (ref 0–6)
EOSINOPHIL NFR BLD AUTO: 0.7 % — SIGNIFICANT CHANGE UP (ref 0–6)
GLUCOSE BLDC GLUCOMTR-MCNC: 138 MG/DL — HIGH (ref 70–99)
GLUCOSE BLDC GLUCOMTR-MCNC: 138 MG/DL — HIGH (ref 70–99)
GLUCOSE BLDC GLUCOMTR-MCNC: 175 MG/DL — HIGH (ref 70–99)
GLUCOSE BLDC GLUCOMTR-MCNC: 175 MG/DL — HIGH (ref 70–99)
GLUCOSE BLDC GLUCOMTR-MCNC: 182 MG/DL — HIGH (ref 70–99)
GLUCOSE BLDC GLUCOMTR-MCNC: 182 MG/DL — HIGH (ref 70–99)
GLUCOSE BLDC GLUCOMTR-MCNC: 271 MG/DL — HIGH (ref 70–99)
GLUCOSE BLDC GLUCOMTR-MCNC: 271 MG/DL — HIGH (ref 70–99)
GLUCOSE SERPL-MCNC: 193 MG/DL — HIGH (ref 70–99)
GLUCOSE SERPL-MCNC: 193 MG/DL — HIGH (ref 70–99)
HCT VFR BLD CALC: 23.7 % — LOW (ref 39–50)
HCT VFR BLD CALC: 23.7 % — LOW (ref 39–50)
HGB BLD-MCNC: 7.5 G/DL — LOW (ref 13–17)
HGB BLD-MCNC: 7.5 G/DL — LOW (ref 13–17)
IMM GRANULOCYTES NFR BLD AUTO: 0.5 % — SIGNIFICANT CHANGE UP (ref 0–0.9)
IMM GRANULOCYTES NFR BLD AUTO: 0.5 % — SIGNIFICANT CHANGE UP (ref 0–0.9)
LYMPHOCYTES # BLD AUTO: 1.13 K/UL — SIGNIFICANT CHANGE UP (ref 1–3.3)
LYMPHOCYTES # BLD AUTO: 1.13 K/UL — SIGNIFICANT CHANGE UP (ref 1–3.3)
LYMPHOCYTES # BLD AUTO: 9.4 % — LOW (ref 13–44)
LYMPHOCYTES # BLD AUTO: 9.4 % — LOW (ref 13–44)
MAGNESIUM SERPL-MCNC: 2 MG/DL — SIGNIFICANT CHANGE UP (ref 1.6–2.6)
MAGNESIUM SERPL-MCNC: 2 MG/DL — SIGNIFICANT CHANGE UP (ref 1.6–2.6)
MCHC RBC-ENTMCNC: 29.8 PG — SIGNIFICANT CHANGE UP (ref 27–34)
MCHC RBC-ENTMCNC: 29.8 PG — SIGNIFICANT CHANGE UP (ref 27–34)
MCHC RBC-ENTMCNC: 31.6 GM/DL — LOW (ref 32–36)
MCHC RBC-ENTMCNC: 31.6 GM/DL — LOW (ref 32–36)
MCV RBC AUTO: 94 FL — SIGNIFICANT CHANGE UP (ref 80–100)
MCV RBC AUTO: 94 FL — SIGNIFICANT CHANGE UP (ref 80–100)
MONOCYTES # BLD AUTO: 1.23 K/UL — HIGH (ref 0–0.9)
MONOCYTES # BLD AUTO: 1.23 K/UL — HIGH (ref 0–0.9)
MONOCYTES NFR BLD AUTO: 10.2 % — SIGNIFICANT CHANGE UP (ref 2–14)
MONOCYTES NFR BLD AUTO: 10.2 % — SIGNIFICANT CHANGE UP (ref 2–14)
NEUTROPHILS # BLD AUTO: 9.51 K/UL — HIGH (ref 1.8–7.4)
NEUTROPHILS # BLD AUTO: 9.51 K/UL — HIGH (ref 1.8–7.4)
NEUTROPHILS NFR BLD AUTO: 79 % — HIGH (ref 43–77)
NEUTROPHILS NFR BLD AUTO: 79 % — HIGH (ref 43–77)
NRBC # BLD: 0 /100 WBCS — SIGNIFICANT CHANGE UP (ref 0–0)
NRBC # BLD: 0 /100 WBCS — SIGNIFICANT CHANGE UP (ref 0–0)
PHOSPHATE SERPL-MCNC: 3.3 MG/DL — SIGNIFICANT CHANGE UP (ref 2.5–4.5)
PHOSPHATE SERPL-MCNC: 3.3 MG/DL — SIGNIFICANT CHANGE UP (ref 2.5–4.5)
PLATELET # BLD AUTO: 213 K/UL — SIGNIFICANT CHANGE UP (ref 150–400)
PLATELET # BLD AUTO: 213 K/UL — SIGNIFICANT CHANGE UP (ref 150–400)
POTASSIUM SERPL-MCNC: 3.8 MMOL/L — SIGNIFICANT CHANGE UP (ref 3.5–5.3)
POTASSIUM SERPL-MCNC: 3.8 MMOL/L — SIGNIFICANT CHANGE UP (ref 3.5–5.3)
POTASSIUM SERPL-SCNC: 3.8 MMOL/L — SIGNIFICANT CHANGE UP (ref 3.5–5.3)
POTASSIUM SERPL-SCNC: 3.8 MMOL/L — SIGNIFICANT CHANGE UP (ref 3.5–5.3)
PROT SERPL-MCNC: 6.2 G/DL — SIGNIFICANT CHANGE UP (ref 6–8.3)
PROT SERPL-MCNC: 6.2 G/DL — SIGNIFICANT CHANGE UP (ref 6–8.3)
RBC # BLD: 2.52 M/UL — LOW (ref 4.2–5.8)
RBC # BLD: 2.52 M/UL — LOW (ref 4.2–5.8)
RBC # FLD: 14 % — SIGNIFICANT CHANGE UP (ref 10.3–14.5)
RBC # FLD: 14 % — SIGNIFICANT CHANGE UP (ref 10.3–14.5)
SODIUM SERPL-SCNC: 134 MMOL/L — LOW (ref 135–145)
SODIUM SERPL-SCNC: 134 MMOL/L — LOW (ref 135–145)
WBC # BLD: 12.03 K/UL — HIGH (ref 3.8–10.5)
WBC # BLD: 12.03 K/UL — HIGH (ref 3.8–10.5)
WBC # FLD AUTO: 12.03 K/UL — HIGH (ref 3.8–10.5)
WBC # FLD AUTO: 12.03 K/UL — HIGH (ref 3.8–10.5)

## 2023-12-05 PROCEDURE — 99223 1ST HOSP IP/OBS HIGH 75: CPT

## 2023-12-05 PROCEDURE — 99233 SBSQ HOSP IP/OBS HIGH 50: CPT | Mod: GC

## 2023-12-05 PROCEDURE — 99232 SBSQ HOSP IP/OBS MODERATE 35: CPT

## 2023-12-05 RX ORDER — INSULIN LISPRO 100/ML
3 VIAL (ML) SUBCUTANEOUS
Refills: 0 | Status: DISCONTINUED | OUTPATIENT
Start: 2023-12-05 | End: 2023-12-10

## 2023-12-05 RX ORDER — INSULIN LISPRO 100/ML
3 VIAL (ML) SUBCUTANEOUS
Qty: 0 | Refills: 0 | DISCHARGE
Start: 2023-12-05

## 2023-12-05 RX ORDER — LEVOFLOXACIN 5 MG/ML
1 INJECTION, SOLUTION INTRAVENOUS
Qty: 0 | Refills: 0 | DISCHARGE
Start: 2023-12-05 | End: 2024-01-06

## 2023-12-05 RX ORDER — INSULIN GLARGINE 100 [IU]/ML
16 INJECTION, SOLUTION SUBCUTANEOUS AT BEDTIME
Refills: 0 | Status: DISCONTINUED | OUTPATIENT
Start: 2023-12-05 | End: 2023-12-09

## 2023-12-05 RX ADMIN — Medication 2: at 09:20

## 2023-12-05 RX ADMIN — GABAPENTIN 800 MILLIGRAM(S): 400 CAPSULE ORAL at 06:01

## 2023-12-05 RX ADMIN — Medication 50 MILLIGRAM(S): at 13:20

## 2023-12-05 RX ADMIN — Medication 650 MILLIGRAM(S): at 22:43

## 2023-12-05 RX ADMIN — CARVEDILOL PHOSPHATE 12.5 MILLIGRAM(S): 80 CAPSULE, EXTENDED RELEASE ORAL at 18:17

## 2023-12-05 RX ADMIN — Medication 650 MILLIGRAM(S): at 21:43

## 2023-12-05 RX ADMIN — OXYCODONE HYDROCHLORIDE 5 MILLIGRAM(S): 5 TABLET ORAL at 07:45

## 2023-12-05 RX ADMIN — OXYCODONE HYDROCHLORIDE 5 MILLIGRAM(S): 5 TABLET ORAL at 13:23

## 2023-12-05 RX ADMIN — PIPERACILLIN AND TAZOBACTAM 25 GRAM(S): 4; .5 INJECTION, POWDER, LYOPHILIZED, FOR SOLUTION INTRAVENOUS at 09:20

## 2023-12-05 RX ADMIN — INSULIN GLARGINE 16 UNIT(S): 100 INJECTION, SOLUTION SUBCUTANEOUS at 22:46

## 2023-12-05 RX ADMIN — LISINOPRIL 40 MILLIGRAM(S): 2.5 TABLET ORAL at 13:20

## 2023-12-05 RX ADMIN — OXYCODONE HYDROCHLORIDE 5 MILLIGRAM(S): 5 TABLET ORAL at 06:58

## 2023-12-05 RX ADMIN — Medication 6: at 22:45

## 2023-12-05 RX ADMIN — Medication 50 MILLIGRAM(S): at 06:00

## 2023-12-05 RX ADMIN — Medication 81 MILLIGRAM(S): at 09:20

## 2023-12-05 RX ADMIN — Medication 50 MILLIGRAM(S): at 21:43

## 2023-12-05 RX ADMIN — OXYCODONE HYDROCHLORIDE 5 MILLIGRAM(S): 5 TABLET ORAL at 00:40

## 2023-12-05 RX ADMIN — OXYCODONE HYDROCHLORIDE 5 MILLIGRAM(S): 5 TABLET ORAL at 01:40

## 2023-12-05 RX ADMIN — ATORVASTATIN CALCIUM 80 MILLIGRAM(S): 80 TABLET, FILM COATED ORAL at 21:44

## 2023-12-05 RX ADMIN — Medication 2: at 18:17

## 2023-12-05 RX ADMIN — Medication 3 UNIT(S): at 13:25

## 2023-12-05 RX ADMIN — OXYCODONE HYDROCHLORIDE 5 MILLIGRAM(S): 5 TABLET ORAL at 18:19

## 2023-12-05 RX ADMIN — GABAPENTIN 800 MILLIGRAM(S): 400 CAPSULE ORAL at 21:43

## 2023-12-05 RX ADMIN — GABAPENTIN 800 MILLIGRAM(S): 400 CAPSULE ORAL at 13:19

## 2023-12-05 RX ADMIN — PIPERACILLIN AND TAZOBACTAM 25 GRAM(S): 4; .5 INJECTION, POWDER, LYOPHILIZED, FOR SOLUTION INTRAVENOUS at 18:17

## 2023-12-05 RX ADMIN — OXYCODONE HYDROCHLORIDE 5 MILLIGRAM(S): 5 TABLET ORAL at 14:06

## 2023-12-05 RX ADMIN — Medication 3 UNIT(S): at 18:17

## 2023-12-05 NOTE — DISCHARGE NOTE PROVIDER - NSDCFUADDAPPT_GEN_ALL_CORE_FT
Ask for Dr. Krishan Soares when you call the number [phone] Ask for Dr. Krishan Soares when you call the number [phone]

## 2023-12-05 NOTE — DISCHARGE NOTE PROVIDER - PROVIDER TOKENS
PROVIDER:[TOKEN:[[id number]],FOLLOWUP:[2 weeks]] PROVIDER:[TOKEN:[[id number]],FOLLOWUP:[2 weeks]],FREE:[LAST:[.],PHONE:[(   )    -],FAX:[(   )    -],ADDRESS:[[facility]  [phone]]] PROVIDER:[TOKEN:[[id number]],FOLLOWUP:[2 weeks]],FREE:[LAST:[.],PHONE:[(   )    -],FAX:[(   )    -],ADDRESS:[[facility]  [phone]]] PROVIDER:[TOKEN:[[id number]],SCHEDULEDAPPT:[01/18/2024]] PROVIDER:[TOKEN:[[id number]],SCHEDULEDAPPT:[01/18/2024]] PROVIDER:[TOKEN:[[id number]],SCHEDULEDAPPT:[01/18/2024],SCHEDULEDAPPTTIME:[10:00 AM]] PROVIDER:[TOKEN:[[id number]],SCHEDULEDAPPT:[01/18/2024],SCHEDULEDAPPTTIME:[10:00 AM]]

## 2023-12-05 NOTE — DISCHARGE NOTE PROVIDER - CARE PROVIDERS DIRECT ADDRESSES
,[email or web address] ,[email or web address] ,[email or web address],DirectAddress_Unknown ,[email or web address],DirectAddress_Unknown ,DirectAddress_Unknown

## 2023-12-05 NOTE — PROGRESS NOTE ADULT - SUBJECTIVE AND OBJECTIVE BOX
**INCOMPLETE NOTE    OVERNIGHT EVENTS:    SUBJECTIVE:  Patient seen and examined at bedside.    Vital Signs Last 12 Hrs  T(F): 98.1 (12-05-23 @ 05:49), Max: 98.4 (12-04-23 @ 21:39)  HR: 58 (12-05-23 @ 05:49) (58 - 63)  BP: 158/77 (12-05-23 @ 05:49) (155/80 - 158/77)  BP(mean): --  RR: 18 (12-05-23 @ 05:49) (18 - 18)  SpO2: 96% (12-05-23 @ 05:49) (95% - 96%)  I&O's Summary      PHYSICAL EXAM:  Constitutional: NAD, comfortable in bed.  HEENT: NC/AT, PERRLA, EOMI, no conjunctival pallor or scleral icterus, MMM  Neck: Supple, no JVD  Respiratory: CTA B/L. No w/r/r.   Cardiovascular: RRR, normal S1 and S2, no m/r/g.   Gastrointestinal: +BS, soft NTND, no guarding or rebound tenderness, no palpable masses   Extremities: wwp; no cyanosis, clubbing or edema.   Vascular: Pulses equal and strong throughout.   Neurological: AAOx3, no CN deficits, strength and sensation intact throughout.   Skin: No gross skin abnormalities or rashes        LABS:                        7.5    14.17 )-----------( 199      ( 04 Dec 2023 05:30 )             24.5     12-04    136  |  98  |  20  ----------------------------<  71  4.0   |  34<H>  |  1.24    Ca    8.2<L>      04 Dec 2023 05:30  Phos  3.1     12-04  Mg     2.1     12-04        Urinalysis Basic - ( 04 Dec 2023 05:30 )    Color: x / Appearance: x / SG: x / pH: x  Gluc: 71 mg/dL / Ketone: x  / Bili: x / Urobili: x   Blood: x / Protein: x / Nitrite: x   Leuk Esterase: x / RBC: x / WBC x   Sq Epi: x / Non Sq Epi: x / Bacteria: x          RADIOLOGY & ADDITIONAL TESTS:    MEDICATIONS  (STANDING):  aspirin enteric coated 81 milliGRAM(s) Oral every 24 hours  atorvastatin 80 milliGRAM(s) Oral at bedtime  carvedilol 12.5 milliGRAM(s) Oral every 12 hours  dextrose 5%. 1000 milliLiter(s) (100 mL/Hr) IV Continuous <Continuous>  dextrose 5%. 1000 milliLiter(s) (50 mL/Hr) IV Continuous <Continuous>  dextrose 50% Injectable 12.5 Gram(s) IV Push once  dextrose 50% Injectable 25 Gram(s) IV Push once  dextrose 50% Injectable 25 Gram(s) IV Push once  gabapentin 800 milliGRAM(s) Oral three times a day  glucagon  Injectable 1 milliGRAM(s) IntraMuscular once  hydrALAZINE 50 milliGRAM(s) Oral every 8 hours  insulin glargine Injectable (LANTUS) 14 Unit(s) SubCutaneous at bedtime  insulin lispro (ADMELOG) corrective regimen sliding scale   SubCutaneous Before meals and at bedtime  lisinopril 40 milliGRAM(s) Oral every 24 hours  piperacillin/tazobactam IVPB.. 3.375 Gram(s) IV Intermittent every 8 hours    MEDICATIONS  (PRN):  acetaminophen     Tablet .. 650 milliGRAM(s) Oral every 6 hours PRN Temp greater or equal to 38C (100.4F), Mild Pain (1 - 3)  dextrose Oral Gel 15 Gram(s) Oral once PRN Blood Glucose LESS THAN 70 milliGRAM(s)/deciliter  oxyCODONE    IR 5 milliGRAM(s) Oral every 6 hours PRN Severe Pain (7 - 10)   OVERNIGHT EVENTS: NAEO     SUBJECTIVE: Patient seen and examined at bedside. Resting comfortably in bed. Denies CP, SOB, abdominal pain.     Vital Signs Last 12 Hrs  T(F): 98.1 (12-05-23 @ 05:49), Max: 98.4 (12-04-23 @ 21:39)  HR: 58 (12-05-23 @ 05:49) (58 - 63)  BP: 158/77 (12-05-23 @ 05:49) (155/80 - 158/77)  BP(mean): --  RR: 18 (12-05-23 @ 05:49) (18 - 18)  SpO2: 96% (12-05-23 @ 05:49) (95% - 96%)  I&O's Summary      PHYSICAL EXAM:  Constitutional: NAD, comfortable in bed.  HEENT: NC/AT, PERRLA, EOMI, MMM  Neck: Supple, no JVD  Respiratory: CTA B/L. No w/r/r.   Cardiovascular: RRR, systolic murmur   Gastrointestinal: +BS, soft NTND, no guarding or rebound tenderness  Extremities: wwp; +2 pitting edema b/l   Vascular: Pulses equal and strong throughout.   Neurological: AAOx3, no CN deficits, strength and sensation intact throughout.   Skin: No gross skin abnormalities or rashes          LABS:                        7.5    14.17 )-----------( 199      ( 04 Dec 2023 05:30 )             24.5     12-04    136  |  98  |  20  ----------------------------<  71  4.0   |  34<H>  |  1.24    Ca    8.2<L>      04 Dec 2023 05:30  Phos  3.1     12-04  Mg     2.1     12-04        Urinalysis Basic - ( 04 Dec 2023 05:30 )    Color: x / Appearance: x / SG: x / pH: x  Gluc: 71 mg/dL / Ketone: x  / Bili: x / Urobili: x   Blood: x / Protein: x / Nitrite: x   Leuk Esterase: x / RBC: x / WBC x   Sq Epi: x / Non Sq Epi: x / Bacteria: x          RADIOLOGY & ADDITIONAL TESTS:    MEDICATIONS  (STANDING):  aspirin enteric coated 81 milliGRAM(s) Oral every 24 hours  atorvastatin 80 milliGRAM(s) Oral at bedtime  carvedilol 12.5 milliGRAM(s) Oral every 12 hours  dextrose 5%. 1000 milliLiter(s) (100 mL/Hr) IV Continuous <Continuous>  dextrose 5%. 1000 milliLiter(s) (50 mL/Hr) IV Continuous <Continuous>  dextrose 50% Injectable 12.5 Gram(s) IV Push once  dextrose 50% Injectable 25 Gram(s) IV Push once  dextrose 50% Injectable 25 Gram(s) IV Push once  gabapentin 800 milliGRAM(s) Oral three times a day  glucagon  Injectable 1 milliGRAM(s) IntraMuscular once  hydrALAZINE 50 milliGRAM(s) Oral every 8 hours  insulin glargine Injectable (LANTUS) 14 Unit(s) SubCutaneous at bedtime  insulin lispro (ADMELOG) corrective regimen sliding scale   SubCutaneous Before meals and at bedtime  lisinopril 40 milliGRAM(s) Oral every 24 hours  piperacillin/tazobactam IVPB.. 3.375 Gram(s) IV Intermittent every 8 hours    MEDICATIONS  (PRN):  acetaminophen     Tablet .. 650 milliGRAM(s) Oral every 6 hours PRN Temp greater or equal to 38C (100.4F), Mild Pain (1 - 3)  dextrose Oral Gel 15 Gram(s) Oral once PRN Blood Glucose LESS THAN 70 milliGRAM(s)/deciliter  oxyCODONE    IR 5 milliGRAM(s) Oral every 6 hours PRN Severe Pain (7 - 10)

## 2023-12-05 NOTE — PROGRESS NOTE ADULT - PROBLEM SELECTOR PLAN 4
A1c 12 in October. Has been seen by Endocrine in past.  Home meds: glargine 14U qhs    - lantus 16U qhs  - lispro 3U TID   - mISS  - FSG QID  - FSG goal 140-180  - consistent carb diet  - c/w home gabapentin for neuropathic pain

## 2023-12-05 NOTE — DISCHARGE NOTE PROVIDER - HOSPITAL COURSE
#Discharge: do not delete    Ward Avina is a 69yo M PMH CAD (2 stents 2020), HFmrEF (45-50%), HTN, PAD w/ remote RLE angioplasty, R 4th toe OM s/p partial R 4th ray amputation on 10/24, RLE angiogram with AT lithotripsy and AT/peroneal balloon angioplasty 10/27, uncontrolled IDDM (a1c 12 in Oct 2023), recent admission 11/8-11/10 to cardiology service for hypertensive emergency (left AMA, was given levaquin when he left for right foot wound that pt had started treatment for back in October) who presented to [facility] after a fall onto his knees and was having b/l knee pain. Found to have increased soft tissue gas in R foot on CT scan. Admit to [facility] for further management.    Problem List/Main Diagnoses (system-based):   #Osteomyelitis, Gas gangrene, SSTI  P/w fall d/t b/l knee pain. CT shows increased soft tissue gas in forefoot, no drainable abscess, no significant knee joint effusion. Hx of R 4th toe OM s/p partial 4th ray amputation on 10/24, admitted for OM foot, severe infection. Refuses all surgical intervention. Aware that further antibiotic treatment w/o amputation is futile. Refuses to participate in discussion. Started on zosyn, vancomycin. Vancomycin d/c'ed d/t refusing vancomycin trough. Bcx NGTD. Podiatry saw during admission performed debridement w/ wound cultures showing staph haemolyticus, lactobacillus gasseri. Local wound care. Continued on zosyn, oxycodone for pain.   -c/w doxycycline 100 mg PO BID x30 days  -c/w levaquin 750 mg PO QD x30 days   -c/w w/ local wound care- cleansed w/ NS, betadine soaking packing to plantar midfoot wound. Wounds covered w/ betadine soaked gauze, kerlix  -f/u w/ PCP     #Diabetes   Hgb A1c 12 in October. Home medication glargine 14u qhs   -c/w lantus 14 units bedtime   -c/w gabapentin 800 mg PO TID    #HTN   BP 190s on admission. Asymptomatic.   -c/w lisinopril 40 mg PO QD   -c/w hydralazine 25 mg PO TID   -c/w norvasc 10 mg PO QD   -c/w coreg 12.5 mg PO BID     #CAD   #HFmrEF   s/p 2 stents @[facility]. TTE on 11/9/23 shows EF 45-50%, mild-mod LVH, grade I ventricular diastolic dysfunction. Mild-mod aortic stenosis.   -c/w aspirin 81 mg PO QD      #PAD   Remote RLE angioplasty. R 4th toe OM s/p partial 4th ray amputation on 10/24, RLE angiogram w/ AT lithotripsy, AT/peroneal balloon angioplasty 10/27.  -c/w plavix 75mg PO QD       New medications: doxycycline 100 mg PO BID x30 days, levaquin 750 mg PO QD x30 days   Labs to be followed outpatient: N/A  Exam to be followed outpatient: N/A     Physical Exam Upon Discharge:   Constitutional: NAD, comfortable in bed.  HEENT: NC/AT, PERRLA, EOMI, MMM  Neck: Supple, no JVD  Respiratory: CTA B/L. No w/r/r.   Cardiovascular: RRR, systolic murmur   Gastrointestinal: +BS, soft NTND, no guarding or rebound tenderness  Neurological: AAOx3, no CN deficits, strength and sensation intact throughout.   Skin: No gross skin abnormalities or rashes  Lower Extremity Focused:  Vasc: DP/PT biphasic waveforms heard on doppler b/l, b/l LE +2 pitting edema. Erythema present to the R forefoot and midfoot.  CFT <3 x 9   Derm:  R foot surgical site wound at the distal 4th ray. Serous drainage. Fibrotic 100% wound bed, negative PTB, + malodor.  Plantar skin just proximal to the surgical site with extensive hyperkeratotic skin and associated ischemic discoloration. Increase in maceration of plantar skin and area around the 4th digit surgical site.   Right proximal medial midfoot wound with fibrotic slough. Tunneling noted proximally towards the navicular bone area. Serous drainage. Positive PTB, positive malodor. Negative for fluctuance or crepitus  Macerated 2nd and 3rd interspace  Neuro: protective sensation slightly diminished  MSK: 5/5 muscle strength in all compartments      #Discharge: do not delete    Ward Avina is a 67yo M PMH CAD (2 stents 2020), HFmrEF (45-50%), HTN, PAD w/ remote RLE angioplasty, R 4th toe OM s/p partial R 4th ray amputation on 10/24, RLE angiogram with AT lithotripsy and AT/peroneal balloon angioplasty 10/27, uncontrolled IDDM (a1c 12 in Oct 2023), recent admission 11/8-11/10 to cardiology service for hypertensive emergency (left AMA, was given levaquin when he left for right foot wound that pt had started treatment for back in October) who presented to [facility] after a fall onto his knees and was having b/l knee pain. Found to have increased soft tissue gas in R foot on CT scan. Admit to [facility] for further management.    Problem List/Main Diagnoses (system-based):   #Osteomyelitis, Gas gangrene, SSTI  P/w fall d/t b/l knee pain. CT shows increased soft tissue gas in forefoot, no drainable abscess, no significant knee joint effusion. Hx of R 4th toe OM s/p partial 4th ray amputation on 10/24, admitted for OM foot, severe infection. Refuses all surgical intervention. Aware that further antibiotic treatment w/o amputation is futile. Refuses to participate in discussion. Started on zosyn, vancomycin. Vancomycin d/c'ed d/t refusing vancomycin trough. Bcx NGTD. Podiatry saw during admission performed debridement w/ wound cultures showing staph haemolyticus, lactobacillus gasseri. Local wound care. Continued on zosyn, oxycodone for pain.   -c/w doxycycline 100 mg PO BID x30 days  -c/w levaquin 750 mg PO QD x30 days   -c/w w/ local wound care- cleansed w/ NS, betadine soaking packing to plantar midfoot wound. Wounds covered w/ betadine soaked gauze, kerlix  -f/u w/ PCP     #Diabetes   Hgb A1c 12 in October. Home medication glargine 14u qhs   -c/w lantus 14 units bedtime   -c/w gabapentin 800 mg PO TID    #HTN   BP 190s on admission. Asymptomatic.   -c/w lisinopril 40 mg PO QD   -c/w hydralazine 25 mg PO TID   -c/w norvasc 10 mg PO QD   -c/w coreg 12.5 mg PO BID     #CAD   #HFmrEF   s/p 2 stents @[facility]. TTE on 11/9/23 shows EF 45-50%, mild-mod LVH, grade I ventricular diastolic dysfunction. Mild-mod aortic stenosis.   -c/w aspirin 81 mg PO QD      #PAD   Remote RLE angioplasty. R 4th toe OM s/p partial 4th ray amputation on 10/24, RLE angiogram w/ AT lithotripsy, AT/peroneal balloon angioplasty 10/27.  -c/w plavix 75mg PO QD       New medications: doxycycline 100 mg PO BID x30 days, levaquin 750 mg PO QD x30 days   Labs to be followed outpatient: N/A  Exam to be followed outpatient: N/A     Physical Exam Upon Discharge:   Constitutional: NAD, comfortable in bed.  HEENT: NC/AT, PERRLA, EOMI, MMM  Neck: Supple, no JVD  Respiratory: CTA B/L. No w/r/r.   Cardiovascular: RRR, systolic murmur   Gastrointestinal: +BS, soft NTND, no guarding or rebound tenderness  Neurological: AAOx3, no CN deficits, strength and sensation intact throughout.   Skin: No gross skin abnormalities or rashes  Lower Extremity Focused:  Vasc: DP/PT biphasic waveforms heard on doppler b/l, b/l LE +2 pitting edema. Erythema present to the R forefoot and midfoot.  CFT <3 x 9   Derm:  R foot surgical site wound at the distal 4th ray. Serous drainage. Fibrotic 100% wound bed, negative PTB, + malodor.  Plantar skin just proximal to the surgical site with extensive hyperkeratotic skin and associated ischemic discoloration. Increase in maceration of plantar skin and area around the 4th digit surgical site.   Right proximal medial midfoot wound with fibrotic slough. Tunneling noted proximally towards the navicular bone area. Serous drainage. Positive PTB, positive malodor. Negative for fluctuance or crepitus  Macerated 2nd and 3rd interspace  Neuro: protective sensation slightly diminished  MSK: 5/5 muscle strength in all compartments      #Discharge: do not delete    Ward Avina is a 69yo M PMH CAD (2 stents 2020), HFmrEF (45-50%), HTN, PAD w/ remote RLE angioplasty, R 4th toe OM s/p partial R 4th ray amputation on 10/24, RLE angiogram with AT lithotripsy and AT/peroneal balloon angioplasty 10/27, uncontrolled IDDM (a1c 12 in Oct 2023), recent admission 11/8-11/10 to cardiology service for hypertensive emergency (left AMA, was given levaquin when he left for right foot wound that pt had started treatment for back in October) who presented to [facility] after a fall onto his knees and was having b/l knee pain. Found to have increased soft tissue gas in R foot on CT scan. Admit to [facility] for further management.    Problem List/Main Diagnoses (system-based):   #Osteomyelitis, Gas gangrene, SSTI  P/w fall d/t b/l knee pain. CT shows increased soft tissue gas in forefoot, no drainable abscess, no significant knee joint effusion. Hx of R 4th toe OM s/p partial 4th ray amputation on 10/24, admitted for OM foot, severe infection. Refuses all surgical intervention. Aware that further antibiotic treatment w/o amputation is futile. Refuses to participate in discussion. Started on zosyn, vancomycin. Vancomycin d/c'ed d/t refusing vancomycin trough. Bcx NGTD. Podiatry saw during admission performed debridement w/ wound cultures showing staph haemolyticus, lactobacillus gasseri. Local wound care. Continued on zosyn, oxycodone for pain. Pt agreeable to BKA. BKA performed w/ stump left open. Cardiology evaluated and cleared as intermediate risk for intermediate risk procedure. Stump closure performed on ***   -wound care recs***  -ID recs***  -f/u w/ Vascular sx   -f/u w/ PCP     #Diabetes   Hgb A1c 12 in October. Home medication glargine 14u qhs. Noted to have episode of hypoglycemia to FSG 35. Switched to 10u lantus at bedtime, 4 units TID premeal.   -c/w lantus 14 units bedtime ***  -c/w gabapentin 800 mg PO TID    #HTN   BP 190s on admission. Asymptomatic.   -c/w lisinopril 40 mg PO QD   -c/w hydralazine 25 mg PO TID   -c/w norvasc 10 mg PO QD   -c/w coreg 12.5 mg PO BID     #CAD   #HFmrEF   s/p 2 stents @[facility]. TTE on 11/9/23 shows EF 45-50%, mild-mod LVH, grade I ventricular diastolic dysfunction. Mild-mod aortic stenosis.   -c/w aspirin 81 mg PO QD      #PAD   Remote RLE angioplasty. R 4th toe OM s/p partial 4th ray amputation on 10/24, RLE angiogram w/ AT lithotripsy, AT/peroneal balloon angioplasty 10/27.  -c/w plavix 75mg PO QD       New medications: doxycycline 100 mg PO BID x30 days, levaquin 750 mg PO QD x30 days   Labs to be followed outpatient: N/A  Exam to be followed outpatient: N/A     Physical Exam Upon Discharge:   Constitutional: NAD, comfortable in bed.  HEENT: NC/AT, PERRLA, EOMI, MMM  Neck: Supple, no JVD  Respiratory: CTA B/L. No w/r/r.   Cardiovascular: RRR, systolic murmur   Gastrointestinal: +BS, soft NTND, no guarding or rebound tenderness  Neurological: AAOx3, no CN deficits, strength and sensation intact throughout.   Skin: No gross skin abnormalities or rashes  Lower Extremity Focused:  Vasc: DP/PT biphasic waveforms heard on doppler b/l, b/l LE +2 pitting edema. Erythema present to the R forefoot and midfoot.  CFT <3 x 9   Derm:  R foot surgical site wound at the distal 4th ray. Serous drainage. Fibrotic 100% wound bed, negative PTB, + malodor.  Plantar skin just proximal to the surgical site with extensive hyperkeratotic skin and associated ischemic discoloration. Increase in maceration of plantar skin and area around the 4th digit surgical site.   Right proximal medial midfoot wound with fibrotic slough. Tunneling noted proximally towards the navicular bone area. Serous drainage. Positive PTB, positive malodor. Negative for fluctuance or crepitus  Macerated 2nd and 3rd interspace  Neuro: protective sensation slightly diminished  MSK: 5/5 muscle strength in all compartments      #Discharge: do not delete    Ward Avina is a 67yo M PMH CAD (2 stents 2020), HFmrEF (45-50%), HTN, PAD w/ remote RLE angioplasty, R 4th toe OM s/p partial R 4th ray amputation on 10/24, RLE angiogram with AT lithotripsy and AT/peroneal balloon angioplasty 10/27, uncontrolled IDDM (a1c 12 in Oct 2023), recent admission 11/8-11/10 to cardiology service for hypertensive emergency (left AMA, was given levaquin when he left for right foot wound that pt had started treatment for back in October) who presented to [facility] after a fall onto his knees and was having b/l knee pain. Found to have increased soft tissue gas in R foot on CT scan. Admit to [facility] for further management.    Problem List/Main Diagnoses (system-based):   #Osteomyelitis, Gas gangrene, SSTI  P/w fall d/t b/l knee pain. CT shows increased soft tissue gas in forefoot, no drainable abscess, no significant knee joint effusion. Hx of R 4th toe OM s/p partial 4th ray amputation on 10/24, admitted for OM foot, severe infection. Refuses all surgical intervention. Aware that further antibiotic treatment w/o amputation is futile. Refuses to participate in discussion. Started on zosyn, vancomycin. Vancomycin d/c'ed d/t refusing vancomycin trough. Bcx NGTD. Podiatry saw during admission performed debridement w/ wound cultures showing staph haemolyticus, lactobacillus gasseri. Local wound care. Continued on zosyn, oxycodone for pain. Pt agreeable to BKA. BKA performed w/ stump left open. Cardiology evaluated and cleared as intermediate risk for intermediate risk procedure. Stump closure performed on ***   -wound care recs***  -ID recs***  -f/u w/ Vascular sx   -f/u w/ PCP     #Diabetes   Hgb A1c 12 in October. Home medication glargine 14u qhs. Noted to have episode of hypoglycemia to FSG 35. Switched to 10u lantus at bedtime, 4 units TID premeal.   -c/w lantus 14 units bedtime ***  -c/w gabapentin 800 mg PO TID    #HTN   BP 190s on admission. Asymptomatic.   -c/w lisinopril 40 mg PO QD   -c/w hydralazine 25 mg PO TID   -c/w norvasc 10 mg PO QD   -c/w coreg 12.5 mg PO BID     #CAD   #HFmrEF   s/p 2 stents @[facility] [place]. TTE on 11/9/23 shows EF 45-50%, mild-mod LVH, grade I ventricular diastolic dysfunction. Mild-mod aortic stenosis.   -c/w aspirin 81 mg PO QD      #PAD   Remote RLE angioplasty. R 4th toe OM s/p partial 4th ray amputation on 10/24, RLE angiogram w/ AT lithotripsy, AT/peroneal balloon angioplasty 10/27.  -c/w plavix 75mg PO QD       New medications: doxycycline 100 mg PO BID x30 days, levaquin 750 mg PO QD x30 days   Labs to be followed outpatient: N/A  Exam to be followed outpatient: N/A     Physical Exam Upon Discharge:   Constitutional: NAD, comfortable in bed.  HEENT: NC/AT, PERRLA, EOMI, MMM  Neck: Supple, no JVD  Respiratory: CTA B/L. No w/r/r.   Cardiovascular: RRR, systolic murmur   Gastrointestinal: +BS, soft NTND, no guarding or rebound tenderness  Neurological: AAOx3, no CN deficits, strength and sensation intact throughout.   Skin: No gross skin abnormalities or rashes  Lower Extremity Focused:  Vasc: DP/PT biphasic waveforms heard on doppler b/l, b/l LE +2 pitting edema. Erythema present to the R forefoot and midfoot.  CFT <3 x 9   Derm:  R foot surgical site wound at the distal 4th ray. Serous drainage. Fibrotic 100% wound bed, negative PTB, + malodor.  Plantar skin just proximal to the surgical site with extensive hyperkeratotic skin and associated ischemic discoloration. Increase in maceration of plantar skin and area around the 4th digit surgical site.   Right proximal medial midfoot wound with fibrotic slough. Tunneling noted proximally towards the navicular bone area. Serous drainage. Positive PTB, positive malodor. Negative for fluctuance or crepitus  Macerated 2nd and 3rd interspace  Neuro: protective sensation slightly diminished  MSK: 5/5 muscle strength in all compartments      Ward Avina is a 69yo M PMH CAD (2 stents 2020), HFmrEF (45-50%), HTN, PAD w/ remote RLE angioplasty, R 4th toe OM s/p partial R 4th ray amputation on 10/24, RLE angiogram with AT lithotripsy and AT/peroneal balloon angioplasty 10/27, uncontrolled IDDM (a1c 12 in Oct 2023), recent admission 11/8-11/10 to cardiology service for hypertensive emergency (left AMA, was given levaquin when he left for right foot wound that pt had started treatment for back in October) who presented to [facility] after a fall onto his knees and was having b/l knee pain. Found to have increased soft tissue gas in R foot on CT scan. Patient was started on IV vancomycin and zosyn. On 12/11/23 patient underwent a right guillotine below the knee amputation. On 12/14 after three episodes of diarrhea, patient was found to be positive for C. difficile. Patient was started on oral vancomycin solution that is scheduled to stop on 12/25/23. On 12/18/23 patient underwent closure of the BKA. Both procedures went uncomplicated. IV antibiotics were stopped after gaining adequate source control of the infection. Patient worked with physical and occupational therapy after surgery and they had recommended subacute rehab for better conditioning before returning home.    Ward Avina is a 69yo M PMH CAD (2 stents 2020), HFmrEF (45-50%), HTN, PAD w/ remote RLE angioplasty, R 4th toe OM s/p partial R 4th ray amputation on 10/24, RLE angiogram with AT lithotripsy and AT/peroneal balloon angioplasty 10/27, uncontrolled IDDM (a1c 12 in Oct 2023), recent admission 11/8-11/10 to cardiology service for hypertensive emergency (left AMA, was given levaquin when he left for right foot wound that pt had started treatment for back in October) who presented to [facility] after a fall onto his knees and was having b/l knee pain. Found to have increased soft tissue gas in R foot on CT scan. Patient was started on IV vancomycin and zosyn. On 12/11/23 patient underwent a right guillotine below the knee amputation. On 12/14 after three episodes of diarrhea, patient was found to be positive for C. difficile. Patient was started on oral vancomycin solution that is scheduled to stop on 12/25/23. On 12/18/23 patient underwent closure of the BKA. Both procedures went uncomplicated. IV antibiotics were stopped after gaining adequate source control of the infection. Patient worked with physical and occupational therapy after surgery and they had recommended subacute rehab for better conditioning before returning home.    Ward Avina is a 67yo M PMH CAD (2 stents 2020), HFmrEF (45-50%), HTN, PAD w/ remote RLE angioplasty, R 4th toe OM s/p partial R 4th ray amputation on 10/24, RLE angiogram with AT lithotripsy and AT/peroneal balloon angioplasty 10/27, uncontrolled IDDM (a1c 12 in Oct 2023), recent admission 11/8-11/10 to cardiology service for hypertensive emergency (left AMA, was given levaquin when he left for right foot wound that pt had started treatment for back in October) who presented to [facility] after a fall onto his knees and was having b/l knee pain. Found to have increased soft tissue gas in R foot on CT scan. Patient was started on IV vancomycin and zosyn. On 12/11/23 patient underwent a right guillotine below the knee amputation. On 12/14 after three episodes of diarrhea, patient was found to be positive for C. difficile. Patient was started on oral vancomycin solution that is scheduled to stop on 12/25/23. On 12/18/23 patient underwent closure of the BKA. Both procedures went uncomplicated. IV antibiotics were stopped after gaining adequate source control of the infection. Patient worked with physical and occupational therapy after surgery and they had recommended subacute rehab for better conditioning before returning home. Multiple code grey's have been called during the patient's hospital stay due to patient being verbally aggressive at staff and even throwing feces at staff members. On day of discharge patient was medically cleared to go to [facility] but patient refused the discharge. Discharge notice was given to the patient. A previous discharge notice was given on 12/6 when patient was originally refusing surgical treatment of his infected foot.    Ward Avina is a 67yo M PMH CAD (2 stents 2020), HFmrEF (45-50%), HTN, PAD w/ remote RLE angioplasty, R 4th toe OM s/p partial R 4th ray amputation on 10/24, RLE angiogram with AT lithotripsy and AT/peroneal balloon angioplasty 10/27, uncontrolled IDDM (a1c 12 in Oct 2023), recent admission 11/8-11/10 to cardiology service for hypertensive emergency (left AMA, was given levaquin when he left for right foot wound that pt had started treatment for back in October) who presented to [facility] after a fall onto his knees and was having b/l knee pain. Found to have increased soft tissue gas in R foot on CT scan. Patient was started on IV vancomycin and zosyn. On 12/11/23 patient underwent a right guillotine below the knee amputation. On 12/14 after three episodes of diarrhea, patient was found to be positive for C. difficile. Patient was started on oral vancomycin solution that is scheduled to stop on 12/25/23. On 12/18/23 patient underwent closure of the BKA. Both procedures went uncomplicated. IV antibiotics were stopped after gaining adequate source control of the infection. Patient worked with physical and occupational therapy after surgery and they had recommended subacute rehab for better conditioning before returning home. Multiple code grey's have been called during the patient's hospital stay due to patient being verbally aggressive at staff and even throwing feces at staff members. On day of discharge patient was medically cleared to go to [facility] but patient refused the discharge. Discharge notice was given to the patient. A previous discharge notice was given on 12/6 when patient was originally refusing surgical treatment of his infected foot.

## 2023-12-05 NOTE — CONSULT NOTE ADULT - SUBJECTIVE AND OBJECTIVE BOX
HPI:  67yo M PMH CAD (2 stents 2020), grade 1 diastolic dysfunction, HTN, PAD w/ remote RLE angioplasty, R 4th toe OM s/p partial R 4th ray amputation on 10/24, RLE angiogram with AT lithotripsy and AT/peroneal balloon angioplasty 10/27, uncontrolled IDDM (a1c 12 in Oct 2023), recent admission 11/8-11/10 to cardiology service for hypertensive emergency (left AMA, was given levaquin when he left for right foot wound that pt had started treatment for back in October) who presented to [facility] after a fall onto his knees and was having b/l knee pain. The initial pain was severe and he felt like they were swollen so he came to the ED. Knee pain is now getting better. Reports having right foot pain for which he takes oxy 10 q8h at home. He does not wrap the foot or put any topical medications on it. Denies fever, chills, SOB, CP.      ED course:  vitals: afebrile, HR 70, /87, RR 16, O2sat 98 on room air  labs: wbc 13, Na 130, glucose 476, BNP 74357  CXR la/ap: persistent small left and a new small right pleural effusion  b/l knee xray: Moderate to severe left worse than right knee arthrosis without acute displaced fracture  CT angio b/l LE: Increased soft tissue gas in the forefoot. No drainable abscess. Patent two vessel runoff in both lower extremities. No significant knee joint effusion.  ECG: regular, sinus, isolated Q wave AVF, borderline LVH  Interventions: regular insulin 10u total, Lasix 40 IV, norvasc 10, morphine 2 IV x2, tylenol 1g IV. Vanc 1g, zosyn 3.375  Intensivist was called in ED for hyperglycemia but since not in DKA no telemetry was needed.  [facility] also contacted ortho for b/l knee pain and Vascular for R foot wound (02 Dec 2023 01:15)    PERTINENT PM/SXH:   IDDM (insulin dependent diabetes mellitus)    HTN (hypertension)    CAD S/P percutaneous coronary angioplasty    Neuropathy    PAD (peripheral artery disease)    Acute CHF    PNA (pneumonia)    Gangrene of toe of right foot    Moderate aortic stenosis    Acute on chronic diastolic congestive heart failure    Hyperlipidemia      No significant past surgical history    History of partial ray amputation of fourth toe of right foot    Gangrene of toe of right foot    Acute CHF    PNA (pneumonia)    PNA (pneumonia)      FAMILY HISTORY:  No history of diabetes in first degree relatives according to chart    ITEMS NOT CHECKED ARE NOT PRESENT  SOCIAL HISTORY:   Significant other/partner:  []  Children:  []   Substance hx:  []   Tobacco hx:  []   Alcohol hx: []   Home Opioid hx:  [] I-Stop Reference No:  - no active Rx's / see chart note  Living Situation: [X]Home  []Long term care  []Rehab []Other  Restoration/Spiritual practice: ; Role of organized Jew [ ] important [ ] some [X] unable to assess  Coping: [] well [] with difficulty [] poor coping [X] unable to assess  Support system: [] strong [] adequate [] inadequate    ADVANCE DIRECTIVES:    []MOLST  []Living Will  DECISION MAKER(s):  [] Health Care Proxy(s)  [] Surrogate(s)  [] Guardian           Name(s)/Phone Number(s):     BASELINE (I)ADLs (prior to admission):  [place]: [X]Total  [ ] Moderate []Dependent    ALLERGIES:  No Known Allergies    MEDICATIONS  (STANDING):  aspirin enteric coated 81 milliGRAM(s) Oral every 24 hours  atorvastatin 80 milliGRAM(s) Oral at bedtime  carvedilol 12.5 milliGRAM(s) Oral every 12 hours  dextrose 5%. 1000 milliLiter(s) (100 mL/Hr) IV Continuous <Continuous>  dextrose 5%. 1000 milliLiter(s) (50 mL/Hr) IV Continuous <Continuous>  dextrose 50% Injectable 12.5 Gram(s) IV Push once  dextrose 50% Injectable 25 Gram(s) IV Push once  dextrose 50% Injectable 25 Gram(s) IV Push once  gabapentin 800 milliGRAM(s) Oral three times a day  glucagon  Injectable 1 milliGRAM(s) IntraMuscular once  hydrALAZINE 50 milliGRAM(s) Oral every 8 hours  insulin glargine Injectable (LANTUS) 16 Unit(s) SubCutaneous at bedtime  insulin lispro (ADMELOG) corrective regimen sliding scale   SubCutaneous Before meals and at bedtime  insulin lispro Injectable (ADMELOG) 3 Unit(s) SubCutaneous three times a day before meals  lisinopril 40 milliGRAM(s) Oral every 24 hours  piperacillin/tazobactam IVPB.. 3.375 Gram(s) IV Intermittent every 8 hours    MEDICATIONS  (PRN):  acetaminophen     Tablet .. 650 milliGRAM(s) Oral every 6 hours PRN Temp greater or equal to 38C (100.4F), Mild Pain (1 - 3)  dextrose Oral Gel 15 Gram(s) Oral once PRN Blood Glucose LESS THAN 70 milliGRAM(s)/deciliter  oxyCODONE    IR 5 milliGRAM(s) Oral every 6 hours PRN Severe Pain (7 - 10)      PRESENT SYMPTOMS/REVIEW OF SYSTEMS: []Unable to obtain due to poor mentation/encephalopathy  Source if other than patient:  []Family   []Team     Pain: [] yes [] no  QOL Impact -   Location -                    Aggravating Factors -  Quality -  Radiation -  Timing -  Severity (0-10 scale) -   Minimal Acceptable Level (0-10 scale) -    CPOT Score:  (Pain Assessment Scale for Critically Ill)    PAIN AD Score:  (Nonverbal Pain Assessment Scale)    Dyspnea:                           []Mild  []Moderate []Severe  Anxiety:                             []Mild []Moderate []Severe  Fatigue:                             []Mild []Moderate []Severe  Nausea:                             []Mild []Moderate []Severe  Loss of Appetite:              []Mild []Moderate []Severe  Constipation:                    []Mild []Moderate []Severe    Other Symptoms:  [x]All Other Review Of Systems Negative     Palliative Performance Status Version 2: 70 %  (Functional Assessment Tool)    PHYSICAL EXAM:  GENERAL:  [X]Alert  [X]Oriented x3   []Lethargic  []Cachexia  []Unarousable  [X]Verbal  []Non-Verbal  Behavioral:   []Anxiety  []Delirium []Agitation [X]Uncooperative  HEENT:  [X]Normal   []Dry mouth   []ET Tube/Trach  []Oral lesions  PULMONARY:   [X]Clear []Tachypnea  []Audible excessive secretions  []Normal Work of Breathing []Labored Breathing  []Rhonchi []Crackles []Wheezing  CARDIOVASCULAR:    [X]Regular Rate & Rhythm []Irregular []Tachy  []Braulio  GASTROINTESTINAL:  [X]Soft  []Distended   []+BS  [X]Non tender []Tender  []PEG []OGT/ NGT  Last BM:  GENITOURINARY:  [X]Normal [] Incontinent   []Oliguria/Anuria   []Mcdermott  MUSCULOSKELETAL:   []Normal   [X]Weakness  []Bed/Wheelchair bound []Edema  NEUROLOGIC:   [X]No focal deficits  []Cognitive impairment  []Dysphagia []Dysarthria []Paresis []Encephalopathic  SKIN:   [x]Normal   []Pressure ulcer(s)  []Rash    CRITICAL CARE:  []Shock Present  []Septic []Cardiogenic []Neurologic []Hypovolemic  []Vasopressors []Inotropes   []Respiratory failure present []Mechanical Ventilation []Non-invasive ventilatory support []High-Flow  []Acute  []Chronic []Hypoxic  []Hypercarbic  []Other organ failure    Vital Signs Last 24 Hrs  T(C): 36.7 (05 Dec 2023 05:49), Max: 36.9 (04 Dec 2023 21:39)  T(F): 98.1 (05 Dec 2023 05:49), Max: 98.4 (04 Dec 2023 21:39)  HR: 58 (05 Dec 2023 05:49) (58 - 65)  BP: 158/77 (05 Dec 2023 05:49) (155/80 - 191/93)  BP(mean): --  RR: 18 (05 Dec 2023 05:49) (18 - 18)  SpO2: 96% (05 Dec 2023 05:49) (95% - 96%)    Parameters below as of 04 Dec 2023 21:39  Patient On (Oxygen Delivery Method): room air        LABS:                        7.5    12.03 )-----------( 213      ( 05 Dec 2023 07:25 )             23.7   12-05    134<L>  |  98  |  19  ----------------------------<  193<H>  3.8   |  29  |  1.12    Ca    8.2<L>      05 Dec 2023 07:25  Phos  3.3     12-05  Mg     2.0     12-05    TPro  6.2  /  Alb  2.1<L>  /  TBili  0.2  /  DBili  x   /  AST  30  /  ALT  23  /  AlkPhos  269<H>  12-05    RADIOLOGY & ADDITIONAL STUDIES:  < from: CT Angio Lower Extremity w/ IV Cont, Bilateral (12.01.23 @ 17:13) >  IMPRESSION:  Increased soft tissue gas in the forefoot. No drainable abscess.  Patent two vessel runoff in both lower extremities.  Marked diffuse body wall and lower extremity edema.  Small bilateral pleural effusions.  No significant knee joint effusion.    < end of copied text >      REFERRALS:  [x]Social Work  [X]Case management [X]PT/OT []Chaplaincy  []Hospice  []Patient/Family Support []Massage Therapy []Music Therapy    DISCUSSION OF CASE: Primary team - discussed plan of care HPI:  69yo M PMH CAD (2 stents 2020), grade 1 diastolic dysfunction, HTN, PAD w/ remote RLE angioplasty, R 4th toe OM s/p partial R 4th ray amputation on 10/24, RLE angiogram with AT lithotripsy and AT/peroneal balloon angioplasty 10/27, uncontrolled IDDM (a1c 12 in Oct 2023), recent admission 11/8-11/10 to cardiology service for hypertensive emergency (left AMA, was given levaquin when he left for right foot wound that pt had started treatment for back in October) who presented to [facility] after a fall onto his knees and was having b/l knee pain. The initial pain was severe and he felt like they were swollen so he came to the ED. Knee pain is now getting better. Reports having right foot pain for which he takes oxy 10 q8h at home. He does not wrap the foot or put any topical medications on it. Denies fever, chills, SOB, CP.      ED course:  vitals: afebrile, HR 70, /87, RR 16, O2sat 98 on room air  labs: wbc 13, Na 130, glucose 476, BNP 15087  CXR la/ap: persistent small left and a new small right pleural effusion  b/l knee xray: Moderate to severe left worse than right knee arthrosis without acute displaced fracture  CT angio b/l LE: Increased soft tissue gas in the forefoot. No drainable abscess. Patent two vessel runoff in both lower extremities. No significant knee joint effusion.  ECG: regular, sinus, isolated Q wave AVF, borderline LVH  Interventions: regular insulin 10u total, Lasix 40 IV, norvasc 10, morphine 2 IV x2, tylenol 1g IV. Vanc 1g, zosyn 3.375  Intensivist was called in ED for hyperglycemia but since not in DKA no telemetry was needed.  [facility] also contacted ortho for b/l knee pain and Vascular for R foot wound (02 Dec 2023 01:15)    PERTINENT PM/SXH:   IDDM (insulin dependent diabetes mellitus)    HTN (hypertension)    CAD S/P percutaneous coronary angioplasty    Neuropathy    PAD (peripheral artery disease)    Acute CHF    PNA (pneumonia)    Gangrene of toe of right foot    Moderate aortic stenosis    Acute on chronic diastolic congestive heart failure    Hyperlipidemia      No significant past surgical history    History of partial ray amputation of fourth toe of right foot    Gangrene of toe of right foot    Acute CHF    PNA (pneumonia)    PNA (pneumonia)      FAMILY HISTORY:  No history of diabetes in first degree relatives according to chart    ITEMS NOT CHECKED ARE NOT PRESENT  SOCIAL HISTORY:   Significant other/partner:  []  Children:  []   Substance hx:  []   Tobacco hx:  []   Alcohol hx: []   Home Opioid hx:  [] I-Stop Reference No:  - no active Rx's / see chart note  Living Situation: [X]Home  []Long term care  []Rehab []Other  Yarsanism/Spiritual practice: ; Role of organized Buddhism [ ] important [ ] some [X] unable to assess  Coping: [] well [] with difficulty [] poor coping [X] unable to assess  Support system: [] strong [] adequate [] inadequate    ADVANCE DIRECTIVES:    []MOLST  []Living Will  DECISION MAKER(s):  [] Health Care Proxy(s)  [] Surrogate(s)  [] Guardian           Name(s)/Phone Number(s):     BASELINE (I)ADLs (prior to admission):  [place]: [X]Total  [ ] Moderate []Dependent    ALLERGIES:  No Known Allergies    MEDICATIONS  (STANDING):  aspirin enteric coated 81 milliGRAM(s) Oral every 24 hours  atorvastatin 80 milliGRAM(s) Oral at bedtime  carvedilol 12.5 milliGRAM(s) Oral every 12 hours  dextrose 5%. 1000 milliLiter(s) (100 mL/Hr) IV Continuous <Continuous>  dextrose 5%. 1000 milliLiter(s) (50 mL/Hr) IV Continuous <Continuous>  dextrose 50% Injectable 12.5 Gram(s) IV Push once  dextrose 50% Injectable 25 Gram(s) IV Push once  dextrose 50% Injectable 25 Gram(s) IV Push once  gabapentin 800 milliGRAM(s) Oral three times a day  glucagon  Injectable 1 milliGRAM(s) IntraMuscular once  hydrALAZINE 50 milliGRAM(s) Oral every 8 hours  insulin glargine Injectable (LANTUS) 16 Unit(s) SubCutaneous at bedtime  insulin lispro (ADMELOG) corrective regimen sliding scale   SubCutaneous Before meals and at bedtime  insulin lispro Injectable (ADMELOG) 3 Unit(s) SubCutaneous three times a day before meals  lisinopril 40 milliGRAM(s) Oral every 24 hours  piperacillin/tazobactam IVPB.. 3.375 Gram(s) IV Intermittent every 8 hours    MEDICATIONS  (PRN):  acetaminophen     Tablet .. 650 milliGRAM(s) Oral every 6 hours PRN Temp greater or equal to 38C (100.4F), Mild Pain (1 - 3)  dextrose Oral Gel 15 Gram(s) Oral once PRN Blood Glucose LESS THAN 70 milliGRAM(s)/deciliter  oxyCODONE    IR 5 milliGRAM(s) Oral every 6 hours PRN Severe Pain (7 - 10)      PRESENT SYMPTOMS/REVIEW OF SYSTEMS: []Unable to obtain due to poor mentation/encephalopathy  Source if other than patient:  []Family   []Team     Pain: [] yes [] no  QOL Impact -   Location -                    Aggravating Factors -  Quality -  Radiation -  Timing -  Severity (0-10 scale) -   Minimal Acceptable Level (0-10 scale) -    CPOT Score:  (Pain Assessment Scale for Critically Ill)    PAIN AD Score:  (Nonverbal Pain Assessment Scale)    Dyspnea:                           []Mild  []Moderate []Severe  Anxiety:                             []Mild []Moderate []Severe  Fatigue:                             []Mild []Moderate []Severe  Nausea:                             []Mild []Moderate []Severe  Loss of Appetite:              []Mild []Moderate []Severe  Constipation:                    []Mild []Moderate []Severe    Other Symptoms:  [x]All Other Review Of Systems Negative     Palliative Performance Status Version 2: 70 %  (Functional Assessment Tool)    PHYSICAL EXAM:  GENERAL:  [X]Alert  [X]Oriented x3   []Lethargic  []Cachexia  []Unarousable  [X]Verbal  []Non-Verbal  Behavioral:   []Anxiety  []Delirium []Agitation [X]Uncooperative  HEENT:  [X]Normal   []Dry mouth   []ET Tube/Trach  []Oral lesions  PULMONARY:   [X]Clear []Tachypnea  []Audible excessive secretions  []Normal Work of Breathing []Labored Breathing  []Rhonchi []Crackles []Wheezing  CARDIOVASCULAR:    [X]Regular Rate & Rhythm []Irregular []Tachy  []Braulio  GASTROINTESTINAL:  [X]Soft  []Distended   []+BS  [X]Non tender []Tender  []PEG []OGT/ NGT  Last BM:  GENITOURINARY:  [X]Normal [] Incontinent   []Oliguria/Anuria   []Mcdermott  MUSCULOSKELETAL:   []Normal   [X]Weakness  []Bed/Wheelchair bound []Edema  NEUROLOGIC:   [X]No focal deficits  []Cognitive impairment  []Dysphagia []Dysarthria []Paresis []Encephalopathic  SKIN:   [x]Normal   []Pressure ulcer(s)  []Rash    CRITICAL CARE:  []Shock Present  []Septic []Cardiogenic []Neurologic []Hypovolemic  []Vasopressors []Inotropes   []Respiratory failure present []Mechanical Ventilation []Non-invasive ventilatory support []High-Flow  []Acute  []Chronic []Hypoxic  []Hypercarbic  []Other organ failure    Vital Signs Last 24 Hrs  T(C): 36.7 (05 Dec 2023 05:49), Max: 36.9 (04 Dec 2023 21:39)  T(F): 98.1 (05 Dec 2023 05:49), Max: 98.4 (04 Dec 2023 21:39)  HR: 58 (05 Dec 2023 05:49) (58 - 65)  BP: 158/77 (05 Dec 2023 05:49) (155/80 - 191/93)  BP(mean): --  RR: 18 (05 Dec 2023 05:49) (18 - 18)  SpO2: 96% (05 Dec 2023 05:49) (95% - 96%)    Parameters below as of 04 Dec 2023 21:39  Patient On (Oxygen Delivery Method): room air        LABS:                        7.5    12.03 )-----------( 213      ( 05 Dec 2023 07:25 )             23.7   12-05    134<L>  |  98  |  19  ----------------------------<  193<H>  3.8   |  29  |  1.12    Ca    8.2<L>      05 Dec 2023 07:25  Phos  3.3     12-05  Mg     2.0     12-05    TPro  6.2  /  Alb  2.1<L>  /  TBili  0.2  /  DBili  x   /  AST  30  /  ALT  23  /  AlkPhos  269<H>  12-05    RADIOLOGY & ADDITIONAL STUDIES:  < from: CT Angio Lower Extremity w/ IV Cont, Bilateral (12.01.23 @ 17:13) >  IMPRESSION:  Increased soft tissue gas in the forefoot. No drainable abscess.  Patent two vessel runoff in both lower extremities.  Marked diffuse body wall and lower extremity edema.  Small bilateral pleural effusions.  No significant knee joint effusion.    < end of copied text >      REFERRALS:  [x]Social Work  [X]Case management [X]PT/OT []Chaplaincy  []Hospice  []Patient/Family Support []Massage Therapy []Music Therapy    DISCUSSION OF CASE: Primary team - discussed plan of care

## 2023-12-05 NOTE — CONSULT NOTE ADULT - PROBLEM SELECTOR RECOMMENDATION 4
See GOC note above.  - Patient unwilling to engage in GOC conversation. Unable to determine his wishes regarding code status or end of life wishes.  - Patient has poor medical understanding but on repeated discussion of amputation and clarification of the risks of foregoing amputation, including the likelihood of death, he again refused any amputation.

## 2023-12-05 NOTE — PROGRESS NOTE ADULT - SUBJECTIVE AND OBJECTIVE BOX
Patient is a 68y old  Male who presents with a chief complaint of soft tissue and skin infection of right foot (05 Dec 2023 06:36)      INTERVAL HPI/ OVERNIGHT EVENTS: No acute events overnight. Pt resting comfortably in bed. Denies any pain to the R foot. Dressing with serous strikethrough. Pt further reinforced by self with cloth napkin.       LABS                        7.5    12.03 )-----------( 213      ( 05 Dec 2023 07:25 )             23.7     12-05    134<L>  |  98  |  19  ----------------------------<  193<H>  3.8   |  29  |  1.12    Ca    8.2<L>      05 Dec 2023 07:25  Phos  3.3     12-05  Mg     2.0     12-05    TPro  6.2  /  Alb  2.1<L>  /  TBili  0.2  /  DBili  x   /  AST  30  /  ALT  23  /  AlkPhos  269<H>  12-05        ICU Vital Signs Last 24 Hrs  T(C): 36.7 (05 Dec 2023 05:49), Max: 36.9 (04 Dec 2023 11:34)  T(F): 98.1 (05 Dec 2023 05:49), Max: 98.4 (04 Dec 2023 11:34)  HR: 58 (05 Dec 2023 05:49) (58 - 68)  BP: 158/77 (05 Dec 2023 05:49) (134/74 - 191/93)  BP(mean): --  ABP: --  ABP(mean): --  RR: 18 (05 Dec 2023 05:49) (18 - 18)  SpO2: 96% (05 Dec 2023 05:49) (95% - 96%)    O2 Parameters below as of 04 Dec 2023 21:39  Patient On (Oxygen Delivery Method): room air              PHYSICAL EXAM  General: NAD, AA0x3    Lower Extremity Focused:  Vasc: DP/PT biphasic waveforms heard on doppler b/l, b/l LE +2 pitting edema. Erythema present to the R forefoot and midfoot.  CFT <3 x 9   Derm:  R foot surgical site wound at the distal 4th ray. Serous drainage. Fibrotic 100% wound bed, negative PTB, + malodor.  Plantar skin just proximal to the surgical site with extensive hyperkeratotic skin and associated ischemic discoloration. Increase in maceration of plantar skin and area around the 4th digit surgical site.   Right proximal medial midfoot wound with fibrotic slough. Tunneling noted proximally towards the navicular bone area. Serous drainage. Positive PTB, positive malodor. Negative for fluctuance or crepitus  Macerated 2nd and 3rd interspace  Neuro: protective sensation slightly diminished  MSK: 5/5 muscle strength in all compartments     RADIOLOGY  < from: CT Angio Lower Extremity w/ IV Cont, Bilateral (12.01.23 @ 17:13) >  IMPRESSION:  Increased soft tissue gas in the forefoot. No drainable abscess.  Patent two vessel runoff in both lower extremities.  Marked diffuse body wall and lower extremity edema.  Small bilateral pleural effusions.  No significant knee joint effusion.    < end of copied text >    MICROBIOLOGY  Culture - Other (12.02.23 @ 05:09)   Gram Stain:   Rare White blood cells   Few Gram Positive Rods   Rare Gram Negative Rods  Specimen Source: Wound Wound  Culture Results:   Few Staphylococcus haemolyticus   Moderate Lactobacillus gasseri   Culture in progress

## 2023-12-05 NOTE — PROGRESS NOTE ADULT - PROBLEM SELECTOR PLAN 1
Past hx of PAD w/ diabetic foot wound. Had prior admission in October where he was seen by ID and Podiatry and was supposed to complete 6 wks of broad spectrum antimicrobials. At that time MRI with possible 4th digit early OM up to prox phalanx s/p partial R 4th ray amputation on 10/24, proximal cx with E.coli, Pluralibacter gergoviae, stap epi, and Corynebacterium amycolatum.   Then had an admission this month for unrelated issue, had left AMA and PICC line was taken out bc pt was leaving AMA, was prescribed levaquin to complete his 6wk course however pt unsure if he actually picked up the levaquin.  CT angio LE today shows increased soft tissue gas in the right forefoot. Vascular surgery was contacted by [facility].  On exam right foot has open wound between 4th and 5th digits with purulence coming out of wound, foot is erythematous on dorsal aspect and warm to touch.  Ddx includes SSTI vs osteomyelitis vs less likely nec fasc in setting of possibly incompletely treated infection.   Vascular signed off d/t pt refusing interventions  Wound culture shows few staph haemolyticus, lactobacillus gasseri   Started on vancomycin, refused vanc trough- vanc d'jasen   - Podiatry recs local wound care, IV abx per ID recs   - c/w zosyn 3.375g IV q8   - Pain med PRN- tylenol (mild pain), toradol (mod pain), Oxy 5 prn (severe pain)  - f/u blood cx Past hx of PAD w/ diabetic foot wound. Had prior admission in October where he was seen by ID and Podiatry and was supposed to complete 6 wks of broad spectrum antimicrobials. At that time MRI with possible 4th digit early OM up to prox phalanx s/p partial R 4th ray amputation on 10/24, proximal cx with E.coli, Pluralibacter gergoviae, stap epi, and Corynebacterium amycolatum.   Then had an admission this month for unrelated issue, had left AMA and PICC line was taken out bc pt was leaving AMA, was prescribed levaquin to complete his 6wk course however pt unsure if he actually picked up the levaquin.  CT angio LE today shows increased soft tissue gas in the right forefoot. Vascular surgery was contacted by [facility].  On exam right foot has open wound between 4th and 5th digits with purulence coming out of wound, foot is erythematous on dorsal aspect and warm to touch.  Ddx includes SSTI vs osteomyelitis vs less likely nec fasc in setting of possibly incompletely treated infection.   Vascular signed off d/t pt refusing interventions  Wound culture shows few staph haemolyticus, lactobacillus gasseri   Started on vancomycin, refused vanc trough- vanc d'jasen   - Podiatry recs local wound care, IV abx per ID recs   - c/w zosyn 3.375g IV q8   - Pain med PRN- tylenol (mild pain), toradol (mod pain), Oxy 5 prn (severe pain)  - f/u blood cx

## 2023-12-05 NOTE — DISCHARGE NOTE PROVIDER - NSDCCPCAREPLAN_GEN_ALL_CORE_FT
PRINCIPAL DISCHARGE DIAGNOSIS  Diagnosis: Soft tissue infection  Assessment and Plan of Treatment: You came to the hospital because of a fall because of knee pain. Imaging was done of your leg and foot and showed gas buildup in your foot which is very concerning for infection. Samples were taken from your foot wound and showed bacteria growing. You were started on antibiotics in the hospital. The foot specialists saw you and recommened that you receive an amputation for your right foot because antibiotics would not be enough to treat the infection. You refused the amputation even after understanding the risk of not performing the procedure, including the risk of death.     PRINCIPAL DISCHARGE DIAGNOSIS  Diagnosis: Soft tissue infection  Assessment and Plan of Treatment: You came to the hospital because of a fall because of knee pain. Imaging was done of your leg and foot and showed gas buildup in your foot which is very concerning for infection. Samples were taken from your foot wound and showed bacteria growing. You were started on antibiotics in the hospital. The foot specialists saw you and recommended that you receive an amputation for your right foot because antibiotics would not be enough to treat the infection. You have declined the amputation and were informed of the risk of not performing the procedure, including the risk of death. If this is the case we are prescribing 750mg of Levaquin once daily and 100mg of doxycycline twice daily for one month to treat you in the meantime.     PRINCIPAL DISCHARGE DIAGNOSIS  Diagnosis: Soft tissue infection  Assessment and Plan of Treatment: You came to the hospital because of a fall because of knee pain. Imaging was done of your leg and foot and showed gas buildup in your foot which is very concerning for infection. Samples were taken from your foot wound and showed bacteria growing. You were started on antibiotics in the hospital. The foot specialists saw you and recommended that you receive an amputation for your right foot because antibiotics would not be enough to treat the infection. You have declined the amputation and were informed of the risk of not performing the procedure, including the risk of death. If this is the case we are prescribing 750mg of Levaquin once daily and 100mg of doxycycline twice daily for one month to treat you in the meantime.      SECONDARY DISCHARGE DIAGNOSES  Diagnosis: Open wound of foot  Assessment and Plan of Treatment: Please follow up with a Podiatrist when you leave the hospital. Below is a list of places that you can call to schedule an appointment.   - [facility]: [address] [place] [phone] ([phone])  - [facility]: [address] [place] [address] ([phone])     PRINCIPAL DISCHARGE DIAGNOSIS  Diagnosis: Soft tissue infection  Assessment and Plan of Treatment: You came to the hospital because of a fall because of knee pain. Imaging was done of your leg and foot and showed gas buildup in your foot which is very concerning for infection. Samples were taken from your foot wound and showed bacteria growing. You were started on antibiotics in the hospital. The foot specialists saw you and recommended that you receive an amputation for your right foot because antibiotics would not be enough to treat the infection. You have declined the amputation and were informed of the risk of not performing the procedure, including the risk of death. If this is the case we are prescribing 750mg of Levaquin once daily and 100mg of doxycycline twice daily for one month to treat you in the meantime.      SECONDARY DISCHARGE DIAGNOSES  Diagnosis: Open wound of foot  Assessment and Plan of Treatment: Please follow up with a Podiatrist when you leave the hospital. Below is a list of places that you can call to schedule an appointment.   - [facility]: [address] [place] [phone] ([phone])  - [facility]: [address] [place] [address] ([phone])     PRINCIPAL DISCHARGE DIAGNOSIS  Diagnosis: Soft tissue infection  Assessment and Plan of Treatment:      PRINCIPAL DISCHARGE DIAGNOSIS  Diagnosis: Soft tissue infection  Assessment and Plan of Treatment:       SECONDARY DISCHARGE DIAGNOSES  Diagnosis: Open wound of foot  Assessment and Plan of Treatment:     Diagnosis: HTN (hypertension)  Assessment and Plan of Treatment:     Diagnosis: DM (diabetes mellitus)  Assessment and Plan of Treatment:     Diagnosis: CAD (coronary artery disease)  Assessment and Plan of Treatment:     Diagnosis: PAD (peripheral artery disease)  Assessment and Plan of Treatment:     Diagnosis: Chronic systolic congestive heart failure  Assessment and Plan of Treatment:     Diagnosis: Pain in both knees  Assessment and Plan of Treatment:     Diagnosis: Osteomyelitis  Assessment and Plan of Treatment:     Diagnosis: Diabetic foot infection  Assessment and Plan of Treatment:     Diagnosis: C. difficile diarrhea  Assessment and Plan of Treatment:     Diagnosis: Acute blood loss anemia  Assessment and Plan of Treatment:     Diagnosis: Moderate aortic stenosis  Assessment and Plan of Treatment:     Diagnosis: Hyperglycemia  Assessment and Plan of Treatment:

## 2023-12-05 NOTE — PROGRESS NOTE ADULT - ASSESSMENT
69yo M PMH CAD (2 stents 2020), HFmrEF (45-50%), HTN, PAD w/ remote RLE angioplasty, R 4th toe OM s/p partial R 4th ray amputation on 10/24, RLE angiogram with AT lithotripsy and AT/peroneal balloon angioplasty 10/27, uncontrolled IDDM (a1c 12 in Oct 2023), recent admission 11/8-11/10 to cardiology service for hypertensive emergency (left AMA, was given levaquin when he left for right foot wound that pt had started treatment for back in October) who presented to [facility] after a fall onto his knees and was having b/l knee pain. Found to have increased soft tissue gas in R foot on CT scan. Admit to [facility] for further management. 67yo M PMH CAD (2 stents 2020), HFmrEF (45-50%), HTN, PAD w/ remote RLE angioplasty, R 4th toe OM s/p partial R 4th ray amputation on 10/24, RLE angiogram with AT lithotripsy and AT/peroneal balloon angioplasty 10/27, uncontrolled IDDM (a1c 12 in Oct 2023), recent admission 11/8-11/10 to cardiology service for hypertensive emergency (left AMA, was given levaquin when he left for right foot wound that pt had started treatment for back in October) who presented to [facility] after a fall onto his knees and was having b/l knee pain. Found to have increased soft tissue gas in R foot on CT scan. Admit to [facility] for further management.

## 2023-12-05 NOTE — PROGRESS NOTE ADULT - PROBLEM SELECTOR PLAN 6
#HFmrEF  #CAD  s/p 2 stents in 2020 at [facility]  TTE 11/9/23: EF 45-50%, Mild to moderate symmetric LVH, Grade I left ventricular diastolic dysfunction. Biatrial enlargement. Mild-to-moderate aortic stenosis.  Home meds: lisinopril, coreg  - c/w home meds  - c/w aspirin #HFmrEF  #CAD  s/p 2 stents in 2020 at [facility]  TTE 11/9/23: EF 45-50%, Mild to moderate symmetric LVH, Grade I left ventricular diastolic dysfunction. Biatrial enlargement. Mild-to-moderate aortic stenosis.  Home meds: lisinopril, coreg  - c/w home meds  - c/w aspirin

## 2023-12-05 NOTE — CONSULT NOTE ADULT - PROBLEM SELECTOR RECOMMENDATION 9
Worsening foot wound now with extending soft tissue infection with gas on imaging. Podiatry and ID following. Recommending amputation but patient refusing.  - Currently on IV abx per ID recs  - Pt reiterated to me that he does not want amputation

## 2023-12-05 NOTE — PROGRESS NOTE ADULT - SUBJECTIVE AND OBJECTIVE BOX
INFECTIOUS DISEASES CONSULT FOLLOW-UP NOTE    INTERVAL HPI/OVERNIGHT EVENTS:      ROS:   Constitutional, eyes, ENT, cardiovascular, respiratory, gastrointestinal, genitourinary, integumentary, neurological, psychiatric and heme/lymph are otherwise negative other than noted above       ANTIBIOTICS/RELEVANT:    MEDICATIONS  (STANDING):  aspirin enteric coated 81 milliGRAM(s) Oral every 24 hours  atorvastatin 80 milliGRAM(s) Oral at bedtime  carvedilol 12.5 milliGRAM(s) Oral every 12 hours  dextrose 5%. 1000 milliLiter(s) (50 mL/Hr) IV Continuous <Continuous>  dextrose 5%. 1000 milliLiter(s) (100 mL/Hr) IV Continuous <Continuous>  dextrose 50% Injectable 25 Gram(s) IV Push once  dextrose 50% Injectable 12.5 Gram(s) IV Push once  dextrose 50% Injectable 25 Gram(s) IV Push once  gabapentin 800 milliGRAM(s) Oral three times a day  glucagon  Injectable 1 milliGRAM(s) IntraMuscular once  hydrALAZINE 50 milliGRAM(s) Oral every 8 hours  insulin glargine Injectable (LANTUS) 16 Unit(s) SubCutaneous at bedtime  insulin lispro (ADMELOG) corrective regimen sliding scale   SubCutaneous Before meals and at bedtime  insulin lispro Injectable (ADMELOG) 3 Unit(s) SubCutaneous three times a day before meals  lisinopril 40 milliGRAM(s) Oral every 24 hours  piperacillin/tazobactam IVPB.. 3.375 Gram(s) IV Intermittent every 8 hours    MEDICATIONS  (PRN):  acetaminophen     Tablet .. 650 milliGRAM(s) Oral every 6 hours PRN Temp greater or equal to 38C (100.4F), Mild Pain (1 - 3)  dextrose Oral Gel 15 Gram(s) Oral once PRN Blood Glucose LESS THAN 70 milliGRAM(s)/deciliter  oxyCODONE    IR 5 milliGRAM(s) Oral every 6 hours PRN Severe Pain (7 - 10)        Vital Signs Last 24 Hrs  T(C): 36.7 (05 Dec 2023 05:49), Max: 36.9 (04 Dec 2023 21:39)  T(F): 98.1 (05 Dec 2023 05:49), Max: 98.4 (04 Dec 2023 21:39)  HR: 58 (05 Dec 2023 05:49) (58 - 65)  BP: 158/77 (05 Dec 2023 05:49) (155/80 - 191/93)  BP(mean): --  RR: 18 (05 Dec 2023 05:49) (18 - 18)  SpO2: 96% (05 Dec 2023 05:49) (95% - 96%)    Parameters below as of 04 Dec 2023 21:39  Patient On (Oxygen Delivery Method): room air        PHYSICAL EXAM:  Constitutional: alert, NAD  Eyes: the sclera and conjunctiva were normal.   ENT: the ears and nose were normal in appearance.   Neck: the appearance of the neck was normal and the neck was supple.   Pulmonary: no respiratory distress and lungs were clear to auscultation bilaterally.   Heart: heart rate was normal and rhythm regular, normal S1 and S2  Vascular:. there was no peripheral edema  Abdomen: normal bowel sounds, soft, non-tender  Neurological: no focal deficits.   Psychiatric: the affect was normal        LABS:                        7.5    12.03 )-----------( 213      ( 05 Dec 2023 07:25 )             23.7     12-05    134<L>  |  98  |  19  ----------------------------<  193<H>  3.8   |  29  |  1.12    Ca    8.2<L>      05 Dec 2023 07:25  Phos  3.3     12-05  Mg     2.0     12-05    TPro  6.2  /  Alb  2.1<L>  /  TBili  0.2  /  DBili  x   /  AST  30  /  ALT  23  /  AlkPhos  269<H>  12-05      Urinalysis Basic - ( 05 Dec 2023 07:25 )    Color: x / Appearance: x / SG: x / pH: x  Gluc: 193 mg/dL / Ketone: x  / Bili: x / Urobili: x   Blood: x / Protein: x / Nitrite: x   Leuk Esterase: x / RBC: x / WBC x   Sq Epi: x / Non Sq Epi: x / Bacteria: x        MICROBIOLOGY:      RADIOLOGY & ADDITIONAL STUDIES:  Reviewed INFECTIOUS DISEASES CONSULT FOLLOW-UP NOTE    INTERVAL HPI/OVERNIGHT EVENTS:    Patient seen at bedside. YEISON. Endorses pain. Agitated- refusing to participate in interview and exam. Refusing all surgical intervention.      ROS:   Constitutional, eyes, ENT, cardiovascular, respiratory, gastrointestinal, genitourinary, integumentary, neurological, psychiatric and heme/lymph are otherwise negative other than noted above       ANTIBIOTICS/RELEVANT:    MEDICATIONS  (STANDING):  aspirin enteric coated 81 milliGRAM(s) Oral every 24 hours  atorvastatin 80 milliGRAM(s) Oral at bedtime  carvedilol 12.5 milliGRAM(s) Oral every 12 hours  dextrose 5%. 1000 milliLiter(s) (50 mL/Hr) IV Continuous <Continuous>  dextrose 5%. 1000 milliLiter(s) (100 mL/Hr) IV Continuous <Continuous>  dextrose 50% Injectable 25 Gram(s) IV Push once  dextrose 50% Injectable 12.5 Gram(s) IV Push once  dextrose 50% Injectable 25 Gram(s) IV Push once  gabapentin 800 milliGRAM(s) Oral three times a day  glucagon  Injectable 1 milliGRAM(s) IntraMuscular once  hydrALAZINE 50 milliGRAM(s) Oral every 8 hours  insulin glargine Injectable (LANTUS) 16 Unit(s) SubCutaneous at bedtime  insulin lispro (ADMELOG) corrective regimen sliding scale   SubCutaneous Before meals and at bedtime  insulin lispro Injectable (ADMELOG) 3 Unit(s) SubCutaneous three times a day before meals  lisinopril 40 milliGRAM(s) Oral every 24 hours  piperacillin/tazobactam IVPB.. 3.375 Gram(s) IV Intermittent every 8 hours    MEDICATIONS  (PRN):  acetaminophen     Tablet .. 650 milliGRAM(s) Oral every 6 hours PRN Temp greater or equal to 38C (100.4F), Mild Pain (1 - 3)  dextrose Oral Gel 15 Gram(s) Oral once PRN Blood Glucose LESS THAN 70 milliGRAM(s)/deciliter  oxyCODONE    IR 5 milliGRAM(s) Oral every 6 hours PRN Severe Pain (7 - 10)        Vital Signs Last 24 Hrs  T(C): 36.7 (05 Dec 2023 05:49), Max: 36.9 (04 Dec 2023 21:39)  T(F): 98.1 (05 Dec 2023 05:49), Max: 98.4 (04 Dec 2023 21:39)  HR: 58 (05 Dec 2023 05:49) (58 - 65)  BP: 158/77 (05 Dec 2023 05:49) (155/80 - 191/93)  BP(mean): --  RR: 18 (05 Dec 2023 05:49) (18 - 18)  SpO2: 96% (05 Dec 2023 05:49) (95% - 96%)    Parameters below as of 04 Dec 2023 21:39  Patient On (Oxygen Delivery Method): room air        PHYSICAL EXAM: *refused complete exam  Constitutional: alert, NAD  Eyes: the sclera and conjunctiva were normal.   ENT: the ears and nose were normal in appearance.   Neck: the appearance of the neck was normal and the neck was supple.   Pulmonary: symmetric chest rise   Vascular:. there was no peripheral edema  Extremities: R foot wrapped   Neurological: AAO x 3  Psychiatric: the affect was normal        LABS:                        7.5    12.03 )-----------( 213      ( 05 Dec 2023 07:25 )             23.7     12-05    134<L>  |  98  |  19  ----------------------------<  193<H>  3.8   |  29  |  1.12    Ca    8.2<L>      05 Dec 2023 07:25  Phos  3.3     12-05  Mg     2.0     12-05    TPro  6.2  /  Alb  2.1<L>  /  TBili  0.2  /  DBili  x   /  AST  30  /  ALT  23  /  AlkPhos  269<H>  12-05      Urinalysis Basic - ( 05 Dec 2023 07:25 )    Color: x / Appearance: x / SG: x / pH: x  Gluc: 193 mg/dL / Ketone: x  / Bili: x / Urobili: x   Blood: x / Protein: x / Nitrite: x   Leuk Esterase: x / RBC: x / WBC x   Sq Epi: x / Non Sq Epi: x / Bacteria: x        MICROBIOLOGY:  reviewed     RADIOLOGY & ADDITIONAL STUDIES:  Reviewed

## 2023-12-05 NOTE — DISCHARGE NOTE PROVIDER - NSDCHHBASESERVICE_GEN_ALL_CORE
Attempted to have pt ambulate--pt continues to c/o lightheadedness and dizziness. MD aware. Paging Dr. Addison [place]. Will continue to monitor. Physical therapy

## 2023-12-05 NOTE — PROGRESS NOTE ADULT - NS ATTEND AMEND GEN_ALL_CORE FT
Ongoing pain R foot, 9/10 in severity, has II/VI syst murmur max at RUSB, R foot wrapped by Podiatry.  Remainder of exam is unremarkable.  He continues to refuse amputation and intermittently is refusing blood draws.  Vancomycin was stopped by primary team for safety concerns with inability to monitor troughs.  He did not have any resistant gram positive organisms isolated on this admission or the admission in October. Can continue pip-tazo while admitted but treatment with antibiotics alone is futile.  If he is to be discharged, can give doxy/levofloxacin as above but he is unlikely to take them.  Cannot give duration - they may stave off sepsis if he takes them for a time but would not pretend these would cure his foot infection.  Discussed with Dr. Landis.  Please recall if further ID input is desired - team 1.

## 2023-12-05 NOTE — DISCHARGE NOTE PROVIDER - CARE PROVIDER_API CALL
Gary Eduardo)  Internal Medicine  [address], [address]  [place] [address]  Phone: [phone]  Fax: [phone]  Follow Up Time: 2 weeks   Gary Eduardo)  Internal Medicine  [address]  [place] [address]  Phone: [phone]  Fax: [phone]  Follow Up Time: 2 weeks   Gary Eduardo)  Internal Medicine  [address], [address]  [place] [address]  Phone: [phone]  Fax: [phone]  Follow Up Time: 2 weeks    .,   [facility]  [phone]  Phone: (   )    -  Fax: (   )    -  Follow Up Time:    Gary Eduardo)  Internal Medicine  [address]  [place] [address]  Phone: [phone]  Fax: [phone]  Follow Up Time: 2 weeks    .,   [facility]  [phone]  Phone: (   )    -  Fax: (   )    -  Follow Up Time:    Michelet Freed  Vascular Surgery  [address] [address]  Phone: [phone]  Fax: [phone]  Scheduled Appointment: 01/18/2024   Michelet Freed  Vascular Surgery  [address] [address]  Phone: [phone]  Fax: [phone]  Scheduled Appointment: 01/18/2024   Michelet Freed  Vascular Surgery  [address] [address]  Phone: [phone]  Fax: [phone]  Scheduled Appointment: 01/18/2024 10:00 AM   Michelet Freed  Vascular Surgery  [address] [address]  Phone: [phone]  Fax: [phone]  Scheduled Appointment: 01/18/2024 10:00 AM

## 2023-12-05 NOTE — DISCHARGE NOTE PROVIDER - NSDCFUADDINST_GEN_ALL_CORE_FT
FOLLOW UP: Dr. Freed on 1/18/24 at 10 A.M. Call the office at  with any questions.  WOUND CARE: You may shower; soap and water over incision sites. Do not scrub. Pat dry when done. Call the office if you experience increasing pain, redness, swelling or drainage from incision sites/wounds, or temperature >101.4F.   ACTIVITY: Ambulate as tolerated.  DIET: You may resume regular diet.   NEW MEDICATIONS:  ADDITIONAL FOLLOW UP AFTER DISCHARGE: Internal medicine- Dr. Krishan Soares [phone]  DISCHARGE DESTINATION: [facility]   FOLLOW UP: Dr. Freed on 1/18/24 at 10 A.M. Call the office at  with any questions.  WOUND CARE: You may shower; soap and water over incision sites. Do not scrub. Pat dry when done. Call the office if you experience increasing pain, redness, swelling or drainage from incision sites/wounds, or temperature >101.4F.   ACTIVITY: Ambulate as tolerated.  DIET: You may resume regular diet.   NEW MEDICATIONS:  ADDITIONAL FOLLOW UP AFTER DISCHARGE: Internal medicine- Dr. Krishan Soares [phone]  DISCHARGE DESTINATION: [facility]   FOLLOW UP: Dr. Freed on 1/18/24 at 10 A.M. Call the office at  with any questions.  WOUND CARE: Daily dressing change: cleanse incision with chloraprep stick, apply xeroform tape along incision, wrap in kerlix gauze, and wrap moderately tight with ace bandage. Call the office if you experience increasing pain, redness, swelling or drainage from incision sites/wounds, or temperature >101.4F.   ACTIVITY: Ambulate as tolerated.  DIET: You may resume regular diet.   NEW MEDICATIONS:  ADDITIONAL FOLLOW UP AFTER DISCHARGE: Internal medicine- Dr. Krishan Soares [phone]  DISCHARGE DESTINATION: [facility]   FOLLOW UP: Dr. Freed on 1/18/24 at 10 A.M. Call the office at  with any questions.  WOUND CARE: Daily dressing change: cleanse incision with chloraprep stick, apply xeroform tape along incision, wrap in kerlix gauze, and wrap moderately tight with ace bandage. Call the office if you experience increasing pain, redness, swelling or drainage from incision sites/wounds, or temperature >101.4F.   ACTIVITY: Ambulate as tolerated.  DIET: You may resume regular diet.   NEW MEDICATIONS:  ADDITIONAL FOLLOW UP AFTER DISCHARGE: Internal medicine- Dr. Krishan Soares [phone]  DISCHARGE DESTINATION: [facility]   FOLLOW UP: Dr. Freed on 1/18/24 at 10 A.M. Call the office at  with any questions.  WOUND CARE: Daily dressing change: cleanse incision with chloraprep stick, apply xeroform tape along incision, wrap in kerlix gauze, and wrap moderately tight with ace bandage. Call the office if you experience increasing pain, redness, swelling or drainage from incision sites/wounds, or temperature >101.4F.   ACTIVITY: Ambulate as tolerated.  DIET: You may resume regular diet.   NEW MEDICATIONS: Please continue to take Torsemide 40mg PO qd for 7 days (until 12/29/23) and then start Torsemide 20mg PO qd. Additionally, take Spironolactone 25mg PO qd. Continue to take Vancomycin 125mg PO every 6 hours until 12/15/23.  ADDITIONAL FOLLOW UP AFTER DISCHARGE: Internal medicine- Dr. Krishan Soares [phone]. Please follow up with your Cardiologist Dr. Anni Taylor ([phone]) at [place] within 2 weeks of discharge.  DISCHARGE DESTINATION: [facility]   FOLLOW UP: Dr. Freed on 1/18/24 at 10 A.M. Call the office at  with any questions.  WOUND CARE: Daily dressing change: cleanse incision with chloraprep stick, apply xeroform tape along incision, wrap in kerlix gauze, and wrap moderately tight with ace bandage. Call the office if you experience increasing pain, redness, swelling or drainage from incision sites/wounds, or temperature >101.4F.   ACTIVITY: Ambulate as tolerated.  DIET: You may resume regular diet.   NEW MEDICATIONS: Please continue to take Torsemide 40mg PO qd for 7 days (until 12/29/23) and then start Torsemide 20mg PO qd. Additionally, take Spironolactone 25mg PO qd. Continue to take Vancomycin 125mg PO every 6 hours until 12/15/23.  ADDITIONAL FOLLOW UP AFTER DISCHARGE: Internal medicine- Dr. Krishan Soares [phone]. Please follow up with your Cardiologist Dr. Anni Taylor ([phone]) at [place] within 2 weeks of discharge.  DISCHARGE DESTINATION: [facility]   FOLLOW UP: Dr. Freed on 1/18/24 at 10 A.M. Call the office at  with any questions.  WOUND CARE: Daily dressing change: cleanse incision with chloraprep stick, apply xeroform tape along incision, wrap in kerlix gauze, and wrap moderately tight with ace bandage. Call the office if you experience increasing pain, redness, swelling or drainage from incision sites/wounds, or temperature >101.4F.   ACTIVITY: Ambulate as tolerated.  DIET: You may resume regular diet.   NEW MEDICATIONS: Torsemide 40mg PO qd, Spironolactone 25mg PO qd.   ADDITIONAL FOLLOW UP AFTER DISCHARGE: Internal medicine- Dr. Krishan Soares [phone]. Please follow up with your Cardiologist Dr. Anni Taylor ([phone]) at [place] within 2 weeks of discharge.  DISCHARGE DESTINATION: [facility]   FOLLOW UP: Dr. Freed on 1/18/24 at 10 A.M. Call the office at  with any questions.  WOUND CARE: Daily dressing change: cleanse incision with chloraprep stick, apply xeroform tape along incision, wrap in kerlix gauze, and wrap moderately tight with ace bandage. Call the office if you experience increasing pain, redness, swelling or drainage from incision sites/wounds, or temperature >101.4F.   ACTIVITY: Ambulate as tolerated.  DIET: You may resume regular diet.   NEW MEDICATIONS: Torsemide 40mg PO qd, Spironolactone 25mg PO qd.   ADDITIONAL FOLLOW UP AFTER DISCHARGE: Internal medicine- Dr. Krishan Soares [phone]. Please follow up with your Cardiologist Dr. Anni Taylor ([phone]) at [place] within 2 weeks of discharge.  DISCHARGE DESTINATION: [facility]

## 2023-12-05 NOTE — PROGRESS NOTE ADULT - ASSESSMENT
67 yo M with HTN, IDDM, CAD, mod AS, HCV (not treated), PAD s/p remote RLE angioplasty, R 4th toe OM s/p partial R 4th ray amputation 10/24 admitted 12/1 with osteomyelitis foot, severe infection, refusing amputation.  Unclear how compliant he had been with dapto and ceftriaxone at time of d/c on 10/31, or with levofloxacin.  Cannot rely on prior microbiology.  He is refusing surgery and would not be a future candidate for home OPAT. Patient continues to refuse all surgical intervention; agree with podiatry that antibiotics alone are futile. Also refused to participate in C conversation with palliative. Patient will be discharged home today.     Suggest:  - Blood cultures 12/4--ngtd  - Follow up deep wound culture from debridement 12/2 -- growing staph haemolyticus and lactobacillus   - Continue pip-tazo 3.375 g IV q8h via EI while admitted   - Can discharge with doxycycline 100 mg PO Q12 plus Levaquin 750 mg PO Q24. Can give a one month supply.   - Defer ID follow up     Team 2 signing off. Thank you for your consultation   Please reconsult with questions    Case d/w primary team.  Final recommendation pending attending note.    Josiane Boyle, Infectious Diseases PA  Please reach out for any questions 9 am-5pm. For evenings and weekends, please call the ID physician on call.  Work cell: [phone]   67 yo M with HTN, IDDM, CAD, mod AS, HCV (not treated), PAD s/p remote RLE angioplasty, R 4th toe OM s/p partial R 4th ray amputation 10/24 admitted 12/1 with osteomyelitis foot, severe infection, refusing amputation.  Unclear how compliant he had been with dapto and ceftriaxone at time of d/c on 10/31, or with levofloxacin.  Cannot rely on prior microbiology.  He is refusing surgery and would not be a future candidate for home OPAT. Patient continues to refuse all surgical intervention; agree with podiatry that antibiotics alone are futile. Also refused to participate in C conversation with palliative. Patient will be discharged home today.     Suggest:  - Blood cultures 12/4--ngtd  - Follow up deep wound culture from debridement 12/2 -- growing staph haemolyticus and lactobacillus   - Continue pip-tazo 3.375 g IV q8h via EI while admitted   - Can discharge with doxycycline 100 mg PO Q12 plus Levaquin 750 mg PO Q24. Can give a one month supply.   - Defer ID follow up     Team 2 signing off. Thank you for your consultation   Please reconsult with questions    Case d/w primary team.  Final recommendation pending attending note.    Josiane Boyle, Infectious Diseases PA  Please reach out for any questions 9 am-5pm. For evenings and weekends, please call the ID physician on call.  Work cell: [phone]

## 2023-12-05 NOTE — CONSULT NOTE ADULT - PROBLEM SELECTOR RECOMMENDATION 5
Patient unwilling to engage in GOC at this time. Will sign off. Please reconsult for any clinical changes.

## 2023-12-05 NOTE — PROGRESS NOTE ADULT - ASSESSMENT
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 Pt with most recent admission from 11/01-11/10 for hypertensive emergency 2/2 to cocaine use and left AMA. PICC line removed as pt left AMA and was given 25 days of PO Levaquin Podiatry consulted to evaluate R foot infection.  At time of consult, afebrile, hypertensive, WBC 13.54, ESR and CRP pending. CT scan with findings of soft tissue air of the forefoot and possibly the midfoot.. Clinically, R 4th ray wound fibrotic an extensive poor skin quality of the plantar foot. Proximal medial midfoot wound with tracking 4-5cm proximally towards the Talo-navicular area. R foot infection 2/2 to non-healing foot wound and poor pt compliance.     12/5: Chief resident spoke in length with pt with regards to the R foot infection. Worsening maceration to the R forefoot and plantar medial foot. Edema and erythema now extending towards the ankle. Explained to pt that progression of infection and risk for a more aggressive amputation to salvage the limb. Discussed with pt, in length, with regards to surgical intervention for source control. Recommended a BKA amputation for adequate source control. Pt at this time, once again,  is adamantly refusing any surgical intervention. Pt is AAOx3 and  expresses full understanding  of risks of delaying surgical intervention in the setting of gas gangrene. Explained to pt, the infection may spread proximally towards the ankle and beyond, may need a more proximal amputation, and/or pt may become septic and be limb/life threatening.  Pt continues to refuse.     Plan   - Pt evaluated and chart reviewed  - Local wound care: cleansed with NS. Applied betadine soaked packing to plantar midfoot wound and 4th ray surgical site. Covered all wounds with betadine soaked gauze, and kerlix.   -  IV abx per ID recs   - f/u deep wound cx from proximal medial wound sinus tract   - WBAT to R heel   - Rest of care per primary team     Podiatry following, plan discusssed with attending

## 2023-12-05 NOTE — DISCHARGE NOTE PROVIDER - NSDCCPTREATMENT_GEN_ALL_CORE_FT
PRINCIPAL PROCEDURE  Procedure: Right below knee amputation  Findings and Treatment:       SECONDARY PROCEDURE  Procedure: Closure of stump of below knee amputation of right lower extremity  Findings and Treatment:

## 2023-12-05 NOTE — CONSULT NOTE ADULT - CONVERSATION DETAILS
Attempted to discuss advanced directives with patient given his refusal of amputation in the setting of gas gangrene and severe LE infection. He indicated that he was not interested in discussing with us though he minimally partook in conversation. He reiterated that he did not want an amputation but stated incorrectly that providers just needed to find the right antibiotics to treat his infection. When I explained that even with all the antibiotics available, the team did not believe it would control his infection, he responded that he would then be open to amputation. I again explained why that would not be possible. I told him that if he became septic, surgery would no longer be possible and his response was that "then it will be the end" and "you can put a bullet in my head." We attempted to discuss code status but his response when I asked about CPR and DNR was "you do what you have to do." I again attempted to explain that providers only wanted to do what was within his goals of care, which was why we were discussing this with him, but he stated he did not want to talk with us.

## 2023-12-05 NOTE — CONSULT NOTE ADULT - ASSESSMENT
67 yo M with CAD (2 stents 2020), HFrEF (45-50%), HTN, PAD w/ remote RLE angioplasty, R 4th toe OM s/p partial R 4th ray amputation on 10/24, RLE angiogram with AT lithotripsy and AT/peroneal balloon angioplasty 10/27, uncontrolled IDDM (a1c 12 in Oct 2023), recent admission 11/8-11/10 to cardiology service for hypertensive emergency (left AMA, was given levaquin when he left for right foot wound that pt had started treatment for back in October) who presented to [facility] after a fall. Found to have increased soft tissue gas in R foot on CT scan. Patient refusing amputation. Palliative consulted for help with complex medical decision making. 69 yo M with CAD (2 stents 2020), HFrEF (45-50%), HTN, PAD w/ remote RLE angioplasty, R 4th toe OM s/p partial R 4th ray amputation on 10/24, RLE angiogram with AT lithotripsy and AT/peroneal balloon angioplasty 10/27, uncontrolled IDDM (a1c 12 in Oct 2023), recent admission 11/8-11/10 to cardiology service for hypertensive emergency (left AMA, was given levaquin when he left for right foot wound that pt had started treatment for back in October) who presented to [facility] after a fall. Found to have increased soft tissue gas in R foot on CT scan. Patient refusing amputation. Palliative consulted for help with complex medical decision making.

## 2023-12-05 NOTE — PROGRESS NOTE ADULT - ATTENDING COMMENTS
69yo M PMH CAD (2 stents 2020), HFmrEF (45-50%), HTN, PAD w/ remote RLE angioplasty, R 4th toe OM s/p partial R 4th ray amputation on 10/24, RLE angiogram with AT lithotripsy and AT/peroneal balloon angioplasty 10/27, uncontrolled IDDM (a1c 12 in Oct 2023), recent admission 11/8-11/10 to cardiology service for hypertensive emergency (left AMA, was given levaquin when he left for right foot wound that pt had started treatment for back in October) who presented to [facility] after a fall onto his knees and was having b/l knee pain. Found to have increased soft tissue gas in R foot on CT scan. Admit to F for further management.  Patient continues to adamantly refuse amputation and intermittently refusing blood draws. Spoke with ID Nadya Jennings who stated treatement with abx alone is futile if we cannot achieve source control. Decision was made to discharge patient on doxy and levaquin with a one month supply.     Rest as per resident note . 67yo M PMH CAD (2 stents 2020), HFmrEF (45-50%), HTN, PAD w/ remote RLE angioplasty, R 4th toe OM s/p partial R 4th ray amputation on 10/24, RLE angiogram with AT lithotripsy and AT/peroneal balloon angioplasty 10/27, uncontrolled IDDM (a1c 12 in Oct 2023), recent admission 11/8-11/10 to cardiology service for hypertensive emergency (left AMA, was given levaquin when he left for right foot wound that pt had started treatment for back in October) who presented to [facility] after a fall onto his knees and was having b/l knee pain. Found to have increased soft tissue gas in R foot on CT scan. Admit to F for further management.  Patient continues to adamantly refuse amputation and intermittently refusing blood draws. Spoke with ID Nadya Jennings who stated treatement with abx alone is futile if we cannot achieve source control. Decision was made to discharge patient on doxy and levaquin with a one month supply.     Rest as per resident note .

## 2023-12-06 LAB
ANION GAP SERPL CALC-SCNC: 6 MMOL/L — SIGNIFICANT CHANGE UP (ref 5–17)
ANION GAP SERPL CALC-SCNC: 6 MMOL/L — SIGNIFICANT CHANGE UP (ref 5–17)
BASOPHILS # BLD AUTO: 0.03 K/UL — SIGNIFICANT CHANGE UP (ref 0–0.2)
BASOPHILS # BLD AUTO: 0.03 K/UL — SIGNIFICANT CHANGE UP (ref 0–0.2)
BASOPHILS NFR BLD AUTO: 0.3 % — SIGNIFICANT CHANGE UP (ref 0–2)
BASOPHILS NFR BLD AUTO: 0.3 % — SIGNIFICANT CHANGE UP (ref 0–2)
BUN SERPL-MCNC: 21 MG/DL — SIGNIFICANT CHANGE UP (ref 7–23)
BUN SERPL-MCNC: 21 MG/DL — SIGNIFICANT CHANGE UP (ref 7–23)
CALCIUM SERPL-MCNC: 7.9 MG/DL — LOW (ref 8.4–10.5)
CALCIUM SERPL-MCNC: 7.9 MG/DL — LOW (ref 8.4–10.5)
CHLORIDE SERPL-SCNC: 98 MMOL/L — SIGNIFICANT CHANGE UP (ref 96–108)
CHLORIDE SERPL-SCNC: 98 MMOL/L — SIGNIFICANT CHANGE UP (ref 96–108)
CO2 SERPL-SCNC: 29 MMOL/L — SIGNIFICANT CHANGE UP (ref 22–31)
CO2 SERPL-SCNC: 29 MMOL/L — SIGNIFICANT CHANGE UP (ref 22–31)
CREAT SERPL-MCNC: 1.08 MG/DL — SIGNIFICANT CHANGE UP (ref 0.5–1.3)
CREAT SERPL-MCNC: 1.08 MG/DL — SIGNIFICANT CHANGE UP (ref 0.5–1.3)
EGFR: 76 ML/MIN/1.73M2 — SIGNIFICANT CHANGE UP
EGFR: 76 ML/MIN/1.73M2 — SIGNIFICANT CHANGE UP
EOSINOPHIL # BLD AUTO: 0.14 K/UL — SIGNIFICANT CHANGE UP (ref 0–0.5)
EOSINOPHIL # BLD AUTO: 0.14 K/UL — SIGNIFICANT CHANGE UP (ref 0–0.5)
EOSINOPHIL NFR BLD AUTO: 1.4 % — SIGNIFICANT CHANGE UP (ref 0–6)
EOSINOPHIL NFR BLD AUTO: 1.4 % — SIGNIFICANT CHANGE UP (ref 0–6)
GLUCOSE BLDC GLUCOMTR-MCNC: 115 MG/DL — HIGH (ref 70–99)
GLUCOSE BLDC GLUCOMTR-MCNC: 115 MG/DL — HIGH (ref 70–99)
GLUCOSE BLDC GLUCOMTR-MCNC: 117 MG/DL — HIGH (ref 70–99)
GLUCOSE BLDC GLUCOMTR-MCNC: 117 MG/DL — HIGH (ref 70–99)
GLUCOSE BLDC GLUCOMTR-MCNC: 159 MG/DL — HIGH (ref 70–99)
GLUCOSE BLDC GLUCOMTR-MCNC: 159 MG/DL — HIGH (ref 70–99)
GLUCOSE BLDC GLUCOMTR-MCNC: 167 MG/DL — HIGH (ref 70–99)
GLUCOSE BLDC GLUCOMTR-MCNC: 167 MG/DL — HIGH (ref 70–99)
GLUCOSE SERPL-MCNC: 209 MG/DL — HIGH (ref 70–99)
GLUCOSE SERPL-MCNC: 209 MG/DL — HIGH (ref 70–99)
HCT VFR BLD CALC: 23.9 % — LOW (ref 39–50)
HCT VFR BLD CALC: 23.9 % — LOW (ref 39–50)
HGB BLD-MCNC: 7.4 G/DL — LOW (ref 13–17)
HGB BLD-MCNC: 7.4 G/DL — LOW (ref 13–17)
IMM GRANULOCYTES NFR BLD AUTO: 0.4 % — SIGNIFICANT CHANGE UP (ref 0–0.9)
IMM GRANULOCYTES NFR BLD AUTO: 0.4 % — SIGNIFICANT CHANGE UP (ref 0–0.9)
LYMPHOCYTES # BLD AUTO: 1.17 K/UL — SIGNIFICANT CHANGE UP (ref 1–3.3)
LYMPHOCYTES # BLD AUTO: 1.17 K/UL — SIGNIFICANT CHANGE UP (ref 1–3.3)
LYMPHOCYTES # BLD AUTO: 11.4 % — LOW (ref 13–44)
LYMPHOCYTES # BLD AUTO: 11.4 % — LOW (ref 13–44)
MAGNESIUM SERPL-MCNC: 2 MG/DL — SIGNIFICANT CHANGE UP (ref 1.6–2.6)
MAGNESIUM SERPL-MCNC: 2 MG/DL — SIGNIFICANT CHANGE UP (ref 1.6–2.6)
MCHC RBC-ENTMCNC: 29.2 PG — SIGNIFICANT CHANGE UP (ref 27–34)
MCHC RBC-ENTMCNC: 29.2 PG — SIGNIFICANT CHANGE UP (ref 27–34)
MCHC RBC-ENTMCNC: 31 GM/DL — LOW (ref 32–36)
MCHC RBC-ENTMCNC: 31 GM/DL — LOW (ref 32–36)
MCV RBC AUTO: 94.5 FL — SIGNIFICANT CHANGE UP (ref 80–100)
MCV RBC AUTO: 94.5 FL — SIGNIFICANT CHANGE UP (ref 80–100)
MONOCYTES # BLD AUTO: 1.11 K/UL — HIGH (ref 0–0.9)
MONOCYTES # BLD AUTO: 1.11 K/UL — HIGH (ref 0–0.9)
MONOCYTES NFR BLD AUTO: 10.8 % — SIGNIFICANT CHANGE UP (ref 2–14)
MONOCYTES NFR BLD AUTO: 10.8 % — SIGNIFICANT CHANGE UP (ref 2–14)
NEUTROPHILS # BLD AUTO: 7.76 K/UL — HIGH (ref 1.8–7.4)
NEUTROPHILS # BLD AUTO: 7.76 K/UL — HIGH (ref 1.8–7.4)
NEUTROPHILS NFR BLD AUTO: 75.7 % — SIGNIFICANT CHANGE UP (ref 43–77)
NEUTROPHILS NFR BLD AUTO: 75.7 % — SIGNIFICANT CHANGE UP (ref 43–77)
NRBC # BLD: 0 /100 WBCS — SIGNIFICANT CHANGE UP (ref 0–0)
NRBC # BLD: 0 /100 WBCS — SIGNIFICANT CHANGE UP (ref 0–0)
PHOSPHATE SERPL-MCNC: 2.8 MG/DL — SIGNIFICANT CHANGE UP (ref 2.5–4.5)
PHOSPHATE SERPL-MCNC: 2.8 MG/DL — SIGNIFICANT CHANGE UP (ref 2.5–4.5)
PLATELET # BLD AUTO: 251 K/UL — SIGNIFICANT CHANGE UP (ref 150–400)
PLATELET # BLD AUTO: 251 K/UL — SIGNIFICANT CHANGE UP (ref 150–400)
POTASSIUM SERPL-MCNC: 3.9 MMOL/L — SIGNIFICANT CHANGE UP (ref 3.5–5.3)
POTASSIUM SERPL-MCNC: 3.9 MMOL/L — SIGNIFICANT CHANGE UP (ref 3.5–5.3)
POTASSIUM SERPL-SCNC: 3.9 MMOL/L — SIGNIFICANT CHANGE UP (ref 3.5–5.3)
POTASSIUM SERPL-SCNC: 3.9 MMOL/L — SIGNIFICANT CHANGE UP (ref 3.5–5.3)
RBC # BLD: 2.53 M/UL — LOW (ref 4.2–5.8)
RBC # BLD: 2.53 M/UL — LOW (ref 4.2–5.8)
RBC # FLD: 14.2 % — SIGNIFICANT CHANGE UP (ref 10.3–14.5)
RBC # FLD: 14.2 % — SIGNIFICANT CHANGE UP (ref 10.3–14.5)
SODIUM SERPL-SCNC: 133 MMOL/L — LOW (ref 135–145)
SODIUM SERPL-SCNC: 133 MMOL/L — LOW (ref 135–145)
WBC # BLD: 10.25 K/UL — SIGNIFICANT CHANGE UP (ref 3.8–10.5)
WBC # BLD: 10.25 K/UL — SIGNIFICANT CHANGE UP (ref 3.8–10.5)
WBC # FLD AUTO: 10.25 K/UL — SIGNIFICANT CHANGE UP (ref 3.8–10.5)
WBC # FLD AUTO: 10.25 K/UL — SIGNIFICANT CHANGE UP (ref 3.8–10.5)

## 2023-12-06 PROCEDURE — 99233 SBSQ HOSP IP/OBS HIGH 50: CPT | Mod: GC

## 2023-12-06 RX ORDER — NYSTATIN CREAM 100000 [USP'U]/G
1 CREAM TOPICAL
Qty: 0 | Refills: 0 | DISCHARGE
Start: 2023-12-06

## 2023-12-06 RX ORDER — ACETAMINOPHEN 500 MG
1000 TABLET ORAL ONCE
Refills: 0 | Status: COMPLETED | OUTPATIENT
Start: 2023-12-06 | End: 2023-12-06

## 2023-12-06 RX ORDER — NYSTATIN CREAM 100000 [USP'U]/G
1 CREAM TOPICAL
Refills: 0 | Status: DISCONTINUED | OUTPATIENT
Start: 2023-12-06 | End: 2023-12-11

## 2023-12-06 RX ORDER — FUROSEMIDE 40 MG
40 TABLET ORAL ONCE
Refills: 0 | Status: COMPLETED | OUTPATIENT
Start: 2023-12-06 | End: 2023-12-06

## 2023-12-06 RX ADMIN — Medication 1000 MILLIGRAM(S): at 23:15

## 2023-12-06 RX ADMIN — Medication 3 UNIT(S): at 18:41

## 2023-12-06 RX ADMIN — GABAPENTIN 800 MILLIGRAM(S): 400 CAPSULE ORAL at 22:56

## 2023-12-06 RX ADMIN — Medication 400 MILLIGRAM(S): at 22:55

## 2023-12-06 RX ADMIN — CARVEDILOL PHOSPHATE 12.5 MILLIGRAM(S): 80 CAPSULE, EXTENDED RELEASE ORAL at 17:30

## 2023-12-06 RX ADMIN — GABAPENTIN 800 MILLIGRAM(S): 400 CAPSULE ORAL at 12:44

## 2023-12-06 RX ADMIN — Medication 50 MILLIGRAM(S): at 12:42

## 2023-12-06 RX ADMIN — LISINOPRIL 40 MILLIGRAM(S): 2.5 TABLET ORAL at 12:42

## 2023-12-06 RX ADMIN — Medication 81 MILLIGRAM(S): at 08:55

## 2023-12-06 RX ADMIN — CARVEDILOL PHOSPHATE 12.5 MILLIGRAM(S): 80 CAPSULE, EXTENDED RELEASE ORAL at 05:48

## 2023-12-06 RX ADMIN — Medication 3 UNIT(S): at 09:27

## 2023-12-06 RX ADMIN — OXYCODONE HYDROCHLORIDE 5 MILLIGRAM(S): 5 TABLET ORAL at 00:33

## 2023-12-06 RX ADMIN — PIPERACILLIN AND TAZOBACTAM 25 GRAM(S): 4; .5 INJECTION, POWDER, LYOPHILIZED, FOR SOLUTION INTRAVENOUS at 17:29

## 2023-12-06 RX ADMIN — Medication 50 MILLIGRAM(S): at 05:48

## 2023-12-06 RX ADMIN — OXYCODONE HYDROCHLORIDE 5 MILLIGRAM(S): 5 TABLET ORAL at 07:34

## 2023-12-06 RX ADMIN — OXYCODONE HYDROCHLORIDE 5 MILLIGRAM(S): 5 TABLET ORAL at 06:32

## 2023-12-06 RX ADMIN — PIPERACILLIN AND TAZOBACTAM 25 GRAM(S): 4; .5 INJECTION, POWDER, LYOPHILIZED, FOR SOLUTION INTRAVENOUS at 08:55

## 2023-12-06 RX ADMIN — OXYCODONE HYDROCHLORIDE 5 MILLIGRAM(S): 5 TABLET ORAL at 12:42

## 2023-12-06 RX ADMIN — PIPERACILLIN AND TAZOBACTAM 25 GRAM(S): 4; .5 INJECTION, POWDER, LYOPHILIZED, FOR SOLUTION INTRAVENOUS at 00:33

## 2023-12-06 RX ADMIN — Medication 3 UNIT(S): at 12:44

## 2023-12-06 RX ADMIN — GABAPENTIN 800 MILLIGRAM(S): 400 CAPSULE ORAL at 05:48

## 2023-12-06 RX ADMIN — OXYCODONE HYDROCHLORIDE 5 MILLIGRAM(S): 5 TABLET ORAL at 19:21

## 2023-12-06 RX ADMIN — Medication 50 MILLIGRAM(S): at 20:36

## 2023-12-06 RX ADMIN — OXYCODONE HYDROCHLORIDE 5 MILLIGRAM(S): 5 TABLET ORAL at 18:45

## 2023-12-06 RX ADMIN — ATORVASTATIN CALCIUM 80 MILLIGRAM(S): 80 TABLET, FILM COATED ORAL at 22:56

## 2023-12-06 RX ADMIN — NYSTATIN CREAM 1 APPLICATION(S): 100000 CREAM TOPICAL at 17:29

## 2023-12-06 RX ADMIN — OXYCODONE HYDROCHLORIDE 5 MILLIGRAM(S): 5 TABLET ORAL at 01:03

## 2023-12-06 RX ADMIN — Medication 40 MILLIGRAM(S): at 10:15

## 2023-12-06 RX ADMIN — OXYCODONE HYDROCHLORIDE 5 MILLIGRAM(S): 5 TABLET ORAL at 14:52

## 2023-12-06 RX ADMIN — INSULIN GLARGINE 16 UNIT(S): 100 INJECTION, SOLUTION SUBCUTANEOUS at 22:56

## 2023-12-06 RX ADMIN — Medication 2: at 09:27

## 2023-12-06 RX ADMIN — Medication 2: at 18:40

## 2023-12-06 NOTE — PROGRESS NOTE ADULT - PROBLEM SELECTOR PLAN 1
Past hx of PAD w/ diabetic foot wound. Had prior admission in October where he was seen by ID and Podiatry and was supposed to complete 6 wks of broad spectrum antimicrobials. At that time MRI with possible 4th digit early OM up to prox phalanx s/p partial R 4th ray amputation on 10/24, proximal cx with E.coli, Pluralibacter gergoviae, stap epi, and Corynebacterium amycolatum.   Then had an admission this month for unrelated issue, had left AMA and PICC line was taken out bc pt was leaving AMA, was prescribed levaquin to complete his 6wk course however pt unsure if he actually picked up the levaquin.  CT angio LE today shows increased soft tissue gas in the right forefoot. Vascular surgery was contacted by [facility].  On exam right foot has open wound between 4th and 5th digits with purulence coming out of wound, foot is erythematous on dorsal aspect and warm to touch.  Ddx includes SSTI vs osteomyelitis vs less likely nec fasc in setting of possibly incompletely treated infection.   Vascular signed off d/t pt refusing interventions  Wound culture shows few staph haemolyticus, lactobacillus gasseri   Started on vancomycin, refused vanc trough- vanc d'jasen   Extensive conversation on 12/6 w/ Vascular, Podiatry, Medicine team, pt not agreeable to BKA. Given D/c notice.   - Podiatry recs local wound care, IV abx per ID recs   - c/w zosyn 3.375g IV q8 while inpt, on d/c ID rec doxycycline 100 mg PO Q12, levaquin 750 mg PO q24  - Pain med PRN- tylenol (mild pain), toradol (mod pain), Oxy 5 prn (severe pain)  - f/u blood cx Past hx of PAD w/ diabetic foot wound. Had prior admission in October where he was seen by ID and Podiatry and was supposed to complete 6 wks of broad spectrum antimicrobials. At that time MRI with possible 4th digit early OM up to prox phalanx s/p partial R 4th ray amputation on 10/24, proximal cx with E.coli, Pluralibacter gergoviae, stap epi, and Corynebacterium amycolatum.   Then had an admission this month for unrelated issue, had left AMA and PICC line was taken out bc pt was leaving AMA, was prescribed levaquin to complete his 6wk course however pt unsure if he actually picked up the levaquin.  CT angio LE today shows increased soft tissue gas in the right forefoot. Vascular surgery was contacted by [facility].  On exam right foot has open wound between 4th and 5th digits with purulence coming out of wound, foot is erythematous on dorsal aspect and warm to touch.  Ddx includes SSTI vs osteomyelitis vs less likely nec fasc in setting of possibly incompletely treated infection.   Vascular signed off d/t pt refusing interventions  Wound culture shows few staph haemolyticus, lactobacillus gasseri   Started on vancomycin, refused vanc trough- vanc d'jasen   Extensive conversation on 12/6 w/ Vascular, Podiatry, Medicine team, pt not agreeable to BKA. Given D/c notice.   - Podiatry recs local wound care, IV abx per ID recs   - c/w zosyn 3.375g IV q8 while inpt, on d/c ID rec doxycycline 100 mg PO Q12, levaquin 750 mg PO q24  - Pain med PRN- tylenol (mild pain), toradol (mod pain), Oxy 5 prn (severe pain)  - f/u blood cx

## 2023-12-06 NOTE — PROGRESS NOTE ADULT - SUBJECTIVE AND OBJECTIVE BOX
Patient is a 68y old  Male who presents with a chief complaint of soft tissue and skin infection of right foot (06 Dec 2023 06:42)      INTERVAL HPI/ OVERNIGHT EVENTS: No acute events overnight. Attempted to change dressing 12/6 AM, pt refused and asked for podiatry to come back later. Currently, dressing with serous strikethrough and severely malodorous. Pt once again states he is not amenable to a BKA.       LABS                        7.4    10.25 )-----------( 251      ( 06 Dec 2023 05:30 )             23.9     12-06    133<L>  |  98  |  21  ----------------------------<  209<H>  3.9   |  29  |  1.08    Ca    7.9<L>      06 Dec 2023 05:30  Phos  2.8     12-06  Mg     2.0     12-06    TPro  6.2  /  Alb  2.1<L>  /  TBili  0.2  /  DBili  x   /  AST  30  /  ALT  23  /  AlkPhos  269<H>  12-05        ICU Vital Signs Last 24 Hrs  T(C): 37.3 (06 Dec 2023 12:29), Max: 37.3 (06 Dec 2023 12:29)  T(F): 99.1 (06 Dec 2023 12:29), Max: 99.1 (06 Dec 2023 12:29)  HR: 66 (06 Dec 2023 12:29) (65 - 72)  BP: 182/92 (06 Dec 2023 12:29) (142/82 - 189/72)  BP(mean): --  ABP: --  ABP(mean): --  RR: 18 (06 Dec 2023 12:29) (18 - 19)  SpO2: 98% (06 Dec 2023 12:29) (96% - 98%)    O2 Parameters below as of 06 Dec 2023 12:29  Patient On (Oxygen Delivery Method): room air          Lower Extremity Focused:  Vasc: DP/PT biphasic waveforms heard on doppler b/l, b/l LE +2 pitting edema. Erythema present to the R forefoot and midfoot.  CFT <3 x 9   Derm:  R foot surgical site wound at the distal 4th ray. Serous drainage. Fibrotic 100% wound bed, negative PTB, + malodor.  Plantar skin just proximal to the surgical site with extensive hyperkeratotic skin and associated ischemic discoloration. Increase in maceration of plantar skin and area around the 4th digit surgical site.   Right proximal medial midfoot wound with fibrotic slough. Tunneling noted proximally towards the navicular bone area. Serous drainage. Positive PTB, positive malodor. Negative for fluctuance or crepitus  Macerated 2nd and 3rd interspace  Neuro: protective sensation slightly diminished  MSK: 5/5 muscle strength in all compartments     RADIOLOGY  < from: CT Angio Lower Extremity w/ IV Cont, Bilateral (12.01.23 @ 17:13) >  IMPRESSION:  Increased soft tissue gas in the forefoot. No drainable abscess.  Patent two vessel runoff in both lower extremities.  Marked diffuse body wall and lower extremity edema.  Small bilateral pleural effusions.  No significant knee joint effusion.    < end of copied text >    MICROBIOLOGY  Culture - Other (12.02.23 @ 05:09)   Gram Stain:   Rare White blood cells   Few Gram Positive Rods   Rare Gram Negative Rods  Specimen Source: Wound Wound  Culture Results:   Few Staphylococcus haemolyticus   Moderate Lactobacillus gasseri   Culture in progress

## 2023-12-06 NOTE — PROGRESS NOTE ADULT - PROBLEM SELECTOR PLAN 6
#HFmrEF  #CAD  s/p 2 stents in 2020 at [facility]  TTE 11/9/23: EF 45-50%, Mild to moderate symmetric LVH, Grade I left ventricular diastolic dysfunction. Biatrial enlargement. Mild-to-moderate aortic stenosis.  Home meds: lisinopril, coreg  +2 b/l LE edema to groins, + JVD given IV lasix 40 x1   - c/w home meds  - c/w aspirin #HFmrEF  #CAD  s/p 2 stents in 2020 at [facility]  TTE 11/9/23: EF 45-50%, Mild to moderate symmetric LVH, Grade I left ventricular diastolic dysfunction. Biatrial enlargement. Mild-to-moderate aortic stenosis.  Home meds: lisinopril, coreg  +2 b/l LE edema to groins, + JVD given IV lasix 40 x1   - c/w home meds  - c/w aspirin

## 2023-12-06 NOTE — CHART NOTE - NSCHARTNOTEFT_GEN_A_CORE
MULTIDISCIPLINARY MEETING REGARDING SURGICAL MANAGEMENT:  Meeting with podiatry (Jhonny Cifuentes), primary medicine team (Rodger), and myself present in room to discuss operative intervention with patient. 2-stage BKA was offered to patient, explained that it was the best option for source control that would also allow him to be fit with a prosthesis and walk in the future. Explained the risk of not having the procedure included likely ascending infection requiring a more extensive amputation or an AKA, as well as the risk for severe illness, sepsis, and death. Patient expressed understanding however was insistent that he would only be amenable to a limited resection and described a TMA. Podiatry explained to patient that TMA would not be able to achieve source control and that it was not offered or indicated at this time as it is in the incorrect procedure. Patient repeated that he would only be interested in TMA and nothing else. Primary team explained that if patient is uninterested in management then he would be discharged today (received dc notice yesterday), patient did not respond. MULTIDISCIPLINARY MEETING REGARDING SURGICAL MANAGEMENT:  Meeting with podiatry (Jhonny Cifuentes), primary medicine team (Rodger), and myself present in room to discuss operative intervention with patient. 2-stage BKA was offered to patient, explained that it was the best option for source control that would also allow him to be fit with a prosthesis and walk in the future. Explained the risk of not having the procedure included likely ascending infection requiring a more extensive amputation or an AKA, as well as the risk for severe illness, sepsis, and death. Patient expressed understanding however was insistent that he would only be amenable to a limited resection and described a TMA. Podiatry explained to patient that TMA would not be able to achieve source control and that it was not offered or indicated at this time as it is in the incorrect procedure. Patient repeated that he would only be interested in TMA and nothing else. Primary team explained that if patient is uninterested in management then he would be discharged today (received dc notice yesterday), patient did not respond.    Vascular surgery will sign off at this time. Please reconsult as indicated and if patient becomes amenable to surgical intervention.

## 2023-12-06 NOTE — PROGRESS NOTE ADULT - ASSESSMENT
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 Pt with most recent admission from 11/01-11/10 for hypertensive emergency 2/2 to cocaine use and left AMA. PICC line removed as pt left AMA and was given 25 days of PO Levaquin Podiatry consulted to evaluate R foot infection.  At time of consult, afebrile, hypertensive, WBC 13.54, ESR and CRP pending. CT scan with findings of soft tissue air of the forefoot and possibly the midfoot.. Clinically, R 4th ray wound fibrotic an extensive poor skin quality of the plantar foot. Proximal medial midfoot wound with tracking 4-5cm proximally towards the Talo-navicular area. R foot infection 2/2 to non-healing foot wound and poor pt compliance.     12/6:  MULTIDISCIPLINARY MEETING REGARDING SURGICAL MANAGEMENT:  Meeting with podiatry chief (Dr. Cifuentes), Vascular resident (Dr. Kwok), medicine resident (Dr. Soares), myself and pt occurred A 2-stage BKA was offered to the pt for surgical management of his current R foot infection. Explained to pt that his R foot will continue with worsen without surgical intervention, and can become life threatening  Worsening maceration to the R forefoot and plantar medial foot. Edema and erythema now extending towards the ankle. Pt at this time, once again,  is adamantly refusing any surgical intervention. Pt is AAOx3 and  expresses full understanding  of risks of delaying surgical intervention in the setting of gas gangrene. Explained to pt, the infection may spread proximally towards the ankle and beyond, may need a more proximal amputation, and/or pt may become septic and be limb/life threatening.  Pt continues to refuse.     Plan   - Pt evaluated and chart reviewed  - Local wound care: cleansed with NS. Applied betadine soaked packing to plantar midfoot wound and 4th ray surgical site. Covered all wounds with betadine soaked gauze, and kerlix.   -  IV abx per ID recs   - f/u deep wound cx from proximal medial wound sinus tract   - WBAT to R heel   - Rest of care per primary team     Plan discussed with attending     Local wound care instructions: cleanse with NS. Apply 1/4 inch plain packing to R 4th ray surgical site wound, medial midfoot wound. Cover with betadine soaked gauze. Secure with Kerlix and tape/ace.     [facility] ([facility]) at the [facility]  [address] [place] [address]  [phone]    [facility]   Address: [address] [place] [address]  Phone: [phone] 68M PMH HTN, IDDM with peripheral neuropathy, HCV, HFmrEF, CAD, with prior hospitalization 10/21-10/31 for DFU and is s/p R partial 4th ray amputation (DOS 10/24), and was discharged on 6 weeks daptomycin/vancomycin (through 12/5) admitted to medicine for R foot infection and b/l knee pain. Pt with most recent admission from 11/01-11/10 for hypertensive emergency 2/2 to cocaine use and left AMA. PICC line removed as pt left AMA and was given 25 days of PO Levaquin Podiatry consulted to evaluate R foot infection.  At time of consult, afebrile, hypertensive, WBC 13.54, ESR and CRP pending. CT scan with findings of soft tissue air of the forefoot and possibly the midfoot.. Clinically, R 4th ray wound fibrotic an extensive poor skin quality of the plantar foot. Proximal medial midfoot wound with tracking 4-5cm proximally towards the Talo-navicular area. R foot infection 2/2 to non-healing foot wound and poor pt compliance.     12/6:  MULTIDISCIPLINARY MEETING REGARDING SURGICAL MANAGEMENT:  Meeting with podiatry chief (Dr. Cifuentes), Vascular resident (Dr. Kwok), medicine resident (Dr. Soares), myself and pt occurred A 2-stage BKA was offered to the pt for surgical management of his current R foot infection. Explained to pt that his R foot will continue with worsen without surgical intervention, and can become life threatening  Worsening maceration to the R forefoot and plantar medial foot. Edema and erythema now extending towards the ankle. Pt at this time, once again,  is adamantly refusing any surgical intervention. Pt is AAOx3 and  expresses full understanding  of risks of delaying surgical intervention in the setting of gas gangrene. Explained to pt, the infection may spread proximally towards the ankle and beyond, may need a more proximal amputation, and/or pt may become septic and be limb/life threatening.  Pt continues to refuse.     Plan   - Pt evaluated and chart reviewed  - Local wound care: cleansed with NS. Applied betadine soaked packing to plantar midfoot wound and 4th ray surgical site. Covered all wounds with betadine soaked gauze, and kerlix.   -  IV abx per ID recs   - f/u deep wound cx from proximal medial wound sinus tract   - WBAT to R heel   - Rest of care per primary team     Plan discussed with attending     Local wound care instructions: cleanse with NS. Apply 1/4 inch plain packing to R 4th ray surgical site wound, medial midfoot wound. Cover with betadine soaked gauze. Secure with Kerlix and tape/ace.     [facility] ([facility]) at the [facility]  [address] [place] [address]  [phone]    [facility]   Address: [address] [place] [address]  Phone: [phone]

## 2023-12-06 NOTE — PROGRESS NOTE ADULT - ASSESSMENT
67yo M PMH CAD (2 stents 2020), HFmrEF (45-50%), HTN, PAD w/ remote RLE angioplasty, R 4th toe OM s/p partial R 4th ray amputation on 10/24, RLE angiogram with AT lithotripsy and AT/peroneal balloon angioplasty 10/27, uncontrolled IDDM (a1c 12 in Oct 2023), recent admission 11/8-11/10 to cardiology service for hypertensive emergency (left AMA, was given levaquin when he left for right foot wound that pt had started treatment for back in October) who presented to [facility] after a fall onto his knees and was having b/l knee pain. Found to have increased soft tissue gas in R foot on CT scan. Admit to [facility] for further management. 67yo M PMH CAD (2 stents 2020), HFmrEF (45-50%), HTN, PAD w/ remote RLE angioplasty, R 4th toe OM s/p partial R 4th ray amputation on 10/24, RLE angiogram with AT lithotripsy and AT/peroneal balloon angioplasty 10/27, uncontrolled IDDM (a1c 12 in Oct 2023), recent admission 11/8-11/10 to cardiology service for hypertensive emergency (left AMA, was given levaquin when he left for right foot wound that pt had started treatment for back in October) who presented to [facility] after a fall onto his knees and was having b/l knee pain. Found to have increased soft tissue gas in R foot on CT scan. Admit to [facility] for further management.

## 2023-12-06 NOTE — PROGRESS NOTE ADULT - SUBJECTIVE AND OBJECTIVE BOX
**INCOMPLETE NOTE    OVERNIGHT EVENTS:    SUBJECTIVE:  Patient seen and examined at bedside.    Vital Signs Last 12 Hrs  T(F): 97.7 (12-06-23 @ 05:13), Max: 98.7 (12-05-23 @ 20:39)  HR: 65 (12-06-23 @ 05:13) (65 - 67)  BP: 178/82 (12-06-23 @ 05:13) (142/82 - 178/82)  BP(mean): --  RR: 19 (12-06-23 @ 05:13) (18 - 19)  SpO2: 96% (12-06-23 @ 05:13) (96% - 97%)  I&O's Summary    05 Dec 2023 07:01  -  06 Dec 2023 06:42  --------------------------------------------------------  IN: 0 mL / OUT: 700 mL / NET: -700 mL        PHYSICAL EXAM:  Constitutional: NAD, comfortable in bed.  HEENT: NC/AT, PERRLA, EOMI, no conjunctival pallor or scleral icterus, MMM  Neck: Supple, no JVD  Respiratory: CTA B/L. No w/r/r.   Cardiovascular: RRR, normal S1 and S2, no m/r/g.   Gastrointestinal: +BS, soft NTND, no guarding or rebound tenderness, no palpable masses   Extremities: wwp; no cyanosis, clubbing or edema.   Vascular: Pulses equal and strong throughout.   Neurological: AAOx3, no CN deficits, strength and sensation intact throughout.   Skin: No gross skin abnormalities or rashes        LABS:                        7.5    12.03 )-----------( 213      ( 05 Dec 2023 07:25 )             23.7     12-05    134<L>  |  98  |  19  ----------------------------<  193<H>  3.8   |  29  |  1.12    Ca    8.2<L>      05 Dec 2023 07:25  Phos  3.3     12-05  Mg     2.0     12-05    TPro  6.2  /  Alb  2.1<L>  /  TBili  0.2  /  DBili  x   /  AST  30  /  ALT  23  /  AlkPhos  269<H>  12-05      Urinalysis Basic - ( 05 Dec 2023 07:25 )    Color: x / Appearance: x / SG: x / pH: x  Gluc: 193 mg/dL / Ketone: x  / Bili: x / Urobili: x   Blood: x / Protein: x / Nitrite: x   Leuk Esterase: x / RBC: x / WBC x   Sq Epi: x / Non Sq Epi: x / Bacteria: x          RADIOLOGY & ADDITIONAL TESTS:    MEDICATIONS  (STANDING):  aspirin enteric coated 81 milliGRAM(s) Oral every 24 hours  atorvastatin 80 milliGRAM(s) Oral at bedtime  carvedilol 12.5 milliGRAM(s) Oral every 12 hours  dextrose 5%. 1000 milliLiter(s) (100 mL/Hr) IV Continuous <Continuous>  dextrose 5%. 1000 milliLiter(s) (50 mL/Hr) IV Continuous <Continuous>  dextrose 50% Injectable 12.5 Gram(s) IV Push once  dextrose 50% Injectable 25 Gram(s) IV Push once  dextrose 50% Injectable 25 Gram(s) IV Push once  gabapentin 800 milliGRAM(s) Oral three times a day  glucagon  Injectable 1 milliGRAM(s) IntraMuscular once  hydrALAZINE 50 milliGRAM(s) Oral every 8 hours  insulin glargine Injectable (LANTUS) 16 Unit(s) SubCutaneous at bedtime  insulin lispro (ADMELOG) corrective regimen sliding scale   SubCutaneous Before meals and at bedtime  insulin lispro Injectable (ADMELOG) 3 Unit(s) SubCutaneous three times a day before meals  lisinopril 40 milliGRAM(s) Oral every 24 hours  piperacillin/tazobactam IVPB.. 3.375 Gram(s) IV Intermittent every 8 hours    MEDICATIONS  (PRN):  acetaminophen     Tablet .. 650 milliGRAM(s) Oral every 6 hours PRN Temp greater or equal to 38C (100.4F), Mild Pain (1 - 3)  dextrose Oral Gel 15 Gram(s) Oral once PRN Blood Glucose LESS THAN 70 milliGRAM(s)/deciliter  oxyCODONE    IR 5 milliGRAM(s) Oral every 6 hours PRN Severe Pain (7 - 10)   OVERNIGHT EVENTS: NAEO     SUBJECTIVE: Patient seen and examined at bedside. Reports R foot pain. Denies CP, SOB, abdominal pain. Swelling in b/l legs.     Vital Signs Last 12 Hrs  T(F): 97.7 (12-06-23 @ 05:13), Max: 98.7 (12-05-23 @ 20:39)  HR: 65 (12-06-23 @ 05:13) (65 - 67)  BP: 178/82 (12-06-23 @ 05:13) (142/82 - 178/82)  BP(mean): --  RR: 19 (12-06-23 @ 05:13) (18 - 19)  SpO2: 96% (12-06-23 @ 05:13) (96% - 97%)  I&O's Summary    05 Dec 2023 07:01  -  06 Dec 2023 06:42  --------------------------------------------------------  IN: 0 mL / OUT: 700 mL / NET: -700 mL        PHYSICAL EXAM:  Constitutional: NAD, comfortable in bed.  HEENT: NC/AT, PERRLA, EOMI, MMM  Neck: Supple, + JVD  Respiratory: CTA B/L. No w/r/r.   Cardiovascular: RRR, systolic murmur   Gastrointestinal: +BS, soft NTND, no guarding or rebound tenderness  Extremities: wwp; +2 pitting edema b/l up to groin. RLE wrapped in bandage  Vascular: Pulses equal and strong throughout.   Neurological: AAOx3, no CN deficits, strength and sensation intact throughout.   Skin: No gross skin abnormalities or rashes        LABS:                        7.5    12.03 )-----------( 213      ( 05 Dec 2023 07:25 )             23.7     12-05    134<L>  |  98  |  19  ----------------------------<  193<H>  3.8   |  29  |  1.12    Ca    8.2<L>      05 Dec 2023 07:25  Phos  3.3     12-05  Mg     2.0     12-05    TPro  6.2  /  Alb  2.1<L>  /  TBili  0.2  /  DBili  x   /  AST  30  /  ALT  23  /  AlkPhos  269<H>  12-05      Urinalysis Basic - ( 05 Dec 2023 07:25 )    Color: x / Appearance: x / SG: x / pH: x  Gluc: 193 mg/dL / Ketone: x  / Bili: x / Urobili: x   Blood: x / Protein: x / Nitrite: x   Leuk Esterase: x / RBC: x / WBC x   Sq Epi: x / Non Sq Epi: x / Bacteria: x          RADIOLOGY & ADDITIONAL TESTS:    MEDICATIONS  (STANDING):  aspirin enteric coated 81 milliGRAM(s) Oral every 24 hours  atorvastatin 80 milliGRAM(s) Oral at bedtime  carvedilol 12.5 milliGRAM(s) Oral every 12 hours  dextrose 5%. 1000 milliLiter(s) (100 mL/Hr) IV Continuous <Continuous>  dextrose 5%. 1000 milliLiter(s) (50 mL/Hr) IV Continuous <Continuous>  dextrose 50% Injectable 12.5 Gram(s) IV Push once  dextrose 50% Injectable 25 Gram(s) IV Push once  dextrose 50% Injectable 25 Gram(s) IV Push once  gabapentin 800 milliGRAM(s) Oral three times a day  glucagon  Injectable 1 milliGRAM(s) IntraMuscular once  hydrALAZINE 50 milliGRAM(s) Oral every 8 hours  insulin glargine Injectable (LANTUS) 16 Unit(s) SubCutaneous at bedtime  insulin lispro (ADMELOG) corrective regimen sliding scale   SubCutaneous Before meals and at bedtime  insulin lispro Injectable (ADMELOG) 3 Unit(s) SubCutaneous three times a day before meals  lisinopril 40 milliGRAM(s) Oral every 24 hours  piperacillin/tazobactam IVPB.. 3.375 Gram(s) IV Intermittent every 8 hours    MEDICATIONS  (PRN):  acetaminophen     Tablet .. 650 milliGRAM(s) Oral every 6 hours PRN Temp greater or equal to 38C (100.4F), Mild Pain (1 - 3)  dextrose Oral Gel 15 Gram(s) Oral once PRN Blood Glucose LESS THAN 70 milliGRAM(s)/deciliter  oxyCODONE    IR 5 milliGRAM(s) Oral every 6 hours PRN Severe Pain (7 - 10)

## 2023-12-07 ENCOUNTER — TRANSCRIPTION ENCOUNTER (OUTPATIENT)
Age: 68
End: 2023-12-07

## 2023-12-07 LAB
GLUCOSE BLDC GLUCOMTR-MCNC: 109 MG/DL — HIGH (ref 70–99)
GLUCOSE BLDC GLUCOMTR-MCNC: 109 MG/DL — HIGH (ref 70–99)
GLUCOSE BLDC GLUCOMTR-MCNC: 112 MG/DL — HIGH (ref 70–99)
GLUCOSE BLDC GLUCOMTR-MCNC: 112 MG/DL — HIGH (ref 70–99)
GLUCOSE BLDC GLUCOMTR-MCNC: 151 MG/DL — HIGH (ref 70–99)
GLUCOSE BLDC GLUCOMTR-MCNC: 151 MG/DL — HIGH (ref 70–99)
GLUCOSE BLDC GLUCOMTR-MCNC: 164 MG/DL — HIGH (ref 70–99)
GLUCOSE BLDC GLUCOMTR-MCNC: 164 MG/DL — HIGH (ref 70–99)

## 2023-12-07 PROCEDURE — 99232 SBSQ HOSP IP/OBS MODERATE 35: CPT | Mod: GC

## 2023-12-07 RX ORDER — LEVOFLOXACIN 5 MG/ML
1 INJECTION, SOLUTION INTRAVENOUS
Qty: 30 | Refills: 0
Start: 2023-12-07 | End: 2024-01-05

## 2023-12-07 RX ORDER — OXYCODONE HYDROCHLORIDE 5 MG/1
5 TABLET ORAL EVERY 6 HOURS
Refills: 0 | Status: DISCONTINUED | OUTPATIENT
Start: 2023-12-07 | End: 2023-12-11

## 2023-12-07 RX ADMIN — CARVEDILOL PHOSPHATE 12.5 MILLIGRAM(S): 80 CAPSULE, EXTENDED RELEASE ORAL at 17:04

## 2023-12-07 RX ADMIN — GABAPENTIN 800 MILLIGRAM(S): 400 CAPSULE ORAL at 13:07

## 2023-12-07 RX ADMIN — GABAPENTIN 800 MILLIGRAM(S): 400 CAPSULE ORAL at 06:52

## 2023-12-07 RX ADMIN — Medication 2: at 22:16

## 2023-12-07 RX ADMIN — OXYCODONE HYDROCHLORIDE 5 MILLIGRAM(S): 5 TABLET ORAL at 00:46

## 2023-12-07 RX ADMIN — OXYCODONE HYDROCHLORIDE 5 MILLIGRAM(S): 5 TABLET ORAL at 06:52

## 2023-12-07 RX ADMIN — Medication 2: at 13:27

## 2023-12-07 RX ADMIN — Medication 50 MILLIGRAM(S): at 13:08

## 2023-12-07 RX ADMIN — OXYCODONE HYDROCHLORIDE 5 MILLIGRAM(S): 5 TABLET ORAL at 18:57

## 2023-12-07 RX ADMIN — Medication 81 MILLIGRAM(S): at 08:36

## 2023-12-07 RX ADMIN — PIPERACILLIN AND TAZOBACTAM 25 GRAM(S): 4; .5 INJECTION, POWDER, LYOPHILIZED, FOR SOLUTION INTRAVENOUS at 08:36

## 2023-12-07 RX ADMIN — GABAPENTIN 800 MILLIGRAM(S): 400 CAPSULE ORAL at 22:15

## 2023-12-07 RX ADMIN — NYSTATIN CREAM 1 APPLICATION(S): 100000 CREAM TOPICAL at 17:03

## 2023-12-07 RX ADMIN — OXYCODONE HYDROCHLORIDE 5 MILLIGRAM(S): 5 TABLET ORAL at 13:53

## 2023-12-07 RX ADMIN — Medication 50 MILLIGRAM(S): at 06:52

## 2023-12-07 RX ADMIN — Medication 3 UNIT(S): at 18:27

## 2023-12-07 RX ADMIN — OXYCODONE HYDROCHLORIDE 5 MILLIGRAM(S): 5 TABLET ORAL at 01:46

## 2023-12-07 RX ADMIN — OXYCODONE HYDROCHLORIDE 5 MILLIGRAM(S): 5 TABLET ORAL at 07:52

## 2023-12-07 RX ADMIN — Medication 3 UNIT(S): at 13:26

## 2023-12-07 RX ADMIN — OXYCODONE HYDROCHLORIDE 5 MILLIGRAM(S): 5 TABLET ORAL at 12:53

## 2023-12-07 RX ADMIN — LISINOPRIL 40 MILLIGRAM(S): 2.5 TABLET ORAL at 11:27

## 2023-12-07 RX ADMIN — PIPERACILLIN AND TAZOBACTAM 25 GRAM(S): 4; .5 INJECTION, POWDER, LYOPHILIZED, FOR SOLUTION INTRAVENOUS at 17:03

## 2023-12-07 RX ADMIN — INSULIN GLARGINE 16 UNIT(S): 100 INJECTION, SOLUTION SUBCUTANEOUS at 22:16

## 2023-12-07 RX ADMIN — NYSTATIN CREAM 1 APPLICATION(S): 100000 CREAM TOPICAL at 06:55

## 2023-12-07 RX ADMIN — Medication 50 MILLIGRAM(S): at 22:15

## 2023-12-07 RX ADMIN — ATORVASTATIN CALCIUM 80 MILLIGRAM(S): 80 TABLET, FILM COATED ORAL at 22:16

## 2023-12-07 RX ADMIN — CARVEDILOL PHOSPHATE 12.5 MILLIGRAM(S): 80 CAPSULE, EXTENDED RELEASE ORAL at 06:52

## 2023-12-07 RX ADMIN — PIPERACILLIN AND TAZOBACTAM 25 GRAM(S): 4; .5 INJECTION, POWDER, LYOPHILIZED, FOR SOLUTION INTRAVENOUS at 00:46

## 2023-12-07 RX ADMIN — Medication 3 UNIT(S): at 09:29

## 2023-12-07 NOTE — PROGRESS NOTE ADULT - SUBJECTIVE AND OBJECTIVE BOX
**INCOMPLETE NOTE    OVERNIGHT EVENTS:    SUBJECTIVE:  Patient seen and examined at bedside.    Vital Signs Last 12 Hrs  T(F): 99.3 (12-07-23 @ 06:17), Max: 99.3 (12-07-23 @ 06:17)  HR: 72 (12-07-23 @ 06:17) (72 - 76)  BP: 159/85 (12-07-23 @ 06:17) (144/70 - 159/85)  BP(mean): --  RR: 19 (12-07-23 @ 06:17) (18 - 19)  SpO2: 98% (12-07-23 @ 06:17) (96% - 98%)  I&O's Summary    05 Dec 2023 07:01  -  06 Dec 2023 07:00  --------------------------------------------------------  IN: 0 mL / OUT: 700 mL / NET: -700 mL        PHYSICAL EXAM:  Constitutional: NAD, comfortable in bed.  HEENT: NC/AT, PERRLA, EOMI, no conjunctival pallor or scleral icterus, MMM  Neck: Supple, no JVD  Respiratory: CTA B/L. No w/r/r.   Cardiovascular: RRR, normal S1 and S2, no m/r/g.   Gastrointestinal: +BS, soft NTND, no guarding or rebound tenderness, no palpable masses   Extremities: wwp; no cyanosis, clubbing or edema.   Vascular: Pulses equal and strong throughout.   Neurological: AAOx3, no CN deficits, strength and sensation intact throughout.   Skin: No gross skin abnormalities or rashes        LABS:                        7.4    10.25 )-----------( 251      ( 06 Dec 2023 05:30 )             23.9     12-06    133<L>  |  98  |  21  ----------------------------<  209<H>  3.9   |  29  |  1.08    Ca    7.9<L>      06 Dec 2023 05:30  Phos  2.8     12-06  Mg     2.0     12-06    TPro  6.2  /  Alb  2.1<L>  /  TBili  0.2  /  DBili  x   /  AST  30  /  ALT  23  /  AlkPhos  269<H>  12-05      Urinalysis Basic - ( 06 Dec 2023 05:30 )    Color: x / Appearance: x / SG: x / pH: x  Gluc: 209 mg/dL / Ketone: x  / Bili: x / Urobili: x   Blood: x / Protein: x / Nitrite: x   Leuk Esterase: x / RBC: x / WBC x   Sq Epi: x / Non Sq Epi: x / Bacteria: x          RADIOLOGY & ADDITIONAL TESTS:    MEDICATIONS  (STANDING):  aspirin enteric coated 81 milliGRAM(s) Oral every 24 hours  atorvastatin 80 milliGRAM(s) Oral at bedtime  carvedilol 12.5 milliGRAM(s) Oral every 12 hours  dextrose 5%. 1000 milliLiter(s) (100 mL/Hr) IV Continuous <Continuous>  dextrose 5%. 1000 milliLiter(s) (50 mL/Hr) IV Continuous <Continuous>  dextrose 50% Injectable 12.5 Gram(s) IV Push once  dextrose 50% Injectable 25 Gram(s) IV Push once  dextrose 50% Injectable 25 Gram(s) IV Push once  gabapentin 800 milliGRAM(s) Oral three times a day  glucagon  Injectable 1 milliGRAM(s) IntraMuscular once  hydrALAZINE 50 milliGRAM(s) Oral every 8 hours  insulin glargine Injectable (LANTUS) 16 Unit(s) SubCutaneous at bedtime  insulin lispro (ADMELOG) corrective regimen sliding scale   SubCutaneous Before meals and at bedtime  insulin lispro Injectable (ADMELOG) 3 Unit(s) SubCutaneous three times a day before meals  lisinopril 40 milliGRAM(s) Oral every 24 hours  nystatin Cream 1 Application(s) Topical two times a day  piperacillin/tazobactam IVPB.. 3.375 Gram(s) IV Intermittent every 8 hours    MEDICATIONS  (PRN):  acetaminophen     Tablet .. 650 milliGRAM(s) Oral every 6 hours PRN Temp greater or equal to 38C (100.4F), Mild Pain (1 - 3)  dextrose Oral Gel 15 Gram(s) Oral once PRN Blood Glucose LESS THAN 70 milliGRAM(s)/deciliter  oxyCODONE    IR 5 milliGRAM(s) Oral every 6 hours PRN Severe Pain (7 - 10)   OVERNIGHT EVENTS: NAEO    SUBJECTIVE: Patient seen and examined at bedside. Resting comfortably in bed.     Vital Signs Last 12 Hrs  T(F): 99.3 (12-07-23 @ 06:17), Max: 99.3 (12-07-23 @ 06:17)  HR: 72 (12-07-23 @ 06:17) (72 - 76)  BP: 159/85 (12-07-23 @ 06:17) (144/70 - 159/85)  BP(mean): --  RR: 19 (12-07-23 @ 06:17) (18 - 19)  SpO2: 98% (12-07-23 @ 06:17) (96% - 98%)  I&O's Summary    05 Dec 2023 07:01  -  06 Dec 2023 07:00  --------------------------------------------------------  IN: 0 mL / OUT: 700 mL / NET: -700 mL        PHYSICAL EXAM:  Constitutional: NAD, comfortable in bed.  HEENT: NC/AT, PERRLA, EOMI, MMM  Neck: Supple, + JVD  Respiratory: CTA B/L. No w/r/r.   Cardiovascular: RRR, systolic murmur   Gastrointestinal: +BS, soft NTND, no guarding or rebound tenderness  Extremities: wwp; +2 pitting edema b/l up to groin. RLE wrapped in bandage  Vascular: Pulses equal and strong throughout.   Neurological: AAOx3, no CN deficits, strength and sensation intact throughout.   Skin: No gross skin abnormalities or rashes        LABS:                        7.4    10.25 )-----------( 251      ( 06 Dec 2023 05:30 )             23.9     12-06    133<L>  |  98  |  21  ----------------------------<  209<H>  3.9   |  29  |  1.08    Ca    7.9<L>      06 Dec 2023 05:30  Phos  2.8     12-06  Mg     2.0     12-06    TPro  6.2  /  Alb  2.1<L>  /  TBili  0.2  /  DBili  x   /  AST  30  /  ALT  23  /  AlkPhos  269<H>  12-05      Urinalysis Basic - ( 06 Dec 2023 05:30 )    Color: x / Appearance: x / SG: x / pH: x  Gluc: 209 mg/dL / Ketone: x  / Bili: x / Urobili: x   Blood: x / Protein: x / Nitrite: x   Leuk Esterase: x / RBC: x / WBC x   Sq Epi: x / Non Sq Epi: x / Bacteria: x          RADIOLOGY & ADDITIONAL TESTS:    MEDICATIONS  (STANDING):  aspirin enteric coated 81 milliGRAM(s) Oral every 24 hours  atorvastatin 80 milliGRAM(s) Oral at bedtime  carvedilol 12.5 milliGRAM(s) Oral every 12 hours  dextrose 5%. 1000 milliLiter(s) (100 mL/Hr) IV Continuous <Continuous>  dextrose 5%. 1000 milliLiter(s) (50 mL/Hr) IV Continuous <Continuous>  dextrose 50% Injectable 12.5 Gram(s) IV Push once  dextrose 50% Injectable 25 Gram(s) IV Push once  dextrose 50% Injectable 25 Gram(s) IV Push once  gabapentin 800 milliGRAM(s) Oral three times a day  glucagon  Injectable 1 milliGRAM(s) IntraMuscular once  hydrALAZINE 50 milliGRAM(s) Oral every 8 hours  insulin glargine Injectable (LANTUS) 16 Unit(s) SubCutaneous at bedtime  insulin lispro (ADMELOG) corrective regimen sliding scale   SubCutaneous Before meals and at bedtime  insulin lispro Injectable (ADMELOG) 3 Unit(s) SubCutaneous three times a day before meals  lisinopril 40 milliGRAM(s) Oral every 24 hours  nystatin Cream 1 Application(s) Topical two times a day  piperacillin/tazobactam IVPB.. 3.375 Gram(s) IV Intermittent every 8 hours    MEDICATIONS  (PRN):  acetaminophen     Tablet .. 650 milliGRAM(s) Oral every 6 hours PRN Temp greater or equal to 38C (100.4F), Mild Pain (1 - 3)  dextrose Oral Gel 15 Gram(s) Oral once PRN Blood Glucose LESS THAN 70 milliGRAM(s)/deciliter  oxyCODONE    IR 5 milliGRAM(s) Oral every 6 hours PRN Severe Pain (7 - 10)

## 2023-12-07 NOTE — CHART NOTE - NSCHARTNOTEFT_GEN_A_CORE
Podiatry service has been following and taking care of Ward Avina since day of admission 12/2. On daily basis and in length, explained to pt the need of surgical intervention (BKA) for adequate source control, greatest chance of wound healing, and most functional recovery post op. Pt has refused surgery of any type, requesting only IV abx. Pt is AAox3 and aware of his medical management thus far. The risk of refusing surgical intervention for gas gangrene may lead to worsening of infection, ascending infection, need for further proximal amputations, bacteremia, septic shock w/wo multi organ failure, and/or death. Pt each day stated  "I'd rather die than have surgery". At this point, all methods of discussion have been exhausted. Discharge note was provided by primary team. Podiatry team will sign off with nursing wound care instructions. Please re-consult with any questions or concerns.

## 2023-12-07 NOTE — PROGRESS NOTE ADULT - PROBLEM SELECTOR PLAN 6
#HFmrEF  #CAD  s/p 2 stents in 2020 at [facility]  TTE 11/9/23: EF 45-50%, Mild to moderate symmetric LVH, Grade I left ventricular diastolic dysfunction. Biatrial enlargement. Mild-to-moderate aortic stenosis.  Home meds: lisinopril, coreg  +2 b/l LE edema to groins, + JVD given IV lasix 40 x1   - c/w home meds  - c/w aspirin #HFmrEF  #CAD  s/p 2 stents in 2020 at [facility]  TTE 11/9/23: EF 45-50%, Mild to moderate symmetric LVH, Grade I left ventricular diastolic dysfunction. Biatrial enlargement. Mild-to-moderate aortic stenosis.  Home meds: lisinopril, coreg  +2 b/l LE edema to groins, + JVD given IV lasix 40 x1   - c/w home meds  - c/w aspirin

## 2023-12-07 NOTE — PROGRESS NOTE ADULT - ATTENDING COMMENTS
Patient continues to refuse amputation. Continue Zosyn while in the hospital. Will discharge on PO abx as per ID   Patient is for discharge as he is refusing treatment for gas gangrene.

## 2023-12-07 NOTE — PROGRESS NOTE ADULT - PROBLEM SELECTOR PLAN 1
Past hx of PAD w/ diabetic foot wound. Had prior admission in October where he was seen by ID and Podiatry and was supposed to complete 6 wks of broad spectrum antimicrobials. At that time MRI with possible 4th digit early OM up to prox phalanx s/p partial R 4th ray amputation on 10/24, proximal cx with E.coli, Pluralibacter gergoviae, stap epi, and Corynebacterium amycolatum.   Then had an admission this month for unrelated issue, had left AMA and PICC line was taken out bc pt was leaving AMA, was prescribed levaquin to complete his 6wk course however pt unsure if he actually picked up the levaquin.  CT angio LE today shows increased soft tissue gas in the right forefoot. Vascular surgery was contacted by [facility].  On exam right foot has open wound between 4th and 5th digits with purulence coming out of wound, foot is erythematous on dorsal aspect and warm to touch.  Ddx includes SSTI vs osteomyelitis vs less likely nec fasc in setting of possibly incompletely treated infection.   Vascular signed off d/t pt refusing interventions  Wound culture shows few staph haemolyticus, lactobacillus gasseri   Started on vancomycin, refused vanc trough- vanc d'jasen   Extensive conversation on 12/6 w/ Vascular, Podiatry, Medicine team, pt not agreeable to BKA. Given D/c notice.   - Podiatry recs local wound care, IV abx per ID recs   - c/w zosyn 3.375g IV q8 while inpt, on d/c ID rec doxycycline 100 mg PO Q12, levaquin 750 mg PO q24  - Pain med PRN- tylenol (mild pain), toradol (mod pain), Oxy 5 prn (severe pain)  - f/u blood cx Past hx of PAD w/ diabetic foot wound. Had prior admission in October where he was seen by ID and Podiatry and was supposed to complete 6 wks of broad spectrum antimicrobials. At that time MRI with possible 4th digit early OM up to prox phalanx s/p partial R 4th ray amputation on 10/24, proximal cx with E.coli, Pluralibacter gergoviae, stap epi, and Corynebacterium amycolatum.   Then had an admission this month for unrelated issue, had left AMA and PICC line was taken out bc pt was leaving AMA, was prescribed levaquin to complete his 6wk course however pt unsure if he actually picked up the levaquin.  CT angio LE today shows increased soft tissue gas in the right forefoot. Vascular surgery was contacted by [facility].  On exam right foot has open wound between 4th and 5th digits with purulence coming out of wound, foot is erythematous on dorsal aspect and warm to touch.  Ddx includes SSTI vs osteomyelitis vs less likely nec fasc in setting of possibly incompletely treated infection.   Vascular signed off d/t pt refusing interventions  Wound culture shows few staph haemolyticus, lactobacillus gasseri   Started on vancomycin, refused vanc trough- vanc d'jasen   Extensive conversation on 12/6 w/ Vascular, Podiatry, Medicine team, pt not agreeable to BKA. Given D/c notice.   - Podiatry recs local wound care, IV abx per ID recs   - c/w zosyn 3.375g IV q8 while inpt, on d/c ID rec doxycycline 100 mg PO Q12, levaquin 750 mg PO q24  - Pain med PRN- tylenol (mild pain), toradol (mod pain), Oxy 5 prn (severe pain)  - f/u blood cx

## 2023-12-07 NOTE — DISCHARGE NOTE NURSING/CASE MANAGEMENT/SOCIAL WORK - NSDCFUADDAPPT_GEN_ALL_CORE_FT
Ask for Dr. Krishan Soares when you call the number [phone] Ask for Dr. Krishan Soares when you call the number [phone]

## 2023-12-07 NOTE — DISCHARGE NOTE NURSING/CASE MANAGEMENT/SOCIAL WORK - NSDCPEFALRISK_GEN_ALL_CORE
For information on Fall & Injury Prevention, visit: [email or web address] OR  [email or web address] OR  https://www.cdc.gov/steadi/patient.html For information on Fall & Injury Prevention, visit: [email or web address] OR  [email or web address] OR  https://www.cdc.gov/steadi/patient.html

## 2023-12-07 NOTE — PROGRESS NOTE ADULT - ASSESSMENT
69yo M PMH CAD (2 stents 2020), HFmrEF (45-50%), HTN, PAD w/ remote RLE angioplasty, R 4th toe OM s/p partial R 4th ray amputation on 10/24, RLE angiogram with AT lithotripsy and AT/peroneal balloon angioplasty 10/27, uncontrolled IDDM (a1c 12 in Oct 2023), recent admission 11/8-11/10 to cardiology service for hypertensive emergency (left AMA, was given levaquin when he left for right foot wound that pt had started treatment for back in October) who presented to [facility] after a fall onto his knees and was having b/l knee pain. Found to have increased soft tissue gas in R foot on CT scan. Admit to [facility] for further management. 67yo M PMH CAD (2 stents 2020), HFmrEF (45-50%), HTN, PAD w/ remote RLE angioplasty, R 4th toe OM s/p partial R 4th ray amputation on 10/24, RLE angiogram with AT lithotripsy and AT/peroneal balloon angioplasty 10/27, uncontrolled IDDM (a1c 12 in Oct 2023), recent admission 11/8-11/10 to cardiology service for hypertensive emergency (left AMA, was given levaquin when he left for right foot wound that pt had started treatment for back in October) who presented to [facility] after a fall onto his knees and was having b/l knee pain. Found to have increased soft tissue gas in R foot on CT scan. Admit to [facility] for further management.

## 2023-12-07 NOTE — PROGRESS NOTE ADULT - PROBLEM SELECTOR PLAN 5
s/p 2 stents in 2020 at [facility]. s/p 2 stents in 2020 at [facility].

## 2023-12-08 LAB
GLUCOSE BLDC GLUCOMTR-MCNC: 158 MG/DL — HIGH (ref 70–99)
GLUCOSE BLDC GLUCOMTR-MCNC: 158 MG/DL — HIGH (ref 70–99)
GLUCOSE BLDC GLUCOMTR-MCNC: 223 MG/DL — HIGH (ref 70–99)
GLUCOSE BLDC GLUCOMTR-MCNC: 223 MG/DL — HIGH (ref 70–99)
GLUCOSE BLDC GLUCOMTR-MCNC: 294 MG/DL — HIGH (ref 70–99)
GLUCOSE BLDC GLUCOMTR-MCNC: 294 MG/DL — HIGH (ref 70–99)
GLUCOSE BLDC GLUCOMTR-MCNC: 76 MG/DL — SIGNIFICANT CHANGE UP (ref 70–99)
GLUCOSE BLDC GLUCOMTR-MCNC: 76 MG/DL — SIGNIFICANT CHANGE UP (ref 70–99)

## 2023-12-08 PROCEDURE — 99233 SBSQ HOSP IP/OBS HIGH 50: CPT | Mod: GC

## 2023-12-08 RX ORDER — HEPARIN SODIUM 5000 [USP'U]/ML
5000 INJECTION INTRAVENOUS; SUBCUTANEOUS EVERY 8 HOURS
Refills: 0 | Status: DISCONTINUED | OUTPATIENT
Start: 2023-12-08 | End: 2023-12-11

## 2023-12-08 RX ADMIN — LISINOPRIL 40 MILLIGRAM(S): 2.5 TABLET ORAL at 12:03

## 2023-12-08 RX ADMIN — CARVEDILOL PHOSPHATE 12.5 MILLIGRAM(S): 80 CAPSULE, EXTENDED RELEASE ORAL at 18:15

## 2023-12-08 RX ADMIN — Medication 4: at 17:47

## 2023-12-08 RX ADMIN — ATORVASTATIN CALCIUM 80 MILLIGRAM(S): 80 TABLET, FILM COATED ORAL at 21:52

## 2023-12-08 RX ADMIN — GABAPENTIN 800 MILLIGRAM(S): 400 CAPSULE ORAL at 21:53

## 2023-12-08 RX ADMIN — PIPERACILLIN AND TAZOBACTAM 25 GRAM(S): 4; .5 INJECTION, POWDER, LYOPHILIZED, FOR SOLUTION INTRAVENOUS at 16:48

## 2023-12-08 RX ADMIN — Medication 2: at 12:38

## 2023-12-08 RX ADMIN — OXYCODONE HYDROCHLORIDE 5 MILLIGRAM(S): 5 TABLET ORAL at 21:52

## 2023-12-08 RX ADMIN — OXYCODONE HYDROCHLORIDE 5 MILLIGRAM(S): 5 TABLET ORAL at 01:30

## 2023-12-08 RX ADMIN — OXYCODONE HYDROCHLORIDE 5 MILLIGRAM(S): 5 TABLET ORAL at 00:44

## 2023-12-08 RX ADMIN — OXYCODONE HYDROCHLORIDE 5 MILLIGRAM(S): 5 TABLET ORAL at 22:54

## 2023-12-08 RX ADMIN — Medication 81 MILLIGRAM(S): at 09:18

## 2023-12-08 RX ADMIN — Medication 6: at 21:53

## 2023-12-08 RX ADMIN — PIPERACILLIN AND TAZOBACTAM 25 GRAM(S): 4; .5 INJECTION, POWDER, LYOPHILIZED, FOR SOLUTION INTRAVENOUS at 09:18

## 2023-12-08 RX ADMIN — PIPERACILLIN AND TAZOBACTAM 25 GRAM(S): 4; .5 INJECTION, POWDER, LYOPHILIZED, FOR SOLUTION INTRAVENOUS at 00:37

## 2023-12-08 RX ADMIN — GABAPENTIN 800 MILLIGRAM(S): 400 CAPSULE ORAL at 14:57

## 2023-12-08 RX ADMIN — Medication 50 MILLIGRAM(S): at 21:52

## 2023-12-08 RX ADMIN — GABAPENTIN 800 MILLIGRAM(S): 400 CAPSULE ORAL at 06:09

## 2023-12-08 RX ADMIN — CARVEDILOL PHOSPHATE 12.5 MILLIGRAM(S): 80 CAPSULE, EXTENDED RELEASE ORAL at 06:09

## 2023-12-08 RX ADMIN — Medication 3 UNIT(S): at 17:47

## 2023-12-08 RX ADMIN — OXYCODONE HYDROCHLORIDE 5 MILLIGRAM(S): 5 TABLET ORAL at 08:04

## 2023-12-08 RX ADMIN — OXYCODONE HYDROCHLORIDE 5 MILLIGRAM(S): 5 TABLET ORAL at 07:04

## 2023-12-08 RX ADMIN — Medication 3 UNIT(S): at 12:38

## 2023-12-08 RX ADMIN — INSULIN GLARGINE 16 UNIT(S): 100 INJECTION, SOLUTION SUBCUTANEOUS at 21:53

## 2023-12-08 RX ADMIN — Medication 50 MILLIGRAM(S): at 14:57

## 2023-12-08 RX ADMIN — OXYCODONE HYDROCHLORIDE 5 MILLIGRAM(S): 5 TABLET ORAL at 12:37

## 2023-12-08 RX ADMIN — Medication 50 MILLIGRAM(S): at 06:10

## 2023-12-08 RX ADMIN — OXYCODONE HYDROCHLORIDE 5 MILLIGRAM(S): 5 TABLET ORAL at 13:37

## 2023-12-08 NOTE — PROGRESS NOTE ADULT - SUBJECTIVE AND OBJECTIVE BOX
**INCOMPLETE NOTE    OVERNIGHT EVENTS:    SUBJECTIVE:  Patient seen and examined at bedside.    Vital Signs Last 12 Hrs  T(F): 98.5 (12-08-23 @ 05:46), Max: 98.5 (12-08-23 @ 05:46)  HR: 68 (12-08-23 @ 05:46) (60 - 68)  BP: 179/90 (12-08-23 @ 05:46) (124/61 - 179/90)  BP(mean): --  RR: 18 (12-08-23 @ 05:46) (18 - 19)  SpO2: 98% (12-08-23 @ 05:46) (97% - 98%)  I&O's Summary    07 Dec 2023 07:01  -  08 Dec 2023 07:00  --------------------------------------------------------  IN: 0 mL / OUT: 200 mL / NET: -200 mL        PHYSICAL EXAM:  Constitutional: NAD, comfortable in bed.  HEENT: NC/AT, PERRLA, EOMI, no conjunctival pallor or scleral icterus, MMM  Neck: Supple, no JVD  Respiratory: CTA B/L. No w/r/r.   Cardiovascular: RRR, normal S1 and S2, no m/r/g.   Gastrointestinal: +BS, soft NTND, no guarding or rebound tenderness, no palpable masses   Extremities: wwp; no cyanosis, clubbing or edema.   Vascular: Pulses equal and strong throughout.   Neurological: AAOx3, no CN deficits, strength and sensation intact throughout.   Skin: No gross skin abnormalities or rashes        LABS:                  RADIOLOGY & ADDITIONAL TESTS:    MEDICATIONS  (STANDING):  aspirin enteric coated 81 milliGRAM(s) Oral every 24 hours  atorvastatin 80 milliGRAM(s) Oral at bedtime  carvedilol 12.5 milliGRAM(s) Oral every 12 hours  dextrose 5%. 1000 milliLiter(s) (100 mL/Hr) IV Continuous <Continuous>  dextrose 5%. 1000 milliLiter(s) (50 mL/Hr) IV Continuous <Continuous>  dextrose 50% Injectable 12.5 Gram(s) IV Push once  dextrose 50% Injectable 25 Gram(s) IV Push once  dextrose 50% Injectable 25 Gram(s) IV Push once  gabapentin 800 milliGRAM(s) Oral three times a day  glucagon  Injectable 1 milliGRAM(s) IntraMuscular once  hydrALAZINE 50 milliGRAM(s) Oral every 8 hours  insulin glargine Injectable (LANTUS) 16 Unit(s) SubCutaneous at bedtime  insulin lispro (ADMELOG) corrective regimen sliding scale   SubCutaneous Before meals and at bedtime  insulin lispro Injectable (ADMELOG) 3 Unit(s) SubCutaneous three times a day before meals  lisinopril 40 milliGRAM(s) Oral every 24 hours  nystatin Cream 1 Application(s) Topical two times a day  piperacillin/tazobactam IVPB.. 3.375 Gram(s) IV Intermittent every 8 hours    MEDICATIONS  (PRN):  acetaminophen     Tablet .. 650 milliGRAM(s) Oral every 6 hours PRN Temp greater or equal to 38C (100.4F), Mild Pain (1 - 3)  dextrose Oral Gel 15 Gram(s) Oral once PRN Blood Glucose LESS THAN 70 milliGRAM(s)/deciliter  oxyCODONE    IR 5 milliGRAM(s) Oral every 6 hours PRN Severe Pain (7 - 10)   OVERNIGHT EVENTS: NAEO    SUBJECTIVE: Patient seen and examined at bedside. Resting comfortably in bed. R foot pain. Denies CP, SOB, abdominal pain.     Vital Signs Last 12 Hrs  T(F): 98.5 (12-08-23 @ 05:46), Max: 98.5 (12-08-23 @ 05:46)  HR: 68 (12-08-23 @ 05:46) (60 - 68)  BP: 179/90 (12-08-23 @ 05:46) (124/61 - 179/90)  BP(mean): --  RR: 18 (12-08-23 @ 05:46) (18 - 19)  SpO2: 98% (12-08-23 @ 05:46) (97% - 98%)  I&O's Summary    07 Dec 2023 07:01  -  08 Dec 2023 07:00  --------------------------------------------------------  IN: 0 mL / OUT: 200 mL / NET: -200 mL        PHYSICAL EXAM:  Constitutional: NAD, comfortable in bed.  HEENT: NC/AT, PERRLA, EOMI, MMM  Neck: Supple, no JVD  Respiratory: CTA B/L. No w/r/r.   Cardiovascular: RRR, normal S1 and S2, no m/r/g.   Gastrointestinal: +BS, soft NTND, no guarding or rebound tenderness  Extremities: wwp; no cyanosis, clubbing or edema.   Vascular: Pulses equal and strong throughout.   Neurological: AAOx3, no CN deficits, strength and sensation intact throughout.   Skin: No gross skin abnormalities or rashes        LABS:                  RADIOLOGY & ADDITIONAL TESTS:    MEDICATIONS  (STANDING):  aspirin enteric coated 81 milliGRAM(s) Oral every 24 hours  atorvastatin 80 milliGRAM(s) Oral at bedtime  carvedilol 12.5 milliGRAM(s) Oral every 12 hours  dextrose 5%. 1000 milliLiter(s) (100 mL/Hr) IV Continuous <Continuous>  dextrose 5%. 1000 milliLiter(s) (50 mL/Hr) IV Continuous <Continuous>  dextrose 50% Injectable 12.5 Gram(s) IV Push once  dextrose 50% Injectable 25 Gram(s) IV Push once  dextrose 50% Injectable 25 Gram(s) IV Push once  gabapentin 800 milliGRAM(s) Oral three times a day  glucagon  Injectable 1 milliGRAM(s) IntraMuscular once  hydrALAZINE 50 milliGRAM(s) Oral every 8 hours  insulin glargine Injectable (LANTUS) 16 Unit(s) SubCutaneous at bedtime  insulin lispro (ADMELOG) corrective regimen sliding scale   SubCutaneous Before meals and at bedtime  insulin lispro Injectable (ADMELOG) 3 Unit(s) SubCutaneous three times a day before meals  lisinopril 40 milliGRAM(s) Oral every 24 hours  nystatin Cream 1 Application(s) Topical two times a day  piperacillin/tazobactam IVPB.. 3.375 Gram(s) IV Intermittent every 8 hours    MEDICATIONS  (PRN):  acetaminophen     Tablet .. 650 milliGRAM(s) Oral every 6 hours PRN Temp greater or equal to 38C (100.4F), Mild Pain (1 - 3)  dextrose Oral Gel 15 Gram(s) Oral once PRN Blood Glucose LESS THAN 70 milliGRAM(s)/deciliter  oxyCODONE    IR 5 milliGRAM(s) Oral every 6 hours PRN Severe Pain (7 - 10)   OVERNIGHT EVENTS: NAEO    SUBJECTIVE: Patient seen and examined at bedside. Resting comfortably in bed. R foot pain. Denies CP, SOB, abdominal pain.     Vital Signs Last 12 Hrs  T(F): 98.5 (12-08-23 @ 05:46), Max: 98.5 (12-08-23 @ 05:46)  HR: 68 (12-08-23 @ 05:46) (60 - 68)  BP: 179/90 (12-08-23 @ 05:46) (124/61 - 179/90)  BP(mean): --  RR: 18 (12-08-23 @ 05:46) (18 - 19)  SpO2: 98% (12-08-23 @ 05:46) (97% - 98%)  I&O's Summary    07 Dec 2023 07:01  -  08 Dec 2023 07:00  --------------------------------------------------------  IN: 0 mL / OUT: 200 mL / NET: -200 mL        PHYSICAL EXAM:  Constitutional: NAD, comfortable in bed.  HEENT: NC/AT, PERRLA, EOMI, MMM  Neck: Supple, + JVD  Respiratory: CTA B/L. No w/r/r.   Cardiovascular: RRR, systolic murmur   Gastrointestinal: +BS, soft NTND, no guarding or rebound tenderness  Extremities: wwp; +2 pitting edema b/l up to groin. RLE wrapped in bandage  Vascular: Pulses equal and strong throughout.   Neurological: AAOx3, no CN deficits, strength and sensation intact throughout.   Skin: No gross skin abnormalities or rashes        LABS:                  RADIOLOGY & ADDITIONAL TESTS:    MEDICATIONS  (STANDING):  aspirin enteric coated 81 milliGRAM(s) Oral every 24 hours  atorvastatin 80 milliGRAM(s) Oral at bedtime  carvedilol 12.5 milliGRAM(s) Oral every 12 hours  dextrose 5%. 1000 milliLiter(s) (100 mL/Hr) IV Continuous <Continuous>  dextrose 5%. 1000 milliLiter(s) (50 mL/Hr) IV Continuous <Continuous>  dextrose 50% Injectable 12.5 Gram(s) IV Push once  dextrose 50% Injectable 25 Gram(s) IV Push once  dextrose 50% Injectable 25 Gram(s) IV Push once  gabapentin 800 milliGRAM(s) Oral three times a day  glucagon  Injectable 1 milliGRAM(s) IntraMuscular once  hydrALAZINE 50 milliGRAM(s) Oral every 8 hours  insulin glargine Injectable (LANTUS) 16 Unit(s) SubCutaneous at bedtime  insulin lispro (ADMELOG) corrective regimen sliding scale   SubCutaneous Before meals and at bedtime  insulin lispro Injectable (ADMELOG) 3 Unit(s) SubCutaneous three times a day before meals  lisinopril 40 milliGRAM(s) Oral every 24 hours  nystatin Cream 1 Application(s) Topical two times a day  piperacillin/tazobactam IVPB.. 3.375 Gram(s) IV Intermittent every 8 hours    MEDICATIONS  (PRN):  acetaminophen     Tablet .. 650 milliGRAM(s) Oral every 6 hours PRN Temp greater or equal to 38C (100.4F), Mild Pain (1 - 3)  dextrose Oral Gel 15 Gram(s) Oral once PRN Blood Glucose LESS THAN 70 milliGRAM(s)/deciliter  oxyCODONE    IR 5 milliGRAM(s) Oral every 6 hours PRN Severe Pain (7 - 10)

## 2023-12-08 NOTE — PROGRESS NOTE ADULT - ASSESSMENT
69yo M PMH CAD (2 stents 2020), HFmrEF (45-50%), HTN, PAD w/ remote RLE angioplasty, R 4th toe OM s/p partial R 4th ray amputation on 10/24, RLE angiogram with AT lithotripsy and AT/peroneal balloon angioplasty 10/27, uncontrolled IDDM (a1c 12 in Oct 2023), recent admission 11/8-11/10 to cardiology service for hypertensive emergency (left AMA, was given levaquin when he left for right foot wound that pt had started treatment for back in October) who presented to [facility] after a fall onto his knees and was having b/l knee pain. Found to have increased soft tissue gas in R foot on CT scan. Admit to [facility] for further management. 67yo M PMH CAD (2 stents 2020), HFmrEF (45-50%), HTN, PAD w/ remote RLE angioplasty, R 4th toe OM s/p partial R 4th ray amputation on 10/24, RLE angiogram with AT lithotripsy and AT/peroneal balloon angioplasty 10/27, uncontrolled IDDM (a1c 12 in Oct 2023), recent admission 11/8-11/10 to cardiology service for hypertensive emergency (left AMA, was given levaquin when he left for right foot wound that pt had started treatment for back in October) who presented to [facility] after a fall onto his knees and was having b/l knee pain. Found to have increased soft tissue gas in R foot on CT scan. Admit to [facility] for further management.

## 2023-12-08 NOTE — PROGRESS NOTE ADULT - PROBLEM SELECTOR PLAN 6
#HFmrEF  #CAD  s/p 2 stents in 2020 at [facility]  TTE 11/9/23: EF 45-50%, Mild to moderate symmetric LVH, Grade I left ventricular diastolic dysfunction. Biatrial enlargement. Mild-to-moderate aortic stenosis.  Home meds: lisinopril, coreg  +2 b/l LE edema to groins, + JVD given IV lasix 40 x1   - c/w home meds  - c/w aspirin #HFmrEF  #CAD  s/p 2 stents in 2020 at [facility]  TTE 11/9/23: EF 45-50%, Mild to moderate symmetric LVH, Grade I left ventricular diastolic dysfunction. Biatrial enlargement. Mild-to-moderate aortic stenosis.  Home meds: lisinopril, coreg  +2 b/l LE edema to groins, + JVD given IV lasix 40 x1   - c/w home meds  - c/w aspirin

## 2023-12-08 NOTE — PROGRESS NOTE ADULT - ATTENDING COMMENTS
Plan:  This morning patient broke down crying, stating he is mentally ready for the BKA now; follow vascular recs   Will continue Zosyn     Rest as per resident note

## 2023-12-08 NOTE — PROGRESS NOTE ADULT - PROBLEM SELECTOR PLAN 1
Past hx of PAD w/ diabetic foot wound. Had prior admission in October where he was seen by ID and Podiatry and was supposed to complete 6 wks of broad spectrum antimicrobials. At that time MRI with possible 4th digit early OM up to prox phalanx s/p partial R 4th ray amputation on 10/24, proximal cx with E.coli, Pluralibacter gergoviae, stap epi, and Corynebacterium amycolatum.   Then had an admission this month for unrelated issue, had left AMA and PICC line was taken out bc pt was leaving AMA, was prescribed levaquin to complete his 6wk course however pt unsure if he actually picked up the levaquin.  CT angio LE today shows increased soft tissue gas in the right forefoot. Vascular surgery was contacted by [facility].  On exam right foot has open wound between 4th and 5th digits with purulence coming out of wound, foot is erythematous on dorsal aspect and warm to touch.  Ddx includes SSTI vs osteomyelitis vs less likely nec fasc in setting of possibly incompletely treated infection.   Vascular signed off d/t pt refusing interventions  Wound culture shows few staph haemolyticus, lactobacillus gasseri   Started on vancomycin, refused vanc trough- vanc d'jasen   Extensive conversation on 12/6 w/ Vascular, Podiatry, Medicine team, pt not agreeable to BKA. Given D/c notice.   On 12/8- pt amenable to BKA, Vascular/Podiatry on board- sx planned for 12/11   - pre-op labs for tentative BKA on 12/11   - c/w zosyn 3.375g IV q8  - Pain med PRN- tylenol (mild pain), toradol (mod pain), Oxy 5 prn (severe pain)  - f/u blood cx- NGTD Past hx of PAD w/ diabetic foot wound. Had prior admission in October where he was seen by ID and Podiatry and was supposed to complete 6 wks of broad spectrum antimicrobials. At that time MRI with possible 4th digit early OM up to prox phalanx s/p partial R 4th ray amputation on 10/24, proximal cx with E.coli, Pluralibacter gergoviae, stap epi, and Corynebacterium amycolatum.   Then had an admission this month for unrelated issue, had left AMA and PICC line was taken out bc pt was leaving AMA, was prescribed levaquin to complete his 6wk course however pt unsure if he actually picked up the levaquin.  CT angio LE today shows increased soft tissue gas in the right forefoot. Vascular surgery was contacted by [facility].  On exam right foot has open wound between 4th and 5th digits with purulence coming out of wound, foot is erythematous on dorsal aspect and warm to touch.  Ddx includes SSTI vs osteomyelitis vs less likely nec fasc in setting of possibly incompletely treated infection.   Vascular signed off d/t pt refusing interventions  Wound culture shows few staph haemolyticus, lactobacillus gasseri   Started on vancomycin, refused vanc trough- vanc d'jasen   Extensive conversation on 12/6 w/ Vascular, Podiatry, Medicine team, pt not agreeable to BKA. Given D/c notice.   On 12/8- pt amenable to BKA, Vascular/Podiatry on board- sx planned for 12/11   - pre-op labs for tentative BKA on 12/11   - c/w zosyn 3.375g IV q8  - Pain med PRN- tylenol (mild pain), toradol (mod pain), Oxy 5 prn (severe pain)  - f/u blood cx- NGTD

## 2023-12-08 NOTE — CHART NOTE - NSCHARTNOTEFT_GEN_A_CORE
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          Current Nutrition Order: Consistent Carbohydrate, Glucerna 2x/day        PO Intake: Good (%) [ x ]  Fair (50-75%) [   ] Poor (<25%) [   ]    GI Issues: endorses diarrhea, last BM 12/8 per EMR    Skin Integrity: diabetic ulcer to R foot, +1 edema to bilateral legs    Labs: Na 133, glucose trending  x 24 hours       CAPILLARY BLOOD GLUCOSE      POCT Blood Glucose.: 158 mg/dL (08 Dec 2023 12:29)  POCT Blood Glucose.: 76 mg/dL (08 Dec 2023 09:15)  POCT Blood Glucose.: 151 mg/dL (07 Dec 2023 22:04)  POCT Blood Glucose.: 112 mg/dL (07 Dec 2023 18:22)      Medications:  MEDICATIONS  (STANDING):  aspirin enteric coated 81 milliGRAM(s) Oral every 24 hours  atorvastatin 80 milliGRAM(s) Oral at bedtime  carvedilol 12.5 milliGRAM(s) Oral every 12 hours  dextrose 5%. 1000 milliLiter(s) (100 mL/Hr) IV Continuous <Continuous>  dextrose 5%. 1000 milliLiter(s) (50 mL/Hr) IV Continuous <Continuous>  dextrose 50% Injectable 12.5 Gram(s) IV Push once  dextrose 50% Injectable 25 Gram(s) IV Push once  dextrose 50% Injectable 25 Gram(s) IV Push once  gabapentin 800 milliGRAM(s) Oral three times a day  glucagon  Injectable 1 milliGRAM(s) IntraMuscular once  heparin   Injectable 5000 Unit(s) SubCutaneous every 12 hours  hydrALAZINE 50 milliGRAM(s) Oral every 8 hours  insulin glargine Injectable (LANTUS) 16 Unit(s) SubCutaneous at bedtime  insulin lispro (ADMELOG) corrective regimen sliding scale   SubCutaneous Before meals and at bedtime  insulin lispro Injectable (ADMELOG) 3 Unit(s) SubCutaneous three times a day before meals  lisinopril 40 milliGRAM(s) Oral every 24 hours  nystatin Cream 1 Application(s) Topical two times a day  piperacillin/tazobactam IVPB.. 3.375 Gram(s) IV Intermittent every 8 hours    MEDICATIONS  (PRN):  acetaminophen     Tablet .. 650 milliGRAM(s) Oral every 6 hours PRN Temp greater or equal to 38C (100.4F), Mild Pain (1 - 3)  dextrose Oral Gel 15 Gram(s) Oral once PRN Blood Glucose LESS THAN 70 milliGRAM(s)/deciliter  oxyCODONE    IR 5 milliGRAM(s) Oral every 6 hours PRN Severe Pain (7 - 10)      Anthropometrics:  Dosing height: 67in   Dosing weight: 70kg/154#  BMI: 24.2  IBW: 148#          %IBW: 104%    Weight Change: no new weights since admit     Nutrition Focused Physical Exam: Completed [   ]  Not Pertinent [ x ]  >>no overt signs of nutritional wasting     Estimated energy needs:   Weight used for calculations	current weight  Estimated Energy Needs Weight (lbs)	154.3 lb  Estimated Energy Needs Weight (kg)	70 kg  Estimated Energy Needs From (carrie/kg)	30  Estimated Energy Needs To (carrie/kg)	35  Estimated Energy Needs Calculated From (carrie/kg)	2100  Estimated Energy Needs Calculated To (carrie/kg)	2450  Weight used for calculations	current weight  Estimated Protein Needs Weight (lbs)	154.3 lb  Estimated Protein Needs Weight (kg)	70 kg  Estimated Protein Needs From (g/kg)	1.25  Estimated Protein Needs To (g/kg)	1.5  Estimated Protein Needs Calculated From (g/kg)	87.5  Estimated Protein Needs Calculated To (g/kg)	105  Estimated Fluid Needs Weight (lbs)	154.3 lb  Estimated Fluid Needs Weight (kg)	70 kg  Estimated Fluid Needs From (ml/kg)	25  Estimated Fluid Needs To (ml/kg)	30  Estimated Fluid Needs Calculated From (ml/kg)	1750  Estimated Fluid Needs Calculated To (ml/kg)	2100  Other Calculations	Estimated nutritional needs determined using [facility] Standards of Nutrition Care for wound healing using current body weight 70 kg (104%IBW).    Subjective:   67yo M PMH CAD (2 stents 2020), HFmrEF (45-50%), HTN, PAD w/ remote RLE angioplasty, R 4th toe OM s/p partial R 4th ray amputation on 10/24, RLE angiogram with AT lithotripsy and AT/peroneal balloon angioplasty 10/27, uncontrolled IDDM (a1c 12 in Oct 2023), recent admission 11/8-11/10 to cardiology service for hypertensive emergency (left AMA, was given levaquin when he left for right foot wound that pt had started treatment for back in October) who presented to [facility] after a fall onto his knees and was having b/l knee pain. Found to have increased soft tissue gas in R foot on CT scan. Admit to [facility] for further management.    Patient seen at bedside for follow up assessment. On Consistent Carbohydrate diet with Glucerna 2x/day. Endorses good appetite and reflective PO intake, observed 100% breakfast tray completed. Also observed 2 unopened bottles of Glucerna at bedside- patient states he does not like them. Recommend to discontinue in view of good PO intake. Endorses diarrhea, last BM 12/8 per EMR. Labs: Na 133, glucose trending  x 24 hours. Meds: abx, insulin. RD to remain available for additional nutrition interventions as needed.     Previous Nutrition Diagnosis: Increased nutrient needs (energy/protein) related to R diabetic foot ulcer as evidenced by increased metabolic demands for wound healing     Active [ x ]  Resolved [   ]    Goal: Pt to meet at least 75% of nutritional needs consistently     Recommendations:  1. Continue consistent carbohydrate diet, recommend to discontinue Glucerna   2. Encourage and monitor PO intake, honor preferences as able   >> Consistently meet >75% of estimated needs during admission   3. Monitor labs, wt trends, GI function, and skin integrity   4. Pain and bowel regimen per team   5. RD to remain available for additional nutrition interventions and diet edu as needed     Education: continued PO intake     Risk Level: High [   ] Moderate [ x ] Low [   ] 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          Current Nutrition Order: Consistent Carbohydrate, Glucerna 2x/day        PO Intake: Good (%) [ x ]  Fair (50-75%) [   ] Poor (<25%) [   ]    GI Issues: endorses diarrhea, last BM 12/8 per EMR    Skin Integrity: diabetic ulcer to R foot, +1 edema to bilateral legs    Labs: Na 133, glucose trending  x 24 hours       CAPILLARY BLOOD GLUCOSE      POCT Blood Glucose.: 158 mg/dL (08 Dec 2023 12:29)  POCT Blood Glucose.: 76 mg/dL (08 Dec 2023 09:15)  POCT Blood Glucose.: 151 mg/dL (07 Dec 2023 22:04)  POCT Blood Glucose.: 112 mg/dL (07 Dec 2023 18:22)      Medications:  MEDICATIONS  (STANDING):  aspirin enteric coated 81 milliGRAM(s) Oral every 24 hours  atorvastatin 80 milliGRAM(s) Oral at bedtime  carvedilol 12.5 milliGRAM(s) Oral every 12 hours  dextrose 5%. 1000 milliLiter(s) (100 mL/Hr) IV Continuous <Continuous>  dextrose 5%. 1000 milliLiter(s) (50 mL/Hr) IV Continuous <Continuous>  dextrose 50% Injectable 12.5 Gram(s) IV Push once  dextrose 50% Injectable 25 Gram(s) IV Push once  dextrose 50% Injectable 25 Gram(s) IV Push once  gabapentin 800 milliGRAM(s) Oral three times a day  glucagon  Injectable 1 milliGRAM(s) IntraMuscular once  heparin   Injectable 5000 Unit(s) SubCutaneous every 12 hours  hydrALAZINE 50 milliGRAM(s) Oral every 8 hours  insulin glargine Injectable (LANTUS) 16 Unit(s) SubCutaneous at bedtime  insulin lispro (ADMELOG) corrective regimen sliding scale   SubCutaneous Before meals and at bedtime  insulin lispro Injectable (ADMELOG) 3 Unit(s) SubCutaneous three times a day before meals  lisinopril 40 milliGRAM(s) Oral every 24 hours  nystatin Cream 1 Application(s) Topical two times a day  piperacillin/tazobactam IVPB.. 3.375 Gram(s) IV Intermittent every 8 hours    MEDICATIONS  (PRN):  acetaminophen     Tablet .. 650 milliGRAM(s) Oral every 6 hours PRN Temp greater or equal to 38C (100.4F), Mild Pain (1 - 3)  dextrose Oral Gel 15 Gram(s) Oral once PRN Blood Glucose LESS THAN 70 milliGRAM(s)/deciliter  oxyCODONE    IR 5 milliGRAM(s) Oral every 6 hours PRN Severe Pain (7 - 10)      Anthropometrics:  Dosing height: 67in   Dosing weight: 70kg/154#  BMI: 24.2  IBW: 148#          %IBW: 104%    Weight Change: no new weights since admit     Nutrition Focused Physical Exam: Completed [   ]  Not Pertinent [ x ]  >>no overt signs of nutritional wasting     Estimated energy needs:   Weight used for calculations	current weight  Estimated Energy Needs Weight (lbs)	154.3 lb  Estimated Energy Needs Weight (kg)	70 kg  Estimated Energy Needs From (carrie/kg)	30  Estimated Energy Needs To (carrie/kg)	35  Estimated Energy Needs Calculated From (carrie/kg)	2100  Estimated Energy Needs Calculated To (carrie/kg)	2450  Weight used for calculations	current weight  Estimated Protein Needs Weight (lbs)	154.3 lb  Estimated Protein Needs Weight (kg)	70 kg  Estimated Protein Needs From (g/kg)	1.25  Estimated Protein Needs To (g/kg)	1.5  Estimated Protein Needs Calculated From (g/kg)	87.5  Estimated Protein Needs Calculated To (g/kg)	105  Estimated Fluid Needs Weight (lbs)	154.3 lb  Estimated Fluid Needs Weight (kg)	70 kg  Estimated Fluid Needs From (ml/kg)	25  Estimated Fluid Needs To (ml/kg)	30  Estimated Fluid Needs Calculated From (ml/kg)	1750  Estimated Fluid Needs Calculated To (ml/kg)	2100  Other Calculations	Estimated nutritional needs determined using [facility] Standards of Nutrition Care for wound healing using current body weight 70 kg (104%IBW).    Subjective:   67yo M PMH CAD (2 stents 2020), HFmrEF (45-50%), HTN, PAD w/ remote RLE angioplasty, R 4th toe OM s/p partial R 4th ray amputation on 10/24, RLE angiogram with AT lithotripsy and AT/peroneal balloon angioplasty 10/27, uncontrolled IDDM (a1c 12 in Oct 2023), recent admission 11/8-11/10 to cardiology service for hypertensive emergency (left AMA, was given levaquin when he left for right foot wound that pt had started treatment for back in October) who presented to [facility] after a fall onto his knees and was having b/l knee pain. Found to have increased soft tissue gas in R foot on CT scan. Admit to [facility] for further management.    Patient seen at bedside for follow up assessment. On Consistent Carbohydrate diet with Glucerna 2x/day. Endorses good appetite and reflective PO intake, observed 100% breakfast tray completed. Also observed 2 unopened bottles of Glucerna at bedside- patient states he does not like them. Recommend to discontinue in view of good PO intake. Endorses diarrhea, last BM 12/8 per EMR. Labs: Na 133, glucose trending  x 24 hours. Meds: abx, insulin. RD to remain available for additional nutrition interventions as needed.     Previous Nutrition Diagnosis: Increased nutrient needs (energy/protein) related to R diabetic foot ulcer as evidenced by increased metabolic demands for wound healing     Active [ x ]  Resolved [   ]    Goal: Pt to meet at least 75% of nutritional needs consistently     Recommendations:  1. Continue consistent carbohydrate diet, recommend to discontinue Glucerna   2. Encourage and monitor PO intake, honor preferences as able   >> Consistently meet >75% of estimated needs during admission   3. Monitor labs, wt trends, GI function, and skin integrity   4. Pain and bowel regimen per team   5. RD to remain available for additional nutrition interventions and diet edu as needed     Education: continued PO intake     Risk Level: High [   ] Moderate [ x ] Low [   ]

## 2023-12-09 LAB
ALBUMIN SERPL ELPH-MCNC: 1.7 G/DL — LOW (ref 3.3–5)
ALBUMIN SERPL ELPH-MCNC: 1.7 G/DL — LOW (ref 3.3–5)
ALBUMIN SERPL ELPH-MCNC: 2.2 G/DL — LOW (ref 3.3–5)
ALBUMIN SERPL ELPH-MCNC: 2.2 G/DL — LOW (ref 3.3–5)
ALP SERPL-CCNC: 218 U/L — HIGH (ref 40–120)
ALP SERPL-CCNC: 218 U/L — HIGH (ref 40–120)
ALP SERPL-CCNC: 255 U/L — HIGH (ref 40–120)
ALP SERPL-CCNC: 255 U/L — HIGH (ref 40–120)
ALT FLD-CCNC: 18 U/L — SIGNIFICANT CHANGE UP (ref 10–45)
ALT FLD-CCNC: 18 U/L — SIGNIFICANT CHANGE UP (ref 10–45)
ALT FLD-CCNC: 21 U/L — SIGNIFICANT CHANGE UP (ref 10–45)
ALT FLD-CCNC: 21 U/L — SIGNIFICANT CHANGE UP (ref 10–45)
ANION GAP SERPL CALC-SCNC: 5 MMOL/L — SIGNIFICANT CHANGE UP (ref 5–17)
ANION GAP SERPL CALC-SCNC: 5 MMOL/L — SIGNIFICANT CHANGE UP (ref 5–17)
ANION GAP SERPL CALC-SCNC: 6 MMOL/L — SIGNIFICANT CHANGE UP (ref 5–17)
ANION GAP SERPL CALC-SCNC: 6 MMOL/L — SIGNIFICANT CHANGE UP (ref 5–17)
ANION GAP SERPL CALC-SCNC: 7 MMOL/L — SIGNIFICANT CHANGE UP (ref 5–17)
ANION GAP SERPL CALC-SCNC: 7 MMOL/L — SIGNIFICANT CHANGE UP (ref 5–17)
APTT BLD: 31.7 SEC — SIGNIFICANT CHANGE UP (ref 24.5–35.6)
APTT BLD: 31.7 SEC — SIGNIFICANT CHANGE UP (ref 24.5–35.6)
AST SERPL-CCNC: 27 U/L — SIGNIFICANT CHANGE UP (ref 10–40)
BASOPHILS # BLD AUTO: 0.03 K/UL — SIGNIFICANT CHANGE UP (ref 0–0.2)
BASOPHILS # BLD AUTO: 0.05 K/UL — SIGNIFICANT CHANGE UP (ref 0–0.2)
BASOPHILS # BLD AUTO: 0.05 K/UL — SIGNIFICANT CHANGE UP (ref 0–0.2)
BASOPHILS NFR BLD AUTO: 0.2 % — SIGNIFICANT CHANGE UP (ref 0–2)
BASOPHILS NFR BLD AUTO: 0.2 % — SIGNIFICANT CHANGE UP (ref 0–2)
BASOPHILS NFR BLD AUTO: 0.3 % — SIGNIFICANT CHANGE UP (ref 0–2)
BASOPHILS NFR BLD AUTO: 0.3 % — SIGNIFICANT CHANGE UP (ref 0–2)
BASOPHILS NFR BLD AUTO: 0.4 % — SIGNIFICANT CHANGE UP (ref 0–2)
BASOPHILS NFR BLD AUTO: 0.4 % — SIGNIFICANT CHANGE UP (ref 0–2)
BILIRUB DIRECT SERPL-MCNC: <0.2 MG/DL — SIGNIFICANT CHANGE UP (ref 0–0.3)
BILIRUB DIRECT SERPL-MCNC: <0.2 MG/DL — SIGNIFICANT CHANGE UP (ref 0–0.3)
BILIRUB INDIRECT FLD-MCNC: SIGNIFICANT CHANGE UP MG/DL (ref 0.2–1)
BILIRUB INDIRECT FLD-MCNC: SIGNIFICANT CHANGE UP MG/DL (ref 0.2–1)
BILIRUB SERPL-MCNC: 0.2 MG/DL — SIGNIFICANT CHANGE UP (ref 0.2–1.2)
BILIRUB SERPL-MCNC: 0.2 MG/DL — SIGNIFICANT CHANGE UP (ref 0.2–1.2)
BILIRUB SERPL-MCNC: 0.3 MG/DL — SIGNIFICANT CHANGE UP (ref 0.2–1.2)
BILIRUB SERPL-MCNC: 0.3 MG/DL — SIGNIFICANT CHANGE UP (ref 0.2–1.2)
BLD GP AB SCN SERPL QL: NEGATIVE — SIGNIFICANT CHANGE UP
BLD GP AB SCN SERPL QL: NEGATIVE — SIGNIFICANT CHANGE UP
BUN SERPL-MCNC: 20 MG/DL — SIGNIFICANT CHANGE UP (ref 7–23)
BUN SERPL-MCNC: 20 MG/DL — SIGNIFICANT CHANGE UP (ref 7–23)
BUN SERPL-MCNC: 21 MG/DL — SIGNIFICANT CHANGE UP (ref 7–23)
CALCIUM SERPL-MCNC: 8 MG/DL — LOW (ref 8.4–10.5)
CALCIUM SERPL-MCNC: 8 MG/DL — LOW (ref 8.4–10.5)
CALCIUM SERPL-MCNC: 8.3 MG/DL — LOW (ref 8.4–10.5)
CALCIUM SERPL-MCNC: 8.3 MG/DL — LOW (ref 8.4–10.5)
CALCIUM SERPL-MCNC: 8.5 MG/DL — SIGNIFICANT CHANGE UP (ref 8.4–10.5)
CALCIUM SERPL-MCNC: 8.5 MG/DL — SIGNIFICANT CHANGE UP (ref 8.4–10.5)
CHLORIDE SERPL-SCNC: 103 MMOL/L — SIGNIFICANT CHANGE UP (ref 96–108)
CHLORIDE SERPL-SCNC: 103 MMOL/L — SIGNIFICANT CHANGE UP (ref 96–108)
CHLORIDE SERPL-SCNC: 104 MMOL/L — SIGNIFICANT CHANGE UP (ref 96–108)
CHLORIDE SERPL-SCNC: 104 MMOL/L — SIGNIFICANT CHANGE UP (ref 96–108)
CHLORIDE SERPL-SCNC: 106 MMOL/L — SIGNIFICANT CHANGE UP (ref 96–108)
CHLORIDE SERPL-SCNC: 106 MMOL/L — SIGNIFICANT CHANGE UP (ref 96–108)
CO2 SERPL-SCNC: 27 MMOL/L — SIGNIFICANT CHANGE UP (ref 22–31)
CO2 SERPL-SCNC: 28 MMOL/L — SIGNIFICANT CHANGE UP (ref 22–31)
CO2 SERPL-SCNC: 28 MMOL/L — SIGNIFICANT CHANGE UP (ref 22–31)
CREAT SERPL-MCNC: 1.17 MG/DL — SIGNIFICANT CHANGE UP (ref 0.5–1.3)
CREAT SERPL-MCNC: 1.17 MG/DL — SIGNIFICANT CHANGE UP (ref 0.5–1.3)
CREAT SERPL-MCNC: 1.2 MG/DL — SIGNIFICANT CHANGE UP (ref 0.5–1.3)
CREAT SERPL-MCNC: 1.2 MG/DL — SIGNIFICANT CHANGE UP (ref 0.5–1.3)
CREAT SERPL-MCNC: 1.22 MG/DL — SIGNIFICANT CHANGE UP (ref 0.5–1.3)
CREAT SERPL-MCNC: 1.22 MG/DL — SIGNIFICANT CHANGE UP (ref 0.5–1.3)
CULTURE RESULTS: SIGNIFICANT CHANGE UP
CULTURE RESULTS: SIGNIFICANT CHANGE UP
EGFR: 65 ML/MIN/1.73M2 — SIGNIFICANT CHANGE UP
EGFR: 65 ML/MIN/1.73M2 — SIGNIFICANT CHANGE UP
EGFR: 67 ML/MIN/1.73M2 — SIGNIFICANT CHANGE UP
EGFR: 67 ML/MIN/1.73M2 — SIGNIFICANT CHANGE UP
EGFR: 69 ML/MIN/1.73M2 — SIGNIFICANT CHANGE UP
EGFR: 69 ML/MIN/1.73M2 — SIGNIFICANT CHANGE UP
EOSINOPHIL # BLD AUTO: 0.08 K/UL — SIGNIFICANT CHANGE UP (ref 0–0.5)
EOSINOPHIL # BLD AUTO: 0.08 K/UL — SIGNIFICANT CHANGE UP (ref 0–0.5)
EOSINOPHIL # BLD AUTO: 0.12 K/UL — SIGNIFICANT CHANGE UP (ref 0–0.5)
EOSINOPHIL # BLD AUTO: 0.12 K/UL — SIGNIFICANT CHANGE UP (ref 0–0.5)
EOSINOPHIL # BLD AUTO: 0.14 K/UL — SIGNIFICANT CHANGE UP (ref 0–0.5)
EOSINOPHIL # BLD AUTO: 0.14 K/UL — SIGNIFICANT CHANGE UP (ref 0–0.5)
EOSINOPHIL NFR BLD AUTO: 0.6 % — SIGNIFICANT CHANGE UP (ref 0–6)
EOSINOPHIL NFR BLD AUTO: 0.6 % — SIGNIFICANT CHANGE UP (ref 0–6)
EOSINOPHIL NFR BLD AUTO: 1.1 % — SIGNIFICANT CHANGE UP (ref 0–6)
GLUCOSE BLDC GLUCOMTR-MCNC: 118 MG/DL — HIGH (ref 70–99)
GLUCOSE BLDC GLUCOMTR-MCNC: 118 MG/DL — HIGH (ref 70–99)
GLUCOSE BLDC GLUCOMTR-MCNC: 197 MG/DL — HIGH (ref 70–99)
GLUCOSE BLDC GLUCOMTR-MCNC: 197 MG/DL — HIGH (ref 70–99)
GLUCOSE BLDC GLUCOMTR-MCNC: 224 MG/DL — HIGH (ref 70–99)
GLUCOSE BLDC GLUCOMTR-MCNC: 224 MG/DL — HIGH (ref 70–99)
GLUCOSE BLDC GLUCOMTR-MCNC: 238 MG/DL — HIGH (ref 70–99)
GLUCOSE BLDC GLUCOMTR-MCNC: 238 MG/DL — HIGH (ref 70–99)
GLUCOSE BLDC GLUCOMTR-MCNC: 257 MG/DL — HIGH (ref 70–99)
GLUCOSE BLDC GLUCOMTR-MCNC: 257 MG/DL — HIGH (ref 70–99)
GLUCOSE BLDC GLUCOMTR-MCNC: 295 MG/DL — HIGH (ref 70–99)
GLUCOSE BLDC GLUCOMTR-MCNC: 295 MG/DL — HIGH (ref 70–99)
GLUCOSE BLDC GLUCOMTR-MCNC: 35 MG/DL — CRITICAL LOW (ref 70–99)
GLUCOSE BLDC GLUCOMTR-MCNC: 35 MG/DL — CRITICAL LOW (ref 70–99)
GLUCOSE SERPL-MCNC: 154 MG/DL — HIGH (ref 70–99)
GLUCOSE SERPL-MCNC: 154 MG/DL — HIGH (ref 70–99)
GLUCOSE SERPL-MCNC: 161 MG/DL — HIGH (ref 70–99)
GLUCOSE SERPL-MCNC: 161 MG/DL — HIGH (ref 70–99)
GLUCOSE SERPL-MCNC: 91 MG/DL — SIGNIFICANT CHANGE UP (ref 70–99)
GLUCOSE SERPL-MCNC: 91 MG/DL — SIGNIFICANT CHANGE UP (ref 70–99)
HCT VFR BLD CALC: 21.8 % — LOW (ref 39–50)
HCT VFR BLD CALC: 21.8 % — LOW (ref 39–50)
HCT VFR BLD CALC: 25.6 % — LOW (ref 39–50)
HCT VFR BLD CALC: 25.6 % — LOW (ref 39–50)
HCT VFR BLD CALC: 26.3 % — LOW (ref 39–50)
HCT VFR BLD CALC: 26.3 % — LOW (ref 39–50)
HGB BLD-MCNC: 6.9 G/DL — CRITICAL LOW (ref 13–17)
HGB BLD-MCNC: 6.9 G/DL — CRITICAL LOW (ref 13–17)
HGB BLD-MCNC: 8 G/DL — LOW (ref 13–17)
IMM GRANULOCYTES NFR BLD AUTO: 0.7 % — SIGNIFICANT CHANGE UP (ref 0–0.9)
IMM GRANULOCYTES NFR BLD AUTO: 0.7 % — SIGNIFICANT CHANGE UP (ref 0–0.9)
IMM GRANULOCYTES NFR BLD AUTO: 0.8 % — SIGNIFICANT CHANGE UP (ref 0–0.9)
INR BLD: 1.02 — SIGNIFICANT CHANGE UP (ref 0.85–1.18)
INR BLD: 1.02 — SIGNIFICANT CHANGE UP (ref 0.85–1.18)
LACTATE SERPL-SCNC: 1.1 MMOL/L — SIGNIFICANT CHANGE UP (ref 0.5–2)
LACTATE SERPL-SCNC: 1.1 MMOL/L — SIGNIFICANT CHANGE UP (ref 0.5–2)
LYMPHOCYTES # BLD AUTO: 0.88 K/UL — LOW (ref 1–3.3)
LYMPHOCYTES # BLD AUTO: 0.88 K/UL — LOW (ref 1–3.3)
LYMPHOCYTES # BLD AUTO: 1.11 K/UL — SIGNIFICANT CHANGE UP (ref 1–3.3)
LYMPHOCYTES # BLD AUTO: 1.11 K/UL — SIGNIFICANT CHANGE UP (ref 1–3.3)
LYMPHOCYTES # BLD AUTO: 1.12 K/UL — SIGNIFICANT CHANGE UP (ref 1–3.3)
LYMPHOCYTES # BLD AUTO: 1.12 K/UL — SIGNIFICANT CHANGE UP (ref 1–3.3)
LYMPHOCYTES # BLD AUTO: 7.9 % — LOW (ref 13–44)
LYMPHOCYTES # BLD AUTO: 7.9 % — LOW (ref 13–44)
LYMPHOCYTES # BLD AUTO: 8.2 % — LOW (ref 13–44)
LYMPHOCYTES # BLD AUTO: 8.2 % — LOW (ref 13–44)
LYMPHOCYTES # BLD AUTO: 8.4 % — LOW (ref 13–44)
LYMPHOCYTES # BLD AUTO: 8.4 % — LOW (ref 13–44)
MAGNESIUM SERPL-MCNC: 1.9 MG/DL — SIGNIFICANT CHANGE UP (ref 1.6–2.6)
MCHC RBC-ENTMCNC: 29.5 PG — SIGNIFICANT CHANGE UP (ref 27–34)
MCHC RBC-ENTMCNC: 29.5 PG — SIGNIFICANT CHANGE UP (ref 27–34)
MCHC RBC-ENTMCNC: 29.9 PG — SIGNIFICANT CHANGE UP (ref 27–34)
MCHC RBC-ENTMCNC: 29.9 PG — SIGNIFICANT CHANGE UP (ref 27–34)
MCHC RBC-ENTMCNC: 30 PG — SIGNIFICANT CHANGE UP (ref 27–34)
MCHC RBC-ENTMCNC: 30 PG — SIGNIFICANT CHANGE UP (ref 27–34)
MCHC RBC-ENTMCNC: 30.4 GM/DL — LOW (ref 32–36)
MCHC RBC-ENTMCNC: 30.4 GM/DL — LOW (ref 32–36)
MCHC RBC-ENTMCNC: 31.3 GM/DL — LOW (ref 32–36)
MCHC RBC-ENTMCNC: 31.3 GM/DL — LOW (ref 32–36)
MCHC RBC-ENTMCNC: 31.7 GM/DL — LOW (ref 32–36)
MCHC RBC-ENTMCNC: 31.7 GM/DL — LOW (ref 32–36)
MCV RBC AUTO: 94.4 FL — SIGNIFICANT CHANGE UP (ref 80–100)
MCV RBC AUTO: 94.4 FL — SIGNIFICANT CHANGE UP (ref 80–100)
MCV RBC AUTO: 95.9 FL — SIGNIFICANT CHANGE UP (ref 80–100)
MCV RBC AUTO: 95.9 FL — SIGNIFICANT CHANGE UP (ref 80–100)
MCV RBC AUTO: 97 FL — SIGNIFICANT CHANGE UP (ref 80–100)
MCV RBC AUTO: 97 FL — SIGNIFICANT CHANGE UP (ref 80–100)
MONOCYTES # BLD AUTO: 0.96 K/UL — HIGH (ref 0–0.9)
MONOCYTES # BLD AUTO: 0.96 K/UL — HIGH (ref 0–0.9)
MONOCYTES # BLD AUTO: 1.1 K/UL — HIGH (ref 0–0.9)
MONOCYTES NFR BLD AUTO: 7.8 % — SIGNIFICANT CHANGE UP (ref 2–14)
MONOCYTES NFR BLD AUTO: 7.8 % — SIGNIFICANT CHANGE UP (ref 2–14)
MONOCYTES NFR BLD AUTO: 8.3 % — SIGNIFICANT CHANGE UP (ref 2–14)
MONOCYTES NFR BLD AUTO: 8.3 % — SIGNIFICANT CHANGE UP (ref 2–14)
MONOCYTES NFR BLD AUTO: 8.9 % — SIGNIFICANT CHANGE UP (ref 2–14)
MONOCYTES NFR BLD AUTO: 8.9 % — SIGNIFICANT CHANGE UP (ref 2–14)
NEUTROPHILS # BLD AUTO: 10.7 K/UL — HIGH (ref 1.8–7.4)
NEUTROPHILS # BLD AUTO: 10.7 K/UL — HIGH (ref 1.8–7.4)
NEUTROPHILS # BLD AUTO: 11.68 K/UL — HIGH (ref 1.8–7.4)
NEUTROPHILS # BLD AUTO: 11.68 K/UL — HIGH (ref 1.8–7.4)
NEUTROPHILS # BLD AUTO: 8.7 K/UL — HIGH (ref 1.8–7.4)
NEUTROPHILS # BLD AUTO: 8.7 K/UL — HIGH (ref 1.8–7.4)
NEUTROPHILS NFR BLD AUTO: 80.8 % — HIGH (ref 43–77)
NEUTROPHILS NFR BLD AUTO: 80.8 % — HIGH (ref 43–77)
NEUTROPHILS NFR BLD AUTO: 81.2 % — HIGH (ref 43–77)
NEUTROPHILS NFR BLD AUTO: 81.2 % — HIGH (ref 43–77)
NEUTROPHILS NFR BLD AUTO: 82.5 % — HIGH (ref 43–77)
NEUTROPHILS NFR BLD AUTO: 82.5 % — HIGH (ref 43–77)
NRBC # BLD: 0 /100 WBCS — SIGNIFICANT CHANGE UP (ref 0–0)
PHOSPHATE SERPL-MCNC: 2.5 MG/DL — SIGNIFICANT CHANGE UP (ref 2.5–4.5)
PHOSPHATE SERPL-MCNC: 2.5 MG/DL — SIGNIFICANT CHANGE UP (ref 2.5–4.5)
PHOSPHATE SERPL-MCNC: 3.3 MG/DL — SIGNIFICANT CHANGE UP (ref 2.5–4.5)
PHOSPHATE SERPL-MCNC: 3.3 MG/DL — SIGNIFICANT CHANGE UP (ref 2.5–4.5)
PLATELET # BLD AUTO: 255 K/UL — SIGNIFICANT CHANGE UP (ref 150–400)
PLATELET # BLD AUTO: 255 K/UL — SIGNIFICANT CHANGE UP (ref 150–400)
PLATELET # BLD AUTO: 306 K/UL — SIGNIFICANT CHANGE UP (ref 150–400)
PLATELET # BLD AUTO: 306 K/UL — SIGNIFICANT CHANGE UP (ref 150–400)
PLATELET # BLD AUTO: 307 K/UL — SIGNIFICANT CHANGE UP (ref 150–400)
PLATELET # BLD AUTO: 307 K/UL — SIGNIFICANT CHANGE UP (ref 150–400)
POTASSIUM SERPL-MCNC: 3.7 MMOL/L — SIGNIFICANT CHANGE UP (ref 3.5–5.3)
POTASSIUM SERPL-MCNC: 3.7 MMOL/L — SIGNIFICANT CHANGE UP (ref 3.5–5.3)
POTASSIUM SERPL-MCNC: 4.2 MMOL/L — SIGNIFICANT CHANGE UP (ref 3.5–5.3)
POTASSIUM SERPL-MCNC: 4.2 MMOL/L — SIGNIFICANT CHANGE UP (ref 3.5–5.3)
POTASSIUM SERPL-MCNC: 4.4 MMOL/L — SIGNIFICANT CHANGE UP (ref 3.5–5.3)
POTASSIUM SERPL-MCNC: 4.4 MMOL/L — SIGNIFICANT CHANGE UP (ref 3.5–5.3)
POTASSIUM SERPL-SCNC: 3.7 MMOL/L — SIGNIFICANT CHANGE UP (ref 3.5–5.3)
POTASSIUM SERPL-SCNC: 3.7 MMOL/L — SIGNIFICANT CHANGE UP (ref 3.5–5.3)
POTASSIUM SERPL-SCNC: 4.2 MMOL/L — SIGNIFICANT CHANGE UP (ref 3.5–5.3)
POTASSIUM SERPL-SCNC: 4.2 MMOL/L — SIGNIFICANT CHANGE UP (ref 3.5–5.3)
POTASSIUM SERPL-SCNC: 4.4 MMOL/L — SIGNIFICANT CHANGE UP (ref 3.5–5.3)
POTASSIUM SERPL-SCNC: 4.4 MMOL/L — SIGNIFICANT CHANGE UP (ref 3.5–5.3)
PROT SERPL-MCNC: 5.8 G/DL — LOW (ref 6–8.3)
PROT SERPL-MCNC: 5.8 G/DL — LOW (ref 6–8.3)
PROT SERPL-MCNC: 6.7 G/DL — SIGNIFICANT CHANGE UP (ref 6–8.3)
PROT SERPL-MCNC: 6.7 G/DL — SIGNIFICANT CHANGE UP (ref 6–8.3)
PROTHROM AB SERPL-ACNC: 11.6 SEC — SIGNIFICANT CHANGE UP (ref 9.5–13)
PROTHROM AB SERPL-ACNC: 11.6 SEC — SIGNIFICANT CHANGE UP (ref 9.5–13)
RBC # BLD: 2.31 M/UL — LOW (ref 4.2–5.8)
RBC # BLD: 2.31 M/UL — LOW (ref 4.2–5.8)
RBC # BLD: 2.67 M/UL — LOW (ref 4.2–5.8)
RBC # BLD: 2.67 M/UL — LOW (ref 4.2–5.8)
RBC # BLD: 2.71 M/UL — LOW (ref 4.2–5.8)
RBC # BLD: 2.71 M/UL — LOW (ref 4.2–5.8)
RBC # FLD: 14.2 % — SIGNIFICANT CHANGE UP (ref 10.3–14.5)
RBC # FLD: 14.2 % — SIGNIFICANT CHANGE UP (ref 10.3–14.5)
RBC # FLD: 14.4 % — SIGNIFICANT CHANGE UP (ref 10.3–14.5)
RBC # FLD: 14.4 % — SIGNIFICANT CHANGE UP (ref 10.3–14.5)
RBC # FLD: 14.5 % — SIGNIFICANT CHANGE UP (ref 10.3–14.5)
RBC # FLD: 14.5 % — SIGNIFICANT CHANGE UP (ref 10.3–14.5)
RH IG SCN BLD-IMP: POSITIVE — SIGNIFICANT CHANGE UP
RH IG SCN BLD-IMP: POSITIVE — SIGNIFICANT CHANGE UP
SODIUM SERPL-SCNC: 136 MMOL/L — SIGNIFICANT CHANGE UP (ref 135–145)
SODIUM SERPL-SCNC: 136 MMOL/L — SIGNIFICANT CHANGE UP (ref 135–145)
SODIUM SERPL-SCNC: 138 MMOL/L — SIGNIFICANT CHANGE UP (ref 135–145)
SODIUM SERPL-SCNC: 138 MMOL/L — SIGNIFICANT CHANGE UP (ref 135–145)
SODIUM SERPL-SCNC: 139 MMOL/L — SIGNIFICANT CHANGE UP (ref 135–145)
SODIUM SERPL-SCNC: 139 MMOL/L — SIGNIFICANT CHANGE UP (ref 135–145)
SPECIMEN SOURCE: SIGNIFICANT CHANGE UP
SPECIMEN SOURCE: SIGNIFICANT CHANGE UP
WBC # BLD: 10.76 K/UL — HIGH (ref 3.8–10.5)
WBC # BLD: 10.76 K/UL — HIGH (ref 3.8–10.5)
WBC # BLD: 13.19 K/UL — HIGH (ref 3.8–10.5)
WBC # BLD: 13.19 K/UL — HIGH (ref 3.8–10.5)
WBC # BLD: 14.14 K/UL — HIGH (ref 3.8–10.5)
WBC # BLD: 14.14 K/UL — HIGH (ref 3.8–10.5)
WBC # FLD AUTO: 10.76 K/UL — HIGH (ref 3.8–10.5)
WBC # FLD AUTO: 10.76 K/UL — HIGH (ref 3.8–10.5)
WBC # FLD AUTO: 13.19 K/UL — HIGH (ref 3.8–10.5)
WBC # FLD AUTO: 13.19 K/UL — HIGH (ref 3.8–10.5)
WBC # FLD AUTO: 14.14 K/UL — HIGH (ref 3.8–10.5)
WBC # FLD AUTO: 14.14 K/UL — HIGH (ref 3.8–10.5)

## 2023-12-09 PROCEDURE — 93010 ELECTROCARDIOGRAM REPORT: CPT

## 2023-12-09 PROCEDURE — 99291 CRITICAL CARE FIRST HOUR: CPT

## 2023-12-09 PROCEDURE — 99221 1ST HOSP IP/OBS SF/LOW 40: CPT

## 2023-12-09 PROCEDURE — 99233 SBSQ HOSP IP/OBS HIGH 50: CPT

## 2023-12-09 RX ORDER — INSULIN LISPRO 100/ML
VIAL (ML) SUBCUTANEOUS
Refills: 0 | Status: DISCONTINUED | OUTPATIENT
Start: 2023-12-09 | End: 2023-12-11

## 2023-12-09 RX ORDER — MEROPENEM 1 G/30ML
1000 INJECTION INTRAVENOUS EVERY 8 HOURS
Refills: 0 | Status: DISCONTINUED | OUTPATIENT
Start: 2023-12-09 | End: 2023-12-09

## 2023-12-09 RX ORDER — VANCOMYCIN HCL 1 G
1500 VIAL (EA) INTRAVENOUS EVERY 12 HOURS
Refills: 0 | Status: DISCONTINUED | OUTPATIENT
Start: 2023-12-09 | End: 2023-12-10

## 2023-12-09 RX ORDER — INSULIN GLARGINE 100 [IU]/ML
15 INJECTION, SOLUTION SUBCUTANEOUS AT BEDTIME
Refills: 0 | Status: DISCONTINUED | OUTPATIENT
Start: 2023-12-09 | End: 2023-12-11

## 2023-12-09 RX ORDER — PIPERACILLIN AND TAZOBACTAM 4; .5 G/20ML; G/20ML
3.38 INJECTION, POWDER, LYOPHILIZED, FOR SOLUTION INTRAVENOUS EVERY 8 HOURS
Refills: 0 | Status: DISCONTINUED | OUTPATIENT
Start: 2023-12-09 | End: 2023-12-09

## 2023-12-09 RX ORDER — INSULIN GLARGINE 100 [IU]/ML
15 INJECTION, SOLUTION SUBCUTANEOUS AT BEDTIME
Refills: 0 | Status: DISCONTINUED | OUTPATIENT
Start: 2023-12-09 | End: 2023-12-09

## 2023-12-09 RX ORDER — PIPERACILLIN AND TAZOBACTAM 4; .5 G/20ML; G/20ML
3.38 INJECTION, POWDER, LYOPHILIZED, FOR SOLUTION INTRAVENOUS EVERY 8 HOURS
Refills: 0 | Status: DISCONTINUED | OUTPATIENT
Start: 2023-12-09 | End: 2023-12-10

## 2023-12-09 RX ADMIN — MEROPENEM 100 MILLIGRAM(S): 1 INJECTION INTRAVENOUS at 10:32

## 2023-12-09 RX ADMIN — CARVEDILOL PHOSPHATE 12.5 MILLIGRAM(S): 80 CAPSULE, EXTENDED RELEASE ORAL at 18:44

## 2023-12-09 RX ADMIN — OXYCODONE HYDROCHLORIDE 5 MILLIGRAM(S): 5 TABLET ORAL at 04:17

## 2023-12-09 RX ADMIN — Medication 81 MILLIGRAM(S): at 09:49

## 2023-12-09 RX ADMIN — OXYCODONE HYDROCHLORIDE 5 MILLIGRAM(S): 5 TABLET ORAL at 12:18

## 2023-12-09 RX ADMIN — GABAPENTIN 800 MILLIGRAM(S): 400 CAPSULE ORAL at 06:13

## 2023-12-09 RX ADMIN — Medication 50 MILLIGRAM(S): at 22:34

## 2023-12-09 RX ADMIN — LISINOPRIL 40 MILLIGRAM(S): 2.5 TABLET ORAL at 12:12

## 2023-12-09 RX ADMIN — OXYCODONE HYDROCHLORIDE 5 MILLIGRAM(S): 5 TABLET ORAL at 21:55

## 2023-12-09 RX ADMIN — HEPARIN SODIUM 5000 UNIT(S): 5000 INJECTION INTRAVENOUS; SUBCUTANEOUS at 12:11

## 2023-12-09 RX ADMIN — PIPERACILLIN AND TAZOBACTAM 25 GRAM(S): 4; .5 INJECTION, POWDER, LYOPHILIZED, FOR SOLUTION INTRAVENOUS at 00:20

## 2023-12-09 RX ADMIN — INSULIN GLARGINE 15 UNIT(S): 100 INJECTION, SOLUTION SUBCUTANEOUS at 22:34

## 2023-12-09 RX ADMIN — OXYCODONE HYDROCHLORIDE 5 MILLIGRAM(S): 5 TABLET ORAL at 03:31

## 2023-12-09 RX ADMIN — GABAPENTIN 800 MILLIGRAM(S): 400 CAPSULE ORAL at 22:35

## 2023-12-09 RX ADMIN — Medication 50 MILLIGRAM(S): at 06:13

## 2023-12-09 RX ADMIN — Medication 300 MILLIGRAM(S): at 11:10

## 2023-12-09 RX ADMIN — OXYCODONE HYDROCHLORIDE 5 MILLIGRAM(S): 5 TABLET ORAL at 21:01

## 2023-12-09 RX ADMIN — HEPARIN SODIUM 5000 UNIT(S): 5000 INJECTION INTRAVENOUS; SUBCUTANEOUS at 21:01

## 2023-12-09 RX ADMIN — Medication 6: at 13:01

## 2023-12-09 RX ADMIN — CARVEDILOL PHOSPHATE 12.5 MILLIGRAM(S): 80 CAPSULE, EXTENDED RELEASE ORAL at 06:13

## 2023-12-09 RX ADMIN — Medication 3 UNIT(S): at 18:44

## 2023-12-09 RX ADMIN — GABAPENTIN 800 MILLIGRAM(S): 400 CAPSULE ORAL at 14:23

## 2023-12-09 RX ADMIN — Medication 50 MILLIGRAM(S): at 14:23

## 2023-12-09 RX ADMIN — OXYCODONE HYDROCHLORIDE 5 MILLIGRAM(S): 5 TABLET ORAL at 13:18

## 2023-12-09 RX ADMIN — Medication 3 UNIT(S): at 13:01

## 2023-12-09 RX ADMIN — Medication 2: at 22:34

## 2023-12-09 RX ADMIN — PIPERACILLIN AND TAZOBACTAM 25 GRAM(S): 4; .5 INJECTION, POWDER, LYOPHILIZED, FOR SOLUTION INTRAVENOUS at 22:34

## 2023-12-09 RX ADMIN — ATORVASTATIN CALCIUM 80 MILLIGRAM(S): 80 TABLET, FILM COATED ORAL at 22:33

## 2023-12-09 NOTE — CONSULT NOTE ADULT - TIME BILLING
chart review, discuss with primary team
Emotional Support/Supportive Care and Clarification of Potential Disease Trajectory related to gas gangrene and sepsis.  Exploration of GOC including advanced directives such as HCP designation and code status.

## 2023-12-09 NOTE — CONSULT NOTE ADULT - SUBJECTIVE AND OBJECTIVE BOX
**Incomplete Note**    Cardiology Consult      HPI:  67yo M PMH CAD (2 stents 2020), grade 1 diastolic dysfunction, HTN, PAD w/ remote RLE angioplasty, R 4th toe OM s/p partial R 4th ray amputation on 10/24, RLE angiogram with AT lithotripsy and AT/peroneal balloon angioplasty 10/27, uncontrolled IDDM (a1c 12 in Oct 2023), recent admission 11/8-11/10 to cardiology service for hypertensive emergency (left AMA, was given levaquin when he left for right foot wound that pt had started treatment for back in October) who presented to [facility] after a fall onto his knees and was having b/l knee pain. The initial pain was severe and he felt like they were swollen so he came to the ED. Knee pain is now getting better. Reports having right foot pain for which he takes oxy 10 q8h at home. He does not wrap the foot or put any topical medications on it. Denies fever, chills, SOB, CP.      ED course:  vitals: afebrile, HR 70, /87, RR 16, O2sat 98 on room air  labs: wbc 13, Na 130, glucose 476, BNP 01987  CXR la/ap: persistent small left and a new small right pleural effusion  b/l knee xray: Moderate to severe left worse than right knee arthrosis without acute displaced fracture  CT angio b/l LE: Increased soft tissue gas in the forefoot. No drainable abscess. Patent two vessel runoff in both lower extremities. No significant knee joint effusion.  ECG: regular, sinus, isolated Q wave AVF, borderline LVH  Interventions: regular insulin 10u total, Lasix 40 IV, norvasc 10, morphine 2 IV x2, tylenol 1g IV. Vanc 1g, zosyn 3.375  Intensivist was called in ED for hyperglycemia but since not in DKA no telemetry was needed.  [facility] also contacted ortho for b/l knee pain and Vascular for R foot wound (02 Dec 2023 01:15)        Pt seen and examined at bedside, NAD, denies chest pain/pressure/discomfort. ROS s/f ?,      Review of Systems:  CONSTITUTIONAL:  No weight loss, fever, chills, weakness or fatigue.  HEENT:  Eyes:  No visual loss, blurred vision, double vision or yellow sclerae. Ears, Nose, Throat:  No hearing loss, sneezing, congestion, runny nose or sore throat.  SKIN:  No rash or itching.  CARDIOVASCULAR:  No chest pain, chest pressure or chest discomfort. No palpitations or edema.  RESPIRATORY:  No shortness of breath, cough or sputum.  GASTROINTESTINAL:  No anorexia, nausea, vomiting or diarrhea. No abdominal pain or blood.  GENITOURINARY: No Burning on urination.   NEUROLOGICAL:  see HPI  MUSCULOSKELETAL:  No muscle, back pain, joint pain or stiffness.  HEMATOLOGIC:  No anemia, bleeding or bruising.  LYMPHATICS:  No enlarged nodes. No history of splenectomy.  PSYCHIATRIC:  No history of depression or anxiety.  ENDOCRINOLOGIC:  No reports of sweating, cold or heat intolerance. No polyuria or polydipsia.  ALLERGIES:  No history of asthma, hives, eczema or rhinitis.    PAST MEDICAL & SURGICAL HISTORY:  IDDM (insulin dependent diabetes mellitus)      HTN (hypertension)      CAD S/P percutaneous coronary angioplasty      Neuropathy      PAD (peripheral artery disease)      PNA (pneumonia)      Gangrene of toe of right foot      Moderate aortic stenosis      Acute on chronic diastolic congestive heart failure      Hyperlipidemia      History of partial ray amputation of fourth toe of right foot        SOCIAL HISTORY:  FAMILY HISTORY:    ALLERGIES: 	  No Known Allergies          MEDICATIONS:  acetaminophen     Tablet .. 650 milliGRAM(s) Oral every 6 hours PRN  aspirin enteric coated 81 milliGRAM(s) Oral every 24 hours  atorvastatin 80 milliGRAM(s) Oral at bedtime  carvedilol 12.5 milliGRAM(s) Oral every 12 hours  dextrose 5%. 1000 milliLiter(s) IV Continuous <Continuous>  dextrose 5%. 1000 milliLiter(s) IV Continuous <Continuous>  dextrose 50% Injectable 12.5 Gram(s) IV Push once  dextrose 50% Injectable 25 Gram(s) IV Push once  dextrose 50% Injectable 25 Gram(s) IV Push once  dextrose Oral Gel 15 Gram(s) Oral once PRN  gabapentin 800 milliGRAM(s) Oral three times a day  glucagon  Injectable 1 milliGRAM(s) IntraMuscular once  heparin   Injectable 5000 Unit(s) SubCutaneous every 8 hours  hydrALAZINE 50 milliGRAM(s) Oral every 8 hours  insulin glargine Injectable (LANTUS) 16 Unit(s) SubCutaneous at bedtime  insulin lispro (ADMELOG) corrective regimen sliding scale   SubCutaneous Before meals and at bedtime  insulin lispro Injectable (ADMELOG) 3 Unit(s) SubCutaneous three times a day before meals  lisinopril 40 milliGRAM(s) Oral every 24 hours  nystatin Cream 1 Application(s) Topical two times a day  oxyCODONE    IR 5 milliGRAM(s) Oral every 6 hours PRN  piperacillin/tazobactam IVPB.. 3.375 Gram(s) IV Intermittent every 8 hours  vancomycin  IVPB 1500 milliGRAM(s) IV Intermittent every 12 hours      T(C): 36.4 (12-09-23 @ 11:58), Max: 36.8 (12-08-23 @ 20:36)  HR: 77 (12-09-23 @ 11:58) (52 - 77)  BP: 137/71 (12-09-23 @ 11:58) (137/71 - 184/83)  RR: 18 (12-09-23 @ 11:58) (18 - 19)  SpO2: 97% (12-09-23 @ 11:58) (95% - 99%)      PHYSICAL EXAM:    Constitutional: resting comfortably in bed; NAD  HEENT: NC/AT, PERRL, EOMI, anicteric sclera, no nasal discharge; uvula midline, no oropharyngeal erythema or exudates; MMM  Neck: supple; no thyromegaly, JVP ? cm H20, JVD +/-  Respiratory: CTA B/L; no W/R/R, no retractions  Cardiac: +S1/S2; RRR; no M/R/G; PMI non-displaced  Gastrointestinal: soft, NT/ND; no rebound or guarding; +BSx4  Extremities: WWP, no clubbing or cyanosis; no peripheral edema  Musculoskeletal: NROM x4; no joint swelling, tenderness or erythema  Vascular: 2+ radial, DP/PT pulses B/L  Dermatologic: skin warm, dry and intact; no rashes, wounds, or scars  Lymphatic: no submandibular or cervical LAD  Neurologic: AAOx3; CNII-XII grossly intact; no focal deficits        I&O's Summary      LABS:	 	                        8.0    14.14 )-----------( 307      ( 09 Dec 2023 10:33 )             26.3     12-09    138  |  103  |  21  ----------------------------<  161<H>  4.4   |  28  |  1.20    Ca    8.3<L>      09 Dec 2023 10:33  Phos  3.3     12-09  Mg     1.9     12-09    TPro  6.7  /  Alb  2.2<L>  /  TBili  0.3  /  DBili  x   /  AST  27  /  ALT  21  /  AlkPhos  255<H>  12-09         **Incomplete Note**    Cardiology Consult      HPI:  67yo M PMH CAD (2 stents 2020), grade 1 diastolic dysfunction, HTN, PAD w/ remote RLE angioplasty, R 4th toe OM s/p partial R 4th ray amputation on 10/24, RLE angiogram with AT lithotripsy and AT/peroneal balloon angioplasty 10/27, uncontrolled IDDM (a1c 12 in Oct 2023), recent admission 11/8-11/10 to cardiology service for hypertensive emergency (left AMA, was given levaquin when he left for right foot wound that pt had started treatment for back in October) who presented to [facility] after a fall onto his knees and was having b/l knee pain. The initial pain was severe and he felt like they were swollen so he came to the ED. Knee pain is now getting better. Reports having right foot pain for which he takes oxy 10 q8h at home. He does not wrap the foot or put any topical medications on it. Denies fever, chills, SOB, CP.      ED course:  vitals: afebrile, HR 70, /87, RR 16, O2sat 98 on room air  labs: wbc 13, Na 130, glucose 476, BNP 17435  CXR la/ap: persistent small left and a new small right pleural effusion  b/l knee xray: Moderate to severe left worse than right knee arthrosis without acute displaced fracture  CT angio b/l LE: Increased soft tissue gas in the forefoot. No drainable abscess. Patent two vessel runoff in both lower extremities. No significant knee joint effusion.  ECG: regular, sinus, isolated Q wave AVF, borderline LVH  Interventions: regular insulin 10u total, Lasix 40 IV, norvasc 10, morphine 2 IV x2, tylenol 1g IV. Vanc 1g, zosyn 3.375  Intensivist was called in ED for hyperglycemia but since not in DKA no telemetry was needed.  [facility] also contacted ortho for b/l knee pain and Vascular for R foot wound (02 Dec 2023 01:15)        Pt seen and examined at bedside, NAD, denies chest pain/pressure/discomfort. ROS s/f ?,      Review of Systems:  CONSTITUTIONAL:  No weight loss, fever, chills, weakness or fatigue.  HEENT:  Eyes:  No visual loss, blurred vision, double vision or yellow sclerae. Ears, Nose, Throat:  No hearing loss, sneezing, congestion, runny nose or sore throat.  SKIN:  No rash or itching.  CARDIOVASCULAR:  No chest pain, chest pressure or chest discomfort. No palpitations or edema.  RESPIRATORY:  No shortness of breath, cough or sputum.  GASTROINTESTINAL:  No anorexia, nausea, vomiting or diarrhea. No abdominal pain or blood.  GENITOURINARY: No Burning on urination.   NEUROLOGICAL:  see HPI  MUSCULOSKELETAL:  No muscle, back pain, joint pain or stiffness.  HEMATOLOGIC:  No anemia, bleeding or bruising.  LYMPHATICS:  No enlarged nodes. No history of splenectomy.  PSYCHIATRIC:  No history of depression or anxiety.  ENDOCRINOLOGIC:  No reports of sweating, cold or heat intolerance. No polyuria or polydipsia.  ALLERGIES:  No history of asthma, hives, eczema or rhinitis.    PAST MEDICAL & SURGICAL HISTORY:  IDDM (insulin dependent diabetes mellitus)      HTN (hypertension)      CAD S/P percutaneous coronary angioplasty      Neuropathy      PAD (peripheral artery disease)      PNA (pneumonia)      Gangrene of toe of right foot      Moderate aortic stenosis      Acute on chronic diastolic congestive heart failure      Hyperlipidemia      History of partial ray amputation of fourth toe of right foot        SOCIAL HISTORY:  FAMILY HISTORY:    ALLERGIES: 	  No Known Allergies          MEDICATIONS:  acetaminophen     Tablet .. 650 milliGRAM(s) Oral every 6 hours PRN  aspirin enteric coated 81 milliGRAM(s) Oral every 24 hours  atorvastatin 80 milliGRAM(s) Oral at bedtime  carvedilol 12.5 milliGRAM(s) Oral every 12 hours  dextrose 5%. 1000 milliLiter(s) IV Continuous <Continuous>  dextrose 5%. 1000 milliLiter(s) IV Continuous <Continuous>  dextrose 50% Injectable 12.5 Gram(s) IV Push once  dextrose 50% Injectable 25 Gram(s) IV Push once  dextrose 50% Injectable 25 Gram(s) IV Push once  dextrose Oral Gel 15 Gram(s) Oral once PRN  gabapentin 800 milliGRAM(s) Oral three times a day  glucagon  Injectable 1 milliGRAM(s) IntraMuscular once  heparin   Injectable 5000 Unit(s) SubCutaneous every 8 hours  hydrALAZINE 50 milliGRAM(s) Oral every 8 hours  insulin glargine Injectable (LANTUS) 16 Unit(s) SubCutaneous at bedtime  insulin lispro (ADMELOG) corrective regimen sliding scale   SubCutaneous Before meals and at bedtime  insulin lispro Injectable (ADMELOG) 3 Unit(s) SubCutaneous three times a day before meals  lisinopril 40 milliGRAM(s) Oral every 24 hours  nystatin Cream 1 Application(s) Topical two times a day  oxyCODONE    IR 5 milliGRAM(s) Oral every 6 hours PRN  piperacillin/tazobactam IVPB.. 3.375 Gram(s) IV Intermittent every 8 hours  vancomycin  IVPB 1500 milliGRAM(s) IV Intermittent every 12 hours      T(C): 36.4 (12-09-23 @ 11:58), Max: 36.8 (12-08-23 @ 20:36)  HR: 77 (12-09-23 @ 11:58) (52 - 77)  BP: 137/71 (12-09-23 @ 11:58) (137/71 - 184/83)  RR: 18 (12-09-23 @ 11:58) (18 - 19)  SpO2: 97% (12-09-23 @ 11:58) (95% - 99%)      PHYSICAL EXAM:    Constitutional: resting comfortably in bed; NAD  HEENT: NC/AT, PERRL, EOMI, anicteric sclera, no nasal discharge; uvula midline, no oropharyngeal erythema or exudates; MMM  Neck: supple; no thyromegaly, JVP ? cm H20, JVD +/-  Respiratory: CTA B/L; no W/R/R, no retractions  Cardiac: +S1/S2; RRR; no M/R/G; PMI non-displaced  Gastrointestinal: soft, NT/ND; no rebound or guarding; +BSx4  Extremities: WWP, no clubbing or cyanosis; no peripheral edema  Musculoskeletal: NROM x4; no joint swelling, tenderness or erythema  Vascular: 2+ radial, DP/PT pulses B/L  Dermatologic: skin warm, dry and intact; no rashes, wounds, or scars  Lymphatic: no submandibular or cervical LAD  Neurologic: AAOx3; CNII-XII grossly intact; no focal deficits        I&O's Summary      LABS:	 	                        8.0    14.14 )-----------( 307      ( 09 Dec 2023 10:33 )             26.3     12-09    138  |  103  |  21  ----------------------------<  161<H>  4.4   |  28  |  1.20    Ca    8.3<L>      09 Dec 2023 10:33  Phos  3.3     12-09  Mg     1.9     12-09    TPro  6.7  /  Alb  2.2<L>  /  TBili  0.3  /  DBili  x   /  AST  27  /  ALT  21  /  AlkPhos  255<H>  12-09         Cardiology Consult      HPI:  67yo M PMH CAD (2 stents 2020), grade 1 diastolic dysfunction, HTN, PAD w/ remote RLE angioplasty, R 4th toe OM s/p partial R 4th ray amputation on 10/24, RLE angiogram with AT lithotripsy and AT/peroneal balloon angioplasty 10/27, uncontrolled IDDM (a1c 12 in Oct 2023), recent admission 11/8-11/10 to cardiology service for hypertensive emergency (left AMA, was given levaquin when he left for right foot wound that pt had started treatment for back in October) who presented to [facility] after a fall onto his knees and was having b/l knee pain. The initial pain was severe and he felt like they were swollen so he came to the ED. Knee pain is now getting better. Reports having right foot pain for which he takes oxy 10 q8h at home. He does not wrap the foot or put any topical medications on it. Denies fever, chills, SOB, CP.      ED course:  vitals: afebrile, HR 70, /87, RR 16, O2sat 98 on room air  labs: wbc 13, Na 130, glucose 476, BNP 53743  CXR la/ap: persistent small left and a new small right pleural effusion  b/l knee xray: Moderate to severe left worse than right knee arthrosis without acute displaced fracture  CT angio b/l LE: Increased soft tissue gas in the forefoot. No drainable abscess. Patent two vessel runoff in both lower extremities. No significant knee joint effusion.  ECG: regular, sinus, isolated Q wave AVF, borderline LVH  Interventions: regular insulin 10u total, Lasix 40 IV, norvasc 10, morphine 2 IV x2, tylenol 1g IV. Vanc 1g, zosyn 3.375  Intensivist was called in ED for hyperglycemia but since not in DKA no telemetry was needed.  [facility] also contacted ortho for b/l knee pain and Vascular for R foot wound (02 Dec 2023 01:15)        Pt seen and examined at bedside, pt deferred interview process and requested that I leave. Unable to obtain ROS      Review of Systems:  CONSTITUTIONAL:  No weight loss, fever, chills, weakness or fatigue.  HEENT:  Eyes:  No visual loss, blurred vision, double vision or yellow sclerae. Ears, Nose, Throat:  No hearing loss, sneezing, congestion, runny nose or sore throat.  SKIN:  No rash or itching.  CARDIOVASCULAR:  No chest pain, chest pressure or chest discomfort. No palpitations or edema.  RESPIRATORY:  No shortness of breath, cough or sputum.  GASTROINTESTINAL:  No anorexia, nausea, vomiting or diarrhea. No abdominal pain or blood.  GENITOURINARY: No Burning on urination.   NEUROLOGICAL:  see HPI  MUSCULOSKELETAL:  No muscle, back pain, joint pain or stiffness.  HEMATOLOGIC:  No anemia, bleeding or bruising.  LYMPHATICS:  No enlarged nodes. No history of splenectomy.  PSYCHIATRIC:  No history of depression or anxiety.  ENDOCRINOLOGIC:  No reports of sweating, cold or heat intolerance. No polyuria or polydipsia.  ALLERGIES:  No history of asthma, hives, eczema or rhinitis.    PAST MEDICAL & SURGICAL HISTORY:  IDDM (insulin dependent diabetes mellitus)      HTN (hypertension)      CAD S/P percutaneous coronary angioplasty      Neuropathy      PAD (peripheral artery disease)      PNA (pneumonia)      Gangrene of toe of right foot      Moderate aortic stenosis      Acute on chronic diastolic congestive heart failure      Hyperlipidemia      History of partial ray amputation of fourth toe of right foot        SOCIAL HISTORY:  FAMILY HISTORY:    ALLERGIES: 	  No Known Allergies          MEDICATIONS:  acetaminophen     Tablet .. 650 milliGRAM(s) Oral every 6 hours PRN  aspirin enteric coated 81 milliGRAM(s) Oral every 24 hours  atorvastatin 80 milliGRAM(s) Oral at bedtime  carvedilol 12.5 milliGRAM(s) Oral every 12 hours  dextrose 5%. 1000 milliLiter(s) IV Continuous <Continuous>  dextrose 5%. 1000 milliLiter(s) IV Continuous <Continuous>  dextrose 50% Injectable 12.5 Gram(s) IV Push once  dextrose 50% Injectable 25 Gram(s) IV Push once  dextrose 50% Injectable 25 Gram(s) IV Push once  dextrose Oral Gel 15 Gram(s) Oral once PRN  gabapentin 800 milliGRAM(s) Oral three times a day  glucagon  Injectable 1 milliGRAM(s) IntraMuscular once  heparin   Injectable 5000 Unit(s) SubCutaneous every 8 hours  hydrALAZINE 50 milliGRAM(s) Oral every 8 hours  insulin glargine Injectable (LANTUS) 16 Unit(s) SubCutaneous at bedtime  insulin lispro (ADMELOG) corrective regimen sliding scale   SubCutaneous Before meals and at bedtime  insulin lispro Injectable (ADMELOG) 3 Unit(s) SubCutaneous three times a day before meals  lisinopril 40 milliGRAM(s) Oral every 24 hours  nystatin Cream 1 Application(s) Topical two times a day  oxyCODONE    IR 5 milliGRAM(s) Oral every 6 hours PRN  piperacillin/tazobactam IVPB.. 3.375 Gram(s) IV Intermittent every 8 hours  vancomycin  IVPB 1500 milliGRAM(s) IV Intermittent every 12 hours      T(C): 36.4 (12-09-23 @ 11:58), Max: 36.8 (12-08-23 @ 20:36)  HR: 77 (12-09-23 @ 11:58) (52 - 77)  BP: 137/71 (12-09-23 @ 11:58) (137/71 - 184/83)  RR: 18 (12-09-23 @ 11:58) (18 - 19)  SpO2: 97% (12-09-23 @ 11:58) (95% - 99%)      PHYSICAL EXAM:  Unable to obtain exam, pt defers     I&O's Summary      LABS:	 	                        8.0    14.14 )-----------( 307      ( 09 Dec 2023 10:33 )             26.3     12-09    138  |  103  |  21  ----------------------------<  161<H>  4.4   |  28  |  1.20    Ca    8.3<L>      09 Dec 2023 10:33  Phos  3.3     12-09  Mg     1.9     12-09    TPro  6.7  /  Alb  2.2<L>  /  TBili  0.3  /  DBili  x   /  AST  27  /  ALT  21  /  AlkPhos  255<H>  12-09         Cardiology Consult      HPI:  67yo M PMH CAD (2 stents 2020), grade 1 diastolic dysfunction, HTN, PAD w/ remote RLE angioplasty, R 4th toe OM s/p partial R 4th ray amputation on 10/24, RLE angiogram with AT lithotripsy and AT/peroneal balloon angioplasty 10/27, uncontrolled IDDM (a1c 12 in Oct 2023), recent admission 11/8-11/10 to cardiology service for hypertensive emergency (left AMA, was given levaquin when he left for right foot wound that pt had started treatment for back in October) who presented to [facility] after a fall onto his knees and was having b/l knee pain. The initial pain was severe and he felt like they were swollen so he came to the ED. Knee pain is now getting better. Reports having right foot pain for which he takes oxy 10 q8h at home. He does not wrap the foot or put any topical medications on it. Denies fever, chills, SOB, CP.      ED course:  vitals: afebrile, HR 70, /87, RR 16, O2sat 98 on room air  labs: wbc 13, Na 130, glucose 476, BNP 78813  CXR la/ap: persistent small left and a new small right pleural effusion  b/l knee xray: Moderate to severe left worse than right knee arthrosis without acute displaced fracture  CT angio b/l LE: Increased soft tissue gas in the forefoot. No drainable abscess. Patent two vessel runoff in both lower extremities. No significant knee joint effusion.  ECG: regular, sinus, isolated Q wave AVF, borderline LVH  Interventions: regular insulin 10u total, Lasix 40 IV, norvasc 10, morphine 2 IV x2, tylenol 1g IV. Vanc 1g, zosyn 3.375  Intensivist was called in ED for hyperglycemia but since not in DKA no telemetry was needed.  [facility] also contacted ortho for b/l knee pain and Vascular for R foot wound (02 Dec 2023 01:15)        Pt seen and examined at bedside, pt deferred interview process and requested that I leave. Unable to obtain ROS      Review of Systems:  CONSTITUTIONAL:  No weight loss, fever, chills, weakness or fatigue.  HEENT:  Eyes:  No visual loss, blurred vision, double vision or yellow sclerae. Ears, Nose, Throat:  No hearing loss, sneezing, congestion, runny nose or sore throat.  SKIN:  No rash or itching.  CARDIOVASCULAR:  No chest pain, chest pressure or chest discomfort. No palpitations or edema.  RESPIRATORY:  No shortness of breath, cough or sputum.  GASTROINTESTINAL:  No anorexia, nausea, vomiting or diarrhea. No abdominal pain or blood.  GENITOURINARY: No Burning on urination.   NEUROLOGICAL:  see HPI  MUSCULOSKELETAL:  No muscle, back pain, joint pain or stiffness.  HEMATOLOGIC:  No anemia, bleeding or bruising.  LYMPHATICS:  No enlarged nodes. No history of splenectomy.  PSYCHIATRIC:  No history of depression or anxiety.  ENDOCRINOLOGIC:  No reports of sweating, cold or heat intolerance. No polyuria or polydipsia.  ALLERGIES:  No history of asthma, hives, eczema or rhinitis.    PAST MEDICAL & SURGICAL HISTORY:  IDDM (insulin dependent diabetes mellitus)      HTN (hypertension)      CAD S/P percutaneous coronary angioplasty      Neuropathy      PAD (peripheral artery disease)      PNA (pneumonia)      Gangrene of toe of right foot      Moderate aortic stenosis      Acute on chronic diastolic congestive heart failure      Hyperlipidemia      History of partial ray amputation of fourth toe of right foot        SOCIAL HISTORY:  FAMILY HISTORY:    ALLERGIES: 	  No Known Allergies          MEDICATIONS:  acetaminophen     Tablet .. 650 milliGRAM(s) Oral every 6 hours PRN  aspirin enteric coated 81 milliGRAM(s) Oral every 24 hours  atorvastatin 80 milliGRAM(s) Oral at bedtime  carvedilol 12.5 milliGRAM(s) Oral every 12 hours  dextrose 5%. 1000 milliLiter(s) IV Continuous <Continuous>  dextrose 5%. 1000 milliLiter(s) IV Continuous <Continuous>  dextrose 50% Injectable 12.5 Gram(s) IV Push once  dextrose 50% Injectable 25 Gram(s) IV Push once  dextrose 50% Injectable 25 Gram(s) IV Push once  dextrose Oral Gel 15 Gram(s) Oral once PRN  gabapentin 800 milliGRAM(s) Oral three times a day  glucagon  Injectable 1 milliGRAM(s) IntraMuscular once  heparin   Injectable 5000 Unit(s) SubCutaneous every 8 hours  hydrALAZINE 50 milliGRAM(s) Oral every 8 hours  insulin glargine Injectable (LANTUS) 16 Unit(s) SubCutaneous at bedtime  insulin lispro (ADMELOG) corrective regimen sliding scale   SubCutaneous Before meals and at bedtime  insulin lispro Injectable (ADMELOG) 3 Unit(s) SubCutaneous three times a day before meals  lisinopril 40 milliGRAM(s) Oral every 24 hours  nystatin Cream 1 Application(s) Topical two times a day  oxyCODONE    IR 5 milliGRAM(s) Oral every 6 hours PRN  piperacillin/tazobactam IVPB.. 3.375 Gram(s) IV Intermittent every 8 hours  vancomycin  IVPB 1500 milliGRAM(s) IV Intermittent every 12 hours      T(C): 36.4 (12-09-23 @ 11:58), Max: 36.8 (12-08-23 @ 20:36)  HR: 77 (12-09-23 @ 11:58) (52 - 77)  BP: 137/71 (12-09-23 @ 11:58) (137/71 - 184/83)  RR: 18 (12-09-23 @ 11:58) (18 - 19)  SpO2: 97% (12-09-23 @ 11:58) (95% - 99%)      PHYSICAL EXAM:  Unable to obtain exam, pt defers     I&O's Summary      LABS:	 	                        8.0    14.14 )-----------( 307      ( 09 Dec 2023 10:33 )             26.3     12-09    138  |  103  |  21  ----------------------------<  161<H>  4.4   |  28  |  1.20    Ca    8.3<L>      09 Dec 2023 10:33  Phos  3.3     12-09  Mg     1.9     12-09    TPro  6.7  /  Alb  2.2<L>  /  TBili  0.3  /  DBili  x   /  AST  27  /  ALT  21  /  AlkPhos  255<H>  12-09

## 2023-12-09 NOTE — CONSULT NOTE ADULT - SUBJECTIVE AND OBJECTIVE BOX
HPI: 68yMale admitted for R BKA, history of osteomyelitis in R toe.  Sugars controlled on insulin regimen but he had hypoglycemia last night, symptomatic, to 35.  Currently, pt in bed, says "leave me alone", will not talk to me.  sugars reviewed:    12/7 12/8  76, 158, 223, 294, 35  12/9 197, 118, 295    IDDM (insulin dependent diabetes mellitus)    HTN (hypertension)    CAD S/P percutaneous coronary angioplasty    Neuropathy    PAD (peripheral artery disease)    Acute CHF    PNA (pneumonia)    Gangrene of toe of right foot    Moderate aortic stenosis    Acute on chronic diastolic congestive heart failure    Hyperlipidemia    Anemia    Soft tissue infection    Hyperglycemia    Pain in both knees    Open wound of foot    HTN (hypertension)    DM (diabetes mellitus)    CAD (coronary artery disease)    PAD (peripheral artery disease)    Prophylactic measure    Chronic systolic congestive heart failure    Debility    Advanced care planning/counseling discussion    Encounter for palliative care    No significant past surgical history    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 650 milliGRAM(s) Oral every 6 hours PRN  aspirin enteric coated 81 milliGRAM(s) Oral every 24 hours  atorvastatin 80 milliGRAM(s) Oral at bedtime  carvedilol 12.5 milliGRAM(s) Oral every 12 hours  dextrose 5%. 1000 milliLiter(s) IV Continuous <Continuous>  dextrose 5%. 1000 milliLiter(s) IV Continuous <Continuous>  dextrose 50% Injectable 12.5 Gram(s) IV Push once  dextrose 50% Injectable 25 Gram(s) IV Push once  dextrose 50% Injectable 25 Gram(s) IV Push once  dextrose Oral Gel 15 Gram(s) Oral once PRN  gabapentin 800 milliGRAM(s) Oral three times a day  glucagon  Injectable 1 milliGRAM(s) IntraMuscular once  heparin   Injectable 5000 Unit(s) SubCutaneous every 8 hours  hydrALAZINE 50 milliGRAM(s) Oral every 8 hours  insulin glargine Injectable (LANTUS) 16 Unit(s) SubCutaneous at bedtime  insulin lispro (ADMELOG) corrective regimen sliding scale   SubCutaneous Before meals and at bedtime  insulin lispro Injectable (ADMELOG) 3 Unit(s) SubCutaneous three times a day before meals  lisinopril 40 milliGRAM(s) Oral every 24 hours  nystatin Cream 1 Application(s) Topical two times a day  oxyCODONE    IR 5 milliGRAM(s) Oral every 6 hours PRN  piperacillin/tazobactam IVPB.. 3.375 Gram(s) IV Intermittent every 8 hours  vancomycin  IVPB 1500 milliGRAM(s) IV Intermittent every 12 hours      Allergies:  No Known Allergies      Vital Signs Last 24 Hrs  T(C): 36.4 (09 Dec 2023 11:58), Max: 36.8 (08 Dec 2023 20:36)  T(F): 97.5 (09 Dec 2023 11:58), Max: 98.3 (08 Dec 2023 20:36)  HR: 77 (09 Dec 2023 11:58) (52 - 77)  BP: 137/71 (09 Dec 2023 11:58) (137/71 - 184/83)  BP(mean): 93 (09 Dec 2023 11:58) (93 - 228)  RR: 18 (09 Dec 2023 11:58) (18 - 19)  SpO2: 97% (09 Dec 2023 11:58) (95% - 99%)    Parameters below as of 09 Dec 2023 11:58  Patient On (Oxygen Delivery Method): room air    LABS:                        8.0    14.14 )-----------( 307      ( 09 Dec 2023 10:33 )             26.3     12-09    138  |  103  |  21  ----------------------------<  161<H>  4.4   |  28  |  1.20    Ca    8.3<L>      09 Dec 2023 10:33  Phos  3.3     12-09  Mg     1.9     12-09    TPro  6.7  /  Alb  2.2<L>  /  TBili  0.3  /  DBili  x   /  AST  27  /  ALT  21  /  AlkPhos  255<H>  12-09    PT/INR - ( 09 Dec 2023 09:56 )   PT: 11.6 sec;   INR: 1.02          PTT - ( 09 Dec 2023 09:56 )  PTT:31.7 sec  Urinalysis Basic - ( 09 Dec 2023 10:33 )    Color: x / Appearance: x / SG: x / pH: x  Gluc: 161 mg/dL / Ketone: x  / Bili: x / Urobili: x   Blood: x / Protein: x / Nitrite: x   Leuk Esterase: x / RBC: x / WBC x   Sq Epi: x / Non Sq Epi: x / Bacteria: x      POCT Blood Glucose.: 295 mg/dL (09 Dec 2023 12:53)  POCT Blood Glucose.: 118 mg/dL (09 Dec 2023 09:25)  POCT Blood Glucose.: 197 mg/dL (09 Dec 2023 09:05)  POCT Blood Glucose.: 257 mg/dL (09 Dec 2023 08:59)  POCT Blood Glucose.: 35 mg/dL (09 Dec 2023 08:54)  POCT Blood Glucose.: 294 mg/dL (08 Dec 2023 21:49)  POCT Blood Glucose.: 223 mg/dL (08 Dec 2023 17:41)   HPI: 68yMale admitted for R BKA, history of osteomyelitis in R toe.  Sugars controlled on insulin regimen but he had hypoglycemia last night, symptomatic, to 35.  Currently, pt in bed, says "leave me alone", will not talk to me.  sugars reviewed:    12/7  109, 164, 112, 151  12/8  76, 158, 223, 294, 35  12/9 197, 118, 295    IDDM (insulin dependent diabetes mellitus)    HTN (hypertension)    CAD S/P percutaneous coronary angioplasty    Neuropathy    PAD (peripheral artery disease)    Acute CHF    PNA (pneumonia)    Gangrene of toe of right foot    Moderate aortic stenosis    Acute on chronic diastolic congestive heart failure    Hyperlipidemia    Anemia    Soft tissue infection    Hyperglycemia    Pain in both knees    Open wound of foot    HTN (hypertension)    DM (diabetes mellitus)    CAD (coronary artery disease)    PAD (peripheral artery disease)    Prophylactic measure    Chronic systolic congestive heart failure    Debility    Advanced care planning/counseling discussion    Encounter for palliative care    No significant past surgical history    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 650 milliGRAM(s) Oral every 6 hours PRN  aspirin enteric coated 81 milliGRAM(s) Oral every 24 hours  atorvastatin 80 milliGRAM(s) Oral at bedtime  carvedilol 12.5 milliGRAM(s) Oral every 12 hours  dextrose 5%. 1000 milliLiter(s) IV Continuous <Continuous>  dextrose 5%. 1000 milliLiter(s) IV Continuous <Continuous>  dextrose 50% Injectable 12.5 Gram(s) IV Push once  dextrose 50% Injectable 25 Gram(s) IV Push once  dextrose 50% Injectable 25 Gram(s) IV Push once  dextrose Oral Gel 15 Gram(s) Oral once PRN  gabapentin 800 milliGRAM(s) Oral three times a day  glucagon  Injectable 1 milliGRAM(s) IntraMuscular once  heparin   Injectable 5000 Unit(s) SubCutaneous every 8 hours  hydrALAZINE 50 milliGRAM(s) Oral every 8 hours  insulin glargine Injectable (LANTUS) 16 Unit(s) SubCutaneous at bedtime  insulin lispro (ADMELOG) corrective regimen sliding scale   SubCutaneous Before meals and at bedtime  insulin lispro Injectable (ADMELOG) 3 Unit(s) SubCutaneous three times a day before meals  lisinopril 40 milliGRAM(s) Oral every 24 hours  nystatin Cream 1 Application(s) Topical two times a day  oxyCODONE    IR 5 milliGRAM(s) Oral every 6 hours PRN  piperacillin/tazobactam IVPB.. 3.375 Gram(s) IV Intermittent every 8 hours  vancomycin  IVPB 1500 milliGRAM(s) IV Intermittent every 12 hours      Allergies:  No Known Allergies      Vital Signs Last 24 Hrs  T(C): 36.4 (09 Dec 2023 11:58), Max: 36.8 (08 Dec 2023 20:36)  T(F): 97.5 (09 Dec 2023 11:58), Max: 98.3 (08 Dec 2023 20:36)  HR: 77 (09 Dec 2023 11:58) (52 - 77)  BP: 137/71 (09 Dec 2023 11:58) (137/71 - 184/83)  BP(mean): 93 (09 Dec 2023 11:58) (93 - 228)  RR: 18 (09 Dec 2023 11:58) (18 - 19)  SpO2: 97% (09 Dec 2023 11:58) (95% - 99%)    Parameters below as of 09 Dec 2023 11:58  Patient On (Oxygen Delivery Method): room air    LABS:                        8.0    14.14 )-----------( 307      ( 09 Dec 2023 10:33 )             26.3     12-09    138  |  103  |  21  ----------------------------<  161<H>  4.4   |  28  |  1.20    Ca    8.3<L>      09 Dec 2023 10:33  Phos  3.3     12-09  Mg     1.9     12-09    TPro  6.7  /  Alb  2.2<L>  /  TBili  0.3  /  DBili  x   /  AST  27  /  ALT  21  /  AlkPhos  255<H>  12-09    PT/INR - ( 09 Dec 2023 09:56 )   PT: 11.6 sec;   INR: 1.02          PTT - ( 09 Dec 2023 09:56 )  PTT:31.7 sec  Urinalysis Basic - ( 09 Dec 2023 10:33 )    Color: x / Appearance: x / SG: x / pH: x  Gluc: 161 mg/dL / Ketone: x  / Bili: x / Urobili: x   Blood: x / Protein: x / Nitrite: x   Leuk Esterase: x / RBC: x / WBC x   Sq Epi: x / Non Sq Epi: x / Bacteria: x      POCT Blood Glucose.: 295 mg/dL (09 Dec 2023 12:53)  POCT Blood Glucose.: 118 mg/dL (09 Dec 2023 09:25)  POCT Blood Glucose.: 197 mg/dL (09 Dec 2023 09:05)  POCT Blood Glucose.: 257 mg/dL (09 Dec 2023 08:59)  POCT Blood Glucose.: 35 mg/dL (09 Dec 2023 08:54)  POCT Blood Glucose.: 294 mg/dL (08 Dec 2023 21:49)  POCT Blood Glucose.: 223 mg/dL (08 Dec 2023 17:41)

## 2023-12-09 NOTE — CONSULT NOTE ADULT - ASSESSMENT
Diabetes, type 1, with vascular complications.     Hypoglycemia due to overcorrection from hyperglycemia the night before, plus maybe a bit too much glargine (previous night, glucose went from 151 to 76).  I recommend reducing glargine at night by 1 unit.  Currently is on glargine 16 units, reduce to 15 units.  continue lispro 3 for meals  I also recommend modifying his sliding scale to correcting over 200 (instead of 150) and give him the light sliding scale (1 unit per 50mg/dl).

## 2023-12-09 NOTE — CHART NOTE - NSCHARTNOTEFT_GEN_A_CORE
***Rapid Response Clinical Impact Physician Assistant Note***    **********INCOMPLETE***********        Patient is a 68y old  Male         admitted for HPI:  67yo M PMH CAD (2 stents 2020), grade 1 diastolic dysfunction, HTN, PAD w/ remote RLE angioplasty, R 4th toe OM s/p partial R 4th ray amputation on 10/24, RLE angiogram with AT lithotripsy and AT/peroneal balloon angioplasty 10/27, uncontrolled IDDM (a1c 12 in Oct 2023), recent admission 11/8-11/10 to cardiology service for hypertensive emergency (left AMA, was given levaquin when he left for right foot wound that pt had started treatment for back in October) who presented to [facility] after a fall onto his knees and was having b/l knee pain. The initial pain was severe and he felt like they were swollen so he came to the ED. Knee pain is now getting better. Reports having right foot pain for which he takes oxy 10 q8h at home. He does not wrap the foot or put any topical medications on it. Denies fever, chills, SOB, CP.      ED course:  vitals: afebrile, HR 70, /87, RR 16, O2sat 98 on room air  labs: wbc 13, Na 130, glucose 476, BNP 52457  CXR la/ap: persistent small left and a new small right pleural effusion  b/l knee xray: Moderate to severe left worse than right knee arthrosis without acute displaced fracture  CT angio b/l LE: Increased soft tissue gas in the forefoot. No drainable abscess. Patent two vessel runoff in both lower extremities. No significant knee joint effusion.  ECG: regular, sinus, isolated Q wave AVF, borderline LVH  Interventions: regular insulin 10u total, Lasix 40 IV, norvasc 10, morphine 2 IV x2, tylenol 1g IV. Vanc 1g, zosyn 3.375  Intensivist was called in ED for hyperglycemia but since not in DKA no telemetry was needed.  [facility] also contacted ortho for b/l knee pain and Vascular for R foot wound (02 Dec 2023 01:15)      Rapid response team called because _____________________.    Patient was seen and examined at the bedside by the rapid response team.    Allergies    No Known Allergies    Intolerances        PAST MEDICAL & SURGICAL HISTORY:  IDDM (insulin dependent diabetes mellitus)      HTN (hypertension)      CAD S/P percutaneous coronary angioplasty      Neuropathy      PAD (peripheral artery disease)      PNA (pneumonia)      Gangrene of toe of right foot      Moderate aortic stenosis      Acute on chronic diastolic congestive heart failure      Hyperlipidemia      History of partial ray amputation of fourth toe of right foot          Vital Signs Last 24 Hrs  T(C): 36.7 (09 Dec 2023 05:48), Max: 36.8 (08 Dec 2023 20:36)  T(F): 98 (09 Dec 2023 05:48), Max: 98.3 (08 Dec 2023 20:36)  HR: 52 (09 Dec 2023 08:47) (52 - 66)  BP: 167/79 (09 Dec 2023 08:47) (147/73 - 184/83)  BP(mean): 228 (08 Dec 2023 18:13) (228 - 228)  RR: 18 (09 Dec 2023 08:47) (18 - 19)  SpO2: 97% (09 Dec 2023 08:47) (95% - 99%)    Parameters below as of 09 Dec 2023 08:47  Patient On (Oxygen Delivery Method): room air        Review of Systems:  CONSTITUTIONAL: No weakness, fevers or chills  EYES/ENT: No visual changes;  No vertigo or throat pain   NECK: No pain or stiffness  RESPIRATORY: No cough, wheezing, hemoptysis; No shortness of breath  CARDIOVASCULAR: No chest pain or palpitations  GASTROINTESTINAL: No abdominal or epigastric pain. No nausea, vomiting, or hematemesis; No diarrhea or constipation. No melena or hematochezia.  GENITOURINARY: No dysuria, frequency or hematuria  NEUROLOGICAL: No numbness or weakness  SKIN: No itching, burning, rashes, or lesions   All other review of systems is negative unless indicated above.    Physical exam:  GENERAL: The patient is awake and alert in no apparent distress.   HEENT: Head is normocephalic and atraumatic. Extraocular muscles are intact. Mucous membranes are moist. No throat erythema/exudates no lymphadenopathy, no JVD,   NECK: Supple.  LUNGS: Clear to auscultation BL without wheezing, rales or rhonchi; respirations unlabored  HEART: Regular rate and rhythm ,+S1/+S2, no murmurs, rubs, gallops  ABDOMEN: Soft, nontender, and nondistended, no rebound, guarding rigidity, bowel sounds in all 4 quadrants  EXTREMITIES: Without any cyanosis, clubbing, rash, lesions or edema.  SKIN: No new rashes or lesions.  MSK: strength equal BL  VASCULAR: Radial and Dorsal pedal pulses palpable BL.  NEUROLOGIC: Grossly intact.  PSYCH: No new changes.                              MEDICATIONS  (STANDING):  aspirin enteric coated 81 milliGRAM(s) Oral every 24 hours  atorvastatin 80 milliGRAM(s) Oral at bedtime  carvedilol 12.5 milliGRAM(s) Oral every 12 hours  dextrose 5%. 1000 milliLiter(s) (100 mL/Hr) IV Continuous <Continuous>  dextrose 5%. 1000 milliLiter(s) (50 mL/Hr) IV Continuous <Continuous>  dextrose 50% Injectable 12.5 Gram(s) IV Push once  dextrose 50% Injectable 25 Gram(s) IV Push once  dextrose 50% Injectable 25 Gram(s) IV Push once  gabapentin 800 milliGRAM(s) Oral three times a day  glucagon  Injectable 1 milliGRAM(s) IntraMuscular once  heparin   Injectable 5000 Unit(s) SubCutaneous every 12 hours  hydrALAZINE 50 milliGRAM(s) Oral every 8 hours  insulin glargine Injectable (LANTUS) 16 Unit(s) SubCutaneous at bedtime  insulin lispro (ADMELOG) corrective regimen sliding scale   SubCutaneous Before meals and at bedtime  insulin lispro Injectable (ADMELOG) 3 Unit(s) SubCutaneous three times a day before meals  lisinopril 40 milliGRAM(s) Oral every 24 hours  nystatin Cream 1 Application(s) Topical two times a day  piperacillin/tazobactam IVPB.. 3.375 Gram(s) IV Intermittent every 8 hours  vancomycin  IVPB 1500 milliGRAM(s) IV Intermittent every 12 hours    MEDICATIONS  (PRN):  acetaminophen     Tablet .. 650 milliGRAM(s) Oral every 6 hours PRN Temp greater or equal to 38C (100.4F), Mild Pain (1 - 3)  dextrose Oral Gel 15 Gram(s) Oral once PRN Blood Glucose LESS THAN 70 milliGRAM(s)/deciliter  oxyCODONE    IR 5 milliGRAM(s) Oral every 6 hours PRN Severe Pain (7 - 10)      Assessment- Rapid Response called for 68y year old Male with a past medical history of PAST MEDICAL & SURGICAL HISTORY:  IDDM (insulin dependent diabetes mellitus)      HTN (hypertension)      CAD S/P percutaneous coronary angioplasty      Neuropathy      PAD (peripheral artery disease)      PNA (pneumonia)      Gangrene of toe of right foot      Moderate aortic stenosis      Acute on chronic diastolic congestive heart failure      Hyperlipidemia      History of partial ray amputation of fourth toe of right foot            DDx:      Plan-      Dispo:    I have personally and independently provided 31 minutes of critical care services.  This excludes any time spent on separate procedures or teaching. ***Rapid Response Clinical Impact Physician Assistant Note***    **********INCOMPLETE***********        Patient is a 68y old  Male         admitted for HPI:  67yo M PMH CAD (2 stents 2020), grade 1 diastolic dysfunction, HTN, PAD w/ remote RLE angioplasty, R 4th toe OM s/p partial R 4th ray amputation on 10/24, RLE angiogram with AT lithotripsy and AT/peroneal balloon angioplasty 10/27, uncontrolled IDDM (a1c 12 in Oct 2023), recent admission 11/8-11/10 to cardiology service for hypertensive emergency (left AMA, was given levaquin when he left for right foot wound that pt had started treatment for back in October) who presented to [facility] after a fall onto his knees and was having b/l knee pain. The initial pain was severe and he felt like they were swollen so he came to the ED. Knee pain is now getting better. Reports having right foot pain for which he takes oxy 10 q8h at home. He does not wrap the foot or put any topical medications on it. Denies fever, chills, SOB, CP.      ED course:  vitals: afebrile, HR 70, /87, RR 16, O2sat 98 on room air  labs: wbc 13, Na 130, glucose 476, BNP 35175  CXR la/ap: persistent small left and a new small right pleural effusion  b/l knee xray: Moderate to severe left worse than right knee arthrosis without acute displaced fracture  CT angio b/l LE: Increased soft tissue gas in the forefoot. No drainable abscess. Patent two vessel runoff in both lower extremities. No significant knee joint effusion.  ECG: regular, sinus, isolated Q wave AVF, borderline LVH  Interventions: regular insulin 10u total, Lasix 40 IV, norvasc 10, morphine 2 IV x2, tylenol 1g IV. Vanc 1g, zosyn 3.375  Intensivist was called in ED for hyperglycemia but since not in DKA no telemetry was needed.  [facility] also contacted ortho for b/l knee pain and Vascular for R foot wound (02 Dec 2023 01:15)      Rapid response team called because _____________________.    Patient was seen and examined at the bedside by the rapid response team.    Allergies    No Known Allergies    Intolerances        PAST MEDICAL & SURGICAL HISTORY:  IDDM (insulin dependent diabetes mellitus)      HTN (hypertension)      CAD S/P percutaneous coronary angioplasty      Neuropathy      PAD (peripheral artery disease)      PNA (pneumonia)      Gangrene of toe of right foot      Moderate aortic stenosis      Acute on chronic diastolic congestive heart failure      Hyperlipidemia      History of partial ray amputation of fourth toe of right foot          Vital Signs Last 24 Hrs  T(C): 36.7 (09 Dec 2023 05:48), Max: 36.8 (08 Dec 2023 20:36)  T(F): 98 (09 Dec 2023 05:48), Max: 98.3 (08 Dec 2023 20:36)  HR: 52 (09 Dec 2023 08:47) (52 - 66)  BP: 167/79 (09 Dec 2023 08:47) (147/73 - 184/83)  BP(mean): 228 (08 Dec 2023 18:13) (228 - 228)  RR: 18 (09 Dec 2023 08:47) (18 - 19)  SpO2: 97% (09 Dec 2023 08:47) (95% - 99%)    Parameters below as of 09 Dec 2023 08:47  Patient On (Oxygen Delivery Method): room air        Review of Systems:  CONSTITUTIONAL: No weakness, fevers or chills  EYES/ENT: No visual changes;  No vertigo or throat pain   NECK: No pain or stiffness  RESPIRATORY: No cough, wheezing, hemoptysis; No shortness of breath  CARDIOVASCULAR: No chest pain or palpitations  GASTROINTESTINAL: No abdominal or epigastric pain. No nausea, vomiting, or hematemesis; No diarrhea or constipation. No melena or hematochezia.  GENITOURINARY: No dysuria, frequency or hematuria  NEUROLOGICAL: No numbness or weakness  SKIN: No itching, burning, rashes, or lesions   All other review of systems is negative unless indicated above.    Physical exam:  GENERAL: The patient is awake and alert in no apparent distress.   HEENT: Head is normocephalic and atraumatic. Extraocular muscles are intact. Mucous membranes are moist. No throat erythema/exudates no lymphadenopathy, no JVD,   NECK: Supple.  LUNGS: Clear to auscultation BL without wheezing, rales or rhonchi; respirations unlabored  HEART: Regular rate and rhythm ,+S1/+S2, no murmurs, rubs, gallops  ABDOMEN: Soft, nontender, and nondistended, no rebound, guarding rigidity, bowel sounds in all 4 quadrants  EXTREMITIES: Without any cyanosis, clubbing, rash, lesions or edema.  SKIN: No new rashes or lesions.  MSK: strength equal BL  VASCULAR: Radial and Dorsal pedal pulses palpable BL.  NEUROLOGIC: Grossly intact.  PSYCH: No new changes.                              MEDICATIONS  (STANDING):  aspirin enteric coated 81 milliGRAM(s) Oral every 24 hours  atorvastatin 80 milliGRAM(s) Oral at bedtime  carvedilol 12.5 milliGRAM(s) Oral every 12 hours  dextrose 5%. 1000 milliLiter(s) (100 mL/Hr) IV Continuous <Continuous>  dextrose 5%. 1000 milliLiter(s) (50 mL/Hr) IV Continuous <Continuous>  dextrose 50% Injectable 12.5 Gram(s) IV Push once  dextrose 50% Injectable 25 Gram(s) IV Push once  dextrose 50% Injectable 25 Gram(s) IV Push once  gabapentin 800 milliGRAM(s) Oral three times a day  glucagon  Injectable 1 milliGRAM(s) IntraMuscular once  heparin   Injectable 5000 Unit(s) SubCutaneous every 12 hours  hydrALAZINE 50 milliGRAM(s) Oral every 8 hours  insulin glargine Injectable (LANTUS) 16 Unit(s) SubCutaneous at bedtime  insulin lispro (ADMELOG) corrective regimen sliding scale   SubCutaneous Before meals and at bedtime  insulin lispro Injectable (ADMELOG) 3 Unit(s) SubCutaneous three times a day before meals  lisinopril 40 milliGRAM(s) Oral every 24 hours  nystatin Cream 1 Application(s) Topical two times a day  piperacillin/tazobactam IVPB.. 3.375 Gram(s) IV Intermittent every 8 hours  vancomycin  IVPB 1500 milliGRAM(s) IV Intermittent every 12 hours    MEDICATIONS  (PRN):  acetaminophen     Tablet .. 650 milliGRAM(s) Oral every 6 hours PRN Temp greater or equal to 38C (100.4F), Mild Pain (1 - 3)  dextrose Oral Gel 15 Gram(s) Oral once PRN Blood Glucose LESS THAN 70 milliGRAM(s)/deciliter  oxyCODONE    IR 5 milliGRAM(s) Oral every 6 hours PRN Severe Pain (7 - 10)      Assessment- Rapid Response called for 68y year old Male with a past medical history of PAST MEDICAL & SURGICAL HISTORY:  IDDM (insulin dependent diabetes mellitus)      HTN (hypertension)      CAD S/P percutaneous coronary angioplasty      Neuropathy      PAD (peripheral artery disease)      PNA (pneumonia)      Gangrene of toe of right foot      Moderate aortic stenosis      Acute on chronic diastolic congestive heart failure      Hyperlipidemia      History of partial ray amputation of fourth toe of right foot            DDx:      Plan-      Dispo:    I have personally and independently provided 31 minutes of critical care services.  This excludes any time spent on separate procedures or teaching. ***Rapid Response Clinical Impact Physician Assistant Note***    67 y/o Male w/ PMHx of CAD s/p PCI (2 stents 2020), G1DD, HTN, uncontrolled IDDM (A1c 12 in 10/2023) PAD s/p remote RLE angioplasty and AT balloon angio 10/27/23,, R 4th toe OM s/p partial amputation on 10/24/23, and recent admission for HTN emergency where he left AMA and was discharged on Levaquin for R foot wound, who initially presented to [facility] for a fall c/o b/l knee pain. Pt was subsequently admitted for R foot OM w/ subcutaneous air, w/ wound Cx growing Staph and Lactobacillus and prior BCx from 12/4 w/ NGTD. ID, vascular and podiatry following. Since admission pt has refused lab work and initially refused surgical intervention of the R foot and is currently being medically managed pending pt's agreement w/ surgical intervention.    Rapid response called at 8:56am for hypoglycemia w/ associated AMS. Per bedside RN, AM FS BGL 35 and pt was notably lethargic for which the rapid response was called.    Patient was seen and examined at the bedside by the rapid response team. Upon arrival pt was seated in bed in his room in care of primary team and ICU resident. VS: UTO oral temp, later rectal T 93.1F rectal, HR 45, /71, RR 18 non-labored, SpO2 97% on RA. Physical exam significant for lethargy and pt minimally verbal, but when prompted pt would speak and was A+Ox3 w/ situational awareness, no focal neuro deficits noted, pupils PERRL, and R foot wound wrapped in dry kerlex. Chart review reveals only sedating medication included Oxycodone 5mg IR x1 at 7:30pm last night and an additional 5mg x1 at 3:30am this morning, and pt's last insulin dose was Lantus 16u at 10pm. Per pt and RN pt ate dinner last night but had not yet eaten anything today. Pt given one D50 25g IVP x1 w/ gradual improvement in MS; repeat FS . EKG done for bradycardia reveals sinus bradycardia @45bpm w/o ischemic changes. Labs were drawn including BCx given hypothermia and hypoglycemia, Lactate, CBC, CMP, Mag, Phos, coags, and T&S.        No Known Allergies or intolerances    PAST MEDICAL & SURGICAL HISTORY:  IDDM (insulin dependent diabetes mellitus)  HTN (hypertension)  CAD S/P percutaneous coronary angioplasty  Neuropathy  PAD (peripheral artery disease)  PNA (pneumonia)  Gangrene of toe of right foot  Moderate aortic stenosis  Acute on chronic diastolic congestive heart failure  Hyperlipidemia  History of partial ray amputation of fourth toe of right foot    Vital Signs Last 24 Hrs  T(C): 36.7 (09 Dec 2023 05:48), Max: 36.8 (08 Dec 2023 20:36)  T(F): 98 (09 Dec 2023 05:48), Max: 98.3 (08 Dec 2023 20:36)  HR: 52 (09 Dec 2023 08:47) (52 - 66)  BP: 167/79 (09 Dec 2023 08:47) (147/73 - 184/83)  BP(mean): 228 (08 Dec 2023 18:13) (228 - 228)  RR: 18 (09 Dec 2023 08:47) (18 - 19)  SpO2: 97% (09 Dec 2023 08:47) (95% - 99%)    Parameters below as of 09 Dec 2023 08:47  Patient On (Oxygen Delivery Method): room air      Review of Systems:  Pt reports his only complaint is hunger, and would not provide any further ROS.  All other review of systems is negative unless indicated above.    Physical exam:  GENERAL: Initially lethargic but A+Ox3 w/ situational awareness. Lethargy improved s/p D50.  HEENT: Head is normocephalic and atraumatic. Extraocular muscles are intact. Mucous membranes are moist. No throat erythema/exudates no lymphadenopathy, no JVD,   NECK: Supple.  LUNGS: Clear to auscultation BL without wheezing, rales or rhonchi; respirations unlabored  HEART: Regular rate and rhythm ,+S1/+S2, no murmurs, rubs, gallops; bradycardic  ABDOMEN: Soft, nontender, and nondistended, no rebound, guarding rigidity, bowel sounds in all 4 quadrants  EXTREMITIES: Without any cyanosis, clubbing, rash, lesions or edema.  SKIN: No new rashes or lesions.  MSK: strength equal BL  VASCULAR: R foot wrapped w/ clean/dry kerlex; per RN this was just evaluated by primary team and dressed so dressing in situ was not taken down.  NEUROLOGIC: Grossly intact.  PSYCH: No new changes.    MEDICATIONS  (STANDING):  aspirin enteric coated 81 milliGRAM(s) Oral every 24 hours  atorvastatin 80 milliGRAM(s) Oral at bedtime  carvedilol 12.5 milliGRAM(s) Oral every 12 hours  dextrose 5%. 1000 milliLiter(s) (100 mL/Hr) IV Continuous <Continuous>  dextrose 5%. 1000 milliLiter(s) (50 mL/Hr) IV Continuous <Continuous>  dextrose 50% Injectable 12.5 Gram(s) IV Push once  dextrose 50% Injectable 25 Gram(s) IV Push once  dextrose 50% Injectable 25 Gram(s) IV Push once  gabapentin 800 milliGRAM(s) Oral three times a day  glucagon  Injectable 1 milliGRAM(s) IntraMuscular once  heparin   Injectable 5000 Unit(s) SubCutaneous every 12 hours  hydrALAZINE 50 milliGRAM(s) Oral every 8 hours  insulin glargine Injectable (LANTUS) 16 Unit(s) SubCutaneous at bedtime  insulin lispro (ADMELOG) corrective regimen sliding scale   SubCutaneous Before meals and at bedtime  insulin lispro Injectable (ADMELOG) 3 Unit(s) SubCutaneous three times a day before meals  lisinopril 40 milliGRAM(s) Oral every 24 hours  nystatin Cream 1 Application(s) Topical two times a day  piperacillin/tazobactam IVPB.. 3.375 Gram(s) IV Intermittent every 8 hours  vancomycin  IVPB 1500 milliGRAM(s) IV Intermittent every 12 hours    MEDICATIONS  (PRN):  acetaminophen     Tablet .. 650 milliGRAM(s) Oral every 6 hours PRN Temp greater or equal to 38C (100.4F), Mild Pain (1 - 3)  dextrose Oral Gel 15 Gram(s) Oral once PRN Blood Glucose LESS THAN 70 milliGRAM(s)/deciliter  oxyCODONE    IR 5 milliGRAM(s) Oral every 6 hours PRN Severe Pain (7 - 10)      Assessment-   67 y/o Male w/ PMHx of CAD s/p PCI (2 stents 2020), G1DD, HTN, uncontrolled IDDM (A1c 12 in 10/2023) PAD s/p remote RLE angioplasty and AT balloon angio 10/27/23,, R 4th toe OM s/p partial amputation on 10/24/23, and recent admission for HTN emergency where he left AMA and was discharged on Levaquin for R foot wound, who initially presented to [facility] for a fall c/o b/l knee pain and was subsequently admitted for R foot OM w/ subcutaneous air, for whom a rapid response was called for hypoglycemia w/ associated AMS.    DDx:  -Hypoglycemia i/s/o Lantus use and not eating breakfast  -Hypoglycemia 2/2 sepsis (given hypothermia, leukocytosis w/ L shift and known R foot infection)    Plan-  -f/u labs including BCx, lactate, CBC, CMP, Mag, Phos, and Coags  -q4hr FS BGL's and encourage PO intake if okay with primary team; FS BGL goal 140-180.  -Reduce home standing insulin dose by 2/3 while inpatient (home dosing Lantus 14u QHS and Lispro 3u TID w/ meals), w/ medium dose sliding scale. Hold PO hypoglycemic agents while inpatient  -Consider endo consult to help adjust insulin dosing  -c/w Zosyn per ID rec's. Consider addition of Vancomycin if pt is now more amenable to blood draws for level monitoring. Further ID recs appreciated.  -Monitor bradycardia; NTD at this time given pt is asymptomatic and hemodynamically stable but may warrant further cardiac w/u to r/o underlying ischemic etiology    Dispo: Pt to remain on unit.      I have personally and independently provided 31 minutes of critical care services.  This excludes any time spent on separate procedures or teaching. ***Rapid Response Clinical Impact Physician Assistant Note***    67 y/o Male w/ PMHx of CAD s/p PCI (2 stents 2020), G1DD, HTN, uncontrolled IDDM (A1c 12 in 10/2023) PAD s/p remote RLE angioplasty and AT balloon angio 10/27/23,, R 4th toe OM s/p partial amputation on 10/24/23, and recent admission for HTN emergency where he left AMA and was discharged on Levaquin for R foot wound, who initially presented to [facility] for a fall c/o b/l knee pain. Pt was subsequently admitted for R foot OM w/ subcutaneous air, w/ wound Cx growing Staph and Lactobacillus and prior BCx from 12/4 w/ NGTD. ID, vascular and podiatry following. Since admission pt has refused lab work and initially refused surgical intervention of the R foot and is currently being medically managed pending pt's agreement w/ surgical intervention.    Rapid response called at 8:56am for hypoglycemia w/ associated AMS. Per bedside RN, AM FS BGL 35 and pt was notably lethargic for which the rapid response was called.    Patient was seen and examined at the bedside by the rapid response team. Upon arrival pt was seated in bed in his room in care of primary team and ICU resident. VS: UTO oral temp, later rectal T 93.1F rectal, HR 45, /71, RR 18 non-labored, SpO2 97% on RA. Physical exam significant for lethargy and pt minimally verbal, but when prompted pt would speak and was A+Ox3 w/ situational awareness, no focal neuro deficits noted, pupils PERRL, and R foot wound wrapped in dry kerlex. Chart review reveals only sedating medication included Oxycodone 5mg IR x1 at 7:30pm last night and an additional 5mg x1 at 3:30am this morning, and pt's last insulin dose was Lantus 16u at 10pm. Per pt and RN pt ate dinner last night but had not yet eaten anything today. Pt given one D50 25g IVP x1 w/ gradual improvement in MS; repeat FS . EKG done for bradycardia reveals sinus bradycardia @45bpm w/o ischemic changes. Labs were drawn including BCx given hypothermia and hypoglycemia, Lactate, CBC, CMP, Mag, Phos, coags, and T&S.        No Known Allergies or intolerances    PAST MEDICAL & SURGICAL HISTORY:  IDDM (insulin dependent diabetes mellitus)  HTN (hypertension)  CAD S/P percutaneous coronary angioplasty  Neuropathy  PAD (peripheral artery disease)  PNA (pneumonia)  Gangrene of toe of right foot  Moderate aortic stenosis  Acute on chronic diastolic congestive heart failure  Hyperlipidemia  History of partial ray amputation of fourth toe of right foot    Vital Signs Last 24 Hrs  T(C): 36.7 (09 Dec 2023 05:48), Max: 36.8 (08 Dec 2023 20:36)  T(F): 98 (09 Dec 2023 05:48), Max: 98.3 (08 Dec 2023 20:36)  HR: 52 (09 Dec 2023 08:47) (52 - 66)  BP: 167/79 (09 Dec 2023 08:47) (147/73 - 184/83)  BP(mean): 228 (08 Dec 2023 18:13) (228 - 228)  RR: 18 (09 Dec 2023 08:47) (18 - 19)  SpO2: 97% (09 Dec 2023 08:47) (95% - 99%)    Parameters below as of 09 Dec 2023 08:47  Patient On (Oxygen Delivery Method): room air      Review of Systems:  Pt reports his only complaint is hunger, and would not provide any further ROS.  All other review of systems is negative unless indicated above.    Physical exam:  GENERAL: Initially lethargic but A+Ox3 w/ situational awareness. Lethargy improved s/p D50.  HEENT: Head is normocephalic and atraumatic. Extraocular muscles are intact. Mucous membranes are moist. No throat erythema/exudates no lymphadenopathy, no JVD,   NECK: Supple.  LUNGS: Clear to auscultation BL without wheezing, rales or rhonchi; respirations unlabored  HEART: Regular rate and rhythm ,+S1/+S2, no murmurs, rubs, gallops; bradycardic  ABDOMEN: Soft, nontender, and nondistended, no rebound, guarding rigidity, bowel sounds in all 4 quadrants  EXTREMITIES: Without any cyanosis, clubbing, rash, lesions or edema.  SKIN: No new rashes or lesions.  MSK: strength equal BL  VASCULAR: R foot wrapped w/ clean/dry kerlex; per RN this was just evaluated by primary team and dressed so dressing in situ was not taken down.  NEUROLOGIC: Grossly intact.  PSYCH: No new changes.    MEDICATIONS  (STANDING):  aspirin enteric coated 81 milliGRAM(s) Oral every 24 hours  atorvastatin 80 milliGRAM(s) Oral at bedtime  carvedilol 12.5 milliGRAM(s) Oral every 12 hours  dextrose 5%. 1000 milliLiter(s) (100 mL/Hr) IV Continuous <Continuous>  dextrose 5%. 1000 milliLiter(s) (50 mL/Hr) IV Continuous <Continuous>  dextrose 50% Injectable 12.5 Gram(s) IV Push once  dextrose 50% Injectable 25 Gram(s) IV Push once  dextrose 50% Injectable 25 Gram(s) IV Push once  gabapentin 800 milliGRAM(s) Oral three times a day  glucagon  Injectable 1 milliGRAM(s) IntraMuscular once  heparin   Injectable 5000 Unit(s) SubCutaneous every 12 hours  hydrALAZINE 50 milliGRAM(s) Oral every 8 hours  insulin glargine Injectable (LANTUS) 16 Unit(s) SubCutaneous at bedtime  insulin lispro (ADMELOG) corrective regimen sliding scale   SubCutaneous Before meals and at bedtime  insulin lispro Injectable (ADMELOG) 3 Unit(s) SubCutaneous three times a day before meals  lisinopril 40 milliGRAM(s) Oral every 24 hours  nystatin Cream 1 Application(s) Topical two times a day  piperacillin/tazobactam IVPB.. 3.375 Gram(s) IV Intermittent every 8 hours  vancomycin  IVPB 1500 milliGRAM(s) IV Intermittent every 12 hours    MEDICATIONS  (PRN):  acetaminophen     Tablet .. 650 milliGRAM(s) Oral every 6 hours PRN Temp greater or equal to 38C (100.4F), Mild Pain (1 - 3)  dextrose Oral Gel 15 Gram(s) Oral once PRN Blood Glucose LESS THAN 70 milliGRAM(s)/deciliter  oxyCODONE    IR 5 milliGRAM(s) Oral every 6 hours PRN Severe Pain (7 - 10)      Assessment-   67 y/o Male w/ PMHx of CAD s/p PCI (2 stents 2020), G1DD, HTN, uncontrolled IDDM (A1c 12 in 10/2023) PAD s/p remote RLE angioplasty and AT balloon angio 10/27/23,, R 4th toe OM s/p partial amputation on 10/24/23, and recent admission for HTN emergency where he left AMA and was discharged on Levaquin for R foot wound, who initially presented to [facility] for a fall c/o b/l knee pain and was subsequently admitted for R foot OM w/ subcutaneous air, for whom a rapid response was called for hypoglycemia w/ associated AMS.    DDx:  -Hypoglycemia i/s/o Lantus use and not eating breakfast  -Hypoglycemia 2/2 sepsis (given hypothermia, leukocytosis w/ L shift and known R foot infection)    Plan-  -f/u labs including BCx, lactate, CBC, CMP, Mag, Phos, and Coags  -q4hr FS BGL's and encourage PO intake if okay with primary team; FS BGL goal 140-180.  -Reduce home standing insulin dose by 2/3 while inpatient (home dosing Lantus 14u QHS and Lispro 3u TID w/ meals), w/ medium dose sliding scale. Hold PO hypoglycemic agents while inpatient  -Consider endo consult to help adjust insulin dosing  -c/w Zosyn per ID rec's. Consider addition of Vancomycin if pt is now more amenable to blood draws for level monitoring. Further ID recs appreciated.  -Monitor bradycardia; NTD at this time given pt is asymptomatic and hemodynamically stable but may warrant further cardiac w/u to r/o underlying ischemic etiology    Dispo: Pt to remain on unit.      I have personally and independently provided 31 minutes of critical care services.  This excludes any time spent on separate procedures or teaching. ***Rapid Response Clinical Impact Physician Assistant Note***    69 y/o Male w/ PMHx of CAD s/p PCI (2 stents 2020), G1DD, HTN, uncontrolled IDDM (A1c 12 in 10/2023) PAD s/p remote RLE angioplasty and AT balloon angio 10/27/23,, R 4th toe OM s/p partial amputation on 10/24/23, and recent admission for HTN emergency where he left AMA and was discharged on Levaquin for R foot wound, who initially presented to [facility] for a fall c/o b/l knee pain. Pt was subsequently admitted for R foot OM w/ subcutaneous air, w/ wound Cx growing Staph and Lactobacillus and prior BCx from 12/4 w/ NGTD. ID, vascular and podiatry following. Since admission pt has refused lab work and initially refused surgical intervention of the R foot and is currently being medically managed pending pt's agreement w/ surgical intervention.    Rapid response called at 8:56am for hypoglycemia w/ associated AMS. Per bedside RN, AM FS BGL 35 and pt was notably lethargic for which the rapid response was called.    Patient was seen and examined at the bedside by the rapid response team. Upon arrival pt was seated in bed in his room in care of primary team and ICU resident. VS: UTO oral temp, later rectal T 93.1F rectal, HR 45, /71, RR 18 non-labored, SpO2 97% on RA. Physical exam significant for lethargy and pt minimally verbal, but when prompted pt would speak and was A+Ox3 w/ situational awareness, no focal neuro deficits noted, pupils PERRL, and R foot wound wrapped in dry kerlex. Chart review reveals only sedating medication included Oxycodone 5mg IR x1 at 7:30pm last night and an additional 5mg x1 at 3:30am this morning, and pt's last insulin dose was Lantus 16u at 10pm. Per pt and RN pt ate dinner last night but had not yet eaten anything today. Pt given one D50 25g IVP x1 w/ gradual improvement in MS; repeat FS . EKG done for bradycardia reveals sinus bradycardia @45bpm w/o ischemic changes. Labs were drawn including BCx given hypothermia and hypoglycemia, Lactate, CBC, CMP, Mag, Phos, coags, and T&S.        No Known Allergies or intolerances    PAST MEDICAL & SURGICAL HISTORY:  IDDM (insulin dependent diabetes mellitus)  HTN (hypertension)  CAD S/P percutaneous coronary angioplasty  Neuropathy  PAD (peripheral artery disease)  PNA (pneumonia)  Gangrene of toe of right foot  Moderate aortic stenosis  Acute on chronic diastolic congestive heart failure  Hyperlipidemia  History of partial ray amputation of fourth toe of right foot    Vital Signs Last 24 Hrs  T(C): 36.7 (09 Dec 2023 05:48), Max: 36.8 (08 Dec 2023 20:36)  T(F): 98 (09 Dec 2023 05:48), Max: 98.3 (08 Dec 2023 20:36)  HR: 52 (09 Dec 2023 08:47) (52 - 66)  BP: 167/79 (09 Dec 2023 08:47) (147/73 - 184/83)  BP(mean): 228 (08 Dec 2023 18:13) (228 - 228)  RR: 18 (09 Dec 2023 08:47) (18 - 19)  SpO2: 97% (09 Dec 2023 08:47) (95% - 99%)    Parameters below as of 09 Dec 2023 08:47  Patient On (Oxygen Delivery Method): room air      Review of Systems:  Pt reports his only complaint is hunger, and would not provide any further ROS.  All other review of systems is negative unless indicated above.    Physical exam:  GENERAL: Initially lethargic but A+Ox3 w/ situational awareness. Lethargy improved s/p D50.  HEENT: Head is normocephalic and atraumatic. Extraocular muscles are intact. Mucous membranes are moist. No throat erythema/exudates no lymphadenopathy, no JVD,   NECK: Supple.  LUNGS: Clear to auscultation BL without wheezing, rales or rhonchi; respirations unlabored  HEART: Regular rate and rhythm ,+S1/+S2, no murmurs, rubs, gallops; bradycardic  ABDOMEN: Soft, nontender, and nondistended, no rebound, guarding rigidity, bowel sounds in all 4 quadrants  EXTREMITIES: Without any cyanosis, clubbing, rash, lesions or edema.  SKIN: No new rashes or lesions.  MSK: strength equal BL  VASCULAR: R foot wrapped w/ clean/dry kerlex; per RN this was just evaluated by primary team and dressed so dressing in situ was not taken down.  NEUROLOGIC: Grossly intact.  PSYCH: No new changes.    MEDICATIONS  (STANDING):  aspirin enteric coated 81 milliGRAM(s) Oral every 24 hours  atorvastatin 80 milliGRAM(s) Oral at bedtime  carvedilol 12.5 milliGRAM(s) Oral every 12 hours  dextrose 5%. 1000 milliLiter(s) (100 mL/Hr) IV Continuous <Continuous>  dextrose 5%. 1000 milliLiter(s) (50 mL/Hr) IV Continuous <Continuous>  dextrose 50% Injectable 12.5 Gram(s) IV Push once  dextrose 50% Injectable 25 Gram(s) IV Push once  dextrose 50% Injectable 25 Gram(s) IV Push once  gabapentin 800 milliGRAM(s) Oral three times a day  glucagon  Injectable 1 milliGRAM(s) IntraMuscular once  heparin   Injectable 5000 Unit(s) SubCutaneous every 12 hours  hydrALAZINE 50 milliGRAM(s) Oral every 8 hours  insulin glargine Injectable (LANTUS) 16 Unit(s) SubCutaneous at bedtime  insulin lispro (ADMELOG) corrective regimen sliding scale   SubCutaneous Before meals and at bedtime  insulin lispro Injectable (ADMELOG) 3 Unit(s) SubCutaneous three times a day before meals  lisinopril 40 milliGRAM(s) Oral every 24 hours  nystatin Cream 1 Application(s) Topical two times a day  piperacillin/tazobactam IVPB.. 3.375 Gram(s) IV Intermittent every 8 hours  vancomycin  IVPB 1500 milliGRAM(s) IV Intermittent every 12 hours    MEDICATIONS  (PRN):  acetaminophen     Tablet .. 650 milliGRAM(s) Oral every 6 hours PRN Temp greater or equal to 38C (100.4F), Mild Pain (1 - 3)  dextrose Oral Gel 15 Gram(s) Oral once PRN Blood Glucose LESS THAN 70 milliGRAM(s)/deciliter  oxyCODONE    IR 5 milliGRAM(s) Oral every 6 hours PRN Severe Pain (7 - 10)      Assessment-   69 y/o Male w/ PMHx of CAD s/p PCI (2 stents 2020), G1DD, HTN, uncontrolled IDDM (A1c 12 in 10/2023) PAD s/p remote RLE angioplasty and AT balloon angio 10/27/23,, R 4th toe OM s/p partial amputation on 10/24/23, and recent admission for HTN emergency where he left AMA and was discharged on Levaquin for R foot wound, who initially presented to [facility] for a fall c/o b/l knee pain and was subsequently admitted for R foot OM w/ subcutaneous air, for whom a rapid response was called for hypoglycemia w/ associated AMS.    DDx:  -Hypoglycemia i/s/o Lantus use and not eating breakfast  -Hypoglycemia 2/2 sepsis (given hypothermia, leukocytosis w/ L shift and known R foot infection)    Plan-  -f/u labs including BCx, lactate, CBC, CMP, Mag, Phos, and Coags  -q4hr FS BGL's and encourage PO intake if okay with primary team; FS BGL goal 140-180.  -Reduce home standing insulin dose by 2/3 while inpatient (home dosing Lantus 14u QHS and Lispro 3u TID w/ meals), w/ medium dose sliding scale. Hold PO hypoglycemic agents while inpatient  -Consider endo consult to help adjust insulin dosing  -c/w Zosyn per ID rec's. Consider addition of Vancomycin if pt is now more amenable to blood draws for level monitoring. Further ID recs appreciated.  -Monitor bradycardia; NTD at this time given pt is asymptomatic and hemodynamically stable but may warrant further cardiac w/u to r/o underlying ischemic etiology, especially considering hx of CAD s/p PCI.    Dispo: Pt to remain on unit.      I have personally and independently provided 31 minutes of critical care services.  This excludes any time spent on separate procedures or teaching. ***Rapid Response Clinical Impact Physician Assistant Note***    69 y/o Male w/ PMHx of CAD s/p PCI (2 stents 2020), G1DD, HTN, uncontrolled IDDM (A1c 12 in 10/2023) PAD s/p remote RLE angioplasty and AT balloon angio 10/27/23,, R 4th toe OM s/p partial amputation on 10/24/23, and recent admission for HTN emergency where he left AMA and was discharged on Levaquin for R foot wound, who initially presented to [facility] for a fall c/o b/l knee pain. Pt was subsequently admitted for R foot OM w/ subcutaneous air, w/ wound Cx growing Staph and Lactobacillus and prior BCx from 12/4 w/ NGTD. ID, vascular and podiatry following. Since admission pt has refused lab work and initially refused surgical intervention of the R foot and is currently being medically managed pending pt's agreement w/ surgical intervention.    Rapid response called at 8:56am for hypoglycemia w/ associated AMS. Per bedside RN, AM FS BGL 35 and pt was notably lethargic for which the rapid response was called.    Patient was seen and examined at the bedside by the rapid response team. Upon arrival pt was seated in bed in his room in care of primary team and ICU resident. VS: UTO oral temp, later rectal T 93.1F rectal, HR 45, /71, RR 18 non-labored, SpO2 97% on RA. Physical exam significant for lethargy and pt minimally verbal, but when prompted pt would speak and was A+Ox3 w/ situational awareness, no focal neuro deficits noted, pupils PERRL, and R foot wound wrapped in dry kerlex. Chart review reveals only sedating medication included Oxycodone 5mg IR x1 at 7:30pm last night and an additional 5mg x1 at 3:30am this morning, and pt's last insulin dose was Lantus 16u at 10pm. Per pt and RN pt ate dinner last night but had not yet eaten anything today. Pt given one D50 25g IVP x1 w/ gradual improvement in MS; repeat FS . EKG done for bradycardia reveals sinus bradycardia @45bpm w/o ischemic changes. Labs were drawn including BCx given hypothermia and hypoglycemia, Lactate, CBC, CMP, Mag, Phos, coags, and T&S.        No Known Allergies or intolerances    PAST MEDICAL & SURGICAL HISTORY:  IDDM (insulin dependent diabetes mellitus)  HTN (hypertension)  CAD S/P percutaneous coronary angioplasty  Neuropathy  PAD (peripheral artery disease)  PNA (pneumonia)  Gangrene of toe of right foot  Moderate aortic stenosis  Acute on chronic diastolic congestive heart failure  Hyperlipidemia  History of partial ray amputation of fourth toe of right foot    Vital Signs Last 24 Hrs  T(C): 36.7 (09 Dec 2023 05:48), Max: 36.8 (08 Dec 2023 20:36)  T(F): 98 (09 Dec 2023 05:48), Max: 98.3 (08 Dec 2023 20:36)  HR: 52 (09 Dec 2023 08:47) (52 - 66)  BP: 167/79 (09 Dec 2023 08:47) (147/73 - 184/83)  BP(mean): 228 (08 Dec 2023 18:13) (228 - 228)  RR: 18 (09 Dec 2023 08:47) (18 - 19)  SpO2: 97% (09 Dec 2023 08:47) (95% - 99%)    Parameters below as of 09 Dec 2023 08:47  Patient On (Oxygen Delivery Method): room air      Review of Systems:  Pt reports his only complaint is hunger, and would not provide any further ROS.  All other review of systems is negative unless indicated above.    Physical exam:  GENERAL: Initially lethargic but A+Ox3 w/ situational awareness. Lethargy improved s/p D50.  HEENT: Head is normocephalic and atraumatic. Extraocular muscles are intact. Mucous membranes are moist. No throat erythema/exudates no lymphadenopathy, no JVD,   NECK: Supple.  LUNGS: Clear to auscultation BL without wheezing, rales or rhonchi; respirations unlabored  HEART: Regular rate and rhythm ,+S1/+S2, no murmurs, rubs, gallops; bradycardic  ABDOMEN: Soft, nontender, and nondistended, no rebound, guarding rigidity, bowel sounds in all 4 quadrants  EXTREMITIES: Without any cyanosis, clubbing, rash, lesions or edema.  SKIN: No new rashes or lesions.  MSK: strength equal BL  VASCULAR: R foot wrapped w/ clean/dry kerlex; per RN this was just evaluated by primary team and dressed so dressing in situ was not taken down.  NEUROLOGIC: Grossly intact.  PSYCH: No new changes.    MEDICATIONS  (STANDING):  aspirin enteric coated 81 milliGRAM(s) Oral every 24 hours  atorvastatin 80 milliGRAM(s) Oral at bedtime  carvedilol 12.5 milliGRAM(s) Oral every 12 hours  dextrose 5%. 1000 milliLiter(s) (100 mL/Hr) IV Continuous <Continuous>  dextrose 5%. 1000 milliLiter(s) (50 mL/Hr) IV Continuous <Continuous>  dextrose 50% Injectable 12.5 Gram(s) IV Push once  dextrose 50% Injectable 25 Gram(s) IV Push once  dextrose 50% Injectable 25 Gram(s) IV Push once  gabapentin 800 milliGRAM(s) Oral three times a day  glucagon  Injectable 1 milliGRAM(s) IntraMuscular once  heparin   Injectable 5000 Unit(s) SubCutaneous every 12 hours  hydrALAZINE 50 milliGRAM(s) Oral every 8 hours  insulin glargine Injectable (LANTUS) 16 Unit(s) SubCutaneous at bedtime  insulin lispro (ADMELOG) corrective regimen sliding scale   SubCutaneous Before meals and at bedtime  insulin lispro Injectable (ADMELOG) 3 Unit(s) SubCutaneous three times a day before meals  lisinopril 40 milliGRAM(s) Oral every 24 hours  nystatin Cream 1 Application(s) Topical two times a day  piperacillin/tazobactam IVPB.. 3.375 Gram(s) IV Intermittent every 8 hours  vancomycin  IVPB 1500 milliGRAM(s) IV Intermittent every 12 hours    MEDICATIONS  (PRN):  acetaminophen     Tablet .. 650 milliGRAM(s) Oral every 6 hours PRN Temp greater or equal to 38C (100.4F), Mild Pain (1 - 3)  dextrose Oral Gel 15 Gram(s) Oral once PRN Blood Glucose LESS THAN 70 milliGRAM(s)/deciliter  oxyCODONE    IR 5 milliGRAM(s) Oral every 6 hours PRN Severe Pain (7 - 10)      Assessment-   69 y/o Male w/ PMHx of CAD s/p PCI (2 stents 2020), G1DD, HTN, uncontrolled IDDM (A1c 12 in 10/2023) PAD s/p remote RLE angioplasty and AT balloon angio 10/27/23,, R 4th toe OM s/p partial amputation on 10/24/23, and recent admission for HTN emergency where he left AMA and was discharged on Levaquin for R foot wound, who initially presented to [facility] for a fall c/o b/l knee pain and was subsequently admitted for R foot OM w/ subcutaneous air, for whom a rapid response was called for hypoglycemia w/ associated AMS.    DDx:  -Hypoglycemia i/s/o Lantus use and not eating breakfast  -Hypoglycemia 2/2 sepsis (given hypothermia, leukocytosis w/ L shift and known R foot infection)    Plan-  -f/u labs including BCx, lactate, CBC, CMP, Mag, Phos, and Coags  -q4hr FS BGL's and encourage PO intake if okay with primary team; FS BGL goal 140-180.  -Reduce home standing insulin dose by 2/3 while inpatient (home dosing Lantus 14u QHS and Lispro 3u TID w/ meals), w/ medium dose sliding scale. Hold PO hypoglycemic agents while inpatient  -Consider endo consult to help adjust insulin dosing  -c/w Zosyn per ID rec's. Consider addition of Vancomycin if pt is now more amenable to blood draws for level monitoring. Further ID recs appreciated.  -Monitor bradycardia; NTD at this time given pt is asymptomatic and hemodynamically stable but may warrant further cardiac w/u to r/o underlying ischemic etiology, especially considering hx of CAD s/p PCI.    Dispo: Pt to remain on unit.      I have personally and independently provided 31 minutes of critical care services.  This excludes any time spent on separate procedures or teaching.

## 2023-12-09 NOTE — PROGRESS NOTE ADULT - PROBLEM SELECTOR PLAN 1
Past hx of PAD w/ diabetic foot wound. Had prior admission in October where he was seen by ID and Podiatry and was supposed to complete 6 wks of broad spectrum antimicrobials. At that time MRI with possible 4th digit early OM up to prox phalanx s/p partial R 4th ray amputation on 10/24, proximal cx with E.coli, Pluralibacter gergoviae, stap epi, and Corynebacterium amycolatum.   Then had an admission this month for unrelated issue, had left AMA and PICC line was taken out bc pt was leaving AMA, was prescribed levaquin to complete his 6wk course however pt unsure if he actually picked up the levaquin.  CT angio LE today shows increased soft tissue gas in the right forefoot. Vascular surgery was contacted by [facility].  On exam right foot has open wound between 4th and 5th digits with purulence coming out of wound, foot is erythematous on dorsal aspect and warm to touch.  Ddx includes SSTI vs osteomyelitis vs less likely nec fasc in setting of possibly incompletely treated infection.   Vascular signed off d/t pt refusing interventions  Wound culture shows few staph haemolyticus, lactobacillus gasseri   Started on vancomycin, refused vanc trough- vanc d'jasen   Extensive conversation on 12/6 w/ Vascular, Podiatry, Medicine team, pt not agreeable to BKA. Given D/c notice.   On 12/8- pt amenable to BKA, Vascular/Podiatry on board- sx planned for 12/11   Rapid response called for hypoglycemic episode w/ FSG 35. Returned to baseline mentation s/p 1 amp D50.   - pending cardiac clearance for sx scheduled for 12/11   - c/w zosyn 3.375g IV q8, vancomycin   - Pain med PRN- tylenol (mild pain), toradol (mod pain), Oxy 5 prn (severe pain)  - f/u blood cx- NGTD Past hx of PAD w/ diabetic foot wound. Had prior admission in October where he was seen by ID and Podiatry and was supposed to complete 6 wks of broad spectrum antimicrobials. At that time MRI with possible 4th digit early OM up to prox phalanx s/p partial R 4th ray amputation on 10/24, proximal cx with E.coli, Pluralibacter gergoviae, stap epi, and Corynebacterium amycolatum.   Then had an admission this month for unrelated issue, had left AMA and PICC line was taken out bc pt was leaving AMA, was prescribed levaquin to complete his 6wk course however pt unsure if he actually picked up the levaquin.  CT angio LE today shows increased soft tissue gas in the right forefoot. Vascular surgery was contacted by [facility].  On exam right foot has open wound between 4th and 5th digits with purulence coming out of wound, foot is erythematous on dorsal aspect and warm to touch.  Ddx includes SSTI vs osteomyelitis vs less likely nec fasc in setting of possibly incompletely treated infection.   Vascular signed off d/t pt refusing interventions  Wound culture shows few staph haemolyticus, lactobacillus gasseri   Started on vancomycin, refused vanc trough- vanc d'jasen   Extensive conversation on 12/6 w/ Vascular, Podiatry, Medicine team, pt not agreeable to BKA. Given D/c notice.   On 12/8- pt amenable to BKA, Vascular/Podiatry on board- sx planned for 12/11   Rapid response called for hypoglycemic episode w/ FSG 35. Returned to baseline mentation s/p 1 amp D50.   - pending cardiac clearance for sx scheduled for 12/11   - c/w zosyn 3.375g IV q8, vancomycin   - Pain med PRN- tylenol (mild pain), toradol (mod pain), Oxy 5 prn (severe pain)  - f/u blood cx- NGTD

## 2023-12-09 NOTE — PROGRESS NOTE ADULT - SUBJECTIVE AND OBJECTIVE BOX
**INCOMPLETE NOTE    OVERNIGHT EVENTS:    SUBJECTIVE:  Patient seen and examined at bedside.    Vital Signs Last 12 Hrs  T(F): 98 (12-09-23 @ 05:48), Max: 98.3 (12-08-23 @ 20:36)  HR: 66 (12-09-23 @ 05:48) (64 - 66)  BP: 184/83 (12-09-23 @ 05:48) (147/73 - 184/83)  BP(mean): --  RR: 19 (12-09-23 @ 05:48) (19 - 19)  SpO2: 95% (12-09-23 @ 05:48) (95% - 99%)  I&O's Summary    07 Dec 2023 07:01  -  08 Dec 2023 07:00  --------------------------------------------------------  IN: 0 mL / OUT: 200 mL / NET: -200 mL        PHYSICAL EXAM:  Constitutional: NAD, comfortable in bed.  HEENT: NC/AT, PERRLA, EOMI, no conjunctival pallor or scleral icterus, MMM  Neck: Supple, no JVD  Respiratory: CTA B/L. No w/r/r.   Cardiovascular: RRR, normal S1 and S2, no m/r/g.   Gastrointestinal: +BS, soft NTND, no guarding or rebound tenderness, no palpable masses   Extremities: wwp; no cyanosis, clubbing or edema.   Vascular: Pulses equal and strong throughout.   Neurological: AAOx3, no CN deficits, strength and sensation intact throughout.   Skin: No gross skin abnormalities or rashes        LABS:                  RADIOLOGY & ADDITIONAL TESTS:    MEDICATIONS  (STANDING):  aspirin enteric coated 81 milliGRAM(s) Oral every 24 hours  atorvastatin 80 milliGRAM(s) Oral at bedtime  carvedilol 12.5 milliGRAM(s) Oral every 12 hours  dextrose 5%. 1000 milliLiter(s) (50 mL/Hr) IV Continuous <Continuous>  dextrose 5%. 1000 milliLiter(s) (100 mL/Hr) IV Continuous <Continuous>  dextrose 50% Injectable 12.5 Gram(s) IV Push once  dextrose 50% Injectable 25 Gram(s) IV Push once  dextrose 50% Injectable 25 Gram(s) IV Push once  gabapentin 800 milliGRAM(s) Oral three times a day  glucagon  Injectable 1 milliGRAM(s) IntraMuscular once  heparin   Injectable 5000 Unit(s) SubCutaneous every 12 hours  hydrALAZINE 50 milliGRAM(s) Oral every 8 hours  insulin glargine Injectable (LANTUS) 16 Unit(s) SubCutaneous at bedtime  insulin lispro (ADMELOG) corrective regimen sliding scale   SubCutaneous Before meals and at bedtime  insulin lispro Injectable (ADMELOG) 3 Unit(s) SubCutaneous three times a day before meals  lisinopril 40 milliGRAM(s) Oral every 24 hours  nystatin Cream 1 Application(s) Topical two times a day    MEDICATIONS  (PRN):  acetaminophen     Tablet .. 650 milliGRAM(s) Oral every 6 hours PRN Temp greater or equal to 38C (100.4F), Mild Pain (1 - 3)  dextrose Oral Gel 15 Gram(s) Oral once PRN Blood Glucose LESS THAN 70 milliGRAM(s)/deciliter  oxyCODONE    IR 5 milliGRAM(s) Oral every 6 hours PRN Severe Pain (7 - 10)   OVERNIGHT EVENTS: NAEO    SUBJECTIVE: Patient seen and examined at bedside. Lethargic, diaphoretic at bedside. Minimally responsive. Rapid Response called. FSG 35. s/p 1 amps D5. Returned to baseline mentation.     Vital Signs Last 12 Hrs  T(F): 98 (12-09-23 @ 05:48), Max: 98.3 (12-08-23 @ 20:36)  HR: 66 (12-09-23 @ 05:48) (64 - 66)  BP: 184/83 (12-09-23 @ 05:48) (147/73 - 184/83)  BP(mean): --  RR: 19 (12-09-23 @ 05:48) (19 - 19)  SpO2: 95% (12-09-23 @ 05:48) (95% - 99%)  I&O's Summary    07 Dec 2023 07:01  -  08 Dec 2023 07:00  --------------------------------------------------------  IN: 0 mL / OUT: 200 mL / NET: -200 mL        PHYSICAL EXAM:  Constitutional: NAD, comfortable in bed.  HEENT: NC/AT, PERRLA, EOMI, MMM  Neck: Supple, + JVD  Respiratory: CTA B/L. No w/r/r.   Cardiovascular: RRR, systolic murmur   Gastrointestinal: +BS, soft NTND, no guarding or rebound tenderness  Extremities: wwp; +2 pitting edema b/l up to groin. RLE wrapped in bandage  Vascular: Pulses equal and strong throughout.   Neurological: AAOx3, no CN deficits, strength and sensation intact throughout.   Skin: No gross skin abnormalities or rashes        LABS:                  RADIOLOGY & ADDITIONAL TESTS:    MEDICATIONS  (STANDING):  aspirin enteric coated 81 milliGRAM(s) Oral every 24 hours  atorvastatin 80 milliGRAM(s) Oral at bedtime  carvedilol 12.5 milliGRAM(s) Oral every 12 hours  dextrose 5%. 1000 milliLiter(s) (50 mL/Hr) IV Continuous <Continuous>  dextrose 5%. 1000 milliLiter(s) (100 mL/Hr) IV Continuous <Continuous>  dextrose 50% Injectable 12.5 Gram(s) IV Push once  dextrose 50% Injectable 25 Gram(s) IV Push once  dextrose 50% Injectable 25 Gram(s) IV Push once  gabapentin 800 milliGRAM(s) Oral three times a day  glucagon  Injectable 1 milliGRAM(s) IntraMuscular once  heparin   Injectable 5000 Unit(s) SubCutaneous every 12 hours  hydrALAZINE 50 milliGRAM(s) Oral every 8 hours  insulin glargine Injectable (LANTUS) 16 Unit(s) SubCutaneous at bedtime  insulin lispro (ADMELOG) corrective regimen sliding scale   SubCutaneous Before meals and at bedtime  insulin lispro Injectable (ADMELOG) 3 Unit(s) SubCutaneous three times a day before meals  lisinopril 40 milliGRAM(s) Oral every 24 hours  nystatin Cream 1 Application(s) Topical two times a day    MEDICATIONS  (PRN):  acetaminophen     Tablet .. 650 milliGRAM(s) Oral every 6 hours PRN Temp greater or equal to 38C (100.4F), Mild Pain (1 - 3)  dextrose Oral Gel 15 Gram(s) Oral once PRN Blood Glucose LESS THAN 70 milliGRAM(s)/deciliter  oxyCODONE    IR 5 milliGRAM(s) Oral every 6 hours PRN Severe Pain (7 - 10)

## 2023-12-09 NOTE — PROGRESS NOTE ADULT - PROBLEM SELECTOR PLAN 5
s/p 2 stents in 2020 at [facility]. s/p 2 stents in 2020 at [facility].

## 2023-12-09 NOTE — PROVIDER CONTACT NOTE (CHANGE IN STATUS NOTIFICATION) - RECOMMENDATIONS
administer IV antibiotics and blood transfusion once type and screen is done. monitor blood sugar and temperature. hold morning dose of insulin as per Dr. Soares order

## 2023-12-09 NOTE — CONSULT NOTE ADULT - ASSESSMENT
67M pmhx JANAY (cocaine/THC), HTN, IDDM with peripheral neuropathy, HCV, HFmrEF (TTE 10/23/23 EF 45% with moderate AS), CAD s/p PCI with 2 stents (2020 at [facility]), recent admission x2 for diabetic SSTI (s/p partial 4th toe ray resection 10/24) and 11/08 admission to CCU for hypertensive emergency and ADHF requiring IV diuresis and nipride gtt with his c/b acute metabolic encephalopathy in the setting of positive cocaine and THC use and subsequently left AMA. He now presents to [facility] after falling onto his knees and w/ subsequent b/l knee pain. 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 Cardiology consulted for perioperative cardiovascular risk assessment.    Review of Studies:    Recommendations:    #Perioperative Cardiovascular Risk Assessment  -RCRI ?, Sanchez ? risk for MI or cardiac arrrest during the procedure and up to 30d  -No evidence of ACS, decompensated HF, severe AS, and tachyarrhythmia on clinical and physical exam assessment  -Had ? METs of exercise tolerance prior to the hospitalization  -? cardiac w/u required, pt is deemed cardiovascularly ? for the procedure and may/may not proceed  -Deemed ? risk of perioperative cardiovascular complications for an ? risk procedure        #HFrEF (ACC Stage C, NYHA Class 3)  Etiology:  Hemodynamics:   Perfusion:   Inopressors:   GDMT: will hold on GDMT while on inopressors  Diuretics: , strict I/Os and daily weights  Device: n/a  AT: Will require rapid uptitration/optimization of GDMT before AT is considered        Recommendations above are preliminary pending discussion with an attending cardiologist  Plan was discussed with primary team  We'll continue to follow, thank you for the consultation      Medardo Crolwey (PGY5)  Cardiovascular Disease Fellow  Consult Pager: [phone]         67M pmhx JANAY (cocaine/THC), HTN, IDDM with peripheral neuropathy, HCV, HFmrEF (TTE 10/23/23 EF 45% with moderate AS), CAD s/p PCI with 2 stents (2020 at [facility]), recent admission x2 for diabetic SSTI (s/p partial 4th toe ray resection 10/24) and 11/08 admission to CCU for hypertensive emergency and ADHF requiring IV diuresis and nipride gtt with his c/b acute metabolic encephalopathy in the setting of positive cocaine and THC use and subsequently left AMA. He now presents to [facility] after falling onto his knees and w/ subsequent b/l knee pain. 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 Cardiology consulted for perioperative cardiovascular risk assessment.    Review of Studies:    Recommendations:    #Perioperative Cardiovascular Risk Assessment  -RCRI ?, Sanchez ? risk for MI or cardiac arrrest during the procedure and up to 30d  -No evidence of ACS, decompensated HF, severe AS, and tachyarrhythmia on clinical and physical exam assessment  -Had ? METs of exercise tolerance prior to the hospitalization  -? cardiac w/u required, pt is deemed cardiovascularly ? for the procedure and may/may not proceed  -Deemed ? risk of perioperative cardiovascular complications for an ? risk procedure        #HFrEF (ACC Stage C, NYHA Class 3)  Etiology:  Hemodynamics:   Perfusion:   Inopressors:   GDMT: will hold on GDMT while on inopressors  Diuretics: , strict I/Os and daily weights  Device: n/a  AT: Will require rapid uptitration/optimization of GDMT before AT is considered        Recommendations above are preliminary pending discussion with an attending cardiologist  Plan was discussed with primary team  We'll continue to follow, thank you for the consultation      Medardo Crowley (PGY5)  Cardiovascular Disease Fellow  Consult Pager: [phone]         67M pmhx JANAY (cocaine/THC), HTN, IDDM with peripheral neuropathy, HCV, HFmrEF (TTE 10/23/23 EF 45% with moderate AS), CAD s/p PCI with 2 stents (2020 at [facility]), recent admission x2 for diabetic SSTI (s/p partial 4th toe ray resection 10/24) and 11/08 admission to CCU for hypertensive emergency and ADHF requiring IV diuresis and nipride gtt with his c/b acute metabolic encephalopathy in the setting of positive cocaine and THC use and subsequently left AMA. He now presents to [facility] after falling onto his knees and w/ subsequent b/l knee pain. 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 Cardiology consulted for perioperative cardiovascular risk assessment.    Review of Studies:  ECG 12/9/23:  TTE 12/9/23:      Recommendations:    #Perioperative Cardiovascular Risk Assessment  -RCRI 4, Sanchez 1.8% risk for MI or cardiac arrest during the procedure and up to 30d  -No evidence of ACS, decompensated HF, severe AS, and tachyarrhythmia on clinical and physical exam assessment  -Unable to obtain functional status as pt deferred interview/exam and requested that I leave the room, we will reattempt tomorrow to ascertain functional status  -We are not at this time able to determine if the pt is optimized from a cardiovascular perspective  -Deemed high risk of perioperative cardiovascular complications for an high risk procedure    #HFmrEF (ACC Stage C, NYHA Class 3)  GDMT: c/w coreg 12.5mg BID w/holding parameters SBP <110 and/or HR <60, c/w hydralazine 50mg TID w/holding parameters SBP <110, c/w lisinopril 40mg qd w/holding parameters SBP <110   Diuretics: Unable to determine volume status given deferral of exam, strict I/Os and daily weights  Device: n/a  AT: Will require rapid uptitration/optimization of GDMT before AT is considered      Recommendations above are preliminary pending discussion with an attending cardiologist  Plan was discussed with primary team  We'll continue to follow, thank you for the consultation      Medardo Crowley (PGY5)  Cardiovascular Disease Fellow  Consult Pager: [phone]         67M pmhx JANAY (cocaine/THC), HTN, IDDM with peripheral neuropathy, HCV, HFmrEF (TTE 10/23/23 EF 45% with moderate AS), CAD s/p PCI with 2 stents (2020 at [facility]), recent admission x2 for diabetic SSTI (s/p partial 4th toe ray resection 10/24) and 11/08 admission to CCU for hypertensive emergency and ADHF requiring IV diuresis and nipride gtt with his c/b acute metabolic encephalopathy in the setting of positive cocaine and THC use and subsequently left AMA. He now presents to [facility] after falling onto his knees and w/ subsequent b/l knee pain. 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 Cardiology consulted for perioperative cardiovascular risk assessment.    Review of Studies:  ECG 12/9/23:  TTE 12/9/23:      Recommendations:    #Perioperative Cardiovascular Risk Assessment  -RCRI 4, Sanchez 1.8% risk for MI or cardiac arrest during the procedure and up to 30d  -No evidence of ACS, decompensated HF, severe AS, and tachyarrhythmia on clinical and physical exam assessment  -Unable to obtain functional status as pt deferred interview/exam and requested that I leave the room, we will reattempt tomorrow to ascertain functional status  -We are not at this time able to determine if the pt is optimized from a cardiovascular perspective  -Deemed high risk of perioperative cardiovascular complications for an high risk procedure    #HFmrEF (ACC Stage C, NYHA Class 3)  GDMT: c/w coreg 12.5mg BID w/holding parameters SBP <110 and/or HR <60, c/w hydralazine 50mg TID w/holding parameters SBP <110, c/w lisinopril 40mg qd w/holding parameters SBP <110   Diuretics: Unable to determine volume status given deferral of exam, strict I/Os and daily weights  Device: n/a  AT: Will require rapid uptitration/optimization of GDMT before AT is considered      Recommendations above are preliminary pending discussion with an attending cardiologist  Plan was discussed with primary team  We'll continue to follow, thank you for the consultation      Medardo Crowley (PGY5)  Cardiovascular Disease Fellow  Consult Pager: [phone]         67M pmhx JANAY (cocaine/THC), HTN, IDDM with peripheral neuropathy, HCV, HFmrEF (TTE 10/23/23 EF 45% with moderate AS), CAD s/p PCI with 2 stents (2020 at [facility]), recent admission x2 for diabetic SSTI (s/p partial 4th toe ray resection 10/24) and 11/08 admission to CCU for hypertensive emergency and ADHF requiring IV diuresis and nipride gtt with his c/b acute metabolic encephalopathy in the setting of positive cocaine and THC use and subsequently left AMA. He now presents to [facility] after falling onto his knees and w/ subsequent b/l knee pain. 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 Cardiology consulted for perioperative cardiovascular risk assessment.    Review of Studies:  ECG 12/9/23: sinus bradycardia w/nonspecific ST & T wave changes  TTE 11/09/23L LVEF 45-50% w/ RWMA, mild to moderate LVH, biatrial enlargement, mild to moderate AS, peak trans velocity 2.92 m/s, MG 15 mmHg, LVOT/AV 0.50, aortic valve area 1.28 cm2      Recommendations:    #Perioperative Cardiovascular Risk Assessment  -RCRI 4, Sanchez 1.8% risk for MI or cardiac arrest during the procedure and up to 30d  -No evidence of ACS, decompensated HF, severe AS, and tachyarrhythmia on clinical and physical exam assessment  -Unable to obtain functional status as pt deferred interview/exam and requested that I leave the room, we will reattempt tomorrow to ascertain functional status  -We are not at this time able to determine if the pt is optimized from a cardiovascular perspective  -Deemed high risk of perioperative cardiovascular complications for an high risk procedure    #HFmrEF (ACC Stage C, NYHA Class 3)  -GDMT: c/w coreg 12.5mg BID w/holding parameters SBP <110 and/or HR <60, c/w hydralazine 50mg TID w/holding parameters SBP <110, c/w lisinopril 40mg qd w/holding parameters SBP <110   -Diuretics: Unable to determine volume status given deferral of exam, strict I/Os and daily weights  -Device: n/a  -AT: Will require rapid uptitration/optimization of GDMT before AT is considered    Recommendations above are preliminary pending discussion with an attending cardiologist  Plan was discussed with primary team  We'll continue to follow, thank you for the consultation      Medardo Crowley (PGY5)  Cardiovascular Disease Fellow  Consult Pager: [phone]         67M pmhx JANAY (cocaine/THC), HTN, IDDM with peripheral neuropathy, HCV, HFmrEF (TTE 10/23/23 EF 45% with moderate AS), CAD s/p PCI with 2 stents (2020 at [facility]), recent admission x2 for diabetic SSTI (s/p partial 4th toe ray resection 10/24) and 11/08 admission to CCU for hypertensive emergency and ADHF requiring IV diuresis and nipride gtt with his c/b acute metabolic encephalopathy in the setting of positive cocaine and THC use and subsequently left AMA. He now presents to [facility] after falling onto his knees and w/ subsequent b/l knee pain. 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 Cardiology consulted for perioperative cardiovascular risk assessment.    Review of Studies:  ECG 12/9/23: sinus bradycardia w/nonspecific ST & T wave changes  TTE 11/09/23L LVEF 45-50% w/ RWMA, mild to moderate LVH, biatrial enlargement, mild to moderate AS, peak trans velocity 2.92 m/s, MG 15 mmHg, LVOT/AV 0.50, aortic valve area 1.28 cm2      Recommendations:    #Perioperative Cardiovascular Risk Assessment  -RCRI 4, Sanchez 1.8% risk for MI or cardiac arrest during the procedure and up to 30d  -No evidence of ACS, decompensated HF, severe AS, and tachyarrhythmia on clinical and physical exam assessment  -Unable to obtain functional status as pt deferred interview/exam and requested that I leave the room, we will reattempt tomorrow to ascertain functional status  -We are not at this time able to determine if the pt is optimized from a cardiovascular perspective  -Deemed high risk of perioperative cardiovascular complications for an high risk procedure    #HFmrEF (ACC Stage C, NYHA Class 3)  -GDMT: c/w coreg 12.5mg BID w/holding parameters SBP <110 and/or HR <60, c/w hydralazine 50mg TID w/holding parameters SBP <110, c/w lisinopril 40mg qd w/holding parameters SBP <110   -Diuretics: Unable to determine volume status given deferral of exam, strict I/Os and daily weights  -Device: n/a  -AT: Will require rapid uptitration/optimization of GDMT before AT is considered    Recommendations above are preliminary pending discussion with an attending cardiologist  Plan was discussed with primary team  We'll continue to follow, thank you for the consultation      Medardo Crowley (PGY5)  Cardiovascular Disease Fellow  Consult Pager: [phone]         67M pmhx JANAY (cocaine/THC), HTN, IDDM with peripheral neuropathy, HCV, HFmrEF (TTE 10/23/23 EF 45% with moderate AS), CAD s/p PCI with 2 stents (2020 at [facility]), recent admission x2 for diabetic SSTI (s/p partial 4th toe ray resection 10/24) and 11/08 admission to CCU for hypertensive emergency and ADHF requiring IV diuresis and nipride gtt with his c/b acute metabolic encephalopathy in the setting of positive cocaine and THC use and subsequently left AMA. He now presents to [facility] after falling onto his knees and w/ subsequent b/l knee pain. 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 Cardiology consulted for perioperative cardiovascular risk assessment.    Review of Studies:  ECG 12/9/23: sinus bradycardia w/nonspecific ST & T wave changes  TTE 11/09/23L LVEF 45-50% w/ RWMA, mild to moderate LVH, biatrial enlargement, mild to moderate AS, peak trans velocity 2.92 m/s, MG 15 mmHg, LVOT/AV 0.50, aortic valve area 1.28 cm2      Recommendations:    #Perioperative Cardiovascular Risk Assessment  -RCRI 4, Sanchez 1.8% risk for MI or cardiac arrest during the procedure and up to 30d  -No evidence of ACS, decompensated HF, severe AS, and tachyarrhythmia on clinical assessment  -Unable to obtain functional status as pt deferred interview/exam and requested that I leave the room, we will reattempt tomorrow to ascertain functional status  -We are not at this time able to determine if the pt is optimized from a cardiovascular perspective  -Deemed high risk of perioperative cardiovascular complications for an high risk procedure    #HFmrEF (ACC Stage C, NYHA Class 3)  -GDMT: c/w coreg 12.5mg BID w/holding parameters SBP <110 and/or HR <60, c/w hydralazine 50mg TID w/holding parameters SBP <110, c/w lisinopril 40mg qd w/holding parameters SBP <110   -Diuretics: Unable to determine volume status given deferral of exam, strict I/Os and daily weights  -Device: n/a  -AT: Will require rapid uptitration/optimization of GDMT before AT is considered    Recommendations above are preliminary pending discussion with an attending cardiologist  Plan was discussed with primary team  We'll continue to follow, thank you for the consultation      Medardo Crowley (PGY5)  Cardiovascular Disease Fellow  Consult Pager: [phone]         67M pmhx JANAY (cocaine/THC), HTN, IDDM with peripheral neuropathy, HCV, HFmrEF (TTE 10/23/23 EF 45% with moderate AS), CAD s/p PCI with 2 stents (2020 at [facility]), recent admission x2 for diabetic SSTI (s/p partial 4th toe ray resection 10/24) and 11/08 admission to CCU for hypertensive emergency and ADHF requiring IV diuresis and nipride gtt with his c/b acute metabolic encephalopathy in the setting of positive cocaine and THC use and subsequently left AMA. He now presents to [facility] after falling onto his knees and w/ subsequent b/l knee pain. 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 Cardiology consulted for perioperative cardiovascular risk assessment.    Review of Studies:  ECG 12/9/23: sinus bradycardia w/nonspecific ST & T wave changes  TTE 11/09/23L LVEF 45-50% w/ RWMA, mild to moderate LVH, biatrial enlargement, mild to moderate AS, peak trans velocity 2.92 m/s, MG 15 mmHg, LVOT/AV 0.50, aortic valve area 1.28 cm2      Recommendations:    #Perioperative Cardiovascular Risk Assessment  -RCRI 4, Sanchez 1.8% risk for MI or cardiac arrest during the procedure and up to 30d  -No evidence of ACS, decompensated HF, severe AS, and tachyarrhythmia on clinical assessment  -Unable to obtain functional status as pt deferred interview/exam and requested that I leave the room, we will reattempt tomorrow to ascertain functional status  -We are not at this time able to determine if the pt is optimized from a cardiovascular perspective  -Deemed high risk of perioperative cardiovascular complications for an high risk procedure    #HFmrEF (ACC Stage C, NYHA Class 3)  -GDMT: c/w coreg 12.5mg BID w/holding parameters SBP <110 and/or HR <60, c/w hydralazine 50mg TID w/holding parameters SBP <110, c/w lisinopril 40mg qd w/holding parameters SBP <110   -Diuretics: Unable to determine volume status given deferral of exam, strict I/Os and daily weights  -Device: n/a  -AT: Will require rapid uptitration/optimization of GDMT before AT is considered    Recommendations above are preliminary pending discussion with an attending cardiologist  Plan was discussed with primary team  We'll continue to follow, thank you for the consultation      Medardo Crowley (PGY5)  Cardiovascular Disease Fellow  Consult Pager: [phone]         68M pmhx JANAY (cocaine/THC), HTN, IDDM with peripheral neuropathy, HCV, HFmrEF (TTE 10/23/23 EF 45% with moderate AS), CAD s/p PCI with 2 stents (2020 at [facility]), recent admission x2 for diabetic SSTI (s/p partial 4th toe ray resection 10/24) and 11/08 admission to CCU for hypertensive emergency and ADHF requiring IV diuresis and nipride gtt with his c/b acute metabolic encephalopathy in the setting of positive cocaine and THC use and subsequently left AMA. He now presents to [facility] after falling onto his knees and w/ subsequent b/l knee pain. 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 Cardiology consulted for perioperative cardiovascular risk assessment.    Review of Studies:  ECG 12/9/23: sinus bradycardia w/nonspecific ST & T wave changes  TTE 11/09/23L LVEF 45-50% w/ RWMA, mild to moderate LVH, biatrial enlargement, mild to moderate AS, peak trans velocity 2.92 m/s, MG 15 mmHg, LVOT/AV 0.50, aortic valve area 1.28 cm2      Recommendations:    #Perioperative Cardiovascular Risk Assessment  -RCRI 4, Sanchez 1.8% risk for MI or cardiac arrest during the procedure and up to 30d  -No evidence of ACS, decompensated HF, severe AS, and tachyarrhythmia on clinical assessment  -Unable to obtain functional status as pt deferred interview/exam and requested that I leave the room, we will reattempt tomorrow to ascertain functional status  -We are not at this time able to determine if the pt is optimized from a cardiovascular perspective  -Deemed high risk of perioperative cardiovascular complications for an high risk procedure    #HFmrEF (ACC Stage C, NYHA Class 3)  -GDMT: c/w coreg 12.5mg BID w/holding parameters SBP <110 and/or HR <60, c/w hydralazine 50mg TID w/holding parameters SBP <110, c/w lisinopril 40mg qd w/holding parameters SBP <110   -Diuretics: Unable to determine volume status given deferral of exam, strict I/Os and daily weights  -Device: n/a  -AT: Will require rapid uptitration/optimization of GDMT before AT is considered    Recommendations above are preliminary pending discussion with an attending cardiologist  Plan was discussed with primary team  We'll continue to follow, thank you for the consultation      Medardo Crowley (PGY5)  Cardiovascular Disease Fellow  Consult Pager: [phone]         68M pmhx JANAY (cocaine/THC), HTN, IDDM with peripheral neuropathy, HCV, HFmrEF (TTE 10/23/23 EF 45% with moderate AS), CAD s/p PCI with 2 stents (2020 at [facility]), recent admission x2 for diabetic SSTI (s/p partial 4th toe ray resection 10/24) and 11/08 admission to CCU for hypertensive emergency and ADHF requiring IV diuresis and nipride gtt with his c/b acute metabolic encephalopathy in the setting of positive cocaine and THC use and subsequently left AMA. He now presents to [facility] after falling onto his knees and w/ subsequent b/l knee pain. 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 Cardiology consulted for perioperative cardiovascular risk assessment.    Review of Studies:  ECG 12/9/23: sinus bradycardia w/nonspecific ST & T wave changes  TTE 11/09/23L LVEF 45-50% w/ RWMA, mild to moderate LVH, biatrial enlargement, mild to moderate AS, peak trans velocity 2.92 m/s, MG 15 mmHg, LVOT/AV 0.50, aortic valve area 1.28 cm2      Recommendations:    #Perioperative Cardiovascular Risk Assessment  -RCRI 4, Sanchez 1.8% risk for MI or cardiac arrest during the procedure and up to 30d  -No evidence of ACS, decompensated HF, severe AS, and tachyarrhythmia on clinical assessment  -Unable to obtain functional status as pt deferred interview/exam and requested that I leave the room, we will reattempt tomorrow to ascertain functional status  -We are not at this time able to determine if the pt is optimized from a cardiovascular perspective  -Deemed high risk of perioperative cardiovascular complications for an high risk procedure    #HFmrEF (ACC Stage C, NYHA Class 3)  -GDMT: c/w coreg 12.5mg BID w/holding parameters SBP <110 and/or HR <60, c/w hydralazine 50mg TID w/holding parameters SBP <110, c/w lisinopril 40mg qd w/holding parameters SBP <110   -Diuretics: Unable to determine volume status given deferral of exam, strict I/Os and daily weights  -Device: n/a  -AT: Will require rapid uptitration/optimization of GDMT before AT is considered    Recommendations above are preliminary pending discussion with an attending cardiologist  Plan was discussed with primary team  We'll continue to follow, thank you for the consultation      Medardo Crowley (PGY5)  Cardiovascular Disease Fellow  Consult Pager: [phone]

## 2023-12-09 NOTE — PROGRESS NOTE ADULT - ATTENDING COMMENTS
Pt. seen and examined by me earlier today; I have read Dr. Mckinnon's note, I agree w/ his findings and plan of care as documented; events note, rapid response this AM for hypoglycemia; Endocrine consulted, adjust insulin regimen per recs; OR planning, Cardiology consulted for optimization and cardiac risk assessment; abx per ID

## 2023-12-09 NOTE — PROVIDER CONTACT NOTE (CHANGE IN STATUS NOTIFICATION) - SITUATION
pt fingerstick 35 and was found to be hypoglycemic. pt A&Ox4 at baseline and was found unresponsive by RN. Dr. Mckinnon and Dr. Soares notified. Rapid response called on pt and pt placed on zoll. pt given a push of dextrose 5% and given apple juice and joy crackers. pt fingerstick 257 after treatment and is now A&Ox4. hematology called at 9:45 and pt hgb level is 6.9. pt pending blood transfusion once type and screen is done. Dr. Soares aware.

## 2023-12-09 NOTE — PROGRESS NOTE ADULT - PROBLEM SELECTOR PLAN 6
#HFmrEF  #CAD  s/p 2 stents in 2020 at [facility]  TTE 11/9/23: EF 45-50%, Mild to moderate symmetric LVH, Grade I left ventricular diastolic dysfunction. Biatrial enlargement. Mild-to-moderate aortic stenosis.  Home meds: lisinopril, coreg  +2 b/l LE edema to groins, + JVD given IV lasix 40 x1   - c/w home meds  - c/w aspirin

## 2023-12-09 NOTE — PROGRESS NOTE ADULT - PROBLEM SELECTOR PLAN 4
A1c 12 in October. Has been seen by Endocrine in past.  Home meds: glargine 14U qhs  Lethargic, diaphoretic on exam on 12/9 AM. Found to be hypoglycemic w/ FSG 35. Improved s/p 1 amp D5. Returned to baseline mentation.     - lantus 15U qhs  - lispro 3U TID   - mISS  - FSG QID  - FSG goal 140-180  - consistent carb diet  - c/w home gabapentin for neuropathic pain

## 2023-12-09 NOTE — PROGRESS NOTE ADULT - ASSESSMENT
69yo M PMH CAD (2 stents 2020), HFmrEF (45-50%), HTN, PAD w/ remote RLE angioplasty, R 4th toe OM s/p partial R 4th ray amputation on 10/24, RLE angiogram with AT lithotripsy and AT/peroneal balloon angioplasty 10/27, uncontrolled IDDM (a1c 12 in Oct 2023), recent admission 11/8-11/10 to cardiology service for hypertensive emergency (left AMA, was given levaquin when he left for right foot wound that pt had started treatment for back in October) who presented to [facility] after a fall onto his knees and was having b/l knee pain. Found to have increased soft tissue gas in R foot on CT scan. Admit to [facility] for further management. 69yo M PMH CAD (2 stents 2020), HFmrEF (45-50%), HTN, PAD w/ remote RLE angioplasty, R 4th toe OM s/p partial R 4th ray amputation on 10/24, RLE angiogram with AT lithotripsy and AT/peroneal balloon angioplasty 10/27, uncontrolled IDDM (a1c 12 in Oct 2023), recent admission 11/8-11/10 to cardiology service for hypertensive emergency (left AMA, was given levaquin when he left for right foot wound that pt had started treatment for back in October) who presented to [facility] after a fall onto his knees and was having b/l knee pain. Found to have increased soft tissue gas in R foot on CT scan. Admit to [facility] for further management.

## 2023-12-10 ENCOUNTER — TRANSCRIPTION ENCOUNTER (OUTPATIENT)
Age: 68
End: 2023-12-10

## 2023-12-10 LAB
ANION GAP SERPL CALC-SCNC: 5 MMOL/L — SIGNIFICANT CHANGE UP (ref 5–17)
ANION GAP SERPL CALC-SCNC: 5 MMOL/L — SIGNIFICANT CHANGE UP (ref 5–17)
ANISOCYTOSIS BLD QL: SLIGHT — SIGNIFICANT CHANGE UP
ANISOCYTOSIS BLD QL: SLIGHT — SIGNIFICANT CHANGE UP
BASOPHILS # BLD AUTO: 0 K/UL — SIGNIFICANT CHANGE UP (ref 0–0.2)
BASOPHILS # BLD AUTO: 0 K/UL — SIGNIFICANT CHANGE UP (ref 0–0.2)
BASOPHILS NFR BLD AUTO: 0 % — SIGNIFICANT CHANGE UP (ref 0–2)
BASOPHILS NFR BLD AUTO: 0 % — SIGNIFICANT CHANGE UP (ref 0–2)
BUN SERPL-MCNC: 25 MG/DL — HIGH (ref 7–23)
BUN SERPL-MCNC: 25 MG/DL — HIGH (ref 7–23)
BURR CELLS BLD QL SMEAR: PRESENT — SIGNIFICANT CHANGE UP
BURR CELLS BLD QL SMEAR: PRESENT — SIGNIFICANT CHANGE UP
CALCIUM SERPL-MCNC: 8.1 MG/DL — LOW (ref 8.4–10.5)
CALCIUM SERPL-MCNC: 8.1 MG/DL — LOW (ref 8.4–10.5)
CHLORIDE SERPL-SCNC: 103 MMOL/L — SIGNIFICANT CHANGE UP (ref 96–108)
CHLORIDE SERPL-SCNC: 103 MMOL/L — SIGNIFICANT CHANGE UP (ref 96–108)
CO2 SERPL-SCNC: 27 MMOL/L — SIGNIFICANT CHANGE UP (ref 22–31)
CO2 SERPL-SCNC: 27 MMOL/L — SIGNIFICANT CHANGE UP (ref 22–31)
CREAT SERPL-MCNC: 1.22 MG/DL — SIGNIFICANT CHANGE UP (ref 0.5–1.3)
CREAT SERPL-MCNC: 1.22 MG/DL — SIGNIFICANT CHANGE UP (ref 0.5–1.3)
EGFR: 65 ML/MIN/1.73M2 — SIGNIFICANT CHANGE UP
EGFR: 65 ML/MIN/1.73M2 — SIGNIFICANT CHANGE UP
EOSINOPHIL # BLD AUTO: 0.11 K/UL — SIGNIFICANT CHANGE UP (ref 0–0.5)
EOSINOPHIL # BLD AUTO: 0.11 K/UL — SIGNIFICANT CHANGE UP (ref 0–0.5)
EOSINOPHIL NFR BLD AUTO: 0.9 % — SIGNIFICANT CHANGE UP (ref 0–6)
EOSINOPHIL NFR BLD AUTO: 0.9 % — SIGNIFICANT CHANGE UP (ref 0–6)
GIANT PLATELETS BLD QL SMEAR: PRESENT — SIGNIFICANT CHANGE UP
GIANT PLATELETS BLD QL SMEAR: PRESENT — SIGNIFICANT CHANGE UP
GLUCOSE BLDC GLUCOMTR-MCNC: 112 MG/DL — HIGH (ref 70–99)
GLUCOSE BLDC GLUCOMTR-MCNC: 112 MG/DL — HIGH (ref 70–99)
GLUCOSE BLDC GLUCOMTR-MCNC: 248 MG/DL — HIGH (ref 70–99)
GLUCOSE BLDC GLUCOMTR-MCNC: 248 MG/DL — HIGH (ref 70–99)
GLUCOSE BLDC GLUCOMTR-MCNC: 301 MG/DL — HIGH (ref 70–99)
GLUCOSE BLDC GLUCOMTR-MCNC: 301 MG/DL — HIGH (ref 70–99)
GLUCOSE BLDC GLUCOMTR-MCNC: 445 MG/DL — HIGH (ref 70–99)
GLUCOSE BLDC GLUCOMTR-MCNC: 445 MG/DL — HIGH (ref 70–99)
GLUCOSE BLDC GLUCOMTR-MCNC: 84 MG/DL — SIGNIFICANT CHANGE UP (ref 70–99)
GLUCOSE BLDC GLUCOMTR-MCNC: 84 MG/DL — SIGNIFICANT CHANGE UP (ref 70–99)
GLUCOSE SERPL-MCNC: 154 MG/DL — HIGH (ref 70–99)
GLUCOSE SERPL-MCNC: 154 MG/DL — HIGH (ref 70–99)
HCT VFR BLD CALC: 22.5 % — LOW (ref 39–50)
HCT VFR BLD CALC: 22.5 % — LOW (ref 39–50)
HGB BLD-MCNC: 7.1 G/DL — LOW (ref 13–17)
HGB BLD-MCNC: 7.1 G/DL — LOW (ref 13–17)
LYMPHOCYTES # BLD AUTO: 0.93 K/UL — LOW (ref 1–3.3)
LYMPHOCYTES # BLD AUTO: 0.93 K/UL — LOW (ref 1–3.3)
LYMPHOCYTES # BLD AUTO: 7.8 % — LOW (ref 13–44)
LYMPHOCYTES # BLD AUTO: 7.8 % — LOW (ref 13–44)
MACROCYTES BLD QL: SLIGHT — SIGNIFICANT CHANGE UP
MACROCYTES BLD QL: SLIGHT — SIGNIFICANT CHANGE UP
MAGNESIUM SERPL-MCNC: 2 MG/DL — SIGNIFICANT CHANGE UP (ref 1.6–2.6)
MAGNESIUM SERPL-MCNC: 2 MG/DL — SIGNIFICANT CHANGE UP (ref 1.6–2.6)
MANUAL SMEAR VERIFICATION: SIGNIFICANT CHANGE UP
MANUAL SMEAR VERIFICATION: SIGNIFICANT CHANGE UP
MCHC RBC-ENTMCNC: 29.7 PG — SIGNIFICANT CHANGE UP (ref 27–34)
MCHC RBC-ENTMCNC: 29.7 PG — SIGNIFICANT CHANGE UP (ref 27–34)
MCHC RBC-ENTMCNC: 31.6 GM/DL — LOW (ref 32–36)
MCHC RBC-ENTMCNC: 31.6 GM/DL — LOW (ref 32–36)
MCV RBC AUTO: 94.1 FL — SIGNIFICANT CHANGE UP (ref 80–100)
MCV RBC AUTO: 94.1 FL — SIGNIFICANT CHANGE UP (ref 80–100)
MONOCYTES # BLD AUTO: 1.14 K/UL — HIGH (ref 0–0.9)
MONOCYTES # BLD AUTO: 1.14 K/UL — HIGH (ref 0–0.9)
MONOCYTES NFR BLD AUTO: 9.6 % — SIGNIFICANT CHANGE UP (ref 2–14)
MONOCYTES NFR BLD AUTO: 9.6 % — SIGNIFICANT CHANGE UP (ref 2–14)
NEUTROPHILS # BLD AUTO: 9.71 K/UL — HIGH (ref 1.8–7.4)
NEUTROPHILS # BLD AUTO: 9.71 K/UL — HIGH (ref 1.8–7.4)
NEUTROPHILS NFR BLD AUTO: 81.7 % — HIGH (ref 43–77)
NEUTROPHILS NFR BLD AUTO: 81.7 % — HIGH (ref 43–77)
OVALOCYTES BLD QL SMEAR: SLIGHT — SIGNIFICANT CHANGE UP
OVALOCYTES BLD QL SMEAR: SLIGHT — SIGNIFICANT CHANGE UP
PHOSPHATE SERPL-MCNC: 2.6 MG/DL — SIGNIFICANT CHANGE UP (ref 2.5–4.5)
PHOSPHATE SERPL-MCNC: 2.6 MG/DL — SIGNIFICANT CHANGE UP (ref 2.5–4.5)
PLAT MORPH BLD: ABNORMAL
PLAT MORPH BLD: ABNORMAL
PLATELET # BLD AUTO: 275 K/UL — SIGNIFICANT CHANGE UP (ref 150–400)
PLATELET # BLD AUTO: 275 K/UL — SIGNIFICANT CHANGE UP (ref 150–400)
POIKILOCYTOSIS BLD QL AUTO: SLIGHT — SIGNIFICANT CHANGE UP
POIKILOCYTOSIS BLD QL AUTO: SLIGHT — SIGNIFICANT CHANGE UP
POTASSIUM SERPL-MCNC: 4 MMOL/L — SIGNIFICANT CHANGE UP (ref 3.5–5.3)
POTASSIUM SERPL-MCNC: 4 MMOL/L — SIGNIFICANT CHANGE UP (ref 3.5–5.3)
POTASSIUM SERPL-SCNC: 4 MMOL/L — SIGNIFICANT CHANGE UP (ref 3.5–5.3)
POTASSIUM SERPL-SCNC: 4 MMOL/L — SIGNIFICANT CHANGE UP (ref 3.5–5.3)
RBC # BLD: 2.39 M/UL — LOW (ref 4.2–5.8)
RBC # BLD: 2.39 M/UL — LOW (ref 4.2–5.8)
RBC # FLD: 14.3 % — SIGNIFICANT CHANGE UP (ref 10.3–14.5)
RBC # FLD: 14.3 % — SIGNIFICANT CHANGE UP (ref 10.3–14.5)
RBC BLD AUTO: ABNORMAL
RBC BLD AUTO: ABNORMAL
SODIUM SERPL-SCNC: 135 MMOL/L — SIGNIFICANT CHANGE UP (ref 135–145)
SODIUM SERPL-SCNC: 135 MMOL/L — SIGNIFICANT CHANGE UP (ref 135–145)
VANCOMYCIN TROUGH SERPL-MCNC: 27.7 UG/ML — CRITICAL HIGH (ref 10–20)
VANCOMYCIN TROUGH SERPL-MCNC: 27.7 UG/ML — CRITICAL HIGH (ref 10–20)
WBC # BLD: 11.89 K/UL — HIGH (ref 3.8–10.5)
WBC # BLD: 11.89 K/UL — HIGH (ref 3.8–10.5)
WBC # FLD AUTO: 11.89 K/UL — HIGH (ref 3.8–10.5)
WBC # FLD AUTO: 11.89 K/UL — HIGH (ref 3.8–10.5)

## 2023-12-10 PROCEDURE — 99233 SBSQ HOSP IP/OBS HIGH 50: CPT

## 2023-12-10 RX ORDER — PIPERACILLIN AND TAZOBACTAM 4; .5 G/20ML; G/20ML
3.38 INJECTION, POWDER, LYOPHILIZED, FOR SOLUTION INTRAVENOUS EVERY 8 HOURS
Refills: 0 | Status: DISCONTINUED | OUTPATIENT
Start: 2023-12-10 | End: 2023-12-11

## 2023-12-10 RX ORDER — INSULIN LISPRO 100/ML
4 VIAL (ML) SUBCUTANEOUS
Refills: 0 | Status: DISCONTINUED | OUTPATIENT
Start: 2023-12-10 | End: 2023-12-11

## 2023-12-10 RX ADMIN — PIPERACILLIN AND TAZOBACTAM 25 GRAM(S): 4; .5 INJECTION, POWDER, LYOPHILIZED, FOR SOLUTION INTRAVENOUS at 22:55

## 2023-12-10 RX ADMIN — Medication 81 MILLIGRAM(S): at 09:43

## 2023-12-10 RX ADMIN — OXYCODONE HYDROCHLORIDE 5 MILLIGRAM(S): 5 TABLET ORAL at 17:49

## 2023-12-10 RX ADMIN — CARVEDILOL PHOSPHATE 12.5 MILLIGRAM(S): 80 CAPSULE, EXTENDED RELEASE ORAL at 06:29

## 2023-12-10 RX ADMIN — Medication 3 UNIT(S): at 09:43

## 2023-12-10 RX ADMIN — PIPERACILLIN AND TAZOBACTAM 25 GRAM(S): 4; .5 INJECTION, POWDER, LYOPHILIZED, FOR SOLUTION INTRAVENOUS at 09:43

## 2023-12-10 RX ADMIN — Medication 300 MILLIGRAM(S): at 18:58

## 2023-12-10 RX ADMIN — Medication 62.5 MILLIMOLE(S): at 10:33

## 2023-12-10 RX ADMIN — OXYCODONE HYDROCHLORIDE 5 MILLIGRAM(S): 5 TABLET ORAL at 10:43

## 2023-12-10 RX ADMIN — Medication 50 MILLIGRAM(S): at 21:45

## 2023-12-10 RX ADMIN — Medication 4 UNIT(S): at 18:58

## 2023-12-10 RX ADMIN — OXYCODONE HYDROCHLORIDE 5 MILLIGRAM(S): 5 TABLET ORAL at 23:30

## 2023-12-10 RX ADMIN — OXYCODONE HYDROCHLORIDE 5 MILLIGRAM(S): 5 TABLET ORAL at 22:55

## 2023-12-10 RX ADMIN — Medication 4: at 18:58

## 2023-12-10 RX ADMIN — INSULIN GLARGINE 15 UNIT(S): 100 INJECTION, SOLUTION SUBCUTANEOUS at 22:54

## 2023-12-10 RX ADMIN — HEPARIN SODIUM 5000 UNIT(S): 5000 INJECTION INTRAVENOUS; SUBCUTANEOUS at 21:45

## 2023-12-10 RX ADMIN — OXYCODONE HYDROCHLORIDE 5 MILLIGRAM(S): 5 TABLET ORAL at 09:43

## 2023-12-10 RX ADMIN — LISINOPRIL 40 MILLIGRAM(S): 2.5 TABLET ORAL at 12:16

## 2023-12-10 RX ADMIN — Medication 300 MILLIGRAM(S): at 06:23

## 2023-12-10 RX ADMIN — Medication 6: at 22:50

## 2023-12-10 RX ADMIN — Medication 650 MILLIGRAM(S): at 18:31

## 2023-12-10 RX ADMIN — OXYCODONE HYDROCHLORIDE 5 MILLIGRAM(S): 5 TABLET ORAL at 03:01

## 2023-12-10 RX ADMIN — CARVEDILOL PHOSPHATE 12.5 MILLIGRAM(S): 80 CAPSULE, EXTENDED RELEASE ORAL at 18:28

## 2023-12-10 RX ADMIN — OXYCODONE HYDROCHLORIDE 5 MILLIGRAM(S): 5 TABLET ORAL at 16:49

## 2023-12-10 RX ADMIN — Medication 650 MILLIGRAM(S): at 17:31

## 2023-12-10 RX ADMIN — GABAPENTIN 800 MILLIGRAM(S): 400 CAPSULE ORAL at 06:25

## 2023-12-10 RX ADMIN — ATORVASTATIN CALCIUM 80 MILLIGRAM(S): 80 TABLET, FILM COATED ORAL at 21:45

## 2023-12-10 RX ADMIN — Medication 50 MILLIGRAM(S): at 06:30

## 2023-12-10 RX ADMIN — GABAPENTIN 800 MILLIGRAM(S): 400 CAPSULE ORAL at 14:32

## 2023-12-10 RX ADMIN — Medication 650 MILLIGRAM(S): at 02:43

## 2023-12-10 RX ADMIN — Medication 650 MILLIGRAM(S): at 03:36

## 2023-12-10 RX ADMIN — Medication 300 MILLIGRAM(S): at 00:06

## 2023-12-10 RX ADMIN — Medication 50 MILLIGRAM(S): at 14:32

## 2023-12-10 RX ADMIN — PIPERACILLIN AND TAZOBACTAM 25 GRAM(S): 4; .5 INJECTION, POWDER, LYOPHILIZED, FOR SOLUTION INTRAVENOUS at 16:49

## 2023-12-10 RX ADMIN — GABAPENTIN 800 MILLIGRAM(S): 400 CAPSULE ORAL at 21:45

## 2023-12-10 RX ADMIN — HEPARIN SODIUM 5000 UNIT(S): 5000 INJECTION INTRAVENOUS; SUBCUTANEOUS at 06:24

## 2023-12-10 RX ADMIN — OXYCODONE HYDROCHLORIDE 5 MILLIGRAM(S): 5 TABLET ORAL at 03:36

## 2023-12-10 NOTE — PROGRESS NOTE ADULT - PROBLEM SELECTOR PLAN 4
A1c 12 in October. Has been seen by Endocrine in past.  Home meds: glargine 14U qhs  Lethargic, diaphoretic on exam on 12/9 AM. Found to be hypoglycemic w/ FSG 35. Improved s/p 1 amp D5. Returned to baseline mentation.   Endocrine consulted  - lantus 15U qhs  - lispro 4U TID   - mISS  - FSG QID  - FSG goal 140-180  - consistent carb diet  - c/w home gabapentin for neuropathic pain

## 2023-12-10 NOTE — PROGRESS NOTE ADULT - PROBLEM SELECTOR PLAN 5
s/p 2 stents in 2020 at [facility].  - cont. ASA, statin, beta-blocker s/p 2 stents in 2020 at [facility].  - cont. ASA, statin, beta-blocker

## 2023-12-10 NOTE — PROGRESS NOTE ADULT - SUBJECTIVE AND OBJECTIVE BOX
INTERVAL HPI/OVERNIGHT EVENTS:    Patient is a 68y old  Male who presents with a chief complaint of soft tissue and skin infection of right foot (09 Dec 2023 14:07)  Sugars reviewed, no hypoglycemia yesterday  12/9  197, 118, 295, 238, 224  today 112    MEDICATIONS  (STANDING):  aspirin enteric coated 81 milliGRAM(s) Oral every 24 hours  atorvastatin 80 milliGRAM(s) Oral at bedtime  carvedilol 12.5 milliGRAM(s) Oral every 12 hours  dextrose 5%. 1000 milliLiter(s) (100 mL/Hr) IV Continuous <Continuous>  dextrose 5%. 1000 milliLiter(s) (50 mL/Hr) IV Continuous <Continuous>  dextrose 50% Injectable 12.5 Gram(s) IV Push once  dextrose 50% Injectable 25 Gram(s) IV Push once  dextrose 50% Injectable 25 Gram(s) IV Push once  gabapentin 800 milliGRAM(s) Oral three times a day  glucagon  Injectable 1 milliGRAM(s) IntraMuscular once  heparin   Injectable 5000 Unit(s) SubCutaneous every 8 hours  hydrALAZINE 50 milliGRAM(s) Oral every 8 hours  insulin glargine Injectable (LANTUS) 15 Unit(s) SubCutaneous at bedtime  insulin lispro (ADMELOG) corrective regimen sliding scale   SubCutaneous Before meals and at bedtime  insulin lispro Injectable (ADMELOG) 4 Unit(s) SubCutaneous three times a day before meals  lisinopril 40 milliGRAM(s) Oral every 24 hours  nystatin Cream 1 Application(s) Topical two times a day  piperacillin/tazobactam IVPB.. 3.375 Gram(s) IV Intermittent every 8 hours  vancomycin  IVPB 1500 milliGRAM(s) IV Intermittent every 12 hours    MEDICATIONS  (PRN):  acetaminophen     Tablet .. 650 milliGRAM(s) Oral every 6 hours PRN Temp greater or equal to 38C (100.4F), Mild Pain (1 - 3)  dextrose Oral Gel 15 Gram(s) Oral once PRN Blood Glucose LESS THAN 70 milliGRAM(s)/deciliter  oxyCODONE    IR 5 milliGRAM(s) Oral every 6 hours PRN Severe Pain (7 - 10)      PHYSICAL EXAM  Vital Signs Last 24 Hrs  T(C): 36.9 (10 Dec 2023 11:44), Max: 37.1 (10 Dec 2023 06:27)  T(F): 98.4 (10 Dec 2023 11:44), Max: 98.8 (10 Dec 2023 06:27)  HR: 65 (10 Dec 2023 11:44) (65 - 75)  BP: 134/77 (10 Dec 2023 11:44) (134/77 - 186/76)  BP(mean): --  RR: 18 (10 Dec 2023 11:44) (18 - 19)  SpO2: 96% (10 Dec 2023 11:44) (96% - 100%)    Parameters below as of 10 Dec 2023 11:44  Patient On (Oxygen Delivery Method): room air      LABS:                        7.1    11.89 )-----------( 275      ( 10 Dec 2023 07:55 )             22.5     12-10    135  |  103  |  25<H>  ----------------------------<  154<H>  4.0   |  27  |  1.22    Ca    8.1<L>      10 Dec 2023 07:55  Phos  2.6     12-10  Mg     2.0     12-10    TPro  6.7  /  Alb  2.2<L>  /  TBili  0.3  /  DBili  x   /  AST  27  /  ALT  21  /  AlkPhos  255<H>  12-09    PT/INR - ( 09 Dec 2023 09:56 )   PT: 11.6 sec;   INR: 1.02          PTT - ( 09 Dec 2023 09:56 )  PTT:31.7 sec    CAPILLARY BLOOD GLUCOSE  POCT Blood Glucose.: 84 mg/dL (10 Dec 2023 12:51)  POCT Blood Glucose.: 112 mg/dL (10 Dec 2023 09:39)  POCT Blood Glucose.: 224 mg/dL (09 Dec 2023 22:02)  POCT Blood Glucose.: 238 mg/dL (09 Dec 2023 18:26)

## 2023-12-10 NOTE — PROGRESS NOTE ADULT - PROBLEM SELECTOR PLAN 3
Upon arrival BP 190s, asymptomatic.  Home meds: lisinopril 40 qd, hydral 25 TID, norvasc 10 qd, coreg 12.5 bid  - c/w all home anti hypertensives; hold ACE-I day of surgery

## 2023-12-10 NOTE — CHART NOTE - NSCHARTNOTEFT_GEN_A_CORE
Attempted to evaluate patient & obtain consent for tomorrow's planned R BKA. Patient refused examination twice by vascular surgery and this AM with the medicine team. Patient has been explained to on numerous occasions the need for surgical management of his RLE wound including risk of infection, sepsis, and death. Patient still refusing to be examined.

## 2023-12-10 NOTE — PROGRESS NOTE ADULT - PROBLEM SELECTOR PLAN 7
PAD w/ remote RLE angioplasty, R 4th toe OM s/p partial R 4th ray amputation on 10/24, RLE angiogram with AT lithotripsy and AT/peroneal balloon angioplasty 10/27.  Has been seen by Vascular in past.  - cont. ASA, statin

## 2023-12-10 NOTE — PROGRESS NOTE ADULT - PROBLEM SELECTOR PLAN 6
#HFmrEF  TTE 11/9/23: EF 45-50%, Mild to moderate symmetric LVH, Grade I left ventricular diastolic dysfunction. Biatrial enlargement. Mild-to-moderate aortic stenosis.  Home meds: lisinopril, coreg  +2 b/l LE edema to groins, + JVD given IV lasix 40 x1   - c/w home meds

## 2023-12-10 NOTE — PROGRESS NOTE ADULT - ASSESSMENT
Diabetes, with vascular complications.  Continue glargine 15 units (dose reduced yesterday),  increase lispro from 3 to 4 units with meal for consistently high post meal sugars.  continue light correction sliding scale.  discussed with primary team.

## 2023-12-10 NOTE — PROGRESS NOTE ADULT - SUBJECTIVE AND OBJECTIVE BOX
Patient is a 68y old  Male who presents with a chief complaint of soft tissue and skin infection of right foot (10 Dec 2023 12:56)      INTERVAL HPI/OVERNIGHT EVENTS:    Pt. seen and examined by me earlier today  Pt. refused interview and exam; unable to obtain ROS    Review of Systems: 12 point review of systems otherwise negative    MEDICATIONS  (STANDING):  aspirin enteric coated 81 milliGRAM(s) Oral every 24 hours  atorvastatin 80 milliGRAM(s) Oral at bedtime  carvedilol 12.5 milliGRAM(s) Oral every 12 hours  dextrose 5%. 1000 milliLiter(s) (100 mL/Hr) IV Continuous <Continuous>  dextrose 5%. 1000 milliLiter(s) (50 mL/Hr) IV Continuous <Continuous>  dextrose 50% Injectable 12.5 Gram(s) IV Push once  dextrose 50% Injectable 25 Gram(s) IV Push once  dextrose 50% Injectable 25 Gram(s) IV Push once  gabapentin 800 milliGRAM(s) Oral three times a day  glucagon  Injectable 1 milliGRAM(s) IntraMuscular once  heparin   Injectable 5000 Unit(s) SubCutaneous every 8 hours  hydrALAZINE 50 milliGRAM(s) Oral every 8 hours  insulin glargine Injectable (LANTUS) 15 Unit(s) SubCutaneous at bedtime  insulin lispro (ADMELOG) corrective regimen sliding scale   SubCutaneous Before meals and at bedtime  insulin lispro Injectable (ADMELOG) 4 Unit(s) SubCutaneous three times a day before meals  lisinopril 40 milliGRAM(s) Oral every 24 hours  nystatin Cream 1 Application(s) Topical two times a day  piperacillin/tazobactam IVPB.. 3.375 Gram(s) IV Intermittent every 8 hours  vancomycin  IVPB 1500 milliGRAM(s) IV Intermittent every 12 hours    MEDICATIONS  (PRN):  acetaminophen     Tablet .. 650 milliGRAM(s) Oral every 6 hours PRN Temp greater or equal to 38C (100.4F), Mild Pain (1 - 3)  dextrose Oral Gel 15 Gram(s) Oral once PRN Blood Glucose LESS THAN 70 milliGRAM(s)/deciliter  oxyCODONE    IR 5 milliGRAM(s) Oral every 6 hours PRN Severe Pain (7 - 10)      Allergies    No Known Allergies    Intolerances          Vital Signs Last 24 Hrs  T(C): 36.9 (10 Dec 2023 11:44), Max: 37.1 (10 Dec 2023 06:27)  T(F): 98.4 (10 Dec 2023 11:44), Max: 98.8 (10 Dec 2023 06:27)  HR: 70 (10 Dec 2023 18:16) (65 - 75)  BP: 180/92 (10 Dec 2023 18:16) (134/77 - 186/76)  BP(mean): --  RR: 18 (10 Dec 2023 11:44) (18 - 19)  SpO2: 96% (10 Dec 2023 11:44) (96% - 100%)    Parameters below as of 10 Dec 2023 11:44  Patient On (Oxygen Delivery Method): room air      CAPILLARY BLOOD GLUCOSE      POCT Blood Glucose.: 248 mg/dL (10 Dec 2023 16:53)  POCT Blood Glucose.: 84 mg/dL (10 Dec 2023 12:51)  POCT Blood Glucose.: 112 mg/dL (10 Dec 2023 09:39)  POCT Blood Glucose.: 224 mg/dL (09 Dec 2023 22:02)  POCT Blood Glucose.: 238 mg/dL (09 Dec 2023 18:26)        Physical Exam:  (earlier today)  Daily     Daily   General:  non-toxic and comfortable-appearing in NAD, unpleasant, sitting at edge of bed  HEENT:  MMM  Extremities:  R foot ACE wrap C/D/I  Neuro:  AAOx3  Pt. refused rest of the exam    LABS:                        7.1    11.89 )-----------( 275      ( 10 Dec 2023 07:55 )             22.5     12-10    135  |  103  |  25<H>  ----------------------------<  154<H>  4.0   |  27  |  1.22    Ca    8.1<L>      10 Dec 2023 07:55  Phos  2.6     12-10  Mg     2.0     12-10    TPro  6.7  /  Alb  2.2<L>  /  TBili  0.3  /  DBili  x   /  AST  27  /  ALT  21  /  AlkPhos  255<H>  12-09    PT/INR - ( 09 Dec 2023 09:56 )   PT: 11.6 sec;   INR: 1.02          PTT - ( 09 Dec 2023 09:56 )  PTT:31.7 sec  Urinalysis Basic - ( 10 Dec 2023 07:55 )    Color: x / Appearance: x / SG: x / pH: x  Gluc: 154 mg/dL / Ketone: x  / Bili: x / Urobili: x   Blood: x / Protein: x / Nitrite: x   Leuk Esterase: x / RBC: x / WBC x   Sq Epi: x / Non Sq Epi: x / Bacteria: x

## 2023-12-10 NOTE — PROGRESS NOTE ADULT - ASSESSMENT
67yo M PMH CAD (2 stents 2020), HFmrEF (45-50%), HTN, PAD w/ remote RLE angioplasty, R 4th toe OM s/p partial R 4th ray amputation on 10/24, RLE angiogram with AT lithotripsy and AT/peroneal balloon angioplasty 10/27, uncontrolled IDDM (a1c 12 in Oct 2023), recent admission 11/8-11/10 to cardiology service for hypertensive emergency (left AMA, was given levaquin when he left for right foot wound that pt had started treatment for back in October) who presented to [facility] after a fall onto his knees and was having b/l knee pain. Found to have increased soft tissue gas in R foot on CT scan. Admit to [facility] for further management. 69yo M PMH CAD (2 stents 2020), HFmrEF (45-50%), HTN, PAD w/ remote RLE angioplasty, R 4th toe OM s/p partial R 4th ray amputation on 10/24, RLE angiogram with AT lithotripsy and AT/peroneal balloon angioplasty 10/27, uncontrolled IDDM (a1c 12 in Oct 2023), recent admission 11/8-11/10 to cardiology service for hypertensive emergency (left AMA, was given levaquin when he left for right foot wound that pt had started treatment for back in October) who presented to [facility] after a fall onto his knees and was having b/l knee pain. Found to have increased soft tissue gas in R foot on CT scan. Admit to [facility] for further management.

## 2023-12-10 NOTE — PROGRESS NOTE ADULT - PROBLEM SELECTOR PLAN 1
Past hx of PAD w/ diabetic foot wound. Had prior admission in October where he was seen by ID and Podiatry and was supposed to complete 6 wks of broad spectrum antimicrobials. At that time MRI with possible 4th digit early OM up to prox phalanx s/p partial R 4th ray amputation on 10/24, proximal cx with E.coli, Pluralibacter gergoviae, stap epi, and Corynebacterium amycolatum.   Then had an admission this month for unrelated issue, had left AMA and PICC line was taken out bc pt was leaving AMA, was prescribed levaquin to complete his 6wk course however pt unsure if he actually picked up the levaquin.  CT angio LE now shows increased soft tissue gas in the right forefoot. Vascular surgery was contacted by [facility].  On exam right foot has open wound between 4th and 5th digits with purulence coming out of wound, foot is erythematous on dorsal aspect and warm to touch.  Ddx includes SSTI vs osteomyelitis vs less likely nec fasc in setting of possibly incompletely treated infection.   Wound culture shows few staph haemolyticus, lactobacillus gasseri   Extensive conversation on 12/6 w/ Vascular, Podiatry, Medicine team, pt not agreeable to BKA. Given D/c notice.   On 12/8- pt amenable to BKA, Vascular/Podiatry on board- surgery planned for 12/11   - pending cardiac clearance for surgery scheduled for 12/11   - c/w zosyn 3.375g IV q8, vancomycin if Pt. agreeable to trough; f/u ID recs  - Pain med PRN- tylenol (mild pain), toradol (mod pain), Oxy 5 prn (severe pain)  - f/u blood cx- NGTD Past hx of PAD w/ diabetic foot wound. Had prior admission in October where he was seen by ID and Podiatry and was supposed to complete 6 wks of broad spectrum antimicrobials. At that time MRI with possible 4th digit early OM up to prox phalanx s/p partial R 4th ray amputation on 10/24, proximal cx with E.coli, Pluralibacter gergoviae, stap epi, and Corynebacterium amycolatum.   Then had an admission this month for unrelated issue, had left AMA and PICC line was taken out bc pt was leaving AMA, was prescribed levaquin to complete his 6wk course however pt unsure if he actually picked up the levaquin.  CT angio LE now shows increased soft tissue gas in the right forefoot. Vascular surgery was contacted by [facility].  On exam right foot has open wound between 4th and 5th digits with purulence coming out of wound, foot is erythematous on dorsal aspect and warm to touch.  Ddx includes SSTI vs osteomyelitis vs less likely nec fasc in setting of possibly incompletely treated infection.   Wound culture shows few staph haemolyticus, lactobacillus gasseri   Extensive conversation on 12/6 w/ Vascular, Podiatry, Medicine team, pt not agreeable to BKA. Given D/c notice.   On 12/8- pt amenable to BKA, Vascular/Podiatry on board- surgery planned for 12/11   - pending cardiac clearance for surgery scheduled for 12/11   - c/w zosyn 3.375g IV q8, vancomycin if Pt. agreeable to trough; f/u ID recs  - Pain med PRN- tylenol (mild pain), toradol (mod pain), Oxy 5 prn (severe pain)  - f/u blood cx- NGTD

## 2023-12-11 ENCOUNTER — TRANSCRIPTION ENCOUNTER (OUTPATIENT)
Age: 68
End: 2023-12-11

## 2023-12-11 LAB
ALBUMIN SERPL ELPH-MCNC: 1.9 G/DL — LOW (ref 3.3–5)
ALBUMIN SERPL ELPH-MCNC: 1.9 G/DL — LOW (ref 3.3–5)
ALP SERPL-CCNC: 260 U/L — HIGH (ref 40–120)
ALP SERPL-CCNC: 260 U/L — HIGH (ref 40–120)
ALT FLD-CCNC: 23 U/L — SIGNIFICANT CHANGE UP (ref 10–45)
ALT FLD-CCNC: 23 U/L — SIGNIFICANT CHANGE UP (ref 10–45)
ANION GAP SERPL CALC-SCNC: 7 MMOL/L — SIGNIFICANT CHANGE UP (ref 5–17)
ANION GAP SERPL CALC-SCNC: 7 MMOL/L — SIGNIFICANT CHANGE UP (ref 5–17)
AST SERPL-CCNC: 23 U/L — SIGNIFICANT CHANGE UP (ref 10–40)
AST SERPL-CCNC: 23 U/L — SIGNIFICANT CHANGE UP (ref 10–40)
BASOPHILS # BLD AUTO: 0.04 K/UL — SIGNIFICANT CHANGE UP (ref 0–0.2)
BASOPHILS # BLD AUTO: 0.04 K/UL — SIGNIFICANT CHANGE UP (ref 0–0.2)
BASOPHILS NFR BLD AUTO: 0.3 % — SIGNIFICANT CHANGE UP (ref 0–2)
BASOPHILS NFR BLD AUTO: 0.3 % — SIGNIFICANT CHANGE UP (ref 0–2)
BILIRUB SERPL-MCNC: 0.2 MG/DL — SIGNIFICANT CHANGE UP (ref 0.2–1.2)
BILIRUB SERPL-MCNC: 0.2 MG/DL — SIGNIFICANT CHANGE UP (ref 0.2–1.2)
BLD GP AB SCN SERPL QL: NEGATIVE — SIGNIFICANT CHANGE UP
BLD GP AB SCN SERPL QL: NEGATIVE — SIGNIFICANT CHANGE UP
BUN SERPL-MCNC: 24 MG/DL — HIGH (ref 7–23)
BUN SERPL-MCNC: 24 MG/DL — HIGH (ref 7–23)
CALCIUM SERPL-MCNC: 8.1 MG/DL — LOW (ref 8.4–10.5)
CALCIUM SERPL-MCNC: 8.1 MG/DL — LOW (ref 8.4–10.5)
CHLORIDE SERPL-SCNC: 104 MMOL/L — SIGNIFICANT CHANGE UP (ref 96–108)
CHLORIDE SERPL-SCNC: 104 MMOL/L — SIGNIFICANT CHANGE UP (ref 96–108)
CO2 SERPL-SCNC: 22 MMOL/L — SIGNIFICANT CHANGE UP (ref 22–31)
CO2 SERPL-SCNC: 22 MMOL/L — SIGNIFICANT CHANGE UP (ref 22–31)
CREAT SERPL-MCNC: 1.27 MG/DL — SIGNIFICANT CHANGE UP (ref 0.5–1.3)
CREAT SERPL-MCNC: 1.27 MG/DL — SIGNIFICANT CHANGE UP (ref 0.5–1.3)
EGFR: 62 ML/MIN/1.73M2 — SIGNIFICANT CHANGE UP
EGFR: 62 ML/MIN/1.73M2 — SIGNIFICANT CHANGE UP
EOSINOPHIL # BLD AUTO: 0.18 K/UL — SIGNIFICANT CHANGE UP (ref 0–0.5)
EOSINOPHIL # BLD AUTO: 0.18 K/UL — SIGNIFICANT CHANGE UP (ref 0–0.5)
EOSINOPHIL NFR BLD AUTO: 1.5 % — SIGNIFICANT CHANGE UP (ref 0–6)
EOSINOPHIL NFR BLD AUTO: 1.5 % — SIGNIFICANT CHANGE UP (ref 0–6)
GLUCOSE BLDC GLUCOMTR-MCNC: 179 MG/DL — HIGH (ref 70–99)
GLUCOSE BLDC GLUCOMTR-MCNC: 179 MG/DL — HIGH (ref 70–99)
GLUCOSE BLDC GLUCOMTR-MCNC: 276 MG/DL — HIGH (ref 70–99)
GLUCOSE BLDC GLUCOMTR-MCNC: 276 MG/DL — HIGH (ref 70–99)
GLUCOSE BLDC GLUCOMTR-MCNC: 293 MG/DL — HIGH (ref 70–99)
GLUCOSE BLDC GLUCOMTR-MCNC: 293 MG/DL — HIGH (ref 70–99)
GLUCOSE BLDC GLUCOMTR-MCNC: 90 MG/DL — SIGNIFICANT CHANGE UP (ref 70–99)
GLUCOSE BLDC GLUCOMTR-MCNC: 90 MG/DL — SIGNIFICANT CHANGE UP (ref 70–99)
GLUCOSE SERPL-MCNC: 171 MG/DL — HIGH (ref 70–99)
GLUCOSE SERPL-MCNC: 171 MG/DL — HIGH (ref 70–99)
HCT VFR BLD CALC: 24.5 % — LOW (ref 39–50)
HCT VFR BLD CALC: 24.5 % — LOW (ref 39–50)
HGB BLD-MCNC: 7.5 G/DL — LOW (ref 13–17)
HGB BLD-MCNC: 7.5 G/DL — LOW (ref 13–17)
IMM GRANULOCYTES NFR BLD AUTO: 0.7 % — SIGNIFICANT CHANGE UP (ref 0–0.9)
IMM GRANULOCYTES NFR BLD AUTO: 0.7 % — SIGNIFICANT CHANGE UP (ref 0–0.9)
INR BLD: 0.97 — SIGNIFICANT CHANGE UP (ref 0.85–1.18)
INR BLD: 0.97 — SIGNIFICANT CHANGE UP (ref 0.85–1.18)
LYMPHOCYTES # BLD AUTO: 1.48 K/UL — SIGNIFICANT CHANGE UP (ref 1–3.3)
LYMPHOCYTES # BLD AUTO: 1.48 K/UL — SIGNIFICANT CHANGE UP (ref 1–3.3)
LYMPHOCYTES # BLD AUTO: 12.5 % — LOW (ref 13–44)
LYMPHOCYTES # BLD AUTO: 12.5 % — LOW (ref 13–44)
MAGNESIUM SERPL-MCNC: 1.8 MG/DL — SIGNIFICANT CHANGE UP (ref 1.6–2.6)
MAGNESIUM SERPL-MCNC: 1.8 MG/DL — SIGNIFICANT CHANGE UP (ref 1.6–2.6)
MCHC RBC-ENTMCNC: 29.5 PG — SIGNIFICANT CHANGE UP (ref 27–34)
MCHC RBC-ENTMCNC: 29.5 PG — SIGNIFICANT CHANGE UP (ref 27–34)
MCHC RBC-ENTMCNC: 30.6 GM/DL — LOW (ref 32–36)
MCHC RBC-ENTMCNC: 30.6 GM/DL — LOW (ref 32–36)
MCV RBC AUTO: 96.5 FL — SIGNIFICANT CHANGE UP (ref 80–100)
MCV RBC AUTO: 96.5 FL — SIGNIFICANT CHANGE UP (ref 80–100)
MONOCYTES # BLD AUTO: 1.44 K/UL — HIGH (ref 0–0.9)
MONOCYTES # BLD AUTO: 1.44 K/UL — HIGH (ref 0–0.9)
MONOCYTES NFR BLD AUTO: 12.2 % — SIGNIFICANT CHANGE UP (ref 2–14)
MONOCYTES NFR BLD AUTO: 12.2 % — SIGNIFICANT CHANGE UP (ref 2–14)
NEUTROPHILS # BLD AUTO: 8.62 K/UL — HIGH (ref 1.8–7.4)
NEUTROPHILS # BLD AUTO: 8.62 K/UL — HIGH (ref 1.8–7.4)
NEUTROPHILS NFR BLD AUTO: 72.8 % — SIGNIFICANT CHANGE UP (ref 43–77)
NEUTROPHILS NFR BLD AUTO: 72.8 % — SIGNIFICANT CHANGE UP (ref 43–77)
NRBC # BLD: 0 /100 WBCS — SIGNIFICANT CHANGE UP (ref 0–0)
NRBC # BLD: 0 /100 WBCS — SIGNIFICANT CHANGE UP (ref 0–0)
PHOSPHATE SERPL-MCNC: 2.9 MG/DL — SIGNIFICANT CHANGE UP (ref 2.5–4.5)
PHOSPHATE SERPL-MCNC: 2.9 MG/DL — SIGNIFICANT CHANGE UP (ref 2.5–4.5)
PLATELET # BLD AUTO: 318 K/UL — SIGNIFICANT CHANGE UP (ref 150–400)
PLATELET # BLD AUTO: 318 K/UL — SIGNIFICANT CHANGE UP (ref 150–400)
POTASSIUM SERPL-MCNC: 4.1 MMOL/L — SIGNIFICANT CHANGE UP (ref 3.5–5.3)
POTASSIUM SERPL-MCNC: 4.1 MMOL/L — SIGNIFICANT CHANGE UP (ref 3.5–5.3)
POTASSIUM SERPL-SCNC: 4.1 MMOL/L — SIGNIFICANT CHANGE UP (ref 3.5–5.3)
POTASSIUM SERPL-SCNC: 4.1 MMOL/L — SIGNIFICANT CHANGE UP (ref 3.5–5.3)
PROT SERPL-MCNC: 6.2 G/DL — SIGNIFICANT CHANGE UP (ref 6–8.3)
PROT SERPL-MCNC: 6.2 G/DL — SIGNIFICANT CHANGE UP (ref 6–8.3)
PROTHROM AB SERPL-ACNC: 11.1 SEC — SIGNIFICANT CHANGE UP (ref 9.5–13)
PROTHROM AB SERPL-ACNC: 11.1 SEC — SIGNIFICANT CHANGE UP (ref 9.5–13)
RBC # BLD: 2.54 M/UL — LOW (ref 4.2–5.8)
RBC # BLD: 2.54 M/UL — LOW (ref 4.2–5.8)
RBC # FLD: 14.5 % — SIGNIFICANT CHANGE UP (ref 10.3–14.5)
RBC # FLD: 14.5 % — SIGNIFICANT CHANGE UP (ref 10.3–14.5)
RH IG SCN BLD-IMP: POSITIVE — SIGNIFICANT CHANGE UP
RH IG SCN BLD-IMP: POSITIVE — SIGNIFICANT CHANGE UP
SODIUM SERPL-SCNC: 133 MMOL/L — LOW (ref 135–145)
SODIUM SERPL-SCNC: 133 MMOL/L — LOW (ref 135–145)
VANCOMYCIN FLD-MCNC: 27 UG/ML
VANCOMYCIN FLD-MCNC: 27 UG/ML
WBC # BLD: 11.84 K/UL — HIGH (ref 3.8–10.5)
WBC # BLD: 11.84 K/UL — HIGH (ref 3.8–10.5)
WBC # FLD AUTO: 11.84 K/UL — HIGH (ref 3.8–10.5)
WBC # FLD AUTO: 11.84 K/UL — HIGH (ref 3.8–10.5)

## 2023-12-11 PROCEDURE — 99233 SBSQ HOSP IP/OBS HIGH 50: CPT | Mod: GC

## 2023-12-11 PROCEDURE — 88307 TISSUE EXAM BY PATHOLOGIST: CPT | Mod: 26

## 2023-12-11 PROCEDURE — 27882 AMPUTATION OF LOWER LEG: CPT | Mod: RT,GC

## 2023-12-11 PROCEDURE — 99233 SBSQ HOSP IP/OBS HIGH 50: CPT | Mod: GC,25,57

## 2023-12-11 PROCEDURE — 99232 SBSQ HOSP IP/OBS MODERATE 35: CPT

## 2023-12-11 RX ORDER — ASPIRIN/CALCIUM CARB/MAGNESIUM 324 MG
81 TABLET ORAL EVERY 24 HOURS
Refills: 0 | Status: DISCONTINUED | OUTPATIENT
Start: 2023-12-12 | End: 2023-12-12

## 2023-12-11 RX ORDER — INSULIN GLARGINE 100 [IU]/ML
10 INJECTION, SOLUTION SUBCUTANEOUS AT BEDTIME
Refills: 0 | Status: DISCONTINUED | OUTPATIENT
Start: 2023-12-11 | End: 2023-12-13

## 2023-12-11 RX ORDER — INSULIN LISPRO 100/ML
VIAL (ML) SUBCUTANEOUS
Refills: 0 | Status: DISCONTINUED | OUTPATIENT
Start: 2023-12-11 | End: 2023-12-13

## 2023-12-11 RX ORDER — PIPERACILLIN AND TAZOBACTAM 4; .5 G/20ML; G/20ML
3.38 INJECTION, POWDER, LYOPHILIZED, FOR SOLUTION INTRAVENOUS EVERY 8 HOURS
Refills: 0 | Status: COMPLETED | OUTPATIENT
Start: 2023-12-11 | End: 2023-12-18

## 2023-12-11 RX ORDER — OXYCODONE HYDROCHLORIDE 5 MG/1
5 TABLET ORAL EVERY 6 HOURS
Refills: 0 | Status: DISCONTINUED | OUTPATIENT
Start: 2023-12-11 | End: 2023-12-13

## 2023-12-11 RX ORDER — INSULIN LISPRO 100/ML
4 VIAL (ML) SUBCUTANEOUS
Refills: 0 | Status: DISCONTINUED | OUTPATIENT
Start: 2023-12-11 | End: 2023-12-13

## 2023-12-11 RX ORDER — CARVEDILOL PHOSPHATE 80 MG/1
12.5 CAPSULE, EXTENDED RELEASE ORAL EVERY 12 HOURS
Refills: 0 | Status: DISCONTINUED | OUTPATIENT
Start: 2023-12-11 | End: 2023-12-16

## 2023-12-11 RX ORDER — ACETAMINOPHEN 500 MG
650 TABLET ORAL EVERY 6 HOURS
Refills: 0 | Status: DISCONTINUED | OUTPATIENT
Start: 2023-12-11 | End: 2023-12-13

## 2023-12-11 RX ORDER — HEPARIN SODIUM 5000 [USP'U]/ML
5000 INJECTION INTRAVENOUS; SUBCUTANEOUS EVERY 8 HOURS
Refills: 0 | Status: DISCONTINUED | OUTPATIENT
Start: 2023-12-11 | End: 2023-12-12

## 2023-12-11 RX ORDER — HYDROMORPHONE HYDROCHLORIDE 2 MG/ML
0.2 INJECTION INTRAMUSCULAR; INTRAVENOUS; SUBCUTANEOUS
Refills: 0 | Status: DISCONTINUED | OUTPATIENT
Start: 2023-12-11 | End: 2023-12-11

## 2023-12-11 RX ORDER — GABAPENTIN 400 MG/1
800 CAPSULE ORAL THREE TIMES A DAY
Refills: 0 | Status: DISCONTINUED | OUTPATIENT
Start: 2023-12-11 | End: 2023-12-18

## 2023-12-11 RX ORDER — ATORVASTATIN CALCIUM 80 MG/1
80 TABLET, FILM COATED ORAL AT BEDTIME
Refills: 0 | Status: DISCONTINUED | OUTPATIENT
Start: 2023-12-11 | End: 2023-12-18

## 2023-12-11 RX ORDER — INSULIN GLARGINE 100 [IU]/ML
15 INJECTION, SOLUTION SUBCUTANEOUS AT BEDTIME
Refills: 0 | Status: DISCONTINUED | OUTPATIENT
Start: 2023-12-11 | End: 2023-12-11

## 2023-12-11 RX ADMIN — HYDROMORPHONE HYDROCHLORIDE 0.2 MILLIGRAM(S): 2 INJECTION INTRAMUSCULAR; INTRAVENOUS; SUBCUTANEOUS at 20:44

## 2023-12-11 RX ADMIN — Medication 3: at 18:27

## 2023-12-11 RX ADMIN — OXYCODONE HYDROCHLORIDE 5 MILLIGRAM(S): 5 TABLET ORAL at 06:35

## 2023-12-11 RX ADMIN — Medication 4 UNIT(S): at 18:28

## 2023-12-11 RX ADMIN — PIPERACILLIN AND TAZOBACTAM 25 GRAM(S): 4; .5 INJECTION, POWDER, LYOPHILIZED, FOR SOLUTION INTRAVENOUS at 13:36

## 2023-12-11 RX ADMIN — Medication 3: at 22:12

## 2023-12-11 RX ADMIN — HYDROMORPHONE HYDROCHLORIDE 0.2 MILLIGRAM(S): 2 INJECTION INTRAMUSCULAR; INTRAVENOUS; SUBCUTANEOUS at 18:26

## 2023-12-11 RX ADMIN — PIPERACILLIN AND TAZOBACTAM 25 GRAM(S): 4; .5 INJECTION, POWDER, LYOPHILIZED, FOR SOLUTION INTRAVENOUS at 06:35

## 2023-12-11 RX ADMIN — Medication 1: at 09:39

## 2023-12-11 RX ADMIN — GABAPENTIN 800 MILLIGRAM(S): 400 CAPSULE ORAL at 22:12

## 2023-12-11 RX ADMIN — CARVEDILOL PHOSPHATE 12.5 MILLIGRAM(S): 80 CAPSULE, EXTENDED RELEASE ORAL at 06:35

## 2023-12-11 RX ADMIN — GABAPENTIN 800 MILLIGRAM(S): 400 CAPSULE ORAL at 06:35

## 2023-12-11 RX ADMIN — HYDROMORPHONE HYDROCHLORIDE 0.2 MILLIGRAM(S): 2 INJECTION INTRAMUSCULAR; INTRAVENOUS; SUBCUTANEOUS at 19:44

## 2023-12-11 RX ADMIN — GABAPENTIN 800 MILLIGRAM(S): 400 CAPSULE ORAL at 13:21

## 2023-12-11 RX ADMIN — ATORVASTATIN CALCIUM 80 MILLIGRAM(S): 80 TABLET, FILM COATED ORAL at 22:18

## 2023-12-11 RX ADMIN — Medication 50 MILLIGRAM(S): at 06:35

## 2023-12-11 RX ADMIN — Medication 81 MILLIGRAM(S): at 09:39

## 2023-12-11 RX ADMIN — HEPARIN SODIUM 5000 UNIT(S): 5000 INJECTION INTRAVENOUS; SUBCUTANEOUS at 22:11

## 2023-12-11 RX ADMIN — PIPERACILLIN AND TAZOBACTAM 25 GRAM(S): 4; .5 INJECTION, POWDER, LYOPHILIZED, FOR SOLUTION INTRAVENOUS at 22:11

## 2023-12-11 RX ADMIN — HEPARIN SODIUM 5000 UNIT(S): 5000 INJECTION INTRAVENOUS; SUBCUTANEOUS at 06:41

## 2023-12-11 RX ADMIN — HYDROMORPHONE HYDROCHLORIDE 0.2 MILLIGRAM(S): 2 INJECTION INTRAMUSCULAR; INTRAVENOUS; SUBCUTANEOUS at 19:26

## 2023-12-11 RX ADMIN — INSULIN GLARGINE 10 UNIT(S): 100 INJECTION, SOLUTION SUBCUTANEOUS at 22:17

## 2023-12-11 RX ADMIN — CARVEDILOL PHOSPHATE 12.5 MILLIGRAM(S): 80 CAPSULE, EXTENDED RELEASE ORAL at 17:42

## 2023-12-11 RX ADMIN — OXYCODONE HYDROCHLORIDE 5 MILLIGRAM(S): 5 TABLET ORAL at 07:30

## 2023-12-11 NOTE — PROGRESS NOTE ADULT - PROBLEM SELECTOR PLAN 1
Past hx of PAD w/ diabetic foot wound. Had prior admission in October where he was seen by ID and Podiatry and was supposed to complete 6 wks of broad spectrum antimicrobials. At that time MRI with possible 4th digit early OM up to prox phalanx s/p partial R 4th ray amputation on 10/24, proximal cx with E.coli, Pluralibacter gergoviae, stap epi, and Corynebacterium amycolatum.   Then had an admission this month for unrelated issue, had left AMA and PICC line was taken out bc pt was leaving AMA, was prescribed levaquin to complete his 6wk course however pt unsure if he actually picked up the levaquin.  CT angio LE today shows increased soft tissue gas in the right forefoot. Vascular surgery was contacted by [facility].  On exam right foot has open wound between 4th and 5th digits with purulence coming out of wound, foot is erythematous on dorsal aspect and warm to touch.  Ddx includes SSTI vs osteomyelitis vs less likely nec fasc in setting of possibly incompletely treated infection.   Vascular signed off d/t pt refusing interventions  Wound culture shows few staph haemolyticus, lactobacillus gasseri   Started on vancomycin, refused vanc trough- vanc d'jasen   Extensive conversation on 12/6 w/ Vascular, Podiatry, Medicine team, pt not agreeable to BKA. Given D/c notice.   On 12/8- pt amenable to BKA, Vascular/Podiatry on board- sx planned for 12/11   Rapid response called for hypoglycemic episode w/ FSG 35. Returned to baseline mentation s/p 1 amp D50.   - pending cardiac clearance for sx scheduled for 12/11 --> vascular to perform BKA   - c/w zosyn 3.375g IV q8, vancomycin   - Pain med PRN- tylenol (mild pain), toradol (mod pain), Oxy 5 prn (severe pain)  - f/u blood cx- NGTD Past hx of PAD w/ diabetic foot wound. Had prior admission in October where he was seen by ID and Podiatry and was supposed to complete 6 wks of broad spectrum antimicrobials. At that time MRI with possible 4th digit early OM up to prox phalanx s/p partial R 4th ray amputation on 10/24, proximal cx with E.coli, Pluralibacter gergoviae, stap epi, and Corynebacterium amycolatum.   Then had an admission this month for unrelated issue, had left AMA and PICC line was taken out bc pt was leaving AMA, was prescribed levaquin to complete his 6wk course however pt unsure if he actually picked up the levaquin.  CT angio LE today shows increased soft tissue gas in the right forefoot. Vascular surgery was contacted by [facility].  On exam right foot has open wound between 4th and 5th digits with purulence coming out of wound, foot is erythematous on dorsal aspect and warm to touch.  Ddx includes SSTI vs osteomyelitis vs less likely nec fasc in setting of possibly incompletely treated infection.   Vascular signed off d/t pt refusing interventions  Wound culture shows few staph haemolyticus, lactobacillus gasseri   Started on vancomycin, refused vanc trough- vanc d'jasen   Extensive conversation on 12/6 w/ Vascular, Podiatry, Medicine team, pt not agreeable to BKA. Given D/c notice.   On 12/8- pt amenable to BKA, Vascular/Podiatry on board- sx planned for 12/11   Rapid response called for hypoglycemic episode w/ FSG 35. Returned to baseline mentation s/p 1 amp D50.   - pending cardiac clearance for sx scheduled for 12/11 --> vascular to perform BKA   - c/w zosyn 3.375g IV q8, vancomycin   - Pain med PRN- tylenol (mild pain), toradol (mod pain), Oxy 5 prn (severe pain)  - f/u blood cx- NGTD

## 2023-12-11 NOTE — PROGRESS NOTE ADULT - PROBLEM SELECTOR PLAN 5
s/p 2 stents in 2020 at [facility]. s/p 2 stents in 2020 at [facility].

## 2023-12-11 NOTE — PROGRESS NOTE ADULT - ASSESSMENT
69yo M PMH CAD (2 stents 2020), HFmrEF (45-50%), HTN, PAD w/ remote RLE angioplasty, R 4th toe OM s/p partial R 4th ray amputation on 10/24, RLE angiogram with AT lithotripsy and AT/peroneal balloon angioplasty 10/27, uncontrolled IDDM (a1c 12 in Oct 2023), recent admission 11/8-11/10 to cardiology service for hypertensive emergency (left AMA, was given levaquin when he left for right foot wound that pt had started treatment for back in October) who presented to [facility] after a fall onto his knees and was having b/l knee pain. Found to have increased soft tissue gas in R foot on CT scan, now s/p right BKA 12/11/2023.    A1C: 13.5 %  C-peptide 0.5 with , JAMIR/ICA neg (Oct 2023)  BUN: 24  Creatinine: 1.27  GFR: 62  Weight: 70  BMI: 24.2 67yo M PMH CAD (2 stents 2020), HFmrEF (45-50%), HTN, PAD w/ remote RLE angioplasty, R 4th toe OM s/p partial R 4th ray amputation on 10/24, RLE angiogram with AT lithotripsy and AT/peroneal balloon angioplasty 10/27, uncontrolled IDDM (a1c 12 in Oct 2023), recent admission 11/8-11/10 to cardiology service for hypertensive emergency (left AMA, was given levaquin when he left for right foot wound that pt had started treatment for back in October) who presented to [facility] after a fall onto his knees and was having b/l knee pain. Found to have increased soft tissue gas in R foot on CT scan, now s/p right BKA 12/11/2023.    A1C: 13.5 %  C-peptide 0.5 with , JAMIR/ICA neg (Oct 2023)  BUN: 24  Creatinine: 1.27  GFR: 62  Weight: 70  BMI: 24.2

## 2023-12-11 NOTE — PROGRESS NOTE ADULT - ASSESSMENT
69yo M PMH CAD (2 stents 2020), HFmrEF (45-50%), HTN, PAD w/ remote RLE angioplasty, R 4th toe OM s/p partial R 4th ray amputation on 10/24, RLE angiogram with AT lithotripsy and AT/peroneal balloon angioplasty 10/27, uncontrolled IDDM (a1c 12 in Oct 2023), recent admission 11/8-11/10 to cardiology service for hypertensive emergency (left AMA, was given levaquin when he left for right foot wound that pt had started treatment for back in October) who presented to [facility] after a fall onto his knees and was having b/l knee pain. Found to have increased soft tissue gas in R foot on CT scan. Admit to [facility] for further management. 69yo M PMH CAD (2 stents 2020), HFmrEF (45-50%), HTN, PAD w/ remote RLE angioplasty, R 4th toe OM s/p partial R 4th ray amputation on 10/24, RLE angiogram with AT lithotripsy and AT/peroneal balloon angioplasty 10/27, uncontrolled IDDM (a1c 12 in Oct 2023), recent admission 11/8-11/10 to cardiology service for hypertensive emergency (left AMA, was given levaquin when he left for right foot wound that pt had started treatment for back in October) who presented to [facility] after a fall onto his knees and was having b/l knee pain. Found to have increased soft tissue gas in R foot on CT scan. Admit to [facility] for further management.

## 2023-12-11 NOTE — PROGRESS NOTE ADULT - SUBJECTIVE AND OBJECTIVE BOX
SUBJECTIVE: Patient seen and examined at bedside. Extensive conversation regarding surgical intervention with primary team Dr. Soares and patient. Currently c/o RLE and LUE swelling.    aspirin enteric coated 81 milliGRAM(s) Oral every 24 hours  carvedilol 12.5 milliGRAM(s) Oral every 12 hours  heparin   Injectable 5000 Unit(s) SubCutaneous every 8 hours  hydrALAZINE 50 milliGRAM(s) Oral every 8 hours  lisinopril 40 milliGRAM(s) Oral every 24 hours  piperacillin/tazobactam IVPB.. 3.375 Gram(s) IV Intermittent every 8 hours      Vital Signs Last 24 Hrs  T(C): 37.1 (11 Dec 2023 06:33), Max: 37.1 (11 Dec 2023 06:33)  T(F): 98.7 (11 Dec 2023 06:33), Max: 98.7 (11 Dec 2023 06:33)  HR: 75 (11 Dec 2023 06:33) (65 - 75)  BP: 131/86 (11 Dec 2023 06:33) (131/86 - 180/92)  BP(mean): --  RR: 18 (11 Dec 2023 06:33) (18 - 18)  SpO2: 95% (11 Dec 2023 06:33) (95% - 96%)    Parameters below as of 11 Dec 2023 06:33  Patient On (Oxygen Delivery Method): room air      I&O's Detail      General: NAD, resting comfortably in bed  C/V: NSR  Pulm: Nonlabored breathing, no respiratory distress  Abd: soft, NT/ND.  Extrem: RLE wound covered in ACE, LUE 1+ edema        LABS:                        7.5    11.84 )-----------( 318      ( 11 Dec 2023 05:30 )             24.5     12-11    133<L>  |  104  |  24<H>  ----------------------------<  171<H>  4.1   |  22  |  1.27    Ca    8.1<L>      11 Dec 2023 05:30  Phos  2.9     12-11  Mg     1.8     12-11    TPro  6.2  /  Alb  1.9<L>  /  TBili  0.2  /  DBili  x   /  AST  23  /  ALT  23  /  AlkPhos  260<H>  12-11    PT/INR - ( 11 Dec 2023 05:30 )   PT: 11.1 sec;   INR: 0.97          PTT - ( 09 Dec 2023 09:56 )  PTT:31.7 sec  Urinalysis Basic - ( 11 Dec 2023 05:30 )    Color: x / Appearance: x / SG: x / pH: x  Gluc: 171 mg/dL / Ketone: x  / Bili: x / Urobili: x   Blood: x / Protein: x / Nitrite: x   Leuk Esterase: x / RBC: x / WBC x   Sq Epi: x / Non Sq Epi: x / Bacteria: x        RADIOLOGY & ADDITIONAL STUDIES:

## 2023-12-11 NOTE — BRIEF OPERATIVE NOTE - NSICDXBRIEFPOSTOP_GEN_ALL_CORE_FT
POST-OP DIAGNOSIS:  Gangrene 11-Dec-2023 11:50:25  Guicho Gilmore   POST-OP DIAGNOSIS:  Gangrene 11-Dec-2023 11:50:25  uGicho Gilmore

## 2023-12-11 NOTE — PROGRESS NOTE ADULT - SUBJECTIVE AND OBJECTIVE BOX
**INCOMPLETE NOTE    OVERNIGHT EVENTS:    SUBJECTIVE:  Patient seen and examined at bedside.    Vital Signs Last 12 Hrs  T(F): 98.7 (12-11-23 @ 06:33), Max: 98.7 (12-11-23 @ 06:33)  HR: 75 (12-11-23 @ 06:33) (69 - 75)  BP: 131/86 (12-11-23 @ 06:33) (131/86 - 151/74)  BP(mean): --  RR: 18 (12-11-23 @ 06:33) (18 - 18)  SpO2: 95% (12-11-23 @ 06:33) (95% - 96%)  I&O's Summary      PHYSICAL EXAM:  Constitutional: NAD, comfortable in bed.  HEENT: NC/AT, PERRLA, EOMI, no conjunctival pallor or scleral icterus, MMM  Neck: Supple, no JVD  Respiratory: CTA B/L. No w/r/r.   Cardiovascular: RRR, normal S1 and S2, no m/r/g.   Gastrointestinal: +BS, soft NTND, no guarding or rebound tenderness, no palpable masses   Extremities: wwp; no cyanosis, clubbing or edema.   Vascular: Pulses equal and strong throughout.   Neurological: AAOx3, no CN deficits, strength and sensation intact throughout.   Skin: No gross skin abnormalities or rashes        LABS:                        7.1    11.89 )-----------( 275      ( 10 Dec 2023 07:55 )             22.5     12-10    135  |  103  |  25<H>  ----------------------------<  154<H>  4.0   |  27  |  1.22    Ca    8.1<L>      10 Dec 2023 07:55  Phos  2.6     12-10  Mg     2.0     12-10    TPro  6.7  /  Alb  2.2<L>  /  TBili  0.3  /  DBili  x   /  AST  27  /  ALT  21  /  AlkPhos  255<H>  12-09    PT/INR - ( 09 Dec 2023 09:56 )   PT: 11.6 sec;   INR: 1.02          PTT - ( 09 Dec 2023 09:56 )  PTT:31.7 sec  Urinalysis Basic - ( 10 Dec 2023 07:55 )    Color: x / Appearance: x / SG: x / pH: x  Gluc: 154 mg/dL / Ketone: x  / Bili: x / Urobili: x   Blood: x / Protein: x / Nitrite: x   Leuk Esterase: x / RBC: x / WBC x   Sq Epi: x / Non Sq Epi: x / Bacteria: x          RADIOLOGY & ADDITIONAL TESTS:    MEDICATIONS  (STANDING):  aspirin enteric coated 81 milliGRAM(s) Oral every 24 hours  atorvastatin 80 milliGRAM(s) Oral at bedtime  carvedilol 12.5 milliGRAM(s) Oral every 12 hours  dextrose 5%. 1000 milliLiter(s) (100 mL/Hr) IV Continuous <Continuous>  dextrose 5%. 1000 milliLiter(s) (50 mL/Hr) IV Continuous <Continuous>  dextrose 50% Injectable 12.5 Gram(s) IV Push once  dextrose 50% Injectable 25 Gram(s) IV Push once  dextrose 50% Injectable 25 Gram(s) IV Push once  gabapentin 800 milliGRAM(s) Oral three times a day  glucagon  Injectable 1 milliGRAM(s) IntraMuscular once  heparin   Injectable 5000 Unit(s) SubCutaneous every 8 hours  hydrALAZINE 50 milliGRAM(s) Oral every 8 hours  insulin glargine Injectable (LANTUS) 15 Unit(s) SubCutaneous at bedtime  insulin lispro (ADMELOG) corrective regimen sliding scale   SubCutaneous Before meals and at bedtime  insulin lispro Injectable (ADMELOG) 4 Unit(s) SubCutaneous three times a day before meals  lisinopril 40 milliGRAM(s) Oral every 24 hours  nystatin Cream 1 Application(s) Topical two times a day  piperacillin/tazobactam IVPB.. 3.375 Gram(s) IV Intermittent every 8 hours    MEDICATIONS  (PRN):  acetaminophen     Tablet .. 650 milliGRAM(s) Oral every 6 hours PRN Temp greater or equal to 38C (100.4F), Mild Pain (1 - 3)  dextrose Oral Gel 15 Gram(s) Oral once PRN Blood Glucose LESS THAN 70 milliGRAM(s)/deciliter  oxyCODONE    IR 5 milliGRAM(s) Oral every 6 hours PRN Severe Pain (7 - 10)   OVERNIGHT EVENTS: NAEO    SUBJECTIVE: Patient seen and examined at bedside. Reports diarrhea x3, abdominal pain, swelling L hand > R. Denies CP, SOB.     Vital Signs Last 12 Hrs  T(F): 98.7 (12-11-23 @ 06:33), Max: 98.7 (12-11-23 @ 06:33)  HR: 75 (12-11-23 @ 06:33) (69 - 75)  BP: 131/86 (12-11-23 @ 06:33) (131/86 - 151/74)  BP(mean): --  RR: 18 (12-11-23 @ 06:33) (18 - 18)  SpO2: 95% (12-11-23 @ 06:33) (95% - 96%)  I&O's Summary      PHYSICAL EXAM:  Constitutional: NAD, comfortable in bed.  HEENT: NC/AT, PERRLA, EOMI, MMM  Neck: Supple, + JVD  Respiratory: CTA B/L. No w/r/r.   Cardiovascular: RRR, systolic murmur   Gastrointestinal: +BS, soft NTND, no guarding or rebound tenderness  Extremities: wwp; +2 pitting edema b/l up to groin. RLE wrapped in bandage  Vascular: Pulses equal and strong throughout.   Neurological: AAOx3, no CN deficits, strength and sensation intact throughout.   Skin: No gross skin abnormalities or rashes        LABS:                        7.1    11.89 )-----------( 275      ( 10 Dec 2023 07:55 )             22.5     12-10    135  |  103  |  25<H>  ----------------------------<  154<H>  4.0   |  27  |  1.22    Ca    8.1<L>      10 Dec 2023 07:55  Phos  2.6     12-10  Mg     2.0     12-10    TPro  6.7  /  Alb  2.2<L>  /  TBili  0.3  /  DBili  x   /  AST  27  /  ALT  21  /  AlkPhos  255<H>  12-09    PT/INR - ( 09 Dec 2023 09:56 )   PT: 11.6 sec;   INR: 1.02          PTT - ( 09 Dec 2023 09:56 )  PTT:31.7 sec  Urinalysis Basic - ( 10 Dec 2023 07:55 )    Color: x / Appearance: x / SG: x / pH: x  Gluc: 154 mg/dL / Ketone: x  / Bili: x / Urobili: x   Blood: x / Protein: x / Nitrite: x   Leuk Esterase: x / RBC: x / WBC x   Sq Epi: x / Non Sq Epi: x / Bacteria: x          RADIOLOGY & ADDITIONAL TESTS:    MEDICATIONS  (STANDING):  aspirin enteric coated 81 milliGRAM(s) Oral every 24 hours  atorvastatin 80 milliGRAM(s) Oral at bedtime  carvedilol 12.5 milliGRAM(s) Oral every 12 hours  dextrose 5%. 1000 milliLiter(s) (100 mL/Hr) IV Continuous <Continuous>  dextrose 5%. 1000 milliLiter(s) (50 mL/Hr) IV Continuous <Continuous>  dextrose 50% Injectable 12.5 Gram(s) IV Push once  dextrose 50% Injectable 25 Gram(s) IV Push once  dextrose 50% Injectable 25 Gram(s) IV Push once  gabapentin 800 milliGRAM(s) Oral three times a day  glucagon  Injectable 1 milliGRAM(s) IntraMuscular once  heparin   Injectable 5000 Unit(s) SubCutaneous every 8 hours  hydrALAZINE 50 milliGRAM(s) Oral every 8 hours  insulin glargine Injectable (LANTUS) 15 Unit(s) SubCutaneous at bedtime  insulin lispro (ADMELOG) corrective regimen sliding scale   SubCutaneous Before meals and at bedtime  insulin lispro Injectable (ADMELOG) 4 Unit(s) SubCutaneous three times a day before meals  lisinopril 40 milliGRAM(s) Oral every 24 hours  nystatin Cream 1 Application(s) Topical two times a day  piperacillin/tazobactam IVPB.. 3.375 Gram(s) IV Intermittent every 8 hours    MEDICATIONS  (PRN):  acetaminophen     Tablet .. 650 milliGRAM(s) Oral every 6 hours PRN Temp greater or equal to 38C (100.4F), Mild Pain (1 - 3)  dextrose Oral Gel 15 Gram(s) Oral once PRN Blood Glucose LESS THAN 70 milliGRAM(s)/deciliter  oxyCODONE    IR 5 milliGRAM(s) Oral every 6 hours PRN Severe Pain (7 - 10)

## 2023-12-11 NOTE — PROGRESS NOTE ADULT - SUBJECTIVE AND OBJECTIVE BOX
POST-OPERATIVE NOTE    Procedure: R BKA    Diagnosis/Indication: gas gangrene of R foot    Surgeon: Dr. Oneal, Dr. Freed    S: Pt has no complaints. Denies CP, SOB, CANSECO, calf tenderness. Pain controlled with medication.    O:  T(C): --  T(F): --  HR: --  BP: --  RR: --  SpO2: --  Wt(kg): --                        7.5    11.84 )-----------( 318      ( 11 Dec 2023 05:30 )             24.5     12-11    133<L>  |  104  |  24<H>  ----------------------------<  171<H>  4.1   |  22  |  1.27    Ca    8.1<L>      11 Dec 2023 05:30  Phos  2.9     12-11  Mg     1.8     12-11    TPro  6.2  /  Alb  1.9<L>  /  TBili  0.2  /  DBili  x   /  AST  23  /  ALT  23  /  AlkPhos  260<H>  12-11      Gen: NAD, resting comfortably in bed  C/V: NSR  Pulm: Nonlabored breathing, no respiratory distress  Abd: soft, NT/ND  Extrem: RLE stump with sanguinous strike through

## 2023-12-11 NOTE — PROGRESS NOTE ADULT - PROBLEM SELECTOR PLAN 1
Type 2 diabetes mellitus with hyperglycemia  - Please give lantus 10 units at bedtime.   - Continue lispro 4 units before each meal.  - Continue lispro low dose sliding scale before meals and at bedtime.  - Patient's fingerstick glucose goal is 100-180 mg/dL.    - For discharge, patient can ***.    - Patient can follow up at discharge with [facility] by calling [phone] to make an appointment.      Case discussed with Dr. Huertas. Primary team updated. Type 2 diabetes mellitus with hyperglycemia  - Please give lantus 10 units at bedtime.   - Continue lispro 4 units before each meal.  - Continue lispro low dose sliding scale before meals and at bedtime.  - Patient's fingerstick glucose goal is 100-180 mg/dL.    - For discharge, patient can ***.    - Patient can follow up at discharge with [facility] by calling [phone] to make an appointment.      Case discussed with Dr. Huertas. Primary team updated.

## 2023-12-11 NOTE — BRIEF OPERATIVE NOTE - OPERATION/FINDINGS
Procedure: RIGHT below knee amputation    RIGHT guillotine amputation performed 2 fingers above medial malleolus. Vessels suture ligated with silk. Excellent hemostasis. Kerlix and Ace applie    EBL: 30cc Procedure: RIGHT below knee amputation    Preoperative adductor block by Anesthesia. RIGHT guillotine amputation performed 2 fingers above medial malleolus. Vessels suture ligated with silk. Excellent hemostasis. Christa carranza    EBL: 30cc

## 2023-12-11 NOTE — PROGRESS NOTE ADULT - ASSESSMENT
69yo M PMH CAD (2 stents 2020), HFmrEF (45-50%), HTN, PAD w/ remote RLE angioplasty, R 4th toe OM s/p partial R 4th ray amputation on 10/24, RLE angiogram with AT lithotripsy and AT/peroneal balloon angioplasty 10/27, uncontrolled T2DM vascular surgery consulted for BKA.    Extensive conversation regarding R BKA, patient now consenting, form signed and in chart  OR today  Care per primary team 67yo M PMH CAD (2 stents 2020), HFmrEF (45-50%), HTN, PAD w/ remote RLE angioplasty, R 4th toe OM s/p partial R 4th ray amputation on 10/24, RLE angiogram with AT lithotripsy and AT/peroneal balloon angioplasty 10/27, uncontrolled T2DM vascular surgery consulted for BKA.    Extensive conversation regarding R BKA, patient now consenting, form signed and in chart  Needs Cardiology clearance ASAP   If cleared for OR today, patient will require 1U PRBC pre-op  Care per primary team

## 2023-12-11 NOTE — PROGRESS NOTE ADULT - ATTENDING COMMENTS
-OR today for BKA  -Endo following for diabetes management  -Pain management after surgery   -Reconsult ID as patient now s/p amputation, continue zosyn till further recs.

## 2023-12-11 NOTE — PROGRESS NOTE ADULT - PROBLEM SELECTOR PLAN 6
#HFmrEF  #CAD  s/p 2 stents in 2020 at [facility]  TTE 11/9/23: EF 45-50%, Mild to moderate symmetric LVH, Grade I left ventricular diastolic dysfunction. Biatrial enlargement. Mild-to-moderate aortic stenosis.  Home meds: lisinopril, coreg  +2 b/l LE edema to groins, + JVD given IV lasix 40 x1   - c/w home meds  - c/w aspirin #HFmrEF  #CAD  s/p 2 stents in 2020 at [facility]  TTE 11/9/23: EF 45-50%, Mild to moderate symmetric LVH, Grade I left ventricular diastolic dysfunction. Biatrial enlargement. Mild-to-moderate aortic stenosis.  Home meds: lisinopril, coreg  +2 b/l LE edema to groins, + JVD given IV lasix 40 x1   - c/w home meds  - c/w aspirin

## 2023-12-11 NOTE — PROGRESS NOTE ADULT - SUBJECTIVE AND OBJECTIVE BOX
SUBJECTIVE / INTERVAL HPI: Patient was seen and examined post op. Denies pain. Dressing saturated with serous sanguinous fluid. Good appetite.    CAPILLARY BLOOD GLUCOSE & INSULIN RECEIVED  154 mg/dL (12-10 @ 07:55)  112 mg/dL (12-10 @ 09:39) - Lispro 3  84 mg/dL (12-10 @ 12:51)  248 mg/dL (12-10 @ 16:53)  301 mg/dL (12-10 @ 18:50) - Lispro 4+4  445 mg/dL (12-10 @ 22:49) - Lantus 15 + lispro 6  171 mg/dL (12-11 @ 05:30)  179 mg/dL (12-11 @ 09:03) - Lispro 1. NPO  90 mg/dL (12-11 @ 13:13)  293 mg/dL (12-11 @ 18:06)      REVIEW OF SYSTEMS  Constitutional:  Negative fever, chills or loss of appetite.  Eyes:  Negative blurry vision or double vision.  Cardiovascular:  Negative for chest pain or palpitations.  Respiratory:  Negative for cough, wheezing, or shortness of breath.    Gastrointestinal:  Negative for nausea, vomiting, diarrhea, constipation, or abdominal pain.  Genitourinary:  Negative frequency, urgency or dysuria.  Neurologic:  No headache, confusion, dizziness, lightheadedness.    PHYSICAL EXAM  Vital Signs Last 24 Hrs  T(C): 36.1 (11 Dec 2023 13:30), Max: 37.1 (11 Dec 2023 06:33)  T(F): 97 (11 Dec 2023 13:30), Max: 98.8 (11 Dec 2023 09:55)  HR: 68 (11 Dec 2023 17:42) (49 - 75)  BP: 141/85 (11 Dec 2023 17:42) (131/67 - 168/84)  BP(mean): 118 (11 Dec 2023 13:30) (93 - 118)  RR: 16 (11 Dec 2023 13:30) (10 - 18)  SpO2: 100% (11 Dec 2023 13:30) (95% - 100%)    Parameters below as of 11 Dec 2023 13:30  Patient On (Oxygen Delivery Method): nasal cannula  O2 Flow (L/min): 2      Constitutional: Awake, alert, in no acute distress.   HEENT: Normocephalic, atraumatic, RAGHAV.  Respiratory: Lungs clear to ausculation bilaterally.   Cardiovascular: regular rhythm, normal S1 and S2, no audible murmurs.   GI: soft, non-tender, non-distended, bowel sounds present.  Extremities: No lower extremity edema. Right BKA with serosanguinous dressing  Psychiatric: AAO x 3. Normal affect/mood.     LABS  CBC - WBC/HGB/HTC/PLT: 11.84/7.5/24.5/318 (12-11-23)  BMP - Na/K/Cl/Bicarb/BUN/Cr/Gluc/AG/eGFR: 133/4.1/104/22/24/1.27/171/7/62 (12-11-23)  Ca - 8.1 (12-11-23)  Phos - 2.9 (12-11-23)  Mg - 1.8 (12-11-23)  LFT - Alb/Tprot/Tbili/Dbili/AlkPhos/ALT/AST: 1.9/--/0.2/--/260/23/23 (12-11-23)  PT/aPTT/INR: 11.1/--/0.97 (12-11-23)       MEDICATIONS  MEDICATIONS  (STANDING):  atorvastatin 80 milliGRAM(s) Oral at bedtime  carvedilol 12.5 milliGRAM(s) Oral every 12 hours  gabapentin 800 milliGRAM(s) Oral three times a day  heparin   Injectable 5000 Unit(s) SubCutaneous every 8 hours  HYDROmorphone  Injectable 0.2 milliGRAM(s) IV Push every 15 minutes  insulin glargine Injectable (LANTUS) 15 Unit(s) SubCutaneous at bedtime  insulin lispro (ADMELOG) corrective regimen sliding scale   SubCutaneous Before meals and at bedtime  insulin lispro Injectable (ADMELOG) 4 Unit(s) SubCutaneous three times a day before meals  piperacillin/tazobactam IVPB.. 3.375 Gram(s) IV Intermittent every 8 hours    MEDICATIONS  (PRN):  acetaminophen     Tablet .. 650 milliGRAM(s) Oral every 6 hours PRN Temp greater or equal to 38C (100.4F), Mild Pain (1 - 3)  oxyCODONE    IR 5 milliGRAM(s) Oral every 6 hours PRN Severe Pain (7 - 10)

## 2023-12-12 LAB
ANION GAP SERPL CALC-SCNC: 4 MMOL/L — LOW (ref 5–17)
BASOPHILS # BLD AUTO: 0 K/UL — SIGNIFICANT CHANGE UP (ref 0–0.2)
BASOPHILS # BLD AUTO: 0 K/UL — SIGNIFICANT CHANGE UP (ref 0–0.2)
BASOPHILS NFR BLD AUTO: 0 % — SIGNIFICANT CHANGE UP (ref 0–2)
BASOPHILS NFR BLD AUTO: 0 % — SIGNIFICANT CHANGE UP (ref 0–2)
BUN SERPL-MCNC: 25 MG/DL — HIGH (ref 7–23)
CALCIUM SERPL-MCNC: 8 MG/DL — LOW (ref 8.4–10.5)
CALCIUM SERPL-MCNC: 8 MG/DL — LOW (ref 8.4–10.5)
CALCIUM SERPL-MCNC: 8.2 MG/DL — LOW (ref 8.4–10.5)
CALCIUM SERPL-MCNC: 8.2 MG/DL — LOW (ref 8.4–10.5)
CHLORIDE SERPL-SCNC: 106 MMOL/L — SIGNIFICANT CHANGE UP (ref 96–108)
CHLORIDE SERPL-SCNC: 106 MMOL/L — SIGNIFICANT CHANGE UP (ref 96–108)
CHLORIDE SERPL-SCNC: 107 MMOL/L — SIGNIFICANT CHANGE UP (ref 96–108)
CHLORIDE SERPL-SCNC: 107 MMOL/L — SIGNIFICANT CHANGE UP (ref 96–108)
CO2 SERPL-SCNC: 27 MMOL/L — SIGNIFICANT CHANGE UP (ref 22–31)
CO2 SERPL-SCNC: 27 MMOL/L — SIGNIFICANT CHANGE UP (ref 22–31)
CO2 SERPL-SCNC: 28 MMOL/L — SIGNIFICANT CHANGE UP (ref 22–31)
CO2 SERPL-SCNC: 28 MMOL/L — SIGNIFICANT CHANGE UP (ref 22–31)
CREAT SERPL-MCNC: 1.19 MG/DL — SIGNIFICANT CHANGE UP (ref 0.5–1.3)
CREAT SERPL-MCNC: 1.19 MG/DL — SIGNIFICANT CHANGE UP (ref 0.5–1.3)
CREAT SERPL-MCNC: 1.21 MG/DL — SIGNIFICANT CHANGE UP (ref 0.5–1.3)
CREAT SERPL-MCNC: 1.21 MG/DL — SIGNIFICANT CHANGE UP (ref 0.5–1.3)
EGFR: 66 ML/MIN/1.73M2 — SIGNIFICANT CHANGE UP
EGFR: 66 ML/MIN/1.73M2 — SIGNIFICANT CHANGE UP
EGFR: 67 ML/MIN/1.73M2 — SIGNIFICANT CHANGE UP
EGFR: 67 ML/MIN/1.73M2 — SIGNIFICANT CHANGE UP
EOSINOPHIL # BLD AUTO: 0 K/UL — SIGNIFICANT CHANGE UP (ref 0–0.5)
EOSINOPHIL # BLD AUTO: 0 K/UL — SIGNIFICANT CHANGE UP (ref 0–0.5)
EOSINOPHIL NFR BLD AUTO: 0 % — SIGNIFICANT CHANGE UP (ref 0–6)
EOSINOPHIL NFR BLD AUTO: 0 % — SIGNIFICANT CHANGE UP (ref 0–6)
GLUCOSE BLDC GLUCOMTR-MCNC: 115 MG/DL — HIGH (ref 70–99)
GLUCOSE BLDC GLUCOMTR-MCNC: 115 MG/DL — HIGH (ref 70–99)
GLUCOSE BLDC GLUCOMTR-MCNC: 132 MG/DL — HIGH (ref 70–99)
GLUCOSE BLDC GLUCOMTR-MCNC: 132 MG/DL — HIGH (ref 70–99)
GLUCOSE BLDC GLUCOMTR-MCNC: 184 MG/DL — HIGH (ref 70–99)
GLUCOSE BLDC GLUCOMTR-MCNC: 184 MG/DL — HIGH (ref 70–99)
GLUCOSE BLDC GLUCOMTR-MCNC: 214 MG/DL — HIGH (ref 70–99)
GLUCOSE BLDC GLUCOMTR-MCNC: 214 MG/DL — HIGH (ref 70–99)
GLUCOSE BLDC GLUCOMTR-MCNC: 88 MG/DL — SIGNIFICANT CHANGE UP (ref 70–99)
GLUCOSE BLDC GLUCOMTR-MCNC: 88 MG/DL — SIGNIFICANT CHANGE UP (ref 70–99)
GLUCOSE SERPL-MCNC: 119 MG/DL — HIGH (ref 70–99)
GLUCOSE SERPL-MCNC: 119 MG/DL — HIGH (ref 70–99)
GLUCOSE SERPL-MCNC: 91 MG/DL — SIGNIFICANT CHANGE UP (ref 70–99)
GLUCOSE SERPL-MCNC: 91 MG/DL — SIGNIFICANT CHANGE UP (ref 70–99)
HCT VFR BLD CALC: 19.5 % — CRITICAL LOW (ref 39–50)
HCT VFR BLD CALC: 19.5 % — CRITICAL LOW (ref 39–50)
HCT VFR BLD CALC: 20.1 % — CRITICAL LOW (ref 39–50)
HCT VFR BLD CALC: 20.1 % — CRITICAL LOW (ref 39–50)
HCT VFR BLD CALC: 21.4 % — LOW (ref 39–50)
HCT VFR BLD CALC: 21.4 % — LOW (ref 39–50)
HGB BLD-MCNC: 6.1 G/DL — CRITICAL LOW (ref 13–17)
HGB BLD-MCNC: 6.1 G/DL — CRITICAL LOW (ref 13–17)
HGB BLD-MCNC: 6.2 G/DL — CRITICAL LOW (ref 13–17)
HGB BLD-MCNC: 6.2 G/DL — CRITICAL LOW (ref 13–17)
HGB BLD-MCNC: 6.8 G/DL — CRITICAL LOW (ref 13–17)
HGB BLD-MCNC: 6.8 G/DL — CRITICAL LOW (ref 13–17)
HYPOCHROMIA BLD QL: SLIGHT — SIGNIFICANT CHANGE UP
HYPOCHROMIA BLD QL: SLIGHT — SIGNIFICANT CHANGE UP
LYMPHOCYTES # BLD AUTO: 1.4 K/UL — SIGNIFICANT CHANGE UP (ref 1–3.3)
LYMPHOCYTES # BLD AUTO: 1.4 K/UL — SIGNIFICANT CHANGE UP (ref 1–3.3)
LYMPHOCYTES # BLD AUTO: 6.9 % — LOW (ref 13–44)
LYMPHOCYTES # BLD AUTO: 6.9 % — LOW (ref 13–44)
MAGNESIUM SERPL-MCNC: 1.7 MG/DL — SIGNIFICANT CHANGE UP (ref 1.6–2.6)
MAGNESIUM SERPL-MCNC: 1.7 MG/DL — SIGNIFICANT CHANGE UP (ref 1.6–2.6)
MANUAL SMEAR VERIFICATION: SIGNIFICANT CHANGE UP
MANUAL SMEAR VERIFICATION: SIGNIFICANT CHANGE UP
MCHC RBC-ENTMCNC: 29.2 PG — SIGNIFICANT CHANGE UP (ref 27–34)
MCHC RBC-ENTMCNC: 30 PG — SIGNIFICANT CHANGE UP (ref 27–34)
MCHC RBC-ENTMCNC: 30 PG — SIGNIFICANT CHANGE UP (ref 27–34)
MCHC RBC-ENTMCNC: 30.8 GM/DL — LOW (ref 32–36)
MCHC RBC-ENTMCNC: 30.8 GM/DL — LOW (ref 32–36)
MCHC RBC-ENTMCNC: 31.3 GM/DL — LOW (ref 32–36)
MCHC RBC-ENTMCNC: 31.3 GM/DL — LOW (ref 32–36)
MCHC RBC-ENTMCNC: 31.8 GM/DL — LOW (ref 32–36)
MCHC RBC-ENTMCNC: 31.8 GM/DL — LOW (ref 32–36)
MCV RBC AUTO: 93.3 FL — SIGNIFICANT CHANGE UP (ref 80–100)
MCV RBC AUTO: 93.3 FL — SIGNIFICANT CHANGE UP (ref 80–100)
MCV RBC AUTO: 94.3 FL — SIGNIFICANT CHANGE UP (ref 80–100)
MCV RBC AUTO: 94.3 FL — SIGNIFICANT CHANGE UP (ref 80–100)
MCV RBC AUTO: 94.8 FL — SIGNIFICANT CHANGE UP (ref 80–100)
MCV RBC AUTO: 94.8 FL — SIGNIFICANT CHANGE UP (ref 80–100)
MONOCYTES # BLD AUTO: 0.71 K/UL — SIGNIFICANT CHANGE UP (ref 0–0.9)
MONOCYTES # BLD AUTO: 0.71 K/UL — SIGNIFICANT CHANGE UP (ref 0–0.9)
MONOCYTES NFR BLD AUTO: 3.5 % — SIGNIFICANT CHANGE UP (ref 2–14)
MONOCYTES NFR BLD AUTO: 3.5 % — SIGNIFICANT CHANGE UP (ref 2–14)
NEUTROPHILS # BLD AUTO: 18.15 K/UL — HIGH (ref 1.8–7.4)
NEUTROPHILS # BLD AUTO: 18.15 K/UL — HIGH (ref 1.8–7.4)
NEUTROPHILS NFR BLD AUTO: 89.6 % — HIGH (ref 43–77)
NEUTROPHILS NFR BLD AUTO: 89.6 % — HIGH (ref 43–77)
NRBC # BLD: 0 /100 WBCS — SIGNIFICANT CHANGE UP (ref 0–0)
OVALOCYTES BLD QL SMEAR: SLIGHT — SIGNIFICANT CHANGE UP
OVALOCYTES BLD QL SMEAR: SLIGHT — SIGNIFICANT CHANGE UP
PHOSPHATE SERPL-MCNC: 2.7 MG/DL — SIGNIFICANT CHANGE UP (ref 2.5–4.5)
PHOSPHATE SERPL-MCNC: 2.7 MG/DL — SIGNIFICANT CHANGE UP (ref 2.5–4.5)
PLAT MORPH BLD: NORMAL — SIGNIFICANT CHANGE UP
PLAT MORPH BLD: NORMAL — SIGNIFICANT CHANGE UP
PLATELET # BLD AUTO: 218 K/UL — SIGNIFICANT CHANGE UP (ref 150–400)
PLATELET # BLD AUTO: 218 K/UL — SIGNIFICANT CHANGE UP (ref 150–400)
PLATELET # BLD AUTO: 254 K/UL — SIGNIFICANT CHANGE UP (ref 150–400)
PLATELET # BLD AUTO: 254 K/UL — SIGNIFICANT CHANGE UP (ref 150–400)
PLATELET # BLD AUTO: 270 K/UL — SIGNIFICANT CHANGE UP (ref 150–400)
PLATELET # BLD AUTO: 270 K/UL — SIGNIFICANT CHANGE UP (ref 150–400)
POLYCHROMASIA BLD QL SMEAR: SLIGHT — SIGNIFICANT CHANGE UP
POLYCHROMASIA BLD QL SMEAR: SLIGHT — SIGNIFICANT CHANGE UP
POTASSIUM SERPL-MCNC: 3.8 MMOL/L — SIGNIFICANT CHANGE UP (ref 3.5–5.3)
POTASSIUM SERPL-MCNC: 3.8 MMOL/L — SIGNIFICANT CHANGE UP (ref 3.5–5.3)
POTASSIUM SERPL-MCNC: 4 MMOL/L — SIGNIFICANT CHANGE UP (ref 3.5–5.3)
POTASSIUM SERPL-MCNC: 4 MMOL/L — SIGNIFICANT CHANGE UP (ref 3.5–5.3)
POTASSIUM SERPL-SCNC: 3.8 MMOL/L — SIGNIFICANT CHANGE UP (ref 3.5–5.3)
POTASSIUM SERPL-SCNC: 3.8 MMOL/L — SIGNIFICANT CHANGE UP (ref 3.5–5.3)
POTASSIUM SERPL-SCNC: 4 MMOL/L — SIGNIFICANT CHANGE UP (ref 3.5–5.3)
POTASSIUM SERPL-SCNC: 4 MMOL/L — SIGNIFICANT CHANGE UP (ref 3.5–5.3)
RBC # BLD: 2.09 M/UL — LOW (ref 4.2–5.8)
RBC # BLD: 2.09 M/UL — LOW (ref 4.2–5.8)
RBC # BLD: 2.12 M/UL — LOW (ref 4.2–5.8)
RBC # BLD: 2.12 M/UL — LOW (ref 4.2–5.8)
RBC # BLD: 2.27 M/UL — LOW (ref 4.2–5.8)
RBC # BLD: 2.27 M/UL — LOW (ref 4.2–5.8)
RBC # FLD: 14.5 % — SIGNIFICANT CHANGE UP (ref 10.3–14.5)
RBC # FLD: 14.5 % — SIGNIFICANT CHANGE UP (ref 10.3–14.5)
RBC # FLD: 14.6 % — HIGH (ref 10.3–14.5)
RBC # FLD: 14.6 % — HIGH (ref 10.3–14.5)
RBC # FLD: 15.6 % — HIGH (ref 10.3–14.5)
RBC # FLD: 15.6 % — HIGH (ref 10.3–14.5)
RBC BLD AUTO: ABNORMAL
RBC BLD AUTO: ABNORMAL
SCHISTOCYTES BLD QL AUTO: SLIGHT — SIGNIFICANT CHANGE UP
SCHISTOCYTES BLD QL AUTO: SLIGHT — SIGNIFICANT CHANGE UP
SODIUM SERPL-SCNC: 138 MMOL/L — SIGNIFICANT CHANGE UP (ref 135–145)
WBC # BLD: 20.26 K/UL — HIGH (ref 3.8–10.5)
WBC # BLD: 20.26 K/UL — HIGH (ref 3.8–10.5)
WBC # BLD: 20.4 K/UL — HIGH (ref 3.8–10.5)
WBC # BLD: 20.4 K/UL — HIGH (ref 3.8–10.5)
WBC # BLD: 23.46 K/UL — HIGH (ref 3.8–10.5)
WBC # BLD: 23.46 K/UL — HIGH (ref 3.8–10.5)
WBC # FLD AUTO: 20.26 K/UL — HIGH (ref 3.8–10.5)
WBC # FLD AUTO: 20.26 K/UL — HIGH (ref 3.8–10.5)
WBC # FLD AUTO: 20.4 K/UL — HIGH (ref 3.8–10.5)
WBC # FLD AUTO: 20.4 K/UL — HIGH (ref 3.8–10.5)
WBC # FLD AUTO: 23.46 K/UL — HIGH (ref 3.8–10.5)
WBC # FLD AUTO: 23.46 K/UL — HIGH (ref 3.8–10.5)

## 2023-12-12 PROCEDURE — 99233 SBSQ HOSP IP/OBS HIGH 50: CPT | Mod: GC

## 2023-12-12 PROCEDURE — 99233 SBSQ HOSP IP/OBS HIGH 50: CPT

## 2023-12-12 PROCEDURE — 99232 SBSQ HOSP IP/OBS MODERATE 35: CPT

## 2023-12-12 RX ORDER — HYDROMORPHONE HYDROCHLORIDE 2 MG/ML
1 INJECTION INTRAMUSCULAR; INTRAVENOUS; SUBCUTANEOUS ONCE
Refills: 0 | Status: DISCONTINUED | OUTPATIENT
Start: 2023-12-12 | End: 2023-12-12

## 2023-12-12 RX ORDER — LISINOPRIL 2.5 MG/1
40 TABLET ORAL DAILY
Refills: 0 | Status: DISCONTINUED | OUTPATIENT
Start: 2023-12-12 | End: 2023-12-14

## 2023-12-12 RX ORDER — HYDRALAZINE HCL 50 MG
50 TABLET ORAL EVERY 8 HOURS
Refills: 0 | Status: DISCONTINUED | OUTPATIENT
Start: 2023-12-12 | End: 2023-12-17

## 2023-12-12 RX ORDER — HYDROMORPHONE HYDROCHLORIDE 2 MG/ML
0.5 INJECTION INTRAMUSCULAR; INTRAVENOUS; SUBCUTANEOUS ONCE
Refills: 0 | Status: DISCONTINUED | OUTPATIENT
Start: 2023-12-12 | End: 2023-12-12

## 2023-12-12 RX ORDER — ACETAMINOPHEN 500 MG
1000 TABLET ORAL ONCE
Refills: 0 | Status: COMPLETED | OUTPATIENT
Start: 2023-12-12 | End: 2023-12-12

## 2023-12-12 RX ORDER — FUROSEMIDE 40 MG
40 TABLET ORAL ONCE
Refills: 0 | Status: COMPLETED | OUTPATIENT
Start: 2023-12-12 | End: 2023-12-12

## 2023-12-12 RX ADMIN — Medication 81 MILLIGRAM(S): at 06:03

## 2023-12-12 RX ADMIN — GABAPENTIN 800 MILLIGRAM(S): 400 CAPSULE ORAL at 14:04

## 2023-12-12 RX ADMIN — PIPERACILLIN AND TAZOBACTAM 25 GRAM(S): 4; .5 INJECTION, POWDER, LYOPHILIZED, FOR SOLUTION INTRAVENOUS at 06:03

## 2023-12-12 RX ADMIN — HYDROMORPHONE HYDROCHLORIDE 1 MILLIGRAM(S): 2 INJECTION INTRAMUSCULAR; INTRAVENOUS; SUBCUTANEOUS at 21:00

## 2023-12-12 RX ADMIN — Medication 50 MILLIGRAM(S): at 14:06

## 2023-12-12 RX ADMIN — CARVEDILOL PHOSPHATE 12.5 MILLIGRAM(S): 80 CAPSULE, EXTENDED RELEASE ORAL at 06:03

## 2023-12-12 RX ADMIN — OXYCODONE HYDROCHLORIDE 5 MILLIGRAM(S): 5 TABLET ORAL at 20:04

## 2023-12-12 RX ADMIN — HYDROMORPHONE HYDROCHLORIDE 1 MILLIGRAM(S): 2 INJECTION INTRAMUSCULAR; INTRAVENOUS; SUBCUTANEOUS at 11:17

## 2023-12-12 RX ADMIN — ATORVASTATIN CALCIUM 80 MILLIGRAM(S): 80 TABLET, FILM COATED ORAL at 21:30

## 2023-12-12 RX ADMIN — PIPERACILLIN AND TAZOBACTAM 25 GRAM(S): 4; .5 INJECTION, POWDER, LYOPHILIZED, FOR SOLUTION INTRAVENOUS at 22:46

## 2023-12-12 RX ADMIN — GABAPENTIN 800 MILLIGRAM(S): 400 CAPSULE ORAL at 06:03

## 2023-12-12 RX ADMIN — Medication 650 MILLIGRAM(S): at 22:29

## 2023-12-12 RX ADMIN — INSULIN GLARGINE 10 UNIT(S): 100 INJECTION, SOLUTION SUBCUTANEOUS at 22:46

## 2023-12-12 RX ADMIN — Medication 650 MILLIGRAM(S): at 21:29

## 2023-12-12 RX ADMIN — HYDROMORPHONE HYDROCHLORIDE 1 MILLIGRAM(S): 2 INJECTION INTRAMUSCULAR; INTRAVENOUS; SUBCUTANEOUS at 10:17

## 2023-12-12 RX ADMIN — HYDROMORPHONE HYDROCHLORIDE 1 MILLIGRAM(S): 2 INJECTION INTRAMUSCULAR; INTRAVENOUS; SUBCUTANEOUS at 11:48

## 2023-12-12 RX ADMIN — OXYCODONE HYDROCHLORIDE 5 MILLIGRAM(S): 5 TABLET ORAL at 10:21

## 2023-12-12 RX ADMIN — HEPARIN SODIUM 5000 UNIT(S): 5000 INJECTION INTRAVENOUS; SUBCUTANEOUS at 14:04

## 2023-12-12 RX ADMIN — Medication 4 UNIT(S): at 09:34

## 2023-12-12 RX ADMIN — Medication 4 UNIT(S): at 18:20

## 2023-12-12 RX ADMIN — HYDROMORPHONE HYDROCHLORIDE 1 MILLIGRAM(S): 2 INJECTION INTRAMUSCULAR; INTRAVENOUS; SUBCUTANEOUS at 12:13

## 2023-12-12 RX ADMIN — PIPERACILLIN AND TAZOBACTAM 25 GRAM(S): 4; .5 INJECTION, POWDER, LYOPHILIZED, FOR SOLUTION INTRAVENOUS at 14:04

## 2023-12-12 RX ADMIN — OXYCODONE HYDROCHLORIDE 5 MILLIGRAM(S): 5 TABLET ORAL at 05:06

## 2023-12-12 RX ADMIN — Medication 4 UNIT(S): at 13:18

## 2023-12-12 RX ADMIN — Medication 400 MILLIGRAM(S): at 03:39

## 2023-12-12 RX ADMIN — HYDROMORPHONE HYDROCHLORIDE 0.5 MILLIGRAM(S): 2 INJECTION INTRAMUSCULAR; INTRAVENOUS; SUBCUTANEOUS at 23:15

## 2023-12-12 RX ADMIN — HEPARIN SODIUM 5000 UNIT(S): 5000 INJECTION INTRAVENOUS; SUBCUTANEOUS at 06:03

## 2023-12-12 RX ADMIN — OXYCODONE HYDROCHLORIDE 5 MILLIGRAM(S): 5 TABLET ORAL at 05:45

## 2023-12-12 RX ADMIN — HYDROMORPHONE HYDROCHLORIDE 1 MILLIGRAM(S): 2 INJECTION INTRAMUSCULAR; INTRAVENOUS; SUBCUTANEOUS at 10:48

## 2023-12-12 RX ADMIN — GABAPENTIN 800 MILLIGRAM(S): 400 CAPSULE ORAL at 21:30

## 2023-12-12 RX ADMIN — Medication 1: at 18:20

## 2023-12-12 RX ADMIN — HYDROMORPHONE HYDROCHLORIDE 0.5 MILLIGRAM(S): 2 INJECTION INTRAMUSCULAR; INTRAVENOUS; SUBCUTANEOUS at 22:53

## 2023-12-12 RX ADMIN — CARVEDILOL PHOSPHATE 12.5 MILLIGRAM(S): 80 CAPSULE, EXTENDED RELEASE ORAL at 18:21

## 2023-12-12 RX ADMIN — HYDROMORPHONE HYDROCHLORIDE 1 MILLIGRAM(S): 2 INJECTION INTRAMUSCULAR; INTRAVENOUS; SUBCUTANEOUS at 13:13

## 2023-12-12 RX ADMIN — Medication 40 MILLIGRAM(S): at 14:05

## 2023-12-12 RX ADMIN — HYDROMORPHONE HYDROCHLORIDE 1 MILLIGRAM(S): 2 INJECTION INTRAMUSCULAR; INTRAVENOUS; SUBCUTANEOUS at 20:21

## 2023-12-12 RX ADMIN — Medication 1000 MILLIGRAM(S): at 04:20

## 2023-12-12 RX ADMIN — OXYCODONE HYDROCHLORIDE 5 MILLIGRAM(S): 5 TABLET ORAL at 21:04

## 2023-12-12 RX ADMIN — OXYCODONE HYDROCHLORIDE 5 MILLIGRAM(S): 5 TABLET ORAL at 11:21

## 2023-12-12 NOTE — PROGRESS NOTE ADULT - SUBJECTIVE AND OBJECTIVE BOX
INTERVAL HPI/OVERNIGHT EVENTS:    SUBJECTIVE: Patient seen and examined at bedside.    OBJECTIVE:    VITAL SIGNS:  ICU Vital Signs Last 24 Hrs  T(C): 37.1 (12 Dec 2023 12:00), Max: 37.4 (12 Dec 2023 05:54)  T(F): 98.7 (12 Dec 2023 12:00), Max: 99.4 (12 Dec 2023 05:54)  HR: 53 (12 Dec 2023 12:00) (53 - 68)  BP: 110/64 (12 Dec 2023 12:00) (110/64 - 141/85)  BP(mean): --  ABP: --  ABP(mean): --  RR: 16 (12 Dec 2023 12:00) (16 - 16)  SpO2: 96% (12 Dec 2023 12:00) (95% - 96%)    O2 Parameters below as of 12 Dec 2023 12:00  Patient On (Oxygen Delivery Method): room air              CAPILLARY BLOOD GLUCOSE      POCT Blood Glucose.: 132 mg/dL (12 Dec 2023 12:36)      PHYSICAL EXAM:    General: WDWN ; NAD  HEENT: NC/AT; PERRL, anicteric sclera  Neck: supple, no JVD  Respiratory: CTA B/L; no W/R/R  Cardiovascular: +S1/S2; RRR; no M/R/G  Gastrointestinal: soft, NT/ND; +BS x4  Extremities: WWP; 2+ peripheral pulses B/L; no LE edema  Skin: normal color and turgor; no rash  Neurological:     MEDICATIONS:  MEDICATIONS  (STANDING):  aspirin enteric coated 81 milliGRAM(s) Oral every 24 hours  atorvastatin 80 milliGRAM(s) Oral at bedtime  carvedilol 12.5 milliGRAM(s) Oral every 12 hours  furosemide   Injectable 40 milliGRAM(s) IV Push once  gabapentin 800 milliGRAM(s) Oral three times a day  heparin   Injectable 5000 Unit(s) SubCutaneous every 8 hours  hydrALAZINE 50 milliGRAM(s) Oral every 8 hours  insulin glargine Injectable (LANTUS) 10 Unit(s) SubCutaneous at bedtime  insulin lispro (ADMELOG) corrective regimen sliding scale   SubCutaneous Before meals and at bedtime  insulin lispro Injectable (ADMELOG) 4 Unit(s) SubCutaneous three times a day before meals  lisinopril 40 milliGRAM(s) Oral daily  piperacillin/tazobactam IVPB.. 3.375 Gram(s) IV Intermittent every 8 hours    MEDICATIONS  (PRN):  acetaminophen     Tablet .. 650 milliGRAM(s) Oral every 6 hours PRN Temp greater or equal to 38C (100.4F), Mild Pain (1 - 3)  oxyCODONE    IR 5 milliGRAM(s) Oral every 6 hours PRN Severe Pain (7 - 10)      ALLERGIES:  Allergies    No Known Allergies    Intolerances        LABS:                        6.2    20.26 )-----------( 254      ( 12 Dec 2023 10:32 )             20.1     12-12    138  |  106  |  25<H>  ----------------------------<  119<H>  3.8   |  28  |  1.19    Ca    8.0<L>      12 Dec 2023 10:32  Phos  2.7     12-12  Mg     1.7     12-12    TPro  6.2  /  Alb  1.9<L>  /  TBili  0.2  /  DBili  x   /  AST  23  /  ALT  23  /  AlkPhos  260<H>  12-11    LIVER FUNCTIONS - ( 11 Dec 2023 05:30 )  Alb: 1.9 g/dL / Pro: 6.2 g/dL / ALK PHOS: 260 U/L / ALT: 23 U/L / AST: 23 U/L / GGT: x           PT/INR - ( 11 Dec 2023 05:30 )   PT: 11.1 sec;   INR: 0.97            Urinalysis Basic - ( 12 Dec 2023 10:32 )    Color: x / Appearance: x / SG: x / pH: x  Gluc: 119 mg/dL / Ketone: x  / Bili: x / Urobili: x   Blood: x / Protein: x / Nitrite: x   Leuk Esterase: x / RBC: x / WBC x   Sq Epi: x / Non Sq Epi: x / Bacteria: x            RADIOLOGY & ADDITIONAL TESTS: Reviewed.   INTERVAL HPI/OVERNIGHT EVENTS: Hg 6.8 given 1 UpRBC and then Lasix 40mg IVP for diuresis.     SUBJECTIVE: Patient seen and examined at bedside.     OBJECTIVE:    VITAL SIGNS:  ICU Vital Signs Last 24 Hrs  T(C): 37.1 (12 Dec 2023 12:00), Max: 37.4 (12 Dec 2023 05:54)  T(F): 98.7 (12 Dec 2023 12:00), Max: 99.4 (12 Dec 2023 05:54)  HR: 53 (12 Dec 2023 12:00) (53 - 68)  BP: 110/64 (12 Dec 2023 12:00) (110/64 - 141/85)  RR: 16 (12 Dec 2023 12:00) (16 - 16)  SpO2: 96% (12 Dec 2023 12:00) (95% - 96%)    O2 Parameters below as of 12 Dec 2023 12:00  Patient On (Oxygen Delivery Method): room air      CAPILLARY BLOOD GLUCOSE      POCT Blood Glucose.: 132 mg/dL (12 Dec 2023 12:36)      PHYSICAL EXAM:    Constitutional: Moderate distress   HEENT: NC/AT, PERRLA, EOMI, MMM  Neck: Supple, no JVD  Respiratory: CTA B/L. No w/r/r.   Cardiovascular: RRR, normal S1 and S2, no m/r/g.   Gastrointestinal: +BS, soft NTND, no guarding or rebound tenderness  Extremities: +2 b/l pitting edema to groin, R foot stump in bandages w/ blood soaking   Vascular: Pulses equal and strong throughout.   Neurological: AAOx3, no CN deficits, strength and sensation intact throughout.   Skin: No gross skin abnormalities or rashes ACE.    MEDICATIONS:  MEDICATIONS  (STANDING):  aspirin enteric coated 81 milliGRAM(s) Oral every 24 hours  atorvastatin 80 milliGRAM(s) Oral at bedtime  carvedilol 12.5 milliGRAM(s) Oral every 12 hours  furosemide   Injectable 40 milliGRAM(s) IV Push once  gabapentin 800 milliGRAM(s) Oral three times a day  heparin   Injectable 5000 Unit(s) SubCutaneous every 8 hours  hydrALAZINE 50 milliGRAM(s) Oral every 8 hours  insulin glargine Injectable (LANTUS) 10 Unit(s) SubCutaneous at bedtime  insulin lispro (ADMELOG) corrective regimen sliding scale   SubCutaneous Before meals and at bedtime  insulin lispro Injectable (ADMELOG) 4 Unit(s) SubCutaneous three times a day before meals  lisinopril 40 milliGRAM(s) Oral daily  piperacillin/tazobactam IVPB.. 3.375 Gram(s) IV Intermittent every 8 hours    MEDICATIONS  (PRN):  acetaminophen     Tablet .. 650 milliGRAM(s) Oral every 6 hours PRN Temp greater or equal to 38C (100.4F), Mild Pain (1 - 3)  oxyCODONE    IR 5 milliGRAM(s) Oral every 6 hours PRN Severe Pain (7 - 10)      ALLERGIES:  Allergies    No Known Allergies    Intolerances        LABS:                        6.2    20.26 )-----------( 254      ( 12 Dec 2023 10:32 )             20.1     12-12    138  |  106  |  25<H>  ----------------------------<  119<H>  3.8   |  28  |  1.19    Ca    8.0<L>      12 Dec 2023 10:32  Phos  2.7     12-12  Mg     1.7     12-12    TPro  6.2  /  Alb  1.9<L>  /  TBili  0.2  /  DBili  x   /  AST  23  /  ALT  23  /  AlkPhos  260<H>  12-11    LIVER FUNCTIONS - ( 11 Dec 2023 05:30 )  Alb: 1.9 g/dL / Pro: 6.2 g/dL / ALK PHOS: 260 U/L / ALT: 23 U/L / AST: 23 U/L / GGT: x           PT/INR - ( 11 Dec 2023 05:30 )   PT: 11.1 sec;   INR: 0.97            Urinalysis Basic - ( 12 Dec 2023 10:32 )    Color: x / Appearance: x / SG: x / pH: x  Gluc: 119 mg/dL / Ketone: x  / Bili: x / Urobili: x   Blood: x / Protein: x / Nitrite: x   Leuk Esterase: x / RBC: x / WBC x   Sq Epi: x / Non Sq Epi: x / Bacteria: x            RADIOLOGY & ADDITIONAL TESTS: Reviewed.

## 2023-12-12 NOTE — PROGRESS NOTE ADULT - ASSESSMENT
67M PMH ACC/AHA Stage C ICM LVIDD 5.3cm LVEF 45% 10/23/23),  moderate AS, CAD s/p PCI (2020), HTN, IDDM with peripheral neuropathy, PAD s/p RLE angiogram w/ shockwave lithotripsy & ballooning of AT, and balloon angioplasty of peroneal artery on 10/27 and multiple admissions for diabetic SSTI  (s/p partial 4th toe ray resection 10/24) and 11/8/23 admission to CCU for hypertensive emergency and ADHF c/b acute metabolic encephalopathy in the setting of positive cocaine and THC use and subsequently left AMA now presents to [facility] with b/l knee pain found to have increased soft tissue gas in R foot on CT scan 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 Cardiology following for perioperative cardiovascular risk assessment.    Review of studies:    EKG 12/1/23: Sinus rhythm, LVH (aVL)  EKG 10/24/2023: NSR    TTE 11/9/23: LVIDD 5.3 cm LVEF 45-50% Mild-mod LVH. G1DD. Normal RV size and function. Biatrial enlargement. Mild-moderate AS (PV 2.9m/s [id number], LVOT/AV 0.5, ALLISON 1.28cm2), No pHTN. No pericardial effusion.    TTE 10/23/23: LVIDD 4.9cm LVEF 45%. Mild LVH. Normal RV size and function. MIdl LA dilation. Moderate AS ALLISON 1.27, PV 2.47m/s MG 16mmHg LVOT/AV 0.4     #Perioperative Risk Assessment  Previously had excellent performance status, reportedly playing basketball prior to last hospitalization with no cardiopulmonary complaints. Noted mild-to moderate AS on echocardiogram.  - Intermediate risk for intermediate risk procedure.  - No cardiac contraindication to proceed with intervention (BKA closure once infection resolved)    #ACC/AHA Stage C ICM (LVIDD 5.3cm LVEF 45-50% 10/23/23)  Etiology: Ischemic cardiomyopathy  Device: Premature, not meeting criteria  GDMT:   - continue Lisinopril 40mg QD   - continue Coreg 12.5mg BID  - Plan for reintroduction of MRA/SGLT2i prior to discharge.   Diuretics: Lower extremity edema, and receiving pRBC for acute blood loss anemia, recommend Lasix 20mg IVP with transfusion.    #Mild-moderate AS  Noted systolic murmur heard trough out the pericardium and lower extremity edema  - Management as above    #CAD s/p PCI (placed in setting of abnormal stress test)  [facility] in 2020 had 2 Vessel CAD with discussion for PCI vs CABG; unclear if patient was subsequent revascularized.  - continue Aspirin 81mg QD  - Lipitor 80mg QD    #PAD  s/p PTCA and atherectomy in 2021 of the Right Ant Tibial artery after which he was started on DAPT (ASA and brilinta)  - continue Aspirin 81mg QD  - Plavix 75mg Qd held in the setting of acute blood loss anemia.  - Lipitor 80mg QD  - Outpatient follow up with Cardiologist Dr Anni Taylor ([phone]) at [place]    Recommendations finalized pending attending attestation.   67M PMH ACC/AHA Stage C ICM LVIDD 5.3cm LVEF 45% 10/23/23),  moderate AS, CAD s/p PCI (2020), HTN, IDDM with peripheral neuropathy, PAD s/p RLE angiogram w/ shockwave lithotripsy & ballooning of AT, and balloon angioplasty of peroneal artery on 10/27 and multiple admissions for diabetic SSTI  (s/p partial 4th toe ray resection 10/24) and 11/8/23 admission to CCU for hypertensive emergency and ADHF c/b acute metabolic encephalopathy in the setting of positive cocaine and THC use and subsequently left AMA now presents to [facility] with b/l knee pain found to have increased soft tissue gas in R foot on CT scan 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 Cardiology following for perioperative cardiovascular risk assessment.    Review of studies:    EKG 12/1/23: Sinus rhythm, LVH (aVL)  EKG 10/24/2023: NSR    TTE 11/9/23: LVIDD 5.3 cm LVEF 45-50% Mild-mod LVH. G1DD. Normal RV size and function. Biatrial enlargement. Mild-moderate AS (PV 2.9m/s [id number], LVOT/AV 0.5, ALLISON 1.28cm2), No pHTN. No pericardial effusion.    TTE 10/23/23: LVIDD 4.9cm LVEF 45%. Mild LVH. Normal RV size and function. MIdl LA dilation. Moderate AS ALLISON 1.27, PV 2.47m/s MG 16mmHg LVOT/AV 0.4     #Perioperative Risk Assessment  Previously had excellent performance status, reportedly playing basketball prior to last hospitalization with no cardiopulmonary complaints. Noted mild-to moderate AS on echocardiogram.  - Intermediate risk for intermediate risk procedure.  - No cardiac contraindication to proceed with intervention (BKA closure once infection resolved)    #ACC/AHA Stage C ICM (LVIDD 5.3cm LVEF 45-50% 10/23/23)  Etiology: Ischemic cardiomyopathy  Device: Premature, not meeting criteria  GDMT:   - continue Lisinopril 40mg QD   - continue Coreg 12.5mg BID  - Plan for reintroduction of MRA/SGLT2i prior to discharge.   Diuretics: Lower extremity edema, and receiving pRBC for acute blood loss anemia, recommend Lasix 20mg IVP with transfusion.    #Mild-moderate AS  Noted systolic murmur heard trough out the pericardium and lower extremity edema  - Management as above    #CAD s/p PCI (placed in setting of abnormal stress test)  [facility] in 2020 had 2 Vessel CAD with discussion for PCI vs CABG; unclear if patient was subsequent revascularized.  - continue Aspirin 81mg QD  - Lipitor 80mg QD    #PAD  s/p PTCA and atherectomy in 2021 of the Right Ant Tibial artery after which he was started on DAPT (ASA and brilinta)  - continue Aspirin 81mg QD  - Plavix 75mg Qd held in the setting of acute blood loss anemia.  - Lipitor 80mg QD  - Outpatient follow up with Cardiologist Dr Anni Taylor ([phone]) at [place]    Recommendations finalized pending attending attestation.   68M PMH ACC/AHA Stage C ICM LVIDD 5.3cm LVEF 45% 10/23/23),  moderate AS, CAD s/p PCI (2020), HTN, IDDM with peripheral neuropathy, PAD s/p RLE angiogram w/ shockwave lithotripsy & ballooning of AT, and balloon angioplasty of peroneal artery on 10/27 and multiple admissions for diabetic SSTI  (s/p partial 4th toe ray resection 10/24) and 11/8/23 admission to CCU for hypertensive emergency and ADHF c/b acute metabolic encephalopathy in the setting of positive cocaine and THC use and subsequently left AMA now presents to [facility] with b/l knee pain found to have increased soft tissue gas in R foot on CT scan 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 Cardiology following for perioperative cardiovascular risk assessment.    Review of studies:    EKG 12/1/23: Sinus rhythm, LVH (aVL)  EKG 10/24/2023: NSR    TTE 11/9/23: LVIDD 5.3 cm LVEF 45-50% Mild-mod LVH. G1DD. Normal RV size and function. Biatrial enlargement. Mild-moderate AS (PV 2.9m/s [id number], LVOT/AV 0.5, ALLISON 1.28cm2), No pHTN. No pericardial effusion.    TTE 10/23/23: LVIDD 4.9cm LVEF 45%. Mild LVH. Normal RV size and function. MIdl LA dilation. Moderate AS ALLISON 1.27, PV 2.47m/s MG 16mmHg LVOT/AV 0.4     #Perioperative Risk Assessment  Previously had excellent performance status, reportedly playing basketball prior to last hospitalization with no cardiopulmonary complaints. Noted mild-to moderate AS on echocardiogram.  - Intermediate risk for intermediate risk procedure.  - No cardiac contraindication to proceed with intervention (BKA closure once infection resolved)    #ACC/AHA Stage C ICM (LVIDD 5.3cm LVEF 45-50% 10/23/23)  Etiology: Ischemic cardiomyopathy  Device: Premature, not meeting criteria  GDMT:   - continue Lisinopril 40mg QD   - continue Coreg 12.5mg BID  - Plan for reintroduction of MRA/SGLT2i prior to discharge.   Diuretics: Lower extremity edema, and receiving pRBC for acute blood loss anemia, recommend Lasix 20mg IVP with transfusion.    #Mild-moderate AS  Noted systolic murmur heard trough out the pericardium and lower extremity edema  - Management as above    #CAD s/p PCI (placed in setting of abnormal stress test)  [facility] in 2020 had 2 Vessel CAD with discussion for PCI vs CABG; unclear if patient was subsequent revascularized.  - continue Aspirin 81mg QD  - Lipitor 80mg QD    #PAD  s/p PTCA and atherectomy in 2021 of the Right Ant Tibial artery after which he was started on DAPT (ASA and brilinta)  - continue Aspirin 81mg QD  - Plavix 75mg Qd held in the setting of acute blood loss anemia.  - Lipitor 80mg QD  - Outpatient follow up with Cardiologist Dr Anni Taylor ([phone]) at [place]    Recommendations finalized pending attending attestation.   68M PMH ACC/AHA Stage C ICM LVIDD 5.3cm LVEF 45% 10/23/23),  moderate AS, CAD s/p PCI (2020), HTN, IDDM with peripheral neuropathy, PAD s/p RLE angiogram w/ shockwave lithotripsy & ballooning of AT, and balloon angioplasty of peroneal artery on 10/27 and multiple admissions for diabetic SSTI  (s/p partial 4th toe ray resection 10/24) and 11/8/23 admission to CCU for hypertensive emergency and ADHF c/b acute metabolic encephalopathy in the setting of positive cocaine and THC use and subsequently left AMA now presents to [facility] with b/l knee pain found to have increased soft tissue gas in R foot on CT scan 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 Cardiology following for perioperative cardiovascular risk assessment.    Review of studies:    EKG 12/1/23: Sinus rhythm, LVH (aVL)  EKG 10/24/2023: NSR    TTE 11/9/23: LVIDD 5.3 cm LVEF 45-50% Mild-mod LVH. G1DD. Normal RV size and function. Biatrial enlargement. Mild-moderate AS (PV 2.9m/s [id number], LVOT/AV 0.5, ALLISON 1.28cm2), No pHTN. No pericardial effusion.    TTE 10/23/23: LVIDD 4.9cm LVEF 45%. Mild LVH. Normal RV size and function. MIdl LA dilation. Moderate AS ALLISON 1.27, PV 2.47m/s MG 16mmHg LVOT/AV 0.4     #Perioperative Risk Assessment  Previously had excellent performance status, reportedly playing basketball prior to last hospitalization with no cardiopulmonary complaints. Noted mild-to moderate AS on echocardiogram.  - Intermediate risk for intermediate risk procedure.  - No cardiac contraindication to proceed with intervention (BKA closure once infection resolved)    #ACC/AHA Stage C ICM (LVIDD 5.3cm LVEF 45-50% 10/23/23)  Etiology: Ischemic cardiomyopathy  Device: Premature, not meeting criteria  GDMT:   - continue Lisinopril 40mg QD   - continue Coreg 12.5mg BID  - Plan for reintroduction of MRA/SGLT2i prior to discharge.   Diuretics: Lower extremity edema, and receiving pRBC for acute blood loss anemia, recommend Lasix 20mg IVP with transfusion.    #Mild-moderate AS  Noted systolic murmur heard trough out the pericardium and lower extremity edema  - Management as above    #CAD s/p PCI (placed in setting of abnormal stress test)  [facility] in 2020 had 2 Vessel CAD with discussion for PCI vs CABG; unclear if patient was subsequent revascularized.  - continue Aspirin 81mg QD  - Lipitor 80mg QD    #PAD  s/p PTCA and atherectomy in 2021 of the Right Ant Tibial artery after which he was started on DAPT (ASA and brilinta)  - continue Aspirin 81mg QD  - Plavix 75mg Qd held in the setting of acute blood loss anemia.  - Lipitor 80mg QD  - Outpatient follow up with Cardiologist Dr Anni Taylor ([phone]) at [place]    Recommendations finalized pending attending attestation.

## 2023-12-12 NOTE — PROGRESS NOTE ADULT - PROBLEM SELECTOR PLAN 1
Past hx of PAD w/ diabetic foot wound. Had prior admission in October where he was seen by ID and Podiatry and was supposed to complete 6 wks of broad spectrum antimicrobials. At that time MRI with possible 4th digit early OM up to prox phalanx s/p partial R 4th ray amputation on 10/24, proximal cx with E.coli, Pluralibacter gergoviae, stap epi, and Corynebacterium amycolatum.   Then had an admission this month for unrelated issue, had left AMA and PICC line was taken out bc pt was leaving AMA, was prescribed levaquin to complete his 6wk course however pt unsure if he actually picked up the levaquin.  CT angio LE today shows increased soft tissue gas in the right forefoot. Vascular surgery was contacted by [facility].  On exam right foot has open wound between 4th and 5th digits with purulence coming out of wound, foot is erythematous on dorsal aspect and warm to touch.  Ddx includes SSTI vs osteomyelitis vs less likely nec fasc in setting of possibly incompletely treated infection.   Vascular signed off d/t pt refusing interventions  Wound culture shows few staph haemolyticus, lactobacillus gasseri   Started on vancomycin, refused vanc trough- vanc d'jasen   Extensive conversation on 12/6 w/ Vascular, Podiatry, Medicine team, pt not agreeable to BKA. Given D/c notice.   On 12/8- pt amenable to BKA, Vascular/Podiatry on board- sx planned for 12/11   Rapid response called for hypoglycemic episode w/ FSG 35. Returned to baseline mentation s/p 1 amp D50.   Vascular performed BKA w/ revision scheduled after cardiac clearance   Noted blood loss from wound this AM on 12/12 --> vascular dressed wound, given 1 unit of PRBC   - pending cardiac clearance for sx  - c/w zosyn 3.375g IV q8 last dose tomorrow d/t source control   - Pain med PRN- tylenol (mild pain), toradol (mod pain), Oxy 5 prn (severe pain)  - f/u blood cx- NGTD Past hx of PAD w/ diabetic foot wound. Had prior admission in October where he was seen by ID and Podiatry and was supposed to complete 6 wks of broad spectrum antimicrobials. At that time MRI with possible 4th digit early OM up to prox phalanx s/p partial R 4th ray amputation on 10/24, proximal cx with E.coli, Pluralibacter gergoviae, stap epi, and Corynebacterium amycolatum.   Then had an admission this month for unrelated issue, had left AMA and PICC line was taken out bc pt was leaving AMA, was prescribed levaquin to complete his 6wk course however pt unsure if he actually picked up the levaquin.  CT angio LE today shows increased soft tissue gas in the right forefoot. Vascular surgery was contacted by [facility].  On exam right foot has open wound between 4th and 5th digits with purulence coming out of wound, foot is erythematous on dorsal aspect and warm to touch.  Ddx includes SSTI vs osteomyelitis vs less likely nec fasc in setting of possibly incompletely treated infection.   Vascular signed off d/t pt refusing interventions  Wound culture shows few staph haemolyticus, lactobacillus gasseri   Started on vancomycin, refused vanc trough- vanc d'jasen   Extensive conversation on 12/6 w/ Vascular, Podiatry, Medicine team, pt not agreeable to BKA. Given D/c notice.   On 12/8- pt amenable to BKA, Vascular/Podiatry on board- sx planned for 12/11   Rapid response called for hypoglycemic episode w/ FSG 35. Returned to baseline mentation s/p 1 amp D50.   Vascular performed BKA w/ revision scheduled after cardiac clearance   Noted blood loss from wound this AM on 12/12 --> vascular dressed wound, given 1 unit of PRBC   - pending cardiac clearance for sx  - c/w zosyn 3.375g IV q8 last dose tomorrow d/t source control   - Pain med PRN- tylenol (mild pain), toradol (mod pain), Oxy 5 prn (severe pain)  - f/u blood cx- NGTD

## 2023-12-12 NOTE — PROGRESS NOTE ADULT - SUBJECTIVE AND OBJECTIVE BOX
**INCOMPLETE NOTE    OVERNIGHT EVENTS:    SUBJECTIVE:  Patient seen and examined at bedside.    Vital Signs Last 12 Hrs  T(F): 99.4 (12-12-23 @ 05:54), Max: 99.4 (12-12-23 @ 05:54)  HR: 62 (12-12-23 @ 05:54) (62 - 66)  BP: 132/74 (12-12-23 @ 05:54) (132/74 - 138/78)  BP(mean): --  RR: 16 (12-12-23 @ 05:54) (16 - 16)  SpO2: 95% (12-12-23 @ 05:54) (95% - 96%)  I&O's Summary      PHYSICAL EXAM:  Constitutional: NAD, comfortable in bed.  HEENT: NC/AT, PERRLA, EOMI, no conjunctival pallor or scleral icterus, MMM  Neck: Supple, no JVD  Respiratory: CTA B/L. No w/r/r.   Cardiovascular: RRR, normal S1 and S2, no m/r/g.   Gastrointestinal: +BS, soft NTND, no guarding or rebound tenderness, no palpable masses   Extremities: wwp; no cyanosis, clubbing or edema.   Vascular: Pulses equal and strong throughout.   Neurological: AAOx3, no CN deficits, strength and sensation intact throughout.   Skin: No gross skin abnormalities or rashes        LABS:                        7.5    11.84 )-----------( 318      ( 11 Dec 2023 05:30 )             24.5     12-11    133<L>  |  104  |  24<H>  ----------------------------<  171<H>  4.1   |  22  |  1.27    Ca    8.1<L>      11 Dec 2023 05:30  Phos  2.9     12-11  Mg     1.8     12-11    TPro  6.2  /  Alb  1.9<L>  /  TBili  0.2  /  DBili  x   /  AST  23  /  ALT  23  /  AlkPhos  260<H>  12-11    PT/INR - ( 11 Dec 2023 05:30 )   PT: 11.1 sec;   INR: 0.97            Urinalysis Basic - ( 11 Dec 2023 05:30 )    Color: x / Appearance: x / SG: x / pH: x  Gluc: 171 mg/dL / Ketone: x  / Bili: x / Urobili: x   Blood: x / Protein: x / Nitrite: x   Leuk Esterase: x / RBC: x / WBC x   Sq Epi: x / Non Sq Epi: x / Bacteria: x          RADIOLOGY & ADDITIONAL TESTS:    MEDICATIONS  (STANDING):  aspirin enteric coated 81 milliGRAM(s) Oral every 24 hours  atorvastatin 80 milliGRAM(s) Oral at bedtime  carvedilol 12.5 milliGRAM(s) Oral every 12 hours  gabapentin 800 milliGRAM(s) Oral three times a day  heparin   Injectable 5000 Unit(s) SubCutaneous every 8 hours  insulin glargine Injectable (LANTUS) 10 Unit(s) SubCutaneous at bedtime  insulin lispro (ADMELOG) corrective regimen sliding scale   SubCutaneous Before meals and at bedtime  insulin lispro Injectable (ADMELOG) 4 Unit(s) SubCutaneous three times a day before meals  piperacillin/tazobactam IVPB.. 3.375 Gram(s) IV Intermittent every 8 hours    MEDICATIONS  (PRN):  acetaminophen     Tablet .. 650 milliGRAM(s) Oral every 6 hours PRN Temp greater or equal to 38C (100.4F), Mild Pain (1 - 3)  oxyCODONE    IR 5 milliGRAM(s) Oral every 6 hours PRN Severe Pain (7 - 10)   OVERNIGHT EVENTS: Noted episodes of diarrhea s/p starting abx.     SUBJECTIVE:  Patient seen and examined at bedside. In moderate distress. Blood soaking through bandages on covered R stump.     Vital Signs Last 12 Hrs  T(F): 99.4 (12-12-23 @ 05:54), Max: 99.4 (12-12-23 @ 05:54)  HR: 62 (12-12-23 @ 05:54) (62 - 66)  BP: 132/74 (12-12-23 @ 05:54) (132/74 - 138/78)  BP(mean): --  RR: 16 (12-12-23 @ 05:54) (16 - 16)  SpO2: 95% (12-12-23 @ 05:54) (95% - 96%)  I&O's Summary      PHYSICAL EXAM:  Constitutional: Moderate distress   HEENT: NC/AT, PERRLA, EOMI, MMM  Neck: Supple, no JVD  Respiratory: CTA B/L. No w/r/r.   Cardiovascular: RRR, normal S1 and S2, no m/r/g.   Gastrointestinal: +BS, soft NTND, no guarding or rebound tenderness  Extremities: +2 b/l pitting edema to groin, R foot stump in bandages w/ blood soaking   Vascular: Pulses equal and strong throughout.   Neurological: AAOx3, no CN deficits, strength and sensation intact throughout.   Skin: No gross skin abnormalities or rashes        LABS:                        7.5    11.84 )-----------( 318      ( 11 Dec 2023 05:30 )             24.5     12-11    133<L>  |  104  |  24<H>  ----------------------------<  171<H>  4.1   |  22  |  1.27    Ca    8.1<L>      11 Dec 2023 05:30  Phos  2.9     12-11  Mg     1.8     12-11    TPro  6.2  /  Alb  1.9<L>  /  TBili  0.2  /  DBili  x   /  AST  23  /  ALT  23  /  AlkPhos  260<H>  12-11    PT/INR - ( 11 Dec 2023 05:30 )   PT: 11.1 sec;   INR: 0.97            Urinalysis Basic - ( 11 Dec 2023 05:30 )    Color: x / Appearance: x / SG: x / pH: x  Gluc: 171 mg/dL / Ketone: x  / Bili: x / Urobili: x   Blood: x / Protein: x / Nitrite: x   Leuk Esterase: x / RBC: x / WBC x   Sq Epi: x / Non Sq Epi: x / Bacteria: x          RADIOLOGY & ADDITIONAL TESTS:    MEDICATIONS  (STANDING):  aspirin enteric coated 81 milliGRAM(s) Oral every 24 hours  atorvastatin 80 milliGRAM(s) Oral at bedtime  carvedilol 12.5 milliGRAM(s) Oral every 12 hours  gabapentin 800 milliGRAM(s) Oral three times a day  heparin   Injectable 5000 Unit(s) SubCutaneous every 8 hours  insulin glargine Injectable (LANTUS) 10 Unit(s) SubCutaneous at bedtime  insulin lispro (ADMELOG) corrective regimen sliding scale   SubCutaneous Before meals and at bedtime  insulin lispro Injectable (ADMELOG) 4 Unit(s) SubCutaneous three times a day before meals  piperacillin/tazobactam IVPB.. 3.375 Gram(s) IV Intermittent every 8 hours    MEDICATIONS  (PRN):  acetaminophen     Tablet .. 650 milliGRAM(s) Oral every 6 hours PRN Temp greater or equal to 38C (100.4F), Mild Pain (1 - 3)  oxyCODONE    IR 5 milliGRAM(s) Oral every 6 hours PRN Severe Pain (7 - 10)

## 2023-12-12 NOTE — PROGRESS NOTE ADULT - SUBJECTIVE AND OBJECTIVE BOX
SUBJECTIVE / INTERVAL HPI: Patient was seen and examined this morning. Pt fell getting OOB this morning to go to [facility], had episode of diarrhea. Hgb dropped received 1 unit PRBC. Having pump at stump site, no phantom pain. Eating well.     CAPILLARY BLOOD GLUCOSE & INSULIN RECEIVED  293 mg/dL (12-11 @ 18:06) - Lispro 4+3  276 mg/dL (12-11 @ 21:56) - Lantus 10 + lispro 3  214 mg/dL (12-12 @ 06:01)  115 mg/dL (12-12 @ 08:56) - Lispro 4. Ate eggs and Urdu toast.  91 mg/dL (12-12 @ 09:48)  119 mg/dL (12-12 @ 10:32)  132 mg/dL (12-12 @ 12:36)  184 mg/dL (12-12 @ 18:13)    REVIEW OF SYSTEMS  Constitutional:  Negative fever, chills or loss of appetite.  Eyes:  Negative blurry vision or double vision.  Cardiovascular:  Negative for chest pain or palpitations.  Respiratory:  Negative for cough, wheezing, or shortness of breath.    Gastrointestinal:  Negative for nausea, vomiting,  constipation, or abdominal pain. + diarrhea  Genitourinary:  Negative frequency, urgency or dysuria.  Neurologic:  No headache, confusion, dizziness, lightheadedness.    PHYSICAL EXAM  Vital Signs Last 24 Hrs  T(C): 37.1 (12 Dec 2023 12:00), Max: 37.4 (12 Dec 2023 05:54)  T(F): 98.7 (12 Dec 2023 12:00), Max: 99.4 (12 Dec 2023 05:54)  HR: 53 (12 Dec 2023 14:05) (53 - 66)  BP: 124/68 (12 Dec 2023 14:05) (110/64 - 138/78)  BP(mean): --  RR: 16 (12 Dec 2023 12:00) (16 - 16)  SpO2: 96% (12 Dec 2023 12:00) (95% - 96%)    Parameters below as of 12 Dec 2023 12:00  Patient On (Oxygen Delivery Method): room air    Constitutional: Awake, alert, in no acute distress.   HEENT: Normocephalic, atraumatic, RAGHAV.  Respiratory: Lungs clear to ausculation bilaterally.   Cardiovascular: regular rhythm, normal S1 and S2, no audible murmurs.   GI: soft, non-tender, non-distended, bowel sounds present.  Extremities: No lower extremity edema. Right BKA with serosanguinous dressing  Psychiatric: AAO x 3. Normal affect/mood.     LABS  CBC - WBC/HGB/HTC/PLT: 20.26/6.2/20.1/254 (12-12-23)  BMP - Na/K/Cl/Bicarb/BUN/Cr/Gluc/AG/eGFR: 138/3.8/106/28/25/1.19/119/4/67 (12-12-23)  Ca - 8.0 (12-12-23)  Phos - 2.7 (12-12-23)  Mg - 1.7 (12-12-23)  LFT - Alb/Tprot/Tbili/Dbili/AlkPhos/ALT/AST: 1.9/--/0.2/--/260/23/23 (12-11-23)  PT/aPTT/INR: 11.1/--/0.97 (12-11-23)       MEDICATIONS  MEDICATIONS  (STANDING):  aspirin enteric coated 81 milliGRAM(s) Oral every 24 hours  atorvastatin 80 milliGRAM(s) Oral at bedtime  carvedilol 12.5 milliGRAM(s) Oral every 12 hours  gabapentin 800 milliGRAM(s) Oral three times a day  heparin   Injectable 5000 Unit(s) SubCutaneous every 8 hours  hydrALAZINE 50 milliGRAM(s) Oral every 8 hours  insulin glargine Injectable (LANTUS) 10 Unit(s) SubCutaneous at bedtime  insulin lispro (ADMELOG) corrective regimen sliding scale   SubCutaneous Before meals and at bedtime  insulin lispro Injectable (ADMELOG) 4 Unit(s) SubCutaneous three times a day before meals  lisinopril 40 milliGRAM(s) Oral daily  piperacillin/tazobactam IVPB.. 3.375 Gram(s) IV Intermittent every 8 hours    MEDICATIONS  (PRN):  acetaminophen     Tablet .. 650 milliGRAM(s) Oral every 6 hours PRN Temp greater or equal to 38C (100.4F), Mild Pain (1 - 3)  oxyCODONE    IR 5 milliGRAM(s) Oral every 6 hours PRN Severe Pain (7 - 10)   SUBJECTIVE / INTERVAL HPI: Patient was seen and examined this morning. Pt fell getting OOB this morning to go to [place], had episode of diarrhea. Hgb dropped received 1 unit PRBC. Having pump at stump site, no phantom pain. Eating well.     CAPILLARY BLOOD GLUCOSE & INSULIN RECEIVED  293 mg/dL (12-11 @ 18:06) - Lispro 4+3  276 mg/dL (12-11 @ 21:56) - Lantus 10 + lispro 3  214 mg/dL (12-12 @ 06:01)  115 mg/dL (12-12 @ 08:56) - Lispro 4. Ate eggs and Malay toast.  91 mg/dL (12-12 @ 09:48)  119 mg/dL (12-12 @ 10:32)  132 mg/dL (12-12 @ 12:36)  184 mg/dL (12-12 @ 18:13)    REVIEW OF SYSTEMS  Constitutional:  Negative fever, chills or loss of appetite.  Eyes:  Negative blurry vision or double vision.  Cardiovascular:  Negative for chest pain or palpitations.  Respiratory:  Negative for cough, wheezing, or shortness of breath.    Gastrointestinal:  Negative for nausea, vomiting,  constipation, or abdominal pain. + diarrhea  Genitourinary:  Negative frequency, urgency or dysuria.  Neurologic:  No headache, confusion, dizziness, lightheadedness.    PHYSICAL EXAM  Vital Signs Last 24 Hrs  T(C): 37.1 (12 Dec 2023 12:00), Max: 37.4 (12 Dec 2023 05:54)  T(F): 98.7 (12 Dec 2023 12:00), Max: 99.4 (12 Dec 2023 05:54)  HR: 53 (12 Dec 2023 14:05) (53 - 66)  BP: 124/68 (12 Dec 2023 14:05) (110/64 - 138/78)  BP(mean): --  RR: 16 (12 Dec 2023 12:00) (16 - 16)  SpO2: 96% (12 Dec 2023 12:00) (95% - 96%)    Parameters below as of 12 Dec 2023 12:00  Patient On (Oxygen Delivery Method): room air    Constitutional: Awake, alert, in no acute distress.   HEENT: Normocephalic, atraumatic, RAGHAV.  Respiratory: Lungs clear to ausculation bilaterally.   Cardiovascular: regular rhythm, normal S1 and S2, no audible murmurs.   GI: soft, non-tender, non-distended, bowel sounds present.  Extremities: No lower extremity edema. Right BKA with serosanguinous dressing  Psychiatric: AAO x 3. Normal affect/mood.     LABS  CBC - WBC/HGB/HTC/PLT: 20.26/6.2/20.1/254 (12-12-23)  BMP - Na/K/Cl/Bicarb/BUN/Cr/Gluc/AG/eGFR: 138/3.8/106/28/25/1.19/119/4/67 (12-12-23)  Ca - 8.0 (12-12-23)  Phos - 2.7 (12-12-23)  Mg - 1.7 (12-12-23)  LFT - Alb/Tprot/Tbili/Dbili/AlkPhos/ALT/AST: 1.9/--/0.2/--/260/23/23 (12-11-23)  PT/aPTT/INR: 11.1/--/0.97 (12-11-23)       MEDICATIONS  MEDICATIONS  (STANDING):  aspirin enteric coated 81 milliGRAM(s) Oral every 24 hours  atorvastatin 80 milliGRAM(s) Oral at bedtime  carvedilol 12.5 milliGRAM(s) Oral every 12 hours  gabapentin 800 milliGRAM(s) Oral three times a day  heparin   Injectable 5000 Unit(s) SubCutaneous every 8 hours  hydrALAZINE 50 milliGRAM(s) Oral every 8 hours  insulin glargine Injectable (LANTUS) 10 Unit(s) SubCutaneous at bedtime  insulin lispro (ADMELOG) corrective regimen sliding scale   SubCutaneous Before meals and at bedtime  insulin lispro Injectable (ADMELOG) 4 Unit(s) SubCutaneous three times a day before meals  lisinopril 40 milliGRAM(s) Oral daily  piperacillin/tazobactam IVPB.. 3.375 Gram(s) IV Intermittent every 8 hours    MEDICATIONS  (PRN):  acetaminophen     Tablet .. 650 milliGRAM(s) Oral every 6 hours PRN Temp greater or equal to 38C (100.4F), Mild Pain (1 - 3)  oxyCODONE    IR 5 milliGRAM(s) Oral every 6 hours PRN Severe Pain (7 - 10)

## 2023-12-12 NOTE — PROGRESS NOTE ADULT - PROBLEM SELECTOR PLAN 1
Type 2 diabetes mellitus with hyperglycemia  - Please decrease lantus to 7 units at bedtime.   - Continue lispro 4 units before each meal.  - Continue lispro low dose sliding scale before meals and at bedtime.  - Patient's fingerstick glucose goal is 100-180 mg/dL.    - For discharge, patient can ***.    - Patient can follow up at discharge with [facility] by calling [phone] to make an appointment.      Case discussed with Dr. Huertas. Primary team updated. Type 2 diabetes mellitus with hyperglycemia  - Please decrease lantus to 7 units at bedtime.   - Continue lispro 4 units before each meal.  - Continue lispro low dose sliding scale before meals and at bedtime.  - Patient's fingerstick glucose goal is 100-180 mg/dL.    - For discharge, patient can ***.    - Patient can follow up at discharge with [facility] by calling [phone] to make an appointment.      Case discussed with Dr. Huertas. Primary team updated.

## 2023-12-12 NOTE — PROGRESS NOTE ADULT - ATTENDING COMMENTS
Patient is a 68M [facility] ACC/AHA Stage C ICM LVIDD 5.3cm LVEF 45% 10/23/23),  Moderate AS, CAD s/p PCI (2020), HTN, IDDM with peripheral neuropathy, PAD s/p RLE angiogram w/ shockwave lithotripsy & ballooning of AT, and balloon angioplasty of peroneal artery on 10/27 and multiple admissions for diabetic SSTI  (s/p partial 4th toe ray resection 10/24) and 11/8/23 admission to CCU for hypertensive emergency and ADHF c/b acute metabolic encephalopathy in the setting of positive cocaine and THC use and subsequently left AMA now presents to [facility] with b/l knee pain found to have increased soft tissue gas in R foot on CT scan 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 Cardiology following for perioperative cardiovascular risk assessment.    Review of studies:  - EKG 12/1/23: Sinus rhythm, LVH (aVL)  - EKG 10/24/2023: NSR  - TTE 11/9/23: LVIDD 5.3 cm LVEF 45-50% Mild-mod LVH. G1DD. Normal RV size and function. Biatrial enlargement. Mild-moderate AS (PV 2.9m/s [id number], LVOT/AV 0.5, ALLISON 1.28cm2), No pHTN. No pericardial effusion.  - TTE (10/23/2023): EF 45%; Mild-to-moderate concentric left ventricular hypertrophy; Left ventricular systolic function is mildly reduced with regional wall motion abnormalities; indeterminate left ventricular diastolic function and filling pressure. Right ventricular normal size and function; TAPSE 2.38cm. Mild dilation of left atrium; Right atrium normal in size. Evidence of moderate aortic valve stenosis; AV area 1.27, peak transvalvular velocity 2.47, mean transvalvular gradient is 16.00 mmHg, and the LVOT/AV velocity ratio is 0.40. IVC compressible to <50%.    #Perioperative Risk Assessment  - Patient well known to the Cardiology service, previously seen during prior Hospitalization  - He has known ASCVD, CAD with last revascularization in 2020, severe PAD with extensive revascularization in 2021 now s/p R BKA with plan for OR return for closure  - Prior to worsening PAD/LE infection, patient had excellent performance status, reportedly playing basketball for up to an hour without cardiopulmonary complaints. He has known stable moderate AS  - Patient is considered at Intermediate risk for intermediate risk procedure.  - There are No active CV contraindication to proceeding with intervention (BKA closure once infection resolved)    #CAD s/p PCI   - Patient with known ASCVD with CAD and PCI placed in setting of abnormal stress test.   - Per outside [facility] [place] records, patient underwent LHC in 2020 had 2 Vessel CAD with discussion for PCI vs CABG; unclear if patient was subsequent revascularized. He later underwent PTCA and atherectomy in 2021 of the Right Ant Tibial atery after which he was started on DAPT (ASA and brilinta)  - Clinically patient remains well compensated, warm and well perfused. Physical exam is notable for 3/6 systolic murmur heard trough out the pericardium  - Echo reviewed with Moderate LVH, mildly reduced LV systolic function with RWMA.  - Cont ASA 81 mg po daily and cont with Lipitor 80 mg.   - Cont Coreg 12.5 mg po BID  - Noteg Hg of 6.1. Please transfuse with goal for Hg of 8. Recommend diuresis with Lasix 20 IV x 1 after rescucitation    # Moderate Aortic stenosis  - Patient with Moderate Aortic stenosis with AV area 1.27, peak transvalvular velocity 2.47, mean transvalvular gradient is 16.00 mmHg, and the LVOT/AV velocity ratio is 0.40 with an EF of 45%  - Echo this hospitalization showing stable vlavular heart disease from OCtober  - Patient will need to obtain repeat Echo in 1 year or sooner if clinically indicated  - Cont Coreg as above    #ACC/AHA Stage C ICM (LVIDD 5.3cm LVEF 45-50% 10/23/23)  Etiology: Ischemic cardiomyopathy  Device: Premature, not meeting criteria  GDMT:   - continue Lisinopril 40mg QD   - continue Coreg 12.5mg BID  - Plan for reintroduction of Spionolactone 25 mg po dailu prior to DC  - No plan for SGLT2 on DC given Severe PAD with delayed wound healing  Diuretics: Lower extremity edema, and receiving pRBC for acute blood loss anemia, recommend Lasix 20mg IVP x 1 post transfusion.      Please ensure patient has outpatient follow up with Cardiologist Dr Anni Taylor ([phone]) at [place] within 2 weeks of DC . Patient is a 68M [facility] ACC/AHA Stage C ICM LVIDD 5.3cm LVEF 45% 10/23/23),  Moderate AS, CAD s/p PCI (2020), HTN, IDDM with peripheral neuropathy, PAD s/p RLE angiogram w/ shockwave lithotripsy & ballooning of AT, and balloon angioplasty of peroneal artery on 10/27 and multiple admissions for diabetic SSTI  (s/p partial 4th toe ray resection 10/24) and 11/8/23 admission to CCU for hypertensive emergency and ADHF c/b acute metabolic encephalopathy in the setting of positive cocaine and THC use and subsequently left AMA now presents to [facility] with b/l knee pain found to have increased soft tissue gas in R foot on CT scan 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 Cardiology following for perioperative cardiovascular risk assessment.    Review of studies:  - EKG 12/1/23: Sinus rhythm, LVH (aVL)  - EKG 10/24/2023: NSR  - TTE 11/9/23: LVIDD 5.3 cm LVEF 45-50% Mild-mod LVH. G1DD. Normal RV size and function. Biatrial enlargement. Mild-moderate AS (PV 2.9m/s [id number], LVOT/AV 0.5, ALLISON 1.28cm2), No pHTN. No pericardial effusion.  - TTE (10/23/2023): EF 45%; Mild-to-moderate concentric left ventricular hypertrophy; Left ventricular systolic function is mildly reduced with regional wall motion abnormalities; indeterminate left ventricular diastolic function and filling pressure. Right ventricular normal size and function; TAPSE 2.38cm. Mild dilation of left atrium; Right atrium normal in size. Evidence of moderate aortic valve stenosis; AV area 1.27, peak transvalvular velocity 2.47, mean transvalvular gradient is 16.00 mmHg, and the LVOT/AV velocity ratio is 0.40. IVC compressible to <50%.    #Perioperative Risk Assessment  - Patient well known to the Cardiology service, previously seen during prior Hospitalization  - He has known ASCVD, CAD with last revascularization in 2020, severe PAD with extensive revascularization in 2021 now s/p R BKA with plan for OR return for closure  - Prior to worsening PAD/LE infection, patient had excellent performance status, reportedly playing basketball for up to an hour without cardiopulmonary complaints. He has known stable moderate AS  - Patient is considered at Intermediate risk for intermediate risk procedure.  - There are No active CV contraindication to proceeding with intervention (BKA closure once infection resolved)    #CAD s/p PCI   - Patient with known ASCVD with CAD and PCI placed in setting of abnormal stress test.   - Per outside [facility] [place] records, patient underwent LHC in 2020 had 2 Vessel CAD with discussion for PCI vs CABG; unclear if patient was subsequent revascularized. He later underwent PTCA and atherectomy in 2021 of the Right Ant Tibial atery after which he was started on DAPT (ASA and brilinta)  - Clinically patient remains well compensated, warm and well perfused. Physical exam is notable for 3/6 systolic murmur heard trough out the pericardium  - Echo reviewed with Moderate LVH, mildly reduced LV systolic function with RWMA.  - Cont ASA 81 mg po daily and cont with Lipitor 80 mg.   - Cont Coreg 12.5 mg po BID  - Noteg Hg of 6.1. Please transfuse with goal for Hg of 8. Recommend diuresis with Lasix 20 IV x 1 after rescucitation    # Moderate Aortic stenosis  - Patient with Moderate Aortic stenosis with AV area 1.27, peak transvalvular velocity 2.47, mean transvalvular gradient is 16.00 mmHg, and the LVOT/AV velocity ratio is 0.40 with an EF of 45%  - Echo this hospitalization showing stable vlavular heart disease from OCtober  - Patient will need to obtain repeat Echo in 1 year or sooner if clinically indicated  - Cont Coreg as above    #ACC/AHA Stage C ICM (LVIDD 5.3cm LVEF 45-50% 10/23/23)  Etiology: Ischemic cardiomyopathy  Device: Premature, not meeting criteria  GDMT:   - continue Lisinopril 40mg QD   - continue Coreg 12.5mg BID  - Plan for reintroduction of Spionolactone 25 mg po dailu prior to DC  - No plan for SGLT2 on DC given Severe PAD with delayed wound healing  Diuretics: Lower extremity edema, and receiving pRBC for acute blood loss anemia, recommend Lasix 20mg IVP x 1 post transfusion.      Please ensure patient has outpatient follow up with Cardiologist Dr Anni Taylor ([phone]) at [place] within 2 weeks of DC .

## 2023-12-12 NOTE — PROGRESS NOTE ADULT - PROBLEM SELECTOR PLAN 6
#HFmrEF  #CAD  s/p 2 stents in 2020 at [facility]  TTE 11/9/23: EF 45-50%, Mild to moderate symmetric LVH, Grade I left ventricular diastolic dysfunction. Biatrial enlargement. Mild-to-moderate aortic stenosis.  Home meds: lisinopril, coreg  +2 b/l LE edema to groins, + JVD given IV lasix 40 x1 today on 12/12   - c/w home meds  - c/w aspirin #HFmrEF  #CAD  s/p 2 stents in 2020 at [facility]  TTE 11/9/23: EF 45-50%, Mild to moderate symmetric LVH, Grade I left ventricular diastolic dysfunction. Biatrial enlargement. Mild-to-moderate aortic stenosis.  Home meds: lisinopril, coreg  +2 b/l LE edema to groins, + JVD given IV lasix 40 x1 today on 12/12   - c/w home meds  - c/w aspirin

## 2023-12-12 NOTE — PROGRESS NOTE ADULT - ASSESSMENT
68M admitted to E gas gangrene now POD1 R mariia CARDENAS, recovering well.    Hgb 6.2 this AM - please order 1U PRBC  Please ensure patient does not get out of bed without assistance, instate out of bed alarms  Dressing changed with good hemostasis noted  Rest of care per primary

## 2023-12-12 NOTE — PROGRESS NOTE ADULT - ATTENDING COMMENTS
Plan:  -Spoke with ID Dr. Yadav, patient can receive 1 more day for zosyn and then d/c as source control has been achieved.   -Patient got out of bed this am and applied weight to his right leg which caused bleeding, drop in H/H. 1prbc administered and vasc called for dressing change, good hemostasis noted as per their note. Follow post transfusion CBC. Aspirin and chemical dvt ppx held for bleeding and low h/h. Lasix post transfusion   -HF recs appreciate. Continue lisinopril, coreg and plan to start spironolactone 25mg before d/c Plan:  -Spoke with ID Dr. Yadav, patient can receive 1 more day for zosyn and then d/c as source control has been achieved. Leukocytosis likely reactive, continue to  monitor.   -Patient got out of bed this am and applied weight to his right leg which caused bleeding, drop in H/H. 1prbc administered and vasc called for dressing change, good hemostasis noted as per their note. Follow post transfusion CBC. Aspirin and chemical dvt ppx held for bleeding and low h/h. Lasix post transfusion   -HF recs appreciate. Continue lisinopril, coreg and plan to start spironolactone 25mg before d/c

## 2023-12-12 NOTE — PROVIDER CONTACT NOTE (FALL NOTIFICATION) - SITUATION
Pt was found on the floor at 900. Pt states he had to use the bathroom badly so he did not call help from RN or PCA. Pt was educated on fall risk/fall harm. Pt refused bed alarm.

## 2023-12-12 NOTE — PROGRESS NOTE ADULT - ASSESSMENT
69yo M PMH CAD (2 stents 2020), HFmrEF (45-50%), HTN, PAD w/ remote RLE angioplasty, R 4th toe OM s/p partial R 4th ray amputation on 10/24, RLE angiogram with AT lithotripsy and AT/peroneal balloon angioplasty 10/27, uncontrolled IDDM (a1c 12 in Oct 2023), recent admission 11/8-11/10 to cardiology service for hypertensive emergency (left AMA, was given levaquin when he left for right foot wound that pt had started treatment for back in October) who presented to [facility] after a fall onto his knees and was having b/l knee pain. Found to have increased soft tissue gas in R foot on CT scan, now s/p right BKA 12/11/2023.    A1C: 13.5 %  C-peptide 0.5 with , JAMIR/ICA neg (Oct 2023)  BUN: 25  Creatinine: 1.19  GFR: 67  Weight: 70  BMI: 24.2 67yo M PMH CAD (2 stents 2020), HFmrEF (45-50%), HTN, PAD w/ remote RLE angioplasty, R 4th toe OM s/p partial R 4th ray amputation on 10/24, RLE angiogram with AT lithotripsy and AT/peroneal balloon angioplasty 10/27, uncontrolled IDDM (a1c 12 in Oct 2023), recent admission 11/8-11/10 to cardiology service for hypertensive emergency (left AMA, was given levaquin when he left for right foot wound that pt had started treatment for back in October) who presented to [facility] after a fall onto his knees and was having b/l knee pain. Found to have increased soft tissue gas in R foot on CT scan, now s/p right BKA 12/11/2023.    A1C: 13.5 %  C-peptide 0.5 with , JAMIR/ICA neg (Oct 2023)  BUN: 25  Creatinine: 1.19  GFR: 67  Weight: 70  BMI: 24.2

## 2023-12-12 NOTE — PROGRESS NOTE ADULT - ASSESSMENT
69yo M PMH CAD (2 stents 2020), HFmrEF (45-50%), HTN, PAD w/ remote RLE angioplasty, R 4th toe OM s/p partial R 4th ray amputation on 10/24, RLE angiogram with AT lithotripsy and AT/peroneal balloon angioplasty 10/27, uncontrolled IDDM (a1c 12 in Oct 2023), recent admission 11/8-11/10 to cardiology service for hypertensive emergency (left AMA, was given levaquin when he left for right foot wound that pt had started treatment for back in October) who presented to [facility] after a fall onto his knees and was having b/l knee pain. Found to have increased soft tissue gas in R foot on CT scan. Admit to [facility] for further management. 67yo M PMH CAD (2 stents 2020), HFmrEF (45-50%), HTN, PAD w/ remote RLE angioplasty, R 4th toe OM s/p partial R 4th ray amputation on 10/24, RLE angiogram with AT lithotripsy and AT/peroneal balloon angioplasty 10/27, uncontrolled IDDM (a1c 12 in Oct 2023), recent admission 11/8-11/10 to cardiology service for hypertensive emergency (left AMA, was given levaquin when he left for right foot wound that pt had started treatment for back in October) who presented to [facility] after a fall onto his knees and was having b/l knee pain. Found to have increased soft tissue gas in R foot on CT scan. Admit to [facility] for further management.

## 2023-12-12 NOTE — PROGRESS NOTE ADULT - SUBJECTIVE AND OBJECTIVE BOX
SUBJECTIVE: Patient seen and examined at bedside. Pain well-controlled, reports trauma to stump after fall earlier this morning. Denies dizziness, weakness, cp, sob. Dressing changed today with good resulting hemostasis.    aspirin enteric coated 81 milliGRAM(s) Oral every 24 hours  carvedilol 12.5 milliGRAM(s) Oral every 12 hours  furosemide   Injectable 40 milliGRAM(s) IV Push once  heparin   Injectable 5000 Unit(s) SubCutaneous every 8 hours  hydrALAZINE 50 milliGRAM(s) Oral every 8 hours  lisinopril 40 milliGRAM(s) Oral daily  piperacillin/tazobactam IVPB.. 3.375 Gram(s) IV Intermittent every 8 hours      Vital Signs Last 24 Hrs  T(C): 37.2 (12 Dec 2023 09:10), Max: 37.4 (12 Dec 2023 05:54)  T(F): 99 (12 Dec 2023 09:10), Max: 99.4 (12 Dec 2023 05:54)  HR: 64 (12 Dec 2023 09:10) (49 - 68)  BP: 122/54 (12 Dec 2023 09:10) (122/54 - 168/84)  BP(mean): 118 (11 Dec 2023 13:30) (93 - 118)  RR: 16 (12 Dec 2023 09:10) (10 - 16)  SpO2: 96% (12 Dec 2023 09:10) (95% - 100%)    Parameters below as of 12 Dec 2023 09:10  Patient On (Oxygen Delivery Method): room air      I&O's Detail      General: NAD, resting comfortably in bed  C/V: NSR  Pulm: Nonlabored breathing, no respiratory distress  Abd: soft, NT/ND.  Extrem: RLE s/p guillotine BKA, prior dressing soiled and taken down, stump with healthy tissue and no residual sign of infection; no active bleeding noted, stump covered in surgicel x2, wrapped in Abd pad, Kerlix, and ACE.        LABS:                        6.2    20.26 )-----------( 254      ( 12 Dec 2023 10:32 )             20.1     12-12    138  |  106  |  25<H>  ----------------------------<  119<H>  3.8   |  28  |  1.19    Ca    8.0<L>      12 Dec 2023 10:32  Phos  2.7     12-12  Mg     1.7     12-12    TPro  6.2  /  Alb  1.9<L>  /  TBili  0.2  /  DBili  x   /  AST  23  /  ALT  23  /  AlkPhos  260<H>  12-11    PT/INR - ( 11 Dec 2023 05:30 )   PT: 11.1 sec;   INR: 0.97            Urinalysis Basic - ( 12 Dec 2023 10:32 )    Color: x / Appearance: x / SG: x / pH: x  Gluc: 119 mg/dL / Ketone: x  / Bili: x / Urobili: x   Blood: x / Protein: x / Nitrite: x   Leuk Esterase: x / RBC: x / WBC x   Sq Epi: x / Non Sq Epi: x / Bacteria: x        RADIOLOGY & ADDITIONAL STUDIES:

## 2023-12-13 LAB
AGGLUTINATION: PRESENT — SIGNIFICANT CHANGE UP
AGGLUTINATION: PRESENT — SIGNIFICANT CHANGE UP
ANION GAP SERPL CALC-SCNC: 9 MMOL/L — SIGNIFICANT CHANGE UP (ref 5–17)
ANION GAP SERPL CALC-SCNC: 9 MMOL/L — SIGNIFICANT CHANGE UP (ref 5–17)
ANISOCYTOSIS BLD QL: SIGNIFICANT CHANGE UP
ANISOCYTOSIS BLD QL: SIGNIFICANT CHANGE UP
BASOPHILS # BLD AUTO: 0 K/UL — SIGNIFICANT CHANGE UP (ref 0–0.2)
BASOPHILS # BLD AUTO: 0 K/UL — SIGNIFICANT CHANGE UP (ref 0–0.2)
BASOPHILS NFR BLD AUTO: 0 % — SIGNIFICANT CHANGE UP (ref 0–2)
BASOPHILS NFR BLD AUTO: 0 % — SIGNIFICANT CHANGE UP (ref 0–2)
BUN SERPL-MCNC: 30 MG/DL — HIGH (ref 7–23)
BUN SERPL-MCNC: 30 MG/DL — HIGH (ref 7–23)
BURR CELLS BLD QL SMEAR: PRESENT — SIGNIFICANT CHANGE UP
BURR CELLS BLD QL SMEAR: PRESENT — SIGNIFICANT CHANGE UP
CALCIUM SERPL-MCNC: 7.9 MG/DL — LOW (ref 8.4–10.5)
CALCIUM SERPL-MCNC: 7.9 MG/DL — LOW (ref 8.4–10.5)
CHLORIDE SERPL-SCNC: 109 MMOL/L — HIGH (ref 96–108)
CHLORIDE SERPL-SCNC: 109 MMOL/L — HIGH (ref 96–108)
CO2 SERPL-SCNC: 22 MMOL/L — SIGNIFICANT CHANGE UP (ref 22–31)
CO2 SERPL-SCNC: 22 MMOL/L — SIGNIFICANT CHANGE UP (ref 22–31)
CREAT SERPL-MCNC: 1.27 MG/DL — SIGNIFICANT CHANGE UP (ref 0.5–1.3)
CREAT SERPL-MCNC: 1.27 MG/DL — SIGNIFICANT CHANGE UP (ref 0.5–1.3)
EGFR: 62 ML/MIN/1.73M2 — SIGNIFICANT CHANGE UP
EGFR: 62 ML/MIN/1.73M2 — SIGNIFICANT CHANGE UP
EOSINOPHIL # BLD AUTO: 0.17 K/UL — SIGNIFICANT CHANGE UP (ref 0–0.5)
EOSINOPHIL # BLD AUTO: 0.17 K/UL — SIGNIFICANT CHANGE UP (ref 0–0.5)
EOSINOPHIL NFR BLD AUTO: 0.9 % — SIGNIFICANT CHANGE UP (ref 0–6)
EOSINOPHIL NFR BLD AUTO: 0.9 % — SIGNIFICANT CHANGE UP (ref 0–6)
GIANT PLATELETS BLD QL SMEAR: PRESENT — SIGNIFICANT CHANGE UP
GIANT PLATELETS BLD QL SMEAR: PRESENT — SIGNIFICANT CHANGE UP
GLUCOSE BLDC GLUCOMTR-MCNC: 115 MG/DL — HIGH (ref 70–99)
GLUCOSE BLDC GLUCOMTR-MCNC: 115 MG/DL — HIGH (ref 70–99)
GLUCOSE BLDC GLUCOMTR-MCNC: 133 MG/DL — HIGH (ref 70–99)
GLUCOSE BLDC GLUCOMTR-MCNC: 133 MG/DL — HIGH (ref 70–99)
GLUCOSE BLDC GLUCOMTR-MCNC: 144 MG/DL — HIGH (ref 70–99)
GLUCOSE BLDC GLUCOMTR-MCNC: 144 MG/DL — HIGH (ref 70–99)
GLUCOSE BLDC GLUCOMTR-MCNC: 162 MG/DL — HIGH (ref 70–99)
GLUCOSE BLDC GLUCOMTR-MCNC: 162 MG/DL — HIGH (ref 70–99)
GLUCOSE BLDC GLUCOMTR-MCNC: 176 MG/DL — HIGH (ref 70–99)
GLUCOSE BLDC GLUCOMTR-MCNC: 176 MG/DL — HIGH (ref 70–99)
GLUCOSE BLDC GLUCOMTR-MCNC: 193 MG/DL — HIGH (ref 70–99)
GLUCOSE BLDC GLUCOMTR-MCNC: 193 MG/DL — HIGH (ref 70–99)
GLUCOSE SERPL-MCNC: 49 MG/DL — CRITICAL LOW (ref 70–99)
GLUCOSE SERPL-MCNC: 49 MG/DL — CRITICAL LOW (ref 70–99)
HCT VFR BLD CALC: 23.4 % — LOW (ref 39–50)
HCT VFR BLD CALC: 23.4 % — LOW (ref 39–50)
HGB BLD-MCNC: 7.3 G/DL — LOW (ref 13–17)
HGB BLD-MCNC: 7.3 G/DL — LOW (ref 13–17)
HYPOCHROMIA BLD QL: SIGNIFICANT CHANGE UP
HYPOCHROMIA BLD QL: SIGNIFICANT CHANGE UP
LYMPHOCYTES # BLD AUTO: 16.4 % — SIGNIFICANT CHANGE UP (ref 13–44)
LYMPHOCYTES # BLD AUTO: 16.4 % — SIGNIFICANT CHANGE UP (ref 13–44)
LYMPHOCYTES # BLD AUTO: 3.15 K/UL — SIGNIFICANT CHANGE UP (ref 1–3.3)
LYMPHOCYTES # BLD AUTO: 3.15 K/UL — SIGNIFICANT CHANGE UP (ref 1–3.3)
MACROCYTES BLD QL: SLIGHT — SIGNIFICANT CHANGE UP
MACROCYTES BLD QL: SLIGHT — SIGNIFICANT CHANGE UP
MAGNESIUM SERPL-MCNC: 1.9 MG/DL — SIGNIFICANT CHANGE UP (ref 1.6–2.6)
MAGNESIUM SERPL-MCNC: 1.9 MG/DL — SIGNIFICANT CHANGE UP (ref 1.6–2.6)
MANUAL SMEAR VERIFICATION: SIGNIFICANT CHANGE UP
MANUAL SMEAR VERIFICATION: SIGNIFICANT CHANGE UP
MCHC RBC-ENTMCNC: 27 PG — SIGNIFICANT CHANGE UP (ref 27–34)
MCHC RBC-ENTMCNC: 27 PG — SIGNIFICANT CHANGE UP (ref 27–34)
MCHC RBC-ENTMCNC: 31.2 GM/DL — LOW (ref 32–36)
MCHC RBC-ENTMCNC: 31.2 GM/DL — LOW (ref 32–36)
MCV RBC AUTO: 86.7 FL — SIGNIFICANT CHANGE UP (ref 80–100)
MCV RBC AUTO: 86.7 FL — SIGNIFICANT CHANGE UP (ref 80–100)
MICROCYTES BLD QL: SLIGHT — SIGNIFICANT CHANGE UP
MICROCYTES BLD QL: SLIGHT — SIGNIFICANT CHANGE UP
MONOCYTES # BLD AUTO: 0.87 K/UL — SIGNIFICANT CHANGE UP (ref 0–0.9)
MONOCYTES # BLD AUTO: 0.87 K/UL — SIGNIFICANT CHANGE UP (ref 0–0.9)
MONOCYTES NFR BLD AUTO: 4.5 % — SIGNIFICANT CHANGE UP (ref 2–14)
MONOCYTES NFR BLD AUTO: 4.5 % — SIGNIFICANT CHANGE UP (ref 2–14)
NEUTROPHILS # BLD AUTO: 15.04 K/UL — HIGH (ref 1.8–7.4)
NEUTROPHILS # BLD AUTO: 15.04 K/UL — HIGH (ref 1.8–7.4)
NEUTROPHILS NFR BLD AUTO: 77.3 % — HIGH (ref 43–77)
NEUTROPHILS NFR BLD AUTO: 77.3 % — HIGH (ref 43–77)
NEUTS BAND # BLD: 0.9 % — SIGNIFICANT CHANGE UP (ref 0–8)
NEUTS BAND # BLD: 0.9 % — SIGNIFICANT CHANGE UP (ref 0–8)
OVALOCYTES BLD QL SMEAR: SIGNIFICANT CHANGE UP
OVALOCYTES BLD QL SMEAR: SIGNIFICANT CHANGE UP
PHOSPHATE SERPL-MCNC: 3.6 MG/DL — SIGNIFICANT CHANGE UP (ref 2.5–4.5)
PHOSPHATE SERPL-MCNC: 3.6 MG/DL — SIGNIFICANT CHANGE UP (ref 2.5–4.5)
PLAT MORPH BLD: ABNORMAL
PLAT MORPH BLD: ABNORMAL
PLATELET # BLD AUTO: 164 K/UL — SIGNIFICANT CHANGE UP (ref 150–400)
PLATELET # BLD AUTO: 164 K/UL — SIGNIFICANT CHANGE UP (ref 150–400)
POIKILOCYTOSIS BLD QL AUTO: SIGNIFICANT CHANGE UP
POIKILOCYTOSIS BLD QL AUTO: SIGNIFICANT CHANGE UP
POLYCHROMASIA BLD QL SMEAR: SLIGHT — SIGNIFICANT CHANGE UP
POLYCHROMASIA BLD QL SMEAR: SLIGHT — SIGNIFICANT CHANGE UP
POTASSIUM SERPL-MCNC: 4 MMOL/L — SIGNIFICANT CHANGE UP (ref 3.5–5.3)
POTASSIUM SERPL-MCNC: 4 MMOL/L — SIGNIFICANT CHANGE UP (ref 3.5–5.3)
POTASSIUM SERPL-SCNC: 4 MMOL/L — SIGNIFICANT CHANGE UP (ref 3.5–5.3)
POTASSIUM SERPL-SCNC: 4 MMOL/L — SIGNIFICANT CHANGE UP (ref 3.5–5.3)
RBC # BLD: 2.7 M/UL — LOW (ref 4.2–5.8)
RBC # BLD: 2.7 M/UL — LOW (ref 4.2–5.8)
RBC # FLD: 20.7 % — HIGH (ref 10.3–14.5)
RBC # FLD: 20.7 % — HIGH (ref 10.3–14.5)
RBC BLD AUTO: ABNORMAL
RBC BLD AUTO: ABNORMAL
SCHISTOCYTES BLD QL AUTO: SLIGHT — SIGNIFICANT CHANGE UP
SCHISTOCYTES BLD QL AUTO: SLIGHT — SIGNIFICANT CHANGE UP
SODIUM SERPL-SCNC: 140 MMOL/L — SIGNIFICANT CHANGE UP (ref 135–145)
SODIUM SERPL-SCNC: 140 MMOL/L — SIGNIFICANT CHANGE UP (ref 135–145)
SPHEROCYTES BLD QL SMEAR: SLIGHT — SIGNIFICANT CHANGE UP
SPHEROCYTES BLD QL SMEAR: SLIGHT — SIGNIFICANT CHANGE UP
WBC # BLD: 19.23 K/UL — HIGH (ref 3.8–10.5)
WBC # BLD: 19.23 K/UL — HIGH (ref 3.8–10.5)
WBC # FLD AUTO: 19.23 K/UL — HIGH (ref 3.8–10.5)
WBC # FLD AUTO: 19.23 K/UL — HIGH (ref 3.8–10.5)

## 2023-12-13 PROCEDURE — 99233 SBSQ HOSP IP/OBS HIGH 50: CPT | Mod: GC

## 2023-12-13 PROCEDURE — 99232 SBSQ HOSP IP/OBS MODERATE 35: CPT

## 2023-12-13 RX ORDER — DEXTROSE 50 % IN WATER 50 %
50 SYRINGE (ML) INTRAVENOUS ONCE
Refills: 0 | Status: COMPLETED | OUTPATIENT
Start: 2023-12-13 | End: 2023-12-13

## 2023-12-13 RX ORDER — ACETAMINOPHEN 500 MG
1000 TABLET ORAL ONCE
Refills: 0 | Status: COMPLETED | OUTPATIENT
Start: 2023-12-13 | End: 2023-12-13

## 2023-12-13 RX ORDER — INSULIN LISPRO 100/ML
VIAL (ML) SUBCUTANEOUS
Refills: 0 | Status: DISCONTINUED | OUTPATIENT
Start: 2023-12-13 | End: 2023-12-13

## 2023-12-13 RX ORDER — LANOLIN ALCOHOL/MO/W.PET/CERES
5 CREAM (GRAM) TOPICAL ONCE
Refills: 0 | Status: COMPLETED | OUTPATIENT
Start: 2023-12-13 | End: 2023-12-13

## 2023-12-13 RX ORDER — HYDROMORPHONE HYDROCHLORIDE 2 MG/ML
1 INJECTION INTRAMUSCULAR; INTRAVENOUS; SUBCUTANEOUS EVERY 6 HOURS
Refills: 0 | Status: DISCONTINUED | OUTPATIENT
Start: 2023-12-13 | End: 2023-12-18

## 2023-12-13 RX ORDER — INSULIN LISPRO 100/ML
VIAL (ML) SUBCUTANEOUS
Refills: 0 | Status: DISCONTINUED | OUTPATIENT
Start: 2023-12-13 | End: 2023-12-18

## 2023-12-13 RX ORDER — ACETAMINOPHEN 500 MG
650 TABLET ORAL EVERY 6 HOURS
Refills: 0 | Status: DISCONTINUED | OUTPATIENT
Start: 2023-12-13 | End: 2023-12-18

## 2023-12-13 RX ORDER — OXYCODONE HYDROCHLORIDE 5 MG/1
5 TABLET ORAL EVERY 8 HOURS
Refills: 0 | Status: DISCONTINUED | OUTPATIENT
Start: 2023-12-13 | End: 2023-12-18

## 2023-12-13 RX ORDER — OXYCODONE HYDROCHLORIDE 5 MG/1
10 TABLET ORAL EVERY 8 HOURS
Refills: 0 | Status: DISCONTINUED | OUTPATIENT
Start: 2023-12-13 | End: 2023-12-18

## 2023-12-13 RX ORDER — INSULIN LISPRO 100/ML
3 VIAL (ML) SUBCUTANEOUS
Refills: 0 | Status: DISCONTINUED | OUTPATIENT
Start: 2023-12-13 | End: 2023-12-15

## 2023-12-13 RX ORDER — INSULIN GLARGINE 100 [IU]/ML
7 INJECTION, SOLUTION SUBCUTANEOUS AT BEDTIME
Refills: 0 | Status: DISCONTINUED | OUTPATIENT
Start: 2023-12-13 | End: 2023-12-14

## 2023-12-13 RX ORDER — HYDROMORPHONE HYDROCHLORIDE 2 MG/ML
2 INJECTION INTRAMUSCULAR; INTRAVENOUS; SUBCUTANEOUS ONCE
Refills: 0 | Status: DISCONTINUED | OUTPATIENT
Start: 2023-12-13 | End: 2023-12-13

## 2023-12-13 RX ADMIN — OXYCODONE HYDROCHLORIDE 10 MILLIGRAM(S): 5 TABLET ORAL at 14:12

## 2023-12-13 RX ADMIN — OXYCODONE HYDROCHLORIDE 10 MILLIGRAM(S): 5 TABLET ORAL at 13:12

## 2023-12-13 RX ADMIN — HYDROMORPHONE HYDROCHLORIDE 1 MILLIGRAM(S): 2 INJECTION INTRAMUSCULAR; INTRAVENOUS; SUBCUTANEOUS at 18:43

## 2023-12-13 RX ADMIN — OXYCODONE HYDROCHLORIDE 5 MILLIGRAM(S): 5 TABLET ORAL at 01:23

## 2023-12-13 RX ADMIN — HYDROMORPHONE HYDROCHLORIDE 1 MILLIGRAM(S): 2 INJECTION INTRAMUSCULAR; INTRAVENOUS; SUBCUTANEOUS at 19:13

## 2023-12-13 RX ADMIN — Medication 3 UNIT(S): at 18:45

## 2023-12-13 RX ADMIN — Medication 5 MILLIGRAM(S): at 03:21

## 2023-12-13 RX ADMIN — Medication 50 MILLILITER(S): at 07:28

## 2023-12-13 RX ADMIN — LISINOPRIL 40 MILLIGRAM(S): 2.5 TABLET ORAL at 08:04

## 2023-12-13 RX ADMIN — PIPERACILLIN AND TAZOBACTAM 25 GRAM(S): 4; .5 INJECTION, POWDER, LYOPHILIZED, FOR SOLUTION INTRAVENOUS at 06:57

## 2023-12-13 RX ADMIN — GABAPENTIN 800 MILLIGRAM(S): 400 CAPSULE ORAL at 21:28

## 2023-12-13 RX ADMIN — GABAPENTIN 800 MILLIGRAM(S): 400 CAPSULE ORAL at 06:57

## 2023-12-13 RX ADMIN — HYDROMORPHONE HYDROCHLORIDE 2 MILLIGRAM(S): 2 INJECTION INTRAMUSCULAR; INTRAVENOUS; SUBCUTANEOUS at 04:21

## 2023-12-13 RX ADMIN — CARVEDILOL PHOSPHATE 12.5 MILLIGRAM(S): 80 CAPSULE, EXTENDED RELEASE ORAL at 20:02

## 2023-12-13 RX ADMIN — Medication 4 UNIT(S): at 13:50

## 2023-12-13 RX ADMIN — HYDROMORPHONE HYDROCHLORIDE 1 MILLIGRAM(S): 2 INJECTION INTRAMUSCULAR; INTRAVENOUS; SUBCUTANEOUS at 12:56

## 2023-12-13 RX ADMIN — Medication 650 MILLIGRAM(S): at 23:51

## 2023-12-13 RX ADMIN — Medication 1: at 23:49

## 2023-12-13 RX ADMIN — Medication 50 MILLIGRAM(S): at 23:50

## 2023-12-13 RX ADMIN — OXYCODONE HYDROCHLORIDE 5 MILLIGRAM(S): 5 TABLET ORAL at 02:23

## 2023-12-13 RX ADMIN — GABAPENTIN 800 MILLIGRAM(S): 400 CAPSULE ORAL at 12:26

## 2023-12-13 RX ADMIN — OXYCODONE HYDROCHLORIDE 10 MILLIGRAM(S): 5 TABLET ORAL at 23:50

## 2023-12-13 RX ADMIN — HYDROMORPHONE HYDROCHLORIDE 2 MILLIGRAM(S): 2 INJECTION INTRAMUSCULAR; INTRAVENOUS; SUBCUTANEOUS at 03:21

## 2023-12-13 RX ADMIN — CARVEDILOL PHOSPHATE 12.5 MILLIGRAM(S): 80 CAPSULE, EXTENDED RELEASE ORAL at 08:04

## 2023-12-13 RX ADMIN — PIPERACILLIN AND TAZOBACTAM 25 GRAM(S): 4; .5 INJECTION, POWDER, LYOPHILIZED, FOR SOLUTION INTRAVENOUS at 15:49

## 2023-12-13 RX ADMIN — Medication 650 MILLIGRAM(S): at 18:43

## 2023-12-13 RX ADMIN — Medication 650 MILLIGRAM(S): at 19:13

## 2023-12-13 RX ADMIN — INSULIN GLARGINE 7 UNIT(S): 100 INJECTION, SOLUTION SUBCUTANEOUS at 21:27

## 2023-12-13 RX ADMIN — Medication 1: at 18:45

## 2023-12-13 RX ADMIN — Medication 50 MILLIGRAM(S): at 18:43

## 2023-12-13 RX ADMIN — HYDROMORPHONE HYDROCHLORIDE 1 MILLIGRAM(S): 2 INJECTION INTRAMUSCULAR; INTRAVENOUS; SUBCUTANEOUS at 12:26

## 2023-12-13 RX ADMIN — ATORVASTATIN CALCIUM 80 MILLIGRAM(S): 80 TABLET, FILM COATED ORAL at 21:28

## 2023-12-13 RX ADMIN — Medication 50 MILLIGRAM(S): at 08:04

## 2023-12-13 RX ADMIN — PIPERACILLIN AND TAZOBACTAM 25 GRAM(S): 4; .5 INJECTION, POWDER, LYOPHILIZED, FOR SOLUTION INTRAVENOUS at 21:38

## 2023-12-13 NOTE — PROGRESS NOTE ADULT - PROBLEM SELECTOR PLAN 5
s/p 2 stents in 2020 at [facility]. s/p 2 stents in 2020 at [facility]. s/p 2 stents in 2020 at [facility].    - transfusion goal is for Hgb <8  - plan to restart aspirin if Hgb stable after additional unit transfused today s/p 2 stents in 2020 at [facility].    - transfusion goal is for Hgb <8  - plan to restart aspirin if Hgb stable after additional unit transfused today s/p 2 stents in 2020 at [facility].    - transfusion goal is for Hgb <8  - restart aspirin once Hgb stable and no more bleeding  - f/u 2PM post transfusion CBC s/p 2 stents in 2020 at [facility].    - transfusion goal is for Hgb <8  - restart aspirin once Hgb stable and no more bleeding  - f/u 2PM post transfusion CBC

## 2023-12-13 NOTE — PROGRESS NOTE ADULT - ASSESSMENT
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 Found to have increased soft tissue gas in R foot on CT scan. Admit to [facility] for further management. 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 Found to have increased soft tissue gas in R foot on CT scan. Admit to [facility] for further management.

## 2023-12-13 NOTE — PROGRESS NOTE ADULT - PROBLEM SELECTOR PROBLEM 2
Pain in both knees

## 2023-12-13 NOTE — PROGRESS NOTE ADULT - SUBJECTIVE AND OBJECTIVE BOX
SUBJECTIVE / INTERVAL HPI: Patient was seen and examined this morning. Pt in significant pain at stump site and phantom pain despite receiving Dilaudid 1mg and gabapentin 800mg TID. Receiving addition unit of blood, hgb 7.3 this am. Hypoglycemia this AM.    CAPILLARY BLOOD GLUCOSE & INSULIN RECEIVED  184 mg/dL (12-12 @ 18:13) - Lispro  4+1  88 mg/dL (12-12 @ 22:42) - Lantus 10  115 mg/dL (12-13 @ 01:52)  49 mg/dL (12-13 @ 05:30) - 1 amp  162 mg/dL (12-13 @ 07:45)  133 mg/dL (12-13 @ 09:31)  144 mg/dL (12-13 @ 13:06)      REVIEW OF SYSTEMS  Constitutional:  Negative fever, chills or loss of appetite.  Eyes:  Negative blurry vision or double vision.  Cardiovascular:  Negative for chest pain or palpitations.  Respiratory:  Negative for cough, wheezing, or shortness of breath.    Gastrointestinal:  Negative for nausea, vomiting, diarrhea, constipation, or abdominal pain.  Genitourinary:  Negative frequency, urgency or dysuria.  Neurologic:  No headache, confusion, dizziness, lightheadedness.    PHYSICAL EXAM  Vital Signs Last 24 Hrs  T(C): 36 (13 Dec 2023 07:36), Max: 37.2 (12 Dec 2023 20:00)  T(F): 96.8 (13 Dec 2023 07:36), Max: 99 (12 Dec 2023 20:00)  HR: 61 (13 Dec 2023 08:04) (54 - 68)  BP: 151/66 (13 Dec 2023 08:04) (97/51 - 151/66)  BP(mean): --  RR: 19 (13 Dec 2023 05:10) (18 - 19)  SpO2: 99% (13 Dec 2023 05:10) (99% - 100%)    Parameters below as of 13 Dec 2023 05:10  Patient On (Oxygen Delivery Method): room air    Constitutional: Awake, alert, in no acute distress.   HEENT: Normocephalic, atraumatic, RAGHAV.  Respiratory: Lungs clear to ausculation bilaterally.   Cardiovascular: regular rhythm, normal S1 and S2, no audible murmurs.   GI: soft, non-tender, non-distended, bowel sounds present.  Extremities: No lower extremity edema. Right BKA with serosanguinous dressing  Psychiatric: AAO x 3. Normal affect/mood.     LABS  CBC - WBC/HGB/HTC/PLT: 19.23/7.3/23.4/164 (12-13-23)  BMP - Na/K/Cl/Bicarb/BUN/Cr/Gluc/AG/eGFR: 140/4.0/109/22/30/1.27/49/9/62 (12-13-23)  Ca - 7.9 (12-13-23)  Phos - 3.6 (12-13-23)  Mg - 1.9 (12-13-23)  LFT - Alb/Tprot/Tbili/Dbili/AlkPhos/ALT/AST: 1.9/--/0.2/--/260/23/23 (12-11-23)  PT/aPTT/INR: 11.1/--/0.97 (12-11-23)       MEDICATIONS  MEDICATIONS  (STANDING):  acetaminophen     Tablet .. 650 milliGRAM(s) Oral every 6 hours  atorvastatin 80 milliGRAM(s) Oral at bedtime  carvedilol 12.5 milliGRAM(s) Oral every 12 hours  gabapentin 800 milliGRAM(s) Oral three times a day  hydrALAZINE 50 milliGRAM(s) Oral every 8 hours  insulin glargine Injectable (LANTUS) 7 Unit(s) SubCutaneous at bedtime  insulin lispro (ADMELOG) corrective regimen sliding scale   SubCutaneous Before meals and at bedtime  insulin lispro Injectable (ADMELOG) 4 Unit(s) SubCutaneous three times a day before meals  lisinopril 40 milliGRAM(s) Oral daily  piperacillin/tazobactam IVPB.. 3.375 Gram(s) IV Intermittent every 8 hours    MEDICATIONS  (PRN):  HYDROmorphone  Injectable 1 milliGRAM(s) IV Push every 6 hours PRN Severe Pain (7 - 10)  oxyCODONE    IR 5 milliGRAM(s) Oral every 8 hours PRN Mild Pain (1 - 3)  oxyCODONE    IR 10 milliGRAM(s) Oral every 8 hours PRN Moderate Pain (4 - 6)

## 2023-12-13 NOTE — PROGRESS NOTE ADULT - SUBJECTIVE AND OBJECTIVE BOX
SUBJECTIVE: Patient seen and examined at bedside.    carvedilol 12.5 milliGRAM(s) Oral every 12 hours  hydrALAZINE 50 milliGRAM(s) Oral every 8 hours  lisinopril 40 milliGRAM(s) Oral daily  piperacillin/tazobactam IVPB.. 3.375 Gram(s) IV Intermittent every 8 hours      Vital Signs Last 24 Hrs  T(C): 36 (13 Dec 2023 07:36), Max: 37.2 (12 Dec 2023 20:00)  T(F): 96.8 (13 Dec 2023 07:36), Max: 99 (12 Dec 2023 20:00)  HR: 61 (13 Dec 2023 08:04) (53 - 68)  BP: 151/66 (13 Dec 2023 08:04) (97/51 - 151/66)  BP(mean): --  RR: 19 (13 Dec 2023 05:10) (16 - 19)  SpO2: 99% (13 Dec 2023 05:10) (96% - 100%)    Parameters below as of 13 Dec 2023 05:10  Patient On (Oxygen Delivery Method): room air      I&O's Detail      General: NAD, resting comfortably in bed  C/V: NSR  Pulm: Nonlabored breathing, no respiratory distress  Abd: soft, NT/ND.  Extrem: RLE s/p guillotine BKA, stump wrapped in ACE and Kerlex with appropriate strike through at the posterior aspect of the stump.      LABS:                        7.3    19.23 )-----------( 164      ( 13 Dec 2023 05:30 )             23.4     12-13    140  |  109<H>  |  30<H>  ----------------------------<  49<LL>  4.0   |  22  |  1.27    Ca    7.9<L>      13 Dec 2023 05:30  Phos  3.6     12-13  Mg     1.9     12-13        Urinalysis Basic - ( 13 Dec 2023 05:30 )    Color: x / Appearance: x / SG: x / pH: x  Gluc: 49 mg/dL / Ketone: x  / Bili: x / Urobili: x   Blood: x / Protein: x / Nitrite: x   Leuk Esterase: x / RBC: x / WBC x   Sq Epi: x / Non Sq Epi: x / Bacteria: x

## 2023-12-13 NOTE — PROGRESS NOTE ADULT - PROBLEM SELECTOR PLAN 2
s/p mechanical fall and hit both knees.   xray b/l showed Moderate to severe left worse than right knee arthrosis without acute displaced fracture.  CT b/l LE did not show any significant knee pathology or effusion of knee  - pain regimen as above s/p mechanical fall and hit both knees.   xray b/l showed Moderate to severe left worse than right knee arthrosis without acute displaced fracture.  CT b/l LE did not show any significant knee pathology or effusion of knee  - pain regimen as above  - can give Lidocaine patch if pt complains of pain

## 2023-12-13 NOTE — PROVIDER CONTACT NOTE (HYPOGLYCEMIA EVENT) - NS PROVIDER CONTACT BACKGROUND-HYPO
Age: 68y    Gender: Male    POCT Blood Glucose:  118 mg/dL (12-09-23 @ 09:25)  197 mg/dL (12-09-23 @ 09:05)  257 mg/dL (12-09-23 @ 08:59)  35 mg/dL (12-09-23 @ 08:54)  294 mg/dL (12-08-23 @ 21:49)  223 mg/dL (12-08-23 @ 17:41)  158 mg/dL (12-08-23 @ 12:29)      eMAR:atorvastatin   80 milliGRAM(s) Oral (12-08-23 @ 21:52)    insulin glargine Injectable (LANTUS)   16 Unit(s) SubCutaneous (12-08-23 @ 21:53)    insulin lispro (ADMELOG) corrective regimen sliding scale   6 Unit(s) SubCutaneous (12-08-23 @ 21:53)   4 Unit(s) SubCutaneous (12-08-23 @ 17:47)   2 Unit(s) SubCutaneous (12-08-23 @ 12:38)    insulin lispro Injectable (ADMELOG)   3 Unit(s) SubCutaneous (12-08-23 @ 17:47)   3 Unit(s) SubCutaneous (12-08-23 @ 12:38)    
Age: 68y    Gender: Male    POCT Blood Glucose:  115 mg/dL (12-13-23 @ 01:52)  88 mg/dL (12-12-23 @ 22:42)  184 mg/dL (12-12-23 @ 18:13)  132 mg/dL (12-12-23 @ 12:36)  115 mg/dL (12-12-23 @ 08:56)      eMAR:atorvastatin   80 milliGRAM(s) Oral (12-12-23 @ 21:30)    dextrose 50% Injectable   50 milliLiter(s) IV Push (12-13-23 @ 07:28)    insulin glargine Injectable (LANTUS)   10 Unit(s) SubCutaneous (12-12-23 @ 22:46)    insulin lispro (ADMELOG) corrective regimen sliding scale   1 Unit(s) SubCutaneous (12-12-23 @ 18:20)    insulin lispro Injectable (ADMELOG)   4 Unit(s) SubCutaneous (12-12-23 @ 18:20)   4 Unit(s) SubCutaneous (12-12-23 @ 13:18)   4 Unit(s) SubCutaneous (12-12-23 @ 09:34)

## 2023-12-13 NOTE — PROGRESS NOTE ADULT - PROBLEM SELECTOR PLAN 3
Upon arrival BP 190s, asymptomatic.  Home meds: lisinopril 40 qd, hydral 25 TID, norvasc 10 qd, coreg 12.5 bid  - c/w all home anti hypertensives Upon arrival BP 190s, asymptomatic.  Home meds: lisinopril 40 qd, hydral 25 TID, norvasc 10 qd, coreg 12.5 bid  - c/w all home anti hypertensives except amlodipine 10qd

## 2023-12-13 NOTE — PROGRESS NOTE ADULT - PROBLEM SELECTOR PLAN 6
#HFmrEF  #CAD  s/p 2 stents in 2020 at [facility]  TTE 11/9/23: EF 45-50%, Mild to moderate symmetric LVH, Grade I left ventricular diastolic dysfunction. Biatrial enlargement. Mild-to-moderate aortic stenosis.  Home meds: lisinopril, coreg  +2 b/l LE edema to groins, + JVD given IV lasix 40 x1 today on 12/12   - c/w home meds  - c/w aspirin #HFmrEF  #CAD  s/p 2 stents in 2020 at [facility]  TTE 11/9/23: EF 45-50%, Mild to moderate symmetric LVH, Grade I left ventricular diastolic dysfunction. Biatrial enlargement. Mild-to-moderate aortic stenosis.  Home meds: lisinopril, coreg  +2 LE edema to groins, + JVD given IV lasix 40 x1 on 12/12     - c/w home meds  - c/w aspirin if Hgb stable #HFmrEF  #CAD  s/p 2 stents in 2020 at [facility]  TTE 11/9/23: EF 45-50%, Mild to moderate symmetric LVH, Grade I left ventricular diastolic dysfunction. Biatrial enlargement. Mild-to-moderate aortic stenosis.  Home meds: lisinopril, coreg  +2 LE edema to groins, + JVD given IV lasix 40 x1 on 12/12     - c/w home meds  - c/w aspirin if Hgb stable

## 2023-12-13 NOTE — PROGRESS NOTE ADULT - ASSESSMENT
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    Dressing change tomorrow  Please ensure patient does not get out of bed without assistance, instate out of bed alarms  Transfuse as necessary  Rest of care per primary

## 2023-12-13 NOTE — PROGRESS NOTE ADULT - SUBJECTIVE AND OBJECTIVE BOX
Patient is a 68y old  Male who presents with a chief complaint of soft tissue and skin infection of right foot (12 Dec 2023 18:17)    INTERVAL HPI/OVERNIGHT EVENTS:   Overnight patient received 0.5mg Dilaudid for pain. Per night team, there was a delay in getting 2upRBC. They were eventually released, with plan to recheck CBC in AM. 2mg Dilaudid given again, later in the night, for pain       AT BEDSIDE:      Vital Signs Last 24 Hrs  T(C): 37.1 (13 Dec 2023 05:10), Max: 37.2 (12 Dec 2023 09:10)  T(F): 98.7 (13 Dec 2023 05:10), Max: 99 (12 Dec 2023 09:10)  HR: 54 (13 Dec 2023 05:10) (53 - 68)  BP: 127/54 (13 Dec 2023 05:10) (97/51 - 127/54)  RR: 19 (13 Dec 2023 05:10) (16 - 19)  SpO2: 99% (13 Dec 2023 05:10) (96% - 100%)    O2 Parameters below as of 13 Dec 2023 05:10  Patient On (Oxygen Delivery Method): room air      PHYSICAL EXAM:  Constitutional: Moderate distress   HEENT: NC/AT, PERRLA, EOMI, MMM  Neck: Supple, no JVD  Respiratory: CTA B/L. No w/r/r.   Cardiovascular: RRR, normal S1 and S2, no m/r/g.   Gastrointestinal: +BS, soft NTND, no guarding or rebound tenderness  Extremities: +2 b/l pitting edema to groin, R foot stump in bandages w/ blood soaking   Vascular: Pulses equal and strong throughout.   Neurological: AAOx3, no CN deficits, strength and sensation intact throughout.   Skin: No gross skin abnormalities or rashes      LABS:                        6.1    23.46 )-----------( 218      ( 12 Dec 2023 18:52 )             19.5     12-12    138  |  106  |  25<H>  ----------------------------<  119<H>  3.8   |  28  |  1.19    Ca    8.0<L>      12 Dec 2023 10:32  Phos  2.7     12-12  Mg     1.7     12-12        Urinalysis Basic - ( 12 Dec 2023 10:32 )    Color: x / Appearance: x / SG: x / pH: x  Gluc: 119 mg/dL / Ketone: x  / Bili: x / Urobili: x   Blood: x / Protein: x / Nitrite: x   Leuk Esterase: x / RBC: x / WBC x   Sq Epi: x / Non Sq Epi: x / Bacteria: x      CAPILLARY BLOOD GLUCOSE  POCT Blood Glucose.: 115 mg/dL (13 Dec 2023 01:52)  POCT Blood Glucose.: 88 mg/dL (12 Dec 2023 22:42)  POCT Blood Glucose.: 184 mg/dL (12 Dec 2023 18:13)  POCT Blood Glucose.: 132 mg/dL (12 Dec 2023 12:36)  POCT Blood Glucose.: 115 mg/dL (12 Dec 2023 08:56)        RADIOLOGY & ADDITIONAL TESTS:  Consultant(s) Notes Reviewed:  [x ] YES  [ ] NO    MEDICATIONS  (STANDING):  atorvastatin 80 milliGRAM(s) Oral at bedtime  carvedilol 12.5 milliGRAM(s) Oral every 12 hours  gabapentin 800 milliGRAM(s) Oral three times a day  hydrALAZINE 50 milliGRAM(s) Oral every 8 hours  insulin glargine Injectable (LANTUS) 10 Unit(s) SubCutaneous at bedtime  insulin lispro (ADMELOG) corrective regimen sliding scale   SubCutaneous Before meals and at bedtime  insulin lispro Injectable (ADMELOG) 4 Unit(s) SubCutaneous three times a day before meals  lisinopril 40 milliGRAM(s) Oral daily  piperacillin/tazobactam IVPB.. 3.375 Gram(s) IV Intermittent every 8 hours    MEDICATIONS  (PRN):  acetaminophen     Tablet .. 650 milliGRAM(s) Oral every 6 hours PRN Temp greater or equal to 38C (100.4F), Mild Pain (1 - 3)  oxyCODONE    IR 5 milliGRAM(s) Oral every 6 hours PRN Severe Pain (7 - 10)    Care Discussed with Consultants/Other Providers [ x] YES  [ ] NO Patient is a 68y old  Male who presents with a chief complaint of soft tissue and skin infection of right foot (12 Dec 2023 18:17)    INTERVAL HPI/OVERNIGHT EVENTS:   Overnight patient received 0.5mg Dilaudid for pain. Per night team, there was a delay in getting 2upRBC. They were eventually released, with plan to recheck CBC in AM. 2mg Dilaudid given again, later in the night, for pain.       AT BEDSIDE:  Patient denies any pain overnight. Denies further bleeding from stump, which is bandaged and appears to not be overtly bleeding at bedside. Patient states a few days ago he was having profuse diarrhea but none since. Denies abdominal pain.       Vital Signs Last 24 Hrs  T(C): 37.1 (13 Dec 2023 05:10), Max: 37.2 (12 Dec 2023 09:10)  T(F): 98.7 (13 Dec 2023 05:10), Max: 99 (12 Dec 2023 09:10)  HR: 54 (13 Dec 2023 05:10) (53 - 68)  BP: 127/54 (13 Dec 2023 05:10) (97/51 - 127/54)  RR: 19 (13 Dec 2023 05:10) (16 - 19)  SpO2: 99% (13 Dec 2023 05:10) (96% - 100%)    O2 Parameters below as of 13 Dec 2023 05:10  Patient On (Oxygen Delivery Method): room air      PHYSICAL EXAM:  Constitutional: NAD.   HEENT: NC/AT, PERRLA, EOMI, MMM  Neck: Supple, no JVD  Respiratory: CTA B/L.   Cardiovascular: RRR.  Gastrointestinal: +BS, soft NTND, no guarding or rebound tenderness  Extremities: +2 b/l pitting edema to groin, R foot stump in bandage, appears clean.  Vascular: Pulses equal and strong throughout.   Neurological: AAOx3, no gross CN deficits        LABS:                        6.1    23.46 )-----------( 218      ( 12 Dec 2023 18:52 )             19.5     12-12    138  |  106  |  25<H>  ----------------------------<  119<H>  3.8   |  28  |  1.19    Ca    8.0<L>      12 Dec 2023 10:32  Phos  2.7     12-12  Mg     1.7     12-12        Urinalysis Basic - ( 12 Dec 2023 10:32 )    Color: x / Appearance: x / SG: x / pH: x  Gluc: 119 mg/dL / Ketone: x  / Bili: x / Urobili: x   Blood: x / Protein: x / Nitrite: x   Leuk Esterase: x / RBC: x / WBC x   Sq Epi: x / Non Sq Epi: x / Bacteria: x      CAPILLARY BLOOD GLUCOSE  POCT Blood Glucose.: 115 mg/dL (13 Dec 2023 01:52)  POCT Blood Glucose.: 88 mg/dL (12 Dec 2023 22:42)  POCT Blood Glucose.: 184 mg/dL (12 Dec 2023 18:13)  POCT Blood Glucose.: 132 mg/dL (12 Dec 2023 12:36)  POCT Blood Glucose.: 115 mg/dL (12 Dec 2023 08:56)        RADIOLOGY & ADDITIONAL TESTS:  Consultant(s) Notes Reviewed:  [x ] YES  [ ] NO    MEDICATIONS  (STANDING):  atorvastatin 80 milliGRAM(s) Oral at bedtime  carvedilol 12.5 milliGRAM(s) Oral every 12 hours  gabapentin 800 milliGRAM(s) Oral three times a day  hydrALAZINE 50 milliGRAM(s) Oral every 8 hours  insulin glargine Injectable (LANTUS) 10 Unit(s) SubCutaneous at bedtime  insulin lispro (ADMELOG) corrective regimen sliding scale   SubCutaneous Before meals and at bedtime  insulin lispro Injectable (ADMELOG) 4 Unit(s) SubCutaneous three times a day before meals  lisinopril 40 milliGRAM(s) Oral daily  piperacillin/tazobactam IVPB.. 3.375 Gram(s) IV Intermittent every 8 hours    MEDICATIONS  (PRN):  acetaminophen     Tablet .. 650 milliGRAM(s) Oral every 6 hours PRN Temp greater or equal to 38C (100.4F), Mild Pain (1 - 3)  oxyCODONE    IR 5 milliGRAM(s) Oral every 6 hours PRN Severe Pain (7 - 10)    Care Discussed with Consultants/Other Providers [ x] YES  [ ] NO Patient is a 68y old  Male who presents with a chief complaint of soft tissue and skin infection of right foot (12 Dec 2023 18:17)    INTERVAL HPI/OVERNIGHT EVENTS:   Overnight patient received 0.5mg Dilaudid for pain. Per night team, there was a delay in getting 2upRBC. They were eventually released, with plan to recheck CBC in AM. 2mg Dilaudid given again, later in the night, for pain.       AT BEDSIDE:  Patient denies any pain overnight. Denies further bleeding from stump, which is bandaged and appears to not be overtly bleeding at bedside. Patient states a few days ago he was having profuse diarrhea but none since. Denies abdominal pain.     Medical Clearance: Plan to transfuse additional unit of blood given Hgb < 8 and CAD hx. Patient medically optimized and cleared for surgery.       Vital Signs Last 24 Hrs  T(C): 37.1 (13 Dec 2023 05:10), Max: 37.2 (12 Dec 2023 09:10)  T(F): 98.7 (13 Dec 2023 05:10), Max: 99 (12 Dec 2023 09:10)  HR: 54 (13 Dec 2023 05:10) (53 - 68)  BP: 127/54 (13 Dec 2023 05:10) (97/51 - 127/54)  RR: 19 (13 Dec 2023 05:10) (16 - 19)  SpO2: 99% (13 Dec 2023 05:10) (96% - 100%)    O2 Parameters below as of 13 Dec 2023 05:10  Patient On (Oxygen Delivery Method): room air      PHYSICAL EXAM:  Constitutional: NAD.   HEENT: NC/AT, PERRLA, EOMI, MMM  Neck: Supple, no JVD  Respiratory: CTA B/L.   Cardiovascular: RRR.  Gastrointestinal: +BS, soft NTND, no guarding or rebound tenderness  Extremities: +2 b/l pitting edema to groin, R foot stump in bandage, appears clean.  Vascular: Pulses equal and strong throughout.   Neurological: AAOx3, no gross CN deficits        LABS:                        6.1    23.46 )-----------( 218      ( 12 Dec 2023 18:52 )             19.5     12-12    138  |  106  |  25<H>  ----------------------------<  119<H>  3.8   |  28  |  1.19    Ca    8.0<L>      12 Dec 2023 10:32  Phos  2.7     12-12  Mg     1.7     12-12        Urinalysis Basic - ( 12 Dec 2023 10:32 )    Color: x / Appearance: x / SG: x / pH: x  Gluc: 119 mg/dL / Ketone: x  / Bili: x / Urobili: x   Blood: x / Protein: x / Nitrite: x   Leuk Esterase: x / RBC: x / WBC x   Sq Epi: x / Non Sq Epi: x / Bacteria: x      CAPILLARY BLOOD GLUCOSE  POCT Blood Glucose.: 115 mg/dL (13 Dec 2023 01:52)  POCT Blood Glucose.: 88 mg/dL (12 Dec 2023 22:42)  POCT Blood Glucose.: 184 mg/dL (12 Dec 2023 18:13)  POCT Blood Glucose.: 132 mg/dL (12 Dec 2023 12:36)  POCT Blood Glucose.: 115 mg/dL (12 Dec 2023 08:56)        RADIOLOGY & ADDITIONAL TESTS:  Consultant(s) Notes Reviewed:  [x ] YES  [ ] NO    MEDICATIONS  (STANDING):  atorvastatin 80 milliGRAM(s) Oral at bedtime  carvedilol 12.5 milliGRAM(s) Oral every 12 hours  gabapentin 800 milliGRAM(s) Oral three times a day  hydrALAZINE 50 milliGRAM(s) Oral every 8 hours  insulin glargine Injectable (LANTUS) 10 Unit(s) SubCutaneous at bedtime  insulin lispro (ADMELOG) corrective regimen sliding scale   SubCutaneous Before meals and at bedtime  insulin lispro Injectable (ADMELOG) 4 Unit(s) SubCutaneous three times a day before meals  lisinopril 40 milliGRAM(s) Oral daily  piperacillin/tazobactam IVPB.. 3.375 Gram(s) IV Intermittent every 8 hours    MEDICATIONS  (PRN):  acetaminophen     Tablet .. 650 milliGRAM(s) Oral every 6 hours PRN Temp greater or equal to 38C (100.4F), Mild Pain (1 - 3)  oxyCODONE    IR 5 milliGRAM(s) Oral every 6 hours PRN Severe Pain (7 - 10)    Care Discussed with Consultants/Other Providers [ x] YES  [ ] NO Patient is a 68y old  Male who presents with a chief complaint of soft tissue and skin infection of right foot (12 Dec 2023 18:17)    INTERVAL HPI/OVERNIGHT EVENTS:   Overnight patient received 0.5mg Dilaudid for pain. Per night team, there was a delay in getting 2upRBC. They were eventually released, with plan to recheck CBC in AM. 2mg Dilaudid given again, later in the night, for pain.       AT BEDSIDE:  Patient denies any pain overnight. Denies further bleeding from stump, which is bandaged and appears to not be overtly bleeding at bedside. Patient states a few days ago he was having profuse diarrhea but none since. Denies abdominal pain.     Medical Clearance: Plan to transfuse additional unit of blood given Hgb < 8 and CAD hx. Patient medically optimized and cleared for surgery. Patient to restart aspirin once hemoglobin stable and no more bleeding.       Vital Signs Last 24 Hrs  T(C): 37.1 (13 Dec 2023 05:10), Max: 37.2 (12 Dec 2023 09:10)  T(F): 98.7 (13 Dec 2023 05:10), Max: 99 (12 Dec 2023 09:10)  HR: 54 (13 Dec 2023 05:10) (53 - 68)  BP: 127/54 (13 Dec 2023 05:10) (97/51 - 127/54)  RR: 19 (13 Dec 2023 05:10) (16 - 19)  SpO2: 99% (13 Dec 2023 05:10) (96% - 100%)    O2 Parameters below as of 13 Dec 2023 05:10  Patient On (Oxygen Delivery Method): room air      PHYSICAL EXAM:  Constitutional: NAD.   HEENT: NC/AT, PERRLA, EOMI, MMM  Neck: Supple, no JVD  Respiratory: CTA B/L.   Cardiovascular: RRR.  Gastrointestinal: +BS, soft NTND, no guarding or rebound tenderness  Extremities: +2 b/l pitting edema to groin, R foot stump in bandage, appears clean.  Vascular: Pulses equal and strong throughout.   Neurological: AAOx3, no gross CN deficits        LABS:                        6.1    23.46 )-----------( 218      ( 12 Dec 2023 18:52 )             19.5     12-12    138  |  106  |  25<H>  ----------------------------<  119<H>  3.8   |  28  |  1.19    Ca    8.0<L>      12 Dec 2023 10:32  Phos  2.7     12-12  Mg     1.7     12-12        Urinalysis Basic - ( 12 Dec 2023 10:32 )    Color: x / Appearance: x / SG: x / pH: x  Gluc: 119 mg/dL / Ketone: x  / Bili: x / Urobili: x   Blood: x / Protein: x / Nitrite: x   Leuk Esterase: x / RBC: x / WBC x   Sq Epi: x / Non Sq Epi: x / Bacteria: x      CAPILLARY BLOOD GLUCOSE  POCT Blood Glucose.: 115 mg/dL (13 Dec 2023 01:52)  POCT Blood Glucose.: 88 mg/dL (12 Dec 2023 22:42)  POCT Blood Glucose.: 184 mg/dL (12 Dec 2023 18:13)  POCT Blood Glucose.: 132 mg/dL (12 Dec 2023 12:36)  POCT Blood Glucose.: 115 mg/dL (12 Dec 2023 08:56)        RADIOLOGY & ADDITIONAL TESTS:  Consultant(s) Notes Reviewed:  [x ] YES  [ ] NO    MEDICATIONS  (STANDING):  atorvastatin 80 milliGRAM(s) Oral at bedtime  carvedilol 12.5 milliGRAM(s) Oral every 12 hours  gabapentin 800 milliGRAM(s) Oral three times a day  hydrALAZINE 50 milliGRAM(s) Oral every 8 hours  insulin glargine Injectable (LANTUS) 10 Unit(s) SubCutaneous at bedtime  insulin lispro (ADMELOG) corrective regimen sliding scale   SubCutaneous Before meals and at bedtime  insulin lispro Injectable (ADMELOG) 4 Unit(s) SubCutaneous three times a day before meals  lisinopril 40 milliGRAM(s) Oral daily  piperacillin/tazobactam IVPB.. 3.375 Gram(s) IV Intermittent every 8 hours    MEDICATIONS  (PRN):  acetaminophen     Tablet .. 650 milliGRAM(s) Oral every 6 hours PRN Temp greater or equal to 38C (100.4F), Mild Pain (1 - 3)  oxyCODONE    IR 5 milliGRAM(s) Oral every 6 hours PRN Severe Pain (7 - 10)    Care Discussed with Consultants/Other Providers [ x] YES  [ ] NO TRANSFER IN SERVICE INTERNAL MEDICINE TO VASCULAR SURGERY  69yo M PMH CAD (2 stents 2020), grade 1 diastolic dysfunction, HTN, PAD w/ remote RLE angioplasty, R 4th toe OM s/p partial R 4th ray amputation on 10/24, RLE angiogram with AT lithotripsy and AT/peroneal balloon angioplasty 10/27, uncontrolled IDDM (a1c 12 in Oct 2023), recent admission 11/8-11/10 to cardiology service for hypertensive emergency (left AMA, was given levaquin when he left for right foot wound that pt had started treatment for back in October). Patient is now s/p BKA pending revision. Cardiology and medicine have both seen patient and have performed pre-op evaluation.     INTERVAL HPI/OVERNIGHT EVENTS:   Overnight patient received 0.5mg Dilaudid for pain. Per night team, there was a delay in getting 2upRBC. They were eventually released, with plan to recheck CBC in AM. 2mg Dilaudid given again, later in the night, for pain.       AT BEDSIDE:  Patient denies any pain overnight. Denies further bleeding from stump, which is bandaged and appears to not be overtly bleeding at bedside. Patient states a few days ago he was having profuse diarrhea but none since. Denies abdominal pain.     Medical Clearance: Plan to transfuse additional unit of blood given Hgb < 8 and CAD hx. Patient medically optimized and cleared for surgery. Patient to restart aspirin once hemoglobin stable and no more bleeding.       Vital Signs Last 24 Hrs  T(C): 37.1 (13 Dec 2023 05:10), Max: 37.2 (12 Dec 2023 09:10)  T(F): 98.7 (13 Dec 2023 05:10), Max: 99 (12 Dec 2023 09:10)  HR: 54 (13 Dec 2023 05:10) (53 - 68)  BP: 127/54 (13 Dec 2023 05:10) (97/51 - 127/54)  RR: 19 (13 Dec 2023 05:10) (16 - 19)  SpO2: 99% (13 Dec 2023 05:10) (96% - 100%)    O2 Parameters below as of 13 Dec 2023 05:10  Patient On (Oxygen Delivery Method): room air      PHYSICAL EXAM:  Constitutional: NAD.   HEENT: NC/AT, PERRLA, EOMI, MMM  Neck: Supple, no JVD  Respiratory: CTA B/L.   Cardiovascular: RRR.  Gastrointestinal: +BS, soft NTND, no guarding or rebound tenderness  Extremities: +2 b/l pitting edema to groin, R foot stump in bandage, appears clean.  Vascular: Pulses equal and strong throughout.   Neurological: AAOx3, no gross CN deficits        LABS:                        6.1    23.46 )-----------( 218      ( 12 Dec 2023 18:52 )             19.5     12-12    138  |  106  |  25<H>  ----------------------------<  119<H>  3.8   |  28  |  1.19    Ca    8.0<L>      12 Dec 2023 10:32  Phos  2.7     12-12  Mg     1.7     12-12        Urinalysis Basic - ( 12 Dec 2023 10:32 )    Color: x / Appearance: x / SG: x / pH: x  Gluc: 119 mg/dL / Ketone: x  / Bili: x / Urobili: x   Blood: x / Protein: x / Nitrite: x   Leuk Esterase: x / RBC: x / WBC x   Sq Epi: x / Non Sq Epi: x / Bacteria: x      CAPILLARY BLOOD GLUCOSE  POCT Blood Glucose.: 115 mg/dL (13 Dec 2023 01:52)  POCT Blood Glucose.: 88 mg/dL (12 Dec 2023 22:42)  POCT Blood Glucose.: 184 mg/dL (12 Dec 2023 18:13)  POCT Blood Glucose.: 132 mg/dL (12 Dec 2023 12:36)  POCT Blood Glucose.: 115 mg/dL (12 Dec 2023 08:56)        RADIOLOGY & ADDITIONAL TESTS:  Consultant(s) Notes Reviewed:  [x ] YES  [ ] NO    MEDICATIONS  (STANDING):  atorvastatin 80 milliGRAM(s) Oral at bedtime  carvedilol 12.5 milliGRAM(s) Oral every 12 hours  gabapentin 800 milliGRAM(s) Oral three times a day  hydrALAZINE 50 milliGRAM(s) Oral every 8 hours  insulin glargine Injectable (LANTUS) 10 Unit(s) SubCutaneous at bedtime  insulin lispro (ADMELOG) corrective regimen sliding scale   SubCutaneous Before meals and at bedtime  insulin lispro Injectable (ADMELOG) 4 Unit(s) SubCutaneous three times a day before meals  lisinopril 40 milliGRAM(s) Oral daily  piperacillin/tazobactam IVPB.. 3.375 Gram(s) IV Intermittent every 8 hours    MEDICATIONS  (PRN):  acetaminophen     Tablet .. 650 milliGRAM(s) Oral every 6 hours PRN Temp greater or equal to 38C (100.4F), Mild Pain (1 - 3)  oxyCODONE    IR 5 milliGRAM(s) Oral every 6 hours PRN Severe Pain (7 - 10)    Care Discussed with Consultants/Other Providers [ x] YES  [ ] NO

## 2023-12-13 NOTE — PROGRESS NOTE ADULT - PROBLEM SELECTOR PLAN 4
A1c 12 in October. Has been seen by Endocrine in past.  Home meds: glargine 14U qhs  Lethargic, diaphoretic on exam on 12/9 AM. Found to be hypoglycemic w/ FSG 35. Improved s/p 1 amp D5. Returned to baseline mentation.     - lantus 15U qhs  - lispro 3U TID   - mISS  - FSG QID  - FSG goal 140-180  - consistent carb diet  - c/w home gabapentin for neuropathic pain A1c 12 in October. Has been seen by Endocrine in past.  Home meds: glargine 14U qhs  Lethargic, diaphoretic on exam on 12/9 AM. Found to be hypoglycemic w/ FSG 35. Improved s/p 1 amp D5. Returned to baseline mentation.     - lantus 10U qhs  - lispro 4U TID   - mISS  - FSG QID  - FSG goal 140-180  - consistent carb diet  - c/w home gabapentin for neuropathic pain A1c 12 in October. Has been seen by Endocrine in past.  Home meds: glargine 14U qhs  Lethargic, diaphoretic on exam on 12/9 AM. Found to be hypoglycemic w/ FSG 35. Improved s/p 1 amp D5. Returned to baseline mentation.     - stopped night time sliding scale, follow up with endocrine for further recs  - lantus 10U qhs  - lispro 4U TID   - mISS  - FSG QID  - FSG goal 140-180  - consistent carb diet  - c/w home gabapentin for neuropathic pain

## 2023-12-13 NOTE — PROGRESS NOTE ADULT - PROBLEM SELECTOR PLAN 1
Past hx of PAD w/ diabetic foot wound. Had prior admission in October where he was seen by ID and Podiatry and was supposed to complete 6 wks of broad spectrum antimicrobials. At that time MRI with possible 4th digit early OM up to prox phalanx s/p partial R 4th ray amputation on 10/24, proximal cx with E.coli, Pluralibacter gergoviae, stap epi, and Corynebacterium amycolatum.   Then had an admission this month for unrelated issue, had left AMA and PICC line was taken out bc pt was leaving AMA, was prescribed levaquin to complete his 6wk course however pt unsure if he actually picked up the levaquin.  CT angio LE today shows increased soft tissue gas in the right forefoot. Vascular surgery was contacted by [facility].  On exam right foot has open wound between 4th and 5th digits with purulence coming out of wound, foot is erythematous on dorsal aspect and warm to touch.  Ddx includes SSTI vs osteomyelitis vs less likely nec fasc in setting of possibly incompletely treated infection.   Vascular signed off d/t pt refusing interventions  Wound culture shows few staph haemolyticus, lactobacillus gasseri   Started on vancomycin, refused vanc trough- vanc d'jasen   Extensive conversation on 12/6 w/ Vascular, Podiatry, Medicine team, pt not agreeable to BKA. Given D/c notice.   On 12/8- pt amenable to BKA, Vascular/Podiatry on board- sx planned for 12/11   Rapid response called for hypoglycemic episode w/ FSG 35. Returned to baseline mentation s/p 1 amp D50.   Vascular performed BKA w/ revision scheduled after cardiac clearance   Noted blood loss from wound this AM on 12/12 --> vascular dressed wound, given 1 unit of PRBC   - f/u AM CBC for 2uPRBC  - c/w zosyn 3.375g IV q8 last dose tomorrow d/t source control   - Pain med PRN- tylenol (mild pain), toradol (mod pain), Oxy 5 prn (severe pain)  - f/u blood cx- NGTD Past hx of PAD w/ diabetic foot wound. Had prior admission in October where he was seen by ID and Podiatry and was supposed to complete 6 wks of broad spectrum antimicrobials. At that time MRI with possible 4th digit early OM up to prox phalanx s/p partial R 4th ray amputation on 10/24, proximal cx with E.coli, Pluralibacter gergoviae, stap epi, and Corynebacterium amycolatum.   Then had an admission this month for unrelated issue, had left AMA and PICC line was taken out bc pt was leaving AMA, was prescribed levaquin to complete his 6wk course however pt unsure if he actually picked up the levaquin.  CT angio LE today shows increased soft tissue gas in the right forefoot. Vascular surgery was contacted by [facility].  On exam right foot has open wound between 4th and 5th digits with purulence coming out of wound, foot is erythematous on dorsal aspect and warm to touch.  Ddx includes SSTI vs osteomyelitis vs less likely nec fasc in setting of possibly incompletely treated infection.   Vascular signed off d/t pt refusing interventions  Wound culture shows few staph haemolyticus, lactobacillus gasseri   Started on vancomycin, refused vanc trough- vanc d'jasen   Extensive conversation on 12/6 w/ Vascular, Podiatry, Medicine team, pt not agreeable to BKA. Given D/c notice.   On 12/8- pt amenable to BKA, Vascular/Podiatry on board- sx planned for 12/11   Rapid response called for hypoglycemic episode w/ FSG 35. Returned to baseline mentation s/p 1 amp D50.   Vascular performed BKA w/ revision scheduled after cardiac clearance   Noted blood loss from wound this AM on 12/12 --> vascular dressed wound, given 1 unit of PRBC   - f/u AM CBC for 2uPRBC  - c/w zosyn 3.375g IV q8 last dose tomorrow d/t source control   - Pain med PRN- tylenol (mild pain), toradol (mod pain), Oxy 5 prn (severe pain)  - f/u blood cx- NGTD Past hx of PAD w/ diabetic foot wound. Had prior admission in October where he was seen by ID and Podiatry and was supposed to complete 6 wks of broad spectrum antimicrobials. At that time MRI with possible 4th digit early OM up to prox phalanx s/p partial R 4th ray amputation on 10/24, proximal cx with E.coli, Pluralibacter gergoviae, stap epi, and Corynebacterium amycolatum.   Then had an admission this month for unrelated issue, had left AMA and PICC line was taken out bc pt was leaving AMA, was prescribed levaquin to complete his 6wk course however pt unsure if he actually picked up the levaquin.  CT angio LE today shows increased soft tissue gas in the right forefoot. Vascular surgery was contacted by [facility].  On exam right foot has open wound between 4th and 5th digits with purulence coming out of wound, foot is erythematous on dorsal aspect and warm to touch.  Ddx includes SSTI vs osteomyelitis vs less likely nec fasc in setting of possibly incompletely treated infection.   Vascular signed off d/t pt refusing interventions  Wound culture shows few staph haemolyticus, lactobacillus gasseri   Started on vancomycin, refused vanc trough- vanc d'jasen   Extensive conversation on 12/6 w/ Vascular, Podiatry, Medicine team, pt not agreeable to BKA. Given D/c notice.   On 12/8- pt amenable to BKA, Vascular/Podiatry on board- sx planned for 12/11   Rapid response called for hypoglycemic episode w/ FSG 35. Returned to baseline mentation s/p 1 amp D50.   Vascular performed BKA w/ revision scheduled after cardiac clearance   Noted blood loss from wound this AM on 12/12 --> vascular dressed wound, given 1 unit of PRBC     - f/u AM CBC for 2uPRBC = inadequate Hgb rise (6.1 --> 7.3 after 3 units in 24hrs).   - giving another uPRBC given hx of CAD (Hgb transfusion goal 8).  - can stop zosyn 3.375g IV q8 today after source control achieved (f/u vascular surgery)  - f/u blood cx- NGTD  - Pain regimen: oxy 5 q8PRN mild, oxy 10 q8 PRN mod, Dilaudid 1mg q6 PRN severe Past hx of PAD w/ diabetic foot wound. Had prior admission in October where he was seen by ID and Podiatry and was supposed to complete 6 wks of broad spectrum antimicrobials. At that time MRI with possible 4th digit early OM up to prox phalanx s/p partial R 4th ray amputation on 10/24, proximal cx with E.coli, Pluralibacter gergoviae, stap epi, and Corynebacterium amycolatum.   Then had an admission this month for unrelated issue, had left AMA and PICC line was taken out bc pt was leaving AMA, was prescribed levaquin to complete his 6wk course however pt unsure if he actually picked up the levaquin.  CT angio LE today shows increased soft tissue gas in the right forefoot. Vascular surgery was contacted by [facility].  On exam right foot has open wound between 4th and 5th digits with purulence coming out of wound, foot is erythematous on dorsal aspect and warm to touch.  Ddx includes SSTI vs osteomyelitis vs less likely nec fasc in setting of possibly incompletely treated infection.   Vascular signed off d/t pt refusing interventions  Wound culture shows few staph haemolyticus, lactobacillus gasseri   Started on vancomycin, refused vanc trough- vanc d'jasen   Extensive conversation on 12/6 w/ Vascular, Podiatry, Medicine team, pt not agreeable to BKA. Given D/c notice.   On 12/8- pt amenable to BKA, Vascular/Podiatry on board- sx planned for 12/11   Rapid response called for hypoglycemic episode w/ FSG 35. Returned to baseline mentation s/p 1 amp D50.   Vascular performed BKA w/ revision scheduled after cardiac clearance   Noted blood loss from wound this AM on 12/12 --> vascular dressed wound, given 1 unit of PRBC     - f/u AM CBC for 2uPRBC = inadequate Hgb rise (6.1 --> 7.3 after 3 units in 24hrs).   - giving another uPRBC given hx of CAD (Hgb transfusion goal 8).  - can stop zosyn 3.375g IV q8 today after source control achieved (f/u vascular surgery)  - f/u blood cx- NGTD  - Pain regimen: oxy 5 q8PRN mild, oxy 10 q8 PRN mod, Dilaudid 1mg q6 PRN severe Past hx of PAD w/ diabetic foot wound. Had prior admission in October where he was seen by ID and Podiatry and was supposed to complete 6 wks of broad spectrum antimicrobials. At that time MRI with possible 4th digit early OM up to prox phalanx s/p partial R 4th ray amputation on 10/24, proximal cx with E.coli, Pluralibacter gergoviae, stap epi, and Corynebacterium amycolatum.   Then had an admission this month for unrelated issue, had left AMA and PICC line was taken out bc pt was leaving AMA, was prescribed levaquin to complete his 6wk course however pt unsure if he actually picked up the levaquin.  CT angio LE today shows increased soft tissue gas in the right forefoot. Vascular surgery was contacted by [facility].  On exam right foot has open wound between 4th and 5th digits with purulence coming out of wound, foot is erythematous on dorsal aspect and warm to touch.  Ddx includes SSTI vs osteomyelitis vs less likely nec fasc in setting of possibly incompletely treated infection.   Vascular signed off d/t pt refusing interventions  Wound culture shows few staph haemolyticus, lactobacillus gasseri   Started on vancomycin, refused vanc trough- vanc d'jasen   Extensive conversation on 12/6 w/ Vascular, Podiatry, Medicine team, pt not agreeable to BKA. Given D/c notice.   On 12/8- pt amenable to BKA, Vascular/Podiatry on board- sx planned for 12/11   Rapid response called for hypoglycemic episode w/ FSG 35. Returned to baseline mentation s/p 1 amp D50.   Vascular performed BKA w/ revision scheduled after cardiac clearance     - f/u AM CBC for 2uPRBC = inadequate Hgb rise (6.1 --> 7.3 after 3 units in 24hrs).   - giving another uPRBC given hx of CAD (Hgb transfusion goal 8).  - can stop zosyn 3.375g IV q8 today after source control achieved (f/u vascular surgery)  - Medically optimized and cleared for surgery  - f/u blood cx- NGTD  - Pain regimen: oxy 5 q8PRN mild, oxy 10 q8 PRN mod, Dilaudid 1mg q6 PRN severe Past hx of PAD w/ diabetic foot wound. Had prior admission in October where he was seen by ID and Podiatry and was supposed to complete 6 wks of broad spectrum antimicrobials. At that time MRI with possible 4th digit early OM up to prox phalanx s/p partial R 4th ray amputation on 10/24, proximal cx with E.coli, Pluralibacter gergoviae, stap epi, and Corynebacterium amycolatum.   Then had an admission this month for unrelated issue, had left AMA and PICC line was taken out bc pt was leaving AMA, was prescribed levaquin to complete his 6wk course however pt unsure if he actually picked up the levaquin.  CT angio LE today shows increased soft tissue gas in the right forefoot. Vascular surgery was contacted by [facility].  On exam right foot has open wound between 4th and 5th digits with purulence coming out of wound, foot is erythematous on dorsal aspect and warm to touch.  Ddx includes SSTI vs osteomyelitis vs less likely nec fasc in setting of possibly incompletely treated infection.   Vascular signed off d/t pt refusing interventions  Wound culture shows few staph haemolyticus, lactobacillus gasseri   Started on vancomycin, refused vanc trough- vanc d'jasen   Extensive conversation on 12/6 w/ Vascular, Podiatry, Medicine team, pt not agreeable to BKA. Given D/c notice.   On 12/8- pt amenable to BKA, Vascular/Podiatry on board- sx planned for 12/11   Rapid response called for hypoglycemic episode w/ FSG 35. Returned to baseline mentation s/p 1 amp D50.   Vascular performed BKA w/ revision scheduled after cardiac clearance     - f/u AM CBC for 2uPRBC = inadequate Hgb rise (6.1 --> 7.3 after 3 units in 24hrs).   - giving another uPRBC given hx of CAD (Hgb transfusion goal 8).  - can stop zosyn 3.375g IV q8 today after source control achieved (f/u vascular surgery)  - Medically optimized and cleared for surgery  - f/u blood cx- NGTD  - Pain regimen: oxy 5 q8PRN mild, oxy 10 q8 PRN mod, Dilaudid 1mg q6 PRN severe Past hx of PAD w/ diabetic foot wound. Had prior admission in October where he was seen by ID and Podiatry and was supposed to complete 6 wks of broad spectrum antimicrobials. At that time MRI with possible 4th digit early OM up to prox phalanx s/p partial R 4th ray amputation on 10/24, proximal cx with E.coli, Pluralibacter gergoviae, stap epi, and Corynebacterium amycolatum.   Then had an admission this month for unrelated issue, had left AMA and PICC line was taken out bc pt was leaving AMA, was prescribed levaquin to complete his 6wk course however pt unsure if he actually picked up the levaquin.  CT angio LE today shows increased soft tissue gas in the right forefoot. Vascular surgery was contacted by [facility].  On exam right foot has open wound between 4th and 5th digits with purulence coming out of wound, foot is erythematous on dorsal aspect and warm to touch.  Ddx includes SSTI vs osteomyelitis vs less likely nec fasc in setting of possibly incompletely treated infection.   Vascular signed off d/t pt refusing interventions  Wound culture shows few staph haemolyticus, lactobacillus gasseri   Started on vancomycin, refused vanc trough- vanc d'jasen   Extensive conversation on 12/6 w/ Vascular, Podiatry, Medicine team, pt not agreeable to BKA. Given D/c notice.   On 12/8- pt amenable to BKA, Vascular/Podiatry on board- sx planned for 12/11   Rapid response called for hypoglycemic episode w/ FSG 35. Returned to baseline mentation s/p 1 amp D50.   Vascular performed BKA w/ revision scheduled after cardiac clearance     - f/u AM CBC for 2uPRBC = inadequate Hgb rise (6.1 --> 7.3 after 3 units in 24hrs).   - giving another uPRBC given hx of CAD (Hgb transfusion goal 8).  - stop zosyn 3.375g IV q8 today after source control achieved  - Medically optimized and cleared for surgery  - patient can restart aspirin for CAD once Hgb stable & no more bleeding  - f/u blood cx- NGTD  - Pain regimen: oxy 5 q8PRN mild, oxy 10 q8 PRN mod, Dilaudid 1mg q6 PRN severe Past hx of PAD w/ diabetic foot wound. Had prior admission in October where he was seen by ID and Podiatry and was supposed to complete 6 wks of broad spectrum antimicrobials. At that time MRI with possible 4th digit early OM up to prox phalanx s/p partial R 4th ray amputation on 10/24, proximal cx with E.coli, Pluralibacter gergoviae, stap epi, and Corynebacterium amycolatum.   Then had an admission this month for unrelated issue, had left AMA and PICC line was taken out bc pt was leaving AMA, was prescribed levaquin to complete his 6wk course however pt unsure if he actually picked up the levaquin.  CT angio LE today shows increased soft tissue gas in the right forefoot. Vascular surgery was contacted by [facility].  On exam right foot has open wound between 4th and 5th digits with purulence coming out of wound, foot is erythematous on dorsal aspect and warm to touch.  Ddx includes SSTI vs osteomyelitis vs less likely nec fasc in setting of possibly incompletely treated infection.   Vascular signed off d/t pt refusing interventions  Wound culture shows few staph haemolyticus, lactobacillus gasseri   Started on vancomycin, refused vanc trough- vanc d'jasen   Extensive conversation on 12/6 w/ Vascular, Podiatry, Medicine team, pt not agreeable to BKA. Given D/c notice.   On 12/8- pt amenable to BKA, Vascular/Podiatry on board- sx planned for 12/11   Rapid response called for hypoglycemic episode w/ FSG 35. Returned to baseline mentation s/p 1 amp D50.   Vascular performed BKA w/ revision scheduled after cardiac clearance     - f/u AM CBC for 2uPRBC = inadequate Hgb rise (6.1 --> 7.3 after 3 units in 24hrs).   - giving another uPRBC given hx of CAD (Hgb transfusion goal 8).  - stop zosyn 3.375g IV q8 today after source control achieved  - Medically optimized and cleared for surgery  - patient can restart aspirin for CAD once Hgb stable & no more bleeding  - f/u blood cx- NGTD  - Pain regimen: oxy 5 q8PRN mild, oxy 10 q8 PRN mod, Dilaudid 1mg q6 PRN severe

## 2023-12-13 NOTE — PROGRESS NOTE ADULT - ATTENDING COMMENTS
Plan:  -Spoke with ID Dr. Yadav, patient can receive 1 more day for zosyn and then d/c as source control has been achieved. Leukocytosis likely reactive, continue to  monitor. Vasc recs to continue abx till wound closure. Will keep on zosyn   -Inappropriate rise in Hb  Aspirin and chemical dvt ppx held for bleeding and low h/h. Follow post transfusion CBC. Patient is still bleeding from wound site, vascular with re-evaluate. Hemodynamically stable   -HF recs appreciate. Continue lisinopril, coreg and plan to start spironolactone 25mg before d/c.

## 2023-12-13 NOTE — PROGRESS NOTE ADULT - PROBLEM SELECTOR PROBLEM 7
PAD (peripheral artery disease)

## 2023-12-13 NOTE — PROGRESS NOTE ADULT - PROBLEM SELECTOR PLAN 1
Type 2 diabetes mellitus with hyperglycemia  - Please decrease lantus to  units at bedtime.   - Continue lispro  units before each meal.  - Continue lispro low dose sliding scale before meals and at bedtime.  - Patient's fingerstick glucose goal is 100-180 mg/dL.    - For discharge, patient can ***.    - Patient can follow up at discharge with [facility] by calling [phone] to make an appointment.      Case discussed with Dr. Huertas. Primary team updated. Type 2 diabetes mellitus with hyperglycemia  - Please decrease lantus to  units at bedtime.   - Continue lispro  units before each meal.  - Continue lispro low dose sliding scale before meals and at bedtime.  - Patient's fingerstick glucose goal is 100-180 mg/dL.    - For discharge, patient can ***.    - Patient can follow up at discharge with [facility] by calling [phone] to make an appointment.      Case discussed with Dr. Huertas. Primary team updated. Type 2 diabetes mellitus with hyperglycemia  - Please decrease lantus to 7 units at bedtime.   - Decrease lispro to 3 units before each meal.  - Continue lispro low dose sliding scale before meals and at bedtime.  - Patient's fingerstick glucose goal is 100-180 mg/dL.    - For discharge, patient can ***.    - Patient can follow up at discharge with [facility] by calling [phone] to make an appointment.      Case discussed with Dr. Huertas. Primary team updated. Type 2 diabetes mellitus with hyperglycemia  - Please decrease lantus to 7 units at bedtime.   - Decrease lispro to 3 units before each meal.  - Continue lispro low dose sliding scale before meals and at bedtime.  - Patient's fingerstick glucose goal is 100-180 mg/dL.    - For discharge, patient can ***.    - Patient can follow up at discharge with [facility] by calling [phone] to make an appointment.      Case discussed with Dr. Huertas. Primary team updated.

## 2023-12-13 NOTE — PROGRESS NOTE ADULT - PROBLEM SELECTOR PLAN 8
F: none  E: replenish as appropriate  N: regular, DASH, consistent carb; NPO until Podiatry eval    VTE Prophylaxis: Lovenox 40mg q24h  GI: not needed  C: Full Code  D: CHONG F: none  E: replenish as appropriate  N: consistent carb no snacks    VTE Prophylaxis: L leg SCDs  C: Full Code  D: RMF

## 2023-12-13 NOTE — PROGRESS NOTE ADULT - ASSESSMENT
69yo M PMH CAD (2 stents 2020), HFmrEF (45-50%), HTN, PAD w/ remote RLE angioplasty, R 4th toe OM s/p partial R 4th ray amputation on 10/24, RLE angiogram with AT lithotripsy and AT/peroneal balloon angioplasty 10/27, uncontrolled IDDM (a1c 12 in Oct 2023), recent admission 11/8-11/10 to cardiology service for hypertensive emergency (left AMA, was given levaquin when he left for right foot wound that pt had started treatment for back in October) who presented to [facility] after a fall onto his knees and was having b/l knee pain. Found to have increased soft tissue gas in R foot on CT scan, now s/p right BKA 12/11/2023.    A1C: 13.5 %  C-peptide 0.5 with , JAMIR/ICA neg (Oct 2023)  BUN: 30  Creatinine: 1.27  GFR: 62  Weight: 70  BMI: 24.2   69yo M PMH CAD (2 stents 2020), HFmrEF (45-50%), HTN, PAD w/ remote RLE angioplasty, R 4th toe OM s/p partial R 4th ray amputation on 10/24, RLE angiogram with AT lithotripsy and AT/peroneal balloon angioplasty 10/27, uncontrolled IDDM (a1c 12 in Oct 2023), recent admission 11/8-11/10 to cardiology service for hypertensive emergency (left AMA, was given levaquin when he left for right foot wound that pt had started treatment for back in October) who presented to [facility] after a fall onto his knees and was having b/l knee pain. Found to have increased soft tissue gas in R foot on CT scan, now s/p right BKA 12/11/2023.    A1C: 13.5 %  C-peptide 0.5 with , JAMIR/ICA neg (Oct 2023)  BUN: 30  Creatinine: 1.27  GFR: 62  Weight: 70  BMI: 24.2

## 2023-12-13 NOTE — PROGRESS NOTE ADULT - PROBLEM SELECTOR PROBLEM 3
Resend with new directions.   
HTN (hypertension)

## 2023-12-13 NOTE — PROGRESS NOTE ADULT - PROBLEM SELECTOR PROBLEM 6
Chronic systolic congestive heart failure

## 2023-12-14 DIAGNOSIS — E11.628 TYPE 2 DIABETES MELLITUS WITH OTHER SKIN COMPLICATIONS: ICD-10-CM

## 2023-12-14 LAB
ALBUMIN SERPL ELPH-MCNC: 1.4 G/DL — LOW (ref 3.3–5)
ALBUMIN SERPL ELPH-MCNC: 1.4 G/DL — LOW (ref 3.3–5)
ALP SERPL-CCNC: 141 U/L — HIGH (ref 40–120)
ALP SERPL-CCNC: 141 U/L — HIGH (ref 40–120)
ALT FLD-CCNC: 20 U/L — SIGNIFICANT CHANGE UP (ref 10–45)
ALT FLD-CCNC: 20 U/L — SIGNIFICANT CHANGE UP (ref 10–45)
ANION GAP SERPL CALC-SCNC: 5 MMOL/L — SIGNIFICANT CHANGE UP (ref 5–17)
ANION GAP SERPL CALC-SCNC: 5 MMOL/L — SIGNIFICANT CHANGE UP (ref 5–17)
ANISOCYTOSIS BLD QL: SLIGHT — SIGNIFICANT CHANGE UP
ANISOCYTOSIS BLD QL: SLIGHT — SIGNIFICANT CHANGE UP
AST SERPL-CCNC: 30 U/L — SIGNIFICANT CHANGE UP (ref 10–40)
AST SERPL-CCNC: 30 U/L — SIGNIFICANT CHANGE UP (ref 10–40)
BASOPHILS # BLD AUTO: 0 K/UL — SIGNIFICANT CHANGE UP (ref 0–0.2)
BASOPHILS # BLD AUTO: 0 K/UL — SIGNIFICANT CHANGE UP (ref 0–0.2)
BASOPHILS NFR BLD AUTO: 0 % — SIGNIFICANT CHANGE UP (ref 0–2)
BASOPHILS NFR BLD AUTO: 0 % — SIGNIFICANT CHANGE UP (ref 0–2)
BILIRUB SERPL-MCNC: 0.3 MG/DL — SIGNIFICANT CHANGE UP (ref 0.2–1.2)
BILIRUB SERPL-MCNC: 0.3 MG/DL — SIGNIFICANT CHANGE UP (ref 0.2–1.2)
BLD GP AB SCN SERPL QL: NEGATIVE — SIGNIFICANT CHANGE UP
BLD GP AB SCN SERPL QL: NEGATIVE — SIGNIFICANT CHANGE UP
BUN SERPL-MCNC: 28 MG/DL — HIGH (ref 7–23)
BUN SERPL-MCNC: 28 MG/DL — HIGH (ref 7–23)
BURR CELLS BLD QL SMEAR: PRESENT — SIGNIFICANT CHANGE UP
BURR CELLS BLD QL SMEAR: PRESENT — SIGNIFICANT CHANGE UP
C DIFF GDH STL QL: SIGNIFICANT CHANGE UP
CALCIUM SERPL-MCNC: 7.8 MG/DL — LOW (ref 8.4–10.5)
CALCIUM SERPL-MCNC: 7.8 MG/DL — LOW (ref 8.4–10.5)
CHLORIDE SERPL-SCNC: 110 MMOL/L — HIGH (ref 96–108)
CHLORIDE SERPL-SCNC: 110 MMOL/L — HIGH (ref 96–108)
CO2 SERPL-SCNC: 25 MMOL/L — SIGNIFICANT CHANGE UP (ref 22–31)
CO2 SERPL-SCNC: 25 MMOL/L — SIGNIFICANT CHANGE UP (ref 22–31)
CREAT SERPL-MCNC: 1.36 MG/DL — HIGH (ref 0.5–1.3)
CREAT SERPL-MCNC: 1.36 MG/DL — HIGH (ref 0.5–1.3)
CULTURE RESULTS: SIGNIFICANT CHANGE UP
CULTURE RESULTS: SIGNIFICANT CHANGE UP
EGFR: 57 ML/MIN/1.73M2 — LOW
EGFR: 57 ML/MIN/1.73M2 — LOW
EOSINOPHIL # BLD AUTO: 0.52 K/UL — HIGH (ref 0–0.5)
EOSINOPHIL # BLD AUTO: 0.52 K/UL — HIGH (ref 0–0.5)
EOSINOPHIL NFR BLD AUTO: 3.6 % — SIGNIFICANT CHANGE UP (ref 0–6)
EOSINOPHIL NFR BLD AUTO: 3.6 % — SIGNIFICANT CHANGE UP (ref 0–6)
GLUCOSE BLDC GLUCOMTR-MCNC: 102 MG/DL — HIGH (ref 70–99)
GLUCOSE BLDC GLUCOMTR-MCNC: 102 MG/DL — HIGH (ref 70–99)
GLUCOSE BLDC GLUCOMTR-MCNC: 127 MG/DL — HIGH (ref 70–99)
GLUCOSE BLDC GLUCOMTR-MCNC: 127 MG/DL — HIGH (ref 70–99)
GLUCOSE BLDC GLUCOMTR-MCNC: 163 MG/DL — HIGH (ref 70–99)
GLUCOSE BLDC GLUCOMTR-MCNC: 163 MG/DL — HIGH (ref 70–99)
GLUCOSE BLDC GLUCOMTR-MCNC: 240 MG/DL — HIGH (ref 70–99)
GLUCOSE BLDC GLUCOMTR-MCNC: 240 MG/DL — HIGH (ref 70–99)
GLUCOSE BLDC GLUCOMTR-MCNC: 94 MG/DL — SIGNIFICANT CHANGE UP (ref 70–99)
GLUCOSE BLDC GLUCOMTR-MCNC: 94 MG/DL — SIGNIFICANT CHANGE UP (ref 70–99)
GLUCOSE SERPL-MCNC: 94 MG/DL — SIGNIFICANT CHANGE UP (ref 70–99)
GLUCOSE SERPL-MCNC: 94 MG/DL — SIGNIFICANT CHANGE UP (ref 70–99)
HCT VFR BLD CALC: 22.5 % — LOW (ref 39–50)
HCT VFR BLD CALC: 22.5 % — LOW (ref 39–50)
HCT VFR BLD CALC: 23 % — LOW (ref 39–50)
HCT VFR BLD CALC: 23 % — LOW (ref 39–50)
HGB BLD-MCNC: 7.2 G/DL — LOW (ref 13–17)
HGB BLD-MCNC: 7.2 G/DL — LOW (ref 13–17)
HGB BLD-MCNC: 7.6 G/DL — LOW (ref 13–17)
HGB BLD-MCNC: 7.6 G/DL — LOW (ref 13–17)
LYMPHOCYTES # BLD AUTO: 1.04 K/UL — SIGNIFICANT CHANGE UP (ref 1–3.3)
LYMPHOCYTES # BLD AUTO: 1.04 K/UL — SIGNIFICANT CHANGE UP (ref 1–3.3)
LYMPHOCYTES # BLD AUTO: 7.2 % — LOW (ref 13–44)
LYMPHOCYTES # BLD AUTO: 7.2 % — LOW (ref 13–44)
MAGNESIUM SERPL-MCNC: 1.9 MG/DL — SIGNIFICANT CHANGE UP (ref 1.6–2.6)
MAGNESIUM SERPL-MCNC: 1.9 MG/DL — SIGNIFICANT CHANGE UP (ref 1.6–2.6)
MANUAL SMEAR VERIFICATION: SIGNIFICANT CHANGE UP
MANUAL SMEAR VERIFICATION: SIGNIFICANT CHANGE UP
MCHC RBC-ENTMCNC: 27.2 PG — SIGNIFICANT CHANGE UP (ref 27–34)
MCHC RBC-ENTMCNC: 27.2 PG — SIGNIFICANT CHANGE UP (ref 27–34)
MCHC RBC-ENTMCNC: 27.5 PG — SIGNIFICANT CHANGE UP (ref 27–34)
MCHC RBC-ENTMCNC: 27.5 PG — SIGNIFICANT CHANGE UP (ref 27–34)
MCHC RBC-ENTMCNC: 32 GM/DL — SIGNIFICANT CHANGE UP (ref 32–36)
MCHC RBC-ENTMCNC: 32 GM/DL — SIGNIFICANT CHANGE UP (ref 32–36)
MCHC RBC-ENTMCNC: 33 GM/DL — SIGNIFICANT CHANGE UP (ref 32–36)
MCHC RBC-ENTMCNC: 33 GM/DL — SIGNIFICANT CHANGE UP (ref 32–36)
MCV RBC AUTO: 83.3 FL — SIGNIFICANT CHANGE UP (ref 80–100)
MCV RBC AUTO: 83.3 FL — SIGNIFICANT CHANGE UP (ref 80–100)
MCV RBC AUTO: 84.9 FL — SIGNIFICANT CHANGE UP (ref 80–100)
MCV RBC AUTO: 84.9 FL — SIGNIFICANT CHANGE UP (ref 80–100)
MICROCYTES BLD QL: SLIGHT — SIGNIFICANT CHANGE UP
MICROCYTES BLD QL: SLIGHT — SIGNIFICANT CHANGE UP
MONOCYTES # BLD AUTO: 0.52 K/UL — SIGNIFICANT CHANGE UP (ref 0–0.9)
MONOCYTES # BLD AUTO: 0.52 K/UL — SIGNIFICANT CHANGE UP (ref 0–0.9)
MONOCYTES NFR BLD AUTO: 3.6 % — SIGNIFICANT CHANGE UP (ref 2–14)
MONOCYTES NFR BLD AUTO: 3.6 % — SIGNIFICANT CHANGE UP (ref 2–14)
NEUTROPHILS # BLD AUTO: 12.35 K/UL — HIGH (ref 1.8–7.4)
NEUTROPHILS # BLD AUTO: 12.35 K/UL — HIGH (ref 1.8–7.4)
NEUTROPHILS NFR BLD AUTO: 85.6 % — HIGH (ref 43–77)
NEUTROPHILS NFR BLD AUTO: 85.6 % — HIGH (ref 43–77)
NRBC # BLD: 0 /100 WBCS — SIGNIFICANT CHANGE UP (ref 0–0)
NRBC # BLD: 0 /100 WBCS — SIGNIFICANT CHANGE UP (ref 0–0)
OVALOCYTES BLD QL SMEAR: SLIGHT — SIGNIFICANT CHANGE UP
OVALOCYTES BLD QL SMEAR: SLIGHT — SIGNIFICANT CHANGE UP
PHOSPHATE SERPL-MCNC: 3.1 MG/DL — SIGNIFICANT CHANGE UP (ref 2.5–4.5)
PHOSPHATE SERPL-MCNC: 3.1 MG/DL — SIGNIFICANT CHANGE UP (ref 2.5–4.5)
PLAT MORPH BLD: NORMAL — SIGNIFICANT CHANGE UP
PLAT MORPH BLD: NORMAL — SIGNIFICANT CHANGE UP
PLATELET # BLD AUTO: 171 K/UL — SIGNIFICANT CHANGE UP (ref 150–400)
PLATELET # BLD AUTO: 171 K/UL — SIGNIFICANT CHANGE UP (ref 150–400)
PLATELET # BLD AUTO: 174 K/UL — SIGNIFICANT CHANGE UP (ref 150–400)
PLATELET # BLD AUTO: 174 K/UL — SIGNIFICANT CHANGE UP (ref 150–400)
POIKILOCYTOSIS BLD QL AUTO: SLIGHT — SIGNIFICANT CHANGE UP
POIKILOCYTOSIS BLD QL AUTO: SLIGHT — SIGNIFICANT CHANGE UP
POTASSIUM SERPL-MCNC: 4.1 MMOL/L — SIGNIFICANT CHANGE UP (ref 3.5–5.3)
POTASSIUM SERPL-MCNC: 4.1 MMOL/L — SIGNIFICANT CHANGE UP (ref 3.5–5.3)
POTASSIUM SERPL-SCNC: 4.1 MMOL/L — SIGNIFICANT CHANGE UP (ref 3.5–5.3)
POTASSIUM SERPL-SCNC: 4.1 MMOL/L — SIGNIFICANT CHANGE UP (ref 3.5–5.3)
PROT SERPL-MCNC: 4.6 G/DL — LOW (ref 6–8.3)
PROT SERPL-MCNC: 4.6 G/DL — LOW (ref 6–8.3)
RBC # BLD: 2.65 M/UL — LOW (ref 4.2–5.8)
RBC # BLD: 2.65 M/UL — LOW (ref 4.2–5.8)
RBC # BLD: 2.76 M/UL — LOW (ref 4.2–5.8)
RBC # BLD: 2.76 M/UL — LOW (ref 4.2–5.8)
RBC # FLD: 19.9 % — HIGH (ref 10.3–14.5)
RBC BLD AUTO: ABNORMAL
RBC BLD AUTO: ABNORMAL
RH IG SCN BLD-IMP: POSITIVE — SIGNIFICANT CHANGE UP
RH IG SCN BLD-IMP: POSITIVE — SIGNIFICANT CHANGE UP
SCHISTOCYTES BLD QL AUTO: SLIGHT — SIGNIFICANT CHANGE UP
SCHISTOCYTES BLD QL AUTO: SLIGHT — SIGNIFICANT CHANGE UP
SMUDGE CELLS # BLD: PRESENT — SIGNIFICANT CHANGE UP
SMUDGE CELLS # BLD: PRESENT — SIGNIFICANT CHANGE UP
SODIUM SERPL-SCNC: 140 MMOL/L — SIGNIFICANT CHANGE UP (ref 135–145)
SODIUM SERPL-SCNC: 140 MMOL/L — SIGNIFICANT CHANGE UP (ref 135–145)
SPECIMEN SOURCE: SIGNIFICANT CHANGE UP
SPECIMEN SOURCE: SIGNIFICANT CHANGE UP
WBC # BLD: 14.43 K/UL — HIGH (ref 3.8–10.5)
WBC # BLD: 14.43 K/UL — HIGH (ref 3.8–10.5)
WBC # BLD: 15.06 K/UL — HIGH (ref 3.8–10.5)
WBC # BLD: 15.06 K/UL — HIGH (ref 3.8–10.5)
WBC # FLD AUTO: 14.43 K/UL — HIGH (ref 3.8–10.5)
WBC # FLD AUTO: 14.43 K/UL — HIGH (ref 3.8–10.5)
WBC # FLD AUTO: 15.06 K/UL — HIGH (ref 3.8–10.5)
WBC # FLD AUTO: 15.06 K/UL — HIGH (ref 3.8–10.5)

## 2023-12-14 PROCEDURE — 99231 SBSQ HOSP IP/OBS SF/LOW 25: CPT

## 2023-12-14 PROCEDURE — 99222 1ST HOSP IP/OBS MODERATE 55: CPT | Mod: GC

## 2023-12-14 PROCEDURE — 99223 1ST HOSP IP/OBS HIGH 75: CPT

## 2023-12-14 PROCEDURE — 99233 SBSQ HOSP IP/OBS HIGH 50: CPT | Mod: 25

## 2023-12-14 RX ORDER — HYDROMORPHONE HYDROCHLORIDE 2 MG/ML
0.5 INJECTION INTRAMUSCULAR; INTRAVENOUS; SUBCUTANEOUS ONCE
Refills: 0 | Status: DISCONTINUED | OUTPATIENT
Start: 2023-12-14 | End: 2023-12-14

## 2023-12-14 RX ORDER — ASPIRIN/CALCIUM CARB/MAGNESIUM 324 MG
81 TABLET ORAL DAILY
Refills: 0 | Status: DISCONTINUED | OUTPATIENT
Start: 2023-12-14 | End: 2023-12-18

## 2023-12-14 RX ORDER — HYDROMORPHONE HYDROCHLORIDE 2 MG/ML
1 INJECTION INTRAMUSCULAR; INTRAVENOUS; SUBCUTANEOUS ONCE
Refills: 0 | Status: DISCONTINUED | OUTPATIENT
Start: 2023-12-14 | End: 2023-12-14

## 2023-12-14 RX ORDER — INSULIN GLARGINE 100 [IU]/ML
6 INJECTION, SOLUTION SUBCUTANEOUS AT BEDTIME
Refills: 0 | Status: DISCONTINUED | OUTPATIENT
Start: 2023-12-14 | End: 2023-12-15

## 2023-12-14 RX ORDER — FUROSEMIDE 40 MG
20 TABLET ORAL ONCE
Refills: 0 | Status: COMPLETED | OUTPATIENT
Start: 2023-12-14 | End: 2023-12-14

## 2023-12-14 RX ORDER — HEPARIN SODIUM 5000 [USP'U]/ML
5000 INJECTION INTRAVENOUS; SUBCUTANEOUS EVERY 8 HOURS
Refills: 0 | Status: DISCONTINUED | OUTPATIENT
Start: 2023-12-14 | End: 2023-12-18

## 2023-12-14 RX ORDER — MAGNESIUM SULFATE 500 MG/ML
1 VIAL (ML) INJECTION ONCE
Refills: 0 | Status: DISCONTINUED | OUTPATIENT
Start: 2023-12-14 | End: 2023-12-14

## 2023-12-14 RX ORDER — VANCOMYCIN HCL 1 G
125 VIAL (EA) INTRAVENOUS EVERY 6 HOURS
Refills: 0 | Status: DISCONTINUED | OUTPATIENT
Start: 2023-12-14 | End: 2023-12-18

## 2023-12-14 RX ORDER — MAGNESIUM OXIDE 400 MG ORAL TABLET 241.3 MG
400 TABLET ORAL
Refills: 0 | Status: DISCONTINUED | OUTPATIENT
Start: 2023-12-14 | End: 2023-12-14

## 2023-12-14 RX ADMIN — CARVEDILOL PHOSPHATE 12.5 MILLIGRAM(S): 80 CAPSULE, EXTENDED RELEASE ORAL at 21:43

## 2023-12-14 RX ADMIN — Medication 125 MILLIGRAM(S): at 23:43

## 2023-12-14 RX ADMIN — HYDROMORPHONE HYDROCHLORIDE 1 MILLIGRAM(S): 2 INJECTION INTRAMUSCULAR; INTRAVENOUS; SUBCUTANEOUS at 22:44

## 2023-12-14 RX ADMIN — Medication 650 MILLIGRAM(S): at 06:29

## 2023-12-14 RX ADMIN — ATORVASTATIN CALCIUM 80 MILLIGRAM(S): 80 TABLET, FILM COATED ORAL at 21:43

## 2023-12-14 RX ADMIN — CARVEDILOL PHOSPHATE 12.5 MILLIGRAM(S): 80 CAPSULE, EXTENDED RELEASE ORAL at 09:16

## 2023-12-14 RX ADMIN — PIPERACILLIN AND TAZOBACTAM 25 GRAM(S): 4; .5 INJECTION, POWDER, LYOPHILIZED, FOR SOLUTION INTRAVENOUS at 05:59

## 2023-12-14 RX ADMIN — HYDROMORPHONE HYDROCHLORIDE 1 MILLIGRAM(S): 2 INJECTION INTRAMUSCULAR; INTRAVENOUS; SUBCUTANEOUS at 21:44

## 2023-12-14 RX ADMIN — Medication 20 MILLIGRAM(S): at 12:10

## 2023-12-14 RX ADMIN — INSULIN GLARGINE 6 UNIT(S): 100 INJECTION, SOLUTION SUBCUTANEOUS at 23:42

## 2023-12-14 RX ADMIN — Medication 650 MILLIGRAM(S): at 12:10

## 2023-12-14 RX ADMIN — OXYCODONE HYDROCHLORIDE 10 MILLIGRAM(S): 5 TABLET ORAL at 00:20

## 2023-12-14 RX ADMIN — HYDROMORPHONE HYDROCHLORIDE 1 MILLIGRAM(S): 2 INJECTION INTRAMUSCULAR; INTRAVENOUS; SUBCUTANEOUS at 12:25

## 2023-12-14 RX ADMIN — MAGNESIUM OXIDE 400 MG ORAL TABLET 400 MILLIGRAM(S): 241.3 TABLET ORAL at 13:55

## 2023-12-14 RX ADMIN — Medication 1: at 17:32

## 2023-12-14 RX ADMIN — OXYCODONE HYDROCHLORIDE 10 MILLIGRAM(S): 5 TABLET ORAL at 17:17

## 2023-12-14 RX ADMIN — PIPERACILLIN AND TAZOBACTAM 25 GRAM(S): 4; .5 INJECTION, POWDER, LYOPHILIZED, FOR SOLUTION INTRAVENOUS at 16:39

## 2023-12-14 RX ADMIN — GABAPENTIN 800 MILLIGRAM(S): 400 CAPSULE ORAL at 13:55

## 2023-12-14 RX ADMIN — GABAPENTIN 800 MILLIGRAM(S): 400 CAPSULE ORAL at 09:16

## 2023-12-14 RX ADMIN — HYDROMORPHONE HYDROCHLORIDE 1 MILLIGRAM(S): 2 INJECTION INTRAMUSCULAR; INTRAVENOUS; SUBCUTANEOUS at 02:12

## 2023-12-14 RX ADMIN — OXYCODONE HYDROCHLORIDE 10 MILLIGRAM(S): 5 TABLET ORAL at 18:36

## 2023-12-14 RX ADMIN — HYDROMORPHONE HYDROCHLORIDE 1 MILLIGRAM(S): 2 INJECTION INTRAMUSCULAR; INTRAVENOUS; SUBCUTANEOUS at 06:13

## 2023-12-14 RX ADMIN — HYDROMORPHONE HYDROCHLORIDE 1 MILLIGRAM(S): 2 INJECTION INTRAMUSCULAR; INTRAVENOUS; SUBCUTANEOUS at 12:10

## 2023-12-14 RX ADMIN — Medication 50 MILLIGRAM(S): at 09:15

## 2023-12-14 RX ADMIN — Medication 650 MILLIGRAM(S): at 17:17

## 2023-12-14 RX ADMIN — Medication 650 MILLIGRAM(S): at 05:59

## 2023-12-14 RX ADMIN — Medication 3 UNIT(S): at 12:39

## 2023-12-14 RX ADMIN — Medication 3 UNIT(S): at 17:32

## 2023-12-14 RX ADMIN — Medication 3 UNIT(S): at 09:18

## 2023-12-14 RX ADMIN — Medication 2: at 23:40

## 2023-12-14 RX ADMIN — LISINOPRIL 40 MILLIGRAM(S): 2.5 TABLET ORAL at 05:59

## 2023-12-14 RX ADMIN — Medication 650 MILLIGRAM(S): at 23:45

## 2023-12-14 RX ADMIN — PIPERACILLIN AND TAZOBACTAM 25 GRAM(S): 4; .5 INJECTION, POWDER, LYOPHILIZED, FOR SOLUTION INTRAVENOUS at 21:44

## 2023-12-14 RX ADMIN — GABAPENTIN 800 MILLIGRAM(S): 400 CAPSULE ORAL at 23:39

## 2023-12-14 RX ADMIN — HYDROMORPHONE HYDROCHLORIDE 1 MILLIGRAM(S): 2 INJECTION INTRAMUSCULAR; INTRAVENOUS; SUBCUTANEOUS at 01:42

## 2023-12-14 RX ADMIN — Medication 50 MILLIGRAM(S): at 23:45

## 2023-12-14 RX ADMIN — Medication 81 MILLIGRAM(S): at 17:17

## 2023-12-14 NOTE — BH CONSULTATION LIAISON ASSESSMENT NOTE - NSBHREFERDETAILS_PSY_A_CORE_FT
Patient made provocative suicidal statement: " I cross my finger and I hope it does. I hope I get to leave this place and die."  Patient made provocative suicidal statement: "I cross my finger and I hope it does. I hope I get to leave this place and die."

## 2023-12-14 NOTE — CONSULT NOTE ADULT - ASSESSMENT
67 YO male with functional deficits and ADL impairments s/p right BKA      S/p Right BKA secondary to PAD  Post-op pain   Post op anemia - s/p transfusion, HD stable  DM - endocrine following for DM management  CAD, PAD, HTN, CHF - cardiology following; hydralazine, lisinopril, carvedilol, statin   67 YO male with functional deficits and ADL impairments s/p right BKA    S/p Right BKA secondary to PAD/Osteomyelitis  Post-op pain   Post op anemia - s/p transfusion, HD stable  DM - endocrine following for DM management  CAD, PAD, HTN, CHF - cardiology following; hydralazine, lisinopril, carvedilol, statin      PLAN / RECOMMENDATIONS:     Rehab / Mobilization:   - Initial therapy assessment: [X] PT  [ ] OT   - Continue skilled therapy while admitted to prevent secondary complications of immobility focus on transfer training, bed mobility, progressive ambulation, and equipment evaluation.   - Educated patient on post-acute rehabilitation. Discussed anticipated rehab course.  - Encourage OOB throughout the day with staff or OOB in chair with meals   - Therapy precautions: per vascular team    Bracing/Splinting:   - None indicated at this time      Pain Management:   - Currently well controlled on current regimen: Dilaudid PRN, Oxycodone PRN, Gabapentin for neuropathic pain    Disposition:  KERRY  - Based off current functional status and activity tolerance, recommend KERRY following discharge.

## 2023-12-14 NOTE — BH CONSULTATION LIAISON ASSESSMENT NOTE - NSBHCONSULTMEDAGITATION_PSY_A_CORE FT
For severe agitation that puts himself and others at danger:  - Haldol 5mg PO/IM q4h PRN   - Ativan 2mg PO/IM/IV q4h PRN  - Benadryl 50mg PO/IM/IV q4h PRN

## 2023-12-14 NOTE — BH CONSULTATION LIAISON ASSESSMENT NOTE - SUMMARY
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 Psychiatry was consulted for uncooperative behavior and making suicidal statement of "I hope I get to leave this place and die."    Upon evaluation, patient was remorseful about his rude and uncooperative behavior and explained that he made provocative statement after he felt like he was disrespected when he was awoken by the medical staff. Patient adamantly denies suicidal ideation. Patient does not endorse HI/paranoia/delusion/psychosis and does not display any objective signs and symptoms of carmen/paranoia/delusion/psychosis. Patient is not an imminent danger to himself or others, and patient does not require inpatient level psychiatric care. Patient is stable in psychiatric standpoint for discharge after medical stabilization. Patient would most benefit from following up with outpatient psychiatrist and/or therapist to build frustration tolerance and healthy, adaptive coping mechanism while functioning and adapting to stressors in the community.     RECOMMENDATION:  1. No indication for psychiatric hospitalization   2. For severe agitation that puts himself and others at danger:  - Haldol 5mg PO/IM q4h PRN   - Ativan 2mg PO/IM/IV q4h PRN  - Benadryl 50mg PO/IM/IV q4h PRN   3. Please call psychiatry as needed.   .     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 Psychiatry was consulted for uncooperative behavior and making suicidal statement of "I hope I get to leave this place and die."    Upon evaluation, patient was remorseful about his rude and uncooperative behavior and explained that he made provocative statement after he felt like he was disrespected when he was awoken by the medical staff. Patient adamantly denies suicidal ideation. Patient does not endorse HI/paranoia/delusion/psychosis and does not display any objective signs and symptoms of carmen/paranoia/delusion/psychosis. Patient is not an imminent danger to himself or others, and patient does not require inpatient level psychiatric care. Patient is stable in psychiatric standpoint for discharge after medical stabilization. Patient would most benefit from following up with outpatient psychiatrist and/or therapist to build frustration tolerance and healthy, adaptive coping mechanism while functioning and adapting to stressors in the community.     RECOMMENDATION:  1. No indication for psychiatric hospitalization   2. For severe agitation that puts himself and others at danger:  - Haldol 5mg PO/IM q4h PRN   - Ativan 2mg PO/IM/IV q4h PRN  - Benadryl 50mg PO/IM/IV q4h PRN   3. Please call psychiatry as needed.

## 2023-12-14 NOTE — BH CONSULTATION LIAISON ASSESSMENT NOTE - NSSUICPROTFACT_PSY_ALL_CORE
Supportive social network of family or friends Responsibility to children, family, or others/Supportive social network of family or friends/Cultural, spiritual and/or moral attitudes against suicide

## 2023-12-14 NOTE — PROGRESS NOTE ADULT - ATTENDING COMMENTS
This is a patient known to Dr. 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 He is transferred from the medical service to vascular, awaiting staged R BKA w/ closure.       Open wound hemostatic. Pain relatively controlled. No signs of pus. WBC 14 (from 19). H/H 7.2/22.5.     PLAN & RECOMMENDATIONS  - IV ABX per ID  - Preop for staged R BKA w/ closure possibly Friday 12/15/23 vs Monday 12/18/23 pending OR availability, medical clearance and improvement/stability in leukocytosis  - ASA/SQH    Thank you,    Michelet Freed MD  Attending Vascular Surgeon  [facility] at [address] [place] [address]  Office: [phone]; Fax: [phone]  [email or web address] This is a patient known to Dr. 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 He is transferred from the medical service to vascular, awaiting staged R BKA w/ closure.       Open wound hemostatic. Pain relatively controlled. No signs of pus. WBC 14 (from 19). H/H 7.2/22.5.     PLAN & RECOMMENDATIONS  - IV ABX per ID  - Preop for staged R BKA w/ closure possibly Friday 12/15/23 vs Monday 12/18/23 pending OR availability, medical clearance and improvement/stability in leukocytosis  - ASA/SQH    Thank you,    Michelet Freed MD  Attending Vascular Surgeon  [facility] at [address] [place] [address]  Office: [phone]; Fax: [phone]  [email or web address]

## 2023-12-14 NOTE — PHYSICAL THERAPY INITIAL EVALUATION ADULT - LEVEL OF INDEPENDENCE: GAIT, REHAB EVAL
Unable to assess, pt unwilling to cooperate with evaluation TBA; pt unwilling to cooperate with evaluation despite max encouragement / motivation, Vascular team informed

## 2023-12-14 NOTE — PROGRESS NOTE ADULT - PROBLEM SELECTOR PLAN 1
Type 2 diabetes mellitus with hyperglycemia  - Please decrease lantus to  units at bedtime.   - Decrease lispro to  units before each meal.  - Continue lispro low dose sliding scale before meals and at bedtime.  - Patient's fingerstick glucose goal is 100-180 mg/dL.    - For discharge, patient can ***.    - Patient can follow up at discharge with [facility] by calling [phone] to make an appointment.      Case discussed with Dr. Huertas. Primary team updated. Type 2 diabetes mellitus with hyperglycemia  - Please decrease lantus to  units at bedtime.   - Decrease lispro to  units before each meal.  - Continue lispro low dose sliding scale before meals and at bedtime.  - Patient's fingerstick glucose goal is 100-180 mg/dL.    - For discharge, patient can ***.    - Patient can follow up at discharge with [facility] by calling [phone] to make an appointment.      Case discussed with Dr. Huertas. Primary team updated. Type 2 diabetes mellitus with hyperglycemia  - Please decrease lantus to 6 units at bedtime.   - Continue lispro 3  units before each meal.  - Continue lispro low dose sliding scale before meals and at bedtime.  - Patient's fingerstick glucose goal is 100-180 mg/dL.    - For discharge, patient can ***.    - Patient can follow up at discharge with [facility] by calling [phone] to make an appointment.      Case discussed with Dr. Huertas. Primary team updated. Type 2 diabetes mellitus with hyperglycemia  - Please decrease lantus to 6 units at bedtime.   - Continue lispro 3  units before each meal.  - Continue lispro low dose sliding scale before meals and at bedtime.  - Patient's fingerstick glucose goal is 100-180 mg/dL.    - For discharge, patient can ***.    - Patient can follow up at discharge with [facility] by calling [phone] to make an appointment.      Case discussed with Dr. Huertas. Primary team updated.

## 2023-12-14 NOTE — CONSULT NOTE ADULT - REASON FOR ADMISSION
soft tissue and skin infection of right foot

## 2023-12-14 NOTE — BH CONSULTATION LIAISON ASSESSMENT NOTE - NSBHCHARTREVIEWLAB_PSY_A_CORE FT
7.2    14.43 )-----------( 174      ( 14 Dec 2023 05:30 )             22.5       12-14    140  |  110<H>  |  28<H>  ----------------------------<  94  4.1   |  25  |  1.36<H>    Ca    7.8<L>      14 Dec 2023 05:30  Phos  3.1     12-14  Mg     1.9     12-14    TPro  4.6<L>  /  Alb  1.4<L>  /  TBili  0.3  /  DBili  x   /  AST  30  /  ALT  20  /  AlkPhos  141<H>  12-14

## 2023-12-14 NOTE — PROGRESS NOTE ADULT - SUBJECTIVE AND OBJECTIVE BOX
O/N: transferred to vascular service, repeat cbc 7.6 (7.3). refusing telemetry        ---------------------------------------------------------------------------  PLEASE CHECK WHEN PRESENT:     [  ]Heart Failure     [  ] Acute     [  ] Acute on Chronic     [x  ] Chronic  -------------------------------------------------------------------     [  ]Diastolic [HFpEF]     [ x ]Systolic [HFrEF]     [  ]Combined [HFpEF & HFrEF]  .................................................................................     [  ]Other:     [ ] Pulmonary Hypertension     [ ] Chronic A-fib     [ ] Persistet A-fib     [ ] Permanent A-fib     [ ] Paroxysmal A-fib     [x ] Hypertensive Heart Disease  -------------------------------------------------------------------  [ ] Respiratory failure  [ ] Acute cor pulmonale  [ ] Asthma/COPD Exacerbation  [ ]COPD on home O2 (Chronic renal Failure)   [ ] Pleural effusion  [ ] Aspiration pneumonia  [ ] Obstructive Sleep Apnea  [ ]Atelectasis   [ ] Acute PE   -------------------------------------------------------------------  [  ]Acute Kidney Injury      [  ]Acute Tubular Necrosis      [  ]Reneal Medullary Necrosis     [  ]Renal Cortical Necrosis     [  ]Other Pathological Lesions:    [  ]CKD 1  [  ]CKD 2  [  ]CKD 3  [  ]CKD 4  [  ]CKD 5 (ESRD)  [  ]Other  -------------------------------------------------------------------  [ x ]Diabetes  [  ] Diabetic PVD Ulcer  [  ] Neuropathic ulcer to DM  [  ] Diabetes with Nephropathy  [ x ] Osteomyelitis due to diabetes  [  ] Hyperglycemia   [  ]hypoglycemia   --------------------------------------------------------------------  [  ]Malnutrition: See Nutrition Note  [  ]Cachexia  [  ]Other:   [  ]Supplement Ordered:  [  ]Morbid Obesity (BMI >=40]  [ ] Ileus  ---------------------------------------------------------------------  [ ] Sepsis/severe sepsis/septic shock  [ ] Noninfectious SIRS  [ ] UTI  [ ] Pneumonia  [ ] Thrombophlebitis     -----------------------------------------------------------------------  [ ] Acidosis/alkalosis  [ ] Fluid overload  [ ] Hypokalemia  [ ] Hyperkalemia  [ ] Hypomagnesemia  [ ] Hypophosphatemia  [ ] Hyperphosphatemia  ------------------------------------------------------------------------  [ ] Acute blood loss anemia  [ ] Post op blood loss anemia  [ ] Iron deficiency anemia  [ ] Anemia due to chronic disease  [ ] Hypercoagulable state  [ ] Thrombocytopenia  ----------------------------------------------------------------------  [ ] Cerebral infarction  [ ] Transient ischemia attack  [ ] Encephalopathy - Toxic or Metabolic      A/P: 69yo M PMH CAD (2 stents 2020), HFmrEF (45-50%), HTN, PAD w/ remote RLE angioplasty, R 4th toe OM s/p partial 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 Found to have increased soft tissue gas in R foot on CT scan. Patient is s/p R BKA on 12/11/23.    Vascular/PAD:  -s/p R guilvishnu BKA 12/11/23, will require closure  -pain control, dressing change today    HTN/HLD:  -c/w Hydralazine, Lisinopril, Carvedilol  -c/w Atorvastatin    CAD/CHF:   -cardiology following, appreciate reccs  -c/w Plavix, ASA    DM:  -endocrine following, appreciate reccs  -mISS, lispro 3u TID, lantus 7u qHS    Anemia:   -post operative anemia- s/p transfusion  -f/u AM cbc    ID:  -c/w Zosyn 3.375g IV q8 last dose today due to source control (?)    Diet: consistent carbs    Activity: OOB to chair    DVTPPx:     Dispo: 5 [facility]ris telemetry       O/N: transferred to vascular service, repeat cbc 7.6 (7.3). refusing telemetry        ---------------------------------------------------------------------------  PLEASE CHECK WHEN PRESENT:     [  ]Heart Failure     [  ] Acute     [  ] Acute on Chronic     [x  ] Chronic  -------------------------------------------------------------------     [  ]Diastolic [HFpEF]     [ x ]Systolic [HFrEF]     [  ]Combined [HFpEF & HFrEF]  .................................................................................     [  ]Other:     [ ] Pulmonary Hypertension     [ ] Chronic A-fib     [ ] Persistet A-fib     [ ] Permanent A-fib     [ ] Paroxysmal A-fib     [x ] Hypertensive Heart Disease  -------------------------------------------------------------------  [ ] Respiratory failure  [ ] Acute cor pulmonale  [ ] Asthma/COPD Exacerbation  [ ]COPD on home O2 (Chronic renal Failure)   [ ] Pleural effusion  [ ] Aspiration pneumonia  [ ] Obstructive Sleep Apnea  [ ]Atelectasis   [ ] Acute PE   -------------------------------------------------------------------  [  ]Acute Kidney Injury      [  ]Acute Tubular Necrosis      [  ]Reneal Medullary Necrosis     [  ]Renal Cortical Necrosis     [  ]Other Pathological Lesions:    [  ]CKD 1  [  ]CKD 2  [  ]CKD 3  [  ]CKD 4  [  ]CKD 5 (ESRD)  [  ]Other  -------------------------------------------------------------------  [ x ]Diabetes  [  ] Diabetic PVD Ulcer  [  ] Neuropathic ulcer to DM  [  ] Diabetes with Nephropathy  [ x ] Osteomyelitis due to diabetes  [  ] Hyperglycemia   [  ]hypoglycemia   --------------------------------------------------------------------  [  ]Malnutrition: See Nutrition Note  [  ]Cachexia  [  ]Other:   [  ]Supplement Ordered:  [  ]Morbid Obesity (BMI >=40]  [ ] Ileus  ---------------------------------------------------------------------  [ ] Sepsis/severe sepsis/septic shock  [ ] Noninfectious SIRS  [ ] UTI  [ ] Pneumonia  [ ] Thrombophlebitis     -----------------------------------------------------------------------  [ ] Acidosis/alkalosis  [ ] Fluid overload  [ ] Hypokalemia  [ ] Hyperkalemia  [ ] Hypomagnesemia  [ ] Hypophosphatemia  [ ] Hyperphosphatemia  ------------------------------------------------------------------------  [ ] Acute blood loss anemia  [ ] Post op blood loss anemia  [ ] Iron deficiency anemia  [ ] Anemia due to chronic disease  [ ] Hypercoagulable state  [ ] Thrombocytopenia  ----------------------------------------------------------------------  [ ] Cerebral infarction  [ ] Transient ischemia attack  [ ] Encephalopathy - Toxic or Metabolic      A/P: 69yo M PMH CAD (2 stents 2020), HFmrEF (45-50%), HTN, PAD w/ remote RLE angioplasty, R 4th toe OM s/p partial R 4th ray amputation on 10/24, RLE angiogram with AT lithotripsy and AT/peroneal balloon angioplasty 10/27, uncontrolled IDDM (a1c 12 in Oct 2023), recent admission 11/8-11/10 to cardiology service for hypertensive emergency (left AMA, was given levaquin when he left for right foot wound that pt had started treatment for back in October) who presented to [facility] after a fall onto his knees and was having b/l knee pain. Found to have increased soft tissue gas in R foot on CT scan. Patient is s/p R BKA on 12/11/23.    Vascular/PAD:  -s/p R guilvishnu BKA 12/11/23, will require closure  -pain control, dressing change today    HTN/HLD:  -c/w Hydralazine, Lisinopril, Carvedilol  -c/w Atorvastatin    CAD/CHF:   -cardiology following, appreciate reccs  -c/w Plavix, ASA    DM:  -endocrine following, appreciate reccs  -mISS, lispro 3u TID, lantus 7u qHS    Anemia:   -post operative anemia- s/p transfusion  -f/u AM cbc    ID:  -c/w Zosyn 3.375g IV q8 last dose today due to source control (?)    Diet: consistent carbs    Activity: OOB to chair    DVTPPx:     Dispo: 5 [employer]ris telemetry       O/N: transferred to vascular service, repeat cbc 7.6 (7.3). refusing telemetry      S: Patient does not have any complaints    O: Examined in bed resting comfortably     ROS: Denies headache, blurred vision, chest pain, SOB, abdominal pain, nausea or vomiting.         piperacillin/tazobactam IVPB.. 3.375  carvedilol 12.5  hydrALAZINE 50  lisinopril 40  piperacillin/tazobactam IVPB.. 3.375      Allergies    No Known Allergies    Intolerances        Vital Signs Last 24 Hrs  T(C): 36.9 (13 Dec 2023 20:59), Max: 37.1 (13 Dec 2023 19:00)  T(F): 98.4 (13 Dec 2023 20:59), Max: 98.7 (13 Dec 2023 19:00)  HR: 66 (13 Dec 2023 20:59) (61 - 72)  BP: 145/65 (13 Dec 2023 20:59) (142/74 - 151/66)  BP(mean): 92 (13 Dec 2023 20:59) (92 - 96)  RR: 18 (13 Dec 2023 20:59) (18 - 18)  SpO2: 97% (13 Dec 2023 20:59) (96% - 97%)    Parameters below as of 13 Dec 2023 20:59  Patient On (Oxygen Delivery Method): room air      I&O's Summary    13 Dec 2023 07:01  -  14 Dec 2023 07:00  --------------------------------------------------------  IN: 0 mL / OUT: 400 mL / NET: -400 mL      Physical Exam:  General: NAD, resting comfortably in bed  C/V: NSR  Pulm: Nonlabored breathing, no respiratory distress  Abd: soft, NT/ND.  Extrem: RLE s/p guillotine BKA, stump wrapped in ACE and Kerlex with appropriate strike through at the posterior aspect of the stump.      LABS:                        7.2    14.43 )-----------( 174      ( 14 Dec 2023 05:30 )             22.5     12-14    140  |  110<H>  |  28<H>  ----------------------------<  94  4.1   |  25  |  1.36<H>    Ca    7.8<L>      14 Dec 2023 05:30  Phos  3.1     12-14  Mg     1.9     12-14    TPro  4.6<L>  /  Alb  1.4<L>  /  TBili  0.3  /  DBili  x   /  AST  30  /  ALT  20  /  AlkPhos  141<H>  12-14        Radiology and Additional Studies:      ---------------------------------------------------------------------------  PLEASE CHECK WHEN PRESENT:     [  ]Heart Failure     [  ] Acute     [  ] Acute on Chronic     [x  ] Chronic  -------------------------------------------------------------------     [  ]Diastolic [HFpEF]     [ x ]Systolic [HFrEF]     [  ]Combined [HFpEF & HFrEF]  .................................................................................     [  ]Other:     [ ] Pulmonary Hypertension     [ ] Chronic A-fib     [ ] Persistet A-fib     [ ] Permanent A-fib     [ ] Paroxysmal A-fib     [x ] Hypertensive Heart Disease  -------------------------------------------------------------------  [ ] Respiratory failure  [ ] Acute cor pulmonale  [ ] Asthma/COPD Exacerbation  [ ]COPD on home O2 (Chronic renal Failure)   [ ] Pleural effusion  [ ] Aspiration pneumonia  [ ] Obstructive Sleep Apnea  [ ]Atelectasis   [ ] Acute PE   -------------------------------------------------------------------  [  ]Acute Kidney Injury      [  ]Acute Tubular Necrosis      [  ]Reneal Medullary Necrosis     [  ]Renal Cortical Necrosis     [  ]Other Pathological Lesions:    [  ]CKD 1  [  ]CKD 2  [  ]CKD 3  [  ]CKD 4  [  ]CKD 5 (ESRD)  [  ]Other  -------------------------------------------------------------------  [ x ]Diabetes  [  ] Diabetic PVD Ulcer  [  ] Neuropathic ulcer to DM  [  ] Diabetes with Nephropathy  [ x ] Osteomyelitis due to diabetes  [  ] Hyperglycemia   [  ]hypoglycemia   --------------------------------------------------------------------  [  ]Malnutrition: See Nutrition Note  [  ]Cachexia  [  ]Other:   [  ]Supplement Ordered:  [  ]Morbid Obesity (BMI >=40]  [ ] Ileus  ---------------------------------------------------------------------  [ ] Sepsis/severe sepsis/septic shock  [ ] Noninfectious SIRS  [ ] UTI  [ ] Pneumonia  [ ] Thrombophlebitis     -----------------------------------------------------------------------  [ ] Acidosis/alkalosis  [ ] Fluid overload  [ ] Hypokalemia  [ ] Hyperkalemia  [ ] Hypomagnesemia  [ ] Hypophosphatemia  [ ] Hyperphosphatemia  ------------------------------------------------------------------------  [ ] Acute blood loss anemia  [ ] Post op blood loss anemia  [ ] Iron deficiency anemia  [ ] Anemia due to chronic disease  [ ] Hypercoagulable state  [ ] Thrombocytopenia  ----------------------------------------------------------------------  [ ] Cerebral infarction  [ ] Transient ischemia attack  [ ] Encephalopathy - Toxic or Metabolic      A/P: 67yo M PMH CAD (2 stents 2020), HFmrEF (45-50%), HTN, PAD w/ remote RLE angioplasty, R 4th toe OM s/p partial R 4th ray amputation on 10/24, RLE angiogram with AT lithotripsy and AT/peroneal balloon angioplasty 10/27, uncontrolled IDDM (a1c 12 in Oct 2023), recent admission 11/8-11/10 to cardiology service for hypertensive emergency (left AMA, was given levaquin when he left for right foot wound that pt had started treatment for back in October) who presented to [facility] after a fall onto his knees and was having b/l knee pain. Found to have increased soft tissue gas in R foot on CT scan. Patient is s/p R BKA on 12/11/23.    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 refusing telemetry      S: Patient does not have any complaints    O: Examined in bed resting comfortably     ROS: Denies headache, blurred vision, chest pain, SOB, abdominal pain, nausea or vomiting.         piperacillin/tazobactam IVPB.. 3.375  carvedilol 12.5  hydrALAZINE 50  lisinopril 40  piperacillin/tazobactam IVPB.. 3.375      Allergies    No Known Allergies    Intolerances        Vital Signs Last 24 Hrs  T(C): 36.9 (13 Dec 2023 20:59), Max: 37.1 (13 Dec 2023 19:00)  T(F): 98.4 (13 Dec 2023 20:59), Max: 98.7 (13 Dec 2023 19:00)  HR: 66 (13 Dec 2023 20:59) (61 - 72)  BP: 145/65 (13 Dec 2023 20:59) (142/74 - 151/66)  BP(mean): 92 (13 Dec 2023 20:59) (92 - 96)  RR: 18 (13 Dec 2023 20:59) (18 - 18)  SpO2: 97% (13 Dec 2023 20:59) (96% - 97%)    Parameters below as of 13 Dec 2023 20:59  Patient On (Oxygen Delivery Method): room air      I&O's Summary    13 Dec 2023 07:01  -  14 Dec 2023 07:00  --------------------------------------------------------  IN: 0 mL / OUT: 400 mL / NET: -400 mL      Physical Exam:  General: NAD, resting comfortably in bed  C/V: NSR  Pulm: Nonlabored breathing, no respiratory distress  Abd: soft, NT/ND.  Extrem: RLE s/p guillotine BKA, stump wrapped in ACE and Kerlex with appropriate strike through at the posterior aspect of the stump.      LABS:                        7.2    14.43 )-----------( 174      ( 14 Dec 2023 05:30 )             22.5     12-14    140  |  110<H>  |  28<H>  ----------------------------<  94  4.1   |  25  |  1.36<H>    Ca    7.8<L>      14 Dec 2023 05:30  Phos  3.1     12-14  Mg     1.9     12-14    TPro  4.6<L>  /  Alb  1.4<L>  /  TBili  0.3  /  DBili  x   /  AST  30  /  ALT  20  /  AlkPhos  141<H>  12-14        Radiology and Additional Studies:      ---------------------------------------------------------------------------  PLEASE CHECK WHEN PRESENT:     [  ]Heart Failure     [  ] Acute     [  ] Acute on Chronic     [x  ] Chronic  -------------------------------------------------------------------     [  ]Diastolic [HFpEF]     [ x ]Systolic [HFrEF]     [  ]Combined [HFpEF & HFrEF]  .................................................................................     [  ]Other:     [ ] Pulmonary Hypertension     [ ] Chronic A-fib     [ ] Persistet A-fib     [ ] Permanent A-fib     [ ] Paroxysmal A-fib     [x ] Hypertensive Heart Disease  -------------------------------------------------------------------  [ ] Respiratory failure  [ ] Acute cor pulmonale  [ ] Asthma/COPD Exacerbation  [ ]COPD on home O2 (Chronic renal Failure)   [ ] Pleural effusion  [ ] Aspiration pneumonia  [ ] Obstructive Sleep Apnea  [ ]Atelectasis   [ ] Acute PE   -------------------------------------------------------------------  [  ]Acute Kidney Injury      [  ]Acute Tubular Necrosis      [  ]Reneal Medullary Necrosis     [  ]Renal Cortical Necrosis     [  ]Other Pathological Lesions:    [  ]CKD 1  [  ]CKD 2  [  ]CKD 3  [  ]CKD 4  [  ]CKD 5 (ESRD)  [  ]Other  -------------------------------------------------------------------  [ x ]Diabetes  [  ] Diabetic PVD Ulcer  [  ] Neuropathic ulcer to DM  [  ] Diabetes with Nephropathy  [ x ] Osteomyelitis due to diabetes  [  ] Hyperglycemia   [  ]hypoglycemia   --------------------------------------------------------------------  [  ]Malnutrition: See Nutrition Note  [  ]Cachexia  [  ]Other:   [  ]Supplement Ordered:  [  ]Morbid Obesity (BMI >=40]  [ ] Ileus  ---------------------------------------------------------------------  [ ] Sepsis/severe sepsis/septic shock  [ ] Noninfectious SIRS  [ ] UTI  [ ] Pneumonia  [ ] Thrombophlebitis     -----------------------------------------------------------------------  [ ] Acidosis/alkalosis  [ ] Fluid overload  [ ] Hypokalemia  [ ] Hyperkalemia  [ ] Hypomagnesemia  [ ] Hypophosphatemia  [ ] Hyperphosphatemia  ------------------------------------------------------------------------  [ ] Acute blood loss anemia  [ ] Post op blood loss anemia  [ ] Iron deficiency anemia  [ ] Anemia due to chronic disease  [ ] Hypercoagulable state  [ ] Thrombocytopenia  ----------------------------------------------------------------------  [ ] Cerebral infarction  [ ] Transient ischemia attack  [ ] Encephalopathy - Toxic or Metabolic      A/P: 69yo M PMH CAD (2 stents 2020), HFmrEF (45-50%), HTN, PAD w/ remote RLE angioplasty, R 4th toe OM s/p partial R 4th ray amputation on 10/24, RLE angiogram with AT lithotripsy and AT/peroneal balloon angioplasty 10/27, uncontrolled IDDM (a1c 12 in Oct 2023), recent admission 11/8-11/10 to cardiology service for hypertensive emergency (left AMA, was given levaquin when he left for right foot wound that pt had started treatment for back in October) who presented to [facility] after a fall onto his knees and was having b/l knee pain. Found to have increased soft tissue gas in R foot on CT scan. Patient is s/p R BKA on 12/11/23.    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 refusing telemetry      S: Complaining of pain localized to stump, otherwise does not have any complaints    O: Examined in bed resting comfortably     ROS: Denies headache, blurred vision, chest pain, SOB, abdominal pain, nausea or vomiting.         piperacillin/tazobactam IVPB.. 3.375  carvedilol 12.5  hydrALAZINE 50  lisinopril 40  piperacillin/tazobactam IVPB.. 3.375      Allergies    No Known Allergies    Intolerances        Vital Signs Last 24 Hrs  T(C): 36.9 (13 Dec 2023 20:59), Max: 37.1 (13 Dec 2023 19:00)  T(F): 98.4 (13 Dec 2023 20:59), Max: 98.7 (13 Dec 2023 19:00)  HR: 66 (13 Dec 2023 20:59) (61 - 72)  BP: 145/65 (13 Dec 2023 20:59) (142/74 - 151/66)  BP(mean): 92 (13 Dec 2023 20:59) (92 - 96)  RR: 18 (13 Dec 2023 20:59) (18 - 18)  SpO2: 97% (13 Dec 2023 20:59) (96% - 97%)    Parameters below as of 13 Dec 2023 20:59  Patient On (Oxygen Delivery Method): room air      I&O's Summary    13 Dec 2023 07:01  -  14 Dec 2023 07:00  --------------------------------------------------------  IN: 0 mL / OUT: 400 mL / NET: -400 mL      Physical Exam:  General: NAD, resting comfortably in bed  C/V: NSR  Pulm: Nonlabored breathing, no respiratory distress  Abd: soft, NT/ND.  Extrem: RLE s/p guillotine BKA, stump wrapped in ACE and Kerlex with appropriate strike through at the posterior aspect of the stump.      LABS:                        7.2    14.43 )-----------( 174      ( 14 Dec 2023 05:30 )             22.5     12-14    140  |  110<H>  |  28<H>  ----------------------------<  94  4.1   |  25  |  1.36<H>    Ca    7.8<L>      14 Dec 2023 05:30  Phos  3.1     12-14  Mg     1.9     12-14    TPro  4.6<L>  /  Alb  1.4<L>  /  TBili  0.3  /  DBili  x   /  AST  30  /  ALT  20  /  AlkPhos  141<H>  12-14        Radiology and Additional Studies:      ---------------------------------------------------------------------------  PLEASE CHECK WHEN PRESENT:     [  ]Heart Failure     [  ] Acute     [  ] Acute on Chronic     [x  ] Chronic  -------------------------------------------------------------------     [  ]Diastolic [HFpEF]     [ x ]Systolic [HFrEF]     [  ]Combined [HFpEF & HFrEF]  .................................................................................     [  ]Other:     [ ] Pulmonary Hypertension     [ ] Chronic A-fib     [ ] Persistet A-fib     [ ] Permanent A-fib     [ ] Paroxysmal A-fib     [x ] Hypertensive Heart Disease  -------------------------------------------------------------------  [ ] Respiratory failure  [ ] Acute cor pulmonale  [ ] Asthma/COPD Exacerbation  [ ]COPD on home O2 (Chronic renal Failure)   [ ] Pleural effusion  [ ] Aspiration pneumonia  [ ] Obstructive Sleep Apnea  [ ]Atelectasis   [ ] Acute PE   -------------------------------------------------------------------  [  ]Acute Kidney Injury      [  ]Acute Tubular Necrosis      [  ]Reneal Medullary Necrosis     [  ]Renal Cortical Necrosis     [  ]Other Pathological Lesions:    [  ]CKD 1  [  ]CKD 2  [  ]CKD 3  [  ]CKD 4  [  ]CKD 5 (ESRD)  [  ]Other  -------------------------------------------------------------------  [ x ]Diabetes  [  ] Diabetic PVD Ulcer  [  ] Neuropathic ulcer to DM  [  ] Diabetes with Nephropathy  [ x ] Osteomyelitis due to diabetes  [  ] Hyperglycemia   [  ]hypoglycemia   --------------------------------------------------------------------  [  ]Malnutrition: See Nutrition Note  [  ]Cachexia  [  ]Other:   [  ]Supplement Ordered:  [  ]Morbid Obesity (BMI >=40]  [ ] Ileus  ---------------------------------------------------------------------  [ ] Sepsis/severe sepsis/septic shock  [ ] Noninfectious SIRS  [ ] UTI  [ ] Pneumonia  [ ] Thrombophlebitis     -----------------------------------------------------------------------  [ ] Acidosis/alkalosis  [ ] Fluid overload  [ ] Hypokalemia  [ ] Hyperkalemia  [ ] Hypomagnesemia  [ ] Hypophosphatemia  [ ] Hyperphosphatemia  ------------------------------------------------------------------------  [ ] Acute blood loss anemia  [ ] Post op blood loss anemia  [ ] Iron deficiency anemia  [ ] Anemia due to chronic disease  [ ] Hypercoagulable state  [ ] Thrombocytopenia  ----------------------------------------------------------------------  [ ] Cerebral infarction  [ ] Transient ischemia attack  [ ] Encephalopathy - Toxic or Metabolic      A/P: 69yo M PMH CAD (2 stents 2020), HFmrEF (45-50%), HTN, PAD w/ remote RLE angioplasty, R 4th toe OM s/p partial R 4th ray amputation on 10/24, RLE angiogram with AT lithotripsy and AT/peroneal balloon angioplasty 10/27, uncontrolled IDDM (a1c 12 in Oct 2023), recent admission 11/8-11/10 to cardiology service for hypertensive emergency (left AMA, was given levaquin when he left for right foot wound that pt had started treatment for back in October) who presented to [facility] after a fall onto his knees and was having b/l knee pain. Found to have increased soft tissue gas in R foot on CT scan. Patient is s/p R BKA on 12/11/23.    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 refusing telemetry      S: Complaining of pain localized to stump, otherwise does not have any complaints    O: Examined in bed resting comfortably     ROS: Denies headache, blurred vision, chest pain, SOB, abdominal pain, nausea or vomiting.         piperacillin/tazobactam IVPB.. 3.375  carvedilol 12.5  hydrALAZINE 50  lisinopril 40  piperacillin/tazobactam IVPB.. 3.375      Allergies    No Known Allergies    Intolerances        Vital Signs Last 24 Hrs  T(C): 36.9 (13 Dec 2023 20:59), Max: 37.1 (13 Dec 2023 19:00)  T(F): 98.4 (13 Dec 2023 20:59), Max: 98.7 (13 Dec 2023 19:00)  HR: 66 (13 Dec 2023 20:59) (61 - 72)  BP: 145/65 (13 Dec 2023 20:59) (142/74 - 151/66)  BP(mean): 92 (13 Dec 2023 20:59) (92 - 96)  RR: 18 (13 Dec 2023 20:59) (18 - 18)  SpO2: 97% (13 Dec 2023 20:59) (96% - 97%)    Parameters below as of 13 Dec 2023 20:59  Patient On (Oxygen Delivery Method): room air      I&O's Summary    13 Dec 2023 07:01  -  14 Dec 2023 07:00  --------------------------------------------------------  IN: 0 mL / OUT: 400 mL / NET: -400 mL      Physical Exam:  General: NAD, resting comfortably in bed  C/V: NSR  Pulm: Nonlabored breathing, no respiratory distress  Abd: soft, NT/ND.  Extrem: RLE s/p guillotine BKA, stump wrapped in ACE and Kerlex with appropriate strike through at the posterior aspect of the stump.      LABS:                        7.2    14.43 )-----------( 174      ( 14 Dec 2023 05:30 )             22.5     12-14    140  |  110<H>  |  28<H>  ----------------------------<  94  4.1   |  25  |  1.36<H>    Ca    7.8<L>      14 Dec 2023 05:30  Phos  3.1     12-14  Mg     1.9     12-14    TPro  4.6<L>  /  Alb  1.4<L>  /  TBili  0.3  /  DBili  x   /  AST  30  /  ALT  20  /  AlkPhos  141<H>  12-14        Radiology and Additional Studies:      ---------------------------------------------------------------------------  PLEASE CHECK WHEN PRESENT:     [  ]Heart Failure     [  ] Acute     [  ] Acute on Chronic     [x  ] Chronic  -------------------------------------------------------------------     [  ]Diastolic [HFpEF]     [ x ]Systolic [HFrEF]     [  ]Combined [HFpEF & HFrEF]  .................................................................................     [  ]Other:     [ ] Pulmonary Hypertension     [ ] Chronic A-fib     [ ] Persistet A-fib     [ ] Permanent A-fib     [ ] Paroxysmal A-fib     [x ] Hypertensive Heart Disease  -------------------------------------------------------------------  [ ] Respiratory failure  [ ] Acute cor pulmonale  [ ] Asthma/COPD Exacerbation  [ ]COPD on home O2 (Chronic renal Failure)   [ ] Pleural effusion  [ ] Aspiration pneumonia  [ ] Obstructive Sleep Apnea  [ ]Atelectasis   [ ] Acute PE   -------------------------------------------------------------------  [  ]Acute Kidney Injury      [  ]Acute Tubular Necrosis      [  ]Reneal Medullary Necrosis     [  ]Renal Cortical Necrosis     [  ]Other Pathological Lesions:    [  ]CKD 1  [  ]CKD 2  [  ]CKD 3  [  ]CKD 4  [  ]CKD 5 (ESRD)  [  ]Other  -------------------------------------------------------------------  [ x ]Diabetes  [  ] Diabetic PVD Ulcer  [  ] Neuropathic ulcer to DM  [  ] Diabetes with Nephropathy  [ x ] Osteomyelitis due to diabetes  [  ] Hyperglycemia   [  ]hypoglycemia   --------------------------------------------------------------------  [  ]Malnutrition: See Nutrition Note  [  ]Cachexia  [  ]Other:   [  ]Supplement Ordered:  [  ]Morbid Obesity (BMI >=40]  [ ] Ileus  ---------------------------------------------------------------------  [ ] Sepsis/severe sepsis/septic shock  [ ] Noninfectious SIRS  [ ] UTI  [ ] Pneumonia  [ ] Thrombophlebitis     -----------------------------------------------------------------------  [ ] Acidosis/alkalosis  [ ] Fluid overload  [ ] Hypokalemia  [ ] Hyperkalemia  [ ] Hypomagnesemia  [ ] Hypophosphatemia  [ ] Hyperphosphatemia  ------------------------------------------------------------------------  [ ] Acute blood loss anemia  [ ] Post op blood loss anemia  [ ] Iron deficiency anemia  [ ] Anemia due to chronic disease  [ ] Hypercoagulable state  [ ] Thrombocytopenia  ----------------------------------------------------------------------  [ ] Cerebral infarction  [ ] Transient ischemia attack  [ ] Encephalopathy - Toxic or Metabolic      A/P: 69yo M PMH CAD (2 stents 2020), HFmrEF (45-50%), HTN, PAD w/ remote RLE angioplasty, 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 Found to have increased soft tissue gas in R foot on CT scan. Patient is s/p R BKA on 12/11/23.    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

## 2023-12-14 NOTE — CONSULT NOTE ADULT - SUBJECTIVE AND OBJECTIVE BOX
HPI:  69yo M PMH CAD (2 stents 2020), HFmrEF (45-50%), HTN, PAD w/ remote RLE angioplasty, R 4th toe OM s/p partial R 4th ray amputation on 10/24, RLE angiogram with AT lithotripsy and AT/peroneal balloon angioplasty 10/27, uncontrolled IDDM (a1c 12 in Oct 2023), recent admission 11/8-11/10 to cardiology service for hypertensive emergency (left AMA, was given levaquin when he left for right foot wound that pt had started treatment for back in October) who presented to [facility] after a fall onto his knees and was having b/l knee pain. Found to have increased soft tissue gas in R foot on CT scan now s/p right BKA on 12/11/23 now pending revision and stump closure of BKA      INTERVAL HISTORY:  Receiving blood transfusion for anemia, last on 12/13with good response    -----------------------------------------------------------------------  REVIEW OF SYSTEMS  Constitutional - No fever,  No fatigue  HEENT - No vertigo, No neck pain  Neurological - No headaches, No memory loss, No loss of strength, No numbness, No tremors  Musculoskeletal - No joint pain, No joint swelling, No muscle pain  Bowel -   Bladder -   -----------------------------------------------------------------------  FUNCTIONAL HISTORY  PTA, patient independent with ADLs and IADLs. Used a SC for ambulation.     CURRENT FUNCTIONAL STATUS  PT initial evaluation 12/14:    Transfer: Sit to Stand:   · Level of [place]	maximum assist (25% patients effort)  · Physical Assist/Nonphysical Assist	verbal cues; 2 person assist  · Assistive Device	rolling walker    Transfer: Stand to Sit:   · Level of [place]	maximum assist (25% patients effort)  · Physical Assist/Nonphysical Assist	verbal cues; 2 person assist  · Assistive Device	rolling walker    Sit/Stand Transfer Safety Analysis:   · Transfer Safety Concerns Noted	decreased weight-shifting ability; decreased safety awareness  · Impairments Contributing to Impaired Transfers	impaired balance; impaired postural control; decreased strength        -----------------------------------------------------------------------  PAST MEDICAL & SURGICAL HISTORY  IDDM (insulin dependent diabetes mellitus)  HTN (hypertension)  CAD S/P percutaneous coronary angioplasty  Neuropathy  PAD (peripheral artery disease)  Acute CHF  PNA (pneumonia)  Gangrene of toe of right foot  Moderate aortic stenosis  Acute on chronic diastolic congestive heart failure  Hyperlipidemia  No significant past surgical history  History of partial ray amputation of fourth toe of right foot  Gangrene of toe of right foot  Acute CHF  PNA (pneumonia)  PNA (pneumonia)    FAMILY HISTORY     SOCIAL HISTORY  as above  -----------------------------------------------------------------------  VITALS  T(C): 37 (12-14-23 @ 08:28), Max: 37.1 (12-13-23 @ 19:00)  HR: 71 (12-14-23 @ 08:28) (66 - 72)  BP: 139/71 (12-14-23 @ 08:28) (139/71 - 145/65)  RR: 18 (12-14-23 @ 08:28) (18 - 18)  SpO2: 99% (12-14-23 @ 08:28) (96% - 99%)  Wt(kg): --    PHYSICAL EXAM        -----------------------------------------------------------------------  RECENT LABS  CBC Full  -  ( 14 Dec 2023 05:30 )  WBC Count : 14.43 K/uL  RBC Count : 2.65 M/uL  Hemoglobin : 7.2 g/dL  Hematocrit : 22.5 %  Platelet Count - Automated : 174 K/uL  Mean Cell Volume : 84.9 fl  Mean Cell Hemoglobin : 27.2 pg  Mean Cell Hemoglobin Concentration : 32.0 gm/dL  Auto Neutrophil # : 12.35 K/uL  Auto Lymphocyte # : 1.04 K/uL  Auto Monocyte # : 0.52 K/uL  Auto Eosinophil # : 0.52 K/uL  Auto Basophil # : 0.00 K/uL  Auto Neutrophil % : 85.6 %  Auto Lymphocyte % : 7.2 %  Auto Monocyte % : 3.6 %  Auto Eosinophil % : 3.6 %  Auto Basophil % : 0.0 %    12-14    140  |  110<H>  |  28<H>  ----------------------------<  94  4.1   |  25  |  1.36<H>    Ca    7.8<L>      14 Dec 2023 05:30  Phos  3.1     12-14  Mg     1.9     12-14    TPro  4.6<L>  /  Alb  1.4<L>  /  TBili  0.3  /  DBili  x   /  AST  30  /  ALT  20  /  AlkPhos  141<H>  12-14    Urinalysis Basic - ( 14 Dec 2023 05:30 )    Color: x / Appearance: x / SG: x / pH: x  Gluc: 94 mg/dL / Ketone: x  / Bili: x / Urobili: x   Blood: x / Protein: x / Nitrite: x   Leuk Esterase: x / RBC: x / WBC x   Sq Epi: x / Non Sq Epi: x / Bacteria: x    -----------------------------------------------------------------------  IMAGING:      -----------------------------------------------------------------------  ALLERGIES  No Known Allergies    MEDICATIONS   acetaminophen     Tablet .. 650 milliGRAM(s) Oral every 6 hours  atorvastatin 80 milliGRAM(s) Oral at bedtime  carvedilol 12.5 milliGRAM(s) Oral every 12 hours  gabapentin 800 milliGRAM(s) Oral three times a day  hydrALAZINE 50 milliGRAM(s) Oral every 8 hours  HYDROmorphone  Injectable 1 milliGRAM(s) IV Push every 6 hours PRN  insulin glargine Injectable (LANTUS) 7 Unit(s) SubCutaneous at bedtime  insulin lispro (ADMELOG) corrective regimen sliding scale   SubCutaneous Before meals and at bedtime  insulin lispro Injectable (ADMELOG) 3 Unit(s) SubCutaneous three times a day before meals  lisinopril 40 milliGRAM(s) Oral daily  magnesium sulfate  IVPB 1 Gram(s) IV Intermittent once  oxyCODONE    IR 10 milliGRAM(s) Oral every 8 hours PRN  oxyCODONE    IR 5 milliGRAM(s) Oral every 8 hours PRN  piperacillin/tazobactam IVPB.. 3.375 Gram(s) IV Intermittent every 8 hours    -----------------------------------------------------------------------   HPI:  69yo M PMH CAD (2 stents 2020), HFmrEF (45-50%), HTN, PAD w/ remote RLE angioplasty, R 4th toe OM s/p partial R 4th ray amputation on 10/24, RLE angiogram with AT lithotripsy and AT/peroneal balloon angioplasty 10/27, uncontrolled IDDM (a1c 12 in Oct 2023), recent admission 11/8-11/10 to cardiology service for hypertensive emergency (left AMA, was given levaquin when he left for right foot wound that pt had started treatment for back in October) who presented to [facility] after a fall onto his knees and was having b/l knee pain. Found to have increased soft tissue gas in R foot on CT scan now s/p right BKA on 12/11/23 now pending revision and stump closure of BKA      INTERVAL HISTORY:  Receiving blood transfusion for anemia, last on 12/13with good response    -----------------------------------------------------------------------  REVIEW OF SYSTEMS  Constitutional - No fever,  No fatigue  HEENT - No vertigo, No neck pain  Neurological - No headaches, No memory loss, No loss of strength, No numbness, No tremors  Musculoskeletal - No joint pain, No joint swelling, No muscle pain  Bowel -   Bladder -   -----------------------------------------------------------------------  FUNCTIONAL HISTORY  PTA, patient independent with ADLs and IADLs. Used a SC for ambulation.     CURRENT FUNCTIONAL STATUS  PT initial evaluation 12/14:    Transfer: Sit to Stand:   · Level of [place]	maximum assist (25% patients effort)  · Physical Assist/Nonphysical Assist	verbal cues; 2 person assist  · Assistive Device	rolling walker    Transfer: Stand to Sit:   · Level of [place]	maximum assist (25% patients effort)  · Physical Assist/Nonphysical Assist	verbal cues; 2 person assist  · Assistive Device	rolling walker    Sit/Stand Transfer Safety Analysis:   · Transfer Safety Concerns Noted	decreased weight-shifting ability; decreased safety awareness  · Impairments Contributing to Impaired Transfers	impaired balance; impaired postural control; decreased strength        -----------------------------------------------------------------------  PAST MEDICAL & SURGICAL HISTORY  IDDM (insulin dependent diabetes mellitus)  HTN (hypertension)  CAD S/P percutaneous coronary angioplasty  Neuropathy  PAD (peripheral artery disease)  Acute CHF  PNA (pneumonia)  Gangrene of toe of right foot  Moderate aortic stenosis  Acute on chronic diastolic congestive heart failure  Hyperlipidemia  No significant past surgical history  History of partial ray amputation of fourth toe of right foot  Gangrene of toe of right foot  Acute CHF  PNA (pneumonia)  PNA (pneumonia)    FAMILY HISTORY     SOCIAL HISTORY  as above  -----------------------------------------------------------------------  VITALS  T(C): 37 (12-14-23 @ 08:28), Max: 37.1 (12-13-23 @ 19:00)  HR: 71 (12-14-23 @ 08:28) (66 - 72)  BP: 139/71 (12-14-23 @ 08:28) (139/71 - 145/65)  RR: 18 (12-14-23 @ 08:28) (18 - 18)  SpO2: 99% (12-14-23 @ 08:28) (96% - 99%)  Wt(kg): --    PHYSICAL EXAM        -----------------------------------------------------------------------  RECENT LABS  CBC Full  -  ( 14 Dec 2023 05:30 )  WBC Count : 14.43 K/uL  RBC Count : 2.65 M/uL  Hemoglobin : 7.2 g/dL  Hematocrit : 22.5 %  Platelet Count - Automated : 174 K/uL  Mean Cell Volume : 84.9 fl  Mean Cell Hemoglobin : 27.2 pg  Mean Cell Hemoglobin Concentration : 32.0 gm/dL  Auto Neutrophil # : 12.35 K/uL  Auto Lymphocyte # : 1.04 K/uL  Auto Monocyte # : 0.52 K/uL  Auto Eosinophil # : 0.52 K/uL  Auto Basophil # : 0.00 K/uL  Auto Neutrophil % : 85.6 %  Auto Lymphocyte % : 7.2 %  Auto Monocyte % : 3.6 %  Auto Eosinophil % : 3.6 %  Auto Basophil % : 0.0 %    12-14    140  |  110<H>  |  28<H>  ----------------------------<  94  4.1   |  25  |  1.36<H>    Ca    7.8<L>      14 Dec 2023 05:30  Phos  3.1     12-14  Mg     1.9     12-14    TPro  4.6<L>  /  Alb  1.4<L>  /  TBili  0.3  /  DBili  x   /  AST  30  /  ALT  20  /  AlkPhos  141<H>  12-14    Urinalysis Basic - ( 14 Dec 2023 05:30 )    Color: x / Appearance: x / SG: x / pH: x  Gluc: 94 mg/dL / Ketone: x  / Bili: x / Urobili: x   Blood: x / Protein: x / Nitrite: x   Leuk Esterase: x / RBC: x / WBC x   Sq Epi: x / Non Sq Epi: x / Bacteria: x    -----------------------------------------------------------------------  IMAGING:      -----------------------------------------------------------------------  ALLERGIES  No Known Allergies    MEDICATIONS   acetaminophen     Tablet .. 650 milliGRAM(s) Oral every 6 hours  atorvastatin 80 milliGRAM(s) Oral at bedtime  carvedilol 12.5 milliGRAM(s) Oral every 12 hours  gabapentin 800 milliGRAM(s) Oral three times a day  hydrALAZINE 50 milliGRAM(s) Oral every 8 hours  HYDROmorphone  Injectable 1 milliGRAM(s) IV Push every 6 hours PRN  insulin glargine Injectable (LANTUS) 7 Unit(s) SubCutaneous at bedtime  insulin lispro (ADMELOG) corrective regimen sliding scale   SubCutaneous Before meals and at bedtime  insulin lispro Injectable (ADMELOG) 3 Unit(s) SubCutaneous three times a day before meals  lisinopril 40 milliGRAM(s) Oral daily  magnesium sulfate  IVPB 1 Gram(s) IV Intermittent once  oxyCODONE    IR 10 milliGRAM(s) Oral every 8 hours PRN  oxyCODONE    IR 5 milliGRAM(s) Oral every 8 hours PRN  piperacillin/tazobactam IVPB.. 3.375 Gram(s) IV Intermittent every 8 hours    -----------------------------------------------------------------------   HPI:  67yo M PMH CAD (2 stents 2020), HFmrEF (45-50%), HTN, PAD w/ remote RLE angioplasty, R 4th toe OM s/p partial R 4th ray amputation on 10/24, RLE angiogram with AT lithotripsy and AT/peroneal balloon angioplasty 10/27, uncontrolled IDDM (a1c 12 in Oct 2023), recent admission 11/8-11/10 to cardiology service for hypertensive emergency (left AMA, was given levaquin when he left for right foot wound that pt had started treatment for back in October) who presented to [facility] after a fall onto his knees and was having b/l knee pain. Found to have increased soft tissue gas in R foot on CT scan now s/p right BKA on 12/11/23 now pending revision and stump closure of BKA    INTERVAL HISTORY:  Receiving blood transfusion for anemia, last on 12/13 with good response    SUBJECTIVE:  Patient seen and evaluated at bedside. Sitting up in bed eating. States pain is better controlled on current regimen.   Worked with therapy today without issues.     -----------------------------------------------------------------------  REVIEW OF SYSTEMS  Constitutional - No fever,  No fatigue  HEENT - No vertigo, No neck pain  Neurological - No headaches, No memory loss, +loss of strength, No numbness, No tremors  Musculoskeletal - +post op pain  Bowel - no issues  Bladder - voiding independently   -----------------------------------------------------------------------  FUNCTIONAL HISTORY  PTA, patient independent with ADLs and IADLs. Used a SC for ambulation.     CURRENT FUNCTIONAL STATUS  PT initial evaluation 12/14:    Transfer: Sit to Stand:   · Level of [place]	maximum assist (25% patients effort)  · Physical Assist/Nonphysical Assist	verbal cues; 2 person assist  · Assistive Device	rolling walker    Transfer: Stand to Sit:   · Level of [place]	maximum assist (25% patients effort)  · Physical Assist/Nonphysical Assist	verbal cues; 2 person assist  · Assistive Device	rolling walker    Sit/Stand Transfer Safety Analysis:   · Transfer Safety Concerns Noted	decreased weight-shifting ability; decreased safety awareness  · Impairments Contributing to Impaired Transfers	impaired balance; impaired postural control; decreased strength        -----------------------------------------------------------------------  PAST MEDICAL & SURGICAL HISTORY  IDDM (insulin dependent diabetes mellitus)  HTN (hypertension)  CAD S/P percutaneous coronary angioplasty  Neuropathy  PAD (peripheral artery disease)  Acute CHF  PNA (pneumonia)  Gangrene of toe of right foot  Moderate aortic stenosis  Acute on chronic diastolic congestive heart failure  Hyperlipidemia  No significant past surgical history  History of partial ray amputation of fourth toe of right foot  Gangrene of toe of right foot  Acute CHF  PNA (pneumonia)  PNA (pneumonia)    FAMILY HISTORY     SOCIAL HISTORY  as above  -----------------------------------------------------------------------  VITALS  T(C): 37 (12-14-23 @ 08:28), Max: 37.1 (12-13-23 @ 19:00)  HR: 71 (12-14-23 @ 08:28) (66 - 72)  BP: 139/71 (12-14-23 @ 08:28) (139/71 - 145/65)  RR: 18 (12-14-23 @ 08:28) (18 - 18)  SpO2: 99% (12-14-23 @ 08:28) (96% - 99%)  Wt(kg): --    PHYSICAL EXAM  Constitutional - NAD, Comfortable  HEENT - NCAT  Neck - Supple, No limited ROM  Chest - Breathing comfortably, No Respiratory distress  Cardiovascular - Regular pulse  Abdomen - No visible abdominal distension  Extremities - s/p right BKA, residual limp wrapped in ACE bandage  Neurologic Exam -                    Cognitive - Awake, Alert, AAO to self, place, date, year, situation; follows commands     Motor - No focal deficits. Moves all 4 limbs while in bed at least antigravity  Psychiatric - Mood stable, Affect WNL       -----------------------------------------------------------------------  RECENT LABS  CBC Full  -  ( 14 Dec 2023 05:30 )  WBC Count : 14.43 K/uL  RBC Count : 2.65 M/uL  Hemoglobin : 7.2 g/dL  Hematocrit : 22.5 %  Platelet Count - Automated : 174 K/uL  Mean Cell Volume : 84.9 fl  Mean Cell Hemoglobin : 27.2 pg  Mean Cell Hemoglobin Concentration : 32.0 gm/dL  Auto Neutrophil # : 12.35 K/uL  Auto Lymphocyte # : 1.04 K/uL  Auto Monocyte # : 0.52 K/uL  Auto Eosinophil # : 0.52 K/uL  Auto Basophil # : 0.00 K/uL  Auto Neutrophil % : 85.6 %  Auto Lymphocyte % : 7.2 %  Auto Monocyte % : 3.6 %  Auto Eosinophil % : 3.6 %  Auto Basophil % : 0.0 %    12-14    140  |  110<H>  |  28<H>  ----------------------------<  94  4.1   |  25  |  1.36<H>    Ca    7.8<L>      14 Dec 2023 05:30  Phos  3.1     12-14  Mg     1.9     12-14    TPro  4.6<L>  /  Alb  1.4<L>  /  TBili  0.3  /  DBili  x   /  AST  30  /  ALT  20  /  AlkPhos  141<H>  12-14    Urinalysis Basic - ( 14 Dec 2023 05:30 )    Color: x / Appearance: x / SG: x / pH: x  Gluc: 94 mg/dL / Ketone: x  / Bili: x / Urobili: x   Blood: x / Protein: x / Nitrite: x   Leuk Esterase: x / RBC: x / WBC x   Sq Epi: x / Non Sq Epi: x / Bacteria: x    -----------------------------------------------------------------------  IMAGING:  < from: Xray Tibia + Fibula 2 Views, Right (12.02.23 @ 15:03) >  INTERPRETATION:  Clinical History: Pain    2 views of the right tibia and fibula demonstrates no evidence of acute   fracture or dislocation. No radiographic evidence of osteomyelitis. No   evidence of gas within the soft tissue. Vascular calcifications noted.   Calcaneal spurring    IMPRESSION: No evidence of acute fracture. No radiographic evidence of   osteomyelitis.    < end of copied text >  < from: VA Duplex Lower Extrem Arterial, Bilat (12.02.23 @ 12:09) >  IMPRESSION:    1.  Diminished flow at the bilateral posterior tibial arteries, likely   chronic, with collateral vessels and distal reconstitution.  2.  Stenosis of the left anterior tibial artery, though limited   evaluation of the left peroneal artery.  3.  No critical stenosis of the bilateral femoral and popliteal arteries.      < end of copied text >      -----------------------------------------------------------------------  ALLERGIES  No Known Allergies    MEDICATIONS   acetaminophen     Tablet .. 650 milliGRAM(s) Oral every 6 hours  atorvastatin 80 milliGRAM(s) Oral at bedtime  carvedilol 12.5 milliGRAM(s) Oral every 12 hours  gabapentin 800 milliGRAM(s) Oral three times a day  hydrALAZINE 50 milliGRAM(s) Oral every 8 hours  HYDROmorphone  Injectable 1 milliGRAM(s) IV Push every 6 hours PRN  insulin glargine Injectable (LANTUS) 7 Unit(s) SubCutaneous at bedtime  insulin lispro (ADMELOG) corrective regimen sliding scale   SubCutaneous Before meals and at bedtime  insulin lispro Injectable (ADMELOG) 3 Unit(s) SubCutaneous three times a day before meals  lisinopril 40 milliGRAM(s) Oral daily  magnesium sulfate  IVPB 1 Gram(s) IV Intermittent once  oxyCODONE    IR 10 milliGRAM(s) Oral every 8 hours PRN  oxyCODONE    IR 5 milliGRAM(s) Oral every 8 hours PRN  piperacillin/tazobactam IVPB.. 3.375 Gram(s) IV Intermittent every 8 hours    -----------------------------------------------------------------------   HPI:  69yo M PMH CAD (2 stents 2020), HFmrEF (45-50%), HTN, PAD w/ remote RLE angioplasty, R 4th toe OM s/p partial R 4th ray amputation on 10/24, RLE angiogram with AT lithotripsy and AT/peroneal balloon angioplasty 10/27, uncontrolled IDDM (a1c 12 in Oct 2023), recent admission 11/8-11/10 to cardiology service for hypertensive emergency (left AMA, was given levaquin when he left for right foot wound that pt had started treatment for back in October) who presented to [facility] after a fall onto his knees and was having b/l knee pain. Found to have increased soft tissue gas in R foot on CT scan now s/p right BKA on 12/11/23 now pending revision and stump closure of BKA    INTERVAL HISTORY:  Receiving blood transfusion for anemia, last on 12/13 with good response    SUBJECTIVE:  Patient seen and evaluated at bedside. Sitting up in bed eating. States pain is better controlled on current regimen.   Worked with therapy today without issues.     -----------------------------------------------------------------------  REVIEW OF SYSTEMS  Constitutional - No fever,  No fatigue  HEENT - No vertigo, No neck pain  Neurological - No headaches, No memory loss, +loss of strength, No numbness, No tremors  Musculoskeletal - +post op pain  Bowel - no issues  Bladder - voiding independently   -----------------------------------------------------------------------  FUNCTIONAL HISTORY  PTA, patient independent with ADLs and IADLs. Used a SC for ambulation.     CURRENT FUNCTIONAL STATUS  PT initial evaluation 12/14:    Transfer: Sit to Stand:   · Level of [place]	maximum assist (25% patients effort)  · Physical Assist/Nonphysical Assist	verbal cues; 2 person assist  · Assistive Device	rolling walker    Transfer: Stand to Sit:   · Level of [place]	maximum assist (25% patients effort)  · Physical Assist/Nonphysical Assist	verbal cues; 2 person assist  · Assistive Device	rolling walker    Sit/Stand Transfer Safety Analysis:   · Transfer Safety Concerns Noted	decreased weight-shifting ability; decreased safety awareness  · Impairments Contributing to Impaired Transfers	impaired balance; impaired postural control; decreased strength        -----------------------------------------------------------------------  PAST MEDICAL & SURGICAL HISTORY  IDDM (insulin dependent diabetes mellitus)  HTN (hypertension)  CAD S/P percutaneous coronary angioplasty  Neuropathy  PAD (peripheral artery disease)  Acute CHF  PNA (pneumonia)  Gangrene of toe of right foot  Moderate aortic stenosis  Acute on chronic diastolic congestive heart failure  Hyperlipidemia  No significant past surgical history  History of partial ray amputation of fourth toe of right foot  Gangrene of toe of right foot  Acute CHF  PNA (pneumonia)  PNA (pneumonia)    FAMILY HISTORY     SOCIAL HISTORY  as above  -----------------------------------------------------------------------  VITALS  T(C): 37 (12-14-23 @ 08:28), Max: 37.1 (12-13-23 @ 19:00)  HR: 71 (12-14-23 @ 08:28) (66 - 72)  BP: 139/71 (12-14-23 @ 08:28) (139/71 - 145/65)  RR: 18 (12-14-23 @ 08:28) (18 - 18)  SpO2: 99% (12-14-23 @ 08:28) (96% - 99%)  Wt(kg): --    PHYSICAL EXAM  Constitutional - NAD, Comfortable  HEENT - NCAT  Neck - Supple, No limited ROM  Chest - Breathing comfortably, No Respiratory distress  Cardiovascular - Regular pulse  Abdomen - No visible abdominal distension  Extremities - s/p right BKA, residual limp wrapped in ACE bandage  Neurologic Exam -                    Cognitive - Awake, Alert, AAO to self, place, date, year, situation; follows commands     Motor - No focal deficits. Moves all 4 limbs while in bed at least antigravity  Psychiatric - Mood stable, Affect WNL       -----------------------------------------------------------------------  RECENT LABS  CBC Full  -  ( 14 Dec 2023 05:30 )  WBC Count : 14.43 K/uL  RBC Count : 2.65 M/uL  Hemoglobin : 7.2 g/dL  Hematocrit : 22.5 %  Platelet Count - Automated : 174 K/uL  Mean Cell Volume : 84.9 fl  Mean Cell Hemoglobin : 27.2 pg  Mean Cell Hemoglobin Concentration : 32.0 gm/dL  Auto Neutrophil # : 12.35 K/uL  Auto Lymphocyte # : 1.04 K/uL  Auto Monocyte # : 0.52 K/uL  Auto Eosinophil # : 0.52 K/uL  Auto Basophil # : 0.00 K/uL  Auto Neutrophil % : 85.6 %  Auto Lymphocyte % : 7.2 %  Auto Monocyte % : 3.6 %  Auto Eosinophil % : 3.6 %  Auto Basophil % : 0.0 %    12-14    140  |  110<H>  |  28<H>  ----------------------------<  94  4.1   |  25  |  1.36<H>    Ca    7.8<L>      14 Dec 2023 05:30  Phos  3.1     12-14  Mg     1.9     12-14    TPro  4.6<L>  /  Alb  1.4<L>  /  TBili  0.3  /  DBili  x   /  AST  30  /  ALT  20  /  AlkPhos  141<H>  12-14    Urinalysis Basic - ( 14 Dec 2023 05:30 )    Color: x / Appearance: x / SG: x / pH: x  Gluc: 94 mg/dL / Ketone: x  / Bili: x / Urobili: x   Blood: x / Protein: x / Nitrite: x   Leuk Esterase: x / RBC: x / WBC x   Sq Epi: x / Non Sq Epi: x / Bacteria: x    -----------------------------------------------------------------------  IMAGING:  < from: Xray Tibia + Fibula 2 Views, Right (12.02.23 @ 15:03) >  INTERPRETATION:  Clinical History: Pain    2 views of the right tibia and fibula demonstrates no evidence of acute   fracture or dislocation. No radiographic evidence of osteomyelitis. No   evidence of gas within the soft tissue. Vascular calcifications noted.   Calcaneal spurring    IMPRESSION: No evidence of acute fracture. No radiographic evidence of   osteomyelitis.    < end of copied text >  < from: VA Duplex Lower Extrem Arterial, Bilat (12.02.23 @ 12:09) >  IMPRESSION:    1.  Diminished flow at the bilateral posterior tibial arteries, likely   chronic, with collateral vessels and distal reconstitution.  2.  Stenosis of the left anterior tibial artery, though limited   evaluation of the left peroneal artery.  3.  No critical stenosis of the bilateral femoral and popliteal arteries.      < end of copied text >      -----------------------------------------------------------------------  ALLERGIES  No Known Allergies    MEDICATIONS   acetaminophen     Tablet .. 650 milliGRAM(s) Oral every 6 hours  atorvastatin 80 milliGRAM(s) Oral at bedtime  carvedilol 12.5 milliGRAM(s) Oral every 12 hours  gabapentin 800 milliGRAM(s) Oral three times a day  hydrALAZINE 50 milliGRAM(s) Oral every 8 hours  HYDROmorphone  Injectable 1 milliGRAM(s) IV Push every 6 hours PRN  insulin glargine Injectable (LANTUS) 7 Unit(s) SubCutaneous at bedtime  insulin lispro (ADMELOG) corrective regimen sliding scale   SubCutaneous Before meals and at bedtime  insulin lispro Injectable (ADMELOG) 3 Unit(s) SubCutaneous three times a day before meals  lisinopril 40 milliGRAM(s) Oral daily  magnesium sulfate  IVPB 1 Gram(s) IV Intermittent once  oxyCODONE    IR 10 milliGRAM(s) Oral every 8 hours PRN  oxyCODONE    IR 5 milliGRAM(s) Oral every 8 hours PRN  piperacillin/tazobactam IVPB.. 3.375 Gram(s) IV Intermittent every 8 hours    -----------------------------------------------------------------------

## 2023-12-14 NOTE — PHYSICAL THERAPY INITIAL EVALUATION ADULT - IMPAIRMENTS FOUND, PT EVAL
aerobic capacity/endurance/ergonomics and body mechanics/gait, locomotion, and balance/muscle strength/poor safety awareness/posture

## 2023-12-14 NOTE — PHYSICAL THERAPY INITIAL EVALUATION ADULT - BED MOBILITY LIMITATIONS, REHAB EVAL
Not assessed, pt received and left seated at edge of bed Not assessed, pt received and left seated at edge of bed, states he does not want / need assistance to lay back down

## 2023-12-14 NOTE — CONSULT NOTE ADULT - SUBJECTIVE AND OBJECTIVE BOX
Admission H&P:  HPI:  67yo M PMH CAD (2 stents 2020), grade 1 diastolic dysfunction, HTN, PAD w/ remote RLE angioplasty, R 4th toe OM s/p partial R 4th ray amputation on 10/24, RLE angiogram with AT lithotripsy and AT/peroneal balloon angioplasty 10/27, uncontrolled IDDM (a1c 12 in Oct 2023), recent admission 11/8-11/10 to cardiology service for hypertensive emergency (left AMA, was given levaquin when he left for right foot wound that pt had started treatment for back in October) who presented to [facility] after a fall onto his knees and was having b/l knee pain.   Found to have increased soft tissue gas in R foot on CT scan.   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     PAST MEDICAL & SURGICAL HISTORY:  IDDM (insulin dependent diabetes mellitus)  HTN (hypertension)  CAD S/P percutaneous coronary angioplasty  Neuropathy  PAD (peripheral artery disease)  PNA (pneumonia)      Gangrene of toe of right foot      Moderate aortic stenosis      Acute on chronic diastolic congestive heart failure      Hyperlipidemia      History of partial ray amputation of fourth toe of right foot        Home Medications:  insulin glargine 100 units/mL subcutaneous solution: 14 unit(s) subcutaneous once a day (at bedtime) (02 Dec 2023 01:47)  insulin lispro 100 units/mL injectable solution: 3 injectable 3 times a day (with meals) do not take if you skip a meal. only take WITH meals (05 Dec 2023 20:23)  nystatin 100,000 units/g topical cream: 1 Apply topically to affected area 2 times a day (06 Dec 2023 17:04)    Allergies    No Known Allergies    Intolerances      FAMILY HISTORY:    Social History:  No alcohol use.  No tobacco use.  Lives alone. (02 Dec 2023 01:15)      REVIEW OF SYSTEMS:  Tolerating breakfast - full tray   RESPIRATORY: No cough, No dyspnea  CARDIOVASCULAR: No chest pain,   GASTROINTESTINAL: no constipation  GENITOURINARY: No dysuria,     Diet, Consistent Carbohydrate/No Snacks (12-11-23 @ 11:55) [Active]    CURRENT MEDICATIONS:   acetaminophen     Tablet .. 650 milliGRAM(s) Oral every 6 hours  atorvastatin 80 milliGRAM(s) Oral at bedtime  carvedilol 12.5 milliGRAM(s) Oral every 12 hours  gabapentin 800 milliGRAM(s) Oral three times a day  hydrALAZINE 50 milliGRAM(s) Oral every 8 hours  HYDROmorphone  Injectable 1 milliGRAM(s) IV Push every 6 hours PRN  insulin glargine Injectable (LANTUS) 7 Unit(s) SubCutaneous at bedtime  insulin lispro (ADMELOG) corrective regimen sliding scale   SubCutaneous Before meals and at bedtime  insulin lispro Injectable (ADMELOG) 3 Unit(s) SubCutaneous three times a day before meals  lisinopril 40 milliGRAM(s) Oral daily  magnesium sulfate  IVPB 1 Gram(s) IV Intermittent once  oxyCODONE    IR 10 milliGRAM(s) Oral every 8 hours PRN  oxyCODONE    IR 5 milliGRAM(s) Oral every 8 hours PRN  piperacillin/tazobactam IVPB.. 3.375 Gram(s) IV Intermittent every 8 hours      VITAL SIGNS, INS/OUTS (last 24 hours):  Vital Signs Last 24 Hrs  T(C): 37 (14 Dec 2023 08:28), Max: 37.1 (13 Dec 2023 19:00)  T(F): 98.6 (14 Dec 2023 08:28), Max: 98.7 (13 Dec 2023 19:00)  HR: 71 (14 Dec 2023 08:28) (66 - 72)  BP: 139/71 (14 Dec 2023 08:28) (139/71 - 145/65)  BP(mean): 99 (14 Dec 2023 08:28) (92 - 99)  RR: 18 (14 Dec 2023 08:28) (18 - 18)  SpO2: 99% (14 Dec 2023 08:28) (96% - 99%)    Parameters below as of 14 Dec 2023 08:28  Patient On (Oxygen Delivery Method): room air      I&O's Summary    13 Dec 2023 07:01  -  14 Dec 2023 07:00  --------------------------------------------------------  IN: 0 mL / OUT: 400 mL / NET: -400 mL    PHYSICAL EXAM:  Gen: Sitting in chair at time of exam, appears stated age  HEENT: MMM, clear OP  Neck: supple, trachea at midline  CV: RRR, +S1/S2  Pulm: adequate respiratory effort, no increase in work of breathing  Abd: soft, ND  Skin: warm and dry,  Ext: right leg s/p BKA, stump wrapped in dressing, ACE ; left foot warm   Neuro: AOx3, speaking in full sentences   Psych: affect and behavior appropriate    BASIC LABS:                        7.2    14.43 )-----------( 174      ( 14 Dec 2023 05:30 )             22.5     12-14    140  |  110<H>  |  28<H>  ----------------------------<  94  4.1   |  25  |  1.36<H>    Ca    7.8<L>      14 Dec 2023 05:30  Phos  3.1     12-14  Mg     1.9     12-14    TPro  4.6<L>  /  Alb  1.4<L>  /  TBili  0.3  /  DBili  x   /  AST  30  /  ALT  20  /  AlkPhos  141<H>  12-14      Urinalysis Basic - ( 14 Dec 2023 05:30 )    Color: x / Appearance: x / SG: x / pH: x  Gluc: 94 mg/dL / Ketone: x  / Bili: x / Urobili: x   Blood: x / Protein: x / Nitrite: x   Leuk Esterase: x / RBC: x / WBC x   Sq Epi: x / Non Sq Epi: x / Bacteria: x      CAPILLARY BLOOD GLUCOSE      POCT Blood Glucose.: 102 mg/dL (14 Dec 2023 09:02)  POCT Blood Glucose.: 127 mg/dL (14 Dec 2023 08:53)  POCT Blood Glucose.: 176 mg/dL (13 Dec 2023 23:43)  POCT Blood Glucose.: 193 mg/dL (13 Dec 2023 17:46)  POCT Blood Glucose.: 144 mg/dL (13 Dec 2023 13:06)      OTHER LABS:        MICRODATA:      IMAGING:    EKG:    #Diet - Diet, Consistent Carbohydrate/No Snacks (12-11-23 @ 11:55) [Active]        #DVT PPx -  Admission H&P:  HPI:  69yo M PMH CAD (2 stents 2020), grade 1 diastolic dysfunction, HTN, PAD w/ remote RLE angioplasty, R 4th toe OM s/p partial R 4th ray amputation on 10/24, RLE angiogram with AT lithotripsy and AT/peroneal balloon angioplasty 10/27, uncontrolled IDDM (a1c 12 in Oct 2023), recent admission 11/8-11/10 to cardiology service for hypertensive emergency (left AMA, was given levaquin when he left for right foot wound that pt had started treatment for back in October) who presented to [facility] after a fall onto his knees and was having b/l knee pain.   Found to have increased soft tissue gas in R foot on CT scan.   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     PAST MEDICAL & SURGICAL HISTORY:  IDDM (insulin dependent diabetes mellitus)  HTN (hypertension)  CAD S/P percutaneous coronary angioplasty  Neuropathy  PAD (peripheral artery disease)  PNA (pneumonia)      Gangrene of toe of right foot      Moderate aortic stenosis      Acute on chronic diastolic congestive heart failure      Hyperlipidemia      History of partial ray amputation of fourth toe of right foot        Home Medications:  insulin glargine 100 units/mL subcutaneous solution: 14 unit(s) subcutaneous once a day (at bedtime) (02 Dec 2023 01:47)  insulin lispro 100 units/mL injectable solution: 3 injectable 3 times a day (with meals) do not take if you skip a meal. only take WITH meals (05 Dec 2023 20:23)  nystatin 100,000 units/g topical cream: 1 Apply topically to affected area 2 times a day (06 Dec 2023 17:04)    Allergies    No Known Allergies    Intolerances      FAMILY HISTORY:    Social History:  No alcohol use.  No tobacco use.  Lives alone. (02 Dec 2023 01:15)      REVIEW OF SYSTEMS:  Tolerating breakfast - full tray   RESPIRATORY: No cough, No dyspnea  CARDIOVASCULAR: No chest pain,   GASTROINTESTINAL: no constipation  GENITOURINARY: No dysuria,     Diet, Consistent Carbohydrate/No Snacks (12-11-23 @ 11:55) [Active]    CURRENT MEDICATIONS:   acetaminophen     Tablet .. 650 milliGRAM(s) Oral every 6 hours  atorvastatin 80 milliGRAM(s) Oral at bedtime  carvedilol 12.5 milliGRAM(s) Oral every 12 hours  gabapentin 800 milliGRAM(s) Oral three times a day  hydrALAZINE 50 milliGRAM(s) Oral every 8 hours  HYDROmorphone  Injectable 1 milliGRAM(s) IV Push every 6 hours PRN  insulin glargine Injectable (LANTUS) 7 Unit(s) SubCutaneous at bedtime  insulin lispro (ADMELOG) corrective regimen sliding scale   SubCutaneous Before meals and at bedtime  insulin lispro Injectable (ADMELOG) 3 Unit(s) SubCutaneous three times a day before meals  lisinopril 40 milliGRAM(s) Oral daily  magnesium sulfate  IVPB 1 Gram(s) IV Intermittent once  oxyCODONE    IR 10 milliGRAM(s) Oral every 8 hours PRN  oxyCODONE    IR 5 milliGRAM(s) Oral every 8 hours PRN  piperacillin/tazobactam IVPB.. 3.375 Gram(s) IV Intermittent every 8 hours      VITAL SIGNS, INS/OUTS (last 24 hours):  Vital Signs Last 24 Hrs  T(C): 37 (14 Dec 2023 08:28), Max: 37.1 (13 Dec 2023 19:00)  T(F): 98.6 (14 Dec 2023 08:28), Max: 98.7 (13 Dec 2023 19:00)  HR: 71 (14 Dec 2023 08:28) (66 - 72)  BP: 139/71 (14 Dec 2023 08:28) (139/71 - 145/65)  BP(mean): 99 (14 Dec 2023 08:28) (92 - 99)  RR: 18 (14 Dec 2023 08:28) (18 - 18)  SpO2: 99% (14 Dec 2023 08:28) (96% - 99%)    Parameters below as of 14 Dec 2023 08:28  Patient On (Oxygen Delivery Method): room air      I&O's Summary    13 Dec 2023 07:01  -  14 Dec 2023 07:00  --------------------------------------------------------  IN: 0 mL / OUT: 400 mL / NET: -400 mL    PHYSICAL EXAM:  Gen: Sitting in chair at time of exam, appears stated age  HEENT: MMM, clear OP  Neck: supple, trachea at midline  CV: RRR, +S1/S2  Pulm: adequate respiratory effort, no increase in work of breathing  Abd: soft, ND  Skin: warm and dry,  Ext: right leg s/p BKA, stump wrapped in dressing, ACE ; left foot warm   Neuro: AOx3, speaking in full sentences   Psych: affect and behavior appropriate    BASIC LABS:                        7.2    14.43 )-----------( 174      ( 14 Dec 2023 05:30 )             22.5     12-14    140  |  110<H>  |  28<H>  ----------------------------<  94  4.1   |  25  |  1.36<H>    Ca    7.8<L>      14 Dec 2023 05:30  Phos  3.1     12-14  Mg     1.9     12-14    TPro  4.6<L>  /  Alb  1.4<L>  /  TBili  0.3  /  DBili  x   /  AST  30  /  ALT  20  /  AlkPhos  141<H>  12-14      Urinalysis Basic - ( 14 Dec 2023 05:30 )    Color: x / Appearance: x / SG: x / pH: x  Gluc: 94 mg/dL / Ketone: x  / Bili: x / Urobili: x   Blood: x / Protein: x / Nitrite: x   Leuk Esterase: x / RBC: x / WBC x   Sq Epi: x / Non Sq Epi: x / Bacteria: x      CAPILLARY BLOOD GLUCOSE      POCT Blood Glucose.: 102 mg/dL (14 Dec 2023 09:02)  POCT Blood Glucose.: 127 mg/dL (14 Dec 2023 08:53)  POCT Blood Glucose.: 176 mg/dL (13 Dec 2023 23:43)  POCT Blood Glucose.: 193 mg/dL (13 Dec 2023 17:46)  POCT Blood Glucose.: 144 mg/dL (13 Dec 2023 13:06)      OTHER LABS:        MICRODATA:      IMAGING:    EKG:    #Diet - Diet, Consistent Carbohydrate/No Snacks (12-11-23 @ 11:55) [Active]        #DVT PPx -  Admission H&P:  HPI:  67yo M PMH CAD (2 stents 2020), grade 1 diastolic dysfunction, HTN, PAD w/ remote RLE angioplasty, R 4th toe OM s/p partial R 4th ray amputation on 10/24, RLE angiogram with AT lithotripsy and AT/peroneal balloon angioplasty 10/27, uncontrolled IDDM (a1c 12 in Oct 2023), recent admission 11/8-11/10 to cardiology service for hypertensive emergency (left AMA, was given levaquin when he left for right foot wound that pt had started treatment for back in October) who presented to [facility] after a fall onto his knees and was having b/l knee pain.   Found to have increased soft tissue gas in R foot on CT scan.   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     PAST MEDICAL & SURGICAL HISTORY:  IDDM (insulin dependent diabetes mellitus)  HTN (hypertension)  CAD S/P percutaneous coronary angioplasty  Neuropathy  PAD (peripheral artery disease)  PNA (pneumonia)      Gangrene of toe of right foot  Moderate aortic stenosis  Acute on chronic diastolic congestive heart failure  Hyperlipidemia  History of partial ray amputation of fourth toe of right foot    Home Medications:  insulin glargine 100 units/mL subcutaneous solution: 14 unit(s) subcutaneous once a day (at bedtime) (02 Dec 2023 01:47)  insulin lispro 100 units/mL injectable solution: 3 injectable 3 times a day (with meals) do not take if you skip a meal. only take WITH meals (05 Dec 2023 20:23)  nystatin 100,000 units/g topical cream: 1 Apply topically to affected area 2 times a day (06 Dec 2023 17:04)    Allergies    No Known Allergies    Intolerances      FAMILY HISTORY:    Social History:  No alcohol use.  No tobacco use.  Lives alone. (02 Dec 2023 01:15)      REVIEW OF SYSTEMS:  Tolerating breakfast - full tray   RESPIRATORY: No cough, No dyspnea  CARDIOVASCULAR: No chest pain,   GASTROINTESTINAL: no constipation  GENITOURINARY: No dysuria,     Diet, Consistent Carbohydrate/No Snacks (12-11-23 @ 11:55) [Active]    CURRENT MEDICATIONS:   acetaminophen     Tablet .. 650 milliGRAM(s) Oral every 6 hours  atorvastatin 80 milliGRAM(s) Oral at bedtime  carvedilol 12.5 milliGRAM(s) Oral every 12 hours  gabapentin 800 milliGRAM(s) Oral three times a day  hydrALAZINE 50 milliGRAM(s) Oral every 8 hours  HYDROmorphone  Injectable 1 milliGRAM(s) IV Push every 6 hours PRN  insulin glargine Injectable (LANTUS) 7 Unit(s) SubCutaneous at bedtime  insulin lispro (ADMELOG) corrective regimen sliding scale   SubCutaneous Before meals and at bedtime  insulin lispro Injectable (ADMELOG) 3 Unit(s) SubCutaneous three times a day before meals  lisinopril 40 milliGRAM(s) Oral daily  magnesium sulfate  IVPB 1 Gram(s) IV Intermittent once  oxyCODONE    IR 10 milliGRAM(s) Oral every 8 hours PRN  oxyCODONE    IR 5 milliGRAM(s) Oral every 8 hours PRN  piperacillin/tazobactam IVPB.. 3.375 Gram(s) IV Intermittent every 8 hours      VITAL SIGNS, INS/OUTS (last 24 hours):  Vital Signs Last 24 Hrs  T(C): 37 (14 Dec 2023 08:28), Max: 37.1 (13 Dec 2023 19:00)  T(F): 98.6 (14 Dec 2023 08:28), Max: 98.7 (13 Dec 2023 19:00)  HR: 71 (14 Dec 2023 08:28) (66 - 72)  BP: 139/71 (14 Dec 2023 08:28) (139/71 - 145/65)  BP(mean): 99 (14 Dec 2023 08:28) (92 - 99)  RR: 18 (14 Dec 2023 08:28) (18 - 18)  SpO2: 99% (14 Dec 2023 08:28) (96% - 99%)    Parameters below as of 14 Dec 2023 08:28  Patient On (Oxygen Delivery Method): room air      I&O's Summary    13 Dec 2023 07:01  -  14 Dec 2023 07:00  --------------------------------------------------------  IN: 0 mL / OUT: 400 mL / NET: -400 mL    PHYSICAL EXAM:  Gen: Sitting in chair at time of exam, appears stated age  HEENT: MMM, clear OP  Neck: supple, trachea at midline  CV: RRR, +S1/S2  Pulm: adequate respiratory effort, no increase in work of breathing  Abd: soft, ND  Skin: warm and dry,  Ext: right leg s/p BKA, stump wrapped in dressing, ACE ; left foot warm ; left leg edema present, 2+  Neuro: AOx3, speaking in full sentences   Psych: affect and behavior appropriate    BASIC LABS:                        7.2    14.43 )-----------( 174      ( 14 Dec 2023 05:30 )             22.5     12-14    140  |  110<H>  |  28<H>  ----------------------------<  94  4.1   |  25  |  1.36<H>    Ca    7.8<L>      14 Dec 2023 05:30  Phos  3.1     12-14  Mg     1.9     12-14    TPro  4.6<L>  /  Alb  1.4<L>  /  TBili  0.3  /  DBili  x   /  AST  30  /  ALT  20  /  AlkPhos  141<H>  12-14      Urinalysis Basic - ( 14 Dec 2023 05:30 )    Color: x / Appearance: x / SG: x / pH: x  Gluc: 94 mg/dL / Ketone: x  / Bili: x / Urobili: x   Blood: x / Protein: x / Nitrite: x   Leuk Esterase: x / RBC: x / WBC x   Sq Epi: x / Non Sq Epi: x / Bacteria: x      CAPILLARY BLOOD GLUCOSE      POCT Blood Glucose.: 102 mg/dL (14 Dec 2023 09:02)  POCT Blood Glucose.: 127 mg/dL (14 Dec 2023 08:53)  POCT Blood Glucose.: 176 mg/dL (13 Dec 2023 23:43)  POCT Blood Glucose.: 193 mg/dL (13 Dec 2023 17:46)  POCT Blood Glucose.: 144 mg/dL (13 Dec 2023 13:06)      OTHER LABS:        MICRODATA:      IMAGING:    EKG:    #Diet - Diet, Consistent Carbohydrate/No Snacks (12-11-23 @ 11:55) [Active]        #DVT PPx -  Admission H&P:  HPI:  69yo M PMH CAD (2 stents 2020), grade 1 diastolic dysfunction, HTN, PAD w/ remote RLE angioplasty, R 4th toe OM s/p partial R 4th ray amputation on 10/24, RLE angiogram with AT lithotripsy and AT/peroneal balloon angioplasty 10/27, uncontrolled IDDM (a1c 12 in Oct 2023), recent admission 11/8-11/10 to cardiology service for hypertensive emergency (left AMA, was given levaquin when he left for right foot wound that pt had started treatment for back in October) who presented to [facility] after a fall onto his knees and was having b/l knee pain.   Found to have increased soft tissue gas in R foot on CT scan.   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     PAST MEDICAL & SURGICAL HISTORY:  IDDM (insulin dependent diabetes mellitus)  HTN (hypertension)  CAD S/P percutaneous coronary angioplasty  Neuropathy  PAD (peripheral artery disease)  PNA (pneumonia)      Gangrene of toe of right foot  Moderate aortic stenosis  Acute on chronic diastolic congestive heart failure  Hyperlipidemia  History of partial ray amputation of fourth toe of right foot    Home Medications:  insulin glargine 100 units/mL subcutaneous solution: 14 unit(s) subcutaneous once a day (at bedtime) (02 Dec 2023 01:47)  insulin lispro 100 units/mL injectable solution: 3 injectable 3 times a day (with meals) do not take if you skip a meal. only take WITH meals (05 Dec 2023 20:23)  nystatin 100,000 units/g topical cream: 1 Apply topically to affected area 2 times a day (06 Dec 2023 17:04)    Allergies    No Known Allergies    Intolerances      FAMILY HISTORY:    Social History:  No alcohol use.  No tobacco use.  Lives alone. (02 Dec 2023 01:15)      REVIEW OF SYSTEMS:  Tolerating breakfast - full tray   RESPIRATORY: No cough, No dyspnea  CARDIOVASCULAR: No chest pain,   GASTROINTESTINAL: no constipation  GENITOURINARY: No dysuria,     Diet, Consistent Carbohydrate/No Snacks (12-11-23 @ 11:55) [Active]    CURRENT MEDICATIONS:   acetaminophen     Tablet .. 650 milliGRAM(s) Oral every 6 hours  atorvastatin 80 milliGRAM(s) Oral at bedtime  carvedilol 12.5 milliGRAM(s) Oral every 12 hours  gabapentin 800 milliGRAM(s) Oral three times a day  hydrALAZINE 50 milliGRAM(s) Oral every 8 hours  HYDROmorphone  Injectable 1 milliGRAM(s) IV Push every 6 hours PRN  insulin glargine Injectable (LANTUS) 7 Unit(s) SubCutaneous at bedtime  insulin lispro (ADMELOG) corrective regimen sliding scale   SubCutaneous Before meals and at bedtime  insulin lispro Injectable (ADMELOG) 3 Unit(s) SubCutaneous three times a day before meals  lisinopril 40 milliGRAM(s) Oral daily  magnesium sulfate  IVPB 1 Gram(s) IV Intermittent once  oxyCODONE    IR 10 milliGRAM(s) Oral every 8 hours PRN  oxyCODONE    IR 5 milliGRAM(s) Oral every 8 hours PRN  piperacillin/tazobactam IVPB.. 3.375 Gram(s) IV Intermittent every 8 hours      VITAL SIGNS, INS/OUTS (last 24 hours):  Vital Signs Last 24 Hrs  T(C): 37 (14 Dec 2023 08:28), Max: 37.1 (13 Dec 2023 19:00)  T(F): 98.6 (14 Dec 2023 08:28), Max: 98.7 (13 Dec 2023 19:00)  HR: 71 (14 Dec 2023 08:28) (66 - 72)  BP: 139/71 (14 Dec 2023 08:28) (139/71 - 145/65)  BP(mean): 99 (14 Dec 2023 08:28) (92 - 99)  RR: 18 (14 Dec 2023 08:28) (18 - 18)  SpO2: 99% (14 Dec 2023 08:28) (96% - 99%)    Parameters below as of 14 Dec 2023 08:28  Patient On (Oxygen Delivery Method): room air      I&O's Summary    13 Dec 2023 07:01  -  14 Dec 2023 07:00  --------------------------------------------------------  IN: 0 mL / OUT: 400 mL / NET: -400 mL    PHYSICAL EXAM:  Gen: Sitting in chair at time of exam, appears stated age  HEENT: MMM, clear OP  Neck: supple, trachea at midline  CV: RRR, +S1/S2  Pulm: adequate respiratory effort, no increase in work of breathing  Abd: soft, ND  Skin: warm and dry,  Ext: right leg s/p BKA, stump wrapped in dressing, ACE ; left foot warm ; left leg edema present, 2+  Neuro: AOx3, speaking in full sentences   Psych: affect and behavior appropriate    BASIC LABS:                        7.2    14.43 )-----------( 174      ( 14 Dec 2023 05:30 )             22.5     12-14    140  |  110<H>  |  28<H>  ----------------------------<  94  4.1   |  25  |  1.36<H>    Ca    7.8<L>      14 Dec 2023 05:30  Phos  3.1     12-14  Mg     1.9     12-14    TPro  4.6<L>  /  Alb  1.4<L>  /  TBili  0.3  /  DBili  x   /  AST  30  /  ALT  20  /  AlkPhos  141<H>  12-14      Urinalysis Basic - ( 14 Dec 2023 05:30 )    Color: x / Appearance: x / SG: x / pH: x  Gluc: 94 mg/dL / Ketone: x  / Bili: x / Urobili: x   Blood: x / Protein: x / Nitrite: x   Leuk Esterase: x / RBC: x / WBC x   Sq Epi: x / Non Sq Epi: x / Bacteria: x      CAPILLARY BLOOD GLUCOSE      POCT Blood Glucose.: 102 mg/dL (14 Dec 2023 09:02)  POCT Blood Glucose.: 127 mg/dL (14 Dec 2023 08:53)  POCT Blood Glucose.: 176 mg/dL (13 Dec 2023 23:43)  POCT Blood Glucose.: 193 mg/dL (13 Dec 2023 17:46)  POCT Blood Glucose.: 144 mg/dL (13 Dec 2023 13:06)      OTHER LABS:        MICRODATA:      IMAGING:    EKG:    #Diet - Diet, Consistent Carbohydrate/No Snacks (12-11-23 @ 11:55) [Active]        #DVT PPx -

## 2023-12-14 NOTE — BH CONSULTATION LIAISON ASSESSMENT NOTE - NSBHCONSULTFOLLOWAFTERCARE_PSY_A_CORE FT
HISTORY OF PRESENT ILLNESS  Castillo Carreon is a 50 y.o. female, presents for cellulitis follow up. HPI  Cellulitis: Pt repots she was put on Cipro to treat and has been holding Bactrim. She notes improvement with antibiotic. She notes she still has some ringing/swishing in her ears and will follow up with ENT later today. In addition, pt reports she woke up with \"goopy\" eyes this morning. Review of Systems   Skin: Positive for rash. All other systems reviewed and are negative. Physical Exam   Constitutional: She is oriented to person, place, and time. She appears well-developed and well-nourished. HENT:   Head: Normocephalic and atraumatic. Right Ear: External ear normal.   Left Ear: External ear normal.   Nose: Nose normal.   Mouth/Throat: Oropharynx is clear and moist.   Eyes: Conjunctivae and EOM are normal.   Neck: Normal range of motion. Neck supple. Carotid bruit is not present. No thyroid mass and no thyromegaly present. Cardiovascular: Normal rate, regular rhythm, S1 normal, S2 normal, normal heart sounds and intact distal pulses. Pulmonary/Chest: Effort normal and breath sounds normal.   Abdominal: Soft. Normal appearance and bowel sounds are normal. There is no hepatosplenomegaly. There is no tenderness. Musculoskeletal: Normal range of motion. Neurological: She is alert and oriented to person, place, and time. She has normal strength. No cranial nerve deficit or sensory deficit. Coordination normal.   Skin: Skin is warm, dry and intact. No abrasion and no rash noted. Psychiatric: She has a normal mood and affect. Her behavior is normal. Judgment and thought content normal.   Nursing note and vitals reviewed. ASSESSMENT and PLAN  Diagnoses and all orders for this visit:    1. Cellulitis of neck   Advised pt to continue holding Bactrim and continue with Cipro.  She will follow up with ENT, Dr. Noemi Gamble, about medication and will proceed with labs to ensure no other acute infection present. Erythema and skin has improved since Rocephin injection and antibiotic. No repeat injection necessary today in office.   -     CBC WITH AUTOMATED DIFF  -     METABOLIC PANEL, COMPREHENSIVE    2. Other fatigue  Will assess labs to rule out acute process. -     KATY BARR VIRUS AB PANEL    lab results and schedule of future lab studies reviewed with patient  reviewed diet, exercise and weight control    Written by Tyson Cesar, as dictated by Celine Bojorquez MD.     Current diagnosis and concerns discussed with pt at length. Understands risks and benefits or current treatment plan and medications and accepts the treatment and medication with any possible risks. Pt asks appropriate questions which were answered. Pt instructed to call with any concerns or problems. This note will not be viewable in [address] N/A

## 2023-12-14 NOTE — PHYSICAL THERAPY INITIAL EVALUATION ADULT - PERTINENT HX OF CURRENT PROBLEM, REHAB EVAL
69yo M PMH CAD (2 stents 2020), HFmrEF (45-50%), HTN, PAD w/ remote RLE angioplasty, R 4th toe OM s/p partial R 4th ray amputation on 10/24, RLE angiogram with AT lithotripsy and AT/peroneal balloon angioplasty 10/27, uncontrolled IDDM (a1c 12 in Oct 2023), recent admission 11/8-11/10 to cardiology service for hypertensive emergency (left AMA, was given levaquin when he left for right foot wound that pt had started treatment for back in October) who presented to [facility] after a fall onto his knees and was having b/l knee pain. Found to have increased soft tissue gas in R foot on CT scan. Patient is s/p R BKA on 12/11/23. 67yo M PMH CAD (2 stents 2020), HFmrEF (45-50%), HTN, PAD w/ remote RLE angioplasty, R 4th toe OM s/p partial R 4th ray amputation on 10/24, RLE angiogram with AT lithotripsy and AT/peroneal balloon angioplasty 10/27, uncontrolled IDDM (a1c 12 in Oct 2023), recent admission 11/8-11/10 to cardiology service for hypertensive emergency (left AMA, was given levaquin when he left for right foot wound that pt had started treatment for back in October) who presented to [facility] after a fall onto his knees and was having b/l knee pain. Found to have increased soft tissue gas in R foot on CT scan. Patient is s/p R BKA on 12/11/23.

## 2023-12-14 NOTE — BH CONSULTATION LIAISON ASSESSMENT NOTE - HPI (INCLUDE ILLNESS QUALITY, SEVERITY, DURATION, TIMING, CONTEXT, MODIFYING FACTORS, ASSOCIATED SIGNS AND SYMPTOMS)
Wrad Avina is a 68 year old male, domiciled in private apartment alone, with no past psychiatric history/psychiatry admission/SI/SA/NSSIB/violence, with multiple medical history including CAD, HfmREF, HTN, PAD, angioplasty, and uncontrolled DM, who presented to the ED for knee pain s/p fall, subsequently admitted for open wound of foot, now s/p R BKA on 12/11/23. Psychiatry was consulted for uncooperative behavior and making suicidal statement of "I hope I get to leave this place and die."    Psychiatry was introduced by the vascular team. Upon approach, patient is sitting on the bed comfortable with dinner tray in front of him, states that he is remorseful that he has been rude to the medical staff. Patient clarifies that he said suicidal statement of "I hope I get to leave this place and die," because he was angry. He explains that he was awoken by one of the staff members while he was asleep to sign a document, and patient states he felt like he was being disrespected. He states he has been frustrated with his medical condition and acknowledges that he was rude to the medical staff. He states that he will apologize to the medical staff he was rude to. When asked about how we could help as a psychiatry team, patient states he would like to talk to the kitchen as his meal preference was not met for the dinner.     Patient denies any past psychiatric history or current suicidal ideation, no paranoia/AVH/delusions elicited.   . 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 Psychiatry was consulted for uncooperative behavior and making suicidal statement of "I hope I get to leave this place and die."    Psychiatry was introduced by the vascular team. Upon approach, patient is sitting on the bed comfortable with dinner tray in front of him, states that he is remorseful that he has been rude to the medical staff. Patient clarifies that he said suicidal statement of "I hope I get to leave this place and die," because he was angry. He explains that he was awoken by one of the staff members while he was asleep to sign a document, and patient states he felt like he was being disrespected. He states he has been frustrated with his medical condition and acknowledges that he was rude to the medical staff. He states that he will apologize to the medical staff he was rude to. When asked about how we could help as a psychiatry team, patient states he would like to talk to the kitchen as his meal preference was not met for the dinner.     Patient denies any past psychiatric history or current suicidal ideation, no paranoia/AVH/delusions elicited.   . 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 Psychiatry was consulted for uncooperative behavior and making suicidal statement of "I hope I get to leave this place and die."    Psychiatry was introduced by the vascular team. Upon approach, patient is sitting on the bed comfortable with dinner tray in front of him, states that he is remorseful that he has been rude to the medical staff. Patient clarifies that he said suicidal statement of "I hope I get to leave this place and die," because he was angry. He explains that he was awoken by one of the staff members while he was asleep to sign a document, and patient states he felt like he was being disrespected. He states he has been frustrated with his medical condition and acknowledges that he was rude to the medical staff. He states that he will apologize to the medical staff he was rude to. When asked about how we could help as a psychiatry team, patient states he would like to talk to the kitchen as his meal preference was not met for the dinner.     Patient denies any past psychiatric history or current suicidal ideation, no paranoia/AVH/delusions elicited.

## 2023-12-14 NOTE — CONSULT NOTE ADULT - ASSESSMENT
69yo M PMH CAD (2 stents 2020), grade 1 diastolic dysfunction, HTN, IDDM (a1c 12 in Oct 2023), PAD w/ remote RLE angioplasty, R 4th toe OM s/p partial R 4th ray amputation on 10/24, RLE angiogram with AT lithotripsy and AT/peroneal balloon angioplasty 10/27, , recent admission 11/8-11/10 to cardiology service for hypertensive emergency (left AMA, was given levaquin when he left for right foot wound that pt had started treatment for back in October) who presented to [facility] after a fall onto his knees and was having b/l knee pain. Found to have increased soft tissue gas in R foot on CT scan. 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 Pending stump closure.     #   ****************incomplete note  69yo M PMH CAD (2 stents 2020), grade 1 diastolic dysfunction, HTN, IDDM (a1c 12 in Oct 2023), PAD w/ remote RLE angioplasty, R 4th toe OM s/p partial R 4th ray amputation on 10/24, RLE angiogram with AT lithotripsy and AT/peroneal balloon angioplasty 10/27, , recent admission 11/8-11/10 to cardiology service for hypertensive emergency (left AMA, was given levaquin when he left for right foot wound that pt had started treatment for back in October) who presented to [facility] after a fall onto his knees and was having b/l knee pain. Found to have increased soft tissue gas in R foot on CT scan. 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 Pending stump closure.     #   ****************incomplete note  69yo M PMH CAD (2 stents 2020), grade 1 diastolic dysfunction, HTN, IDDM (a1c 12 in Oct 2023), PAD w/ remote RLE angioplasty, R 4th toe OM s/p partial R 4th ray amputation on 10/24, RLE angiogram with AT lithotripsy and AT/peroneal balloon angioplasty 10/27, , recent admission 11/8-11/10 to cardiology service for hypertensive emergency (left AMA, was given levaquin when he left for right foot wound that pt had started treatment for back in October) who presented to [facility] after a fall onto his knees and was having b/l knee pain. Found to have increased soft tissue gas in R foot on CT scan. 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 Pending stump closure.     # Diabetic foot infection   on vanc and zosyn   s/p left BKA; pending wound closure;   Defer eval of perioperative cardiac risk to cardiology consult service.     # Acute blood loss anemia   Hgb downtrended from time of admission  Hemoglobin: 7.2 g/dL (12-14-23 @ 05:30)  Hemoglobin: 8.0 g/dL (12-09-23 @ 09:45)  Partially attributable to operative blood losses ; required 1U prbc intraop  continue to trend daily CBC while inpatient  [ ] Hgb goal 8.0 given history of CAD.   - consider 20mg IV furosemide if patient requires 1 u prbcs    # CAD   2 stents placed in 2020 at [facility].   continue aspirin 81mg qd, atorvastatin 80mg qd     # Chronic HFmrEF   on gdmt, lisinopril 40mg qd, on coreg 12.5mg bid ,   TTE from Nov 2023, with EF  45-50% ; Grade 1 diastolic dysfunction.   Recommend IV lasix with any blood transfusions   [ ] per cards recs, plan for reintroduction of spironolactone 25mg PO qd prior to dc     # moderate aortic stenosis -per cards recs, repeat echo in a year or sooner if clinically indicated    # Type 2 Diabetes Complicated by Peripheral artery disease, diabetic foot infection, and Hyperglycemia   Inpatient insulin regimen and discharge regimen per endocrinology consult     # HTN   Home meds lisinopril 40mg qd, hydralazine 25mg TID, amlodipine 10mg qd, coreg 12.5mg BID   - Continuing all home antihypertensives except for amlodipine.   [ ]  consider resuming amlodipine if BP consistently >140mmHg.     DVT ppx - on heparin subq     High MDM   - Acute Illness - diabetic foot infection requiring amputation   - Close Monitoring of vanc trough while on vancomycin   - required Intravenous hydromorphone for pain  69yo M PMH CAD (2 stents 2020), grade 1 diastolic dysfunction, HTN, IDDM (a1c 12 in Oct 2023), PAD w/ remote RLE angioplasty, R 4th toe OM s/p partial R 4th ray amputation on 10/24, RLE angiogram with AT lithotripsy and AT/peroneal balloon angioplasty 10/27, , recent admission 11/8-11/10 to cardiology service for hypertensive emergency (left AMA, was given levaquin when he left for right foot wound that pt had started treatment for back in October) who presented to [facility] after a fall onto his knees and was having b/l knee pain. Found to have increased soft tissue gas in R foot on CT scan. 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 Pending stump closure.     # Diabetic foot infection   on vanc and zosyn   s/p left BKA; pending wound closure;   Defer eval of perioperative cardiac risk to cardiology consult service.     # Acute blood loss anemia   Hgb downtrended from time of admission  Hemoglobin: 7.2 g/dL (12-14-23 @ 05:30)  Hemoglobin: 8.0 g/dL (12-09-23 @ 09:45)  Partially attributable to operative blood losses ; required 1U prbc intraop  continue to trend daily CBC while inpatient  [ ] Hgb goal 8.0 given history of CAD.   - consider 20mg IV furosemide if patient requires 1 u prbcs    # CAD   2 stents placed in 2020 at [facility].   continue aspirin 81mg qd, atorvastatin 80mg qd     # Chronic HFmrEF   on gdmt, lisinopril 40mg qd, on coreg 12.5mg bid ,   TTE from Nov 2023, with EF  45-50% ; Grade 1 diastolic dysfunction.   Recommend IV lasix with any blood transfusions   [ ] per cards recs, plan for reintroduction of spironolactone 25mg PO qd prior to dc     # moderate aortic stenosis -per cards recs, repeat echo in a year or sooner if clinically indicated    # Type 2 Diabetes Complicated by Peripheral artery disease, diabetic foot infection, and Hyperglycemia   Inpatient insulin regimen and discharge regimen per endocrinology consult     # HTN   Home meds lisinopril 40mg qd, hydralazine 25mg TID, amlodipine 10mg qd, coreg 12.5mg BID   - Continuing all home antihypertensives except for amlodipine.   [ ]  consider resuming amlodipine if BP consistently >140mmHg.     DVT ppx - on heparin subq     High MDM   - Acute Illness - diabetic foot infection requiring amputation   - Close Monitoring of vanc trough while on vancomycin   - required Intravenous hydromorphone for pain

## 2023-12-14 NOTE — PROGRESS NOTE ADULT - SUBJECTIVE AND OBJECTIVE BOX
SUBJECTIVE / INTERVAL HPI:  Patient was seen and examined this morning. Reports pain is same as yesterday, but looks more comfortable. Continues to have diarrhea. Receiving additional unit of blood, hgb 7.2 this am. Glucose stable, but a little "tight." Appetite is good.    CAPILLARY BLOOD GLUCOSE & INSULIN RECEIVED  193 mg/dL (12-13 @ 17:46) - Lispro 3+1. Ate chicken, sweet potato, and fruit cup.  176 mg/dL (12-13 @ 23:43) - Lantus 7 + lispro 1  94 mg/dL (12-14 @ 05:30)  127 mg/dL (12-14 @ 08:53) - Lispro 3. Ate eggs, cereal, and potatoes  102 mg/dL (12-14 @ 09:02)  94 mg/dL (12-14 @ 12:14)      REVIEW OF SYSTEMS  Constitutional:  Negative fever, chills or loss of appetite.  Eyes:  Negative blurry vision or double vision.  Cardiovascular:  Negative for chest pain or palpitations.  Respiratory:  Negative for cough, wheezing, or shortness of breath.    Gastrointestinal:  Negative for nausea, vomiting,  constipation, or abdominal pain. + diarrhea  Genitourinary:  Negative frequency, urgency or dysuria.  Neurologic:  No headache, confusion, dizziness, lightheadedness.    PHYSICAL EXAM  Vital Signs Last 24 Hrs  T(C): 37 (14 Dec 2023 08:28), Max: 37.1 (13 Dec 2023 19:00)  T(F): 98.6 (14 Dec 2023 08:28), Max: 98.7 (13 Dec 2023 19:00)  HR: 71 (14 Dec 2023 08:28) (66 - 72)  BP: 139/71 (14 Dec 2023 08:28) (139/71 - 145/65)  BP(mean): 99 (14 Dec 2023 08:28) (92 - 99)  RR: 18 (14 Dec 2023 08:28) (18 - 18)  SpO2: 99% (14 Dec 2023 08:28) (96% - 99%)    Parameters below as of 14 Dec 2023 08:28  Patient On (Oxygen Delivery Method): room air    Constitutional: Awake, alert, in no acute distress.   HEENT: Normocephalic, atraumatic, RAGHAV.  Respiratory: Lungs clear to ausculation bilaterally.   Cardiovascular: regular rhythm, normal S1 and S2, no audible murmurs.   GI: soft, non-tender, non-distended, bowel sounds present.  Extremities: No lower extremity edema. Right BKA with serosanguinous dressing  Psychiatric: AAO x 3. Normal affect/mood.       LABS  CBC - WBC/HGB/HTC/PLT: 14.43/7.2/22.5/174 (12-14-23)  BMP - Na/K/Cl/Bicarb/BUN/Cr/Gluc/AG/eGFR: 140/4.1/110/25/28/1.36/94/5/57 (12-14-23)  Ca - 7.8 (12-14-23)  Phos - 3.1 (12-14-23)  Mg - 1.9 (12-14-23)  LFT - Alb/Tprot/Tbili/Dbili/AlkPhos/ALT/AST: 1.4/--/0.3/--/141/20/30 (12-14-23)  PT/aPTT/INR: 11.1/--/0.97 (12-11-23)       MEDICATIONS  MEDICATIONS  (STANDING):  acetaminophen     Tablet .. 650 milliGRAM(s) Oral every 6 hours  atorvastatin 80 milliGRAM(s) Oral at bedtime  carvedilol 12.5 milliGRAM(s) Oral every 12 hours  gabapentin 800 milliGRAM(s) Oral three times a day  hydrALAZINE 50 milliGRAM(s) Oral every 8 hours  insulin glargine Injectable (LANTUS) 7 Unit(s) SubCutaneous at bedtime  insulin lispro (ADMELOG) corrective regimen sliding scale   SubCutaneous Before meals and at bedtime  insulin lispro Injectable (ADMELOG) 3 Unit(s) SubCutaneous three times a day before meals  lisinopril 40 milliGRAM(s) Oral daily  magnesium oxide 400 milliGRAM(s) Oral three times a day with meals  piperacillin/tazobactam IVPB.. 3.375 Gram(s) IV Intermittent every 8 hours    MEDICATIONS  (PRN):  HYDROmorphone  Injectable 1 milliGRAM(s) IV Push every 6 hours PRN Severe Pain (7 - 10)  oxyCODONE    IR 10 milliGRAM(s) Oral every 8 hours PRN Moderate Pain (4 - 6)  oxyCODONE    IR 5 milliGRAM(s) Oral every 8 hours PRN Mild Pain (1 - 3)

## 2023-12-14 NOTE — BH CONSULTATION LIAISON ASSESSMENT NOTE - CURRENT MEDICATION
MEDICATIONS  (STANDING):  acetaminophen     Tablet .. 650 milliGRAM(s) Oral every 6 hours  aspirin  chewable 81 milliGRAM(s) Oral daily  atorvastatin 80 milliGRAM(s) Oral at bedtime  carvedilol 12.5 milliGRAM(s) Oral every 12 hours  gabapentin 800 milliGRAM(s) Oral three times a day  heparin   Injectable 5000 Unit(s) SubCutaneous every 8 hours  hydrALAZINE 50 milliGRAM(s) Oral every 8 hours  insulin glargine Injectable (LANTUS) 6 Unit(s) SubCutaneous at bedtime  insulin lispro (ADMELOG) corrective regimen sliding scale   SubCutaneous Before meals and at bedtime  insulin lispro Injectable (ADMELOG) 3 Unit(s) SubCutaneous three times a day before meals  piperacillin/tazobactam IVPB.. 3.375 Gram(s) IV Intermittent every 8 hours  vancomycin    Solution 125 milliGRAM(s) Oral every 6 hours    MEDICATIONS  (PRN):  HYDROmorphone  Injectable 1 milliGRAM(s) IV Push every 6 hours PRN Severe Pain (7 - 10)  oxyCODONE    IR 5 milliGRAM(s) Oral every 8 hours PRN Mild Pain (1 - 3)  oxyCODONE    IR 10 milliGRAM(s) Oral every 8 hours PRN Moderate Pain (4 - 6)

## 2023-12-14 NOTE — CONSULT NOTE ADULT - CONSULT REASON
Internal medicine comanagement
preop eval
Antibiotic managment
Evaluation of rehab needs
R foot infection
hypoglycemia
Complex medical decision making
Foot infection

## 2023-12-14 NOTE — BH CONSULTATION LIAISON ASSESSMENT NOTE - NSBHCHARTREVIEWINVESTIGATE_PSY_A_CORE FT
Ventricular Rate 66 BPM    Atrial Rate 66 BPM    P-R Interval 128 ms    QRS Duration 86 ms    Q-T Interval 422 ms    QTC Calculation(Bazett) 442 ms    P Axis 47 degrees    R Axis 57 degrees    T Axis 93 degrees    Diagnosis Line Normal sinus rhythm  Nonspecific T wave abnormality    Confirmed by ALEXIS CH NEIL ([id number]) on 11/15/2023 11:17:28 PM

## 2023-12-14 NOTE — CHART NOTE - NSCHARTNOTEFT_GEN_A_CORE
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 Last documented bowel movement 12/13; noted as loose BMs.    Pain: Unable to assess at this time.     Skin Integrity: Generalized edema 1+, nonpitting scrotum edema. Wilder score: 14.     Labs:   12-14    140  |  110<H>  |  28<H>  ----------------------------<  94  4.1   |  25  |  1.36<H>    Ca    7.8<L>      14 Dec 2023 05:30  Phos  3.1     12-14  Mg     1.9     12-14    TPro  4.6<L>  /  Alb  1.4<L>  /  TBili  0.3  /  DBili  x   /  AST  30  /  ALT  20  /  AlkPhos  141<H>  12-14    CAPILLARY BLOOD GLUCOSE      POCT Blood Glucose.: 94 mg/dL (14 Dec 2023 12:14)  POCT Blood Glucose.: 102 mg/dL (14 Dec 2023 09:02)  POCT Blood Glucose.: 127 mg/dL (14 Dec 2023 08:53)  POCT Blood Glucose.: 176 mg/dL (13 Dec 2023 23:43)  POCT Blood Glucose.: 193 mg/dL (13 Dec 2023 17:46)      Medications:  MEDICATIONS  (STANDING):  acetaminophen     Tablet .. 650 milliGRAM(s) Oral every 6 hours  aspirin  chewable 81 milliGRAM(s) Oral daily  atorvastatin 80 milliGRAM(s) Oral at bedtime  carvedilol 12.5 milliGRAM(s) Oral every 12 hours  gabapentin 800 milliGRAM(s) Oral three times a day  heparin   Injectable 5000 Unit(s) SubCutaneous every 8 hours  hydrALAZINE 50 milliGRAM(s) Oral every 8 hours  insulin glargine Injectable (LANTUS) 7 Unit(s) SubCutaneous at bedtime  insulin lispro (ADMELOG) corrective regimen sliding scale   SubCutaneous Before meals and at bedtime  insulin lispro Injectable (ADMELOG) 3 Unit(s) SubCutaneous three times a day before meals  lisinopril 40 milliGRAM(s) Oral daily  magnesium oxide 400 milliGRAM(s) Oral three times a day with meals  piperacillin/tazobactam IVPB.. 3.375 Gram(s) IV Intermittent every 8 hours    MEDICATIONS  (PRN):  HYDROmorphone  Injectable 1 milliGRAM(s) IV Push every 6 hours PRN Severe Pain (7 - 10)  oxyCODONE    IR 10 milliGRAM(s) Oral every 8 hours PRN Moderate Pain (4 - 6)  oxyCODONE    IR 5 milliGRAM(s) Oral every 8 hours PRN Mild Pain (1 - 3)      Anthropometrics:  Dosing height: 67in   Dosing weight: 70kg/154#  BMI: 24.2  IBW: 148# adjusted ideal body weight: 139 lbs  % adjusted IBW: 111%    Weight Change: no new weights since admit     Nutrition Focused Physical Exam: Completed [   ]  Not Pertinent [ x ]  >>no overt signs of nutritional wasting     Estimated energy needs:   Weight used for calculations	current weight  Estimated Energy Needs Weight (lbs)	154.3 lb  Estimated Energy Needs Weight (kg)	70 kg  Estimated Energy Needs From (carrie/kg)	30  Estimated Energy Needs To (carrie/kg)	35  Estimated Energy Needs Calculated From (carrie/kg)	2100  Estimated Energy Needs Calculated To (carrie/kg)	2450  Weight used for calculations	current weight  Estimated Protein Needs Weight (lbs)	154.3 lb  Estimated Protein Needs Weight (kg)	70 kg  Estimated Protein Needs From (g/kg)	1.25  Estimated Protein Needs To (g/kg)	1.5  Estimated Protein Needs Calculated From (g/kg)	87.5  Estimated Protein Needs Calculated To (g/kg)	105  Estimated Fluid Needs Weight (lbs)	154.3 lb  Estimated Fluid Needs Weight (kg)	70 kg  Estimated Fluid Needs From (ml/kg)	25  Estimated Fluid Needs To (ml/kg)	30  Estimated Fluid Needs Calculated From (ml/kg)	1750  Estimated Fluid Needs Calculated To (ml/kg)	2100   Other Calculations	Estimated nutritional needs determined using [facility] Standards of Nutrition Care for wound healing using current body weight 70 kg (104%IBW).    Subjective:   67yo M PMH CAD (2 stents 2020), grade 1 diastolic dysfunction, HTN, IDDM (a1c 12 in Oct 2023), PAD w/ remote RLE angioplasty, R 4th toe OM s/p partial R 4th ray amputation on 10/24, RLE angiogram with AT lithotripsy and AT/peroneal balloon angioplasty 10/27, , recent admission 11/8-11/10 to cardiology service for hypertensive emergency (left AMA, was given levaquin when he left for right foot wound that pt had started treatment for back in October) who presented to [facility] after a fall onto his knees and was having b/l knee pain. Found to have increased soft tissue gas in R foot on CT scan. 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 Pending stump closure.     Patient seen at bedside for follow up assessment. Physical therapy at bedside at time of assessment thus unable to interview patient at this time. Labs reviewed 12/14; noted w/ elevated BUN/Cr. Fingersticks 12/13-12/14: 102-193 mg/dL; insulin regimen ordered. Observed breakfast tray ~50% completed at time of assessment; per previous RD nots patient with good PO intake. RD to remain available for additional nutrition interventions as needed.     Previous Nutrition Diagnosis: Increased nutrient needs (energy/protein) related to R diabetic foot ulcer as evidenced by increased metabolic demands for wound healing     Active [ x ]  Resolved [   ]    Goal: Pt to meet at least 75% of nutritional needs consistently     Recommendations:  1. Continue consistent carbohydrate diet  2. Encourage and monitor PO intake, honor preferences as able   >> Consistently meet >75% of estimated needs during admission   3. Monitor labs, wt trends, GI function, and skin integrity   4. Pain and bowel regimen per team   5. RD to remain available for additional nutrition interventions and diet edu as needed     Education: continued PO intake     Risk Level: High [   ] Moderate [ x ] Low [   ]. Admitting Diagnosis:   Patient is a 68y old  Male who presents with a chief complaint of soft tissue and skin infection of right foot (14 Dec 2023 12:45)      PAST MEDICAL & SURGICAL HISTORY:  IDDM (insulin dependent diabetes mellitus)      HTN (hypertension)      CAD S/P percutaneous coronary angioplasty      Neuropathy      PAD (peripheral artery disease)      PNA (pneumonia)      Gangrene of toe of right foot      Moderate aortic stenosis      Acute on chronic diastolic congestive heart failure      Hyperlipidemia      History of partial ray amputation of fourth toe of right foot    Current Nutrition Order: [facility] Diet    PO Intake: Good (%) [   ]  Fair (50-75%) [   ] Poor (<25%) [   ] [x] unable to assess    GI Issues: No nausea/vomiting documented at this time. Last documented bowel movement 12/13; noted as loose BMs.    Pain: Unable to assess at this time.     Skin Integrity: Generalized edema 1+, nonpitting scrotum edema. Wilder score: 14.     Labs:   12-14    140  |  110<H>  |  28<H>  ----------------------------<  94  4.1   |  25  |  1.36<H>    Ca    7.8<L>      14 Dec 2023 05:30  Phos  3.1     12-14  Mg     1.9     12-14    TPro  4.6<L>  /  Alb  1.4<L>  /  TBili  0.3  /  DBili  x   /  AST  30  /  ALT  20  /  AlkPhos  141<H>  12-14    CAPILLARY BLOOD GLUCOSE      POCT Blood Glucose.: 94 mg/dL (14 Dec 2023 12:14)  POCT Blood Glucose.: 102 mg/dL (14 Dec 2023 09:02)  POCT Blood Glucose.: 127 mg/dL (14 Dec 2023 08:53)  POCT Blood Glucose.: 176 mg/dL (13 Dec 2023 23:43)  POCT Blood Glucose.: 193 mg/dL (13 Dec 2023 17:46)      Medications:  MEDICATIONS  (STANDING):  acetaminophen     Tablet .. 650 milliGRAM(s) Oral every 6 hours  aspirin  chewable 81 milliGRAM(s) Oral daily  atorvastatin 80 milliGRAM(s) Oral at bedtime  carvedilol 12.5 milliGRAM(s) Oral every 12 hours  gabapentin 800 milliGRAM(s) Oral three times a day  heparin   Injectable 5000 Unit(s) SubCutaneous every 8 hours  hydrALAZINE 50 milliGRAM(s) Oral every 8 hours  insulin glargine Injectable (LANTUS) 7 Unit(s) SubCutaneous at bedtime  insulin lispro (ADMELOG) corrective regimen sliding scale   SubCutaneous Before meals and at bedtime  insulin lispro Injectable (ADMELOG) 3 Unit(s) SubCutaneous three times a day before meals  lisinopril 40 milliGRAM(s) Oral daily  magnesium oxide 400 milliGRAM(s) Oral three times a day with meals  piperacillin/tazobactam IVPB.. 3.375 Gram(s) IV Intermittent every 8 hours    MEDICATIONS  (PRN):  HYDROmorphone  Injectable 1 milliGRAM(s) IV Push every 6 hours PRN Severe Pain (7 - 10)  oxyCODONE    IR 10 milliGRAM(s) Oral every 8 hours PRN Moderate Pain (4 - 6)  oxyCODONE    IR 5 milliGRAM(s) Oral every 8 hours PRN Mild Pain (1 - 3)      Anthropometrics:  Dosing height: 67in   Dosing weight: 70kg/154#  BMI: 24.2  IBW: 148# adjusted ideal body weight: 139 lbs  % adjusted IBW: 111%    Weight Change: no new weights since admit     Nutrition Focused Physical Exam: Completed [   ]  Not Pertinent [ x ]  >>no overt signs of nutritional wasting     Estimated energy needs:   Weight used for calculations	current weight  Estimated Energy Needs Weight (lbs)	154.3 lb  Estimated Energy Needs Weight (kg)	70 kg  Estimated Energy Needs From (carrie/kg)	30  Estimated Energy Needs To (carrie/kg)	35  Estimated Energy Needs Calculated From (carrie/kg)	2100  Estimated Energy Needs Calculated To (carrie/kg)	2450  Weight used for calculations	current weight  Estimated Protein Needs Weight (lbs)	154.3 lb  Estimated Protein Needs Weight (kg)	70 kg  Estimated Protein Needs From (g/kg)	1.25  Estimated Protein Needs To (g/kg)	1.5  Estimated Protein Needs Calculated From (g/kg)	87.5  Estimated Protein Needs Calculated To (g/kg)	105  Estimated Fluid Needs Weight (lbs)	154.3 lb  Estimated Fluid Needs Weight (kg)	70 kg  Estimated Fluid Needs From (ml/kg)	25  Estimated Fluid Needs To (ml/kg)	30  Estimated Fluid Needs Calculated From (ml/kg)	1750  Estimated Fluid Needs Calculated To (ml/kg)	2100   Other Calculations	Estimated nutritional needs determined using [facility] Standards of Nutrition Care for wound healing using current body weight 70 kg (104%IBW).    Subjective:   67yo M PMH CAD (2 stents 2020), grade 1 diastolic dysfunction, HTN, IDDM (a1c 12 in Oct 2023), PAD w/ remote RLE angioplasty, R 4th toe OM s/p partial R 4th ray amputation on 10/24, RLE angiogram with AT lithotripsy and AT/peroneal balloon angioplasty 10/27, , recent admission 11/8-11/10 to cardiology service for hypertensive emergency (left AMA, was given levaquin when he left for right foot wound that pt had started treatment for back in October) who presented to [facility] after a fall onto his knees and was having b/l knee pain. Found to have increased soft tissue gas in R foot on CT scan. 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 Pending stump closure.     Patient seen at bedside for follow up assessment. Physical therapy at bedside at time of assessment thus unable to interview patient at this time. Labs reviewed 12/14; noted w/ elevated BUN/Cr. Fingersticks 12/13-12/14: 102-193 mg/dL; insulin regimen ordered. Observed breakfast tray ~50% completed at time of assessment; per previous RD nots patient with good PO intake. RD to remain available for additional nutrition interventions as needed.     Previous Nutrition Diagnosis: Increased nutrient needs (energy/protein) related to R diabetic foot ulcer as evidenced by increased metabolic demands for wound healing     Active [ x ]  Resolved [   ]    Goal: Pt to meet at least 75% of nutritional needs consistently     Recommendations:  1. Continue consistent carbohydrate diet  2. Encourage and monitor PO intake, honor preferences as able   >> Consistently meet >75% of estimated needs during admission   3. Monitor labs, wt trends, GI function, and skin integrity   4. Pain and bowel regimen per team   5. RD to remain available for additional nutrition interventions and diet edu as needed     Education: continued PO intake     Risk Level: High [   ] Moderate [ x ] Low [   ].

## 2023-12-14 NOTE — CONSULT NOTE ADULT - CONSULT REQUESTED DATE/TIME
02-Dec-2023
09-Dec-2023 13:07
09-Dec-2023 12:25
14-Dec-2023 10:09
05-Dec-2023 12:06
02-Dec-2023 05:37
14-Dec-2023 13:04
02-Dec-2023 03:25

## 2023-12-14 NOTE — PHYSICAL THERAPY INITIAL EVALUATION ADULT - ADDITIONAL COMMENTS
PTA, pt states he was independent with all functional mobility, ADLs, and IADLs. Reports using a straight cane for ambulation.

## 2023-12-14 NOTE — BH CONSULTATION LIAISON ASSESSMENT NOTE - NSBHATTESTBILLING_PSY_A_CORE
[id number]-[id number] diagnostic evaluation without medical services [id number] diagnostic evaluation without medical services

## 2023-12-14 NOTE — CONSULT NOTE ADULT - PROVIDER SPECIALTY LIST ADULT
Vascular Surgery
Endocrinology
Infectious Disease
Rehab Medicine
Cardiology
Podiatry
Hospitalist
Palliative Care

## 2023-12-14 NOTE — BH CONSULTATION LIAISON ASSESSMENT NOTE - NSBHCHARTREVIEWVS_PSY_A_CORE FT
Vital Signs Last 24 Hrs  T(C): 37.2 (14 Dec 2023 15:13), Max: 37.2 (14 Dec 2023 15:13)  T(F): 98.9 (14 Dec 2023 15:13), Max: 98.9 (14 Dec 2023 15:13)  HR: 59 (14 Dec 2023 15:13) (59 - 72)  BP: 164/72 (14 Dec 2023 15:13) (135/63 - 164/77)  BP(mean): 103 (14 Dec 2023 15:13) (91 - 110)  RR: 22 (14 Dec 2023 15:13) (16 - 22)  SpO2: 99% (14 Dec 2023 15:13) (96% - 99%)    Parameters below as of 14 Dec 2023 15:13  Patient On (Oxygen Delivery Method): room air

## 2023-12-14 NOTE — BH CONSULTATION LIAISON ASSESSMENT NOTE - RISK ASSESSMENT
Modifiable risk factors include medical illness/pain.   Static risk factors include advanced age, male gender.  Protective factors include social support/family connectiveness, seeking out treatment/hopefullness.

## 2023-12-14 NOTE — CHART NOTE - NSCHARTNOTEFT_GEN_A_CORE
At about 4:30pm, KATE Sánchez paged me to tell me that the patient At about 4:30pm, KATE Edwards paged me to tell me that the patient has been refusing the iv zosyn and tht she had attempted 3 times before calling me. I went to the patient room and He was laying under the covers, I introduced myself and called for his name multiple times and he ignores me. I then tap his shoulder to try to get his attention, he say what do you want. 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 IF he doesn't continue with the antibiotic he runs the risk of the wound becoming infected. He then says that he just wants to rest, I explain him the importance of the antibiotic. HE finally agrees as long as the nurse gives it to him. As KATE Edwards attaches the lines to his IV - he starts to refuse and states that it is not antibiotic. We explain to him that it is. We attempted to give him his po medications, hydralazine and ASA 81mg. He refuses saying that he does not want it, I explained that he needs it for his heart stent and blood pressure. His tone changed and started to say "He has a healthy heart and does not need it anymore. IT isnt what you're saying it is." IT was explained that if he stops taking it, he might risk having the stents become occluded. He states angryily " I cross my finger and I hope it does. I hope I get to leave this place and die." HE repeatedly says "cono" in Fijian to me and KATE Edwards. At this point me and KATE edwards leaves the room. At about 4:30pm, KATE Edwards paged me to tell me that the patient has been refusing the iv zosyn and tht she had attempted 3 times before calling me. I went to the patient room and He was laying under the covers, I introduced myself and called for his name multiple times and he ignores me. I then tap his shoulder to try to get his attention, he say what do you want. 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 IF he doesn't continue with the antibiotic he runs the risk of the wound becoming infected. He then says that he just wants to rest, I explain him the importance of the antibiotic. HE finally agrees as long as the nurse gives it to him. As KATE Edwards attaches the lines to his IV - he starts to refuse and states that it is not antibiotic. We explain to him that it is. We attempted to give him his po medications, hydralazine and ASA 81mg. He refuses saying that he does not want it, I explained that he needs it for his heart stent and blood pressure. His tone changed and started to say "He has a healthy heart and does not need it anymore. IT isnt what you're saying it is." IT was explained that if he stops taking it, he might risk having the stents become occluded. He states angryily " I cross my finger and I hope it does. I hope I get to leave this place and die." HE repeatedly says "cono" in Sri [place]ankan to me and KATE Edwards. At this point me and KATE edwards leaves the room.

## 2023-12-14 NOTE — PHYSICAL THERAPY INITIAL EVALUATION ADULT - MANUAL MUSCLE TESTING RESULTS, REHAB EVAL
BUE/BLE grossly >3+/5 besides RLE; unable to assess 2/2 pt agitation BUE/BLE grossly >3+/5 besides RLE; unable to assess formally 2/2 pt agitation

## 2023-12-14 NOTE — PROGRESS NOTE ADULT - ASSESSMENT
67yo M PMH CAD (2 stents 2020), HFmrEF (45-50%), HTN, PAD w/ remote RLE angioplasty, R 4th toe OM s/p partial R 4th ray amputation on 10/24, RLE angiogram with AT lithotripsy and AT/peroneal balloon angioplasty 10/27, uncontrolled IDDM (a1c 12 in Oct 2023), recent admission 11/8-11/10 to cardiology service for hypertensive emergency (left AMA, was given levaquin when he left for right foot wound that pt had started treatment for back in October) who presented to [facility] after a fall onto his knees and was having b/l knee pain. Found to have increased soft tissue gas in R foot on CT scan, now s/p right BKA 12/11/2023.    A1C: 13.5 %  C-peptide 0.5 with , JAMIR/ICA neg (Oct 2023)  BUN: 30  Creatinine: 1.27  GFR: 62  Weight: 70  BMI: 24.2   67yo M PMH CAD (2 stents 2020), HFmrEF (45-50%), HTN, PAD w/ remote RLE angioplasty, R 4th toe OM s/p partial R 4th ray amputation on 10/24, RLE angiogram with AT lithotripsy and AT/peroneal balloon angioplasty 10/27, uncontrolled IDDM (a1c 12 in Oct 2023), recent admission 11/8-11/10 to cardiology service for hypertensive emergency (left AMA, was given levaquin when he left for right foot wound that pt had started treatment for back in October) who presented to [facility] after a fall onto his knees and was having b/l knee pain. Found to have increased soft tissue gas in R foot on CT scan, now s/p right BKA 12/11/2023.    A1C: 13.5 %  C-peptide 0.5 with , JAMIR/ICA neg (Oct 2023)  BUN: 30  Creatinine: 1.27  GFR: 62  Weight: 70  BMI: 24.2   69yo M PMH CAD (2 stents 2020), HFmrEF (45-50%), HTN, PAD w/ remote RLE angioplasty, R 4th toe OM s/p partial R 4th ray amputation on 10/24, RLE angiogram with AT lithotripsy and AT/peroneal balloon angioplasty 10/27, uncontrolled IDDM (a1c 12 in Oct 2023), recent admission 11/8-11/10 to cardiology service for hypertensive emergency (left AMA, was given levaquin when he left for right foot wound that pt had started treatment for back in October) who presented to [facility] after a fall onto his knees and was having b/l knee pain. Found to have increased soft tissue gas in R foot on CT scan, now s/p right BKA 12/11/2023 pending closure.    A1C: 13.5 %  C-peptide 0.5 with , JAMIR/ICA neg (Oct 2023)  BUN: 30  Creatinine: 1.27  GFR: 62  Weight: 70  BMI: 24.2   67yo M PMH CAD (2 stents 2020), HFmrEF (45-50%), HTN, PAD w/ remote RLE angioplasty, R 4th toe OM s/p partial R 4th ray amputation on 10/24, RLE angiogram with AT lithotripsy and AT/peroneal balloon angioplasty 10/27, uncontrolled IDDM (a1c 12 in Oct 2023), recent admission 11/8-11/10 to cardiology service for hypertensive emergency (left AMA, was given levaquin when he left for right foot wound that pt had started treatment for back in October) who presented to [facility] after a fall onto his knees and was having b/l knee pain. Found to have increased soft tissue gas in R foot on CT scan, now s/p right BKA 12/11/2023 pending closure.    A1C: 13.5 %  C-peptide 0.5 with , JAMIR/ICA neg (Oct 2023)  BUN: 30  Creatinine: 1.27  GFR: 62  Weight: 70  BMI: 24.2

## 2023-12-15 LAB
ANION GAP SERPL CALC-SCNC: 4 MMOL/L — LOW (ref 5–17)
ANION GAP SERPL CALC-SCNC: 4 MMOL/L — LOW (ref 5–17)
BUN SERPL-MCNC: 30 MG/DL — HIGH (ref 7–23)
BUN SERPL-MCNC: 30 MG/DL — HIGH (ref 7–23)
CALCIUM SERPL-MCNC: 7.8 MG/DL — LOW (ref 8.4–10.5)
CALCIUM SERPL-MCNC: 7.8 MG/DL — LOW (ref 8.4–10.5)
CHLORIDE SERPL-SCNC: 107 MMOL/L — SIGNIFICANT CHANGE UP (ref 96–108)
CHLORIDE SERPL-SCNC: 107 MMOL/L — SIGNIFICANT CHANGE UP (ref 96–108)
CO2 SERPL-SCNC: 26 MMOL/L — SIGNIFICANT CHANGE UP (ref 22–31)
CO2 SERPL-SCNC: 26 MMOL/L — SIGNIFICANT CHANGE UP (ref 22–31)
CREAT SERPL-MCNC: 1.27 MG/DL — SIGNIFICANT CHANGE UP (ref 0.5–1.3)
CREAT SERPL-MCNC: 1.27 MG/DL — SIGNIFICANT CHANGE UP (ref 0.5–1.3)
EGFR: 62 ML/MIN/1.73M2 — SIGNIFICANT CHANGE UP
EGFR: 62 ML/MIN/1.73M2 — SIGNIFICANT CHANGE UP
GLUCOSE BLDC GLUCOMTR-MCNC: 170 MG/DL — HIGH (ref 70–99)
GLUCOSE BLDC GLUCOMTR-MCNC: 170 MG/DL — HIGH (ref 70–99)
GLUCOSE BLDC GLUCOMTR-MCNC: 217 MG/DL — HIGH (ref 70–99)
GLUCOSE BLDC GLUCOMTR-MCNC: 217 MG/DL — HIGH (ref 70–99)
GLUCOSE BLDC GLUCOMTR-MCNC: 267 MG/DL — HIGH (ref 70–99)
GLUCOSE BLDC GLUCOMTR-MCNC: 267 MG/DL — HIGH (ref 70–99)
GLUCOSE BLDC GLUCOMTR-MCNC: 314 MG/DL — HIGH (ref 70–99)
GLUCOSE BLDC GLUCOMTR-MCNC: 314 MG/DL — HIGH (ref 70–99)
GLUCOSE BLDC GLUCOMTR-MCNC: 357 MG/DL — HIGH (ref 70–99)
GLUCOSE BLDC GLUCOMTR-MCNC: 357 MG/DL — HIGH (ref 70–99)
GLUCOSE BLDC GLUCOMTR-MCNC: 384 MG/DL — HIGH (ref 70–99)
GLUCOSE BLDC GLUCOMTR-MCNC: 384 MG/DL — HIGH (ref 70–99)
GLUCOSE SERPL-MCNC: 233 MG/DL — HIGH (ref 70–99)
GLUCOSE SERPL-MCNC: 233 MG/DL — HIGH (ref 70–99)
HCT VFR BLD CALC: 24.7 % — LOW (ref 39–50)
HCT VFR BLD CALC: 24.7 % — LOW (ref 39–50)
HGB BLD-MCNC: 8.2 G/DL — LOW (ref 13–17)
HGB BLD-MCNC: 8.2 G/DL — LOW (ref 13–17)
MAGNESIUM SERPL-MCNC: 2 MG/DL — SIGNIFICANT CHANGE UP (ref 1.6–2.6)
MAGNESIUM SERPL-MCNC: 2 MG/DL — SIGNIFICANT CHANGE UP (ref 1.6–2.6)
MCHC RBC-ENTMCNC: 28.2 PG — SIGNIFICANT CHANGE UP (ref 27–34)
MCHC RBC-ENTMCNC: 28.2 PG — SIGNIFICANT CHANGE UP (ref 27–34)
MCHC RBC-ENTMCNC: 33.2 GM/DL — SIGNIFICANT CHANGE UP (ref 32–36)
MCHC RBC-ENTMCNC: 33.2 GM/DL — SIGNIFICANT CHANGE UP (ref 32–36)
MCV RBC AUTO: 84.9 FL — SIGNIFICANT CHANGE UP (ref 80–100)
MCV RBC AUTO: 84.9 FL — SIGNIFICANT CHANGE UP (ref 80–100)
NRBC # BLD: 0 /100 WBCS — SIGNIFICANT CHANGE UP (ref 0–0)
NRBC # BLD: 0 /100 WBCS — SIGNIFICANT CHANGE UP (ref 0–0)
PHOSPHATE SERPL-MCNC: 2.9 MG/DL — SIGNIFICANT CHANGE UP (ref 2.5–4.5)
PHOSPHATE SERPL-MCNC: 2.9 MG/DL — SIGNIFICANT CHANGE UP (ref 2.5–4.5)
PLATELET # BLD AUTO: 183 K/UL — SIGNIFICANT CHANGE UP (ref 150–400)
PLATELET # BLD AUTO: 183 K/UL — SIGNIFICANT CHANGE UP (ref 150–400)
POTASSIUM SERPL-MCNC: 3.9 MMOL/L — SIGNIFICANT CHANGE UP (ref 3.5–5.3)
POTASSIUM SERPL-MCNC: 3.9 MMOL/L — SIGNIFICANT CHANGE UP (ref 3.5–5.3)
POTASSIUM SERPL-SCNC: 3.9 MMOL/L — SIGNIFICANT CHANGE UP (ref 3.5–5.3)
POTASSIUM SERPL-SCNC: 3.9 MMOL/L — SIGNIFICANT CHANGE UP (ref 3.5–5.3)
RBC # BLD: 2.91 M/UL — LOW (ref 4.2–5.8)
RBC # BLD: 2.91 M/UL — LOW (ref 4.2–5.8)
RBC # FLD: 18.3 % — HIGH (ref 10.3–14.5)
RBC # FLD: 18.3 % — HIGH (ref 10.3–14.5)
SODIUM SERPL-SCNC: 137 MMOL/L — SIGNIFICANT CHANGE UP (ref 135–145)
SODIUM SERPL-SCNC: 137 MMOL/L — SIGNIFICANT CHANGE UP (ref 135–145)
WBC # BLD: 11.42 K/UL — HIGH (ref 3.8–10.5)
WBC # BLD: 11.42 K/UL — HIGH (ref 3.8–10.5)
WBC # FLD AUTO: 11.42 K/UL — HIGH (ref 3.8–10.5)
WBC # FLD AUTO: 11.42 K/UL — HIGH (ref 3.8–10.5)

## 2023-12-15 PROCEDURE — 99232 SBSQ HOSP IP/OBS MODERATE 35: CPT

## 2023-12-15 PROCEDURE — 99233 SBSQ HOSP IP/OBS HIGH 50: CPT | Mod: GC

## 2023-12-15 RX ORDER — LISINOPRIL 2.5 MG/1
40 TABLET ORAL EVERY 24 HOURS
Refills: 0 | Status: DISCONTINUED | OUTPATIENT
Start: 2023-12-15 | End: 2023-12-18

## 2023-12-15 RX ORDER — INSULIN LISPRO 100/ML
4 VIAL (ML) SUBCUTANEOUS
Refills: 0 | Status: DISCONTINUED | OUTPATIENT
Start: 2023-12-15 | End: 2023-12-16

## 2023-12-15 RX ORDER — POTASSIUM CHLORIDE 20 MEQ
20 PACKET (EA) ORAL ONCE
Refills: 0 | Status: COMPLETED | OUTPATIENT
Start: 2023-12-15 | End: 2023-12-15

## 2023-12-15 RX ORDER — INSULIN GLARGINE 100 [IU]/ML
8 INJECTION, SOLUTION SUBCUTANEOUS AT BEDTIME
Refills: 0 | Status: DISCONTINUED | OUTPATIENT
Start: 2023-12-15 | End: 2023-12-16

## 2023-12-15 RX ORDER — SODIUM,POTASSIUM PHOSPHATES 278-250MG
1 POWDER IN PACKET (EA) ORAL EVERY 4 HOURS
Refills: 0 | Status: COMPLETED | OUTPATIENT
Start: 2023-12-15 | End: 2023-12-15

## 2023-12-15 RX ADMIN — PIPERACILLIN AND TAZOBACTAM 25 GRAM(S): 4; .5 INJECTION, POWDER, LYOPHILIZED, FOR SOLUTION INTRAVENOUS at 23:52

## 2023-12-15 RX ADMIN — ATORVASTATIN CALCIUM 80 MILLIGRAM(S): 80 TABLET, FILM COATED ORAL at 21:47

## 2023-12-15 RX ADMIN — PIPERACILLIN AND TAZOBACTAM 25 GRAM(S): 4; .5 INJECTION, POWDER, LYOPHILIZED, FOR SOLUTION INTRAVENOUS at 05:53

## 2023-12-15 RX ADMIN — GABAPENTIN 800 MILLIGRAM(S): 400 CAPSULE ORAL at 15:00

## 2023-12-15 RX ADMIN — Medication 81 MILLIGRAM(S): at 11:49

## 2023-12-15 RX ADMIN — HYDROMORPHONE HYDROCHLORIDE 1 MILLIGRAM(S): 2 INJECTION INTRAMUSCULAR; INTRAVENOUS; SUBCUTANEOUS at 11:58

## 2023-12-15 RX ADMIN — Medication 125 MILLIGRAM(S): at 21:47

## 2023-12-15 RX ADMIN — Medication 1: at 12:25

## 2023-12-15 RX ADMIN — Medication 4: at 18:51

## 2023-12-15 RX ADMIN — Medication 20 MILLIEQUIVALENT(S): at 08:49

## 2023-12-15 RX ADMIN — Medication 50 MILLIGRAM(S): at 15:49

## 2023-12-15 RX ADMIN — Medication 4 UNIT(S): at 18:51

## 2023-12-15 RX ADMIN — HYDROMORPHONE HYDROCHLORIDE 1 MILLIGRAM(S): 2 INJECTION INTRAMUSCULAR; INTRAVENOUS; SUBCUTANEOUS at 21:45

## 2023-12-15 RX ADMIN — Medication 5: at 23:40

## 2023-12-15 RX ADMIN — Medication 50 MILLIGRAM(S): at 08:47

## 2023-12-15 RX ADMIN — Medication 3 UNIT(S): at 08:41

## 2023-12-15 RX ADMIN — Medication 650 MILLIGRAM(S): at 23:52

## 2023-12-15 RX ADMIN — Medication 3 UNIT(S): at 12:25

## 2023-12-15 RX ADMIN — PIPERACILLIN AND TAZOBACTAM 25 GRAM(S): 4; .5 INJECTION, POWDER, LYOPHILIZED, FOR SOLUTION INTRAVENOUS at 15:01

## 2023-12-15 RX ADMIN — Medication 650 MILLIGRAM(S): at 00:15

## 2023-12-15 RX ADMIN — Medication 650 MILLIGRAM(S): at 05:52

## 2023-12-15 RX ADMIN — Medication 1 PACKET(S): at 11:51

## 2023-12-15 RX ADMIN — HEPARIN SODIUM 5000 UNIT(S): 5000 INJECTION INTRAVENOUS; SUBCUTANEOUS at 15:01

## 2023-12-15 RX ADMIN — HYDROMORPHONE HYDROCHLORIDE 1 MILLIGRAM(S): 2 INJECTION INTRAMUSCULAR; INTRAVENOUS; SUBCUTANEOUS at 12:06

## 2023-12-15 RX ADMIN — Medication 1 PACKET(S): at 15:00

## 2023-12-15 RX ADMIN — GABAPENTIN 800 MILLIGRAM(S): 400 CAPSULE ORAL at 05:53

## 2023-12-15 RX ADMIN — HEPARIN SODIUM 5000 UNIT(S): 5000 INJECTION INTRAVENOUS; SUBCUTANEOUS at 05:53

## 2023-12-15 RX ADMIN — Medication 650 MILLIGRAM(S): at 18:50

## 2023-12-15 RX ADMIN — CARVEDILOL PHOSPHATE 12.5 MILLIGRAM(S): 80 CAPSULE, EXTENDED RELEASE ORAL at 18:51

## 2023-12-15 RX ADMIN — HYDROMORPHONE HYDROCHLORIDE 1 MILLIGRAM(S): 2 INJECTION INTRAMUSCULAR; INTRAVENOUS; SUBCUTANEOUS at 22:00

## 2023-12-15 RX ADMIN — Medication 50 MILLIGRAM(S): at 23:52

## 2023-12-15 RX ADMIN — Medication 125 MILLIGRAM(S): at 18:50

## 2023-12-15 RX ADMIN — LISINOPRIL 40 MILLIGRAM(S): 2.5 TABLET ORAL at 11:49

## 2023-12-15 RX ADMIN — GABAPENTIN 800 MILLIGRAM(S): 400 CAPSULE ORAL at 21:46

## 2023-12-15 RX ADMIN — HYDROMORPHONE HYDROCHLORIDE 1 MILLIGRAM(S): 2 INJECTION INTRAMUSCULAR; INTRAVENOUS; SUBCUTANEOUS at 03:30

## 2023-12-15 RX ADMIN — INSULIN GLARGINE 8 UNIT(S): 100 INJECTION, SOLUTION SUBCUTANEOUS at 23:41

## 2023-12-15 RX ADMIN — Medication 650 MILLIGRAM(S): at 11:51

## 2023-12-15 RX ADMIN — Medication 2: at 08:42

## 2023-12-15 RX ADMIN — CARVEDILOL PHOSPHATE 12.5 MILLIGRAM(S): 80 CAPSULE, EXTENDED RELEASE ORAL at 08:47

## 2023-12-15 RX ADMIN — HYDROMORPHONE HYDROCHLORIDE 1 MILLIGRAM(S): 2 INJECTION INTRAMUSCULAR; INTRAVENOUS; SUBCUTANEOUS at 03:00

## 2023-12-15 RX ADMIN — HEPARIN SODIUM 5000 UNIT(S): 5000 INJECTION INTRAVENOUS; SUBCUTANEOUS at 21:48

## 2023-12-15 RX ADMIN — Medication 125 MILLIGRAM(S): at 05:53

## 2023-12-15 RX ADMIN — OXYCODONE HYDROCHLORIDE 10 MILLIGRAM(S): 5 TABLET ORAL at 16:20

## 2023-12-15 RX ADMIN — OXYCODONE HYDROCHLORIDE 10 MILLIGRAM(S): 5 TABLET ORAL at 15:00

## 2023-12-15 RX ADMIN — Medication 125 MILLIGRAM(S): at 11:49

## 2023-12-15 RX ADMIN — Medication 650 MILLIGRAM(S): at 06:22

## 2023-12-15 NOTE — PROGRESS NOTE ADULT - ASSESSMENT
67yo M PMH CAD (2 stents 2020), HFmrEF (45-50%), HTN, PAD w/ remote RLE angioplasty, R 4th toe OM s/p partial R 4th ray amputation on 10/24, RLE angiogram with AT lithotripsy and AT/peroneal balloon angioplasty 10/27, uncontrolled IDDM (a1c 12 in Oct 2023), recent admission 11/8-11/10 to cardiology service for hypertensive emergency (left AMA, was given levaquin when he left for right foot wound that pt had started treatment for back in October) who presented to [facility] after a fall onto his knees and was having b/l knee pain. Found to have increased soft tissue gas in R foot on CT scan, now s/p right BKA 12/11/2023 planned for closure on Monday.    A1C: 13.5 %  C-peptide 0.5 with , JAMIR/ICA neg (Oct 2023)  Added on c-peptide 12/15 with   BUN: 30  Creatinine: 1.27  GFR: 62  Weight: 70  BMI: 24.2 67yo M PMH CAD (2 stents 2020), HFmrEF (45-50%), HTN, PAD w/ remote RLE angioplasty, R 4th toe OM s/p partial R 4th ray amputation on 10/24, RLE angiogram with AT lithotripsy and AT/peroneal balloon angioplasty 10/27, uncontrolled IDDM (a1c 12 in Oct 2023), recent admission 11/8-11/10 to cardiology service for hypertensive emergency (left AMA, was given levaquin when he left for right foot wound that pt had started treatment for back in October) who presented to [facility] after a fall onto his knees and was having b/l knee pain. Found to have increased soft tissue gas in R foot on CT scan, now s/p right BKA 12/11/2023 planned for closure on Monday.    A1C: 13.5 %  C-peptide 0.5 with , JAMIR/ICA neg (Oct 2023)  Added on c-peptide 12/15 with   BUN: 30  Creatinine: 1.27  GFR: 62  Weight: 70  BMI: 24.2

## 2023-12-15 NOTE — PROGRESS NOTE ADULT - SUBJECTIVE AND OBJECTIVE BOX
O/N: STEPH LATHAM                                  ---------------------------------------------------------------------------  PLEASE CHECK WHEN PRESENT:     [  ]Heart Failure     [  ] Acute     [  ] Acute on Chronic     [x  ] Chronic  -------------------------------------------------------------------     [  ]Diastolic [HFpEF]     [ x ]Systolic [HFrEF]     [  ]Combined [HFpEF & HFrEF]  .................................................................................     [  ]Other:     [ ] Pulmonary Hypertension     [ ] Chronic A-fib     [ ] Persistet A-fib     [ ] Permanent A-fib     [ ] Paroxysmal A-fib     [x ] Hypertensive Heart Disease  -------------------------------------------------------------------  [ ] Respiratory failure  [ ] Acute cor pulmonale  [ ] Asthma/COPD Exacerbation  [ ]COPD on home O2 (Chronic renal Failure)   [ ] Pleural effusion  [ ] Aspiration pneumonia  [ ] Obstructive Sleep Apnea  [ ]Atelectasis   [ ] Acute PE   -------------------------------------------------------------------  [  ]Acute Kidney Injury      [  ]Acute Tubular Necrosis      [  ]Reneal Medullary Necrosis     [  ]Renal Cortical Necrosis     [  ]Other Pathological Lesions:    [  ]CKD 1  [  ]CKD 2  [  ]CKD 3  [  ]CKD 4  [  ]CKD 5 (ESRD)  [  ]Other  -------------------------------------------------------------------  [ x ]Diabetes  [  ] Diabetic PVD Ulcer  [  ] Neuropathic ulcer to DM  [  ] Diabetes with Nephropathy  [ x ] Osteomyelitis due to diabetes  [  ] Hyperglycemia   [  ]hypoglycemia   --------------------------------------------------------------------  [  ]Malnutrition: See Nutrition Note  [  ]Cachexia  [  ]Other:   [  ]Supplement Ordered:  [  ]Morbid Obesity (BMI >=40]  [ ] Ileus  ---------------------------------------------------------------------  [ ] Sepsis/severe sepsis/septic shock  [ ] Noninfectious SIRS  [ ] UTI  [ ] Pneumonia  [ ] Thrombophlebitis     -----------------------------------------------------------------------  [ ] Acidosis/alkalosis  [ ] Fluid overload  [ ] Hypokalemia  [ ] Hyperkalemia  [ ] Hypomagnesemia  [ ] Hypophosphatemia  [ ] Hyperphosphatemia  ------------------------------------------------------------------------  [ ] Acute blood loss anemia  [ ] Post op blood loss anemia  [ ] Iron deficiency anemia  [ ] Anemia due to chronic disease  [ ] Hypercoagulable state  [ ] Thrombocytopenia  ----------------------------------------------------------------------  [ ] Cerebral infarction  [ ] Transient ischemia attack  [ ] Encephalopathy - Toxic or Metabolic      A/P: 67yo M PMH CAD (2 stents 2020), HFmrEF (45-50%), HTN, PAD w/ remote RLE angioplasty, R 4th toe OM s/p partial R 4th ray amputation on 10/24, RLE angiogram with AT lithotripsy and AT/peroneal balloon angioplasty 10/27, uncontrolled IDDM (a1c 12 in Oct 2023), recent admission 11/8-11/10 to cardiology service for hypertensive emergency (left AMA, was given levaquin when he left for right foot wound that pt had started treatment for back in October) who presented to [facility] after a fall onto his knees and was having b/l knee pain. Found to have increased soft tissue gas in R foot on CT scan. Patient is s/p R BKA on 12/11/23.    Vascular/PAD:  -s/p R mariia BKA 12/11/23, will require closure  -pain control, dressing change today    HTN/HLD:  -c/w Hydralazine, Lisinopril, Carvedilol  -c/w Atorvastatin    CAD/CHF:   -cardiology following, appreciate reccs  -will need to restart Plavix, ASA    DM:  -endocrine following, appreciate reccs  -mISS, lispro 3u TID, lantus 7u qHS    Anemia:   -post operative anemia- s/p transfusion  -f/u AM cbc    ID:  -c/w Zosyn 3.375g IV q8 until stump closure    Diet: consistent carbs    Activity: OOB to chair, PT Consult    DVTPPx: HSQ    Dispo: 5 Uris telemetry   O/N: STEPH LATHAM                                  ---------------------------------------------------------------------------  PLEASE CHECK WHEN PRESENT:     [  ]Heart Failure     [  ] Acute     [  ] Acute on Chronic     [x  ] Chronic  -------------------------------------------------------------------     [  ]Diastolic [HFpEF]     [ x ]Systolic [HFrEF]     [  ]Combined [HFpEF & HFrEF]  .................................................................................     [  ]Other:     [ ] Pulmonary Hypertension     [ ] Chronic A-fib     [ ] Persistet A-fib     [ ] Permanent A-fib     [ ] Paroxysmal A-fib     [x ] Hypertensive Heart Disease  -------------------------------------------------------------------  [ ] Respiratory failure  [ ] Acute cor pulmonale  [ ] Asthma/COPD Exacerbation  [ ]COPD on home O2 (Chronic renal Failure)   [ ] Pleural effusion  [ ] Aspiration pneumonia  [ ] Obstructive Sleep Apnea  [ ]Atelectasis   [ ] Acute PE   -------------------------------------------------------------------  [  ]Acute Kidney Injury      [  ]Acute Tubular Necrosis      [  ]Reneal Medullary Necrosis     [  ]Renal Cortical Necrosis     [  ]Other Pathological Lesions:    [  ]CKD 1  [  ]CKD 2  [  ]CKD 3  [  ]CKD 4  [  ]CKD 5 (ESRD)  [  ]Other  -------------------------------------------------------------------  [ x ]Diabetes  [  ] Diabetic PVD Ulcer  [  ] Neuropathic ulcer to DM  [  ] Diabetes with Nephropathy  [ x ] Osteomyelitis due to diabetes  [  ] Hyperglycemia   [  ]hypoglycemia   --------------------------------------------------------------------  [  ]Malnutrition: See Nutrition Note  [  ]Cachexia  [  ]Other:   [  ]Supplement Ordered:  [  ]Morbid Obesity (BMI >=40]  [ ] Ileus  ---------------------------------------------------------------------  [ ] Sepsis/severe sepsis/septic shock  [ ] Noninfectious SIRS  [ ] UTI  [ ] Pneumonia  [ ] Thrombophlebitis     -----------------------------------------------------------------------  [ ] Acidosis/alkalosis  [ ] Fluid overload  [ ] Hypokalemia  [ ] Hyperkalemia  [ ] Hypomagnesemia  [ ] Hypophosphatemia  [ ] Hyperphosphatemia  ------------------------------------------------------------------------  [ ] Acute blood loss anemia  [ ] Post op blood loss anemia  [ ] Iron deficiency anemia  [ ] Anemia due to chronic disease  [ ] Hypercoagulable state  [ ] Thrombocytopenia  ----------------------------------------------------------------------  [ ] Cerebral infarction  [ ] Transient ischemia attack  [ ] Encephalopathy - Toxic or Metabolic      A/P: 69yo M PMH CAD (2 stents 2020), HFmrEF (45-50%), HTN, PAD w/ remote RLE angioplasty, R 4th toe OM s/p partial R 4th ray amputation on 10/24, RLE angiogram with AT lithotripsy and AT/peroneal balloon angioplasty 10/27, uncontrolled IDDM (a1c 12 in Oct 2023), recent admission 11/8-11/10 to cardiology service for hypertensive emergency (left AMA, was given levaquin when he left for right foot wound that pt had started treatment for back in October) who presented to [facility] after a fall onto his knees and was having b/l knee pain. Found to have increased soft tissue gas in R foot on CT scan. Patient is s/p R BKA on 12/11/23.    Vascular/PAD:  -s/p R mariia BKA 12/11/23, will require closure  -pain control, dressing change today    HTN/HLD:  -c/w Hydralazine, Lisinopril, Carvedilol  -c/w Atorvastatin    CAD/CHF:   -cardiology following, appreciate reccs  -will need to restart Plavix, ASA    DM:  -endocrine following, appreciate reccs  -mISS, lispro 3u TID, lantus 7u qHS    Anemia:   -post operative anemia- s/p transfusion  -f/u AM cbc    ID:  -c/w Zosyn 3.375g IV q8 until stump closure    Diet: consistent carbs    Activity: OOB to chair, PT Consult    DVTPPx: HSQ    Dispo: 5 Uris telemetry   O/N: YEISON, VSS      S: Complaining of pain localized to stump, patient does not have any complaints    O: Examined in bed resting comfortably     ROS: Denies headache, blurred vision, chest pain, SOB, abdominal pain, nausea or vomiting.         piperacillin/tazobactam IVPB.. 3.375  vancomycin    Solution 125  aspirin  chewable 81  carvedilol 12.5  heparin   Injectable 5000  hydrALAZINE 50  piperacillin/tazobactam IVPB.. 3.375  vancomycin    Solution 125      Allergies    No Known Allergies    Intolerances        Vital Signs Last 24 Hrs  T(C): 36.8 (15 Dec 2023 05:51), Max: 37.2 (14 Dec 2023 15:13)  T(F): 98.3 (15 Dec 2023 05:51), Max: 98.9 (14 Dec 2023 15:13)  HR: 69 (15 Dec 2023 05:51) (59 - 71)  BP: 141/70 (15 Dec 2023 05:51) (135/63 - 164/77)  BP(mean): 99 (15 Dec 2023 05:51) (91 - 110)  RR: 18 (15 Dec 2023 05:51) (16 - 22)  SpO2: 96% (15 Dec 2023 05:51) (96% - 99%)    Parameters below as of 15 Dec 2023 05:51  Patient On (Oxygen Delivery Method): room air      I&O's Summary    13 Dec 2023 07:01  -  14 Dec 2023 07:00  --------------------------------------------------------  IN: 0 mL / OUT: 400 mL / NET: -400 mL    14 Dec 2023 07:01  -  15 Dec 2023 06:55  --------------------------------------------------------  IN: 540 mL / OUT: 1375 mL / NET: -835 mL        Physical Exam:  General: NAD, resting comfortably in bed  C/V: NSR  Pulm: Nonlabored breathing, no respiratory distress  Abd: soft, NT/ND.  Extrem: RLE s/p guillotine BKA, stump wrapped in ACE and Kerlex with appropriate strike through at the posterior aspect of the stump.      LABS:                        8.2    11.42 )-----------( 183      ( 15 Dec 2023 05:46 )             24.7     12-15    137  |  107  |  30<H>  ----------------------------<  233<H>  3.9   |  26  |  1.27    Ca    7.8<L>      15 Dec 2023 05:46  Phos  2.9     12-15  Mg     2.0     12-15    TPro  4.6<L>  /  Alb  1.4<L>  /  TBili  0.3  /  DBili  x   /  AST  30  /  ALT  20  /  AlkPhos  141<H>  12-14        Radiology and Additional Studies:          ---------------------------------------------------------------------------  PLEASE CHECK WHEN PRESENT:     [  ]Heart Failure     [  ] Acute     [  ] Acute on Chronic     [x  ] Chronic  -------------------------------------------------------------------     [  ]Diastolic [HFpEF]     [ x ]Systolic [HFrEF]     [  ]Combined [HFpEF & HFrEF]  .................................................................................     [  ]Other:     [ ] Pulmonary Hypertension     [ ] Chronic A-fib     [ ] Persistet A-fib     [ ] Permanent A-fib     [ ] Paroxysmal A-fib     [x ] Hypertensive Heart Disease  -------------------------------------------------------------------  [ ] Respiratory failure  [ ] Acute cor pulmonale  [ ] Asthma/COPD Exacerbation  [ ]COPD on home O2 (Chronic renal Failure)   [ ] Pleural effusion  [ ] Aspiration pneumonia  [ ] Obstructive Sleep Apnea  [ ]Atelectasis   [ ] Acute PE   -------------------------------------------------------------------  [  ]Acute Kidney Injury      [  ]Acute Tubular Necrosis      [  ]Reneal Medullary Necrosis     [  ]Renal Cortical Necrosis     [  ]Other Pathological Lesions:    [  ]CKD 1  [  ]CKD 2  [  ]CKD 3  [  ]CKD 4  [  ]CKD 5 (ESRD)  [  ]Other  -------------------------------------------------------------------  [ x ]Diabetes  [  ] Diabetic PVD Ulcer  [  ] Neuropathic ulcer to DM  [  ] Diabetes with Nephropathy  [ x ] Osteomyelitis due to diabetes  [  ] Hyperglycemia   [  ]hypoglycemia   --------------------------------------------------------------------  [  ]Malnutrition: See Nutrition Note  [  ]Cachexia  [  ]Other:   [  ]Supplement Ordered:  [  ]Morbid Obesity (BMI >=40]  [ ] Ileus  ---------------------------------------------------------------------  [ ] Sepsis/severe sepsis/septic shock  [ ] Noninfectious SIRS  [ ] UTI  [ ] Pneumonia  [ ] Thrombophlebitis     -----------------------------------------------------------------------  [ ] Acidosis/alkalosis  [ ] Fluid overload  [ ] Hypokalemia  [ ] Hyperkalemia  [ ] Hypomagnesemia  [ ] Hypophosphatemia  [ ] Hyperphosphatemia  ------------------------------------------------------------------------  [ ] Acute blood loss anemia  [ ] Post op blood loss anemia  [ ] Iron deficiency anemia  [ ] Anemia due to chronic disease  [ ] Hypercoagulable state  [ ] Thrombocytopenia  ----------------------------------------------------------------------  [ ] Cerebral infarction  [ ] Transient ischemia attack  [ ] Encephalopathy - Toxic or Metabolic        A/P: 69yo M PMH CAD (2 stents 2020), HFmrEF (45-50%), HTN, PAD w/ remote RLE angioplasty, R 4th toe OM s/p partial R 4th ray amputation on 10/24, RLE angiogram with AT lithotripsy and AT/peroneal balloon angioplasty 10/27, uncontrolled IDDM (a1c 12 in Oct 2023), recent admission 11/8-11/10 to cardiology service for hypertensive emergency (left AMA, was given levaquin when he left for right foot wound that pt had started treatment for back in October) who presented to [facility] after a fall onto his knees and was having b/l knee pain. Found to have increased soft tissue gas in R foot on CT scan. Patient is s/p R BKA on 12/11/23.    Vascular/PAD:  -s/p R mariia BKA 12/11/23, will require closure. Tentatively planned for Monday.  -pain control, dressing change today    HTN/HLD:  -c/w Hydralazine, Lisinopril, Carvedilol  -c/w Atorvastatin    CAD/CHF:   -cardiology following, appreciate reccs  -will need to restart Plavix, ASA    DM:  -endocrine following, appreciate reccs  -mISS, lispro 3u TID, lantus 7u qHS    Anemia:   -post operative anemia- s/p transfusion  -f/u AM cbc    ID:  -c/w Zosyn 3.375g IV q8 until stump closure    Diet: consistent carbs    Activity: OOB to chair, PT Consult    DVTPPx: HSQ    Dispo: 5 [employer]ris telemetry   O/N: YEISON, VSS      S: Complaining of pain localized to stump, patient does not have any complaints    O: Examined in bed resting comfortably     ROS: Denies headache, blurred vision, chest pain, SOB, abdominal pain, nausea or vomiting.         piperacillin/tazobactam IVPB.. 3.375  vancomycin    Solution 125  aspirin  chewable 81  carvedilol 12.5  heparin   Injectable 5000  hydrALAZINE 50  piperacillin/tazobactam IVPB.. 3.375  vancomycin    Solution 125      Allergies    No Known Allergies    Intolerances        Vital Signs Last 24 Hrs  T(C): 36.8 (15 Dec 2023 05:51), Max: 37.2 (14 Dec 2023 15:13)  T(F): 98.3 (15 Dec 2023 05:51), Max: 98.9 (14 Dec 2023 15:13)  HR: 69 (15 Dec 2023 05:51) (59 - 71)  BP: 141/70 (15 Dec 2023 05:51) (135/63 - 164/77)  BP(mean): 99 (15 Dec 2023 05:51) (91 - 110)  RR: 18 (15 Dec 2023 05:51) (16 - 22)  SpO2: 96% (15 Dec 2023 05:51) (96% - 99%)    Parameters below as of 15 Dec 2023 05:51  Patient On (Oxygen Delivery Method): room air      I&O's Summary    13 Dec 2023 07:01  -  14 Dec 2023 07:00  --------------------------------------------------------  IN: 0 mL / OUT: 400 mL / NET: -400 mL    14 Dec 2023 07:01  -  15 Dec 2023 06:55  --------------------------------------------------------  IN: 540 mL / OUT: 1375 mL / NET: -835 mL        Physical Exam:  General: NAD, resting comfortably in bed  C/V: NSR  Pulm: Nonlabored breathing, no respiratory distress  Abd: soft, NT/ND.  Extrem: RLE s/p guillotine BKA, stump wrapped in ACE and Kerlex with appropriate strike through at the posterior aspect of the stump.      LABS:                        8.2    11.42 )-----------( 183      ( 15 Dec 2023 05:46 )             24.7     12-15    137  |  107  |  30<H>  ----------------------------<  233<H>  3.9   |  26  |  1.27    Ca    7.8<L>      15 Dec 2023 05:46  Phos  2.9     12-15  Mg     2.0     12-15    TPro  4.6<L>  /  Alb  1.4<L>  /  TBili  0.3  /  DBili  x   /  AST  30  /  ALT  20  /  AlkPhos  141<H>  12-14        Radiology and Additional Studies:          ---------------------------------------------------------------------------  PLEASE CHECK WHEN PRESENT:     [  ]Heart Failure     [  ] Acute     [  ] Acute on Chronic     [x  ] Chronic  -------------------------------------------------------------------     [  ]Diastolic [HFpEF]     [ x ]Systolic [HFrEF]     [  ]Combined [HFpEF & HFrEF]  .................................................................................     [  ]Other:     [ ] Pulmonary Hypertension     [ ] Chronic A-fib     [ ] Persistet A-fib     [ ] Permanent A-fib     [ ] Paroxysmal A-fib     [x ] Hypertensive Heart Disease  -------------------------------------------------------------------  [ ] Respiratory failure  [ ] Acute cor pulmonale  [ ] Asthma/COPD Exacerbation  [ ]COPD on home O2 (Chronic renal Failure)   [ ] Pleural effusion  [ ] Aspiration pneumonia  [ ] Obstructive Sleep Apnea  [ ]Atelectasis   [ ] Acute PE   -------------------------------------------------------------------  [  ]Acute Kidney Injury      [  ]Acute Tubular Necrosis      [  ]Reneal Medullary Necrosis     [  ]Renal Cortical Necrosis     [  ]Other Pathological Lesions:    [  ]CKD 1  [  ]CKD 2  [  ]CKD 3  [  ]CKD 4  [  ]CKD 5 (ESRD)  [  ]Other  -------------------------------------------------------------------  [ x ]Diabetes  [  ] Diabetic PVD Ulcer  [  ] Neuropathic ulcer to DM  [  ] Diabetes with Nephropathy  [ x ] Osteomyelitis due to diabetes  [  ] Hyperglycemia   [  ]hypoglycemia   --------------------------------------------------------------------  [  ]Malnutrition: See Nutrition Note  [  ]Cachexia  [  ]Other:   [  ]Supplement Ordered:  [  ]Morbid Obesity (BMI >=40]  [ ] Ileus  ---------------------------------------------------------------------  [ ] Sepsis/severe sepsis/septic shock  [ ] Noninfectious SIRS  [ ] UTI  [ ] Pneumonia  [ ] Thrombophlebitis     -----------------------------------------------------------------------  [ ] Acidosis/alkalosis  [ ] Fluid overload  [ ] Hypokalemia  [ ] Hyperkalemia  [ ] Hypomagnesemia  [ ] Hypophosphatemia  [ ] Hyperphosphatemia  ------------------------------------------------------------------------  [ ] Acute blood loss anemia  [ ] Post op blood loss anemia  [ ] Iron deficiency anemia  [ ] Anemia due to chronic disease  [ ] Hypercoagulable state  [ ] Thrombocytopenia  ----------------------------------------------------------------------  [ ] Cerebral infarction  [ ] Transient ischemia attack  [ ] Encephalopathy - Toxic or Metabolic        A/P: 67yo M PMH CAD (2 stents 2020), HFmrEF (45-50%), HTN, PAD w/ remote RLE angioplasty, R 4th toe OM s/p partial R 4th ray amputation on 10/24, RLE angiogram with AT lithotripsy and AT/peroneal balloon angioplasty 10/27, uncontrolled IDDM (a1c 12 in Oct 2023), recent admission 11/8-11/10 to cardiology service for hypertensive emergency (left AMA, was given levaquin when he left for right foot wound that pt had started treatment for back in October) who presented to [facility] after a fall onto his knees and was having b/l knee pain. Found to have increased soft tissue gas in R foot on CT scan. Patient is s/p R BKA on 12/11/23.    Vascular/PAD:  -s/p R mariia BKA 12/11/23, will require closure. Tentatively planned for Monday.  -pain control, dressing change today    HTN/HLD:  -c/w Hydralazine, Lisinopril, Carvedilol  -c/w Atorvastatin    CAD/CHF:   -cardiology following, appreciate reccs  -will need to restart Plavix, ASA    DM:  -endocrine following, appreciate reccs  -mISS, lispro 3u TID, lantus 7u qHS    Anemia:   -post operative anemia- s/p transfusion  -f/u AM cbc    ID:  -c/w Zosyn 3.375g IV q8 until stump closure    Diet: consistent carbs    Activity: OOB to chair, PT Consult    DVTPPx: HSQ    Dispo: 5 Uris telemetry

## 2023-12-15 NOTE — OCCUPATIONAL THERAPY INITIAL EVALUATION ADULT - PERTINENT HX OF CURRENT PROBLEM, REHAB EVAL
67yo M PMH CAD (2 stents 2020), HFmrEF (45-50%), HTN, PAD w/ remote RLE angioplasty, R 4th toe OM s/p partial R 4th ray amputation on 10/24, RLE angiogram with AT lithotripsy and AT/peroneal balloon angioplasty 10/27, uncontrolled IDDM (a1c 12 in Oct 2023), recent admission 11/8-11/10 to cardiology service for hypertensive emergency (left AMA, was given levaquin when he left for right foot wound that pt had started treatment for back in October) who presented to [facility] after a fall onto his knees and was having b/l knee pain. Found to have increased soft tissue gas in R foot on CT scan. Patient is s/p R BKA on 12/11/23. 67yo M PMH CAD (2 stents 2020), HFmrEF (45-50%), HTN, PAD w/ remote RLE angioplasty, R 4th toe OM s/p partial R 4th ray amputation on 10/24, RLE angiogram with AT lithotripsy and AT/peroneal balloon angioplasty 10/27, uncontrolled IDDM (a1c 12 in Oct 2023), recent admission 11/8-11/10 to cardiology service for hypertensive emergency (left AMA, was given levaquin when he left for right foot wound that pt had started treatment for back in October) who presented to [facility] after a fall onto his knees and was having b/l knee pain. Found to have increased soft tissue gas in R foot on CT scan. Patient is s/p R BKA on 12/11/23.

## 2023-12-15 NOTE — OCCUPATIONAL THERAPY INITIAL EVALUATION ADULT - MODALITIES TREATMENT COMMENTS
Pt able to perform bed<->commode transfer with Max Ax1 b/l axillary support stand-pivot transfer (maintained RLE NWB). Pt performed toileting/toilet hygiene with supervision. OT attempted to transfer pt from commode<->bed in order to further assess mobility/ADLs/functional transfers and ambulation, however pt adamantly refusing to participate. Pt reporting that he is unwilling to work with OT at this time, and is not amenable to participating in OT program moving forward. Pt left seated on commode, and KATE Sánchez made aware.

## 2023-12-15 NOTE — PROGRESS NOTE ADULT - SUBJECTIVE AND OBJECTIVE BOX
Subjective:  No acute events overnight.  Patient is doing well today - slight increase in pain following dressing change this morning.  Last BM yesterday.  Denies HA, CP, SOB, abdominal pain, nausea, vomiting, fever, chills or diarrhea.     Objective:   Vital Signs Last 24 Hrs  T(C): 36.8 (15 Dec 2023 05:51), Max: 37.2 (14 Dec 2023 15:13)  T(F): 98.3 (15 Dec 2023 05:51), Max: 98.9 (14 Dec 2023 15:13)  HR: 69 (15 Dec 2023 05:51) (59 - 69)  BP: 157/89 (15 Dec 2023 08:54) (140/67 - 164/77)  BP(mean): 99 (15 Dec 2023 05:51) (96 - 110)  RR: 18 (15 Dec 2023 05:51) (17 - 22)  SpO2: 96% (15 Dec 2023 05:51) (96% - 99%)    Parameters below as of 15 Dec 2023 05:51  Patient On (Oxygen Delivery Method): room air    Physical Exam:   -Gen: NAD, resting in bed  -HEENT: EOMI, PERRL, no scleral icterus  -CV: normal S1 and S2  -Lungs: CTABL, normal respiratory effort on RA  -Ab: soft, NT, ND, normal BS  -Ext: RLE stump wrapped by primary team, +2 LLE edema   -Neuro: A&O x 3, no focal deficits     Labs:                        8.2    11.42 )-----------( 183      ( 15 Dec 2023 05:46 )             24.7       12-15    137  |  107  |  30<H>  ----------------------------<  233<H>  3.9   |  26  |  1.27    Ca    7.8<L>      15 Dec 2023 05:46  Phos  2.9     12-15  Mg     2.0     12-15    TPro  4.6<L>  /  Alb  1.4<L>  /  TBili  0.3  /  DBili  x   /  AST  30  /  ALT  20  /  AlkPhos  141<H>  12-14    Medications:  MEDICATIONS  (STANDING):  acetaminophen     Tablet .. 650 milliGRAM(s) Oral every 6 hours  aspirin  chewable 81 milliGRAM(s) Oral daily  atorvastatin 80 milliGRAM(s) Oral at bedtime  carvedilol 12.5 milliGRAM(s) Oral every 12 hours  gabapentin 800 milliGRAM(s) Oral three times a day  heparin   Injectable 5000 Unit(s) SubCutaneous every 8 hours  hydrALAZINE 50 milliGRAM(s) Oral every 8 hours  insulin glargine Injectable (LANTUS) 6 Unit(s) SubCutaneous at bedtime  insulin lispro (ADMELOG) corrective regimen sliding scale   SubCutaneous Before meals and at bedtime  insulin lispro Injectable (ADMELOG) 3 Unit(s) SubCutaneous three times a day before meals  lisinopril 40 milliGRAM(s) Oral every 24 hours  piperacillin/tazobactam IVPB.. 3.375 Gram(s) IV Intermittent every 8 hours  potassium phosphate / sodium phosphate Powder (PHOS-NaK) 1 Packet(s) Oral every 4 hours  vancomycin    Solution 125 milliGRAM(s) Oral every 6 hours    MEDICATIONS  (PRN):  HYDROmorphone  Injectable 1 milliGRAM(s) IV Push every 6 hours PRN Severe Pain (7 - 10)  oxyCODONE    IR 10 milliGRAM(s) Oral every 8 hours PRN Moderate Pain (4 - 6)  oxyCODONE    IR 5 milliGRAM(s) Oral every 8 hours PRN Mild Pain (1 - 3)

## 2023-12-15 NOTE — OCCUPATIONAL THERAPY INITIAL EVALUATION ADULT - MANUAL MUSCLE TESTING RESULTS, REHAB EVAL
Grossly assessed during functional mobility against gravity with pt presenting with 3+/5 or greater BUE/BLE strength with exception of RLE unable to assess 2/2 pt agitation.

## 2023-12-15 NOTE — OCCUPATIONAL THERAPY INITIAL EVALUATION ADULT - DIAGNOSIS, OT EVAL
Pt p/w impaired strength, balance, postural control, and functional activity tolerance, impacting ability to perform functional mobility/ADLs.

## 2023-12-15 NOTE — PROGRESS NOTE ADULT - PROBLEM SELECTOR PLAN 1
Type 2 diabetes mellitus with hyperglycemia  - Please increase lantus to 8 units at bedtime.   - Increase lispro to 4  units before each meal.  - Continue lispro low dose sliding scale before meals and at bedtime.  - Patient's fingerstick glucose goal is 100-180 mg/dL.    - For discharge, patient can ***.    - Patient can follow up at discharge with [facility] by calling [phone] to make an appointment.      Case discussed with Dr. Huertas. Primary team updated. Type 2 diabetes mellitus with hyperglycemia  - Please increase lantus to 8 units at bedtime.   - Increase lispro to 4  units before each meal.  - Continue lispro low dose sliding scale before meals and at bedtime.  - Patient's fingerstick glucose goal is 100-180 mg/dL.    - For discharge, patient can ***.    - Patient can follow up at discharge with [facility] by calling [phone] to make an appointment.      Case discussed with Dr. Huertas. Primary team updated.

## 2023-12-15 NOTE — OCCUPATIONAL THERAPY INITIAL EVALUATION ADULT - ADDITIONAL COMMENTS
Prior to admission, pt able to perform all ADLs/mobility independently with use of SC for ambulation. Pt reporting use of no additional DME.

## 2023-12-15 NOTE — PROGRESS NOTE ADULT - SUBJECTIVE AND OBJECTIVE BOX
SUBJECTIVE / INTERVAL HPI: Patient was seen and examined this morning. Found to have c diff, but no diarrhea today so far. Pain is more manageable today. Eating well. Planned for closure on Monday.    CAPILLARY BLOOD GLUCOSE & INSULIN RECEIVED  163 mg/dL (12-14 @ 17:09) - Lispro 3+1  240 mg/dL (12-14 @ 22:49) - Lantus 6 + lispro 2  267 mg/dL (12-15 @ 01:55)  233 mg/dL (12-15 @ 05:46)  217 mg/dL (12-15 @ 07:52) - Lispro 3+2  170 mg/dL (12-15 @ 12:13)    REVIEW OF SYSTEMS  Constitutional:  Negative fever, chills or loss of appetite.  Eyes:  Negative blurry vision or double vision.  Cardiovascular:  Negative for chest pain or palpitations.  Respiratory:  Negative for cough, wheezing, or shortness of breath.    Gastrointestinal:  Negative for nausea, vomiting,  constipation, or abdominal pain. + diarrhea  Genitourinary:  Negative frequency, urgency or dysuria.  Neurologic:  No headache, confusion, dizziness, lightheadedness.    PHYSICAL EXAM  Vital Signs Last 24 Hrs  T(C): 37.2 (15 Dec 2023 11:49), Max: 37.2 (15 Dec 2023 11:49)  T(F): 98.9 (15 Dec 2023 11:49), Max: 98.9 (15 Dec 2023 11:49)  HR: 63 (15 Dec 2023 11:49) (63 - 69)  BP: 140/66 (15 Dec 2023 11:49) (140/66 - 159/74)  BP(mean): 94 (15 Dec 2023 11:49) (94 - 107)  RR: 22 (15 Dec 2023 11:49) (17 - 22)  SpO2: 96% (15 Dec 2023 11:49) (96% - 96%)    Parameters below as of 15 Dec 2023 11:49  Patient On (Oxygen Delivery Method): room air    Constitutional: Awake, alert, in no acute distress.   HEENT: Normocephalic, atraumatic, RAGHAV.  Respiratory: Lungs clear to ausculation bilaterally.   Cardiovascular: regular rhythm, normal S1 and S2, no audible murmurs.   GI: soft, non-tender, non-distended, bowel sounds present.  Extremities: No lower extremity edema. Right BKA with serosanguinous dressing  Psychiatric: AAO x 3. Normal affect/mood.     LABS  CBC - WBC/HGB/HTC/PLT: 11.42/8.2/24.7/183 (12-15-23)  BMP - Na/K/Cl/Bicarb/BUN/Cr/Gluc/AG/eGFR: 137/3.9/107/26/30/1.27/233/4/62 (12-15-23)  Ca - 7.8 (12-15-23)  Phos - 2.9 (12-15-23)  Mg - 2.0 (12-15-23)  LFT - Alb/Tprot/Tbili/Dbili/AlkPhos/ALT/AST: 1.4/--/0.3/--/141/20/30 (12-14-23)  PT/aPTT/INR: 11.1/--/0.97 (12-11-23)       MEDICATIONS  MEDICATIONS  (STANDING):  acetaminophen     Tablet .. 650 milliGRAM(s) Oral every 6 hours  aspirin  chewable 81 milliGRAM(s) Oral daily  atorvastatin 80 milliGRAM(s) Oral at bedtime  carvedilol 12.5 milliGRAM(s) Oral every 12 hours  gabapentin 800 milliGRAM(s) Oral three times a day  heparin   Injectable 5000 Unit(s) SubCutaneous every 8 hours  hydrALAZINE 50 milliGRAM(s) Oral every 8 hours  insulin glargine Injectable (LANTUS) 6 Unit(s) SubCutaneous at bedtime  insulin lispro (ADMELOG) corrective regimen sliding scale   SubCutaneous Before meals and at bedtime  insulin lispro Injectable (ADMELOG) 3 Unit(s) SubCutaneous three times a day before meals  lisinopril 40 milliGRAM(s) Oral every 24 hours  piperacillin/tazobactam IVPB.. 3.375 Gram(s) IV Intermittent every 8 hours  vancomycin    Solution 125 milliGRAM(s) Oral every 6 hours    MEDICATIONS  (PRN):  HYDROmorphone  Injectable 1 milliGRAM(s) IV Push every 6 hours PRN Severe Pain (7 - 10)  oxyCODONE    IR 10 milliGRAM(s) Oral every 8 hours PRN Moderate Pain (4 - 6)  oxyCODONE    IR 5 milliGRAM(s) Oral every 8 hours PRN Mild Pain (1 - 3)

## 2023-12-15 NOTE — OCCUPATIONAL THERAPY INITIAL EVALUATION ADULT - GENERAL OBSERVATIONS, REHAB EVAL
OT IE completed. Pt admitted to [facility] for R BKA, performed on 12/11/23. Orders received, chart reviewed, pt cleared for OT by KATE Sánchez. Pt received sitting at EOB, NAD, +heplock, +RLE dressing C/D/I. Pt A&Ox4, agreeable to OT, and tolerated session poorly. OT IE completed. Pt admitted to [facility] for R BKA, performed on 12/11/23. Orders received, chart reviewed, pt cleared for OT by KATE Sánchez. Pt received sitting at EOB, NAD, +heplock, +RLE dressing C/D/I. Pt A&Ox4, agreeable to OT, and tolerated session poorly.

## 2023-12-15 NOTE — OCCUPATIONAL THERAPY INITIAL EVALUATION ADULT - REHAB POTENTIAL, OT EVAL
OT to discharge from skilled program at this time, 2/2 pt declining to participate in future OT sessions. Vascular team made aware. Please re-consult OT should pt demonstrate amenability to services.

## 2023-12-15 NOTE — PROGRESS NOTE ADULT - ASSESSMENT
68 year old male with a PMHx of CAD (s/p PCI x 2 in 2020), HTN, IDDM (A1c 12%, 10/2023), PAD (s/p remote RLE angioplasty), 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    #Diabetic Foot Infection   -further management as per vascular surgery, s/p BKA on 12/11, now pending closure next week   -currently on Vancomycin and Zosyn     #Acute Blood Loss Anemia   -Hgb downtrended from time of admission, partially attributable to operative blood losses (required 1 units pRBCs intraoperatively)  -continue to monitor with daily CBC   -goal Hgb > 8.0 given history of CAD  -if requires additional blood products, consider 20mg IV Lasix     #CAD   -continue with ASA 81mg daily and Atorvastatin 80mg qhs     #Chronic HFmrEF   #HTN  -continue with carvedilol 12.5mg BID, lisinopril 40mg daily and hydralazine 50mg q8hrs   -per cards recs, plan for reintroduction of spironolactone 25mg PO qd prior to discharge     #Moderate Aortic Stenosis  -outpatient follow up for surveillance TTE     #Type 2 Diabetes c/b PAD, diabetic foot infection and hyperglycemia   -further management as per endocrinology   -currently on Lantus 6 units qhs, Lispro 3 units with meals and ISS    DVT PPx: SQH    Dispo: pending OR next week for BKA closure

## 2023-12-16 LAB
ANION GAP SERPL CALC-SCNC: 4 MMOL/L — LOW (ref 5–17)
ANION GAP SERPL CALC-SCNC: 4 MMOL/L — LOW (ref 5–17)
BUN SERPL-MCNC: 25 MG/DL — HIGH (ref 7–23)
BUN SERPL-MCNC: 25 MG/DL — HIGH (ref 7–23)
CALCIUM SERPL-MCNC: 8 MG/DL — LOW (ref 8.4–10.5)
CALCIUM SERPL-MCNC: 8 MG/DL — LOW (ref 8.4–10.5)
CHLORIDE SERPL-SCNC: 109 MMOL/L — HIGH (ref 96–108)
CHLORIDE SERPL-SCNC: 109 MMOL/L — HIGH (ref 96–108)
CO2 SERPL-SCNC: 25 MMOL/L — SIGNIFICANT CHANGE UP (ref 22–31)
CO2 SERPL-SCNC: 25 MMOL/L — SIGNIFICANT CHANGE UP (ref 22–31)
CREAT SERPL-MCNC: 1.22 MG/DL — SIGNIFICANT CHANGE UP (ref 0.5–1.3)
CREAT SERPL-MCNC: 1.22 MG/DL — SIGNIFICANT CHANGE UP (ref 0.5–1.3)
EGFR: 65 ML/MIN/1.73M2 — SIGNIFICANT CHANGE UP
EGFR: 65 ML/MIN/1.73M2 — SIGNIFICANT CHANGE UP
GLUCOSE BLDC GLUCOMTR-MCNC: 161 MG/DL — HIGH (ref 70–99)
GLUCOSE BLDC GLUCOMTR-MCNC: 161 MG/DL — HIGH (ref 70–99)
GLUCOSE BLDC GLUCOMTR-MCNC: 173 MG/DL — HIGH (ref 70–99)
GLUCOSE BLDC GLUCOMTR-MCNC: 173 MG/DL — HIGH (ref 70–99)
GLUCOSE BLDC GLUCOMTR-MCNC: 191 MG/DL — HIGH (ref 70–99)
GLUCOSE BLDC GLUCOMTR-MCNC: 191 MG/DL — HIGH (ref 70–99)
GLUCOSE BLDC GLUCOMTR-MCNC: 250 MG/DL — HIGH (ref 70–99)
GLUCOSE BLDC GLUCOMTR-MCNC: 250 MG/DL — HIGH (ref 70–99)
GLUCOSE SERPL-MCNC: 209 MG/DL — HIGH (ref 70–99)
GLUCOSE SERPL-MCNC: 209 MG/DL — HIGH (ref 70–99)
HCT VFR BLD CALC: 25.7 % — LOW (ref 39–50)
HCT VFR BLD CALC: 25.7 % — LOW (ref 39–50)
HGB BLD-MCNC: 8.2 G/DL — LOW (ref 13–17)
HGB BLD-MCNC: 8.2 G/DL — LOW (ref 13–17)
MAGNESIUM SERPL-MCNC: 1.9 MG/DL — SIGNIFICANT CHANGE UP (ref 1.6–2.6)
MAGNESIUM SERPL-MCNC: 1.9 MG/DL — SIGNIFICANT CHANGE UP (ref 1.6–2.6)
MCHC RBC-ENTMCNC: 28.1 PG — SIGNIFICANT CHANGE UP (ref 27–34)
MCHC RBC-ENTMCNC: 28.1 PG — SIGNIFICANT CHANGE UP (ref 27–34)
MCHC RBC-ENTMCNC: 31.9 GM/DL — LOW (ref 32–36)
MCHC RBC-ENTMCNC: 31.9 GM/DL — LOW (ref 32–36)
MCV RBC AUTO: 88 FL — SIGNIFICANT CHANGE UP (ref 80–100)
MCV RBC AUTO: 88 FL — SIGNIFICANT CHANGE UP (ref 80–100)
NRBC # BLD: 0 /100 WBCS — SIGNIFICANT CHANGE UP (ref 0–0)
NRBC # BLD: 0 /100 WBCS — SIGNIFICANT CHANGE UP (ref 0–0)
PHOSPHATE SERPL-MCNC: 2.7 MG/DL — SIGNIFICANT CHANGE UP (ref 2.5–4.5)
PHOSPHATE SERPL-MCNC: 2.7 MG/DL — SIGNIFICANT CHANGE UP (ref 2.5–4.5)
PLATELET # BLD AUTO: 201 K/UL — SIGNIFICANT CHANGE UP (ref 150–400)
PLATELET # BLD AUTO: 201 K/UL — SIGNIFICANT CHANGE UP (ref 150–400)
POTASSIUM SERPL-MCNC: 4.1 MMOL/L — SIGNIFICANT CHANGE UP (ref 3.5–5.3)
POTASSIUM SERPL-MCNC: 4.1 MMOL/L — SIGNIFICANT CHANGE UP (ref 3.5–5.3)
POTASSIUM SERPL-SCNC: 4.1 MMOL/L — SIGNIFICANT CHANGE UP (ref 3.5–5.3)
POTASSIUM SERPL-SCNC: 4.1 MMOL/L — SIGNIFICANT CHANGE UP (ref 3.5–5.3)
RBC # BLD: 2.92 M/UL — LOW (ref 4.2–5.8)
RBC # BLD: 2.92 M/UL — LOW (ref 4.2–5.8)
RBC # FLD: 18.6 % — HIGH (ref 10.3–14.5)
RBC # FLD: 18.6 % — HIGH (ref 10.3–14.5)
SODIUM SERPL-SCNC: 138 MMOL/L — SIGNIFICANT CHANGE UP (ref 135–145)
SODIUM SERPL-SCNC: 138 MMOL/L — SIGNIFICANT CHANGE UP (ref 135–145)
WBC # BLD: 11.83 K/UL — HIGH (ref 3.8–10.5)
WBC # BLD: 11.83 K/UL — HIGH (ref 3.8–10.5)
WBC # FLD AUTO: 11.83 K/UL — HIGH (ref 3.8–10.5)
WBC # FLD AUTO: 11.83 K/UL — HIGH (ref 3.8–10.5)

## 2023-12-16 PROCEDURE — 99232 SBSQ HOSP IP/OBS MODERATE 35: CPT

## 2023-12-16 RX ORDER — MAGNESIUM SULFATE 500 MG/ML
1 VIAL (ML) INJECTION ONCE
Refills: 0 | Status: COMPLETED | OUTPATIENT
Start: 2023-12-16 | End: 2023-12-16

## 2023-12-16 RX ORDER — SODIUM,POTASSIUM PHOSPHATES 278-250MG
1 POWDER IN PACKET (EA) ORAL ONCE
Refills: 0 | Status: COMPLETED | OUTPATIENT
Start: 2023-12-16 | End: 2023-12-16

## 2023-12-16 RX ORDER — INSULIN GLARGINE 100 [IU]/ML
10 INJECTION, SOLUTION SUBCUTANEOUS AT BEDTIME
Refills: 0 | Status: DISCONTINUED | OUTPATIENT
Start: 2023-12-16 | End: 2023-12-18

## 2023-12-16 RX ORDER — INSULIN LISPRO 100/ML
6 VIAL (ML) SUBCUTANEOUS
Refills: 0 | Status: DISCONTINUED | OUTPATIENT
Start: 2023-12-16 | End: 2023-12-18

## 2023-12-16 RX ORDER — CARVEDILOL PHOSPHATE 80 MG/1
25 CAPSULE, EXTENDED RELEASE ORAL EVERY 12 HOURS
Refills: 0 | Status: DISCONTINUED | OUTPATIENT
Start: 2023-12-16 | End: 2023-12-18

## 2023-12-16 RX ADMIN — CARVEDILOL PHOSPHATE 25 MILLIGRAM(S): 80 CAPSULE, EXTENDED RELEASE ORAL at 17:50

## 2023-12-16 RX ADMIN — CARVEDILOL PHOSPHATE 12.5 MILLIGRAM(S): 80 CAPSULE, EXTENDED RELEASE ORAL at 08:12

## 2023-12-16 RX ADMIN — HEPARIN SODIUM 5000 UNIT(S): 5000 INJECTION INTRAVENOUS; SUBCUTANEOUS at 22:08

## 2023-12-16 RX ADMIN — Medication 125 MILLIGRAM(S): at 12:44

## 2023-12-16 RX ADMIN — HYDROMORPHONE HYDROCHLORIDE 1 MILLIGRAM(S): 2 INJECTION INTRAMUSCULAR; INTRAVENOUS; SUBCUTANEOUS at 22:08

## 2023-12-16 RX ADMIN — HYDROMORPHONE HYDROCHLORIDE 1 MILLIGRAM(S): 2 INJECTION INTRAMUSCULAR; INTRAVENOUS; SUBCUTANEOUS at 13:26

## 2023-12-16 RX ADMIN — Medication 50 MILLIGRAM(S): at 08:11

## 2023-12-16 RX ADMIN — LISINOPRIL 40 MILLIGRAM(S): 2.5 TABLET ORAL at 12:48

## 2023-12-16 RX ADMIN — Medication 650 MILLIGRAM(S): at 12:45

## 2023-12-16 RX ADMIN — Medication 125 MILLIGRAM(S): at 17:31

## 2023-12-16 RX ADMIN — PIPERACILLIN AND TAZOBACTAM 25 GRAM(S): 4; .5 INJECTION, POWDER, LYOPHILIZED, FOR SOLUTION INTRAVENOUS at 22:09

## 2023-12-16 RX ADMIN — Medication 650 MILLIGRAM(S): at 17:29

## 2023-12-16 RX ADMIN — HYDROMORPHONE HYDROCHLORIDE 1 MILLIGRAM(S): 2 INJECTION INTRAMUSCULAR; INTRAVENOUS; SUBCUTANEOUS at 06:40

## 2023-12-16 RX ADMIN — OXYCODONE HYDROCHLORIDE 10 MILLIGRAM(S): 5 TABLET ORAL at 03:41

## 2023-12-16 RX ADMIN — Medication 6 UNIT(S): at 17:22

## 2023-12-16 RX ADMIN — HEPARIN SODIUM 5000 UNIT(S): 5000 INJECTION INTRAVENOUS; SUBCUTANEOUS at 07:04

## 2023-12-16 RX ADMIN — Medication 50 MILLIGRAM(S): at 17:50

## 2023-12-16 RX ADMIN — GABAPENTIN 800 MILLIGRAM(S): 400 CAPSULE ORAL at 06:41

## 2023-12-16 RX ADMIN — OXYCODONE HYDROCHLORIDE 10 MILLIGRAM(S): 5 TABLET ORAL at 04:41

## 2023-12-16 RX ADMIN — HYDROMORPHONE HYDROCHLORIDE 1 MILLIGRAM(S): 2 INJECTION INTRAMUSCULAR; INTRAVENOUS; SUBCUTANEOUS at 07:10

## 2023-12-16 RX ADMIN — Medication 4 UNIT(S): at 12:45

## 2023-12-16 RX ADMIN — Medication 650 MILLIGRAM(S): at 00:22

## 2023-12-16 RX ADMIN — HEPARIN SODIUM 5000 UNIT(S): 5000 INJECTION INTRAVENOUS; SUBCUTANEOUS at 17:28

## 2023-12-16 RX ADMIN — Medication 81 MILLIGRAM(S): at 12:45

## 2023-12-16 RX ADMIN — Medication 100 GRAM(S): at 12:44

## 2023-12-16 RX ADMIN — Medication 4 UNIT(S): at 08:50

## 2023-12-16 RX ADMIN — PIPERACILLIN AND TAZOBACTAM 25 GRAM(S): 4; .5 INJECTION, POWDER, LYOPHILIZED, FOR SOLUTION INTRAVENOUS at 17:27

## 2023-12-16 RX ADMIN — Medication 125 MILLIGRAM(S): at 06:42

## 2023-12-16 RX ADMIN — PIPERACILLIN AND TAZOBACTAM 25 GRAM(S): 4; .5 INJECTION, POWDER, LYOPHILIZED, FOR SOLUTION INTRAVENOUS at 06:42

## 2023-12-16 RX ADMIN — Medication 1: at 08:50

## 2023-12-16 RX ADMIN — ATORVASTATIN CALCIUM 80 MILLIGRAM(S): 80 TABLET, FILM COATED ORAL at 22:08

## 2023-12-16 RX ADMIN — HYDROMORPHONE HYDROCHLORIDE 1 MILLIGRAM(S): 2 INJECTION INTRAMUSCULAR; INTRAVENOUS; SUBCUTANEOUS at 22:23

## 2023-12-16 RX ADMIN — OXYCODONE HYDROCHLORIDE 10 MILLIGRAM(S): 5 TABLET ORAL at 18:45

## 2023-12-16 RX ADMIN — Medication 2: at 22:17

## 2023-12-16 RX ADMIN — GABAPENTIN 800 MILLIGRAM(S): 400 CAPSULE ORAL at 22:07

## 2023-12-16 RX ADMIN — HYDROMORPHONE HYDROCHLORIDE 1 MILLIGRAM(S): 2 INJECTION INTRAMUSCULAR; INTRAVENOUS; SUBCUTANEOUS at 13:35

## 2023-12-16 RX ADMIN — Medication 1 PACKET(S): at 12:44

## 2023-12-16 RX ADMIN — Medication 1: at 12:45

## 2023-12-16 RX ADMIN — GABAPENTIN 800 MILLIGRAM(S): 400 CAPSULE ORAL at 17:32

## 2023-12-16 RX ADMIN — Medication 650 MILLIGRAM(S): at 07:04

## 2023-12-16 RX ADMIN — Medication 1: at 17:21

## 2023-12-16 RX ADMIN — INSULIN GLARGINE 10 UNIT(S): 100 INJECTION, SOLUTION SUBCUTANEOUS at 22:08

## 2023-12-16 RX ADMIN — OXYCODONE HYDROCHLORIDE 10 MILLIGRAM(S): 5 TABLET ORAL at 17:28

## 2023-12-16 NOTE — PROGRESS NOTE ADULT - ATTENDING COMMENTS
This is a patient known to Dr. 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 He is transferred from the medical service to vascular, awaiting staged R BKA w/ closure.       Open wound hemostatic. Pain relatively controlled. No signs of pus. Getting treated for C-diff    PLAN & RECOMMENDATIONS  - IV ABX per ID  - C-diff treatment  - Preop for staged R BKA w/ closure on Monday 12/18/23  - ASA/SQH    Thank you,    Michelet Freed MD  Attending Vascular Surgeon  [facility] at [address] [place] [address]  Office: [phone]; Fax: [phone]  [email or web address] This is a patient known to Dr. 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 He is transferred from the medical service to vascular, awaiting staged R BKA w/ closure.       Open wound hemostatic. Pain relatively controlled. No signs of pus. Getting treated for C-diff    PLAN & RECOMMENDATIONS  - IV ABX per ID  - C-diff treatment  - Preop for staged R BKA w/ closure on Monday 12/18/23  - ASA/SQH    Thank you,    Michleet Freed MD  Attending Vascular Surgeon  [facility] at [address] [place] [address]  Office: [phone]; Fax: [phone]  [email or web address]

## 2023-12-16 NOTE — PROGRESS NOTE ADULT - SUBJECTIVE AND OBJECTIVE BOX
INTERVAL EVENTS: no acute events or complaints    PAST MEDICAL & SURGICAL HISTORY:  IDDM (insulin dependent diabetes mellitus)    HTN (hypertension)    CAD S/P percutaneous coronary angioplasty    Neuropathy    PAD (peripheral artery disease)    Acute CHF    PNA (pneumonia)    Gangrene of toe of right foot    Moderate aortic stenosis    Acute on chronic diastolic congestive heart failure    Hyperlipidemia    No significant past surgical history    History of partial ray amputation of fourth toe of right foot    Gangrene of toe of right foot    Acute CHF    PNA (pneumonia)    PNA (pneumonia)        MEDICATIONS  (STANDING):  acetaminophen     Tablet .. 650 milliGRAM(s) Oral every 6 hours  aspirin  chewable 81 milliGRAM(s) Oral daily  atorvastatin 80 milliGRAM(s) Oral at bedtime  carvedilol 12.5 milliGRAM(s) Oral every 12 hours  gabapentin 800 milliGRAM(s) Oral three times a day  heparin   Injectable 5000 Unit(s) SubCutaneous every 8 hours  hydrALAZINE 50 milliGRAM(s) Oral every 8 hours  insulin glargine Injectable (LANTUS) 8 Unit(s) SubCutaneous at bedtime  insulin lispro (ADMELOG) corrective regimen sliding scale   SubCutaneous Before meals and at bedtime  insulin lispro Injectable (ADMELOG) 4 Unit(s) SubCutaneous three times a day with meals  lisinopril 40 milliGRAM(s) Oral every 24 hours  magnesium sulfate  IVPB 1 Gram(s) IV Intermittent once  piperacillin/tazobactam IVPB.. 3.375 Gram(s) IV Intermittent every 8 hours  potassium phosphate / sodium phosphate Powder (PHOS-NaK) 1 Packet(s) Oral once  sodium phosphate 15 milliMole(s)/250 mL IVPB 15 milliMole(s) IV Intermittent once  vancomycin    Solution 125 milliGRAM(s) Oral every 6 hours    MEDICATIONS  (PRN):  HYDROmorphone  Injectable 1 milliGRAM(s) IV Push every 6 hours PRN Severe Pain (7 - 10)  oxyCODONE    IR 5 milliGRAM(s) Oral every 8 hours PRN Mild Pain (1 - 3)  oxyCODONE    IR 10 milliGRAM(s) Oral every 8 hours PRN Moderate Pain (4 - 6)    T(F): 98.8 (12-16-23 @ 08:59), Max: 98.8 (12-16-23 @ 08:59)  HR: 71 (12-16-23 @ 08:59) (61 - 77)  BP: 195/106 (12-16-23 @ 08:59) (160/70 - 195/106)  BP(mean): 137 (12-16-23 @ 08:59) (101 - 137)  ABP: --  ABP(mean): --  RR: 22 (12-16-23 @ 08:59) (18 - 24)  SpO2: 96% (12-16-23 @ 08:59) (94% - 100%)    I/O Detail 24H    15 Dec 2023 07:01  -  16 Dec 2023 07:00  --------------------------------------------------------  IN:    IV PiggyBack: 200 mL    Oral Fluid: 560 mL  Total IN: 760 mL    OUT:    Voided (mL): 1725 mL  Total OUT: 1725 mL    Total NET: -965 mL      16 Dec 2023 07:01  -  16 Dec 2023 12:40  --------------------------------------------------------  IN:    Oral Fluid: 240 mL  Total IN: 240 mL    OUT:  Total OUT: 0 mL    Total NET: 240 mL          PHYSICAL EXAM:  -Gen: NAD, resting in bed  -HEENT: EOMI, PERRL, no scleral icterus  -CV: normal S1 and S2  -Lungs: CTABL, normal respiratory effort on RA  -Ab: soft, NT, ND, normal BS  -Ext: RLE stump wrapped by primary team, +2 LLE edema   -Neuro: A&O x 3, no focal deficits     LABS:  CBC 12-16-23 @ 07:15                        8.2    11.83 )-----------( 201                   25.7       Hgb trend: 8.2 <-- , 8.2 <-- , 7.2 <-- , 7.6 <--   WBC trend: 11.83 <-- , 11.42 <-- , 14.43 <-- , 15.06 <--       CMP 12-16-23 @ 07:15    138  |  109<H>  |  25<H>  ----------------------------<  209<H>  4.1   |  25  |  1.22    Ca    8.0<L>      12-16-23 @ 07:15  Phos  2.7     12-16  Mg     1.9     12-16        Serum Cr trend: 1.22 <-- , 1.27 <-- , 1.36 <--         Cardiac Markers           STUDIES:

## 2023-12-16 NOTE — PROGRESS NOTE ADULT - ASSESSMENT
68 year old male with a PMHx of CAD (s/p PCI x 2 in 2020), HTN, IDDM (A1c 12%, 10/2023), PAD (s/p remote RLE angioplasty), 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    #Diabetic Foot Infection   -further management as per vascular surgery, s/p BKA on 12/11, now pending closure next week   -currently on Vancomycin and Zosyn     #Acute Blood Loss Anemia   -continue to monitor with daily CBC   -goal Hgb > 8.0 given history of CAD  -if requires additional blood products, consider 20mg IV Lasix     #CAD   -continue with ASA 81mg daily and Atorvastatin 80mg qhs     #Chronic HFmrEF   #HTN  -continue with lisinopril 40mg daily and hydralazine 50mg q8hrs, would increase coreg from 12.5mg BID to 25mg BID today   -per cards recs, plan for reintroduction of spironolactone 25mg PO qd prior to discharge     #Moderate Aortic Stenosis  -outpatient follow up for surveillance TTE     #Type 2 Diabetes c/b PAD, diabetic foot infection and hyperglycemia   -further management as per endocrinology   -currently on Lantus 6 units qhs, Lispro 3 units with meals and ISS; would increase lantus to 10 QHS and lispro to 6 TID today    DVT PPx: SQH    Dispo: pending OR next week for BKA closure

## 2023-12-16 NOTE — PROGRESS NOTE ADULT - SUBJECTIVE AND OBJECTIVE BOX
ON: STEPH LATHAM  Subjective: Patient with no acute complaints this morning. Dressing changed bedside. R BKA healing well.    ROS:   Denies Headache, blurred vision, Chest Pain, SOB, Abdominal pain, nausea or vomiting     Social   piperacillin/tazobactam IVPB.. 3.375  vancomycin    Solution 125  aspirin  chewable 81  carvedilol 12.5  heparin   Injectable 5000  hydrALAZINE 50  lisinopril 40  piperacillin/tazobactam IVPB.. 3.375  vancomycin    Solution 125      Allergies    No Known Allergies    Intolerances        Vital Signs Last 24 Hrs  T(C): 37.1 (16 Dec 2023 08:59), Max: 37.1 (16 Dec 2023 08:59)  T(F): 98.8 (16 Dec 2023 08:59), Max: 98.8 (16 Dec 2023 08:59)  HR: 71 (16 Dec 2023 08:59) (61 - 77)  BP: 195/106 (16 Dec 2023 08:59) (160/70 - 195/106)  BP(mean): 137 (16 Dec 2023 08:59) (101 - 137)  RR: 22 (16 Dec 2023 08:59) (18 - 24)  SpO2: 96% (16 Dec 2023 08:59) (94% - 100%)    Parameters below as of 16 Dec 2023 08:59  Patient On (Oxygen Delivery Method): room air      I&O's Summary    15 Dec 2023 07:01  -  16 Dec 2023 07:00  --------------------------------------------------------  IN: 760 mL / OUT: 1725 mL / NET: -965 mL    16 Dec 2023 07:01  -  16 Dec 2023 15:16  --------------------------------------------------------  IN: 420 mL / OUT: 1100 mL / NET: -680 mL        Physical Exam:  General: NAD, resting comfortably in bed  C/V: NSR  Pulm: Nonlabored breathing, no respiratory distress  Abd: soft, NT/ND.  Extrem: RLE s/p guillotine BKA, stump clean with no purulence, drainage or surrounding erythema, moderate strikethrough on ace and Kerlix, rewrapped.      LABS:                        8.2    11.83 )-----------( 201      ( 16 Dec 2023 07:15 )             25.7     12-16    138  |  109<H>  |  25<H>  ----------------------------<  209<H>  4.1   |  25  |  1.22    Ca    8.0<L>      16 Dec 2023 07:15  Phos  2.7     12-16  Mg     1.9     12-16          Radiology and Additional Studies:    A/P: A/P: 69yo M PMH CAD (2 stents 2020), HFmrEF (45-50%), HTN, PAD w/ remote RLE angioplasty, R 4th toe OM s/p partial R 4th ray amputation on 10/24, RLE angiogram with AT lithotripsy and AT/peroneal balloon angioplasty 10/27, uncontrolled IDDM (a1c 12 in Oct 2023), recent admission 11/8-11/10 to cardiology service for hypertensive emergency (left AMA, was given levaquin when he left for right foot wound that pt had started treatment for back in October) who presented to [facility] after a fall onto his knees and was having b/l knee pain. Found to have increased soft tissue gas in R foot on CT scan. Patient is s/p R BKA on 12/11/23 with planned closure on Monday 12/16/23.    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 Tentatively planned for Monday.  -pain control, dressing change today    HTN/HLD:  -c/w Hydralazine, Lisinopril  -Coreg 25 BID  -c/w Atorvastatin    CAD/CHF:   -cardiology following, appreciate reccs  -will need to restart Plavix, ASA    DM:  -endocrine following, appreciate reccs  -mISS, lispro 6u TID, Lantus 10u QHS    Anemia:   -post operative anemia- s/p transfusion  -f/u AM cbc    ID:  -c/w Zosyn 3.375g IV q8 until stump closure    Diet: consistent carbs    Activity: OOB to chair, PT Consult    DVTPPx: HSQ    Dispo: 5 [facility] telemetry   ON: STEPH LATHAM  Subjective: Patient with no acute complaints this morning. Dressing changed bedside. R BKA healing well.    ROS:   Denies Headache, blurred vision, Chest Pain, SOB, Abdominal pain, nausea or vomiting     Social   piperacillin/tazobactam IVPB.. 3.375  vancomycin    Solution 125  aspirin  chewable 81  carvedilol 12.5  heparin   Injectable 5000  hydrALAZINE 50  lisinopril 40  piperacillin/tazobactam IVPB.. 3.375  vancomycin    Solution 125      Allergies    No Known Allergies    Intolerances        Vital Signs Last 24 Hrs  T(C): 37.1 (16 Dec 2023 08:59), Max: 37.1 (16 Dec 2023 08:59)  T(F): 98.8 (16 Dec 2023 08:59), Max: 98.8 (16 Dec 2023 08:59)  HR: 71 (16 Dec 2023 08:59) (61 - 77)  BP: 195/106 (16 Dec 2023 08:59) (160/70 - 195/106)  BP(mean): 137 (16 Dec 2023 08:59) (101 - 137)  RR: 22 (16 Dec 2023 08:59) (18 - 24)  SpO2: 96% (16 Dec 2023 08:59) (94% - 100%)    Parameters below as of 16 Dec 2023 08:59  Patient On (Oxygen Delivery Method): room air      I&O's Summary    15 Dec 2023 07:01  -  16 Dec 2023 07:00  --------------------------------------------------------  IN: 760 mL / OUT: 1725 mL / NET: -965 mL    16 Dec 2023 07:01  -  16 Dec 2023 15:16  --------------------------------------------------------  IN: 420 mL / OUT: 1100 mL / NET: -680 mL        Physical Exam:  General: NAD, resting comfortably in bed  C/V: NSR  Pulm: Nonlabored breathing, no respiratory distress  Abd: soft, NT/ND.  Extrem: RLE s/p guillotine BKA, stump clean with no purulence, drainage or surrounding erythema, moderate strikethrough on ace and Kerlix, rewrapped.      LABS:                        8.2    11.83 )-----------( 201      ( 16 Dec 2023 07:15 )             25.7     12-16    138  |  109<H>  |  25<H>  ----------------------------<  209<H>  4.1   |  25  |  1.22    Ca    8.0<L>      16 Dec 2023 07:15  Phos  2.7     12-16  Mg     1.9     12-16          Radiology and Additional Studies:    A/P: A/P: 69yo M PMH CAD (2 stents 2020), HFmrEF (45-50%), HTN, PAD w/ remote RLE angioplasty, R 4th toe OM s/p partial R 4th ray amputation on 10/24, RLE angiogram with AT lithotripsy and AT/peroneal balloon angioplasty 10/27, uncontrolled IDDM (a1c 12 in Oct 2023), recent admission 11/8-11/10 to cardiology service for hypertensive emergency (left AMA, was given levaquin when he left for right foot wound that pt had started treatment for back in October) who presented to [facility] after a fall onto his knees and was having b/l knee pain. Found to have increased soft tissue gas in R foot on CT scan. Patient is s/p R BKA on 12/11/23 with planned closure on Monday 12/16/23.    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 Tentatively planned for Monday.  -pain control, dressing change today    HTN/HLD:  -c/w Hydralazine, Lisinopril  -Coreg 25 BID  -c/w Atorvastatin    CAD/CHF:   -cardiology following, appreciate reccs  -will need to restart Plavix, ASA    DM:  -endocrine following, appreciate reccs  -mISS, lispro 6u TID, Lantus 10u QHS    Anemia:   -post operative anemia- s/p transfusion  -f/u AM cbc    ID:  -c/w Zosyn 3.375g IV q8 until stump closure    Diet: consistent carbs    Activity: OOB to chair, PT Consult    DVTPPx: HSQ    Dispo: 5 [facility] telemetry

## 2023-12-17 ENCOUNTER — TRANSCRIPTION ENCOUNTER (OUTPATIENT)
Age: 68
End: 2023-12-17

## 2023-12-17 LAB
ANION GAP SERPL CALC-SCNC: 5 MMOL/L — SIGNIFICANT CHANGE UP (ref 5–17)
ANION GAP SERPL CALC-SCNC: 5 MMOL/L — SIGNIFICANT CHANGE UP (ref 5–17)
BUN SERPL-MCNC: 21 MG/DL — SIGNIFICANT CHANGE UP (ref 7–23)
BUN SERPL-MCNC: 21 MG/DL — SIGNIFICANT CHANGE UP (ref 7–23)
C PEPTIDE SERPL-MCNC: 1 NG/ML — LOW (ref 1.1–4.4)
C PEPTIDE SERPL-MCNC: 1 NG/ML — LOW (ref 1.1–4.4)
CALCIUM SERPL-MCNC: 8.2 MG/DL — LOW (ref 8.4–10.5)
CALCIUM SERPL-MCNC: 8.2 MG/DL — LOW (ref 8.4–10.5)
CHLORIDE SERPL-SCNC: 111 MMOL/L — HIGH (ref 96–108)
CHLORIDE SERPL-SCNC: 111 MMOL/L — HIGH (ref 96–108)
CO2 SERPL-SCNC: 26 MMOL/L — SIGNIFICANT CHANGE UP (ref 22–31)
CO2 SERPL-SCNC: 26 MMOL/L — SIGNIFICANT CHANGE UP (ref 22–31)
CREAT SERPL-MCNC: 1.23 MG/DL — SIGNIFICANT CHANGE UP (ref 0.5–1.3)
CREAT SERPL-MCNC: 1.23 MG/DL — SIGNIFICANT CHANGE UP (ref 0.5–1.3)
EGFR: 65 ML/MIN/1.73M2 — SIGNIFICANT CHANGE UP
EGFR: 65 ML/MIN/1.73M2 — SIGNIFICANT CHANGE UP
GLUCOSE BLDC GLUCOMTR-MCNC: 112 MG/DL — HIGH (ref 70–99)
GLUCOSE BLDC GLUCOMTR-MCNC: 112 MG/DL — HIGH (ref 70–99)
GLUCOSE BLDC GLUCOMTR-MCNC: 140 MG/DL — HIGH (ref 70–99)
GLUCOSE BLDC GLUCOMTR-MCNC: 140 MG/DL — HIGH (ref 70–99)
GLUCOSE BLDC GLUCOMTR-MCNC: 242 MG/DL — HIGH (ref 70–99)
GLUCOSE BLDC GLUCOMTR-MCNC: 242 MG/DL — HIGH (ref 70–99)
GLUCOSE BLDC GLUCOMTR-MCNC: 335 MG/DL — HIGH (ref 70–99)
GLUCOSE BLDC GLUCOMTR-MCNC: 335 MG/DL — HIGH (ref 70–99)
GLUCOSE SERPL-MCNC: 159 MG/DL — HIGH (ref 70–99)
GLUCOSE SERPL-MCNC: 159 MG/DL — HIGH (ref 70–99)
HCT VFR BLD CALC: 27.4 % — LOW (ref 39–50)
HCT VFR BLD CALC: 27.4 % — LOW (ref 39–50)
HGB BLD-MCNC: 8.6 G/DL — LOW (ref 13–17)
HGB BLD-MCNC: 8.6 G/DL — LOW (ref 13–17)
MAGNESIUM SERPL-MCNC: 2.1 MG/DL — SIGNIFICANT CHANGE UP (ref 1.6–2.6)
MAGNESIUM SERPL-MCNC: 2.1 MG/DL — SIGNIFICANT CHANGE UP (ref 1.6–2.6)
MCHC RBC-ENTMCNC: 28.2 PG — SIGNIFICANT CHANGE UP (ref 27–34)
MCHC RBC-ENTMCNC: 28.2 PG — SIGNIFICANT CHANGE UP (ref 27–34)
MCHC RBC-ENTMCNC: 31.4 GM/DL — LOW (ref 32–36)
MCHC RBC-ENTMCNC: 31.4 GM/DL — LOW (ref 32–36)
MCV RBC AUTO: 89.8 FL — SIGNIFICANT CHANGE UP (ref 80–100)
MCV RBC AUTO: 89.8 FL — SIGNIFICANT CHANGE UP (ref 80–100)
NRBC # BLD: 0 /100 WBCS — SIGNIFICANT CHANGE UP (ref 0–0)
NRBC # BLD: 0 /100 WBCS — SIGNIFICANT CHANGE UP (ref 0–0)
PHOSPHATE SERPL-MCNC: 2.7 MG/DL — SIGNIFICANT CHANGE UP (ref 2.5–4.5)
PHOSPHATE SERPL-MCNC: 2.7 MG/DL — SIGNIFICANT CHANGE UP (ref 2.5–4.5)
PLATELET # BLD AUTO: 208 K/UL — SIGNIFICANT CHANGE UP (ref 150–400)
PLATELET # BLD AUTO: 208 K/UL — SIGNIFICANT CHANGE UP (ref 150–400)
POTASSIUM SERPL-MCNC: 4.3 MMOL/L — SIGNIFICANT CHANGE UP (ref 3.5–5.3)
POTASSIUM SERPL-MCNC: 4.3 MMOL/L — SIGNIFICANT CHANGE UP (ref 3.5–5.3)
POTASSIUM SERPL-SCNC: 4.3 MMOL/L — SIGNIFICANT CHANGE UP (ref 3.5–5.3)
POTASSIUM SERPL-SCNC: 4.3 MMOL/L — SIGNIFICANT CHANGE UP (ref 3.5–5.3)
RBC # BLD: 3.05 M/UL — LOW (ref 4.2–5.8)
RBC # BLD: 3.05 M/UL — LOW (ref 4.2–5.8)
RBC # FLD: 19.2 % — HIGH (ref 10.3–14.5)
RBC # FLD: 19.2 % — HIGH (ref 10.3–14.5)
SODIUM SERPL-SCNC: 142 MMOL/L — SIGNIFICANT CHANGE UP (ref 135–145)
SODIUM SERPL-SCNC: 142 MMOL/L — SIGNIFICANT CHANGE UP (ref 135–145)
WBC # BLD: 12.59 K/UL — HIGH (ref 3.8–10.5)
WBC # BLD: 12.59 K/UL — HIGH (ref 3.8–10.5)
WBC # FLD AUTO: 12.59 K/UL — HIGH (ref 3.8–10.5)
WBC # FLD AUTO: 12.59 K/UL — HIGH (ref 3.8–10.5)

## 2023-12-17 PROCEDURE — 99232 SBSQ HOSP IP/OBS MODERATE 35: CPT

## 2023-12-17 RX ORDER — FUROSEMIDE 40 MG
20 TABLET ORAL ONCE
Refills: 0 | Status: COMPLETED | OUTPATIENT
Start: 2023-12-17 | End: 2023-12-17

## 2023-12-17 RX ORDER — HYDRALAZINE HCL 50 MG
10 TABLET ORAL ONCE
Refills: 0 | Status: COMPLETED | OUTPATIENT
Start: 2023-12-17 | End: 2023-12-17

## 2023-12-17 RX ORDER — HYDRALAZINE HCL 50 MG
75 TABLET ORAL THREE TIMES A DAY
Refills: 0 | Status: DISCONTINUED | OUTPATIENT
Start: 2023-12-17 | End: 2023-12-18

## 2023-12-17 RX ADMIN — Medication 650 MILLIGRAM(S): at 01:22

## 2023-12-17 RX ADMIN — CARVEDILOL PHOSPHATE 25 MILLIGRAM(S): 80 CAPSULE, EXTENDED RELEASE ORAL at 17:45

## 2023-12-17 RX ADMIN — Medication 50 MILLIGRAM(S): at 00:52

## 2023-12-17 RX ADMIN — Medication 125 MILLIGRAM(S): at 17:44

## 2023-12-17 RX ADMIN — Medication 125 MILLIGRAM(S): at 07:08

## 2023-12-17 RX ADMIN — OXYCODONE HYDROCHLORIDE 10 MILLIGRAM(S): 5 TABLET ORAL at 23:17

## 2023-12-17 RX ADMIN — Medication 125 MILLIGRAM(S): at 00:52

## 2023-12-17 RX ADMIN — Medication 75 MILLIGRAM(S): at 14:19

## 2023-12-17 RX ADMIN — OXYCODONE HYDROCHLORIDE 10 MILLIGRAM(S): 5 TABLET ORAL at 14:05

## 2023-12-17 RX ADMIN — LISINOPRIL 40 MILLIGRAM(S): 2.5 TABLET ORAL at 11:41

## 2023-12-17 RX ADMIN — HEPARIN SODIUM 5000 UNIT(S): 5000 INJECTION INTRAVENOUS; SUBCUTANEOUS at 14:04

## 2023-12-17 RX ADMIN — Medication 2: at 17:42

## 2023-12-17 RX ADMIN — Medication 50 MILLIGRAM(S): at 07:10

## 2023-12-17 RX ADMIN — GABAPENTIN 800 MILLIGRAM(S): 400 CAPSULE ORAL at 06:28

## 2023-12-17 RX ADMIN — Medication 81 MILLIGRAM(S): at 11:41

## 2023-12-17 RX ADMIN — OXYCODONE HYDROCHLORIDE 10 MILLIGRAM(S): 5 TABLET ORAL at 03:00

## 2023-12-17 RX ADMIN — GABAPENTIN 800 MILLIGRAM(S): 400 CAPSULE ORAL at 14:04

## 2023-12-17 RX ADMIN — PIPERACILLIN AND TAZOBACTAM 25 GRAM(S): 4; .5 INJECTION, POWDER, LYOPHILIZED, FOR SOLUTION INTRAVENOUS at 06:29

## 2023-12-17 RX ADMIN — Medication 6 UNIT(S): at 17:43

## 2023-12-17 RX ADMIN — ATORVASTATIN CALCIUM 80 MILLIGRAM(S): 80 TABLET, FILM COATED ORAL at 21:15

## 2023-12-17 RX ADMIN — HYDROMORPHONE HYDROCHLORIDE 1 MILLIGRAM(S): 2 INJECTION INTRAMUSCULAR; INTRAVENOUS; SUBCUTANEOUS at 12:48

## 2023-12-17 RX ADMIN — Medication 650 MILLIGRAM(S): at 00:52

## 2023-12-17 RX ADMIN — Medication 4: at 22:09

## 2023-12-17 RX ADMIN — OXYCODONE HYDROCHLORIDE 10 MILLIGRAM(S): 5 TABLET ORAL at 03:30

## 2023-12-17 RX ADMIN — HEPARIN SODIUM 5000 UNIT(S): 5000 INJECTION INTRAVENOUS; SUBCUTANEOUS at 06:29

## 2023-12-17 RX ADMIN — HYDROMORPHONE HYDROCHLORIDE 1 MILLIGRAM(S): 2 INJECTION INTRAMUSCULAR; INTRAVENOUS; SUBCUTANEOUS at 11:30

## 2023-12-17 RX ADMIN — HEPARIN SODIUM 5000 UNIT(S): 5000 INJECTION INTRAVENOUS; SUBCUTANEOUS at 21:14

## 2023-12-17 RX ADMIN — Medication 6 UNIT(S): at 13:08

## 2023-12-17 RX ADMIN — Medication 650 MILLIGRAM(S): at 06:28

## 2023-12-17 RX ADMIN — HYDROMORPHONE HYDROCHLORIDE 1 MILLIGRAM(S): 2 INJECTION INTRAMUSCULAR; INTRAVENOUS; SUBCUTANEOUS at 17:59

## 2023-12-17 RX ADMIN — Medication 10 MILLIGRAM(S): at 22:45

## 2023-12-17 RX ADMIN — Medication 125 MILLIGRAM(S): at 11:42

## 2023-12-17 RX ADMIN — OXYCODONE HYDROCHLORIDE 10 MILLIGRAM(S): 5 TABLET ORAL at 15:15

## 2023-12-17 RX ADMIN — GABAPENTIN 800 MILLIGRAM(S): 400 CAPSULE ORAL at 21:15

## 2023-12-17 RX ADMIN — PIPERACILLIN AND TAZOBACTAM 25 GRAM(S): 4; .5 INJECTION, POWDER, LYOPHILIZED, FOR SOLUTION INTRAVENOUS at 14:05

## 2023-12-17 RX ADMIN — INSULIN GLARGINE 10 UNIT(S): 100 INJECTION, SOLUTION SUBCUTANEOUS at 22:09

## 2023-12-17 RX ADMIN — Medication 75 MILLIGRAM(S): at 21:15

## 2023-12-17 RX ADMIN — HYDROMORPHONE HYDROCHLORIDE 1 MILLIGRAM(S): 2 INJECTION INTRAMUSCULAR; INTRAVENOUS; SUBCUTANEOUS at 05:13

## 2023-12-17 RX ADMIN — OXYCODONE HYDROCHLORIDE 10 MILLIGRAM(S): 5 TABLET ORAL at 22:17

## 2023-12-17 RX ADMIN — Medication 20 MILLIGRAM(S): at 07:11

## 2023-12-17 RX ADMIN — Medication 6 UNIT(S): at 08:54

## 2023-12-17 RX ADMIN — PIPERACILLIN AND TAZOBACTAM 25 GRAM(S): 4; .5 INJECTION, POWDER, LYOPHILIZED, FOR SOLUTION INTRAVENOUS at 22:17

## 2023-12-17 RX ADMIN — Medication 650 MILLIGRAM(S): at 07:30

## 2023-12-17 NOTE — PROGRESS NOTE ADULT - ASSESSMENT
68 year old male with a PMHx of CAD (s/p PCI x 2 in 2020), HTN, IDDM (A1c 12%, 10/2023), PAD (s/p remote RLE angioplasty), 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    #Diabetic Foot Infection   -further management as per vascular surgery, s/p BKA on 12/11, now pending closure 12/18 Monday  -currently on Vancomycin and Zosyn     #Acute Blood Loss Anemia   -continue to monitor with daily CBC   -goal Hgb > 8.0 given history of CAD  -if requires additional blood products, consider 20mg IV Lasix     #CAD   -continue with ASA 81mg daily and Atorvastatin 80mg qhs     #Chronic HFmrEF   #HTN  -continue with lisinopril 40mg daily, coreg 25mg BID, and would increase hydralazine 50mg TID to 75mg TID today  -per cards recs, plan for reintroduction of spironolactone 25mg PO qd prior to discharge     #Moderate Aortic Stenosis  -outpatient follow up for surveillance TTE     #Type 2 Diabetes c/b PAD, diabetic foot infection and hyperglycemia   -further management as per endocrinology   -currently on Lantus 6 units qhs, Lispro 3 units with meals and ISS; would increase lantus to 10 QHS and lispro to 6 TID today    DVT PPx: SQH    Dispo: pending OR Monday 12/18 for closure  68 year old male with a PMHx of CAD (s/p PCI x 2 in 2020), HTN, IDDM (A1c 12%, 10/2023), PAD (s/p remote RLE angioplasty), 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    #Diabetic Foot Infection   -further management as per vascular surgery, s/p BKA on 12/11, now pending closure 12/18 Monday  -currently on Vancomycin and Zosyn     #Acute Blood Loss Anemia   -continue to monitor with daily CBC   -goal Hgb > 8.0 given history of CAD  -if requires additional blood products, consider 20mg IV Lasix     #CAD   -continue with ASA 81mg daily and Atorvastatin 80mg qhs     #Chronic HFmrEF   #HTN  -continue with lisinopril 40mg daily, coreg 25mg BID, and would increase hydralazine 50mg TID to 75mg TID today  -per cards recs, plan for reintroduction of spironolactone 25mg PO qd prior to discharge     #Moderate Aortic Stenosis  -outpatient follow up for surveillance TTE     #Type 2 Diabetes c/b PAD, diabetic foot infection and hyperglycemia   -continue Lantus 10 QHS and lispro 6 TID   -further management as per endocrinology     DVT PPx: SQH    Dispo: pending OR Monday 12/18 for closure

## 2023-12-17 NOTE — PROGRESS NOTE ADULT - SUBJECTIVE AND OBJECTIVE BOX
Subjective:     ROS:   Denies Headache, blurred vision, Chest Pain, SOB, Abdominal pain, nausea or vomiting     Social   piperacillin/tazobactam IVPB.. 3.375  vancomycin    Solution 125  aspirin  chewable 81  carvedilol 25  heparin   Injectable 5000  hydrALAZINE 50  lisinopril 40  piperacillin/tazobactam IVPB.. 3.375  vancomycin    Solution 125      Allergies    No Known Allergies    Intolerances        Vital Signs Last 24 Hrs  T(C): 36.7 (17 Dec 2023 00:36), Max: 37.1 (16 Dec 2023 08:59)  T(F): 98 (17 Dec 2023 00:36), Max: 98.8 (16 Dec 2023 08:59)  HR: 72 (17 Dec 2023 00:36) (69 - 72)  BP: 163/73 (17 Dec 2023 02:06) (162/86 - 195/106)  BP(mean): 123 (17 Dec 2023 00:36) (110 - 137)  RR: 16 (17 Dec 2023 00:36) (16 - 22)  SpO2: 94% (17 Dec 2023 00:36) (93% - 96%)    Parameters below as of 16 Dec 2023 20:34  Patient On (Oxygen Delivery Method): room air      I&O's Summary    15 Dec 2023 07:01  -  16 Dec 2023 07:00  --------------------------------------------------------  IN: 760 mL / OUT: 1725 mL / NET: -965 mL    16 Dec 2023 07:01  -  17 Dec 2023 04:49  --------------------------------------------------------  IN: 420 mL / OUT: 1900 mL / NET: -1480 mL        Physical Exam:  General:  Pulmonary:  Cardiovascular:  Abdominal:  Extremities:  Pulses:   Right:                                                                          Left:  FEM [ ]2+ [ ]1+ [ ]doppler                                             FEM [ ]2+ [ ]1+ [ ]doppler    POP [ ]2+ [ ]1+ [ ]doppler                                             POP [ ]2+ [ ]1+ [ ]doppler    DP [ ]2+ [ ]1+ [ ]doppler                                                DP [ ]2+ [ ]1+ [ ]doppler  PT[ ]2+ [ ]1+ [ ]doppler                                                  PT [ ]2+ [ ]1+ [ ]doppler      LABS:                        8.2    11.83 )-----------( 201      ( 16 Dec 2023 07:15 )             25.7     12-16    138  |  109<H>  |  25<H>  ----------------------------<  209<H>  4.1   |  25  |  1.22    Ca    8.0<L>      16 Dec 2023 07:15  Phos  2.7     12-16  Mg     1.9     12-16          Radiology and Additional Studies:    A/P:    67yo M PMH CAD (2 stents 2020), HFmrEF (45-50%), HTN, PAD w/ remote RLE angioplasty, R 4th toe OM s/p partial R 4th ray amputation on 10/24, RLE angiogram with AT lithotripsy and AT/peroneal balloon angioplasty 10/27, uncontrolled IDDM (a1c 12 in Oct 2023), recent admission 11/8-11/10 to cardiology service for hypertensive emergency (left AMA, was given levaquin when he left for right foot wound that pt had started treatment for back in October) who presented to [facility] after a fall onto his knees and was having b/l knee pain. Found to have increased soft tissue gas in R foot on CT scan. Patient is s/p R BKA on 12/11/23 with planned closure on Monday 12/16/23.    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 Tentatively planned for Monday.  -pain control, dressing change today    HTN/HLD:  -c/w Hydralazine, Lisinopril  -Coreg 25 BID  -c/w Atorvastatin    CAD/CHF:   -cardiology following, appreciate reccs  -will need to restart Plavix, ASA    DM:  -endocrine following, appreciate reccs  -mISS, lispro 6u TID, Lantus 10u QHS    Anemia:   -post operative anemia- s/p transfusion  -f/u AM cbc    ID:  -c/w Zosyn 3.375g IV q8 until stump closure    Diet: consistent carbs    Activity: OOB to chair, PT Consult    DVTPPx: HSQ    Dispo: 5 [facility] telemetry     Subjective:     ROS:   Denies Headache, blurred vision, Chest Pain, SOB, Abdominal pain, nausea or vomiting     Social   piperacillin/tazobactam IVPB.. 3.375  vancomycin    Solution 125  aspirin  chewable 81  carvedilol 25  heparin   Injectable 5000  hydrALAZINE 50  lisinopril 40  piperacillin/tazobactam IVPB.. 3.375  vancomycin    Solution 125      Allergies    No Known Allergies    Intolerances        Vital Signs Last 24 Hrs  T(C): 36.7 (17 Dec 2023 00:36), Max: 37.1 (16 Dec 2023 08:59)  T(F): 98 (17 Dec 2023 00:36), Max: 98.8 (16 Dec 2023 08:59)  HR: 72 (17 Dec 2023 00:36) (69 - 72)  BP: 163/73 (17 Dec 2023 02:06) (162/86 - 195/106)  BP(mean): 123 (17 Dec 2023 00:36) (110 - 137)  RR: 16 (17 Dec 2023 00:36) (16 - 22)  SpO2: 94% (17 Dec 2023 00:36) (93% - 96%)    Parameters below as of 16 Dec 2023 20:34  Patient On (Oxygen Delivery Method): room air      I&O's Summary    15 Dec 2023 07:01  -  16 Dec 2023 07:00  --------------------------------------------------------  IN: 760 mL / OUT: 1725 mL / NET: -965 mL    16 Dec 2023 07:01  -  17 Dec 2023 04:49  --------------------------------------------------------  IN: 420 mL / OUT: 1900 mL / NET: -1480 mL        Physical Exam:  General:  Pulmonary:  Cardiovascular:  Abdominal:  Extremities:  Pulses:   Right:                                                                          Left:  FEM [ ]2+ [ ]1+ [ ]doppler                                             FEM [ ]2+ [ ]1+ [ ]doppler    POP [ ]2+ [ ]1+ [ ]doppler                                             POP [ ]2+ [ ]1+ [ ]doppler    DP [ ]2+ [ ]1+ [ ]doppler                                                DP [ ]2+ [ ]1+ [ ]doppler  PT[ ]2+ [ ]1+ [ ]doppler                                                  PT [ ]2+ [ ]1+ [ ]doppler      LABS:                        8.2    11.83 )-----------( 201      ( 16 Dec 2023 07:15 )             25.7     12-16    138  |  109<H>  |  25<H>  ----------------------------<  209<H>  4.1   |  25  |  1.22    Ca    8.0<L>      16 Dec 2023 07:15  Phos  2.7     12-16  Mg     1.9     12-16          Radiology and Additional Studies:    A/P:    69yo M PMH CAD (2 stents 2020), HFmrEF (45-50%), HTN, PAD w/ remote RLE angioplasty, R 4th toe OM s/p partial R 4th ray amputation on 10/24, RLE angiogram with AT lithotripsy and AT/peroneal balloon angioplasty 10/27, uncontrolled IDDM (a1c 12 in Oct 2023), recent admission 11/8-11/10 to cardiology service for hypertensive emergency (left AMA, was given levaquin when he left for right foot wound that pt had started treatment for back in October) who presented to [facility] after a fall onto his knees and was having b/l knee pain. Found to have increased soft tissue gas in R foot on CT scan. Patient is s/p R BKA on 12/11/23 with planned closure on Monday 12/16/23.    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 Tentatively planned for Monday.  -pain control, dressing change today    HTN/HLD:  -c/w Hydralazine, Lisinopril  -Coreg 25 BID  -c/w Atorvastatin    CAD/CHF:   -cardiology following, appreciate reccs  -will need to restart Plavix, ASA    DM:  -endocrine following, appreciate reccs  -mISS, lispro 6u TID, Lantus 10u QHS    Anemia:   -post operative anemia- s/p transfusion  -f/u AM cbc    ID:  -c/w Zosyn 3.375g IV q8 until stump closure    Diet: consistent carbs    Activity: OOB to chair, PT Consult    DVTPPx: HSQ    Dispo: 5 [facility] telemetry     Subjective: Patient resting comfortably. Not complaining of significant pain at stump during dressing change. Having fewer loose bowel movements.     ROS:   Denies Headache, blurred vision, Chest Pain, SOB, Abdominal pain, nausea or vomiting     Social   piperacillin/tazobactam IVPB.. 3.375  vancomycin    Solution 125  aspirin  chewable 81  carvedilol 25  heparin   Injectable 5000  hydrALAZINE 50  lisinopril 40  piperacillin/tazobactam IVPB.. 3.375  vancomycin    Solution 125      Allergies    No Known Allergies    Intolerances        Vital Signs Last 24 Hrs  T(C): 36.7 (17 Dec 2023 00:36), Max: 37.1 (16 Dec 2023 08:59)  T(F): 98 (17 Dec 2023 00:36), Max: 98.8 (16 Dec 2023 08:59)  HR: 72 (17 Dec 2023 00:36) (69 - 72)  BP: 163/73 (17 Dec 2023 02:06) (162/86 - 195/106)  BP(mean): 123 (17 Dec 2023 00:36) (110 - 137)  RR: 16 (17 Dec 2023 00:36) (16 - 22)  SpO2: 94% (17 Dec 2023 00:36) (93% - 96%)    Parameters below as of 16 Dec 2023 20:34  Patient On (Oxygen Delivery Method): room air      I&O's Summary    15 Dec 2023 07:01  -  16 Dec 2023 07:00  --------------------------------------------------------  IN: 760 mL / OUT: 1725 mL / NET: -965 mL    16 Dec 2023 07:01  -  17 Dec 2023 04:49  --------------------------------------------------------  IN: 420 mL / OUT: 1900 mL / NET: -1480 mL        Physical Exam:  General: NAD, resting comfortably in bed  C/V: NSR  Pulm: Nonlabored breathing, no respiratory distress  Abd: soft, NT/ND.  Extrem: RLE s/p mariia BKA, stump clean with no purulence, drainage or surrounding erythema, moderate strikethrough on ace and Kerlix, rewrapped.      LABS:                        8.2    11.83 )-----------( 201      ( 16 Dec 2023 07:15 )             25.7     12-16    138  |  109<H>  |  25<H>  ----------------------------<  209<H>  4.1   |  25  |  1.22    Ca    8.0<L>      16 Dec 2023 07:15  Phos  2.7     12-16  Mg     1.9     12-16          Radiology and Additional Studies:    A/P:    69yo M PMH CAD (2 stents 2020), HFmrEF (45-50%), HTN, PAD w/ remote RLE angioplasty, R 4th toe OM s/p partial R 4th ray amputation on 10/24, RLE angiogram with AT lithotripsy and AT/peroneal balloon angioplasty 10/27, uncontrolled IDDM (a1c 12 in Oct 2023), recent admission 11/8-11/10 to cardiology service for hypertensive emergency (left AMA, was given levaquin when he left for right foot wound that pt had started treatment for back in October) who presented to [facility] after a fall onto his knees and was having b/l knee pain. Found to have increased soft tissue gas in R foot on CT scan. Patient is s/p R BKA on 12/11/23 with planned closure on Monday 12/16/23.    Vascular/PAD:  -s/p R mariia BKA 12/11/23, will require closure. Planned for Monday.  -pain control, daily dressing change     HTN/HLD:  -c/w Hydralazine, Lisinopril  -Coreg 25 BID  -c/w Atorvastatin    CAD/CHF:   -cardiology following, appreciate recs    DM:  -endocrine following, appreciate reccs  -mISS, lispro 6u TID, Lantus 10u QHS    Anemia:   -post operative anemia- now corrected to 8.6 (12/17)    ID:  -c/w Zosyn 3.375g IV q8 until stump closure  -Oral Vancomycin for C. Diff    Diet: consistent carbs    Activity: OOB to chair, PT Consult    DVTPPx: HSQ    Dispo: 5 [facility] telemetry     Subjective: Patient resting comfortably. Not complaining of significant pain at stump during dressing change. Having fewer loose bowel movements.     ROS:   Denies Headache, blurred vision, Chest Pain, SOB, Abdominal pain, nausea or vomiting     Social   piperacillin/tazobactam IVPB.. 3.375  vancomycin    Solution 125  aspirin  chewable 81  carvedilol 25  heparin   Injectable 5000  hydrALAZINE 50  lisinopril 40  piperacillin/tazobactam IVPB.. 3.375  vancomycin    Solution 125      Allergies    No Known Allergies    Intolerances        Vital Signs Last 24 Hrs  T(C): 36.7 (17 Dec 2023 00:36), Max: 37.1 (16 Dec 2023 08:59)  T(F): 98 (17 Dec 2023 00:36), Max: 98.8 (16 Dec 2023 08:59)  HR: 72 (17 Dec 2023 00:36) (69 - 72)  BP: 163/73 (17 Dec 2023 02:06) (162/86 - 195/106)  BP(mean): 123 (17 Dec 2023 00:36) (110 - 137)  RR: 16 (17 Dec 2023 00:36) (16 - 22)  SpO2: 94% (17 Dec 2023 00:36) (93% - 96%)    Parameters below as of 16 Dec 2023 20:34  Patient On (Oxygen Delivery Method): room air      I&O's Summary    15 Dec 2023 07:01  -  16 Dec 2023 07:00  --------------------------------------------------------  IN: 760 mL / OUT: 1725 mL / NET: -965 mL    16 Dec 2023 07:01  -  17 Dec 2023 04:49  --------------------------------------------------------  IN: 420 mL / OUT: 1900 mL / NET: -1480 mL        Physical Exam:  General: NAD, resting comfortably in bed  C/V: NSR  Pulm: Nonlabored breathing, no respiratory distress  Abd: soft, NT/ND.  Extrem: RLE s/p mariia BKA, stump clean with no purulence, drainage or surrounding erythema, moderate strikethrough on ace and Kerlix, rewrapped.      LABS:                        8.2    11.83 )-----------( 201      ( 16 Dec 2023 07:15 )             25.7     12-16    138  |  109<H>  |  25<H>  ----------------------------<  209<H>  4.1   |  25  |  1.22    Ca    8.0<L>      16 Dec 2023 07:15  Phos  2.7     12-16  Mg     1.9     12-16          Radiology and Additional Studies:    A/P:    69yo M PMH CAD (2 stents 2020), HFmrEF (45-50%), HTN, PAD w/ remote RLE angioplasty, R 4th toe OM s/p partial R 4th ray amputation on 10/24, RLE angiogram with AT lithotripsy and AT/peroneal balloon angioplasty 10/27, uncontrolled IDDM (a1c 12 in Oct 2023), recent admission 11/8-11/10 to cardiology service for hypertensive emergency (left AMA, was given levaquin when he left for right foot wound that pt had started treatment for back in October) who presented to [facility] after a fall onto his knees and was having b/l knee pain. Found to have increased soft tissue gas in R foot on CT scan. Patient is s/p R BKA on 12/11/23 with planned closure on Monday 12/16/23.    Vascular/PAD:  -s/p R mariia BKA 12/11/23, will require closure. Planned for Monday.  -pain control, daily dressing change     HTN/HLD:  -c/w Hydralazine, Lisinopril  -Coreg 25 BID  -c/w Atorvastatin    CAD/CHF:   -cardiology following, appreciate recs    DM:  -endocrine following, appreciate reccs  -mISS, lispro 6u TID, Lantus 10u QHS    Anemia:   -post operative anemia- now corrected to 8.6 (12/17)    ID:  -c/w Zosyn 3.375g IV q8 until stump closure  -Oral Vancomycin for C. Diff    Diet: consistent carbs    Activity: OOB to chair, PT Consult    DVTPPx: HSQ    Dispo: 5 [facility] telemetry

## 2023-12-17 NOTE — PROGRESS NOTE ADULT - SUBJECTIVE AND OBJECTIVE BOX
Pre-op Diagnosis:  Procedure: R BKA closure  Surgeon: PADMINI    Consent: In chart                          8.6    12.59 )-----------( 208      ( 17 Dec 2023 05:30 )             27.4     12-17    142  |  111<H>  |  21  ----------------------------<  159<H>  4.3   |  26  |  1.23    Ca    8.2<L>      17 Dec 2023 05:30  Phos  2.7     12-17  Mg     2.1     12-17        Urinalysis Basic - ( 17 Dec 2023 05:30 )    Color: x / Appearance: x / SG: x / pH: x  Gluc: 159 mg/dL / Ketone: x  / Bili: x / Urobili: x   Blood: x / Protein: x / Nitrite: x   Leuk Esterase: x / RBC: x / WBC x   Sq Epi: x / Non Sq Epi: x / Bacteria: x        Type & Screen:     Opos Ab neg    Is patient on ACE/ARB? [ ]No [ x]Yes   *If yes, please hold any ACE/ARB the day of surgery    Is patient on Lantus at bedtime?  [x ]No [ ]Yes   *If yes, please half the dose the night before OR since patient will be NPO    Does patient have a contrast allergy? [x ]No [ ]Yes  *If yes, please pre-medicate per protocol    Is patient on anticoagulation? [x ]No [ ] Yes  *If yes, please discuss with team when to hold it    Is the patient Female and <56yo [ x]No [ ] Yes  If yes, pregnancy test must be documented in the chart    Is patient on dialysis? [ x]No [ ]Yes  *If yes, please obtain all labs including K level EARLY the day of surgery   *Also, will NOT require IVF past midnight    A/P: 68yMale pre-op for above procedure  1. NPO past midnight, except medications  2. IVF at midnight:   3. [2 ] Blood on hold, Units: Pre-op Diagnosis:  Procedure: R BKA closure  Surgeon: PADMINI    Consent: In chart                          8.6    12.59 )-----------( 208      ( 17 Dec 2023 05:30 )             27.4     12-17    142  |  111<H>  |  21  ----------------------------<  159<H>  4.3   |  26  |  1.23    Ca    8.2<L>      17 Dec 2023 05:30  Phos  2.7     12-17  Mg     2.1     12-17        Urinalysis Basic - ( 17 Dec 2023 05:30 )    Color: x / Appearance: x / SG: x / pH: x  Gluc: 159 mg/dL / Ketone: x  / Bili: x / Urobili: x   Blood: x / Protein: x / Nitrite: x   Leuk Esterase: x / RBC: x / WBC x   Sq Epi: x / Non Sq Epi: x / Bacteria: x        Type & Screen:     Opos Ab neg    Is patient on ACE/ARB? [ ]No [ x]Yes   *If yes, please hold any ACE/ARB the day of surgery    Is patient on Lantus at bedtime?  [x ]No [ ]Yes   *If yes, please half the dose the night before OR since patient will be NPO    Does patient have a contrast allergy? [x ]No [ ]Yes  *If yes, please pre-medicate per protocol    Is patient on anticoagulation? [x ]No [ ] Yes  *If yes, please discuss with team when to hold it    Is the patient Female and <54yo [ x]No [ ] Yes  If yes, pregnancy test must be documented in the chart    Is patient on dialysis? [ x]No [ ]Yes  *If yes, please obtain all labs including K level EARLY the day of surgery   *Also, will NOT require IVF past midnight    A/P: 68yMale pre-op for above procedure  1. NPO past midnight, except medications  2. IVF at midnight:   3. [2 ] Blood on hold, Units:

## 2023-12-17 NOTE — PROGRESS NOTE ADULT - SUBJECTIVE AND OBJECTIVE BOX
INTERVAL EVENTS: no acute events    PAST MEDICAL & SURGICAL HISTORY:  IDDM (insulin dependent diabetes mellitus)    HTN (hypertension)    CAD S/P percutaneous coronary angioplasty    Neuropathy    PAD (peripheral artery disease)    Acute CHF    PNA (pneumonia)    Gangrene of toe of right foot    Moderate aortic stenosis    Acute on chronic diastolic congestive heart failure    Hyperlipidemia    No significant past surgical history    History of partial ray amputation of fourth toe of right foot    Gangrene of toe of right foot    Acute CHF    PNA (pneumonia)    PNA (pneumonia)        MEDICATIONS  (STANDING):  acetaminophen     Tablet .. 650 milliGRAM(s) Oral every 6 hours  aspirin  chewable 81 milliGRAM(s) Oral daily  atorvastatin 80 milliGRAM(s) Oral at bedtime  carvedilol 25 milliGRAM(s) Oral every 12 hours  gabapentin 800 milliGRAM(s) Oral three times a day  heparin   Injectable 5000 Unit(s) SubCutaneous every 8 hours  hydrALAZINE 50 milliGRAM(s) Oral every 8 hours  insulin glargine Injectable (LANTUS) 10 Unit(s) SubCutaneous at bedtime  insulin lispro (ADMELOG) corrective regimen sliding scale   SubCutaneous Before meals and at bedtime  insulin lispro Injectable (ADMELOG) 6 Unit(s) SubCutaneous three times a day before meals  lisinopril 40 milliGRAM(s) Oral every 24 hours  piperacillin/tazobactam IVPB.. 3.375 Gram(s) IV Intermittent every 8 hours  vancomycin    Solution 125 milliGRAM(s) Oral every 6 hours    MEDICATIONS  (PRN):  HYDROmorphone  Injectable 1 milliGRAM(s) IV Push every 6 hours PRN Severe Pain (7 - 10)  oxyCODONE    IR 10 milliGRAM(s) Oral every 8 hours PRN Moderate Pain (4 - 6)  oxyCODONE    IR 5 milliGRAM(s) Oral every 8 hours PRN Mild Pain (1 - 3)    T(F): 98.4 (12-17-23 @ 08:51), Max: 98.4 (12-17-23 @ 08:51)  HR: 71 (12-17-23 @ 08:51) (51 - 72)  BP: 151/93 (12-17-23 @ 08:51) (151/93 - 190/90)  BP(mean): 115 (12-17-23 @ 08:51) (110 - 126)  ABP: --  ABP(mean): --  RR: 17 (12-17-23 @ 08:51) (16 - 20)  SpO2: 98% (12-17-23 @ 08:51) (93% - 98%)    I/O Detail 24H    16 Dec 2023 07:01  -  17 Dec 2023 07:00  --------------------------------------------------------  IN:    IV PiggyBack: 150 mL    Oral Fluid: 420 mL  Total IN: 570 mL    OUT:    Voided (mL): 2600 mL  Total OUT: 2600 mL    Total NET: -2030 mL      17 Dec 2023 07:01  -  17 Dec 2023 11:12  --------------------------------------------------------  IN:    Oral Fluid: 180 mL  Total IN: 180 mL    OUT:    Voided (mL): 600 mL  Total OUT: 600 mL    Total NET: -420 mL          PHYSICAL EXAM:  -Gen: NAD, resting in bed  -HEENT: EOMI, PERRL, no scleral icterus  -CV: normal S1 and S2  -Lungs: CTABL, normal respiratory effort on RA  -Ab: soft, NT, ND, normal BS  -Ext: RLE stump wrapped by primary team, +2 LLE edema   -Neuro: A&O x 3, no focal deficits     LABS:  CBC 12-17-23 @ 05:30                        8.6    12.59 )-----------( 208                   27.4       Hgb trend: 8.6 <-- , 8.2 <-- , 8.2 <--   WBC trend: 12.59 <-- , 11.83 <-- , 11.42 <--       CMP 12-17-23 @ 05:30    142  |  111<H>  |  21  ----------------------------<  159<H>  4.3   |  26  |  1.23    Ca    8.2<L>      12-17-23 @ 05:30  Phos  2.7     12-17  Mg     2.1     12-17        Serum Cr trend: 1.23 <-- , 1.22 <-- , 1.27 <--         Cardiac Markers           STUDIES:

## 2023-12-18 ENCOUNTER — TRANSCRIPTION ENCOUNTER (OUTPATIENT)
Age: 68
End: 2023-12-18

## 2023-12-18 LAB
ALBUMIN SERPL ELPH-MCNC: 1.7 G/DL — LOW (ref 3.3–5)
ALBUMIN SERPL ELPH-MCNC: 1.7 G/DL — LOW (ref 3.3–5)
ALP SERPL-CCNC: 113 U/L — SIGNIFICANT CHANGE UP (ref 40–120)
ALP SERPL-CCNC: 113 U/L — SIGNIFICANT CHANGE UP (ref 40–120)
ALT FLD-CCNC: 16 U/L — SIGNIFICANT CHANGE UP (ref 10–45)
ALT FLD-CCNC: 16 U/L — SIGNIFICANT CHANGE UP (ref 10–45)
ANION GAP SERPL CALC-SCNC: 2 MMOL/L — LOW (ref 5–17)
ANION GAP SERPL CALC-SCNC: 2 MMOL/L — LOW (ref 5–17)
ANION GAP SERPL CALC-SCNC: 4 MMOL/L — LOW (ref 5–17)
ANION GAP SERPL CALC-SCNC: 4 MMOL/L — LOW (ref 5–17)
AST SERPL-CCNC: 17 U/L — SIGNIFICANT CHANGE UP (ref 10–40)
AST SERPL-CCNC: 17 U/L — SIGNIFICANT CHANGE UP (ref 10–40)
BILIRUB SERPL-MCNC: 0.2 MG/DL — SIGNIFICANT CHANGE UP (ref 0.2–1.2)
BILIRUB SERPL-MCNC: 0.2 MG/DL — SIGNIFICANT CHANGE UP (ref 0.2–1.2)
BLD GP AB SCN SERPL QL: NEGATIVE — SIGNIFICANT CHANGE UP
BLD GP AB SCN SERPL QL: NEGATIVE — SIGNIFICANT CHANGE UP
BUN SERPL-MCNC: 20 MG/DL — SIGNIFICANT CHANGE UP (ref 7–23)
BUN SERPL-MCNC: 20 MG/DL — SIGNIFICANT CHANGE UP (ref 7–23)
BUN SERPL-MCNC: 21 MG/DL — SIGNIFICANT CHANGE UP (ref 7–23)
BUN SERPL-MCNC: 21 MG/DL — SIGNIFICANT CHANGE UP (ref 7–23)
CALCIUM SERPL-MCNC: 7.8 MG/DL — LOW (ref 8.4–10.5)
CALCIUM SERPL-MCNC: 7.8 MG/DL — LOW (ref 8.4–10.5)
CALCIUM SERPL-MCNC: 7.9 MG/DL — LOW (ref 8.4–10.5)
CALCIUM SERPL-MCNC: 7.9 MG/DL — LOW (ref 8.4–10.5)
CHLORIDE SERPL-SCNC: 108 MMOL/L — SIGNIFICANT CHANGE UP (ref 96–108)
CO2 SERPL-SCNC: 25 MMOL/L — SIGNIFICANT CHANGE UP (ref 22–31)
CO2 SERPL-SCNC: 25 MMOL/L — SIGNIFICANT CHANGE UP (ref 22–31)
CO2 SERPL-SCNC: 28 MMOL/L — SIGNIFICANT CHANGE UP (ref 22–31)
CO2 SERPL-SCNC: 28 MMOL/L — SIGNIFICANT CHANGE UP (ref 22–31)
CREAT SERPL-MCNC: 1.23 MG/DL — SIGNIFICANT CHANGE UP (ref 0.5–1.3)
EGFR: 65 ML/MIN/1.73M2 — SIGNIFICANT CHANGE UP
GLUCOSE BLDC GLUCOMTR-MCNC: 148 MG/DL — HIGH (ref 70–99)
GLUCOSE BLDC GLUCOMTR-MCNC: 148 MG/DL — HIGH (ref 70–99)
GLUCOSE BLDC GLUCOMTR-MCNC: 254 MG/DL — HIGH (ref 70–99)
GLUCOSE BLDC GLUCOMTR-MCNC: 254 MG/DL — HIGH (ref 70–99)
GLUCOSE BLDC GLUCOMTR-MCNC: 282 MG/DL — HIGH (ref 70–99)
GLUCOSE BLDC GLUCOMTR-MCNC: 282 MG/DL — HIGH (ref 70–99)
GLUCOSE SERPL-MCNC: 148 MG/DL — HIGH (ref 70–99)
GLUCOSE SERPL-MCNC: 148 MG/DL — HIGH (ref 70–99)
GLUCOSE SERPL-MCNC: 212 MG/DL — HIGH (ref 70–99)
GLUCOSE SERPL-MCNC: 212 MG/DL — HIGH (ref 70–99)
HCT VFR BLD CALC: 23.8 % — LOW (ref 39–50)
HCT VFR BLD CALC: 23.8 % — LOW (ref 39–50)
HCT VFR BLD CALC: 25.4 % — LOW (ref 39–50)
HCT VFR BLD CALC: 25.4 % — LOW (ref 39–50)
HGB BLD-MCNC: 7.4 G/DL — LOW (ref 13–17)
HGB BLD-MCNC: 7.4 G/DL — LOW (ref 13–17)
HGB BLD-MCNC: 7.8 G/DL — LOW (ref 13–17)
HGB BLD-MCNC: 7.8 G/DL — LOW (ref 13–17)
INR BLD: 0.93 — SIGNIFICANT CHANGE UP (ref 0.85–1.18)
INR BLD: 0.93 — SIGNIFICANT CHANGE UP (ref 0.85–1.18)
MAGNESIUM SERPL-MCNC: 1.8 MG/DL — SIGNIFICANT CHANGE UP (ref 1.6–2.6)
MAGNESIUM SERPL-MCNC: 1.8 MG/DL — SIGNIFICANT CHANGE UP (ref 1.6–2.6)
MAGNESIUM SERPL-MCNC: 1.9 MG/DL — SIGNIFICANT CHANGE UP (ref 1.6–2.6)
MAGNESIUM SERPL-MCNC: 1.9 MG/DL — SIGNIFICANT CHANGE UP (ref 1.6–2.6)
MCHC RBC-ENTMCNC: 28.1 PG — SIGNIFICANT CHANGE UP (ref 27–34)
MCHC RBC-ENTMCNC: 28.1 PG — SIGNIFICANT CHANGE UP (ref 27–34)
MCHC RBC-ENTMCNC: 28.3 PG — SIGNIFICANT CHANGE UP (ref 27–34)
MCHC RBC-ENTMCNC: 28.3 PG — SIGNIFICANT CHANGE UP (ref 27–34)
MCHC RBC-ENTMCNC: 30.7 GM/DL — LOW (ref 32–36)
MCHC RBC-ENTMCNC: 30.7 GM/DL — LOW (ref 32–36)
MCHC RBC-ENTMCNC: 31.1 GM/DL — LOW (ref 32–36)
MCHC RBC-ENTMCNC: 31.1 GM/DL — LOW (ref 32–36)
MCV RBC AUTO: 90.5 FL — SIGNIFICANT CHANGE UP (ref 80–100)
MCV RBC AUTO: 90.5 FL — SIGNIFICANT CHANGE UP (ref 80–100)
MCV RBC AUTO: 92 FL — SIGNIFICANT CHANGE UP (ref 80–100)
MCV RBC AUTO: 92 FL — SIGNIFICANT CHANGE UP (ref 80–100)
NRBC # BLD: 0 /100 WBCS — SIGNIFICANT CHANGE UP (ref 0–0)
PHOSPHATE SERPL-MCNC: 2.7 MG/DL — SIGNIFICANT CHANGE UP (ref 2.5–4.5)
PHOSPHATE SERPL-MCNC: 2.7 MG/DL — SIGNIFICANT CHANGE UP (ref 2.5–4.5)
PHOSPHATE SERPL-MCNC: 3 MG/DL — SIGNIFICANT CHANGE UP (ref 2.5–4.5)
PHOSPHATE SERPL-MCNC: 3 MG/DL — SIGNIFICANT CHANGE UP (ref 2.5–4.5)
PLATELET # BLD AUTO: 195 K/UL — SIGNIFICANT CHANGE UP (ref 150–400)
PLATELET # BLD AUTO: 195 K/UL — SIGNIFICANT CHANGE UP (ref 150–400)
PLATELET # BLD AUTO: 206 K/UL — SIGNIFICANT CHANGE UP (ref 150–400)
PLATELET # BLD AUTO: 206 K/UL — SIGNIFICANT CHANGE UP (ref 150–400)
POTASSIUM SERPL-MCNC: 3.9 MMOL/L — SIGNIFICANT CHANGE UP (ref 3.5–5.3)
POTASSIUM SERPL-MCNC: 3.9 MMOL/L — SIGNIFICANT CHANGE UP (ref 3.5–5.3)
POTASSIUM SERPL-MCNC: 4 MMOL/L — SIGNIFICANT CHANGE UP (ref 3.5–5.3)
POTASSIUM SERPL-MCNC: 4 MMOL/L — SIGNIFICANT CHANGE UP (ref 3.5–5.3)
POTASSIUM SERPL-SCNC: 3.9 MMOL/L — SIGNIFICANT CHANGE UP (ref 3.5–5.3)
POTASSIUM SERPL-SCNC: 3.9 MMOL/L — SIGNIFICANT CHANGE UP (ref 3.5–5.3)
POTASSIUM SERPL-SCNC: 4 MMOL/L — SIGNIFICANT CHANGE UP (ref 3.5–5.3)
POTASSIUM SERPL-SCNC: 4 MMOL/L — SIGNIFICANT CHANGE UP (ref 3.5–5.3)
PROT SERPL-MCNC: 4.9 G/DL — LOW (ref 6–8.3)
PROT SERPL-MCNC: 4.9 G/DL — LOW (ref 6–8.3)
PROTHROM AB SERPL-ACNC: 10.6 SEC — SIGNIFICANT CHANGE UP (ref 9.5–13)
PROTHROM AB SERPL-ACNC: 10.6 SEC — SIGNIFICANT CHANGE UP (ref 9.5–13)
RBC # BLD: 2.63 M/UL — LOW (ref 4.2–5.8)
RBC # BLD: 2.63 M/UL — LOW (ref 4.2–5.8)
RBC # BLD: 2.76 M/UL — LOW (ref 4.2–5.8)
RBC # BLD: 2.76 M/UL — LOW (ref 4.2–5.8)
RBC # FLD: 19.2 % — HIGH (ref 10.3–14.5)
RBC # FLD: 19.2 % — HIGH (ref 10.3–14.5)
RBC # FLD: 19.4 % — HIGH (ref 10.3–14.5)
RBC # FLD: 19.4 % — HIGH (ref 10.3–14.5)
RH IG SCN BLD-IMP: POSITIVE — SIGNIFICANT CHANGE UP
RH IG SCN BLD-IMP: POSITIVE — SIGNIFICANT CHANGE UP
SODIUM SERPL-SCNC: 137 MMOL/L — SIGNIFICANT CHANGE UP (ref 135–145)
SODIUM SERPL-SCNC: 137 MMOL/L — SIGNIFICANT CHANGE UP (ref 135–145)
SODIUM SERPL-SCNC: 138 MMOL/L — SIGNIFICANT CHANGE UP (ref 135–145)
SODIUM SERPL-SCNC: 138 MMOL/L — SIGNIFICANT CHANGE UP (ref 135–145)
WBC # BLD: 10.59 K/UL — HIGH (ref 3.8–10.5)
WBC # BLD: 10.59 K/UL — HIGH (ref 3.8–10.5)
WBC # BLD: 10.61 K/UL — HIGH (ref 3.8–10.5)
WBC # BLD: 10.61 K/UL — HIGH (ref 3.8–10.5)
WBC # FLD AUTO: 10.59 K/UL — HIGH (ref 3.8–10.5)
WBC # FLD AUTO: 10.59 K/UL — HIGH (ref 3.8–10.5)
WBC # FLD AUTO: 10.61 K/UL — HIGH (ref 3.8–10.5)
WBC # FLD AUTO: 10.61 K/UL — HIGH (ref 3.8–10.5)

## 2023-12-18 PROCEDURE — 99233 SBSQ HOSP IP/OBS HIGH 50: CPT | Mod: GC,24,57

## 2023-12-18 PROCEDURE — 88311 DECALCIFY TISSUE: CPT | Mod: 26

## 2023-12-18 PROCEDURE — 27886 AMPUTATION FOLLOW-UP SURGERY: CPT | Mod: GC,58

## 2023-12-18 PROCEDURE — 99233 SBSQ HOSP IP/OBS HIGH 50: CPT

## 2023-12-18 PROCEDURE — 93010 ELECTROCARDIOGRAM REPORT: CPT

## 2023-12-18 PROCEDURE — 88307 TISSUE EXAM BY PATHOLOGIST: CPT | Mod: 26

## 2023-12-18 PROCEDURE — 99232 SBSQ HOSP IP/OBS MODERATE 35: CPT

## 2023-12-18 RX ORDER — INSULIN LISPRO 100/ML
6 VIAL (ML) SUBCUTANEOUS
Refills: 0 | Status: DISCONTINUED | OUTPATIENT
Start: 2023-12-19 | End: 2023-12-21

## 2023-12-18 RX ORDER — INSULIN LISPRO 100/ML
VIAL (ML) SUBCUTANEOUS
Refills: 0 | Status: DISCONTINUED | OUTPATIENT
Start: 2023-12-18 | End: 2023-12-26

## 2023-12-18 RX ORDER — ASPIRIN/CALCIUM CARB/MAGNESIUM 324 MG
81 TABLET ORAL DAILY
Refills: 0 | Status: DISCONTINUED | OUTPATIENT
Start: 2023-12-18 | End: 2023-12-26

## 2023-12-18 RX ORDER — INSULIN LISPRO 100/ML
8 VIAL (ML) SUBCUTANEOUS
Refills: 0 | Status: DISCONTINUED | OUTPATIENT
Start: 2023-12-19 | End: 2023-12-21

## 2023-12-18 RX ORDER — OXYCODONE HYDROCHLORIDE 5 MG/1
10 TABLET ORAL EVERY 8 HOURS
Refills: 0 | Status: DISCONTINUED | OUTPATIENT
Start: 2023-12-18 | End: 2023-12-23

## 2023-12-18 RX ORDER — HYDROMORPHONE HYDROCHLORIDE 2 MG/ML
1 INJECTION INTRAMUSCULAR; INTRAVENOUS; SUBCUTANEOUS EVERY 6 HOURS
Refills: 0 | Status: DISCONTINUED | OUTPATIENT
Start: 2023-12-18 | End: 2023-12-22

## 2023-12-18 RX ORDER — INSULIN GLARGINE 100 [IU]/ML
10 INJECTION, SOLUTION SUBCUTANEOUS AT BEDTIME
Refills: 0 | Status: DISCONTINUED | OUTPATIENT
Start: 2023-12-18 | End: 2023-12-21

## 2023-12-18 RX ORDER — HYDROMORPHONE HYDROCHLORIDE 2 MG/ML
0.5 INJECTION INTRAMUSCULAR; INTRAVENOUS; SUBCUTANEOUS EVERY 4 HOURS
Refills: 0 | Status: DISCONTINUED | OUTPATIENT
Start: 2023-12-18 | End: 2023-12-20

## 2023-12-18 RX ORDER — OXYCODONE HYDROCHLORIDE 5 MG/1
5 TABLET ORAL EVERY 8 HOURS
Refills: 0 | Status: DISCONTINUED | OUTPATIENT
Start: 2023-12-18 | End: 2023-12-23

## 2023-12-18 RX ORDER — CARVEDILOL PHOSPHATE 80 MG/1
25 CAPSULE, EXTENDED RELEASE ORAL EVERY 12 HOURS
Refills: 0 | Status: DISCONTINUED | OUTPATIENT
Start: 2023-12-18 | End: 2023-12-26

## 2023-12-18 RX ORDER — INSULIN LISPRO 100/ML
6 VIAL (ML) SUBCUTANEOUS
Refills: 0 | Status: DISCONTINUED | OUTPATIENT
Start: 2023-12-18 | End: 2023-12-18

## 2023-12-18 RX ORDER — GABAPENTIN 400 MG/1
800 CAPSULE ORAL THREE TIMES A DAY
Refills: 0 | Status: DISCONTINUED | OUTPATIENT
Start: 2023-12-18 | End: 2023-12-26

## 2023-12-18 RX ORDER — HYDRALAZINE HCL 50 MG
75 TABLET ORAL THREE TIMES A DAY
Refills: 0 | Status: DISCONTINUED | OUTPATIENT
Start: 2023-12-18 | End: 2023-12-21

## 2023-12-18 RX ORDER — HEPARIN SODIUM 5000 [USP'U]/ML
5000 INJECTION INTRAVENOUS; SUBCUTANEOUS EVERY 8 HOURS
Refills: 0 | Status: DISCONTINUED | OUTPATIENT
Start: 2023-12-18 | End: 2023-12-26

## 2023-12-18 RX ORDER — VANCOMYCIN HCL 1 G
125 VIAL (EA) INTRAVENOUS EVERY 6 HOURS
Refills: 0 | Status: COMPLETED | OUTPATIENT
Start: 2023-12-18 | End: 2023-12-25

## 2023-12-18 RX ORDER — ACETAMINOPHEN 500 MG
650 TABLET ORAL EVERY 6 HOURS
Refills: 0 | Status: DISCONTINUED | OUTPATIENT
Start: 2023-12-18 | End: 2023-12-26

## 2023-12-18 RX ORDER — PIPERACILLIN AND TAZOBACTAM 4; .5 G/20ML; G/20ML
3.38 INJECTION, POWDER, LYOPHILIZED, FOR SOLUTION INTRAVENOUS EVERY 8 HOURS
Refills: 0 | Status: DISCONTINUED | OUTPATIENT
Start: 2023-12-18 | End: 2023-12-19

## 2023-12-18 RX ORDER — ATORVASTATIN CALCIUM 80 MG/1
80 TABLET, FILM COATED ORAL AT BEDTIME
Refills: 0 | Status: DISCONTINUED | OUTPATIENT
Start: 2023-12-18 | End: 2023-12-26

## 2023-12-18 RX ADMIN — PIPERACILLIN AND TAZOBACTAM 25 GRAM(S): 4; .5 INJECTION, POWDER, LYOPHILIZED, FOR SOLUTION INTRAVENOUS at 15:06

## 2023-12-18 RX ADMIN — HYDROMORPHONE HYDROCHLORIDE 0.5 MILLIGRAM(S): 2 INJECTION INTRAMUSCULAR; INTRAVENOUS; SUBCUTANEOUS at 20:41

## 2023-12-18 RX ADMIN — OXYCODONE HYDROCHLORIDE 10 MILLIGRAM(S): 5 TABLET ORAL at 18:51

## 2023-12-18 RX ADMIN — Medication 81 MILLIGRAM(S): at 15:06

## 2023-12-18 RX ADMIN — PIPERACILLIN AND TAZOBACTAM 25 GRAM(S): 4; .5 INJECTION, POWDER, LYOPHILIZED, FOR SOLUTION INTRAVENOUS at 22:30

## 2023-12-18 RX ADMIN — Medication 650 MILLIGRAM(S): at 11:55

## 2023-12-18 RX ADMIN — Medication 75 MILLIGRAM(S): at 22:30

## 2023-12-18 RX ADMIN — HYDROMORPHONE HYDROCHLORIDE 1 MILLIGRAM(S): 2 INJECTION INTRAMUSCULAR; INTRAVENOUS; SUBCUTANEOUS at 15:10

## 2023-12-18 RX ADMIN — Medication 3: at 06:41

## 2023-12-18 RX ADMIN — HYDROMORPHONE HYDROCHLORIDE 0.5 MILLIGRAM(S): 2 INJECTION INTRAMUSCULAR; INTRAVENOUS; SUBCUTANEOUS at 20:26

## 2023-12-18 RX ADMIN — GABAPENTIN 800 MILLIGRAM(S): 400 CAPSULE ORAL at 22:30

## 2023-12-18 RX ADMIN — OXYCODONE HYDROCHLORIDE 10 MILLIGRAM(S): 5 TABLET ORAL at 19:56

## 2023-12-18 RX ADMIN — Medication 125 MILLIGRAM(S): at 06:16

## 2023-12-18 RX ADMIN — Medication 650 MILLIGRAM(S): at 00:17

## 2023-12-18 RX ADMIN — PIPERACILLIN AND TAZOBACTAM 25 GRAM(S): 4; .5 INJECTION, POWDER, LYOPHILIZED, FOR SOLUTION INTRAVENOUS at 06:35

## 2023-12-18 RX ADMIN — HYDROMORPHONE HYDROCHLORIDE 1 MILLIGRAM(S): 2 INJECTION INTRAMUSCULAR; INTRAVENOUS; SUBCUTANEOUS at 06:35

## 2023-12-18 RX ADMIN — HEPARIN SODIUM 5000 UNIT(S): 5000 INJECTION INTRAVENOUS; SUBCUTANEOUS at 06:17

## 2023-12-18 RX ADMIN — Medication 75 MILLIGRAM(S): at 06:17

## 2023-12-18 RX ADMIN — Medication 125 MILLIGRAM(S): at 17:37

## 2023-12-18 RX ADMIN — HYDROMORPHONE HYDROCHLORIDE 1 MILLIGRAM(S): 2 INJECTION INTRAMUSCULAR; INTRAVENOUS; SUBCUTANEOUS at 06:19

## 2023-12-18 RX ADMIN — CARVEDILOL PHOSPHATE 25 MILLIGRAM(S): 80 CAPSULE, EXTENDED RELEASE ORAL at 06:17

## 2023-12-18 RX ADMIN — Medication 125 MILLIGRAM(S): at 00:15

## 2023-12-18 RX ADMIN — Medication 650 MILLIGRAM(S): at 23:59

## 2023-12-18 RX ADMIN — HEPARIN SODIUM 5000 UNIT(S): 5000 INJECTION INTRAVENOUS; SUBCUTANEOUS at 17:38

## 2023-12-18 RX ADMIN — Medication 6 UNIT(S): at 12:00

## 2023-12-18 RX ADMIN — CARVEDILOL PHOSPHATE 25 MILLIGRAM(S): 80 CAPSULE, EXTENDED RELEASE ORAL at 17:37

## 2023-12-18 RX ADMIN — Medication 650 MILLIGRAM(S): at 17:37

## 2023-12-18 RX ADMIN — Medication 125 MILLIGRAM(S): at 13:24

## 2023-12-18 RX ADMIN — GABAPENTIN 800 MILLIGRAM(S): 400 CAPSULE ORAL at 06:17

## 2023-12-18 RX ADMIN — GABAPENTIN 800 MILLIGRAM(S): 400 CAPSULE ORAL at 13:24

## 2023-12-18 RX ADMIN — Medication 3: at 17:38

## 2023-12-18 RX ADMIN — Medication 6 UNIT(S): at 17:38

## 2023-12-18 RX ADMIN — Medication 75 MILLIGRAM(S): at 15:13

## 2023-12-18 RX ADMIN — Medication 650 MILLIGRAM(S): at 01:17

## 2023-12-18 NOTE — PROGRESS NOTE ADULT - ATTENDING COMMENTS
This is a patient known to Dr. 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 He is transferred from the medical service to vascular, now s/p staged R BKA w/ closure (12/18/23)    No major complaints. Dressing in place with knee immobilizer.    PLAN & RECOMMENDATIONS  - Diet  - Postop labs and EKG  - ASA, no more plavix unless needs for CAD, start SQH tonight  - DC IV abx within 24 hours  - C-diff treatment  - ASA/SQH  - Bedrest today. Possibly physical therapy tomorrow  - Dressing on for 3 days.  -  for dispo    Thank you,    Michelet Freed MD  Attending Vascular Surgeon  [facility] at [address] [place] [address]  Office: [phone]; Fax: [phone]  [email or web address] This is a patient known to Dr. 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 He is transferred from the medical service to vascular, now s/p staged R BKA w/ closure (12/18/23)    No major complaints. Dressing in place with knee immobilizer.    PLAN & RECOMMENDATIONS  - Diet  - Postop labs and EKG  - ASA, no more plavix unless needs for CAD, start SQH tonight  - DC IV abx within 24 hours  - C-diff treatment  - ASA/SQH  - Bedrest today. Possibly physical therapy tomorrow  - Dressing on for 3 days.  -  for dispo    Thank you,    Michelet Freed MD  Attending Vascular Surgeon  [facility] at [address] [place] [address]  Office: [phone]; Fax: [phone]  [email or web address]

## 2023-12-18 NOTE — PROGRESS NOTE ADULT - SUBJECTIVE AND OBJECTIVE BOX
O/N: b/p 199 x2 10 Hydral given                                        A/P:    69yo M PMH CAD (2 stents 2020), HFmrEF (45-50%), HTN, PAD w/ remote RLE angioplasty, R 4th toe OM s/p partial R 4th ray amputation on 10/24, RLE angiogram with AT lithotripsy and AT/peroneal balloon angioplasty 10/27, uncontrolled IDDM (a1c 12 in Oct 2023), recent admission 11/8-11/10 to cardiology service for hypertensive emergency (left AMA, was given levaquin when he left for right foot wound that pt had started treatment for back in October) who presented to [facility] after a fall onto his knees and was having b/l knee pain. Found to have increased soft tissue gas in R foot on CT scan. Patient is s/p R BKA on 12/11/23 with planned closure on Monday 12/16/23.    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 Planned for Monday.  -pain control, daily dressing change     HTN/HLD:  -c/w Hydralazine, Lisinopril  -Coreg 25 BID  -c/w Atorvastatin    CAD/CHF:   -cardiology following, appreciate recs    DM:  -endocrine following, appreciate reccs  -mISS, lispro 6u TID, Lantus 10u QHS    Anemia:   -post operative anemia- now corrected to 8.6 (12/17)    ID:  -c/w Zosyn 3.375g IV q8 until stump closure  -Oral Vancomycin for C. Diff    Diet: consistent carbs    Activity: OOB to chair, PT Consult    DVTPPx: HSQ    Dispo: 5 Uris telemetry O/N: b/p 199 x2 10 Hydral given                                        A/P:    69yo M PMH CAD (2 stents 2020), HFmrEF (45-50%), HTN, PAD w/ remote RLE angioplasty, R 4th toe OM s/p partial R 4th ray amputation on 10/24, RLE angiogram with AT lithotripsy and AT/peroneal balloon angioplasty 10/27, uncontrolled IDDM (a1c 12 in Oct 2023), recent admission 11/8-11/10 to cardiology service for hypertensive emergency (left AMA, was given levaquin when he left for right foot wound that pt had started treatment for back in October) who presented to [facility] after a fall onto his knees and was having b/l knee pain. Found to have increased soft tissue gas in R foot on CT scan. Patient is s/p R BKA on 12/11/23 with planned closure on Monday 12/16/23.    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 Planned for Monday.  -pain control, daily dressing change     HTN/HLD:  -c/w Hydralazine, Lisinopril  -Coreg 25 BID  -c/w Atorvastatin    CAD/CHF:   -cardiology following, appreciate recs    DM:  -endocrine following, appreciate reccs  -mISS, lispro 6u TID, Lantus 10u QHS    Anemia:   -post operative anemia- now corrected to 8.6 (12/17)    ID:  -c/w Zosyn 3.375g IV q8 until stump closure  -Oral Vancomycin for C. Diff    Diet: consistent carbs    Activity: OOB to chair, PT Consult    DVTPPx: HSQ    Dispo: 5 Uris telemetry O/N: b/p 199 x2 10 Hydral given          S: Patient resting comfortably. Having fewer loose bowel movements.     O: Examined in bed resting comfortably     ROS: Denies headache, blurred vision, chest pain, SOB, abdominal pain, nausea or vomiting.         vancomycin    Solution 125  aspirin  chewable 81  carvedilol 25  heparin   Injectable 5000  hydrALAZINE 75  vancomycin    Solution 125      Allergies    No Known Allergies    Intolerances        Vital Signs Last 24 Hrs  T(C): 36.8 (18 Dec 2023 05:52), Max: 37.5 (17 Dec 2023 11:38)  T(F): 98.2 (18 Dec 2023 05:52), Max: 99.5 (17 Dec 2023 11:38)  HR: 64 (18 Dec 2023 06:57) (61 - 76)  BP: 163/70 (18 Dec 2023 06:57) (136/78 - 227/98)  BP(mean): 100 (17 Dec 2023 18:05) (100 - 115)  RR: 17 (18 Dec 2023 06:57) (16 - 18)  SpO2: 96% (18 Dec 2023 06:57) (94% - 100%)    Parameters below as of 18 Dec 2023 06:57  Patient On (Oxygen Delivery Method): room air      I&O's Summary    17 Dec 2023 07:01  -  18 Dec 2023 07:00  --------------------------------------------------------  IN: 590 mL / OUT: 2095 mL / NET: -1505 mL        Physical Exam:  General: NAD, resting comfortably in bed  C/V: NSR  Pulm: Nonlabored breathing, no respiratory distress  Abd: soft, NT/ND.  Extrem: RLE s/p guillotine BKA, stump clean with no purulence        LABS:                        8.6    12.59 )-----------( 208      ( 17 Dec 2023 05:30 )             27.4     12-17    142  |  111<H>  |  21  ----------------------------<  159<H>  4.3   |  26  |  1.23    Ca    8.2<L>      17 Dec 2023 05:30  Phos  2.7     12-17  Mg     2.1     12-17          Radiology and Additional Studies:            A/P:    67yo M PMH CAD (2 stents 2020), HFmrEF (45-50%), HTN, PAD w/ remote RLE angioplasty, R 4th toe OM s/p partial R 4th ray amputation on 10/24, RLE angiogram with AT lithotripsy and AT/peroneal balloon angioplasty 10/27, uncontrolled IDDM (a1c 12 in Oct 2023), recent admission 11/8-11/10 to cardiology service for hypertensive emergency (left AMA, was given levaquin when he left for right foot wound that pt had started treatment for back in October) who presented to [facility] after a fall onto his knees and was having b/l knee pain. Found to have increased soft tissue gas in R foot on CT scan. Patient is s/p R BKA on 12/11/23 with planned closure on Monday 12/16/23.    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 Planned for Monday.  -pain control, daily dressing change     HTN/HLD:  -c/w Hydralazine, Lisinopril  -Coreg 25 BID  -c/w Atorvastatin    CAD/CHF:   -cardiology following, appreciate recs    DM:  -endocrine following, appreciate reccs  -mISS, lispro 6u TID, Lantus 10u QHS    Anemia:   -post operative anemia- now corrected to 8.6 (12/17)    ID:  -c/w Zosyn 3.375g IV q8 until stump closure  -Oral Vancomycin for C. Diff    Diet: consistent carbs    Activity: OOB to chair, PT Consult    DVTPPx: HSQ    Dispo: 5 Uris telemetry O/N: b/p 199 x2 10 Hydral given          S: Patient resting comfortably. Having fewer loose bowel movements.     O: Examined in bed resting comfortably     ROS: Denies headache, blurred vision, chest pain, SOB, abdominal pain, nausea or vomiting.         vancomycin    Solution 125  aspirin  chewable 81  carvedilol 25  heparin   Injectable 5000  hydrALAZINE 75  vancomycin    Solution 125      Allergies    No Known Allergies    Intolerances        Vital Signs Last 24 Hrs  T(C): 36.8 (18 Dec 2023 05:52), Max: 37.5 (17 Dec 2023 11:38)  T(F): 98.2 (18 Dec 2023 05:52), Max: 99.5 (17 Dec 2023 11:38)  HR: 64 (18 Dec 2023 06:57) (61 - 76)  BP: 163/70 (18 Dec 2023 06:57) (136/78 - 227/98)  BP(mean): 100 (17 Dec 2023 18:05) (100 - 115)  RR: 17 (18 Dec 2023 06:57) (16 - 18)  SpO2: 96% (18 Dec 2023 06:57) (94% - 100%)    Parameters below as of 18 Dec 2023 06:57  Patient On (Oxygen Delivery Method): room air      I&O's Summary    17 Dec 2023 07:01  -  18 Dec 2023 07:00  --------------------------------------------------------  IN: 590 mL / OUT: 2095 mL / NET: -1505 mL        Physical Exam:  General: NAD, resting comfortably in bed  C/V: NSR  Pulm: Nonlabored breathing, no respiratory distress  Abd: soft, NT/ND.  Extrem: RLE s/p guillotine BKA, stump clean with no purulence        LABS:                        8.6    12.59 )-----------( 208      ( 17 Dec 2023 05:30 )             27.4     12-17    142  |  111<H>  |  21  ----------------------------<  159<H>  4.3   |  26  |  1.23    Ca    8.2<L>      17 Dec 2023 05:30  Phos  2.7     12-17  Mg     2.1     12-17          Radiology and Additional Studies:            A/P:    67yo M PMH CAD (2 stents 2020), HFmrEF (45-50%), HTN, PAD w/ remote RLE angioplasty, R 4th toe OM s/p partial R 4th ray amputation on 10/24, RLE angiogram with AT lithotripsy and AT/peroneal balloon angioplasty 10/27, uncontrolled IDDM (a1c 12 in Oct 2023), recent admission 11/8-11/10 to cardiology service for hypertensive emergency (left AMA, was given levaquin when he left for right foot wound that pt had started treatment for back in October) who presented to [facility] after a fall onto his knees and was having b/l knee pain. Found to have increased soft tissue gas in R foot on CT scan. Patient is s/p R BKA on 12/11/23 with planned closure on Monday 12/16/23.    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 Planned for Monday.  -pain control, daily dressing change     HTN/HLD:  -c/w Hydralazine, Lisinopril  -Coreg 25 BID  -c/w Atorvastatin    CAD/CHF:   -cardiology following, appreciate recs    DM:  -endocrine following, appreciate reccs  -mISS, lispro 6u TID, Lantus 10u QHS    Anemia:   -post operative anemia- now corrected to 8.6 (12/17)    ID:  -c/w Zosyn 3.375g IV q8 until stump closure  -Oral Vancomycin for C. Diff    Diet: consistent carbs    Activity: OOB to chair, PT Consult    DVTPPx: HSQ    Dispo: 5 Uris telemetry

## 2023-12-18 NOTE — PROGRESS NOTE ADULT - ASSESSMENT
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    A1C: 13.5 %  Insulinopenic  C-peptide 0.5 with , JAMIR/ICA neg (Oct 2023)  C-peptide 1.0 12/15 with   BUN: 20  Creatinine: 1.23  GFR: 65  Weight: 89  BMI: 30.7

## 2023-12-18 NOTE — BRIEF OPERATIVE NOTE - NSICDXBRIEFPROCEDURE_GEN_ALL_CORE_FT
PROCEDURES:  Right below knee amputation 11-Dec-2023 11:50:03  Guicho Gilmore  Closure of stump of below knee amputation of right lower extremity 18-Dec-2023 10:29:51  Guicho Gilmore  
PROCEDURES:  Right below knee amputation 11-Dec-2023 11:50:03  Guicho Gilmore

## 2023-12-18 NOTE — PROGRESS NOTE ADULT - SUBJECTIVE AND OBJECTIVE BOX
Patient is a 68y old  Male who presents with a chief complaint of soft tissue and skin infection of right foot (18 Dec 2023 13:40)    INTERVAL EVENTS:  underwent closure of right BKA stump today   tolerated dinner   had a bowel movement this evening.   Making urine this urine.     SUBJECTIVE:  Patient was seen and examined at bedside.  Review of systems: No CP, dyspnea, nausea or vomiting, dysuria    Diet, Consistent Carbohydrate/No Snacks (12-18-23 @ 12:54) [Active]    MEDICATIONS:  MEDICATIONS  (STANDING):  acetaminophen     Tablet .. 650 milliGRAM(s) Oral every 6 hours  aspirin  chewable 81 milliGRAM(s) Oral daily  atorvastatin 80 milliGRAM(s) Oral at bedtime  carvedilol 25 milliGRAM(s) Oral every 12 hours  gabapentin 800 milliGRAM(s) Oral three times a day  heparin   Injectable 5000 Unit(s) SubCutaneous every 8 hours  hydrALAZINE 75 milliGRAM(s) Oral three times a day  insulin glargine Injectable (LANTUS) 10 Unit(s) SubCutaneous at bedtime  insulin lispro (ADMELOG) corrective regimen sliding scale   SubCutaneous Before meals and at bedtime  piperacillin/tazobactam IVPB.. 3.375 Gram(s) IV Intermittent every 8 hours  vancomycin    Solution 125 milliGRAM(s) Oral every 6 hours    MEDICATIONS  (PRN):  HYDROmorphone  Injectable 0.5 milliGRAM(s) IV Push every 4 hours PRN breakthrough  HYDROmorphone  Injectable 1 milliGRAM(s) IV Push every 6 hours PRN Severe Pain (7 - 10)  oxyCODONE    IR 5 milliGRAM(s) Oral every 8 hours PRN Mild Pain (1 - 3)  oxyCODONE    IR 10 milliGRAM(s) Oral every 8 hours PRN Moderate Pain (4 - 6)    Allergies    No Known Allergies    Intolerances    OBJECTIVE:  Vital Signs Last 24 Hrs  T(C): 36.8 (18 Dec 2023 20:12), Max: 37.1 (18 Dec 2023 13:15)  T(F): 98.3 (18 Dec 2023 20:12), Max: 98.7 (18 Dec 2023 13:15)  HR: 72 (18 Dec 2023 20:12) (56 - 72)  BP: 162/83 (18 Dec 2023 20:12) (96/61 - 227/98)  BP(mean): 115 (18 Dec 2023 20:12) (70 - 115)  RR: 18 (18 Dec 2023 20:12) (11 - 20)  SpO2: 96% (18 Dec 2023 20:12) (94% - 99%)    Parameters below as of 18 Dec 2023 20:12  Patient On (Oxygen Delivery Method): room air      I&O's Summary    17 Dec 2023 07:01  -  18 Dec 2023 07:00  --------------------------------------------------------  IN: 590 mL / OUT: 2095 mL / NET: -1505 mL    18 Dec 2023 07:01  -  18 Dec 2023 23:42  --------------------------------------------------------  IN: 900 mL / OUT: 700 mL / NET: 200 mL    PHYSICAL EXAM:  Gen: Sitting in bed at time of exam, appears stated age  HEENT: NCAT, MMM, clear OP  Neck: supple, trachea at midline  CV: RRR, +S1/S2  Pulm: adequate respiratory effort, no increase in work of breathing  Abd: soft, ND  Skin: warm and dry,   Ext: left calf with 1+ pitting edema  ; right BKA stump-- protective device in place   Neuro: AOx3, speaking in full sentences  Psych: affect and behavior appropriate, pleasant at time of interview    LABS:                        7.4    10.61 )-----------( 195      ( 18 Dec 2023 10:15 )             23.8     12-18    138  |  108  |  20  ----------------------------<  148<H>  4.0   |  28  |  1.23    Ca    7.8<L>      18 Dec 2023 10:15  Phos  3.0     12-18  Mg     1.9     12-18    TPro  4.9<L>  /  Alb  1.7<L>  /  TBili  0.2  /  DBili  x   /  AST  17  /  ALT  16  /  AlkPhos  113  12-18    LIVER FUNCTIONS - ( 18 Dec 2023 10:15 )  Alb: 1.7 g/dL / Pro: 4.9 g/dL / ALK PHOS: 113 U/L / ALT: 16 U/L / AST: 17 U/L / GGT: x           PT/INR - ( 18 Dec 2023 05:30 )   PT: 10.6 sec;   INR: 0.93            CAPILLARY BLOOD GLUCOSE      POCT Blood Glucose.: 282 mg/dL (18 Dec 2023 17:11)  POCT Blood Glucose.: 148 mg/dL (18 Dec 2023 10:32)  POCT Blood Glucose.: 254 mg/dL (18 Dec 2023 06:34)    Urinalysis Basic - ( 18 Dec 2023 10:15 )    Color: x / Appearance: x / SG: x / pH: x  Gluc: 148 mg/dL / Ketone: x  / Bili: x / Urobili: x   Blood: x / Protein: x / Nitrite: x   Leuk Esterase: x / RBC: x / WBC x   Sq Epi: x / Non Sq Epi: x / Bacteria: x        MICRODATA:      RADIOLOGY/OTHER STUDIES:

## 2023-12-18 NOTE — BRIEF OPERATIVE NOTE - NSICDXBRIEFPREOP_GEN_ALL_CORE_FT
PRE-OP DIAGNOSIS:  Gangrene 11-Dec-2023 11:50:14  Guicho Gilmore  
PRE-OP DIAGNOSIS:  Gangrene 11-Dec-2023 11:50:14  Guicho Gilmore

## 2023-12-18 NOTE — PROGRESS NOTE ADULT - PROBLEM SELECTOR PLAN 1
Type 2 diabetes mellitus with hyperglycemia  - Please increase lantus to  units at bedtime.   - Increase lispro to  units before each meal.  - Continue lispro low dose sliding scale before meals and at bedtime.  - Patient's fingerstick glucose goal is 100-180 mg/dL.    - For discharge, patient can ***.    - Patient can follow up at discharge with [facility] by calling [phone] to make an appointment.      Case discussed with Dr. Huertas. Primary team updated. Type 2 diabetes mellitus with hyperglycemia  - Please increase lantus to  units at bedtime.   - Increase lispro to  units before each meal.  - Continue lispro low dose sliding scale before meals and at bedtime.  - Patient's fingerstick glucose goal is 100-180 mg/dL.    - For discharge, patient can ***.    - Patient can follow up at discharge with [facility] by calling [phone] to make an appointment.      Case discussed with Dr. Huertas. Primary team updated. Type 2 diabetes mellitus with hyperglycemia  - Please continue lantus 10 units at bedtime.   - Continue lispro 6 units before breakfast, and Increase lispro to 8 units before lunch and dinner.  - Continue lispro low dose sliding scale before meals and at bedtime.  - Patient's fingerstick glucose goal is 100-180 mg/dL.    - For discharge, patient can ***.    - Patient can follow up at discharge with [facility] by calling [phone] to make an appointment.      Case discussed with Dr. Huertas. Primary team updated. Type 2 diabetes mellitus with hyperglycemia  - Please continue lantus 10 units at bedtime.   - Continue lispro 6 units before breakfast, and Increase lispro to 8 units before lunch and dinner.  - Continue lispro low dose sliding scale before meals and at bedtime.  - Patient's fingerstick glucose goal is 100-180 mg/dL.    - For discharge, patient can ***.    - Patient can follow up at discharge with [facility] by calling [phone] to make an appointment.      Case discussed with Dr. Huertas. Primary team updated.

## 2023-12-18 NOTE — PROGRESS NOTE ADULT - SUBJECTIVE AND OBJECTIVE BOX
Vascular Surgery Post-Op Note    Procedure: right BKA closure    Diagnosis/Indication: right foot gangrene    Surgeon: Dr. Freed    S: Pt has no complaints. Denies CP, SOB, CANSECO. Pain controlled with medication.    O:  T(C): 35.9 (12-18-23 @ 11:55), Max: 36 (12-18-23 @ 10:03)  T(F): 96.6 (12-18-23 @ 11:55), Max: 96.8 (12-18-23 @ 10:03)  HR: 61 (12-18-23 @ 12:19) (56 - 66)  BP: 129/67 (12-18-23 @ 12:19) (117/62 - 158/75)  RR: 12 (12-18-23 @ 12:19) (11 - 20)  SpO2: 94% (12-18-23 @ 12:19) (94% - 99%)  Wt(kg): --                        7.4    10.61 )-----------( 195      ( 18 Dec 2023 10:15 )             23.8     12-18    138  |  108  |  20  ----------------------------<  148<H>  4.0   |  28  |  1.23    Ca    7.8<L>      18 Dec 2023 10:15  Phos  3.0     12-18  Mg     1.9     12-18    TPro  4.9<L>  /  Alb  1.7<L>  /  TBili  0.2  /  DBili  x   /  AST  17  /  ALT  16  /  AlkPhos  113  12-18      Gen: NAD, resting comfortably in bed  C/V: NSR  Pulm: Nonlabored breathing, no respiratory distress  Abd: soft, NT/ND  Extrem: right BKA stump C/D/I, knee immobilizer in place      A/P: 68yMale s/p above procedure  Post op Hgb 7.4 - transfusing 1u pRBC  Diet: advance  IVF: HL once tolerating diet  Pain/nausea control  continue Zosyn  Vanco PO for c.diff

## 2023-12-18 NOTE — BRIEF OPERATIVE NOTE - OPERATION/FINDINGS
Procedure: Closure of RLE below knee amputation    Previous guillotine BKA revised with tibia and fibula taken at level 4 fingerbreadths below tibia tuberosity. Tourniquet time approximately 40 min. Vesels tied and suture ligated with 2-0 silk. Tibial nerve injected with 1% lidocaine. Excellent hemostasis. Procedure: Closure of RLE below knee amputation    Previous guillotine BKA revised with tibia and fibula taken at level 4 fingerbreadths below tibia tuberosity. Tourniquet time approximately 40 min. Vesels tied and suture ligated with 2-0 silk. Tibial nerve injected with 1% lidocaine. Flap closed in layers with 2-0, 3-0 vicryl and staples. Excellent hemostasis.

## 2023-12-18 NOTE — PROGRESS NOTE ADULT - ASSESSMENT
68 year old male with a PMHx of CAD (s/p PCI x 2 in 2020), HTN, IDDM (A1c 12%, 10/2023), PAD (s/p remote RLE angioplasty), 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    #Diabetic Foot Infection   -further management as per vascular surgery, s/p BKA on 12/11, s/p closure on 12/18 Monday  -currently on Zosyn     # C difficile infection - c diff positive on December 14th ; continue PO vancomycin     #Acute Blood Loss Anemia   -continue to monitor with daily CBC   -goal Hgb > 8.0 given history of CAD  -if requires additional blood products, consider 20mg IV Lasix     #CAD   -continue with ASA 81mg daily and Atorvastatin 80mg qhs     #Chronic HFmrEF   #HTN  -continue with lisinopril 40mg daily, coreg 25mg BID, and hydralazine 75mg TID today  -per cards recs, plan for reintroduction of spironolactone 25mg PO qd prior to discharge     #Moderate Aortic Stenosis  -outpatient follow up for surveillance TTE     #Type 2 Diabetes c/b PAD, diabetic foot infection and hyperglycemia   -continue Lantus 10 QHS ; lispro 6 units before breakfast, 8 units before lunch and dinner.   -further management as per endocrinology     DVT PPx: SQH    high mdm   - diabetic foot infection requiring BKA   - reviewed Na, K, Cr  - discussed plan of care with vascular surgery ACP and housestaff  - required intravenous hydromorphone for pain control

## 2023-12-18 NOTE — PRE-ANESTHESIA EVALUATION ADULT - NSATTENDATTESTRD_GEN_ALL_CORE
noted - will see pt The patient has been re-examined and I agree with the above assessment or I updated with my findings.

## 2023-12-18 NOTE — PROGRESS NOTE ADULT - SUBJECTIVE AND OBJECTIVE BOX
SUBJECTIVE / INTERVAL HPI: Patient was seen and examined this morning.     CAPILLARY BLOOD GLUCOSE & INSULIN RECEIVED  159 mg/dL (12-17 @ 05:30)  140 mg/dL (12-17 @ 08:22)  112 mg/dL (12-17 @ 12:37)  242 mg/dL (12-17 @ 16:57)  335 mg/dL (12-17 @ 21:56)  212 mg/dL (12-18 @ 05:30)  254 mg/dL (12-18 @ 06:34)  148 mg/dL (12-18 @ 10:15)  148 mg/dL (12-18 @ 10:32)      REVIEW OF SYSTEMS  Constitutional:  Negative fever, chills or loss of appetite.  Eyes:  Negative blurry vision or double vision.  Cardiovascular:  Negative for chest pain or palpitations.  Respiratory:  Negative for cough, wheezing, or shortness of breath.    Gastrointestinal:  Negative for nausea, vomiting, diarrhea, constipation, or abdominal pain.  Genitourinary:  Negative frequency, urgency or dysuria.  Neurologic:  No headache, confusion, dizziness, lightheadedness.    PHYSICAL EXAM  Vital Signs Last 24 Hrs  T(C): 35.9 (18 Dec 2023 11:55), Max: 36.9 (17 Dec 2023 18:05)  T(F): 96.6 (18 Dec 2023 11:55), Max: 98.5 (17 Dec 2023 18:05)  HR: 61 (18 Dec 2023 12:19) (56 - 76)  BP: 129/67 (18 Dec 2023 12:19) (117/62 - 227/98)  BP(mean): 88 (18 Dec 2023 12:19) (88 - 106)  RR: 12 (18 Dec 2023 12:19) (11 - 20)  SpO2: 94% (18 Dec 2023 12:19) (94% - 100%)    Parameters below as of 18 Dec 2023 12:19  Patient On (Oxygen Delivery Method): room air        Constitutional: Awake, alert, in no acute distress.   HEENT: Normocephalic, atraumatic, RAGHAV.  Respiratory: Lungs clear to ausculation bilaterally.   Cardiovascular: regular rhythm, normal S1 and S2, no audible murmurs.   GI: soft, non-tender, non-distended, bowel sounds present.  Extremities: No lower extremity edema.  Psychiatric: AAO x 3. Normal affect/mood.     LABS  CBC - WBC/HGB/HTC/PLT: 10.61/7.4/23.8/195 (12-18-23)  BMP - Na/K/Cl/Bicarb/BUN/Cr/Gluc/AG/eGFR: 138/4.0/108/28/20/1.23/148/2/65 (12-18-23)  Ca - 7.8 (12-18-23)  Phos - 3.0 (12-18-23)  Mg - 1.9 (12-18-23)  LFT - Alb/Tprot/Tbili/Dbili/AlkPhos/ALT/AST: 1.7/--/0.2/--/113/16/17 (12-18-23)  PT/aPTT/INR: 10.6/--/0.93 (12-18-23)       MEDICATIONS  MEDICATIONS  (STANDING):  acetaminophen     Tablet .. 650 milliGRAM(s) Oral every 6 hours  aspirin  chewable 81 milliGRAM(s) Oral daily  atorvastatin 80 milliGRAM(s) Oral at bedtime  carvedilol 25 milliGRAM(s) Oral every 12 hours  gabapentin 800 milliGRAM(s) Oral three times a day  heparin   Injectable 5000 Unit(s) SubCutaneous every 8 hours  hydrALAZINE 75 milliGRAM(s) Oral three times a day  insulin glargine Injectable (LANTUS) 10 Unit(s) SubCutaneous at bedtime  insulin lispro (ADMELOG) corrective regimen sliding scale   SubCutaneous Before meals and at bedtime  insulin lispro Injectable (ADMELOG) 6 Unit(s) SubCutaneous three times a day before meals  piperacillin/tazobactam IVPB.. 3.375 Gram(s) IV Intermittent every 8 hours  vancomycin    Solution 125 milliGRAM(s) Oral every 6 hours    MEDICATIONS  (PRN):  HYDROmorphone  Injectable 1 milliGRAM(s) IV Push every 6 hours PRN Severe Pain (7 - 10)  oxyCODONE    IR 10 milliGRAM(s) Oral every 8 hours PRN Moderate Pain (4 - 6)  oxyCODONE    IR 5 milliGRAM(s) Oral every 8 hours PRN Mild Pain (1 - 3)    ASSESSMENT / RECOMMENDATIONS    A1C: 13.5 %  BUN: 20  Creatinine: 1.23  GFR: 65  Weight: 89  BMI: 30.7  EF:     # Type 2 diabetes mellitus with hyperglycemia  - Please continue lantus *** units at bedtime.   - Continue lispro *** units before each meal.  - Continue lispro moderate / low dose sliding scale before meals and at bedtime.  - Patient's fingerstick glucose goal is 100-180 mg/dL.    - For discharge, patient can ***.    - Patient can follow up at discharge with [facility] by calling [phone] to make an appointment.      Case discussed with Dr. Huertas. Primary team updated.          SUBJECTIVE / INTERVAL HPI: Patient was seen and examined this morning.     CAPILLARY BLOOD GLUCOSE & INSULIN RECEIVED  159 mg/dL (12-17 @ 05:30)  140 mg/dL (12-17 @ 08:22)  112 mg/dL (12-17 @ 12:37)  242 mg/dL (12-17 @ 16:57)  335 mg/dL (12-17 @ 21:56)  212 mg/dL (12-18 @ 05:30)  254 mg/dL (12-18 @ 06:34)  148 mg/dL (12-18 @ 10:15)  148 mg/dL (12-18 @ 10:32)      REVIEW OF SYSTEMS  Constitutional:  Negative fever, chills or loss of appetite.  Eyes:  Negative blurry vision or double vision.  Cardiovascular:  Negative for chest pain or palpitations.  Respiratory:  Negative for cough, wheezing, or shortness of breath.    Gastrointestinal:  Negative for nausea, vomiting, diarrhea, constipation, or abdominal pain.  Genitourinary:  Negative frequency, urgency or dysuria.  Neurologic:  No headache, confusion, dizziness, lightheadedness.    PHYSICAL EXAM  Vital Signs Last 24 Hrs  T(C): 35.9 (18 Dec 2023 11:55), Max: 36.9 (17 Dec 2023 18:05)  T(F): 96.6 (18 Dec 2023 11:55), Max: 98.5 (17 Dec 2023 18:05)  HR: 61 (18 Dec 2023 12:19) (56 - 76)  BP: 129/67 (18 Dec 2023 12:19) (117/62 - 227/98)  BP(mean): 88 (18 Dec 2023 12:19) (88 - 106)  RR: 12 (18 Dec 2023 12:19) (11 - 20)  SpO2: 94% (18 Dec 2023 12:19) (94% - 100%)    Parameters below as of 18 Dec 2023 12:19  Patient On (Oxygen Delivery Method): room air        Constitutional: Awake, alert, in no acute distress.   HEENT: Normocephalic, atraumatic, RAGHAV.  Respiratory: Lungs clear to ausculation bilaterally.   Cardiovascular: regular rhythm, normal S1 and S2, no audible murmurs.   GI: soft, non-tender, non-distended, bowel sounds present.  Extremities: No lower extremity edema.  Psychiatric: AAO x 3. Normal affect/mood.     LABS  CBC - WBC/HGB/HTC/PLT: 10.61/7.4/23.8/195 (12-18-23)  BMP - Na/K/Cl/Bicarb/BUN/Cr/Gluc/AG/eGFR: 138/4.0/108/28/20/1.23/148/2/65 (12-18-23)  Ca - 7.8 (12-18-23)  Phos - 3.0 (12-18-23)  Mg - 1.9 (12-18-23)  LFT - Alb/Tprot/Tbili/Dbili/AlkPhos/ALT/AST: 1.7/--/0.2/--/113/16/17 (12-18-23)  PT/aPTT/INR: 10.6/--/0.93 (12-18-23)       MEDICATIONS  MEDICATIONS  (STANDING):  acetaminophen     Tablet .. 650 milliGRAM(s) Oral every 6 hours  aspirin  chewable 81 milliGRAM(s) Oral daily  atorvastatin 80 milliGRAM(s) Oral at bedtime  carvedilol 25 milliGRAM(s) Oral every 12 hours  gabapentin 800 milliGRAM(s) Oral three times a day  heparin   Injectable 5000 Unit(s) SubCutaneous every 8 hours  hydrALAZINE 75 milliGRAM(s) Oral three times a day  insulin glargine Injectable (LANTUS) 10 Unit(s) SubCutaneous at bedtime  insulin lispro (ADMELOG) corrective regimen sliding scale   SubCutaneous Before meals and at bedtime  insulin lispro Injectable (ADMELOG) 6 Unit(s) SubCutaneous three times a day before meals  piperacillin/tazobactam IVPB.. 3.375 Gram(s) IV Intermittent every 8 hours  vancomycin    Solution 125 milliGRAM(s) Oral every 6 hours    MEDICATIONS  (PRN):  HYDROmorphone  Injectable 1 milliGRAM(s) IV Push every 6 hours PRN Severe Pain (7 - 10)  oxyCODONE    IR 10 milliGRAM(s) Oral every 8 hours PRN Moderate Pain (4 - 6)  oxyCODONE    IR 5 milliGRAM(s) Oral every 8 hours PRN Mild Pain (1 - 3)    ASSESSMENT / RECOMMENDATIONS    A1C: 13.5 %  BUN: 20  Creatinine: 1.23  GFR: 65  Weight: 89  BMI: 30.7  EF:     # Type 2 diabetes mellitus with hyperglycemia  - Please continue lantus *** units at bedtime.   - Continue lispro *** units before each meal.  - Continue lispro moderate / low dose sliding scale before meals and at bedtime.  - Patient's fingerstick glucose goal is 100-180 mg/dL.    - For discharge, patient can ***.    - Patient can follow up at discharge with [facility] by calling [phone] to make an appointment.      Case discussed with Dr. Huertas. Primary team updated.          SUBJECTIVE / INTERVAL HPI: Patient was seen and examined s/p OR for right BKA closure this morning. Events of weekend reviewed. Insulin increase to Lantus 10 and lispro 6. Pt fixated on getting lunch. Denies pain. Right leg in brace.  Low grade temp 99.5 on 12/17.     CAPILLARY BLOOD GLUCOSE & INSULIN RECEIVED  159 mg/dL (12-17 @ 05:30)   140 mg/dL (12-17 @ 08:22) - Lispro 6  112 mg/dL (12-17 @ 12:37) - Lispro 6  242 mg/dL (12-17 @ 16:57) - Lispro 6+2.  335 mg/dL (12-17 @ 21:56) - Lantus 10 + lispro 4  212 mg/dL (12-18 @ 05:30)  254 mg/dL (12-18 @ 06:34) - Lispro 3. NPO  148 mg/dL (12-18 @ 10:15)  148 mg/dL (12-18 @ 10:32) - Lispro 6+0      REVIEW OF SYSTEMS  Constitutional:  Negative fever, chills or loss of appetite.  Eyes:  Negative blurry vision or double vision.  Cardiovascular:  Negative for chest pain or palpitations.  Respiratory:  Negative for cough, wheezing, or shortness of breath.    Gastrointestinal:  Negative for nausea, vomiting, diarrhea, constipation, or abdominal pain.  Genitourinary:  Negative frequency, urgency or dysuria.  Neurologic:  No headache, confusion, dizziness, lightheadedness.    PHYSICAL EXAM  Vital Signs Last 24 Hrs  T(C): 35.9 (18 Dec 2023 11:55), Max: 36.9 (17 Dec 2023 18:05)  T(F): 96.6 (18 Dec 2023 11:55), Max: 98.5 (17 Dec 2023 18:05)  HR: 61 (18 Dec 2023 12:19) (56 - 76)  BP: 129/67 (18 Dec 2023 12:19) (117/62 - 227/98)  BP(mean): 88 (18 Dec 2023 12:19) (88 - 106)  RR: 12 (18 Dec 2023 12:19) (11 - 20)  SpO2: 94% (18 Dec 2023 12:19) (94% - 100%)    Parameters below as of 18 Dec 2023 12:19  Patient On (Oxygen Delivery Method): room air    Constitutional: Awake, alert, in no acute distress.   HEENT: Normocephalic, atraumatic, RAGHAV.  Respiratory: Lungs clear to ausculation bilaterally.   Cardiovascular: regular rhythm, normal S1 and S2, no audible murmurs.   GI: soft, non-tender, non-distended, bowel sounds present.  Extremities: No lower extremity edema. Right BKAin brace  Psychiatric: AAO x 3. Normal affect/mood.      LABS  CBC - WBC/HGB/HTC/PLT: 10.61/7.4/23.8/195 (12-18-23)  BMP - Na/K/Cl/Bicarb/BUN/Cr/Gluc/AG/eGFR: 138/4.0/108/28/20/1.23/148/2/65 (12-18-23)  Ca - 7.8 (12-18-23)  Phos - 3.0 (12-18-23)  Mg - 1.9 (12-18-23)  LFT - Alb/Tprot/Tbili/Dbili/AlkPhos/ALT/AST: 1.7/--/0.2/--/113/16/17 (12-18-23)  PT/aPTT/INR: 10.6/--/0.93 (12-18-23)       MEDICATIONS  MEDICATIONS  (STANDING):  acetaminophen     Tablet .. 650 milliGRAM(s) Oral every 6 hours  aspirin  chewable 81 milliGRAM(s) Oral daily  atorvastatin 80 milliGRAM(s) Oral at bedtime  carvedilol 25 milliGRAM(s) Oral every 12 hours  gabapentin 800 milliGRAM(s) Oral three times a day  heparin   Injectable 5000 Unit(s) SubCutaneous every 8 hours  hydrALAZINE 75 milliGRAM(s) Oral three times a day  insulin glargine Injectable (LANTUS) 10 Unit(s) SubCutaneous at bedtime  insulin lispro (ADMELOG) corrective regimen sliding scale   SubCutaneous Before meals and at bedtime  insulin lispro Injectable (ADMELOG) 6 Unit(s) SubCutaneous three times a day before meals  piperacillin/tazobactam IVPB.. 3.375 Gram(s) IV Intermittent every 8 hours  vancomycin    Solution 125 milliGRAM(s) Oral every 6 hours    MEDICATIONS  (PRN):  HYDROmorphone  Injectable 1 milliGRAM(s) IV Push every 6 hours PRN Severe Pain (7 - 10)  oxyCODONE    IR 10 milliGRAM(s) Oral every 8 hours PRN Moderate Pain (4 - 6)  oxyCODONE    IR 5 milliGRAM(s) Oral every 8 hours PRN Mild Pain (1 - 3)

## 2023-12-18 NOTE — PRE-OP CHECKLIST - HEIGHT IN CM
Anesthesia Pre-Procedure Evaluation    Patient: Kiersten Phillips   MRN: [id number] : 1947          Preoperative Diagnosis: * No pre-op diagnosis entered *    * No procedures listed *    No past medical history on file.  Past Surgical History:   Procedure Laterality Date     COLPORRHAPHY ANTERIOR, POSTERIOR, COMBINED N/A 2018    A & P repair with sacrospinous vault suspension     HYSTERECTOMY, PAP NO LONGER INDICATED      in her 20s - vaginal hyst. ovaries intact.     LAPAROTOMY EXPLORATORY      teratoma       Anesthesia Evaluation     . Pt has had prior anesthetic.            ROS/MED HX    ENT/Pulmonary:     (+), . Other pulmonary disease .    Neurologic:       Cardiovascular:         METS/Exercise Tolerance:     Hematologic:         Musculoskeletal:         GI/Hepatic:         Renal/Genitourinary:         Endo:         Psychiatric:         Infectious Disease:   (+) Other Infectious Disease       Malignancy:         Other:                          Physical Exam  Normal systems: cardiovascular, pulmonary and dental    Airway   Mallampati: I  TM distance: >3 FB  Neck ROM: full    Dental     Cardiovascular   Rhythm and rate: regular and normal      Pulmonary             Lab Results   Component Value Date    WBC 13.4 (H) 2020    HGB 7.3 (L) 2020    HCT 21.9 (L) 2020     2020    .0 (H) 2020     2020    POTASSIUM 3.7 2020    CHLORIDE 104 2020    CO2 26 2020    BUN 11 2020    CR 0.60 2020     (H) 2020    ARGENIS 8.7 2020    ALBUMIN 2.2 (L) 2020    PROTTOTAL 7.5 2020    ALT 14 2020    AST 18 2020    ALKPHOS 71 2020    BILITOTAL 0.7 2020    LIPASE 111 2020    PTT 33 2020    INR 1.20 (H) 2020    FIBR 947 (H) 2020    TSH 1.13 2018       Preop Vitals  BP Readings from Last 3 Encounters:   20 111/66   20 135/75   19 116/58    Pulse  "Readings from Last 3 Encounters:   09/18/20 83   03/30/20 78   07/24/19 69      Resp Readings from Last 3 Encounters:   09/18/20 27   03/30/20 18   05/13/19 18    SpO2 Readings from Last 3 Encounters:   09/18/20 97%   03/30/20 100%   05/13/19 98%      Temp Readings from Last 1 Encounters:   09/18/20 36.9  C (98.4  F)    Ht Readings from Last 1 Encounters:   09/17/20 1.626 m (5' 4\")      Wt Readings from Last 1 Encounters:   09/18/20 52 kg (114 lb 10.2 oz)    Estimated body mass index is 19.68 kg/m  as calculated from the following:    Height as of this encounter: 1.626 m (5' 4\").    Weight as of this encounter: 52 kg (114 lb 10.2 oz).       Anesthesia Plan      History & Physical Review  History and physical reviewed and following examination; no interval change.    ASA Status:  4 emergent.        Plan for General     Additional equipment: Videolaryngoscope        Postoperative Care      Consents  Anesthetic plan, risks, benefits and alternatives discussed with:  Patient and Other (See Comment)..                 KOMAL Pereyra CRNA  " 170.18

## 2023-12-19 LAB
ANION GAP SERPL CALC-SCNC: 8 MMOL/L — SIGNIFICANT CHANGE UP (ref 5–17)
ANION GAP SERPL CALC-SCNC: 8 MMOL/L — SIGNIFICANT CHANGE UP (ref 5–17)
BUN SERPL-MCNC: 25 MG/DL — HIGH (ref 7–23)
BUN SERPL-MCNC: 25 MG/DL — HIGH (ref 7–23)
CALCIUM SERPL-MCNC: 8 MG/DL — LOW (ref 8.4–10.5)
CALCIUM SERPL-MCNC: 8 MG/DL — LOW (ref 8.4–10.5)
CHLORIDE SERPL-SCNC: 110 MMOL/L — HIGH (ref 96–108)
CHLORIDE SERPL-SCNC: 110 MMOL/L — HIGH (ref 96–108)
CO2 SERPL-SCNC: 23 MMOL/L — SIGNIFICANT CHANGE UP (ref 22–31)
CO2 SERPL-SCNC: 23 MMOL/L — SIGNIFICANT CHANGE UP (ref 22–31)
CREAT SERPL-MCNC: 1.3 MG/DL — SIGNIFICANT CHANGE UP (ref 0.5–1.3)
CREAT SERPL-MCNC: 1.3 MG/DL — SIGNIFICANT CHANGE UP (ref 0.5–1.3)
EGFR: 61 ML/MIN/1.73M2 — SIGNIFICANT CHANGE UP
EGFR: 61 ML/MIN/1.73M2 — SIGNIFICANT CHANGE UP
GLUCOSE BLDC GLUCOMTR-MCNC: 103 MG/DL — HIGH (ref 70–99)
GLUCOSE BLDC GLUCOMTR-MCNC: 103 MG/DL — HIGH (ref 70–99)
GLUCOSE BLDC GLUCOMTR-MCNC: 114 MG/DL — HIGH (ref 70–99)
GLUCOSE BLDC GLUCOMTR-MCNC: 114 MG/DL — HIGH (ref 70–99)
GLUCOSE BLDC GLUCOMTR-MCNC: 170 MG/DL — HIGH (ref 70–99)
GLUCOSE BLDC GLUCOMTR-MCNC: 170 MG/DL — HIGH (ref 70–99)
GLUCOSE BLDC GLUCOMTR-MCNC: 186 MG/DL — HIGH (ref 70–99)
GLUCOSE BLDC GLUCOMTR-MCNC: 186 MG/DL — HIGH (ref 70–99)
GLUCOSE BLDC GLUCOMTR-MCNC: 205 MG/DL — HIGH (ref 70–99)
GLUCOSE BLDC GLUCOMTR-MCNC: 205 MG/DL — HIGH (ref 70–99)
GLUCOSE SERPL-MCNC: 203 MG/DL — HIGH (ref 70–99)
GLUCOSE SERPL-MCNC: 203 MG/DL — HIGH (ref 70–99)
HCT VFR BLD CALC: 23.4 % — LOW (ref 39–50)
HCT VFR BLD CALC: 23.4 % — LOW (ref 39–50)
HGB BLD-MCNC: 7.4 G/DL — LOW (ref 13–17)
HGB BLD-MCNC: 7.4 G/DL — LOW (ref 13–17)
MAGNESIUM SERPL-MCNC: 1.9 MG/DL — SIGNIFICANT CHANGE UP (ref 1.6–2.6)
MAGNESIUM SERPL-MCNC: 1.9 MG/DL — SIGNIFICANT CHANGE UP (ref 1.6–2.6)
MCHC RBC-ENTMCNC: 28.1 PG — SIGNIFICANT CHANGE UP (ref 27–34)
MCHC RBC-ENTMCNC: 28.1 PG — SIGNIFICANT CHANGE UP (ref 27–34)
MCHC RBC-ENTMCNC: 31.6 GM/DL — LOW (ref 32–36)
MCHC RBC-ENTMCNC: 31.6 GM/DL — LOW (ref 32–36)
MCV RBC AUTO: 89 FL — SIGNIFICANT CHANGE UP (ref 80–100)
MCV RBC AUTO: 89 FL — SIGNIFICANT CHANGE UP (ref 80–100)
NRBC # BLD: 0 /100 WBCS — SIGNIFICANT CHANGE UP (ref 0–0)
NRBC # BLD: 0 /100 WBCS — SIGNIFICANT CHANGE UP (ref 0–0)
PHOSPHATE SERPL-MCNC: 3.4 MG/DL — SIGNIFICANT CHANGE UP (ref 2.5–4.5)
PHOSPHATE SERPL-MCNC: 3.4 MG/DL — SIGNIFICANT CHANGE UP (ref 2.5–4.5)
PLATELET # BLD AUTO: 177 K/UL — SIGNIFICANT CHANGE UP (ref 150–400)
PLATELET # BLD AUTO: 177 K/UL — SIGNIFICANT CHANGE UP (ref 150–400)
POTASSIUM SERPL-MCNC: 4.4 MMOL/L — SIGNIFICANT CHANGE UP (ref 3.5–5.3)
POTASSIUM SERPL-MCNC: 4.4 MMOL/L — SIGNIFICANT CHANGE UP (ref 3.5–5.3)
POTASSIUM SERPL-SCNC: 4.4 MMOL/L — SIGNIFICANT CHANGE UP (ref 3.5–5.3)
POTASSIUM SERPL-SCNC: 4.4 MMOL/L — SIGNIFICANT CHANGE UP (ref 3.5–5.3)
RBC # BLD: 2.63 M/UL — LOW (ref 4.2–5.8)
RBC # BLD: 2.63 M/UL — LOW (ref 4.2–5.8)
RBC # FLD: 19.6 % — HIGH (ref 10.3–14.5)
RBC # FLD: 19.6 % — HIGH (ref 10.3–14.5)
SODIUM SERPL-SCNC: 141 MMOL/L — SIGNIFICANT CHANGE UP (ref 135–145)
SODIUM SERPL-SCNC: 141 MMOL/L — SIGNIFICANT CHANGE UP (ref 135–145)
WBC # BLD: 15.37 K/UL — HIGH (ref 3.8–10.5)
WBC # BLD: 15.37 K/UL — HIGH (ref 3.8–10.5)
WBC # FLD AUTO: 15.37 K/UL — HIGH (ref 3.8–10.5)
WBC # FLD AUTO: 15.37 K/UL — HIGH (ref 3.8–10.5)

## 2023-12-19 PROCEDURE — 99231 SBSQ HOSP IP/OBS SF/LOW 25: CPT

## 2023-12-19 PROCEDURE — 99233 SBSQ HOSP IP/OBS HIGH 50: CPT | Mod: GC,24

## 2023-12-19 PROCEDURE — 99233 SBSQ HOSP IP/OBS HIGH 50: CPT

## 2023-12-19 PROCEDURE — 99232 SBSQ HOSP IP/OBS MODERATE 35: CPT

## 2023-12-19 RX ORDER — FUROSEMIDE 40 MG
20 TABLET ORAL ONCE
Refills: 0 | Status: COMPLETED | OUTPATIENT
Start: 2023-12-19 | End: 2023-12-19

## 2023-12-19 RX ORDER — MAGNESIUM OXIDE 400 MG ORAL TABLET 241.3 MG
400 TABLET ORAL ONCE
Refills: 0 | Status: COMPLETED | OUTPATIENT
Start: 2023-12-19 | End: 2023-12-19

## 2023-12-19 RX ORDER — LISINOPRIL 2.5 MG/1
40 TABLET ORAL DAILY
Refills: 0 | Status: DISCONTINUED | OUTPATIENT
Start: 2023-12-19 | End: 2023-12-26

## 2023-12-19 RX ADMIN — LISINOPRIL 40 MILLIGRAM(S): 2.5 TABLET ORAL at 21:39

## 2023-12-19 RX ADMIN — Medication 125 MILLIGRAM(S): at 13:18

## 2023-12-19 RX ADMIN — OXYCODONE HYDROCHLORIDE 10 MILLIGRAM(S): 5 TABLET ORAL at 12:12

## 2023-12-19 RX ADMIN — Medication 6 UNIT(S): at 08:29

## 2023-12-19 RX ADMIN — Medication 650 MILLIGRAM(S): at 14:17

## 2023-12-19 RX ADMIN — OXYCODONE HYDROCHLORIDE 10 MILLIGRAM(S): 5 TABLET ORAL at 11:12

## 2023-12-19 RX ADMIN — HEPARIN SODIUM 5000 UNIT(S): 5000 INJECTION INTRAVENOUS; SUBCUTANEOUS at 09:37

## 2023-12-19 RX ADMIN — Medication 650 MILLIGRAM(S): at 18:48

## 2023-12-19 RX ADMIN — Medication 1: at 21:49

## 2023-12-19 RX ADMIN — HYDROMORPHONE HYDROCHLORIDE 0.5 MILLIGRAM(S): 2 INJECTION INTRAMUSCULAR; INTRAVENOUS; SUBCUTANEOUS at 23:35

## 2023-12-19 RX ADMIN — HYDROMORPHONE HYDROCHLORIDE 1 MILLIGRAM(S): 2 INJECTION INTRAMUSCULAR; INTRAVENOUS; SUBCUTANEOUS at 09:39

## 2023-12-19 RX ADMIN — Medication 75 MILLIGRAM(S): at 13:28

## 2023-12-19 RX ADMIN — Medication 81 MILLIGRAM(S): at 13:18

## 2023-12-19 RX ADMIN — HEPARIN SODIUM 5000 UNIT(S): 5000 INJECTION INTRAVENOUS; SUBCUTANEOUS at 02:59

## 2023-12-19 RX ADMIN — HYDROMORPHONE HYDROCHLORIDE 1 MILLIGRAM(S): 2 INJECTION INTRAMUSCULAR; INTRAVENOUS; SUBCUTANEOUS at 16:18

## 2023-12-19 RX ADMIN — INSULIN GLARGINE 10 UNIT(S): 100 INJECTION, SOLUTION SUBCUTANEOUS at 21:50

## 2023-12-19 RX ADMIN — HYDROMORPHONE HYDROCHLORIDE 1 MILLIGRAM(S): 2 INJECTION INTRAMUSCULAR; INTRAVENOUS; SUBCUTANEOUS at 22:43

## 2023-12-19 RX ADMIN — Medication 650 MILLIGRAM(S): at 23:34

## 2023-12-19 RX ADMIN — MAGNESIUM OXIDE 400 MG ORAL TABLET 400 MILLIGRAM(S): 241.3 TABLET ORAL at 08:28

## 2023-12-19 RX ADMIN — Medication 8 UNIT(S): at 13:18

## 2023-12-19 RX ADMIN — HYDROMORPHONE HYDROCHLORIDE 1 MILLIGRAM(S): 2 INJECTION INTRAMUSCULAR; INTRAVENOUS; SUBCUTANEOUS at 00:30

## 2023-12-19 RX ADMIN — OXYCODONE HYDROCHLORIDE 10 MILLIGRAM(S): 5 TABLET ORAL at 03:29

## 2023-12-19 RX ADMIN — OXYCODONE HYDROCHLORIDE 5 MILLIGRAM(S): 5 TABLET ORAL at 17:44

## 2023-12-19 RX ADMIN — Medication 20 MILLIGRAM(S): at 16:13

## 2023-12-19 RX ADMIN — Medication 650 MILLIGRAM(S): at 07:06

## 2023-12-19 RX ADMIN — Medication 8 UNIT(S): at 17:43

## 2023-12-19 RX ADMIN — Medication 2: at 08:28

## 2023-12-19 RX ADMIN — GABAPENTIN 800 MILLIGRAM(S): 400 CAPSULE ORAL at 09:35

## 2023-12-19 RX ADMIN — Medication 125 MILLIGRAM(S): at 23:35

## 2023-12-19 RX ADMIN — HYDROMORPHONE HYDROCHLORIDE 1 MILLIGRAM(S): 2 INJECTION INTRAMUSCULAR; INTRAVENOUS; SUBCUTANEOUS at 21:43

## 2023-12-19 RX ADMIN — GABAPENTIN 800 MILLIGRAM(S): 400 CAPSULE ORAL at 13:17

## 2023-12-19 RX ADMIN — Medication 650 MILLIGRAM(S): at 00:38

## 2023-12-19 RX ADMIN — OXYCODONE HYDROCHLORIDE 10 MILLIGRAM(S): 5 TABLET ORAL at 02:59

## 2023-12-19 RX ADMIN — Medication 75 MILLIGRAM(S): at 07:06

## 2023-12-19 RX ADMIN — Medication 75 MILLIGRAM(S): at 21:40

## 2023-12-19 RX ADMIN — Medication 20 MILLIGRAM(S): at 16:02

## 2023-12-19 RX ADMIN — GABAPENTIN 800 MILLIGRAM(S): 400 CAPSULE ORAL at 21:40

## 2023-12-19 RX ADMIN — Medication 650 MILLIGRAM(S): at 13:17

## 2023-12-19 RX ADMIN — OXYCODONE HYDROCHLORIDE 5 MILLIGRAM(S): 5 TABLET ORAL at 18:48

## 2023-12-19 RX ADMIN — ATORVASTATIN CALCIUM 80 MILLIGRAM(S): 80 TABLET, FILM COATED ORAL at 21:39

## 2023-12-19 RX ADMIN — OXYCODONE HYDROCHLORIDE 10 MILLIGRAM(S): 5 TABLET ORAL at 23:01

## 2023-12-19 RX ADMIN — CARVEDILOL PHOSPHATE 25 MILLIGRAM(S): 80 CAPSULE, EXTENDED RELEASE ORAL at 17:47

## 2023-12-19 RX ADMIN — HYDROMORPHONE HYDROCHLORIDE 1 MILLIGRAM(S): 2 INJECTION INTRAMUSCULAR; INTRAVENOUS; SUBCUTANEOUS at 16:03

## 2023-12-19 RX ADMIN — Medication 125 MILLIGRAM(S): at 17:44

## 2023-12-19 RX ADMIN — HYDROMORPHONE HYDROCHLORIDE 1 MILLIGRAM(S): 2 INJECTION INTRAMUSCULAR; INTRAVENOUS; SUBCUTANEOUS at 00:00

## 2023-12-19 RX ADMIN — Medication 125 MILLIGRAM(S): at 07:06

## 2023-12-19 RX ADMIN — CARVEDILOL PHOSPHATE 25 MILLIGRAM(S): 80 CAPSULE, EXTENDED RELEASE ORAL at 07:06

## 2023-12-19 RX ADMIN — HEPARIN SODIUM 5000 UNIT(S): 5000 INJECTION INTRAVENOUS; SUBCUTANEOUS at 17:43

## 2023-12-19 RX ADMIN — Medication 125 MILLIGRAM(S): at 01:00

## 2023-12-19 RX ADMIN — Medication 650 MILLIGRAM(S): at 17:44

## 2023-12-19 RX ADMIN — HYDROMORPHONE HYDROCHLORIDE 1 MILLIGRAM(S): 2 INJECTION INTRAMUSCULAR; INTRAVENOUS; SUBCUTANEOUS at 09:54

## 2023-12-19 NOTE — PROGRESS NOTE ADULT - ASSESSMENT
68M PMH ACC/AHA Stage C ICM LVIDD 5.3cm LVEF 45% 10/23/23),  moderate AS, CAD s/p PCI (2020), HTN, IDDM with peripheral neuropathy, PAD s/p RLE angiogram w/ shockwave lithotripsy & ballooning of AT, and balloon angioplasty of peroneal artery on 10/27 and multiple admissions for diabetic SSTI  (s/p partial 4th toe ray resection 10/24) and 11/8/23 admission to CCU for hypertensive emergency and ADHF c/b acute metabolic encephalopathy in the setting of positive cocaine and THC use and subsequently left AMA now presents to [facility] with b/l knee pain found to have increased soft tissue gas in R foot on CT scan 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 Cardiology following for perioperative cardiovascular risk assessment.    Review of studies:    EKG 12/1/23: Sinus rhythm, LVH (aVL)  EKG 10/24/2023: NSR    TTE 11/9/23: LVIDD 5.3 cm LVEF 45-50% Mild-mod LVH. G1DD. Normal RV size and function. Biatrial enlargement. Mild-moderate AS (PV 2.9m/s [id number], LVOT/AV 0.5, ALLISON 1.28cm2), No pHTN. No pericardial effusion.    TTE 10/23/23: LVIDD 4.9cm LVEF 45%. Mild LVH. Normal RV size and function. MIdl LA dilation. Moderate AS ALLISON 1.27, PV 2.47m/s MG 16mmHg LVOT/AV 0.4     #ACC/AHA Stage C ICM (LVIDD 5.3cm LVEF 45-50% 10/23/23)  Etiology: Ischemic cardiomyopathy  Device: Premature, not meeting criteria  GDMT:   - continue Lisinopril 40mg QD   - continue Coreg 25mg BID  - Plan for reintroduction of MRA before dc  - Will not resume SGLT2i given severe PAD w/delayed wound healing  Diuretics: Agree with lasix 40mg IVP x 1 after pRBC today    #Mild-moderate AS  Noted systolic murmur heard trough out the pericardium and lower extremity edema  - Management as above    #CAD s/p PCI (placed in setting of abnormal stress test)  [facility] in 2020 had 2 Vessel CAD with discussion for PCI vs CABG; unclear if patient was subsequent revascularized.  - continue Aspirin 81mg QD  - Lipitor 80mg QD  - Coreg as above    #PAD  s/p PTCA and atherectomy in 2021 of the Right Ant Tibial artery after which he was started on DAPT (ASA and brilinta)  - continue Aspirin 81mg QD  - Plavix 75mg Qd held in the setting of acute blood loss anemia.  - Lipitor 80mg QD  - Outpatient follow up with Cardiologist Dr Anni Taylor ([phone]) at [place]     68M PMH ACC/AHA Stage C ICM LVIDD 5.3cm LVEF 45% 10/23/23),  moderate AS, CAD s/p PCI (2020), HTN, IDDM with peripheral neuropathy, PAD s/p RLE angiogram w/ shockwave lithotripsy & ballooning of AT, and balloon angioplasty of peroneal artery on 10/27 and multiple admissions for diabetic SSTI  (s/p partial 4th toe ray resection 10/24) and 11/8/23 admission to CCU for hypertensive emergency and ADHF c/b acute metabolic encephalopathy in the setting of positive cocaine and THC use and subsequently left AMA now presents to [facility] with b/l knee pain found to have increased soft tissue gas in R foot on CT scan 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 Cardiology following for perioperative cardiovascular risk assessment.    Review of studies:    EKG 12/1/23: Sinus rhythm, LVH (aVL)  EKG 10/24/2023: NSR    TTE 11/9/23: LVIDD 5.3 cm LVEF 45-50% Mild-mod LVH. G1DD. Normal RV size and function. Biatrial enlargement. Mild-moderate AS (PV 2.9m/s [id number], LVOT/AV 0.5, ALLISON 1.28cm2), No pHTN. No pericardial effusion.    TTE 10/23/23: LVIDD 4.9cm LVEF 45%. Mild LVH. Normal RV size and function. MIdl LA dilation. Moderate AS ALLISON 1.27, PV 2.47m/s MG 16mmHg LVOT/AV 0.4     #ACC/AHA Stage C ICM (LVIDD 5.3cm LVEF 45-50% 10/23/23)  Etiology: Ischemic cardiomyopathy  Device: Premature, not meeting criteria  GDMT:   - continue Lisinopril 40mg QD   - continue Coreg 25mg BID  - Plan for reintroduction of MRA before dc  - Will not resume SGLT2i given severe PAD w/delayed wound healing  Diuretics: Agree with lasix 40mg IVP x 1 after pRBC today    #Mild-moderate AS  Noted systolic murmur heard trough out the pericardium and lower extremity edema  - Management as above    #CAD s/p PCI (placed in setting of abnormal stress test)  [facility] in 2020 had 2 Vessel CAD with discussion for PCI vs CABG; unclear if patient was subsequent revascularized.  - continue Aspirin 81mg QD  - Lipitor 80mg QD  - Coreg as above    #PAD  s/p PTCA and atherectomy in 2021 of the Right Ant Tibial artery after which he was started on DAPT (ASA and brilinta)  - continue Aspirin 81mg QD  - Plavix 75mg Qd held in the setting of acute blood loss anemia.  - Lipitor 80mg QD  - Outpatient follow up with Cardiologist Dr Anni Taylor ([phone]) at [place]

## 2023-12-19 NOTE — PROGRESS NOTE ADULT - SUBJECTIVE AND OBJECTIVE BOX
SUBJECTIVE / INTERVAL HPI: Patient was seen and examined this morning. Reports pain to stump and phantom pain. Is tearful about his leg, but somewhat more hopeful after speaking with prosthetics this morning. He declined FSG and insulin at bedtime because he "was being a bastard." Eating what is provided.     CAPILLARY BLOOD GLUCOSE & INSULIN RECEIVED  212 mg/dL (12-18 @ 05:30)  254 mg/dL (12-18 @ 06:34) - Lispro 3. NPO  148 mg/dL (12-18 @ 10:15)  148 mg/dL (12-18 @ 10:32) - Lispro 6  282 mg/dL (12-18 @ 17:11) - Lispro 6+3  203 mg/dL (12-19 @ 05:30)  205 mg/dL (12-19 @ 08:01) - Lispro 6+2      REVIEW OF SYSTEMS  Constitutional:  Negative fever, chills or loss of appetite.  Eyes:  Negative blurry vision or double vision.  Cardiovascular:  Negative for chest pain or palpitations.  Respiratory:  Negative for cough, wheezing, or shortness of breath.    Gastrointestinal:  Negative for nausea, vomiting, diarrhea, constipation, or abdominal pain.  Genitourinary:  Negative frequency, urgency or dysuria.  Neurologic:  No headache, confusion, dizziness, lightheadedness.    PHYSICAL EXAM  Vital Signs Last 24 Hrs  T(C): 36.8 (18 Dec 2023 23:15), Max: 37.1 (18 Dec 2023 13:15)  T(F): 98.2 (18 Dec 2023 23:15), Max: 98.7 (18 Dec 2023 13:15)  HR: 70 (18 Dec 2023 23:15) (61 - 72)  BP: 126/63 (19 Dec 2023 07:42) (96/61 - 183/86)  BP(mean): 88 (19 Dec 2023 07:42) (70 - 115)  RR: 20 (18 Dec 2023 23:15) (12 - 20)  SpO2: 96% (18 Dec 2023 23:15) (94% - 98%)    Parameters below as of 18 Dec 2023 20:12  Patient On (Oxygen Delivery Method): room air      Constitutional: Awake, alert, in no acute distress.   HEENT: Normocephalic, atraumatic, RAGHAV.  Respiratory: Lungs clear to ausculation bilaterally.   Cardiovascular: regular rhythm, normal S1 and S2, no audible murmurs.   GI: soft, non-tender, non-distended, bowel sounds present.  Extremities: No lower extremity edema. Right BKA wrapped in ACE  Psychiatric: AAO x 3. Tearful  LABS  CBC - WBC/HGB/HTC/PLT: 15.37/7.4/23.4/177 (12-19-23)  BMP - Na/K/Cl/Bicarb/BUN/Cr/Gluc/AG/eGFR: 141/4.4/110/23/25/1.30/203/8/61 (12-19-23)  Ca - 8.0 (12-19-23)  Phos - 3.4 (12-19-23)  Mg - 1.9 (12-19-23)  LFT - Alb/Tprot/Tbili/Dbili/AlkPhos/ALT/AST: 1.7/--/0.2/--/113/16/17 (12-18-23)  PT/aPTT/INR: 10.6/--/0.93 (12-18-23)       MEDICATIONS  MEDICATIONS  (STANDING):  acetaminophen     Tablet .. 650 milliGRAM(s) Oral every 6 hours  aspirin  chewable 81 milliGRAM(s) Oral daily  atorvastatin 80 milliGRAM(s) Oral at bedtime  carvedilol 25 milliGRAM(s) Oral every 12 hours  gabapentin 800 milliGRAM(s) Oral three times a day  heparin   Injectable 5000 Unit(s) SubCutaneous every 8 hours  hydrALAZINE 75 milliGRAM(s) Oral three times a day  insulin glargine Injectable (LANTUS) 10 Unit(s) SubCutaneous at bedtime  insulin lispro (ADMELOG) corrective regimen sliding scale   SubCutaneous Before meals and at bedtime  insulin lispro Injectable (ADMELOG) 8 Unit(s) SubCutaneous before lunch  insulin lispro Injectable (ADMELOG) 6 Unit(s) SubCutaneous before breakfast  insulin lispro Injectable (ADMELOG) 8 Unit(s) SubCutaneous before dinner  lisinopril 40 milliGRAM(s) Oral daily  vancomycin    Solution 125 milliGRAM(s) Oral every 6 hours    MEDICATIONS  (PRN):  HYDROmorphone  Injectable 0.5 milliGRAM(s) IV Push every 4 hours PRN breakthrough  HYDROmorphone  Injectable 1 milliGRAM(s) IV Push every 6 hours PRN Severe Pain (7 - 10)  oxyCODONE    IR 10 milliGRAM(s) Oral every 8 hours PRN Moderate Pain (4 - 6)  oxyCODONE    IR 5 milliGRAM(s) Oral every 8 hours PRN Mild Pain (1 - 3)

## 2023-12-19 NOTE — PROGRESS NOTE ADULT - PROBLEM SELECTOR PLAN 1
Type 2 diabetes mellitus with hyperglycemia  - Please continue lantus 10 units at bedtime.   - Continue lispro 6 units before breakfast, and Increase lispro to 8 units before lunch and dinner.  - Continue lispro low dose sliding scale before meals and at bedtime.  - Patient's fingerstick glucose goal is 100-180 mg/dL.    - For discharge, patient can ***.    - Patient can follow up at discharge with [facility] by calling [phone] to make an appointment.      Case discussed with Dr. Huertas. Primary team updated. Type 2 diabetes mellitus with hyperglycemia  - Please continue lantus 10 units at bedtime.   - Continue lispro 6 units before breakfast, and Increase lispro to 8 units before lunch and dinner.  - Continue lispro low dose sliding scale before meals and at bedtime.  - Patient's fingerstick glucose goal is 100-180 mg/dL.    - For discharge, patient can ***.    - Patient can follow up at discharge with [facility] by calling [phone] to make an appointment.      Case discussed with Dr. Huertas. Primary team updated.

## 2023-12-19 NOTE — PROGRESS NOTE ADULT - ATTENDING COMMENTS
Patient is a 68M PMH ACC/AHA Stage C ICM LVIDD 5.3cm LVEF 45% 10/23/23),  Moderate AS, CAD s/p PCI (2020), HTN, IDDM with peripheral neuropathy, PAD s/p RLE angiogram w/ shockwave lithotripsy & ballooning of AT, and balloon angioplasty of peroneal artery on 10/27 and multiple admissions for diabetic SSTI  (s/p partial 4th toe ray resection 10/24) and 11/8/23 admission to CCU for hypertensive emergency and ADHF c/b acute metabolic encephalopathy in the setting of positive cocaine and THC use and subsequently left AMA now presents to [facility] with b/l knee pain found to have increased soft tissue gas in R foot on CT scan 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 Hospital course notable for Cdiff infection.  Cardiology originally consulted for perioperative cardiovascular risk assessment.    Review of studies:  - EKG 12/1/23: Sinus rhythm, LVH (aVL)  - EKG 10/24/2023: NSR  - TTE 11/9/23: LVIDD 5.3 cm LVEF 45-50% Mild-mod LVH. G1DD. Normal RV size and function. Biatrial enlargement. Mild-moderate AS (PV 2.9m/s [id number], LVOT/AV 0.5, ALLISON 1.28cm2), No pHTN. No pericardial effusion.  - TTE (10/23/2023): EF 45%; Mild-to-moderate concentric left ventricular hypertrophy; Left ventricular systolic function is mildly reduced with regional wall motion abnormalities; indeterminate left ventricular diastolic function and filling pressure. Right ventricular normal size and function; TAPSE 2.38cm. Mild dilation of left atrium; Right atrium normal in size. Evidence of moderate aortic valve stenosis; AV area 1.27, peak transvalvular velocity 2.47, mean transvalvular gradient is 16.00 mmHg, and the LVOT/AV velocity ratio is 0.40. IVC compressible to <50%.      #CAD s/p PCI   - Patient with known ASCVD with CAD and PCI placed in setting of abnormal stress test.   - Per outside [facility] [place] records, patient underwent LHC in 2020 had 2 Vessel CAD with discussion for PCI vs CABG; unclear if patient was subsequent revascularized. He later underwent PTCA and atherectomy in 2021 of the Right Ant Tibial atery after which he was started on DAPT (ASA and Brilinta)  - Clinically patient remains well compensated, warm and well perfused. Physical exam is notable for 3/6 systolic murmur heard trough out the pericardium and 2+ pitting edema in the LLE   - Echo reviewed with Moderate LVH, mildly reduced LV systolic function with RWMA.  - Cont ASA 81 mg po daily and cont with Lipitor 80 mg.   - Cont Coreg 25 mg po BID  - Noted Hg of 7.4. Please transfuse with goal for Hg of 8. Recommend diuresis with Lasix 40 IV x 1 after resuscitation    # Moderate Aortic stenosis  - Patient with Moderate Aortic stenosis with AV area 1.27, peak transvalvular velocity 2.47, mean transvalvular gradient is 16.00 mmHg, and the LVOT/AV velocity ratio is 0.40 with an EF of 45%  - Echo this hospitalization showing stable valvular heart disease from October  - Patient will need to obtain repeat Echo in 1 year or sooner if clinically indicated  - Cont Coreg 25 mg po BID as above    #ACC/AHA Stage C ICM (LVIDD 5.3cm LVEF 45-50% 10/23/23)  Etiology: Ischemic cardiomyopathy  Device: Premature, not meeting criteria  GDMT:   - continue Lisinopril 40mg QD. Noted Cr today mildly elevated to 1.3 in setting of cdiff infection. Encourage PO intale  - continue Coreg 25 mg BID  - Plan for reintroduction of Spionolactone 25 mg po daily prior to DC once completelt healed from c diff infection  - No plan for SGLT2 on DC given Severe PAD with delayed wound healing    Please ensure patient has outpatient follow up with Cardiologist Dr Anni Taylor ([phone]) at [place] within 2 weeks of DC . Patient is a 68M PMH ACC/AHA Stage C ICM LVIDD 5.3cm LVEF 45% 10/23/23),  Moderate AS, CAD s/p PCI (2020), HTN, IDDM with peripheral neuropathy, PAD s/p RLE angiogram w/ shockwave lithotripsy & ballooning of AT, and balloon angioplasty of peroneal artery on 10/27 and multiple admissions for diabetic SSTI  (s/p partial 4th toe ray resection 10/24) and 11/8/23 admission to CCU for hypertensive emergency and ADHF c/b acute metabolic encephalopathy in the setting of positive cocaine and THC use and subsequently left AMA now presents to [facility] with b/l knee pain found to have increased soft tissue gas in R foot on CT scan 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 Hospital course notable for Cdiff infection.  Cardiology originally consulted for perioperative cardiovascular risk assessment.    Review of studies:  - EKG 12/1/23: Sinus rhythm, LVH (aVL)  - EKG 10/24/2023: NSR  - TTE 11/9/23: LVIDD 5.3 cm LVEF 45-50% Mild-mod LVH. G1DD. Normal RV size and function. Biatrial enlargement. Mild-moderate AS (PV 2.9m/s [id number], LVOT/AV 0.5, ALLISON 1.28cm2), No pHTN. No pericardial effusion.  - TTE (10/23/2023): EF 45%; Mild-to-moderate concentric left ventricular hypertrophy; Left ventricular systolic function is mildly reduced with regional wall motion abnormalities; indeterminate left ventricular diastolic function and filling pressure. Right ventricular normal size and function; TAPSE 2.38cm. Mild dilation of left atrium; Right atrium normal in size. Evidence of moderate aortic valve stenosis; AV area 1.27, peak transvalvular velocity 2.47, mean transvalvular gradient is 16.00 mmHg, and the LVOT/AV velocity ratio is 0.40. IVC compressible to <50%.      #CAD s/p PCI   - Patient with known ASCVD with CAD and PCI placed in setting of abnormal stress test.   - Per outside Hospital [place] records, patient underwent LHC in 2020 had 2 Vessel CAD with discussion for PCI vs CABG; unclear if patient was subsequent revascularized. He later underwent PTCA and atherectomy in 2021 of the Right Ant Tibial atery after which he was started on DAPT (ASA and Brilinta)  - Clinically patient remains well compensated, warm and well perfused. Physical exam is notable for 3/6 systolic murmur heard trough out the pericardium and 2+ pitting edema in the LLE   - Echo reviewed with Moderate LVH, mildly reduced LV systolic function with RWMA.  - Cont ASA 81 mg po daily and cont with Lipitor 80 mg.   - Cont Coreg 25 mg po BID  - Noted Hg of 7.4. Please transfuse with goal for Hg of 8. Recommend diuresis with Lasix 40 IV x 1 after resuscitation    # Moderate Aortic stenosis  - Patient with Moderate Aortic stenosis with AV area 1.27, peak transvalvular velocity 2.47, mean transvalvular gradient is 16.00 mmHg, and the LVOT/AV velocity ratio is 0.40 with an EF of 45%  - Echo this hospitalization showing stable valvular heart disease from October  - Patient will need to obtain repeat Echo in 1 year or sooner if clinically indicated  - Cont Coreg 25 mg po BID as above    #ACC/AHA Stage C ICM (LVIDD 5.3cm LVEF 45-50% 10/23/23)  Etiology: Ischemic cardiomyopathy  Device: Premature, not meeting criteria  GDMT:   - continue Lisinopril 40mg QD. Noted Cr today mildly elevated to 1.3 in setting of cdiff infection. Encourage PO intale  - continue Coreg 25 mg BID  - Plan for reintroduction of Spionolactone 25 mg po daily prior to DC once completelt healed from c diff infection  - No plan for SGLT2 on DC given Severe PAD with delayed wound healing    Please ensure patient has outpatient follow up with Cardiologist Dr Anni Taylor ([phone]) at [place] within 2 weeks of DC .

## 2023-12-19 NOTE — PROGRESS NOTE ADULT - ASSESSMENT
67yo M PMH CAD (2 stents 2020), HFmrEF (45-50%), HTN, PAD w/ remote RLE angioplasty, R 4th toe OM s/p partial R 4th ray amputation on 10/24, RLE angiogram with AT lithotripsy and AT/peroneal balloon angioplasty 10/27, uncontrolled IDDM (a1c 12 in Oct 2023), recent admission 11/8-11/10 to cardiology service for hypertensive emergency (left AMA, was given levaquin when he left for right foot wound that pt had started treatment for back in October) who presented to [facility] after a fall onto his knees and was having b/l knee pain. 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    A1C: 13.5 %  Insulinopenic  C-peptide 0.5 with , JAMIR/ICA neg (Oct 2023)  C-peptide 1.0 12/15 with   BUN: 25  Creatinine: 1.30  GFR: 61  Weight: 89  BMI: 30.7

## 2023-12-19 NOTE — PROGRESS NOTE ADULT - SUBJECTIVE AND OBJECTIVE BOX
Patient was seen and examined at bedside. Case discuss with resident. Pt with some leg pain this morning but it is improved following pain medication. Pt without diarrhea.     OBJECTIVE:  Vital Signs Last 24 Hrs  T(C): 36.8 (19 Dec 2023 13:26), Max: 36.9 (18 Dec 2023 17:36)  T(F): 98.3 (19 Dec 2023 13:26), Max: 98.5 (18 Dec 2023 17:36)  HR: 74 (19 Dec 2023 13:26) (64 - 74)  BP: 150/76 (19 Dec 2023 13:26) (112/60 - 183/86)  BP(mean): 105 (19 Dec 2023 13:26) (78 - 115)  RR: 18 (19 Dec 2023 13:26) (18 - 20)  SpO2: 95% (19 Dec 2023 13:26) (95% - 98%)    Parameters below as of 19 Dec 2023 13:26  Patient On (Oxygen Delivery Method): room air    PHYSICAL EXAM:  Gen: NAD laying in bed  CV: RRR, +S1/S2, no mumur  Pulm: CTA b/l no wheezing or crackles   Abd: soft, NTND + BS no rebound or guarding   R BKA       LABS:                        7.4    15.37 )-----------( 177      ( 19 Dec 2023 05:30 )             23.4     12-19    141  |  110<H>  |  25<H>  ----------------------------<  203<H>  4.4   |  23  |  1.30    Ca    8.0<L>      19 Dec 2023 05:30  Phos  3.4     12-19  Mg     1.9     12-19    TPro  4.9<L>  /  Alb  1.7<L>  /  TBili  0.2  /  DBili  x   /  AST  17  /  ALT  16  /  AlkPhos  113  12-18    LIVER FUNCTIONS - ( 18 Dec 2023 10:15 )  Alb: 1.7 g/dL / Pro: 4.9 g/dL / ALK PHOS: 113 U/L / ALT: 16 U/L / AST: 17 U/L / GGT: x           PT/INR - ( 18 Dec 2023 05:30 )   PT: 10.6 sec;   INR: 0.93            CAPILLARY BLOOD GLUCOSE  POCT Blood Glucose.: 114 mg/dL (19 Dec 2023 12:33)  POCT Blood Glucose.: 205 mg/dL (19 Dec 2023 08:01)  POCT Blood Glucose.: 282 mg/dL (18 Dec 2023 17:11)    acetaminophen     Tablet .. 650 milliGRAM(s) Oral every 6 hours  aspirin  chewable 81 milliGRAM(s) Oral daily  atorvastatin 80 milliGRAM(s) Oral at bedtime  carvedilol 25 milliGRAM(s) Oral every 12 hours  furosemide   Injectable 20 milliGRAM(s) IV Push once  gabapentin 800 milliGRAM(s) Oral three times a day  heparin   Injectable 5000 Unit(s) SubCutaneous every 8 hours  hydrALAZINE 75 milliGRAM(s) Oral three times a day  HYDROmorphone  Injectable 1 milliGRAM(s) IV Push every 6 hours PRN  HYDROmorphone  Injectable 0.5 milliGRAM(s) IV Push every 4 hours PRN  insulin glargine Injectable (LANTUS) 10 Unit(s) SubCutaneous at bedtime  insulin lispro (ADMELOG) corrective regimen sliding scale   SubCutaneous Before meals and at bedtime  insulin lispro Injectable (ADMELOG) 6 Unit(s) SubCutaneous before breakfast  insulin lispro Injectable (ADMELOG) 8 Unit(s) SubCutaneous before lunch  insulin lispro Injectable (ADMELOG) 8 Unit(s) SubCutaneous before dinner  lisinopril 40 milliGRAM(s) Oral daily  oxyCODONE    IR 10 milliGRAM(s) Oral every 8 hours PRN  oxyCODONE    IR 5 milliGRAM(s) Oral every 8 hours PRN  vancomycin    Solution 125 milliGRAM(s) Oral every 6 hours    68 year old male with a PMHx of CAD (s/p PCI x 2 in 2020), HTN, IDDM (A1c 12%, 10/2023), PAD (s/p remote RLE angioplasty), 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    #Diabetic Foot Infection   -Pt s/p BKA on 12/11 and  s/p closure on 12/18 Monday    # C difficile infection   -Pt with increase in WBC from 10->15; Will continue to monitor WBC and fever curve   - Will  continue PO vancomycin   -Will continue contact precautions     #Acute Blood Loss Anemia   -Pt with Hgb 7.4 today and pt for transfusion of 1 unit of PRBCs today Will f/u post transfusion CBC  -Goal Hgb > 8.0 given history of CAD    #CAD   -continue with ASA 81mg daily and Atorvastatin 80mg qhs     #Chronic HFmrEF   #HTN  -Continue with lisinopril 40mg daily, coreg 25mg BID, and hydralazine 75mg TID  -Per cards recommendation will restart spironolactone 25mg PO qd prior to discharge     #Moderate Aortic Stenosis  -outpatient follow up for surveillance TTE     #Type 2 Diabetes c/b PAD, diabetic foot infection and hyperglycemia   -Will continue Lantus 10 QHS ; lispro 6 units before breakfast, 8 units before lunch and dinner.   -further management as per endocrinology     DVT PPx  - Heparin SQ     #DISPO  -Pt is for [facility] placement

## 2023-12-19 NOTE — PROGRESS NOTE ADULT - SUBJECTIVE AND OBJECTIVE BOX
O/N: refuse meds and telemetry.             ---------------------------------------------------------------------------  PLEASE CHECK WHEN PRESENT:     [  ]Heart Failure     [  ] Acute     [  ] Acute on Chronic     [x  ] Chronic  -------------------------------------------------------------------     [  ]Diastolic [HFpEF]     [ x ]Systolic [HFrEF]     [  ]Combined [HFpEF & HFrEF]  .................................................................................     [  ]Other:     [ ] Pulmonary Hypertension     [ ] Chronic A-fib     [ ] Persistet A-fib     [ ] Permanent A-fib     [ ] Paroxysmal A-fib     [x ] Hypertensive Heart Disease  -------------------------------------------------------------------  [ ] Respiratory failure  [ ] Acute cor pulmonale  [ ] Asthma/COPD Exacerbation  [ ]COPD on home O2 (Chronic renal Failure)   [ ] Pleural effusion  [ ] Aspiration pneumonia  [ ] Obstructive Sleep Apnea  [ ]Atelectasis   [ ] Acute PE   -------------------------------------------------------------------  [  ]Acute Kidney Injury      [  ]Acute Tubular Necrosis      [  ]Reneal Medullary Necrosis     [  ]Renal Cortical Necrosis     [  ]Other Pathological Lesions:    [  ]CKD 1  [  ]CKD 2  [  ]CKD 3  [  ]CKD 4  [  ]CKD 5 (ESRD)  [  ]Other  -------------------------------------------------------------------  [ x ]Diabetes  [  ] Diabetic PVD Ulcer  [  ] Neuropathic ulcer to DM  [  ] Diabetes with Nephropathy  [ x ] Osteomyelitis due to diabetes  [  ] Hyperglycemia   [  ]hypoglycemia   --------------------------------------------------------------------  [  ]Malnutrition: See Nutrition Note  [  ]Cachexia  [  ]Other:   [  ]Supplement Ordered:  [  ]Morbid Obesity (BMI >=40]  [ ] Ileus  ---------------------------------------------------------------------  [ ] Sepsis/severe sepsis/septic shock  [ ] Noninfectious SIRS  [ ] UTI  [ ] Pneumonia  [ ] Thrombophlebitis     -----------------------------------------------------------------------  [ ] Acidosis/alkalosis  [ ] Fluid overload  [ ] Hypokalemia  [ ] Hyperkalemia  [ ] Hypomagnesemia  [ ] Hypophosphatemia  [ ] Hyperphosphatemia  ------------------------------------------------------------------------  [ ] Acute blood loss anemia  [ ] Post op blood loss anemia  [ ] Iron deficiency anemia  [ ] Anemia due to chronic disease  [ ] Hypercoagulable state  [ ] Thrombocytopenia  ----------------------------------------------------------------------  [ ] Cerebral infarction  [ ] Transient ischemia attack  [ ] Encephalopathy - Toxic or Metabolic          A/P:    67yo M PMH CAD (2 stents 2020), HFmrEF (45-50%), HTN, PAD w/ remote RLE angioplasty, R 4th toe OM s/p partial R 4th ray amputation on 10/24, RLE angiogram with AT lithotripsy and AT/peroneal balloon angioplasty 10/27, uncontrolled IDDM (a1c 12 in Oct 2023), recent admission 11/8-11/10 to cardiology service for hypertensive emergency (left AMA, was given levaquin when he left for right foot wound that pt had started treatment for back in October) who presented to [facility] after a fall onto his knees and was having b/l knee pain. Found to have increased soft tissue gas in R foot on CT scan. Patient is s/p R BKA on 12/11/23 with planned closure on Monday 12/16/23.    Vascular/PAD:  -s/p R mariia BKA 12/11/23, will require closure. Planned for Monday.  -pain control, daily dressing change     HTN/HLD:  -c/w Hydralazine, Lisinopril  -Coreg 25 BID  -c/w Atorvastatin    CAD/CHF:   -cardiology following, appreciate recs    DM:  -endocrine following, appreciate reccs  -mISS, lispro 6u TID, Lantus 10u QHS    Anemia:   -post operative anemia- now corrected to 8.6 (12/17)    ID:  -c/w Zosyn 3.375g IV q8 until stump closure  -Oral Vancomycin for C. Diff    Diet: consistent carbs    Activity: OOB to chair, PT Consult    DVTPPx: HSQ    Dispo: 5 Uris telemetry   O/N: refuse meds and telemetry.             ---------------------------------------------------------------------------  PLEASE CHECK WHEN PRESENT:     [  ]Heart Failure     [  ] Acute     [  ] Acute on Chronic     [x  ] Chronic  -------------------------------------------------------------------     [  ]Diastolic [HFpEF]     [ x ]Systolic [HFrEF]     [  ]Combined [HFpEF & HFrEF]  .................................................................................     [  ]Other:     [ ] Pulmonary Hypertension     [ ] Chronic A-fib     [ ] Persistet A-fib     [ ] Permanent A-fib     [ ] Paroxysmal A-fib     [x ] Hypertensive Heart Disease  -------------------------------------------------------------------  [ ] Respiratory failure  [ ] Acute cor pulmonale  [ ] Asthma/COPD Exacerbation  [ ]COPD on home O2 (Chronic renal Failure)   [ ] Pleural effusion  [ ] Aspiration pneumonia  [ ] Obstructive Sleep Apnea  [ ]Atelectasis   [ ] Acute PE   -------------------------------------------------------------------  [  ]Acute Kidney Injury      [  ]Acute Tubular Necrosis      [  ]Reneal Medullary Necrosis     [  ]Renal Cortical Necrosis     [  ]Other Pathological Lesions:    [  ]CKD 1  [  ]CKD 2  [  ]CKD 3  [  ]CKD 4  [  ]CKD 5 (ESRD)  [  ]Other  -------------------------------------------------------------------  [ x ]Diabetes  [  ] Diabetic PVD Ulcer  [  ] Neuropathic ulcer to DM  [  ] Diabetes with Nephropathy  [ x ] Osteomyelitis due to diabetes  [  ] Hyperglycemia   [  ]hypoglycemia   --------------------------------------------------------------------  [  ]Malnutrition: See Nutrition Note  [  ]Cachexia  [  ]Other:   [  ]Supplement Ordered:  [  ]Morbid Obesity (BMI >=40]  [ ] Ileus  ---------------------------------------------------------------------  [ ] Sepsis/severe sepsis/septic shock  [ ] Noninfectious SIRS  [ ] UTI  [ ] Pneumonia  [ ] Thrombophlebitis     -----------------------------------------------------------------------  [ ] Acidosis/alkalosis  [ ] Fluid overload  [ ] Hypokalemia  [ ] Hyperkalemia  [ ] Hypomagnesemia  [ ] Hypophosphatemia  [ ] Hyperphosphatemia  ------------------------------------------------------------------------  [ ] Acute blood loss anemia  [ ] Post op blood loss anemia  [ ] Iron deficiency anemia  [ ] Anemia due to chronic disease  [ ] Hypercoagulable state  [ ] Thrombocytopenia  ----------------------------------------------------------------------  [ ] Cerebral infarction  [ ] Transient ischemia attack  [ ] Encephalopathy - Toxic or Metabolic          A/P:    67yo M PMH CAD (2 stents 2020), HFmrEF (45-50%), HTN, PAD w/ remote RLE angioplasty, R 4th toe OM s/p partial R 4th ray amputation on 10/24, RLE angiogram with AT lithotripsy and AT/peroneal balloon angioplasty 10/27, uncontrolled IDDM (a1c 12 in Oct 2023), recent admission 11/8-11/10 to cardiology service for hypertensive emergency (left AMA, was given levaquin when he left for right foot wound that pt had started treatment for back in October) who presented to [facility] after a fall onto his knees and was having b/l knee pain. Found to have increased soft tissue gas in R foot on CT scan. Patient is s/p R BKA on 12/11/23 with planned closure on Monday 12/16/23.    Vascular/PAD:  -s/p R mariia BKA 12/11/23, will require closure. Planned for Monday.  -pain control, daily dressing change     HTN/HLD:  -c/w Hydralazine, Lisinopril  -Coreg 25 BID  -c/w Atorvastatin    CAD/CHF:   -cardiology following, appreciate recs    DM:  -endocrine following, appreciate reccs  -mISS, lispro 6u TID, Lantus 10u QHS    Anemia:   -post operative anemia- now corrected to 8.6 (12/17)    ID:  -c/w Zosyn 3.375g IV q8 until stump closure  -Oral Vancomycin for C. Diff    Diet: consistent carbs    Activity: OOB to chair, PT Consult    DVTPPx: HSQ    Dispo: 5 Uris telemetry   O/N: refuse meds and telemetry.     S: Patient c/o incisional pain.     O: Examined in bed. Dressing with serosanguinous saturation, replaced with new dressing.    ROS: Denies headache, blurred vision, chest pain, SOB, abdominal pain, nausea or vomiting.         piperacillin/tazobactam IVPB.. 3.375  vancomycin    Solution 125  aspirin  chewable 81  carvedilol 25  heparin   Injectable 5000  hydrALAZINE 75  piperacillin/tazobactam IVPB.. 3.375  vancomycin    Solution 125      Allergies    No Known Allergies    Intolerances        Vital Signs Last 24 Hrs  T(C): 36.8 (18 Dec 2023 23:15), Max: 37.1 (18 Dec 2023 13:15)  T(F): 98.2 (18 Dec 2023 23:15), Max: 98.7 (18 Dec 2023 13:15)  HR: 70 (18 Dec 2023 23:15) (56 - 72)  BP: 183/86 (18 Dec 2023 23:15) (96/61 - 183/86)  BP(mean): 115 (18 Dec 2023 20:12) (70 - 115)  RR: 20 (18 Dec 2023 23:15) (11 - 20)  SpO2: 96% (18 Dec 2023 23:15) (94% - 99%)    Parameters below as of 18 Dec 2023 20:12  Patient On (Oxygen Delivery Method): room air      I&O's Summary    17 Dec 2023 07:01  -  18 Dec 2023 07:00  --------------------------------------------------------  IN: 590 mL / OUT: 2095 mL / NET: -1505 mL    18 Dec 2023 07:01  -  19 Dec 2023 06:56  --------------------------------------------------------  IN: 900 mL / OUT: 975 mL / NET: -75 mL        Physical Exam:  General: alert and awake, NAD  Pulmonary: no respiratory distress  Cardiovascular: RRR  Abdominal: soft  Extremities: R BKA incision clean with minimal serosanguinous output      LABS:                        7.4    15.37 )-----------( 177      ( 19 Dec 2023 05:30 )             23.4     12-19    141  |  110<H>  |  25<H>  ----------------------------<  203<H>  4.4   |  23  |  1.30    Ca    8.0<L>      19 Dec 2023 05:30  Phos  3.4     12-19  Mg     1.9     12-19    TPro  4.9<L>  /  Alb  1.7<L>  /  TBili  0.2  /  DBili  x   /  AST  17  /  ALT  16  /  AlkPhos  113  12-18    PT/INR - ( 18 Dec 2023 05:30 )   PT: 10.6 sec;   INR: 0.93                  ---------------------------------------------------------------------------  PLEASE CHECK WHEN PRESENT:     [  ]Heart Failure     [  ] Acute     [  ] Acute on Chronic     [x  ] Chronic  -------------------------------------------------------------------     [  ]Diastolic [HFpEF]     [ x ]Systolic [HFrEF]     [  ]Combined [HFpEF & HFrEF]  .................................................................................     [  ]Other:     [ ] Pulmonary Hypertension     [ ] Chronic A-fib     [ ] Persistet A-fib     [ ] Permanent A-fib     [ ] Paroxysmal A-fib     [x ] Hypertensive Heart Disease  -------------------------------------------------------------------  [ ] Respiratory failure  [ ] Acute cor pulmonale  [ ] Asthma/COPD Exacerbation  [ ]COPD on home O2 (Chronic renal Failure)   [ ] Pleural effusion  [ ] Aspiration pneumonia  [ ] Obstructive Sleep Apnea  [ ]Atelectasis   [ ] Acute PE   -------------------------------------------------------------------  [  ]Acute Kidney Injury      [  ]Acute Tubular Necrosis      [  ]Reneal Medullary Necrosis     [  ]Renal Cortical Necrosis     [  ]Other Pathological Lesions:    [  ]CKD 1  [  ]CKD 2  [  ]CKD 3  [  ]CKD 4  [  ]CKD 5 (ESRD)  [  ]Other  -------------------------------------------------------------------  [ x ]Diabetes  [  ] Diabetic PVD Ulcer  [  ] Neuropathic ulcer to DM  [  ] Diabetes with Nephropathy  [ x ] Osteomyelitis due to diabetes  [  ] Hyperglycemia   [  ]hypoglycemia   --------------------------------------------------------------------  [  ]Malnutrition: See Nutrition Note  [  ]Cachexia  [  ]Other:   [  ]Supplement Ordered:  [  ]Morbid Obesity (BMI >=40]  [ ] Ileus  ---------------------------------------------------------------------  [ ] Sepsis/severe sepsis/septic shock  [ ] Noninfectious SIRS  [ ] UTI  [ ] Pneumonia  [ ] Thrombophlebitis     -----------------------------------------------------------------------  [ ] Acidosis/alkalosis  [ ] Fluid overload  [ ] Hypokalemia  [ ] Hyperkalemia  [ ] Hypomagnesemia  [ ] Hypophosphatemia  [ ] Hyperphosphatemia  ------------------------------------------------------------------------  [ ] Acute blood loss anemia  [ ] Post op blood loss anemia  [ ] Iron deficiency anemia  [ ] Anemia due to chronic disease  [ ] Hypercoagulable state  [ ] Thrombocytopenia  ----------------------------------------------------------------------  [ ] Cerebral infarction  [ ] Transient ischemia attack  [ ] Encephalopathy - Toxic or Metabolic          A/P:    67yo M PMH CAD (2 stents 2020), HFmrEF (45-50%), HTN, PAD w/ remote RLE angioplasty, R 4th toe OM s/p partial R 4th ray amputation on 10/24, RLE angiogram with AT lithotripsy and AT/peroneal balloon angioplasty 10/27, uncontrolled IDDM (a1c 12 in Oct 2023), recent admission 11/8-11/10 to cardiology service for hypertensive emergency (left AMA, was given levaquin when he left for right foot wound that pt had started treatment for back in October) who presented to [facility] after a fall onto his knees and was having b/l knee pain. Found to have increased soft tissue gas in R foot on CT scan. 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 Diff    Diet: consistent carbs    Activity: OOB to chair, PT Consult    DVTPPx: HSQ    Dispo: 5 Uris telemetry, KERRY   O/N: refuse meds and telemetry.     S: Patient c/o incisional pain.     O: Examined in bed. Dressing with serosanguinous saturation, replaced with new dressing.    ROS: Denies headache, blurred vision, chest pain, SOB, abdominal pain, nausea or vomiting.         piperacillin/tazobactam IVPB.. 3.375  vancomycin    Solution 125  aspirin  chewable 81  carvedilol 25  heparin   Injectable 5000  hydrALAZINE 75  piperacillin/tazobactam IVPB.. 3.375  vancomycin    Solution 125      Allergies    No Known Allergies    Intolerances        Vital Signs Last 24 Hrs  T(C): 36.8 (18 Dec 2023 23:15), Max: 37.1 (18 Dec 2023 13:15)  T(F): 98.2 (18 Dec 2023 23:15), Max: 98.7 (18 Dec 2023 13:15)  HR: 70 (18 Dec 2023 23:15) (56 - 72)  BP: 183/86 (18 Dec 2023 23:15) (96/61 - 183/86)  BP(mean): 115 (18 Dec 2023 20:12) (70 - 115)  RR: 20 (18 Dec 2023 23:15) (11 - 20)  SpO2: 96% (18 Dec 2023 23:15) (94% - 99%)    Parameters below as of 18 Dec 2023 20:12  Patient On (Oxygen Delivery Method): room air      I&O's Summary    17 Dec 2023 07:01  -  18 Dec 2023 07:00  --------------------------------------------------------  IN: 590 mL / OUT: 2095 mL / NET: -1505 mL    18 Dec 2023 07:01  -  19 Dec 2023 06:56  --------------------------------------------------------  IN: 900 mL / OUT: 975 mL / NET: -75 mL        Physical Exam:  General: alert and awake, NAD  Pulmonary: no respiratory distress  Cardiovascular: RRR  Abdominal: soft  Extremities: R BKA incision clean with minimal serosanguinous output      LABS:                        7.4    15.37 )-----------( 177      ( 19 Dec 2023 05:30 )             23.4     12-19    141  |  110<H>  |  25<H>  ----------------------------<  203<H>  4.4   |  23  |  1.30    Ca    8.0<L>      19 Dec 2023 05:30  Phos  3.4     12-19  Mg     1.9     12-19    TPro  4.9<L>  /  Alb  1.7<L>  /  TBili  0.2  /  DBili  x   /  AST  17  /  ALT  16  /  AlkPhos  113  12-18    PT/INR - ( 18 Dec 2023 05:30 )   PT: 10.6 sec;   INR: 0.93                  ---------------------------------------------------------------------------  PLEASE CHECK WHEN PRESENT:     [  ]Heart Failure     [  ] Acute     [  ] Acute on Chronic     [x  ] Chronic  -------------------------------------------------------------------     [  ]Diastolic [HFpEF]     [ x ]Systolic [HFrEF]     [  ]Combined [HFpEF & HFrEF]  .................................................................................     [  ]Other:     [ ] Pulmonary Hypertension     [ ] Chronic A-fib     [ ] Persistet A-fib     [ ] Permanent A-fib     [ ] Paroxysmal A-fib     [x ] Hypertensive Heart Disease  -------------------------------------------------------------------  [ ] Respiratory failure  [ ] Acute cor pulmonale  [ ] Asthma/COPD Exacerbation  [ ]COPD on home O2 (Chronic renal Failure)   [ ] Pleural effusion  [ ] Aspiration pneumonia  [ ] Obstructive Sleep Apnea  [ ]Atelectasis   [ ] Acute PE   -------------------------------------------------------------------  [  ]Acute Kidney Injury      [  ]Acute Tubular Necrosis      [  ]Reneal Medullary Necrosis     [  ]Renal Cortical Necrosis     [  ]Other Pathological Lesions:    [  ]CKD 1  [  ]CKD 2  [  ]CKD 3  [  ]CKD 4  [  ]CKD 5 (ESRD)  [  ]Other  -------------------------------------------------------------------  [ x ]Diabetes  [  ] Diabetic PVD Ulcer  [  ] Neuropathic ulcer to DM  [  ] Diabetes with Nephropathy  [ x ] Osteomyelitis due to diabetes  [  ] Hyperglycemia   [  ]hypoglycemia   --------------------------------------------------------------------  [  ]Malnutrition: See Nutrition Note  [  ]Cachexia  [  ]Other:   [  ]Supplement Ordered:  [  ]Morbid Obesity (BMI >=40]  [ ] Ileus  ---------------------------------------------------------------------  [ ] Sepsis/severe sepsis/septic shock  [ ] Noninfectious SIRS  [ ] UTI  [ ] Pneumonia  [ ] Thrombophlebitis     -----------------------------------------------------------------------  [ ] Acidosis/alkalosis  [ ] Fluid overload  [ ] Hypokalemia  [ ] Hyperkalemia  [ ] Hypomagnesemia  [ ] Hypophosphatemia  [ ] Hyperphosphatemia  ------------------------------------------------------------------------  [ ] Acute blood loss anemia  [ ] Post op blood loss anemia  [ ] Iron deficiency anemia  [ ] Anemia due to chronic disease  [ ] Hypercoagulable state  [ ] Thrombocytopenia  ----------------------------------------------------------------------  [ ] Cerebral infarction  [ ] Transient ischemia attack  [ ] Encephalopathy - Toxic or Metabolic          A/P:    67yo M PMH CAD (2 stents 2020), HFmrEF (45-50%), HTN, PAD w/ remote RLE angioplasty, R 4th toe OM s/p partial R 4th ray amputation on 10/24, RLE angiogram with AT lithotripsy and AT/peroneal balloon angioplasty 10/27, uncontrolled IDDM (a1c 12 in Oct 2023), recent admission 11/8-11/10 to cardiology service for hypertensive emergency (left AMA, was given levaquin when he left for right foot wound that pt had started treatment for back in October) who presented to [facility] after a fall onto his knees and was having b/l knee pain. Found to have increased soft tissue gas in R foot on CT scan. 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 Diff    Diet: consistent carbs    Activity: OOB to chair, PT Consult    DVTPPx: HSQ    Dispo: 5 Uris telemetry, KERRY

## 2023-12-19 NOTE — PROGRESS NOTE ADULT - SUBJECTIVE AND OBJECTIVE BOX
INTERVAL EVENTS:  -S/p R BKA closure requiring 2u pRBC afterwards  -States his stools have become more formed    PAST MEDICAL & SURGICAL HISTORY:  IDDM (insulin dependent diabetes mellitus)    HTN (hypertension)    CAD S/P percutaneous coronary angioplasty    Neuropathy    PAD (peripheral artery disease)    Acute CHF    PNA (pneumonia)    Gangrene of toe of right foot    Moderate aortic stenosis    Acute on chronic diastolic congestive heart failure    Hyperlipidemia    No significant past surgical history    History of partial ray amputation of fourth toe of right foot    Gangrene of toe of right foot    Acute CHF    PNA (pneumonia)    PNA (pneumonia)        MEDICATIONS  (STANDING):  acetaminophen     Tablet .. 650 milliGRAM(s) Oral every 6 hours  aspirin  chewable 81 milliGRAM(s) Oral daily  atorvastatin 80 milliGRAM(s) Oral at bedtime  carvedilol 25 milliGRAM(s) Oral every 12 hours  gabapentin 800 milliGRAM(s) Oral three times a day  heparin   Injectable 5000 Unit(s) SubCutaneous every 8 hours  hydrALAZINE 75 milliGRAM(s) Oral three times a day  insulin glargine Injectable (LANTUS) 10 Unit(s) SubCutaneous at bedtime  insulin lispro (ADMELOG) corrective regimen sliding scale   SubCutaneous Before meals and at bedtime  insulin lispro Injectable (ADMELOG) 8 Unit(s) SubCutaneous before lunch  insulin lispro Injectable (ADMELOG) 8 Unit(s) SubCutaneous before dinner  insulin lispro Injectable (ADMELOG) 6 Unit(s) SubCutaneous before breakfast  lisinopril 40 milliGRAM(s) Oral daily  vancomycin    Solution 125 milliGRAM(s) Oral every 6 hours    MEDICATIONS  (PRN):  HYDROmorphone  Injectable 0.5 milliGRAM(s) IV Push every 4 hours PRN breakthrough  HYDROmorphone  Injectable 1 milliGRAM(s) IV Push every 6 hours PRN Severe Pain (7 - 10)  oxyCODONE    IR 10 milliGRAM(s) Oral every 8 hours PRN Moderate Pain (4 - 6)  oxyCODONE    IR 5 milliGRAM(s) Oral every 8 hours PRN Mild Pain (1 - 3)    T(F): 99 (12-19-23 @ 15:18), Max: 99 (12-19-23 @ 15:18)  HR: 69 (12-19-23 @ 15:18) (66 - 74)  BP: 107/51 (12-19-23 @ 15:18) (107/51 - 183/86)  BP(mean): 73 (12-19-23 @ 15:18) (73 - 115)  ABP: --  ABP(mean): --  RR: 18 (12-19-23 @ 15:18) (18 - 20)  SpO2: 96% (12-19-23 @ 15:18) (95% - 96%)    I/O Detail 24H    18 Dec 2023 07:01  -  19 Dec 2023 07:00  --------------------------------------------------------  IN:    Oral Fluid: 900 mL  Total IN: 900 mL    OUT:    Voided (mL): 975 mL  Total OUT: 975 mL    Total NET: -75 mL      19 Dec 2023 07:01  -  19 Dec 2023 17:32  --------------------------------------------------------  IN:  Total IN: 0 mL    OUT:    Voided (mL): 540 mL  Total OUT: 540 mL    Total NET: -540 mL          PHYSICAL EXAM:  GEN: NAD  HEENT: EOMI, +JVD  RESP: CTA b/l  CV: RRR. Normal S1/S2. No m/r/g.  ABD: soft, non-distended  EXT: No edema   NEURO: alert and attentive    LABS:  CBC 12-19-23 @ 05:30                        7.4    15.37 )-----------( 177                   23.4       Hgb trend: 7.4 <-- , 7.4 <-- , 7.8 <-- , 8.6 <--   WBC trend: 15.37 <-- , 10.61 <-- , 10.59 <-- , 12.59 <--       CMP 12-19-23 @ 05:30    141  |  110<H>  |  25<H>  ----------------------------<  203<H>  4.4   |  23  |  1.30    Ca    8.0<L>      12-19-23 @ 05:30  Phos  3.4     12-19  Mg     1.9     12-19    TPro  4.9<L>  /  Alb  1.7<L>  /  TBili  0.2  /  DBili  x   /  AST  17  /  ALT  16  /  AlkPhos  113  12-18      Serum Cr trend: 1.30 <-- , 1.23 <-- , 1.23 <-- , 1.23 <--     PT/INR - ( 18 Dec 2023 05:30 )   PT: 10.6 sec;   INR: 0.93              Cardiac Markers           STUDIES:

## 2023-12-19 NOTE — PROGRESS NOTE ADULT - ATTENDING COMMENTS
This is a patient known to Dr. 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 He is transferred from the medical service to vascular, now s/p staged R BKA w/ closure (12/18/23)    No major complaints, except expected POD1 pain. Patient took off dressing and knee immobilizer by himself. WBC 15.37 (from 10.61). H/H 7.4/23.4.     PLAN & RECOMMENDATIONS  - ASA/SQH; no more plavix unless needs for CAD  - DC IV abx  - ABX for C-diff treatment  - ASA/SQH  - Bedrest today. Possibly physical therapy tomorrow  - Possibly transfuse 1u PRBC today  -  for dispo    Thank you,    Michelet Freed MD  Attending Vascular Surgeon  [facility]  [address] [place] [address], [address] [place] [address]  Office: [phone]; Fax: [phone]  [email or web address] This is a patient known to Dr. 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 He is transferred from the medical service to vascular, now s/p staged R BKA w/ closure (12/18/23)    No major complaints, except expected POD1 pain. Patient took off dressing and knee immobilizer by himself. WBC 15.37 (from 10.61). H/H 7.4/23.4.     PLAN & RECOMMENDATIONS  - ASA/SQH; no more plavix unless needs for CAD  - DC IV abx  - ABX for C-diff treatment  - ASA/SQH  - Bedrest today. Possibly physical therapy tomorrow  - Possibly transfuse 1u PRBC today  -  for dispo    Thank you,    Michelet Freed MD  Attending Vascular Surgeon  [facility]  [address] [place] [address], [address] [place] [address]  Office: [phone]; Fax: [phone]  [email or web address] This is a patient known to Dr. 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 He is transferred from the medical service to vascular, now s/p staged R BKA w/ closure (12/18/23)    No major complaints, except expected POD1 pain. Patient took off dressing and knee immobilizer by himself. WBC 15.37 (from 10.61). H/H 7.4/23.4. He also removed PIV    PLAN & RECOMMENDATIONS  - ASA/SQH; no more plavix unless needs for CAD  - DC IV abx  - ABX for C-diff treatment  - ASA/SQH  - Bedrest today. Possibly physical therapy tomorrow  - Transfuse additional 1u PRBC today  -  for dispo    Thank you,    Michelet Freed MD  Attending Vascular Surgeon  [facility] [place] [address] [place] [address], [address] [place] [address]  Office: [phone]; Fax: [phone]  [email or web address] This is a patient known to Dr. 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 He is transferred from the medical service to vascular, now s/p staged R BKA w/ closure (12/18/23)    No major complaints, except expected POD1 pain. Patient took off dressing and knee immobilizer by himself. WBC 15.37 (from 10.61). H/H 7.4/23.4. He also removed PIV    PLAN & RECOMMENDATIONS  - ASA/SQH; no more plavix unless needs for CAD  - DC IV abx  - ABX for C-diff treatment  - ASA/SQH  - Bedrest today. Possibly physical therapy tomorrow  - Transfuse additional 1u PRBC today  -  for dispo    Thank you,    Michelet Freed MD  Attending Vascular Surgeon  [facility] at [place] [address] [place] [address], [address] [place] [address]  Office: [phone]; Fax: [phone]  [email or web address]

## 2023-12-20 LAB
ANION GAP SERPL CALC-SCNC: 5 MMOL/L — SIGNIFICANT CHANGE UP (ref 5–17)
ANION GAP SERPL CALC-SCNC: 5 MMOL/L — SIGNIFICANT CHANGE UP (ref 5–17)
BUN SERPL-MCNC: 27 MG/DL — HIGH (ref 7–23)
BUN SERPL-MCNC: 27 MG/DL — HIGH (ref 7–23)
CALCIUM SERPL-MCNC: 8.3 MG/DL — LOW (ref 8.4–10.5)
CALCIUM SERPL-MCNC: 8.3 MG/DL — LOW (ref 8.4–10.5)
CHLORIDE SERPL-SCNC: 109 MMOL/L — HIGH (ref 96–108)
CHLORIDE SERPL-SCNC: 109 MMOL/L — HIGH (ref 96–108)
CO2 SERPL-SCNC: 26 MMOL/L — SIGNIFICANT CHANGE UP (ref 22–31)
CO2 SERPL-SCNC: 26 MMOL/L — SIGNIFICANT CHANGE UP (ref 22–31)
CREAT SERPL-MCNC: 1.31 MG/DL — HIGH (ref 0.5–1.3)
CREAT SERPL-MCNC: 1.31 MG/DL — HIGH (ref 0.5–1.3)
EGFR: 60 ML/MIN/1.73M2 — SIGNIFICANT CHANGE UP
EGFR: 60 ML/MIN/1.73M2 — SIGNIFICANT CHANGE UP
GLUCOSE BLDC GLUCOMTR-MCNC: 122 MG/DL — HIGH (ref 70–99)
GLUCOSE BLDC GLUCOMTR-MCNC: 122 MG/DL — HIGH (ref 70–99)
GLUCOSE BLDC GLUCOMTR-MCNC: 161 MG/DL — HIGH (ref 70–99)
GLUCOSE BLDC GLUCOMTR-MCNC: 161 MG/DL — HIGH (ref 70–99)
GLUCOSE BLDC GLUCOMTR-MCNC: 230 MG/DL — HIGH (ref 70–99)
GLUCOSE BLDC GLUCOMTR-MCNC: 230 MG/DL — HIGH (ref 70–99)
GLUCOSE BLDC GLUCOMTR-MCNC: 92 MG/DL — SIGNIFICANT CHANGE UP (ref 70–99)
GLUCOSE BLDC GLUCOMTR-MCNC: 92 MG/DL — SIGNIFICANT CHANGE UP (ref 70–99)
GLUCOSE SERPL-MCNC: 108 MG/DL — HIGH (ref 70–99)
GLUCOSE SERPL-MCNC: 108 MG/DL — HIGH (ref 70–99)
HCT VFR BLD CALC: 24.5 % — LOW (ref 39–50)
HCT VFR BLD CALC: 24.5 % — LOW (ref 39–50)
HGB BLD-MCNC: 7.8 G/DL — LOW (ref 13–17)
HGB BLD-MCNC: 7.8 G/DL — LOW (ref 13–17)
MAGNESIUM SERPL-MCNC: 2.1 MG/DL — SIGNIFICANT CHANGE UP (ref 1.6–2.6)
MAGNESIUM SERPL-MCNC: 2.1 MG/DL — SIGNIFICANT CHANGE UP (ref 1.6–2.6)
MCHC RBC-ENTMCNC: 28.7 PG — SIGNIFICANT CHANGE UP (ref 27–34)
MCHC RBC-ENTMCNC: 28.7 PG — SIGNIFICANT CHANGE UP (ref 27–34)
MCHC RBC-ENTMCNC: 31.8 GM/DL — LOW (ref 32–36)
MCHC RBC-ENTMCNC: 31.8 GM/DL — LOW (ref 32–36)
MCV RBC AUTO: 90.1 FL — SIGNIFICANT CHANGE UP (ref 80–100)
MCV RBC AUTO: 90.1 FL — SIGNIFICANT CHANGE UP (ref 80–100)
NRBC # BLD: 0 /100 WBCS — SIGNIFICANT CHANGE UP (ref 0–0)
NRBC # BLD: 0 /100 WBCS — SIGNIFICANT CHANGE UP (ref 0–0)
PHOSPHATE SERPL-MCNC: 3.7 MG/DL — SIGNIFICANT CHANGE UP (ref 2.5–4.5)
PHOSPHATE SERPL-MCNC: 3.7 MG/DL — SIGNIFICANT CHANGE UP (ref 2.5–4.5)
PLATELET # BLD AUTO: 192 K/UL — SIGNIFICANT CHANGE UP (ref 150–400)
PLATELET # BLD AUTO: 192 K/UL — SIGNIFICANT CHANGE UP (ref 150–400)
POTASSIUM SERPL-MCNC: 3.9 MMOL/L — SIGNIFICANT CHANGE UP (ref 3.5–5.3)
POTASSIUM SERPL-MCNC: 3.9 MMOL/L — SIGNIFICANT CHANGE UP (ref 3.5–5.3)
POTASSIUM SERPL-SCNC: 3.9 MMOL/L — SIGNIFICANT CHANGE UP (ref 3.5–5.3)
POTASSIUM SERPL-SCNC: 3.9 MMOL/L — SIGNIFICANT CHANGE UP (ref 3.5–5.3)
RBC # BLD: 2.72 M/UL — LOW (ref 4.2–5.8)
RBC # BLD: 2.72 M/UL — LOW (ref 4.2–5.8)
RBC # FLD: 19.5 % — HIGH (ref 10.3–14.5)
RBC # FLD: 19.5 % — HIGH (ref 10.3–14.5)
SODIUM SERPL-SCNC: 140 MMOL/L — SIGNIFICANT CHANGE UP (ref 135–145)
SODIUM SERPL-SCNC: 140 MMOL/L — SIGNIFICANT CHANGE UP (ref 135–145)
WBC # BLD: 14.97 K/UL — HIGH (ref 3.8–10.5)
WBC # BLD: 14.97 K/UL — HIGH (ref 3.8–10.5)
WBC # FLD AUTO: 14.97 K/UL — HIGH (ref 3.8–10.5)
WBC # FLD AUTO: 14.97 K/UL — HIGH (ref 3.8–10.5)

## 2023-12-20 PROCEDURE — 99233 SBSQ HOSP IP/OBS HIGH 50: CPT

## 2023-12-20 PROCEDURE — 99231 SBSQ HOSP IP/OBS SF/LOW 25: CPT

## 2023-12-20 PROCEDURE — 99233 SBSQ HOSP IP/OBS HIGH 50: CPT | Mod: 24

## 2023-12-20 RX ORDER — HYDROMORPHONE HYDROCHLORIDE 2 MG/ML
0.5 INJECTION INTRAMUSCULAR; INTRAVENOUS; SUBCUTANEOUS ONCE
Refills: 0 | Status: DISCONTINUED | OUTPATIENT
Start: 2023-12-20 | End: 2023-12-20

## 2023-12-20 RX ADMIN — Medication 8 UNIT(S): at 12:51

## 2023-12-20 RX ADMIN — HYDROMORPHONE HYDROCHLORIDE 1 MILLIGRAM(S): 2 INJECTION INTRAMUSCULAR; INTRAVENOUS; SUBCUTANEOUS at 10:24

## 2023-12-20 RX ADMIN — OXYCODONE HYDROCHLORIDE 10 MILLIGRAM(S): 5 TABLET ORAL at 20:50

## 2023-12-20 RX ADMIN — HEPARIN SODIUM 5000 UNIT(S): 5000 INJECTION INTRAVENOUS; SUBCUTANEOUS at 10:55

## 2023-12-20 RX ADMIN — Medication 75 MILLIGRAM(S): at 06:45

## 2023-12-20 RX ADMIN — HYDROMORPHONE HYDROCHLORIDE 1 MILLIGRAM(S): 2 INJECTION INTRAMUSCULAR; INTRAVENOUS; SUBCUTANEOUS at 22:32

## 2023-12-20 RX ADMIN — Medication 650 MILLIGRAM(S): at 06:45

## 2023-12-20 RX ADMIN — HYDROMORPHONE HYDROCHLORIDE 0.5 MILLIGRAM(S): 2 INJECTION INTRAMUSCULAR; INTRAVENOUS; SUBCUTANEOUS at 07:50

## 2023-12-20 RX ADMIN — HYDROMORPHONE HYDROCHLORIDE 1 MILLIGRAM(S): 2 INJECTION INTRAMUSCULAR; INTRAVENOUS; SUBCUTANEOUS at 16:26

## 2023-12-20 RX ADMIN — Medication 75 MILLIGRAM(S): at 22:43

## 2023-12-20 RX ADMIN — HYDROMORPHONE HYDROCHLORIDE 1 MILLIGRAM(S): 2 INJECTION INTRAMUSCULAR; INTRAVENOUS; SUBCUTANEOUS at 23:32

## 2023-12-20 RX ADMIN — Medication 125 MILLIGRAM(S): at 12:09

## 2023-12-20 RX ADMIN — Medication 81 MILLIGRAM(S): at 12:09

## 2023-12-20 RX ADMIN — Medication 2: at 22:40

## 2023-12-20 RX ADMIN — OXYCODONE HYDROCHLORIDE 10 MILLIGRAM(S): 5 TABLET ORAL at 09:45

## 2023-12-20 RX ADMIN — HYDROMORPHONE HYDROCHLORIDE 0.5 MILLIGRAM(S): 2 INJECTION INTRAMUSCULAR; INTRAVENOUS; SUBCUTANEOUS at 14:15

## 2023-12-20 RX ADMIN — HEPARIN SODIUM 5000 UNIT(S): 5000 INJECTION INTRAVENOUS; SUBCUTANEOUS at 17:51

## 2023-12-20 RX ADMIN — HYDROMORPHONE HYDROCHLORIDE 0.5 MILLIGRAM(S): 2 INJECTION INTRAMUSCULAR; INTRAVENOUS; SUBCUTANEOUS at 14:30

## 2023-12-20 RX ADMIN — OXYCODONE HYDROCHLORIDE 10 MILLIGRAM(S): 5 TABLET ORAL at 08:46

## 2023-12-20 RX ADMIN — Medication 75 MILLIGRAM(S): at 13:02

## 2023-12-20 RX ADMIN — Medication 6 UNIT(S): at 08:37

## 2023-12-20 RX ADMIN — OXYCODONE HYDROCHLORIDE 10 MILLIGRAM(S): 5 TABLET ORAL at 00:01

## 2023-12-20 RX ADMIN — INSULIN GLARGINE 10 UNIT(S): 100 INJECTION, SOLUTION SUBCUTANEOUS at 22:40

## 2023-12-20 RX ADMIN — HEPARIN SODIUM 5000 UNIT(S): 5000 INJECTION INTRAVENOUS; SUBCUTANEOUS at 03:49

## 2023-12-20 RX ADMIN — Medication 8 UNIT(S): at 17:52

## 2023-12-20 RX ADMIN — HYDROMORPHONE HYDROCHLORIDE 1 MILLIGRAM(S): 2 INJECTION INTRAMUSCULAR; INTRAVENOUS; SUBCUTANEOUS at 10:09

## 2023-12-20 RX ADMIN — GABAPENTIN 800 MILLIGRAM(S): 400 CAPSULE ORAL at 06:43

## 2023-12-20 RX ADMIN — HYDROMORPHONE HYDROCHLORIDE 1 MILLIGRAM(S): 2 INJECTION INTRAMUSCULAR; INTRAVENOUS; SUBCUTANEOUS at 03:44

## 2023-12-20 RX ADMIN — Medication 1: at 17:52

## 2023-12-20 RX ADMIN — CARVEDILOL PHOSPHATE 25 MILLIGRAM(S): 80 CAPSULE, EXTENDED RELEASE ORAL at 06:45

## 2023-12-20 RX ADMIN — Medication 650 MILLIGRAM(S): at 18:51

## 2023-12-20 RX ADMIN — Medication 650 MILLIGRAM(S): at 13:00

## 2023-12-20 RX ADMIN — CARVEDILOL PHOSPHATE 25 MILLIGRAM(S): 80 CAPSULE, EXTENDED RELEASE ORAL at 17:51

## 2023-12-20 RX ADMIN — GABAPENTIN 800 MILLIGRAM(S): 400 CAPSULE ORAL at 22:43

## 2023-12-20 RX ADMIN — HYDROMORPHONE HYDROCHLORIDE 0.5 MILLIGRAM(S): 2 INJECTION INTRAMUSCULAR; INTRAVENOUS; SUBCUTANEOUS at 06:50

## 2023-12-20 RX ADMIN — Medication 125 MILLIGRAM(S): at 17:53

## 2023-12-20 RX ADMIN — ATORVASTATIN CALCIUM 80 MILLIGRAM(S): 80 TABLET, FILM COATED ORAL at 22:43

## 2023-12-20 RX ADMIN — OXYCODONE HYDROCHLORIDE 5 MILLIGRAM(S): 5 TABLET ORAL at 07:50

## 2023-12-20 RX ADMIN — OXYCODONE HYDROCHLORIDE 5 MILLIGRAM(S): 5 TABLET ORAL at 06:50

## 2023-12-20 RX ADMIN — Medication 125 MILLIGRAM(S): at 06:44

## 2023-12-20 RX ADMIN — Medication 650 MILLIGRAM(S): at 07:45

## 2023-12-20 RX ADMIN — Medication 650 MILLIGRAM(S): at 12:11

## 2023-12-20 RX ADMIN — HYDROMORPHONE HYDROCHLORIDE 1 MILLIGRAM(S): 2 INJECTION INTRAMUSCULAR; INTRAVENOUS; SUBCUTANEOUS at 04:44

## 2023-12-20 RX ADMIN — Medication 650 MILLIGRAM(S): at 17:51

## 2023-12-20 RX ADMIN — GABAPENTIN 800 MILLIGRAM(S): 400 CAPSULE ORAL at 13:02

## 2023-12-20 RX ADMIN — LISINOPRIL 40 MILLIGRAM(S): 2.5 TABLET ORAL at 06:45

## 2023-12-20 RX ADMIN — OXYCODONE HYDROCHLORIDE 10 MILLIGRAM(S): 5 TABLET ORAL at 19:50

## 2023-12-20 RX ADMIN — HYDROMORPHONE HYDROCHLORIDE 1 MILLIGRAM(S): 2 INJECTION INTRAMUSCULAR; INTRAVENOUS; SUBCUTANEOUS at 16:41

## 2023-12-20 NOTE — PROGRESS NOTE ADULT - ASSESSMENT
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    A1C: 13.5 %  Insulinopenic  C-peptide 0.5 with , JAMIR/ICA neg (Oct 2023)  C-peptide 1.0 12/15 with   BUN: 27  Creatinine: 1.31  GFR: 60  Weight: 89  BMI: 30.7 67yo M PMH CAD (2 stents 2020), HFmrEF (45-50%), HTN, PAD w/ remote RLE angioplasty, R 4th toe OM s/p partial R 4th ray amputation on 10/24, RLE angiogram with AT lithotripsy and AT/peroneal balloon angioplasty 10/27, uncontrolled IDDM (a1c 12 in Oct 2023), recent admission 11/8-11/10 to cardiology service for hypertensive emergency (left AMA, was given levaquin when he left for right foot wound that pt had started treatment for back in October) who presented to [facility] after a fall onto his knees and was having b/l knee pain. 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    A1C: 13.5 %  Insulinopenic  C-peptide 0.5 with , JAMIR/ICA neg (Oct 2023)  C-peptide 1.0 12/15 with   BUN: 27  Creatinine: 1.31  GFR: 60  Weight: 89  BMI: 30.7

## 2023-12-20 NOTE — PROGRESS NOTE ADULT - SUBJECTIVE AND OBJECTIVE BOX
O/N: STEPH LATHAM                 ---------------------------------------------------------------------------  PLEASE CHECK WHEN PRESENT:     [  ]Heart Failure     [  ] Acute     [  ] Acute on Chronic     [x  ] Chronic  -------------------------------------------------------------------     [  ]Diastolic [HFpEF]     [ x ]Systolic [HFrEF]     [  ]Combined [HFpEF & HFrEF]  .................................................................................     [  ]Other:     [ ] Pulmonary Hypertension     [ ] Chronic A-fib     [ ] Persistet A-fib     [ ] Permanent A-fib     [ ] Paroxysmal A-fib     [x ] Hypertensive Heart Disease  -------------------------------------------------------------------  [ ] Respiratory failure  [ ] Acute cor pulmonale  [ ] Asthma/COPD Exacerbation  [ ]COPD on home O2 (Chronic renal Failure)   [ ] Pleural effusion  [ ] Aspiration pneumonia  [ ] Obstructive Sleep Apnea  [ ]Atelectasis   [ ] Acute PE   -------------------------------------------------------------------  [  ]Acute Kidney Injury      [  ]Acute Tubular Necrosis      [  ]Reneal Medullary Necrosis     [  ]Renal Cortical Necrosis     [  ]Other Pathological Lesions:    [  ]CKD 1  [  ]CKD 2  [  ]CKD 3  [  ]CKD 4  [  ]CKD 5 (ESRD)  [  ]Other  -------------------------------------------------------------------  [ x ]Diabetes  [  ] Diabetic PVD Ulcer  [  ] Neuropathic ulcer to DM  [  ] Diabetes with Nephropathy  [ x ] Osteomyelitis due to diabetes  [  ] Hyperglycemia   [  ]hypoglycemia   --------------------------------------------------------------------  [  ]Malnutrition: See Nutrition Note  [  ]Cachexia  [  ]Other:   [  ]Supplement Ordered:  [  ]Morbid Obesity (BMI >=40]  [ ] Ileus  ---------------------------------------------------------------------  [ ] Sepsis/severe sepsis/septic shock  [ ] Noninfectious SIRS  [ ] UTI  [ ] Pneumonia  [ ] Thrombophlebitis     -----------------------------------------------------------------------  [ ] Acidosis/alkalosis  [ ] Fluid overload  [ ] Hypokalemia  [ ] Hyperkalemia  [ ] Hypomagnesemia  [ ] Hypophosphatemia  [ ] Hyperphosphatemia  ------------------------------------------------------------------------  [ ] Acute blood loss anemia  [ ] Post op blood loss anemia  [ ] Iron deficiency anemia  [ ] Anemia due to chronic disease  [ ] Hypercoagulable state  [ ] Thrombocytopenia  ----------------------------------------------------------------------  [ ] Cerebral infarction  [ ] Transient ischemia attack  [ ] Encephalopathy - Toxic or Metabolic          A/P:    67yo M PMH CAD (2 stents 2020), HFmrEF (45-50%), HTN, PAD w/ remote RLE angioplasty, R 4th toe OM s/p partial R 4th ray amputation on 10/24, RLE angiogram with AT lithotripsy and AT/peroneal balloon angioplasty 10/27, uncontrolled IDDM (a1c 12 in Oct 2023), recent admission 11/8-11/10 to cardiology service for hypertensive emergency (left AMA, was given levaquin when he left for right foot wound that pt had started treatment for back in October) who presented to [facility] after a fall onto his knees and was having b/l knee pain. Found to have increased soft tissue gas in R foot on CT scan. 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 Diff    Diet: consistent carbs    Activity: OOB to chair, PT Consult    DVTPPx: HSQ    Dispo: 5 Uris telemetry, KERRY   O/N: STEPH LATHAM                 ---------------------------------------------------------------------------  PLEASE CHECK WHEN PRESENT:     [  ]Heart Failure     [  ] Acute     [  ] Acute on Chronic     [x  ] Chronic  -------------------------------------------------------------------     [  ]Diastolic [HFpEF]     [ x ]Systolic [HFrEF]     [  ]Combined [HFpEF & HFrEF]  .................................................................................     [  ]Other:     [ ] Pulmonary Hypertension     [ ] Chronic A-fib     [ ] Persistet A-fib     [ ] Permanent A-fib     [ ] Paroxysmal A-fib     [x ] Hypertensive Heart Disease  -------------------------------------------------------------------  [ ] Respiratory failure  [ ] Acute cor pulmonale  [ ] Asthma/COPD Exacerbation  [ ]COPD on home O2 (Chronic renal Failure)   [ ] Pleural effusion  [ ] Aspiration pneumonia  [ ] Obstructive Sleep Apnea  [ ]Atelectasis   [ ] Acute PE   -------------------------------------------------------------------  [  ]Acute Kidney Injury      [  ]Acute Tubular Necrosis      [  ]Reneal Medullary Necrosis     [  ]Renal Cortical Necrosis     [  ]Other Pathological Lesions:    [  ]CKD 1  [  ]CKD 2  [  ]CKD 3  [  ]CKD 4  [  ]CKD 5 (ESRD)  [  ]Other  -------------------------------------------------------------------  [ x ]Diabetes  [  ] Diabetic PVD Ulcer  [  ] Neuropathic ulcer to DM  [  ] Diabetes with Nephropathy  [ x ] Osteomyelitis due to diabetes  [  ] Hyperglycemia   [  ]hypoglycemia   --------------------------------------------------------------------  [  ]Malnutrition: See Nutrition Note  [  ]Cachexia  [  ]Other:   [  ]Supplement Ordered:  [  ]Morbid Obesity (BMI >=40]  [ ] Ileus  ---------------------------------------------------------------------  [ ] Sepsis/severe sepsis/septic shock  [ ] Noninfectious SIRS  [ ] UTI  [ ] Pneumonia  [ ] Thrombophlebitis     -----------------------------------------------------------------------  [ ] Acidosis/alkalosis  [ ] Fluid overload  [ ] Hypokalemia  [ ] Hyperkalemia  [ ] Hypomagnesemia  [ ] Hypophosphatemia  [ ] Hyperphosphatemia  ------------------------------------------------------------------------  [ ] Acute blood loss anemia  [ ] Post op blood loss anemia  [ ] Iron deficiency anemia  [ ] Anemia due to chronic disease  [ ] Hypercoagulable state  [ ] Thrombocytopenia  ----------------------------------------------------------------------  [ ] Cerebral infarction  [ ] Transient ischemia attack  [ ] Encephalopathy - Toxic or Metabolic          A/P:    69yo M PMH CAD (2 stents 2020), HFmrEF (45-50%), HTN, PAD w/ remote RLE angioplasty, R 4th toe OM s/p partial R 4th ray amputation on 10/24, RLE angiogram with AT lithotripsy and AT/peroneal balloon angioplasty 10/27, uncontrolled IDDM (a1c 12 in Oct 2023), recent admission 11/8-11/10 to cardiology service for hypertensive emergency (left AMA, was given levaquin when he left for right foot wound that pt had started treatment for back in October) who presented to [facility] after a fall onto his knees and was having b/l knee pain. Found to have increased soft tissue gas in R foot on CT scan. 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 Diff    Diet: consistent carbs    Activity: OOB to chair, PT Consult    DVTPPx: HSQ    Dispo: 5 Uris telemetry, KERRY   O/N: YEISON, VSS     Subjective: This morning denies CP, SOB or abdominal pain, Denies having any BM overnight     ROS:   Denies Headache, blurred vision, Chest Pain, SOB, Abdominal pain, nausea or vomiting     Social   vancomycin    Solution 125  aspirin  chewable 81  carvedilol 25  heparin   Injectable 5000  hydrALAZINE 75  lisinopril 40  vancomycin    Solution 125      Allergies    No Known Allergies    Intolerances        Vital Signs Last 24 Hrs  T(C): 37.2 (20 Dec 2023 06:36), Max: 37.2 (19 Dec 2023 15:18)  T(F): 99 (20 Dec 2023 06:36), Max: 99 (19 Dec 2023 15:18)  HR: 77 (20 Dec 2023 06:36) (66 - 77)  BP: 151/67 (20 Dec 2023 06:36) (107/51 - 165/90)  BP(mean): 97 (20 Dec 2023 06:36) (73 - 120)  RR: 17 (20 Dec 2023 06:36) (17 - 19)  SpO2: 96% (20 Dec 2023 06:36) (95% - 97%)    Parameters below as of 20 Dec 2023 06:36  Patient On (Oxygen Delivery Method): room air      I&O's Summary    19 Dec 2023 07:01  -  20 Dec 2023 07:00  --------------------------------------------------------  IN: 0 mL / OUT: 2340 mL / NET: -2340 mL        Physical Exam:  General: NAD  Pulmonary: b/l breath sounds   Cardiovascular: s1s2 Reg  Abdominal: soft NT/ND  Extremities: Rt BKA stump clean and dry , no drainage noted, no hematoma or ecchymosis       LABS:                        7.8    14.97 )-----------( 192      ( 20 Dec 2023 05:30 )             24.5     12-20    140  |  109<H>  |  27<H>  ----------------------------<  108<H>  3.9   |  26  |  1.31<H>    Ca    8.3<L>      20 Dec 2023 05:30  Phos  3.7     12-20  Mg     2.1     12-20    TPro  4.9<L>  /  Alb  1.7<L>  /  TBili  0.2  /  DBili  x   /  AST  17  /  ALT  16  /  AlkPhos  113  12-18        Radiology and Additional Studies:      ---------------------------------------------------------------------------  PLEASE CHECK WHEN PRESENT:     [  ]Heart Failure     [  ] Acute     [  ] Acute on Chronic     [x  ] Chronic  -------------------------------------------------------------------     [  ]Diastolic [HFpEF]     [ x ]Systolic [HFrEF]     [  ]Combined [HFpEF & HFrEF]  .................................................................................     [  ]Other:     [ ] Pulmonary Hypertension     [ ] Chronic A-fib     [ ] Persistet A-fib     [ ] Permanent A-fib     [ ] Paroxysmal A-fib     [x ] Hypertensive Heart Disease  -------------------------------------------------------------------  [ ] Respiratory failure  [ ] Acute cor pulmonale  [ ] Asthma/COPD Exacerbation  [ ]COPD on home O2 (Chronic renal Failure)   [ ] Pleural effusion  [ ] Aspiration pneumonia  [ ] Obstructive Sleep Apnea  [ ]Atelectasis   [ ] Acute PE   -------------------------------------------------------------------  [  ]Acute Kidney Injury      [  ]Acute Tubular Necrosis      [  ]Reneal Medullary Necrosis     [  ]Renal Cortical Necrosis     [  ]Other Pathological Lesions:    [  ]CKD 1  [  ]CKD 2  [  ]CKD 3  [  ]CKD 4  [  ]CKD 5 (ESRD)  [  ]Other  -------------------------------------------------------------------  [ x ]Diabetes  [  ] Diabetic PVD Ulcer  [  ] Neuropathic ulcer to DM  [  ] Diabetes with Nephropathy  [ x ] Osteomyelitis due to diabetes  [  ] Hyperglycemia   [  ]hypoglycemia   --------------------------------------------------------------------  [  ]Malnutrition: See Nutrition Note  [  ]Cachexia  [  ]Other:   [  ]Supplement Ordered:  [  ]Morbid Obesity (BMI >=40]  [ ] Ileus  ---------------------------------------------------------------------  [ ] Sepsis/severe sepsis/septic shock  [ ] Noninfectious SIRS  [ ] UTI  [ ] Pneumonia  [ ] Thrombophlebitis     -----------------------------------------------------------------------  [ ] Acidosis/alkalosis  [ ] Fluid overload  [ ] Hypokalemia  [ ] Hyperkalemia  [ ] Hypomagnesemia  [ ] Hypophosphatemia  [ ] Hyperphosphatemia  ------------------------------------------------------------------------  [ ] Acute blood loss anemia  [ ] Post op blood loss anemia  [ ] Iron deficiency anemia  [ ] Anemia due to chronic disease  [ ] Hypercoagulable state  [ ] Thrombocytopenia  ----------------------------------------------------------------------  [ ] Cerebral infarction  [ ] Transient ischemia attack  [ ] Encephalopathy - Toxic or Metabolic          A/P:    69yo M PMH CAD (2 stents 2020), HFmrEF (45-50%), HTN, PAD w/ remote RLE angioplasty, R 4th toe OM s/p partial R 4th ray amputation on 10/24, RLE angiogram with AT lithotripsy and AT/peroneal balloon angioplasty 10/27, uncontrolled IDDM (a1c 12 in Oct 2023), recent admission 11/8-11/10 to cardiology service for hypertensive emergency (left AMA, was given levaquin when he left for right foot wound that pt had started treatment for back in October) who presented to [facility] after a fall onto his knees and was having b/l knee pain. Found to have increased soft tissue gas in R foot on CT scan. 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 Diff    Diet: consistent carbs    Activity: OOB to chair, PT Consult    DVTPPx: HSQ    Dispo: 5 Uris telemetry, KERRY     I (Faina Jimenez PA-C ) have personally seen and examined the patient. I have reviewed all pertinent imaging and laboratory findings.    O/N: YEISON, VSS     Subjective: This morning denies CP, SOB or abdominal pain, Denies having any BM overnight     ROS:   Denies Headache, blurred vision, Chest Pain, SOB, Abdominal pain, nausea or vomiting     Social   vancomycin    Solution 125  aspirin  chewable 81  carvedilol 25  heparin   Injectable 5000  hydrALAZINE 75  lisinopril 40  vancomycin    Solution 125      Allergies    No Known Allergies    Intolerances        Vital Signs Last 24 Hrs  T(C): 37.2 (20 Dec 2023 06:36), Max: 37.2 (19 Dec 2023 15:18)  T(F): 99 (20 Dec 2023 06:36), Max: 99 (19 Dec 2023 15:18)  HR: 77 (20 Dec 2023 06:36) (66 - 77)  BP: 151/67 (20 Dec 2023 06:36) (107/51 - 165/90)  BP(mean): 97 (20 Dec 2023 06:36) (73 - 120)  RR: 17 (20 Dec 2023 06:36) (17 - 19)  SpO2: 96% (20 Dec 2023 06:36) (95% - 97%)    Parameters below as of 20 Dec 2023 06:36  Patient On (Oxygen Delivery Method): room air      I&O's Summary    19 Dec 2023 07:01  -  20 Dec 2023 07:00  --------------------------------------------------------  IN: 0 mL / OUT: 2340 mL / NET: -2340 mL        Physical Exam:  General: NAD  Pulmonary: b/l breath sounds   Cardiovascular: s1s2 Reg  Abdominal: soft NT/ND  Extremities: Rt BKA stump clean and dry , no drainage noted, no hematoma or ecchymosis       LABS:                        7.8    14.97 )-----------( 192      ( 20 Dec 2023 05:30 )             24.5     12-20    140  |  109<H>  |  27<H>  ----------------------------<  108<H>  3.9   |  26  |  1.31<H>    Ca    8.3<L>      20 Dec 2023 05:30  Phos  3.7     12-20  Mg     2.1     12-20    TPro  4.9<L>  /  Alb  1.7<L>  /  TBili  0.2  /  DBili  x   /  AST  17  /  ALT  16  /  AlkPhos  113  12-18        Radiology and Additional Studies:      ---------------------------------------------------------------------------  PLEASE CHECK WHEN PRESENT:     [  ]Heart Failure     [  ] Acute     [  ] Acute on Chronic     [x  ] Chronic  -------------------------------------------------------------------     [  ]Diastolic [HFpEF]     [ x ]Systolic [HFrEF]     [  ]Combined [HFpEF & HFrEF]  .................................................................................     [  ]Other:     [ ] Pulmonary Hypertension     [ ] Chronic A-fib     [ ] Persistet A-fib     [ ] Permanent A-fib     [ ] Paroxysmal A-fib     [x ] Hypertensive Heart Disease  -------------------------------------------------------------------  [ ] Respiratory failure  [ ] Acute cor pulmonale  [ ] Asthma/COPD Exacerbation  [ ]COPD on home O2 (Chronic renal Failure)   [ ] Pleural effusion  [ ] Aspiration pneumonia  [ ] Obstructive Sleep Apnea  [ ]Atelectasis   [ ] Acute PE   -------------------------------------------------------------------  [  ]Acute Kidney Injury      [  ]Acute Tubular Necrosis      [  ]Reneal Medullary Necrosis     [  ]Renal Cortical Necrosis     [  ]Other Pathological Lesions:    [  ]CKD 1  [  ]CKD 2  [  ]CKD 3  [  ]CKD 4  [  ]CKD 5 (ESRD)  [  ]Other  -------------------------------------------------------------------  [ x ]Diabetes  [  ] Diabetic PVD Ulcer  [  ] Neuropathic ulcer to DM  [  ] Diabetes with Nephropathy  [ x ] Osteomyelitis due to diabetes  [  ] Hyperglycemia   [  ]hypoglycemia   --------------------------------------------------------------------  [  ]Malnutrition: See Nutrition Note  [  ]Cachexia  [  ]Other:   [  ]Supplement Ordered:  [  ]Morbid Obesity (BMI >=40]  [ ] Ileus  ---------------------------------------------------------------------  [ ] Sepsis/severe sepsis/septic shock  [ ] Noninfectious SIRS  [ ] UTI  [ ] Pneumonia  [ ] Thrombophlebitis     -----------------------------------------------------------------------  [ ] Acidosis/alkalosis  [ ] Fluid overload  [ ] Hypokalemia  [ ] Hyperkalemia  [ ] Hypomagnesemia  [ ] Hypophosphatemia  [ ] Hyperphosphatemia  ------------------------------------------------------------------------  [ ] Acute blood loss anemia  [ ] Post op blood loss anemia  [ ] Iron deficiency anemia  [ ] Anemia due to chronic disease  [ ] Hypercoagulable state  [ ] Thrombocytopenia  ----------------------------------------------------------------------  [ ] Cerebral infarction  [ ] Transient ischemia attack  [ ] Encephalopathy - Toxic or Metabolic          A/P:    69yo M PMH CAD (2 stents 2020), HFmrEF (45-50%), HTN, PAD w/ remote RLE angioplasty, 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 Found to have increased soft tissue gas in R foot on CT scan. 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 Diff    Diet: consistent carbs    Activity: OOB to chair, PT Consult    DVTPPx: HSQ    Dispo: 5 Uris telemetry, KERRY     I (Faina Jimenez PA-C ) have personally seen and examined the patient. I have reviewed all pertinent imaging and laboratory findings.

## 2023-12-20 NOTE — PROGRESS NOTE ADULT - PROBLEM SELECTOR PROBLEM 1
Open wound of foot
DM (diabetes mellitus)
Open wound of foot
DM (diabetes mellitus)
Open wound of foot
DM (diabetes mellitus)
Open wound of foot

## 2023-12-20 NOTE — PROGRESS NOTE ADULT - SUBJECTIVE AND OBJECTIVE BOX
SUBJECTIVE / INTERVAL HPI: Patient was seen and examined this morning.     CAPILLARY BLOOD GLUCOSE & INSULIN RECEIVED  103 mg/dL (12-19 @ 17:12) - Lispro 8  186 mg/dL (12-19 @ 21:48) - Lantus 10 + lispro 1  170 mg/dL (12-19 @ 22:19)  108 mg/dL (12-20 @ 05:30)  122 mg/dL (12-20 @ 08:27) - Lispro 6  mg/dL (12-20 @ lunch) - Lispro 6      REVIEW OF SYSTEMS  Constitutional:  Negative fever, chills or loss of appetite.  Eyes:  Negative blurry vision or double vision.  Cardiovascular:  Negative for chest pain or palpitations.  Respiratory:  Negative for cough, wheezing, or shortness of breath.    Gastrointestinal:  Negative for nausea, vomiting, diarrhea, constipation, or abdominal pain.  Genitourinary:  Negative frequency, urgency or dysuria.  Neurologic:  No headache, confusion, dizziness, lightheadedness.    PHYSICAL EXAM  Vital Signs Last 24 Hrs  T(C): 37.2 (20 Dec 2023 06:36), Max: 37.2 (19 Dec 2023 15:18)  T(F): 99 (20 Dec 2023 06:36), Max: 99 (19 Dec 2023 15:18)  HR: 74 (20 Dec 2023 08:53) (66 - 77)  BP: 167/64 (20 Dec 2023 08:53) (107/51 - 167/64)  BP(mean): 125 (20 Dec 2023 08:53) (73 - 125)  RR: 18 (20 Dec 2023 08:53) (17 - 19)  SpO2: 97% (20 Dec 2023 08:53) (95% - 97%)    Parameters below as of 20 Dec 2023 08:53  Patient On (Oxygen Delivery Method): room air    Constitutional: Awake, alert, in no acute distress.   HEENT: Normocephalic, atraumatic, RAGHAV.  Respiratory: Lungs clear to ausculation bilaterally.   Cardiovascular: regular rhythm, normal S1 and S2, no audible murmurs.   GI: soft, non-tender, non-distended, bowel sounds present.  Extremities: No lower extremity edema. Right BKA wrapped in ACE  Psychiatric: AAO x 3. Tearful    LABS  CBC - WBC/HGB/HTC/PLT: 14.97/7.8/24.5/192 (12-20-23)  BMP - Na/K/Cl/Bicarb/BUN/Cr/Gluc/AG/eGFR: 140/3.9/109/26/27/1.31/108/5/60 (12-20-23)  Ca - 8.3 (12-20-23)  Phos - 3.7 (12-20-23)  Mg - 2.1 (12-20-23)  LFT - Alb/Tprot/Tbili/Dbili/AlkPhos/ALT/AST: 1.7/--/0.2/--/113/16/17 (12-18-23)  PT/aPTT/INR: 10.6/--/0.93 (12-18-23)       MEDICATIONS  MEDICATIONS  (STANDING):  acetaminophen     Tablet .. 650 milliGRAM(s) Oral every 6 hours  aspirin  chewable 81 milliGRAM(s) Oral daily  atorvastatin 80 milliGRAM(s) Oral at bedtime  carvedilol 25 milliGRAM(s) Oral every 12 hours  gabapentin 800 milliGRAM(s) Oral three times a day  heparin   Injectable 5000 Unit(s) SubCutaneous every 8 hours  hydrALAZINE 75 milliGRAM(s) Oral three times a day  insulin glargine Injectable (LANTUS) 10 Unit(s) SubCutaneous at bedtime  insulin lispro (ADMELOG) corrective regimen sliding scale   SubCutaneous Before meals and at bedtime  insulin lispro Injectable (ADMELOG) 6 Unit(s) SubCutaneous before breakfast  insulin lispro Injectable (ADMELOG) 8 Unit(s) SubCutaneous before lunch  insulin lispro Injectable (ADMELOG) 8 Unit(s) SubCutaneous before dinner  lisinopril 40 milliGRAM(s) Oral daily  vancomycin    Solution 125 milliGRAM(s) Oral every 6 hours    MEDICATIONS  (PRN):  HYDROmorphone  Injectable 1 milliGRAM(s) IV Push every 6 hours PRN Severe Pain (7 - 10)  oxyCODONE    IR 10 milliGRAM(s) Oral every 8 hours PRN Moderate Pain (4 - 6)  oxyCODONE    IR 5 milliGRAM(s) Oral every 8 hours PRN Mild Pain (1 - 3)           SUBJECTIVE / INTERVAL HPI: Patient was seen and examined this morning. Pain is manageable. No diarrhea. Is looking forward to getting home after rehab. Eating well.      CAPILLARY BLOOD GLUCOSE & INSULIN RECEIVED  103 mg/dL (12-19 @ 17:12) - Lispro 8  186 mg/dL (12-19 @ 21:48) - Lantus 10 + lispro 1  170 mg/dL (12-19 @ 22:19)  108 mg/dL (12-20 @ 05:30)  122 mg/dL (12-20 @ 08:27) - Lispro 6  mg/dL (12-20 @ lunch) - Lispro 6      REVIEW OF SYSTEMS  Constitutional:  Negative fever, chills or loss of appetite.  Eyes:  Negative blurry vision or double vision.  Cardiovascular:  Negative for chest pain or palpitations.  Respiratory:  Negative for cough, wheezing, or shortness of breath.    Gastrointestinal:  Negative for nausea, vomiting, diarrhea, constipation, or abdominal pain.  Genitourinary:  Negative frequency, urgency or dysuria.  Neurologic:  No headache, confusion, dizziness, lightheadedness.    PHYSICAL EXAM  Vital Signs Last 24 Hrs  T(C): 37.2 (20 Dec 2023 06:36), Max: 37.2 (19 Dec 2023 15:18)  T(F): 99 (20 Dec 2023 06:36), Max: 99 (19 Dec 2023 15:18)  HR: 74 (20 Dec 2023 08:53) (66 - 77)  BP: 167/64 (20 Dec 2023 08:53) (107/51 - 167/64)  BP(mean): 125 (20 Dec 2023 08:53) (73 - 125)  RR: 18 (20 Dec 2023 08:53) (17 - 19)  SpO2: 97% (20 Dec 2023 08:53) (95% - 97%)    Parameters below as of 20 Dec 2023 08:53  Patient On (Oxygen Delivery Method): room air    Constitutional: Awake, alert, in no acute distress.   HEENT: Normocephalic, atraumatic, RAGHAV.  Respiratory: Lungs clear to ausculation bilaterally.   Cardiovascular: regular rhythm, normal S1 and S2, no audible murmurs.   GI: soft, non-tender, non-distended, bowel sounds present.  Extremities: No lower extremity edema. Right BKA wrapped in ACE  Psychiatric: AAO x 3. Tearful    LABS  CBC - WBC/HGB/HTC/PLT: 14.97/7.8/24.5/192 (12-20-23)  BMP - Na/K/Cl/Bicarb/BUN/Cr/Gluc/AG/eGFR: 140/3.9/109/26/27/1.31/108/5/60 (12-20-23)  Ca - 8.3 (12-20-23)  Phos - 3.7 (12-20-23)  Mg - 2.1 (12-20-23)  LFT - Alb/Tprot/Tbili/Dbili/AlkPhos/ALT/AST: 1.7/--/0.2/--/113/16/17 (12-18-23)  PT/aPTT/INR: 10.6/--/0.93 (12-18-23)       MEDICATIONS  MEDICATIONS  (STANDING):  acetaminophen     Tablet .. 650 milliGRAM(s) Oral every 6 hours  aspirin  chewable 81 milliGRAM(s) Oral daily  atorvastatin 80 milliGRAM(s) Oral at bedtime  carvedilol 25 milliGRAM(s) Oral every 12 hours  gabapentin 800 milliGRAM(s) Oral three times a day  heparin   Injectable 5000 Unit(s) SubCutaneous every 8 hours  hydrALAZINE 75 milliGRAM(s) Oral three times a day  insulin glargine Injectable (LANTUS) 10 Unit(s) SubCutaneous at bedtime  insulin lispro (ADMELOG) corrective regimen sliding scale   SubCutaneous Before meals and at bedtime  insulin lispro Injectable (ADMELOG) 6 Unit(s) SubCutaneous before breakfast  insulin lispro Injectable (ADMELOG) 8 Unit(s) SubCutaneous before lunch  insulin lispro Injectable (ADMELOG) 8 Unit(s) SubCutaneous before dinner  lisinopril 40 milliGRAM(s) Oral daily  vancomycin    Solution 125 milliGRAM(s) Oral every 6 hours    MEDICATIONS  (PRN):  HYDROmorphone  Injectable 1 milliGRAM(s) IV Push every 6 hours PRN Severe Pain (7 - 10)  oxyCODONE    IR 10 milliGRAM(s) Oral every 8 hours PRN Moderate Pain (4 - 6)  oxyCODONE    IR 5 milliGRAM(s) Oral every 8 hours PRN Mild Pain (1 - 3)

## 2023-12-20 NOTE — PROGRESS NOTE ADULT - PROBLEM/PLAN-1
After Visit Summary   8/7/2017    Arbaella Desai    MRN: [id number]           Patient Information     Date Of Birth          1988        Visit Information        Provider Department      8/7/2017 2:00 PM Anayeli Kaminski MD [facility]        Today's Diagnoses     Allergic conjunctivitis, bilateral    -  1    Cough          Care Instructions    Take Claritin nightly.  Use eye drops twice daily.  Can use artificial tears or gel to lubricate as needed as well.  Saline sinus rinses.  Call if not improving.          Follow-ups after your visit        Your next 10 appointments already scheduled     Aug 08, 2017  8:30 AM CDT   (Arrive by 8:15 AM)   ESTABLISHED PRENATAL with Kayla Dean MD   [facility] ([facility] )    [address] [place] [address] [id number] [phone]            Sep 26, 2017  8:00 AM CDT   Radiology with [facility] ULTRASOUND 2   [facility] ([facility] )    [address] [facility] [address]   [phone]              Who to contact     If you have questions or need follow up information about today's clinic visit or your schedule please contact [facility] directly at [phone].  Normal or non-critical lab and imaging results will be communicated to you by MyChart, letter or phone within 4 business days after the clinic has received the results. If you do not hear from us within 7 days, please contact the clinic through MyChart or phone. If you have a critical or abnormal lab result, we will notify you by phone as soon as possible.  Submit refill requests through [email or web address] or call your pharmacy and they will forward the refill request to us. Please allow 3 business days for your refill to be completed.          Additional Information About Your Visit        MyChart Information     [email or web address] lets you send messages to your doctor, view your test results, renew your prescriptions, schedule appointments and 
DISPLAY PLAN FREE TEXT
"more. To sign up, go to [email or web address] . Click on \"Log in\" on the left side of the screen, which will take you to the Welcome page. Then click on \"Sign up Now\" on the right side of the page.     You will be asked to enter the access code listed below, as well as some personal information. Please follow the directions to create your username and password.     Your access code is: [email or web address]  Expires: 2017  9:05 AM     Your access code will  in 90 days. If you need help or a new code, please call your [place] clinic or [phone].        Care EveryWhere ID     This is your Care EveryWhere ID. This could be used by other organizations to access your [place] medical records  [id number]        Your Vitals Were     Pulse Temperature Height Last Period Pulse Oximetry BMI (Body Mass Index)    95 97.9  F (36.6  C) (Tympanic) 5' 1.5\" (1.562 m) 2017 (Approximate) 98% 30.11 kg/m2       Blood Pressure from Last 3 Encounters:   17 108/58   07/10/17 118/66   17 118/74    Weight from Last 3 Encounters:   17 162 lb (73.5 kg)   07/10/17 170 lb (77.1 kg)   17 171 lb (77.6 kg)              Today, you had the following     No orders found for display         Today's Medication Changes          These changes are accurate as of: 17  2:16 PM.  If you have any questions, ask your nurse or doctor.               Start taking these medicines.        Dose/Directions    loratadine 10 MG tablet   Commonly known as:  CLARITIN   Used for:  Allergic conjunctivitis, bilateral, Cough   Started by:  Anayeli Kaminski MD        Dose:  10 mg   Take 1 tablet (10 mg) by mouth daily   Quantity:  30 tablet   Refills:  3       olopatadine 0.1 % ophthalmic solution   Commonly known as:  PATANOL   Used for:  Allergic conjunctivitis, bilateral   Started by:  Anayeli Kaminski MD        Dose:  1 drop   Place 1 drop into both eyes 2 times daily   Quantity:  1 Bottle   Refills:  3            Where to "
get your medicines      These medications were sent to [facility] - [place], [place] - [address]  [address], [place] [address]     Phone:  [phone]     loratadine 10 MG tablet    olopatadine 0.1 % ophthalmic solution                Primary Care Provider Office Phone # Fax #    Katherine ADAMS NITO Han [phone]       [facility] [address]  [place] [address]        Equal Access to Services     [facility] Center[facility] : Hadii aad ku hadasho Soomaali, waaxda luqadaha, qaybta kaalmada adeegyada, waxay idiin hayaan adeeg kharash lagiuseppe . So [facility] [phone].    ATENCIÓN: Si habla español, tiene a boston disposición servicios gratuitos de asistencia lingüística. Ll[facility] [phone].    We comply with applicable federal civil rights laws and [place] laws. We do not discriminate on the basis of race, color, national origin, age, disability sex, sexual orientation or gender identity.            Thank you!     Thank you for choosing [facility]  for your care. Our goal is always to provide you with excellent care. Hearing back from our patients is one way we can continue to improve our services. Please take a few minutes to complete the written survey that you may receive in the mail after your visit with us. Thank you!             Your Updated Medication List - Protect others around you: Learn how to safely use, store and throw away your medicines at www.disposemymeds.org.          This list is accurate as of: 8/7/17  2:16 PM.  Always use your most recent med list.                   Brand Name Dispense Instructions for use Diagnosis    gentamicin 0.3 % ophthalmic solution    GARAMYCIN    5 mL    Place 1 drop into both eyes every 4 hours    Pink eye, unspecified laterality       loratadine 10 MG tablet    CLARITIN    30 tablet    Take 1 tablet (10 mg) by mouth daily    Allergic conjunctivitis, bilateral, Cough       nicotine 14 MG/24HR 24 hr patch    NICODERM 
CQ    30 patch    Place 1 patch onto the skin every 24 hours    Smokes and motivated to quit       olopatadine 0.1 % ophthalmic solution    PATANOL    1 Bottle    Place 1 drop into both eyes 2 times daily    Allergic conjunctivitis, bilateral       prenatal multivitamin plus iron 27-0.8 MG Tabs per tablet      Take 1 tablet by mouth daily          
DISPLAY PLAN FREE TEXT

## 2023-12-20 NOTE — PROGRESS NOTE ADULT - SUBJECTIVE AND OBJECTIVE BOX
Patient was seen and examined at bedside. Case discuss with resident. Pt with some leg pain at his bandage site.     T(C): 37.2 (20 Dec 2023 06:36), Max: 37.2 (20 Dec 2023 06:36)  T(F): 99 (20 Dec 2023 06:36), Max: 99 (20 Dec 2023 06:36)  HR: 72 (20 Dec 2023 14:27) (69 - 77)  BP: 134/59 (20 Dec 2023 14:27) (125/78 - 167/64)  BP(mean): 125 (20 Dec 2023 08:53) (91 - 125)  RR: 18 (20 Dec 2023 08:53) (17 - 18)  SpO2: 97% (20 Dec 2023 08:53) (95% - 97%)    Parameters below as of 20 Dec 2023 08:53  Patient On (Oxygen Delivery Method): room air    PHYSICAL EXAM:  Gen: NAD laying in bed  CV: RRR, +S1/S2, no mumur  Pulm: CTA b/l no wheezing or crackles   Abd: soft, NTND + BS no rebound or guarding   R BKA dressing intact                         7.8    14.97 )-----------( 192      ( 20 Dec 2023 05:30 )             24.5     12-20    140  |  109<H>  |  27<H>  ----------------------------<  108<H>  3.9   |  26  |  1.31<H>    Ca    8.3<L>      20 Dec 2023 05:30  Phos  3.7     12-20  Mg     2.1     12-20    CAPILLARY BLOOD GLUCOSE  POCT Blood Glucose.: 92 mg/dL (20 Dec 2023 12:22)  POCT Blood Glucose.: 122 mg/dL (20 Dec 2023 08:27)  POCT Blood Glucose.: 170 mg/dL (19 Dec 2023 22:19)  POCT Blood Glucose.: 186 mg/dL (19 Dec 2023 21:48)  POCT Blood Glucose.: 103 mg/dL (19 Dec 2023 17:12)    acetaminophen     Tablet .. 650 milliGRAM(s) Oral every 6 hours  aspirin  chewable 81 milliGRAM(s) Oral daily  atorvastatin 80 milliGRAM(s) Oral at bedtime  carvedilol 25 milliGRAM(s) Oral every 12 hours  gabapentin 800 milliGRAM(s) Oral three times a day  heparin   Injectable 5000 Unit(s) SubCutaneous every 8 hours  hydrALAZINE 75 milliGRAM(s) Oral three times a day  HYDROmorphone  Injectable 1 milliGRAM(s) IV Push every 6 hours PRN  insulin glargine Injectable (LANTUS) 10 Unit(s) SubCutaneous at bedtime  insulin lispro (ADMELOG) corrective regimen sliding scale   SubCutaneous Before meals and at bedtime  insulin lispro Injectable (ADMELOG) 8 Unit(s) SubCutaneous before lunch  insulin lispro Injectable (ADMELOG) 6 Unit(s) SubCutaneous before breakfast  insulin lispro Injectable (ADMELOG) 8 Unit(s) SubCutaneous before dinner  lisinopril 40 milliGRAM(s) Oral daily  oxyCODONE    IR 10 milliGRAM(s) Oral every 8 hours PRN  oxyCODONE    IR 5 milliGRAM(s) Oral every 8 hours PRN  vancomycin    Solution 125 milliGRAM(s) Oral every 6 hours      A/P: 68 year old male with a PMHx of CAD (s/p PCI x 2 in 2020), HTN, IDDM (A1c 12%, 10/2023), PAD (s/p remote RLE angioplasty), 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    #Diabetic Foot Infection   -Pt s/p BKA on 12/11 and  s/p closure on 12/18 Monday  -Continue pain medication as per primary team     # C difficile infection   -Will continue to monitor WBC and fever curve   - Will  continue PO vancomycin   -Will continue contact precautions     #Acute Blood Loss Anemia   -Pt with Hgb 7.8 today s/p transfusion of 1 unit of PRBCS on 12/19   -Will continue to monitor Hgb   -Goal Hgb > 8.0 given history of CAD    #CAD   -Continue with ASA 81mg daily and Atorvastatin 80mg qhs     #Chronic HFmrEF   #HTN  -Continue with lisinopril 40mg daily, coreg 25mg BID, and hydralazine 75mg TID  -Per cards recommendation will restart spironolactone 25mg PO qd prior to discharge     #Moderate Aortic Stenosis  -outpatient follow up for surveillance TTE     #Type 2 Diabetes c/b PAD, diabetic foot infection and hyperglycemia   -Will continue Lantus 10 QHS ; lispro 6 units before breakfast, 8 units before lunch and dinner.   -Further management as per endocrinology recommendation     DVT PPx  - Heparin SQ     #DISPO  -Awaiting KERRY placement

## 2023-12-20 NOTE — PROGRESS NOTE ADULT - NS ATTEND AMEND GEN_ALL_CORE FT
This is a patient known to Dr. 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 He is transferred from the medical service to vascular, now s/p staged R BKA w/ closure (12/18/23)    I have seen him on 12/20/23. No major complaints. Says pain tolerable. WBC 14.97 (from 15.37) H/H 7.8/24.5.     PLAN & RECOMMENDATIONS  - ASA/SQH; no more plavix unless needs for CAD  - ABX for C-diff treatment  - ASA/SQH  - Physical therapy  - SW for dispo    Thank you,    Michelet Freed MD  Attending Vascular Surgeon  [facility] at [address] [place] [address]  Office: [phone]; Fax: [phone]  [email or web address]. This is a patient known to Dr. 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 He is transferred from the medical service to vascular, now s/p staged R BKA w/ closure (12/18/23)    I have seen him on 12/20/23. No major complaints. Says pain tolerable. WBC 14.97 (from 15.37) H/H 7.8/24.5.     PLAN & RECOMMENDATIONS  - ASA/SQH; no more plavix unless needs for CAD  - ABX for C-diff treatment  - ASA/SQH  - Physical therapy  - SW for dispo    Thank you,    Michelet Freed MD  Attending Vascular Surgeon  [facility] at [address] [place] [address]  Office: [phone]; Fax: [phone]  [email or web address]. I have personally seen and examined this patient. I have fully participated in the care of this patient. I have reviewed all pertinent clinical information, including history, physical exam, plan and the Resident's, PA's and NP's note and agree except as noted. This is a patient known to Dr. 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 He is transferred from the medical service to vascular, now s/p staged R BKA w/ closure (12/18/23)    I have seen him on 12/20/23. No major complaints. Says pain tolerable. WBC 14.97 (from 15.37) H/H 7.8/24.5.     PLAN & RECOMMENDATIONS  - ASA/SQH; no more plavix unless needs for CAD  - ABX for C-diff treatment  - ASA/SQH  - Physical therapy  - SW for dispo    Thank you,    Michelet Freed MD  Attending Vascular Surgeon  [facility] at [address] [place] [address]  Office: [phone]; Fax: [phone]  [email or web address]. I have personally seen and examined this patient. I have fully participated in the care of this patient. I have reviewed all pertinent clinical information, including history, physical exam, plan and the Resident's, PA's and NP's note and agree except as noted. This is a patient known to Dr. 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 He is transferred from the medical service to vascular, now s/p staged R BKA w/ closure (12/18/23)    I have seen him on 12/20/23. No major complaints. Says pain tolerable. WBC 14.97 (from 15.37) H/H 7.8/24.5.     PLAN & RECOMMENDATIONS  - ASA/SQH; no more plavix unless needs for CAD  - ABX for C-diff treatment  - ASA/SQH  - Physical therapy  - SW for dispo    Thank you,    Michelet Freed MD  Attending Vascular Surgeon  [facility] at [address] [place] [address]  Office: [phone]; Fax: [phone]  [email or web address].

## 2023-12-20 NOTE — PROGRESS NOTE ADULT - SUBJECTIVE AND OBJECTIVE BOX
VASCULAR CARDIOLOGY ATTENDING PROGRESS NOTE    SERVICE LINE: [phone]    Subjective:    No acute events overnight.   ROS negative.     Current Medications:   acetaminophen     Tablet .. 650 milliGRAM(s) Oral every 6 hours  aspirin  chewable 81 milliGRAM(s) Oral daily  atorvastatin 80 milliGRAM(s) Oral at bedtime  carvedilol 25 milliGRAM(s) Oral every 12 hours  gabapentin 800 milliGRAM(s) Oral three times a day  heparin   Injectable 5000 Unit(s) SubCutaneous every 8 hours  hydrALAZINE 75 milliGRAM(s) Oral three times a day  HYDROmorphone  Injectable 1 milliGRAM(s) IV Push every 6 hours PRN  insulin glargine Injectable (LANTUS) 10 Unit(s) SubCutaneous at bedtime  insulin lispro (ADMELOG) corrective regimen sliding scale   SubCutaneous Before meals and at bedtime  insulin lispro Injectable (ADMELOG) 8 Unit(s) SubCutaneous before lunch  insulin lispro Injectable (ADMELOG) 6 Unit(s) SubCutaneous before breakfast  insulin lispro Injectable (ADMELOG) 8 Unit(s) SubCutaneous before dinner  lisinopril 40 milliGRAM(s) Oral daily  oxyCODONE    IR 10 milliGRAM(s) Oral every 8 hours PRN  oxyCODONE    IR 5 milliGRAM(s) Oral every 8 hours PRN  vancomycin    Solution 125 milliGRAM(s) Oral every 6 hours      REVIEW OF SYSTEMS:  CONSTITUTIONAL: No weakness, fevers or chills  EYES/ENT: No visual changes;  No dysphagia  NECK: No pain or stiffness  RESPIRATORY: No cough, wheezing, hemoptysis; No shortness of breath  CARDIOVASCULAR: No chest pain or palpitations; No lower extremity edema  GASTROINTESTINAL: No abdominal or epigastric pain. No nausea, vomiting, or hematemesis; No diarrhea or constipation. No melena or hematochezia.  BACK: No back pain  GENITOURINARY: No dysuria, frequency or hematuria  NEUROLOGICAL: No numbness or weakness  SKIN: No itching, burning, rashes, or lesions   All other review of systems is negative unless indicated above.    Physical Exam:  T(F): 99 (12-20), Max: 99 (12-19)  HR: 74 (12-20) (69 - 77)  BP: 144/64 (12-20) (107/51 - 167/64)  BP(mean): 125 (12-20) (73 - 125)  ABP: --  ABP(mean): --  RR: 18 (12-20)  SpO2: 97% (12-20)  GENERAL: No acute distress, well-developed  HEAD:  Atraumatic, Normocephalic  ENT: EOMI, PERRLA, conjunctiva and sclera clear, Neck supple, No JVD, moist mucosa  CHEST/LUNG: Clear to auscultation bilaterally; No wheeze, equal breath sounds bilaterally   BACK: No spinal tenderness  HEART: Regular rate and rhythm; No murmurs, rubs, or gallops  ABDOMEN: Soft, Nontender, Nondistended; Bowel sounds present  EXTREMITIES:  No clubbing, cyanosis, or edema  PSYCH: Nl behavior, nl affect  NEUROLOGY: AAOx3, non-focal, cranial nerves intact  SKIN: Normal color, No rashes or lesions  LINES:    Cardiovascular Diagnostic Testing: personally reviewed    CXR: Personally reviewed    Labs: Personally reviewed                        7.8    14.97 )-----------( 192      ( 20 Dec 2023 05:30 )             24.5     12-20    140  |  109<H>  |  27<H>  ----------------------------<  108<H>  3.9   |  26  |  1.31<H>    Ca    8.3<L>      20 Dec 2023 05:30  Phos  3.7     12-20  Mg     2.1     12-20                         VASCULAR CARDIOLOGY ATTENDING PROGRESS NOTE    SERVICE LINE: [phone]    Subjective:    No acute events overnight.   ROS negative.     Current Medications:   acetaminophen     Tablet .. 650 milliGRAM(s) Oral every 6 hours  aspirin  chewable 81 milliGRAM(s) Oral daily  atorvastatin 80 milliGRAM(s) Oral at bedtime  carvedilol 25 milliGRAM(s) Oral every 12 hours  gabapentin 800 milliGRAM(s) Oral three times a day  heparin   Injectable 5000 Unit(s) SubCutaneous every 8 hours  hydrALAZINE 75 milliGRAM(s) Oral three times a day  HYDROmorphone  Injectable 1 milliGRAM(s) IV Push every 6 hours PRN  insulin glargine Injectable (LANTUS) 10 Unit(s) SubCutaneous at bedtime  insulin lispro (ADMELOG) corrective regimen sliding scale   SubCutaneous Before meals and at bedtime  insulin lispro Injectable (ADMELOG) 8 Unit(s) SubCutaneous before lunch  insulin lispro Injectable (ADMELOG) 6 Unit(s) SubCutaneous before breakfast  insulin lispro Injectable (ADMELOG) 8 Unit(s) SubCutaneous before dinner  lisinopril 40 milliGRAM(s) Oral daily  oxyCODONE    IR 10 milliGRAM(s) Oral every 8 hours PRN  oxyCODONE    IR 5 milliGRAM(s) Oral every 8 hours PRN  vancomycin    Solution 125 milliGRAM(s) Oral every 6 hours      REVIEW OF SYSTEMS:  CONSTITUTIONAL: No weakness, fevers or chills  EYES/ENT: No visual changes;  No dysphagia  NECK: No pain or stiffness  RESPIRATORY: No cough, wheezing, hemoptysis; No shortness of breath  CARDIOVASCULAR: No chest pain or palpitations; No lower extremity edema  GASTROINTESTINAL: No abdominal or epigastric pain. No nausea, vomiting, or hematemesis; No diarrhea or constipation. No melena or hematochezia.  BACK: No back pain  GENITOURINARY: No dysuria, frequency or hematuria  NEUROLOGICAL: No numbness or weakness  SKIN: No itching, burning, rashes, or lesions   All other review of systems is negative unless indicated above.    Physical Exam:  T(F): 99 (12-20), Max: 99 (12-19)  HR: 74 (12-20) (69 - 77)  BP: 144/64 (12-20) (107/51 - 167/64)  BP(mean): 125 (12-20) (73 - 125)  ABP: --  ABP(mean): --  RR: 18 (12-20)  SpO2: 97% (12-20)  GENERAL: No acute distress, well-developed  HEAD:  Atraumatic, Normocephalic  ENT: EOMI, PERRLA, conjunctiva and sclera clear, Neck supple, No JVD, moist mucosa  CHEST/LUNG: Clear to auscultation bilaterally; No wheeze, equal breath sounds bilaterally   BACK: No spinal tenderness  HEART: Regular rate and rhythm; No murmurs, rubs, or gallops  ABDOMEN: Soft, Nontender, Nondistended; Bowel sounds present  EXTREMITIES:  No clubbing, cyanosis, or edema  PSYCH: Nl behavior, nl affect  NEUROLOGY: AAOx3, non-focal, cranial nerves intact  SKIN: Normal color, No rashes or lesions  LINES:    Cardiovascular Diagnostic Testing: personally reviewed    CXR: Personally reviewed    Labs: Personally reviewed                        7.8    14.97 )-----------( 192      ( 20 Dec 2023 05:30 )             24.5     12-20    140  |  109<H>  |  27<H>  ----------------------------<  108<H>  3.9   |  26  |  1.31<H>    Ca    8.3<L>      20 Dec 2023 05:30  Phos  3.7     12-20  Mg     2.1     12-20

## 2023-12-20 NOTE — PROGRESS NOTE ADULT - PROBLEM SELECTOR PLAN 1
Type 2 diabetes mellitus with hyperglycemia  - Please continue lantus  units at bedtime.   - Continue lispro  units before breakfast, and Increase lispro to 8 units before lunch and dinner.  - Continue lispro low dose sliding scale before meals and at bedtime.  - Patient's fingerstick glucose goal is 100-180 mg/dL.    - For discharge, patient can ***.    - Patient can follow up at discharge with [facility] by calling [phone] to make an appointment.      Case discussed with Dr. Huertas. Primary team updated. Type 2 diabetes mellitus with hyperglycemia  - Please continue lantus  units at bedtime.   - Continue lispro  units before breakfast, and Increase lispro to 8 units before lunch and dinner.  - Continue lispro low dose sliding scale before meals and at bedtime.  - Patient's fingerstick glucose goal is 100-180 mg/dL.    - For discharge, patient can ***.    - Patient can follow up at discharge with [facility] by calling [phone] to make an appointment.      Case discussed with Dr. Huertas. Primary team updated. Type 2 diabetes mellitus with hyperglycemia  - Please continue lantus 10 units at bedtime.   - Continue lispro 6 units before breakfast, and  lispro to  units before lunch and dinner.  - Continue lispro low dose sliding scale before meals and at bedtime.  - Patient's fingerstick glucose goal is 100-180 mg/dL.    - For discharge: basal/bolus insulin, dose TBD.  - Patient can follow up at discharge with [facility] by calling [phone] to make an appointment.      Case discussed with Dr. Huertas. Primary team updated. Type 2 diabetes mellitus with hyperglycemia  - Please continue lantus 10 units at bedtime.   - Continue lispro 6 units before breakfast, and  lispro to  units before lunch and dinner.  - Continue lispro low dose sliding scale before meals and at bedtime.  - Patient's fingerstick glucose goal is 100-180 mg/dL.    - For discharge: basal/bolus insulin, dose TBD.  - Patient can follow up at discharge with [facility] by calling [phone] to make an appointment.      Case discussed with Dr. Huertas. Primary team updated. Type 2 diabetes mellitus with hyperglycemia  - Please continue lantus 10 units at bedtime.   - Continue lispro 6 units before breakfast, and  lispro 8 units before lunch and dinner.  - Continue lispro low dose sliding scale before meals and at bedtime.  - Patient's fingerstick glucose goal is 100-180 mg/dL.    - For discharge: basal/bolus insulin, dose TBD.  - Patient can follow up at discharge with [facility] by calling [phone] to make an appointment.      Case discussed with Dr. Huertas. Primary team updated. Type 2 diabetes mellitus with hyperglycemia  - Please continue lantus 10 units at bedtime.   - Continue lispro 6 units before breakfast, and  lispro 8 units before lunch and dinner.  - Continue lispro low dose sliding scale before meals and at bedtime.  - Patient's fingerstick glucose goal is 100-180 mg/dL.    - For discharge: basal/bolus insulin, dose TBD.  - Patient can follow up at discharge with [facility] by calling [phone] to make an appointment.      Case discussed with Dr. Huertas. Primary team updated.

## 2023-12-20 NOTE — PROGRESS NOTE ADULT - ASSESSMENT
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 diff colitis.    Assessment  1. 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 PAD s/p prior intervention to RLE Oct/2023  3. CAD s/p PCI  4. ICM HFrEF  5. Moderate AS (ALLISON 1.28cm^2, MG 15 mmHg)    Plan  1. ASA 81mg QD and high intensity statin for CAD and PAD  2. TTE in 1y for surveillance of moderate AS  3. GDMT for HFrEF: Coreg 25mg BID and lisinopril 40mg QD; will add MRA once DIANA resolves; will avoid SGLT2i given PAD w/ delayed wound healing  4. Would uptitrate hydralazine to 100mg q8h for better BP control and additional afterload reduction    Thank you for the consult and the opportunity to take care of this patient.     Agapito Porter M.D.  CARDIOLOGY | VASCULAR CARDIOLOGY ATTENDING  [phone]    During non face-to-face time, I reviewed relevant portions of the patient’s medical record. During face-to-face time, I took a relevant history and examined the patient. I also explained differential diagnoses, relevant cardiac diagnoses, workup, and management plan, which required a moderate level of medical decision making. I answered all questions related to the patient's medical conditions.          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 diff colitis.    Assessment  1. 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 PAD s/p prior intervention to RLE Oct/2023  3. CAD s/p PCI  4. ICM HFrEF  5. Moderate AS (ALLISON 1.28cm^2, MG 15 mmHg)    Plan  1. ASA 81mg QD and high intensity statin for CAD and PAD  2. TTE in 1y for surveillance of moderate AS  3. GDMT for HFrEF: Coreg 25mg BID and lisinopril 40mg QD; will add MRA once DIANA resolves; will avoid SGLT2i given PAD w/ delayed wound healing  4. Would uptitrate hydralazine to 100mg q8h for better BP control and additional afterload reduction    Thank you for the consult and the opportunity to take care of this patient.     Agapito Porter M.D.  CARDIOLOGY | VASCULAR CARDIOLOGY ATTENDING  [phone]    During non face-to-face time, I reviewed relevant portions of the patient’s medical record. During face-to-face time, I took a relevant history and examined the patient. I also explained differential diagnoses, relevant cardiac diagnoses, workup, and management plan, which required a moderate level of medical decision making. I answered all questions related to the patient's medical conditions.

## 2023-12-20 NOTE — CHART NOTE - NSCHARTNOTEFT_GEN_A_CORE
PT had to go to the bathroom to poop, had called the nurse into the room to assist to transfer to commode. RN santos did not feel safe transferring him alone and was asking for help to transfer over. However the Pt said that he was able to do so himself and attempted to transfer himself, which resulted him sliding off the bed and the RN had to yell for help. There was 4 nurses (KATE vidal, sharifa, yahir and katt) needed to bring him back into bed. At this time, he still insisted on going to the commode. At this point, Santos and Katt remained to help him go to the commode, but he was verbally abusive to both the RNs, he was yelling and cursing at the nurses. Yahir then calls for me to come to the station at this time to help. When I arrived to the room, the patient had taken the lid of the commode and threw it on the floor and said "Help me get on the commode." while the nurses protested that it was not safe for him to transfer. He said " I just need fucking shit so bad, help me get on the commode." He then grabs katt hands to place it on the commode to hold it in place, and put ur weight on it. I instruct yahir to call security. THey come and I also tell the patient to go back into bed, and please use the bedpan. He said " Call the security, what are they going to do. Tell them to wipe my ass." Security comes and speaks to him, he is also verbally abusive to the security guards " N*** what are you going to do." multiple times. HE then finishes pooping, and throws the bedpan onto the floor. Is extremely demanding to the nurse and abrasive, repeatedly asking " Can you hurry up." He then refuse telemetry and finger stick. I spoke to the patient, explaining that we are all trying to help him and take care of him, he has been extremely rude to everyone. Vascular Surgery team made aware that patient had to go to the bathroom to have a bowel movement, and had called the nurse into the room to assist to transfer to commode. RN Derrick did not feel safe transferring him alone and was asking for help to transfer over. However the Pt said that he was able to do so himself and attempted to transfer himself, which resulted him sliding off the bed and the RN had to yell for help. There was 4 nurses (RN's Derirck, Meron, Estefany and Shana) needed to bring him back into bed. At this time, he still insisted on going to the commode. At this point, Derrick and Shana remained to help him go to the commode, but he was verbally abusive to both the RNs, he was yelling and cursing at the nurses. Estefany then calls for me to come to the station at this time to help. When I arrived to the room, the patient had taken the lid of the commode and threw it on the floor and said "Help me get on the commode." while the nurses protested that it was not safe for him to transfer. He insists that he needs to use commode. He then grabs Shana's hands to place it on the commode to hold it in place, and put ur weight on it. I instruct Estefany to call security. They come and I also tell the patient to go back into bed, and please use the bedpan. He said " Call the security, what are they going to do. Tell them to wipe my ass." Security comes and speaks to him, he is also verbally abusive to the security guards, including using racial slurs multiple times. He then finishes having a bowel movement, and throws the bedpan onto the floor. Is extremely demanding to the nurse and abrasive, repeatedly asking " Can you hurry up." He then refuse telemetry and finger stick. I spoke to the patient, explaining that we are all trying to help him and take care of him, he continues to be verbally abusive to every. Vascular Surgery team made aware that patient had to go to the bathroom to have a bowel movement, and had called the nurse into the room to assist to transfer to commode. RN Derrick did not feel safe transferring him alone and was asking for help to transfer over. However the Pt said that he was able to do so himself and attempted to transfer himself, which resulted him sliding off the bed and the RN had to yell for help. There was 4 nurses (RN's Derrick, Meron, Estefany and Shana) needed to bring him back into bed. At this time, he still insisted on going to the commode. At this point, Derrick and Shana remained to help him go to the commode, but he was verbally abusive to both the RNs, he was yelling and cursing at the nurses. Estefany then calls for me to come to the station at this time to help. When I arrived to the room, the patient had taken the lid of the commode and threw it on the floor and said "Help me get on the commode." while the nurses protested that it was not safe for him to transfer. He insists that he needs to use commode. He then grabs Shana's hands to place it on the commode to hold it in place, and put ur weight on it. I instruct Estefany to call security. They come and I also tell the patient to go back into bed, and please use the bedpan. He said " Call the security, what are they going to do. Tell them to wipe my ass." Security comes and speaks to him, he is also verbally abusive to the security guards, including using racial slurs multiple times. He then finishes having a bowel movement, and throws the bedpan onto the floor. Is extremely demanding to the nurse and abrasive, repeatedly asking " Can you hurry up." He then refuse telemetry and finger stick. I spoke to the patient, explaining that we are all trying to help him and take care of him, he continues to be verbally abusive to every.

## 2023-12-21 LAB
ANION GAP SERPL CALC-SCNC: 4 MMOL/L — LOW (ref 5–17)
ANION GAP SERPL CALC-SCNC: 4 MMOL/L — LOW (ref 5–17)
BUN SERPL-MCNC: 29 MG/DL — HIGH (ref 7–23)
BUN SERPL-MCNC: 29 MG/DL — HIGH (ref 7–23)
CALCIUM SERPL-MCNC: 8.2 MG/DL — LOW (ref 8.4–10.5)
CALCIUM SERPL-MCNC: 8.2 MG/DL — LOW (ref 8.4–10.5)
CHLORIDE SERPL-SCNC: 108 MMOL/L — SIGNIFICANT CHANGE UP (ref 96–108)
CHLORIDE SERPL-SCNC: 108 MMOL/L — SIGNIFICANT CHANGE UP (ref 96–108)
CO2 SERPL-SCNC: 26 MMOL/L — SIGNIFICANT CHANGE UP (ref 22–31)
CO2 SERPL-SCNC: 26 MMOL/L — SIGNIFICANT CHANGE UP (ref 22–31)
CREAT SERPL-MCNC: 1.44 MG/DL — HIGH (ref 0.5–1.3)
CREAT SERPL-MCNC: 1.44 MG/DL — HIGH (ref 0.5–1.3)
EGFR: 54 ML/MIN/1.73M2 — LOW
EGFR: 54 ML/MIN/1.73M2 — LOW
GLUCOSE BLDC GLUCOMTR-MCNC: 186 MG/DL — HIGH (ref 70–99)
GLUCOSE BLDC GLUCOMTR-MCNC: 186 MG/DL — HIGH (ref 70–99)
GLUCOSE BLDC GLUCOMTR-MCNC: 221 MG/DL — HIGH (ref 70–99)
GLUCOSE BLDC GLUCOMTR-MCNC: 221 MG/DL — HIGH (ref 70–99)
GLUCOSE BLDC GLUCOMTR-MCNC: 244 MG/DL — HIGH (ref 70–99)
GLUCOSE BLDC GLUCOMTR-MCNC: 244 MG/DL — HIGH (ref 70–99)
GLUCOSE BLDC GLUCOMTR-MCNC: 77 MG/DL — SIGNIFICANT CHANGE UP (ref 70–99)
GLUCOSE BLDC GLUCOMTR-MCNC: 77 MG/DL — SIGNIFICANT CHANGE UP (ref 70–99)
GLUCOSE SERPL-MCNC: 139 MG/DL — HIGH (ref 70–99)
GLUCOSE SERPL-MCNC: 139 MG/DL — HIGH (ref 70–99)
HCT VFR BLD CALC: 24.1 % — LOW (ref 39–50)
HCT VFR BLD CALC: 24.1 % — LOW (ref 39–50)
HGB BLD-MCNC: 7.3 G/DL — LOW (ref 13–17)
HGB BLD-MCNC: 7.3 G/DL — LOW (ref 13–17)
MAGNESIUM SERPL-MCNC: 2 MG/DL — SIGNIFICANT CHANGE UP (ref 1.6–2.6)
MAGNESIUM SERPL-MCNC: 2 MG/DL — SIGNIFICANT CHANGE UP (ref 1.6–2.6)
MCHC RBC-ENTMCNC: 28 PG — SIGNIFICANT CHANGE UP (ref 27–34)
MCHC RBC-ENTMCNC: 28 PG — SIGNIFICANT CHANGE UP (ref 27–34)
MCHC RBC-ENTMCNC: 30.3 GM/DL — LOW (ref 32–36)
MCHC RBC-ENTMCNC: 30.3 GM/DL — LOW (ref 32–36)
MCV RBC AUTO: 92.3 FL — SIGNIFICANT CHANGE UP (ref 80–100)
MCV RBC AUTO: 92.3 FL — SIGNIFICANT CHANGE UP (ref 80–100)
NRBC # BLD: 0 /100 WBCS — SIGNIFICANT CHANGE UP (ref 0–0)
NRBC # BLD: 0 /100 WBCS — SIGNIFICANT CHANGE UP (ref 0–0)
NT-PROBNP SERPL-SCNC: 7238 PG/ML — HIGH (ref 0–300)
NT-PROBNP SERPL-SCNC: 7238 PG/ML — HIGH (ref 0–300)
PHOSPHATE SERPL-MCNC: 3.6 MG/DL — SIGNIFICANT CHANGE UP (ref 2.5–4.5)
PHOSPHATE SERPL-MCNC: 3.6 MG/DL — SIGNIFICANT CHANGE UP (ref 2.5–4.5)
PLATELET # BLD AUTO: 202 K/UL — SIGNIFICANT CHANGE UP (ref 150–400)
PLATELET # BLD AUTO: 202 K/UL — SIGNIFICANT CHANGE UP (ref 150–400)
POTASSIUM SERPL-MCNC: 3.9 MMOL/L — SIGNIFICANT CHANGE UP (ref 3.5–5.3)
POTASSIUM SERPL-MCNC: 3.9 MMOL/L — SIGNIFICANT CHANGE UP (ref 3.5–5.3)
POTASSIUM SERPL-SCNC: 3.9 MMOL/L — SIGNIFICANT CHANGE UP (ref 3.5–5.3)
POTASSIUM SERPL-SCNC: 3.9 MMOL/L — SIGNIFICANT CHANGE UP (ref 3.5–5.3)
RBC # BLD: 2.61 M/UL — LOW (ref 4.2–5.8)
RBC # BLD: 2.61 M/UL — LOW (ref 4.2–5.8)
RBC # FLD: 19.1 % — HIGH (ref 10.3–14.5)
RBC # FLD: 19.1 % — HIGH (ref 10.3–14.5)
SODIUM SERPL-SCNC: 138 MMOL/L — SIGNIFICANT CHANGE UP (ref 135–145)
SODIUM SERPL-SCNC: 138 MMOL/L — SIGNIFICANT CHANGE UP (ref 135–145)
WBC # BLD: 12.04 K/UL — HIGH (ref 3.8–10.5)
WBC # BLD: 12.04 K/UL — HIGH (ref 3.8–10.5)
WBC # FLD AUTO: 12.04 K/UL — HIGH (ref 3.8–10.5)
WBC # FLD AUTO: 12.04 K/UL — HIGH (ref 3.8–10.5)

## 2023-12-21 PROCEDURE — 99232 SBSQ HOSP IP/OBS MODERATE 35: CPT | Mod: GC

## 2023-12-21 PROCEDURE — 99233 SBSQ HOSP IP/OBS HIGH 50: CPT

## 2023-12-21 PROCEDURE — 99233 SBSQ HOSP IP/OBS HIGH 50: CPT | Mod: 24

## 2023-12-21 RX ORDER — HYDROMORPHONE HYDROCHLORIDE 2 MG/ML
0.5 INJECTION INTRAMUSCULAR; INTRAVENOUS; SUBCUTANEOUS EVERY 4 HOURS
Refills: 0 | Status: DISCONTINUED | OUTPATIENT
Start: 2023-12-21 | End: 2023-12-21

## 2023-12-21 RX ORDER — FUROSEMIDE 40 MG
40 TABLET ORAL ONCE
Refills: 0 | Status: COMPLETED | OUTPATIENT
Start: 2023-12-21 | End: 2023-12-21

## 2023-12-21 RX ORDER — FUROSEMIDE 40 MG
40 TABLET ORAL
Refills: 0 | Status: DISCONTINUED | OUTPATIENT
Start: 2023-12-21 | End: 2023-12-21

## 2023-12-21 RX ORDER — FUROSEMIDE 40 MG
80 TABLET ORAL
Refills: 0 | Status: DISCONTINUED | OUTPATIENT
Start: 2023-12-21 | End: 2023-12-22

## 2023-12-21 RX ORDER — HYDROMORPHONE HYDROCHLORIDE 2 MG/ML
0.5 INJECTION INTRAMUSCULAR; INTRAVENOUS; SUBCUTANEOUS ONCE
Refills: 0 | Status: DISCONTINUED | OUTPATIENT
Start: 2023-12-21 | End: 2023-12-21

## 2023-12-21 RX ORDER — INSULIN LISPRO 100/ML
6 VIAL (ML) SUBCUTANEOUS
Refills: 0 | Status: DISCONTINUED | OUTPATIENT
Start: 2023-12-21 | End: 2023-12-23

## 2023-12-21 RX ORDER — INSULIN GLARGINE 100 [IU]/ML
14 INJECTION, SOLUTION SUBCUTANEOUS AT BEDTIME
Refills: 0 | Status: DISCONTINUED | OUTPATIENT
Start: 2023-12-21 | End: 2023-12-24

## 2023-12-21 RX ORDER — HYDRALAZINE HCL 50 MG
100 TABLET ORAL THREE TIMES A DAY
Refills: 0 | Status: DISCONTINUED | OUTPATIENT
Start: 2023-12-21 | End: 2023-12-26

## 2023-12-21 RX ADMIN — Medication 125 MILLIGRAM(S): at 00:35

## 2023-12-21 RX ADMIN — Medication 2: at 08:44

## 2023-12-21 RX ADMIN — OXYCODONE HYDROCHLORIDE 10 MILLIGRAM(S): 5 TABLET ORAL at 04:54

## 2023-12-21 RX ADMIN — Medication 8 UNIT(S): at 17:29

## 2023-12-21 RX ADMIN — HYDROMORPHONE HYDROCHLORIDE 0.5 MILLIGRAM(S): 2 INJECTION INTRAMUSCULAR; INTRAVENOUS; SUBCUTANEOUS at 06:46

## 2023-12-21 RX ADMIN — Medication 80 MILLIGRAM(S): at 19:13

## 2023-12-21 RX ADMIN — Medication 125 MILLIGRAM(S): at 07:54

## 2023-12-21 RX ADMIN — GABAPENTIN 800 MILLIGRAM(S): 400 CAPSULE ORAL at 07:55

## 2023-12-21 RX ADMIN — Medication 100 MILLIGRAM(S): at 14:48

## 2023-12-21 RX ADMIN — OXYCODONE HYDROCHLORIDE 10 MILLIGRAM(S): 5 TABLET ORAL at 23:42

## 2023-12-21 RX ADMIN — Medication 2: at 22:20

## 2023-12-21 RX ADMIN — Medication 650 MILLIGRAM(S): at 12:09

## 2023-12-21 RX ADMIN — HYDROMORPHONE HYDROCHLORIDE 1 MILLIGRAM(S): 2 INJECTION INTRAMUSCULAR; INTRAVENOUS; SUBCUTANEOUS at 09:58

## 2023-12-21 RX ADMIN — Medication 650 MILLIGRAM(S): at 01:35

## 2023-12-21 RX ADMIN — Medication 125 MILLIGRAM(S): at 12:09

## 2023-12-21 RX ADMIN — HYDROMORPHONE HYDROCHLORIDE 0.5 MILLIGRAM(S): 2 INJECTION INTRAMUSCULAR; INTRAVENOUS; SUBCUTANEOUS at 02:16

## 2023-12-21 RX ADMIN — HYDROMORPHONE HYDROCHLORIDE 0.5 MILLIGRAM(S): 2 INJECTION INTRAMUSCULAR; INTRAVENOUS; SUBCUTANEOUS at 11:05

## 2023-12-21 RX ADMIN — Medication 81 MILLIGRAM(S): at 12:09

## 2023-12-21 RX ADMIN — HYDROMORPHONE HYDROCHLORIDE 1 MILLIGRAM(S): 2 INJECTION INTRAMUSCULAR; INTRAVENOUS; SUBCUTANEOUS at 08:41

## 2023-12-21 RX ADMIN — OXYCODONE HYDROCHLORIDE 10 MILLIGRAM(S): 5 TABLET ORAL at 03:54

## 2023-12-21 RX ADMIN — HEPARIN SODIUM 5000 UNIT(S): 5000 INJECTION INTRAVENOUS; SUBCUTANEOUS at 02:16

## 2023-12-21 RX ADMIN — Medication 650 MILLIGRAM(S): at 17:25

## 2023-12-21 RX ADMIN — Medication 40 MILLIGRAM(S): at 09:50

## 2023-12-21 RX ADMIN — Medication 75 MILLIGRAM(S): at 07:57

## 2023-12-21 RX ADMIN — GABAPENTIN 800 MILLIGRAM(S): 400 CAPSULE ORAL at 14:49

## 2023-12-21 RX ADMIN — HYDROMORPHONE HYDROCHLORIDE 0.5 MILLIGRAM(S): 2 INJECTION INTRAMUSCULAR; INTRAVENOUS; SUBCUTANEOUS at 06:31

## 2023-12-21 RX ADMIN — Medication 125 MILLIGRAM(S): at 17:34

## 2023-12-21 RX ADMIN — GABAPENTIN 800 MILLIGRAM(S): 400 CAPSULE ORAL at 22:42

## 2023-12-21 RX ADMIN — CARVEDILOL PHOSPHATE 25 MILLIGRAM(S): 80 CAPSULE, EXTENDED RELEASE ORAL at 17:25

## 2023-12-21 RX ADMIN — Medication 6 UNIT(S): at 08:46

## 2023-12-21 RX ADMIN — Medication 650 MILLIGRAM(S): at 00:35

## 2023-12-21 RX ADMIN — Medication 100 MILLIGRAM(S): at 22:42

## 2023-12-21 RX ADMIN — Medication 1: at 17:29

## 2023-12-21 RX ADMIN — OXYCODONE HYDROCHLORIDE 10 MILLIGRAM(S): 5 TABLET ORAL at 22:42

## 2023-12-21 RX ADMIN — HYDROMORPHONE HYDROCHLORIDE 0.5 MILLIGRAM(S): 2 INJECTION INTRAMUSCULAR; INTRAVENOUS; SUBCUTANEOUS at 10:34

## 2023-12-21 RX ADMIN — HYDROMORPHONE HYDROCHLORIDE 1 MILLIGRAM(S): 2 INJECTION INTRAMUSCULAR; INTRAVENOUS; SUBCUTANEOUS at 18:00

## 2023-12-21 RX ADMIN — HYDROMORPHONE HYDROCHLORIDE 1 MILLIGRAM(S): 2 INJECTION INTRAMUSCULAR; INTRAVENOUS; SUBCUTANEOUS at 17:28

## 2023-12-21 RX ADMIN — Medication 650 MILLIGRAM(S): at 07:56

## 2023-12-21 RX ADMIN — HEPARIN SODIUM 5000 UNIT(S): 5000 INJECTION INTRAVENOUS; SUBCUTANEOUS at 17:34

## 2023-12-21 RX ADMIN — ATORVASTATIN CALCIUM 80 MILLIGRAM(S): 80 TABLET, FILM COATED ORAL at 22:42

## 2023-12-21 RX ADMIN — INSULIN GLARGINE 14 UNIT(S): 100 INJECTION, SOLUTION SUBCUTANEOUS at 22:22

## 2023-12-21 RX ADMIN — HYDROMORPHONE HYDROCHLORIDE 0.5 MILLIGRAM(S): 2 INJECTION INTRAMUSCULAR; INTRAVENOUS; SUBCUTANEOUS at 02:46

## 2023-12-21 RX ADMIN — LISINOPRIL 40 MILLIGRAM(S): 2.5 TABLET ORAL at 07:56

## 2023-12-21 RX ADMIN — CARVEDILOL PHOSPHATE 25 MILLIGRAM(S): 80 CAPSULE, EXTENDED RELEASE ORAL at 07:56

## 2023-12-21 NOTE — PROGRESS NOTE ADULT - TIME BILLING
Review of hospital course, labs, vitals, medical records.   Bedside exam and interview   Discussed plan of care with vascular surgery ACP and housestaff, cardiology consult.   Documenting the encounter

## 2023-12-21 NOTE — PROGRESS NOTE ADULT - SUBJECTIVE AND OBJECTIVE BOX
Patient is a 68y old  Male who presents with a chief complaint of soft tissue and skin infection of right foot (21 Dec 2023 15:45)    INTERVAL EVENTS:  tolerating diet,  no dysuria, no constipation  bowel movements more formed     SUBJECTIVE:  Patient was seen and examined at bedside.  Review of systems: No CP, dyspnea, vomiting, dysuria,   + LE edema.     Diet, Consistent Carbohydrate/No Snacks (12-18-23 @ 12:54) [Active]    MEDICATIONS:  MEDICATIONS  (STANDING):  acetaminophen     Tablet .. 650 milliGRAM(s) Oral every 6 hours  aspirin  chewable 81 milliGRAM(s) Oral daily  atorvastatin 80 milliGRAM(s) Oral at bedtime  carvedilol 25 milliGRAM(s) Oral every 12 hours  furosemide   Injectable 80 milliGRAM(s) IV Push two times a day  gabapentin 800 milliGRAM(s) Oral three times a day  heparin   Injectable 5000 Unit(s) SubCutaneous every 8 hours  hydrALAZINE 100 milliGRAM(s) Oral three times a day  insulin glargine Injectable (LANTUS) 14 Unit(s) SubCutaneous at bedtime  insulin lispro (ADMELOG) corrective regimen sliding scale   SubCutaneous Before meals and at bedtime  insulin lispro Injectable (ADMELOG) 6 Unit(s) SubCutaneous three times a day before meals  lisinopril 40 milliGRAM(s) Oral daily  vancomycin    Solution 125 milliGRAM(s) Oral every 6 hours    MEDICATIONS  (PRN):  HYDROmorphone  Injectable 1 milliGRAM(s) IV Push every 6 hours PRN Severe Pain (7 - 10)  oxyCODONE    IR 10 milliGRAM(s) Oral every 8 hours PRN Moderate Pain (4 - 6)  oxyCODONE    IR 5 milliGRAM(s) Oral every 8 hours PRN Mild Pain (1 - 3)    Allergies  No Known Allergies    Intolerances    OBJECTIVE:  Vital Signs Last 24 Hrs  T(C): 36.7 (21 Dec 2023 17:17), Max: 36.7 (21 Dec 2023 06:33)  T(F): 98.1 (21 Dec 2023 17:17), Max: 98.1 (21 Dec 2023 17:17)  HR: 68 (21 Dec 2023 19:12) (60 - 72)  BP: 132/67 (21 Dec 2023 19:12) (103/56 - 171/78)  BP(mean): 109 (21 Dec 2023 17:17) (102 - 121)  RR: 16 (21 Dec 2023 12:19) (16 - 18)  SpO2: 98% (21 Dec 2023 12:19) (98% - 98%)    Parameters below as of 21 Dec 2023 12:19  Patient On (Oxygen Delivery Method): room air      I&O's Summary    21 Dec 2023 07:01  -  21 Dec 2023 22:10  --------------------------------------------------------  IN: 540 mL / OUT: 1450 mL / NET: -910 mL    PHYSICAL EXAM:  Gen: Sitting in bed at time of exam, appears stated age  HEENT: NCAT, MMM, clear OP  Neck: supple, trachea at midline  CV: RRR, +S1/S2  Pulm: adequate respiratory effort, no increase in work of breathing  Abd: soft, ND  Skin: warm and dry,   Ext: Right leg s/p BKA, ; left leg with pitting edema extending up to thigh   Neuro: AOx3, speaking in full sentences  Psych: affect and behavior appropriate, pleasant at time of interview    LABS:                        7.3    12.04 )-----------( 202      ( 21 Dec 2023 08:04 )             24.1     12-21    138  |  108  |  29<H>  ----------------------------<  139<H>  3.9   |  26  |  1.44<H>    Ca    8.2<L>      21 Dec 2023 08:04  Phos  3.6     12-21  Mg     2.0     12-21          CAPILLARY BLOOD GLUCOSE      POCT Blood Glucose.: 244 mg/dL (21 Dec 2023 21:52)  POCT Blood Glucose.: 186 mg/dL (21 Dec 2023 16:56)  POCT Blood Glucose.: 77 mg/dL (21 Dec 2023 12:02)  POCT Blood Glucose.: 221 mg/dL (21 Dec 2023 08:37)  POCT Blood Glucose.: 230 mg/dL (20 Dec 2023 22:38)    Urinalysis Basic - ( 21 Dec 2023 08:04 )    Color: x / Appearance: x / SG: x / pH: x  Gluc: 139 mg/dL / Ketone: x  / Bili: x / Urobili: x   Blood: x / Protein: x / Nitrite: x   Leuk Esterase: x / RBC: x / WBC x   Sq Epi: x / Non Sq Epi: x / Bacteria: x        MICRODATA:      RADIOLOGY/OTHER STUDIES:

## 2023-12-21 NOTE — PROGRESS NOTE ADULT - NS ATTEND AMEND GEN_ALL_CORE FT
This is a patient known to Dr. 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 He is transferred from the medical service to vascular, now s/p staged R BKA w/ closure (12/18/23)    I have seen him on 12/21/23. Overnight he was abusive to nursing staff. Security was called. Calmer this morning. No major complaints. Says pain tolerable. WBC 12 (from 14.97).     PLAN & RECOMMENDATIONS  - ASA/SQH; no more plavix unless needs for CAD  - ABX for C-diff treatment  - ASA/SQH  - Physical therapy  -  for dispo    Thank you,    Michelet Freed MD  Attending Vascular Surgeon  [facility] at [address] [place] [address]  Office: [phone]; Fax: [phone]  [email or web address] This is a patient known to Dr. 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 He is transferred from the medical service to vascular, now s/p staged R BKA w/ closure (12/18/23)    I have seen him on 12/21/23. Overnight he was abusive to nursing staff. Security was called. Calmer this morning. No major complaints. Says pain tolerable. WBC 12 (from 14.97).     PLAN & RECOMMENDATIONS  - ASA/SQH; no more plavix unless needs for CAD  - ABX for C-diff treatment  - ASA/SQH  - Physical therapy  -  for dispo    Thank you,    Michelet Freed MD  Attending Vascular Surgeon  [facility] at [address], [address] [place] [address]  Office: [phone]; Fax: [phone]  [email or web address] I have personally seen and examined this patient. I have fully participated in the care of this patient. I have reviewed all pertinent clinical information, including history, physical exam, plan and the Resident's, PA's and NP's note and agree except as noted. This is a patient known to Dr. 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 He is transferred from the medical service to vascular, now s/p staged R BKA w/ closure (12/18/23)    I have seen him on 12/21/23. Overnight he was abusive to nursing staff. Security was called. Calmer this morning. No major complaints. Says pain tolerable. WBC 12 (from 14.97).     PLAN & RECOMMENDATIONS  - ASA/SQH; no more plavix unless needs for CAD  - ABX for C-diff treatment  - ASA/SQH  - Physical therapy  - SW for dispo    Thank you,    Michelet Freed MD  Attending Vascular Surgeon  [facility] at [address], [address] [place] [address]  Office: [phone]; Fax: [phone]  [email or web address] I have personally seen and examined this patient. I have fully participated in the care of this patient. I have reviewed all pertinent clinical information, including history, physical exam, plan and the Resident's, PA's and NP's note and agree except as noted. This is a patient known to Dr. 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 He is transferred from the medical service to vascular, now s/p staged R BKA w/ closure (12/18/23)    I have seen him on 12/21/23. Overnight he was abusive to nursing staff. Security was called. Calmer this morning. No major complaints. Says pain tolerable. WBC 12 (from 14.97).     PLAN & RECOMMENDATIONS  - ASA/SQH; no more plavix unless needs for CAD  - ABX for C-diff treatment  - ASA/SQH  - Physical therapy  - SW for dispo    Thank you,    Michelet Freed MD  Attending Vascular Surgeon  [facility] at [address], [address] [place] [address]  Office: [phone]; Fax: [phone]  [email or web address]

## 2023-12-21 NOTE — PROGRESS NOTE ADULT - SUBJECTIVE AND OBJECTIVE BOX
O/N: called security overnight. VSS,                 ---------------------------------------------------------------------------  PLEASE CHECK WHEN PRESENT:     [  ]Heart Failure     [  ] Acute     [  ] Acute on Chronic     [x  ] Chronic  -------------------------------------------------------------------     [  ]Diastolic [HFpEF]     [ x ]Systolic [HFrEF]     [  ]Combined [HFpEF & HFrEF]  .................................................................................     [  ]Other:     [ ] Pulmonary Hypertension     [ ] Chronic A-fib     [ ] Persistet A-fib     [ ] Permanent A-fib     [ ] Paroxysmal A-fib     [x ] Hypertensive Heart Disease  -------------------------------------------------------------------  [ ] Respiratory failure  [ ] Acute cor pulmonale  [ ] Asthma/COPD Exacerbation  [ ]COPD on home O2 (Chronic renal Failure)   [ ] Pleural effusion  [ ] Aspiration pneumonia  [ ] Obstructive Sleep Apnea  [ ]Atelectasis   [ ] Acute PE   -------------------------------------------------------------------  [  ]Acute Kidney Injury      [  ]Acute Tubular Necrosis      [  ]Reneal Medullary Necrosis     [  ]Renal Cortical Necrosis     [  ]Other Pathological Lesions:    [  ]CKD 1  [  ]CKD 2  [  ]CKD 3  [  ]CKD 4  [  ]CKD 5 (ESRD)  [  ]Other  -------------------------------------------------------------------  [ x ]Diabetes  [  ] Diabetic PVD Ulcer  [  ] Neuropathic ulcer to DM  [  ] Diabetes with Nephropathy  [ x ] Osteomyelitis due to diabetes  [  ] Hyperglycemia   [  ]hypoglycemia   --------------------------------------------------------------------  [  ]Malnutrition: See Nutrition Note  [  ]Cachexia  [  ]Other:   [  ]Supplement Ordered:  [  ]Morbid Obesity (BMI >=40]  [ ] Ileus  ---------------------------------------------------------------------  [ ] Sepsis/severe sepsis/septic shock  [ ] Noninfectious SIRS  [ ] UTI  [ ] Pneumonia  [ ] Thrombophlebitis     -----------------------------------------------------------------------  [ ] Acidosis/alkalosis  [ ] Fluid overload  [ ] Hypokalemia  [ ] Hyperkalemia  [ ] Hypomagnesemia  [ ] Hypophosphatemia  [ ] Hyperphosphatemia  ------------------------------------------------------------------------  [ ] Acute blood loss anemia  [ ] Post op blood loss anemia  [ ] Iron deficiency anemia  [ ] Anemia due to chronic disease  [ ] Hypercoagulable state  [ ] Thrombocytopenia  ----------------------------------------------------------------------  [ ] Cerebral infarction  [ ] Transient ischemia attack  [ ] Encephalopathy - Toxic or Metabolic          A/P:    69yo M PMH CAD (2 stents 2020), HFmrEF (45-50%), HTN, PAD w/ remote RLE angioplasty, R 4th toe OM s/p partial R 4th ray amputation on 10/24, RLE angiogram with AT lithotripsy and AT/peroneal balloon angioplasty 10/27, uncontrolled IDDM (a1c 12 in Oct 2023), recent admission 11/8-11/10 to cardiology service for hypertensive emergency (left AMA, was given levaquin when he left for right foot wound that pt had started treatment for back in October) who presented to [facility] after a fall onto his knees and was having b/l knee pain. Found to have increased soft tissue gas in R foot on CT scan. 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 Diff    Diet: consistent carbs    Activity: OOB to chair, PT Consult    DVTPPx: HSQ    Dispo: 5 Uris telemetry, KERRY    O/N: called security overnight. VSS,                 ---------------------------------------------------------------------------  PLEASE CHECK WHEN PRESENT:     [  ]Heart Failure     [  ] Acute     [  ] Acute on Chronic     [x  ] Chronic  -------------------------------------------------------------------     [  ]Diastolic [HFpEF]     [ x ]Systolic [HFrEF]     [  ]Combined [HFpEF & HFrEF]  .................................................................................     [  ]Other:     [ ] Pulmonary Hypertension     [ ] Chronic A-fib     [ ] Persistet A-fib     [ ] Permanent A-fib     [ ] Paroxysmal A-fib     [x ] Hypertensive Heart Disease  -------------------------------------------------------------------  [ ] Respiratory failure  [ ] Acute cor pulmonale  [ ] Asthma/COPD Exacerbation  [ ]COPD on home O2 (Chronic renal Failure)   [ ] Pleural effusion  [ ] Aspiration pneumonia  [ ] Obstructive Sleep Apnea  [ ]Atelectasis   [ ] Acute PE   -------------------------------------------------------------------  [  ]Acute Kidney Injury      [  ]Acute Tubular Necrosis      [  ]Reneal Medullary Necrosis     [  ]Renal Cortical Necrosis     [  ]Other Pathological Lesions:    [  ]CKD 1  [  ]CKD 2  [  ]CKD 3  [  ]CKD 4  [  ]CKD 5 (ESRD)  [  ]Other  -------------------------------------------------------------------  [ x ]Diabetes  [  ] Diabetic PVD Ulcer  [  ] Neuropathic ulcer to DM  [  ] Diabetes with Nephropathy  [ x ] Osteomyelitis due to diabetes  [  ] Hyperglycemia   [  ]hypoglycemia   --------------------------------------------------------------------  [  ]Malnutrition: See Nutrition Note  [  ]Cachexia  [  ]Other:   [  ]Supplement Ordered:  [  ]Morbid Obesity (BMI >=40]  [ ] Ileus  ---------------------------------------------------------------------  [ ] Sepsis/severe sepsis/septic shock  [ ] Noninfectious SIRS  [ ] UTI  [ ] Pneumonia  [ ] Thrombophlebitis     -----------------------------------------------------------------------  [ ] Acidosis/alkalosis  [ ] Fluid overload  [ ] Hypokalemia  [ ] Hyperkalemia  [ ] Hypomagnesemia  [ ] Hypophosphatemia  [ ] Hyperphosphatemia  ------------------------------------------------------------------------  [ ] Acute blood loss anemia  [ ] Post op blood loss anemia  [ ] Iron deficiency anemia  [ ] Anemia due to chronic disease  [ ] Hypercoagulable state  [ ] Thrombocytopenia  ----------------------------------------------------------------------  [ ] Cerebral infarction  [ ] Transient ischemia attack  [ ] Encephalopathy - Toxic or Metabolic          A/P:    67yo M PMH CAD (2 stents 2020), HFmrEF (45-50%), HTN, PAD w/ remote RLE angioplasty, R 4th toe OM s/p partial R 4th ray amputation on 10/24, RLE angiogram with AT lithotripsy and AT/peroneal balloon angioplasty 10/27, uncontrolled IDDM (a1c 12 in Oct 2023), recent admission 11/8-11/10 to cardiology service for hypertensive emergency (left AMA, was given levaquin when he left for right foot wound that pt had started treatment for back in October) who presented to [facility] after a fall onto his knees and was having b/l knee pain. Found to have increased soft tissue gas in R foot on CT scan. 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 Diff    Diet: consistent carbs    Activity: OOB to chair, PT Consult    DVTPPx: HSQ    Dispo: 5 Uris telemetry, KERRY    O/N: called security overnight. VSS,     Subjective: Patient seen and examined at bedside, resting in bed. Denies acute complaints this morning. Denies fever, chills, cp, SOB, abdominal pain.    ROS:   Denies Headache, blurred vision, Chest Pain, SOB, Abdominal pain, nausea or vomiting     Social   vancomycin    Solution 125  aspirin  chewable 81  carvedilol 25  heparin   Injectable 5000  hydrALAZINE 75  lisinopril 40  vancomycin    Solution 125      Allergies    No Known Allergies    Intolerances        Vital Signs Last 24 Hrs  T(C): 36.9 (20 Dec 2023 20:56), Max: 36.9 (20 Dec 2023 20:56)  T(F): 98.5 (20 Dec 2023 20:56), Max: 98.5 (20 Dec 2023 20:56)  HR: 70 (21 Dec 2023 00:38) (68 - 74)  BP: 171/78 (21 Dec 2023 00:38) (119/70 - 171/78)  BP(mean): 112 (21 Dec 2023 00:38) (89 - 125)  RR: 18 (21 Dec 2023 00:38) (17 - 18)  SpO2: 95% (20 Dec 2023 20:56) (95% - 97%)    Parameters below as of 21 Dec 2023 00:38  Patient On (Oxygen Delivery Method): room air      I&O's Summary      Physical Exam:  General: NAD  Pulmonary: On room air, nonlabored breathing, no respiratory distress  Cardiovascular: NSR  Abdominal: soft, nontender, nondistended  Extremities: Right BKA stump c/d/i, no drainage, hematoma, or ecchymosis. mobile stump site      LABS:                        7.8    14.97 )-----------( 192      ( 20 Dec 2023 05:30 )             24.5     12-20    140  |  109<H>  |  27<H>  ----------------------------<  108<H>  3.9   |  26  |  1.31<H>    Ca    8.3<L>      20 Dec 2023 05:30  Phos  3.7     12-20  Mg     2.1     12-20          Radiology and Additional Studies:      ---------------------------------------------------------------------------  PLEASE CHECK WHEN PRESENT:     [  ]Heart Failure     [  ] Acute     [  ] Acute on Chronic     [x  ] Chronic  -------------------------------------------------------------------     [  ]Diastolic [HFpEF]     [ x ]Systolic [HFrEF]     [  ]Combined [HFpEF & HFrEF]  .................................................................................     [  ]Other:     [ ] Pulmonary Hypertension     [ ] Chronic A-fib     [ ] Persistet A-fib     [ ] Permanent A-fib     [ ] Paroxysmal A-fib     [x ] Hypertensive Heart Disease  -------------------------------------------------------------------  [ ] Respiratory failure  [ ] Acute cor pulmonale  [ ] Asthma/COPD Exacerbation  [ ]COPD on home O2 (Chronic renal Failure)   [ ] Pleural effusion  [ ] Aspiration pneumonia  [ ] Obstructive Sleep Apnea  [ ]Atelectasis   [ ] Acute PE   -------------------------------------------------------------------  [  ]Acute Kidney Injury      [  ]Acute Tubular Necrosis      [  ]Reneal Medullary Necrosis     [  ]Renal Cortical Necrosis     [  ]Other Pathological Lesions:    [  ]CKD 1  [  ]CKD 2  [  ]CKD 3  [  ]CKD 4  [  ]CKD 5 (ESRD)  [  ]Other  -------------------------------------------------------------------  [ x ]Diabetes  [  ] Diabetic PVD Ulcer  [  ] Neuropathic ulcer to DM  [  ] Diabetes with Nephropathy  [ x ] Osteomyelitis due to diabetes  [  ] Hyperglycemia   [  ]hypoglycemia   --------------------------------------------------------------------  [  ]Malnutrition: See Nutrition Note  [  ]Cachexia  [  ]Other:   [  ]Supplement Ordered:  [  ]Morbid Obesity (BMI >=40]  [ ] Ileus  ---------------------------------------------------------------------  [ ] Sepsis/severe sepsis/septic shock  [ ] Noninfectious SIRS  [ ] UTI  [ ] Pneumonia  [ ] Thrombophlebitis     -----------------------------------------------------------------------  [ ] Acidosis/alkalosis  [ ] Fluid overload  [ ] Hypokalemia  [ ] Hyperkalemia  [ ] Hypomagnesemia  [ ] Hypophosphatemia  [ ] Hyperphosphatemia  ------------------------------------------------------------------------  [ ] Acute blood loss anemia  [ ] Post op blood loss anemia  [ ] Iron deficiency anemia  [ ] Anemia due to chronic disease  [ ] Hypercoagulable state  [ ] Thrombocytopenia  ----------------------------------------------------------------------  [ ] Cerebral infarction  [ ] Transient ischemia attack  [ ] Encephalopathy - Toxic or Metabolic          A/P:    69yo M PMH CAD (2 stents 2020), HFmrEF (45-50%), HTN, PAD w/ remote RLE angioplasty, R 4th toe OM s/p partial R 4th ray amputation on 10/24, RLE angiogram with AT lithotripsy and AT/peroneal balloon angioplasty 10/27, uncontrolled IDDM (a1c 12 in Oct 2023), recent admission 11/8-11/10 to cardiology service for hypertensive emergency (left AMA, was given levaquin when he left for right foot wound that pt had started treatment for back in October) who presented to [facility] after a fall onto his knees and was having b/l knee pain. Found to have increased soft tissue gas in R foot on CT scan. 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 Diff    Diet: consistent carbs    Activity: OOB to chair, PT Consult    DVTPPx: HSQ    Dispo: 5 Uris telemetry, KERRY      O/N: called security overnight. VSS,     Subjective: Patient seen and examined at bedside, resting in bed. Denies acute complaints this morning. Denies fever, chills, cp, SOB, abdominal pain.    ROS:   Denies Headache, blurred vision, Chest Pain, SOB, Abdominal pain, nausea or vomiting     Social   vancomycin    Solution 125  aspirin  chewable 81  carvedilol 25  heparin   Injectable 5000  hydrALAZINE 75  lisinopril 40  vancomycin    Solution 125      Allergies    No Known Allergies    Intolerances        Vital Signs Last 24 Hrs  T(C): 36.9 (20 Dec 2023 20:56), Max: 36.9 (20 Dec 2023 20:56)  T(F): 98.5 (20 Dec 2023 20:56), Max: 98.5 (20 Dec 2023 20:56)  HR: 70 (21 Dec 2023 00:38) (68 - 74)  BP: 171/78 (21 Dec 2023 00:38) (119/70 - 171/78)  BP(mean): 112 (21 Dec 2023 00:38) (89 - 125)  RR: 18 (21 Dec 2023 00:38) (17 - 18)  SpO2: 95% (20 Dec 2023 20:56) (95% - 97%)    Parameters below as of 21 Dec 2023 00:38  Patient On (Oxygen Delivery Method): room air      I&O's Summary      Physical Exam:  General: NAD  Pulmonary: On room air, nonlabored breathing, no respiratory distress  Cardiovascular: NSR  Abdominal: soft, nontender, nondistended  Extremities: Right BKA stump c/d/i, no drainage, hematoma, or ecchymosis. mobile stump site      LABS:                        7.8    14.97 )-----------( 192      ( 20 Dec 2023 05:30 )             24.5     12-20    140  |  109<H>  |  27<H>  ----------------------------<  108<H>  3.9   |  26  |  1.31<H>    Ca    8.3<L>      20 Dec 2023 05:30  Phos  3.7     12-20  Mg     2.1     12-20          Radiology and Additional Studies:      ---------------------------------------------------------------------------  PLEASE CHECK WHEN PRESENT:     [  ]Heart Failure     [  ] Acute     [  ] Acute on Chronic     [x  ] Chronic  -------------------------------------------------------------------     [  ]Diastolic [HFpEF]     [ x ]Systolic [HFrEF]     [  ]Combined [HFpEF & HFrEF]  .................................................................................     [  ]Other:     [ ] Pulmonary Hypertension     [ ] Chronic A-fib     [ ] Persistet A-fib     [ ] Permanent A-fib     [ ] Paroxysmal A-fib     [x ] Hypertensive Heart Disease  -------------------------------------------------------------------  [ ] Respiratory failure  [ ] Acute cor pulmonale  [ ] Asthma/COPD Exacerbation  [ ]COPD on home O2 (Chronic renal Failure)   [ ] Pleural effusion  [ ] Aspiration pneumonia  [ ] Obstructive Sleep Apnea  [ ]Atelectasis   [ ] Acute PE   -------------------------------------------------------------------  [  ]Acute Kidney Injury      [  ]Acute Tubular Necrosis      [  ]Reneal Medullary Necrosis     [  ]Renal Cortical Necrosis     [  ]Other Pathological Lesions:    [  ]CKD 1  [  ]CKD 2  [  ]CKD 3  [  ]CKD 4  [  ]CKD 5 (ESRD)  [  ]Other  -------------------------------------------------------------------  [ x ]Diabetes  [  ] Diabetic PVD Ulcer  [  ] Neuropathic ulcer to DM  [  ] Diabetes with Nephropathy  [ x ] Osteomyelitis due to diabetes  [  ] Hyperglycemia   [  ]hypoglycemia   --------------------------------------------------------------------  [  ]Malnutrition: See Nutrition Note  [  ]Cachexia  [  ]Other:   [  ]Supplement Ordered:  [  ]Morbid Obesity (BMI >=40]  [ ] Ileus  ---------------------------------------------------------------------  [ ] Sepsis/severe sepsis/septic shock  [ ] Noninfectious SIRS  [ ] UTI  [ ] Pneumonia  [ ] Thrombophlebitis     -----------------------------------------------------------------------  [ ] Acidosis/alkalosis  [ ] Fluid overload  [ ] Hypokalemia  [ ] Hyperkalemia  [ ] Hypomagnesemia  [ ] Hypophosphatemia  [ ] Hyperphosphatemia  ------------------------------------------------------------------------  [ ] Acute blood loss anemia  [ ] Post op blood loss anemia  [ ] Iron deficiency anemia  [ ] Anemia due to chronic disease  [ ] Hypercoagulable state  [ ] Thrombocytopenia  ----------------------------------------------------------------------  [ ] Cerebral infarction  [ ] Transient ischemia attack  [ ] Encephalopathy - Toxic or Metabolic          A/P:    67yo M PMH CAD (2 stents 2020), HFmrEF (45-50%), HTN, PAD w/ remote RLE angioplasty, R 4th toe OM s/p partial R 4th ray amputation on 10/24, RLE angiogram with AT lithotripsy and AT/peroneal balloon angioplasty 10/27, uncontrolled IDDM (a1c 12 in Oct 2023), recent admission 11/8-11/10 to cardiology service for hypertensive emergency (left AMA, was given levaquin when he left for right foot wound that pt had started treatment for back in October) who presented to [facility] after a fall onto his knees and was having b/l knee pain. Found to have increased soft tissue gas in R foot on CT scan. 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 Diff    Diet: consistent carbs    Activity: OOB to chair, PT Consult    DVTPPx: HSQ    Dispo: 5 Uris telemetry, KERRY

## 2023-12-21 NOTE — PROGRESS NOTE ADULT - ASSESSMENT
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    #Diabetic Foot Infection   -Pt s/p BKA on 12/11 and  s/p closure on 12/18 Monday  -Continue pain medication as per primary team   -White count downtrending post op     # C difficile infection   -WBC downtrending. stools more formed  Continue PO vancomycin, contact precautions     #Acute Blood Loss Anemia   -Pt s/p transfusion of 1 unit of PRBCS on 12/19;  Continue to monitor Hgb while inpatient     #CAD   -Continue with ASA 81mg daily and Atorvastatin 80mg qhs     #Chronic HFmrEF   #HTN  -Continue with lisinopril 40mg daily, coreg 25mg BID, and hydralazine 100mg TID  IV diuresis today and tomorrow 80mg furosemide IV per cards recs   Discharge diuretic regimen per cardiology recs.     #Moderate Aortic Stenosis  -outpatient follow up for surveillance TTE     #Type 2 Diabetes c/b PAD, diabetic foot infection and hyperglycemia   inpatient and discharge insulin regimen per endocrine consult     DVT PPx  - Heparin SQ     #DISPO  -Awaiting [facility] placement

## 2023-12-21 NOTE — PROGRESS NOTE ADULT - ATTENDING COMMENTS
Pt seen on rounds this afternoon.  Glucoses have been fluctuating, with not all of these explainable--tierra the drop to 77 before lunch today after a full breakfast.  He is very disturbed about the peripheral edema, which is essentially 4+ in his lower extremities and scrotum.  He is being diuresed, and was told that it would likely take 3 days to mobilize the fluid.  --With AM fingerstick elevated to 221 (he denied overnight snacking), to increase the Lantus to 14 units  --Will keep the premeal at 6 units lispro, mostly because of the drop after breakfast today

## 2023-12-21 NOTE — PROGRESS NOTE ADULT - SUBJECTIVE AND OBJECTIVE BOX
VASCULAR CARDIOLOGY ATTENDING PROGRESS NOTE    SERVICE LINE: [phone]    Subjective:    No acute events overnight.   ROS negative.     Current Medications:   acetaminophen     Tablet .. 650 milliGRAM(s) Oral every 6 hours  aspirin  chewable 81 milliGRAM(s) Oral daily  atorvastatin 80 milliGRAM(s) Oral at bedtime  carvedilol 25 milliGRAM(s) Oral every 12 hours  gabapentin 800 milliGRAM(s) Oral three times a day  heparin   Injectable 5000 Unit(s) SubCutaneous every 8 hours  hydrALAZINE 75 milliGRAM(s) Oral three times a day  HYDROmorphone  Injectable 1 milliGRAM(s) IV Push every 6 hours PRN  insulin glargine Injectable (LANTUS) 10 Unit(s) SubCutaneous at bedtime  insulin lispro (ADMELOG) corrective regimen sliding scale   SubCutaneous Before meals and at bedtime  insulin lispro Injectable (ADMELOG) 8 Unit(s) SubCutaneous before lunch  insulin lispro Injectable (ADMELOG) 6 Unit(s) SubCutaneous before breakfast  insulin lispro Injectable (ADMELOG) 8 Unit(s) SubCutaneous before dinner  lisinopril 40 milliGRAM(s) Oral daily  oxyCODONE    IR 10 milliGRAM(s) Oral every 8 hours PRN  oxyCODONE    IR 5 milliGRAM(s) Oral every 8 hours PRN  vancomycin    Solution 125 milliGRAM(s) Oral every 6 hours      REVIEW OF SYSTEMS:  CONSTITUTIONAL: No weakness, fevers or chills  EYES/ENT: No visual changes;  No dysphagia  NECK: No pain or stiffness  RESPIRATORY: No cough, wheezing, hemoptysis; No shortness of breath  CARDIOVASCULAR: No chest pain or palpitations; No lower extremity edema  GASTROINTESTINAL: No abdominal or epigastric pain. No nausea, vomiting, or hematemesis; No diarrhea or constipation. No melena or hematochezia.  BACK: No back pain  GENITOURINARY: No dysuria, frequency or hematuria  NEUROLOGICAL: No numbness or weakness  SKIN: No itching, burning, rashes, or lesions   All other review of systems is negative unless indicated above.    Physical Exam:  T(F): 99 (12-20), Max: 99 (12-19)  HR: 74 (12-20) (69 - 77)  BP: 144/64 (12-20) (107/51 - 167/64)  BP(mean): 125 (12-20) (73 - 125)  ABP: --  ABP(mean): --  RR: 18 (12-20)  SpO2: 97% (12-20)  GENERAL: No acute distress, well-developed  HEAD:  Atraumatic, Normocephalic  ENT: +JVD  CHEST/LUNG: Clear to auscultation bilaterally; No wheeze, equal breath sounds bilaterally   BACK: No spinal tenderness  HEART: Regular rate and rhythm; No murmurs, rubs, or gallops  ABDOMEN: Soft, Nontender, Nondistended; Bowel sounds present  EXTREMITIES: 2+ pitting edema b/l LE and scrotal edema.  PSYCH: Nl behavior, nl affect  NEUROLOGY: AAOx3, non-focal, cranial nerves intact  SKIN: Normal color, No rashes or lesions    Cardiovascular Diagnostic Testing: personally reviewed    CXR: Personally reviewed    Labs: Personally reviewed                        7.8    14.97 )-----------( 192      ( 20 Dec 2023 05:30 )             24.5     12-20    140  |  109<H>  |  27<H>  ----------------------------<  108<H>  3.9   |  26  |  1.31<H>    Ca    8.3<L>      20 Dec 2023 05:30  Phos  3.7     12-20  Mg     2.1     12-20                         VASCULAR CARDIOLOGY ATTENDING PROGRESS NOTE    SERVICE LINE: [phone]    Subjective:    No acute events overnight.   ROS negative.     Current Medications:   acetaminophen     Tablet .. 650 milliGRAM(s) Oral every 6 hours  aspirin  chewable 81 milliGRAM(s) Oral daily  atorvastatin 80 milliGRAM(s) Oral at bedtime  carvedilol 25 milliGRAM(s) Oral every 12 hours  gabapentin 800 milliGRAM(s) Oral three times a day  heparin   Injectable 5000 Unit(s) SubCutaneous every 8 hours  hydrALAZINE 75 milliGRAM(s) Oral three times a day  HYDROmorphone  Injectable 1 milliGRAM(s) IV Push every 6 hours PRN  insulin glargine Injectable (LANTUS) 10 Unit(s) SubCutaneous at bedtime  insulin lispro (ADMELOG) corrective regimen sliding scale   SubCutaneous Before meals and at bedtime  insulin lispro Injectable (ADMELOG) 8 Unit(s) SubCutaneous before lunch  insulin lispro Injectable (ADMELOG) 6 Unit(s) SubCutaneous before breakfast  insulin lispro Injectable (ADMELOG) 8 Unit(s) SubCutaneous before dinner  lisinopril 40 milliGRAM(s) Oral daily  oxyCODONE    IR 10 milliGRAM(s) Oral every 8 hours PRN  oxyCODONE    IR 5 milliGRAM(s) Oral every 8 hours PRN  vancomycin    Solution 125 milliGRAM(s) Oral every 6 hours      REVIEW OF SYSTEMS:  CONSTITUTIONAL: No weakness, fevers or chills  EYES/ENT: No visual changes;  No dysphagia  NECK: No pain or stiffness  RESPIRATORY: No cough, wheezing, hemoptysis; No shortness of breath  CARDIOVASCULAR: No chest pain or palpitations; No lower extremity edema  GASTROINTESTINAL: No abdominal or epigastric pain. No nausea, vomiting, or hematemesis; No diarrhea or constipation. No melena or hematochezia.  BACK: No back pain  GENITOURINARY: No dysuria, frequency or hematuria  NEUROLOGICAL: No numbness or weakness  SKIN: No itching, burning, rashes, or lesions   All other review of systems is negative unless indicated above.    Physical Exam:  T(F): 99 (12-20), Max: 99 (12-19)  HR: 74 (12-20) (69 - 77)  BP: 144/64 (12-20) (107/51 - 167/64)  BP(mean): 125 (12-20) (73 - 125)  ABP: --  ABP(mean): --  RR: 18 (12-20)  SpO2: 97% (12-20)  GENERAL: No acute distress, well-developed  HEAD:  Atraumatic, Normocephalic  ENT: +JVD  CHEST/LUNG: Clear to auscultation bilaterally; No wheeze, equal breath sounds bilaterally   BACK: No spinal tenderness  HEART: Regular rate and rhythm; No murmurs, rubs, or gallops  ABDOMEN: Soft, Nontender, Nondistended; Bowel sounds present  EXTREMITIES: 2+ pitting edema b/l LE and scrotal edema.  PSYCH: Nl behavior, nl affect  NEUROLOGY: AAOx3, non-focal, cranial nerves intact  SKIN: Normal color, No rashes or lesions    Cardiovascular Diagnostic Testing: personally reviewed    CXR: Personally reviewed    Labs: Personally reviewed                        7.8    14.97 )-----------( 192      ( 20 Dec 2023 05:30 )             24.5     12-20    140  |  109<H>  |  27<H>  ----------------------------<  108<H>  3.9   |  26  |  1.31<H>    Ca    8.3<L>      20 Dec 2023 05:30  Phos  3.7     12-20  Mg     2.1     12-20

## 2023-12-21 NOTE — PROGRESS NOTE ADULT - SUBJECTIVE AND OBJECTIVE BOX
SUBJECTIVE / INTERVAL HPI: Patient was seen and examined this morning.     Overnight events:  Patient was seen and examined this morning. Pain is manageable. No diarrhea.    12/11: lantus 10 + lispro 4  12/12: lantus 7 + lispro 3  12/13: lantus 7 + lispro 3  12/14: lantus 6 + lispro 3  12/15: lantus 8 + lispro 4  12/18-20: lantus 10 + lispro 6      CAPILLARY BLOOD GLUCOSE & INSULIN RECEIVED  186 mg/dL (12-19 @ 21:48)  170 mg/dL (12-19 @ 22:19)  122 mg/dL (12-20 @ 08:27)  92 mg/dL (12-20 @ 12:22)  161 mg/dL (12-20 @ 17:06) 6 + 1  230 mg/dL (12-20 @ 22:38) 10 + 2  221 mg/dL (12-21 @ 08:37) 6 + 2      REVIEW OF SYSTEMS  Constitutional:  Negative fever, chills or loss of appetite.  Eyes:  Negative blurry vision or double vision.  Cardiovascular:  Negative for chest pain or palpitations.  Respiratory:  Negative for cough, wheezing, or shortness of breath.    Gastrointestinal:  Negative for nausea, vomiting, diarrhea, constipation, or abdominal pain.  Genitourinary:  Negative frequency, urgency or dysuria.  Neurologic:  No headache, confusion, dizziness, lightheadedness.    PHYSICAL EXAM  Vital Signs Last 24 Hrs  T(C): 36.7 (21 Dec 2023 06:33), Max: 36.9 (20 Dec 2023 20:56)  T(F): 98 (21 Dec 2023 06:33), Max: 98.5 (20 Dec 2023 20:56)  HR: 62 (21 Dec 2023 07:55) (60 - 72)  BP: 161/73 (21 Dec 2023 07:55) (119/70 - 171/78)  BP(mean): 105 (21 Dec 2023 07:55) (89 - 121)  RR: 18 (21 Dec 2023 07:55) (17 - 18)  SpO2: 95% (20 Dec 2023 20:56) (95% - 97%)    Parameters below as of 21 Dec 2023 07:55  Patient On (Oxygen Delivery Method): room air        Constitutional: Awake, alert, in no acute distress.   HEENT: Normocephalic, atraumatic, RAGHAV.  Respiratory: Lungs clear to ausculation bilaterally.   Cardiovascular: regular rhythm, normal S1 and S2, no audible murmurs.   GI: soft, non-tender, non-distended, bowel sounds present.  Extremities: No lower extremity edema.  Psychiatric: AAO x 3. Normal affect/mood.     LABS  CBC - WBC/HGB/HTC/PLT: 12.04/7.3/24.1/202 (12-21-23)  BMP - Na/K/Cl/Bicarb/BUN/Cr/Gluc/AG/eGFR: 138/3.9/108/26/29/1.44/139/4/54 (12-21-23)  Ca - 8.2 (12-21-23)  Phos - 3.6 (12-21-23)  Mg - 2.0 (12-21-23)  LFT - Alb/Tprot/Tbili/Dbili/AlkPhos/ALT/AST: 1.7/--/0.2/--/113/16/17 (12-18-23)  PT/aPTT/INR: 10.6/--/0.93 (12-18-23)           MEDICATIONS  MEDICATIONS  (STANDING):  acetaminophen     Tablet .. 650 milliGRAM(s) Oral every 6 hours  aspirin  chewable 81 milliGRAM(s) Oral daily  atorvastatin 80 milliGRAM(s) Oral at bedtime  carvedilol 25 milliGRAM(s) Oral every 12 hours  furosemide   Injectable 40 milliGRAM(s) IV Push two times a day  furosemide   Injectable 40 milliGRAM(s) IV Push once  gabapentin 800 milliGRAM(s) Oral three times a day  heparin   Injectable 5000 Unit(s) SubCutaneous every 8 hours  hydrALAZINE 100 milliGRAM(s) Oral three times a day  insulin glargine Injectable (LANTUS) 10 Unit(s) SubCutaneous at bedtime  insulin lispro (ADMELOG) corrective regimen sliding scale   SubCutaneous Before meals and at bedtime  insulin lispro Injectable (ADMELOG) 8 Unit(s) SubCutaneous before lunch  insulin lispro Injectable (ADMELOG) 8 Unit(s) SubCutaneous before dinner  insulin lispro Injectable (ADMELOG) 6 Unit(s) SubCutaneous before breakfast  lisinopril 40 milliGRAM(s) Oral daily  vancomycin    Solution 125 milliGRAM(s) Oral every 6 hours    MEDICATIONS  (PRN):  HYDROmorphone  Injectable 1 milliGRAM(s) IV Push every 6 hours PRN Severe Pain (7 - 10)  oxyCODONE    IR 10 milliGRAM(s) Oral every 8 hours PRN Moderate Pain (4 - 6)  oxyCODONE    IR 5 milliGRAM(s) Oral every 8 hours PRN Mild Pain (1 - 3)    ASSESSMENT / RECOMMENDATIONS    69yo M PMH CAD (2 stents 2020), HFmrEF (45-50%), HTN, PAD w/ remote RLE angioplasty, R 4th toe OM s/p partial R 4th ray amputation on 10/24, RLE angiogram with AT lithotripsy and AT/peroneal balloon angioplasty 10/27, uncontrolled IDDM (a1c 12 in Oct 2023), recent admission 11/8-11/10 to cardiology service for hypertensive emergency (left AMA, was given levaquin when he left for right foot wound that pt had started treatment for back in October) who presented to [facility] after a fall onto his knees and was having b/l knee pain. 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    A1C: 13.5 %  Insulinopenic  C-peptide 0.5 with , JAMIR/ICA neg (Oct 2023)  C-peptide 1.0 12/15 with   BUN: 27  Creatinine: 1.31  GFR: 60  Weight: 89  BMI: 30.7    Discharged early Oct 2023 on Lantus 11 and lispro 7. He reports taking Lantus 12-14 and lispro 7. He reports that since this change his FSG <150, with no hypoglycemia.   Diabetes managed outpatient by: endocrinologist, unsure of the name      # Type 2 diabetes mellitus with hyperglycemia  - Please keep lantus  units at bedtime.   - keep lispro   units before each meal.  - Continue lispro low dose sliding scale before meals and at bedtime.  - Patient's fingerstick glucose goal is 100-180 mg/dL.    - Discharge recommendations: basal/bolus insulin, dose TBD.   - Patient can follow up at discharge with [facility] by calling [phone] to make an appointment.      Case discussed with Dr. Huertas. Primary team updated.       Christa Alexis  Endocrinology Fellow    Service Pager: [phone]    SUBJECTIVE / INTERVAL HPI: Patient was seen and examined this morning.     Overnight events:  Patient was seen and examined this morning. Pain is manageable. No diarrhea.    12/11: lantus 10 + lispro 4  12/12: lantus 7 + lispro 3  12/13: lantus 7 + lispro 3  12/14: lantus 6 + lispro 3  12/15: lantus 8 + lispro 4  12/18-20: lantus 10 + lispro 6      CAPILLARY BLOOD GLUCOSE & INSULIN RECEIVED  186 mg/dL (12-19 @ 21:48)  170 mg/dL (12-19 @ 22:19)  122 mg/dL (12-20 @ 08:27)  92 mg/dL (12-20 @ 12:22)  161 mg/dL (12-20 @ 17:06) 6 + 1  230 mg/dL (12-20 @ 22:38) 10 + 2  221 mg/dL (12-21 @ 08:37) 6 + 2      REVIEW OF SYSTEMS  Constitutional:  Negative fever, chills or loss of appetite.  Eyes:  Negative blurry vision or double vision.  Cardiovascular:  Negative for chest pain or palpitations.  Respiratory:  Negative for cough, wheezing, or shortness of breath.    Gastrointestinal:  Negative for nausea, vomiting, diarrhea, constipation, or abdominal pain.  Genitourinary:  Negative frequency, urgency or dysuria.  Neurologic:  No headache, confusion, dizziness, lightheadedness.    PHYSICAL EXAM  Vital Signs Last 24 Hrs  T(C): 36.7 (21 Dec 2023 06:33), Max: 36.9 (20 Dec 2023 20:56)  T(F): 98 (21 Dec 2023 06:33), Max: 98.5 (20 Dec 2023 20:56)  HR: 62 (21 Dec 2023 07:55) (60 - 72)  BP: 161/73 (21 Dec 2023 07:55) (119/70 - 171/78)  BP(mean): 105 (21 Dec 2023 07:55) (89 - 121)  RR: 18 (21 Dec 2023 07:55) (17 - 18)  SpO2: 95% (20 Dec 2023 20:56) (95% - 97%)    Parameters below as of 21 Dec 2023 07:55  Patient On (Oxygen Delivery Method): room air        Constitutional: Awake, alert, in no acute distress.   HEENT: Normocephalic, atraumatic, RAGHAV.  Respiratory: Lungs clear to ausculation bilaterally.   Cardiovascular: regular rhythm, normal S1 and S2, no audible murmurs.   GI: soft, non-tender, non-distended, bowel sounds present.  Extremities: No lower extremity edema.  Psychiatric: AAO x 3. Normal affect/mood.     LABS  CBC - WBC/HGB/HTC/PLT: 12.04/7.3/24.1/202 (12-21-23)  BMP - Na/K/Cl/Bicarb/BUN/Cr/Gluc/AG/eGFR: 138/3.9/108/26/29/1.44/139/4/54 (12-21-23)  Ca - 8.2 (12-21-23)  Phos - 3.6 (12-21-23)  Mg - 2.0 (12-21-23)  LFT - Alb/Tprot/Tbili/Dbili/AlkPhos/ALT/AST: 1.7/--/0.2/--/113/16/17 (12-18-23)  PT/aPTT/INR: 10.6/--/0.93 (12-18-23)           MEDICATIONS  MEDICATIONS  (STANDING):  acetaminophen     Tablet .. 650 milliGRAM(s) Oral every 6 hours  aspirin  chewable 81 milliGRAM(s) Oral daily  atorvastatin 80 milliGRAM(s) Oral at bedtime  carvedilol 25 milliGRAM(s) Oral every 12 hours  furosemide   Injectable 40 milliGRAM(s) IV Push two times a day  furosemide   Injectable 40 milliGRAM(s) IV Push once  gabapentin 800 milliGRAM(s) Oral three times a day  heparin   Injectable 5000 Unit(s) SubCutaneous every 8 hours  hydrALAZINE 100 milliGRAM(s) Oral three times a day  insulin glargine Injectable (LANTUS) 10 Unit(s) SubCutaneous at bedtime  insulin lispro (ADMELOG) corrective regimen sliding scale   SubCutaneous Before meals and at bedtime  insulin lispro Injectable (ADMELOG) 8 Unit(s) SubCutaneous before lunch  insulin lispro Injectable (ADMELOG) 8 Unit(s) SubCutaneous before dinner  insulin lispro Injectable (ADMELOG) 6 Unit(s) SubCutaneous before breakfast  lisinopril 40 milliGRAM(s) Oral daily  vancomycin    Solution 125 milliGRAM(s) Oral every 6 hours    MEDICATIONS  (PRN):  HYDROmorphone  Injectable 1 milliGRAM(s) IV Push every 6 hours PRN Severe Pain (7 - 10)  oxyCODONE    IR 10 milliGRAM(s) Oral every 8 hours PRN Moderate Pain (4 - 6)  oxyCODONE    IR 5 milliGRAM(s) Oral every 8 hours PRN Mild Pain (1 - 3)    ASSESSMENT / RECOMMENDATIONS    69yo M PMH CAD (2 stents 2020), HFmrEF (45-50%), HTN, PAD w/ remote RLE angioplasty, R 4th toe OM s/p partial R 4th ray amputation on 10/24, RLE angiogram with AT lithotripsy and AT/peroneal balloon angioplasty 10/27, uncontrolled IDDM (a1c 12 in Oct 2023), recent admission 11/8-11/10 to cardiology service for hypertensive emergency (left AMA, was given levaquin when he left for right foot wound that pt had started treatment for back in October) who presented to [facility] after a fall onto his knees and was having b/l knee pain. 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    A1C: 13.5 %  Insulinopenic  C-peptide 0.5 with , JAMIR/ICA neg (Oct 2023)  C-peptide 1.0 12/15 with   BUN: 27  Creatinine: 1.31  GFR: 60  Weight: 89  BMI: 30.7    Discharged early Oct 2023 on Lantus 11 and lispro 7. He reports taking Lantus 12-14 and lispro 7. He reports that since this change his FSG <150, with no hypoglycemia.   Diabetes managed outpatient by: endocrinologist, unsure of the name      # Type 2 diabetes mellitus with hyperglycemia  - Please keep lantus  units at bedtime.   - keep lispro   units before each meal.  - Continue lispro low dose sliding scale before meals and at bedtime.  - Patient's fingerstick glucose goal is 100-180 mg/dL.    - Discharge recommendations: basal/bolus insulin, dose TBD.   - Patient can follow up at discharge with [facility] by calling [phone] to make an appointment.      Case discussed with Dr. Huertas. Primary team updated.       Christa Alexis  Endocrinology Fellow    Service Pager: [phone]    SUBJECTIVE / INTERVAL HPI: Patient was seen and examined this morning.     Overnight events:  Patient was seen and examined this morning. Pain is manageable.   he states he had rice and chicken last night, denies snacking  he had scrambled eggs, turkey link and cereal for breakfast today    12/11: lantus 10 + lispro 4  12/12: lantus 7 + lispro 3  12/13: lantus 7 + lispro 3  12/14: lantus 6 + lispro 3  12/15: lantus 8 + lispro 4  12/18-20: lantus 10 + lispro 6      CAPILLARY BLOOD GLUCOSE & INSULIN RECEIVED  186 mg/dL (12-19 @ 21:48)  170 mg/dL (12-19 @ 22:19)  122 mg/dL (12-20 @ 08:27)  92 mg/dL (12-20 @ 12:22)  161 mg/dL (12-20 @ 17:06) 6 + 1  230 mg/dL (12-20 @ 22:38) 10 + 2  221 mg/dL (12-21 @ 08:37) 6 + 2  77    REVIEW OF SYSTEMS  Constitutional:  Negative fever, chills or loss of appetite.  Eyes:  Negative blurry vision or double vision.  Cardiovascular:  Negative for chest pain or palpitations.  Respiratory:  Negative for cough, wheezing, or shortness of breath.    Gastrointestinal:  Negative for nausea, vomiting, diarrhea, constipation, or abdominal pain.  Genitourinary:  Negative frequency, urgency or dysuria.  Neurologic:  No headache, confusion, dizziness, lightheadedness.    PHYSICAL EXAM  Vital Signs Last 24 Hrs  T(C): 36.7 (21 Dec 2023 06:33), Max: 36.9 (20 Dec 2023 20:56)  T(F): 98 (21 Dec 2023 06:33), Max: 98.5 (20 Dec 2023 20:56)  HR: 62 (21 Dec 2023 07:55) (60 - 72)  BP: 161/73 (21 Dec 2023 07:55) (119/70 - 171/78)  BP(mean): 105 (21 Dec 2023 07:55) (89 - 121)  RR: 18 (21 Dec 2023 07:55) (17 - 18)  SpO2: 95% (20 Dec 2023 20:56) (95% - 97%)    Parameters below as of 21 Dec 2023 07:55  Patient On (Oxygen Delivery Method): room air        Constitutional: Awake, alert, in no acute distress.   HEENT: Normocephalic, atraumatic, RAGHAV.  Respiratory: Lungs clear to ausculation bilaterally.   Cardiovascular: regular rhythm, normal S1 and S2, no audible murmurs.   GI: soft, non-tender, non-distended, bowel sounds present.  Extremities: No lower extremity edema.  Psychiatric: AAO x 3. Normal affect/mood.     LABS  CBC - WBC/HGB/HTC/PLT: 12.04/7.3/24.1/202 (12-21-23)  BMP - Na/K/Cl/Bicarb/BUN/Cr/Gluc/AG/eGFR: 138/3.9/108/26/29/1.44/139/4/54 (12-21-23)  Ca - 8.2 (12-21-23)  Phos - 3.6 (12-21-23)  Mg - 2.0 (12-21-23)  LFT - Alb/Tprot/Tbili/Dbili/AlkPhos/ALT/AST: 1.7/--/0.2/--/113/16/17 (12-18-23)  PT/aPTT/INR: 10.6/--/0.93 (12-18-23)           MEDICATIONS  MEDICATIONS  (STANDING):  acetaminophen     Tablet .. 650 milliGRAM(s) Oral every 6 hours  aspirin  chewable 81 milliGRAM(s) Oral daily  atorvastatin 80 milliGRAM(s) Oral at bedtime  carvedilol 25 milliGRAM(s) Oral every 12 hours  furosemide   Injectable 40 milliGRAM(s) IV Push two times a day  furosemide   Injectable 40 milliGRAM(s) IV Push once  gabapentin 800 milliGRAM(s) Oral three times a day  heparin   Injectable 5000 Unit(s) SubCutaneous every 8 hours  hydrALAZINE 100 milliGRAM(s) Oral three times a day  insulin glargine Injectable (LANTUS) 10 Unit(s) SubCutaneous at bedtime  insulin lispro (ADMELOG) corrective regimen sliding scale   SubCutaneous Before meals and at bedtime  insulin lispro Injectable (ADMELOG) 8 Unit(s) SubCutaneous before lunch  insulin lispro Injectable (ADMELOG) 8 Unit(s) SubCutaneous before dinner  insulin lispro Injectable (ADMELOG) 6 Unit(s) SubCutaneous before breakfast  lisinopril 40 milliGRAM(s) Oral daily  vancomycin    Solution 125 milliGRAM(s) Oral every 6 hours    MEDICATIONS  (PRN):  HYDROmorphone  Injectable 1 milliGRAM(s) IV Push every 6 hours PRN Severe Pain (7 - 10)  oxyCODONE    IR 10 milliGRAM(s) Oral every 8 hours PRN Moderate Pain (4 - 6)  oxyCODONE    IR 5 milliGRAM(s) Oral every 8 hours PRN Mild Pain (1 - 3)    ASSESSMENT / RECOMMENDATIONS    67yo M PMH CAD (2 stents 2020), HFmrEF (45-50%), HTN, PAD w/ remote RLE angioplasty, R 4th toe OM s/p partial R 4th ray amputation on 10/24, RLE angiogram with AT lithotripsy and AT/peroneal balloon angioplasty 10/27, uncontrolled IDDM (a1c 12 in Oct 2023), recent admission 11/8-11/10 to cardiology service for hypertensive emergency (left AMA, was given levaquin when he left for right foot wound that pt had started treatment for back in October) who presented to [facility] after a fall onto his knees and was having b/l knee pain. 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    A1C: 13.5 %  Insulinopenic  C-peptide 0.5 with , JAMIR/ICA neg (Oct 2023)  C-peptide 1.0 12/15 with   BUN: 27  Creatinine: 1.31  GFR: 60  Weight: 89  BMI: 30.7    Discharged early Oct 2023 on Lantus 11 and lispro 7. He reports taking Lantus 12-14 and lispro 7. He reports that since this change his FSG <150, with no hypoglycemia.   Diabetes managed outpatient by: endocrinologist, unsure of the name      # Type 2 diabetes mellitus with hyperglycemia  - Please keep lantus  units at bedtime.   - keep lispro   units before each meal.  - Continue lispro low dose sliding scale before meals and at bedtime.  - Patient's fingerstick glucose goal is 100-180 mg/dL.    - Discharge recommendations: basal/bolus insulin, dose TBD.   - Patient can follow up at discharge with [facility] by calling [phone] to make an appointment.      Case discussed with Dr. Huertas. Primary team updated.       Christa Alexis  Endocrinology Fellow    Service Pager: [phone]    SUBJECTIVE / INTERVAL HPI: Patient was seen and examined this morning.     Overnight events:  Patient was seen and examined this morning. Pain is manageable.   he states he had rice and chicken last night, denies snacking  he had scrambled eggs, turkey link and cereal for breakfast today    12/11: lantus 10 + lispro 4  12/12: lantus 7 + lispro 3  12/13: lantus 7 + lispro 3  12/14: lantus 6 + lispro 3  12/15: lantus 8 + lispro 4  12/18-20: lantus 10 + lispro 6      CAPILLARY BLOOD GLUCOSE & INSULIN RECEIVED  186 mg/dL (12-19 @ 21:48)  170 mg/dL (12-19 @ 22:19)  122 mg/dL (12-20 @ 08:27)  92 mg/dL (12-20 @ 12:22)  161 mg/dL (12-20 @ 17:06) 6 + 1  230 mg/dL (12-20 @ 22:38) 10 + 2  221 mg/dL (12-21 @ 08:37) 6 + 2  77    REVIEW OF SYSTEMS  Constitutional:  Negative fever, chills or loss of appetite.  Eyes:  Negative blurry vision or double vision.  Cardiovascular:  Negative for chest pain or palpitations.  Respiratory:  Negative for cough, wheezing, or shortness of breath.    Gastrointestinal:  Negative for nausea, vomiting, diarrhea, constipation, or abdominal pain.  Genitourinary:  Negative frequency, urgency or dysuria.  Neurologic:  No headache, confusion, dizziness, lightheadedness.    PHYSICAL EXAM  Vital Signs Last 24 Hrs  T(C): 36.7 (21 Dec 2023 06:33), Max: 36.9 (20 Dec 2023 20:56)  T(F): 98 (21 Dec 2023 06:33), Max: 98.5 (20 Dec 2023 20:56)  HR: 62 (21 Dec 2023 07:55) (60 - 72)  BP: 161/73 (21 Dec 2023 07:55) (119/70 - 171/78)  BP(mean): 105 (21 Dec 2023 07:55) (89 - 121)  RR: 18 (21 Dec 2023 07:55) (17 - 18)  SpO2: 95% (20 Dec 2023 20:56) (95% - 97%)    Parameters below as of 21 Dec 2023 07:55  Patient On (Oxygen Delivery Method): room air        Constitutional: Awake, alert, in no acute distress.   HEENT: Normocephalic, atraumatic, RAGHAV.  Respiratory: Lungs clear to ausculation bilaterally.   Cardiovascular: regular rhythm, normal S1 and S2, no audible murmurs.   GI: soft, non-tender, non-distended, bowel sounds present.  Extremities: No lower extremity edema.  Psychiatric: AAO x 3. Normal affect/mood.     LABS  CBC - WBC/HGB/HTC/PLT: 12.04/7.3/24.1/202 (12-21-23)  BMP - Na/K/Cl/Bicarb/BUN/Cr/Gluc/AG/eGFR: 138/3.9/108/26/29/1.44/139/4/54 (12-21-23)  Ca - 8.2 (12-21-23)  Phos - 3.6 (12-21-23)  Mg - 2.0 (12-21-23)  LFT - Alb/Tprot/Tbili/Dbili/AlkPhos/ALT/AST: 1.7/--/0.2/--/113/16/17 (12-18-23)  PT/aPTT/INR: 10.6/--/0.93 (12-18-23)           MEDICATIONS  MEDICATIONS  (STANDING):  acetaminophen     Tablet .. 650 milliGRAM(s) Oral every 6 hours  aspirin  chewable 81 milliGRAM(s) Oral daily  atorvastatin 80 milliGRAM(s) Oral at bedtime  carvedilol 25 milliGRAM(s) Oral every 12 hours  furosemide   Injectable 40 milliGRAM(s) IV Push two times a day  furosemide   Injectable 40 milliGRAM(s) IV Push once  gabapentin 800 milliGRAM(s) Oral three times a day  heparin   Injectable 5000 Unit(s) SubCutaneous every 8 hours  hydrALAZINE 100 milliGRAM(s) Oral three times a day  insulin glargine Injectable (LANTUS) 10 Unit(s) SubCutaneous at bedtime  insulin lispro (ADMELOG) corrective regimen sliding scale   SubCutaneous Before meals and at bedtime  insulin lispro Injectable (ADMELOG) 8 Unit(s) SubCutaneous before lunch  insulin lispro Injectable (ADMELOG) 8 Unit(s) SubCutaneous before dinner  insulin lispro Injectable (ADMELOG) 6 Unit(s) SubCutaneous before breakfast  lisinopril 40 milliGRAM(s) Oral daily  vancomycin    Solution 125 milliGRAM(s) Oral every 6 hours    MEDICATIONS  (PRN):  HYDROmorphone  Injectable 1 milliGRAM(s) IV Push every 6 hours PRN Severe Pain (7 - 10)  oxyCODONE    IR 10 milliGRAM(s) Oral every 8 hours PRN Moderate Pain (4 - 6)  oxyCODONE    IR 5 milliGRAM(s) Oral every 8 hours PRN Mild Pain (1 - 3)    ASSESSMENT / RECOMMENDATIONS    67yo M PMH CAD (2 stents 2020), HFmrEF (45-50%), HTN, PAD w/ remote RLE angioplasty, R 4th toe OM s/p partial R 4th ray amputation on 10/24, RLE angiogram with AT lithotripsy and AT/peroneal balloon angioplasty 10/27, uncontrolled IDDM (a1c 12 in Oct 2023), recent admission 11/8-11/10 to cardiology service for hypertensive emergency (left AMA, was given levaquin when he left for right foot wound that pt had started treatment for back in October) who presented to [facility] after a fall onto his knees and was having b/l knee pain. 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    A1C: 13.5 %  Insulinopenic  C-peptide 0.5 with , JAMIR/ICA neg (Oct 2023)  C-peptide 1.0 12/15 with   BUN: 27  Creatinine: 1.31  GFR: 60  Weight: 89  BMI: 30.7    Discharged early Oct 2023 on Lantus 11 and lispro 7. He reports taking Lantus 12-14 and lispro 7. He reports that since this change his FSG <150, with no hypoglycemia.   Diabetes managed outpatient by: endocrinologist, unsure of the name      # Type 2 diabetes mellitus with hyperglycemia  - Please keep lantus  units at bedtime.   - keep lispro   units before each meal.  - Continue lispro low dose sliding scale before meals and at bedtime.  - Patient's fingerstick glucose goal is 100-180 mg/dL.    - Discharge recommendations: basal/bolus insulin, dose TBD.   - Patient can follow up at discharge with [facility] by calling [phone] to make an appointment.      Case discussed with Dr. Huertas. Primary team updated.       Christa Alexis  Endocrinology Fellow    Service Pager: [phone]    SUBJECTIVE / INTERVAL HPI: Patient was seen and examined this morning.     Overnight events:  Patient was seen and examined this morning. Pain is manageable.   he states he had rice and chicken last night, denies snacking  he had scrambled eggs, turkey link and cereal for breakfast today    12/11: lantus 10 + lispro 4  12/12: lantus 7 + lispro 3  12/13: lantus 7 + lispro 3  12/14: lantus 6 + lispro 3  12/15: lantus 8 + lispro 4  12/18-20: lantus 10 + lispro 6      CAPILLARY BLOOD GLUCOSE & INSULIN RECEIVED  186 mg/dL (12-19 @ 21:48)  170 mg/dL (12-19 @ 22:19)  122 mg/dL (12-20 @ 08:27)  92 mg/dL (12-20 @ 12:22)  161 mg/dL (12-20 @ 17:06) 6 + 1  230 mg/dL (12-20 @ 22:38) 10 + 2  221 mg/dL (12-21 @ 08:37) 6 + 2  77    REVIEW OF SYSTEMS  Constitutional:  Negative fever, chills or loss of appetite.  Eyes:  Negative blurry vision or double vision.  Cardiovascular:  Negative for chest pain or palpitations.  Respiratory:  Negative for cough, wheezing, or shortness of breath.    Gastrointestinal:  Negative for nausea, vomiting, diarrhea, constipation, or abdominal pain.  Genitourinary:  Negative frequency, urgency or dysuria.  Neurologic:  No headache, confusion, dizziness, lightheadedness.    PHYSICAL EXAM  Vital Signs Last 24 Hrs  T(C): 36.7 (21 Dec 2023 06:33), Max: 36.9 (20 Dec 2023 20:56)  T(F): 98 (21 Dec 2023 06:33), Max: 98.5 (20 Dec 2023 20:56)  HR: 62 (21 Dec 2023 07:55) (60 - 72)  BP: 161/73 (21 Dec 2023 07:55) (119/70 - 171/78)  BP(mean): 105 (21 Dec 2023 07:55) (89 - 121)  RR: 18 (21 Dec 2023 07:55) (17 - 18)  SpO2: 95% (20 Dec 2023 20:56) (95% - 97%)    Parameters below as of 21 Dec 2023 07:55  Patient On (Oxygen Delivery Method): room air        Constitutional: Awake, alert, in no acute distress.   HEENT: Normocephalic, atraumatic, RAGHAV.  Respiratory: Lungs clear to ausculation bilaterally.   Cardiovascular: regular rhythm, normal S1 and S2, no audible murmurs.   GI: soft, non-tender, non-distended, bowel sounds present.  Extremities: leg swelling, s/p r bka, dressing wrapped  Psychiatric: AAO x 3. Normal affect/mood.     LABS  CBC - WBC/HGB/HTC/PLT: 12.04/7.3/24.1/202 (12-21-23)  BMP - Na/K/Cl/Bicarb/BUN/Cr/Gluc/AG/eGFR: 138/3.9/108/26/29/1.44/139/4/54 (12-21-23)  Ca - 8.2 (12-21-23)  Phos - 3.6 (12-21-23)  Mg - 2.0 (12-21-23)  LFT - Alb/Tprot/Tbili/Dbili/AlkPhos/ALT/AST: 1.7/--/0.2/--/113/16/17 (12-18-23)  PT/aPTT/INR: 10.6/--/0.93 (12-18-23)           MEDICATIONS  MEDICATIONS  (STANDING):  acetaminophen     Tablet .. 650 milliGRAM(s) Oral every 6 hours  aspirin  chewable 81 milliGRAM(s) Oral daily  atorvastatin 80 milliGRAM(s) Oral at bedtime  carvedilol 25 milliGRAM(s) Oral every 12 hours  furosemide   Injectable 40 milliGRAM(s) IV Push two times a day  furosemide   Injectable 40 milliGRAM(s) IV Push once  gabapentin 800 milliGRAM(s) Oral three times a day  heparin   Injectable 5000 Unit(s) SubCutaneous every 8 hours  hydrALAZINE 100 milliGRAM(s) Oral three times a day  insulin glargine Injectable (LANTUS) 10 Unit(s) SubCutaneous at bedtime  insulin lispro (ADMELOG) corrective regimen sliding scale   SubCutaneous Before meals and at bedtime  insulin lispro Injectable (ADMELOG) 8 Unit(s) SubCutaneous before lunch  insulin lispro Injectable (ADMELOG) 8 Unit(s) SubCutaneous before dinner  insulin lispro Injectable (ADMELOG) 6 Unit(s) SubCutaneous before breakfast  lisinopril 40 milliGRAM(s) Oral daily  vancomycin    Solution 125 milliGRAM(s) Oral every 6 hours    MEDICATIONS  (PRN):  HYDROmorphone  Injectable 1 milliGRAM(s) IV Push every 6 hours PRN Severe Pain (7 - 10)  oxyCODONE    IR 10 milliGRAM(s) Oral every 8 hours PRN Moderate Pain (4 - 6)  oxyCODONE    IR 5 milliGRAM(s) Oral every 8 hours PRN Mild Pain (1 - 3)    ASSESSMENT / RECOMMENDATIONS    67yo M PMH CAD (2 stents 2020), HFmrEF (45-50%), HTN, PAD w/ remote RLE angioplasty, R 4th toe OM s/p partial R 4th ray amputation on 10/24, RLE angiogram with AT lithotripsy and AT/peroneal balloon angioplasty 10/27, uncontrolled IDDM (a1c 12 in Oct 2023), recent admission 11/8-11/10 to cardiology service for hypertensive emergency (left AMA, was given levaquin when he left for right foot wound that pt had started treatment for back in October) who presented to [facility] after a fall onto his knees and was having b/l knee pain. 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    A1C: 13.5 %  Insulinopenic  C-peptide 0.5 with , JAMIR/ICA neg (Oct 2023)  C-peptide 1.0 12/15 with   BUN: 27  Creatinine: 1.31  GFR: 60  Weight: 89  BMI: 30.7    Discharged early Oct 2023 on Lantus 11 and lispro 7. He reports taking Lantus 12-14 and lispro 7. He reports that since this change his FSG <150, with no hypoglycemia.   Diabetes managed outpatient by: endocrinologist, unsure of the name      # Type 2 diabetes mellitus with hyperglycemia  - Please keep lantus  units at bedtime.   - keep lispro   units before each meal.  - Continue lispro low dose sliding scale before meals and at bedtime.  - Patient's fingerstick glucose goal is 100-180 mg/dL.    - Discharge recommendations: basal/bolus insulin, dose TBD.   - Patient can follow up at discharge with [facility] by calling [phone] to make an appointment.      Case discussed with Dr. Huertas. Primary team updated.       Christa Alexis  Endocrinology Fellow    Service Pager: [phone]    SUBJECTIVE / INTERVAL HPI: Patient was seen and examined this morning.     Overnight events:  Patient was seen and examined this morning. Pain is manageable.   he states he had rice and chicken last night, denies snacking  he had scrambled eggs, turkey link and cereal for breakfast today    12/11: lantus 10 + lispro 4  12/12: lantus 7 + lispro 3  12/13: lantus 7 + lispro 3  12/14: lantus 6 + lispro 3  12/15: lantus 8 + lispro 4  12/18-20: lantus 10 + lispro 6      CAPILLARY BLOOD GLUCOSE & INSULIN RECEIVED  186 mg/dL (12-19 @ 21:48)  170 mg/dL (12-19 @ 22:19)  122 mg/dL (12-20 @ 08:27)  92 mg/dL (12-20 @ 12:22)  161 mg/dL (12-20 @ 17:06) 6 + 1  230 mg/dL (12-20 @ 22:38) 10 + 2  221 mg/dL (12-21 @ 08:37) 6 + 2  77    REVIEW OF SYSTEMS  Constitutional:  Negative fever, chills or loss of appetite.  Eyes:  Negative blurry vision or double vision.  Cardiovascular:  Negative for chest pain or palpitations.  Respiratory:  Negative for cough, wheezing, or shortness of breath.    Gastrointestinal:  Negative for nausea, vomiting, diarrhea, constipation, or abdominal pain.  Genitourinary:  Negative frequency, urgency or dysuria.  Neurologic:  No headache, confusion, dizziness, lightheadedness.    PHYSICAL EXAM  Vital Signs Last 24 Hrs  T(C): 36.7 (21 Dec 2023 06:33), Max: 36.9 (20 Dec 2023 20:56)  T(F): 98 (21 Dec 2023 06:33), Max: 98.5 (20 Dec 2023 20:56)  HR: 62 (21 Dec 2023 07:55) (60 - 72)  BP: 161/73 (21 Dec 2023 07:55) (119/70 - 171/78)  BP(mean): 105 (21 Dec 2023 07:55) (89 - 121)  RR: 18 (21 Dec 2023 07:55) (17 - 18)  SpO2: 95% (20 Dec 2023 20:56) (95% - 97%)    Parameters below as of 21 Dec 2023 07:55  Patient On (Oxygen Delivery Method): room air        Constitutional: Awake, alert, in no acute distress.   HEENT: Normocephalic, atraumatic, RAGHAV.  Respiratory: Lungs clear to ausculation bilaterally.   Cardiovascular: regular rhythm, normal S1 and S2, no audible murmurs.   GI: soft, non-tender, non-distended, bowel sounds present.  Extremities: leg swelling, s/p r bka, dressing wrapped  Psychiatric: AAO x 3. Normal affect/mood.     LABS  CBC - WBC/HGB/HTC/PLT: 12.04/7.3/24.1/202 (12-21-23)  BMP - Na/K/Cl/Bicarb/BUN/Cr/Gluc/AG/eGFR: 138/3.9/108/26/29/1.44/139/4/54 (12-21-23)  Ca - 8.2 (12-21-23)  Phos - 3.6 (12-21-23)  Mg - 2.0 (12-21-23)  LFT - Alb/Tprot/Tbili/Dbili/AlkPhos/ALT/AST: 1.7/--/0.2/--/113/16/17 (12-18-23)  PT/aPTT/INR: 10.6/--/0.93 (12-18-23)           MEDICATIONS  MEDICATIONS  (STANDING):  acetaminophen     Tablet .. 650 milliGRAM(s) Oral every 6 hours  aspirin  chewable 81 milliGRAM(s) Oral daily  atorvastatin 80 milliGRAM(s) Oral at bedtime  carvedilol 25 milliGRAM(s) Oral every 12 hours  furosemide   Injectable 40 milliGRAM(s) IV Push two times a day  furosemide   Injectable 40 milliGRAM(s) IV Push once  gabapentin 800 milliGRAM(s) Oral three times a day  heparin   Injectable 5000 Unit(s) SubCutaneous every 8 hours  hydrALAZINE 100 milliGRAM(s) Oral three times a day  insulin glargine Injectable (LANTUS) 10 Unit(s) SubCutaneous at bedtime  insulin lispro (ADMELOG) corrective regimen sliding scale   SubCutaneous Before meals and at bedtime  insulin lispro Injectable (ADMELOG) 8 Unit(s) SubCutaneous before lunch  insulin lispro Injectable (ADMELOG) 8 Unit(s) SubCutaneous before dinner  insulin lispro Injectable (ADMELOG) 6 Unit(s) SubCutaneous before breakfast  lisinopril 40 milliGRAM(s) Oral daily  vancomycin    Solution 125 milliGRAM(s) Oral every 6 hours    MEDICATIONS  (PRN):  HYDROmorphone  Injectable 1 milliGRAM(s) IV Push every 6 hours PRN Severe Pain (7 - 10)  oxyCODONE    IR 10 milliGRAM(s) Oral every 8 hours PRN Moderate Pain (4 - 6)  oxyCODONE    IR 5 milliGRAM(s) Oral every 8 hours PRN Mild Pain (1 - 3)    ASSESSMENT / RECOMMENDATIONS    69yo M PMH CAD (2 stents 2020), HFmrEF (45-50%), HTN, PAD w/ remote RLE angioplasty, R 4th toe OM s/p partial R 4th ray amputation on 10/24, RLE angiogram with AT lithotripsy and AT/peroneal balloon angioplasty 10/27, uncontrolled IDDM (a1c 12 in Oct 2023), recent admission 11/8-11/10 to cardiology service for hypertensive emergency (left AMA, was given levaquin when he left for right foot wound that pt had started treatment for back in October) who presented to [facility] after a fall onto his knees and was having b/l knee pain. 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    A1C: 13.5 %  Insulinopenic  C-peptide 0.5 with , JAMIR/ICA neg (Oct 2023)  C-peptide 1.0 12/15 with   BUN: 27  Creatinine: 1.31  GFR: 60  Weight: 89  BMI: 30.7    Discharged early Oct 2023 on Lantus 11 and lispro 7. He reports taking Lantus 12-14 and lispro 7. He reports that since this change his FSG <150, with no hypoglycemia.   Diabetes managed outpatient by: endocrinologist, unsure of the name      # Type 2 diabetes mellitus with hyperglycemia  - Please keep lantus  units at bedtime.   - keep lispro   units before each meal.  - Continue lispro low dose sliding scale before meals and at bedtime.  - Patient's fingerstick glucose goal is 100-180 mg/dL.    - Discharge recommendations: basal/bolus insulin, dose TBD.   - Patient can follow up at discharge with [facility] by calling [phone] to make an appointment.      Case discussed with Dr. Huertas. Primary team updated.       Christa Alexis  Endocrinology Fellow    Service Pager: [phone]    SUBJECTIVE / INTERVAL HPI: Patient was seen and examined this morning.     Overnight events:  Patient was seen and examined this morning. Pain is manageable.   he states he had rice and chicken last night, denies snacking  he had scrambled eggs, turkey link and cereal for breakfast today    12/11: lantus 10 + lispro 4  12/12: lantus 7 + lispro 3  12/13: lantus 7 + lispro 3  12/14: lantus 6 + lispro 3  12/15: lantus 8 + lispro 4  12/18-20: lantus 10 + lispro 6      CAPILLARY BLOOD GLUCOSE & INSULIN RECEIVED  186 mg/dL (12-19 @ 21:48)  170 mg/dL (12-19 @ 22:19)  122 mg/dL (12-20 @ 08:27)  92 mg/dL (12-20 @ 12:22)  161 mg/dL (12-20 @ 17:06) 6 + 1  230 mg/dL (12-20 @ 22:38) 10 + 2  221 mg/dL (12-21 @ 08:37) 6 + 2  77    REVIEW OF SYSTEMS  Constitutional:  Negative fever, chills or loss of appetite.  Eyes:  Negative blurry vision or double vision.  Cardiovascular:  Negative for chest pain or palpitations.  Respiratory:  Negative for cough, wheezing, or shortness of breath.    Gastrointestinal:  Negative for nausea, vomiting, diarrhea, constipation, or abdominal pain.  Genitourinary:  Negative frequency, urgency or dysuria.  Neurologic:  No headache, confusion, dizziness, lightheadedness.    PHYSICAL EXAM  Vital Signs Last 24 Hrs  T(C): 36.7 (21 Dec 2023 06:33), Max: 36.9 (20 Dec 2023 20:56)  T(F): 98 (21 Dec 2023 06:33), Max: 98.5 (20 Dec 2023 20:56)  HR: 62 (21 Dec 2023 07:55) (60 - 72)  BP: 161/73 (21 Dec 2023 07:55) (119/70 - 171/78)  BP(mean): 105 (21 Dec 2023 07:55) (89 - 121)  RR: 18 (21 Dec 2023 07:55) (17 - 18)  SpO2: 95% (20 Dec 2023 20:56) (95% - 97%)    Parameters below as of 21 Dec 2023 07:55  Patient On (Oxygen Delivery Method): room air        Constitutional: Awake, alert, in no acute distress.   HEENT: Normocephalic, atraumatic, RAGHAV.  Respiratory: Lungs clear to ausculation bilaterally.   Cardiovascular: regular rhythm, normal S1 and S2, no audible murmurs.   GI: soft, non-tender, non-distended, bowel sounds present.  Extremities: leg swelling, s/p r bka, dressing wrapped  Psychiatric: AAO x 3. Normal affect/mood.     LABS  CBC - WBC/HGB/HTC/PLT: 12.04/7.3/24.1/202 (12-21-23)  BMP - Na/K/Cl/Bicarb/BUN/Cr/Gluc/AG/eGFR: 138/3.9/108/26/29/1.44/139/4/54 (12-21-23)  Ca - 8.2 (12-21-23)  Phos - 3.6 (12-21-23)  Mg - 2.0 (12-21-23)  LFT - Alb/Tprot/Tbili/Dbili/AlkPhos/ALT/AST: 1.7/--/0.2/--/113/16/17 (12-18-23)  PT/aPTT/INR: 10.6/--/0.93 (12-18-23)           MEDICATIONS  MEDICATIONS  (STANDING):  acetaminophen     Tablet .. 650 milliGRAM(s) Oral every 6 hours  aspirin  chewable 81 milliGRAM(s) Oral daily  atorvastatin 80 milliGRAM(s) Oral at bedtime  carvedilol 25 milliGRAM(s) Oral every 12 hours  furosemide   Injectable 40 milliGRAM(s) IV Push two times a day  furosemide   Injectable 40 milliGRAM(s) IV Push once  gabapentin 800 milliGRAM(s) Oral three times a day  heparin   Injectable 5000 Unit(s) SubCutaneous every 8 hours  hydrALAZINE 100 milliGRAM(s) Oral three times a day  insulin glargine Injectable (LANTUS) 10 Unit(s) SubCutaneous at bedtime  insulin lispro (ADMELOG) corrective regimen sliding scale   SubCutaneous Before meals and at bedtime  insulin lispro Injectable (ADMELOG) 8 Unit(s) SubCutaneous before lunch  insulin lispro Injectable (ADMELOG) 8 Unit(s) SubCutaneous before dinner  insulin lispro Injectable (ADMELOG) 6 Unit(s) SubCutaneous before breakfast  lisinopril 40 milliGRAM(s) Oral daily  vancomycin    Solution 125 milliGRAM(s) Oral every 6 hours    MEDICATIONS  (PRN):  HYDROmorphone  Injectable 1 milliGRAM(s) IV Push every 6 hours PRN Severe Pain (7 - 10)  oxyCODONE    IR 10 milliGRAM(s) Oral every 8 hours PRN Moderate Pain (4 - 6)  oxyCODONE    IR 5 milliGRAM(s) Oral every 8 hours PRN Mild Pain (1 - 3)    ASSESSMENT / RECOMMENDATIONS    67yo M PMH CAD (2 stents 2020), HFmrEF (45-50%), HTN, PAD w/ remote RLE angioplasty, R 4th toe OM s/p partial R 4th ray amputation on 10/24, RLE angiogram with AT lithotripsy and AT/peroneal balloon angioplasty 10/27, uncontrolled IDDM (a1c 12 in Oct 2023), recent admission 11/8-11/10 to cardiology service for hypertensive emergency (left AMA, was given levaquin when he left for right foot wound that pt had started treatment for back in October) who presented to [facility] after a fall onto his knees and was having b/l knee pain. 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    A1C: 13.5 %  Insulinopenic  C-peptide 0.5 with , JAMIR/ICA neg (Oct 2023)  C-peptide 1.0 12/15 with   BUN: 27  Creatinine: 1.31  GFR: 60  Weight: 89  BMI: 30.7    Discharged early Oct 2023 on Lantus 11 and lispro 7. He reports taking Lantus 12-14 and lispro 7. He reports that since this change his FSG <150, with no hypoglycemia.   Diabetes managed outpatient by: endocrinologist, unsure of the name      # Type 2 diabetes mellitus with hyperglycemia  - Please keep lantus 14  units at bedtime.   - keep lispro  6 units before each meal.  - Continue lispro low dose sliding scale before meals and at bedtime.  - Patient's fingerstick glucose goal is 100-180 mg/dL.    - Discharge recommendations: basal/bolus insulin, dose TBD.   - Patient can follow up at discharge with [facility] by calling [phone] to make an appointment.      Case discussed with Dr. Huertas. Primary team updated.       Christa Alexis  Endocrinology Fellow    Service Pager: [phone]    SUBJECTIVE / INTERVAL HPI: Patient was seen and examined this morning.     Overnight events:  Patient was seen and examined this morning. Pain is manageable.   he states he had rice and chicken last night, denies snacking  he had scrambled eggs, turkey link and cereal for breakfast today    12/11: lantus 10 + lispro 4  12/12: lantus 7 + lispro 3  12/13: lantus 7 + lispro 3  12/14: lantus 6 + lispro 3  12/15: lantus 8 + lispro 4  12/18-20: lantus 10 + lispro 6      CAPILLARY BLOOD GLUCOSE & INSULIN RECEIVED  186 mg/dL (12-19 @ 21:48)  170 mg/dL (12-19 @ 22:19)  122 mg/dL (12-20 @ 08:27)  92 mg/dL (12-20 @ 12:22)  161 mg/dL (12-20 @ 17:06) 6 + 1  230 mg/dL (12-20 @ 22:38) 10 + 2  221 mg/dL (12-21 @ 08:37) 6 + 2  77    REVIEW OF SYSTEMS  Constitutional:  Negative fever, chills or loss of appetite.  Eyes:  Negative blurry vision or double vision.  Cardiovascular:  Negative for chest pain or palpitations.  Respiratory:  Negative for cough, wheezing, or shortness of breath.    Gastrointestinal:  Negative for nausea, vomiting, diarrhea, constipation, or abdominal pain.  Genitourinary:  Negative frequency, urgency or dysuria.  Neurologic:  No headache, confusion, dizziness, lightheadedness.    PHYSICAL EXAM  Vital Signs Last 24 Hrs  T(C): 36.7 (21 Dec 2023 06:33), Max: 36.9 (20 Dec 2023 20:56)  T(F): 98 (21 Dec 2023 06:33), Max: 98.5 (20 Dec 2023 20:56)  HR: 62 (21 Dec 2023 07:55) (60 - 72)  BP: 161/73 (21 Dec 2023 07:55) (119/70 - 171/78)  BP(mean): 105 (21 Dec 2023 07:55) (89 - 121)  RR: 18 (21 Dec 2023 07:55) (17 - 18)  SpO2: 95% (20 Dec 2023 20:56) (95% - 97%)    Parameters below as of 21 Dec 2023 07:55  Patient On (Oxygen Delivery Method): room air        Constitutional: Awake, alert, in no acute distress.   HEENT: Normocephalic, atraumatic, RAGHAV.  Respiratory: Lungs clear to ausculation bilaterally.   Cardiovascular: regular rhythm, normal S1 and S2, no audible murmurs.   GI: soft, non-tender, non-distended, bowel sounds present.  Extremities: leg swelling, s/p r bka, dressing wrapped  Psychiatric: AAO x 3. Normal affect/mood.     LABS  CBC - WBC/HGB/HTC/PLT: 12.04/7.3/24.1/202 (12-21-23)  BMP - Na/K/Cl/Bicarb/BUN/Cr/Gluc/AG/eGFR: 138/3.9/108/26/29/1.44/139/4/54 (12-21-23)  Ca - 8.2 (12-21-23)  Phos - 3.6 (12-21-23)  Mg - 2.0 (12-21-23)  LFT - Alb/Tprot/Tbili/Dbili/AlkPhos/ALT/AST: 1.7/--/0.2/--/113/16/17 (12-18-23)  PT/aPTT/INR: 10.6/--/0.93 (12-18-23)           MEDICATIONS  MEDICATIONS  (STANDING):  acetaminophen     Tablet .. 650 milliGRAM(s) Oral every 6 hours  aspirin  chewable 81 milliGRAM(s) Oral daily  atorvastatin 80 milliGRAM(s) Oral at bedtime  carvedilol 25 milliGRAM(s) Oral every 12 hours  furosemide   Injectable 40 milliGRAM(s) IV Push two times a day  furosemide   Injectable 40 milliGRAM(s) IV Push once  gabapentin 800 milliGRAM(s) Oral three times a day  heparin   Injectable 5000 Unit(s) SubCutaneous every 8 hours  hydrALAZINE 100 milliGRAM(s) Oral three times a day  insulin glargine Injectable (LANTUS) 10 Unit(s) SubCutaneous at bedtime  insulin lispro (ADMELOG) corrective regimen sliding scale   SubCutaneous Before meals and at bedtime  insulin lispro Injectable (ADMELOG) 8 Unit(s) SubCutaneous before lunch  insulin lispro Injectable (ADMELOG) 8 Unit(s) SubCutaneous before dinner  insulin lispro Injectable (ADMELOG) 6 Unit(s) SubCutaneous before breakfast  lisinopril 40 milliGRAM(s) Oral daily  vancomycin    Solution 125 milliGRAM(s) Oral every 6 hours    MEDICATIONS  (PRN):  HYDROmorphone  Injectable 1 milliGRAM(s) IV Push every 6 hours PRN Severe Pain (7 - 10)  oxyCODONE    IR 10 milliGRAM(s) Oral every 8 hours PRN Moderate Pain (4 - 6)  oxyCODONE    IR 5 milliGRAM(s) Oral every 8 hours PRN Mild Pain (1 - 3)    ASSESSMENT / RECOMMENDATIONS    67yo M PMH CAD (2 stents 2020), HFmrEF (45-50%), HTN, PAD w/ remote RLE angioplasty, R 4th toe OM s/p partial R 4th ray amputation on 10/24, RLE angiogram with AT lithotripsy and AT/peroneal balloon angioplasty 10/27, uncontrolled IDDM (a1c 12 in Oct 2023), recent admission 11/8-11/10 to cardiology service for hypertensive emergency (left AMA, was given levaquin when he left for right foot wound that pt had started treatment for back in October) who presented to [facility] after a fall onto his knees and was having b/l knee pain. 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    A1C: 13.5 %  Insulinopenic  C-peptide 0.5 with , JAMIR/ICA neg (Oct 2023)  C-peptide 1.0 12/15 with   BUN: 27  Creatinine: 1.31  GFR: 60  Weight: 89  BMI: 30.7    Discharged early Oct 2023 on Lantus 11 and lispro 7. He reports taking Lantus 12-14 and lispro 7. He reports that since this change his FSG <150, with no hypoglycemia.   Diabetes managed outpatient by: endocrinologist, unsure of the name      # Type 2 diabetes mellitus with hyperglycemia  - Please keep lantus 14  units at bedtime.   - keep lispro  6 units before each meal.  - Continue lispro low dose sliding scale before meals and at bedtime.  - Patient's fingerstick glucose goal is 100-180 mg/dL.    - Discharge recommendations: basal/bolus insulin, dose TBD.   - Patient can follow up at discharge with [facility] by calling [phone] to make an appointment.      Case discussed with Dr. Huertas. Primary team updated.       Christa Alexis  Endocrinology Fellow    Service Pager: [phone]

## 2023-12-21 NOTE — PROGRESS NOTE ADULT - ASSESSMENT
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 diff colitis.    Assessment  1. 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 PAD s/p prior intervention to RLE Oct/2023  3. CAD s/p PCI  4. ICM HFrEF - decompensated  5. Mild-moderate AS (ALLISON 1.28cm^2, MG 15 mmHg)    Plan  1. ASA 81mg QD and high intensity statin for CAD and PAD  2. IV lasix 80mg BID tonight and tomorrow, will switch to torsemide PO 40mg QD on discharge  2. TTE in 1y for surveillance of moderate AS  3. GDMT for HFrEF: Coreg 25mg BID and lisinopril 40mg QD; won't add MRA given mildly decreased EF; will avoid SGLT2i given PAD w/ delayed wound healing  4. Hydralazine 100mg q8h for better BP control and additional afterload reduction    Thank you for the consult and the opportunity to take care of this patient.     Agapito Porter M.D.  CARDIOLOGY | VASCULAR CARDIOLOGY ATTENDING  [phone]    During non face-to-face time, I reviewed relevant portions of the patient’s medical record. During face-to-face time, I took a relevant history and examined the patient. I also explained differential diagnoses, relevant cardiac diagnoses, workup, and management plan, which required a high level of medical decision making. I answered all questions related to the patient's medical conditions.          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 diff colitis.    Assessment  1. 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 PAD s/p prior intervention to RLE Oct/2023  3. CAD s/p PCI  4. ICM HFrEF - decompensated  5. Mild-moderate AS (ALLISON 1.28cm^2, MG 15 mmHg)    Plan  1. ASA 81mg QD and high intensity statin for CAD and PAD  2. IV lasix 80mg BID tonight and tomorrow, will switch to torsemide PO 40mg QD on discharge  2. TTE in 1y for surveillance of moderate AS  3. GDMT for HFrEF: Coreg 25mg BID and lisinopril 40mg QD; won't add MRA given mildly decreased EF; will avoid SGLT2i given PAD w/ delayed wound healing  4. Hydralazine 100mg q8h for better BP control and additional afterload reduction    Thank you for the consult and the opportunity to take care of this patient.     Agapito Porter M.D.  CARDIOLOGY | VASCULAR CARDIOLOGY ATTENDING  [phone]    During non face-to-face time, I reviewed relevant portions of the patient’s medical record. During face-to-face time, I took a relevant history and examined the patient. I also explained differential diagnoses, relevant cardiac diagnoses, workup, and management plan, which required a high level of medical decision making. I answered all questions related to the patient's medical conditions.

## 2023-12-22 LAB
ANION GAP SERPL CALC-SCNC: 8 MMOL/L — SIGNIFICANT CHANGE UP (ref 5–17)
ANION GAP SERPL CALC-SCNC: 8 MMOL/L — SIGNIFICANT CHANGE UP (ref 5–17)
BUN SERPL-MCNC: 31 MG/DL — HIGH (ref 7–23)
BUN SERPL-MCNC: 31 MG/DL — HIGH (ref 7–23)
CALCIUM SERPL-MCNC: 8.6 MG/DL — SIGNIFICANT CHANGE UP (ref 8.4–10.5)
CALCIUM SERPL-MCNC: 8.6 MG/DL — SIGNIFICANT CHANGE UP (ref 8.4–10.5)
CHLORIDE SERPL-SCNC: 107 MMOL/L — SIGNIFICANT CHANGE UP (ref 96–108)
CHLORIDE SERPL-SCNC: 107 MMOL/L — SIGNIFICANT CHANGE UP (ref 96–108)
CO2 SERPL-SCNC: 26 MMOL/L — SIGNIFICANT CHANGE UP (ref 22–31)
CO2 SERPL-SCNC: 26 MMOL/L — SIGNIFICANT CHANGE UP (ref 22–31)
CREAT SERPL-MCNC: 1.39 MG/DL — HIGH (ref 0.5–1.3)
CREAT SERPL-MCNC: 1.39 MG/DL — HIGH (ref 0.5–1.3)
EGFR: 56 ML/MIN/1.73M2 — LOW
EGFR: 56 ML/MIN/1.73M2 — LOW
GLUCOSE BLDC GLUCOMTR-MCNC: 108 MG/DL — HIGH (ref 70–99)
GLUCOSE BLDC GLUCOMTR-MCNC: 108 MG/DL — HIGH (ref 70–99)
GLUCOSE BLDC GLUCOMTR-MCNC: 138 MG/DL — HIGH (ref 70–99)
GLUCOSE BLDC GLUCOMTR-MCNC: 138 MG/DL — HIGH (ref 70–99)
GLUCOSE BLDC GLUCOMTR-MCNC: 240 MG/DL — HIGH (ref 70–99)
GLUCOSE BLDC GLUCOMTR-MCNC: 240 MG/DL — HIGH (ref 70–99)
GLUCOSE BLDC GLUCOMTR-MCNC: 86 MG/DL — SIGNIFICANT CHANGE UP (ref 70–99)
GLUCOSE BLDC GLUCOMTR-MCNC: 86 MG/DL — SIGNIFICANT CHANGE UP (ref 70–99)
GLUCOSE SERPL-MCNC: 120 MG/DL — HIGH (ref 70–99)
GLUCOSE SERPL-MCNC: 120 MG/DL — HIGH (ref 70–99)
HCT VFR BLD CALC: 26 % — LOW (ref 39–50)
HCT VFR BLD CALC: 26 % — LOW (ref 39–50)
HGB BLD-MCNC: 8.4 G/DL — LOW (ref 13–17)
HGB BLD-MCNC: 8.4 G/DL — LOW (ref 13–17)
MAGNESIUM SERPL-MCNC: 1.9 MG/DL — SIGNIFICANT CHANGE UP (ref 1.6–2.6)
MAGNESIUM SERPL-MCNC: 1.9 MG/DL — SIGNIFICANT CHANGE UP (ref 1.6–2.6)
MCHC RBC-ENTMCNC: 28.9 PG — SIGNIFICANT CHANGE UP (ref 27–34)
MCHC RBC-ENTMCNC: 28.9 PG — SIGNIFICANT CHANGE UP (ref 27–34)
MCHC RBC-ENTMCNC: 32.3 GM/DL — SIGNIFICANT CHANGE UP (ref 32–36)
MCHC RBC-ENTMCNC: 32.3 GM/DL — SIGNIFICANT CHANGE UP (ref 32–36)
MCV RBC AUTO: 89.3 FL — SIGNIFICANT CHANGE UP (ref 80–100)
MCV RBC AUTO: 89.3 FL — SIGNIFICANT CHANGE UP (ref 80–100)
NRBC # BLD: 0 /100 WBCS — SIGNIFICANT CHANGE UP (ref 0–0)
NRBC # BLD: 0 /100 WBCS — SIGNIFICANT CHANGE UP (ref 0–0)
PHOSPHATE SERPL-MCNC: 3.7 MG/DL — SIGNIFICANT CHANGE UP (ref 2.5–4.5)
PHOSPHATE SERPL-MCNC: 3.7 MG/DL — SIGNIFICANT CHANGE UP (ref 2.5–4.5)
PLATELET # BLD AUTO: 214 K/UL — SIGNIFICANT CHANGE UP (ref 150–400)
PLATELET # BLD AUTO: 214 K/UL — SIGNIFICANT CHANGE UP (ref 150–400)
POTASSIUM SERPL-MCNC: 4 MMOL/L — SIGNIFICANT CHANGE UP (ref 3.5–5.3)
POTASSIUM SERPL-MCNC: 4 MMOL/L — SIGNIFICANT CHANGE UP (ref 3.5–5.3)
POTASSIUM SERPL-SCNC: 4 MMOL/L — SIGNIFICANT CHANGE UP (ref 3.5–5.3)
POTASSIUM SERPL-SCNC: 4 MMOL/L — SIGNIFICANT CHANGE UP (ref 3.5–5.3)
RBC # BLD: 2.91 M/UL — LOW (ref 4.2–5.8)
RBC # BLD: 2.91 M/UL — LOW (ref 4.2–5.8)
RBC # FLD: 18.6 % — HIGH (ref 10.3–14.5)
RBC # FLD: 18.6 % — HIGH (ref 10.3–14.5)
SODIUM SERPL-SCNC: 141 MMOL/L — SIGNIFICANT CHANGE UP (ref 135–145)
SODIUM SERPL-SCNC: 141 MMOL/L — SIGNIFICANT CHANGE UP (ref 135–145)
WBC # BLD: 11.73 K/UL — HIGH (ref 3.8–10.5)
WBC # BLD: 11.73 K/UL — HIGH (ref 3.8–10.5)
WBC # FLD AUTO: 11.73 K/UL — HIGH (ref 3.8–10.5)
WBC # FLD AUTO: 11.73 K/UL — HIGH (ref 3.8–10.5)

## 2023-12-22 PROCEDURE — 99233 SBSQ HOSP IP/OBS HIGH 50: CPT

## 2023-12-22 PROCEDURE — 99233 SBSQ HOSP IP/OBS HIGH 50: CPT | Mod: 24

## 2023-12-22 RX ORDER — HYDRALAZINE HCL 50 MG
1 TABLET ORAL
Qty: 0 | Refills: 0 | DISCHARGE
Start: 2023-12-22

## 2023-12-22 RX ORDER — VANCOMYCIN HCL 1 G
1 VIAL (EA) INTRAVENOUS
Qty: 12 | Refills: 0
Start: 2023-12-22 | End: 2023-12-24

## 2023-12-22 RX ORDER — CARVEDILOL PHOSPHATE 80 MG/1
1 CAPSULE, EXTENDED RELEASE ORAL
Qty: 0 | Refills: 0 | DISCHARGE
Start: 2023-12-22

## 2023-12-22 RX ORDER — HYDROMORPHONE HYDROCHLORIDE 2 MG/ML
0.5 INJECTION INTRAMUSCULAR; INTRAVENOUS; SUBCUTANEOUS ONCE
Refills: 0 | Status: DISCONTINUED | OUTPATIENT
Start: 2023-12-22 | End: 2023-12-22

## 2023-12-22 RX ORDER — ASPIRIN/CALCIUM CARB/MAGNESIUM 324 MG
1 TABLET ORAL
Qty: 0 | Refills: 0 | DISCHARGE
Start: 2023-12-22

## 2023-12-22 RX ORDER — SPIRONOLACTONE 25 MG/1
1 TABLET, FILM COATED ORAL
Qty: 1 | Refills: 0
Start: 2023-12-22

## 2023-12-22 RX ORDER — GABAPENTIN 400 MG/1
2 CAPSULE ORAL
Qty: 0 | Refills: 0 | DISCHARGE
Start: 2023-12-22

## 2023-12-22 RX ORDER — INSULIN GLARGINE 100 [IU]/ML
14 INJECTION, SOLUTION SUBCUTANEOUS
Qty: 0 | Refills: 0 | DISCHARGE
Start: 2023-12-22

## 2023-12-22 RX ORDER — ATORVASTATIN CALCIUM 80 MG/1
1 TABLET, FILM COATED ORAL
Qty: 0 | Refills: 0 | DISCHARGE
Start: 2023-12-22

## 2023-12-22 RX ORDER — INSULIN LISPRO 100/ML
8 VIAL (ML) SUBCUTANEOUS
Qty: 0 | Refills: 0 | DISCHARGE
Start: 2023-12-22

## 2023-12-22 RX ORDER — INSULIN GLARGINE 100 [IU]/ML
14 INJECTION, SOLUTION SUBCUTANEOUS
Refills: 0 | DISCHARGE

## 2023-12-22 RX ORDER — LISINOPRIL 2.5 MG/1
1 TABLET ORAL
Qty: 0 | Refills: 0 | DISCHARGE
Start: 2023-12-22

## 2023-12-22 RX ADMIN — Medication 650 MILLIGRAM(S): at 23:35

## 2023-12-22 RX ADMIN — HYDROMORPHONE HYDROCHLORIDE 1 MILLIGRAM(S): 2 INJECTION INTRAMUSCULAR; INTRAVENOUS; SUBCUTANEOUS at 01:30

## 2023-12-22 RX ADMIN — HEPARIN SODIUM 5000 UNIT(S): 5000 INJECTION INTRAVENOUS; SUBCUTANEOUS at 17:34

## 2023-12-22 RX ADMIN — HEPARIN SODIUM 5000 UNIT(S): 5000 INJECTION INTRAVENOUS; SUBCUTANEOUS at 01:27

## 2023-12-22 RX ADMIN — OXYCODONE HYDROCHLORIDE 5 MILLIGRAM(S): 5 TABLET ORAL at 23:25

## 2023-12-22 RX ADMIN — Medication 125 MILLIGRAM(S): at 07:14

## 2023-12-22 RX ADMIN — HYDROMORPHONE HYDROCHLORIDE 1 MILLIGRAM(S): 2 INJECTION INTRAMUSCULAR; INTRAVENOUS; SUBCUTANEOUS at 14:22

## 2023-12-22 RX ADMIN — ATORVASTATIN CALCIUM 80 MILLIGRAM(S): 80 TABLET, FILM COATED ORAL at 22:26

## 2023-12-22 RX ADMIN — CARVEDILOL PHOSPHATE 25 MILLIGRAM(S): 80 CAPSULE, EXTENDED RELEASE ORAL at 17:34

## 2023-12-22 RX ADMIN — HYDROMORPHONE HYDROCHLORIDE 1 MILLIGRAM(S): 2 INJECTION INTRAMUSCULAR; INTRAVENOUS; SUBCUTANEOUS at 04:29

## 2023-12-22 RX ADMIN — Medication 2: at 22:26

## 2023-12-22 RX ADMIN — HYDROMORPHONE HYDROCHLORIDE 1 MILLIGRAM(S): 2 INJECTION INTRAMUSCULAR; INTRAVENOUS; SUBCUTANEOUS at 00:30

## 2023-12-22 RX ADMIN — Medication 80 MILLIGRAM(S): at 14:07

## 2023-12-22 RX ADMIN — Medication 650 MILLIGRAM(S): at 18:30

## 2023-12-22 RX ADMIN — OXYCODONE HYDROCHLORIDE 5 MILLIGRAM(S): 5 TABLET ORAL at 10:40

## 2023-12-22 RX ADMIN — Medication 6 UNIT(S): at 12:47

## 2023-12-22 RX ADMIN — GABAPENTIN 800 MILLIGRAM(S): 400 CAPSULE ORAL at 07:12

## 2023-12-22 RX ADMIN — Medication 650 MILLIGRAM(S): at 13:45

## 2023-12-22 RX ADMIN — Medication 125 MILLIGRAM(S): at 17:34

## 2023-12-22 RX ADMIN — Medication 100 MILLIGRAM(S): at 22:25

## 2023-12-22 RX ADMIN — LISINOPRIL 40 MILLIGRAM(S): 2.5 TABLET ORAL at 07:13

## 2023-12-22 RX ADMIN — OXYCODONE HYDROCHLORIDE 5 MILLIGRAM(S): 5 TABLET ORAL at 22:25

## 2023-12-22 RX ADMIN — Medication 650 MILLIGRAM(S): at 12:46

## 2023-12-22 RX ADMIN — OXYCODONE HYDROCHLORIDE 5 MILLIGRAM(S): 5 TABLET ORAL at 09:40

## 2023-12-22 RX ADMIN — HEPARIN SODIUM 5000 UNIT(S): 5000 INJECTION INTRAVENOUS; SUBCUTANEOUS at 09:26

## 2023-12-22 RX ADMIN — Medication 125 MILLIGRAM(S): at 00:29

## 2023-12-22 RX ADMIN — INSULIN GLARGINE 14 UNIT(S): 100 INJECTION, SOLUTION SUBCUTANEOUS at 22:26

## 2023-12-22 RX ADMIN — Medication 650 MILLIGRAM(S): at 07:13

## 2023-12-22 RX ADMIN — GABAPENTIN 800 MILLIGRAM(S): 400 CAPSULE ORAL at 22:25

## 2023-12-22 RX ADMIN — Medication 6 UNIT(S): at 08:39

## 2023-12-22 RX ADMIN — Medication 81 MILLIGRAM(S): at 12:46

## 2023-12-22 RX ADMIN — Medication 80 MILLIGRAM(S): at 07:12

## 2023-12-22 RX ADMIN — HYDROMORPHONE HYDROCHLORIDE 0.5 MILLIGRAM(S): 2 INJECTION INTRAMUSCULAR; INTRAVENOUS; SUBCUTANEOUS at 08:46

## 2023-12-22 RX ADMIN — Medication 100 MILLIGRAM(S): at 14:10

## 2023-12-22 RX ADMIN — GABAPENTIN 800 MILLIGRAM(S): 400 CAPSULE ORAL at 14:10

## 2023-12-22 RX ADMIN — HYDROMORPHONE HYDROCHLORIDE 0.5 MILLIGRAM(S): 2 INJECTION INTRAMUSCULAR; INTRAVENOUS; SUBCUTANEOUS at 09:01

## 2023-12-22 RX ADMIN — Medication 125 MILLIGRAM(S): at 23:35

## 2023-12-22 RX ADMIN — Medication 125 MILLIGRAM(S): at 12:47

## 2023-12-22 RX ADMIN — Medication 650 MILLIGRAM(S): at 00:30

## 2023-12-22 RX ADMIN — Medication 650 MILLIGRAM(S): at 01:30

## 2023-12-22 RX ADMIN — HYDROMORPHONE HYDROCHLORIDE 1 MILLIGRAM(S): 2 INJECTION INTRAMUSCULAR; INTRAVENOUS; SUBCUTANEOUS at 14:07

## 2023-12-22 RX ADMIN — Medication 100 MILLIGRAM(S): at 07:13

## 2023-12-22 RX ADMIN — Medication 650 MILLIGRAM(S): at 08:13

## 2023-12-22 RX ADMIN — CARVEDILOL PHOSPHATE 25 MILLIGRAM(S): 80 CAPSULE, EXTENDED RELEASE ORAL at 07:13

## 2023-12-22 RX ADMIN — OXYCODONE HYDROCHLORIDE 10 MILLIGRAM(S): 5 TABLET ORAL at 18:50

## 2023-12-22 RX ADMIN — Medication 650 MILLIGRAM(S): at 17:33

## 2023-12-22 RX ADMIN — OXYCODONE HYDROCHLORIDE 10 MILLIGRAM(S): 5 TABLET ORAL at 17:52

## 2023-12-22 RX ADMIN — Medication 6 UNIT(S): at 17:33

## 2023-12-22 NOTE — PROGRESS NOTE ADULT - ASSESSMENT
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 diff colitis.    Assessment  1. 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 PAD s/p prior intervention to RLE Oct/2023  3. CAD s/p PCI  4. ICM HFrEF - decompensated  5. Mild-moderate AS (ALLISON 1.28cm^2, MG 15 mmHg)    Plan  1. ASA 81mg QD and high intensity statin for CAD and PAD  2. IV lasix 80mg BID, switch to PO torsemide 40mg QD on discharge (to take for 7d followed by 20mg QD with cardiology f/u)  3. TTE in 1y for surveillance of moderate AS  4. GDMT for HFrEF: Coreg 25mg BID and lisinopril 40mg QD; won't add MRA given mildly decreased EF; will avoid SGLT2i given PAD w/ delayed wound healing  5. Hydralazine 100mg q8h for better BP control and additional afterload reduction    Thank you for the consult and the opportunity to take care of this patient.     Agapito Porter M.D.  CARDIOLOGY | VASCULAR CARDIOLOGY ATTENDING  [phone]    During non face-to-face time, I reviewed relevant portions of the patient’s medical record. During face-to-face time, I took a relevant history and examined the patient. I also explained differential diagnoses, relevant cardiac diagnoses, workup, and management plan, which required a high level of medical decision making. I answered all questions related to the patient's medical conditions.          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 diff colitis.    Assessment  1. 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 PAD s/p prior intervention to RLE Oct/2023  3. CAD s/p PCI  4. ICM HFrEF - decompensated  5. Mild-moderate AS (ALLISON 1.28cm^2, MG 15 mmHg)    Plan  1. ASA 81mg QD and high intensity statin for CAD and PAD  2. IV lasix 80mg BID, switch to PO torsemide 40mg QD on discharge (to take for 7d followed by 20mg QD with cardiology f/u)  3. TTE in 1y for surveillance of moderate AS  4. GDMT for HFrEF: Coreg 25mg BID and lisinopril 40mg QD; won't add MRA given mildly decreased EF; will avoid SGLT2i given PAD w/ delayed wound healing  5. Hydralazine 100mg q8h for better BP control and additional afterload reduction    Thank you for the consult and the opportunity to take care of this patient.     Agapito Porter M.D.  CARDIOLOGY | VASCULAR CARDIOLOGY ATTENDING  [phone]    During non face-to-face time, I reviewed relevant portions of the patient’s medical record. During face-to-face time, I took a relevant history and examined the patient. I also explained differential diagnoses, relevant cardiac diagnoses, workup, and management plan, which required a high level of medical decision making. I answered all questions related to the patient's medical conditions.

## 2023-12-22 NOTE — PROGRESS NOTE ADULT - SUBJECTIVE AND OBJECTIVE BOX
VASCULAR CARDIOLOGY ATTENDING PROGRESS NOTE    SERVICE LINE: [phone]    Subjective:    No acute events overnight.   ROS negative.     Current Medications:   acetaminophen     Tablet .. 650 milliGRAM(s) Oral every 6 hours  aspirin  chewable 81 milliGRAM(s) Oral daily  atorvastatin 80 milliGRAM(s) Oral at bedtime  carvedilol 25 milliGRAM(s) Oral every 12 hours  gabapentin 800 milliGRAM(s) Oral three times a day  heparin   Injectable 5000 Unit(s) SubCutaneous every 8 hours  hydrALAZINE 75 milliGRAM(s) Oral three times a day  HYDROmorphone  Injectable 1 milliGRAM(s) IV Push every 6 hours PRN  insulin glargine Injectable (LANTUS) 10 Unit(s) SubCutaneous at bedtime  insulin lispro (ADMELOG) corrective regimen sliding scale   SubCutaneous Before meals and at bedtime  insulin lispro Injectable (ADMELOG) 8 Unit(s) SubCutaneous before lunch  insulin lispro Injectable (ADMELOG) 6 Unit(s) SubCutaneous before breakfast  insulin lispro Injectable (ADMELOG) 8 Unit(s) SubCutaneous before dinner  lisinopril 40 milliGRAM(s) Oral daily  oxyCODONE    IR 10 milliGRAM(s) Oral every 8 hours PRN  oxyCODONE    IR 5 milliGRAM(s) Oral every 8 hours PRN  vancomycin    Solution 125 milliGRAM(s) Oral every 6 hours      REVIEW OF SYSTEMS:  CONSTITUTIONAL: No weakness, fevers or chills  EYES/ENT: No visual changes;  No dysphagia  NECK: No pain or stiffness  RESPIRATORY: No cough, wheezing, hemoptysis; No shortness of breath  CARDIOVASCULAR: No chest pain or palpitations; No lower extremity edema  GASTROINTESTINAL: No abdominal or epigastric pain. No nausea, vomiting, or hematemesis; No diarrhea or constipation. No melena or hematochezia.  BACK: No back pain  GENITOURINARY: No dysuria, frequency or hematuria  NEUROLOGICAL: No numbness or weakness  SKIN: No itching, burning, rashes, or lesions   All other review of systems is negative unless indicated above.    Physical Exam:  T(F): 99 (12-20), Max: 99 (12-19)  HR: 74 (12-20) (69 - 77)  BP: 144/64 (12-20) (107/51 - 167/64)  BP(mean): 125 (12-20) (73 - 125)  ABP: --  ABP(mean): --  RR: 18 (12-20)  SpO2: 97% (12-20)  GENERAL: No acute distress, well-developed  HEAD:  Atraumatic, Normocephalic  ENT: +JVD  CHEST/LUNG: Clear to auscultation bilaterally; No wheeze, equal breath sounds bilaterally   BACK: No spinal tenderness  HEART: Regular rate and rhythm; No murmurs, rubs, or gallops  ABDOMEN: Soft, Nontender, Nondistended; Bowel sounds present  EXTREMITIES: 2+ pitting edema b/l LE and scrotal edema.  PSYCH: Nl behavior, nl affect  NEUROLOGY: AAOx3, non-focal, cranial nerves intact  SKIN: Normal color, No rashes or lesions    Cardiovascular Diagnostic Testing: personally reviewed    CXR: Personally reviewed    Labs: Personally reviewed                        7.8    14.97 )-----------( 192      ( 20 Dec 2023 05:30 )             24.5     12-20    140  |  109<H>  |  27<H>  ----------------------------<  108<H>  3.9   |  26  |  1.31<H>    Ca    8.3<L>      20 Dec 2023 05:30  Phos  3.7     12-20  Mg     2.1     12-20                         VASCULAR CARDIOLOGY ATTENDING PROGRESS NOTE    SERVICE LINE: [phone]    Subjective:    No acute events overnight.   ROS negative.     Current Medications:   acetaminophen     Tablet .. 650 milliGRAM(s) Oral every 6 hours  aspirin  chewable 81 milliGRAM(s) Oral daily  atorvastatin 80 milliGRAM(s) Oral at bedtime  carvedilol 25 milliGRAM(s) Oral every 12 hours  gabapentin 800 milliGRAM(s) Oral three times a day  heparin   Injectable 5000 Unit(s) SubCutaneous every 8 hours  hydrALAZINE 75 milliGRAM(s) Oral three times a day  HYDROmorphone  Injectable 1 milliGRAM(s) IV Push every 6 hours PRN  insulin glargine Injectable (LANTUS) 10 Unit(s) SubCutaneous at bedtime  insulin lispro (ADMELOG) corrective regimen sliding scale   SubCutaneous Before meals and at bedtime  insulin lispro Injectable (ADMELOG) 8 Unit(s) SubCutaneous before lunch  insulin lispro Injectable (ADMELOG) 6 Unit(s) SubCutaneous before breakfast  insulin lispro Injectable (ADMELOG) 8 Unit(s) SubCutaneous before dinner  lisinopril 40 milliGRAM(s) Oral daily  oxyCODONE    IR 10 milliGRAM(s) Oral every 8 hours PRN  oxyCODONE    IR 5 milliGRAM(s) Oral every 8 hours PRN  vancomycin    Solution 125 milliGRAM(s) Oral every 6 hours      REVIEW OF SYSTEMS:  CONSTITUTIONAL: No weakness, fevers or chills  EYES/ENT: No visual changes;  No dysphagia  NECK: No pain or stiffness  RESPIRATORY: No cough, wheezing, hemoptysis; No shortness of breath  CARDIOVASCULAR: No chest pain or palpitations; No lower extremity edema  GASTROINTESTINAL: No abdominal or epigastric pain. No nausea, vomiting, or hematemesis; No diarrhea or constipation. No melena or hematochezia.  BACK: No back pain  GENITOURINARY: No dysuria, frequency or hematuria  NEUROLOGICAL: No numbness or weakness  SKIN: No itching, burning, rashes, or lesions   All other review of systems is negative unless indicated above.    Physical Exam:  T(F): 99 (12-20), Max: 99 (12-19)  HR: 74 (12-20) (69 - 77)  BP: 144/64 (12-20) (107/51 - 167/64)  BP(mean): 125 (12-20) (73 - 125)  ABP: --  ABP(mean): --  RR: 18 (12-20)  SpO2: 97% (12-20)  GENERAL: No acute distress, well-developed  HEAD:  Atraumatic, Normocephalic  ENT: +JVD  CHEST/LUNG: Clear to auscultation bilaterally; No wheeze, equal breath sounds bilaterally   BACK: No spinal tenderness  HEART: Regular rate and rhythm; No murmurs, rubs, or gallops  ABDOMEN: Soft, Nontender, Nondistended; Bowel sounds present  EXTREMITIES: 2+ pitting edema b/l LE and scrotal edema.  PSYCH: Nl behavior, nl affect  NEUROLOGY: AAOx3, non-focal, cranial nerves intact  SKIN: Normal color, No rashes or lesions    Cardiovascular Diagnostic Testing: personally reviewed    CXR: Personally reviewed    Labs: Personally reviewed                        7.8    14.97 )-----------( 192      ( 20 Dec 2023 05:30 )             24.5     12-20    140  |  109<H>  |  27<H>  ----------------------------<  108<H>  3.9   |  26  |  1.31<H>    Ca    8.3<L>      20 Dec 2023 05:30  Phos  3.7     12-20  Mg     2.1     12-20

## 2023-12-22 NOTE — DIETITIAN INITIAL EVALUATION ADULT - OTHER CALCULATIONS
Estimated nutritional needs determined using [facility] Standards of Nutrition Care for wound healing using current body weight 70 kg (104%IBW).  Estimated nutritional needs determined using [facility] Standards of Nutrition Care for wound healing using current body weight 70 kg (104%IBW).  4 = No assist / stand by assistance

## 2023-12-22 NOTE — PROGRESS NOTE ADULT - SUBJECTIVE AND OBJECTIVE BOX
Patient is a 68y old  Male who presents with a chief complaint of soft tissue and skin infection of right foot (22 Dec 2023 16:07)    INTERVAL EVENTS:  Tolerating regular texture diet   no nausea,   no dysuria,   last bowel movement yesterday. not watery     SUBJECTIVE:  Patient was seen and examined at bedside.  Review of systems: No CP, dyspnea, nausea or vomiting, dysuria,   + LE edema ; + scrotal edema     Diet, Consistent Carbohydrate/No Snacks (12-18-23 @ 12:54) [Active]    MEDICATIONS:  MEDICATIONS  (STANDING):  acetaminophen     Tablet .. 650 milliGRAM(s) Oral every 6 hours  aspirin  chewable 81 milliGRAM(s) Oral daily  atorvastatin 80 milliGRAM(s) Oral at bedtime  carvedilol 25 milliGRAM(s) Oral every 12 hours  gabapentin 800 milliGRAM(s) Oral three times a day  heparin   Injectable 5000 Unit(s) SubCutaneous every 8 hours  hydrALAZINE 100 milliGRAM(s) Oral three times a day  insulin glargine Injectable (LANTUS) 14 Unit(s) SubCutaneous at bedtime  insulin lispro (ADMELOG) corrective regimen sliding scale   SubCutaneous Before meals and at bedtime  insulin lispro Injectable (ADMELOG) 6 Unit(s) SubCutaneous three times a day before meals  lisinopril 40 milliGRAM(s) Oral daily  torsemide 40 milliGRAM(s) Oral daily  vancomycin    Solution 125 milliGRAM(s) Oral every 6 hours    MEDICATIONS  (PRN):  oxyCODONE    IR 10 milliGRAM(s) Oral every 8 hours PRN Moderate Pain (4 - 6)  oxyCODONE    IR 5 milliGRAM(s) Oral every 8 hours PRN Mild Pain (1 - 3)    Allergies    No Known Allergies    Intolerances    OBJECTIVE:  Vital Signs Last 24 Hrs  T(C): 36.7 (22 Dec 2023 14:00), Max: 37.9 (22 Dec 2023 07:04)  T(F): 98.1 (22 Dec 2023 14:00), Max: 100.2 (22 Dec 2023 07:04)  HR: 69 (22 Dec 2023 14:00) (68 - 77)  BP: 174/81 (22 Dec 2023 14:00) (117/59 - 190/84)  BP(mean): 98 (22 Dec 2023 07:04) (98 - 121)  RR: 19 (22 Dec 2023 14:00) (18 - 19)  SpO2: 97% (22 Dec 2023 07:04) (97% - 100%)    Parameters below as of 22 Dec 2023 14:00  Patient On (Oxygen Delivery Method): room air    I&O's Summary    21 Dec 2023 07:01  -  22 Dec 2023 07:00  --------------------------------------------------------  IN: 540 mL / OUT: 4880 mL / NET: -4340 mL    22 Dec 2023 07:01  -  22 Dec 2023 16:57  --------------------------------------------------------  IN: 720 mL / OUT: 750 mL / NET: -30 mL    PHYSICAL EXAM:  Gen: Sitting in bed at time of exam, appears stated age  HEENT: NCAT, MMM, clear OP  Neck: supple, trachea at midline  CV: RRR, +S1/S2  Pulm: adequate respiratory effort, no increase in work of breathing  Abd: soft, ND  Skin: warm and dry,   Ext: right leg s/p BKA, wrapped in ACE ; left leg with 2+ pitting edema extending up to thighs ;  right arm edematous, + scrotal edema  Neuro: AOx3, speaking in full sentences  Psych: affect and behavior appropriate      LABS:                        8.4    11.73 )-----------( 214      ( 22 Dec 2023 05:30 )             26.0     12-22    141  |  107  |  31<H>  ----------------------------<  120<H>  4.0   |  26  |  1.39<H>    Ca    8.6      22 Dec 2023 05:30  Phos  3.7     12-22  Mg     1.9     12-22          CAPILLARY BLOOD GLUCOSE      POCT Blood Glucose.: 86 mg/dL (22 Dec 2023 11:59)  POCT Blood Glucose.: 108 mg/dL (22 Dec 2023 08:28)  POCT Blood Glucose.: 244 mg/dL (21 Dec 2023 21:52)    Urinalysis Basic - ( 22 Dec 2023 05:30 )    Color: x / Appearance: x / SG: x / pH: x  Gluc: 120 mg/dL / Ketone: x  / Bili: x / Urobili: x   Blood: x / Protein: x / Nitrite: x   Leuk Esterase: x / RBC: x / WBC x   Sq Epi: x / Non Sq Epi: x / Bacteria: x        MICRODATA:      RADIOLOGY/OTHER STUDIES:

## 2023-12-22 NOTE — PROGRESS NOTE ADULT - SUBJECTIVE AND OBJECTIVE BOX
O/N: YEISON, VSS voided >3k                        ---------------------------------------------------------------------------  PLEASE CHECK WHEN PRESENT:     [  ]Heart Failure     [  ] Acute     [  ] Acute on Chronic     [x  ] Chronic  -------------------------------------------------------------------     [  ]Diastolic [HFpEF]     [ x ]Systolic [HFrEF]     [  ]Combined [HFpEF & HFrEF]  .................................................................................     [  ]Other:     [ ] Pulmonary Hypertension     [ ] Chronic A-fib     [ ] Persistet A-fib     [ ] Permanent A-fib     [ ] Paroxysmal A-fib     [x ] Hypertensive Heart Disease  -------------------------------------------------------------------  [ ] Respiratory failure  [ ] Acute cor pulmonale  [ ] Asthma/COPD Exacerbation  [ ]COPD on home O2 (Chronic renal Failure)   [ ] Pleural effusion  [ ] Aspiration pneumonia  [ ] Obstructive Sleep Apnea  [ ]Atelectasis   [ ] Acute PE   -------------------------------------------------------------------  [  ]Acute Kidney Injury      [  ]Acute Tubular Necrosis      [  ]Reneal Medullary Necrosis     [  ]Renal Cortical Necrosis     [  ]Other Pathological Lesions:    [  ]CKD 1  [  ]CKD 2  [  ]CKD 3  [  ]CKD 4  [  ]CKD 5 (ESRD)  [  ]Other  -------------------------------------------------------------------  [ x ]Diabetes  [  ] Diabetic PVD Ulcer  [  ] Neuropathic ulcer to DM  [  ] Diabetes with Nephropathy  [ x ] Osteomyelitis due to diabetes  [  ] Hyperglycemia   [  ]hypoglycemia   --------------------------------------------------------------------  [  ]Malnutrition: See Nutrition Note  [  ]Cachexia  [  ]Other:   [  ]Supplement Ordered:  [  ]Morbid Obesity (BMI >=40]  [ ] Ileus  ---------------------------------------------------------------------  [ ] Sepsis/severe sepsis/septic shock  [ ] Noninfectious SIRS  [ ] UTI  [ ] Pneumonia  [ ] Thrombophlebitis     -----------------------------------------------------------------------  [ ] Acidosis/alkalosis  [ ] Fluid overload  [ ] Hypokalemia  [ ] Hyperkalemia  [ ] Hypomagnesemia  [ ] Hypophosphatemia  [ ] Hyperphosphatemia  ------------------------------------------------------------------------  [ ] Acute blood loss anemia  [ ] Post op blood loss anemia  [ ] Iron deficiency anemia  [ ] Anemia due to chronic disease  [ ] Hypercoagulable state  [ ] Thrombocytopenia  ----------------------------------------------------------------------  [ ] Cerebral infarction  [ ] Transient ischemia attack  [ ] Encephalopathy - Toxic or Metabolic          A/P:    69yo M PMH CAD (2 stents 2020), HFmrEF (45-50%), HTN, PAD w/ remote RLE angioplasty, 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 Found to have increased soft tissue gas in R foot on CT scan. 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 Diff    Diet: consistent carbs    Activity: OOB to chair, PT Consult    DVTPPx: HSQ    Dispo: 5 Uris telemetry, KERRY           Attestation Statements:   Attestation Statements:  Attending and PA/NP shared services statement (NON-critical care):     Attending to bill.     I independently performed the documented:     History, Exam and Medical decision making.     I have made amendments to the documentation where necessary. Additional comments: I have personally seen and examined this patient. I have fully participated in the care of this patient. I have reviewed all pertinent clinical information, including history, physical exam, plan and the Resident's, PA's and NP's note and agree except as noted. This is a patient known to Dr. 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 He is transferred from the medical service to vascular, now s/p staged R BKA w/ closure (12/18/23)    I have seen him on 12/21/23. Overnight he was abusive to nursing staff. Security was called. Calmer this morning. No major complaints. Says pain tolerable. WBC 12 (from 14.97).     PLAN & RECOMMENDATIONS  - ASA/SQH; no more plavix unless needs for CAD  - ABX for C-diff treatment  - ASA/SQH  - Physical therapy O/N: YEISON, VSS voided >3k                        ---------------------------------------------------------------------------  PLEASE CHECK WHEN PRESENT:     [  ]Heart Failure     [  ] Acute     [  ] Acute on Chronic     [x  ] Chronic  -------------------------------------------------------------------     [  ]Diastolic [HFpEF]     [ x ]Systolic [HFrEF]     [  ]Combined [HFpEF & HFrEF]  .................................................................................     [  ]Other:     [ ] Pulmonary Hypertension     [ ] Chronic A-fib     [ ] Persistet A-fib     [ ] Permanent A-fib     [ ] Paroxysmal A-fib     [x ] Hypertensive Heart Disease  -------------------------------------------------------------------  [ ] Respiratory failure  [ ] Acute cor pulmonale  [ ] Asthma/COPD Exacerbation  [ ]COPD on home O2 (Chronic renal Failure)   [ ] Pleural effusion  [ ] Aspiration pneumonia  [ ] Obstructive Sleep Apnea  [ ]Atelectasis   [ ] Acute PE   -------------------------------------------------------------------  [  ]Acute Kidney Injury      [  ]Acute Tubular Necrosis      [  ]Reneal Medullary Necrosis     [  ]Renal Cortical Necrosis     [  ]Other Pathological Lesions:    [  ]CKD 1  [  ]CKD 2  [  ]CKD 3  [  ]CKD 4  [  ]CKD 5 (ESRD)  [  ]Other  -------------------------------------------------------------------  [ x ]Diabetes  [  ] Diabetic PVD Ulcer  [  ] Neuropathic ulcer to DM  [  ] Diabetes with Nephropathy  [ x ] Osteomyelitis due to diabetes  [  ] Hyperglycemia   [  ]hypoglycemia   --------------------------------------------------------------------  [  ]Malnutrition: See Nutrition Note  [  ]Cachexia  [  ]Other:   [  ]Supplement Ordered:  [  ]Morbid Obesity (BMI >=40]  [ ] Ileus  ---------------------------------------------------------------------  [ ] Sepsis/severe sepsis/septic shock  [ ] Noninfectious SIRS  [ ] UTI  [ ] Pneumonia  [ ] Thrombophlebitis     -----------------------------------------------------------------------  [ ] Acidosis/alkalosis  [ ] Fluid overload  [ ] Hypokalemia  [ ] Hyperkalemia  [ ] Hypomagnesemia  [ ] Hypophosphatemia  [ ] Hyperphosphatemia  ------------------------------------------------------------------------  [ ] Acute blood loss anemia  [ ] Post op blood loss anemia  [ ] Iron deficiency anemia  [ ] Anemia due to chronic disease  [ ] Hypercoagulable state  [ ] Thrombocytopenia  ----------------------------------------------------------------------  [ ] Cerebral infarction  [ ] Transient ischemia attack  [ ] Encephalopathy - Toxic or Metabolic          A/P:    69yo M PMH CAD (2 stents 2020), HFmrEF (45-50%), HTN, PAD w/ remote RLE angioplasty, 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 Found to have increased soft tissue gas in R foot on CT scan. 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 Diff    Diet: consistent carbs    Activity: OOB to chair, PT Consult    DVTPPx: HSQ    Dispo: 5 Uris telemetry, KERRY           Attestation Statements:   Attestation Statements:  Attending and PA/NP shared services statement (NON-critical care):     Attending to bill.     I independently performed the documented:     History, Exam and Medical decision making.     I have made amendments to the documentation where necessary. Additional comments: I have personally seen and examined this patient. I have fully participated in the care of this patient. I have reviewed all pertinent clinical information, including history, physical exam, plan and the Resident's, PA's and NP's note and agree except as noted. This is a patient known to Dr. 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 He is transferred from the medical service to vascular, now s/p staged R BKA w/ closure (12/18/23)    I have seen him on 12/21/23. Overnight he was abusive to nursing staff. Security was called. Calmer this morning. No major complaints. Says pain tolerable. WBC 12 (from 14.97).     PLAN & RECOMMENDATIONS  - ASA/SQH; no more plavix unless needs for CAD  - ABX for C-diff treatment  - ASA/SQH  - Physical therapy O/N: YEISON, VSS voided >3k    Subjective: Patient seen and examined at bedside, resting comfortably. Patient states his pain is controlled and swelling has mildly improved. Denies other acute complaints. Denies cp, SOB, abdominal pain, fever, chills.    ROS:   Denies Headache, blurred vision, Chest Pain, SOB, Abdominal pain, nausea or vomiting     Social   vancomycin    Solution 125  aspirin  chewable 81  carvedilol 25  furosemide   Injectable 80  heparin   Injectable 5000  hydrALAZINE 100  lisinopril 40  vancomycin    Solution 125      Allergies    No Known Allergies    Intolerances        Vital Signs Last 24 Hrs  T(C): 36.4 (21 Dec 2023 22:08), Max: 36.7 (21 Dec 2023 12:19)  T(F): 97.6 (21 Dec 2023 22:08), Max: 98.1 (21 Dec 2023 17:17)  HR: 77 (22 Dec 2023 00:39) (61 - 77)  BP: 156/74 (22 Dec 2023 00:39) (103/56 - 190/84)  BP(mean): 106 (22 Dec 2023 00:39) (102 - 121)  RR: 18 (22 Dec 2023 00:39) (16 - 18)  SpO2: 98% (22 Dec 2023 00:39) (98% - 100%)    Parameters below as of 22 Dec 2023 00:39  Patient On (Oxygen Delivery Method): room air      I&O's Summary    21 Dec 2023 07:01  -  22 Dec 2023 06:54  --------------------------------------------------------  IN: 540 mL / OUT: 4505 mL / NET: -3965 mL        Physical Exam:  General: NAD, resting comfortably  Pulmonary: On room air, nonlabored breathing, no respiratory distress  Cardiovascular: NSR  Abdominal: soft, nontender, nondistended  Extremities: Right BKA stump c/d/i, no drainage, hematoma, ecchymosis. mobile stump site. edema present on b/l LE with scrotal swelling improved from last night      LABS:                        8.4    11.73 )-----------( 214      ( 22 Dec 2023 05:30 )             26.0     12-22    141  |  107  |  x   ----------------------------<  120<H>  4.0   |  26  |  x     Ca    8.6      22 Dec 2023 05:30  Phos  3.7     12-22  Mg     1.9     12-22          Radiology and Additional Studies:    ---------------------------------------------------------------------------  PLEASE CHECK WHEN PRESENT:     [  ]Heart Failure     [  ] Acute     [  ] Acute on Chronic     [x  ] Chronic  -------------------------------------------------------------------     [  ]Diastolic [HFpEF]     [ x ]Systolic [HFrEF]     [  ]Combined [HFpEF & HFrEF]  .................................................................................     [  ]Other:     [ ] Pulmonary Hypertension     [ ] Chronic A-fib     [ ] Persistet A-fib     [ ] Permanent A-fib     [ ] Paroxysmal A-fib     [x ] Hypertensive Heart Disease  -------------------------------------------------------------------  [ ] Respiratory failure  [ ] Acute cor pulmonale  [ ] Asthma/COPD Exacerbation  [ ]COPD on home O2 (Chronic renal Failure)   [ ] Pleural effusion  [ ] Aspiration pneumonia  [ ] Obstructive Sleep Apnea  [ ]Atelectasis   [ ] Acute PE   -------------------------------------------------------------------  [  ]Acute Kidney Injury      [  ]Acute Tubular Necrosis      [  ]Reneal Medullary Necrosis     [  ]Renal Cortical Necrosis     [  ]Other Pathological Lesions:    [  ]CKD 1  [  ]CKD 2  [  ]CKD 3  [  ]CKD 4  [  ]CKD 5 (ESRD)  [  ]Other  -------------------------------------------------------------------  [ x ]Diabetes  [  ] Diabetic PVD Ulcer  [  ] Neuropathic ulcer to DM  [  ] Diabetes with Nephropathy  [ x ] Osteomyelitis due to diabetes  [  ] Hyperglycemia   [  ]hypoglycemia   --------------------------------------------------------------------  [  ]Malnutrition: See Nutrition Note  [  ]Cachexia  [  ]Other:   [  ]Supplement Ordered:  [  ]Morbid Obesity (BMI >=40]  [ ] Ileus  ---------------------------------------------------------------------  [ ] Sepsis/severe sepsis/septic shock  [ ] Noninfectious SIRS  [ ] UTI  [ ] Pneumonia  [ ] Thrombophlebitis     -----------------------------------------------------------------------  [ ] Acidosis/alkalosis  [ ] Fluid overload  [ ] Hypokalemia  [ ] Hyperkalemia  [ ] Hypomagnesemia  [ ] Hypophosphatemia  [ ] Hyperphosphatemia  ------------------------------------------------------------------------  [ ] Acute blood loss anemia  [ ] Post op blood loss anemia  [ ] Iron deficiency anemia  [ ] Anemia due to chronic disease  [ ] Hypercoagulable state  [ ] Thrombocytopenia  ----------------------------------------------------------------------  [ ] Cerebral infarction  [ ] Transient ischemia attack  [ ] Encephalopathy - Toxic or Metabolic          A/P:    69yo M PMH CAD (2 stents 2020), HFmrEF (45-50%), HTN, PAD w/ remote RLE angioplasty, 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 Found to have increased soft tissue gas in R foot on CT scan. 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 Diff    Diet: consistent carbs    Activity: OOB to chair, PT Consult    DVTPPx: HSQ    Dispo: 5 Uris telemetry, KERRY     I (Bello Sales PA-C) have personally seen and examined the patient. I have reviewed all patient imaging and laboratory findings.        O/N: YEISON, VSS voided >3k    Subjective: Patient seen and examined at bedside, resting comfortably. Patient states his pain is controlled and swelling has mildly improved. Denies other acute complaints. Denies cp, SOB, abdominal pain, fever, chills.    ROS:   Denies Headache, blurred vision, Chest Pain, SOB, Abdominal pain, nausea or vomiting     Social   vancomycin    Solution 125  aspirin  chewable 81  carvedilol 25  furosemide   Injectable 80  heparin   Injectable 5000  hydrALAZINE 100  lisinopril 40  vancomycin    Solution 125      Allergies    No Known Allergies    Intolerances        Vital Signs Last 24 Hrs  T(C): 36.4 (21 Dec 2023 22:08), Max: 36.7 (21 Dec 2023 12:19)  T(F): 97.6 (21 Dec 2023 22:08), Max: 98.1 (21 Dec 2023 17:17)  HR: 77 (22 Dec 2023 00:39) (61 - 77)  BP: 156/74 (22 Dec 2023 00:39) (103/56 - 190/84)  BP(mean): 106 (22 Dec 2023 00:39) (102 - 121)  RR: 18 (22 Dec 2023 00:39) (16 - 18)  SpO2: 98% (22 Dec 2023 00:39) (98% - 100%)    Parameters below as of 22 Dec 2023 00:39  Patient On (Oxygen Delivery Method): room air      I&O's Summary    21 Dec 2023 07:01  -  22 Dec 2023 06:54  --------------------------------------------------------  IN: 540 mL / OUT: 4505 mL / NET: -3965 mL        Physical Exam:  General: NAD, resting comfortably  Pulmonary: On room air, nonlabored breathing, no respiratory distress  Cardiovascular: NSR  Abdominal: soft, nontender, nondistended  Extremities: Right BKA stump c/d/i, no drainage, hematoma, ecchymosis. mobile stump site. edema present on b/l LE with scrotal swelling improved from last night      LABS:                        8.4    11.73 )-----------( 214      ( 22 Dec 2023 05:30 )             26.0     12-22    141  |  107  |  x   ----------------------------<  120<H>  4.0   |  26  |  x     Ca    8.6      22 Dec 2023 05:30  Phos  3.7     12-22  Mg     1.9     12-22          Radiology and Additional Studies:    ---------------------------------------------------------------------------  PLEASE CHECK WHEN PRESENT:     [  ]Heart Failure     [  ] Acute     [  ] Acute on Chronic     [x  ] Chronic  -------------------------------------------------------------------     [  ]Diastolic [HFpEF]     [ x ]Systolic [HFrEF]     [  ]Combined [HFpEF & HFrEF]  .................................................................................     [  ]Other:     [ ] Pulmonary Hypertension     [ ] Chronic A-fib     [ ] Persistet A-fib     [ ] Permanent A-fib     [ ] Paroxysmal A-fib     [x ] Hypertensive Heart Disease  -------------------------------------------------------------------  [ ] Respiratory failure  [ ] Acute cor pulmonale  [ ] Asthma/COPD Exacerbation  [ ]COPD on home O2 (Chronic renal Failure)   [ ] Pleural effusion  [ ] Aspiration pneumonia  [ ] Obstructive Sleep Apnea  [ ]Atelectasis   [ ] Acute PE   -------------------------------------------------------------------  [  ]Acute Kidney Injury      [  ]Acute Tubular Necrosis      [  ]Reneal Medullary Necrosis     [  ]Renal Cortical Necrosis     [  ]Other Pathological Lesions:    [  ]CKD 1  [  ]CKD 2  [  ]CKD 3  [  ]CKD 4  [  ]CKD 5 (ESRD)  [  ]Other  -------------------------------------------------------------------  [ x ]Diabetes  [  ] Diabetic PVD Ulcer  [  ] Neuropathic ulcer to DM  [  ] Diabetes with Nephropathy  [ x ] Osteomyelitis due to diabetes  [  ] Hyperglycemia   [  ]hypoglycemia   --------------------------------------------------------------------  [  ]Malnutrition: See Nutrition Note  [  ]Cachexia  [  ]Other:   [  ]Supplement Ordered:  [  ]Morbid Obesity (BMI >=40]  [ ] Ileus  ---------------------------------------------------------------------  [ ] Sepsis/severe sepsis/septic shock  [ ] Noninfectious SIRS  [ ] UTI  [ ] Pneumonia  [ ] Thrombophlebitis     -----------------------------------------------------------------------  [ ] Acidosis/alkalosis  [ ] Fluid overload  [ ] Hypokalemia  [ ] Hyperkalemia  [ ] Hypomagnesemia  [ ] Hypophosphatemia  [ ] Hyperphosphatemia  ------------------------------------------------------------------------  [ ] Acute blood loss anemia  [ ] Post op blood loss anemia  [ ] Iron deficiency anemia  [ ] Anemia due to chronic disease  [ ] Hypercoagulable state  [ ] Thrombocytopenia  ----------------------------------------------------------------------  [ ] Cerebral infarction  [ ] Transient ischemia attack  [ ] Encephalopathy - Toxic or Metabolic          A/P:    69yo M PMH CAD (2 stents 2020), HFmrEF (45-50%), HTN, PAD w/ remote RLE angioplasty, 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 Found to have increased soft tissue gas in R foot on CT scan. 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 Diff    Diet: consistent carbs    Activity: OOB to chair, PT Consult    DVTPPx: HSQ    Dispo: 5 Uris telemetry, KERRY     I (Bello Sales PA-C) have personally seen and examined the patient. I have reviewed all patient imaging and laboratory findings.

## 2023-12-22 NOTE — CHART NOTE - NSCHARTNOTEFT_GEN_A_CORE
# Type 2 diabetes mellitus with hyperglycemia  - Patient's fingerstick glucose goal is 100-180 mg/dL.    - Discharge recommendations: please discharge with 12 u lantus nightly + 8 units lispro with meals  - Patient can follow up at discharge with [facility] by calling [phone] to make an appointment.      Case discussed with Dr. Huertas. Primary team updated. # Type 2 diabetes mellitus with hyperglycemia  - Patient's fingerstick glucose goal is 100-180 mg/dL.    - Discharge recommendations: please discharge with 12 u lantus nightly + 8 units lispro with meals  - Patient can follow up at discharge with Health system Physician [facility] by calling [phone] to make an appointment.      Case discussed with Dr. Huertas. Primary team updated. # Type 2 diabetes mellitus with hyperglycemia  - Patient's fingerstick glucose goal is 100-180 mg/dL.    - Discharge recommendations: please discharge with 12 u lantus nightly + 8 units lispro with meals  - Patient can follow up at discharge with [facility] by calling [phone] to make an appointment.      Case discussed with Dr. Huertas. Primary team updated.    Attending:  Above discussed with DR. Alexis.  We were notified this morning that the pt was to be transferred to [facility] today.  Will decrease the Lantus back to 12 units given the sharp drop in his glucose overnight from 244 to 108.  Will also increase the premeal slightly to 8 units given the elevated 10 PM of 244 last night.    MEKA Huertas MD # Type 2 diabetes mellitus with hyperglycemia  - Patient's fingerstick glucose goal is 100-180 mg/dL.    - Discharge recommendations: please discharge with 12 u lantus nightly + 8 units lispro with meals  - Patient can follow up at discharge with [facility] by calling [phone] to make an appointment.      Case discussed with Dr. Huertas. Primary team updated.    Attending:  Above discussed with DR. Alexis.  We were notified this morning that the pt was to be transferred to [facility] today.  Will decrease the Lantus back to 12 units given the sharp drop in his glucose overnight from 244 to 108.  Will also increase the premeal slightly to 8 units given the elevated 10 PM of 244 last night.    MEKA Huertas MD

## 2023-12-22 NOTE — PROGRESS NOTE ADULT - TIME BILLING
Review of hospital course, labs, vitals, medical records.   Bedside exam and interview   Discussed plan of care with vascular surgery ACP, housestaff, attd  Documenting the encounter

## 2023-12-22 NOTE — PROGRESS NOTE ADULT - SUBJECTIVE AND OBJECTIVE BOX
SUBJECTIVE / INTERVAL HPI: Patient was seen and examined this morning.     Overnight events:    12/11: lantus 10 + lispro 4  12/12: lantus 7 + lispro 3  12/13: lantus 7 + lispro 3  12/14: lantus 6 + lispro 3  12/15: lantus 8 + lispro 4  12/18-20: lantus 10 + lispro 6  12/21: lantus 14 + lispro 6    CAPILLARY BLOOD GLUCOSE & INSULIN RECEIVED  230 mg/dL (12-20 @ 22:38)  221 mg/dL (12-21 @ 08:37)  77 mg/dL (12-21 @ 12:02)  186 mg/dL (12-21 @ 16:56) - 8 +1  244 mg/dL (12-21 @ 21:52) - 14 + 2  108 mg/dL (12-22 @ 08:28)       REVIEW OF SYSTEMS  Constitutional:  Negative fever, chills or loss of appetite.  Eyes:  Negative blurry vision or double vision.  Cardiovascular:  Negative for chest pain or palpitations.  Respiratory:  Negative for cough, wheezing, or shortness of breath.    Gastrointestinal:  Negative for nausea, vomiting, diarrhea, constipation, or abdominal pain.  Genitourinary:  Negative frequency, urgency or dysuria.  Neurologic:  No headache, confusion, dizziness, lightheadedness.    PHYSICAL EXAM  Vital Signs Last 24 Hrs  T(C): 37.6 (22 Dec 2023 07:20), Max: 37.9 (22 Dec 2023 07:04)  T(F): 99.7 (22 Dec 2023 07:20), Max: 100.2 (22 Dec 2023 07:04)  HR: 71 (22 Dec 2023 07:04) (61 - 77)  BP: 144/70 (22 Dec 2023 07:04) (103/56 - 190/84)  BP(mean): 98 (22 Dec 2023 07:04) (98 - 121)  RR: 18 (22 Dec 2023 07:04) (16 - 18)  SpO2: 97% (22 Dec 2023 07:04) (97% - 100%)    Parameters below as of 22 Dec 2023 07:04  Patient On (Oxygen Delivery Method): room air        Constitutional: Awake, alert, in no acute distress.   HEENT: Normocephalic, atraumatic, RAGHAV.  Respiratory: Lungs clear to ausculation bilaterally.   Cardiovascular: regular rhythm, normal S1 and S2, no audible murmurs.   GI: soft, non-tender, non-distended, bowel sounds present.  Extremities: No lower extremity edema.  Psychiatric: AAO x 3. Normal affect/mood.     LABS  CBC - WBC/HGB/HTC/PLT: 11.73/8.4/26.0/214 (12-22-23)  BMP - Na/K/Cl/Bicarb/BUN/Cr/Gluc/AG/eGFR: 141/4.0/107/26/31/1.39/120/8/56 (12-22-23)  Ca - 8.6 (12-22-23)  Phos - 3.7 (12-22-23)  Mg - 1.9 (12-22-23)  LFT - Alb/Tprot/Tbili/Dbili/AlkPhos/ALT/AST: 1.7/--/0.2/--/113/16/17 (12-18-23)  PT/aPTT/INR: 10.6/--/0.93 (12-18-23)         12-21-23 @ 07:01  -  12-22-23 @ 07:00  --------------------------------------------------------  IN: 540 mL / OUT: 4505 mL / NET: -3965 mL        MEDICATIONS  MEDICATIONS  (STANDING):  acetaminophen     Tablet .. 650 milliGRAM(s) Oral every 6 hours  aspirin  chewable 81 milliGRAM(s) Oral daily  atorvastatin 80 milliGRAM(s) Oral at bedtime  carvedilol 25 milliGRAM(s) Oral every 12 hours  furosemide   Injectable 80 milliGRAM(s) IV Push two times a day  gabapentin 800 milliGRAM(s) Oral three times a day  heparin   Injectable 5000 Unit(s) SubCutaneous every 8 hours  hydrALAZINE 100 milliGRAM(s) Oral three times a day  HYDROmorphone  Injectable 0.5 milliGRAM(s) IV Push once  insulin glargine Injectable (LANTUS) 14 Unit(s) SubCutaneous at bedtime  insulin lispro (ADMELOG) corrective regimen sliding scale   SubCutaneous Before meals and at bedtime  insulin lispro Injectable (ADMELOG) 6 Unit(s) SubCutaneous three times a day before meals  lisinopril 40 milliGRAM(s) Oral daily  vancomycin    Solution 125 milliGRAM(s) Oral every 6 hours    MEDICATIONS  (PRN):  HYDROmorphone  Injectable 1 milliGRAM(s) IV Push every 6 hours PRN Severe Pain (7 - 10)  oxyCODONE    IR 10 milliGRAM(s) Oral every 8 hours PRN Moderate Pain (4 - 6)  oxyCODONE    IR 5 milliGRAM(s) Oral every 8 hours PRN Mild Pain (1 - 3)    ASSESSMENT / RECOMMENDATIONS    67yo M PMH CAD (2 stents 2020), HFmrEF (45-50%), HTN, PAD w/ remote RLE angioplasty, R 4th toe OM s/p partial R 4th ray amputation on 10/24, RLE angiogram with AT lithotripsy and AT/peroneal balloon angioplasty 10/27, uncontrolled IDDM (a1c 12 in Oct 2023), recent admission 11/8-11/10 to cardiology service for hypertensive emergency (left AMA, was given levaquin when he left for right foot wound that pt had started treatment for back in October) who presented to [facility] after a fall onto his knees and was having b/l knee pain. 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      A1C: 13.5 %  Insulinopenic  C-peptide 0.5 with , JAMIR/ICA neg (Oct 2023)  C-peptide 1.0 12/15 with   BUN: 27  Creatinine: 1.31  GFR: 60  Weight: 89  BMI: 30.7    Discharged early Oct 2023 on Lantus 11 and lispro 7. He reports taking Lantus 12-14 and lispro 7. He reports that since this change his FSG <150, with no hypoglycemia.   Diabetes managed outpatient by: endocrinologist, unsure of the name      # Type 2 diabetes mellitus with hyperglycemia  - Please keep lantus  units at bedtime.   - keep lispro   units before each meal.  - Continue lispro low dose sliding scale before meals and at bedtime.  - Patient's fingerstick glucose goal is 100-180 mg/dL.    - Discharge recommendations: basal/bolus insulin, dose TBD.   - Patient can follow up at discharge with [facility] by calling [phone] to make an appointment.          Case discussed with Dr. Huertas. Primary team updated.       Christa Alexis  Endocrinology Fellow    Service Pager: [phone]    SUBJECTIVE / INTERVAL HPI: Patient was seen and examined this morning.     Overnight events:    12/11: lantus 10 + lispro 4  12/12: lantus 7 + lispro 3  12/13: lantus 7 + lispro 3  12/14: lantus 6 + lispro 3  12/15: lantus 8 + lispro 4  12/18-20: lantus 10 + lispro 6  12/21: lantus 14 + lispro 6    CAPILLARY BLOOD GLUCOSE & INSULIN RECEIVED  230 mg/dL (12-20 @ 22:38)  221 mg/dL (12-21 @ 08:37)  77 mg/dL (12-21 @ 12:02)  186 mg/dL (12-21 @ 16:56) - 8 +1  244 mg/dL (12-21 @ 21:52) - 14 + 2  108 mg/dL (12-22 @ 08:28)       REVIEW OF SYSTEMS  Constitutional:  Negative fever, chills or loss of appetite.  Eyes:  Negative blurry vision or double vision.  Cardiovascular:  Negative for chest pain or palpitations.  Respiratory:  Negative for cough, wheezing, or shortness of breath.    Gastrointestinal:  Negative for nausea, vomiting, diarrhea, constipation, or abdominal pain.  Genitourinary:  Negative frequency, urgency or dysuria.  Neurologic:  No headache, confusion, dizziness, lightheadedness.    PHYSICAL EXAM  Vital Signs Last 24 Hrs  T(C): 37.6 (22 Dec 2023 07:20), Max: 37.9 (22 Dec 2023 07:04)  T(F): 99.7 (22 Dec 2023 07:20), Max: 100.2 (22 Dec 2023 07:04)  HR: 71 (22 Dec 2023 07:04) (61 - 77)  BP: 144/70 (22 Dec 2023 07:04) (103/56 - 190/84)  BP(mean): 98 (22 Dec 2023 07:04) (98 - 121)  RR: 18 (22 Dec 2023 07:04) (16 - 18)  SpO2: 97% (22 Dec 2023 07:04) (97% - 100%)    Parameters below as of 22 Dec 2023 07:04  Patient On (Oxygen Delivery Method): room air        Constitutional: Awake, alert, in no acute distress.   HEENT: Normocephalic, atraumatic, RAGHAV.  Respiratory: Lungs clear to ausculation bilaterally.   Cardiovascular: regular rhythm, normal S1 and S2, no audible murmurs.   GI: soft, non-tender, non-distended, bowel sounds present.  Extremities: No lower extremity edema.  Psychiatric: AAO x 3. Normal affect/mood.     LABS  CBC - WBC/HGB/HTC/PLT: 11.73/8.4/26.0/214 (12-22-23)  BMP - Na/K/Cl/Bicarb/BUN/Cr/Gluc/AG/eGFR: 141/4.0/107/26/31/1.39/120/8/56 (12-22-23)  Ca - 8.6 (12-22-23)  Phos - 3.7 (12-22-23)  Mg - 1.9 (12-22-23)  LFT - Alb/Tprot/Tbili/Dbili/AlkPhos/ALT/AST: 1.7/--/0.2/--/113/16/17 (12-18-23)  PT/aPTT/INR: 10.6/--/0.93 (12-18-23)         12-21-23 @ 07:01  -  12-22-23 @ 07:00  --------------------------------------------------------  IN: 540 mL / OUT: 4505 mL / NET: -3965 mL        MEDICATIONS  MEDICATIONS  (STANDING):  acetaminophen     Tablet .. 650 milliGRAM(s) Oral every 6 hours  aspirin  chewable 81 milliGRAM(s) Oral daily  atorvastatin 80 milliGRAM(s) Oral at bedtime  carvedilol 25 milliGRAM(s) Oral every 12 hours  furosemide   Injectable 80 milliGRAM(s) IV Push two times a day  gabapentin 800 milliGRAM(s) Oral three times a day  heparin   Injectable 5000 Unit(s) SubCutaneous every 8 hours  hydrALAZINE 100 milliGRAM(s) Oral three times a day  HYDROmorphone  Injectable 0.5 milliGRAM(s) IV Push once  insulin glargine Injectable (LANTUS) 14 Unit(s) SubCutaneous at bedtime  insulin lispro (ADMELOG) corrective regimen sliding scale   SubCutaneous Before meals and at bedtime  insulin lispro Injectable (ADMELOG) 6 Unit(s) SubCutaneous three times a day before meals  lisinopril 40 milliGRAM(s) Oral daily  vancomycin    Solution 125 milliGRAM(s) Oral every 6 hours    MEDICATIONS  (PRN):  HYDROmorphone  Injectable 1 milliGRAM(s) IV Push every 6 hours PRN Severe Pain (7 - 10)  oxyCODONE    IR 10 milliGRAM(s) Oral every 8 hours PRN Moderate Pain (4 - 6)  oxyCODONE    IR 5 milliGRAM(s) Oral every 8 hours PRN Mild Pain (1 - 3)    ASSESSMENT / RECOMMENDATIONS    67yo M PMH CAD (2 stents 2020), HFmrEF (45-50%), HTN, PAD w/ remote RLE angioplasty, R 4th toe OM s/p partial R 4th ray amputation on 10/24, RLE angiogram with AT lithotripsy and AT/peroneal balloon angioplasty 10/27, uncontrolled IDDM (a1c 12 in Oct 2023), recent admission 11/8-11/10 to cardiology service for hypertensive emergency (left AMA, was given levaquin when he left for right foot wound that pt had started treatment for back in October) who presented to [facility] after a fall onto his knees and was having b/l knee pain. 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      A1C: 13.5 %  Insulinopenic  C-peptide 0.5 with , JAMIR/ICA neg (Oct 2023)  C-peptide 1.0 12/15 with   BUN: 27  Creatinine: 1.31  GFR: 60  Weight: 89  BMI: 30.7    Discharged early Oct 2023 on Lantus 11 and lispro 7. He reports taking Lantus 12-14 and lispro 7. He reports that since this change his FSG <150, with no hypoglycemia.   Diabetes managed outpatient by: endocrinologist, unsure of the name      # Type 2 diabetes mellitus with hyperglycemia  - Please keep lantus  units at bedtime.   - keep lispro   units before each meal.  - Continue lispro low dose sliding scale before meals and at bedtime.  - Patient's fingerstick glucose goal is 100-180 mg/dL.    - Discharge recommendations: basal/bolus insulin, dose TBD.   - Patient can follow up at discharge with [facility] by calling [phone] to make an appointment.          Case discussed with Dr. Huertas. Primary team updated.       Christa Alexis  Endocrinology Fellow    Service Pager: [phone]

## 2023-12-22 NOTE — PROGRESS NOTE ADULT - ASSESSMENT
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    #Diabetic Foot Infection   -Pt s/p BKA on 12/11 and  s/p closure on 12/18 Monday  -Continue pain medication as per primary team   -White count downtrending post op   [ ] Monitor off of tylenol, NSAIDs, check rectal temp if patient has any oral/axillary/temporal temps >99F    # R arm edema   right arm edematous. [ ] f/u right UE duplex     #Chronic HFmrEF   #HTN  -Continue with lisinopril 40mg daily, coreg 25mg BID, and hydralazine 100mg TID  inpatient diuresis and discharge diuretic regimen per cardiology recs  received furosemide 80mg IV x 2 doses on 12/21/23;   Switched to torsemide 40mg on 12/22/23, planned for 7 days of torsemide 40mg qd, followed by torsemide 20mg qd thereafter.     # C difficile infection   -WBC downtrending. stools more formed; Continue PO vancomycin (completes on 12/25), continue contact precautions     #Acute Blood Loss Anemia   -Pt s/p transfusion of 1 unit of PRBCS on 12/19; Continue to monitor Hgb while inpatient     #CAD -Continue with ASA 81mg daily and Atorvastatin 80mg qhs     #Moderate Aortic Stenosis  -outpatient follow up for surveillance TTE     #Type 2 Diabetes c/b PAD, diabetic foot infection and hyperglycemia   inpatient and discharge insulin regimen per endocrine consult     DVT PPx  - Heparin SQ  68 year old male with a PMHx of CAD (s/p PCI x 2 in 2020), HTN, IDDM (A1c 12%, 10/2023), PAD (s/p remote RLE angioplasty), right 4th toe OM (s/p partial  4th ray amputation, 10/24), RLE angiogram with AT lithotripsy and AT/peroneal balloon angioplasty and recent admission hypertensive emergency (left AMA) who presented after a fall, found to have increased soft tissue gas in right foot, now s/p right sided BKA.    #Diabetic Foot Infection   -Pt s/p BKA on 12/11 and  s/p closure on 12/18 Monday  -Continue pain medication as per primary team   -White count downtrending post op   [ ] Monitor off of tylenol, NSAIDs, check rectal temp if patient has any oral/axillary/temporal temps >99F    # R arm edema   right arm edematous. [ ] f/u right UE duplex     #Chronic HFmrEF   #HTN  -Continue with lisinopril 40mg daily, coreg 25mg BID, and hydralazine 100mg TID  received furosemide 80mg IV x 2 doses on 12/21/23; still has scrotal edema and pitting LE edema.   [  ] discuss with cards re: continuing IV diuresis through the weekend   on discharge, planned for 7 days of torsemide 40mg qd, followed by torsemide 20mg qd thereafter.     # C difficile infection   -WBC downtrending. stools more formed; Continue PO vancomycin (completes on 12/25), continue contact precautions     #Acute Blood Loss Anemia   -Pt s/p transfusion of 1 unit of PRBCS on 12/19; Continue to monitor Hgb while inpatient     #CAD -Continue with ASA 81mg daily and Atorvastatin 80mg qhs     #Moderate Aortic Stenosis  -outpatient follow up for surveillance TTE     #Type 2 Diabetes c/b PAD, diabetic foot infection and hyperglycemia   inpatient and discharge insulin regimen per endocrine consult     DVT PPx  - Heparin SQ

## 2023-12-22 NOTE — PROGRESS NOTE ADULT - ATTENDING COMMENTS
I have personally seen and examined this patient. I have fully participated in the care of this patient. I have reviewed all pertinent clinical information, including history, physical exam, plan and the Resident's, PA's and NP's note and agree except as noted. This is a patient known to Dr. 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 He is transferred from the medical service to vascular, now s/p staged R BKA w/ closure (12/18/23)    I have seen him on 12/22/23. Has RLE and scrotal swelling. Got diuresis per cardiology. WBC 11.73 (from 12.04). Cr 1.39 (from 1.44)    PLAN & RECOMMENDATIONS  - ASA/SQH; no more plavix unless needs for CAD  - ABX for C-diff treatment  - ASA/SQH  - Physical therapy  - Diuresis per cardiology; appreciate [facility] for dispo    Thank you,    Michelet Freed MD  Attending Vascular Surgeon  [facility] at [address] [place] [address]  Office: [phone]; Fax: [phone]  [email or web address] I have personally seen and examined this patient. I have fully participated in the care of this patient. I have reviewed all pertinent clinical information, including history, physical exam, plan and the Resident's, PA's and NP's note and agree except as noted. This is a patient known to Dr. 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 He is transferred from the medical service to vascular, now s/p staged R BKA w/ closure (12/18/23)    I have seen him on 12/22/23. Has RLE and scrotal swelling. Got diuresis per cardiology. WBC 11.73 (from 12.04). Cr 1.39 (from 1.44)    PLAN & RECOMMENDATIONS  - ASA/SQH; no more plavix unless needs for CAD  - ABX for C-diff treatment  - ASA/SQH  - Physical therapy  - Diuresis per cardiology; appreciate [facility] for dispo    Thank you,    Michelet Freed MD  Attending Vascular Surgeon  [facility] at [address] [place] [address]  Office: [phone]; Fax: [phone]  [email or web address]

## 2023-12-23 LAB
GLUCOSE BLDC GLUCOMTR-MCNC: 152 MG/DL — HIGH (ref 70–99)
GLUCOSE BLDC GLUCOMTR-MCNC: 152 MG/DL — HIGH (ref 70–99)
GLUCOSE BLDC GLUCOMTR-MCNC: 214 MG/DL — HIGH (ref 70–99)
GLUCOSE BLDC GLUCOMTR-MCNC: 214 MG/DL — HIGH (ref 70–99)
GLUCOSE BLDC GLUCOMTR-MCNC: 223 MG/DL — HIGH (ref 70–99)
GLUCOSE BLDC GLUCOMTR-MCNC: 223 MG/DL — HIGH (ref 70–99)
GLUCOSE BLDC GLUCOMTR-MCNC: 242 MG/DL — HIGH (ref 70–99)
GLUCOSE BLDC GLUCOMTR-MCNC: 242 MG/DL — HIGH (ref 70–99)
GLUCOSE BLDC GLUCOMTR-MCNC: 253 MG/DL — HIGH (ref 70–99)
GLUCOSE BLDC GLUCOMTR-MCNC: 253 MG/DL — HIGH (ref 70–99)

## 2023-12-23 PROCEDURE — 99233 SBSQ HOSP IP/OBS HIGH 50: CPT

## 2023-12-23 PROCEDURE — 99233 SBSQ HOSP IP/OBS HIGH 50: CPT | Mod: GC,24

## 2023-12-23 RX ORDER — ACETAMINOPHEN 500 MG
1000 TABLET ORAL ONCE
Refills: 0 | Status: COMPLETED | OUTPATIENT
Start: 2023-12-23 | End: 2023-12-23

## 2023-12-23 RX ORDER — SPIRONOLACTONE 25 MG/1
25 TABLET, FILM COATED ORAL DAILY
Refills: 0 | Status: DISCONTINUED | OUTPATIENT
Start: 2023-12-23 | End: 2023-12-26

## 2023-12-23 RX ORDER — OXYCODONE HYDROCHLORIDE 5 MG/1
5 TABLET ORAL EVERY 6 HOURS
Refills: 0 | Status: DISCONTINUED | OUTPATIENT
Start: 2023-12-23 | End: 2023-12-26

## 2023-12-23 RX ORDER — OXYCODONE HYDROCHLORIDE 5 MG/1
10 TABLET ORAL EVERY 6 HOURS
Refills: 0 | Status: DISCONTINUED | OUTPATIENT
Start: 2023-12-23 | End: 2023-12-26

## 2023-12-23 RX ORDER — INSULIN LISPRO 100/ML
8 VIAL (ML) SUBCUTANEOUS
Refills: 0 | Status: DISCONTINUED | OUTPATIENT
Start: 2023-12-23 | End: 2023-12-24

## 2023-12-23 RX ADMIN — Medication 650 MILLIGRAM(S): at 21:30

## 2023-12-23 RX ADMIN — Medication 2: at 22:24

## 2023-12-23 RX ADMIN — Medication 2: at 12:18

## 2023-12-23 RX ADMIN — CARVEDILOL PHOSPHATE 25 MILLIGRAM(S): 80 CAPSULE, EXTENDED RELEASE ORAL at 05:29

## 2023-12-23 RX ADMIN — Medication 650 MILLIGRAM(S): at 00:05

## 2023-12-23 RX ADMIN — Medication 3: at 18:29

## 2023-12-23 RX ADMIN — OXYCODONE HYDROCHLORIDE 10 MILLIGRAM(S): 5 TABLET ORAL at 20:09

## 2023-12-23 RX ADMIN — OXYCODONE HYDROCHLORIDE 10 MILLIGRAM(S): 5 TABLET ORAL at 05:27

## 2023-12-23 RX ADMIN — Medication 100 MILLIGRAM(S): at 13:50

## 2023-12-23 RX ADMIN — Medication 125 MILLIGRAM(S): at 12:05

## 2023-12-23 RX ADMIN — Medication 125 MILLIGRAM(S): at 18:50

## 2023-12-23 RX ADMIN — Medication 6 UNIT(S): at 12:18

## 2023-12-23 RX ADMIN — Medication 81 MILLIGRAM(S): at 11:29

## 2023-12-23 RX ADMIN — HEPARIN SODIUM 5000 UNIT(S): 5000 INJECTION INTRAVENOUS; SUBCUTANEOUS at 22:25

## 2023-12-23 RX ADMIN — Medication 100 MILLIGRAM(S): at 22:24

## 2023-12-23 RX ADMIN — Medication 650 MILLIGRAM(S): at 09:58

## 2023-12-23 RX ADMIN — Medication 1000 MILLIGRAM(S): at 12:30

## 2023-12-23 RX ADMIN — CARVEDILOL PHOSPHATE 25 MILLIGRAM(S): 80 CAPSULE, EXTENDED RELEASE ORAL at 18:50

## 2023-12-23 RX ADMIN — OXYCODONE HYDROCHLORIDE 10 MILLIGRAM(S): 5 TABLET ORAL at 05:57

## 2023-12-23 RX ADMIN — LISINOPRIL 40 MILLIGRAM(S): 2.5 TABLET ORAL at 05:27

## 2023-12-23 RX ADMIN — SPIRONOLACTONE 25 MILLIGRAM(S): 25 TABLET, FILM COATED ORAL at 18:49

## 2023-12-23 RX ADMIN — Medication 650 MILLIGRAM(S): at 20:28

## 2023-12-23 RX ADMIN — Medication 8 UNIT(S): at 18:30

## 2023-12-23 RX ADMIN — Medication 650 MILLIGRAM(S): at 11:00

## 2023-12-23 RX ADMIN — Medication 400 MILLIGRAM(S): at 11:20

## 2023-12-23 RX ADMIN — GABAPENTIN 800 MILLIGRAM(S): 400 CAPSULE ORAL at 22:24

## 2023-12-23 RX ADMIN — Medication 125 MILLIGRAM(S): at 05:30

## 2023-12-23 RX ADMIN — Medication 40 MILLIGRAM(S): at 05:28

## 2023-12-23 RX ADMIN — HEPARIN SODIUM 5000 UNIT(S): 5000 INJECTION INTRAVENOUS; SUBCUTANEOUS at 06:29

## 2023-12-23 RX ADMIN — HEPARIN SODIUM 5000 UNIT(S): 5000 INJECTION INTRAVENOUS; SUBCUTANEOUS at 13:49

## 2023-12-23 RX ADMIN — GABAPENTIN 800 MILLIGRAM(S): 400 CAPSULE ORAL at 05:26

## 2023-12-23 RX ADMIN — OXYCODONE HYDROCHLORIDE 10 MILLIGRAM(S): 5 TABLET ORAL at 19:09

## 2023-12-23 RX ADMIN — Medication 6 UNIT(S): at 06:13

## 2023-12-23 RX ADMIN — OXYCODONE HYDROCHLORIDE 5 MILLIGRAM(S): 5 TABLET ORAL at 11:00

## 2023-12-23 RX ADMIN — ATORVASTATIN CALCIUM 80 MILLIGRAM(S): 80 TABLET, FILM COATED ORAL at 22:23

## 2023-12-23 RX ADMIN — Medication 100 MILLIGRAM(S): at 05:29

## 2023-12-23 RX ADMIN — GABAPENTIN 800 MILLIGRAM(S): 400 CAPSULE ORAL at 13:49

## 2023-12-23 RX ADMIN — OXYCODONE HYDROCHLORIDE 5 MILLIGRAM(S): 5 TABLET ORAL at 09:56

## 2023-12-23 NOTE — PROGRESS NOTE ADULT - SUBJECTIVE AND OBJECTIVE BOX
INTERVAL EVENTS: No o/n events. Denies CP, dyspnea, palpitations, presyncope, syncope, f/c/n/v.     REVIEW OF SYSTEMS:  Constitutional:     [X] negative [ ] fevers [ ] chills [ ] weight loss [ ] weight gain  HEENT:                  [X] negative [ ] dry eyes [ ] eye irritation [ ] postnasal drip [ ] nasal congestion  CV:                         [X] negative  [ ] chest pain [ ] orthopnea [ ] palpitations [ ] murmur  Resp:                     [X] negative [ ] cough [ ] shortness of breath [ ] wheezing [ ] sputum [ ] hemoptysis  GI:                          [X] negative [ ] nausea [ ] vomiting [ ] diarrhea [ ] constipation [ ] abd pain [ ] dysphagia   :                        [X] negative [ ] dysuria [ ] nocturia [ ] hematuria [ ] increased urinary frequency  MSK:                      [X] negative [ ] back pain [ ] myalgias [ ] arthralgias [ ] fracture  Skin:                       [X] negative [ ] rash [ ] itch  Neuro:                   [X] negative [ ] headache [ ] dizziness [ ] syncope [ ] weakness [ ] numbness  Psych:                    [X] negative [ ] anxiety [ ] depression  Endo:                     [X] negative [ ] diabetes [ ] thyroid problem  Heme/Lymph:      [X] negative [ ] anemia [ ] bleeding problem  Allergic/Immune: [X] negative [ ] itchy eyes [ ] nasal discharge [ ] hives [ ] angioedema    [X] All other systems negative or otherwise described above.  [ ] Unable to assess ROS due to ________.    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 650 milliGRAM(s) Oral every 6 hours  aspirin  chewable 81 milliGRAM(s) Oral daily  atorvastatin 80 milliGRAM(s) Oral at bedtime  carvedilol 25 milliGRAM(s) Oral every 12 hours  gabapentin 800 milliGRAM(s) Oral three times a day  heparin   Injectable 5000 Unit(s) SubCutaneous every 8 hours  hydrALAZINE 100 milliGRAM(s) Oral three times a day  insulin glargine Injectable (LANTUS) 14 Unit(s) SubCutaneous at bedtime  insulin lispro (ADMELOG) corrective regimen sliding scale   SubCutaneous Before meals and at bedtime  insulin lispro Injectable (ADMELOG) 8 Unit(s) SubCutaneous three times a day before meals  lisinopril 40 milliGRAM(s) Oral daily  spironolactone 25 milliGRAM(s) Oral daily  torsemide 40 milliGRAM(s) Oral daily  vancomycin    Solution 125 milliGRAM(s) Oral every 6 hours    MEDICATIONS  (PRN):  oxyCODONE    IR 10 milliGRAM(s) Oral every 6 hours PRN Severe Pain (7 - 10)  oxyCODONE    IR 5 milliGRAM(s) Oral every 6 hours PRN Moderate Pain (4 - 6)    ICU Vital Signs Last 24 Hrs  T(C): 37.1 (23 Dec 2023 18:44), Max: 37.1 (23 Dec 2023 09:15)  T(F): 98.8 (23 Dec 2023 18:44), Max: 98.8 (23 Dec 2023 09:15)  HR: 73 (23 Dec 2023 18:44) (60 - 73)  BP: 194/90 (23 Dec 2023 18:44) (135/82 - 194/90)  BP(mean): 131 (23 Dec 2023 14:02) (109 - 131)  ABP: --  ABP(mean): --  RR: 18 (23 Dec 2023 18:44) (18 - 20)  SpO2: 98% (23 Dec 2023 18:44) (96% - 98%)    O2 Parameters below as of 23 Dec 2023 18:44  Patient On (Oxygen Delivery Method): room air          Orthostatic VS    Daily     Daily   I&O's Summary    22 Dec 2023 07:01  -  23 Dec 2023 07:00  --------------------------------------------------------  IN: 960 mL / OUT: 3000 mL / NET: -2040 mL    23 Dec 2023 07:01  -  23 Dec 2023 18:55  --------------------------------------------------------  IN: 600 mL / OUT: 1300 mL / NET: -700 mL        PHYSICAL EXAM:  Gen: Sitting in bed at time of exam, appears stated age  HEENT: NCAT, MMM, clear OP  Neck: supple, trachea at midline  CV: RRR, +S1/S2  Pulm: adequate respiratory effort, no increase in work of breathing  Abd: soft, ND  Skin: warm and dry,   Ext: right leg s/p BKA, wrapped in ACE ; left leg with 2+ pitting edema extending up to thighs ;  right arm edematous, + scrotal edema  Neuro: AOx3, speaking in full sentences  Psych: affect and behavior appropriate    LABS:                        8.4    11.73 )-----------( 214      ( 22 Dec 2023 05:30 )             26.0       12-22    141  |  107  |  31<H>  ----------------------------<  120<H>  4.0   |  26  |  1.39<H>    Ca    8.6      22 Dec 2023 05:30  Phos  3.7     12-22  Mg     1.9     12-22        Urinalysis Basic - ( 22 Dec 2023 05:30 )    Color: x / Appearance: x / SG: x / pH: x  Gluc: 120 mg/dL / Ketone: x  / Bili: x / Urobili: x   Blood: x / Protein: x / Nitrite: x   Leuk Esterase: x / RBC: x / WBC x   Sq Epi: x / Non Sq Epi: x / Bacteria: x          RADIOLOGY & ADDITIONAL STUDIES: reviewed

## 2023-12-23 NOTE — CHART NOTE - NSCHARTNOTEFT_GEN_A_CORE
Patient ordered for duplex ultrasound of the RUE yesterday 12/22 for RUE swelling. Patient refused yesterday evening when transport was available and told the nurse and transport that "he does not want to go now, I want to go tomorrow". Today 12/23 when ultrasound transport was rescheduled patient refused the ultrasound again. Ultrasound cancelled due to patient non-compliance.

## 2023-12-23 NOTE — PROGRESS NOTE ADULT - ATTENDING COMMENTS
I have personally seen and examined this patient. I have fully participated in the care of this patient. I have reviewed all pertinent clinical information, including history, physical exam, plan and the Resident's, PA's and NP's note and agree except as noted. This is a patient known to Dr. 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 He is transferred from the medical service to vascular, now s/p staged R BKA w/ closure (12/18/23)    No major complaints on 12/22/23. RLE swelling improving with diuresis. Refusing RUE venous duplex. Stump OK. Awaiting dispo.     PLAN & RECOMMENDATIONS  - ASA/SQH; no more plavix  - ABX for C-diff treatment  - ASA/SQH  - Physical therapy  - RUE venous duplex if amenable  - Diuresis and HTN recs per cardiology; appreciate recs  - F/u hospitalist  -  for dispo    Thank you,    Michelet Freed MD  Attending Vascular Surgeon  [facility] at [address] [place] [address]  Office: [phone]; Fax: [phone]  [email or web address]. I have personally seen and examined this patient. I have fully participated in the care of this patient. I have reviewed all pertinent clinical information, including history, physical exam, plan and the Resident's, PA's and NP's note and agree except as noted. This is a patient known to Dr. 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 He is transferred from the medical service to vascular, now s/p staged R BKA w/ closure (12/18/23)    No major complaints on 12/22/23. RLE swelling improving with diuresis. Refusing RUE venous duplex. Stump OK. Awaiting dispo.     PLAN & RECOMMENDATIONS  - ASA/SQH; no more plavix  - ABX for C-diff treatment  - ASA/SQH  - Physical therapy  - RUE venous duplex if amenable  - Diuresis and HTN recs per cardiology; appreciate recs  - F/u hospitalist  -  for dispo    Thank you,    Michelet Freed MD  Attending Vascular Surgeon  [facility] at [address] [place] [address]  Office: [phone]; Fax: [phone]  [email or web address].

## 2023-12-23 NOTE — PROGRESS NOTE ADULT - SUBJECTIVE AND OBJECTIVE BOX
VASCULAR CARDIOLOGY ATTENDING PROGRESS NOTE    SERVICE LINE: [phone]    Subjective:    No acute events overnight.   ROS negative.     Current Medications:   acetaminophen     Tablet .. 650 milliGRAM(s) Oral every 6 hours  aspirin  chewable 81 milliGRAM(s) Oral daily  atorvastatin 80 milliGRAM(s) Oral at bedtime  carvedilol 25 milliGRAM(s) Oral every 12 hours  gabapentin 800 milliGRAM(s) Oral three times a day  heparin   Injectable 5000 Unit(s) SubCutaneous every 8 hours  hydrALAZINE 75 milliGRAM(s) Oral three times a day  HYDROmorphone  Injectable 1 milliGRAM(s) IV Push every 6 hours PRN  insulin glargine Injectable (LANTUS) 10 Unit(s) SubCutaneous at bedtime  insulin lispro (ADMELOG) corrective regimen sliding scale   SubCutaneous Before meals and at bedtime  insulin lispro Injectable (ADMELOG) 8 Unit(s) SubCutaneous before lunch  insulin lispro Injectable (ADMELOG) 6 Unit(s) SubCutaneous before breakfast  insulin lispro Injectable (ADMELOG) 8 Unit(s) SubCutaneous before dinner  lisinopril 40 milliGRAM(s) Oral daily  oxyCODONE    IR 10 milliGRAM(s) Oral every 8 hours PRN  oxyCODONE    IR 5 milliGRAM(s) Oral every 8 hours PRN  vancomycin    Solution 125 milliGRAM(s) Oral every 6 hours      REVIEW OF SYSTEMS:  CONSTITUTIONAL: No weakness, fevers or chills  EYES/ENT: No visual changes;  No dysphagia  NECK: No pain or stiffness  RESPIRATORY: No cough, wheezing, hemoptysis; No shortness of breath  CARDIOVASCULAR: No chest pain or palpitations; No lower extremity edema  GASTROINTESTINAL: No abdominal or epigastric pain. No nausea, vomiting, or hematemesis; No diarrhea or constipation. No melena or hematochezia.  BACK: No back pain  GENITOURINARY: No dysuria, frequency or hematuria  NEUROLOGICAL: No numbness or weakness  SKIN: No itching, burning, rashes, or lesions   All other review of systems is negative unless indicated above.    Physical Exam:  T(F): 99 (12-20), Max: 99 (12-19)  HR: 74 (12-20) (69 - 77)  BP: 144/64 (12-20) (107/51 - 167/64)  BP(mean): 125 (12-20) (73 - 125)  ABP: --  ABP(mean): --  RR: 18 (12-20)  SpO2: 97% (12-20)  GENERAL: No acute distress, well-developed  HEAD:  Atraumatic, Normocephalic  ENT: +JVD  CHEST/LUNG: Clear to auscultation bilaterally; No wheeze, equal breath sounds bilaterally   BACK: No spinal tenderness  HEART: Regular rate and rhythm; No murmurs, rubs, or gallops  ABDOMEN: Soft, Nontender, Nondistended; Bowel sounds present  EXTREMITIES: 2+ pitting edema b/l LE and scrotal edema.  PSYCH: Nl behavior, nl affect  NEUROLOGY: AAOx3, non-focal, cranial nerves intact  SKIN: Normal color, No rashes or lesions    Cardiovascular Diagnostic Testing: personally reviewed    CXR: Personally reviewed    Labs: Personally reviewed                        7.8    14.97 )-----------( 192      ( 20 Dec 2023 05:30 )             24.5     12-20    140  |  109<H>  |  27<H>  ----------------------------<  108<H>  3.9   |  26  |  1.31<H>    Ca    8.3<L>      20 Dec 2023 05:30  Phos  3.7     12-20  Mg     2.1     12-20                         VASCULAR CARDIOLOGY ATTENDING PROGRESS NOTE    SERVICE LINE: [phone]    Subjective:    No acute events overnight.   ROS negative.     Current Medications:   acetaminophen     Tablet .. 650 milliGRAM(s) Oral every 6 hours  aspirin  chewable 81 milliGRAM(s) Oral daily  atorvastatin 80 milliGRAM(s) Oral at bedtime  carvedilol 25 milliGRAM(s) Oral every 12 hours  gabapentin 800 milliGRAM(s) Oral three times a day  heparin   Injectable 5000 Unit(s) SubCutaneous every 8 hours  hydrALAZINE 75 milliGRAM(s) Oral three times a day  HYDROmorphone  Injectable 1 milliGRAM(s) IV Push every 6 hours PRN  insulin glargine Injectable (LANTUS) 10 Unit(s) SubCutaneous at bedtime  insulin lispro (ADMELOG) corrective regimen sliding scale   SubCutaneous Before meals and at bedtime  insulin lispro Injectable (ADMELOG) 8 Unit(s) SubCutaneous before lunch  insulin lispro Injectable (ADMELOG) 6 Unit(s) SubCutaneous before breakfast  insulin lispro Injectable (ADMELOG) 8 Unit(s) SubCutaneous before dinner  lisinopril 40 milliGRAM(s) Oral daily  oxyCODONE    IR 10 milliGRAM(s) Oral every 8 hours PRN  oxyCODONE    IR 5 milliGRAM(s) Oral every 8 hours PRN  vancomycin    Solution 125 milliGRAM(s) Oral every 6 hours      REVIEW OF SYSTEMS:  CONSTITUTIONAL: No weakness, fevers or chills  EYES/ENT: No visual changes;  No dysphagia  NECK: No pain or stiffness  RESPIRATORY: No cough, wheezing, hemoptysis; No shortness of breath  CARDIOVASCULAR: No chest pain or palpitations; No lower extremity edema  GASTROINTESTINAL: No abdominal or epigastric pain. No nausea, vomiting, or hematemesis; No diarrhea or constipation. No melena or hematochezia.  BACK: No back pain  GENITOURINARY: No dysuria, frequency or hematuria  NEUROLOGICAL: No numbness or weakness  SKIN: No itching, burning, rashes, or lesions   All other review of systems is negative unless indicated above.    Physical Exam:  T(F): 99 (12-20), Max: 99 (12-19)  HR: 74 (12-20) (69 - 77)  BP: 144/64 (12-20) (107/51 - 167/64)  BP(mean): 125 (12-20) (73 - 125)  ABP: --  ABP(mean): --  RR: 18 (12-20)  SpO2: 97% (12-20)  GENERAL: No acute distress, well-developed  HEAD:  Atraumatic, Normocephalic  ENT: +JVD  CHEST/LUNG: Clear to auscultation bilaterally; No wheeze, equal breath sounds bilaterally   BACK: No spinal tenderness  HEART: Regular rate and rhythm; No murmurs, rubs, or gallops  ABDOMEN: Soft, Nontender, Nondistended; Bowel sounds present  EXTREMITIES: 2+ pitting edema b/l LE and scrotal edema.  PSYCH: Nl behavior, nl affect  NEUROLOGY: AAOx3, non-focal, cranial nerves intact  SKIN: Normal color, No rashes or lesions    Cardiovascular Diagnostic Testing: personally reviewed    CXR: Personally reviewed    Labs: Personally reviewed                        7.8    14.97 )-----------( 192      ( 20 Dec 2023 05:30 )             24.5     12-20    140  |  109<H>  |  27<H>  ----------------------------<  108<H>  3.9   |  26  |  1.31<H>    Ca    8.3<L>      20 Dec 2023 05:30  Phos  3.7     12-20  Mg     2.1     12-20

## 2023-12-23 NOTE — PROGRESS NOTE ADULT - ASSESSMENT
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 diff colitis. [facility] following for PAD, CAD, and HFrEF.    Assessment  1. 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 PAD s/p prior intervention to RLE Oct/2023  3. CAD s/p PCI  4. ICM HFrEF - decompensated  Last TTE Nov/2023 mildly reduced EF, G1DD  5. Mild-moderate AS (ALLISON 1.28cm^2, MG 15 mmHg)  6. RUE swelling    Plan  1. ASA 81mg QD and high intensity statin for CAD and PAD  2. Can be discharged to [facility] w/ torsemide 40mg QD (to continue until next cardiology appt in 2 weeks at [facility] - please arrange f/u)  3. TTE in 1y for surveillance of moderate AS  4. GDMT for HFrEF: Coreg 25mg BID and lisinopril 40mg QD; add Aldactone 25mg PO QD for better HTN control; will avoid SGLT2i given PAD w/ delayed wound healing  5. Hydralazine 100mg q8h for better BP control and additional afterload reduction  6. Recommend RUE duplex US    Thank you for the consult and the opportunity to take care of this patient.     Agapito Porter M.D.  CARDIOLOGY | VASCULAR CARDIOLOGY ATTENDING  [phone]    During non face-to-face time, I reviewed relevant portions of the patient’s medical record. During face-to-face time, I took a relevant history and examined the patient. I also explained differential diagnoses, relevant cardiac diagnoses, workup, and management plan, which required a high level of medical decision making. I answered all questions related to the patient's medical conditions.          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 diff colitis. [facility] Cardiology following for PAD, CAD, and HFrEF.    Assessment  1. 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 PAD s/p prior intervention to RLE Oct/2023  3. CAD s/p PCI  4. ICM HFrEF - decompensated  Last TTE Nov/2023 mildly reduced EF, G1DD  5. Mild-moderate AS (ALLISON 1.28cm^2, MG 15 mmHg)  6. RUE swelling    Plan  1. ASA 81mg QD and high intensity statin for CAD and PAD  2. Can be discharged to [facility] w/ torsemide 40mg QD (to continue until next cardiology appt in 2 weeks at [facility] - please arrange f/u)  3. TTE in 1y for surveillance of moderate AS  4. GDMT for HFrEF: Coreg 25mg BID and lisinopril 40mg QD; add Aldactone 25mg PO QD for better HTN control; will avoid SGLT2i given PAD w/ delayed wound healing  5. Hydralazine 100mg q8h for better BP control and additional afterload reduction  6. Recommend RUE duplex US    Thank you for the consult and the opportunity to take care of this patient.     Agapito Porter M.D.  CARDIOLOGY | VASCULAR CARDIOLOGY ATTENDING  [phone]    During non face-to-face time, I reviewed relevant portions of the patient’s medical record. During face-to-face time, I took a relevant history and examined the patient. I also explained differential diagnoses, relevant cardiac diagnoses, workup, and management plan, which required a high level of medical decision making. I answered all questions related to the patient's medical conditions.

## 2023-12-23 NOTE — PROGRESS NOTE ADULT - SUBJECTIVE AND OBJECTIVE BOX
O/N: STEPH LATHAM                 ---------------------------------------------------------------------------  PLEASE CHECK WHEN PRESENT:     [  ]Heart Failure     [  ] Acute     [  ] Acute on Chronic     [x  ] Chronic  -------------------------------------------------------------------     [  ]Diastolic [HFpEF]     [ x ]Systolic [HFrEF]     [  ]Combined [HFpEF & HFrEF]  .................................................................................     [  ]Other:     [ ] Pulmonary Hypertension     [ ] Chronic A-fib     [ ] Persistet A-fib     [ ] Permanent A-fib     [ ] Paroxysmal A-fib     [x ] Hypertensive Heart Disease  -------------------------------------------------------------------  [ ] Respiratory failure  [ ] Acute cor pulmonale  [ ] Asthma/COPD Exacerbation  [ ]COPD on home O2 (Chronic renal Failure)   [ ] Pleural effusion  [ ] Aspiration pneumonia  [ ] Obstructive Sleep Apnea  [ ]Atelectasis   [ ] Acute PE   -------------------------------------------------------------------  [  ]Acute Kidney Injury      [  ]Acute Tubular Necrosis      [  ]Reneal Medullary Necrosis     [  ]Renal Cortical Necrosis     [  ]Other Pathological Lesions:    [  ]CKD 1  [  ]CKD 2  [  ]CKD 3  [  ]CKD 4  [  ]CKD 5 (ESRD)  [  ]Other  -------------------------------------------------------------------  [ x ]Diabetes  [  ] Diabetic PVD Ulcer  [  ] Neuropathic ulcer to DM  [  ] Diabetes with Nephropathy  [ x ] Osteomyelitis due to diabetes  [  ] Hyperglycemia   [  ]hypoglycemia   --------------------------------------------------------------------  [  ]Malnutrition: See Nutrition Note  [  ]Cachexia  [  ]Other:   [  ]Supplement Ordered:  [  ]Morbid Obesity (BMI >=40]  [ ] Ileus  ---------------------------------------------------------------------  [ ] Sepsis/severe sepsis/septic shock  [ ] Noninfectious SIRS  [ ] UTI  [ ] Pneumonia  [ ] Thrombophlebitis     -----------------------------------------------------------------------  [ ] Acidosis/alkalosis  [ ] Fluid overload  [ ] Hypokalemia  [ ] Hyperkalemia  [ ] Hypomagnesemia  [ ] Hypophosphatemia  [ ] Hyperphosphatemia  ------------------------------------------------------------------------  [ ] Acute blood loss anemia  [ ] Post op blood loss anemia  [ ] Iron deficiency anemia  [ ] Anemia due to chronic disease  [ ] Hypercoagulable state  [ ] Thrombocytopenia  ----------------------------------------------------------------------  [ ] Cerebral infarction  [ ] Transient ischemia attack  [ ] Encephalopathy - Toxic or Metabolic          A/P:    67yo M PMH CAD (2 stents 2020), HFmrEF (45-50%), HTN, PAD w/ remote RLE angioplasty, R 4th toe OM s/p partial R 4th ray amputation on 10/24, RLE angiogram with AT lithotripsy and AT/peroneal balloon angioplasty 10/27, uncontrolled IDDM (a1c 12 in Oct 2023), recent admission 11/8-11/10 to cardiology service for hypertensive emergency (left AMA, was given levaquin when he left for right foot wound that pt had started treatment for back in October) who presented to [facility] after a fall onto his knees and was having b/l knee pain. Found to have increased soft tissue gas in R foot on CT scan. 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 Diff    Diet: consistent carbs    Activity: OOB to chair, PT Consult    DVTPPx: HSQ    Dispo: 5 Uris telemetry, KERRY    O/N: STEPH LATHAM                 ---------------------------------------------------------------------------  PLEASE CHECK WHEN PRESENT:     [  ]Heart Failure     [  ] Acute     [  ] Acute on Chronic     [x  ] Chronic  -------------------------------------------------------------------     [  ]Diastolic [HFpEF]     [ x ]Systolic [HFrEF]     [  ]Combined [HFpEF & HFrEF]  .................................................................................     [  ]Other:     [ ] Pulmonary Hypertension     [ ] Chronic A-fib     [ ] Persistet A-fib     [ ] Permanent A-fib     [ ] Paroxysmal A-fib     [x ] Hypertensive Heart Disease  -------------------------------------------------------------------  [ ] Respiratory failure  [ ] Acute cor pulmonale  [ ] Asthma/COPD Exacerbation  [ ]COPD on home O2 (Chronic renal Failure)   [ ] Pleural effusion  [ ] Aspiration pneumonia  [ ] Obstructive Sleep Apnea  [ ]Atelectasis   [ ] Acute PE   -------------------------------------------------------------------  [  ]Acute Kidney Injury      [  ]Acute Tubular Necrosis      [  ]Reneal Medullary Necrosis     [  ]Renal Cortical Necrosis     [  ]Other Pathological Lesions:    [  ]CKD 1  [  ]CKD 2  [  ]CKD 3  [  ]CKD 4  [  ]CKD 5 (ESRD)  [  ]Other  -------------------------------------------------------------------  [ x ]Diabetes  [  ] Diabetic PVD Ulcer  [  ] Neuropathic ulcer to DM  [  ] Diabetes with Nephropathy  [ x ] Osteomyelitis due to diabetes  [  ] Hyperglycemia   [  ]hypoglycemia   --------------------------------------------------------------------  [  ]Malnutrition: See Nutrition Note  [  ]Cachexia  [  ]Other:   [  ]Supplement Ordered:  [  ]Morbid Obesity (BMI >=40]  [ ] Ileus  ---------------------------------------------------------------------  [ ] Sepsis/severe sepsis/septic shock  [ ] Noninfectious SIRS  [ ] UTI  [ ] Pneumonia  [ ] Thrombophlebitis     -----------------------------------------------------------------------  [ ] Acidosis/alkalosis  [ ] Fluid overload  [ ] Hypokalemia  [ ] Hyperkalemia  [ ] Hypomagnesemia  [ ] Hypophosphatemia  [ ] Hyperphosphatemia  ------------------------------------------------------------------------  [ ] Acute blood loss anemia  [ ] Post op blood loss anemia  [ ] Iron deficiency anemia  [ ] Anemia due to chronic disease  [ ] Hypercoagulable state  [ ] Thrombocytopenia  ----------------------------------------------------------------------  [ ] Cerebral infarction  [ ] Transient ischemia attack  [ ] Encephalopathy - Toxic or Metabolic          A/P:    69yo M PMH CAD (2 stents 2020), HFmrEF (45-50%), HTN, PAD w/ remote RLE angioplasty, R 4th toe OM s/p partial R 4th ray amputation on 10/24, RLE angiogram with AT lithotripsy and AT/peroneal balloon angioplasty 10/27, uncontrolled IDDM (a1c 12 in Oct 2023), recent admission 11/8-11/10 to cardiology service for hypertensive emergency (left AMA, was given levaquin when he left for right foot wound that pt had started treatment for back in October) who presented to [facility] after a fall onto his knees and was having b/l knee pain. Found to have increased soft tissue gas in R foot on CT scan. 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 Diff    Diet: consistent carbs    Activity: OOB to chair, PT Consult    DVTPPx: HSQ    Dispo: 5 Uris telemetry, KERRY    O/N: YEISON, VSS     S: Patient does not have any complaints    O: Examined in bed resting comfortably     ROS: Denies headache, blurred vision, chest pain, SOB, abdominal pain, nausea or vomiting.         vancomycin    Solution 125  aspirin  chewable 81  carvedilol 25  heparin   Injectable 5000  hydrALAZINE 100  lisinopril 40  torsemide 40  vancomycin    Solution 125      Allergies    No Known Allergies    Intolerances        Vital Signs Last 24 Hrs  T(C): 37.1 (23 Dec 2023 09:15), Max: 37.1 (23 Dec 2023 09:15)  T(F): 98.8 (23 Dec 2023 09:15), Max: 98.8 (23 Dec 2023 09:15)  HR: 71 (23 Dec 2023 09:15) (60 - 73)  BP: 160/80 (23 Dec 2023 09:15) (135/74 - 174/81)  BP(mean): 109 (23 Dec 2023 09:15) (109 - 109)  RR: 18 (23 Dec 2023 09:15) (18 - 20)  SpO2: 97% (23 Dec 2023 09:15) (96% - 97%)    Parameters below as of 23 Dec 2023 09:15  Patient On (Oxygen Delivery Method): room air      I&O's Summary    22 Dec 2023 07:01  -  23 Dec 2023 07:00  --------------------------------------------------------  IN: 960 mL / OUT: 3000 mL / NET: -2040 mL    23 Dec 2023 07:01  -  23 Dec 2023 09:52  --------------------------------------------------------  IN: 0 mL / OUT: 400 mL / NET: -400 mL        Physical Exam:  General: alert and awake, NAD  Pulmonary: no respiratory distress  Cardiovascular: RRR  Abdominal: soft  Extremities: R BKA incision CDI, blister opened and covered, wound/incision covered with xeroform, kerlix & ace bandage      LABS:                        8.4    11.73 )-----------( 214      ( 22 Dec 2023 05:30 )             26.0     12-22    141  |  107  |  31<H>  ----------------------------<  120<H>  4.0   |  26  |  1.39<H>    Ca    8.6      22 Dec 2023 05:30  Phos  3.7     12-22  Mg     1.9     12-22          ---------------------------------------------------------------------------  PLEASE CHECK WHEN PRESENT:     [  ]Heart Failure     [  ] Acute     [  ] Acute on Chronic     [x  ] Chronic  -------------------------------------------------------------------     [  ]Diastolic [HFpEF]     [ x ]Systolic [HFrEF]     [  ]Combined [HFpEF & HFrEF]  .................................................................................     [  ]Other:     [ ] Pulmonary Hypertension     [ ] Chronic A-fib     [ ] Persistet A-fib     [ ] Permanent A-fib     [ ] Paroxysmal A-fib     [x ] Hypertensive Heart Disease  -------------------------------------------------------------------  [ ] Respiratory failure  [ ] Acute cor pulmonale  [ ] Asthma/COPD Exacerbation  [ ]COPD on home O2 (Chronic renal Failure)   [ ] Pleural effusion  [ ] Aspiration pneumonia  [ ] Obstructive Sleep Apnea  [ ]Atelectasis   [ ] Acute PE   -------------------------------------------------------------------  [  ]Acute Kidney Injury      [  ]Acute Tubular Necrosis      [  ]Reneal Medullary Necrosis     [  ]Renal Cortical Necrosis     [  ]Other Pathological Lesions:    [  ]CKD 1  [  ]CKD 2  [  ]CKD 3  [  ]CKD 4  [  ]CKD 5 (ESRD)  [  ]Other  -------------------------------------------------------------------  [ x ]Diabetes  [  ] Diabetic PVD Ulcer  [  ] Neuropathic ulcer to DM  [  ] Diabetes with Nephropathy  [ x ] Osteomyelitis due to diabetes  [  ] Hyperglycemia   [  ]hypoglycemia   --------------------------------------------------------------------  [  ]Malnutrition: See Nutrition Note  [  ]Cachexia  [  ]Other:   [  ]Supplement Ordered:  [  ]Morbid Obesity (BMI >=40]  [ ] Ileus  ---------------------------------------------------------------------  [ ] Sepsis/severe sepsis/septic shock  [ ] Noninfectious SIRS  [ ] UTI  [ ] Pneumonia  [ ] Thrombophlebitis     -----------------------------------------------------------------------  [ ] Acidosis/alkalosis  [ ] Fluid overload  [ ] Hypokalemia  [ ] Hyperkalemia  [ ] Hypomagnesemia  [ ] Hypophosphatemia  [ ] Hyperphosphatemia  ------------------------------------------------------------------------  [ ] Acute blood loss anemia  [ ] Post op blood loss anemia  [ ] Iron deficiency anemia  [ ] Anemia due to chronic disease  [ ] Hypercoagulable state  [ ] Thrombocytopenia  ----------------------------------------------------------------------  [ ] Cerebral infarction  [ ] Transient ischemia attack  [ ] Encephalopathy - Toxic or Metabolic          A/P:    67yo M PMH CAD (2 stents 2020), HFmrEF (45-50%), HTN, PAD w/ remote RLE angioplasty, R 4th toe OM s/p partial R 4th ray amputation on 10/24, RLE angiogram with AT lithotripsy and AT/peroneal balloon angioplasty 10/27, uncontrolled IDDM (a1c 12 in Oct 2023), recent admission 11/8-11/10 to cardiology service for hypertensive emergency (left AMA, was given levaquin when he left for right foot wound that pt had started treatment for back in October) who presented to [facility] after a fall onto his knees and was having b/l knee pain. Found to have increased soft tissue gas in R foot on CT scan. 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 Diff    Diet: consistent carbs    Activity: OOB to chair, PT Consult    DVTPPx: HSQ    Dispo: 5 Uris telemetry, DC notice given 12/22   O/N: YEISON, VSS     S: Patient does not have any complaints    O: Examined in bed resting comfortably     ROS: Denies headache, blurred vision, chest pain, SOB, abdominal pain, nausea or vomiting.         vancomycin    Solution 125  aspirin  chewable 81  carvedilol 25  heparin   Injectable 5000  hydrALAZINE 100  lisinopril 40  torsemide 40  vancomycin    Solution 125      Allergies    No Known Allergies    Intolerances        Vital Signs Last 24 Hrs  T(C): 37.1 (23 Dec 2023 09:15), Max: 37.1 (23 Dec 2023 09:15)  T(F): 98.8 (23 Dec 2023 09:15), Max: 98.8 (23 Dec 2023 09:15)  HR: 71 (23 Dec 2023 09:15) (60 - 73)  BP: 160/80 (23 Dec 2023 09:15) (135/74 - 174/81)  BP(mean): 109 (23 Dec 2023 09:15) (109 - 109)  RR: 18 (23 Dec 2023 09:15) (18 - 20)  SpO2: 97% (23 Dec 2023 09:15) (96% - 97%)    Parameters below as of 23 Dec 2023 09:15  Patient On (Oxygen Delivery Method): room air      I&O's Summary    22 Dec 2023 07:01  -  23 Dec 2023 07:00  --------------------------------------------------------  IN: 960 mL / OUT: 3000 mL / NET: -2040 mL    23 Dec 2023 07:01  -  23 Dec 2023 09:52  --------------------------------------------------------  IN: 0 mL / OUT: 400 mL / NET: -400 mL        Physical Exam:  General: alert and awake, NAD  Pulmonary: no respiratory distress  Cardiovascular: RRR  Abdominal: soft  Extremities: R BKA incision CDI, blister opened and covered, wound/incision covered with xeroform, kerlix & ace bandage      LABS:                        8.4    11.73 )-----------( 214      ( 22 Dec 2023 05:30 )             26.0     12-22    141  |  107  |  31<H>  ----------------------------<  120<H>  4.0   |  26  |  1.39<H>    Ca    8.6      22 Dec 2023 05:30  Phos  3.7     12-22  Mg     1.9     12-22          ---------------------------------------------------------------------------  PLEASE CHECK WHEN PRESENT:     [  ]Heart Failure     [  ] Acute     [  ] Acute on Chronic     [x  ] Chronic  -------------------------------------------------------------------     [  ]Diastolic [HFpEF]     [ x ]Systolic [HFrEF]     [  ]Combined [HFpEF & HFrEF]  .................................................................................     [  ]Other:     [ ] Pulmonary Hypertension     [ ] Chronic A-fib     [ ] Persistet A-fib     [ ] Permanent A-fib     [ ] Paroxysmal A-fib     [x ] Hypertensive Heart Disease  -------------------------------------------------------------------  [ ] Respiratory failure  [ ] Acute cor pulmonale  [ ] Asthma/COPD Exacerbation  [ ]COPD on home O2 (Chronic renal Failure)   [ ] Pleural effusion  [ ] Aspiration pneumonia  [ ] Obstructive Sleep Apnea  [ ]Atelectasis   [ ] Acute PE   -------------------------------------------------------------------  [  ]Acute Kidney Injury      [  ]Acute Tubular Necrosis      [  ]Reneal Medullary Necrosis     [  ]Renal Cortical Necrosis     [  ]Other Pathological Lesions:    [  ]CKD 1  [  ]CKD 2  [  ]CKD 3  [  ]CKD 4  [  ]CKD 5 (ESRD)  [  ]Other  -------------------------------------------------------------------  [ x ]Diabetes  [  ] Diabetic PVD Ulcer  [  ] Neuropathic ulcer to DM  [  ] Diabetes with Nephropathy  [ x ] Osteomyelitis due to diabetes  [  ] Hyperglycemia   [  ]hypoglycemia   --------------------------------------------------------------------  [  ]Malnutrition: See Nutrition Note  [  ]Cachexia  [  ]Other:   [  ]Supplement Ordered:  [  ]Morbid Obesity (BMI >=40]  [ ] Ileus  ---------------------------------------------------------------------  [ ] Sepsis/severe sepsis/septic shock  [ ] Noninfectious SIRS  [ ] UTI  [ ] Pneumonia  [ ] Thrombophlebitis     -----------------------------------------------------------------------  [ ] Acidosis/alkalosis  [ ] Fluid overload  [ ] Hypokalemia  [ ] Hyperkalemia  [ ] Hypomagnesemia  [ ] Hypophosphatemia  [ ] Hyperphosphatemia  ------------------------------------------------------------------------  [ ] Acute blood loss anemia  [ ] Post op blood loss anemia  [ ] Iron deficiency anemia  [ ] Anemia due to chronic disease  [ ] Hypercoagulable state  [ ] Thrombocytopenia  ----------------------------------------------------------------------  [ ] Cerebral infarction  [ ] Transient ischemia attack  [ ] Encephalopathy - Toxic or Metabolic          A/P:    67yo M PMH CAD (2 stents 2020), HFmrEF (45-50%), HTN, PAD w/ remote RLE angioplasty, R 4th toe OM s/p partial R 4th ray amputation on 10/24, RLE angiogram with AT lithotripsy and AT/peroneal balloon angioplasty 10/27, uncontrolled IDDM (a1c 12 in Oct 2023), recent admission 11/8-11/10 to cardiology service for hypertensive emergency (left AMA, was given levaquin when he left for right foot wound that pt had started treatment for back in October) who presented to [facility] after a fall onto his knees and was having b/l knee pain. Found to have increased soft tissue gas in R foot on CT scan. 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 Diff    Diet: consistent carbs    Activity: OOB to chair, PT Consult    DVTPPx: HSQ    Dispo: 5 Uris telemetry, DC notice given 12/22

## 2023-12-23 NOTE — PROGRESS NOTE ADULT - ASSESSMENT
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    #Diabetic Foot Infection   -Pt s/p BKA on 12/11 and  s/p closure on 12/18 Monday  -Continue pain medication as per primary team   -White count downtrending post op   [ ] Monitor off of tylenol, NSAIDs, check rectal temp if patient has any oral/axillary/temporal temps >99F    # R arm edema   right arm edematous. [ ] f/u right UE duplex     #Chronic HFmrEF   #HTN  -Continue with lisinopril 40mg daily, coreg 25mg BID, and hydralazine 100mg TID, torsemide 40mg QD    # C difficile infection   -WBC downtrending. stools more formed; Continue PO vancomycin (completes on 12/25), continue contact precautions     #Acute Blood Loss Anemia   -Pt s/p transfusion of 1 unit of PRBCS on 12/19; Continue to monitor Hgb while inpatient     #CAD -Continue with ASA 81mg daily and Atorvastatin 80mg qhs     #Moderate Aortic Stenosis  -outpatient follow up for surveillance TTE     #Type 2 Diabetes c/b PAD, diabetic foot infection and hyperglycemia   inpatient and discharge insulin regimen per endocrine consult     DVT PPx  - Heparin SQ

## 2023-12-23 NOTE — CHART NOTE - NSCHARTNOTESELECT_GEN_ALL_CORE
Endocrinology/Event Note
Event Note
Vascular Surgery/Event Note
I-STOP
Nutrition Services
Nutrition Services
Event Note
Rapid Response

## 2023-12-24 LAB
GLUCOSE BLDC GLUCOMTR-MCNC: 146 MG/DL — HIGH (ref 70–99)
GLUCOSE BLDC GLUCOMTR-MCNC: 146 MG/DL — HIGH (ref 70–99)
GLUCOSE BLDC GLUCOMTR-MCNC: 306 MG/DL — HIGH (ref 70–99)
GLUCOSE BLDC GLUCOMTR-MCNC: 306 MG/DL — HIGH (ref 70–99)
GLUCOSE BLDC GLUCOMTR-MCNC: 308 MG/DL — HIGH (ref 70–99)
GLUCOSE BLDC GLUCOMTR-MCNC: 308 MG/DL — HIGH (ref 70–99)
GLUCOSE BLDC GLUCOMTR-MCNC: 347 MG/DL — HIGH (ref 70–99)
GLUCOSE BLDC GLUCOMTR-MCNC: 347 MG/DL — HIGH (ref 70–99)

## 2023-12-24 PROCEDURE — 99233 SBSQ HOSP IP/OBS HIGH 50: CPT

## 2023-12-24 PROCEDURE — 99233 SBSQ HOSP IP/OBS HIGH 50: CPT | Mod: 24

## 2023-12-24 RX ORDER — SODIUM CHLORIDE 9 MG/ML
1000 INJECTION, SOLUTION INTRAVENOUS
Refills: 0 | Status: DISCONTINUED | OUTPATIENT
Start: 2023-12-24 | End: 2023-12-26

## 2023-12-24 RX ORDER — DEXTROSE 50 % IN WATER 50 %
25 SYRINGE (ML) INTRAVENOUS ONCE
Refills: 0 | Status: DISCONTINUED | OUTPATIENT
Start: 2023-12-24 | End: 2023-12-26

## 2023-12-24 RX ORDER — INSULIN GLARGINE 100 [IU]/ML
16 INJECTION, SOLUTION SUBCUTANEOUS AT BEDTIME
Refills: 0 | Status: DISCONTINUED | OUTPATIENT
Start: 2023-12-24 | End: 2023-12-25

## 2023-12-24 RX ORDER — NIFEDIPINE 30 MG
60 TABLET, EXTENDED RELEASE 24 HR ORAL DAILY
Refills: 0 | Status: DISCONTINUED | OUTPATIENT
Start: 2023-12-24 | End: 2023-12-26

## 2023-12-24 RX ORDER — HUMAN INSULIN 100 [IU]/ML
7 INJECTION, SUSPENSION SUBCUTANEOUS ONCE
Refills: 0 | Status: COMPLETED | OUTPATIENT
Start: 2023-12-24 | End: 2023-12-24

## 2023-12-24 RX ORDER — DEXTROSE 50 % IN WATER 50 %
15 SYRINGE (ML) INTRAVENOUS ONCE
Refills: 0 | Status: DISCONTINUED | OUTPATIENT
Start: 2023-12-24 | End: 2023-12-26

## 2023-12-24 RX ORDER — ACETAMINOPHEN 500 MG
1000 TABLET ORAL ONCE
Refills: 0 | Status: COMPLETED | OUTPATIENT
Start: 2023-12-24 | End: 2023-12-24

## 2023-12-24 RX ORDER — INSULIN LISPRO 100/ML
10 VIAL (ML) SUBCUTANEOUS
Refills: 0 | Status: DISCONTINUED | OUTPATIENT
Start: 2023-12-24 | End: 2023-12-25

## 2023-12-24 RX ORDER — OXYCODONE HYDROCHLORIDE 5 MG/1
5 TABLET ORAL ONCE
Refills: 0 | Status: DISCONTINUED | OUTPATIENT
Start: 2023-12-24 | End: 2023-12-24

## 2023-12-24 RX ORDER — GLUCAGON INJECTION, SOLUTION 0.5 MG/.1ML
1 INJECTION, SOLUTION SUBCUTANEOUS ONCE
Refills: 0 | Status: DISCONTINUED | OUTPATIENT
Start: 2023-12-24 | End: 2023-12-26

## 2023-12-24 RX ORDER — FUROSEMIDE 40 MG
80 TABLET ORAL EVERY 12 HOURS
Refills: 0 | Status: DISCONTINUED | OUTPATIENT
Start: 2023-12-24 | End: 2023-12-26

## 2023-12-24 RX ORDER — ACETAMINOPHEN 500 MG
650 TABLET ORAL ONCE
Refills: 0 | Status: COMPLETED | OUTPATIENT
Start: 2023-12-24 | End: 2023-12-24

## 2023-12-24 RX ORDER — DEXTROSE 50 % IN WATER 50 %
12.5 SYRINGE (ML) INTRAVENOUS ONCE
Refills: 0 | Status: DISCONTINUED | OUTPATIENT
Start: 2023-12-24 | End: 2023-12-26

## 2023-12-24 RX ADMIN — SPIRONOLACTONE 25 MILLIGRAM(S): 25 TABLET, FILM COATED ORAL at 05:44

## 2023-12-24 RX ADMIN — OXYCODONE HYDROCHLORIDE 5 MILLIGRAM(S): 5 TABLET ORAL at 09:40

## 2023-12-24 RX ADMIN — OXYCODONE HYDROCHLORIDE 10 MILLIGRAM(S): 5 TABLET ORAL at 21:29

## 2023-12-24 RX ADMIN — LISINOPRIL 40 MILLIGRAM(S): 2.5 TABLET ORAL at 05:44

## 2023-12-24 RX ADMIN — GABAPENTIN 800 MILLIGRAM(S): 400 CAPSULE ORAL at 21:36

## 2023-12-24 RX ADMIN — Medication 650 MILLIGRAM(S): at 01:06

## 2023-12-24 RX ADMIN — Medication 650 MILLIGRAM(S): at 18:21

## 2023-12-24 RX ADMIN — Medication 81 MILLIGRAM(S): at 11:38

## 2023-12-24 RX ADMIN — Medication 4: at 17:48

## 2023-12-24 RX ADMIN — Medication 10 UNIT(S): at 17:47

## 2023-12-24 RX ADMIN — CARVEDILOL PHOSPHATE 25 MILLIGRAM(S): 80 CAPSULE, EXTENDED RELEASE ORAL at 18:21

## 2023-12-24 RX ADMIN — Medication 650 MILLIGRAM(S): at 00:07

## 2023-12-24 RX ADMIN — Medication 100 MILLIGRAM(S): at 05:43

## 2023-12-24 RX ADMIN — Medication 125 MILLIGRAM(S): at 18:22

## 2023-12-24 RX ADMIN — HEPARIN SODIUM 5000 UNIT(S): 5000 INJECTION INTRAVENOUS; SUBCUTANEOUS at 05:44

## 2023-12-24 RX ADMIN — Medication 650 MILLIGRAM(S): at 09:41

## 2023-12-24 RX ADMIN — Medication 40 MILLIGRAM(S): at 05:44

## 2023-12-24 RX ADMIN — OXYCODONE HYDROCHLORIDE 5 MILLIGRAM(S): 5 TABLET ORAL at 19:40

## 2023-12-24 RX ADMIN — Medication 125 MILLIGRAM(S): at 00:07

## 2023-12-24 RX ADMIN — Medication 80 MILLIGRAM(S): at 18:22

## 2023-12-24 RX ADMIN — OXYCODONE HYDROCHLORIDE 10 MILLIGRAM(S): 5 TABLET ORAL at 06:40

## 2023-12-24 RX ADMIN — Medication 100 MILLIGRAM(S): at 14:36

## 2023-12-24 RX ADMIN — OXYCODONE HYDROCHLORIDE 5 MILLIGRAM(S): 5 TABLET ORAL at 10:30

## 2023-12-24 RX ADMIN — GABAPENTIN 800 MILLIGRAM(S): 400 CAPSULE ORAL at 14:35

## 2023-12-24 RX ADMIN — OXYCODONE HYDROCHLORIDE 10 MILLIGRAM(S): 5 TABLET ORAL at 14:36

## 2023-12-24 RX ADMIN — Medication 4: at 07:25

## 2023-12-24 RX ADMIN — Medication 650 MILLIGRAM(S): at 05:43

## 2023-12-24 RX ADMIN — Medication 650 MILLIGRAM(S): at 19:40

## 2023-12-24 RX ADMIN — Medication 60 MILLIGRAM(S): at 18:42

## 2023-12-24 RX ADMIN — OXYCODONE HYDROCHLORIDE 10 MILLIGRAM(S): 5 TABLET ORAL at 22:29

## 2023-12-24 RX ADMIN — Medication 4: at 12:27

## 2023-12-24 RX ADMIN — Medication 650 MILLIGRAM(S): at 06:40

## 2023-12-24 RX ADMIN — ATORVASTATIN CALCIUM 80 MILLIGRAM(S): 80 TABLET, FILM COATED ORAL at 21:30

## 2023-12-24 RX ADMIN — OXYCODONE HYDROCHLORIDE 5 MILLIGRAM(S): 5 TABLET ORAL at 12:45

## 2023-12-24 RX ADMIN — HEPARIN SODIUM 5000 UNIT(S): 5000 INJECTION INTRAVENOUS; SUBCUTANEOUS at 11:38

## 2023-12-24 RX ADMIN — Medication 125 MILLIGRAM(S): at 11:38

## 2023-12-24 RX ADMIN — OXYCODONE HYDROCHLORIDE 10 MILLIGRAM(S): 5 TABLET ORAL at 05:51

## 2023-12-24 RX ADMIN — OXYCODONE HYDROCHLORIDE 5 MILLIGRAM(S): 5 TABLET ORAL at 11:38

## 2023-12-24 RX ADMIN — Medication 100 MILLIGRAM(S): at 21:29

## 2023-12-24 RX ADMIN — Medication 8 UNIT(S): at 12:27

## 2023-12-24 RX ADMIN — Medication 1000 MILLIGRAM(S): at 19:40

## 2023-12-24 RX ADMIN — GABAPENTIN 800 MILLIGRAM(S): 400 CAPSULE ORAL at 05:43

## 2023-12-24 RX ADMIN — Medication 8 UNIT(S): at 07:26

## 2023-12-24 RX ADMIN — Medication 125 MILLIGRAM(S): at 05:43

## 2023-12-24 RX ADMIN — Medication 650 MILLIGRAM(S): at 10:30

## 2023-12-24 RX ADMIN — OXYCODONE HYDROCHLORIDE 5 MILLIGRAM(S): 5 TABLET ORAL at 18:22

## 2023-12-24 RX ADMIN — OXYCODONE HYDROCHLORIDE 10 MILLIGRAM(S): 5 TABLET ORAL at 15:57

## 2023-12-24 RX ADMIN — HUMAN INSULIN 7 UNIT(S): 100 INJECTION, SUSPENSION SUBCUTANEOUS at 11:39

## 2023-12-24 RX ADMIN — Medication 400 MILLIGRAM(S): at 18:34

## 2023-12-24 RX ADMIN — HEPARIN SODIUM 5000 UNIT(S): 5000 INJECTION INTRAVENOUS; SUBCUTANEOUS at 18:22

## 2023-12-24 RX ADMIN — CARVEDILOL PHOSPHATE 25 MILLIGRAM(S): 80 CAPSULE, EXTENDED RELEASE ORAL at 05:44

## 2023-12-24 RX ADMIN — INSULIN GLARGINE 16 UNIT(S): 100 INJECTION, SOLUTION SUBCUTANEOUS at 21:37

## 2023-12-24 NOTE — PROGRESS NOTE ADULT - NS ATTEND AMEND GEN_ALL_CORE FT
I have personally seen and examined this patient. I have fully participated in the care of this patient. I have reviewed all pertinent clinical information, including history, physical exam, plan and the Resident's, PA's and NP's note and agree except as noted. This is a patient known to Dr. 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 He is transferred from the medical service to vascular, now s/p staged R BKA w/ closure (12/18/23)    I have seen him on 12/23/23. No major complaints expressed to me. RLE swelling improving. Got diuresis per cardiology. Hypertensive. Refusing RUE venous duplex. Stump OK. Awaiting dispo.     PLAN & RECOMMENDATIONS  - ASA/SQH; no more plavix  - ABX for C-diff treatment  - ASA/SQH  - Physical therapy  - Diuresis and HTN recs per cardiology; appreciate recs  - F/u hospitalist  -  for dispo    Thank you,    Michelet Freed MD  Attending Vascular Surgeon  [facility] at [address] [place] [address]  Office: [phone]; Fax: [phone]  [email or web address] I have personally seen and examined this patient. I have fully participated in the care of this patient. I have reviewed all pertinent clinical information, including history, physical exam, plan and the Resident's, PA's and NP's note and agree except as noted. This is a patient known to Dr. 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 He is transferred from the medical service to vascular, now s/p staged R BKA w/ closure (12/18/23)    I have seen him on 12/23/23. No major complaints expressed to me. RLE swelling improving. Got diuresis per cardiology. Hypertensive. Refusing RUE venous duplex. Stump OK. Awaiting dispo.     PLAN & RECOMMENDATIONS  - ASA/SQH; no more plavix  - ABX for C-diff treatment  - ASA/SQH  - Physical therapy  - Diuresis and HTN recs per cardiology; appreciate recs  - F/u hospitalist  -  for dispo    Thank you,    Michelet Freed MD  Attending Vascular Surgeon  [facility] at [address], [address] [place] [address]  Office: [phone]; Fax: [phone]  [email or web address]

## 2023-12-24 NOTE — PROGRESS NOTE ADULT - ASSESSMENT
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 diff colitis. [facility] following for PAD, CAD, and HFrEF.    Assessment  1. 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 PAD s/p prior intervention to RLE Oct/2023  3. CAD s/p PCI  4. ICM HFrEF - decompensated  Last TTE Nov/2023 mildly reduced EF, G1DD  5. Mild-moderate AS (ALLISON 1.28cm^2, MG 15 mmHg)  6. RUE swelling    Plan  1. ASA 81mg QD and high intensity statin for CAD and PAD  2. IV lasix 80mg q12h w/ strict I/O while inpatient, switch to torsemide 40mg PO QD Tuesday when ready for dc  3. TTE in 1y for surveillance of moderate AS  4. GDMT for HFrEF: Coreg 25mg BID, lisinopril 40mg QD, aldactone 25mg QD; will avoid SGLT2i given PAD w/ delayed wound healing  5. Hydralazine 100mg q8h and nifedipine XL 60mg QD for better BP control  6. Recommend RUE venous duplex US    Thank you for the consult and the opportunity to take care of this patient.     Agapito Porter M.D.  CARDIOLOGY | VASCULAR CARDIOLOGY ATTENDING  [phone]    During non face-to-face time, I reviewed relevant portions of the patient’s medical record. During face-to-face time, I took a relevant history and examined the patient. I also explained differential diagnoses, relevant cardiac diagnoses, workup, and management plan, which required a high level of medical decision making. I answered all questions related to the patient's medical conditions.          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 diff colitis. [facility] Card[facility] following for PAD, CAD, and HFrEF.    Assessment  1. 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 PAD s/p prior intervention to RLE Oct/2023  3. CAD s/p PCI  4. ICM HFrEF - decompensated  Last TTE Nov/2023 mildly reduced EF, G1DD  5. Mild-moderate AS (ALLISON 1.28cm^2, MG 15 mmHg)  6. RUE swelling    Plan  1. ASA 81mg QD and high intensity statin for CAD and PAD  2. IV lasix 80mg q12h w/ strict I/O while inpatient, switch to torsemide 40mg PO QD Tuesday when ready for dc  3. TTE in 1y for surveillance of moderate AS  4. GDMT for HFrEF: Coreg 25mg BID, lisinopril 40mg QD, aldactone 25mg QD; will avoid SGLT2i given PAD w/ delayed wound healing  5. Hydralazine 100mg q8h and nifedipine XL 60mg QD for better BP control  6. Recommend RUE venous duplex US    Thank you for the consult and the opportunity to take care of this patient.     Agapito Porter M.D.  CARDIOLOGY | VASCULAR CARDIOLOGY ATTENDING  [phone]    During non face-to-face time, I reviewed relevant portions of the patient’s medical record. During face-to-face time, I took a relevant history and examined the patient. I also explained differential diagnoses, relevant cardiac diagnoses, workup, and management plan, which required a high level of medical decision making. I answered all questions related to the patient's medical conditions.

## 2023-12-24 NOTE — PROGRESS NOTE ADULT - SUBJECTIVE AND OBJECTIVE BOX
INTERVAL EVENTS: No o/n events. Denies CP, dyspnea, palpitations, presyncope, syncope, f/c/n/v.     REVIEW OF SYSTEMS:  Constitutional:     [X] negative [ ] fevers [ ] chills [ ] weight loss [ ] weight gain  HEENT:                  [X] negative [ ] dry eyes [ ] eye irritation [ ] postnasal drip [ ] nasal congestion  CV:                         [X] negative  [ ] chest pain [ ] orthopnea [ ] palpitations [ ] murmur  Resp:                     [X] negative [ ] cough [ ] shortness of breath [ ] wheezing [ ] sputum [ ] hemoptysis  GI:                          [X] negative [ ] nausea [ ] vomiting [ ] diarrhea [ ] constipation [ ] abd pain [ ] dysphagia   :                        [X] negative [ ] dysuria [ ] nocturia [ ] hematuria [ ] increased urinary frequency  MSK:                      [X] negative [ ] back pain [ ] myalgias [ ] arthralgias [ ] fracture  Skin:                       [X] negative [ ] rash [ ] itch  Neuro:                   [X] negative [ ] headache [ ] dizziness [ ] syncope [ ] weakness [ ] numbness  Psych:                    [X] negative [ ] anxiety [ ] depression  Endo:                     [X] negative [ ] diabetes [ ] thyroid problem  Heme/Lymph:      [X] negative [ ] anemia [ ] bleeding problem  Allergic/Immune: [X] negative [ ] itchy eyes [ ] nasal discharge [ ] hives [ ] angioedema    [X] All other systems negative or otherwise described above.  [ ] Unable to assess ROS due to ________.    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 650 milliGRAM(s) Oral every 6 hours  aspirin  chewable 81 milliGRAM(s) Oral daily  atorvastatin 80 milliGRAM(s) Oral at bedtime  carvedilol 25 milliGRAM(s) Oral every 12 hours  dextrose 5%. 1000 milliLiter(s) (100 mL/Hr) IV Continuous <Continuous>  dextrose 5%. 1000 milliLiter(s) (50 mL/Hr) IV Continuous <Continuous>  dextrose 50% Injectable 12.5 Gram(s) IV Push once  dextrose 50% Injectable 25 Gram(s) IV Push once  dextrose 50% Injectable 25 Gram(s) IV Push once  gabapentin 800 milliGRAM(s) Oral three times a day  glucagon  Injectable 1 milliGRAM(s) IntraMuscular once  heparin   Injectable 5000 Unit(s) SubCutaneous every 8 hours  hydrALAZINE 100 milliGRAM(s) Oral three times a day  insulin glargine Injectable (LANTUS) 14 Unit(s) SubCutaneous at bedtime  insulin lispro (ADMELOG) corrective regimen sliding scale   SubCutaneous Before meals and at bedtime  insulin lispro Injectable (ADMELOG) 8 Unit(s) SubCutaneous three times a day before meals  lisinopril 40 milliGRAM(s) Oral daily  spironolactone 25 milliGRAM(s) Oral daily  torsemide 40 milliGRAM(s) Oral daily  vancomycin    Solution 125 milliGRAM(s) Oral every 6 hours    MEDICATIONS  (PRN):  dextrose Oral Gel 15 Gram(s) Oral once PRN Blood Glucose LESS THAN 70 milliGRAM(s)/deciliter  oxyCODONE    IR 5 milliGRAM(s) Oral every 6 hours PRN Moderate Pain (4 - 6)  oxyCODONE    IR 10 milliGRAM(s) Oral every 6 hours PRN Severe Pain (7 - 10)    ICU Vital Signs Last 24 Hrs  T(C): 36.7 (24 Dec 2023 14:30), Max: 37.2 (23 Dec 2023 20:17)  T(F): 98 (24 Dec 2023 14:30), Max: 98.9 (23 Dec 2023 20:17)  HR: 81 (24 Dec 2023 14:30) (66 - 81)  BP: 176/78 (24 Dec 2023 14:30) (147/69 - 214/91)  BP(mean): 112 (24 Dec 2023 14:30) (95 - 112)  ABP: --  ABP(mean): --  RR: 17 (24 Dec 2023 14:30) (16 - 18)  SpO2: 99% (24 Dec 2023 14:30) (98% - 99%)    O2 Parameters below as of 24 Dec 2023 14:30  Patient On (Oxygen Delivery Method): room air          Orthostatic VS    Daily     Daily   I&O's Summary    23 Dec 2023 07:01  -  24 Dec 2023 07:00  --------------------------------------------------------  IN: 780 mL / OUT: 1600 mL / NET: -820 mL    24 Dec 2023 07:01  -  24 Dec 2023 14:50  --------------------------------------------------------  IN: 450 mL / OUT: 1600 mL / NET: -1150 mL        PHYSICAL EXAM:  Gen: Sitting in bed at time of exam, appears stated age  HEENT: NCAT, MMM, clear OP  Neck: supple, trachea at midline  CV: RRR, systolic murmur, +S1/S2  Pulm: adequate respiratory effort, no increase in work of breathing  Abd: soft, ND  Skin: warm and dry,   Ext: right leg s/p BKA, wrapped in ACE ; left leg with 2+ pitting edema extending up to thighs ;  right arm edematous, + scrotal edema  Neuro: AOx3, speaking in full sentences  Psych: affect and behavior appropriate    LABS:            RADIOLOGY & ADDITIONAL STUDIES:    < from: TTE Echo Complete w/o Contrast w/ Doppler (11.09.23 @ 11:40) >  CONCLUSIONS:     1. Normal left ventricular size.   2. Mild to moderate symmetric left ventricular hypertrophy.   3. Mildly reduced left ventricular systolic function.   4. Grade I left ventricular diastolic dysfunction.   5. Normal right ventricular size and systolic function.   6. Biatrial enlargement.   7. Mild-to-moderate aortic stenosis. The peak transvalvular velocity is   2.92 m/s, the mean transvalvular gradient is 15.00 mmHg, and the LVOT/AV   velocity ratio is 0.50. The aortic valve area (estimated via the   continuity method) is 1.28 cm².   8. No evidence of pulmonary hypertension.   9. No pericardial effusion  10. Compared to the previous TTE performed on 10/23/2023, there have been no significant interval changes.      < end of copied text >

## 2023-12-24 NOTE — PROGRESS NOTE ADULT - NS ATTEND BILL GEN_ALL_CORE
Statement Selected
Attending to bill

## 2023-12-24 NOTE — PROGRESS NOTE ADULT - ASSESSMENT
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    #Diabetic Foot Infection   -Pt s/p BKA on 12/11 and  s/p closure on 12/18 Monday  -Continue pain medication as per primary team   [ ] Monitor off of tylenol, NSAIDs, check rectal temp if patient has any oral/axillary/temporal temps >99F    # R arm edema   right arm edematous. c/w monitoring    #Chronic HFmrEF   #HTN  -Continue with lisinopril 40mg daily, coreg 25mg BID, and hydralazine 100mg TID, torsemide 40mg QD  -cardiology recommending IV lasix rather than torsemide and addition of nifedipine; recs appreciated    # C difficile infection   -Continue PO vancomycin (completes on 12/25), continue contact precautions     #Acute Blood Loss Anemia   -Pt s/p transfusion of 1 unit of PRBCS on 12/19; Continue to monitor Hgb while inpatient     #CAD -Continue with ASA 81mg daily and Atorvastatin 80mg qhs     #Moderate Aortic Stenosis  -outpatient follow up for surveillance TTE     #Type 2 Diabetes c/b PAD, diabetic foot infection and hyperglycemia   inpatient and discharge insulin regimen per endocrine consult     DVT PPx  - Heparin SQ

## 2023-12-24 NOTE — PROGRESS NOTE ADULT - SUBJECTIVE AND OBJECTIVE BOX
VASCULAR CARDIOLOGY ATTENDING PROGRESS NOTE    SERVICE LINE: [phone]    Subjective:    No acute events overnight.   ROS negative.     Current Medications:   acetaminophen     Tablet .. 650 milliGRAM(s) Oral every 6 hours  aspirin  chewable 81 milliGRAM(s) Oral daily  atorvastatin 80 milliGRAM(s) Oral at bedtime  carvedilol 25 milliGRAM(s) Oral every 12 hours  gabapentin 800 milliGRAM(s) Oral three times a day  heparin   Injectable 5000 Unit(s) SubCutaneous every 8 hours  hydrALAZINE 75 milliGRAM(s) Oral three times a day  HYDROmorphone  Injectable 1 milliGRAM(s) IV Push every 6 hours PRN  insulin glargine Injectable (LANTUS) 10 Unit(s) SubCutaneous at bedtime  insulin lispro (ADMELOG) corrective regimen sliding scale   SubCutaneous Before meals and at bedtime  insulin lispro Injectable (ADMELOG) 8 Unit(s) SubCutaneous before lunch  insulin lispro Injectable (ADMELOG) 6 Unit(s) SubCutaneous before breakfast  insulin lispro Injectable (ADMELOG) 8 Unit(s) SubCutaneous before dinner  lisinopril 40 milliGRAM(s) Oral daily  oxyCODONE    IR 10 milliGRAM(s) Oral every 8 hours PRN  oxyCODONE    IR 5 milliGRAM(s) Oral every 8 hours PRN  vancomycin    Solution 125 milliGRAM(s) Oral every 6 hours      REVIEW OF SYSTEMS:  CONSTITUTIONAL: No weakness, fevers or chills  EYES/ENT: No visual changes;  No dysphagia  NECK: No pain or stiffness  RESPIRATORY: No cough, wheezing, hemoptysis; No shortness of breath  CARDIOVASCULAR: No chest pain or palpitations; No lower extremity edema  GASTROINTESTINAL: No abdominal or epigastric pain. No nausea, vomiting, or hematemesis; No diarrhea or constipation. No melena or hematochezia.  BACK: No back pain  GENITOURINARY: No dysuria, frequency or hematuria  NEUROLOGICAL: No numbness or weakness  SKIN: No itching, burning, rashes, or lesions   All other review of systems is negative unless indicated above.    Physical Exam:  T(F): 99 (12-20), Max: 99 (12-19)  HR: 74 (12-20) (69 - 77)  BP: 144/64 (12-20) (107/51 - 167/64)  BP(mean): 125 (12-20) (73 - 125)  ABP: --  ABP(mean): --  RR: 18 (12-20)  SpO2: 97% (12-20)  GENERAL: No acute distress, well-developed  HEAD:  Atraumatic, Normocephalic  ENT: +JVD  CHEST/LUNG: Clear to auscultation bilaterally; No wheeze, equal breath sounds bilaterally   BACK: No spinal tenderness  HEART: Regular rate and rhythm; No murmurs, rubs, or gallops  ABDOMEN: Soft, Nontender, Nondistended; Bowel sounds present  EXTREMITIES: 2+ pitting edema b/l LE and scrotal edema.  PSYCH: Nl behavior, nl affect  NEUROLOGY: AAOx3, non-focal, cranial nerves intact  SKIN: Normal color, No rashes or lesions    Cardiovascular Diagnostic Testing: personally reviewed    CXR: Personally reviewed    Labs: Personally reviewed                        7.8    14.97 )-----------( 192      ( 20 Dec 2023 05:30 )             24.5     12-20    140  |  109<H>  |  27<H>  ----------------------------<  108<H>  3.9   |  26  |  1.31<H>    Ca    8.3<L>      20 Dec 2023 05:30  Phos  3.7     12-20  Mg     2.1     12-20                         VASCULAR CARDIOLOGY ATTENDING PROGRESS NOTE    SERVICE LINE: [phone]    Subjective:    No acute events overnight.   ROS negative.     Current Medications:   acetaminophen     Tablet .. 650 milliGRAM(s) Oral every 6 hours  aspirin  chewable 81 milliGRAM(s) Oral daily  atorvastatin 80 milliGRAM(s) Oral at bedtime  carvedilol 25 milliGRAM(s) Oral every 12 hours  gabapentin 800 milliGRAM(s) Oral three times a day  heparin   Injectable 5000 Unit(s) SubCutaneous every 8 hours  hydrALAZINE 75 milliGRAM(s) Oral three times a day  HYDROmorphone  Injectable 1 milliGRAM(s) IV Push every 6 hours PRN  insulin glargine Injectable (LANTUS) 10 Unit(s) SubCutaneous at bedtime  insulin lispro (ADMELOG) corrective regimen sliding scale   SubCutaneous Before meals and at bedtime  insulin lispro Injectable (ADMELOG) 8 Unit(s) SubCutaneous before lunch  insulin lispro Injectable (ADMELOG) 6 Unit(s) SubCutaneous before breakfast  insulin lispro Injectable (ADMELOG) 8 Unit(s) SubCutaneous before dinner  lisinopril 40 milliGRAM(s) Oral daily  oxyCODONE    IR 10 milliGRAM(s) Oral every 8 hours PRN  oxyCODONE    IR 5 milliGRAM(s) Oral every 8 hours PRN  vancomycin    Solution 125 milliGRAM(s) Oral every 6 hours      REVIEW OF SYSTEMS:  CONSTITUTIONAL: No weakness, fevers or chills  EYES/ENT: No visual changes;  No dysphagia  NECK: No pain or stiffness  RESPIRATORY: No cough, wheezing, hemoptysis; No shortness of breath  CARDIOVASCULAR: No chest pain or palpitations; No lower extremity edema  GASTROINTESTINAL: No abdominal or epigastric pain. No nausea, vomiting, or hematemesis; No diarrhea or constipation. No melena or hematochezia.  BACK: No back pain  GENITOURINARY: No dysuria, frequency or hematuria  NEUROLOGICAL: No numbness or weakness  SKIN: No itching, burning, rashes, or lesions   All other review of systems is negative unless indicated above.    Physical Exam:  T(F): 99 (12-20), Max: 99 (12-19)  HR: 74 (12-20) (69 - 77)  BP: 144/64 (12-20) (107/51 - 167/64)  BP(mean): 125 (12-20) (73 - 125)  ABP: --  ABP(mean): --  RR: 18 (12-20)  SpO2: 97% (12-20)  GENERAL: No acute distress, well-developed  HEAD:  Atraumatic, Normocephalic  ENT: +JVD  CHEST/LUNG: Clear to auscultation bilaterally; No wheeze, equal breath sounds bilaterally   BACK: No spinal tenderness  HEART: Regular rate and rhythm; No murmurs, rubs, or gallops  ABDOMEN: Soft, Nontender, Nondistended; Bowel sounds present  EXTREMITIES: 2+ pitting edema b/l LE and scrotal edema.  PSYCH: Nl behavior, nl affect  NEUROLOGY: AAOx3, non-focal, cranial nerves intact  SKIN: Normal color, No rashes or lesions    Cardiovascular Diagnostic Testing: personally reviewed    CXR: Personally reviewed    Labs: Personally reviewed                        7.8    14.97 )-----------( 192      ( 20 Dec 2023 05:30 )             24.5     12-20    140  |  109<H>  |  27<H>  ----------------------------<  108<H>  3.9   |  26  |  1.31<H>    Ca    8.3<L>      20 Dec 2023 05:30  Phos  3.7     12-20  Mg     2.1     12-20

## 2023-12-24 NOTE — PROGRESS NOTE ADULT - SUBJECTIVE AND OBJECTIVE BOX
O/N: BP inc to 194 (644 pm) & 214 (546 am) after pt going to commode -scheduled BP meds given after      Subjective:  PT was seen and examined by the team this morning, dressing was changed. Given extra oxycodone for pain control.     ROS:   Denies Headache, blurred vision, Chest Pain, SOB, Abdominal pain, nausea or vomiting     Social   vancomycin    Solution 125  aspirin  chewable 81  carvedilol 25  heparin   Injectable 5000  hydrALAZINE 100  lisinopril 40  spironolactone 25  torsemide 40  vancomycin    Solution 125      Allergies    No Known Allergies    Intolerances        Vital Signs Last 24 Hrs  T(C): 36.8 (24 Dec 2023 09:13), Max: 37.2 (23 Dec 2023 20:17)  T(F): 98.3 (24 Dec 2023 09:13), Max: 98.9 (23 Dec 2023 20:17)  HR: 72 (24 Dec 2023 09:13) (66 - 73)  BP: 155/66 (24 Dec 2023 09:13) (147/69 - 214/91)  BP(mean): 95 (24 Dec 2023 09:13) (95 - 131)  RR: 17 (24 Dec 2023 09:13) (16 - 18)  SpO2: 99% (24 Dec 2023 09:13) (97% - 99%)    Parameters below as of 24 Dec 2023 09:13  Patient On (Oxygen Delivery Method): room air      I&O's Summary    23 Dec 2023 07:01  -  24 Dec 2023 07:00  --------------------------------------------------------  IN: 780 mL / OUT: 1600 mL / NET: -820 mL    24 Dec 2023 07:01  -  24 Dec 2023 10:17  --------------------------------------------------------  IN: 250 mL / OUT: 800 mL / NET: -550 mL        Physical Exam:  General: alert and awake, NAD  Pulmonary: no respiratory distress  Cardiovascular: RRR  Abdominal: soft  Extremities: R BKA incision CDI, blister opened and covered, wound/incision covered with xeroform, kerlix & ace bandage          ---------------------------------------------------------------------------  PLEASE CHECK WHEN PRESENT:     [  ]Heart Failure     [  ] Acute     [  ] Acute on Chronic     [x  ] Chronic  -------------------------------------------------------------------     [  ]Diastolic [HFpEF]     [ x ]Systolic [HFrEF]     [  ]Combined [HFpEF & HFrEF]  .................................................................................     [  ]Other:     [ ] Pulmonary Hypertension     [ ] Chronic A-fib     [ ] Persistet A-fib     [ ] Permanent A-fib     [ ] Paroxysmal A-fib     [x ] Hypertensive Heart Disease  -------------------------------------------------------------------  [ ] Respiratory failure  [ ] Acute cor pulmonale  [ ] Asthma/COPD Exacerbation  [ ]COPD on home O2 (Chronic renal Failure)   [ ] Pleural effusion  [ ] Aspiration pneumonia  [ ] Obstructive Sleep Apnea  [ ]Atelectasis   [ ] Acute PE   -------------------------------------------------------------------  [  ]Acute Kidney Injury      [  ]Acute Tubular Necrosis      [  ]Reneal Medullary Necrosis     [  ]Renal Cortical Necrosis     [  ]Other Pathological Lesions:    [  ]CKD 1  [  ]CKD 2  [  ]CKD 3  [  ]CKD 4  [  ]CKD 5 (ESRD)  [  ]Other  -------------------------------------------------------------------  [ x ]Diabetes  [  ] Diabetic PVD Ulcer  [  ] Neuropathic ulcer to DM  [  ] Diabetes with Nephropathy  [ x ] Osteomyelitis due to diabetes  [  ] Hyperglycemia   [  ]hypoglycemia   --------------------------------------------------------------------  [  ]Malnutrition: See Nutrition Note  [  ]Cachexia  [  ]Other:   [  ]Supplement Ordered:  [  ]Morbid Obesity (BMI >=40]  [ ] Ileus  ---------------------------------------------------------------------  [ ] Sepsis/severe sepsis/septic shock  [ ] Noninfectious SIRS  [ ] UTI  [ ] Pneumonia  [ ] Thrombophlebitis     -----------------------------------------------------------------------  [ ] Acidosis/alkalosis  [ ] Fluid overload  [ ] Hypokalemia  [ ] Hyperkalemia  [ ] Hypomagnesemia  [ ] Hypophosphatemia  [ ] Hyperphosphatemia  ------------------------------------------------------------------------  [ ] Acute blood loss anemia  [ ] Post op blood loss anemia  [ ] Iron deficiency anemia  [ ] Anemia due to chronic disease  [ ] Hypercoagulable state  [ ] Thrombocytopenia  ----------------------------------------------------------------------  [ ] Cerebral infarction  [ ] Transient ischemia attack  [ ] Encephalopathy - Toxic or Metabolic          A/P:    69yo M PMH CAD (2 stents 2020), HFmrEF (45-50%), HTN, PAD w/ remote RLE angioplasty, 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 Found to have increased soft tissue gas in R foot on CT scan. 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 Diff    Diet: consistent carbs    Activity: OOB to chair, PT Consult    DVTPPx: HSQ    Dispo: 5 Uris telemetry, DC notice given 12/22     O/N: BP inc to 194 (644 pm) & 214 (546 am) after pt going to commode -scheduled BP meds given after      Subjective:  PT was seen and examined by the team this morning, dressing was changed. Given extra oxycodone for pain control.     ROS:   Denies Headache, blurred vision, Chest Pain, SOB, Abdominal pain, nausea or vomiting     Social   vancomycin    Solution 125  aspirin  chewable 81  carvedilol 25  heparin   Injectable 5000  hydrALAZINE 100  lisinopril 40  spironolactone 25  torsemide 40  vancomycin    Solution 125      Allergies    No Known Allergies    Intolerances        Vital Signs Last 24 Hrs  T(C): 36.8 (24 Dec 2023 09:13), Max: 37.2 (23 Dec 2023 20:17)  T(F): 98.3 (24 Dec 2023 09:13), Max: 98.9 (23 Dec 2023 20:17)  HR: 72 (24 Dec 2023 09:13) (66 - 73)  BP: 155/66 (24 Dec 2023 09:13) (147/69 - 214/91)  BP(mean): 95 (24 Dec 2023 09:13) (95 - 131)  RR: 17 (24 Dec 2023 09:13) (16 - 18)  SpO2: 99% (24 Dec 2023 09:13) (97% - 99%)    Parameters below as of 24 Dec 2023 09:13  Patient On (Oxygen Delivery Method): room air      I&O's Summary    23 Dec 2023 07:01  -  24 Dec 2023 07:00  --------------------------------------------------------  IN: 780 mL / OUT: 1600 mL / NET: -820 mL    24 Dec 2023 07:01  -  24 Dec 2023 10:17  --------------------------------------------------------  IN: 250 mL / OUT: 800 mL / NET: -550 mL        Physical Exam:  General: alert and awake, NAD  Pulmonary: no respiratory distress  Cardiovascular: RRR  Abdominal: soft  Extremities: R BKA incision CDI, blister opened and covered, wound/incision covered with xeroform, kerlix & ace bandage          ---------------------------------------------------------------------------  PLEASE CHECK WHEN PRESENT:     [  ]Heart Failure     [  ] Acute     [  ] Acute on Chronic     [x  ] Chronic  -------------------------------------------------------------------     [  ]Diastolic [HFpEF]     [ x ]Systolic [HFrEF]     [  ]Combined [HFpEF & HFrEF]  .................................................................................     [  ]Other:     [ ] Pulmonary Hypertension     [ ] Chronic A-fib     [ ] Persistet A-fib     [ ] Permanent A-fib     [ ] Paroxysmal A-fib     [x ] Hypertensive Heart Disease  -------------------------------------------------------------------  [ ] Respiratory failure  [ ] Acute cor pulmonale  [ ] Asthma/COPD Exacerbation  [ ]COPD on home O2 (Chronic renal Failure)   [ ] Pleural effusion  [ ] Aspiration pneumonia  [ ] Obstructive Sleep Apnea  [ ]Atelectasis   [ ] Acute PE   -------------------------------------------------------------------  [  ]Acute Kidney Injury      [  ]Acute Tubular Necrosis      [  ]Reneal Medullary Necrosis     [  ]Renal Cortical Necrosis     [  ]Other Pathological Lesions:    [  ]CKD 1  [  ]CKD 2  [  ]CKD 3  [  ]CKD 4  [  ]CKD 5 (ESRD)  [  ]Other  -------------------------------------------------------------------  [ x ]Diabetes  [  ] Diabetic PVD Ulcer  [  ] Neuropathic ulcer to DM  [  ] Diabetes with Nephropathy  [ x ] Osteomyelitis due to diabetes  [  ] Hyperglycemia   [  ]hypoglycemia   --------------------------------------------------------------------  [  ]Malnutrition: See Nutrition Note  [  ]Cachexia  [  ]Other:   [  ]Supplement Ordered:  [  ]Morbid Obesity (BMI >=40]  [ ] Ileus  ---------------------------------------------------------------------  [ ] Sepsis/severe sepsis/septic shock  [ ] Noninfectious SIRS  [ ] UTI  [ ] Pneumonia  [ ] Thrombophlebitis     -----------------------------------------------------------------------  [ ] Acidosis/alkalosis  [ ] Fluid overload  [ ] Hypokalemia  [ ] Hyperkalemia  [ ] Hypomagnesemia  [ ] Hypophosphatemia  [ ] Hyperphosphatemia  ------------------------------------------------------------------------  [ ] Acute blood loss anemia  [ ] Post op blood loss anemia  [ ] Iron deficiency anemia  [ ] Anemia due to chronic disease  [ ] Hypercoagulable state  [ ] Thrombocytopenia  ----------------------------------------------------------------------  [ ] Cerebral infarction  [ ] Transient ischemia attack  [ ] Encephalopathy - Toxic or Metabolic          A/P:    67yo M PMH CAD (2 stents 2020), HFmrEF (45-50%), HTN, PAD w/ remote RLE angioplasty, 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 Found to have increased soft tissue gas in R foot on CT scan. 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 Diff    Diet: consistent carbs    Activity: OOB to chair, PT Consult    DVTPPx: HSQ    Dispo: 5 Uris telemetry, DC notice given 12/22

## 2023-12-25 LAB
GLUCOSE BLDC GLUCOMTR-MCNC: 146 MG/DL — HIGH (ref 70–99)
GLUCOSE BLDC GLUCOMTR-MCNC: 146 MG/DL — HIGH (ref 70–99)
GLUCOSE BLDC GLUCOMTR-MCNC: 154 MG/DL — HIGH (ref 70–99)
GLUCOSE BLDC GLUCOMTR-MCNC: 154 MG/DL — HIGH (ref 70–99)
GLUCOSE BLDC GLUCOMTR-MCNC: 183 MG/DL — HIGH (ref 70–99)
GLUCOSE BLDC GLUCOMTR-MCNC: 183 MG/DL — HIGH (ref 70–99)
GLUCOSE BLDC GLUCOMTR-MCNC: 191 MG/DL — HIGH (ref 70–99)
GLUCOSE BLDC GLUCOMTR-MCNC: 191 MG/DL — HIGH (ref 70–99)
GLUCOSE BLDC GLUCOMTR-MCNC: 207 MG/DL — HIGH (ref 70–99)
GLUCOSE BLDC GLUCOMTR-MCNC: 207 MG/DL — HIGH (ref 70–99)
GLUCOSE BLDC GLUCOMTR-MCNC: 73 MG/DL — SIGNIFICANT CHANGE UP (ref 70–99)
GLUCOSE BLDC GLUCOMTR-MCNC: 73 MG/DL — SIGNIFICANT CHANGE UP (ref 70–99)

## 2023-12-25 PROCEDURE — 99233 SBSQ HOSP IP/OBS HIGH 50: CPT

## 2023-12-25 PROCEDURE — 99233 SBSQ HOSP IP/OBS HIGH 50: CPT | Mod: GC,24

## 2023-12-25 RX ORDER — INSULIN GLARGINE 100 [IU]/ML
14 INJECTION, SOLUTION SUBCUTANEOUS AT BEDTIME
Refills: 0 | Status: DISCONTINUED | OUTPATIENT
Start: 2023-12-25 | End: 2023-12-26

## 2023-12-25 RX ORDER — INSULIN LISPRO 100/ML
6 VIAL (ML) SUBCUTANEOUS
Refills: 0 | Status: DISCONTINUED | OUTPATIENT
Start: 2023-12-25 | End: 2023-12-26

## 2023-12-25 RX ADMIN — OXYCODONE HYDROCHLORIDE 5 MILLIGRAM(S): 5 TABLET ORAL at 17:10

## 2023-12-25 RX ADMIN — Medication 125 MILLIGRAM(S): at 05:40

## 2023-12-25 RX ADMIN — OXYCODONE HYDROCHLORIDE 5 MILLIGRAM(S): 5 TABLET ORAL at 16:09

## 2023-12-25 RX ADMIN — GABAPENTIN 800 MILLIGRAM(S): 400 CAPSULE ORAL at 13:39

## 2023-12-25 RX ADMIN — CARVEDILOL PHOSPHATE 25 MILLIGRAM(S): 80 CAPSULE, EXTENDED RELEASE ORAL at 18:00

## 2023-12-25 RX ADMIN — Medication 60 MILLIGRAM(S): at 05:41

## 2023-12-25 RX ADMIN — Medication 650 MILLIGRAM(S): at 06:34

## 2023-12-25 RX ADMIN — Medication 100 MILLIGRAM(S): at 05:41

## 2023-12-25 RX ADMIN — Medication 650 MILLIGRAM(S): at 13:23

## 2023-12-25 RX ADMIN — Medication 650 MILLIGRAM(S): at 05:34

## 2023-12-25 RX ADMIN — GABAPENTIN 800 MILLIGRAM(S): 400 CAPSULE ORAL at 21:08

## 2023-12-25 RX ADMIN — LISINOPRIL 40 MILLIGRAM(S): 2.5 TABLET ORAL at 05:41

## 2023-12-25 RX ADMIN — HEPARIN SODIUM 5000 UNIT(S): 5000 INJECTION INTRAVENOUS; SUBCUTANEOUS at 13:33

## 2023-12-25 RX ADMIN — HEPARIN SODIUM 5000 UNIT(S): 5000 INJECTION INTRAVENOUS; SUBCUTANEOUS at 02:06

## 2023-12-25 RX ADMIN — ATORVASTATIN CALCIUM 80 MILLIGRAM(S): 80 TABLET, FILM COATED ORAL at 21:08

## 2023-12-25 RX ADMIN — Medication 100 MILLIGRAM(S): at 13:40

## 2023-12-25 RX ADMIN — OXYCODONE HYDROCHLORIDE 10 MILLIGRAM(S): 5 TABLET ORAL at 22:04

## 2023-12-25 RX ADMIN — OXYCODONE HYDROCHLORIDE 10 MILLIGRAM(S): 5 TABLET ORAL at 06:34

## 2023-12-25 RX ADMIN — Medication 80 MILLIGRAM(S): at 18:00

## 2023-12-25 RX ADMIN — CARVEDILOL PHOSPHATE 25 MILLIGRAM(S): 80 CAPSULE, EXTENDED RELEASE ORAL at 05:40

## 2023-12-25 RX ADMIN — OXYCODONE HYDROCHLORIDE 10 MILLIGRAM(S): 5 TABLET ORAL at 14:28

## 2023-12-25 RX ADMIN — GABAPENTIN 800 MILLIGRAM(S): 400 CAPSULE ORAL at 05:34

## 2023-12-25 RX ADMIN — OXYCODONE HYDROCHLORIDE 10 MILLIGRAM(S): 5 TABLET ORAL at 21:09

## 2023-12-25 RX ADMIN — Medication 650 MILLIGRAM(S): at 00:28

## 2023-12-25 RX ADMIN — OXYCODONE HYDROCHLORIDE 5 MILLIGRAM(S): 5 TABLET ORAL at 10:40

## 2023-12-25 RX ADMIN — Medication 650 MILLIGRAM(S): at 14:28

## 2023-12-25 RX ADMIN — OXYCODONE HYDROCHLORIDE 10 MILLIGRAM(S): 5 TABLET ORAL at 05:34

## 2023-12-25 RX ADMIN — Medication 6 UNIT(S): at 18:12

## 2023-12-25 RX ADMIN — OXYCODONE HYDROCHLORIDE 10 MILLIGRAM(S): 5 TABLET ORAL at 13:22

## 2023-12-25 RX ADMIN — Medication 125 MILLIGRAM(S): at 00:28

## 2023-12-25 RX ADMIN — Medication 1: at 18:11

## 2023-12-25 RX ADMIN — Medication 1: at 13:56

## 2023-12-25 RX ADMIN — HEPARIN SODIUM 5000 UNIT(S): 5000 INJECTION INTRAVENOUS; SUBCUTANEOUS at 21:08

## 2023-12-25 RX ADMIN — OXYCODONE HYDROCHLORIDE 5 MILLIGRAM(S): 5 TABLET ORAL at 09:27

## 2023-12-25 RX ADMIN — OXYCODONE HYDROCHLORIDE 5 MILLIGRAM(S): 5 TABLET ORAL at 03:06

## 2023-12-25 RX ADMIN — SPIRONOLACTONE 25 MILLIGRAM(S): 25 TABLET, FILM COATED ORAL at 05:43

## 2023-12-25 RX ADMIN — OXYCODONE HYDROCHLORIDE 5 MILLIGRAM(S): 5 TABLET ORAL at 02:06

## 2023-12-25 RX ADMIN — Medication 650 MILLIGRAM(S): at 01:28

## 2023-12-25 RX ADMIN — Medication 81 MILLIGRAM(S): at 13:22

## 2023-12-25 RX ADMIN — Medication 80 MILLIGRAM(S): at 05:42

## 2023-12-25 NOTE — PROGRESS NOTE ADULT - SUBJECTIVE AND OBJECTIVE BOX
VASCULAR CARDIOLOGY ATTENDING PROGRESS NOTE    SERVICE LINE: [phone]    Subjective:    No acute events overnight.   ROS negative.     Current Medications:   acetaminophen     Tablet .. 650 milliGRAM(s) Oral every 6 hours  aspirin  chewable 81 milliGRAM(s) Oral daily  atorvastatin 80 milliGRAM(s) Oral at bedtime  carvedilol 25 milliGRAM(s) Oral every 12 hours  gabapentin 800 milliGRAM(s) Oral three times a day  heparin   Injectable 5000 Unit(s) SubCutaneous every 8 hours  hydrALAZINE 75 milliGRAM(s) Oral three times a day  HYDROmorphone  Injectable 1 milliGRAM(s) IV Push every 6 hours PRN  insulin glargine Injectable (LANTUS) 10 Unit(s) SubCutaneous at bedtime  insulin lispro (ADMELOG) corrective regimen sliding scale   SubCutaneous Before meals and at bedtime  insulin lispro Injectable (ADMELOG) 8 Unit(s) SubCutaneous before lunch  insulin lispro Injectable (ADMELOG) 6 Unit(s) SubCutaneous before breakfast  insulin lispro Injectable (ADMELOG) 8 Unit(s) SubCutaneous before dinner  lisinopril 40 milliGRAM(s) Oral daily  oxyCODONE    IR 10 milliGRAM(s) Oral every 8 hours PRN  oxyCODONE    IR 5 milliGRAM(s) Oral every 8 hours PRN  vancomycin    Solution 125 milliGRAM(s) Oral every 6 hours      REVIEW OF SYSTEMS:  CONSTITUTIONAL: No weakness, fevers or chills  EYES/ENT: No visual changes;  No dysphagia  NECK: No pain or stiffness  RESPIRATORY: No cough, wheezing, hemoptysis; No shortness of breath  CARDIOVASCULAR: No chest pain or palpitations; No lower extremity edema  GASTROINTESTINAL: No abdominal or epigastric pain. No nausea, vomiting, or hematemesis; No diarrhea or constipation. No melena or hematochezia.  BACK: No back pain  GENITOURINARY: No dysuria, frequency or hematuria  NEUROLOGICAL: No numbness or weakness  SKIN: No itching, burning, rashes, or lesions   All other review of systems is negative unless indicated above.    Physical Exam:  T(F): 99 (12-20), Max: 99 (12-19)  HR: 74 (12-20) (69 - 77)  BP: 144/64 (12-20) (107/51 - 167/64)  BP(mean): 125 (12-20) (73 - 125)  ABP: --  ABP(mean): --  RR: 18 (12-20)  SpO2: 97% (12-20)  GENERAL: No acute distress, well-developed  HEAD:  Atraumatic, Normocephalic  ENT: +JVD  CHEST/LUNG: Clear to auscultation bilaterally; No wheeze, equal breath sounds bilaterally   BACK: No spinal tenderness  HEART: Regular rate and rhythm; No murmurs, rubs, or gallops  ABDOMEN: Soft, Nontender, Nondistended; Bowel sounds present  EXTREMITIES: 2+ pitting edema b/l LE and scrotal edema.  PSYCH: Nl behavior, nl affect  NEUROLOGY: AAOx3, non-focal, cranial nerves intact  SKIN: Normal color, No rashes or lesions    Cardiovascular Diagnostic Testing: personally reviewed    CXR: Personally reviewed    Labs: Personally reviewed                        7.8    14.97 )-----------( 192      ( 20 Dec 2023 05:30 )             24.5     12-20    140  |  109<H>  |  27<H>  ----------------------------<  108<H>  3.9   |  26  |  1.31<H>    Ca    8.3<L>      20 Dec 2023 05:30  Phos  3.7     12-20  Mg     2.1     12-20                         VASCULAR CARDIOLOGY ATTENDING PROGRESS NOTE    SERVICE LINE: [phone]    Subjective:    No acute events overnight.   ROS negative.     Current Medications:   acetaminophen     Tablet .. 650 milliGRAM(s) Oral every 6 hours  aspirin  chewable 81 milliGRAM(s) Oral daily  atorvastatin 80 milliGRAM(s) Oral at bedtime  carvedilol 25 milliGRAM(s) Oral every 12 hours  gabapentin 800 milliGRAM(s) Oral three times a day  heparin   Injectable 5000 Unit(s) SubCutaneous every 8 hours  hydrALAZINE 75 milliGRAM(s) Oral three times a day  HYDROmorphone  Injectable 1 milliGRAM(s) IV Push every 6 hours PRN  insulin glargine Injectable (LANTUS) 10 Unit(s) SubCutaneous at bedtime  insulin lispro (ADMELOG) corrective regimen sliding scale   SubCutaneous Before meals and at bedtime  insulin lispro Injectable (ADMELOG) 8 Unit(s) SubCutaneous before lunch  insulin lispro Injectable (ADMELOG) 6 Unit(s) SubCutaneous before breakfast  insulin lispro Injectable (ADMELOG) 8 Unit(s) SubCutaneous before dinner  lisinopril 40 milliGRAM(s) Oral daily  oxyCODONE    IR 10 milliGRAM(s) Oral every 8 hours PRN  oxyCODONE    IR 5 milliGRAM(s) Oral every 8 hours PRN  vancomycin    Solution 125 milliGRAM(s) Oral every 6 hours      REVIEW OF SYSTEMS:  CONSTITUTIONAL: No weakness, fevers or chills  EYES/ENT: No visual changes;  No dysphagia  NECK: No pain or stiffness  RESPIRATORY: No cough, wheezing, hemoptysis; No shortness of breath  CARDIOVASCULAR: No chest pain or palpitations; No lower extremity edema  GASTROINTESTINAL: No abdominal or epigastric pain. No nausea, vomiting, or hematemesis; No diarrhea or constipation. No melena or hematochezia.  BACK: No back pain  GENITOURINARY: No dysuria, frequency or hematuria  NEUROLOGICAL: No numbness or weakness  SKIN: No itching, burning, rashes, or lesions   All other review of systems is negative unless indicated above.    Physical Exam:  T(F): 99 (12-20), Max: 99 (12-19)  HR: 74 (12-20) (69 - 77)  BP: 144/64 (12-20) (107/51 - 167/64)  BP(mean): 125 (12-20) (73 - 125)  ABP: --  ABP(mean): --  RR: 18 (12-20)  SpO2: 97% (12-20)  GENERAL: No acute distress, well-developed  HEAD:  Atraumatic, Normocephalic  ENT: +JVD  CHEST/LUNG: Clear to auscultation bilaterally; No wheeze, equal breath sounds bilaterally   BACK: No spinal tenderness  HEART: Regular rate and rhythm; No murmurs, rubs, or gallops  ABDOMEN: Soft, Nontender, Nondistended; Bowel sounds present  EXTREMITIES: 2+ pitting edema b/l LE and scrotal edema.  PSYCH: Nl behavior, nl affect  NEUROLOGY: AAOx3, non-focal, cranial nerves intact  SKIN: Normal color, No rashes or lesions    Cardiovascular Diagnostic Testing: personally reviewed    CXR: Personally reviewed    Labs: Personally reviewed                        7.8    14.97 )-----------( 192      ( 20 Dec 2023 05:30 )             24.5     12-20    140  |  109<H>  |  27<H>  ----------------------------<  108<H>  3.9   |  26  |  1.31<H>    Ca    8.3<L>      20 Dec 2023 05:30  Phos  3.7     12-20  Mg     2.1     12-20

## 2023-12-25 NOTE — PROGRESS NOTE ADULT - SUBJECTIVE AND OBJECTIVE BOX
O/N: STEPH LATHAM 1500/3750                                ---------------------------------------------------------------------------  PLEASE CHECK WHEN PRESENT:     [  ]Heart Failure     [  ] Acute     [  ] Acute on Chronic     [x  ] Chronic  -------------------------------------------------------------------     [  ]Diastolic [HFpEF]     [ x ]Systolic [HFrEF]     [  ]Combined [HFpEF & HFrEF]  .................................................................................     [  ]Other:     [ ] Pulmonary Hypertension     [ ] Chronic A-fib     [ ] Persistet A-fib     [ ] Permanent A-fib     [ ] Paroxysmal A-fib     [x ] Hypertensive Heart Disease  -------------------------------------------------------------------  [ ] Respiratory failure  [ ] Acute cor pulmonale  [ ] Asthma/COPD Exacerbation  [ ]COPD on home O2 (Chronic renal Failure)   [ ] Pleural effusion  [ ] Aspiration pneumonia  [ ] Obstructive Sleep Apnea  [ ]Atelectasis   [ ] Acute PE   -------------------------------------------------------------------  [  ]Acute Kidney Injury      [  ]Acute Tubular Necrosis      [  ]Reneal Medullary Necrosis     [  ]Renal Cortical Necrosis     [  ]Other Pathological Lesions:    [  ]CKD 1  [  ]CKD 2  [  ]CKD 3  [  ]CKD 4  [  ]CKD 5 (ESRD)  [  ]Other  -------------------------------------------------------------------  [ x ]Diabetes  [  ] Diabetic PVD Ulcer  [  ] Neuropathic ulcer to DM  [  ] Diabetes with Nephropathy  [ x ] Osteomyelitis due to diabetes  [  ] Hyperglycemia   [  ]hypoglycemia   --------------------------------------------------------------------  [  ]Malnutrition: See Nutrition Note  [  ]Cachexia  [  ]Other:   [  ]Supplement Ordered:  [  ]Morbid Obesity (BMI >=40]  [ ] Ileus  ---------------------------------------------------------------------  [ ] Sepsis/severe sepsis/septic shock  [ ] Noninfectious SIRS  [ ] UTI  [ ] Pneumonia  [ ] Thrombophlebitis     -----------------------------------------------------------------------  [ ] Acidosis/alkalosis  [ ] Fluid overload  [ ] Hypokalemia  [ ] Hyperkalemia  [ ] Hypomagnesemia  [ ] Hypophosphatemia  [ ] Hyperphosphatemia  ------------------------------------------------------------------------  [ ] Acute blood loss anemia  [ ] Post op blood loss anemia  [ ] Iron deficiency anemia  [ ] Anemia due to chronic disease  [ ] Hypercoagulable state  [ ] Thrombocytopenia  ----------------------------------------------------------------------  [ ] Cerebral infarction  [ ] Transient ischemia attack  [ ] Encephalopathy - Toxic or Metabolic          A/P:    67yo M PMH CAD (2 stents 2020), HFmrEF (45-50%), HTN, PAD w/ remote RLE angioplasty, R 4th toe OM s/p partial R 4th ray amputation on 10/24, RLE angiogram with AT lithotripsy and AT/peroneal balloon angioplasty 10/27, uncontrolled IDDM (a1c 12 in Oct 2023), recent admission 11/8-11/10 to cardiology service for hypertensive emergency (left AMA, was given levaquin when he left for right foot wound that pt had started treatment for back in October) who presented to [facility] after a fall onto his knees and was having b/l knee pain. Found to have increased soft tissue gas in R foot on CT scan. 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 Diff    Diet: consistent carbs    Activity: OOB to chair, PT Consult    DVTPPx: HSQ    Dispo: 5 Uris telemetry, DC notice given 12/22     O/N: STEPH LATHAM 1500/3750                                ---------------------------------------------------------------------------  PLEASE CHECK WHEN PRESENT:     [  ]Heart Failure     [  ] Acute     [  ] Acute on Chronic     [x  ] Chronic  -------------------------------------------------------------------     [  ]Diastolic [HFpEF]     [ x ]Systolic [HFrEF]     [  ]Combined [HFpEF & HFrEF]  .................................................................................     [  ]Other:     [ ] Pulmonary Hypertension     [ ] Chronic A-fib     [ ] Persistet A-fib     [ ] Permanent A-fib     [ ] Paroxysmal A-fib     [x ] Hypertensive Heart Disease  -------------------------------------------------------------------  [ ] Respiratory failure  [ ] Acute cor pulmonale  [ ] Asthma/COPD Exacerbation  [ ]COPD on home O2 (Chronic renal Failure)   [ ] Pleural effusion  [ ] Aspiration pneumonia  [ ] Obstructive Sleep Apnea  [ ]Atelectasis   [ ] Acute PE   -------------------------------------------------------------------  [  ]Acute Kidney Injury      [  ]Acute Tubular Necrosis      [  ]Reneal Medullary Necrosis     [  ]Renal Cortical Necrosis     [  ]Other Pathological Lesions:    [  ]CKD 1  [  ]CKD 2  [  ]CKD 3  [  ]CKD 4  [  ]CKD 5 (ESRD)  [  ]Other  -------------------------------------------------------------------  [ x ]Diabetes  [  ] Diabetic PVD Ulcer  [  ] Neuropathic ulcer to DM  [  ] Diabetes with Nephropathy  [ x ] Osteomyelitis due to diabetes  [  ] Hyperglycemia   [  ]hypoglycemia   --------------------------------------------------------------------  [  ]Malnutrition: See Nutrition Note  [  ]Cachexia  [  ]Other:   [  ]Supplement Ordered:  [  ]Morbid Obesity (BMI >=40]  [ ] Ileus  ---------------------------------------------------------------------  [ ] Sepsis/severe sepsis/septic shock  [ ] Noninfectious SIRS  [ ] UTI  [ ] Pneumonia  [ ] Thrombophlebitis     -----------------------------------------------------------------------  [ ] Acidosis/alkalosis  [ ] Fluid overload  [ ] Hypokalemia  [ ] Hyperkalemia  [ ] Hypomagnesemia  [ ] Hypophosphatemia  [ ] Hyperphosphatemia  ------------------------------------------------------------------------  [ ] Acute blood loss anemia  [ ] Post op blood loss anemia  [ ] Iron deficiency anemia  [ ] Anemia due to chronic disease  [ ] Hypercoagulable state  [ ] Thrombocytopenia  ----------------------------------------------------------------------  [ ] Cerebral infarction  [ ] Transient ischemia attack  [ ] Encephalopathy - Toxic or Metabolic          A/P:    67yo M PMH CAD (2 stents 2020), HFmrEF (45-50%), HTN, PAD w/ remote RLE angioplasty, R 4th toe OM s/p partial R 4th ray amputation on 10/24, RLE angiogram with AT lithotripsy and AT/peroneal balloon angioplasty 10/27, uncontrolled IDDM (a1c 12 in Oct 2023), recent admission 11/8-11/10 to cardiology service for hypertensive emergency (left AMA, was given levaquin when he left for right foot wound that pt had started treatment for back in October) who presented to [facility] after a fall onto his knees and was having b/l knee pain. Found to have increased soft tissue gas in R foot on CT scan. 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 Diff    Diet: consistent carbs    Activity: OOB to chair, PT Consult    DVTPPx: HSQ    Dispo: 5 Uris telemetry, DC notice given 12/22     O/N: YEISON, VSS UO 1500/3750      S: Patient does not have any complaints    O: Examined in bed resting comfortably     ROS: Denies headache, blurred vision, chest pain, SOB, abdominal pain, nausea or vomiting.         aspirin  chewable 81  carvedilol 25  furosemide   Injectable 80  heparin   Injectable 5000  hydrALAZINE 100  lisinopril 40  NIFEdipine XL 60  spironolactone 25      Allergies    No Known Allergies    Intolerances        Vital Signs Last 24 Hrs  T(C): 37.6 (25 Dec 2023 09:35), Max: 37.6 (25 Dec 2023 09:35)  T(F): 99.6 (25 Dec 2023 09:35), Max: 99.6 (25 Dec 2023 09:35)  HR: 83 (25 Dec 2023 09:35) (70 - 83)  BP: 140/61 (25 Dec 2023 09:35) (131/76 - 184/78)  BP(mean): 91 (25 Dec 2023 09:35) (91 - 112)  RR: 17 (25 Dec 2023 09:35) (17 - 20)  SpO2: 97% (25 Dec 2023 09:35) (97% - 99%)    Parameters below as of 25 Dec 2023 09:35  Patient On (Oxygen Delivery Method): room air      I&O's Summary    24 Dec 2023 07:01  -  25 Dec 2023 07:00  --------------------------------------------------------  IN: 1110 mL / OUT: 3750 mL / NET: -2640 mL    25 Dec 2023 07:01  -  25 Dec 2023 10:14  --------------------------------------------------------  IN: 240 mL / OUT: 800 mL / NET: -560 mL        Physical Exam:  General: alert and awake, NAD  Pulmonary: no respiratory distress  Cardiovascular: RRR  Abdominal: soft  Extremities: R BKA incision CDI, skin from previous blister healing well, covered with xeroform, kerlix, ace bandage        ---------------------------------------------------------------------------  PLEASE CHECK WHEN PRESENT:     [  ]Heart Failure     [  ] Acute     [  ] Acute on Chronic     [x  ] Chronic  -------------------------------------------------------------------     [  ]Diastolic [HFpEF]     [ x ]Systolic [HFrEF]     [  ]Combined [HFpEF & HFrEF]  .................................................................................     [  ]Other:     [ ] Pulmonary Hypertension     [ ] Chronic A-fib     [ ] Persistet A-fib     [ ] Permanent A-fib     [ ] Paroxysmal A-fib     [x ] Hypertensive Heart Disease  -------------------------------------------------------------------  [ ] Respiratory failure  [ ] Acute cor pulmonale  [ ] Asthma/COPD Exacerbation  [ ]COPD on home O2 (Chronic renal Failure)   [ ] Pleural effusion  [ ] Aspiration pneumonia  [ ] Obstructive Sleep Apnea  [ ]Atelectasis   [ ] Acute PE   -------------------------------------------------------------------  [  ]Acute Kidney Injury      [  ]Acute Tubular Necrosis      [  ]Reneal Medullary Necrosis     [  ]Renal Cortical Necrosis     [  ]Other Pathological Lesions:    [  ]CKD 1  [  ]CKD 2  [  ]CKD 3  [  ]CKD 4  [  ]CKD 5 (ESRD)  [  ]Other  -------------------------------------------------------------------  [ x ]Diabetes  [  ] Diabetic PVD Ulcer  [  ] Neuropathic ulcer to DM  [  ] Diabetes with Nephropathy  [ x ] Osteomyelitis due to diabetes  [  ] Hyperglycemia   [  ]hypoglycemia   --------------------------------------------------------------------  [  ]Malnutrition: See Nutrition Note  [  ]Cachexia  [  ]Other:   [  ]Supplement Ordered:  [  ]Morbid Obesity (BMI >=40]  [ ] Ileus  ---------------------------------------------------------------------  [ ] Sepsis/severe sepsis/septic shock  [ ] Noninfectious SIRS  [ ] UTI  [ ] Pneumonia  [ ] Thrombophlebitis     -----------------------------------------------------------------------  [ ] Acidosis/alkalosis  [ ] Fluid overload  [ ] Hypokalemia  [ ] Hyperkalemia  [ ] Hypomagnesemia  [ ] Hypophosphatemia  [ ] Hyperphosphatemia  ------------------------------------------------------------------------  [ ] Acute blood loss anemia  [ ] Post op blood loss anemia  [ ] Iron deficiency anemia  [ ] Anemia due to chronic disease  [ ] Hypercoagulable state  [ ] Thrombocytopenia  ----------------------------------------------------------------------  [ ] Cerebral infarction  [ ] Transient ischemia attack  [ ] Encephalopathy - Toxic or Metabolic          A/P:    69yo M PMH CAD (2 stents 2020), HFmrEF (45-50%), HTN, PAD w/ remote RLE angioplasty, R 4th toe OM s/p partial R 4th ray amputation on 10/24, RLE angiogram with AT lithotripsy and AT/peroneal balloon angioplasty 10/27, uncontrolled IDDM (a1c 12 in Oct 2023), recent admission 11/8-11/10 to cardiology service for hypertensive emergency (left AMA, was given levaquin when he left for right foot wound that pt had started treatment for back in October) who presented to [facility] after a fall onto his knees and was having b/l knee pain. Found to have increased soft tissue gas in R foot on CT scan. 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 Diff as of 12/25      Diet: consistent carbs    Activity: OOB to chair, PT Consult    DVTPPx: HSQ    Dispo: 5 Uris telemetry, DC notice given 12/22     O/N: YEISON, VSS UO 1500/3750      S: Patient does not have any complaints    O: Examined in bed resting comfortably     ROS: Denies headache, blurred vision, chest pain, SOB, abdominal pain, nausea or vomiting.         aspirin  chewable 81  carvedilol 25  furosemide   Injectable 80  heparin   Injectable 5000  hydrALAZINE 100  lisinopril 40  NIFEdipine XL 60  spironolactone 25      Allergies    No Known Allergies    Intolerances        Vital Signs Last 24 Hrs  T(C): 37.6 (25 Dec 2023 09:35), Max: 37.6 (25 Dec 2023 09:35)  T(F): 99.6 (25 Dec 2023 09:35), Max: 99.6 (25 Dec 2023 09:35)  HR: 83 (25 Dec 2023 09:35) (70 - 83)  BP: 140/61 (25 Dec 2023 09:35) (131/76 - 184/78)  BP(mean): 91 (25 Dec 2023 09:35) (91 - 112)  RR: 17 (25 Dec 2023 09:35) (17 - 20)  SpO2: 97% (25 Dec 2023 09:35) (97% - 99%)    Parameters below as of 25 Dec 2023 09:35  Patient On (Oxygen Delivery Method): room air      I&O's Summary    24 Dec 2023 07:01  -  25 Dec 2023 07:00  --------------------------------------------------------  IN: 1110 mL / OUT: 3750 mL / NET: -2640 mL    25 Dec 2023 07:01  -  25 Dec 2023 10:14  --------------------------------------------------------  IN: 240 mL / OUT: 800 mL / NET: -560 mL        Physical Exam:  General: alert and awake, NAD  Pulmonary: no respiratory distress  Cardiovascular: RRR  Abdominal: soft  Extremities: R BKA incision CDI, skin from previous blister healing well, covered with xeroform, kerlix, ace bandage        ---------------------------------------------------------------------------  PLEASE CHECK WHEN PRESENT:     [  ]Heart Failure     [  ] Acute     [  ] Acute on Chronic     [x  ] Chronic  -------------------------------------------------------------------     [  ]Diastolic [HFpEF]     [ x ]Systolic [HFrEF]     [  ]Combined [HFpEF & HFrEF]  .................................................................................     [  ]Other:     [ ] Pulmonary Hypertension     [ ] Chronic A-fib     [ ] Persistet A-fib     [ ] Permanent A-fib     [ ] Paroxysmal A-fib     [x ] Hypertensive Heart Disease  -------------------------------------------------------------------  [ ] Respiratory failure  [ ] Acute cor pulmonale  [ ] Asthma/COPD Exacerbation  [ ]COPD on home O2 (Chronic renal Failure)   [ ] Pleural effusion  [ ] Aspiration pneumonia  [ ] Obstructive Sleep Apnea  [ ]Atelectasis   [ ] Acute PE   -------------------------------------------------------------------  [  ]Acute Kidney Injury      [  ]Acute Tubular Necrosis      [  ]Reneal Medullary Necrosis     [  ]Renal Cortical Necrosis     [  ]Other Pathological Lesions:    [  ]CKD 1  [  ]CKD 2  [  ]CKD 3  [  ]CKD 4  [  ]CKD 5 (ESRD)  [  ]Other  -------------------------------------------------------------------  [ x ]Diabetes  [  ] Diabetic PVD Ulcer  [  ] Neuropathic ulcer to DM  [  ] Diabetes with Nephropathy  [ x ] Osteomyelitis due to diabetes  [  ] Hyperglycemia   [  ]hypoglycemia   --------------------------------------------------------------------  [  ]Malnutrition: See Nutrition Note  [  ]Cachexia  [  ]Other:   [  ]Supplement Ordered:  [  ]Morbid Obesity (BMI >=40]  [ ] Ileus  ---------------------------------------------------------------------  [ ] Sepsis/severe sepsis/septic shock  [ ] Noninfectious SIRS  [ ] UTI  [ ] Pneumonia  [ ] Thrombophlebitis     -----------------------------------------------------------------------  [ ] Acidosis/alkalosis  [ ] Fluid overload  [ ] Hypokalemia  [ ] Hyperkalemia  [ ] Hypomagnesemia  [ ] Hypophosphatemia  [ ] Hyperphosphatemia  ------------------------------------------------------------------------  [ ] Acute blood loss anemia  [ ] Post op blood loss anemia  [ ] Iron deficiency anemia  [ ] Anemia due to chronic disease  [ ] Hypercoagulable state  [ ] Thrombocytopenia  ----------------------------------------------------------------------  [ ] Cerebral infarction  [ ] Transient ischemia attack  [ ] Encephalopathy - Toxic or Metabolic          A/P:    67yo M PMH CAD (2 stents 2020), HFmrEF (45-50%), HTN, PAD w/ remote RLE angioplasty, R 4th toe OM s/p partial R 4th ray amputation on 10/24, RLE angiogram with AT lithotripsy and AT/peroneal balloon angioplasty 10/27, uncontrolled IDDM (a1c 12 in Oct 2023), recent admission 11/8-11/10 to cardiology service for hypertensive emergency (left AMA, was given levaquin when he left for right foot wound that pt had started treatment for back in October) who presented to [facility] after a fall onto his knees and was having b/l knee pain. Found to have increased soft tissue gas in R foot on CT scan. 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 Diff as of 12/25      Diet: consistent carbs    Activity: OOB to chair, PT Consult    DVTPPx: HSQ    Dispo: 5 Uris telemetry, DC notice given 12/22

## 2023-12-25 NOTE — PROGRESS NOTE ADULT - ASSESSMENT
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    #Diabetic Foot Infection   -Pt s/p BKA on 12/11 and  s/p closure on 12/18 Monday  -Continue pain medication as per primary team   [ ] Monitor off of tylenol, NSAIDs, check rectal temp if patient has any oral/axillary/temporal temps >99F    # R arm edema   right arm edematous. c/w monitoring    #Chronic HFmrEF   #HTN  -Continue with lisinopril 40mg daily, coreg 25mg BID, and hydralazine 100mg TID, IV lasix 80mg q12    # C difficile infection   -s/p PO vancomycin (completed on 12/25), continue contact precautions     #Acute Blood Loss Anemia   -Pt s/p transfusion of 1 unit of PRBCS on 12/19; Continue to monitor Hgb while inpatient     #CAD -Continue with ASA 81mg daily and Atorvastatin 80mg qhs     #Moderate Aortic Stenosis  -outpatient follow up for surveillance TTE     #Type 2 Diabetes c/b PAD, diabetic foot infection and hyperglycemia   inpatient and discharge insulin regimen per endocrine consult     DVT PPx  - Heparin SQ

## 2023-12-25 NOTE — PROGRESS NOTE ADULT - ASSESSMENT
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 diff colitis. [facility] following for PAD, CAD, and HFrEF.    Assessment  1. 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 PAD s/p prior intervention to RLE Oct/2023  3. CAD s/p PCI  4. ICM HFrEF - decompensated  Last TTE Nov/2023 mildly reduced EF, G1DD  5. Mild-moderate AS (ALLISON 1.28cm^2, MG 15 mmHg)  6. RUE swelling  7. New fever    Plan  1. ASA 81mg QD and high intensity statin for CAD and PAD  2. IV lasix 80mg q12h w/ strict I/O while inpatient, switch to torsemide 40mg PO QD when ready for dc  3. TTE in 1y for surveillance of moderate AS  4. GDMT for HFrEF: Coreg 25mg BID, lisinopril 40mg QD, aldactone 25mg QD; will avoid SGLT2i given PAD w/ delayed wound healing  5. Hydralazine 100mg q8h and nifedipine XL 60mg QD   6. Recommend RUE venous duplex US  7. Consider ID consult for new fever     Thank you for the consult and the opportunity to take care of this patient.     Agapito Porter M.D.  CARDIOLOGY | VASCULAR CARDIOLOGY ATTENDING  [phone]    During non face-to-face time, I reviewed relevant portions of the patient’s medical record. During face-to-face time, I took a relevant history and examined the patient. I also explained differential diagnoses, relevant cardiac diagnoses, workup, and management plan, which required a high level of medical decision making. I answered all questions related to the patient's medical conditions.          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 diff colitis. [facility] following for PAD, CAD, and HFrEF.    Assessment  1. 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 PAD s/p prior intervention to RLE Oct/2023  3. CAD s/p PCI  4. ICM HFrEF - decompensated  Last TTE Nov/2023 mildly reduced EF, G1DD  5. Mild-moderate AS (ALLISON 1.28cm^2, MG 15 mmHg)  6. RUE swelling  7. New fever    Plan  1. ASA 81mg QD and high intensity statin for CAD and PAD  2. IV lasix 80mg q12h w/ strict I/O while inpatient, switch to torsemide 40mg PO QD when ready for dc  3. TTE in 1y for surveillance of moderate AS  4. GDMT for HFrEF: Coreg 25mg BID, lisinopril 40mg QD, aldactone 25mg QD; will avoid SGLT2i given PAD w/ delayed wound healing  5. Hydralazine 100mg q8h and nifedipine XL 60mg QD   6. Recommend RUE venous duplex US  7. Consider ID consult for new fever     Thank you for the consult and the opportunity to take care of this patient.     Agapito Porter M.D.  CARDIOLOGY | VASCULAR CARDIOLOGY ATTENDING  [phone]    During non face-to-face time, I reviewed relevant portions of the patient’s medical record. During face-to-face time, I took a relevant history and examined the patient. I also explained differential diagnoses, relevant cardiac diagnoses, workup, and management plan, which required a high level of medical decision making. I answered all questions related to the patient's medical conditions.

## 2023-12-25 NOTE — PROGRESS NOTE ADULT - SUBJECTIVE AND OBJECTIVE BOX
INTERVAL EVENTS: No o/n events. Reporting spontaneous ab pain, improved with BM. Denies CP, dyspnea, palpitations, presyncope, syncope, f/c/n/v.     REVIEW OF SYSTEMS:  Constitutional:     [X] negative [ ] fevers [ ] chills [ ] weight loss [ ] weight gain  HEENT:                  [X] negative [ ] dry eyes [ ] eye irritation [ ] postnasal drip [ ] nasal congestion  CV:                         [X] negative  [ ] chest pain [ ] orthopnea [ ] palpitations [ ] murmur  Resp:                     [X] negative [ ] cough [ ] shortness of breath [ ] wheezing [ ] sputum [ ] hemoptysis  GI:                           [ ] negative [ ] nausea [ ] vomiting [ ] diarrhea [ ] constipation [X] abd pain [ ] dysphagia   :                        [X] negative [ ] dysuria [ ] nocturia [ ] hematuria [ ] increased urinary frequency  MSK:                      [X] negative [ ] back pain [ ] myalgias [ ] arthralgias [ ] fracture  Skin:                       [X] negative [ ] rash [ ] itch  Neuro:                   [X] negative [ ] headache [ ] dizziness [ ] syncope [ ] weakness [ ] numbness  Psych:                    [X] negative [ ] anxiety [ ] depression  Endo:                     [X] negative [ ] diabetes [ ] thyroid problem  Heme/Lymph:      [X] negative [ ] anemia [ ] bleeding problem  Allergic/Immune: [X] negative [ ] itchy eyes [ ] nasal discharge [ ] hives [ ] angioedema    [X] All other systems negative or otherwise described above.  [ ] Unable to assess ROS due to ________.    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 650 milliGRAM(s) Oral every 6 hours  aspirin  chewable 81 milliGRAM(s) Oral daily  atorvastatin 80 milliGRAM(s) Oral at bedtime  carvedilol 25 milliGRAM(s) Oral every 12 hours  dextrose 5%. 1000 milliLiter(s) (100 mL/Hr) IV Continuous <Continuous>  dextrose 5%. 1000 milliLiter(s) (50 mL/Hr) IV Continuous <Continuous>  dextrose 50% Injectable 12.5 Gram(s) IV Push once  dextrose 50% Injectable 25 Gram(s) IV Push once  dextrose 50% Injectable 25 Gram(s) IV Push once  furosemide   Injectable 80 milliGRAM(s) IV Push every 12 hours  gabapentin 800 milliGRAM(s) Oral three times a day  glucagon  Injectable 1 milliGRAM(s) IntraMuscular once  heparin   Injectable 5000 Unit(s) SubCutaneous every 8 hours  hydrALAZINE 100 milliGRAM(s) Oral three times a day  insulin glargine Injectable (LANTUS) 14 Unit(s) SubCutaneous at bedtime  insulin lispro (ADMELOG) corrective regimen sliding scale   SubCutaneous Before meals and at bedtime  insulin lispro Injectable (ADMELOG) 6 Unit(s) SubCutaneous three times a day before meals  lisinopril 40 milliGRAM(s) Oral daily  NIFEdipine XL 60 milliGRAM(s) Oral daily  spironolactone 25 milliGRAM(s) Oral daily    MEDICATIONS  (PRN):  dextrose Oral Gel 15 Gram(s) Oral once PRN Blood Glucose LESS THAN 70 milliGRAM(s)/deciliter  oxyCODONE    IR 10 milliGRAM(s) Oral every 6 hours PRN Severe Pain (7 - 10)  oxyCODONE    IR 5 milliGRAM(s) Oral every 6 hours PRN Moderate Pain (4 - 6)    ICU Vital Signs Last 24 Hrs  T(C): 38 (25 Dec 2023 12:12), Max: 38.6 (25 Dec 2023 12:11)  T(F): 100.4 (25 Dec 2023 12:12), Max: 101.4 (25 Dec 2023 12:11)  HR: 83 (25 Dec 2023 09:35) (70 - 83)  BP: 136/64 (25 Dec 2023 13:20) (131/76 - 184/78)  BP(mean): 92 (25 Dec 2023 13:20) (91 - 112)  ABP: --  ABP(mean): --  RR: 17 (25 Dec 2023 09:35) (17 - 20)  SpO2: 97% (25 Dec 2023 09:35) (97% - 99%)    O2 Parameters below as of 25 Dec 2023 09:35  Patient On (Oxygen Delivery Method): room air          Orthostatic VS    Daily     Daily   I&O's Summary    24 Dec 2023 07:01  -  25 Dec 2023 07:00  --------------------------------------------------------  IN: 1110 mL / OUT: 3750 mL / NET: -2640 mL    25 Dec 2023 07:01  -  25 Dec 2023 13:59  --------------------------------------------------------  IN: 240 mL / OUT: 800 mL / NET: -560 mL        PHYSICAL EXAM:  Gen: Sitting in bed at time of exam, appears stated age  HEENT: NCAT, MMM, clear OP  Neck: supple, trachea at midline  CV: RRR, systolic murmur, +S1/S2  Pulm: adequate respiratory effort, no increase in work of breathing  Abd: soft, ND  Skin: warm and dry,   Ext: right leg s/p BKA, wrapped in ACE ; left leg with 2+ pitting edema extending up to thighs ;  right arm edematous, + scrotal edema  Neuro: AOx3, speaking in full sentences  Psych: affect and behavior appropriate    LABS:                  RADIOLOGY & ADDITIONAL STUDIES: reviewed    < from: TTE Echo Complete w/o Contrast w/ Doppler (11.09.23 @ 11:40) >  CONCLUSIONS:     1. Normal left ventricular size.   2. Mild to moderate symmetric left ventricular hypertrophy.   3. Mildly reduced left ventricular systolic function.   4. Grade I left ventricular diastolic dysfunction.   5. Normal right ventricular size and systolic function.   6. Biatrial enlargement.   7. Mild-to-moderate aortic stenosis. The peak transvalvular velocity is   2.92 m/s, the mean transvalvular gradient is 15.00 mmHg, and the LVOT/AV   velocity ratio is 0.50. The aortic valve area (estimated via the   continuity method) is 1.28 cm².   8. No evidence of pulmonary hypertension.   9. No pericardial effusion  10. Compared to the previous TTE performed on 10/23/2023, there have been no significant interval changes.      < end of copied text >

## 2023-12-25 NOTE — PROGRESS NOTE ADULT - ATTENDING COMMENTS
I have personally seen and examined this patient. I have fully participated in the care of this patient. I have reviewed all pertinent clinical information, including history, physical exam, plan and the Resident's, PA's and NP's note and agree except as noted. This is a patient known to Dr. 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 He is transferred from the medical service to vascular, now s/p staged R BKA w/ closure (12/18/23)    No major complaints. RLE swelling improving with diuresis per cardiology. Refusing RUE venous duplex. Stump OK. Awaiting dispo.     PLAN & RECOMMENDATIONS  - ASA/SQH; no more plavix  - ABX for C-diff treatment  - ASA/SQH  - Physical therapy  - Diuresis and HTN recs per cardiology; appreciate recs  - F/u hospitalist  -  for dispo, hopefully on 12/26/23  - Outpatient follow up on 1/18/24    Thank you,    Michelet Freed MD  Attending Vascular Surgeon  [facility]  [address] [place] [address]  Office: [phone]; Fax: [phone]  [email or web address] I have personally seen and examined this patient. I have fully participated in the care of this patient. I have reviewed all pertinent clinical information, including history, physical exam, plan and the Resident's, PA's and NP's note and agree except as noted. This is a patient known to Dr. 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 He is transferred from the medical service to vascular, now s/p staged R BKA w/ closure (12/18/23)    No major complaints. RLE swelling improving with diuresis per cardiology. Refusing RUE venous duplex. Stump OK. Awaiting dispo.     PLAN & RECOMMENDATIONS  - ASA/SQH; no more plavix  - ABX for C-diff treatment  - ASA/SQH  - Physical therapy  - Diuresis and HTN recs per cardiology; appreciate recs  - F/u hospitalist  -  for dispo, hopefully on 12/26/23  - Outpatient follow up on 1/18/24    Thank you,    Michelet Freed MD  Attending Vascular Surgeon  [facility]  [address] [place] [address]  Office: [phone]; Fax: [phone]  [email or web address]

## 2023-12-26 ENCOUNTER — INPATIENT (INPATIENT)
Facility: HOSPITAL | Age: 68
LOS: 9 days | Discharge: HOME | DRG: 177 | End: 2024-01-05
Attending: STUDENT IN AN ORGANIZED HEALTH CARE EDUCATION/TRAINING PROGRAM | Admitting: STUDENT IN AN ORGANIZED HEALTH CARE EDUCATION/TRAINING PROGRAM
Payer: MEDICARE

## 2023-12-26 VITALS
WEIGHT: 160.06 LBS | RESPIRATION RATE: 18 BRPM | TEMPERATURE: 100 F | SYSTOLIC BLOOD PRESSURE: 112 MMHG | DIASTOLIC BLOOD PRESSURE: 78 MMHG | HEART RATE: 76 BPM | HEIGHT: 67 IN | OXYGEN SATURATION: 94 %

## 2023-12-26 VITALS
RESPIRATION RATE: 18 BRPM | OXYGEN SATURATION: 98 % | DIASTOLIC BLOOD PRESSURE: 71 MMHG | TEMPERATURE: 100 F | SYSTOLIC BLOOD PRESSURE: 108 MMHG | HEART RATE: 77 BPM

## 2023-12-26 DIAGNOSIS — I73.9 PERIPHERAL VASCULAR DISEASE, UNSPECIFIED: ICD-10-CM

## 2023-12-26 DIAGNOSIS — I10 ESSENTIAL (PRIMARY) HYPERTENSION: ICD-10-CM

## 2023-12-26 DIAGNOSIS — R65.10 SYSTEMIC INFLAMMATORY RESPONSE SYNDROME (SIRS) OF NON-INFECTIOUS ORIGIN WITHOUT ACUTE ORGAN DYSFUNCTION: ICD-10-CM

## 2023-12-26 DIAGNOSIS — Z89.421 ACQUIRED ABSENCE OF OTHER RIGHT TOE(S): Chronic | ICD-10-CM

## 2023-12-26 DIAGNOSIS — Z29.9 ENCOUNTER FOR PROPHYLACTIC MEASURES, UNSPECIFIED: ICD-10-CM

## 2023-12-26 DIAGNOSIS — E11.9 TYPE 2 DIABETES MELLITUS WITHOUT COMPLICATIONS: ICD-10-CM

## 2023-12-26 DIAGNOSIS — U07.1 COVID-19: ICD-10-CM

## 2023-12-26 DIAGNOSIS — Z89.511 ACQUIRED ABSENCE OF RIGHT LEG BELOW KNEE: ICD-10-CM

## 2023-12-26 DIAGNOSIS — I25.10 ATHEROSCLEROTIC HEART DISEASE OF NATIVE CORONARY ARTERY WITHOUT ANGINA PECTORIS: ICD-10-CM

## 2023-12-26 DIAGNOSIS — I50.22 CHRONIC SYSTOLIC (CONGESTIVE) HEART FAILURE: ICD-10-CM

## 2023-12-26 DIAGNOSIS — E78.5 HYPERLIPIDEMIA, UNSPECIFIED: ICD-10-CM

## 2023-12-26 LAB
ANION GAP SERPL CALC-SCNC: 6 MMOL/L — SIGNIFICANT CHANGE UP (ref 5–17)
ANION GAP SERPL CALC-SCNC: 6 MMOL/L — SIGNIFICANT CHANGE UP (ref 5–17)
ANISOCYTOSIS BLD QL: SLIGHT — SIGNIFICANT CHANGE UP
ANISOCYTOSIS BLD QL: SLIGHT — SIGNIFICANT CHANGE UP
BASOPHILS # BLD AUTO: 0 K/UL — SIGNIFICANT CHANGE UP (ref 0–0.2)
BASOPHILS # BLD AUTO: 0 K/UL — SIGNIFICANT CHANGE UP (ref 0–0.2)
BASOPHILS NFR BLD AUTO: 0 % — SIGNIFICANT CHANGE UP (ref 0–2)
BASOPHILS NFR BLD AUTO: 0 % — SIGNIFICANT CHANGE UP (ref 0–2)
BUN SERPL-MCNC: 36 MG/DL — HIGH (ref 7–23)
BUN SERPL-MCNC: 36 MG/DL — HIGH (ref 7–23)
CALCIUM SERPL-MCNC: 9.1 MG/DL — SIGNIFICANT CHANGE UP (ref 8.4–10.5)
CALCIUM SERPL-MCNC: 9.1 MG/DL — SIGNIFICANT CHANGE UP (ref 8.4–10.5)
CHLORIDE SERPL-SCNC: 103 MMOL/L — SIGNIFICANT CHANGE UP (ref 96–108)
CHLORIDE SERPL-SCNC: 103 MMOL/L — SIGNIFICANT CHANGE UP (ref 96–108)
CO2 SERPL-SCNC: 30 MMOL/L — SIGNIFICANT CHANGE UP (ref 22–31)
CO2 SERPL-SCNC: 30 MMOL/L — SIGNIFICANT CHANGE UP (ref 22–31)
CREAT SERPL-MCNC: 1.3 MG/DL — SIGNIFICANT CHANGE UP (ref 0.5–1.3)
CREAT SERPL-MCNC: 1.3 MG/DL — SIGNIFICANT CHANGE UP (ref 0.5–1.3)
EGFR: 61 ML/MIN/1.73M2 — SIGNIFICANT CHANGE UP
EGFR: 61 ML/MIN/1.73M2 — SIGNIFICANT CHANGE UP
EOSINOPHIL # BLD AUTO: 0 K/UL — SIGNIFICANT CHANGE UP (ref 0–0.5)
EOSINOPHIL # BLD AUTO: 0 K/UL — SIGNIFICANT CHANGE UP (ref 0–0.5)
EOSINOPHIL NFR BLD AUTO: 0 % — SIGNIFICANT CHANGE UP (ref 0–6)
EOSINOPHIL NFR BLD AUTO: 0 % — SIGNIFICANT CHANGE UP (ref 0–6)
GIANT PLATELETS BLD QL SMEAR: PRESENT — SIGNIFICANT CHANGE UP
GIANT PLATELETS BLD QL SMEAR: PRESENT — SIGNIFICANT CHANGE UP
GLUCOSE BLDC GLUCOMTR-MCNC: 105 MG/DL — HIGH (ref 70–99)
GLUCOSE BLDC GLUCOMTR-MCNC: 105 MG/DL — HIGH (ref 70–99)
GLUCOSE BLDC GLUCOMTR-MCNC: 182 MG/DL — HIGH (ref 70–99)
GLUCOSE BLDC GLUCOMTR-MCNC: 182 MG/DL — HIGH (ref 70–99)
GLUCOSE BLDC GLUCOMTR-MCNC: 199 MG/DL — HIGH (ref 70–99)
GLUCOSE BLDC GLUCOMTR-MCNC: 199 MG/DL — HIGH (ref 70–99)
GLUCOSE BLDC GLUCOMTR-MCNC: 230 MG/DL — HIGH (ref 70–99)
GLUCOSE BLDC GLUCOMTR-MCNC: 230 MG/DL — HIGH (ref 70–99)
GLUCOSE SERPL-MCNC: 114 MG/DL — HIGH (ref 70–99)
GLUCOSE SERPL-MCNC: 114 MG/DL — HIGH (ref 70–99)
HCT VFR BLD CALC: 28.2 % — LOW (ref 39–50)
HCT VFR BLD CALC: 28.2 % — LOW (ref 39–50)
HGB BLD-MCNC: 8.8 G/DL — LOW (ref 13–17)
HGB BLD-MCNC: 8.8 G/DL — LOW (ref 13–17)
HYPOCHROMIA BLD QL: SLIGHT — SIGNIFICANT CHANGE UP
HYPOCHROMIA BLD QL: SLIGHT — SIGNIFICANT CHANGE UP
LYMPHOCYTES # BLD AUTO: 0.62 K/UL — LOW (ref 1–3.3)
LYMPHOCYTES # BLD AUTO: 0.62 K/UL — LOW (ref 1–3.3)
LYMPHOCYTES # BLD AUTO: 7.1 % — LOW (ref 13–44)
LYMPHOCYTES # BLD AUTO: 7.1 % — LOW (ref 13–44)
MAGNESIUM SERPL-MCNC: 2 MG/DL — SIGNIFICANT CHANGE UP (ref 1.6–2.6)
MAGNESIUM SERPL-MCNC: 2 MG/DL — SIGNIFICANT CHANGE UP (ref 1.6–2.6)
MANUAL SMEAR VERIFICATION: SIGNIFICANT CHANGE UP
MANUAL SMEAR VERIFICATION: SIGNIFICANT CHANGE UP
MCHC RBC-ENTMCNC: 28.5 PG — SIGNIFICANT CHANGE UP (ref 27–34)
MCHC RBC-ENTMCNC: 28.5 PG — SIGNIFICANT CHANGE UP (ref 27–34)
MCHC RBC-ENTMCNC: 31.2 GM/DL — LOW (ref 32–36)
MCHC RBC-ENTMCNC: 31.2 GM/DL — LOW (ref 32–36)
MCV RBC AUTO: 91.3 FL — SIGNIFICANT CHANGE UP (ref 80–100)
MCV RBC AUTO: 91.3 FL — SIGNIFICANT CHANGE UP (ref 80–100)
MICROCYTES BLD QL: SLIGHT — SIGNIFICANT CHANGE UP
MICROCYTES BLD QL: SLIGHT — SIGNIFICANT CHANGE UP
MONOCYTES # BLD AUTO: 1.01 K/UL — HIGH (ref 0–0.9)
MONOCYTES # BLD AUTO: 1.01 K/UL — HIGH (ref 0–0.9)
MONOCYTES NFR BLD AUTO: 11.5 % — SIGNIFICANT CHANGE UP (ref 2–14)
MONOCYTES NFR BLD AUTO: 11.5 % — SIGNIFICANT CHANGE UP (ref 2–14)
MYELOCYTES NFR BLD: 1.8 % — HIGH (ref 0–0)
MYELOCYTES NFR BLD: 1.8 % — HIGH (ref 0–0)
NEUTROPHILS # BLD AUTO: 6.97 K/UL — SIGNIFICANT CHANGE UP (ref 1.8–7.4)
NEUTROPHILS # BLD AUTO: 6.97 K/UL — SIGNIFICANT CHANGE UP (ref 1.8–7.4)
NEUTROPHILS NFR BLD AUTO: 79.6 % — HIGH (ref 43–77)
NEUTROPHILS NFR BLD AUTO: 79.6 % — HIGH (ref 43–77)
OVALOCYTES BLD QL SMEAR: SLIGHT — SIGNIFICANT CHANGE UP
OVALOCYTES BLD QL SMEAR: SLIGHT — SIGNIFICANT CHANGE UP
PLAT MORPH BLD: ABNORMAL
PLAT MORPH BLD: ABNORMAL
PLATELET # BLD AUTO: 304 K/UL — SIGNIFICANT CHANGE UP (ref 150–400)
PLATELET # BLD AUTO: 304 K/UL — SIGNIFICANT CHANGE UP (ref 150–400)
POIKILOCYTOSIS BLD QL AUTO: SLIGHT — SIGNIFICANT CHANGE UP
POIKILOCYTOSIS BLD QL AUTO: SLIGHT — SIGNIFICANT CHANGE UP
POLYCHROMASIA BLD QL SMEAR: SLIGHT — SIGNIFICANT CHANGE UP
POLYCHROMASIA BLD QL SMEAR: SLIGHT — SIGNIFICANT CHANGE UP
POTASSIUM SERPL-MCNC: 4.2 MMOL/L — SIGNIFICANT CHANGE UP (ref 3.5–5.3)
POTASSIUM SERPL-MCNC: 4.2 MMOL/L — SIGNIFICANT CHANGE UP (ref 3.5–5.3)
POTASSIUM SERPL-SCNC: 4.2 MMOL/L — SIGNIFICANT CHANGE UP (ref 3.5–5.3)
POTASSIUM SERPL-SCNC: 4.2 MMOL/L — SIGNIFICANT CHANGE UP (ref 3.5–5.3)
RBC # BLD: 3.09 M/UL — LOW (ref 4.2–5.8)
RBC # BLD: 3.09 M/UL — LOW (ref 4.2–5.8)
RBC # FLD: 18.1 % — HIGH (ref 10.3–14.5)
RBC # FLD: 18.1 % — HIGH (ref 10.3–14.5)
RBC BLD AUTO: ABNORMAL
RBC BLD AUTO: ABNORMAL
SARS-COV-2 RNA SPEC QL NAA+PROBE: POSITIVE
SARS-COV-2 RNA SPEC QL NAA+PROBE: POSITIVE
SODIUM SERPL-SCNC: 139 MMOL/L — SIGNIFICANT CHANGE UP (ref 135–145)
SODIUM SERPL-SCNC: 139 MMOL/L — SIGNIFICANT CHANGE UP (ref 135–145)
TARGETS BLD QL SMEAR: SLIGHT — SIGNIFICANT CHANGE UP
TARGETS BLD QL SMEAR: SLIGHT — SIGNIFICANT CHANGE UP
WBC # BLD: 8.75 K/UL — SIGNIFICANT CHANGE UP (ref 3.8–10.5)
WBC # BLD: 8.75 K/UL — SIGNIFICANT CHANGE UP (ref 3.8–10.5)
WBC # FLD AUTO: 8.75 K/UL — SIGNIFICANT CHANGE UP (ref 3.8–10.5)
WBC # FLD AUTO: 8.75 K/UL — SIGNIFICANT CHANGE UP (ref 3.8–10.5)

## 2023-12-26 PROCEDURE — 99233 SBSQ HOSP IP/OBS HIGH 50: CPT

## 2023-12-26 PROCEDURE — P9016: CPT

## 2023-12-26 PROCEDURE — 85610 PROTHROMBIN TIME: CPT

## 2023-12-26 PROCEDURE — 73630 X-RAY EXAM OF FOOT: CPT

## 2023-12-26 PROCEDURE — 83880 ASSAY OF NATRIURETIC PEPTIDE: CPT

## 2023-12-26 PROCEDURE — 80053 COMPREHEN METABOLIC PANEL: CPT

## 2023-12-26 PROCEDURE — C9399: CPT

## 2023-12-26 PROCEDURE — 80202 ASSAY OF VANCOMYCIN: CPT

## 2023-12-26 PROCEDURE — 84100 ASSAY OF PHOSPHORUS: CPT

## 2023-12-26 PROCEDURE — 86140 C-REACTIVE PROTEIN: CPT

## 2023-12-26 PROCEDURE — 99239 HOSP IP/OBS DSCHRG MGMT >30: CPT | Mod: 24,GC

## 2023-12-26 PROCEDURE — 86900 BLOOD TYPING SEROLOGIC ABO: CPT

## 2023-12-26 PROCEDURE — 97161 PT EVAL LOW COMPLEX 20 MIN: CPT

## 2023-12-26 PROCEDURE — 86923 COMPATIBILITY TEST ELECTRIC: CPT

## 2023-12-26 PROCEDURE — 85027 COMPLETE CBC AUTOMATED: CPT

## 2023-12-26 PROCEDURE — 73706 CT ANGIO LWR EXTR W/O&W/DYE: CPT

## 2023-12-26 PROCEDURE — 83605 ASSAY OF LACTIC ACID: CPT

## 2023-12-26 PROCEDURE — 97165 OT EVAL LOW COMPLEX 30 MIN: CPT

## 2023-12-26 PROCEDURE — 73562 X-RAY EXAM OF KNEE 3: CPT

## 2023-12-26 PROCEDURE — 36430 TRANSFUSION BLD/BLD COMPNT: CPT

## 2023-12-26 PROCEDURE — 99285 EMERGENCY DEPT VISIT HI MDM: CPT

## 2023-12-26 PROCEDURE — 96372 THER/PROPH/DIAG INJ SC/IM: CPT | Mod: XU

## 2023-12-26 PROCEDURE — 83036 HEMOGLOBIN GLYCOSYLATED A1C: CPT

## 2023-12-26 PROCEDURE — 96374 THER/PROPH/DIAG INJ IV PUSH: CPT

## 2023-12-26 PROCEDURE — 80076 HEPATIC FUNCTION PANEL: CPT

## 2023-12-26 PROCEDURE — 87449 NOS EACH ORGANISM AG IA: CPT

## 2023-12-26 PROCEDURE — 87040 BLOOD CULTURE FOR BACTERIA: CPT

## 2023-12-26 PROCEDURE — 82803 BLOOD GASES ANY COMBINATION: CPT

## 2023-12-26 PROCEDURE — 87070 CULTURE OTHR SPECIMN AEROBIC: CPT

## 2023-12-26 PROCEDURE — 86901 BLOOD TYPING SEROLOGIC RH(D): CPT

## 2023-12-26 PROCEDURE — 73590 X-RAY EXAM OF LOWER LEG: CPT

## 2023-12-26 PROCEDURE — 84484 ASSAY OF TROPONIN QUANT: CPT

## 2023-12-26 PROCEDURE — 93925 LOWER EXTREMITY STUDY: CPT

## 2023-12-26 PROCEDURE — 96375 TX/PRO/DX INJ NEW DRUG ADDON: CPT

## 2023-12-26 PROCEDURE — 83735 ASSAY OF MAGNESIUM: CPT

## 2023-12-26 PROCEDURE — 88311 DECALCIFY TISSUE: CPT

## 2023-12-26 PROCEDURE — 97530 THERAPEUTIC ACTIVITIES: CPT

## 2023-12-26 PROCEDURE — 81001 URINALYSIS AUTO W/SCOPE: CPT

## 2023-12-26 PROCEDURE — 87324 CLOSTRIDIUM AG IA: CPT

## 2023-12-26 PROCEDURE — 82962 GLUCOSE BLOOD TEST: CPT

## 2023-12-26 PROCEDURE — 86850 RBC ANTIBODY SCREEN: CPT

## 2023-12-26 PROCEDURE — 85730 THROMBOPLASTIN TIME PARTIAL: CPT

## 2023-12-26 PROCEDURE — 80048 BASIC METABOLIC PNL TOTAL CA: CPT

## 2023-12-26 PROCEDURE — 93005 ELECTROCARDIOGRAM TRACING: CPT

## 2023-12-26 PROCEDURE — 88307 TISSUE EXAM BY PATHOLOGIST: CPT

## 2023-12-26 PROCEDURE — 73610 X-RAY EXAM OF ANKLE: CPT

## 2023-12-26 PROCEDURE — 85652 RBC SED RATE AUTOMATED: CPT

## 2023-12-26 PROCEDURE — 84681 ASSAY OF C-PEPTIDE: CPT

## 2023-12-26 PROCEDURE — 71045 X-RAY EXAM CHEST 1 VIEW: CPT

## 2023-12-26 PROCEDURE — 36415 COLL VENOUS BLD VENIPUNCTURE: CPT

## 2023-12-26 PROCEDURE — 85025 COMPLETE CBC W/AUTO DIFF WBC: CPT

## 2023-12-26 PROCEDURE — 99223 1ST HOSP IP/OBS HIGH 75: CPT

## 2023-12-26 RX ORDER — HYDROMORPHONE HYDROCHLORIDE 2 MG/ML
0.5 INJECTION INTRAMUSCULAR; INTRAVENOUS; SUBCUTANEOUS ONCE
Refills: 0 | Status: DISCONTINUED | OUTPATIENT
Start: 2023-12-26 | End: 2023-12-26

## 2023-12-26 RX ORDER — NALOXONE HYDROCHLORIDE 4 MG/.1ML
4 SPRAY NASAL
Qty: 2 | Refills: 0
Start: 2023-12-26

## 2023-12-26 RX ORDER — DEXTROSE 50 % IN WATER 50 %
25 SYRINGE (ML) INTRAVENOUS ONCE
Refills: 0 | Status: DISCONTINUED | OUTPATIENT
Start: 2023-12-26 | End: 2024-01-05

## 2023-12-26 RX ORDER — GLUCAGON INJECTION, SOLUTION 0.5 MG/.1ML
1 INJECTION, SOLUTION SUBCUTANEOUS ONCE
Refills: 0 | Status: DISCONTINUED | OUTPATIENT
Start: 2023-12-26 | End: 2024-01-05

## 2023-12-26 RX ORDER — OXYCODONE HYDROCHLORIDE 5 MG/1
1 TABLET ORAL
Qty: 30 | Refills: 0
Start: 2023-12-26

## 2023-12-26 RX ORDER — ACETAMINOPHEN 500 MG
650 TABLET ORAL ONCE
Refills: 0 | Status: COMPLETED | OUTPATIENT
Start: 2023-12-26 | End: 2023-12-26

## 2023-12-26 RX ORDER — INSULIN GLARGINE 100 [IU]/ML
12 INJECTION, SOLUTION SUBCUTANEOUS AT BEDTIME
Refills: 0 | Status: DISCONTINUED | OUTPATIENT
Start: 2023-12-26 | End: 2024-01-05

## 2023-12-26 RX ORDER — ASPIRIN/CALCIUM CARB/MAGNESIUM 324 MG
81 TABLET ORAL EVERY 24 HOURS
Refills: 0 | Status: DISCONTINUED | OUTPATIENT
Start: 2023-12-27 | End: 2024-01-05

## 2023-12-26 RX ORDER — CARVEDILOL PHOSPHATE 80 MG/1
25 CAPSULE, EXTENDED RELEASE ORAL EVERY 12 HOURS
Refills: 0 | Status: DISCONTINUED | OUTPATIENT
Start: 2023-12-27 | End: 2024-01-05

## 2023-12-26 RX ORDER — INSULIN LISPRO 100/ML
VIAL (ML) SUBCUTANEOUS
Refills: 0 | Status: DISCONTINUED | OUTPATIENT
Start: 2023-12-26 | End: 2023-12-27

## 2023-12-26 RX ORDER — NIFEDIPINE 30 MG
1 TABLET, EXTENDED RELEASE 24 HR ORAL
Qty: 0 | Refills: 0 | DISCHARGE
Start: 2023-12-26

## 2023-12-26 RX ORDER — INSULIN GLARGINE 100 [IU]/ML
12 INJECTION, SOLUTION SUBCUTANEOUS
Qty: 10 | Refills: 0
Start: 2023-12-26

## 2023-12-26 RX ORDER — INSULIN LISPRO 100/ML
VIAL (ML) SUBCUTANEOUS AT BEDTIME
Refills: 0 | Status: DISCONTINUED | OUTPATIENT
Start: 2023-12-26 | End: 2023-12-27

## 2023-12-26 RX ORDER — NIFEDIPINE 30 MG
60 TABLET, EXTENDED RELEASE 24 HR ORAL EVERY 24 HOURS
Refills: 0 | Status: DISCONTINUED | OUTPATIENT
Start: 2023-12-27 | End: 2023-12-28

## 2023-12-26 RX ORDER — HYDRALAZINE HCL 50 MG
100 TABLET ORAL EVERY 8 HOURS
Refills: 0 | Status: DISCONTINUED | OUTPATIENT
Start: 2023-12-27 | End: 2024-01-05

## 2023-12-26 RX ORDER — LISINOPRIL 2.5 MG/1
40 TABLET ORAL EVERY 24 HOURS
Refills: 0 | Status: DISCONTINUED | OUTPATIENT
Start: 2023-12-27 | End: 2024-01-05

## 2023-12-26 RX ORDER — SPIRONOLACTONE 25 MG/1
25 TABLET, FILM COATED ORAL EVERY 24 HOURS
Refills: 0 | Status: DISCONTINUED | OUTPATIENT
Start: 2023-12-27 | End: 2024-01-05

## 2023-12-26 RX ORDER — SODIUM CHLORIDE 9 MG/ML
1000 INJECTION, SOLUTION INTRAVENOUS
Refills: 0 | Status: DISCONTINUED | OUTPATIENT
Start: 2023-12-26 | End: 2024-01-05

## 2023-12-26 RX ORDER — SPIRONOLACTONE 25 MG/1
1 TABLET, FILM COATED ORAL
Qty: 1 | Refills: 0
Start: 2023-12-26

## 2023-12-26 RX ORDER — OXYCODONE HYDROCHLORIDE 5 MG/1
1 TABLET ORAL
Qty: 1 | Refills: 0
Start: 2023-12-26

## 2023-12-26 RX ORDER — DEXTROSE 50 % IN WATER 50 %
12.5 SYRINGE (ML) INTRAVENOUS ONCE
Refills: 0 | Status: DISCONTINUED | OUTPATIENT
Start: 2023-12-26 | End: 2024-01-05

## 2023-12-26 RX ORDER — NIFEDIPINE 30 MG
60 TABLET, EXTENDED RELEASE 24 HR ORAL ONCE
Refills: 0 | Status: COMPLETED | OUTPATIENT
Start: 2023-12-26 | End: 2023-12-26

## 2023-12-26 RX ORDER — HEPARIN SODIUM 5000 [USP'U]/ML
5000 INJECTION INTRAVENOUS; SUBCUTANEOUS EVERY 8 HOURS
Refills: 0 | Status: DISCONTINUED | OUTPATIENT
Start: 2023-12-27 | End: 2024-01-05

## 2023-12-26 RX ORDER — ACETAMINOPHEN 500 MG
650 TABLET ORAL EVERY 6 HOURS
Refills: 0 | Status: DISCONTINUED | OUTPATIENT
Start: 2023-12-27 | End: 2024-01-05

## 2023-12-26 RX ORDER — INSULIN LISPRO 100/ML
8 VIAL (ML) SUBCUTANEOUS
Refills: 0 | Status: DISCONTINUED | OUTPATIENT
Start: 2023-12-26 | End: 2024-01-05

## 2023-12-26 RX ORDER — DEXTROSE 50 % IN WATER 50 %
15 SYRINGE (ML) INTRAVENOUS ONCE
Refills: 0 | Status: DISCONTINUED | OUTPATIENT
Start: 2023-12-26 | End: 2024-01-05

## 2023-12-26 RX ORDER — ATORVASTATIN CALCIUM 80 MG/1
80 TABLET, FILM COATED ORAL AT BEDTIME
Refills: 0 | Status: DISCONTINUED | OUTPATIENT
Start: 2023-12-26 | End: 2024-01-05

## 2023-12-26 RX ORDER — OXYCODONE HYDROCHLORIDE 5 MG/1
5 TABLET ORAL EVERY 6 HOURS
Refills: 0 | Status: DISCONTINUED | OUTPATIENT
Start: 2023-12-27 | End: 2024-01-03

## 2023-12-26 RX ORDER — OXYCODONE HYDROCHLORIDE 5 MG/1
5 TABLET ORAL ONCE
Refills: 0 | Status: DISCONTINUED | OUTPATIENT
Start: 2023-12-26 | End: 2023-12-26

## 2023-12-26 RX ORDER — OXYCODONE HYDROCHLORIDE 5 MG/1
10 TABLET ORAL EVERY 6 HOURS
Refills: 0 | Status: DISCONTINUED | OUTPATIENT
Start: 2023-12-26 | End: 2024-01-02

## 2023-12-26 RX ADMIN — Medication 650 MILLIGRAM(S): at 22:46

## 2023-12-26 RX ADMIN — HYDROMORPHONE HYDROCHLORIDE 0.5 MILLIGRAM(S): 2 INJECTION INTRAMUSCULAR; INTRAVENOUS; SUBCUTANEOUS at 22:46

## 2023-12-26 RX ADMIN — LISINOPRIL 40 MILLIGRAM(S): 2.5 TABLET ORAL at 05:54

## 2023-12-26 RX ADMIN — Medication 1: at 11:26

## 2023-12-26 RX ADMIN — OXYCODONE HYDROCHLORIDE 10 MILLIGRAM(S): 5 TABLET ORAL at 07:07

## 2023-12-26 RX ADMIN — Medication 650 MILLIGRAM(S): at 01:34

## 2023-12-26 RX ADMIN — OXYCODONE HYDROCHLORIDE 5 MILLIGRAM(S): 5 TABLET ORAL at 00:21

## 2023-12-26 RX ADMIN — HEPARIN SODIUM 5000 UNIT(S): 5000 INJECTION INTRAVENOUS; SUBCUTANEOUS at 11:08

## 2023-12-26 RX ADMIN — OXYCODONE HYDROCHLORIDE 5 MILLIGRAM(S): 5 TABLET ORAL at 01:34

## 2023-12-26 RX ADMIN — Medication 650 MILLIGRAM(S): at 11:09

## 2023-12-26 RX ADMIN — GABAPENTIN 800 MILLIGRAM(S): 400 CAPSULE ORAL at 05:42

## 2023-12-26 RX ADMIN — OXYCODONE HYDROCHLORIDE 5 MILLIGRAM(S): 5 TABLET ORAL at 22:00

## 2023-12-26 RX ADMIN — OXYCODONE HYDROCHLORIDE 10 MILLIGRAM(S): 5 TABLET ORAL at 05:40

## 2023-12-26 RX ADMIN — Medication 1: at 02:27

## 2023-12-26 RX ADMIN — Medication 100 MILLIGRAM(S): at 00:36

## 2023-12-26 RX ADMIN — Medication 6 UNIT(S): at 11:26

## 2023-12-26 RX ADMIN — Medication 650 MILLIGRAM(S): at 07:07

## 2023-12-26 RX ADMIN — Medication 650 MILLIGRAM(S): at 05:40

## 2023-12-26 RX ADMIN — HYDROMORPHONE HYDROCHLORIDE 0.5 MILLIGRAM(S): 2 INJECTION INTRAMUSCULAR; INTRAVENOUS; SUBCUTANEOUS at 22:42

## 2023-12-26 RX ADMIN — Medication 650 MILLIGRAM(S): at 00:21

## 2023-12-26 RX ADMIN — Medication 6 UNIT(S): at 08:52

## 2023-12-26 RX ADMIN — Medication 2: at 08:51

## 2023-12-26 RX ADMIN — OXYCODONE HYDROCHLORIDE 5 MILLIGRAM(S): 5 TABLET ORAL at 21:30

## 2023-12-26 RX ADMIN — Medication 60 MILLIGRAM(S): at 05:55

## 2023-12-26 RX ADMIN — Medication 650 MILLIGRAM(S): at 22:20

## 2023-12-26 RX ADMIN — OXYCODONE HYDROCHLORIDE 5 MILLIGRAM(S): 5 TABLET ORAL at 12:30

## 2023-12-26 RX ADMIN — CARVEDILOL PHOSPHATE 25 MILLIGRAM(S): 80 CAPSULE, EXTENDED RELEASE ORAL at 11:08

## 2023-12-26 RX ADMIN — SPIRONOLACTONE 25 MILLIGRAM(S): 25 TABLET, FILM COATED ORAL at 05:54

## 2023-12-26 RX ADMIN — Medication 60 MILLIGRAM(S): at 22:46

## 2023-12-26 RX ADMIN — Medication 81 MILLIGRAM(S): at 11:08

## 2023-12-26 RX ADMIN — Medication 650 MILLIGRAM(S): at 12:30

## 2023-12-26 RX ADMIN — OXYCODONE HYDROCHLORIDE 5 MILLIGRAM(S): 5 TABLET ORAL at 11:07

## 2023-12-26 RX ADMIN — INSULIN GLARGINE 14 UNIT(S): 100 INJECTION, SOLUTION SUBCUTANEOUS at 02:22

## 2023-12-26 NOTE — PROVIDER CONTACT NOTE (OTHER) - BACKGROUND
S/p R BKA with closure on 12/18.
patient is s/p R BKA and closure.
Admitted for b/l knee pain s/p right bka
Pt admitted for b/l knee pain s/p right bka

## 2023-12-26 NOTE — H&P ADULT - NSHPPHYSICALEXAM_GEN_ALL_CORE
VITAL SIGNS:  T(C): 38.7 (12-26-23 @ 22:08), Max: 38.7 (12-26-23 @ 22:08)  T(F): 101.7 (12-26-23 @ 22:08), Max: 101.7 (12-26-23 @ 22:08)  HR: 78 (12-26-23 @ 22:08) (76 - 94)  BP: 182/87 (12-26-23 @ 22:08) (108/71 - 182/87)  BP(mean): 85 (12-26-23 @ 10:34) (85 - 120)  RR: 18 (12-26-23 @ 22:08) (18 - 18)  SpO2: 95% (12-26-23 @ 22:08) (94% - 99%)  Wt(kg): --    PHYSICAL EXAM:  Constitutional: NAD, sitting up in bed;  Head: NC/AT  Eyes: PERRL, EOMI  ENT: no nasal discharge;  Neck: supple;   Respiratory: CTA B/L; no W/R/R, no increased work of breathing  Cardiac: +S1/S2; RRR; no M/R/G;   Gastrointestinal: abdomen soft, non-tender, mildly distended; no rebound or guarding  Back: no CVAT B/L  Extremities: +1 pitting edema to Left lower extremity, Right BKA wrapped w/ clean kerlix dressing;  Neurologic: AAOx3;

## 2023-12-26 NOTE — ED ADULT NURSE NOTE - CHIEF COMPLAINT QUOTE
Pt aaox3 s/p rt tejas CARDENAS'd today from [facility] and sent to [facility]. Pt tested covid +, denies any symptoms. Pt sent back to ED because "rehab does not accept covid + pt's". Pt aaox3 s/p rt tejas CARDENAS'd today from [facility] and sent to [facility]. Pt tested covid +, denies any symptoms. Pt sent back to ED because "rehab does not accept covid + pt's".

## 2023-12-26 NOTE — H&P ADULT - NSHPLABSRESULTS_GEN_ALL_CORE
LABS:                         8.8    8.75  )-----------( 304      ( 26 Dec 2023 20:50 )             28.2     12-26    139  |  103  |  36<H>  ----------------------------<  114<H>  4.2   |  30  |  1.30    Ca    9.1      26 Dec 2023 20:50  Mg     2.0     12-26        Urinalysis Basic - ( 26 Dec 2023 20:50 )    Color: x / Appearance: x / SG: x / pH: x  Gluc: 114 mg/dL / Ketone: x  / Bili: x / Urobili: x   Blood: x / Protein: x / Nitrite: x   Leuk Esterase: x / RBC: x / WBC x   Sq Epi: x / Non Sq Epi: x / Bacteria: x            RADIOLOGY, EKG & ADDITIONAL TESTS: Reviewed.

## 2023-12-26 NOTE — H&P ADULT - PROBLEM SELECTOR PLAN 8
Hx of Type 2 Diabetes c/b PAD, diabetic foot infection. D/c on Lantus 12units qhs and Lispro 8units TID w/ meals.  - c/w   - carb consistent diet Hx of Type 2 Diabetes (A1c 13.5% 12/05) c/b PAD, diabetic foot infection. D/c on Lantus 12units qhs and Lispro 8units TID w/ meals. Endocrine consulted last admission w/ rec for Lantus 12units qhs and Lispro 8units TID on discharge.  - c/w Lantus 12units qhs, Lispro 8units TID, and moderate sliding scale w/ meals and bedtime  - carb consistent diet  - Patient's fingerstick glucose goal 100-180 mg/dL. d/c meds: ASA 81mg QD  and atorvastatin 80mg qhs  - c/w aspirin 81mg qd and atorvastatin 80mg qhs

## 2023-12-26 NOTE — H&P ADULT - NSHPSOCIALHISTORY_GEN_ALL_CORE
Prior cocaine and marijuana use. (however denies use in past month as has been hospitalized from 12/01-12/26)

## 2023-12-26 NOTE — PROGRESS NOTE ADULT - SUBJECTIVE AND OBJECTIVE BOX
VASCULAR CARDIOLOGY ATTENDING PROGRESS NOTE    SERVICE LINE: [phone]    Subjective:    No acute events overnight.   ROS negative.     Current Medications:   acetaminophen     Tablet .. 650 milliGRAM(s) Oral every 6 hours  aspirin  chewable 81 milliGRAM(s) Oral daily  atorvastatin 80 milliGRAM(s) Oral at bedtime  carvedilol 25 milliGRAM(s) Oral every 12 hours  gabapentin 800 milliGRAM(s) Oral three times a day  heparin   Injectable 5000 Unit(s) SubCutaneous every 8 hours  hydrALAZINE 75 milliGRAM(s) Oral three times a day  HYDROmorphone  Injectable 1 milliGRAM(s) IV Push every 6 hours PRN  insulin glargine Injectable (LANTUS) 10 Unit(s) SubCutaneous at bedtime  insulin lispro (ADMELOG) corrective regimen sliding scale   SubCutaneous Before meals and at bedtime  insulin lispro Injectable (ADMELOG) 8 Unit(s) SubCutaneous before lunch  insulin lispro Injectable (ADMELOG) 6 Unit(s) SubCutaneous before breakfast  insulin lispro Injectable (ADMELOG) 8 Unit(s) SubCutaneous before dinner  lisinopril 40 milliGRAM(s) Oral daily  oxyCODONE    IR 10 milliGRAM(s) Oral every 8 hours PRN  oxyCODONE    IR 5 milliGRAM(s) Oral every 8 hours PRN  vancomycin    Solution 125 milliGRAM(s) Oral every 6 hours      REVIEW OF SYSTEMS:  CONSTITUTIONAL: No weakness, fevers or chills  EYES/ENT: No visual changes;  No dysphagia  NECK: No pain or stiffness  RESPIRATORY: No cough, wheezing, hemoptysis; No shortness of breath  CARDIOVASCULAR: No chest pain or palpitations; No lower extremity edema  GASTROINTESTINAL: No abdominal or epigastric pain. No nausea, vomiting, or hematemesis; No diarrhea or constipation. No melena or hematochezia.  BACK: No back pain  GENITOURINARY: No dysuria, frequency or hematuria  NEUROLOGICAL: No numbness or weakness  SKIN: No itching, burning, rashes, or lesions   All other review of systems is negative unless indicated above.    Physical Exam:  T(F): 99 (12-20), Max: 99 (12-19)  HR: 74 (12-20) (69 - 77)  BP: 144/64 (12-20) (107/51 - 167/64)  BP(mean): 125 (12-20) (73 - 125)  ABP: --  ABP(mean): --  RR: 18 (12-20)  SpO2: 97% (12-20)  GENERAL: No acute distress, well-developed  HEAD:  Atraumatic, Normocephalic  ENT: +JVD  CHEST/LUNG: Clear to auscultation bilaterally; No wheeze, equal breath sounds bilaterally   BACK: No spinal tenderness  HEART: Regular rate and rhythm; No murmurs, rubs, or gallops  ABDOMEN: Soft, Nontender, Nondistended; Bowel sounds present  EXTREMITIES: 2+ pitting edema b/l LE and scrotal edema.  PSYCH: Nl behavior, nl affect  NEUROLOGY: AAOx3, non-focal, cranial nerves intact  SKIN: Normal color, No rashes or lesions    Cardiovascular Diagnostic Testing: personally reviewed    CXR: Personally reviewed    Labs: Personally reviewed                        7.8    14.97 )-----------( 192      ( 20 Dec 2023 05:30 )             24.5     12-20    140  |  109<H>  |  27<H>  ----------------------------<  108<H>  3.9   |  26  |  1.31<H>    Ca    8.3<L>      20 Dec 2023 05:30  Phos  3.7     12-20  Mg     2.1     12-20                         VASCULAR CARDIOLOGY ATTENDING PROGRESS NOTE    SERVICE LINE: [phone]    Subjective:    No acute events overnight.   ROS negative.     Current Medications:   acetaminophen     Tablet .. 650 milliGRAM(s) Oral every 6 hours  aspirin  chewable 81 milliGRAM(s) Oral daily  atorvastatin 80 milliGRAM(s) Oral at bedtime  carvedilol 25 milliGRAM(s) Oral every 12 hours  gabapentin 800 milliGRAM(s) Oral three times a day  heparin   Injectable 5000 Unit(s) SubCutaneous every 8 hours  hydrALAZINE 75 milliGRAM(s) Oral three times a day  HYDROmorphone  Injectable 1 milliGRAM(s) IV Push every 6 hours PRN  insulin glargine Injectable (LANTUS) 10 Unit(s) SubCutaneous at bedtime  insulin lispro (ADMELOG) corrective regimen sliding scale   SubCutaneous Before meals and at bedtime  insulin lispro Injectable (ADMELOG) 8 Unit(s) SubCutaneous before lunch  insulin lispro Injectable (ADMELOG) 6 Unit(s) SubCutaneous before breakfast  insulin lispro Injectable (ADMELOG) 8 Unit(s) SubCutaneous before dinner  lisinopril 40 milliGRAM(s) Oral daily  oxyCODONE    IR 10 milliGRAM(s) Oral every 8 hours PRN  oxyCODONE    IR 5 milliGRAM(s) Oral every 8 hours PRN  vancomycin    Solution 125 milliGRAM(s) Oral every 6 hours      REVIEW OF SYSTEMS:  CONSTITUTIONAL: No weakness, fevers or chills  EYES/ENT: No visual changes;  No dysphagia  NECK: No pain or stiffness  RESPIRATORY: No cough, wheezing, hemoptysis; No shortness of breath  CARDIOVASCULAR: No chest pain or palpitations; No lower extremity edema  GASTROINTESTINAL: No abdominal or epigastric pain. No nausea, vomiting, or hematemesis; No diarrhea or constipation. No melena or hematochezia.  BACK: No back pain  GENITOURINARY: No dysuria, frequency or hematuria  NEUROLOGICAL: No numbness or weakness  SKIN: No itching, burning, rashes, or lesions   All other review of systems is negative unless indicated above.    Physical Exam:  T(F): 99 (12-20), Max: 99 (12-19)  HR: 74 (12-20) (69 - 77)  BP: 144/64 (12-20) (107/51 - 167/64)  BP(mean): 125 (12-20) (73 - 125)  ABP: --  ABP(mean): --  RR: 18 (12-20)  SpO2: 97% (12-20)  GENERAL: No acute distress, well-developed  HEAD:  Atraumatic, Normocephalic  ENT: +JVD  CHEST/LUNG: Clear to auscultation bilaterally; No wheeze, equal breath sounds bilaterally   BACK: No spinal tenderness  HEART: Regular rate and rhythm; No murmurs, rubs, or gallops  ABDOMEN: Soft, Nontender, Nondistended; Bowel sounds present  EXTREMITIES: 2+ pitting edema b/l LE and scrotal edema.  PSYCH: Nl behavior, nl affect  NEUROLOGY: AAOx3, non-focal, cranial nerves intact  SKIN: Normal color, No rashes or lesions    Cardiovascular Diagnostic Testing: personally reviewed    CXR: Personally reviewed    Labs: Personally reviewed                        7.8    14.97 )-----------( 192      ( 20 Dec 2023 05:30 )             24.5     12-20    140  |  109<H>  |  27<H>  ----------------------------<  108<H>  3.9   |  26  |  1.31<H>    Ca    8.3<L>      20 Dec 2023 05:30  Phos  3.7     12-20  Mg     2.1     12-20

## 2023-12-26 NOTE — PROGRESS NOTE ADULT - SUBJECTIVE AND OBJECTIVE BOX
SUBJECTIVE / INTERVAL HPI: Patient was seen and examined this morning.     Overnight events:    12/11: lantus 10 + lispro 4  12/12: lantus 7 + lispro 3  12/13: lantus 7 + lispro 3  12/14: lantus 6 + lispro 3  12/15: lantus 8 + lispro 4  12/18-20: lantus 10 + lispro 6  12/21-22: lantus 14 + lispro 6  12/23: lantus 14 + lispro 8 (missed lantus)  12/24: lantus 16 + lispro 10  12/25: lantus 14 + lispro 6      CAPILLARY BLOOD GLUCOSE & INSULIN RECEIVED  146 mg/dL (12-25 @ 02:20)  73 mg/dL (12-25 @ 07:35)  154 mg/dL (12-25 @ 12:02)  191 mg/dL (12-25 @ 13:52) 1 unit  207 mg/dL (12-25 @ 16:54)  183 mg/dL (12-25 @ 18:07) 6 + 1  199 mg/dL (12-26 @ 02:17) 14 + 1  230 mg/dL (12-26 @ 08:18) 6 + 2      REVIEW OF SYSTEMS  Constitutional:  Negative fever, chills or loss of appetite.  Eyes:  Negative blurry vision or double vision.  Cardiovascular:  Negative for chest pain or palpitations.  Respiratory:  Negative for cough, wheezing, or shortness of breath.    Gastrointestinal:  Negative for nausea, vomiting, diarrhea, constipation, or abdominal pain.  Genitourinary:  Negative frequency, urgency or dysuria.  Neurologic:  No headache, confusion, dizziness, lightheadedness.    PHYSICAL EXAM  Vital Signs Last 24 Hrs  T(C): 37.6 (25 Dec 2023 18:15), Max: 38.6 (25 Dec 2023 12:11)  T(F): 99.6 (25 Dec 2023 18:15), Max: 101.4 (25 Dec 2023 12:11)  HR: 77 (26 Dec 2023 05:49) (77 - 94)  BP: 172/84 (26 Dec 2023 05:49) (130/71 - 172/84)  BP(mean): 120 (26 Dec 2023 05:49) (92 - 120)  RR: 18 (26 Dec 2023 05:49) (17 - 18)  SpO2: 99% (26 Dec 2023 05:49) (97% - 99%)    Parameters below as of 26 Dec 2023 05:49  Patient On (Oxygen Delivery Method): room air        Constitutional: Awake, alert, in no acute distress.   HEENT: Normocephalic, atraumatic, RAGHAV.  Respiratory: Lungs clear to ausculation bilaterally.   Cardiovascular: regular rhythm, normal S1 and S2, no audible murmurs.   GI: soft, non-tender, non-distended, bowel sounds present.  Extremities: leg swelling, s/p r bka, dressing wrapped  Psychiatric: AAO x 3. Normal affect/mood.    LABS  CBC - WBC/HGB/HTC/PLT: 11.73/8.4/26.0/214 (12-22-23)  BMP - Na/K/Cl/Bicarb/BUN/Cr/Gluc/AG/eGFR: 141/4.0/107/26/31/1.39/120/8/56 (12-22-23)  Ca - 8.6 (12-22-23)  Phos - 3.7 (12-22-23)  Mg - 1.9 (12-22-23)            12-25-23 @ 07:01  -  12-26-23 @ 07:00  --------------------------------------------------------  IN: 540 mL / OUT: 3600 mL / NET: -3060 mL        MEDICATIONS  MEDICATIONS  (STANDING):  acetaminophen     Tablet .. 650 milliGRAM(s) Oral every 6 hours  aspirin  chewable 81 milliGRAM(s) Oral daily  atorvastatin 80 milliGRAM(s) Oral at bedtime  carvedilol 25 milliGRAM(s) Oral every 12 hours  dextrose 5%. 1000 milliLiter(s) (100 mL/Hr) IV Continuous <Continuous>  dextrose 5%. 1000 milliLiter(s) (50 mL/Hr) IV Continuous <Continuous>  dextrose 50% Injectable 12.5 Gram(s) IV Push once  dextrose 50% Injectable 25 Gram(s) IV Push once  dextrose 50% Injectable 25 Gram(s) IV Push once  furosemide   Injectable 80 milliGRAM(s) IV Push every 12 hours  gabapentin 800 milliGRAM(s) Oral three times a day  glucagon  Injectable 1 milliGRAM(s) IntraMuscular once  heparin   Injectable 5000 Unit(s) SubCutaneous every 8 hours  hydrALAZINE 100 milliGRAM(s) Oral three times a day  insulin glargine Injectable (LANTUS) 14 Unit(s) SubCutaneous at bedtime  insulin lispro (ADMELOG) corrective regimen sliding scale   SubCutaneous Before meals and at bedtime  insulin lispro Injectable (ADMELOG) 6 Unit(s) SubCutaneous three times a day before meals  lisinopril 40 milliGRAM(s) Oral daily  NIFEdipine XL 60 milliGRAM(s) Oral daily  spironolactone 25 milliGRAM(s) Oral daily    MEDICATIONS  (PRN):  dextrose Oral Gel 15 Gram(s) Oral once PRN Blood Glucose LESS THAN 70 milliGRAM(s)/deciliter  oxyCODONE    IR 10 milliGRAM(s) Oral every 6 hours PRN Severe Pain (7 - 10)  oxyCODONE    IR 5 milliGRAM(s) Oral every 6 hours PRN Moderate Pain (4 - 6)    ASSESSMENT / RECOMMENDATIONS    67yo M PMH CAD (2 stents 2020), HFmrEF (45-50%), HTN, PAD w/ remote RLE angioplasty, R 4th toe OM s/p partial R 4th ray amputation on 10/24, RLE angiogram with AT lithotripsy and AT/peroneal balloon angioplasty 10/27, uncontrolled IDDM (a1c 12 in Oct 2023), recent admission 11/8-11/10 to cardiology service for hypertensive emergency (left AMA, was given levaquin when he left for right foot wound that pt had started treatment for back in October) who presented to [facility] after a fall onto his knees and was having b/l knee pain. 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    A1C: 13.5 %  Insulinopenic  C-peptide 0.5 with , JAMIR/ICA neg (Oct 2023)  C-peptide 1.0 12/15 with   BUN: 27  Creatinine: 1.31  GFR: 60  Weight: 89  BMI: 30.7    Discharged early Oct 2023 on Lantus 11 and lispro 7. He reports taking Lantus 12-14 and lispro 7. He reports that since this change his FSG <150, with no hypoglycemia.   Diabetes managed outpatient by: endocrinologist, unsure of the name      # Type 2 diabetes mellitus with hyperglycemia  - Please keep lantus 14  units at bedtime.   - keep lispro  6 units before each meal.  - Continue lispro low dose sliding scale before meals and at bedtime.  - Patient's fingerstick glucose goal is 100-180 mg/dL.    - Discharge recommendations: please discharge with 12 u lantus nightly + 8 units lispro with meals  - Patient can follow up at discharge with [facility] by calling [phone] to make an appointment.      Case discussed with Dr. Huertas. Primary team updated.       Christa Alexis  Endocrinology Fellow    Service Pager: [phone]    SUBJECTIVE / INTERVAL HPI: Patient was seen and examined this morning.     Overnight events:    12/11: lantus 10 + lispro 4  12/12: lantus 7 + lispro 3  12/13: lantus 7 + lispro 3  12/14: lantus 6 + lispro 3  12/15: lantus 8 + lispro 4  12/18-20: lantus 10 + lispro 6  12/21-22: lantus 14 + lispro 6  12/23: lantus 14 + lispro 8 (missed lantus)  12/24: lantus 16 + lispro 10  12/25: lantus 14 + lispro 6      CAPILLARY BLOOD GLUCOSE & INSULIN RECEIVED  146 mg/dL (12-25 @ 02:20)  73 mg/dL (12-25 @ 07:35)  154 mg/dL (12-25 @ 12:02)  191 mg/dL (12-25 @ 13:52) 1 unit  207 mg/dL (12-25 @ 16:54)  183 mg/dL (12-25 @ 18:07) 6 + 1  199 mg/dL (12-26 @ 02:17) 14 + 1  230 mg/dL (12-26 @ 08:18) 6 + 2      REVIEW OF SYSTEMS  Constitutional:  Negative fever, chills or loss of appetite.  Eyes:  Negative blurry vision or double vision.  Cardiovascular:  Negative for chest pain or palpitations.  Respiratory:  Negative for cough, wheezing, or shortness of breath.    Gastrointestinal:  Negative for nausea, vomiting, diarrhea, constipation, or abdominal pain.  Genitourinary:  Negative frequency, urgency or dysuria.  Neurologic:  No headache, confusion, dizziness, lightheadedness.    PHYSICAL EXAM  Vital Signs Last 24 Hrs  T(C): 37.6 (25 Dec 2023 18:15), Max: 38.6 (25 Dec 2023 12:11)  T(F): 99.6 (25 Dec 2023 18:15), Max: 101.4 (25 Dec 2023 12:11)  HR: 77 (26 Dec 2023 05:49) (77 - 94)  BP: 172/84 (26 Dec 2023 05:49) (130/71 - 172/84)  BP(mean): 120 (26 Dec 2023 05:49) (92 - 120)  RR: 18 (26 Dec 2023 05:49) (17 - 18)  SpO2: 99% (26 Dec 2023 05:49) (97% - 99%)    Parameters below as of 26 Dec 2023 05:49  Patient On (Oxygen Delivery Method): room air        Constitutional: Awake, alert, in no acute distress.   HEENT: Normocephalic, atraumatic, RAGHAV.  Respiratory: Lungs clear to ausculation bilaterally.   Cardiovascular: regular rhythm, normal S1 and S2, no audible murmurs.   GI: soft, non-tender, non-distended, bowel sounds present.  Extremities: leg swelling, s/p r bka, dressing wrapped  Psychiatric: AAO x 3. Normal affect/mood.    LABS  CBC - WBC/HGB/HTC/PLT: 11.73/8.4/26.0/214 (12-22-23)  BMP - Na/K/Cl/Bicarb/BUN/Cr/Gluc/AG/eGFR: 141/4.0/107/26/31/1.39/120/8/56 (12-22-23)  Ca - 8.6 (12-22-23)  Phos - 3.7 (12-22-23)  Mg - 1.9 (12-22-23)            12-25-23 @ 07:01  -  12-26-23 @ 07:00  --------------------------------------------------------  IN: 540 mL / OUT: 3600 mL / NET: -3060 mL        MEDICATIONS  MEDICATIONS  (STANDING):  acetaminophen     Tablet .. 650 milliGRAM(s) Oral every 6 hours  aspirin  chewable 81 milliGRAM(s) Oral daily  atorvastatin 80 milliGRAM(s) Oral at bedtime  carvedilol 25 milliGRAM(s) Oral every 12 hours  dextrose 5%. 1000 milliLiter(s) (100 mL/Hr) IV Continuous <Continuous>  dextrose 5%. 1000 milliLiter(s) (50 mL/Hr) IV Continuous <Continuous>  dextrose 50% Injectable 12.5 Gram(s) IV Push once  dextrose 50% Injectable 25 Gram(s) IV Push once  dextrose 50% Injectable 25 Gram(s) IV Push once  furosemide   Injectable 80 milliGRAM(s) IV Push every 12 hours  gabapentin 800 milliGRAM(s) Oral three times a day  glucagon  Injectable 1 milliGRAM(s) IntraMuscular once  heparin   Injectable 5000 Unit(s) SubCutaneous every 8 hours  hydrALAZINE 100 milliGRAM(s) Oral three times a day  insulin glargine Injectable (LANTUS) 14 Unit(s) SubCutaneous at bedtime  insulin lispro (ADMELOG) corrective regimen sliding scale   SubCutaneous Before meals and at bedtime  insulin lispro Injectable (ADMELOG) 6 Unit(s) SubCutaneous three times a day before meals  lisinopril 40 milliGRAM(s) Oral daily  NIFEdipine XL 60 milliGRAM(s) Oral daily  spironolactone 25 milliGRAM(s) Oral daily    MEDICATIONS  (PRN):  dextrose Oral Gel 15 Gram(s) Oral once PRN Blood Glucose LESS THAN 70 milliGRAM(s)/deciliter  oxyCODONE    IR 10 milliGRAM(s) Oral every 6 hours PRN Severe Pain (7 - 10)  oxyCODONE    IR 5 milliGRAM(s) Oral every 6 hours PRN Moderate Pain (4 - 6)    ASSESSMENT / RECOMMENDATIONS    69yo M PMH CAD (2 stents 2020), HFmrEF (45-50%), HTN, PAD w/ remote RLE angioplasty, R 4th toe OM s/p partial R 4th ray amputation on 10/24, RLE angiogram with AT lithotripsy and AT/peroneal balloon angioplasty 10/27, uncontrolled IDDM (a1c 12 in Oct 2023), recent admission 11/8-11/10 to cardiology service for hypertensive emergency (left AMA, was given levaquin when he left for right foot wound that pt had started treatment for back in October) who presented to [facility] after a fall onto his knees and was having b/l knee pain. 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    A1C: 13.5 %  Insulinopenic  C-peptide 0.5 with , JAMIR/ICA neg (Oct 2023)  C-peptide 1.0 12/15 with   BUN: 27  Creatinine: 1.31  GFR: 60  Weight: 89  BMI: 30.7    Discharged early Oct 2023 on Lantus 11 and lispro 7. He reports taking Lantus 12-14 and lispro 7. He reports that since this change his FSG <150, with no hypoglycemia.   Diabetes managed outpatient by: endocrinologist, unsure of the name      # Type 2 diabetes mellitus with hyperglycemia  - Please keep lantus 14  units at bedtime.   - keep lispro  6 units before each meal.  - Continue lispro low dose sliding scale before meals and at bedtime.  - Patient's fingerstick glucose goal is 100-180 mg/dL.    - Discharge recommendations: please discharge with 12 u lantus nightly + 8 units lispro with meals  - Patient can follow up at discharge with [facility] by calling [phone] to make an appointment.      Case discussed with Dr. Huertas. Primary team updated.       Christa Alexis  Endocrinology Fellow    Service Pager: [phone]    SUBJECTIVE / INTERVAL HPI: Patient was seen and examined this morning.     Overnight events:  pt did not want to participate in the discussion today  he said he had nuggets and cheese cake last night  he had scrambled eggs and home fries this morning    12/11: lantus 10 + lispro 4  12/12: lantus 7 + lispro 3  12/13: lantus 7 + lispro 3  12/14: lantus 6 + lispro 3  12/15: lantus 8 + lispro 4  12/18-20: lantus 10 + lispro 6  12/21-22: lantus 14 + lispro 6  12/23: lantus 14 + lispro 8 (missed lantus)  12/24: lantus 16 + lispro 10  12/25: lantus 14 + lispro 6      CAPILLARY BLOOD GLUCOSE & INSULIN RECEIVED  146 mg/dL (12-25 @ 02:20)  73 mg/dL (12-25 @ 07:35)  154 mg/dL (12-25 @ 12:02)  191 mg/dL (12-25 @ 13:52) 1 unit  207 mg/dL (12-25 @ 16:54)  183 mg/dL (12-25 @ 18:07) 6 + 1  199 mg/dL (12-26 @ 02:17) 14 + 1  230 mg/dL (12-26 @ 08:18) 6 + 2      REVIEW OF SYSTEMS  limited    PHYSICAL EXAM  Vital Signs Last 24 Hrs  T(C): 37.6 (25 Dec 2023 18:15), Max: 38.6 (25 Dec 2023 12:11)  T(F): 99.6 (25 Dec 2023 18:15), Max: 101.4 (25 Dec 2023 12:11)  HR: 77 (26 Dec 2023 05:49) (77 - 94)  BP: 172/84 (26 Dec 2023 05:49) (130/71 - 172/84)  BP(mean): 120 (26 Dec 2023 05:49) (92 - 120)  RR: 18 (26 Dec 2023 05:49) (17 - 18)  SpO2: 99% (26 Dec 2023 05:49) (97% - 99%)    Parameters below as of 26 Dec 2023 05:49  Patient On (Oxygen Delivery Method): room air        refused physical exam    LABS  CBC - WBC/HGB/HTC/PLT: 11.73/8.4/26.0/214 (12-22-23)  BMP - Na/K/Cl/Bicarb/BUN/Cr/Gluc/AG/eGFR: 141/4.0/107/26/31/1.39/120/8/56 (12-22-23)  Ca - 8.6 (12-22-23)  Phos - 3.7 (12-22-23)  Mg - 1.9 (12-22-23)            12-25-23 @ 07:01  -  12-26-23 @ 07:00  --------------------------------------------------------  IN: 540 mL / OUT: 3600 mL / NET: -3060 mL        MEDICATIONS  MEDICATIONS  (STANDING):  acetaminophen     Tablet .. 650 milliGRAM(s) Oral every 6 hours  aspirin  chewable 81 milliGRAM(s) Oral daily  atorvastatin 80 milliGRAM(s) Oral at bedtime  carvedilol 25 milliGRAM(s) Oral every 12 hours  dextrose 5%. 1000 milliLiter(s) (100 mL/Hr) IV Continuous <Continuous>  dextrose 5%. 1000 milliLiter(s) (50 mL/Hr) IV Continuous <Continuous>  dextrose 50% Injectable 12.5 Gram(s) IV Push once  dextrose 50% Injectable 25 Gram(s) IV Push once  dextrose 50% Injectable 25 Gram(s) IV Push once  furosemide   Injectable 80 milliGRAM(s) IV Push every 12 hours  gabapentin 800 milliGRAM(s) Oral three times a day  glucagon  Injectable 1 milliGRAM(s) IntraMuscular once  heparin   Injectable 5000 Unit(s) SubCutaneous every 8 hours  hydrALAZINE 100 milliGRAM(s) Oral three times a day  insulin glargine Injectable (LANTUS) 14 Unit(s) SubCutaneous at bedtime  insulin lispro (ADMELOG) corrective regimen sliding scale   SubCutaneous Before meals and at bedtime  insulin lispro Injectable (ADMELOG) 6 Unit(s) SubCutaneous three times a day before meals  lisinopril 40 milliGRAM(s) Oral daily  NIFEdipine XL 60 milliGRAM(s) Oral daily  spironolactone 25 milliGRAM(s) Oral daily    MEDICATIONS  (PRN):  dextrose Oral Gel 15 Gram(s) Oral once PRN Blood Glucose LESS THAN 70 milliGRAM(s)/deciliter  oxyCODONE    IR 10 milliGRAM(s) Oral every 6 hours PRN Severe Pain (7 - 10)  oxyCODONE    IR 5 milliGRAM(s) Oral every 6 hours PRN Moderate Pain (4 - 6)    ASSESSMENT / RECOMMENDATIONS    69yo M PMH CAD (2 stents 2020), HFmrEF (45-50%), HTN, PAD w/ remote RLE angioplasty, R 4th toe OM s/p partial R 4th ray amputation on 10/24, RLE angiogram with AT lithotripsy and AT/peroneal balloon angioplasty 10/27, uncontrolled IDDM (a1c 12 in Oct 2023), recent admission 11/8-11/10 to cardiology service for hypertensive emergency (left AMA, was given levaquin when he left for right foot wound that pt had started treatment for back in October) who presented to [facility] after a fall onto his knees and was having b/l knee pain. 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    A1C: 13.5 %  Insulinopenic  C-peptide 0.5 with , JAMIR/ICA neg (Oct 2023)  C-peptide 1.0 12/15 with   BUN: 27  Creatinine: 1.31  GFR: 60  Weight: 89  BMI: 30.7    Discharged early Oct 2023 on Lantus 11 and lispro 7. He reports taking Lantus 12-14 and lispro 7. He reports that since this change his FSG <150, with no hypoglycemia.   Diabetes managed outpatient by: endocrinologist, unsure of the name      # Type 2 diabetes mellitus with hyperglycemia  - Please keep lantus 14  units at bedtime.   - keep lispro  6 units before each meal.  - Continue lispro low dose sliding scale before meals and at bedtime.  - Patient's fingerstick glucose goal is 100-180 mg/dL.    - Discharge recommendations: please discharge with 12 u lantus nightly + 8 units lispro with meals  - Patient can follow up at discharge with [facility] by calling [phone] to make an appointment.      Case discussed with Dr. Huertas. Primary team updated.       Christa Alexis  Endocrinology Fellow    Service Pager: [phone]    SUBJECTIVE / INTERVAL HPI: Patient was seen and examined this morning.     Overnight events:  pt did not want to participate in the discussion today  he said he had nuggets and cheese cake last night  he had scrambled eggs and home fries this morning    12/11: lantus 10 + lispro 4  12/12: lantus 7 + lispro 3  12/13: lantus 7 + lispro 3  12/14: lantus 6 + lispro 3  12/15: lantus 8 + lispro 4  12/18-20: lantus 10 + lispro 6  12/21-22: lantus 14 + lispro 6  12/23: lantus 14 + lispro 8 (missed lantus)  12/24: lantus 16 + lispro 10  12/25: lantus 14 + lispro 6      CAPILLARY BLOOD GLUCOSE & INSULIN RECEIVED  146 mg/dL (12-25 @ 02:20)  73 mg/dL (12-25 @ 07:35)  154 mg/dL (12-25 @ 12:02)  191 mg/dL (12-25 @ 13:52) 1 unit  207 mg/dL (12-25 @ 16:54)  183 mg/dL (12-25 @ 18:07) 6 + 1  199 mg/dL (12-26 @ 02:17) 14 + 1  230 mg/dL (12-26 @ 08:18) 6 + 2      REVIEW OF SYSTEMS  limited    PHYSICAL EXAM  Vital Signs Last 24 Hrs  T(C): 37.6 (25 Dec 2023 18:15), Max: 38.6 (25 Dec 2023 12:11)  T(F): 99.6 (25 Dec 2023 18:15), Max: 101.4 (25 Dec 2023 12:11)  HR: 77 (26 Dec 2023 05:49) (77 - 94)  BP: 172/84 (26 Dec 2023 05:49) (130/71 - 172/84)  BP(mean): 120 (26 Dec 2023 05:49) (92 - 120)  RR: 18 (26 Dec 2023 05:49) (17 - 18)  SpO2: 99% (26 Dec 2023 05:49) (97% - 99%)    Parameters below as of 26 Dec 2023 05:49  Patient On (Oxygen Delivery Method): room air        refused physical exam    LABS  CBC - WBC/HGB/HTC/PLT: 11.73/8.4/26.0/214 (12-22-23)  BMP - Na/K/Cl/Bicarb/BUN/Cr/Gluc/AG/eGFR: 141/4.0/107/26/31/1.39/120/8/56 (12-22-23)  Ca - 8.6 (12-22-23)  Phos - 3.7 (12-22-23)  Mg - 1.9 (12-22-23)            12-25-23 @ 07:01  -  12-26-23 @ 07:00  --------------------------------------------------------  IN: 540 mL / OUT: 3600 mL / NET: -3060 mL        MEDICATIONS  MEDICATIONS  (STANDING):  acetaminophen     Tablet .. 650 milliGRAM(s) Oral every 6 hours  aspirin  chewable 81 milliGRAM(s) Oral daily  atorvastatin 80 milliGRAM(s) Oral at bedtime  carvedilol 25 milliGRAM(s) Oral every 12 hours  dextrose 5%. 1000 milliLiter(s) (100 mL/Hr) IV Continuous <Continuous>  dextrose 5%. 1000 milliLiter(s) (50 mL/Hr) IV Continuous <Continuous>  dextrose 50% Injectable 12.5 Gram(s) IV Push once  dextrose 50% Injectable 25 Gram(s) IV Push once  dextrose 50% Injectable 25 Gram(s) IV Push once  furosemide   Injectable 80 milliGRAM(s) IV Push every 12 hours  gabapentin 800 milliGRAM(s) Oral three times a day  glucagon  Injectable 1 milliGRAM(s) IntraMuscular once  heparin   Injectable 5000 Unit(s) SubCutaneous every 8 hours  hydrALAZINE 100 milliGRAM(s) Oral three times a day  insulin glargine Injectable (LANTUS) 14 Unit(s) SubCutaneous at bedtime  insulin lispro (ADMELOG) corrective regimen sliding scale   SubCutaneous Before meals and at bedtime  insulin lispro Injectable (ADMELOG) 6 Unit(s) SubCutaneous three times a day before meals  lisinopril 40 milliGRAM(s) Oral daily  NIFEdipine XL 60 milliGRAM(s) Oral daily  spironolactone 25 milliGRAM(s) Oral daily    MEDICATIONS  (PRN):  dextrose Oral Gel 15 Gram(s) Oral once PRN Blood Glucose LESS THAN 70 milliGRAM(s)/deciliter  oxyCODONE    IR 10 milliGRAM(s) Oral every 6 hours PRN Severe Pain (7 - 10)  oxyCODONE    IR 5 milliGRAM(s) Oral every 6 hours PRN Moderate Pain (4 - 6)    ASSESSMENT / RECOMMENDATIONS    67yo M PMH CAD (2 stents 2020), HFmrEF (45-50%), HTN, PAD w/ remote RLE angioplasty, R 4th toe OM s/p partial R 4th ray amputation on 10/24, RLE angiogram with AT lithotripsy and AT/peroneal balloon angioplasty 10/27, uncontrolled IDDM (a1c 12 in Oct 2023), recent admission 11/8-11/10 to cardiology service for hypertensive emergency (left AMA, was given levaquin when he left for right foot wound that pt had started treatment for back in October) who presented to [facility] after a fall onto his knees and was having b/l knee pain. 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    A1C: 13.5 %  Insulinopenic  C-peptide 0.5 with , JAMIR/ICA neg (Oct 2023)  C-peptide 1.0 12/15 with   BUN: 27  Creatinine: 1.31  GFR: 60  Weight: 89  BMI: 30.7    Discharged early Oct 2023 on Lantus 11 and lispro 7. He reports taking Lantus 12-14 and lispro 7. He reports that since this change his FSG <150, with no hypoglycemia.   Diabetes managed outpatient by: endocrinologist, unsure of the name      # Type 2 diabetes mellitus with hyperglycemia  - Please keep lantus 14  units at bedtime.   - keep lispro  6 units before each meal.  - Continue lispro low dose sliding scale before meals and at bedtime.  - Patient's fingerstick glucose goal is 100-180 mg/dL.    - Discharge recommendations: please discharge with 12 u lantus nightly + 8 units lispro with meals  - [facility] by calling [phone] to make an appointment.      Case discussed with Dr. Huertas. Primary team updated.       Christa Alexis  Endocrinology Fellow    Service Pager: [phone]

## 2023-12-26 NOTE — PROGRESS NOTE ADULT - TIME BILLING
Review of hospital course, labs, vitals, medical records.   Bedside exam and interview   Discussed plan of care with primary team ACP and housestaff   Documenting the encounter Review of hospital course, labs, vitals, medical records.   Bedside exam and interview   Discussed plan of care with primary team housestaff   Documenting the encounter

## 2023-12-26 NOTE — H&P ADULT - PROBLEM/PLAN-3
Please remember to take the Carvedilol twice a day  Need to schedule visit with Dr Georgia Abad (rheumatology), Dr. Yandy Figueroa (cardiology), mammogram, CT of the chest and the eye doctor  Please start Atorvastatin! DISPLAY PLAN FREE TEXT

## 2023-12-26 NOTE — ED PROVIDER NOTE - CLINICAL SUMMARY MEDICAL DECISION MAKING FREE TEXT BOX
Pt covid+ but denying symptoms, low grade fever here, not septic.  Discussed w/ SW/CM here who says pt will need to be re-admitted and placed in isolation for 5 days before will be accepted to any rehab.

## 2023-12-26 NOTE — ED ADULT NURSE NOTE - NSFALLHARMRISKINTERV_ED_ALL_ED
Assistance OOB with selected safe patient handling equipment if applicable/Assistance with ambulation/Communicate risk of Fall with Harm to all staff, patient, and family/Monitor gait and stability/Provide patient with walking aids/Provide visual cue: red socks, yellow wristband, yellow gown, etc/Reinforce activity limits and safety measures with patient and family/Bed in lowest position, wheels locked, appropriate side rails in place/Call bell, personal items and telephone in reach/Instruct patient to call for assistance before getting out of bed/chair/stretcher/Non-slip footwear applied when patient is off stretcher/[place] to call system/Physically safe environment - no spills, clutter or unnecessary equipment/Purposeful Proactive Rounding/Room/bathroom lighting operational, light cord in reach Assistance OOB with selected safe patient handling equipment if applicable/Assistance with ambulation/Communicate risk of Fall with Harm to all staff, patient, and family/Monitor gait and stability/Provide patient with walking aids/Provide visual cue: red socks, yellow wristband, yellow gown, etc/Reinforce activity limits and safety measures with patient and family/Bed in lowest position, wheels locked, appropriate side rails in place/Call bell, personal items and telephone in reach/Instruct patient to call for assistance before getting out of bed/chair/stretcher/Non-slip footwear applied when patient is off stretcher/[place] to call system/Physically safe environment - no spills, clutter or unnecessary equipment/Purposeful Proactive Rounding/Room/bathroom lighting operational, light cord in reach

## 2023-12-26 NOTE — H&P ADULT - PROBLEM SELECTOR PLAN 9
Hx of CDIff infection last admission. Completed txt w/ oral Vanc on 12/25. Now reporting formed bowel movements.  - c/w contact precautions. Hx of Type 2 Diabetes (A1c 13.5% 12/05) c/b PAD, diabetic foot infection. D/c on Lantus 12units qhs and Lispro 8units TID w/ meals. Endocrine consulted last admission w/ rec for Lantus 12units qhs and Lispro 8units TID on discharge.  - c/w Lantus 12units qhs, Lispro 8units TID, and moderate sliding scale w/ meals and bedtime  - carb consistent diet  - Patient's fingerstick glucose goal 100-180 mg/dL.

## 2023-12-26 NOTE — ED ADULT NURSE REASSESSMENT NOTE - NS ED NURSE REASSESS COMMENT FT1
Pt refused covid swab and PIV/blood work. Rn explained importance of these tests. Pt still refused. MD Knowles aware. Pt refused covid swab and PIV/blood work. Rn explained importance of these tests. Pt still refused. MD nKowles aware.

## 2023-12-26 NOTE — H&P ADULT - ATTENDING COMMENTS
67 yo M with PMHx CAD (s/p PCI x2 in 2020), HTN, HFmrEF (45-50%), moderate AS, T2DM, PAD (s/p RLE angioplasty), R 4th toe OM (s/p partial amputation, 10/24), recent admission for RLE gas gangrene s/p R-BKA (12/11) s/p BKA closure (12/18) c/b C. diff colitis (s/p Vanco, last dose 12/25) discharged to [facility] 12/26 AM now returning to ED from [facility] shortly after arrival after testing COVID+ with need for alternate placement.       #COVID - COVID PCR positive. Asx. Satting well on RA at rest. No indication for tx at this time.     #Noromocytic anemia - Hb 8.8 with MCV 91.3. Stable from 12/11 Hb 8.4. Baseline over past several months appears to be 7-8. Maintain active T/S. No signs/sx suggestive of acute/active bleeding.      #Misc – PT/SW consult for alternate placement. Sent back from [facility] due to COVID+ status.    Agree with remainder of resident plan as above. 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 diff colitis (s/p Vanco, last dose 12/25) discharged to [facility] 12/26 AM now returning to ED from [facility] shortly after arrival after testing COVID+ with need for alternate placement.       #COVID - COVID PCR positive. Asx. Satting well on RA at rest. No indication for tx at this time.     #Noromocytic anemia - Hb 8.8 with MCV 91.3. Stable from 12/11 Hb 8.4. Baseline over past several months appears to be 7-8. Maintain active T/S. No signs/sx suggestive of acute/active bleeding.      #Misc – PT/SW consult for alternate placement. Sent back from [facility] due to COVID+ status.    Agree with remainder of resident plan as above.

## 2023-12-26 NOTE — PROGRESS NOTE ADULT - SUBJECTIVE AND OBJECTIVE BOX
O/N: YEISON, VSS                                      PLEASE CHECK WHEN PRESENT:     [  ]Heart Failure     [  ] Acute     [  ] Acute on Chronic     [x  ] Chronic  -------------------------------------------------------------------     [  ]Diastolic [HFpEF]     [ x ]Systolic [HFrEF]     [  ]Combined [HFpEF & HFrEF]  .................................................................................     [  ]Other:     [ ] Pulmonary Hypertension     [ ] Chronic A-fib     [ ] Persistet A-fib     [ ] Permanent A-fib     [ ] Paroxysmal A-fib     [x ] Hypertensive Heart Disease  -------------------------------------------------------------------  [ ] Respiratory failure  [ ] Acute cor pulmonale  [ ] Asthma/COPD Exacerbation  [ ]COPD on home O2 (Chronic renal Failure)   [ ] Pleural effusion  [ ] Aspiration pneumonia  [ ] Obstructive Sleep Apnea  [ ]Atelectasis   [ ] Acute PE   -------------------------------------------------------------------  [  ]Acute Kidney Injury      [  ]Acute Tubular Necrosis      [  ]Reneal Medullary Necrosis     [  ]Renal Cortical Necrosis     [  ]Other Pathological Lesions:    [  ]CKD 1  [  ]CKD 2  [  ]CKD 3  [  ]CKD 4  [  ]CKD 5 (ESRD)  [  ]Other  -------------------------------------------------------------------  [ x ]Diabetes  [  ] Diabetic PVD Ulcer  [  ] Neuropathic ulcer to DM  [  ] Diabetes with Nephropathy  [ x ] Osteomyelitis due to diabetes  [  ] Hyperglycemia   [  ]hypoglycemia   --------------------------------------------------------------------  [  ]Malnutrition: See Nutrition Note  [  ]Cachexia  [  ]Other:   [  ]Supplement Ordered:  [  ]Morbid Obesity (BMI >=40]  [ ] Ileus  ---------------------------------------------------------------------  [ ] Sepsis/severe sepsis/septic shock  [ ] Noninfectious SIRS  [ ] UTI  [ ] Pneumonia  [ ] Thrombophlebitis     -----------------------------------------------------------------------  [ ] Acidosis/alkalosis  [ ] Fluid overload  [ ] Hypokalemia  [ ] Hyperkalemia  [ ] Hypomagnesemia  [ ] Hypophosphatemia  [ ] Hyperphosphatemia  ------------------------------------------------------------------------  [ ] Acute blood loss anemia  [ ] Post op blood loss anemia  [ ] Iron deficiency anemia  [ ] Anemia due to chronic disease  [ ] Hypercoagulable state  [ ] Thrombocytopenia  ----------------------------------------------------------------------  [ ] Cerebral infarction  [ ] Transient ischemia attack  [ ] Encephalopathy - Toxic or Metabolic          A/P:    69yo M PMH CAD (2 stents 2020), HFmrEF (45-50%), HTN, PAD w/ remote RLE angioplasty, R 4th toe OM s/p partial R 4th ray amputation on 10/24, RLE angiogram with AT lithotripsy and AT/peroneal balloon angioplasty 10/27, uncontrolled IDDM (a1c 12 in Oct 2023), recent admission 11/8-11/10 to cardiology service for hypertensive emergency (left AMA, was given levaquin when he left for right foot wound that pt had started treatment for back in October) who presented to [facility] after a fall onto his knees and was having b/l knee pain. Found to have increased soft tissue gas in R foot on CT scan. 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 Diff as of 12/25      Diet: consistent carbs    Activity: OOB to chair, PT Consult    DVTPPx: HSQ    Dispo: 5 Uris telemetry, DC notice given 12/22     O/N: YEISON, VSS                                      PLEASE CHECK WHEN PRESENT:     [  ]Heart Failure     [  ] Acute     [  ] Acute on Chronic     [x  ] Chronic  -------------------------------------------------------------------     [  ]Diastolic [HFpEF]     [ x ]Systolic [HFrEF]     [  ]Combined [HFpEF & HFrEF]  .................................................................................     [  ]Other:     [ ] Pulmonary Hypertension     [ ] Chronic A-fib     [ ] Persistet A-fib     [ ] Permanent A-fib     [ ] Paroxysmal A-fib     [x ] Hypertensive Heart Disease  -------------------------------------------------------------------  [ ] Respiratory failure  [ ] Acute cor pulmonale  [ ] Asthma/COPD Exacerbation  [ ]COPD on home O2 (Chronic renal Failure)   [ ] Pleural effusion  [ ] Aspiration pneumonia  [ ] Obstructive Sleep Apnea  [ ]Atelectasis   [ ] Acute PE   -------------------------------------------------------------------  [  ]Acute Kidney Injury      [  ]Acute Tubular Necrosis      [  ]Reneal Medullary Necrosis     [  ]Renal Cortical Necrosis     [  ]Other Pathological Lesions:    [  ]CKD 1  [  ]CKD 2  [  ]CKD 3  [  ]CKD 4  [  ]CKD 5 (ESRD)  [  ]Other  -------------------------------------------------------------------  [ x ]Diabetes  [  ] Diabetic PVD Ulcer  [  ] Neuropathic ulcer to DM  [  ] Diabetes with Nephropathy  [ x ] Osteomyelitis due to diabetes  [  ] Hyperglycemia   [  ]hypoglycemia   --------------------------------------------------------------------  [  ]Malnutrition: See Nutrition Note  [  ]Cachexia  [  ]Other:   [  ]Supplement Ordered:  [  ]Morbid Obesity (BMI >=40]  [ ] Ileus  ---------------------------------------------------------------------  [ ] Sepsis/severe sepsis/septic shock  [ ] Noninfectious SIRS  [ ] UTI  [ ] Pneumonia  [ ] Thrombophlebitis     -----------------------------------------------------------------------  [ ] Acidosis/alkalosis  [ ] Fluid overload  [ ] Hypokalemia  [ ] Hyperkalemia  [ ] Hypomagnesemia  [ ] Hypophosphatemia  [ ] Hyperphosphatemia  ------------------------------------------------------------------------  [ ] Acute blood loss anemia  [ ] Post op blood loss anemia  [ ] Iron deficiency anemia  [ ] Anemia due to chronic disease  [ ] Hypercoagulable state  [ ] Thrombocytopenia  ----------------------------------------------------------------------  [ ] Cerebral infarction  [ ] Transient ischemia attack  [ ] Encephalopathy - Toxic or Metabolic          A/P:    69yo M PMH CAD (2 stents 2020), HFmrEF (45-50%), HTN, PAD w/ remote RLE angioplasty, R 4th toe OM s/p partial R 4th ray amputation on 10/24, RLE angiogram with AT lithotripsy and AT/peroneal balloon angioplasty 10/27, uncontrolled IDDM (a1c 12 in Oct 2023), recent admission 11/8-11/10 to cardiology service for hypertensive emergency (left AMA, was given levaquin when he left for right foot wound that pt had started treatment for back in October) who presented to [facility] after a fall onto his knees and was having b/l knee pain. Found to have increased soft tissue gas in R foot on CT scan. 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 Diff as of 12/25      Diet: consistent carbs    Activity: OOB to chair, PT Consult    DVTPPx: HSQ    Dispo: 5 Uris telemetry, DC notice given 12/22     O/N: YEISON, VSS    S: Patient does not have any complaints    O: Examined in bed resting comfortably     ROS: Denies headache, blurred vision, chest pain, SOB, abdominal pain, nausea or vomiting.         aspirin  chewable 81  carvedilol 25  furosemide   Injectable 80  heparin   Injectable 5000  hydrALAZINE 100  lisinopril 40  NIFEdipine XL 60  spironolactone 25      Allergies    No Known Allergies    Intolerances        Vital Signs Last 24 Hrs  T(C): 37.6 (25 Dec 2023 18:15), Max: 38.6 (25 Dec 2023 12:11)  T(F): 99.6 (25 Dec 2023 18:15), Max: 101.4 (25 Dec 2023 12:11)  HR: 77 (26 Dec 2023 05:49) (77 - 94)  BP: 172/84 (26 Dec 2023 05:49) (130/71 - 172/84)  BP(mean): 120 (26 Dec 2023 05:49) (91 - 120)  RR: 18 (26 Dec 2023 05:49) (17 - 18)  SpO2: 99% (26 Dec 2023 05:49) (97% - 99%)    Parameters below as of 26 Dec 2023 05:49  Patient On (Oxygen Delivery Method): room air      I&O's Summary    24 Dec 2023 07:01  -  25 Dec 2023 07:00  --------------------------------------------------------  IN: 1110 mL / OUT: 3750 mL / NET: -2640 mL    25 Dec 2023 07:01  -  26 Dec 2023 06:44  --------------------------------------------------------  IN: 540 mL / OUT: 3600 mL / NET: -3060 mL        Physical Exam:  General: alert and awake, NAD  Pulmonary: no respiratory distress  Cardiovascular: RRR  Abdominal: soft  Extremities: R BKA incision CDI, former blister wound healing appropriately              PLEASE CHECK WHEN PRESENT:     [  ]Heart Failure     [  ] Acute     [  ] Acute on Chronic     [x  ] Chronic  -------------------------------------------------------------------     [  ]Diastolic [HFpEF]     [ x ]Systolic [HFrEF]     [  ]Combined [HFpEF & HFrEF]  .................................................................................     [  ]Other:     [ ] Pulmonary Hypertension     [ ] Chronic A-fib     [ ] Persistet A-fib     [ ] Permanent A-fib     [ ] Paroxysmal A-fib     [x ] Hypertensive Heart Disease  -------------------------------------------------------------------  [ ] Respiratory failure  [ ] Acute cor pulmonale  [ ] Asthma/COPD Exacerbation  [ ]COPD on home O2 (Chronic renal Failure)   [ ] Pleural effusion  [ ] Aspiration pneumonia  [ ] Obstructive Sleep Apnea  [ ]Atelectasis   [ ] Acute PE   -------------------------------------------------------------------  [  ]Acute Kidney Injury      [  ]Acute Tubular Necrosis      [  ]Reneal Medullary Necrosis     [  ]Renal Cortical Necrosis     [  ]Other Pathological Lesions:    [  ]CKD 1  [  ]CKD 2  [  ]CKD 3  [  ]CKD 4  [  ]CKD 5 (ESRD)  [  ]Other  -------------------------------------------------------------------  [ x ]Diabetes  [  ] Diabetic PVD Ulcer  [  ] Neuropathic ulcer to DM  [  ] Diabetes with Nephropathy  [ x ] Osteomyelitis due to diabetes  [  ] Hyperglycemia   [  ]hypoglycemia   --------------------------------------------------------------------  [  ]Malnutrition: See Nutrition Note  [  ]Cachexia  [  ]Other:   [  ]Supplement Ordered:  [  ]Morbid Obesity (BMI >=40]  [ ] Ileus  ---------------------------------------------------------------------  [ ] Sepsis/severe sepsis/septic shock  [ ] Noninfectious SIRS  [ ] UTI  [ ] Pneumonia  [ ] Thrombophlebitis     -----------------------------------------------------------------------  [ ] Acidosis/alkalosis  [ ] Fluid overload  [ ] Hypokalemia  [ ] Hyperkalemia  [ ] Hypomagnesemia  [ ] Hypophosphatemia  [ ] Hyperphosphatemia  ------------------------------------------------------------------------  [ ] Acute blood loss anemia  [ ] Post op blood loss anemia  [ ] Iron deficiency anemia  [ ] Anemia due to chronic disease  [ ] Hypercoagulable state  [ ] Thrombocytopenia  ----------------------------------------------------------------------  [ ] Cerebral infarction  [ ] Transient ischemia attack  [ ] Encephalopathy - Toxic or Metabolic          A/P:    67yo M PMH CAD (2 stents 2020), HFmrEF (45-50%), HTN, PAD w/ remote RLE angioplasty, R 4th toe OM s/p partial R 4th ray amputation on 10/24, RLE angiogram with AT lithotripsy and AT/peroneal balloon angioplasty 10/27, uncontrolled IDDM (a1c 12 in Oct 2023), recent admission 11/8-11/10 to cardiology service for hypertensive emergency (left AMA, was given levaquin when he left for right foot wound that pt had started treatment for back in October) who presented to [facility] after a fall onto his knees and was having b/l knee pain. Found to have increased soft tissue gas in R foot on CT scan. 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 Diff as of 12/25      Diet: consistent carbs    Activity: OOB to chair, PT Consult    DVTPPx: HSQ    Dispo: 5 Uris telemetry, DC notice given 12/22     O/N: YEISON, VSS    S: Patient does not have any complaints    O: Examined in bed resting comfortably     ROS: Denies headache, blurred vision, chest pain, SOB, abdominal pain, nausea or vomiting.         aspirin  chewable 81  carvedilol 25  furosemide   Injectable 80  heparin   Injectable 5000  hydrALAZINE 100  lisinopril 40  NIFEdipine XL 60  spironolactone 25      Allergies    No Known Allergies    Intolerances        Vital Signs Last 24 Hrs  T(C): 37.6 (25 Dec 2023 18:15), Max: 38.6 (25 Dec 2023 12:11)  T(F): 99.6 (25 Dec 2023 18:15), Max: 101.4 (25 Dec 2023 12:11)  HR: 77 (26 Dec 2023 05:49) (77 - 94)  BP: 172/84 (26 Dec 2023 05:49) (130/71 - 172/84)  BP(mean): 120 (26 Dec 2023 05:49) (91 - 120)  RR: 18 (26 Dec 2023 05:49) (17 - 18)  SpO2: 99% (26 Dec 2023 05:49) (97% - 99%)    Parameters below as of 26 Dec 2023 05:49  Patient On (Oxygen Delivery Method): room air      I&O's Summary    24 Dec 2023 07:01  -  25 Dec 2023 07:00  --------------------------------------------------------  IN: 1110 mL / OUT: 3750 mL / NET: -2640 mL    25 Dec 2023 07:01  -  26 Dec 2023 06:44  --------------------------------------------------------  IN: 540 mL / OUT: 3600 mL / NET: -3060 mL        Physical Exam:  General: alert and awake, NAD  Pulmonary: no respiratory distress  Cardiovascular: RRR  Abdominal: soft  Extremities: R BKA incision CDI, former blister wound healing appropriately              PLEASE CHECK WHEN PRESENT:     [  ]Heart Failure     [  ] Acute     [  ] Acute on Chronic     [x  ] Chronic  -------------------------------------------------------------------     [  ]Diastolic [HFpEF]     [ x ]Systolic [HFrEF]     [  ]Combined [HFpEF & HFrEF]  .................................................................................     [  ]Other:     [ ] Pulmonary Hypertension     [ ] Chronic A-fib     [ ] Persistet A-fib     [ ] Permanent A-fib     [ ] Paroxysmal A-fib     [x ] Hypertensive Heart Disease  -------------------------------------------------------------------  [ ] Respiratory failure  [ ] Acute cor pulmonale  [ ] Asthma/COPD Exacerbation  [ ]COPD on home O2 (Chronic renal Failure)   [ ] Pleural effusion  [ ] Aspiration pneumonia  [ ] Obstructive Sleep Apnea  [ ]Atelectasis   [ ] Acute PE   -------------------------------------------------------------------  [  ]Acute Kidney Injury      [  ]Acute Tubular Necrosis      [  ]Reneal Medullary Necrosis     [  ]Renal Cortical Necrosis     [  ]Other Pathological Lesions:    [  ]CKD 1  [  ]CKD 2  [  ]CKD 3  [  ]CKD 4  [  ]CKD 5 (ESRD)  [  ]Other  -------------------------------------------------------------------  [ x ]Diabetes  [  ] Diabetic PVD Ulcer  [  ] Neuropathic ulcer to DM  [  ] Diabetes with Nephropathy  [ x ] Osteomyelitis due to diabetes  [  ] Hyperglycemia   [  ]hypoglycemia   --------------------------------------------------------------------  [  ]Malnutrition: See Nutrition Note  [  ]Cachexia  [  ]Other:   [  ]Supplement Ordered:  [  ]Morbid Obesity (BMI >=40]  [ ] Ileus  ---------------------------------------------------------------------  [ ] Sepsis/severe sepsis/septic shock  [ ] Noninfectious SIRS  [ ] UTI  [ ] Pneumonia  [ ] Thrombophlebitis     -----------------------------------------------------------------------  [ ] Acidosis/alkalosis  [ ] Fluid overload  [ ] Hypokalemia  [ ] Hyperkalemia  [ ] Hypomagnesemia  [ ] Hypophosphatemia  [ ] Hyperphosphatemia  ------------------------------------------------------------------------  [ ] Acute blood loss anemia  [ ] Post op blood loss anemia  [ ] Iron deficiency anemia  [ ] Anemia due to chronic disease  [ ] Hypercoagulable state  [ ] Thrombocytopenia  ----------------------------------------------------------------------  [ ] Cerebral infarction  [ ] Transient ischemia attack  [ ] Encephalopathy - Toxic or Metabolic          A/P:    67yo M PMH CAD (2 stents 2020), HFmrEF (45-50%), HTN, PAD w/ remote RLE angioplasty, R 4th toe OM s/p partial R 4th ray amputation on 10/24, RLE angiogram with AT lithotripsy and AT/peroneal balloon angioplasty 10/27, uncontrolled IDDM (a1c 12 in Oct 2023), recent admission 11/8-11/10 to cardiology service for hypertensive emergency (left AMA, was given levaquin when he left for right foot wound that pt had started treatment for back in October) who presented to [facility] after a fall onto his knees and was having b/l knee pain. Found to have increased soft tissue gas in R foot on CT scan. 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 Diff as of 12/25      Diet: consistent carbs    Activity: OOB to chair, PT Consult    DVTPPx: HSQ    Dispo: 5 Uris telemetry, DC notice given 12/22

## 2023-12-26 NOTE — PROGRESS NOTE ADULT - PROVIDER SPECIALTY LIST ADULT
Cardiology
Cardiology
Endocrinology
Hospitalist
Infectious Disease
Infectious Disease
Internal Medicine
Internal Medicine
Podiatry
Vascular Surgery
Cardiology
Endocrinology
Hospitalist
Vascular Surgery
Endocrinology
Hospitalist
Hospitalist
Internal Medicine
Podiatry
Vascular Surgery
Cardiology
Endocrinology
Internal Medicine
Internal Medicine
Vascular Surgery
Cardiology
Cardiology
Endocrinology
Hospitalist
Internal Medicine
Internal Medicine
Vascular Surgery
Cardiology
Endocrinology
Cardiology
Cardiology
Hospitalist
Hospitalist
Podiatry
Podiatry
Endocrinology
Endocrinology
Internal Medicine
Internal Medicine

## 2023-12-26 NOTE — PROGRESS NOTE ADULT - ATTENDING COMMENTS
I have personally seen and examined this patient. I have fully participated in the care of this patient. I have reviewed all pertinent clinical information, including history, physical exam, plan and the Resident's, PA's and NP's note and agree except as noted. This is a patient known to Dr. 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 He is transferred from the medical service to vascular, now s/p staged R BKA w/ closure (12/18/23)    Calm to me today. No major complaints. RLE swelling improving with diuresis per cardiology. Refusing RUE venous duplex. Stump OK. Awaiting dispo.     PLAN & RECOMMENDATIONS  - ASA/SQH; no more plavix  - ABX for C-diff treatment  - ASA/SQH  - Physical therapy  - Diuresis and HTN recs per cardiology; appreciate recs  - F/u hospitalist  - REGINA for dispo, hopefully on 12/26/23  - Outpatient follow up on 1/18/24    Thank you,    Michelet Freed MD  Attending Vascular Surgeon  [facility] at [address] [place] [address]  Office: [phone]; Fax: [phone]  [email or web address] I have personally seen and examined this patient. I have fully participated in the care of this patient. I have reviewed all pertinent clinical information, including history, physical exam, plan and the Resident's, PA's and NP's note and agree except as noted. This is a patient known to Dr. 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 He is transferred from the medical service to vascular, now s/p staged R BKA w/ closure (12/18/23)    Calm to me today. No major complaints. RLE swelling improving with diuresis per cardiology. Refusing RUE venous duplex. Stump OK. Awaiting dispo.     PLAN & RECOMMENDATIONS  - ASA/SQH; no more plavix  - ABX for C-diff treatment  - ASA/SQH  - Physical therapy  - Diuresis and HTN recs per cardiology; appreciate recs  - F/u hospitalist  - REGINA for dispo, hopefully on 12/26/23  - Outpatient follow up on 1/18/24    Thank you,    Michelet Freed MD  Attending Vascular Surgeon  [facility] at [address] [place] [address]  Office: [phone]; Fax: [phone]  [email or web address]

## 2023-12-26 NOTE — H&P ADULT - PROBLEM SELECTOR PLAN 1
Patient d/c to [facility] on 12/26, found to be COVID-19+ at [facility], returned to [facility] as rehab can't accept patients w/ COVID. He denies cough, runny nose, chills, sob, Found to have fever 101.6 degrees F in ED, no other SIRS crtieria. Currently saturating 94-95% on room air, not hypoxic. Fevers likely due to COVID-19 infection.  - continue to monitor symptoms- treat fever w/ tylenol  - Patient d/c to [facility] on 12/26, found to be COVID-19+ at [facility], returned to [facility] as rehab can't accept patients w/ COVID. He denies cough, runny nose, chills, sob, Found to have fever 101.6 degrees F in ED, no other SIRS crtieria. Currently saturating 94-95% on room air, not hypoxic. Fevers likely due to COVID-19 infection.  - continue to monitor symptoms- treat fever w/ tylenol  - Patient d/c to [facility] on 12/26, found to be COVID-19+ at [facility], returned to [facility] as rehab can't accept patients w/ COVID. He denies cough, runny nose, chills, sob, Found to have fever 101.6 degrees F in ED, no other SIRS criteria. Currently saturating 94-95% on room air, not hypoxic. Fever likely due to COVID-19 infection.  - continue to monitor symptoms  - tylenol 650mg Q6hrs prn fever/mild pain  - patient currently not meeting criteria for inpatient remdesivir txt Patient d/c to [facility] on 12/26, found to be COVID-19+ at [facility], returned to [facility] as rehab can't accept patients w/ COVID. He denies cough, runny nose, chills, sob, Found to have fever 101.6 degrees F in ED, no other SIRS criteria. Currently saturating 94-95% on room air, not hypoxic. Fever likely due to COVID-19 infection.  - continue to monitor symptoms  - tylenol 650mg Q6hrs prn fever/mild pain  - patient currently not meeting criteria for inpatient remdesivir txt

## 2023-12-26 NOTE — PROGRESS NOTE ADULT - ASSESSMENT
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 diff colitis. [facility] following for PAD, CAD, and HFrEF.    Assessment  1. 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 PAD s/p prior intervention to RLE Oct/2023  3. CAD s/p PCI  4. ICM HFrEF - decompensated  Last TTE Nov/2023 mildly reduced EF, G1DD  5. Mild-moderate AS (ALLISON 1.28cm^2, MG 15 mmHg)  6. RUE swelling    Plan  1. ASA 81mg QD and high intensity statin for CAD and PAD  2. DC on torsemide 40mg PO QD (to continue until next f/u with cardiology as outpatient within 2 weeks, please arrange f/u prior to dc to [facility]  3. TTE in 1y for surveillance of moderate AS  4. GDMT for HFrEF: Coreg 25mg BID, lisinopril 40mg QD, aldactone 25mg QD; will avoid SGLT2i given PAD w/ delayed wound healing  5. Hydralazine 100mg q8h and nifedipine XL 60mg QD     Thank you for the consult and the opportunity to take care of this patient.     Agapito Porter M.D.  CARDIOLOGY | VASCULAR CARDIOLOGY ATTENDING  [phone]    During non face-to-face time, I reviewed relevant portions of the patient’s medical record. During face-to-face time, I took a relevant history and examined the patient. I also explained differential diagnoses, relevant cardiac diagnoses, workup, and management plan, which required a high level of medical decision making. I answered all questions related to the patient's medical conditions.          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 diff colitis. [facility] Cardiology following for PAD, CAD, and HFrEF.    Assessment  1. 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 PAD s/p prior intervention to RLE Oct/2023  3. CAD s/p PCI  4. ICM HFrEF - decompensated  Last TTE Nov/2023 mildly reduced EF, G1DD  5. Mild-moderate AS (ALLISON 1.28cm^2, MG 15 mmHg)  6. RUE swelling    Plan  1. ASA 81mg QD and high intensity statin for CAD and PAD  2. DC on torsemide 40mg PO QD (to continue until next f/u with cardiology as outpatient within 2 weeks, please arrange f/u prior to dc to [facility])  3. TTE in 1y for surveillance of moderate AS  4. GDMT for HFrEF: Coreg 25mg BID, lisinopril 40mg QD, aldactone 25mg QD; will avoid SGLT2i given PAD w/ delayed wound healing  5. Hydralazine 100mg q8h and nifedipine XL 60mg QD     Thank you for the consult and the opportunity to take care of this patient.     Agapito Porter M.D.  CARDIOLOGY | VASCULAR CARDIOLOGY ATTENDING  [phone]    During non face-to-face time, I reviewed relevant portions of the patient’s medical record. During face-to-face time, I took a relevant history and examined the patient. I also explained differential diagnoses, relevant cardiac diagnoses, workup, and management plan, which required a high level of medical decision making. I answered all questions related to the patient's medical conditions.

## 2023-12-26 NOTE — H&P ADULT - PROBLEM SELECTOR PROBLEM 9
Preventive measure History of Clostridium difficile infection IDDM (insulin dependent diabetes mellitus)

## 2023-12-26 NOTE — PROVIDER CONTACT NOTE (OTHER) - ASSESSMENT
CONSULT NOTE    Requested by:   Nathan Israel MD      Chief Complaint   Patient presents with   • Consult   • Sleeping Problem       Subjective:  Elissa Gomez is a 74 y.o. female.     History of Present Illness   Patient comes in today for consultation because of sleep apnea.  The patient says that  she was diagnosed with sleep apnea 13 years ago.  It appears that she has been on a PAP device since then.    Patient doesn't report any issues with the device. Patient says that the compliance with the use of the equipment is good. Apart from occasional night, when she feels that the PAP device doesn't function properly, she says that her symptoms of fatigue & daytime sleepiness have been helped greatly with the use of PAP, as prescribed.      Patient is not aware of snoring through the device.     she is not complaining of occasional headaches.    The patient describes no significant issues with her mask either.     Patient's sleep schedule was reviewed. she drinks  cups/cans of caffeinated drinks per day.       The following portions of the patient's history were reviewed and updated as appropriate: allergies, current medications, past family history, past medical history, past social history and past surgical history.    Review of Systems   Constitutional: Negative for chills, fatigue and fever.   HENT: Positive for nosebleeds and postnasal drip. Negative for sinus pressure, sneezing and sore throat.    Respiratory: Negative for cough, chest tightness, shortness of breath and wheezing.    Cardiovascular: Negative for palpitations and leg swelling.   Psychiatric/Behavioral: Negative for sleep disturbance.   All other systems reviewed and are negative.      Past Medical History:   Diagnosis Date   • Acute bronchitis    • Acute sinusitis    • Anemia    • Cataracts, bilateral    • Epistaxis    • Esophageal reflux    • History of bleeding peptic ulcer     Pt reports 20 years ago   • History of bone density study  
"   osteopenia hips   • History of colonoscopy    • History of edema    • History of Papanicolaou smear of cervix     Dr Mike   • Hyperlipidemia    • Localized primary osteoarthritis of carpometacarpal joint of right thumb    • Low vitamin B12 level    • Piercing     ears   • Rash    • Sleep apnea    • Thyroid nodule    • Wears glasses        Social History     Tobacco Use   • Smoking status: Never Smoker   • Smokeless tobacco: Never Used   Substance Use Topics   • Alcohol use: No         Objective:  Visit Vitals  /78   Pulse 70   Temp 97.1 °F (36.2 °C)   Resp 16   Ht 162.6 cm (64.02\")   Wt 85.3 kg (188 lb)   SpO2 98%   BMI 32.25 kg/m²       Physical Exam  Vitals signs reviewed.   Constitutional:       Appearance: She is well-developed.   HENT:      Head: Atraumatic.      Mouth/Throat:      Comments: Oropharynx was crowded.  Eyes:      Pupils: Pupils are equal, round, and reactive to light.   Neck:      Thyroid: No thyromegaly.      Vascular: No JVD.      Trachea: No tracheal deviation.      Comments: Increased adipose tissue.   Cardiovascular:      Rate and Rhythm: Normal rate and regular rhythm.   Pulmonary:      Effort: Pulmonary effort is normal. No respiratory distress.      Breath sounds: Normal breath sounds. No wheezing.   Musculoskeletal: Normal range of motion.      Comments: Gait was normal.   Skin:     General: Skin is warm and dry.   Neurological:      Mental Status: She is alert and oriented to person, place, and time.   Psychiatric:         Behavior: Behavior normal.           Assessment/Plan:  Diagnoses and all orders for this visit:    1. Obstructive sleep apnea (Primary)  -     BIPAP / CPAP Adjustment    2. Excessive daytime sleepiness  -     BIPAP / CPAP Adjustment    3. Obesity (BMI 30-39.9)        Return in about 4 months (around 5/5/2021) for Mike/Belén, ....Also 11 mths w/ Dr. Hinton.    DISCUSSION(if any):  Laboratory workup was also reviewed which showed   Lab Results "
  Component Value Date    CO2 26.0 03/09/2020     ===========================  ===========================    We will try to obtain her last sleep study results from the performing facility, if not already scanned in our system.     I told the patient that her symptoms are consistent with fairly well controlled sleep apnea.    Continue treatment with CPAP at a pressure of 11, with nasal pillows .    The patient appears to have issues with excessive dryness which could be from multiple etiologies.  For now I recommended that she contacts her DME company and learn how to adjust the humidifier.  Another option would be to try heated tubing. This will be considered.     Patient was advised to continue using PAP for at least 4 hours every night.    Patient was advised to call this office with any issues.      Dictated utilizing Dragon dictation.    This document was electronically signed by Hannah Hinton MD on 01/05/21 at 11:34 EST    
Patient asymptomatic. Denies CP, SOB, palpitations.
Pt bp 184/86, HR 50s, asymptomatic and denies cp, sob, palpitations. Pt also noted to have RUE swelling.
R BKA site stable, dressing c/d/i. A&Ox4 but very agitated, throwing linens and cursing at staff.
Patient denies chills but states that he feels warm, requested tylenol.

## 2023-12-26 NOTE — PROVIDER CONTACT NOTE (OTHER) - RECOMMENDATIONS
Further assessment
PO Hydralazine given, new /73. Continue to monitor BP.
Rectal temperature, labs, blood cultures.
Bedpan/bedrest, no commode use. Security called. BRAD Larsen at bedside.

## 2023-12-26 NOTE — H&P ADULT - PROBLEM SELECTOR PLAN 6
d/c meds: ASA 81mg QD  and atorvastatin 80mg qhs  - c/w aspirin 81mg qd and atorvastatin 80mg qhs Hgb 8.8, MCV 91, stable from Hgb 8.4 (12/22). No signs of active bleeding.  - active type and screen.   - transfuse for Hgb <8 given cardiac hx  - continue to monitor for signs of anemia

## 2023-12-26 NOTE — H&P ADULT - PROBLEM SELECTOR PLAN 4
D/c meds: Hydralazine 100mg q8h and nifedipine XL 60mg QD  - c/w hydral 100mg TID and Nifedipine 60mg qd

## 2023-12-26 NOTE — ED ADULT NURSE NOTE - BIRTH SEX
Writer asked to contact Poison Control per Dr. Mena.  Spoke with JOSE Lott who recommends serial digoxin levels q4 hours and monitoring for symptoms.  Orders received, will continue to monitor.    Male

## 2023-12-26 NOTE — ED ADULT NURSE NOTE - OBJECTIVE STATEMENT
Pt A&Ox4 BIBEMS from [facility]. Pt recently sent to [facility] after right leg below the knee amputation. Pt tested positive for covid at rehab and was sent back to [facility]. Per pt, "the rehab cannot have me there with covid." Pt has right leg below the knee amputation wrapped with ace wrap. Pt refusing to let RN assess amputation site. Pt has stage 1 pressure ulcer on sacrum. Pt denies chest pain, shortness of breath, nausea/vomiting, dizziness. Pt A&Ox4 BIBEMS from [facility]. Pt recently sent to [facility] after right leg below the knee amputation. Pt tested positive for covid at rehab and was sent back to [facility]. Per pt, "the rehab cannot have me there with covid." Pt has right leg below the knee amputation wrapped with ace wrap. Pt refusing to let RN assess amputation site. Pt has stage 1 pressure ulcer on sacrum. Pt denies chest pain, shortness of breath, nausea/vomiting, dizziness.

## 2023-12-26 NOTE — H&P ADULT - PROBLEM SELECTOR PLAN 3
s/p R BKA on 12/11/23 and s/p BKA closure on 12/18/23 by vascular surgery at [facility]  - pain regimen: Oxy 10mg q6hrs severe pain, Oxy 5mg q6hrs moderate pain, tylenol 650mg q6 hrs prn mild pain/fevers  - has outpatient f/u apt w/ vascular: Dr. Freed on 1/18/24 at 10 A.M. s/p R BKA on 12/11/23 and s/p BKA closure on 12/18/23 by vascular surgery at [facility]  - pain regimen: Oxy 10mg q6hrs severe pain, Oxy 5mg q6hrs moderate pain, tylenol 650mg q6 hrs prn mild pain/fevers  - has outpatient f/u apt w/ vascular: Dr. Freed on 1/18/24 at 10 A.M.

## 2023-12-26 NOTE — PROGRESS NOTE ADULT - ATTENDING SUPERVISION STATEMENT
Resident
Fellow
Resident
Fellow
Fellow
Resident

## 2023-12-26 NOTE — H&P ADULT - HISTORY OF PRESENT ILLNESS
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 Patient denies current symptoms of cough, runny nose, headache, chills, sob, and wheezing. He says he had no signs that he had COVID-19, and his main complaint is his right sided leg pain. He received Oxy 5mg prior to conversation, and he currently states his pain is an 11/10, as he was going to the bathroom and he hit his stump on the wall. His amputaiton site was recently wrapped/bandage changed this morning when he got to the [facility].     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 Patient denies current symptoms of cough, runny nose, headache, chills, sob, and wheezing. He says he had no signs that he had COVID-19, and his main complaint is his right sided leg pain. He received Oxy 5mg prior to conversation, and he currently states his pain is an 11/10, as he was going to the bathroom and he hit his stump on the wall. His amputaiton site was recently wrapped/bandage changed this morning when he got to the [facility].     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 for further monitoring. Patient denies current symptoms of cough, runny nose, headache, chills, sob, and wheezing. He says he had no signs that he had COVID-19, and his main complaint is his right sided leg pain. He received Oxy 5mg prior to conversation, and he currently states his pain is an 11/10, as he was going to the bathroom and he hit his stump on the wall. He says Dilaudid was helping his pain the best during his admission. His amputation site was recently wrapped/bandage changed this morning when he got to the [facility]. He had a recent bowel movement in the ED that was formed stool, denies current diarrhea, abd pain.     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 for further monitoring. Patient denies current symptoms of cough, runny nose, headache, chills, sob, and wheezing. He says he had no signs that he had COVID-19, and his main complaint is his right sided leg pain. He received Oxy 5mg prior to conversation, and he currently states his pain is an 11/10, as he was going to the bathroom and he hit his stump on the wall. He says Dilaudid was helping his pain the best during his admission. His amputation site was recently wrapped/bandage changed this morning when he got to the [facility]. He had a recent bowel movement in the ED that was formed stool, denies current diarrhea, abd pain.     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 for further monitoring. Patient denies current symptoms of cough, runny nose, headache, chills, sob, and wheezing. He says he had no signs that he had COVID-19, and his main complaint is his right sided leg pain. He received Oxy 5mg prior to conversation, and he currently states his pain is an 11/10, as he was going to the bathroom and he hit his stump on the wall. He says Dilaudid was helping his pain the best during his admission. His amputation site was recently wrapped/bandage changed this morning when he got to the [facility]. He had a recent bowel movement in the ED that was formed stool, denies current diarrhea, abd pain.     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 for further monitoring. Patient denies current symptoms of cough, runny nose, headache, chills, sob, and wheezing. He says he had no signs that he had COVID-19, and his main complaint is his right sided leg pain. He received Oxy 5mg prior to conversation, and he currently states his pain is an 11/10, as he was going to the bathroom and he hit his stump on the wall. He says Dilaudid was helping his pain the best during his admission. His amputation site was recently wrapped/bandage changed this morning when he got to the [facility]. He had a recent bowel movement in the ED that was formed stool, denies current diarrhea, abd pain.     On admission:  Initial vital sign: Temp 100.3 -> 101.7, HR 78, /78, Sp02 94% on room air  Labs significant for: Hgb 8.8, BUN 36,  glucose 114, COVID-19 positive  Imaging: None  Interventions: Tylenol 650mg x1, Oxycodone 5mg x1  Consults: none

## 2023-12-26 NOTE — ED PROVIDER NOTE - OBJECTIVE STATEMENT
68yM w/ CAD 2 stents 2020, CHF 45-50%, HTN, PAD, uncontrolled IDDM, recent admission 12/1-21/2023 for R foot soft tissue infection and underwent R BKA, also had c. dif and completed course of vancomycin (finished 12/25). Was dc earlier today to [facility] and at the rehab tested + for COVID so was sent back as they do not accept covid patients.  Pt denies chest pain, SOB, abd pain. 68yM w/ CAD 2 stents 2020, CHF 45-50%, HTN, PAD, uncontrolled IDDM, recent admission 12/1-21/2023 for R foot soft tissue infection and underwent R BKA, also had c. dif and completed course of vancomycin (finished 12/25). Was dc earlier today to [facility] and at the rehab tested + for COVID so was sent back as they do not accept covid patients.  Pt denies chest pain, SOB, abd pain.

## 2023-12-26 NOTE — H&P ADULT - PROBLEM SELECTOR PLAN 10
Home med: Gabapentin 800mg TID  - c/w gabapentin 800mg TID Home med: Gabapentin 800mg TID  - due to renal function will decrease Gabapentin to 600mg TID Hx of CDIff infection last admission. Completed txt w/ oral Vanc on 12/25. Now reporting formed bowel movements.  - c/w contact precautions.

## 2023-12-26 NOTE — H&P ADULT - PROBLEM SELECTOR PLAN 5
Hx of ICM HF mildly reduced EF (45-50% 11/09), cardiology following during last admission for HF. Patient d/c on Torsemide 40mg PO qd today, now returning from [facility]   - prior recs: IV lasix 80mg q12h w/ strict I/O while inpatient, switch to torsemide 40mg PO QD on d/c  - GDMT for HFrEF: Coreg 25mg BID, lisinopril 40mg QD, aldactone 25mg QD; avoid SGLT2i given PAD w/ delayed wound healing    Plan:  - c/w Torsemide 40mg po qd as patient was d/c ready, returning for new rehab placement due to incidental COVID infection finding  - Strict I/Os  - c/w Coreg 25mg BID, lisinopril 40mg qd, aldactone 25mg qd  - if starts to become volume overloaded can transition back to IV Lasix 80mg BID Hx of ICM HF mildly reduced EF (45-50% 11/09), cardiology following during last admission for HF. Patient d/c on Torsemide 40mg PO qd today, now returning from [facility]   - prior recs: IV lasix 80mg q12h w/ strict I/O while inpatient, switch to torsemide 40mg PO QD on d/c  - GDMT for HFrEF: Coreg 25mg BID, lisinopril 40mg QD, aldactone 25mg QD; avoid SGLT2i given PAD w/ delayed wound healing    Plan:  - c/w Torsemide 40mg po qd as patient was d/c ready, returning for new rehab placement due to incidental COVID infection finding  - Strict I/Os  - c/w Coreg 25mg BID, lisinopril 40mg qd, aldactone 25mg qd  - if starts to become volume overloaded can transition back to IV Lasix 80mg BID

## 2023-12-26 NOTE — PROGRESS NOTE ADULT - REASON FOR ADMISSION
soft tissue and skin infection of right foot

## 2023-12-26 NOTE — PROVIDER CONTACT NOTE (OTHER) - SITUATION
Pt refusing bed alarm, call bell was not on. During rounds RN found pt attempting to bring self to commode, visibly weak and sliding. Immediately called for help, 4 RNs needed to get pt back to bed.
Patient /86
Patient /86, HR 50s; RUE swelling
Patient's oral temperature is 100.1

## 2023-12-26 NOTE — PROVIDER CONTACT NOTE (OTHER) - ACTION/TREATMENT ORDERED:
Dr. Caba at bedside to assess patient, hydralazine PO given, lasix iv given for rue swelling
BRAD Larsen at bedside, per PA bedrest and bedpan only, no commode. Security called, spoke to patient and able to calm him down.
No more vitals checks per MD.

## 2023-12-26 NOTE — PROGRESS NOTE ADULT - ASSESSMENT
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    #Diabetic Foot Infection   -Pt s/p BKA on 12/11 and  s/p closure on 12/18   -Continue pain medication as per primary team   [ ] Monitor off of tylenol, NSAIDs, check rectal temp if patient has any oral/axillary/temporal temps >99F    # R arm edema   right arm edematous. c/w monitoring    #Chronic HFmrEF   #HTN  -Continue with lisinopril 40mg daily, coreg 25mg BID, and hydralazine 100mg TID,   IV lasix 80mg q12 while inpatient per cardiology recs   plan to discharge on torsemide 40mg qd per cardiology recs.     # C difficile infection   -s/p PO vancomycin (completed on 12/25), continue contact precautions     #Acute Blood Loss Anemia   -Pt s/p transfusion of 1 unit of PRBCS on 12/19; Continue to monitor Hgb while inpatient     #CAD -Continue with ASA 81mg daily and Atorvastatin 80mg qhs     #Moderate Aortic Stenosis  -outpatient follow up for surveillance TTE     #Type 2 Diabetes c/b PAD, diabetic foot infection and hyperglycemia   inpatient and discharge insulin regimen per endocrine consult     DVT PPx  - Heparin SQ

## 2023-12-26 NOTE — H&P ADULT - PROBLEM SELECTOR PLAN 11
F: None  E: replete as necessary  N: Carb consistent diet  DVT: Heparin Subq  Dispo: RMF Home med: Gabapentin 800mg TID  - due to renal function will decrease Gabapentin to 600mg TID

## 2023-12-26 NOTE — PROGRESS NOTE ADULT - SUBJECTIVE AND OBJECTIVE BOX
Patient is a 68y old  Male who presents with a chief complaint of soft tissue and skin infection of right foot (26 Dec 2023 11:43)    INTERVAL EVENTS:  tolerating regular texture diet  no nausea, no vomiting, no dysuria, having bowel movements   LE edema improved vs last week.     SUBJECTIVE:  Patient was seen and examined at bedside.  Review of systems: No CP, dyspnea, nausea or vomiting, dysuria,   + LE edema     Diet, Consistent Carbohydrate/No Snacks (12-18-23 @ 12:54) [Active]      MEDICATIONS:  MEDICATIONS  (STANDING):  acetaminophen     Tablet .. 650 milliGRAM(s) Oral every 6 hours  aspirin  chewable 81 milliGRAM(s) Oral daily  atorvastatin 80 milliGRAM(s) Oral at bedtime  carvedilol 25 milliGRAM(s) Oral every 12 hours  dextrose 5%. 1000 milliLiter(s) (50 mL/Hr) IV Continuous <Continuous>  dextrose 5%. 1000 milliLiter(s) (100 mL/Hr) IV Continuous <Continuous>  dextrose 50% Injectable 12.5 Gram(s) IV Push once  dextrose 50% Injectable 25 Gram(s) IV Push once  dextrose 50% Injectable 25 Gram(s) IV Push once  furosemide   Injectable 80 milliGRAM(s) IV Push every 12 hours  gabapentin 800 milliGRAM(s) Oral three times a day  glucagon  Injectable 1 milliGRAM(s) IntraMuscular once  heparin   Injectable 5000 Unit(s) SubCutaneous every 8 hours  hydrALAZINE 100 milliGRAM(s) Oral three times a day  insulin glargine Injectable (LANTUS) 14 Unit(s) SubCutaneous at bedtime  insulin lispro (ADMELOG) corrective regimen sliding scale   SubCutaneous Before meals and at bedtime  insulin lispro Injectable (ADMELOG) 6 Unit(s) SubCutaneous three times a day before meals  lisinopril 40 milliGRAM(s) Oral daily  NIFEdipine XL 60 milliGRAM(s) Oral daily  spironolactone 25 milliGRAM(s) Oral daily    MEDICATIONS  (PRN):  dextrose Oral Gel 15 Gram(s) Oral once PRN Blood Glucose LESS THAN 70 milliGRAM(s)/deciliter  oxyCODONE    IR 5 milliGRAM(s) Oral every 6 hours PRN Moderate Pain (4 - 6)  oxyCODONE    IR 10 milliGRAM(s) Oral every 6 hours PRN Severe Pain (7 - 10)      Allergies    No Known Allergies    Intolerances        OBJECTIVE:  Vital Signs Last 24 Hrs  T(C): 37.4 (26 Dec 2023 23:37), Max: 38.7 (26 Dec 2023 22:08)  T(F): 99.4 (26 Dec 2023 23:37), Max: 101.7 (26 Dec 2023 22:08)  HR: 90 (26 Dec 2023 23:37) (76 - 94)  BP: 169/81 (26 Dec 2023 23:37) (108/71 - 182/87)  BP(mean): 85 (26 Dec 2023 10:34) (85 - 120)  RR: 18 (26 Dec 2023 23:37) (18 - 18)  SpO2: 96% (26 Dec 2023 23:37) (94% - 99%)    Parameters below as of 26 Dec 2023 10:34  Patient On (Oxygen Delivery Method): room air      I&O's Summary    25 Dec 2023 07:01  -  26 Dec 2023 07:00  --------------------------------------------------------  IN: 540 mL / OUT: 3600 mL / NET: -3060 mL    26 Dec 2023 07:01  -  26 Dec 2023 23:49  --------------------------------------------------------  IN: 360 mL / OUT: 0 mL / NET: 360 mL    PHYSICAL EXAM:  Gen: Sitting upright in bed at time of exam, appears stated age  HEENT: NCAT, MMM, clear OP  Neck: supple, trachea at midline  CV: RRR, +S1/S2  Pulm: adequate respiratory effort, no increase in work of breathing  Skin: warm and dry,   Ext: LE edema bilaterally, some improvement vs last week.   Neuro: AOx3, speaking in full sentences  Psych: affect and behavior appropriate, pleasant at time of interview    LABS:                        8.8    8.75  )-----------( 304      ( 26 Dec 2023 20:50 )             28.2     12-26    139  |  103  |  36<H>  ----------------------------<  114<H>  4.2   |  30  |  1.30    Ca    9.1      26 Dec 2023 20:50  Mg     2.0     12-26          CAPILLARY BLOOD GLUCOSE      POCT Blood Glucose.: 105 mg/dL (26 Dec 2023 23:37)  POCT Blood Glucose.: 182 mg/dL (26 Dec 2023 11:24)  POCT Blood Glucose.: 230 mg/dL (26 Dec 2023 08:18)  POCT Blood Glucose.: 199 mg/dL (26 Dec 2023 02:17)    Urinalysis Basic - ( 26 Dec 2023 20:50 )    Color: x / Appearance: x / SG: x / pH: x  Gluc: 114 mg/dL / Ketone: x  / Bili: x / Urobili: x   Blood: x / Protein: x / Nitrite: x   Leuk Esterase: x / RBC: x / WBC x   Sq Epi: x / Non Sq Epi: x / Bacteria: x        MICRODATA:      RADIOLOGY/OTHER STUDIES:

## 2023-12-26 NOTE — H&P ADULT - ASSESSMENT
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 on 12/26 and found to be COVID-19 positive, [facility] unable to accept COVID+ patients, so he returned to [facility] for further monitoring.  67yo M w/ PMHx CAD (2 stents 2020), HFmrEF (45-50%), HTN, PAD w/ remote RLE angioplasty, R 4th toe OM s/p partial R 4th ray amputation on 10/24, uncontrolled IDDM (a1c 12 in Oct 2023), recent admission 11/8-11/10 to cardiology service for hypertensive emergency (left AMA), recently admitted (12/01-12/26/23) for R foot soft tissue infection, now s/p R BKA (12/11/23), course c/b CDiff infection w/ completion of oral vanc on 12/25, discharged to [facility] on 12/26 and found to be COVID-19 positive, [facility] unable to accept COVID+ patients, so he returned to [facility] for further monitoring.

## 2023-12-26 NOTE — ED PROVIDER NOTE - PHYSICAL EXAMINATION
CONST: nontoxic NAD speaking in full sentences  HEAD: atraumatic  EYES: conjunctivae clear  NECK: supple  CARD: regular rate  CHEST: breathing comfortably, no stridor/retractions/tripoding  EXT: FROM, R BKA  SKIN: warm, dry  NEURO: awake alert answering questions following commands moving all extremities

## 2023-12-26 NOTE — ED ADULT TRIAGE NOTE - CHIEF COMPLAINT QUOTE
Pt aaox3 s/p rt tejas CARDENAS'd today from [facility] and sent to [facility]. Pt tested covid +, denies any symptoms. Pt sent back to ED because "rehab does not accept covid + pt's". Pt aaox3 s/p rt tejas CARDENAS'd today from [facility] and sent to [facility]. Pt tested covid +, denies any symptoms. Pt sent back to ED because "rehab does not accept covid + pt's".

## 2023-12-27 DIAGNOSIS — Z86.19 PERSONAL HISTORY OF OTHER INFECTIOUS AND PARASITIC DISEASES: ICD-10-CM

## 2023-12-27 DIAGNOSIS — D64.9 ANEMIA, UNSPECIFIED: ICD-10-CM

## 2023-12-27 DIAGNOSIS — G62.9 POLYNEUROPATHY, UNSPECIFIED: ICD-10-CM

## 2023-12-27 LAB
ANION GAP SERPL CALC-SCNC: 7 MMOL/L — SIGNIFICANT CHANGE UP (ref 5–17)
ANION GAP SERPL CALC-SCNC: 7 MMOL/L — SIGNIFICANT CHANGE UP (ref 5–17)
BASOPHILS # BLD AUTO: 0.05 K/UL — SIGNIFICANT CHANGE UP (ref 0–0.2)
BASOPHILS # BLD AUTO: 0.05 K/UL — SIGNIFICANT CHANGE UP (ref 0–0.2)
BASOPHILS NFR BLD AUTO: 0.7 % — SIGNIFICANT CHANGE UP (ref 0–2)
BASOPHILS NFR BLD AUTO: 0.7 % — SIGNIFICANT CHANGE UP (ref 0–2)
BUN SERPL-MCNC: 34 MG/DL — HIGH (ref 7–23)
BUN SERPL-MCNC: 34 MG/DL — HIGH (ref 7–23)
CALCIUM SERPL-MCNC: 8.8 MG/DL — SIGNIFICANT CHANGE UP (ref 8.4–10.5)
CALCIUM SERPL-MCNC: 8.8 MG/DL — SIGNIFICANT CHANGE UP (ref 8.4–10.5)
CHLORIDE SERPL-SCNC: 103 MMOL/L — SIGNIFICANT CHANGE UP (ref 96–108)
CHLORIDE SERPL-SCNC: 103 MMOL/L — SIGNIFICANT CHANGE UP (ref 96–108)
CO2 SERPL-SCNC: 29 MMOL/L — SIGNIFICANT CHANGE UP (ref 22–31)
CO2 SERPL-SCNC: 29 MMOL/L — SIGNIFICANT CHANGE UP (ref 22–31)
CREAT SERPL-MCNC: 1.19 MG/DL — SIGNIFICANT CHANGE UP (ref 0.5–1.3)
CREAT SERPL-MCNC: 1.19 MG/DL — SIGNIFICANT CHANGE UP (ref 0.5–1.3)
EGFR: 67 ML/MIN/1.73M2 — SIGNIFICANT CHANGE UP
EGFR: 67 ML/MIN/1.73M2 — SIGNIFICANT CHANGE UP
EOSINOPHIL # BLD AUTO: 0.02 K/UL — SIGNIFICANT CHANGE UP (ref 0–0.5)
EOSINOPHIL # BLD AUTO: 0.02 K/UL — SIGNIFICANT CHANGE UP (ref 0–0.5)
EOSINOPHIL NFR BLD AUTO: 0.3 % — SIGNIFICANT CHANGE UP (ref 0–6)
EOSINOPHIL NFR BLD AUTO: 0.3 % — SIGNIFICANT CHANGE UP (ref 0–6)
GLUCOSE BLDC GLUCOMTR-MCNC: 107 MG/DL — HIGH (ref 70–99)
GLUCOSE BLDC GLUCOMTR-MCNC: 107 MG/DL — HIGH (ref 70–99)
GLUCOSE BLDC GLUCOMTR-MCNC: 117 MG/DL — HIGH (ref 70–99)
GLUCOSE BLDC GLUCOMTR-MCNC: 117 MG/DL — HIGH (ref 70–99)
GLUCOSE BLDC GLUCOMTR-MCNC: 136 MG/DL — HIGH (ref 70–99)
GLUCOSE BLDC GLUCOMTR-MCNC: 136 MG/DL — HIGH (ref 70–99)
GLUCOSE BLDC GLUCOMTR-MCNC: 230 MG/DL — HIGH (ref 70–99)
GLUCOSE BLDC GLUCOMTR-MCNC: 230 MG/DL — HIGH (ref 70–99)
GLUCOSE BLDC GLUCOMTR-MCNC: 65 MG/DL — LOW (ref 70–99)
GLUCOSE BLDC GLUCOMTR-MCNC: 65 MG/DL — LOW (ref 70–99)
GLUCOSE SERPL-MCNC: 83 MG/DL — SIGNIFICANT CHANGE UP (ref 70–99)
GLUCOSE SERPL-MCNC: 83 MG/DL — SIGNIFICANT CHANGE UP (ref 70–99)
HCT VFR BLD CALC: 27.5 % — LOW (ref 39–50)
HCT VFR BLD CALC: 27.5 % — LOW (ref 39–50)
HGB BLD-MCNC: 8.5 G/DL — LOW (ref 13–17)
HGB BLD-MCNC: 8.5 G/DL — LOW (ref 13–17)
IMM GRANULOCYTES NFR BLD AUTO: 0.3 % — SIGNIFICANT CHANGE UP (ref 0–0.9)
IMM GRANULOCYTES NFR BLD AUTO: 0.3 % — SIGNIFICANT CHANGE UP (ref 0–0.9)
LYMPHOCYTES # BLD AUTO: 1.1 K/UL — SIGNIFICANT CHANGE UP (ref 1–3.3)
LYMPHOCYTES # BLD AUTO: 1.1 K/UL — SIGNIFICANT CHANGE UP (ref 1–3.3)
LYMPHOCYTES # BLD AUTO: 15 % — SIGNIFICANT CHANGE UP (ref 13–44)
LYMPHOCYTES # BLD AUTO: 15 % — SIGNIFICANT CHANGE UP (ref 13–44)
MAGNESIUM SERPL-MCNC: 1.9 MG/DL — SIGNIFICANT CHANGE UP (ref 1.6–2.6)
MAGNESIUM SERPL-MCNC: 1.9 MG/DL — SIGNIFICANT CHANGE UP (ref 1.6–2.6)
MCHC RBC-ENTMCNC: 28.3 PG — SIGNIFICANT CHANGE UP (ref 27–34)
MCHC RBC-ENTMCNC: 28.3 PG — SIGNIFICANT CHANGE UP (ref 27–34)
MCHC RBC-ENTMCNC: 30.9 GM/DL — LOW (ref 32–36)
MCHC RBC-ENTMCNC: 30.9 GM/DL — LOW (ref 32–36)
MCV RBC AUTO: 91.7 FL — SIGNIFICANT CHANGE UP (ref 80–100)
MCV RBC AUTO: 91.7 FL — SIGNIFICANT CHANGE UP (ref 80–100)
MONOCYTES # BLD AUTO: 1.49 K/UL — HIGH (ref 0–0.9)
MONOCYTES # BLD AUTO: 1.49 K/UL — HIGH (ref 0–0.9)
MONOCYTES NFR BLD AUTO: 20.3 % — HIGH (ref 2–14)
MONOCYTES NFR BLD AUTO: 20.3 % — HIGH (ref 2–14)
NEUTROPHILS # BLD AUTO: 4.66 K/UL — SIGNIFICANT CHANGE UP (ref 1.8–7.4)
NEUTROPHILS # BLD AUTO: 4.66 K/UL — SIGNIFICANT CHANGE UP (ref 1.8–7.4)
NEUTROPHILS NFR BLD AUTO: 63.4 % — SIGNIFICANT CHANGE UP (ref 43–77)
NEUTROPHILS NFR BLD AUTO: 63.4 % — SIGNIFICANT CHANGE UP (ref 43–77)
NRBC # BLD: 0 /100 WBCS — SIGNIFICANT CHANGE UP (ref 0–0)
NRBC # BLD: 0 /100 WBCS — SIGNIFICANT CHANGE UP (ref 0–0)
PHOSPHATE SERPL-MCNC: 3.4 MG/DL — SIGNIFICANT CHANGE UP (ref 2.5–4.5)
PHOSPHATE SERPL-MCNC: 3.4 MG/DL — SIGNIFICANT CHANGE UP (ref 2.5–4.5)
PLATELET # BLD AUTO: 317 K/UL — SIGNIFICANT CHANGE UP (ref 150–400)
PLATELET # BLD AUTO: 317 K/UL — SIGNIFICANT CHANGE UP (ref 150–400)
POTASSIUM SERPL-MCNC: 4.2 MMOL/L — SIGNIFICANT CHANGE UP (ref 3.5–5.3)
POTASSIUM SERPL-MCNC: 4.2 MMOL/L — SIGNIFICANT CHANGE UP (ref 3.5–5.3)
POTASSIUM SERPL-SCNC: 4.2 MMOL/L — SIGNIFICANT CHANGE UP (ref 3.5–5.3)
POTASSIUM SERPL-SCNC: 4.2 MMOL/L — SIGNIFICANT CHANGE UP (ref 3.5–5.3)
RBC # BLD: 3 M/UL — LOW (ref 4.2–5.8)
RBC # BLD: 3 M/UL — LOW (ref 4.2–5.8)
RBC # FLD: 18.3 % — HIGH (ref 10.3–14.5)
RBC # FLD: 18.3 % — HIGH (ref 10.3–14.5)
SODIUM SERPL-SCNC: 139 MMOL/L — SIGNIFICANT CHANGE UP (ref 135–145)
SODIUM SERPL-SCNC: 139 MMOL/L — SIGNIFICANT CHANGE UP (ref 135–145)
SURGICAL PATHOLOGY STUDY: SIGNIFICANT CHANGE UP
SURGICAL PATHOLOGY STUDY: SIGNIFICANT CHANGE UP
WBC # BLD: 7.34 K/UL — SIGNIFICANT CHANGE UP (ref 3.8–10.5)
WBC # BLD: 7.34 K/UL — SIGNIFICANT CHANGE UP (ref 3.8–10.5)
WBC # FLD AUTO: 7.34 K/UL — SIGNIFICANT CHANGE UP (ref 3.8–10.5)
WBC # FLD AUTO: 7.34 K/UL — SIGNIFICANT CHANGE UP (ref 3.8–10.5)

## 2023-12-27 PROCEDURE — 99222 1ST HOSP IP/OBS MODERATE 55: CPT | Mod: 24

## 2023-12-27 PROCEDURE — 99233 SBSQ HOSP IP/OBS HIGH 50: CPT | Mod: GC

## 2023-12-27 RX ORDER — HYDROMORPHONE HYDROCHLORIDE 2 MG/ML
0.5 INJECTION INTRAMUSCULAR; INTRAVENOUS; SUBCUTANEOUS ONCE
Refills: 0 | Status: DISCONTINUED | OUTPATIENT
Start: 2023-12-27 | End: 2023-12-27

## 2023-12-27 RX ORDER — GABAPENTIN 400 MG/1
800 CAPSULE ORAL EVERY 8 HOURS
Refills: 0 | Status: DISCONTINUED | OUTPATIENT
Start: 2023-12-27 | End: 2023-12-27

## 2023-12-27 RX ORDER — REMDESIVIR 5 MG/ML
200 INJECTION INTRAVENOUS ONCE
Refills: 0 | Status: COMPLETED | OUTPATIENT
Start: 2023-12-27 | End: 2023-12-27

## 2023-12-27 RX ORDER — REMDESIVIR 5 MG/ML
100 INJECTION INTRAVENOUS ONCE
Refills: 0 | Status: COMPLETED | OUTPATIENT
Start: 2023-12-29 | End: 2023-12-30

## 2023-12-27 RX ORDER — REMDESIVIR 5 MG/ML
100 INJECTION INTRAVENOUS ONCE
Refills: 0 | Status: COMPLETED | OUTPATIENT
Start: 2023-12-28 | End: 2023-12-28

## 2023-12-27 RX ORDER — INSULIN LISPRO 100/ML
VIAL (ML) SUBCUTANEOUS
Refills: 0 | Status: DISCONTINUED | OUTPATIENT
Start: 2023-12-27 | End: 2024-01-05

## 2023-12-27 RX ORDER — GABAPENTIN 400 MG/1
600 CAPSULE ORAL EVERY 8 HOURS
Refills: 0 | Status: DISCONTINUED | OUTPATIENT
Start: 2023-12-27 | End: 2024-01-05

## 2023-12-27 RX ORDER — SODIUM CHLORIDE 9 MG/ML
100 INJECTION INTRAMUSCULAR; INTRAVENOUS; SUBCUTANEOUS
Refills: 0 | Status: COMPLETED | OUTPATIENT
Start: 2023-12-27 | End: 2023-12-27

## 2023-12-27 RX ADMIN — ATORVASTATIN CALCIUM 80 MILLIGRAM(S): 80 TABLET, FILM COATED ORAL at 23:06

## 2023-12-27 RX ADMIN — INSULIN GLARGINE 12 UNIT(S): 100 INJECTION, SOLUTION SUBCUTANEOUS at 23:06

## 2023-12-27 RX ADMIN — OXYCODONE HYDROCHLORIDE 10 MILLIGRAM(S): 5 TABLET ORAL at 23:12

## 2023-12-27 RX ADMIN — Medication 100 MILLIGRAM(S): at 07:01

## 2023-12-27 RX ADMIN — Medication 100 MILLIGRAM(S): at 15:34

## 2023-12-27 RX ADMIN — Medication 100 MILLIGRAM(S): at 23:07

## 2023-12-27 RX ADMIN — SPIRONOLACTONE 25 MILLIGRAM(S): 25 TABLET, FILM COATED ORAL at 15:33

## 2023-12-27 RX ADMIN — OXYCODONE HYDROCHLORIDE 10 MILLIGRAM(S): 5 TABLET ORAL at 12:23

## 2023-12-27 RX ADMIN — Medication 4: at 18:41

## 2023-12-27 RX ADMIN — CARVEDILOL PHOSPHATE 25 MILLIGRAM(S): 80 CAPSULE, EXTENDED RELEASE ORAL at 21:40

## 2023-12-27 RX ADMIN — GABAPENTIN 600 MILLIGRAM(S): 400 CAPSULE ORAL at 15:34

## 2023-12-27 RX ADMIN — Medication 60 MILLIGRAM(S): at 18:52

## 2023-12-27 RX ADMIN — OXYCODONE HYDROCHLORIDE 10 MILLIGRAM(S): 5 TABLET ORAL at 11:23

## 2023-12-27 RX ADMIN — GABAPENTIN 600 MILLIGRAM(S): 400 CAPSULE ORAL at 22:19

## 2023-12-27 RX ADMIN — HEPARIN SODIUM 5000 UNIT(S): 5000 INJECTION INTRAVENOUS; SUBCUTANEOUS at 15:34

## 2023-12-27 RX ADMIN — LISINOPRIL 40 MILLIGRAM(S): 2.5 TABLET ORAL at 15:33

## 2023-12-27 RX ADMIN — SODIUM CHLORIDE 310 MILLILITER(S): 9 INJECTION INTRAMUSCULAR; INTRAVENOUS; SUBCUTANEOUS at 15:32

## 2023-12-27 RX ADMIN — Medication 40 MILLIGRAM(S): at 07:01

## 2023-12-27 RX ADMIN — Medication 81 MILLIGRAM(S): at 07:01

## 2023-12-27 RX ADMIN — CARVEDILOL PHOSPHATE 25 MILLIGRAM(S): 80 CAPSULE, EXTENDED RELEASE ORAL at 10:22

## 2023-12-27 RX ADMIN — INSULIN GLARGINE 12 UNIT(S): 100 INJECTION, SOLUTION SUBCUTANEOUS at 00:01

## 2023-12-27 RX ADMIN — OXYCODONE HYDROCHLORIDE 10 MILLIGRAM(S): 5 TABLET ORAL at 01:16

## 2023-12-27 RX ADMIN — HEPARIN SODIUM 5000 UNIT(S): 5000 INJECTION INTRAVENOUS; SUBCUTANEOUS at 23:07

## 2023-12-27 RX ADMIN — HYDROMORPHONE HYDROCHLORIDE 0.5 MILLIGRAM(S): 2 INJECTION INTRAMUSCULAR; INTRAVENOUS; SUBCUTANEOUS at 05:10

## 2023-12-27 RX ADMIN — GABAPENTIN 600 MILLIGRAM(S): 400 CAPSULE ORAL at 07:01

## 2023-12-27 RX ADMIN — OXYCODONE HYDROCHLORIDE 10 MILLIGRAM(S): 5 TABLET ORAL at 22:18

## 2023-12-27 RX ADMIN — REMDESIVIR 200 MILLIGRAM(S): 5 INJECTION INTRAVENOUS at 15:32

## 2023-12-27 RX ADMIN — Medication 8 UNIT(S): at 18:40

## 2023-12-27 RX ADMIN — OXYCODONE HYDROCHLORIDE 10 MILLIGRAM(S): 5 TABLET ORAL at 00:46

## 2023-12-27 RX ADMIN — HYDROMORPHONE HYDROCHLORIDE 0.5 MILLIGRAM(S): 2 INJECTION INTRAMUSCULAR; INTRAVENOUS; SUBCUTANEOUS at 05:30

## 2023-12-27 RX ADMIN — HEPARIN SODIUM 5000 UNIT(S): 5000 INJECTION INTRAVENOUS; SUBCUTANEOUS at 07:01

## 2023-12-27 NOTE — CONSULT NOTE ADULT - SUBJECTIVE AND OBJECTIVE BOX
Patient is a 68y old  Male who presents with a chief complaint of     HPI:  67yo M w/ PMHx CAD (2 stents 2020), HFmrEF (45-50%), HTN, PAD w/ remote RLE angioplasty, R 4th toe OM s/p partial R 4th ray amputation on 10/24, uncontrolled IDDM (a1c 12 in Oct 2023), recent admission 11/8-11/10 to cardiology service for hypertensive emergency (left AMA), recently admitted (12/01-12/26/23) for R foot soft tissue infection, now s/p R BKA (12/11/23), course c/b CDiff infection w/ completion of oral vanc on 12/25, discharged to [facility] on 12/26 and found to be COVID-19 positive, [facility] unable to accept COVID+ patients, so he returned to [facility] for further monitoring. Patient denies current symptoms of cough, runny nose, headache, chills, sob, and wheezing. He says he had no signs that he had COVID-19, and his main complaint is his right sided leg pain. He received Oxy 5mg prior to conversation, and he currently states his pain is an 11/10, as he was going to the bathroom and he hit his stump on the wall. He says Dilaudid was helping his pain the best during his admission. His amputation site was recently wrapped/bandage changed this morning when he got to the [facility]. He had a recent bowel movement in the ED that was formed stool, denies current diarrhea, abd pain.     On admission:  Initial vital sign: Temp 100.3 -> 101.7, HR 78, /78, Sp02 94% on room air  Labs significant for: Hgb 8.8, BUN 36,  glucose 114, COVID-19 positive  Imaging: None  Interventions: Tylenol 650mg x1, Oxycodone 5mg x1  Consults: none   (26 Dec 2023 23:10)    PAST MEDICAL & SURGICAL HISTORY:  IDDM (insulin dependent diabetes mellitus)      HTN (hypertension)      CAD S/P percutaneous coronary angioplasty      Neuropathy      PAD (peripheral artery disease)      PNA (pneumonia)      Gangrene of toe of right foot      Moderate aortic stenosis      Acute on chronic diastolic congestive heart failure      Hyperlipidemia      History of partial ray amputation of fourth toe of right foot        MEDICATIONS  (STANDING):  aspirin  chewable 81 milliGRAM(s) Oral every 24 hours  atorvastatin 80 milliGRAM(s) Oral at bedtime  carvedilol 25 milliGRAM(s) Oral every 12 hours  dextrose 5%. 1000 milliLiter(s) (100 mL/Hr) IV Continuous <Continuous>  dextrose 5%. 1000 milliLiter(s) (50 mL/Hr) IV Continuous <Continuous>  dextrose 50% Injectable 25 Gram(s) IV Push once  dextrose 50% Injectable 25 Gram(s) IV Push once  dextrose 50% Injectable 12.5 Gram(s) IV Push once  gabapentin 600 milliGRAM(s) Oral every 8 hours  glucagon  Injectable 1 milliGRAM(s) IntraMuscular once  heparin   Injectable 5000 Unit(s) SubCutaneous every 8 hours  hydrALAZINE 100 milliGRAM(s) Oral every 8 hours  insulin glargine Injectable (LANTUS) 12 Unit(s) SubCutaneous at bedtime  insulin lispro (ADMELOG) corrective regimen sliding scale   SubCutaneous Before meals and at bedtime  insulin lispro Injectable (ADMELOG) 8 Unit(s) SubCutaneous three times a day before meals  lisinopril 40 milliGRAM(s) Oral every 24 hours  NIFEdipine XL 60 milliGRAM(s) Oral every 24 hours  remdesivir  IVPB 200 milliGRAM(s) IV Intermittent once  sodium chloride 0.9%. 100 milliLiter(s) (310 mL/Hr) IV Continuous <Continuous>  spironolactone 25 milliGRAM(s) Oral every 24 hours  torsemide 40 milliGRAM(s) Oral every 24 hours    MEDICATIONS  (PRN):  acetaminophen     Tablet .. 650 milliGRAM(s) Oral every 6 hours PRN Temp greater or equal to 38C (100.4F), Mild Pain (1 - 3)  dextrose Oral Gel 15 Gram(s) Oral once PRN Blood Glucose LESS THAN 70 milliGRAM(s)/deciliter  oxyCODONE    IR 5 milliGRAM(s) Oral every 6 hours PRN Moderate Pain (4 - 6)  oxyCODONE    IR 10 milliGRAM(s) Oral every 6 hours PRN Severe Pain (7 - 10)            FAMILY HISTORY:      CBC Full  -  ( 27 Dec 2023 05:30 )  WBC Count : 7.34 K/uL  RBC Count : 3.00 M/uL  Hemoglobin : 8.5 g/dL  Hematocrit : 27.5 %  Platelet Count - Automated : 317 K/uL  Mean Cell Volume : 91.7 fl  Mean Cell Hemoglobin : 28.3 pg  Mean Cell Hemoglobin Concentration : 30.9 gm/dL  Auto Neutrophil # : 4.66 K/uL  Auto Lymphocyte # : 1.10 K/uL  Auto Monocyte # : 1.49 K/uL  Auto Eosinophil # : 0.02 K/uL  Auto Basophil # : 0.05 K/uL  Auto Neutrophil % : 63.4 %  Auto Lymphocyte % : 15.0 %  Auto Monocyte % : 20.3 %  Auto Eosinophil % : 0.3 %  Auto Basophil % : 0.7 %      12-27    139  |  103  |  34<H>  ----------------------------<  83  4.2   |  29  |  1.19    Ca    8.8      27 Dec 2023 05:30  Phos  3.4     12-27  Mg     1.9     12-27        Urinalysis Basic - ( 27 Dec 2023 05:30 )    Color: x / Appearance: x / SG: x / pH: x  Gluc: 83 mg/dL / Ketone: x  / Bili: x / Urobili: x   Blood: x / Protein: x / Nitrite: x   Leuk Esterase: x / RBC: x / WBC x   Sq Epi: x / Non Sq Epi: x / Bacteria: x        Radiology :             Review of Systems : per HPI         Vital Signs Last 24 Hrs  T(C): 37.3 (27 Dec 2023 10:15), Max: 38.7 (26 Dec 2023 22:08)  T(F): 99.2 (27 Dec 2023 10:15), Max: 101.7 (26 Dec 2023 22:08)  HR: 74 (27 Dec 2023 10:15) (69 - 90)  BP: 174/87 (27 Dec 2023 10:15) (112/78 - 182/87)  BP(mean): 116 (27 Dec 2023 10:15) (103 - 116)  RR: 16 (27 Dec 2023 10:15) (16 - 18)  SpO2: 97% (27 Dec 2023 10:15) (94% - 97%)    Parameters below as of 27 Dec 2023 10:15  Patient On (Oxygen Delivery Method): room air            Physical Exam :  on [employer] COVID isolation ,  68 y o man  lying comfortably in semi Mcclellan's position , awake , alert , no acute complaints     Head : normocephalic , atraumatic    Eyes : PERRLA , EOMI , no nystagmus , sclera anicteric    ENT / FACE : neg nasal discharge , uvula midline , no oropharyngeal erythema / exudate    Neck : supple , negative JVD , negative carotid bruits , no thyromegaly    Chest : CTA bilaterally , neg wheeze / rhonchi / rales / crackles / egophany    Cardiovascular : regular rate and rhythm , neg murmurs / rubs / gallops    Abdomen : soft , non distended , non tender to palpation in all 4 quadrants ,  normal bowel sounds     Extremities : WWP , neg cyanosis /clubbing / edema     Musculoskeletal :  R BK stump wrapped         Skin :        :     Neurologic Exam :     Alert and oriented x 3        Motor Exam:                Right UE:                     no focal weakness ,  > 3+/5 throughout      Left UE:                       no focal weakness ,  > 3+/5 throughout           Right LE:          no focal weakness ,  > 3+/5 throughout        Left LE:          no focal weakness ,  > 3+/5 throughout         Sensation :         intact to light touch x 4 extremities        Gait :  not tested          PM&R Impression : admitted from [facility] for + COVID     - R BKA         Recommendations / Plan :       1) Physical / Occupational therapy focusing on therapeutic exercises , equipment evaluation , bed mobility/transfer out of bed evaluation , progressive ambulation with assistive devices prn .    2) Current disposition plan recommendation  :    return to [facility]        Patient is a 68y old  Male who presents with a chief complaint of     HPI:  69yo M w/ PMHx CAD (2 stents 2020), HFmrEF (45-50%), HTN, PAD w/ remote RLE angioplasty, R 4th toe OM s/p partial R 4th ray amputation on 10/24, uncontrolled IDDM (a1c 12 in Oct 2023), recent admission 11/8-11/10 to cardiology service for hypertensive emergency (left AMA), recently admitted (12/01-12/26/23) for R foot soft tissue infection, now s/p R BKA (12/11/23), course c/b CDiff infection w/ completion of oral vanc on 12/25, discharged to [facility] on 12/26 and found to be COVID-19 positive, [facility] unable to accept COVID+ patients, so he returned to [facility] for further monitoring. Patient denies current symptoms of cough, runny nose, headache, chills, sob, and wheezing. He says he had no signs that he had COVID-19, and his main complaint is his right sided leg pain. He received Oxy 5mg prior to conversation, and he currently states his pain is an 11/10, as he was going to the bathroom and he hit his stump on the wall. He says Dilaudid was helping his pain the best during his admission. His amputation site was recently wrapped/bandage changed this morning when he got to the [facility]. He had a recent bowel movement in the ED that was formed stool, denies current diarrhea, abd pain.     On admission:  Initial vital sign: Temp 100.3 -> 101.7, HR 78, /78, Sp02 94% on room air  Labs significant for: Hgb 8.8, BUN 36,  glucose 114, COVID-19 positive  Imaging: None  Interventions: Tylenol 650mg x1, Oxycodone 5mg x1  Consults: none   (26 Dec 2023 23:10)    PAST MEDICAL & SURGICAL HISTORY:  IDDM (insulin dependent diabetes mellitus)      HTN (hypertension)      CAD S/P percutaneous coronary angioplasty      Neuropathy      PAD (peripheral artery disease)      PNA (pneumonia)      Gangrene of toe of right foot      Moderate aortic stenosis      Acute on chronic diastolic congestive heart failure      Hyperlipidemia      History of partial ray amputation of fourth toe of right foot        MEDICATIONS  (STANDING):  aspirin  chewable 81 milliGRAM(s) Oral every 24 hours  atorvastatin 80 milliGRAM(s) Oral at bedtime  carvedilol 25 milliGRAM(s) Oral every 12 hours  dextrose 5%. 1000 milliLiter(s) (100 mL/Hr) IV Continuous <Continuous>  dextrose 5%. 1000 milliLiter(s) (50 mL/Hr) IV Continuous <Continuous>  dextrose 50% Injectable 25 Gram(s) IV Push once  dextrose 50% Injectable 25 Gram(s) IV Push once  dextrose 50% Injectable 12.5 Gram(s) IV Push once  gabapentin 600 milliGRAM(s) Oral every 8 hours  glucagon  Injectable 1 milliGRAM(s) IntraMuscular once  heparin   Injectable 5000 Unit(s) SubCutaneous every 8 hours  hydrALAZINE 100 milliGRAM(s) Oral every 8 hours  insulin glargine Injectable (LANTUS) 12 Unit(s) SubCutaneous at bedtime  insulin lispro (ADMELOG) corrective regimen sliding scale   SubCutaneous Before meals and at bedtime  insulin lispro Injectable (ADMELOG) 8 Unit(s) SubCutaneous three times a day before meals  lisinopril 40 milliGRAM(s) Oral every 24 hours  NIFEdipine XL 60 milliGRAM(s) Oral every 24 hours  remdesivir  IVPB 200 milliGRAM(s) IV Intermittent once  sodium chloride 0.9%. 100 milliLiter(s) (310 mL/Hr) IV Continuous <Continuous>  spironolactone 25 milliGRAM(s) Oral every 24 hours  torsemide 40 milliGRAM(s) Oral every 24 hours    MEDICATIONS  (PRN):  acetaminophen     Tablet .. 650 milliGRAM(s) Oral every 6 hours PRN Temp greater or equal to 38C (100.4F), Mild Pain (1 - 3)  dextrose Oral Gel 15 Gram(s) Oral once PRN Blood Glucose LESS THAN 70 milliGRAM(s)/deciliter  oxyCODONE    IR 5 milliGRAM(s) Oral every 6 hours PRN Moderate Pain (4 - 6)  oxyCODONE    IR 10 milliGRAM(s) Oral every 6 hours PRN Severe Pain (7 - 10)            FAMILY HISTORY:      CBC Full  -  ( 27 Dec 2023 05:30 )  WBC Count : 7.34 K/uL  RBC Count : 3.00 M/uL  Hemoglobin : 8.5 g/dL  Hematocrit : 27.5 %  Platelet Count - Automated : 317 K/uL  Mean Cell Volume : 91.7 fl  Mean Cell Hemoglobin : 28.3 pg  Mean Cell Hemoglobin Concentration : 30.9 gm/dL  Auto Neutrophil # : 4.66 K/uL  Auto Lymphocyte # : 1.10 K/uL  Auto Monocyte # : 1.49 K/uL  Auto Eosinophil # : 0.02 K/uL  Auto Basophil # : 0.05 K/uL  Auto Neutrophil % : 63.4 %  Auto Lymphocyte % : 15.0 %  Auto Monocyte % : 20.3 %  Auto Eosinophil % : 0.3 %  Auto Basophil % : 0.7 %      12-27    139  |  103  |  34<H>  ----------------------------<  83  4.2   |  29  |  1.19    Ca    8.8      27 Dec 2023 05:30  Phos  3.4     12-27  Mg     1.9     12-27        Urinalysis Basic - ( 27 Dec 2023 05:30 )    Color: x / Appearance: x / SG: x / pH: x  Gluc: 83 mg/dL / Ketone: x  / Bili: x / Urobili: x   Blood: x / Protein: x / Nitrite: x   Leuk Esterase: x / RBC: x / WBC x   Sq Epi: x / Non Sq Epi: x / Bacteria: x        Radiology :             Review of Systems : per HPI         Vital Signs Last 24 Hrs  T(C): 37.3 (27 Dec 2023 10:15), Max: 38.7 (26 Dec 2023 22:08)  T(F): 99.2 (27 Dec 2023 10:15), Max: 101.7 (26 Dec 2023 22:08)  HR: 74 (27 Dec 2023 10:15) (69 - 90)  BP: 174/87 (27 Dec 2023 10:15) (112/78 - 182/87)  BP(mean): 116 (27 Dec 2023 10:15) (103 - 116)  RR: 16 (27 Dec 2023 10:15) (16 - 18)  SpO2: 97% (27 Dec 2023 10:15) (94% - 97%)    Parameters below as of 27 Dec 2023 10:15  Patient On (Oxygen Delivery Method): room air            Physical Exam :  on [employer] COVID isolation ,  68 y o man  lying comfortably in semi Mcclellan's position , awake , alert , no acute complaints     Head : normocephalic , atraumatic    Eyes : PERRLA , EOMI , no nystagmus , sclera anicteric    ENT / FACE : neg nasal discharge , uvula midline , no oropharyngeal erythema / exudate    Neck : supple , negative JVD , negative carotid bruits , no thyromegaly    Chest : CTA bilaterally , neg wheeze / rhonchi / rales / crackles / egophany    Cardiovascular : regular rate and rhythm , neg murmurs / rubs / gallops    Abdomen : soft , non distended , non tender to palpation in all 4 quadrants ,  normal bowel sounds     Extremities : WWP , neg cyanosis /clubbing / edema     Musculoskeletal :  R BK stump wrapped         Skin :        :     Neurologic Exam :     Alert and oriented x 3        Motor Exam:                Right UE:                     no focal weakness ,  > 3+/5 throughout      Left UE:                       no focal weakness ,  > 3+/5 throughout           Right LE:          no focal weakness ,  > 3+/5 throughout        Left LE:          no focal weakness ,  > 3+/5 throughout         Sensation :         intact to light touch x 4 extremities        Gait :  not tested          PM&R Impression : admitted from [facility] for + COVID     - R BKA         Recommendations / Plan :       1) Physical / Occupational therapy focusing on therapeutic exercises , equipment evaluation , bed mobility/transfer out of bed evaluation , progressive ambulation with assistive devices prn .    2) Current disposition plan recommendation  :    return to [facility]

## 2023-12-27 NOTE — PHYSICAL THERAPY INITIAL EVALUATION ADULT - IMPAIRMENTS FOUND, PT EVAL
aerobic capacity/endurance/anthropometric characteristics/gait, locomotion, and balance/gross motor/muscle strength/poor safety awareness/posture/ROM

## 2023-12-27 NOTE — PROGRESS NOTE ADULT - PROBLEM SELECTOR PLAN 3
s/p R BKA on 12/11/23 and s/p BKA closure on 12/18/23 by vascular surgery at [facility]  - pain regimen: Oxy 10mg q6hrs severe pain, Oxy 5mg q6hrs moderate pain, tylenol 650mg q6 hrs prn mild pain/fevers  - has outpatient f/u apt w/ vascular: Dr. Freed on 1/18/24 at 10 A.M. s/p R BKA on 12/11/23 and s/p BKA closure on 12/18/23 by vascular surgery at [facility]  - pain regimen: Oxy 10mg q6hrs severe pain, Oxy 5mg q6hrs moderate pain, tylenol 650mg q6 hrs prn mild pain/fevers  - has outpatient f/u apt w/ vascular: Dr. Freed on 1/18/24 at 10 A.M.

## 2023-12-27 NOTE — PROGRESS NOTE ADULT - ATTENDING COMMENTS
68-year-old male with a PMHx of CAD (s/p PCI x 2 in 2020), HTN, IDDM (A1c 12%, 10/2023), HFmrEF, PAD (s/p remote RLE angioplasty), right 4th toe OM (s/p partial 4th ray amputation, 10/24), RLE angiogram with AT lithotripsy and AT/peroneal balloon angioplasty and recent admissions hypertensive emergency (left AMA) and right BKA who presented from [facility] with COVID.      #COVID-19  -start Remdesivir as patient is high risk for progression to severe disease   -no indication of steroids as patient is not hypoxic     Rest of plan as per resident note. 68-year-old male with a PMHx of CAD (s/p PCI x 2 in 2020), HTN, IDDM (A1c 12%, 10/2023), HFmrEF, PAD (s/p remote RLE angioplasty), right 4th toe OM (s/p partial 4th ray amputation, 10/24), RLE angiogram with AT lithotripsy and AT/peroneal balloon angioplasty and recent admissions hypertensive emergency (left AMA) and right BKA who presented from [facility] with COVID.      #COVID-19  -start Remdesivir as patient is high risk for progression to severe disease   -no indication of steroids as patient is not hypoxic     Rest of plan as per resident note.

## 2023-12-27 NOTE — CONSULT NOTE ADULT - ASSESSMENT
{\rtf1\wiqctz52655\ansi\zizjdsc4364\ftnbj\uc1\deff0  {\fonttbl{\f0 \fnil Segoe UI;}{\f1 \fnil \fcharset0 Segoe UI;}{\f2 \fnil Times New Anthony;}}  {\colortbl ;\srf304\rsjol489\axid591 ;\red0\green0\blue0 ;\red0\green0\byoj295 ;\red0\green0\blue0 ;}  {\stylesheet{\f0\fs20 Normal;}{\cs1 Default Paragraph Font;}{\cs2\f0\fs16 Line Number;}{\cs3\f2\fs24\ul\cf3 Hyperlink;}}  {\*\revtbl{Unknown;}}  \pgdhmg45152\nobvhu49207\fkkli7081\mmrxr6004\zyjyl7079\zekgq8302\btkjpfy680\hmwmkdc763\nogrowautofit\tdgcke188\formshade\nofeaturethrottle1\dntblnsbdb\fet4\aendnotes\aftnnrlc\pgbrdrhead\pgbrdrfoot  \sectd\kituda08852\vqoqzp92648\guttersxn0\buawvexd5099\vykwgleb9805\xvypbbgt6165\fqgkhdbo1429\rtptyvs999\ixgttdw329\sbkpage\pgncont\pgndec  \plain\plain\f0\fs24\ql\plain\f0\fs24\plain\f0\fs20\gaia9568\hich\f0\dbch\f0\loch\f0\fs20\par  I M\par  \par  68 y o M w/ PMHx CAD (2 stents 2020), HFmrEF (45-50%), HTN, PAD w/ remote RLE angioplasty, R 4th toe OM s/p partial R 4th ray amputation on 10/24, uncontrolled IDDM (a1c 12 in Oct 2023), recent admission 11/8-11/10 to cardiology service for hypertensive   emergency (left AMA), recently admitted (12/01-12/26/23) for R foot soft tissue infection, now s/p R BKA (12/11/23), course c/b CDiff infection w/ completion of oral vanc on 12/25, discharged to [facility] on 12/26 and found to be COVID-19 positive, [facility] unable   to accept COVID+ patients, so he returned to [facility] for further monitoring. \par  \par  \plain\f1\fs20\miul5445\hich\f1\dbch\f1\loch\f1\cf2\fs20\ul{\field{\*\fldinst HYPERLINK [id number] }{\fldrslt Problem/Plan - 1:}}\plain\f0\fs20\wjke2421\hich\f0\dbch\f0\loch\f0\fs20\ql\par  \'b7  {\*\bkmkstart xe41800666013}{\*\bkmkend sj19002565326}Problem: {\*\bkmkstart tf61877587986}{\*\bkmkend tn65429767384}2019 novel coronavirus disease (COVID-19). \par  \'b7  {\*\bkmkstart mp77055153460}{\*\bkmkend tu97608268672}Plan: {\*\bkmkstart ga96003342038}{\*\bkmkend lg71862252198}Patient d/c to [facility] on 12/26, found to be COVID-19+ at [facility], returned to [facility] as rehab can't accept patients w/ COVID. He denies cough,   runny nose, chills, sob, Found to have fever 101.6 degrees F in ED, no other SIRS criteria. Currently saturating 94-95% on room air, not hypoxic. Fever likely due to COVID-19 infection.\par  - continue to monitor symptoms\par  - tylenol 650mg Q6hrs prn fever/mild pain\par  - patient currently not meeting criteria for inpatient remdesivir txt.\par  \par  \plain\f1\fs20\xxkj7096\hich\f1\dbch\f1\loch\f1\cf2\fs20\ul{\field{\*\fldinst HYPERLINK [id number] }{\fldrslt Problem/Plan - 2:}}\plain\f0\fs20\fgwv5917\hich\f0\dbch\f0\loch\f0\fs20\ql\par  \'b7  {\*\bkmkstart ls11431126613}{\*\bkmkend jm19593351782}Problem: {\*\bkmkstart zm86105811343}{\*\bkmkend ey59274562498}Systemic inflammatory response syndrome (SIRS). \par  \'b7  {\*\bkmkstart ba23199141562}{\*\bkmkend yu63694851194}Plan: {\*\bkmkstart ii46016668620}{\*\bkmkend zi78512090327}Patient w/ fever 101.6 degrees F, no other SIRS criteria met. Found to be COVID-19 positive. Fevers likely 2/2 COVID-19 infection.\par  - continue to monitor off antibiotics\par  - f/u blood cultures.\par  \par  \plain\f1\fs20\eskg2058\hich\f1\dbch\f1\loch\f1\cf2\fs20\ul{\field{\*\fldinst HYPERLINK [id number] }{\fldrslt Problem/Plan - 3:}}\plain\f0\fs20\khmn6057\hich\f0\dbch\f0\loch\f0\fs20\ql\par  \'b7  {\*\bkmkstart fc57968083348}{\*\bkmkend oc89975642107}Problem: {\*\bkmkstart yh45790595802}{\*\bkmkend sh14715667040}Status post below knee amputation, right. \par  \'b7  {\*\bkmkstart ks80667011244}{\*\bkmkend bm01796093716}Plan: {\*\bkmkstart sq59965771705}{\*\bkmkend ci06065747253}s/p R BKA on 12/11/23 and s/p BKA closure on 12/18/23 by vascular surgery at [facility]\par  - pain regimen: Oxy 10mg q6hrs severe pain, Oxy 5mg q6hrs moderate pain, tylenol 650mg q6 hrs prn mild pain/fevers\par  - has outpatient f/u apt w/ vascular: Dr. Freed on 1/18/24 at 10 A.M.\par  \par  \plain\f1\fs20\nnaq8606\hich\f1\dbch\f1\loch\f1\cf2\fs20\ul{\field{\*\fldinst HYPERLINK [id number] }{\fldrslt Problem/Plan - 4:}}\plain\f0\fs20\atpo5721\hich\f0\dbch\f0\loch\f0\fs20\ql\par  \'b7  {\*\bkmkstart eq00659682342}{\*\bkmkend lw64670186973}Problem: {\*\bkmkstart dc34975913710}{\*\bkmkend iq14513420805}HTN (hypertension). \par  \'b7  {\*\bkmkstart oo94186740069}{\*\bkmkend gn46193159218}Plan: {\*\bkmkstart uo30035311039}{\*\bkmkend wq79974226260}D/c meds: Hydralazine 100mg q8h and nifedipine XL 60mg QD\par  - c/w hydral 100mg TID and Nifedipine 60mg qd.\par  \par  \plain\f1\fs20\mssq2203\hich\f1\dbch\f1\loch\f1\cf2\fs20\ul{\field{\*\fldinst HYPERLINK [id number] }{\fldrslt Problem/Plan - 5:}}\plain\f0\fs20\vhnl5755\hich\f0\dbch\f0\loch\f0\fs20\ql\par  \'b7  {\*\bkmkstart mk72022196240}{\*\bkmkend vm48867707087}Problem: {\*\bkmkstart zk86535187271}{\*\bkmkend tb80427893997}Heart failure with mildly reduced ejection fraction. \par  \'b7  {\*\bkmkstart ti37159307531}{\*\bkmkend oj09929498267}Plan: {\*\bkmkstart pt54927293222}{\*\bkmkend rr51746475259}Hx of ICM HF mildly reduced EF (45-50% 11/09), cardiology following during last admission for HF. Patient d/c on Torsemide 40mg PO   qd today, now returning from [facility] \par  - prior recs: IV lasix 80mg q12h w/ strict I/O while inpatient, switch to torsemide 40mg PO QD on d/c\par  - GDMT for HFrEF: Coreg 25mg BID, lisinopril 40mg QD, aldactone 25mg QD; avoid SGLT2i given PAD w/ delayed wound healing\par  \par  Plan:\par  - c/w Torsemide 40mg po qd as patient was d/c ready, returning for new rehab placement due to incidental COVID infection finding\par  - Strict I/Os\par  - c/w Coreg 25mg BID, lisinopril 40mg qd, aldactone 25mg qd\par  - if starts to become volume overloaded can transition back to IV Lasix 80mg BID.\par  \par  \plain\f1\fs20\tcpg5847\hich\f1\dbch\f1\loch\f1\cf2\fs20\ul{\field{\*\fldinst HYPERLINK [id number] }{\fldrslt Problem/Plan - 6:}}\plain\f0\fs20\alyz7879\hich\f0\dbch\f0\loch\f0\fs20\ql\par  \'b7  {\*\bkmkstart iu70789983886}{\*\bkmkend fw66511256988}Problem: {\*\bkmkstart fo66315710465}{\*\bkmkend xe60847103931}Normocytic anemia.\plain\f1\fs20\vnsl7649\hich\f1\dbch\f1\loch\f1\cf2\fs20\strike\plain\f0\fs20\emrt8467\hich\f0\dbch\f0\loch\f0\fs20\par  \'b7  {\*\bkmkstart si44039346617}{\*\bkmkend ob45549294696}Plan: {\*\bkmkstart nv57713643082}{\*\bkmkend jp76541621550}Hgb 8.8, MCV 91, stable from Hgb 8.4 (12/22). No signs of active bleeding.\par  - active type and screen. \par  - transfuse for Hgb <8 given cardiac hx\par  - continue to monitor for signs of anemia.\plain\f1\fs20\ifeb0527\hich\f1\dbch\f1\loch\f1\cf2\fs20\strike\plain\f0\fs20\rltu0612\hich\f0\dbch\f0\loch\f0\fs20\par  \par  \plain\f1\fs20\vqxr3252\hich\f1\dbch\f1\loch\f1\cf2\fs20\ul{\field{\*\fldinst HYPERLINK [id number] }{\fldrslt Problem/Plan - 7:}}\plain\f0\fs20\qhqy3814\hich\f0\dbch\f0\loch\f0\fs20\ql\par  \'b7  {\*\bkmkstart ht94045120182}{\*\bkmkend ut47473633004}Problem: {\*\bkmkstart jz09400896684}{\*\bkmkend ru17183550350}CAD S/P percutaneous coronary angioplasty.\plain\f1\fs20\zkcn5295\hich\f1\dbch\f1\loch\f1\cf2\fs20\strike\plain\f0\fs20\gzff3405\hich\f0\dbch\f0\loch\f0\fs20\par  \'b7  {\*\bkmkstart tn66797115868}{\*\bkmkend uw52253686804}Plan: {\*\bkmkstart wk92447217101}{\*\bkmkend ap77361668453}d/c meds: ASA 81mg QD  and atorvastatin 80mg qhs\par  - c/w aspirin 81mg qd and atorvastatin 80mg qhs.\par  \par  \plain\f1\fs20\ciic9145\hich\f1\dbch\f1\loch\f1\cf2\fs20\ul{\field{\*\fldinst HYPERLINK [id number] }{\fldrslt Problem/Plan - 8:}}\plain\f0\fs20\jnsq3707\hich\f0\dbch\f0\loch\f0\fs20\ql\par  \'b7  {\*\bkmkstart du09190431717}{\*\bkmkend mp11986725205}Problem: {\*\bkmkstart ur64626906452}{\*\bkmkend qa62505060953}PAD (peripheral artery disease).\plain\f1\fs20\jxur9450\hich\f1\dbch\f1\loch\f1\cf2\fs20\strike\plain\f0\fs20\vrwn1755\hich\f0\dbch\f0\loch\f0\fs20\par  \'b7  {\*\bkmkstart gc53068563624}{\*\bkmkend fw36784121106}Plan: {\*\bkmkstart cn45735801599}{\*\bkmkend av52888376888}d/c meds: ASA 81mg QD  and atorvastatin 80mg qhs\par  - c/w aspirin 81mg qd and atorvastatin 80mg qhs.\plain\f1\fs20\rymb0224\hich\f1\dbch\f1\loch\f1\cf2\fs20\strike\plain\f0\fs20\nrfi3765\hich\f0\dbch\f0\loch\f0\fs20\par  \par  \plain\f1\fs20\htos7027\hich\f1\dbch\f1\loch\f1\cf2\fs20\ul{\field{\*\fldinst HYPERLINK [id number] }{\fldrslt Problem/Plan - 9:}}\plain\f0\fs20\jxfn9845\hich\f0\dbch\f0\loch\f0\fs20\ql\par  \'b7  {\*\bkmkstart cq14355795416}{\*\bkmkend kf21460565001}Problem: {\*\bkmkstart ga84464162554}{\*\bkmkend ps35825158165}IDDM (insulin dependent diabetes mellitus).\plain\f1\fs20\eqbl4012\hich\f1\dbch\f1\loch\f1\cf2\fs20\strike\plain\f0\fs20\xgnh2786\hich\f0\dbch\f0\loch\f0\fs20\par  \'b7  {\*\bkmkstart ty07717400776}{\*\bkmkend zi03929619677}Plan: {\*\bkmkstart gn67635548227}{\*\bkmkend pg51697333440}Hx of Type 2 Diabetes (A1c 13.5% 12/05) c/b PAD, diabetic foot infection. D/c on Lantus 12units qhs and Lispro 8units TID w/ meals.   Endocrine consulted last admission w/ rec for Lantus 12units qhs and Lispro 8units TID on discharge.\par  - c/w Lantus 12units qhs, Lispro 8units TID, and moderate sliding scale w/ meals and bedtime\par  - carb consistent diet\par  - Patient's fingerstick glucose goal 100-180 mg/dL.\plain\f1\fs20\dbmn0929\hich\f1\dbch\f1\loch\f1\cf2\fs20\strike\plain\f0\fs20\kzue3817\hich\f0\dbch\f0\loch\f0\fs20\par  \par  \plain\f1\fs20\zrsh2373\hich\f1\dbch\f1\loch\f1\cf2\fs20\ul{\field{\*\fldinst HYPERLINK [id number] }{\fldrslt Problem/Plan - 10:}}\plain\f0\fs20\ogpp5065\hich\f0\dbch\f0\loch\f0\fs20\ql\par  \'b7  {\*\bkmkstart bt83001173959}{\*\bkmkend if06482212082}Problem: {\*\bkmkstart rq66772577450}{\*\bkmkend wg76571348078}History of Clostridium difficile infection.\plain\f1\fs20\gccy5996\hich\f1\dbch\f1\loch\f1\cf2\fs20\strike\plain\f0\fs20\usrh3465\hich\f0\dbch\f0\loch\f0\fs20\par  \'b7  {\*\bkmkstart pn62017844004}{\*\bkmkend lz97580560796}Plan; {\*\bkmkstart qe70309286300}{\*\bkmkend km83067038034}Hx of CDIff infection last admission. Completed txt w/ oral Vanc on 12/25. Now reporting formed bowel movements.\par  - c/w contact precautions.\plain\f1\fs20\otlh2551\hich\f1\dbch\f1\loch\f1\cf2\fs20\strike\plain\f0\fs20\ohmf4878\hich\f0\dbch\f0\loch\f0\fs20\par  \par  \plain\f1\fs20\beor3295\hich\f1\dbch\f1\loch\f1\cf2\fs20\ul{\field{\*\fldinst HYPERLINK [id number] }{\fldrslt Problem/Plan - 11:}}\plain\f0\fs20\otiu5908\hich\f0\dbch\f0\loch\f0\fs20\ql\par  \'b7  {\*\bkmkstart be831350284891}{\*\bkmkend bb837444043306}Problem: {\*\bkmkstart em191533943692}{\*\bkmkend li001760059341}Neuropathy.\plain\f1\fs20\mudu3983\hich\f1\dbch\f1\loch\f1\cf2\fs20\strike\plain\f0\fs20\pkgm4790\hich\f0\dbch\f0\loch\f0\fs20\par  \'b7  {\*\bkmkstart mb159063981639}{\*\bkmkend kx415900170886}Plan: {\*\bkmkstart rp669461914446}{\*\bkmkend qg481083033675}Home med: Gabapentin 800mg TID\par  - due to renal function will decrease Gabapentin to 600mg TID.\plain\f1\fs20\ttbl8785\hich\f1\dbch\f1\loch\f1\cf2\fs20\strike\plain\f0\fs20\aqei0885\hich\f0\dbch\f0\loch\f0\fs20\par  \par  \plain\f1\fs20\wowb9030\hich\f1\dbch\f1\loch\f1\cf2\fs20\ul{\field{\*\fldinst HYPERLINK [id number] }{\fldrslt Problem/Plan - 12:}}\plain\f0\fs20\ymml7849\hich\f0\dbch\f0\loch\f0\fs20\ql\par  \'b7  {\*\bkmkstart yy181261848060}{\*\bkmkend yp149225136218}Problem: {\*\bkmkstart az515241948865}{\*\bkmkend lk284381569330}Preventive measure. \par  \'b7  {\*\bkmkstart fx411151353461}{\*\bkmkend rh633703252610}Plan: {\*\bkmkstart xm178927585783}{\*\bkmkend hw822795485122}F: None\par  E: replete as necessary\par  N: Carb consistent diet\par  DVT: Heparin Subq\par  Dispo: RMF{\*\bkmkstart bkcommentCR}{\*\bkmkend bkcommentCR}.\par  \par  \par  }   {\rtf1\pkfyuk24493\ansi\qewxeay7092\ftnbj\uc1\deff0  {\fonttbl{\f0 \fnil Segoe UI;}{\f1 \fnil \fcharset0 Segoe UI;}{\f2 \fnil Times New Anthony;}}  {\colortbl ;\sbb309\oaqqe802\kbpo862 ;\red0\green0\blue0 ;\red0\green0\zdzh196 ;\red0\green0\blue0 ;}  {\stylesheet{\f0\fs20 Normal;}{\cs1 Default Paragraph Font;}{\cs2\f0\fs16 Line Number;}{\cs3\f2\fs24\ul\cf3 Hyperlink;}}  {\*\revtbl{Unknown;}}  \prbmeb45889\pfpaph41216\mtfdt3836\dutju5432\sbqme8069\xuscm2782\tpvowzp365\lplcqry499\nogrowautofit\xapdol018\formshade\nofeaturethrottle1\dntblnsbdb\fet4\aendnotes\aftnnrlc\pgbrdrhead\pgbrdrfoot  \sectd\jxjnug46022\fheerg44523\guttersxn0\xarawliq2742\pyeqknzj4916\ukbygwca4434\nawmbksd2484\ktffhdw133\rkxkats833\sbkpage\pgncont\pgndec  \plain\plain\f0\fs24\ql\plain\f0\fs24\plain\f0\fs20\tukc2372\hich\f0\dbch\f0\loch\f0\fs20\par  I M\par  \par  68 y o M w/ PMHx CAD (2 stents 2020), HFmrEF (45-50%), HTN, PAD w/ remote RLE angioplasty, R 4th toe OM s/p partial R 4th ray amputation on 10/24, uncontrolled IDDM (a1c 12 in Oct 2023), recent admission 11/8-11/10 to cardiology service for hypertensive   emergency (left AMA), recently admitted (12/01-12/26/23) for R foot soft tissue infection, now s/p R BKA (12/11/23), course c/b CDiff infection w/ completion of oral vanc on 12/25, discharged to [facility] on 12/26 and found to be COVID-19 positive, [facility] unable   to accept COVID+ patients, so he returned to [facility] for further monitoring. \par  \par  \plain\f1\fs20\hkvo5148\hich\f1\dbch\f1\loch\f1\cf2\fs20\ul{\field{\*\fldinst HYPERLINK [id number] }{\fldrslt Problem/Plan - 1:}}\plain\f0\fs20\lwdm4653\hich\f0\dbch\f0\loch\f0\fs20\ql\par  \'b7  {\*\bkmkstart ap83014558858}{\*\bkmkend yf22526930335}Problem: {\*\bkmkstart yc76057521799}{\*\bkmkend ak90844386840}2019 novel coronavirus disease (COVID-19). \par  \'b7  {\*\bkmkstart ip93538998385}{\*\bkmkend jg25686940633}Plan: {\*\bkmkstart zh43542476564}{\*\bkmkend iw58095252395}Patient d/c to [facility] on 12/26, found to be COVID-19+ at [facility], returned to [facility] as rehab can't accept patients w/ COVID. He denies cough,   runny nose, chills, sob, Found to have fever 101.6 degrees F in ED, no other SIRS criteria. Currently saturating 94-95% on room air, not hypoxic. Fever likely due to COVID-19 infection.\par  - continue to monitor symptoms\par  - tylenol 650mg Q6hrs prn fever/mild pain\par  - patient currently not meeting criteria for inpatient remdesivir txt.\par  \par  \plain\f1\fs20\tiid4520\hich\f1\dbch\f1\loch\f1\cf2\fs20\ul{\field{\*\fldinst HYPERLINK [id number] }{\fldrslt Problem/Plan - 2:}}\plain\f0\fs20\dkhr3228\hich\f0\dbch\f0\loch\f0\fs20\ql\par  \'b7  {\*\bkmkstart eq89713114086}{\*\bkmkend qh35622316591}Problem: {\*\bkmkstart km53950602835}{\*\bkmkend ru65181215823}Systemic inflammatory response syndrome (SIRS). \par  \'b7  {\*\bkmkstart fg90065278093}{\*\bkmkend qj55250828505}Plan: {\*\bkmkstart kq73030831688}{\*\bkmkend nr00186380412}Patient w/ fever 101.6 degrees F, no other SIRS criteria met. Found to be COVID-19 positive. Fevers likely 2/2 COVID-19 infection.\par  - continue to monitor off antibiotics\par  - f/u blood cultures.\par  \par  \plain\f1\fs20\zmth0897\hich\f1\dbch\f1\loch\f1\cf2\fs20\ul{\field{\*\fldinst HYPERLINK [id number] }{\fldrslt Problem/Plan - 3:}}\plain\f0\fs20\glcx4263\hich\f0\dbch\f0\loch\f0\fs20\ql\par  \'b7  {\*\bkmkstart qg34579636812}{\*\bkmkend gp67193673607}Problem: {\*\bkmkstart rb13859705928}{\*\bkmkend wb28806692360}Status post below knee amputation, right. \par  \'b7  {\*\bkmkstart pp51776320344}{\*\bkmkend ur68762881728}Plan: {\*\bkmkstart os26998467351}{\*\bkmkend ar89817991033}s/p R BKA on 12/11/23 and s/p BKA closure on 12/18/23 by vascular surgery at [facility]\par  - pain regimen: Oxy 10mg q6hrs severe pain, Oxy 5mg q6hrs moderate pain, tylenol 650mg q6 hrs prn mild pain/fevers\par  - has outpatient f/u apt w/ vascular: Dr. Freed on 1/18/24 at 10 A.M.\par  \par  \plain\f1\fs20\pbnr8038\hich\f1\dbch\f1\loch\f1\cf2\fs20\ul{\field{\*\fldinst HYPERLINK [id number] }{\fldrslt Problem/Plan - 4:}}\plain\f0\fs20\yfxm0213\hich\f0\dbch\f0\loch\f0\fs20\ql\par  \'b7  {\*\bkmkstart sm26247843408}{\*\bkmkend ue34864180427}Problem: {\*\bkmkstart mn29633342832}{\*\bkmkend jf40885775376}HTN (hypertension). \par  \'b7  {\*\bkmkstart si66999544247}{\*\bkmkend mi42140054383}Plan: {\*\bkmkstart bq87634804010}{\*\bkmkend su70323352077}D/c meds: Hydralazine 100mg q8h and nifedipine XL 60mg QD\par  - c/w hydral 100mg TID and Nifedipine 60mg qd.\par  \par  \plain\f1\fs20\ngqk0154\hich\f1\dbch\f1\loch\f1\cf2\fs20\ul{\field{\*\fldinst HYPERLINK [id number] }{\fldrslt Problem/Plan - 5:}}\plain\f0\fs20\rqik3043\hich\f0\dbch\f0\loch\f0\fs20\ql\par  \'b7  {\*\bkmkstart yu54783060624}{\*\bkmkend ca26778715523}Problem: {\*\bkmkstart te00817403397}{\*\bkmkend fl27832800223}Heart failure with mildly reduced ejection fraction. \par  \'b7  {\*\bkmkstart vh11346131809}{\*\bkmkend fr49751389189}Plan: {\*\bkmkstart hq04139036807}{\*\bkmkend nw07214913045}Hx of ICM HF mildly reduced EF (45-50% 11/09), cardiology following during last admission for HF. Patient d/c on Torsemide 40mg PO   qd today, now returning from [facility] \par  - prior recs: IV lasix 80mg q12h w/ strict I/O while inpatient, switch to torsemide 40mg PO QD on d/c\par  - GDMT for HFrEF: Coreg 25mg BID, lisinopril 40mg QD, aldactone 25mg QD; avoid SGLT2i given PAD w/ delayed wound healing\par  \par  Plan:\par  - c/w Torsemide 40mg po qd as patient was d/c ready, returning for new rehab placement due to incidental COVID infection finding\par  - Strict I/Os\par  - c/w Coreg 25mg BID, lisinopril 40mg qd, aldactone 25mg qd\par  - if starts to become volume overloaded can transition back to IV Lasix 80mg BID.\par  \par  \plain\f1\fs20\rbmo3966\hich\f1\dbch\f1\loch\f1\cf2\fs20\ul{\field{\*\fldinst HYPERLINK [id number] }{\fldrslt Problem/Plan - 6:}}\plain\f0\fs20\uuas9030\hich\f0\dbch\f0\loch\f0\fs20\ql\par  \'b7  {\*\bkmkstart lv41809884930}{\*\bkmkend yt47753236174}Problem: {\*\bkmkstart is35977554413}{\*\bkmkend mo74992146534}Normocytic anemia.\plain\f1\fs20\rgcv0516\hich\f1\dbch\f1\loch\f1\cf2\fs20\strike\plain\f0\fs20\rgdl0931\hich\f0\dbch\f0\loch\f0\fs20\par  \'b7  {\*\bkmkstart oy38744183393}{\*\bkmkend qr58003125335}Plan: {\*\bkmkstart oy34211880563}{\*\bkmkend wc69707529218}Hgb 8.8, MCV 91, stable from Hgb 8.4 (12/22). No signs of active bleeding.\par  - active type and screen. \par  - transfuse for Hgb <8 given cardiac hx\par  - continue to monitor for signs of anemia.\plain\f1\fs20\bioo5374\hich\f1\dbch\f1\loch\f1\cf2\fs20\strike\plain\f0\fs20\wdxi7072\hich\f0\dbch\f0\loch\f0\fs20\par  \par  \plain\f1\fs20\ifld6856\hich\f1\dbch\f1\loch\f1\cf2\fs20\ul{\field{\*\fldinst HYPERLINK [id number] }{\fldrslt Problem/Plan - 7:}}\plain\f0\fs20\sing2697\hich\f0\dbch\f0\loch\f0\fs20\ql\par  \'b7  {\*\bkmkstart wr31340051442}{\*\bkmkend wt54522560557}Problem: {\*\bkmkstart lk94419201153}{\*\bkmkend sn71257404539}CAD S/P percutaneous coronary angioplasty.\plain\f1\fs20\qgxl8250\hich\f1\dbch\f1\loch\f1\cf2\fs20\strike\plain\f0\fs20\ozuf9352\hich\f0\dbch\f0\loch\f0\fs20\par  \'b7  {\*\bkmkstart dx30683440609}{\*\bkmkend mp16437684375}Plan: {\*\bkmkstart zr03160308376}{\*\bkmkend wm35603649350}d/c meds: ASA 81mg QD  and atorvastatin 80mg qhs\par  - c/w aspirin 81mg qd and atorvastatin 80mg qhs.\par  \par  \plain\f1\fs20\qpui6969\hich\f1\dbch\f1\loch\f1\cf2\fs20\ul{\field{\*\fldinst HYPERLINK [id number] }{\fldrslt Problem/Plan - 8:}}\plain\f0\fs20\ebdr3553\hich\f0\dbch\f0\loch\f0\fs20\ql\par  \'b7  {\*\bkmkstart vj24878062962}{\*\bkmkend sm61936850527}Problem: {\*\bkmkstart tq06175307457}{\*\bkmkend md49504623350}PAD (peripheral artery disease).\plain\f1\fs20\ftin9219\hich\f1\dbch\f1\loch\f1\cf2\fs20\strike\plain\f0\fs20\klct2677\hich\f0\dbch\f0\loch\f0\fs20\par  \'b7  {\*\bkmkstart qy98612710664}{\*\bkmkend hu52254763218}Plan: {\*\bkmkstart jl04555534806}{\*\bkmkend mg10067628875}d/c meds: ASA 81mg QD  and atorvastatin 80mg qhs\par  - c/w aspirin 81mg qd and atorvastatin 80mg qhs.\plain\f1\fs20\mbaj9739\hich\f1\dbch\f1\loch\f1\cf2\fs20\strike\plain\f0\fs20\bscj4179\hich\f0\dbch\f0\loch\f0\fs20\par  \par  \plain\f1\fs20\ghng5650\hich\f1\dbch\f1\loch\f1\cf2\fs20\ul{\field{\*\fldinst HYPERLINK [id number] }{\fldrslt Problem/Plan - 9:}}\plain\f0\fs20\ynoj4228\hich\f0\dbch\f0\loch\f0\fs20\ql\par  \'b7  {\*\bkmkstart nq94189273305}{\*\bkmkend bf06832420041}Problem: {\*\bkmkstart dt59029036884}{\*\bkmkend wh61617482404}IDDM (insulin dependent diabetes mellitus).\plain\f1\fs20\etzl6686\hich\f1\dbch\f1\loch\f1\cf2\fs20\strike\plain\f0\fs20\cpqa9420\hich\f0\dbch\f0\loch\f0\fs20\par  \'b7  {\*\bkmkstart ww16772917477}{\*\bkmkend qh00327174294}Plan: {\*\bkmkstart tq43261738836}{\*\bkmkend jz57664989799}Hx of Type 2 Diabetes (A1c 13.5% 12/05) c/b PAD, diabetic foot infection. D/c on Lantus 12units qhs and Lispro 8units TID w/ meals.   Endocrine consulted last admission w/ rec for Lantus 12units qhs and Lispro 8units TID on discharge.\par  - c/w Lantus 12units qhs, Lispro 8units TID, and moderate sliding scale w/ meals and bedtime\par  - carb consistent diet\par  - Patient's fingerstick glucose goal 100-180 mg/dL.\plain\f1\fs20\nwvw9009\hich\f1\dbch\f1\loch\f1\cf2\fs20\strike\plain\f0\fs20\mnmx6915\hich\f0\dbch\f0\loch\f0\fs20\par  \par  \plain\f1\fs20\bruy4376\hich\f1\dbch\f1\loch\f1\cf2\fs20\ul{\field{\*\fldinst [employer]PER[employer] [id number] }{\fldrslt Problem/Plan - 10:}}\plain\f0\fs20\oljc9635\hich\f0\dbch\f0\loch\f0\fs20\ql\par  \'b7  {\*\bkmkstart nz12723209846}{\*\bkmkend aj19994929552}Problem: {\*\bkmkstart rx36379005817}{\*\bkmkend fd67344784927}History of Clostridium difficile infection.\plain\f1\fs20\ogop4862\hich\f1\dbch\f1\loch\f1\cf2\fs20\strike\plain\f0\fs20\ekau8043\hich\f0\dbch\f0\loch\f0\fs20\par  \'b7  {\*\bkmkstart xs70952799190}{\*\bkmkend xa14028443637}Plan; {\*\bkmkstart lk90233281342}{\*\bkmkend tl11524800614}Hx of CDIff infection last admission. Completed txt w/ oral Vanc on 12/25. Now reporting formed bowel movements.\par  - c/w contact precautions.\plain\f1\fs20\dhjo2768\hich\f1\dbch\f1\loch\f1\cf2\fs20\strike\plain\f0\fs20\ljdi6555\hich\f0\dbch\f0\loch\f0\fs20\par  \par  \plain\f1\fs20\gdoq6320\hich\f1\dbch\f1\loch\f1\cf2\fs20\ul{\field{\*\fldinst [employer]PER[employer] [id number] }{\fldrslt Problem/Plan - 11:}}\plain\f0\fs20\jjyr8233\hich\f0\dbch\f0\loch\f0\fs20\ql\par  \'b7  {\*\bkmkstart vx331937692823}{\*\bkmkend je370647130694}Problem: {\*\bkmkstart iw967123374639}{\*\bkmkend rv952391224227}Neuropathy.\plain\f1\fs20\hhwi0674\hich\f1\dbch\f1\loch\f1\cf2\fs20\strike\plain\f0\fs20\ltnp8196\hich\f0\dbch\f0\loch\f0\fs20\par  \'b7  {\*\bkmkstart uw618784232385}{\*\bkmkend we979565116251}Plan: {\*\bkmkstart uc096568967760}{\*\bkmkend yn448275484702}Home med: Gabapentin 800mg TID\par  - due to renal function will decrease Gabapentin to 600mg TID.\plain\f1\fs20\ikqv1891\hich\f1\dbch\f1\loch\f1\cf2\fs20\strike\plain\f0\fs20\jxdw9830\hich\f0\dbch\f0\loch\f0\fs20\par  \par  \plain\f1\fs20\xuuc5062\hich\f1\dbch\f1\loch\f1\cf2\fs20\ul{\field{\*\fldinst HYPERLINK [id number] }{\fldrslt Problem/Plan - 12:}}\plain\f0\fs20\sbic7270\hich\f0\dbch\f0\loch\f0\fs20\ql\par  \'b7  {\*\bkmkstart ke020737109771}{\*\bkmkend fk981367989545}Problem: {\*\bkmkstart dk091027845535}{\*\bkmkend ur899025144705}Preventive measure. \par  \'b7  {\*\bkmkstart oj941809043752}{\*\bkmkend [id number]}Plan: {\*\bkmkstart [id number]}{\*\bkmkend [id number]}F: None\par  E: replete as necessary\par  N: Carb consistent diet\par  DVT: Heparin Subq\par  Dispo: RMF{\*\bkmkstart bkcommentCR}{\*\bkmkend bkcommentCR}.\par  \par  \par  }

## 2023-12-27 NOTE — PHYSICAL THERAPY INITIAL EVALUATION ADULT - LEVEL OF INDEPENDENCE: SIT/STAND, REHAB EVAL
secondary pt stated that he unable to perform transfer secondary pt complains that he has 9/10 RLE pain and he got body ache from covid./unable to perform

## 2023-12-27 NOTE — PHYSICAL THERAPY INITIAL EVALUATION ADULT - GENERAL OBSERVATIONS, REHAB EVAL
Pt received semi supine in bed with +IV, +R BKA stump with ace wrap C/D/I, NAD. Pt left as found, NAD, line intact, NAD, RN Cecilio elizalde.

## 2023-12-27 NOTE — PROGRESS NOTE ADULT - ASSESSMENT
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 for further monitoring.  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 on 12/26 and found to be COVID-19 positive, [facility] unable to accept COVID+ patients, so he returned to [facility] for further monitoring.

## 2023-12-27 NOTE — CONSULT NOTE ADULT - ATTENDING COMMENTS
I have personally seen and examined this patient. I have fully participated in the care of this patient. I have reviewed all pertinent clinical information, including history, physical exam, plan and the Resident's, PA's and NP's note and agree except as noted. This is a patient originally known to Dr. Corky Oneal, but I was asked to perform the guillotine R BKA on 12/11/23 and take over his care rest of his recent admission. 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 He was transferred from the medical service to vascular postop and underwent staged R BKA w/ closure (12/18/23). He was discharged to [facility] on 12/26/23 but was sent back to the ED because he tested positive for COVID and the [facility] doesn't accept COVID patients.     On 12/27, the R BKA stump looks fine. We popped superficial bleb. Staple line clean, dry and intact. Swelling improving. No other major complaints.     PLAN & RECOMMENDATIONS  - ASA/SQH  - Physical therapy  - COVID management per primary medical team  - Outpatient vascular surgery follow up with me on 1/18/24    Thank you,    Michelet Freed MD  Attending Vascular Surgeon  [facility] at [address] [place] [address]  Office: [phone]; Fax: [phone]  [email or web address] I have personally seen and examined this patient. I have fully participated in the care of this patient. I have reviewed all pertinent clinical information, including history, physical exam, plan and the Resident's, PA's and NP's note and agree except as noted. This is a patient originally known to Dr. Corky Oneal, but I was asked to perform the guillotine R BKA on 12/11/23 and take over his care rest of his recent admission. 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 He was transferred from the medical service to vascular postop and underwent staged R BKA w/ closure (12/18/23). He was discharged to [facility] on 12/26/23 but was sent back to the ED because he tested positive for COVID and the [facility] doesn't accept COVID patients.     On 12/27, the R BKA stump looks fine. We popped superficial bleb. Staple line clean, dry and intact. Swelling improving. No other major complaints.     PLAN & RECOMMENDATIONS  - ASA/SQH  - Physical therapy  - COVID management per primary medical team  - Outpatient vascular surgery follow up with me on 1/18/24    Thank you,    Michelet Freed MD  Attending Vascular Surgeon  [facility] at [address] [place] [address]  Office: [phone]; Fax: [phone]  [email or web address]

## 2023-12-27 NOTE — PROGRESS NOTE ADULT - PROBLEM SELECTOR PLAN 5
Hx of ICM HF mildly reduced EF (45-50% 11/09), cardiology following during last admission for HF. Patient d/c on Torsemide 40mg PO qd today, now returning from [facility]   - prior recs: IV lasix 80mg q12h w/ strict I/O while inpatient, switch to torsemide 40mg PO QD on d/c  - GDMT for HFrEF: Coreg 25mg BID, lisinopril 40mg QD, aldactone 25mg QD; avoid SGLT2i given PAD w/ delayed wound healing    Plan:  - c/w Torsemide 40mg po qd as patient was d/c ready, returning for new rehab placement due to incidental COVID infection finding  - Strict I/Os  - c/w Coreg 25mg BID, lisinopril 40mg qd, aldactone 25mg qd  - if starts to become volume overloaded can transition back to IV Lasix 80mg BID Hx of ICM HF mildly reduced EF (45-50% 11/09), cardiology following during last admission for HF. Patient d/c on Torsemide 40mg PO qd today, now returning from [facility]   - prior recs: IV lasix 80mg q12h w/ strict I/O while inpatient, switch to torsemide 40mg PO QD on d/c  - GDMT for HFrEF: Coreg 25mg BID, lisinopril 40mg QD, aldactone 25mg QD; avoid SGLT2i given PAD w/ delayed wound healing    Plan:  - c/w Torsemide 40mg po qd as patient was d/c ready, returning for new rehab placement due to incidental COVID infection finding  - Strict I/Os  - c/w Coreg 25mg BID, lisinopril 40mg qd, aldactone 25mg qd  - if starts to become volume overloaded can transition back to IV Lasix 80mg BID

## 2023-12-27 NOTE — PROGRESS NOTE ADULT - PROBLEM SELECTOR PLAN 1
Patient d/c to [facility] on 12/26, found to be COVID-19+ at [facility], returned to [facility] as rehab can't accept patients w/ COVID. He denies cough, runny nose, chills, sob, Found to have fever 101.6 degrees F in ED, no other SIRS criteria. Currently saturating 94-95% on room air, not hypoxic. Fever likely due to COVID-19 infection.  - continue to monitor symptoms  - tylenol 650mg Q6hrs prn fever/mild pain  - patient currently not meeting criteria for inpatient remdesivir txt Patient d/c to [facility] on 12/26, found to be COVID-19+ at [facility], returned to [facility] as rehab can't accept patients w/ COVID. He denies cough, runny nose, chills, sob, Found to have fever 101.6 degrees F in ED, no other SIRS criteria. Currently saturating 94-95% on room air, not hypoxic. Fever likely due to COVID-19 infection.  - continue to monitor symptoms  - tylenol 650mg Q6hrs prn fever/mild pain  - patient currently not meeting criteria for inpatient remdesivir txt Patient d/c to [facility] on 12/26, found to be COVID-19+ at [facility], returned to [facility] as rehab can't accept patients w/ COVID. He denies cough, runny nose, chills, sob, Found to have fever 101.6 degrees F in ED, no other SIRS criteria. Currently saturating 94-95% on room air, not hypoxic. Fever likely due to COVID-19 infection.  - continue to monitor symptoms  - tylenol 650mg Q6hrs prn fever/mild pain  - patient started on remdesivir for three days Patient d/c to [facility] on 12/26, found to be COVID-19+ at [facility], returned to [facility] as rehab can't accept patients w/ COVID. He denies cough, runny nose, chills, sob, Found to have fever 101.6 degrees F in ED, no other SIRS criteria. Currently saturating 94-95% on room air, not hypoxic. Fever likely due to COVID-19 infection.  - continue to monitor symptoms  - tylenol 650mg Q6hrs prn fever/mild pain  - patient started on remdesivir for three days

## 2023-12-27 NOTE — PHYSICAL THERAPY INITIAL EVALUATION ADULT - PERTINENT HX OF CURRENT PROBLEM, REHAB EVAL
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 for further monitoring. 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 for further monitoring.

## 2023-12-27 NOTE — PATIENT PROFILE ADULT - FALL HARM RISK - HARM RISK INTERVENTIONS
Assistance OOB with selected safe patient handling equipment/Communicate Risk of Fall with Harm to all staff/Discuss with provider need for PT consult/Monitor gait and stability/Provide patient with walking aids - walker, cane, crutches/Reinforce activity limits and safety measures with patient and family/Tailored Fall Risk Interventions/Visual Cue: Yellow wristband and red socks/Bed in lowest position, wheels locked, appropriate side rails in place/Call bell, personal items and telephone in reach/Instruct patient to call for assistance before getting out of bed or chair/Non-slip footwear when patient is out of bed/[place] to call system/Physically safe environment - no spills, clutter or unnecessary equipment/Purposeful Proactive Rounding/Room/bathroom lighting operational, light cord in reach Assistance OOB with selected safe patient handling equipment/Communicate Risk of Fall with Harm to all staff/Discuss with provider need for PT consult/Monitor gait and stability/Provide patient with walking aids - walker, cane, crutches/Reinforce activity limits and safety measures with patient and family/Tailored Fall Risk Interventions/Visual Cue: Yellow wristband and red socks/Bed in lowest position, wheels locked, appropriate side rails in place/Call bell, personal items and telephone in reach/Instruct patient to call for assistance before getting out of bed or chair/Non-slip footwear when patient is out of bed/[place] to call system/Physically safe environment - no spills, clutter or unnecessary equipment/Purposeful Proactive Rounding/Room/bathroom lighting operational, light cord in reach

## 2023-12-27 NOTE — PROGRESS NOTE ADULT - PROBLEM SELECTOR PLAN 2
Patient w/ fever 101.6 degrees F, no other SIRS criteria met. Found to be COVID-19 positive. Fevers likely 2/2 COVID-19 infection.  - continue to monitor off antibiotics  - f/u blood cultures

## 2023-12-27 NOTE — PROGRESS NOTE ADULT - SUBJECTIVE AND OBJECTIVE BOX
*****INCOMPLETE NOTE*****    INTERVAL HPI/OVERNIGHT EVENTS:    SUBJECTIVE: Patient seen and examined at bedside, resting comfortably in bed, and does not appear to be in any acute distress. Patient states  Patient denies any recent fevers, chills, nausea, vomiting, headache, acute sob, chest pain, abdominal pain, genitourinary sx, extremity pain or swelling.     ANTIBIOTICS/RELEVANT:    MEDICATIONS  (STANDING):  aspirin  chewable 81 milliGRAM(s) Oral every 24 hours  atorvastatin 80 milliGRAM(s) Oral at bedtime  carvedilol 25 milliGRAM(s) Oral every 12 hours  dextrose 5%. 1000 milliLiter(s) (50 mL/Hr) IV Continuous <Continuous>  dextrose 5%. 1000 milliLiter(s) (100 mL/Hr) IV Continuous <Continuous>  dextrose 50% Injectable 25 Gram(s) IV Push once  dextrose 50% Injectable 12.5 Gram(s) IV Push once  dextrose 50% Injectable 25 Gram(s) IV Push once  gabapentin 600 milliGRAM(s) Oral every 8 hours  glucagon  Injectable 1 milliGRAM(s) IntraMuscular once  heparin   Injectable 5000 Unit(s) SubCutaneous every 8 hours  hydrALAZINE 100 milliGRAM(s) Oral every 8 hours  insulin glargine Injectable (LANTUS) 12 Unit(s) SubCutaneous at bedtime  insulin lispro (ADMELOG) corrective regimen sliding scale   SubCutaneous at bedtime  insulin lispro (ADMELOG) corrective regimen sliding scale   SubCutaneous three times a day before meals  insulin lispro Injectable (ADMELOG) 8 Unit(s) SubCutaneous three times a day before meals  lisinopril 40 milliGRAM(s) Oral every 24 hours  NIFEdipine XL 60 milliGRAM(s) Oral every 24 hours  spironolactone 25 milliGRAM(s) Oral every 24 hours  torsemide 40 milliGRAM(s) Oral every 24 hours    MEDICATIONS  (PRN):  acetaminophen     Tablet .. 650 milliGRAM(s) Oral every 6 hours PRN Temp greater or equal to 38C (100.4F), Mild Pain (1 - 3)  dextrose Oral Gel 15 Gram(s) Oral once PRN Blood Glucose LESS THAN 70 milliGRAM(s)/deciliter  oxyCODONE    IR 5 milliGRAM(s) Oral every 6 hours PRN Moderate Pain (4 - 6)  oxyCODONE    IR 10 milliGRAM(s) Oral every 6 hours PRN Severe Pain (7 - 10)      Vital Signs Last 24 Hrs  T(C): 36.9 (27 Dec 2023 06:13), Max: 38.7 (26 Dec 2023 22:08)  T(F): 98.5 (27 Dec 2023 06:13), Max: 101.7 (26 Dec 2023 22:08)  HR: 69 (27 Dec 2023 06:13) (69 - 90)  BP: 159/95 (27 Dec 2023 06:13) (108/71 - 182/87)  BP(mean): 103 (27 Dec 2023 01:30) (85 - 103)  RR: 16 (27 Dec 2023 06:13) (16 - 18)  SpO2: 96% (27 Dec 2023 06:13) (94% - 98%)    Parameters below as of 27 Dec 2023 01:30  Patient On (Oxygen Delivery Method): room air        PHYSICAL EXAM:  General: in no acute distress  Eyes: EOMI intact bilaterally. Anicteric sclerae, moist conjunctivae  HENT: Moist mucous membranes  Neck: Trachea midline, supple  Lungs: CTA B/L. No wheezes, rales, or rhonchi  Cardiovascular: RRR. No murmurs, rubs, or gallops  Abdomen: Soft, non-tender non-distended; No rebound or guarding  Extremities: WWP, No clubbing, cyanosis or edema  Neurological: Alert and oriented  Skin: Warm and dry. No obvious rash     LABS:                        8.8    8.75  )-----------( 304      ( 26 Dec 2023 20:50 )             28.2     12-26    139  |  103  |  36<H>  ----------------------------<  114<H>  4.2   |  30  |  1.30    Ca    9.1      26 Dec 2023 20:50  Mg     2.0     12-26        Urinalysis Basic - ( 26 Dec 2023 20:50 )    Color: x / Appearance: x / SG: x / pH: x  Gluc: 114 mg/dL / Ketone: x  / Bili: x / Urobili: x   Blood: x / Protein: x / Nitrite: x   Leuk Esterase: x / RBC: x / WBC x   Sq Epi: x / Non Sq Epi: x / Bacteria: x        MICROBIOLOGY:    RADIOLOGY & ADDITIONAL STUDIES: INTERVAL HPI/OVERNIGHT EVENTS: becky    SUBJECTIVE: Patient seen and examined at bedside, resting comfortably in bed, and does not appear to be in any acute distress. Patient denies any changes to his leg swelling. Patient denies any uri sxs, no shortness of breath    MEDICATIONS  (STANDING):  aspirin  chewable 81 milliGRAM(s) Oral every 24 hours  atorvastatin 80 milliGRAM(s) Oral at bedtime  carvedilol 25 milliGRAM(s) Oral every 12 hours  dextrose 5%. 1000 milliLiter(s) (50 mL/Hr) IV Continuous <Continuous>  dextrose 5%. 1000 milliLiter(s) (100 mL/Hr) IV Continuous <Continuous>  dextrose 50% Injectable 25 Gram(s) IV Push once  dextrose 50% Injectable 12.5 Gram(s) IV Push once  dextrose 50% Injectable 25 Gram(s) IV Push once  gabapentin 600 milliGRAM(s) Oral every 8 hours  glucagon  Injectable 1 milliGRAM(s) IntraMuscular once  heparin   Injectable 5000 Unit(s) SubCutaneous every 8 hours  hydrALAZINE 100 milliGRAM(s) Oral every 8 hours  insulin glargine Injectable (LANTUS) 12 Unit(s) SubCutaneous at bedtime  insulin lispro (ADMELOG) corrective regimen sliding scale   SubCutaneous at bedtime  insulin lispro (ADMELOG) corrective regimen sliding scale   SubCutaneous three times a day before meals  insulin lispro Injectable (ADMELOG) 8 Unit(s) SubCutaneous three times a day before meals  lisinopril 40 milliGRAM(s) Oral every 24 hours  NIFEdipine XL 60 milliGRAM(s) Oral every 24 hours  spironolactone 25 milliGRAM(s) Oral every 24 hours  torsemide 40 milliGRAM(s) Oral every 24 hours    MEDICATIONS  (PRN):  acetaminophen     Tablet .. 650 milliGRAM(s) Oral every 6 hours PRN Temp greater or equal to 38C (100.4F), Mild Pain (1 - 3)  dextrose Oral Gel 15 Gram(s) Oral once PRN Blood Glucose LESS THAN 70 milliGRAM(s)/deciliter  oxyCODONE    IR 5 milliGRAM(s) Oral every 6 hours PRN Moderate Pain (4 - 6)  oxyCODONE    IR 10 milliGRAM(s) Oral every 6 hours PRN Severe Pain (7 - 10)      Vital Signs Last 24 Hrs  T(C): 36.9 (27 Dec 2023 06:13), Max: 38.7 (26 Dec 2023 22:08)  T(F): 98.5 (27 Dec 2023 06:13), Max: 101.7 (26 Dec 2023 22:08)  HR: 69 (27 Dec 2023 06:13) (69 - 90)  BP: 159/95 (27 Dec 2023 06:13) (108/71 - 182/87)  BP(mean): 103 (27 Dec 2023 01:30) (85 - 103)  RR: 16 (27 Dec 2023 06:13) (16 - 18)  SpO2: 96% (27 Dec 2023 06:13) (94% - 98%)    Parameters below as of 27 Dec 2023 01:30  Patient On (Oxygen Delivery Method): room air      PHYSICAL EXAM:  General: in no acute distress  Eyes: EOMI intact bilaterally  HENT: Moist mucous membranes  Neck: Trachea midline, supple  Lungs: CTA B/L. no egophony  Cardiovascular: RRR  Abdomen: Soft, non-tender non-distended  Extremities: +1 pitting edema to Left lower extremity, Right BKA wrapped w/ clean kerlix dressing;  Neurological: Alert and oriented  Skin: Warm and dry    LABS:                        8.8    8.75  )-----------( 304      ( 26 Dec 2023 20:50 )             28.2     12-26    139  |  103  |  36<H>  ----------------------------<  114<H>  4.2   |  30  |  1.30    Ca    9.1      26 Dec 2023 20:50  Mg     2.0     12-26        Urinalysis Basic - ( 26 Dec 2023 20:50 )    Color: x / Appearance: x / SG: x / pH: x  Gluc: 114 mg/dL / Ketone: x  / Bili: x / Urobili: x   Blood: x / Protein: x / Nitrite: x   Leuk Esterase: x / RBC: x / WBC x   Sq Epi: x / Non Sq Epi: x / Bacteria: x        MICROBIOLOGY:    RADIOLOGY & ADDITIONAL STUDIES:

## 2023-12-27 NOTE — CONSULT NOTE ADULT - SUBJECTIVE AND OBJECTIVE BOX
HPI:  69yo M w/ PMHx CAD (2 stents 2020), HFmrEF (45-50%), HTN, PAD w/ remote RLE angioplasty, R 4th toe OM s/p partial R 4th ray amputation on 10/24, uncontrolled IDDM (a1c 12 in Oct 2023), recent admission 11/8-11/10 to cardiology service for hypertensive emergency (left AMA), recently admitted (12/01-12/26/23) for R foot soft tissue infection, now s/p R BKA (12/11/23), course c/b CDiff infection w/ completion of oral vanc on 12/25, discharged to [facility] on 12/26 and found to be COVID-19 positive, [facility] unable to accept COVID+ patients, so he returned to [facility] for further monitoring. Patient denies current symptoms of cough, runny nose, headache, chills, sob, and wheezing. He says he had no signs that he had COVID-19, and his main complaint is his right sided leg pain. He received Oxy 5mg prior to conversation, and he currently states his pain is an 11/10, as he was going to the bathroom and he hit his stump on the wall. He says Dilaudid was helping his pain the best during his admission. His amputation site was recently wrapped/bandage changed this morning when he got to the [facility]. He had a recent bowel movement in the ED that was formed stool, denies current diarrhea, abd pain.     On admission:  Initial vital sign: Temp 100.3 -> 101.7, HR 78, /78, Sp02 94% on room air  Labs significant for: Hgb 8.8, BUN 36,  glucose 114, COVID-19 positive  Imaging: None  Interventions: Tylenol 650mg x1, Oxycodone 5mg x1  Consults: none   (26 Dec 2023 23:10)    VASCULAR SURGERY ADDENDUM:  69yo M PMH CAD (2 stents 2020), HFmrEF (45-50%), HTN, PAD w/ remote RLE angioplasty, R 4th toe OM s/p partial R 4th ray amputation on 10/24, RLE angiogram with AT lithotripsy and AT/peroneal balloon angioplasty 10/27, uncontrolled T2DM recently admitted for R foot gas gangrene s/p R BKA (12/11) and closure (12/18) c/b c. diff infection s/p treatment now returns from [facility] for incidental finding of COVID. Vascular surgery consulted for management of BKA stump.    PAST MEDICAL & SURGICAL HISTORY:  IDDM (insulin dependent diabetes mellitus)      HTN (hypertension)      CAD S/P percutaneous coronary angioplasty      Neuropathy      PAD (peripheral artery disease)      PNA (pneumonia)      Gangrene of toe of right foot      Moderate aortic stenosis      Acute on chronic diastolic congestive heart failure      Hyperlipidemia      History of partial ray amputation of fourth toe of right foot      MEDICATIONS  (STANDING):  aspirin  chewable 81 milliGRAM(s) Oral every 24 hours  atorvastatin 80 milliGRAM(s) Oral at bedtime  carvedilol 25 milliGRAM(s) Oral every 12 hours  dextrose 5%. 1000 milliLiter(s) (50 mL/Hr) IV Continuous <Continuous>  dextrose 5%. 1000 milliLiter(s) (100 mL/Hr) IV Continuous <Continuous>  dextrose 50% Injectable 25 Gram(s) IV Push once  dextrose 50% Injectable 25 Gram(s) IV Push once  dextrose 50% Injectable 12.5 Gram(s) IV Push once  gabapentin 600 milliGRAM(s) Oral every 8 hours  glucagon  Injectable 1 milliGRAM(s) IntraMuscular once  heparin   Injectable 5000 Unit(s) SubCutaneous every 8 hours  hydrALAZINE 100 milliGRAM(s) Oral every 8 hours  insulin glargine Injectable (LANTUS) 12 Unit(s) SubCutaneous at bedtime  insulin lispro (ADMELOG) corrective regimen sliding scale   SubCutaneous Before meals and at bedtime  insulin lispro Injectable (ADMELOG) 8 Unit(s) SubCutaneous three times a day before meals  lisinopril 40 milliGRAM(s) Oral every 24 hours  NIFEdipine XL 60 milliGRAM(s) Oral every 24 hours  remdesivir  IVPB 200 milliGRAM(s) IV Intermittent once  sodium chloride 0.9%. 100 milliLiter(s) (310 mL/Hr) IV Continuous <Continuous>  spironolactone 25 milliGRAM(s) Oral every 24 hours  torsemide 40 milliGRAM(s) Oral every 24 hours    MEDICATIONS  (PRN):  acetaminophen     Tablet .. 650 milliGRAM(s) Oral every 6 hours PRN Temp greater or equal to 38C (100.4F), Mild Pain (1 - 3)  dextrose Oral Gel 15 Gram(s) Oral once PRN Blood Glucose LESS THAN 70 milliGRAM(s)/deciliter  oxyCODONE    IR 5 milliGRAM(s) Oral every 6 hours PRN Moderate Pain (4 - 6)  oxyCODONE    IR 10 milliGRAM(s) Oral every 6 hours PRN Severe Pain (7 - 10)      Allergies    No Known Allergies    Intolerances        SOCIAL HISTORY:    FAMILY HISTORY:      Vital Signs Last 24 Hrs  T(C): 37.3 (27 Dec 2023 10:15), Max: 38.7 (26 Dec 2023 22:08)  T(F): 99.2 (27 Dec 2023 10:15), Max: 101.7 (26 Dec 2023 22:08)  HR: 74 (27 Dec 2023 10:15) (69 - 90)  BP: 174/87 (27 Dec 2023 10:15) (112/78 - 182/87)  BP(mean): 116 (27 Dec 2023 10:15) (103 - 116)  RR: 16 (27 Dec 2023 10:15) (16 - 18)  SpO2: 97% (27 Dec 2023 10:15) (94% - 97%)    Parameters below as of 27 Dec 2023 10:15  Patient On (Oxygen Delivery Method): room air        PHYSICAL EXAM:   Gen: NAD, resting comfortably   CV: NSR  Pulm: no respiratory distress on RA, CTAB   Abd: soft, ND, NTTP, no rebound or guarding   Ext: WWP; R BKA stump incision c/d/i, no hematoma, superficial blister with clean base and no erythema or purulence; dressing changed.  Neuro: motor/sensory grossly intact     LABS:                        8.5    7.34  )-----------( 317      ( 27 Dec 2023 05:30 )             27.5     12-27    139  |  103  |  34<H>  ----------------------------<  83  4.2   |  29  |  1.19    Ca    8.8      27 Dec 2023 05:30  Phos  3.4     12-27  Mg     1.9     12-27              CAPILLARY BLOOD GLUCOSE      POCT Blood Glucose.: 107 mg/dL (27 Dec 2023 12:35)  POCT Blood Glucose.: 136 mg/dL (27 Dec 2023 10:37)  POCT Blood Glucose.: 65 mg/dL (27 Dec 2023 09:45)  POCT Blood Glucose.: 105 mg/dL (26 Dec 2023 23:37)    Urinalysis Basic - ( 27 Dec 2023 05:30 )    Color: x / Appearance: x / SG: x / pH: x  Gluc: 83 mg/dL / Ketone: x  / Bili: x / Urobili: x   Blood: x / Protein: x / Nitrite: x   Leuk Esterase: x / RBC: x / WBC x   Sq Epi: x / Non Sq Epi: x / Bacteria: x        Culture - Blood (collected 26 Dec 2023 22:40)  Source: .Blood Blood  Preliminary Report (27 Dec 2023 12:00):    No growth at 12 hours    Culture - Blood (collected 26 Dec 2023 22:35)  Source: .Blood Blood  Preliminary Report (27 Dec 2023 12:00):    No growth at 12 hours     HPI:  69yo M w/ PMHx CAD (2 stents 2020), HFmrEF (45-50%), HTN, PAD w/ remote RLE angioplasty, R 4th toe OM s/p partial R 4th ray amputation on 10/24, uncontrolled IDDM (a1c 12 in Oct 2023), recent admission 11/8-11/10 to cardiology service for hypertensive emergency (left AMA), recently admitted (12/01-12/26/23) for R foot soft tissue infection, now s/p R BKA (12/11/23), course c/b CDiff infection w/ completion of oral vanc on 12/25, discharged to [facility] on 12/26 and found to be COVID-19 positive, [facility] unable to accept COVID+ patients, so he returned to [facility] for further monitoring. Patient denies current symptoms of cough, runny nose, headache, chills, sob, and wheezing. He says he had no signs that he had COVID-19, and his main complaint is his right sided leg pain. He received Oxy 5mg prior to conversation, and he currently states his pain is an 11/10, as he was going to the bathroom and he hit his stump on the wall. He says Dilaudid was helping his pain the best during his admission. His amputation site was recently wrapped/bandage changed this morning when he got to the [facility]. He had a recent bowel movement in the ED that was formed stool, denies current diarrhea, abd pain.     On admission:  Initial vital sign: Temp 100.3 -> 101.7, HR 78, /78, Sp02 94% on room air  Labs significant for: Hgb 8.8, BUN 36,  glucose 114, COVID-19 positive  Imaging: None  Interventions: Tylenol 650mg x1, Oxycodone 5mg x1  Consults: none   (26 Dec 2023 23:10)    VASCULAR SURGERY ADDENDUM:  69yo M PMH CAD (2 stents 2020), HFmrEF (45-50%), HTN, PAD w/ remote RLE angioplasty, R 4th toe OM s/p partial R 4th ray amputation on 10/24, RLE angiogram with AT lithotripsy and AT/peroneal balloon angioplasty 10/27, uncontrolled T2DM recently admitted for R foot gas gangrene s/p R BKA (12/11) and closure (12/18) c/b c. diff infection s/p treatment now returns from [facility] for incidental finding of COVID. Vascular surgery consulted for management of BKA stump.    PAST MEDICAL & SURGICAL HISTORY:  IDDM (insulin dependent diabetes mellitus)      HTN (hypertension)      CAD S/P percutaneous coronary angioplasty      Neuropathy      PAD (peripheral artery disease)      PNA (pneumonia)      Gangrene of toe of right foot      Moderate aortic stenosis      Acute on chronic diastolic congestive heart failure      Hyperlipidemia      History of partial ray amputation of fourth toe of right foot      MEDICATIONS  (STANDING):  aspirin  chewable 81 milliGRAM(s) Oral every 24 hours  atorvastatin 80 milliGRAM(s) Oral at bedtime  carvedilol 25 milliGRAM(s) Oral every 12 hours  dextrose 5%. 1000 milliLiter(s) (50 mL/Hr) IV Continuous <Continuous>  dextrose 5%. 1000 milliLiter(s) (100 mL/Hr) IV Continuous <Continuous>  dextrose 50% Injectable 25 Gram(s) IV Push once  dextrose 50% Injectable 25 Gram(s) IV Push once  dextrose 50% Injectable 12.5 Gram(s) IV Push once  gabapentin 600 milliGRAM(s) Oral every 8 hours  glucagon  Injectable 1 milliGRAM(s) IntraMuscular once  heparin   Injectable 5000 Unit(s) SubCutaneous every 8 hours  hydrALAZINE 100 milliGRAM(s) Oral every 8 hours  insulin glargine Injectable (LANTUS) 12 Unit(s) SubCutaneous at bedtime  insulin lispro (ADMELOG) corrective regimen sliding scale   SubCutaneous Before meals and at bedtime  insulin lispro Injectable (ADMELOG) 8 Unit(s) SubCutaneous three times a day before meals  lisinopril 40 milliGRAM(s) Oral every 24 hours  NIFEdipine XL 60 milliGRAM(s) Oral every 24 hours  remdesivir  IVPB 200 milliGRAM(s) IV Intermittent once  sodium chloride 0.9%. 100 milliLiter(s) (310 mL/Hr) IV Continuous <Continuous>  spironolactone 25 milliGRAM(s) Oral every 24 hours  torsemide 40 milliGRAM(s) Oral every 24 hours    MEDICATIONS  (PRN):  acetaminophen     Tablet .. 650 milliGRAM(s) Oral every 6 hours PRN Temp greater or equal to 38C (100.4F), Mild Pain (1 - 3)  dextrose Oral Gel 15 Gram(s) Oral once PRN Blood Glucose LESS THAN 70 milliGRAM(s)/deciliter  oxyCODONE    IR 5 milliGRAM(s) Oral every 6 hours PRN Moderate Pain (4 - 6)  oxyCODONE    IR 10 milliGRAM(s) Oral every 6 hours PRN Severe Pain (7 - 10)      Allergies    No Known Allergies    Intolerances        SOCIAL HISTORY:    FAMILY HISTORY:      Vital Signs Last 24 Hrs  T(C): 37.3 (27 Dec 2023 10:15), Max: 38.7 (26 Dec 2023 22:08)  T(F): 99.2 (27 Dec 2023 10:15), Max: 101.7 (26 Dec 2023 22:08)  HR: 74 (27 Dec 2023 10:15) (69 - 90)  BP: 174/87 (27 Dec 2023 10:15) (112/78 - 182/87)  BP(mean): 116 (27 Dec 2023 10:15) (103 - 116)  RR: 16 (27 Dec 2023 10:15) (16 - 18)  SpO2: 97% (27 Dec 2023 10:15) (94% - 97%)    Parameters below as of 27 Dec 2023 10:15  Patient On (Oxygen Delivery Method): room air        PHYSICAL EXAM:   Gen: NAD, resting comfortably   CV: NSR  Pulm: no respiratory distress on RA, CTAB   Abd: soft, ND, NTTP, no rebound or guarding   Ext: WWP; R BKA stump incision c/d/i, no hematoma, superficial blister with clean base and no erythema or purulence; dressing changed.  Neuro: motor/sensory grossly intact     LABS:                        8.5    7.34  )-----------( 317      ( 27 Dec 2023 05:30 )             27.5     12-27    139  |  103  |  34<H>  ----------------------------<  83  4.2   |  29  |  1.19    Ca    8.8      27 Dec 2023 05:30  Phos  3.4     12-27  Mg     1.9     12-27              CAPILLARY BLOOD GLUCOSE      POCT Blood Glucose.: 107 mg/dL (27 Dec 2023 12:35)  POCT Blood Glucose.: 136 mg/dL (27 Dec 2023 10:37)  POCT Blood Glucose.: 65 mg/dL (27 Dec 2023 09:45)  POCT Blood Glucose.: 105 mg/dL (26 Dec 2023 23:37)    Urinalysis Basic - ( 27 Dec 2023 05:30 )    Color: x / Appearance: x / SG: x / pH: x  Gluc: 83 mg/dL / Ketone: x  / Bili: x / Urobili: x   Blood: x / Protein: x / Nitrite: x   Leuk Esterase: x / RBC: x / WBC x   Sq Epi: x / Non Sq Epi: x / Bacteria: x        Culture - Blood (collected 26 Dec 2023 22:40)  Source: .Blood Blood  Preliminary Report (27 Dec 2023 12:00):    No growth at 12 hours    Culture - Blood (collected 26 Dec 2023 22:35)  Source: .Blood Blood  Preliminary Report (27 Dec 2023 12:00):    No growth at 12 hours

## 2023-12-27 NOTE — CONSULT NOTE ADULT - ASSESSMENT
67yo M PMH CAD (2 stents 2020), HFmrEF (45-50%), HTN, PAD w/ remote RLE angioplasty, R 4th toe OM s/p partial R 4th ray amputation on 10/24, RLE angiogram with AT lithotripsy and AT/peroneal balloon angioplasty 10/27, uncontrolled T2DM recently admitted for R foot gas gangrene s/p R BKA (12/11) and closure (12/18) c/b c. diff infection s/p treatment now returns from [facility] for incidental finding of COVID. Vascular surgery consulted for management of BKA stump.    Daily dressing changes 67yo M PMH CAD (2 stents 2020), HFmrEF (45-50%), HTN, PAD w/ remote RLE angioplasty, R 4th toe OM s/p partial R 4th ray amputation on 10/24, RLE angiogram with AT lithotripsy and AT/peroneal balloon angioplasty 10/27, uncontrolled T2DM recently admitted for R foot gas gangrene s/p R BKA (12/11) and closure (12/18) c/b c. diff infection s/p treatment now returns from [facility] for incidental finding of COVID. Vascular surgery consulted for management of BKA stump.    Daily dressing changes

## 2023-12-28 LAB
ANION GAP SERPL CALC-SCNC: 8 MMOL/L — SIGNIFICANT CHANGE UP (ref 5–17)
ANION GAP SERPL CALC-SCNC: 8 MMOL/L — SIGNIFICANT CHANGE UP (ref 5–17)
BASOPHILS # BLD AUTO: 0.04 K/UL — SIGNIFICANT CHANGE UP (ref 0–0.2)
BASOPHILS # BLD AUTO: 0.04 K/UL — SIGNIFICANT CHANGE UP (ref 0–0.2)
BASOPHILS NFR BLD AUTO: 0.7 % — SIGNIFICANT CHANGE UP (ref 0–2)
BASOPHILS NFR BLD AUTO: 0.7 % — SIGNIFICANT CHANGE UP (ref 0–2)
BLD GP AB SCN SERPL QL: NEGATIVE — SIGNIFICANT CHANGE UP
BLD GP AB SCN SERPL QL: NEGATIVE — SIGNIFICANT CHANGE UP
BUN SERPL-MCNC: 30 MG/DL — HIGH (ref 7–23)
BUN SERPL-MCNC: 30 MG/DL — HIGH (ref 7–23)
CALCIUM SERPL-MCNC: 8.6 MG/DL — SIGNIFICANT CHANGE UP (ref 8.4–10.5)
CALCIUM SERPL-MCNC: 8.6 MG/DL — SIGNIFICANT CHANGE UP (ref 8.4–10.5)
CHLORIDE SERPL-SCNC: 102 MMOL/L — SIGNIFICANT CHANGE UP (ref 96–108)
CHLORIDE SERPL-SCNC: 102 MMOL/L — SIGNIFICANT CHANGE UP (ref 96–108)
CO2 SERPL-SCNC: 29 MMOL/L — SIGNIFICANT CHANGE UP (ref 22–31)
CO2 SERPL-SCNC: 29 MMOL/L — SIGNIFICANT CHANGE UP (ref 22–31)
CREAT SERPL-MCNC: 1.15 MG/DL — SIGNIFICANT CHANGE UP (ref 0.5–1.3)
CREAT SERPL-MCNC: 1.15 MG/DL — SIGNIFICANT CHANGE UP (ref 0.5–1.3)
EGFR: 70 ML/MIN/1.73M2 — SIGNIFICANT CHANGE UP
EGFR: 70 ML/MIN/1.73M2 — SIGNIFICANT CHANGE UP
EOSINOPHIL # BLD AUTO: 0.01 K/UL — SIGNIFICANT CHANGE UP (ref 0–0.5)
EOSINOPHIL # BLD AUTO: 0.01 K/UL — SIGNIFICANT CHANGE UP (ref 0–0.5)
EOSINOPHIL NFR BLD AUTO: 0.2 % — SIGNIFICANT CHANGE UP (ref 0–6)
EOSINOPHIL NFR BLD AUTO: 0.2 % — SIGNIFICANT CHANGE UP (ref 0–6)
GLUCOSE BLDC GLUCOMTR-MCNC: 108 MG/DL — HIGH (ref 70–99)
GLUCOSE BLDC GLUCOMTR-MCNC: 108 MG/DL — HIGH (ref 70–99)
GLUCOSE BLDC GLUCOMTR-MCNC: 112 MG/DL — HIGH (ref 70–99)
GLUCOSE BLDC GLUCOMTR-MCNC: 112 MG/DL — HIGH (ref 70–99)
GLUCOSE BLDC GLUCOMTR-MCNC: 84 MG/DL — SIGNIFICANT CHANGE UP (ref 70–99)
GLUCOSE BLDC GLUCOMTR-MCNC: 84 MG/DL — SIGNIFICANT CHANGE UP (ref 70–99)
GLUCOSE BLDC GLUCOMTR-MCNC: 96 MG/DL — SIGNIFICANT CHANGE UP (ref 70–99)
GLUCOSE BLDC GLUCOMTR-MCNC: 96 MG/DL — SIGNIFICANT CHANGE UP (ref 70–99)
GLUCOSE BLDC GLUCOMTR-MCNC: 98 MG/DL — SIGNIFICANT CHANGE UP (ref 70–99)
GLUCOSE BLDC GLUCOMTR-MCNC: 98 MG/DL — SIGNIFICANT CHANGE UP (ref 70–99)
GLUCOSE SERPL-MCNC: 152 MG/DL — HIGH (ref 70–99)
GLUCOSE SERPL-MCNC: 152 MG/DL — HIGH (ref 70–99)
HCT VFR BLD CALC: 25.6 % — LOW (ref 39–50)
HCT VFR BLD CALC: 25.6 % — LOW (ref 39–50)
HGB BLD-MCNC: 8 G/DL — LOW (ref 13–17)
HGB BLD-MCNC: 8 G/DL — LOW (ref 13–17)
IMM GRANULOCYTES NFR BLD AUTO: 0.3 % — SIGNIFICANT CHANGE UP (ref 0–0.9)
IMM GRANULOCYTES NFR BLD AUTO: 0.3 % — SIGNIFICANT CHANGE UP (ref 0–0.9)
LYMPHOCYTES # BLD AUTO: 1.05 K/UL — SIGNIFICANT CHANGE UP (ref 1–3.3)
LYMPHOCYTES # BLD AUTO: 1.05 K/UL — SIGNIFICANT CHANGE UP (ref 1–3.3)
LYMPHOCYTES # BLD AUTO: 18 % — SIGNIFICANT CHANGE UP (ref 13–44)
LYMPHOCYTES # BLD AUTO: 18 % — SIGNIFICANT CHANGE UP (ref 13–44)
MAGNESIUM SERPL-MCNC: 2 MG/DL — SIGNIFICANT CHANGE UP (ref 1.6–2.6)
MAGNESIUM SERPL-MCNC: 2 MG/DL — SIGNIFICANT CHANGE UP (ref 1.6–2.6)
MCHC RBC-ENTMCNC: 28.6 PG — SIGNIFICANT CHANGE UP (ref 27–34)
MCHC RBC-ENTMCNC: 28.6 PG — SIGNIFICANT CHANGE UP (ref 27–34)
MCHC RBC-ENTMCNC: 31.3 GM/DL — LOW (ref 32–36)
MCHC RBC-ENTMCNC: 31.3 GM/DL — LOW (ref 32–36)
MCV RBC AUTO: 91.4 FL — SIGNIFICANT CHANGE UP (ref 80–100)
MCV RBC AUTO: 91.4 FL — SIGNIFICANT CHANGE UP (ref 80–100)
MONOCYTES # BLD AUTO: 0.83 K/UL — SIGNIFICANT CHANGE UP (ref 0–0.9)
MONOCYTES # BLD AUTO: 0.83 K/UL — SIGNIFICANT CHANGE UP (ref 0–0.9)
MONOCYTES NFR BLD AUTO: 14.2 % — HIGH (ref 2–14)
MONOCYTES NFR BLD AUTO: 14.2 % — HIGH (ref 2–14)
NEUTROPHILS # BLD AUTO: 3.89 K/UL — SIGNIFICANT CHANGE UP (ref 1.8–7.4)
NEUTROPHILS # BLD AUTO: 3.89 K/UL — SIGNIFICANT CHANGE UP (ref 1.8–7.4)
NEUTROPHILS NFR BLD AUTO: 66.6 % — SIGNIFICANT CHANGE UP (ref 43–77)
NEUTROPHILS NFR BLD AUTO: 66.6 % — SIGNIFICANT CHANGE UP (ref 43–77)
NRBC # BLD: 0 /100 WBCS — SIGNIFICANT CHANGE UP (ref 0–0)
NRBC # BLD: 0 /100 WBCS — SIGNIFICANT CHANGE UP (ref 0–0)
PHOSPHATE SERPL-MCNC: 3.9 MG/DL — SIGNIFICANT CHANGE UP (ref 2.5–4.5)
PHOSPHATE SERPL-MCNC: 3.9 MG/DL — SIGNIFICANT CHANGE UP (ref 2.5–4.5)
PLATELET # BLD AUTO: 295 K/UL — SIGNIFICANT CHANGE UP (ref 150–400)
PLATELET # BLD AUTO: 295 K/UL — SIGNIFICANT CHANGE UP (ref 150–400)
POTASSIUM SERPL-MCNC: 3.9 MMOL/L — SIGNIFICANT CHANGE UP (ref 3.5–5.3)
POTASSIUM SERPL-MCNC: 3.9 MMOL/L — SIGNIFICANT CHANGE UP (ref 3.5–5.3)
POTASSIUM SERPL-SCNC: 3.9 MMOL/L — SIGNIFICANT CHANGE UP (ref 3.5–5.3)
POTASSIUM SERPL-SCNC: 3.9 MMOL/L — SIGNIFICANT CHANGE UP (ref 3.5–5.3)
RBC # BLD: 2.8 M/UL — LOW (ref 4.2–5.8)
RBC # BLD: 2.8 M/UL — LOW (ref 4.2–5.8)
RBC # FLD: 18.5 % — HIGH (ref 10.3–14.5)
RBC # FLD: 18.5 % — HIGH (ref 10.3–14.5)
RH IG SCN BLD-IMP: POSITIVE — SIGNIFICANT CHANGE UP
RH IG SCN BLD-IMP: POSITIVE — SIGNIFICANT CHANGE UP
SODIUM SERPL-SCNC: 139 MMOL/L — SIGNIFICANT CHANGE UP (ref 135–145)
SODIUM SERPL-SCNC: 139 MMOL/L — SIGNIFICANT CHANGE UP (ref 135–145)
WBC # BLD: 5.84 K/UL — SIGNIFICANT CHANGE UP (ref 3.8–10.5)
WBC # BLD: 5.84 K/UL — SIGNIFICANT CHANGE UP (ref 3.8–10.5)
WBC # FLD AUTO: 5.84 K/UL — SIGNIFICANT CHANGE UP (ref 3.8–10.5)
WBC # FLD AUTO: 5.84 K/UL — SIGNIFICANT CHANGE UP (ref 3.8–10.5)

## 2023-12-28 PROCEDURE — 99232 SBSQ HOSP IP/OBS MODERATE 35: CPT | Mod: GC

## 2023-12-28 PROCEDURE — 99233 SBSQ HOSP IP/OBS HIGH 50: CPT | Mod: GC,24

## 2023-12-28 RX ORDER — NIFEDIPINE 30 MG
30 TABLET, EXTENDED RELEASE 24 HR ORAL ONCE
Refills: 0 | Status: COMPLETED | OUTPATIENT
Start: 2023-12-28 | End: 2023-12-28

## 2023-12-28 RX ORDER — NIFEDIPINE 30 MG
90 TABLET, EXTENDED RELEASE 24 HR ORAL EVERY 24 HOURS
Refills: 0 | Status: DISCONTINUED | OUTPATIENT
Start: 2023-12-29 | End: 2024-01-05

## 2023-12-28 RX ADMIN — Medication 40 MILLIGRAM(S): at 06:26

## 2023-12-28 RX ADMIN — GABAPENTIN 600 MILLIGRAM(S): 400 CAPSULE ORAL at 22:55

## 2023-12-28 RX ADMIN — Medication 8 UNIT(S): at 09:59

## 2023-12-28 RX ADMIN — Medication 30 MILLIGRAM(S): at 11:44

## 2023-12-28 RX ADMIN — CARVEDILOL PHOSPHATE 25 MILLIGRAM(S): 80 CAPSULE, EXTENDED RELEASE ORAL at 10:04

## 2023-12-28 RX ADMIN — OXYCODONE HYDROCHLORIDE 5 MILLIGRAM(S): 5 TABLET ORAL at 15:20

## 2023-12-28 RX ADMIN — Medication 100 MILLIGRAM(S): at 22:54

## 2023-12-28 RX ADMIN — GABAPENTIN 600 MILLIGRAM(S): 400 CAPSULE ORAL at 06:26

## 2023-12-28 RX ADMIN — GABAPENTIN 600 MILLIGRAM(S): 400 CAPSULE ORAL at 16:00

## 2023-12-28 RX ADMIN — INSULIN GLARGINE 12 UNIT(S): 100 INJECTION, SOLUTION SUBCUTANEOUS at 22:52

## 2023-12-28 RX ADMIN — SPIRONOLACTONE 25 MILLIGRAM(S): 25 TABLET, FILM COATED ORAL at 15:59

## 2023-12-28 RX ADMIN — REMDESIVIR 200 MILLIGRAM(S): 5 INJECTION INTRAVENOUS at 16:00

## 2023-12-28 RX ADMIN — OXYCODONE HYDROCHLORIDE 10 MILLIGRAM(S): 5 TABLET ORAL at 06:27

## 2023-12-28 RX ADMIN — Medication 100 MILLIGRAM(S): at 06:26

## 2023-12-28 RX ADMIN — HEPARIN SODIUM 5000 UNIT(S): 5000 INJECTION INTRAVENOUS; SUBCUTANEOUS at 06:27

## 2023-12-28 RX ADMIN — Medication 8 UNIT(S): at 18:44

## 2023-12-28 RX ADMIN — OXYCODONE HYDROCHLORIDE 5 MILLIGRAM(S): 5 TABLET ORAL at 14:20

## 2023-12-28 RX ADMIN — OXYCODONE HYDROCHLORIDE 10 MILLIGRAM(S): 5 TABLET ORAL at 11:45

## 2023-12-28 RX ADMIN — HEPARIN SODIUM 5000 UNIT(S): 5000 INJECTION INTRAVENOUS; SUBCUTANEOUS at 16:01

## 2023-12-28 RX ADMIN — Medication 81 MILLIGRAM(S): at 06:26

## 2023-12-28 RX ADMIN — ATORVASTATIN CALCIUM 80 MILLIGRAM(S): 80 TABLET, FILM COATED ORAL at 22:54

## 2023-12-28 RX ADMIN — Medication 8 UNIT(S): at 14:19

## 2023-12-28 RX ADMIN — OXYCODONE HYDROCHLORIDE 10 MILLIGRAM(S): 5 TABLET ORAL at 18:43

## 2023-12-28 RX ADMIN — OXYCODONE HYDROCHLORIDE 10 MILLIGRAM(S): 5 TABLET ORAL at 12:45

## 2023-12-28 RX ADMIN — HEPARIN SODIUM 5000 UNIT(S): 5000 INJECTION INTRAVENOUS; SUBCUTANEOUS at 22:54

## 2023-12-28 RX ADMIN — CARVEDILOL PHOSPHATE 25 MILLIGRAM(S): 80 CAPSULE, EXTENDED RELEASE ORAL at 22:54

## 2023-12-28 RX ADMIN — Medication 100 MILLIGRAM(S): at 15:59

## 2023-12-28 NOTE — DIETITIAN INITIAL EVALUATION ADULT - PERTINENT LABORATORY DATA
12-28    139  |  102  |  30<H>  ----------------------------<  152<H>  3.9   |  29  |  1.15    Ca    8.6      28 Dec 2023 05:30  Phos  3.9     12-28  Mg     2.0     12-28    POCT Blood Glucose.: 112 mg/dL (12-28-23 @ 13:53)  A1C with Estimated Average Glucose Result: 13.5 % (12-02-23 @ 06:10)  A1C with Estimated Average Glucose Result: 12.6 % (10-02-23 @ 05:30)

## 2023-12-28 NOTE — PROGRESS NOTE ADULT - PROBLEM SELECTOR PLAN 3
s/p R BKA on 12/11/23 and s/p BKA closure on 12/18/23 by vascular surgery at [facility]  - pain regimen: Oxy 10mg q6hrs severe pain, Oxy 5mg q6hrs moderate pain, tylenol 650mg q6 hrs prn mild pain/fevers  - has outpatient f/u apt w/ vascular: Dr. Freed on 1/18/24 at 10 A.M. s/p R BKA on 12/11/23 and s/p BKA closure on 12/18/23 by vascular surgery at [facility]  - pain regimen: Oxy 10mg q6hrs severe pain, Oxy 5mg q6hrs moderate pain, tylenol 650mg q6 hrs prn mild pain/fevers     • Pain regimen as follows:        - Oxy 5mg q6h    • has outpatient f/u apt w/ vascular: Dr. Freed on 1/18/24 at 10 A.M. s/p R BKA on 12/11/23 and s/p BKA closure on 12/18/23 by vascular surgery at [facility]  - pain regimen: Oxy 10mg q6hrs severe pain, Oxy 5mg q6hrs moderate pain, tylenol 650mg q6 hrs prn mild pain/fevers     • Pain regimen as follows:        - Oxy 5mg q6h    • has outpatient f/u apt w/ vascular: Dr. Freed on 1/18/24 at 10 A.M. s/p R BKA on 12/11/23 and s/p BKA closure on 12/18/23 by vascular surgery at [facility]     • Pain regimen as follows:        - Tylenol 650mg po q6h PRN for mild pain/fever       - Oxy 5mg po q6h PRN for mod pain       - Oxy 10mg po q6h PRN for sev pain   • Has outpt f/u apt w/ vascular: Dr. Freed on 1/18/24 at 10 A.M. s/p R BKA on 12/11/23 and s/p BKA closure on 12/18/23 by vascular surgery at [facility]     • Pain regimen as follows:        - Tylenol 650mg po q6h PRN for mild pain/fever       - Oxy 5mg po q6h PRN for mod pain       - Oxy 10mg po q6h PRN for sev pain   • Has outpt f/u apt w/ vascular: Dr. Freed on 1/18/24 at 10 A.M.

## 2023-12-28 NOTE — PROGRESS NOTE ADULT - ATTENDING COMMENTS
I have personally seen and examined this patient. I have fully participated in the care of this patient. I have reviewed all pertinent clinical information, including history, physical exam, plan and the Resident's, PA's and NP's note and agree except as noted. This is a patient originally known to Dr. Corky Oneal, but I was asked to perform the guillotine R BKA on 12/11/23 and take over his care rest of his recent admission. 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 He was transferred from the medical service to vascular postop and underwent staged R BKA w/ closure (12/18/23). He was discharged to [facility] on 12/26/23 but was sent back to the ED because he tested positive for COVID and the [facility] doesn't accept COVID patients.     On 12/28, he has no major complaints. The R BKA stump continues to look fine. Staple line clean, dry and intact. Swelling improving.     PLAN & RECOMMENDATIONS  - ASA/SQH  - Physical therapy  - COVID management per primary medical team  - Daily dressing changes with dry gauze, kerlix and ace-wrap (ace-wrap from stump to thigh)  - Outpatient vascular surgery follow up with me on 1/18/24    Thank you,    Michelet Freed MD  Attending Vascular Surgeon  [facility] at [address] [place] [address]  Office: [phone]; Fax: [phone]  [email or web address] . I have personally seen and examined this patient. I have fully participated in the care of this patient. I have reviewed all pertinent clinical information, including history, physical exam, plan and the Resident's, PA's and NP's note and agree except as noted. This is a patient originally known to Dr. Corky Oneal, but I was asked to perform the guillotine R BKA on 12/11/23 and take over his care rest of his recent admission. 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 He was transferred from the medical service to vascular postop and underwent staged R BKA w/ closure (12/18/23). He was discharged to [facility] on 12/26/23 but was sent back to the ED because he tested positive for COVID and the [facility] doesn't accept COVID patients.     On 12/28, he has no major complaints. The R BKA stump continues to look fine. Staple line clean, dry and intact. Swelling improving.     PLAN & RECOMMENDATIONS  - ASA/SQH  - Physical therapy  - COVID management per primary medical team  - Daily dressing changes with dry gauze, kerlix and ace-wrap (ace-wrap from stump to thigh)  - Outpatient vascular surgery follow up with me on 1/18/24    Thank you,    Michelet Freed MD  Attending Vascular Surgeon  [facility] at [address] [place] [address]  Office: [phone]; Fax: [phone]  [email or web address] .

## 2023-12-28 NOTE — PROGRESS NOTE ADULT - SUBJECTIVE AND OBJECTIVE BOX
Physical Medicine and Rehabilitation Progress Note :       Patient is a 68y old  Male who presents with a chief complaint of     HPI:  67yo M w/ PMHx CAD (2 stents 2020), HFmrEF (45-50%), HTN, PAD w/ remote RLE angioplasty, R 4th toe OM s/p partial R 4th ray amputation on 10/24, uncontrolled IDDM (a1c 12 in Oct 2023), recent admission 11/8-11/10 to cardiology service for hypertensive emergency (left AMA), recently admitted (12/01-12/26/23) for R foot soft tissue infection, now s/p R BKA (12/11/23), course c/b CDiff infection w/ completion of oral vanc on 12/25, discharged to [facility] on 12/26 and found to be COVID-19 positive, [facility] unable to accept COVID+ patients, so he returned to [facility] for further monitoring. Patient denies current symptoms of cough, runny nose, headache, chills, sob, and wheezing. He says he had no signs that he had COVID-19, and his main complaint is his right sided leg pain. He received Oxy 5mg prior to conversation, and he currently states his pain is an 11/10, as he was going to the bathroom and he hit his stump on the wall. He says Dilaudid was helping his pain the best during his admission. His amputation site was recently wrapped/bandage changed this morning when he got to the [facility]. He had a recent bowel movement in the ED that was formed stool, denies current diarrhea, abd pain.     On admission:  Initial vital sign: Temp 100.3 -> 101.7, HR 78, /78, Sp02 94% on room air  Labs significant for: Hgb 8.8, BUN 36,  glucose 114, COVID-19 positive  Imaging: None  Interventions: Tylenol 650mg x1, Oxycodone 5mg x1  Consults: none   (26 Dec 2023 23:10)                            8.0    5.84  )-----------( 295      ( 28 Dec 2023 05:30 )             25.6       12-28    139  |  102  |  30<H>  ----------------------------<  152<H>  3.9   |  29  |  1.15    Ca    8.6      28 Dec 2023 05:30  Phos  3.9     12-28  Mg     2.0     12-28      Vital Signs Last 24 Hrs  T(C): 37.2 (28 Dec 2023 10:00), Max: 38 (28 Dec 2023 05:37)  T(F): 99 (28 Dec 2023 10:00), Max: 100.4 (28 Dec 2023 05:37)  HR: 70 (28 Dec 2023 11:40) (66 - 73)  BP: 156/78 (28 Dec 2023 11:40) (120/71 - 164/75)  BP(mean): --  RR: 16 (28 Dec 2023 10:00) (16 - 18)  SpO2: 97% (28 Dec 2023 10:00) (94% - 97%)    Parameters below as of 28 Dec 2023 10:00  Patient On (Oxygen Delivery Method): room air        MEDICATIONS  (STANDING):  aspirin  chewable 81 milliGRAM(s) Oral every 24 hours  atorvastatin 80 milliGRAM(s) Oral at bedtime  carvedilol 25 milliGRAM(s) Oral every 12 hours  dextrose 5%. 1000 milliLiter(s) (50 mL/Hr) IV Continuous <Continuous>  dextrose 5%. 1000 milliLiter(s) (100 mL/Hr) IV Continuous <Continuous>  dextrose 50% Injectable 25 Gram(s) IV Push once  dextrose 50% Injectable 25 Gram(s) IV Push once  dextrose 50% Injectable 12.5 Gram(s) IV Push once  gabapentin 600 milliGRAM(s) Oral every 8 hours  glucagon  Injectable 1 milliGRAM(s) IntraMuscular once  heparin   Injectable 5000 Unit(s) SubCutaneous every 8 hours  hydrALAZINE 100 milliGRAM(s) Oral every 8 hours  insulin glargine Injectable (LANTUS) 12 Unit(s) SubCutaneous at bedtime  insulin lispro (ADMELOG) corrective regimen sliding scale   SubCutaneous Before meals and at bedtime  insulin lispro Injectable (ADMELOG) 8 Unit(s) SubCutaneous three times a day before meals  lisinopril 40 milliGRAM(s) Oral every 24 hours  NIFEdipine XL 60 milliGRAM(s) Oral every 24 hours  remdesivir  IVPB 100 milliGRAM(s) IV Intermittent once  spironolactone 25 milliGRAM(s) Oral every 24 hours  torsemide 40 milliGRAM(s) Oral every 24 hours    MEDICATIONS  (PRN):  acetaminophen     Tablet .. 650 milliGRAM(s) Oral every 6 hours PRN Temp greater or equal to 38C (100.4F), Mild Pain (1 - 3)  dextrose Oral Gel 15 Gram(s) Oral once PRN Blood Glucose LESS THAN 70 milliGRAM(s)/deciliter  oxyCODONE    IR 5 milliGRAM(s) Oral every 6 hours PRN Moderate Pain (4 - 6)  oxyCODONE    IR 10 milliGRAM(s) Oral every 6 hours PRN Severe Pain (7 - 10)          Initial Functional Status Assessment :       Previous Level of Function:     · Additional Comments	As per pt, he lives alone in an apt with elevator. Pt came from rehab.    Cognitive Status Examination:   · Orientation	oriented to person, place, time and situation  · Level of Consciousness	alert  · Follows Commands and Answers Questions	100% of the time; able to follow multistep instructions  · Personal Safety and Judgment	intact    Peripheral Neurovascular:     · Edema 3+ (Moderate)	LLE    Range of Motion Exam:   · Active Range of Motion Examination	AROM WFL through out except R knee is lacking~ 20 degrees extension    Manual Muscle Testing:   · Manual Muscle Testing Results	b/l UEs >3+/5 grossly, b/l LEs~4-/5 except b/l hip flexion~3-/5    Bed Mobility: Scooting/Bridging:     · Level of [place]	supervision  · Physical Assist/Nonphysical Assist	supervision    Bed Mobility: Sit to Supine:     · Level of [place]	minimum assist (75% patients effort)  · Physical Assist/Nonphysical Assist	1 person assist; verbal cues    Bed Mobility: Supine to Sit:     · Level of [place]	contact guard  · Physical Assist/Nonphysical Assist	1 person assist; verbal cues    Bed Mobility Analysis:     · Bed Mobility Limitations	decreased ability to use arms for pushing/pulling; decreased ability to use legs for bridging/pushing; impaired ability to control trunk for mobility  · Impairments Contributing to Impaired Bed Mobility	impaired balance; pain; impaired postural control; decreased strength    Transfer: Sit to Stand:     · Level of [place]	unable to perform; secondary pt stated that he unable to perform transfer secondary pt complains that he has 9/10 RLE pain and he got body ache from covid.    Gait Skills:     · Level of [place]	unable to perform    Balance Skills Assessment:     · Sitting Balance: Static	fair plus  · Sitting Balance: Dynamic	fair minus  · Sit-to-Stand Balance	SUSIE  · Standing Balance: Static	SUSIE  · Standing Balance: Dynamic	SUSIE    Sensory Examination:   Sensory Examination:    Light Touch Sensation:   · Left LE	within normal limits  · Right LE	within normal limits      Treatment Location:   · Comments	Pt dangled at EOB for~ 5 mins with CG    Clinical Impressions:     · Criteria for Skilled Therapeutic Interventions	impairments found; functional limitations in following categories; rehab potential; therapy frequency; anticipated discharge recommendation  · Impairments Found (describe specific impairments)	aerobic capacity/endurance; muscle strength; gross motor; anthropometric characteristics; gait, locomotion, and balance; poor safety awareness; posture; ROM  · Functional Limitations in Following Categories (describe specific limitations)	self-care; home management; community/leisure        PM&R Impression : as above    Current disposition plan recommendation :    subacute rehab placement             Physical Medicine and Rehabilitation Progress Note :       Patient is a 68y old  Male who presents with a chief complaint of     HPI:  67yo M w/ PMHx CAD (2 stents 2020), HFmrEF (45-50%), HTN, PAD w/ remote RLE angioplasty, R 4th toe OM s/p partial R 4th ray amputation on 10/24, uncontrolled IDDM (a1c 12 in Oct 2023), recent admission 11/8-11/10 to cardiology service for hypertensive emergency (left AMA), recently admitted (12/01-12/26/23) for R foot soft tissue infection, now s/p R BKA (12/11/23), course c/b CDiff infection w/ completion of oral vanc on 12/25, discharged to [facility] on 12/26 and found to be COVID-19 positive, [facility] unable to accept COVID+ patients, so he returned to [facility] for further monitoring. Patient denies current symptoms of cough, runny nose, headache, chills, sob, and wheezing. He says he had no signs that he had COVID-19, and his main complaint is his right sided leg pain. He received Oxy 5mg prior to conversation, and he currently states his pain is an 11/10, as he was going to the bathroom and he hit his stump on the wall. He says Dilaudid was helping his pain the best during his admission. His amputation site was recently wrapped/bandage changed this morning when he got to the [facility]. He had a recent bowel movement in the ED that was formed stool, denies current diarrhea, abd pain.     On admission:  Initial vital sign: Temp 100.3 -> 101.7, HR 78, /78, Sp02 94% on room air  Labs significant for: Hgb 8.8, BUN 36,  glucose 114, COVID-19 positive  Imaging: None  Interventions: Tylenol 650mg x1, Oxycodone 5mg x1  Consults: none   (26 Dec 2023 23:10)                            8.0    5.84  )-----------( 295      ( 28 Dec 2023 05:30 )             25.6       12-28    139  |  102  |  30<H>  ----------------------------<  152<H>  3.9   |  29  |  1.15    Ca    8.6      28 Dec 2023 05:30  Phos  3.9     12-28  Mg     2.0     12-28      Vital Signs Last 24 Hrs  T(C): 37.2 (28 Dec 2023 10:00), Max: 38 (28 Dec 2023 05:37)  T(F): 99 (28 Dec 2023 10:00), Max: 100.4 (28 Dec 2023 05:37)  HR: 70 (28 Dec 2023 11:40) (66 - 73)  BP: 156/78 (28 Dec 2023 11:40) (120/71 - 164/75)  BP(mean): --  RR: 16 (28 Dec 2023 10:00) (16 - 18)  SpO2: 97% (28 Dec 2023 10:00) (94% - 97%)    Parameters below as of 28 Dec 2023 10:00  Patient On (Oxygen Delivery Method): room air        MEDICATIONS  (STANDING):  aspirin  chewable 81 milliGRAM(s) Oral every 24 hours  atorvastatin 80 milliGRAM(s) Oral at bedtime  carvedilol 25 milliGRAM(s) Oral every 12 hours  dextrose 5%. 1000 milliLiter(s) (50 mL/Hr) IV Continuous <Continuous>  dextrose 5%. 1000 milliLiter(s) (100 mL/Hr) IV Continuous <Continuous>  dextrose 50% Injectable 25 Gram(s) IV Push once  dextrose 50% Injectable 25 Gram(s) IV Push once  dextrose 50% Injectable 12.5 Gram(s) IV Push once  gabapentin 600 milliGRAM(s) Oral every 8 hours  glucagon  Injectable 1 milliGRAM(s) IntraMuscular once  heparin   Injectable 5000 Unit(s) SubCutaneous every 8 hours  hydrALAZINE 100 milliGRAM(s) Oral every 8 hours  insulin glargine Injectable (LANTUS) 12 Unit(s) SubCutaneous at bedtime  insulin lispro (ADMELOG) corrective regimen sliding scale   SubCutaneous Before meals and at bedtime  insulin lispro Injectable (ADMELOG) 8 Unit(s) SubCutaneous three times a day before meals  lisinopril 40 milliGRAM(s) Oral every 24 hours  NIFEdipine XL 60 milliGRAM(s) Oral every 24 hours  remdesivir  IVPB 100 milliGRAM(s) IV Intermittent once  spironolactone 25 milliGRAM(s) Oral every 24 hours  torsemide 40 milliGRAM(s) Oral every 24 hours    MEDICATIONS  (PRN):  acetaminophen     Tablet .. 650 milliGRAM(s) Oral every 6 hours PRN Temp greater or equal to 38C (100.4F), Mild Pain (1 - 3)  dextrose Oral Gel 15 Gram(s) Oral once PRN Blood Glucose LESS THAN 70 milliGRAM(s)/deciliter  oxyCODONE    IR 5 milliGRAM(s) Oral every 6 hours PRN Moderate Pain (4 - 6)  oxyCODONE    IR 10 milliGRAM(s) Oral every 6 hours PRN Severe Pain (7 - 10)          Initial Functional Status Assessment :       Previous Level of Function:     · Additional Comments	As per pt, he lives alone in an apt with elevator. Pt came from rehab.    Cognitive Status Examination:   · Orientation	oriented to person, place, time and situation  · Level of Consciousness	alert  · Follows Commands and Answers Questions	100% of the time; able to follow multistep instructions  · Personal Safety and Judgment	intact    Peripheral Neurovascular:     · Edema 3+ (Moderate)	LLE    Range of Motion Exam:   · Active Range of Motion Examination	AROM WFL through out except R knee is lacking~ 20 degrees extension    Manual Muscle Testing:   · Manual Muscle Testing Results	b/l UEs >3+/5 grossly, b/l LEs~4-/5 except b/l hip flexion~3-/5    Bed Mobility: Scooting/Bridging:     · Level of [place]	supervision  · Physical Assist/Nonphysical Assist	supervision    Bed Mobility: Sit to Supine:     · Level of [place]	minimum assist (75% patients effort)  · Physical Assist/Nonphysical Assist	1 person assist; verbal cues    Bed Mobility: Supine to Sit:     · Level of [place]	contact guard  · Physical Assist/Nonphysical Assist	1 person assist; verbal cues    Bed Mobility Analysis:     · Bed Mobility Limitations	decreased ability to use arms for pushing/pulling; decreased ability to use legs for bridging/pushing; impaired ability to control trunk for mobility  · Impairments Contributing to Impaired Bed Mobility	impaired balance; pain; impaired postural control; decreased strength    Transfer: Sit to Stand:     · Level of [place]	unable to perform; secondary pt stated that he unable to perform transfer secondary pt complains that he has 9/10 RLE pain and he got body ache from covid.    Gait Skills:     · Level of [place]	unable to perform    Balance Skills Assessment:     · Sitting Balance: Static	fair plus  · Sitting Balance: Dynamic	fair minus  · Sit-to-Stand Balance	SUSIE  · Standing Balance: Static	SUSIE  · Standing Balance: Dynamic	SUSIE    Sensory Examination:   Sensory Examination:    Light Touch Sensation:   · Left LE	within normal limits  · Right LE	within normal limits      Treatment Location:   · Comments	Pt dangled at EOB for~ 5 mins with CG    Clinical Impressions:     · Criteria for Skilled Therapeutic Interventions	impairments found; functional limitations in following categories; rehab potential; therapy frequency; anticipated discharge recommendation  · Impairments Found (describe specific impairments)	aerobic capacity/endurance; muscle strength; gross motor; anthropometric characteristics; gait, locomotion, and balance; poor safety awareness; posture; ROM  · Functional Limitations in Following Categories (describe specific limitations)	self-care; home management; community/leisure        PM&R Impression : as above    Current disposition plan recommendation :    subacute rehab placement

## 2023-12-28 NOTE — DIETITIAN INITIAL EVALUATION ADULT - PROBLEM/PLAN-9
From: Laura Calvin  To: Trevor Escobar MD  Sent: 3/22/2019 10:04 AM CDT  Subject: Non-Urgent Medical Question    Dr. Escobar  Can I get the gel injection for my right knee now?  Thanks.  Laura   DISPLAY PLAN FREE TEXT

## 2023-12-28 NOTE — PROGRESS NOTE ADULT - PROBLEM SELECTOR PLAN 4
D/c meds: Hydralazine 100mg q8h and nifedipine XL 60mg QD  - c/w hydral 100mg TID and Nifedipine 60mg qd D/c meds: Hydralazine 100mg q8h and nifedipine XL 60mg QD     • c/w hydral 100mg TID and Nifedipine 60mg qd   • C/w nifedipine XL 60mg QD

## 2023-12-28 NOTE — DIETITIAN INITIAL EVALUATION ADULT - PROBLEM SELECTOR PLAN 3
s/p R BKA on 12/11/23 and s/p BKA closure on 12/18/23 by vascular surgery at [facility]  - pain regimen: Oxy 10mg q6hrs severe pain, Oxy 5mg q6hrs moderate pain, tylenol 650mg q6 hrs prn mild pain/fevers  - has outpatient f/u apt w/ vascular: Dr. Freed on 1/18/24 at 10 A.M. s/p R BKA on 12/11/23 and s/p BKA closure on 12/18/23 by vascular surgery at [facility]  - pain regimen: Oxy 10mg q6hrs severe pain, Oxy 5mg q6hrs moderate pain, tylenol 650mg q6 hrs prn mild pain/fevers  - has outpatient f/u apt w/ vascular: Dr. Freed on 1/18/24 at 10 A.M.

## 2023-12-28 NOTE — PROGRESS NOTE ADULT - SUBJECTIVE AND OBJECTIVE BOX
SUBJECTIVE: Patient seen and examined at bedside.    aspirin  chewable 81 milliGRAM(s) Oral every 24 hours  carvedilol 25 milliGRAM(s) Oral every 12 hours  heparin   Injectable 5000 Unit(s) SubCutaneous every 8 hours  hydrALAZINE 100 milliGRAM(s) Oral every 8 hours  lisinopril 40 milliGRAM(s) Oral every 24 hours  NIFEdipine XL 60 milliGRAM(s) Oral every 24 hours  remdesivir  IVPB 100 milliGRAM(s) IV Intermittent once  spironolactone 25 milliGRAM(s) Oral every 24 hours  torsemide 40 milliGRAM(s) Oral every 24 hours      Vital Signs Last 24 Hrs  T(C): 38 (28 Dec 2023 05:37), Max: 38 (28 Dec 2023 05:37)  T(F): 100.4 (28 Dec 2023 05:37), Max: 100.4 (28 Dec 2023 05:37)  HR: 66 (28 Dec 2023 05:37) (66 - 74)  BP: 135/65 (28 Dec 2023 05:37) (120/71 - 174/87)  BP(mean): 116 (27 Dec 2023 10:15) (116 - 116)  RR: 18 (28 Dec 2023 05:37) (16 - 18)  SpO2: 94% (28 Dec 2023 05:37) (94% - 97%)    Parameters below as of 28 Dec 2023 05:37  Patient On (Oxygen Delivery Method): room air      I&O's Detail    27 Dec 2023 07:01  -  28 Dec 2023 07:00  --------------------------------------------------------  IN:  Total IN: 0 mL    OUT:    Voided (mL): 500 mL  Total OUT: 500 mL    Total NET: -500 mL          PHYSICAL EXAM:   Gen: NAD, resting comfortably   CV: NSR  Pulm: no respiratory distress on RA  Abd: soft, ND, NTTP, no rebound or guarding   Ext: WWP; R BKA stump incision c/d/i, no hematoma, superficial blister with clean base and no erythema or purulence; dressing changed.  Neuro: motor/sensory grossly intact       LABS:                        8.5    7.34  )-----------( 317      ( 27 Dec 2023 05:30 )             27.5     12-27    139  |  103  |  34<H>  ----------------------------<  83  4.2   |  29  |  1.19    Ca    8.8      27 Dec 2023 05:30  Phos  3.4     12-27  Mg     1.9     12-27        Urinalysis Basic - ( 27 Dec 2023 05:30 )    Color: x / Appearance: x / SG: x / pH: x  Gluc: 83 mg/dL / Ketone: x  / Bili: x / Urobili: x   Blood: x / Protein: x / Nitrite: x   Leuk Esterase: x / RBC: x / WBC x   Sq Epi: x / Non Sq Epi: x / Bacteria: x        RADIOLOGY & ADDITIONAL STUDIES:

## 2023-12-28 NOTE — DIETITIAN INITIAL EVALUATION ADULT - PROBLEM SELECTOR PLAN 6
Hgb 8.8, MCV 91, stable from Hgb 8.4 (12/22). No signs of active bleeding.  - active type and screen.   - transfuse for Hgb <8 given cardiac hx  - continue to monitor for signs of anemia

## 2023-12-28 NOTE — PROGRESS NOTE ADULT - ASSESSMENT
67yo M PMH CAD (2 stents 2020), HFmrEF (45-50%), HTN, PAD w/ remote RLE angioplasty, R 4th toe OM s/p partial R 4th ray amputation on 10/24, RLE angiogram with AT lithotripsy and AT/peroneal balloon angioplasty 10/27, uncontrolled T2DM recently admitted for R foot gas gangrene s/p R BKA (12/11) and closure (12/18) c/b c. diff infection s/p treatment now returns from [facility] for incidental finding of COVID. Vascular surgery consulted for management of BKA stump.    Daily dressing changes 69yo M PMH CAD (2 stents 2020), HFmrEF (45-50%), HTN, PAD w/ remote RLE angioplasty, R 4th toe OM s/p partial R 4th ray amputation on 10/24, RLE angiogram with AT lithotripsy and AT/peroneal balloon angioplasty 10/27, uncontrolled T2DM recently admitted for R foot gas gangrene s/p R BKA (12/11) and closure (12/18) c/b c. diff infection s/p treatment now returns from [facility] for incidental finding of COVID. Vascular surgery consulted for management of BKA stump.    Daily dressing changes

## 2023-12-28 NOTE — DIETITIAN INITIAL EVALUATION ADULT - OTHER INFO
69yo M w/ PMHx CAD (2 stents 2020), HFmrEF (45-50%), HTN, PAD w/ remote RLE angioplasty, R 4th toe OM s/p partial R 4th ray amputation on 10/24, uncontrolled IDDM (a1c 12 in Oct 2023), recent admission 11/8-11/10 to cardiology service for hypertensive emergency (left AMA), recently admitted (12/01-12/26/23) for R foot soft tissue infection, now s/p R BKA (12/11/23), course c/b CDiff infection w/ completion of oral vanc on 12/25, discharged to [facility] on 12/26 and found to be COVID-19 positive, [facility] unable to accept COVID+ patients, so he returned to [facility] for further monitoring. Patient denies current symptoms of cough, runny nose, headache, chills, sob, and wheezing. He says he had no signs that he had COVID-19, and his main complaint is his right sided leg pain.     Patient seen at bedside for nutrition assessment. Current diet order: consistent carbohydrates. No known food allergies. No difficulty chewing/swallowing reported. Reports good appetite, consumed eggs, home fries, and fruit cup at breakfast. Patient reports he was in pain on his amputated leg and felt feverish likely related to COVID-19. Provided continued encouragement for PO intake. Dosing weight: 160 pounds, BMI 26.4 adjusted for BKA, patient denies recent weight loss. No pressure injuries documented. +3 edema to L leg and scrotum. Denies N/V/D/C, last BM 12/27 per EMR. Elevated BUN, HgbA1c 13.5. Meds: insulin, spironolactone. Observed with no overt signs and symptoms of muscle or fat wasting. Based on ASPEN guidelines, pt does not meet criteria for malnutrition. No cultural, ethnic, [employer]al food preferences reported. See nutrition recommendations below.  69yo M w/ PMHx CAD (2 stents 2020), HFmrEF (45-50%), HTN, PAD w/ remote RLE angioplasty, R 4th toe OM s/p partial R 4th ray amputation on 10/24, uncontrolled IDDM (a1c 12 in Oct 2023), recent admission 11/8-11/10 to cardiology service for hypertensive emergency (left AMA), recently admitted (12/01-12/26/23) for R foot soft tissue infection, now s/p R BKA (12/11/23), course c/b CDiff infection w/ completion of oral vanc on 12/25, discharged to [facility] on 12/26 and found to be COVID-19 positive, [facility] unable to accept COVID+ patients, so he returned to [facility] for further monitoring. Patient denies current symptoms of cough, runny nose, headache, chills, sob, and wheezing. He says he had no signs that he had COVID-19, and his main complaint is his right sided leg pain.     Patient seen at bedside for nutrition assessment. Current diet order: consistent carbohydrates. No known food allergies. No difficulty chewing/swallowing reported. Reports good appetite, consumed eggs, home fries, and fruit cup at breakfast. Patient reports he was in pain on his amputated leg and felt feverish likely related to COVID-19. Provided continued encouragement for PO intake. Dosing weight: 160 pounds, BMI 26.4 adjusted for BKA, patient denies recent weight loss. No pressure injuries documented. +3 edema to L leg and scrotum. Denies N/V/D/C, last BM 12/27 per EMR. Elevated BUN, HgbA1c 13.5. Meds: insulin, spironolactone. Observed with no overt signs and symptoms of muscle or fat wasting. Based on ASPEN guidelines, pt does not meet criteria for malnutrition. No cultural, ethnic, L[employer] day food preferences reported. See nutrition recommendations below.

## 2023-12-28 NOTE — PROGRESS NOTE ADULT - PROBLEM SELECTOR PLAN 5
Hx of ICM HF mildly reduced EF (45-50% 11/09), cardiology following during last admission for HF. Patient d/c on Torsemide 40mg PO qd today, now returning from [facility]   - prior recs: IV lasix 80mg q12h w/ strict I/O while inpatient, switch to torsemide 40mg PO QD on d/c  - GDMT for HFrEF: Coreg 25mg BID, lisinopril 40mg QD, aldactone 25mg QD; avoid SGLT2i given PAD w/ delayed wound healing    Plan:  - c/w Torsemide 40mg po qd as patient was d/c ready, returning for new rehab placement due to incidental COVID infection finding  - Strict I/Os  - c/w Coreg 25mg BID, lisinopril 40mg qd, aldactone 25mg qd  - if starts to become volume overloaded can transition back to IV Lasix 80mg BID Hx of ICM HF mildly reduced EF (45-50% 11/09), cardiology following during last admission for HF. Patient d/c on Torsemide 40mg PO qd today, now returning from [facility]   - prior recs: IV lasix 80mg q12h w/ strict I/O while inpatient, switch to torsemide 40mg PO QD on d/c  - GDMT for HFrEF: Coreg 25mg BID, lisinopril 40mg QD, aldactone 25mg QD; avoid SGLT2i given PAD w/ delayed wound healing    Plan:  - c/w Torsemide 40mg po qd as patient was d/c ready, returning for new rehab placement due to incidental COVID infection finding  - Strict I/Os  - c/w Coreg 25mg BID, lisinopril 40mg qd, aldactone 25mg qd  - if starts to become volume overloaded can transition back to IV Lasix 80mg BID

## 2023-12-28 NOTE — PROGRESS NOTE ADULT - SUBJECTIVE AND OBJECTIVE BOX
Medicine Progress Note  INCOMPLETE NOTE    INTERVAL EVENTS:   No acute events overnight.    SUBJECTIVE:   Patient seen and examined at bedside. Condition largely unchanged from yesterday. No acute complaints at this time.    ROS:  Negative unless otherwise stated above.    PHYSICAL EXAM:  Noted in "Physical Exam" section below.    VITAL SIGNS:  Vital Signs Last 24 Hrs  T(C): 38 (28 Dec 2023 05:37), Max: 38 (28 Dec 2023 05:37)  T(F): 100.4 (28 Dec 2023 05:37), Max: 100.4 (28 Dec 2023 05:37)  HR: 66 (28 Dec 2023 05:37) (66 - 74)  BP: 135/65 (28 Dec 2023 05:37) (120/71 - 174/87)  BP(mean): 116 (27 Dec 2023 10:15) (116 - 116)  RR: 18 (28 Dec 2023 05:37) (16 - 18)  SpO2: 94% (28 Dec 2023 05:37) (94% - 97%)    Parameters below as of 28 Dec 2023 05:37  Patient On (Oxygen Delivery Method): room air      <INS & OUTS:    12-27-23 @ 07:01  -  12-28-23 @ 07:00  --------------------------------------------------------  IN: 0 mL / OUT: 500 mL / NET: -500 mL      INPATIENT MEDICATIONS:   MEDICATIONS  (STANDING):  aspirin  chewable 81 milliGRAM(s) Oral every 24 hours  atorvastatin 80 milliGRAM(s) Oral at bedtime  carvedilol 25 milliGRAM(s) Oral every 12 hours  dextrose 5%. 1000 milliLiter(s) (100 mL/Hr) IV Continuous <Continuous>  dextrose 5%. 1000 milliLiter(s) (50 mL/Hr) IV Continuous <Continuous>  dextrose 50% Injectable 25 Gram(s) IV Push once  dextrose 50% Injectable 25 Gram(s) IV Push once  dextrose 50% Injectable 12.5 Gram(s) IV Push once  gabapentin 600 milliGRAM(s) Oral every 8 hours  glucagon  Injectable 1 milliGRAM(s) IntraMuscular once  heparin   Injectable 5000 Unit(s) SubCutaneous every 8 hours  hydrALAZINE 100 milliGRAM(s) Oral every 8 hours  insulin glargine Injectable (LANTUS) 12 Unit(s) SubCutaneous at bedtime  insulin lispro (ADMELOG) corrective regimen sliding scale   SubCutaneous Before meals and at bedtime  insulin lispro Injectable (ADMELOG) 8 Unit(s) SubCutaneous three times a day before meals  lisinopril 40 milliGRAM(s) Oral every 24 hours  NIFEdipine XL 60 milliGRAM(s) Oral every 24 hours  remdesivir  IVPB 100 milliGRAM(s) IV Intermittent once  spironolactone 25 milliGRAM(s) Oral every 24 hours  torsemide 40 milliGRAM(s) Oral every 24 hours    MEDICATIONS  (PRN):  acetaminophen     Tablet .. 650 milliGRAM(s) Oral every 6 hours PRN Temp greater or equal to 38C (100.4F), Mild Pain (1 - 3)  dextrose Oral Gel 15 Gram(s) Oral once PRN Blood Glucose LESS THAN 70 milliGRAM(s)/deciliter  oxyCODONE    IR 5 milliGRAM(s) Oral every 6 hours PRN Moderate Pain (4 - 6)  oxyCODONE    IR 10 milliGRAM(s) Oral every 6 hours PRN Severe Pain (7 - 10)    ALLERGIES:  Allergies    No Known Allergies    Intolerances    LABS:                       8.5    7.34  )-----------( 317      ( 27 Dec 2023 05:30 )             27.5     12-27    139  |  103  |  34<H>  ----------------------------<  83  4.2   |  29  |  1.19    Ca    8.8      27 Dec 2023 05:30  Phos  3.4     12-27  Mg     1.9     12-27        Urinalysis Basic - ( 27 Dec 2023 05:30 )    Color: x / Appearance: x / SG: x / pH: x  Gluc: 83 mg/dL / Ketone: x  / Bili: x / Urobili: x   Blood: x / Protein: x / Nitrite: x   Leuk Esterase: x / RBC: x / WBC x   Sq Epi: x / Non Sq Epi: x / Bacteria: x      CAPILLARY BLOOD GLUCOSE      POCT Blood Glucose.: 84 mg/dL (28 Dec 2023 03:06)    RADIOLOGY & ADDITIONAL TESTS: Reviewed. Medicine Progress Note    INTERVAL EVENTS:   No acute events overnight.    SUBJECTIVE:   Patient seen and examined at bedside. Condition largely unchanged from yesterday. No acute complaints at this time.    ROS:  Negative unless otherwise stated above.    PHYSICAL EXAM:  General: in no acute distress  Eyes: EOMI intact bilaterally  HENT: Moist mucous membranes  Neck: Trachea midline, supple  Lungs: CTA B/L. no egophony  Cardiovascular: RRR  Abdomen: Soft, non-tender non-distended  Extremities: +1 pitting edema to Left lower extremity, Right BKA wrapped w/ clean kerlix dressing;  Neurological: Alert and oriented  Skin: Warm and dry    VITAL SIGNS:  Vital Signs Last 24 Hrs  T(C): 38 (28 Dec 2023 05:37), Max: 38 (28 Dec 2023 05:37)  T(F): 100.4 (28 Dec 2023 05:37), Max: 100.4 (28 Dec 2023 05:37)  HR: 66 (28 Dec 2023 05:37) (66 - 74)  BP: 135/65 (28 Dec 2023 05:37) (120/71 - 174/87)  BP(mean): 116 (27 Dec 2023 10:15) (116 - 116)  RR: 18 (28 Dec 2023 05:37) (16 - 18)  SpO2: 94% (28 Dec 2023 05:37) (94% - 97%)    Parameters below as of 28 Dec 2023 05:37  Patient On (Oxygen Delivery Method): room air      <INS & OUTS:    12-27-23 @ 07:01  -  12-28-23 @ 07:00  --------------------------------------------------------  IN: 0 mL / OUT: 500 mL / NET: -500 mL      INPATIENT MEDICATIONS:   MEDICATIONS  (STANDING):  aspirin  chewable 81 milliGRAM(s) Oral every 24 hours  atorvastatin 80 milliGRAM(s) Oral at bedtime  carvedilol 25 milliGRAM(s) Oral every 12 hours  dextrose 5%. 1000 milliLiter(s) (100 mL/Hr) IV Continuous <Continuous>  dextrose 5%. 1000 milliLiter(s) (50 mL/Hr) IV Continuous <Continuous>  dextrose 50% Injectable 25 Gram(s) IV Push once  dextrose 50% Injectable 25 Gram(s) IV Push once  dextrose 50% Injectable 12.5 Gram(s) IV Push once  gabapentin 600 milliGRAM(s) Oral every 8 hours  glucagon  Injectable 1 milliGRAM(s) IntraMuscular once  heparin   Injectable 5000 Unit(s) SubCutaneous every 8 hours  hydrALAZINE 100 milliGRAM(s) Oral every 8 hours  insulin glargine Injectable (LANTUS) 12 Unit(s) SubCutaneous at bedtime  insulin lispro (ADMELOG) corrective regimen sliding scale   SubCutaneous Before meals and at bedtime  insulin lispro Injectable (ADMELOG) 8 Unit(s) SubCutaneous three times a day before meals  lisinopril 40 milliGRAM(s) Oral every 24 hours  NIFEdipine XL 60 milliGRAM(s) Oral every 24 hours  remdesivir  IVPB 100 milliGRAM(s) IV Intermittent once  spironolactone 25 milliGRAM(s) Oral every 24 hours  torsemide 40 milliGRAM(s) Oral every 24 hours    MEDICATIONS  (PRN):  acetaminophen     Tablet .. 650 milliGRAM(s) Oral every 6 hours PRN Temp greater or equal to 38C (100.4F), Mild Pain (1 - 3)  dextrose Oral Gel 15 Gram(s) Oral once PRN Blood Glucose LESS THAN 70 milliGRAM(s)/deciliter  oxyCODONE    IR 5 milliGRAM(s) Oral every 6 hours PRN Moderate Pain (4 - 6)  oxyCODONE    IR 10 milliGRAM(s) Oral every 6 hours PRN Severe Pain (7 - 10)    ALLERGIES:  Allergies    No Known Allergies    Intolerances    LABS:                       8.5    7.34  )-----------( 317      ( 27 Dec 2023 05:30 )             27.5     12-27    139  |  103  |  34<H>  ----------------------------<  83  4.2   |  29  |  1.19    Ca    8.8      27 Dec 2023 05:30  Phos  3.4     12-27  Mg     1.9     12-27        Urinalysis Basic - ( 27 Dec 2023 05:30 )    Color: x / Appearance: x / SG: x / pH: x  Gluc: 83 mg/dL / Ketone: x  / Bili: x / Urobili: x   Blood: x / Protein: x / Nitrite: x   Leuk Esterase: x / RBC: x / WBC x   Sq Epi: x / Non Sq Epi: x / Bacteria: x      CAPILLARY BLOOD GLUCOSE      POCT Blood Glucose.: 84 mg/dL (28 Dec 2023 03:06)    RADIOLOGY & ADDITIONAL TESTS: Reviewed.

## 2023-12-28 NOTE — PROGRESS NOTE ADULT - ASSESSMENT
I M    68 y o M w/ PMHx CAD (2 stents 2020), HFmrEF (45-50%), HTN, PAD w/ remote RLE angioplasty, R 4th toe OM s/p partial R 4th ray amputation on 10/24, uncontrolled IDDM (a1c 12 in Oct 2023), recent admission 11/8-11/10 to cardiology service for hypertensive emergency (left AMA), recently admitted (12/01-12/26/23) for R foot soft tissue infection, now s/p R BKA (12/11/23), course c/b CDiff infection w/ completion of oral vanc on 12/25, discharged to [facility] on 12/26 and found to be COVID-19 positive, [facility] unable to accept COVID+ patients, so he returned to [facility] for further monitoring.       Problem/Plan - 1:  ·  Problem: 2019 novel coronavirus disease (COVID-19).   ·  Plan: Patient d/c to [facility] on 12/26, found to be COVID-19+ at [facility], returned to [facility] as rehab can't accept patients w/ COVID. He denies cough, runny nose, chills, sob, Found to have fever 101.6 degrees F in ED, no other SIRS criteria. Currently saturating 94-95% on room air, not hypoxic. Fever likely due to COVID-19 infection.  - continue to monitor symptoms  - tylenol 650mg Q6hrs prn fever/mild pain  - patient started on remdesivir for three days.    Problem/Plan - 2:  ·  Problem: Systemic inflammatory response syndrome (SIRS).   ·  Plan: Patient w/ fever 101.6 degrees F, no other SIRS criteria met. Found to be COVID-19 positive. Fevers likely 2/2 COVID-19 infection.  - continue to monitor off antibiotics  - f/u blood cultures.    Problem/Plan - 3:  ·  Problem: Status post below knee amputation, right.   ·  Plan: s/p R BKA on 12/11/23 and s/p BKA closure on 12/18/23 by vascular surgery at [facility]  - pain regimen: Oxy 10mg q6hrs severe pain, Oxy 5mg q6hrs moderate pain, tylenol 650mg q6 hrs prn mild pain/fevers  - has outpatient f/u apt w/ vascular: Dr. Freed on 1/18/24 at 10 A.M.    Problem/Plan - 4:  ·  Problem: HTN (hypertension).   ·  Plan: D/c meds: Hydralazine 100mg q8h and nifedipine XL 60mg QD  - c/w hydral 100mg TID and Nifedipine 60mg qd.    Problem/Plan - 5:  ·  Problem: Heart failure with mildly reduced ejection fraction.   ·  Plan: Hx of ICM HF mildly reduced EF (45-50% 11/09), cardiology following during last admission for HF. Patient d/c on Torsemide 40mg PO qd today, now returning from [facility]   - prior recs: IV lasix 80mg q12h w/ strict I/O while inpatient, switch to torsemide 40mg PO QD on d/c  - GDMT for HFrEF: Coreg 25mg BID, lisinopril 40mg QD, aldactone 25mg QD; avoid SGLT2i given PAD w/ delayed wound healing    Plan:  - c/w Torsemide 40mg po qd as patient was d/c ready, returning for new rehab placement due to incidental COVID infection finding  - Strict I/Os  - c/w Coreg 25mg BID, lisinopril 40mg qd, aldactone 25mg qd  - if starts to become volume overloaded can transition back to IV Lasix 80mg BID.    Problem/Plan - 6:  ·  Problem: Normocytic anemia.   ·  Plan: Hgb 8.8, MCV 91, stable from Hgb 8.4 (12/22). No signs of active bleeding.  - active type and screen.   - transfuse for Hgb <8 given cardiac hx  - continue to monitor for signs of anemia.    Problem/Plan - 7:  ·  Problem: CAD S/P percutaneous coronary angioplasty.   ·  Plan: d/c meds: ASA 81mg QD  and atorvastatin 80mg qhs  - c/w aspirin 81mg qd and atorvastatin 80mg qhs.    Problem/Plan - 8:  ·  Problem: PAD (peripheral artery disease).   ·  Plan: d/c meds: ASA 81mg QD  and atorvastatin 80mg qhs  - c/w aspirin 81mg qd and atorvastatin 80mg qhs.    Problem/Plan - 9:  ·  Problem: IDDM (insulin dependent diabetes mellitus).   ·  Plan: Hx of Type 2 Diabetes (A1c 13.5% 12/05) c/b PAD, diabetic foot infection. D/c on Lantus 12units qhs and Lispro 8units TID w/ meals. Endocrine consulted last admission w/ rec for Lantus 12units qhs and Lispro 8units TID on discharge.  - c/w Lantus 12units qhs, Lispro 8units TID, and moderate sliding scale w/ meals and bedtime  - carb consistent diet  - Patient's fingerstick glucose goal 100-180 mg/dL.    Problem/Plan - 10:  ·  Problem: History of Clostridium difficile infection.   ·  Plan; Hx of CDIff infection last admission. Completed txt w/ oral Vanc on 12/25. Now reporting formed bowel movements.  - c/w contact precautions.    Problem/Plan - 11:  ·  Problem: Neuropathy.   ·  Plan: Home med: Gabapentin 800mg TID  - due to renal function will decrease Gabapentin to 600mg TID.    Problem/Plan - 12:  ·  Problem: Preventive measure.   ·  Plan: F: None  E: replete as necessary  N: Carb consistent diet  DVT: Heparin Subq  Dispo: RMF.    Attestation Statements:   Attestation Statements:  I have personally seen and examined the patient.  I fully participated in the care of this patient.  I have made amendments to the documentation where necessary, and agree with the history, physical exam, and plan as documented by the Resident.     68-year-old male with a PMHx of CAD (s/p PCI x 2 in 2020), HTN, IDDM (A1c 12%, 10/2023), HFmrEF, PAD (s/p remote RLE angioplasty), right 4th toe OM (s/p partial 4th ray amputation, 10/24), RLE angiogram with AT lithotripsy and AT/peroneal balloon angioplasty and recent admissions hypertensive emergency (left AMA) and right BKA who presented from [facility] with COVID.      #COVID-19  -start Remdesivir as patient is high risk for progression to severe disease   -no indication of steroids as patient is not hypoxic     Rest of plan as per resident note.        I M    68 y o M w/ PMHx CAD (2 stents 2020), HFmrEF (45-50%), HTN, PAD w/ remote RLE angioplasty, R 4th toe OM s/p partial R 4th ray amputation on 10/24, uncontrolled IDDM (a1c 12 in Oct 2023), recent admission 11/8-11/10 to cardiology service for hypertensive emergency (left AMA), recently admitted (12/01-12/26/23) for R foot soft tissue infection, now s/p R BKA (12/11/23), course c/b CDiff infection w/ completion of oral vanc on 12/25, discharged to [facility] on 12/26 and found to be COVID-19 positive, [facility] unable to accept COVID+ patients, so he returned to [facility] for further monitoring.       Problem/Plan - 1:  ·  Problem: 2019 novel coronavirus disease (COVID-19).   ·  Plan: Patient d/c to [facility] on 12/26, found to be COVID-19+ at [facility], returned to [facility] as rehab can't accept patients w/ COVID. He denies cough, runny nose, chills, sob, Found to have fever 101.6 degrees F in ED, no other SIRS criteria. Currently saturating 94-95% on room air, not hypoxic. Fever likely due to COVID-19 infection.  - continue to monitor symptoms  - tylenol 650mg Q6hrs prn fever/mild pain  - patient started on remdesivir for three days.    Problem/Plan - 2:  ·  Problem: Systemic inflammatory response syndrome (SIRS).   ·  Plan: Patient w/ fever 101.6 degrees F, no other SIRS criteria met. Found to be COVID-19 positive. Fevers likely 2/2 COVID-19 infection.  - continue to monitor off antibiotics  - f/u blood cultures.    Problem/Plan - 3:  ·  Problem: Status post below knee amputation, right.   ·  Plan: s/p R BKA on 12/11/23 and s/p BKA closure on 12/18/23 by vascular surgery at [facility]  - pain regimen: Oxy 10mg q6hrs severe pain, Oxy 5mg q6hrs moderate pain, tylenol 650mg q6 hrs prn mild pain/fevers  - has outpatient f/u apt w/ vascular: Dr. Freed on 1/18/24 at 10 A.M.    Problem/Plan - 4:  ·  Problem: HTN (hypertension).   ·  Plan: D/c meds: Hydralazine 100mg q8h and nifedipine XL 60mg QD  - c/w hydral 100mg TID and Nifedipine 60mg qd.    Problem/Plan - 5:  ·  Problem: Heart failure with mildly reduced ejection fraction.   ·  Plan: Hx of ICM HF mildly reduced EF (45-50% 11/09), cardiology following during last admission for HF. Patient d/c on Torsemide 40mg PO qd today, now returning from [facility]   - prior recs: IV lasix 80mg q12h w/ strict I/O while inpatient, switch to torsemide 40mg PO QD on d/c  - GDMT for HFrEF: Coreg 25mg BID, lisinopril 40mg QD, aldactone 25mg QD; avoid SGLT2i given PAD w/ delayed wound healing    Plan:  - c/w Torsemide 40mg po qd as patient was d/c ready, returning for new rehab placement due to incidental COVID infection finding  - Strict I/Os  - c/w Coreg 25mg BID, lisinopril 40mg qd, aldactone 25mg qd  - if starts to become volume overloaded can transition back to IV Lasix 80mg BID.    Problem/Plan - 6:  ·  Problem: Normocytic anemia.   ·  Plan: Hgb 8.8, MCV 91, stable from Hgb 8.4 (12/22). No signs of active bleeding.  - active type and screen.   - transfuse for Hgb <8 given cardiac hx  - continue to monitor for signs of anemia.    Problem/Plan - 7:  ·  Problem: CAD S/P percutaneous coronary angioplasty.   ·  Plan: d/c meds: ASA 81mg QD  and atorvastatin 80mg qhs  - c/w aspirin 81mg qd and atorvastatin 80mg qhs.    Problem/Plan - 8:  ·  Problem: PAD (peripheral artery disease).   ·  Plan: d/c meds: ASA 81mg QD  and atorvastatin 80mg qhs  - c/w aspirin 81mg qd and atorvastatin 80mg qhs.    Problem/Plan - 9:  ·  Problem: IDDM (insulin dependent diabetes mellitus).   ·  Plan: Hx of Type 2 Diabetes (A1c 13.5% 12/05) c/b PAD, diabetic foot infection. D/c on Lantus 12units qhs and Lispro 8units TID w/ meals. Endocrine consulted last admission w/ rec for Lantus 12units qhs and Lispro 8units TID on discharge.  - c/w Lantus 12units qhs, Lispro 8units TID, and moderate sliding scale w/ meals and bedtime  - carb consistent diet  - Patient's fingerstick glucose goal 100-180 mg/dL.    Problem/Plan - 10:  ·  Problem: History of Clostridium difficile infection.   ·  Plan; Hx of CDIff infection last admission. Completed txt w/ oral Vanc on 12/25. Now reporting formed bowel movements.  - c/w contact precautions.    Problem/Plan - 11:  ·  Problem: Neuropathy.   ·  Plan: Home med: Gabapentin 800mg TID  - due to renal function will decrease Gabapentin to 600mg TID.    Problem/Plan - 12:  ·  Problem: Preventive measure.   ·  Plan: F: None  E: replete as necessary  N: Carb consistent diet  DVT: Heparin Subq  Dispo: RMF.    Attestation Statements:   Attestation Statements:  I have personally seen and examined the patient.  I fully participated in the care of this patient.  I have made amendments to the documentation where necessary, and agree with the history, physical exam, and plan as documented by the Resident.     68-year-old male with a PMHx of CAD (s/p PCI x 2 in 2020), HTN, IDDM (A1c 12%, 10/2023), HFmrEF, PAD (s/p remote RLE angioplasty), right 4th toe OM (s/p partial 4th ray amputation, 10/24), RLE angiogram with AT lithotripsy and AT/peroneal balloon angioplasty and recent admissions hypertensive emergency (left AMA) and right BKA who presented from [facility] with COVID.      #COVID-19  -start Remdesivir as patient is high risk for progression to severe disease   -no indication of steroids as patient is not hypoxic     Rest of plan as per resident note.

## 2023-12-28 NOTE — DIETITIAN INITIAL EVALUATION ADULT - PERTINENT MEDS FT
MEDICATIONS  (STANDING):  aspirin  chewable 81 milliGRAM(s) Oral every 24 hours  atorvastatin 80 milliGRAM(s) Oral at bedtime  carvedilol 25 milliGRAM(s) Oral every 12 hours  dextrose 5%. 1000 milliLiter(s) (50 mL/Hr) IV Continuous <Continuous>  dextrose 5%. 1000 milliLiter(s) (100 mL/Hr) IV Continuous <Continuous>  dextrose 50% Injectable 25 Gram(s) IV Push once  dextrose 50% Injectable 25 Gram(s) IV Push once  dextrose 50% Injectable 12.5 Gram(s) IV Push once  gabapentin 600 milliGRAM(s) Oral every 8 hours  glucagon  Injectable 1 milliGRAM(s) IntraMuscular once  heparin   Injectable 5000 Unit(s) SubCutaneous every 8 hours  hydrALAZINE 100 milliGRAM(s) Oral every 8 hours  insulin glargine Injectable (LANTUS) 12 Unit(s) SubCutaneous at bedtime  insulin lispro (ADMELOG) corrective regimen sliding scale   SubCutaneous Before meals and at bedtime  insulin lispro Injectable (ADMELOG) 8 Unit(s) SubCutaneous three times a day before meals  lisinopril 40 milliGRAM(s) Oral every 24 hours  NIFEdipine XL 60 milliGRAM(s) Oral every 24 hours  remdesivir  IVPB 100 milliGRAM(s) IV Intermittent once  spironolactone 25 milliGRAM(s) Oral every 24 hours  torsemide 40 milliGRAM(s) Oral every 24 hours    MEDICATIONS  (PRN):  acetaminophen     Tablet .. 650 milliGRAM(s) Oral every 6 hours PRN Temp greater or equal to 38C (100.4F), Mild Pain (1 - 3)  dextrose Oral Gel 15 Gram(s) Oral once PRN Blood Glucose LESS THAN 70 milliGRAM(s)/deciliter  oxyCODONE    IR 5 milliGRAM(s) Oral every 6 hours PRN Moderate Pain (4 - 6)  oxyCODONE    IR 10 milliGRAM(s) Oral every 6 hours PRN Severe Pain (7 - 10)

## 2023-12-28 NOTE — PROGRESS NOTE ADULT - PROBLEM SELECTOR PLAN 2
Patient w/ fever 101.6 degrees F, no other SIRS criteria met. Found to be COVID-19 positive. Fevers likely 2/2 COVID-19 infection.  - continue to monitor off antibiotics  - f/u blood cultures Patient w/ fever 101.6 degrees F, no other SIRS criteria met. Found to be COVID-19 positive. Fevers likely 2/2 COVID-19 infection.  - BCx x1: sterile     • Continue to monitor off antibiotics

## 2023-12-28 NOTE — DIETITIAN INITIAL EVALUATION ADULT - ADD RECOMMEND
1. Continue with consistent carbohydrate diet. Consider adding low sodium diet modifier if edema does not improve.  2. Encourage pt to meet nutritional needs as able   3. Monitor labs: electrolytes, cmp  4. Monitor weights   5. Pain and bowel regimen per team   6. Will continue to assess/honor food preferences as able

## 2023-12-28 NOTE — DIETITIAN INITIAL EVALUATION ADULT - OTHER CALCULATIONS
Based on Standards of Care pt within % IBW (114%) using IBW adjusted for BKA, thus actual body weight used for all calculations. Needs adjusted for CHF. Fluids per team.

## 2023-12-28 NOTE — DIETITIAN INITIAL EVALUATION ADULT - PROBLEM SELECTOR PLAN 9
Hx of Type 2 Diabetes (A1c 13.5% 12/05) c/b PAD, diabetic foot infection. D/c on Lantus 12units qhs and Lispro 8units TID w/ meals. Endocrine consulted last admission w/ rec for Lantus 12units qhs and Lispro 8units TID on discharge.  - c/w Lantus 12units qhs, Lispro 8units TID, and moderate sliding scale w/ meals and bedtime  - carb consistent diet  - Patient's fingerstick glucose goal 100-180 mg/dL.

## 2023-12-28 NOTE — PROGRESS NOTE ADULT - ATTENDING COMMENTS
68 YOM with PMH of CAD s/p PCI (x 2 in 2020), HTN, IDDM (A1c 12%, 10/2023) c/b peripheral neuropathy, HFmrEF, PAD s/p R BKA recent admission for R foot gangrene s/p RBKA (12/11) and closure (12/18) c/b c-diff colitis s/p PO vancomycin (EOT 12/25) discharged to [facility] who returns due to being found to be incidentally COVID-19 positive at [facility].     COVID-19 - asymptomatic without hypoxia, no indication for steroids. Cont remdisivir due to being elevated risk for progression to severe disease.     Dispo: pending [facility] acceptance 68 YOM with PMH of CAD s/p PCI (x 2 in 2020), HTN, IDDM (A1c 12%, 10/2023) c/b peripheral neuropathy, HFmrEF, PAD s/p R BKA recent admission for R foot gangrene s/p RBKA (12/11) and closure (12/18) c/b c-diff colitis s/p PO vancomycin (EOT 12/25) discharged to [facility] who returns due to being found to be incidentally COVID-19 positive at [facility].     COVID-19 - asymptomatic without hypoxia, no indication for steroids. Cont remdisivir due to being elevated risk for progression to severe disease.     Dispo: pending [facility] acceptance

## 2023-12-28 NOTE — PROGRESS NOTE ADULT - ASSESSMENT
67yo M w/ PMHx CAD (2 stents 2020), HFmrEF (45-50%), HTN, PAD w/ remote RLE angioplasty, R 4th toe OM s/p partial R 4th ray amputation on 10/24, uncontrolled IDDM (a1c 12 in Oct 2023), recent admission 11/8-11/10 to cardiology service for hypertensive emergency (left AMA), recently admitted (12/01-12/26/23) for R foot soft tissue infection, now s/p R BKA (12/11/23), course c/b CDiff infection w/ completion of oral vanc on 12/25, discharged to [facility] on 12/26 and found to be COVID-19 positive, [facility] unable to accept COVID+ patients, so he returned to [facility] for further monitoring.  69yo M w/ PMHx CAD (2 stents 2020), HFmrEF (45-50%), HTN, PAD w/ remote RLE angioplasty, R 4th toe OM s/p partial R 4th ray amputation on 10/24, uncontrolled IDDM (a1c 12 in Oct 2023), recent admission 11/8-11/10 to cardiology service for hypertensive emergency (left AMA), recently admitted (12/01-12/26/23) for R foot soft tissue infection, now s/p R BKA (12/11/23), course c/b CDiff infection w/ completion of oral vanc on 12/25, discharged to [facility] on 12/26 and found to be COVID-19 positive, [facility] unable to accept COVID+ patients, so he returned to [facility] for further monitoring.  69yo M w/ PMHx CAD (2 stents 2020), HFmrEF (45-50%), HTN, PAD w/ remote RLE angioplasty, R 4th toe OM s/p partial R 4th ray amputation on 10/24, uncontrolled IDDM (a1c 12 in Oct 2023), recent admission 11/8-11/10 to cardiology service for hypertensive emergency (left AMA), recently admitted (12/01-12/26/23) for R foot soft tissue infection, now s/p R BKA (12/11/23), course c/b CDiff infection w/ completion of oral vanc on 12/25, discharged to [facility] on 12/26 and found to be COVID-19 positive, [facility] unable to accept COVID+ patients, so he returned to [facility] for further monitoring.     Pending KERRY 67yo M w/ PMHx CAD (2 stents 2020), HFmrEF (45-50%), HTN, PAD w/ remote RLE angioplasty, R 4th toe OM s/p partial R 4th ray amputation on 10/24, uncontrolled IDDM (a1c 12 in Oct 2023), recent admission 11/8-11/10 to cardiology service for hypertensive emergency (left AMA), recently admitted (12/01-12/26/23) for R foot soft tissue infection, now s/p R BKA (12/11/23), course c/b CDiff infection w/ completion of oral vanc on 12/25, discharged to [facility] on 12/26 and found to be COVID-19 positive, [facility] unable to accept COVID+ patients, so he returned to [facility] for further monitoring.     Pending KERRY

## 2023-12-28 NOTE — DIETITIAN INITIAL EVALUATION ADULT - PROBLEM SELECTOR PLAN 10
Hx of CDIff infection last admission. Completed txt w/ oral Vanc on 12/25. Now reporting formed bowel movements.  - c/w contact precautions.

## 2023-12-28 NOTE — PROGRESS NOTE ADULT - PROBLEM SELECTOR PLAN 1
Patient d/c to [facility] on 12/26, found to be COVID-19+ at [facility], returned to [facility] as rehab can't accept patients w/ COVID. He denies cough, runny nose, chills, sob, Found to have fever 101.6 degrees F in ED, no other SIRS criteria. Currently saturating 94-95% on room air, not hypoxic. Fever likely due to COVID-19 infection.  - continue to monitor symptoms  - tylenol 650mg Q6hrs prn fever/mild pain  - patient started on remdesivir for three days Patient d/c to [facility] on 12/26, found to be COVID-19+ at [facility], returned to [facility] as rehab can't accept patients w/ COVID. He denies cough, runny nose, chills, sob, Found to have fever 101.6 degrees F in ED, no other SIRS criteria. Currently saturating 94-95% on room air, not hypoxic. Fever likely due to COVID-19 infection.  - continue to monitor symptoms  - tylenol 650mg Q6hrs prn fever/mild pain  - patient started on remdesivir for three days Patient d/c to [facility] on 12/26, found to be COVID-19+ at [facility], returned to [facility] as rehab can't accept patients w/ COVID. He denies cough, runny nose, chills, sob, Found to have fever 101.6 degrees F in ED, no other SIRS criteria. Currently saturating 94-95% on room air, not hypoxic. Fever likely due to COVID-19 infection.     • C/w Tylenol 650mg Q6hrs prn fever/mild pain   • C/w remdesivir for three days (last day: 12/29) Patient d/c to [facility] on 12/26, found to be COVID-19+ at [facility], returned to [facility] as rehab can't accept patients w/ COVID. He denies cough, runny nose, chills, sob, Found to have fever 101.6 degrees F in ED, no other SIRS criteria. Currently saturating 94-95% on room air, not hypoxic. Fever likely due to COVID-19 infection.     • C/w Tylenol 650mg Q6hrs prn fever/mild pain   • C/w remdesivir for three days (last day: 12/29)

## 2023-12-28 NOTE — DIETITIAN INITIAL EVALUATION ADULT - PROBLEM SELECTOR PLAN 5
Hx of ICM HF mildly reduced EF (45-50% 11/09), cardiology following during last admission for HF. Patient d/c on Torsemide 40mg PO qd today, now returning from [facility]   - prior recs: IV lasix 80mg q12h w/ strict I/O while inpatient, switch to torsemide 40mg PO QD on d/c  - GDMT for HFrEF: Coreg 25mg BID, lisinopril 40mg QD, aldactone 25mg QD; avoid SGLT2i given PAD w/ delayed wound healing    Plan:  - c/w Torsemide 40mg po qd as patient was d/c ready, returning for new rehab placement due to incidental COVID infection finding  - Strict I/Os  - c/w Coreg 25mg BID, lisinopril 40mg qd, aldactone 25mg qd  - if starts to become volume overloaded can transition back to IV Lasix 80mg BID Hx of ICM HF mildly reduced EF (45-50% 11/09), cardiology following during last admission for HF. Patient d/c on Torsemide 40mg PO qd today, now returning from [facility]   - prior recs: IV lasix 80mg q12h w/ strict I/O while inpatient, switch to torsemide 40mg PO QD on d/c  - GDMT for HFrEF: Coreg 25mg BID, lisinopril 40mg QD, aldactone 25mg QD; avoid SGLT2i given PAD w/ delayed wound healing    Plan:  - c/w Torsemide 40mg po qd as patient was d/c ready, returning for new rehab placement due to incidental COVID infection finding  - Strict I/Os  - c/w Coreg 25mg BID, lisinopril 40mg qd, aldactone 25mg qd  - if starts to become volume overloaded can transition back to IV Lasix 80mg BID

## 2023-12-28 NOTE — DIETITIAN INITIAL EVALUATION ADULT - PROBLEM SELECTOR PLAN 1
Patient d/c to [facility] on 12/26, found to be COVID-19+ at [facility], returned to [facility] as rehab can't accept patients w/ COVID. He denies cough, runny nose, chills, sob, Found to have fever 101.6 degrees F in ED, no other SIRS criteria. Currently saturating 94-95% on room air, not hypoxic. Fever likely due to COVID-19 infection.  - continue to monitor symptoms  - tylenol 650mg Q6hrs prn fever/mild pain  - patient currently not meeting criteria for inpatient remdesivir txt Patient d/c to [facility] on 12/26, found to be COVID-19+ at [facility], returned to [facility] as rehab can't accept patients w/ COVID. He denies cough, runny nose, chills, sob, Found to have fever 101.6 degrees F in ED, no other SIRS criteria. Currently saturating 94-95% on room air, not hypoxic. Fever likely due to COVID-19 infection.  - continue to monitor symptoms  - tylenol 650mg Q6hrs prn fever/mild pain  - patient currently not meeting criteria for inpatient remdesivir txt

## 2023-12-29 LAB
ANION GAP SERPL CALC-SCNC: 7 MMOL/L — SIGNIFICANT CHANGE UP (ref 5–17)
ANION GAP SERPL CALC-SCNC: 7 MMOL/L — SIGNIFICANT CHANGE UP (ref 5–17)
BUN SERPL-MCNC: 34 MG/DL — HIGH (ref 7–23)
BUN SERPL-MCNC: 34 MG/DL — HIGH (ref 7–23)
CALCIUM SERPL-MCNC: 8.3 MG/DL — LOW (ref 8.4–10.5)
CALCIUM SERPL-MCNC: 8.3 MG/DL — LOW (ref 8.4–10.5)
CHLORIDE SERPL-SCNC: 103 MMOL/L — SIGNIFICANT CHANGE UP (ref 96–108)
CHLORIDE SERPL-SCNC: 103 MMOL/L — SIGNIFICANT CHANGE UP (ref 96–108)
CO2 SERPL-SCNC: 27 MMOL/L — SIGNIFICANT CHANGE UP (ref 22–31)
CO2 SERPL-SCNC: 27 MMOL/L — SIGNIFICANT CHANGE UP (ref 22–31)
CREAT SERPL-MCNC: 1.27 MG/DL — SIGNIFICANT CHANGE UP (ref 0.5–1.3)
CREAT SERPL-MCNC: 1.27 MG/DL — SIGNIFICANT CHANGE UP (ref 0.5–1.3)
EGFR: 62 ML/MIN/1.73M2 — SIGNIFICANT CHANGE UP
EGFR: 62 ML/MIN/1.73M2 — SIGNIFICANT CHANGE UP
GLUCOSE BLDC GLUCOMTR-MCNC: 125 MG/DL — HIGH (ref 70–99)
GLUCOSE BLDC GLUCOMTR-MCNC: 125 MG/DL — HIGH (ref 70–99)
GLUCOSE BLDC GLUCOMTR-MCNC: 137 MG/DL — HIGH (ref 70–99)
GLUCOSE BLDC GLUCOMTR-MCNC: 137 MG/DL — HIGH (ref 70–99)
GLUCOSE BLDC GLUCOMTR-MCNC: 158 MG/DL — HIGH (ref 70–99)
GLUCOSE BLDC GLUCOMTR-MCNC: 158 MG/DL — HIGH (ref 70–99)
GLUCOSE BLDC GLUCOMTR-MCNC: 252 MG/DL — HIGH (ref 70–99)
GLUCOSE BLDC GLUCOMTR-MCNC: 252 MG/DL — HIGH (ref 70–99)
GLUCOSE BLDC GLUCOMTR-MCNC: 284 MG/DL — HIGH (ref 70–99)
GLUCOSE BLDC GLUCOMTR-MCNC: 284 MG/DL — HIGH (ref 70–99)
GLUCOSE BLDC GLUCOMTR-MCNC: 86 MG/DL — SIGNIFICANT CHANGE UP (ref 70–99)
GLUCOSE BLDC GLUCOMTR-MCNC: 86 MG/DL — SIGNIFICANT CHANGE UP (ref 70–99)
GLUCOSE SERPL-MCNC: 90 MG/DL — SIGNIFICANT CHANGE UP (ref 70–99)
GLUCOSE SERPL-MCNC: 90 MG/DL — SIGNIFICANT CHANGE UP (ref 70–99)
HCT VFR BLD CALC: 27.1 % — LOW (ref 39–50)
HCT VFR BLD CALC: 27.1 % — LOW (ref 39–50)
HGB BLD-MCNC: 8.4 G/DL — LOW (ref 13–17)
HGB BLD-MCNC: 8.4 G/DL — LOW (ref 13–17)
MAGNESIUM SERPL-MCNC: 1.9 MG/DL — SIGNIFICANT CHANGE UP (ref 1.6–2.6)
MAGNESIUM SERPL-MCNC: 1.9 MG/DL — SIGNIFICANT CHANGE UP (ref 1.6–2.6)
MCHC RBC-ENTMCNC: 28 PG — SIGNIFICANT CHANGE UP (ref 27–34)
MCHC RBC-ENTMCNC: 28 PG — SIGNIFICANT CHANGE UP (ref 27–34)
MCHC RBC-ENTMCNC: 31 GM/DL — LOW (ref 32–36)
MCHC RBC-ENTMCNC: 31 GM/DL — LOW (ref 32–36)
MCV RBC AUTO: 90.3 FL — SIGNIFICANT CHANGE UP (ref 80–100)
MCV RBC AUTO: 90.3 FL — SIGNIFICANT CHANGE UP (ref 80–100)
NRBC # BLD: 0 /100 WBCS — SIGNIFICANT CHANGE UP (ref 0–0)
NRBC # BLD: 0 /100 WBCS — SIGNIFICANT CHANGE UP (ref 0–0)
PHOSPHATE SERPL-MCNC: 3.5 MG/DL — SIGNIFICANT CHANGE UP (ref 2.5–4.5)
PHOSPHATE SERPL-MCNC: 3.5 MG/DL — SIGNIFICANT CHANGE UP (ref 2.5–4.5)
PLATELET # BLD AUTO: 294 K/UL — SIGNIFICANT CHANGE UP (ref 150–400)
PLATELET # BLD AUTO: 294 K/UL — SIGNIFICANT CHANGE UP (ref 150–400)
POTASSIUM SERPL-MCNC: 3.9 MMOL/L — SIGNIFICANT CHANGE UP (ref 3.5–5.3)
POTASSIUM SERPL-MCNC: 3.9 MMOL/L — SIGNIFICANT CHANGE UP (ref 3.5–5.3)
POTASSIUM SERPL-SCNC: 3.9 MMOL/L — SIGNIFICANT CHANGE UP (ref 3.5–5.3)
POTASSIUM SERPL-SCNC: 3.9 MMOL/L — SIGNIFICANT CHANGE UP (ref 3.5–5.3)
RBC # BLD: 3 M/UL — LOW (ref 4.2–5.8)
RBC # BLD: 3 M/UL — LOW (ref 4.2–5.8)
RBC # FLD: 18 % — HIGH (ref 10.3–14.5)
RBC # FLD: 18 % — HIGH (ref 10.3–14.5)
SODIUM SERPL-SCNC: 137 MMOL/L — SIGNIFICANT CHANGE UP (ref 135–145)
SODIUM SERPL-SCNC: 137 MMOL/L — SIGNIFICANT CHANGE UP (ref 135–145)
WBC # BLD: 6.29 K/UL — SIGNIFICANT CHANGE UP (ref 3.8–10.5)
WBC # BLD: 6.29 K/UL — SIGNIFICANT CHANGE UP (ref 3.8–10.5)
WBC # FLD AUTO: 6.29 K/UL — SIGNIFICANT CHANGE UP (ref 3.8–10.5)
WBC # FLD AUTO: 6.29 K/UL — SIGNIFICANT CHANGE UP (ref 3.8–10.5)

## 2023-12-29 PROCEDURE — 99232 SBSQ HOSP IP/OBS MODERATE 35: CPT | Mod: GC

## 2023-12-29 RX ADMIN — Medication 100 MILLIGRAM(S): at 15:16

## 2023-12-29 RX ADMIN — HEPARIN SODIUM 5000 UNIT(S): 5000 INJECTION INTRAVENOUS; SUBCUTANEOUS at 07:03

## 2023-12-29 RX ADMIN — Medication 90 MILLIGRAM(S): at 07:02

## 2023-12-29 RX ADMIN — OXYCODONE HYDROCHLORIDE 10 MILLIGRAM(S): 5 TABLET ORAL at 23:16

## 2023-12-29 RX ADMIN — LISINOPRIL 40 MILLIGRAM(S): 2.5 TABLET ORAL at 12:56

## 2023-12-29 RX ADMIN — GABAPENTIN 600 MILLIGRAM(S): 400 CAPSULE ORAL at 15:13

## 2023-12-29 RX ADMIN — OXYCODONE HYDROCHLORIDE 10 MILLIGRAM(S): 5 TABLET ORAL at 15:15

## 2023-12-29 RX ADMIN — HEPARIN SODIUM 5000 UNIT(S): 5000 INJECTION INTRAVENOUS; SUBCUTANEOUS at 15:13

## 2023-12-29 RX ADMIN — Medication 6: at 15:30

## 2023-12-29 RX ADMIN — OXYCODONE HYDROCHLORIDE 10 MILLIGRAM(S): 5 TABLET ORAL at 22:16

## 2023-12-29 RX ADMIN — Medication 100 MILLIGRAM(S): at 07:03

## 2023-12-29 RX ADMIN — OXYCODONE HYDROCHLORIDE 10 MILLIGRAM(S): 5 TABLET ORAL at 16:15

## 2023-12-29 RX ADMIN — OXYCODONE HYDROCHLORIDE 10 MILLIGRAM(S): 5 TABLET ORAL at 08:19

## 2023-12-29 RX ADMIN — CARVEDILOL PHOSPHATE 25 MILLIGRAM(S): 80 CAPSULE, EXTENDED RELEASE ORAL at 22:07

## 2023-12-29 RX ADMIN — Medication 81 MILLIGRAM(S): at 07:03

## 2023-12-29 RX ADMIN — INSULIN GLARGINE 12 UNIT(S): 100 INJECTION, SOLUTION SUBCUTANEOUS at 22:07

## 2023-12-29 RX ADMIN — Medication 8 UNIT(S): at 15:29

## 2023-12-29 RX ADMIN — Medication 100 MILLIGRAM(S): at 22:08

## 2023-12-29 RX ADMIN — CARVEDILOL PHOSPHATE 25 MILLIGRAM(S): 80 CAPSULE, EXTENDED RELEASE ORAL at 10:13

## 2023-12-29 RX ADMIN — GABAPENTIN 600 MILLIGRAM(S): 400 CAPSULE ORAL at 22:07

## 2023-12-29 RX ADMIN — HEPARIN SODIUM 5000 UNIT(S): 5000 INJECTION INTRAVENOUS; SUBCUTANEOUS at 22:08

## 2023-12-29 RX ADMIN — Medication 6: at 22:08

## 2023-12-29 RX ADMIN — OXYCODONE HYDROCHLORIDE 10 MILLIGRAM(S): 5 TABLET ORAL at 07:19

## 2023-12-29 RX ADMIN — Medication 40 MILLIGRAM(S): at 07:03

## 2023-12-29 RX ADMIN — ATORVASTATIN CALCIUM 80 MILLIGRAM(S): 80 TABLET, FILM COATED ORAL at 22:08

## 2023-12-29 RX ADMIN — GABAPENTIN 600 MILLIGRAM(S): 400 CAPSULE ORAL at 07:03

## 2023-12-29 RX ADMIN — SPIRONOLACTONE 25 MILLIGRAM(S): 25 TABLET, FILM COATED ORAL at 15:15

## 2023-12-29 NOTE — PROGRESS NOTE ADULT - SUBJECTIVE AND OBJECTIVE BOX
· Patient with end stage lung cancer, chronic respiratory failure from advanced lung CA and emphysema, follows with palliative care presents and states she can not take this suffering any longer and is ready to die  She states she is unable to care for herself and has worsening back pain which is debilitating with excruciating pain  · She is interested in hospice and is willing to pursue this route  Prior provider explained the concept of hospice and she understands and wants to proceed  She understands she will just be made comfortable and that is all she wants for now  · Consult placed hospice, patient for admission to [facility], transport for 1:00 p m  Today    · She refuses to have health team call her family and is adamant about that SUBJECTIVE: Patient seen and examined at bedside.    aspirin  chewable 81 milliGRAM(s) Oral every 24 hours  carvedilol 25 milliGRAM(s) Oral every 12 hours  heparin   Injectable 5000 Unit(s) SubCutaneous every 8 hours  hydrALAZINE 100 milliGRAM(s) Oral every 8 hours  lisinopril 40 milliGRAM(s) Oral every 24 hours  NIFEdipine XL 90 milliGRAM(s) Oral every 24 hours  remdesivir  IVPB 100 milliGRAM(s) IV Intermittent once  spironolactone 25 milliGRAM(s) Oral every 24 hours  torsemide 40 milliGRAM(s) Oral every 24 hours      Vital Signs Last 24 Hrs  T(C): 37.7 (29 Dec 2023 07:00), Max: 37.7 (29 Dec 2023 07:00)  T(F): 99.9 (29 Dec 2023 07:00), Max: 99.9 (29 Dec 2023 07:00)  HR: 72 (29 Dec 2023 07:00) (63 - 80)  BP: 162/75 (29 Dec 2023 07:00) (140/76 - 170/88)  BP(mean): --  RR: 18 (29 Dec 2023 07:00) (16 - 18)  SpO2: 97% (29 Dec 2023 07:00) (95% - 97%)    Parameters below as of 29 Dec 2023 07:00  Patient On (Oxygen Delivery Method): room air      I&O's Detail    28 Dec 2023 07:01  -  29 Dec 2023 07:00  --------------------------------------------------------  IN:    Oral Fluid: 240 mL  Total IN: 240 mL    OUT:    Voided (mL): 900 mL  Total OUT: 900 mL    Total NET: -660 mL          PHYSICAL EXAM:   Gen: NAD, resting comfortably   CV: NSR  Pulm: no respiratory distress on RA  Abd: soft, ND, NTTP, no rebound or guarding   Ext: WWP; R BKA stump incision c/d/i, no hematoma, superficial blister with clean base and no erythema or purulence; dressing changed.  Neuro: motor/sensory grossly intact       LABS:                        8.0    5.84  )-----------( 295      ( 28 Dec 2023 05:30 )             25.6     12-28    139  |  102  |  30<H>  ----------------------------<  152<H>  3.9   |  29  |  1.15    Ca    8.6      28 Dec 2023 05:30  Phos  3.9     12-28  Mg     2.0     12-28        Urinalysis Basic - ( 28 Dec 2023 05:30 )    Color: x / Appearance: x / SG: x / pH: x  Gluc: 152 mg/dL / Ketone: x  / Bili: x / Urobili: x   Blood: x / Protein: x / Nitrite: x   Leuk Esterase: x / RBC: x / WBC x   Sq Epi: x / Non Sq Epi: x / Bacteria: x        RADIOLOGY & ADDITIONAL STUDIES:

## 2023-12-29 NOTE — PROGRESS NOTE ADULT - PROBLEM SELECTOR PLAN 5
Hx of ICM HF mildly reduced EF (45-50% 11/09), cardiology following during last admission for HF. Patient d/c on Torsemide 40mg PO qd today, now returning from [facility]   - prior recs: IV lasix 80mg q12h w/ strict I/O while inpatient, switch to torsemide 40mg PO QD on d/c  - GDMT for HFrEF: Coreg 25mg BID, lisinopril 40mg QD, aldactone 25mg QD; avoid SGLT2i given PAD w/ delayed wound healing    Plan:  - c/w Torsemide 40mg po qd as patient was d/c ready, returning for new rehab placement due to incidental COVID infection finding  - Strict I/Os  - c/w Coreg 25mg BID, lisinopril 40mg qd, aldactone 25mg qd  - if starts to become volume overloaded can transition back to IV Lasix 80mg BID Hx of ICM HF mildly reduced EF (45-50% 11/09), cardiology following during last admission for HF. Patient d/c on Torsemide 40mg PO qd today, now returning from [facility]   - prior recs: IV lasix 80mg q12h w/ strict I/O while inpatient, switch to torsemide 40mg PO QD on d/c  - GDMT for HFrEF: Coreg 25mg BID, lisinopril 40mg QD, aldactone 25mg QD; avoid SGLT2i given PAD w/ delayed wound healing    Plan:  - c/w Torsemide 40mg po qd as patient was d/c ready, returning for new rehab placement due to incidental COVID infection finding  - Strict I/Os  - c/w Coreg 25mg BID, lisinopril 40mg qd, aldactone 25mg qd  - if starts to become volume overloaded can transition back to IV Lasix 80mg BID

## 2023-12-29 NOTE — PROGRESS NOTE ADULT - ATTENDING COMMENTS
68 YOM with PMH of CAD s/p PCI (x 2 in 2020), HTN, IDDM (A1c 12%, 10/2023) c/b peripheral neuropathy, HFmrEF, PAD s/p R BKA recent admission for R foot gangrene s/p RBKA (12/11) and closure (12/18) c/b c-diff colitis s/p PO vancomycin (EOT 12/25) discharged to [facility] who returns due to being found to be incidentally COVID-19 positive at [facility].     COVID-19 - asymptomatic without hypoxia, no indication for steroids. Cont remdisivir due to being elevated risk for progression to severe disease.     Dispo: pending [facility] acceptance 68 YOM with PMH of CAD s/p PCI (x 2 in 2020), HTN, IDDM (A1c 12%, 10/2023) c/b peripheral neuropathy, HFmrEF, PAD s/p R BKA recent admission for R foot gangrene s/p RBKA (12/11) and closure (12/18) c/b c-diff colitis s/p PO vancomycin (EOT 12/25) discharged to [facility] who returns due to being found to be incidentally COVID-19 positive at [facility].     COVID-19 - asymptomatic without hypoxia, no indication for steroids. Cont remdisivir due to being elevated risk for progression to severe disease.     Dispo: pending [facility] acceptance

## 2023-12-29 NOTE — PROGRESS NOTE ADULT - PROBLEM SELECTOR PLAN 1
Patient d/c to [facility] on 12/26, found to be COVID-19+ at [facility], returned to [facility] as rehab can't accept patients w/ COVID. He denies cough, runny nose, chills, sob, Found to have fever 101.6 degrees F in ED, no other SIRS criteria. Currently saturating 94-95% on room air, not hypoxic. Fever likely due to COVID-19 infection.     • C/w Tylenol 650mg Q6hrs prn fever/mild pain   • C/w remdesivir for three days (last day: 12/29) Patient d/c to [facility] on 12/26, found to be COVID-19+ at [facility], returned to [facility] as rehab can't accept patients w/ COVID. He denies cough, runny nose, chills, sob, Found to have fever 101.6 degrees F in ED, no other SIRS criteria. Currently saturating 94-95% on room air, not hypoxic. Fever likely due to COVID-19 infection.     • C/w Tylenol 650mg Q6hrs prn fever/mild pain   • C/w remdesivir for three days (last day: 12/29)

## 2023-12-29 NOTE — PROGRESS NOTE ADULT - ASSESSMENT
69yo M PMH CAD (2 stents 2020), HFmrEF (45-50%), HTN, PAD w/ remote RLE angioplasty, R 4th toe OM s/p partial R 4th ray amputation on 10/24, RLE angiogram with AT lithotripsy and AT/peroneal balloon angioplasty 10/27, uncontrolled T2DM recently admitted for R foot gas gangrene s/p R BKA (12/11) and closure (12/18) c/b c. diff infection s/p treatment now returns from [facility] for incidental finding of COVID. Vascular surgery consulted for management of BKA stump.    Daily dressing changes   67yo M PMH CAD (2 stents 2020), HFmrEF (45-50%), HTN, PAD w/ remote RLE angioplasty, R 4th toe OM s/p partial R 4th ray amputation on 10/24, RLE angiogram with AT lithotripsy and AT/peroneal balloon angioplasty 10/27, uncontrolled T2DM recently admitted for R foot gas gangrene s/p R BKA (12/11) and closure (12/18) c/b c. diff infection s/p treatment now returns from [facility] for incidental finding of COVID. Vascular surgery consulted for management of BKA stump.    Daily dressing changes

## 2023-12-29 NOTE — PROGRESS NOTE ADULT - PROBLEM SELECTOR PLAN 2
Patient w/ fever 101.6 degrees F, no other SIRS criteria met. Found to be COVID-19 positive. Fevers likely 2/2 COVID-19 infection.  - BCx x1: sterile     • Continue to monitor off antibiotics

## 2023-12-29 NOTE — PROGRESS NOTE ADULT - PROBLEM SELECTOR PLAN 3
s/p R BKA on 12/11/23 and s/p BKA closure on 12/18/23 by vascular surgery at [facility]     • Pain regimen as follows:        - Tylenol 650mg po q6h PRN for mild pain/fever       - Oxy 5mg po q6h PRN for mod pain       - Oxy 10mg po q6h PRN for sev pain   • Has outpt f/u apt w/ vascular: Dr. Freed on 1/18/24 at 10 A.M. s/p R BKA on 12/11/23 and s/p BKA closure on 12/18/23 by vascular surgery at [facility]     • Pain regimen as follows:        - Tylenol 650mg po q6h PRN for mild pain/fever       - Oxy 5mg po q6h PRN for mod pain       - Oxy 10mg po q6h PRN for sev pain   • Has outpt f/u apt w/ vascular: Dr. Freed on 1/18/24 at 10 A.M.

## 2023-12-29 NOTE — PROGRESS NOTE ADULT - SUBJECTIVE AND OBJECTIVE BOX
Medicine Progress Note    INTERVAL EVENTS:   No acute events overnight.    SUBJECTIVE:   Patient seen and examined at bedside. Condition largely unchanged from yesterday. C/o mild back pain, otherwise no acute complaints at this time.    ROS:  Negative unless otherwise stated above.    PHYSICAL EXAM:  Noted in "Physical Exam" section below.    VITAL SIGNS:  Vital Signs Last 24 Hrs  T(C): 36.7 (29 Dec 2023 15:28), Max: 37.7 (29 Dec 2023 07:00)  T(F): 98.1 (29 Dec 2023 15:28), Max: 99.9 (29 Dec 2023 07:00)  HR: 70 (29 Dec 2023 15:28) (63 - 80)  BP: 151/82 (29 Dec 2023 15:28) (119/74 - 162/75)  BP(mean): --  RR: 18 (29 Dec 2023 15:28) (18 - 18)  SpO2: 97% (29 Dec 2023 15:28) (95% - 97%)    Parameters below as of 29 Dec 2023 15:28  Patient On (Oxygen Delivery Method): room air      <INS & OUTS:    12-28-23 @ 07:01  -  12-29-23 @ 07:00  --------------------------------------------------------  IN: 240 mL / OUT: 900 mL / NET: -660 mL    12-29-23 @ 07:01  -  12-29-23 @ 18:39  --------------------------------------------------------  IN: 0 mL / OUT: 800 mL / NET: -800 mL      INPATIENT MEDICATIONS:   MEDICATIONS  (STANDING):  aspirin  chewable 81 milliGRAM(s) Oral every 24 hours  atorvastatin 80 milliGRAM(s) Oral at bedtime  carvedilol 25 milliGRAM(s) Oral every 12 hours  dextrose 5%. 1000 milliLiter(s) (50 mL/Hr) IV Continuous <Continuous>  dextrose 5%. 1000 milliLiter(s) (100 mL/Hr) IV Continuous <Continuous>  dextrose 50% Injectable 25 Gram(s) IV Push once  dextrose 50% Injectable 25 Gram(s) IV Push once  dextrose 50% Injectable 12.5 Gram(s) IV Push once  gabapentin 600 milliGRAM(s) Oral every 8 hours  glucagon  Injectable 1 milliGRAM(s) IntraMuscular once  heparin   Injectable 5000 Unit(s) SubCutaneous every 8 hours  hydrALAZINE 100 milliGRAM(s) Oral every 8 hours  insulin glargine Injectable (LANTUS) 12 Unit(s) SubCutaneous at bedtime  insulin lispro (ADMELOG) corrective regimen sliding scale   SubCutaneous Before meals and at bedtime  insulin lispro Injectable (ADMELOG) 8 Unit(s) SubCutaneous three times a day before meals  lisinopril 40 milliGRAM(s) Oral every 24 hours  NIFEdipine XL 90 milliGRAM(s) Oral every 24 hours  remdesivir  IVPB 100 milliGRAM(s) IV Intermittent once  spironolactone 25 milliGRAM(s) Oral every 24 hours  torsemide 40 milliGRAM(s) Oral every 24 hours    MEDICATIONS  (PRN):  acetaminophen     Tablet .. 650 milliGRAM(s) Oral every 6 hours PRN Temp greater or equal to 38C (100.4F), Mild Pain (1 - 3)  dextrose Oral Gel 15 Gram(s) Oral once PRN Blood Glucose LESS THAN 70 milliGRAM(s)/deciliter  oxyCODONE    IR 5 milliGRAM(s) Oral every 6 hours PRN Moderate Pain (4 - 6)  oxyCODONE    IR 10 milliGRAM(s) Oral every 6 hours PRN Severe Pain (7 - 10)    ALLERGIES:  Allergies    No Known Allergies    Intolerances    LABS:                       8.4    6.29  )-----------( 294      ( 29 Dec 2023 05:30 )             27.1     12-29    137  |  103  |  34<H>  ----------------------------<  90  3.9   |  27  |  1.27    Ca    8.3<L>      29 Dec 2023 05:30  Phos  3.5     12-29  Mg     1.9     12-29        Urinalysis Basic - ( 29 Dec 2023 05:30 )    Color: x / Appearance: x / SG: x / pH: x  Gluc: 90 mg/dL / Ketone: x  / Bili: x / Urobili: x   Blood: x / Protein: x / Nitrite: x   Leuk Esterase: x / RBC: x / WBC x   Sq Epi: x / Non Sq Epi: x / Bacteria: x      CAPILLARY BLOOD GLUCOSE      POCT Blood Glucose.: 137 mg/dL (29 Dec 2023 17:58)    RADIOLOGY & ADDITIONAL TESTS: Reviewed.

## 2023-12-29 NOTE — PROGRESS NOTE ADULT - PROBLEM SELECTOR PLAN 4
D/c meds: Hydralazine 100mg q8h and nifedipine XL 60mg QD     • Start nifedipine XL 90mg QD   • C/w hydral 100mg TID

## 2023-12-29 NOTE — PROGRESS NOTE ADULT - ASSESSMENT
69yo M w/ PMHx CAD (2 stents 2020), HFmrEF (45-50%), HTN, PAD w/ remote RLE angioplasty, R 4th toe OM s/p partial R 4th ray amputation on 10/24, uncontrolled IDDM (a1c 12 in Oct 2023), recent admission 11/8-11/10 to cardiology service for hypertensive emergency (left AMA), recently admitted (12/01-12/26/23) for R foot soft tissue infection, now s/p R BKA (12/11/23), course c/b CDiff infection w/ completion of oral vanc on 12/25, discharged to [facility] on 12/26 and found to be COVID-19 positive, [facility] unable to accept COVID+ patients, so he returned to [facility] for further monitoring.     Pending KERRY 67yo M w/ PMHx CAD (2 stents 2020), HFmrEF (45-50%), HTN, PAD w/ remote RLE angioplasty, R 4th toe OM s/p partial R 4th ray amputation on 10/24, uncontrolled IDDM (a1c 12 in Oct 2023), recent admission 11/8-11/10 to cardiology service for hypertensive emergency (left AMA), recently admitted (12/01-12/26/23) for R foot soft tissue infection, now s/p R BKA (12/11/23), course c/b CDiff infection w/ completion of oral vanc on 12/25, discharged to [facility] on 12/26 and found to be COVID-19 positive, [facility] unable to accept COVID+ patients, so he returned to [facility] for further monitoring.     Pending KERRY

## 2023-12-30 LAB
ANION GAP SERPL CALC-SCNC: 5 MMOL/L — SIGNIFICANT CHANGE UP (ref 5–17)
ANION GAP SERPL CALC-SCNC: 5 MMOL/L — SIGNIFICANT CHANGE UP (ref 5–17)
BUN SERPL-MCNC: 38 MG/DL — HIGH (ref 7–23)
BUN SERPL-MCNC: 38 MG/DL — HIGH (ref 7–23)
CALCIUM SERPL-MCNC: 8.4 MG/DL — SIGNIFICANT CHANGE UP (ref 8.4–10.5)
CALCIUM SERPL-MCNC: 8.4 MG/DL — SIGNIFICANT CHANGE UP (ref 8.4–10.5)
CHLORIDE SERPL-SCNC: 103 MMOL/L — SIGNIFICANT CHANGE UP (ref 96–108)
CHLORIDE SERPL-SCNC: 103 MMOL/L — SIGNIFICANT CHANGE UP (ref 96–108)
CO2 SERPL-SCNC: 28 MMOL/L — SIGNIFICANT CHANGE UP (ref 22–31)
CO2 SERPL-SCNC: 28 MMOL/L — SIGNIFICANT CHANGE UP (ref 22–31)
CREAT SERPL-MCNC: 1.33 MG/DL — HIGH (ref 0.5–1.3)
CREAT SERPL-MCNC: 1.33 MG/DL — HIGH (ref 0.5–1.3)
EGFR: 59 ML/MIN/1.73M2 — LOW
EGFR: 59 ML/MIN/1.73M2 — LOW
GLUCOSE BLDC GLUCOMTR-MCNC: 100 MG/DL — HIGH (ref 70–99)
GLUCOSE BLDC GLUCOMTR-MCNC: 100 MG/DL — HIGH (ref 70–99)
GLUCOSE BLDC GLUCOMTR-MCNC: 176 MG/DL — HIGH (ref 70–99)
GLUCOSE BLDC GLUCOMTR-MCNC: 176 MG/DL — HIGH (ref 70–99)
GLUCOSE BLDC GLUCOMTR-MCNC: 198 MG/DL — HIGH (ref 70–99)
GLUCOSE BLDC GLUCOMTR-MCNC: 198 MG/DL — HIGH (ref 70–99)
GLUCOSE BLDC GLUCOMTR-MCNC: 352 MG/DL — HIGH (ref 70–99)
GLUCOSE BLDC GLUCOMTR-MCNC: 352 MG/DL — HIGH (ref 70–99)
GLUCOSE BLDC GLUCOMTR-MCNC: 70 MG/DL — SIGNIFICANT CHANGE UP (ref 70–99)
GLUCOSE BLDC GLUCOMTR-MCNC: 70 MG/DL — SIGNIFICANT CHANGE UP (ref 70–99)
GLUCOSE SERPL-MCNC: 142 MG/DL — HIGH (ref 70–99)
GLUCOSE SERPL-MCNC: 142 MG/DL — HIGH (ref 70–99)
HCT VFR BLD CALC: 26.8 % — LOW (ref 39–50)
HCT VFR BLD CALC: 26.8 % — LOW (ref 39–50)
HGB BLD-MCNC: 8.4 G/DL — LOW (ref 13–17)
HGB BLD-MCNC: 8.4 G/DL — LOW (ref 13–17)
MAGNESIUM SERPL-MCNC: 1.9 MG/DL — SIGNIFICANT CHANGE UP (ref 1.6–2.6)
MAGNESIUM SERPL-MCNC: 1.9 MG/DL — SIGNIFICANT CHANGE UP (ref 1.6–2.6)
MCHC RBC-ENTMCNC: 28.5 PG — SIGNIFICANT CHANGE UP (ref 27–34)
MCHC RBC-ENTMCNC: 28.5 PG — SIGNIFICANT CHANGE UP (ref 27–34)
MCHC RBC-ENTMCNC: 31.3 GM/DL — LOW (ref 32–36)
MCHC RBC-ENTMCNC: 31.3 GM/DL — LOW (ref 32–36)
MCV RBC AUTO: 90.8 FL — SIGNIFICANT CHANGE UP (ref 80–100)
MCV RBC AUTO: 90.8 FL — SIGNIFICANT CHANGE UP (ref 80–100)
NRBC # BLD: 0 /100 WBCS — SIGNIFICANT CHANGE UP (ref 0–0)
NRBC # BLD: 0 /100 WBCS — SIGNIFICANT CHANGE UP (ref 0–0)
PHOSPHATE SERPL-MCNC: 3.1 MG/DL — SIGNIFICANT CHANGE UP (ref 2.5–4.5)
PHOSPHATE SERPL-MCNC: 3.1 MG/DL — SIGNIFICANT CHANGE UP (ref 2.5–4.5)
PLATELET # BLD AUTO: 304 K/UL — SIGNIFICANT CHANGE UP (ref 150–400)
PLATELET # BLD AUTO: 304 K/UL — SIGNIFICANT CHANGE UP (ref 150–400)
POTASSIUM SERPL-MCNC: 4 MMOL/L — SIGNIFICANT CHANGE UP (ref 3.5–5.3)
POTASSIUM SERPL-MCNC: 4 MMOL/L — SIGNIFICANT CHANGE UP (ref 3.5–5.3)
POTASSIUM SERPL-SCNC: 4 MMOL/L — SIGNIFICANT CHANGE UP (ref 3.5–5.3)
POTASSIUM SERPL-SCNC: 4 MMOL/L — SIGNIFICANT CHANGE UP (ref 3.5–5.3)
RBC # BLD: 2.95 M/UL — LOW (ref 4.2–5.8)
RBC # BLD: 2.95 M/UL — LOW (ref 4.2–5.8)
RBC # FLD: 18.1 % — HIGH (ref 10.3–14.5)
RBC # FLD: 18.1 % — HIGH (ref 10.3–14.5)
SODIUM SERPL-SCNC: 136 MMOL/L — SIGNIFICANT CHANGE UP (ref 135–145)
SODIUM SERPL-SCNC: 136 MMOL/L — SIGNIFICANT CHANGE UP (ref 135–145)
WBC # BLD: 6.89 K/UL — SIGNIFICANT CHANGE UP (ref 3.8–10.5)
WBC # BLD: 6.89 K/UL — SIGNIFICANT CHANGE UP (ref 3.8–10.5)
WBC # FLD AUTO: 6.89 K/UL — SIGNIFICANT CHANGE UP (ref 3.8–10.5)
WBC # FLD AUTO: 6.89 K/UL — SIGNIFICANT CHANGE UP (ref 3.8–10.5)

## 2023-12-30 PROCEDURE — 99233 SBSQ HOSP IP/OBS HIGH 50: CPT

## 2023-12-30 PROCEDURE — 99233 SBSQ HOSP IP/OBS HIGH 50: CPT | Mod: GC,24

## 2023-12-30 RX ADMIN — LISINOPRIL 40 MILLIGRAM(S): 2.5 TABLET ORAL at 13:13

## 2023-12-30 RX ADMIN — GABAPENTIN 600 MILLIGRAM(S): 400 CAPSULE ORAL at 16:46

## 2023-12-30 RX ADMIN — Medication 8 UNIT(S): at 09:26

## 2023-12-30 RX ADMIN — Medication 100 MILLIGRAM(S): at 16:44

## 2023-12-30 RX ADMIN — OXYCODONE HYDROCHLORIDE 10 MILLIGRAM(S): 5 TABLET ORAL at 13:59

## 2023-12-30 RX ADMIN — ATORVASTATIN CALCIUM 80 MILLIGRAM(S): 80 TABLET, FILM COATED ORAL at 23:59

## 2023-12-30 RX ADMIN — Medication 2: at 09:27

## 2023-12-30 RX ADMIN — HEPARIN SODIUM 5000 UNIT(S): 5000 INJECTION INTRAVENOUS; SUBCUTANEOUS at 06:03

## 2023-12-30 RX ADMIN — Medication 100 MILLIGRAM(S): at 06:03

## 2023-12-30 RX ADMIN — OXYCODONE HYDROCHLORIDE 10 MILLIGRAM(S): 5 TABLET ORAL at 06:04

## 2023-12-30 RX ADMIN — Medication 40 MILLIGRAM(S): at 06:03

## 2023-12-30 RX ADMIN — OXYCODONE HYDROCHLORIDE 10 MILLIGRAM(S): 5 TABLET ORAL at 07:04

## 2023-12-30 RX ADMIN — GABAPENTIN 600 MILLIGRAM(S): 400 CAPSULE ORAL at 06:04

## 2023-12-30 RX ADMIN — CARVEDILOL PHOSPHATE 25 MILLIGRAM(S): 80 CAPSULE, EXTENDED RELEASE ORAL at 23:59

## 2023-12-30 RX ADMIN — REMDESIVIR 200 MILLIGRAM(S): 5 INJECTION INTRAVENOUS at 14:15

## 2023-12-30 RX ADMIN — Medication 100 MILLIGRAM(S): at 23:59

## 2023-12-30 RX ADMIN — Medication 90 MILLIGRAM(S): at 06:03

## 2023-12-30 RX ADMIN — Medication 81 MILLIGRAM(S): at 06:03

## 2023-12-30 RX ADMIN — SPIRONOLACTONE 25 MILLIGRAM(S): 25 TABLET, FILM COATED ORAL at 18:54

## 2023-12-30 RX ADMIN — Medication 10: at 18:55

## 2023-12-30 RX ADMIN — OXYCODONE HYDROCHLORIDE 10 MILLIGRAM(S): 5 TABLET ORAL at 23:58

## 2023-12-30 RX ADMIN — GABAPENTIN 600 MILLIGRAM(S): 400 CAPSULE ORAL at 23:59

## 2023-12-30 RX ADMIN — CARVEDILOL PHOSPHATE 25 MILLIGRAM(S): 80 CAPSULE, EXTENDED RELEASE ORAL at 09:50

## 2023-12-30 RX ADMIN — Medication 8 UNIT(S): at 18:54

## 2023-12-30 NOTE — PROGRESS NOTE ADULT - PROBLEM SELECTOR PLAN 3
s/p R BKA on 12/11/23 and s/p BKA closure on 12/18/23 by vascular surgery at [facility]     • Pain regimen as follows:        - Tylenol 650mg po q6h PRN for mild pain/fever       - Oxy 5mg po q6h PRN for mod pain       - Oxy 10mg po q6h PRN for sev pain   • Has outpt f/u apt w/ vascular: Dr. Freed on 1/18/24 at 10 A.M. s/p R BKA on 12/11/23 and s/p BKA closure on 12/18/23 by vascular surgery at [facility]     • Pain regimen as follows:        - Tylenol 650mg po q6h PRN for mild pain/fever       - Oxy 5mg po q6h PRN for mod pain       - Oxy 10mg po q6h PRN for sev pain   • Has outpt f/u apt w/ vascular: Dr. Freed on 1/18/24 at 10 A.M.

## 2023-12-30 NOTE — PROGRESS NOTE ADULT - ASSESSMENT
67yo M w/ PMHx CAD (2 stents 2020), HFmrEF (45-50%), HTN, PAD w/ remote RLE angioplasty, R 4th toe OM s/p partial R 4th ray amputation on 10/24, uncontrolled IDDM (a1c 12 in Oct 2023), recent admission 11/8-11/10 to cardiology service for hypertensive emergency (left AMA), recently admitted (12/01-12/26/23) for R foot soft tissue infection, now s/p R BKA (12/11/23), course c/b CDiff infection w/ completion of oral vanc on 12/25, discharged to [facility] on 12/26 and found to be COVID-19 positive, [facility] unable to accept COVID+ patients, so he returned to [facility] for further monitoring.     Pending KERRY 67yo M w/ PMHx CAD (2 stents 2020), HFmrEF (45-50%), HTN, PAD w/ remote RLE angioplasty, R 4th toe OM s/p partial R 4th ray amputation on 10/24, uncontrolled IDDM (a1c 12 in Oct 2023), recent admission 11/8-11/10 to cardiology service for hypertensive emergency (left AMA), recently admitted (12/01-12/26/23) for R foot soft tissue infection, now s/p R BKA (12/11/23), course c/b CDiff infection w/ completion of oral vanc on 12/25, discharged to [facility] on 12/26 and found to be COVID-19 positive, [facility] unable to accept COVID+ patients, so he returned to [facility] for further monitoring.     Pending KERRY

## 2023-12-30 NOTE — PROGRESS NOTE ADULT - ATTENDING COMMENTS
I have personally seen and examined this patient. I have fully participated in the care of this patient. I have reviewed all pertinent clinical information, including history, physical exam, plan and the Resident's, PA's and NP's note and agree except as noted. This is a patient originally known to Dr. Corky Oneal, but I was asked to perform the guillotine R BKA on 12/11/23 and take over his care rest of his recent admission. 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 He was transferred from the medical service to vascular postop and underwent staged R BKA w/ closure (12/18/23). He was discharged to [facility] on 12/26/23 but was sent back to the ED because he tested positive for COVID and the [facility] doesn't accept COVID patients.     On 12/30, he has no new major complaints. WBC 6.89.The R BKA stump continues to look fine. Staple line clean, dry and intact. Awaiting dispo    PLAN & RECOMMENDATIONS  - COVID management per primary medicine team  - ASA/SQH  - Physical therapy  - COVID management per primary medical team  - Daily dressing changes with dry gauze, kerlix and ace-wrap (ace-wrap from stump to thigh)  - Outpatient vascular surgery follow up with me on 1/18/24    Thank you,    Michelet Freed MD  Attending Vascular Surgeon  [facility] at [address] [place] [address]  Office: [phone]; Fax: [phone]  [email or web address] I have personally seen and examined this patient. I have fully participated in the care of this patient. I have reviewed all pertinent clinical information, including history, physical exam, plan and the Resident's, PA's and NP's note and agree except as noted. This is a patient originally known to Dr. Corky Oneal, but I was asked to perform the guillotine R BKA on 12/11/23 and take over his care rest of his recent admission. 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 He was transferred from the medical service to vascular postop and underwent staged R BKA w/ closure (12/18/23). He was discharged to [facility] on 12/26/23 but was sent back to the ED because he tested positive for COVID and the [facility] doesn't accept COVID patients.     On 12/30, he has no new major complaints. WBC 6.89.The R BKA stump continues to look fine. Staple line clean, dry and intact. Awaiting dispo    PLAN & RECOMMENDATIONS  - COVID management per primary medicine team  - ASA/SQH  - Physical therapy  - COVID management per primary medical team  - Daily dressing changes with dry gauze, kerlix and ace-wrap (ace-wrap from stump to thigh)  - Outpatient vascular surgery follow up with me on 1/18/24    Thank you,    Michelet Freed MD  Attending Vascular Surgeon  [facility] at [address] [place] [address]  Office: [phone]; Fax: [phone]  [email or web address]

## 2023-12-30 NOTE — PROGRESS NOTE ADULT - ATTENDING COMMENTS
Patient seen at bedside, refused to be examined by me today.  Was covered under his sheets and said he wanted to sleep and to be left alone.  I did ask him how his breathing was feeling he said much better.  I asked him if he was in much pain and he said no.  Otherwise continuing current management for COVID-19, he has defervesced, overall improving on remdesivir (completed course yesterday).  Continuing glycemic control as outlined above.  Completed oral vancomycin for C difficile on 12/25 now normally forming stools.  Rest of management as outlined above in the resident note.

## 2023-12-30 NOTE — PROGRESS NOTE ADULT - SUBJECTIVE AND OBJECTIVE BOX
Subjective:  Patient dressing changed beside. VSS, NAC.    ROS:   Denies Headache, blurred vision, Chest Pain, SOB, Abdominal pain, nausea or vomiting     Social   remdesivir  IVPB 100  aspirin  chewable 81  carvedilol 25  heparin   Injectable 5000  hydrALAZINE 100  lisinopril 40  NIFEdipine XL 90  remdesivir  IVPB 100  spironolactone 25  torsemide 40      Allergies    No Known Allergies    Intolerances        Vital Signs Last 24 Hrs  T(C): 37 (30 Dec 2023 05:43), Max: 37.1 (29 Dec 2023 22:00)  T(F): 98.6 (30 Dec 2023 05:43), Max: 98.7 (29 Dec 2023 22:00)  HR: 71 (30 Dec 2023 05:43) (67 - 74)  BP: 179/69 (30 Dec 2023 05:43) (119/74 - 179/69)  BP(mean): --  RR: 19 (30 Dec 2023 05:43) (18 - 19)  SpO2: 97% (30 Dec 2023 05:43) (95% - 97%)    Parameters below as of 30 Dec 2023 05:43  Patient On (Oxygen Delivery Method): room air      I&O's Summary    29 Dec 2023 07:01  -  30 Dec 2023 07:00  --------------------------------------------------------  IN: 0 mL / OUT: 800 mL / NET: -800 mL          PHYSICAL EXAM:   Gen: NAD, resting comfortably   CV: NSR  Pulm: no respiratory distress on RA  Abd: soft, ND, NTTP, no rebound or guarding   Ext: WWP; R BKA stump incision c/d/i, no hematoma, superficial blister with clean base and no erythema or purulence; dressing changed-xerofoam, kerlix, ace.  Neuro: motor/sensory grossly intact       LABS:                        8.4    6.89  )-----------( 304      ( 30 Dec 2023 05:30 )             26.8     12-30    136  |  103  |  38<H>  ----------------------------<  142<H>  4.0   |  28  |  1.33<H>    Ca    8.4      30 Dec 2023 05:30  Phos  3.1     12-30  Mg     1.9     12-30              A/P: 69yo M PMH CAD (2 stents 2020), HFmrEF (45-50%), HTN, PAD w/ remote RLE angioplasty, R 4th toe OM s/p partial R 4th ray amputation on 10/24, RLE angiogram with AT lithotripsy and AT/peroneal balloon angioplasty 10/27, uncontrolled T2DM recently admitted for R foot gas gangrene s/p R BKA (12/11) and closure (12/18) c/b c. diff infection s/p treatment now returns from [facility] for incidental finding of COVID. Vascular surgery consulted for management of BKA stump.  -ASA/SQH  - Physical therapy  - COVID management per primary medical team  - Daily dressing changes by vascular with dry gauze, kerlix and ace-wrap (ace-wrap from stump to thigh)  - Outpatient vascular surgery follow up with Dr. Freed on 1/18/24               Subjective:  Patient dressing changed beside. VSS, NAC.    ROS:   Denies Headache, blurred vision, Chest Pain, SOB, Abdominal pain, nausea or vomiting     Social   remdesivir  IVPB 100  aspirin  chewable 81  carvedilol 25  heparin   Injectable 5000  hydrALAZINE 100  lisinopril 40  NIFEdipine XL 90  remdesivir  IVPB 100  spironolactone 25  torsemide 40      Allergies    No Known Allergies    Intolerances        Vital Signs Last 24 Hrs  T(C): 37 (30 Dec 2023 05:43), Max: 37.1 (29 Dec 2023 22:00)  T(F): 98.6 (30 Dec 2023 05:43), Max: 98.7 (29 Dec 2023 22:00)  HR: 71 (30 Dec 2023 05:43) (67 - 74)  BP: 179/69 (30 Dec 2023 05:43) (119/74 - 179/69)  BP(mean): --  RR: 19 (30 Dec 2023 05:43) (18 - 19)  SpO2: 97% (30 Dec 2023 05:43) (95% - 97%)    Parameters below as of 30 Dec 2023 05:43  Patient On (Oxygen Delivery Method): room air      I&O's Summary    29 Dec 2023 07:01  -  30 Dec 2023 07:00  --------------------------------------------------------  IN: 0 mL / OUT: 800 mL / NET: -800 mL          PHYSICAL EXAM:   Gen: NAD, resting comfortably   CV: NSR  Pulm: no respiratory distress on RA  Abd: soft, ND, NTTP, no rebound or guarding   Ext: WWP; R BKA stump incision c/d/i, no hematoma, superficial blister with clean base and no erythema or purulence; dressing changed-xerofoam, kerlix, ace.  Neuro: motor/sensory grossly intact       LABS:                        8.4    6.89  )-----------( 304      ( 30 Dec 2023 05:30 )             26.8     12-30    136  |  103  |  38<H>  ----------------------------<  142<H>  4.0   |  28  |  1.33<H>    Ca    8.4      30 Dec 2023 05:30  Phos  3.1     12-30  Mg     1.9     12-30              A/P: 69yo M PMH CAD (2 stents 2020), HFmrEF (45-50%), HTN, PAD w/ remote RLE angioplasty, R 4th toe OM s/p partial R 4th ray amputation on 10/24, RLE angiogram with AT lithotripsy and AT/peroneal balloon angioplasty 10/27, uncontrolled T2DM recently admitted for R foot gas gangrene s/p R BKA (12/11) and closure (12/18) c/b c. diff infection s/p treatment now returns from [facility] for incidental finding of COVID. Vascular surgery consulted for management of BKA stump.  -ASA/SQH  - Physical therapy  - COVID management per primary medical team  - Daily dressing changes by vascular with dry gauze, kerlix and ace-wrap (ace-wrap from stump to thigh)  - Outpatient vascular surgery follow up with Dr. Freed on 1/18/24

## 2023-12-30 NOTE — PROGRESS NOTE ADULT - SUBJECTIVE AND OBJECTIVE BOX
Physical Medicine and Rehabilitation Progress Note :       Patient is a 68y old  Male who presents with a chief complaint of COVID-19     (28 Dec 2023 14:02)      HPI:  69yo M w/ PMHx CAD (2 stents 2020), HFmrEF (45-50%), HTN, PAD w/ remote RLE angioplasty, R 4th toe OM s/p partial R 4th ray amputation on 10/24, uncontrolled IDDM (a1c 12 in Oct 2023), recent admission 11/8-11/10 to cardiology service for hypertensive emergency (left AMA), recently admitted (12/01-12/26/23) for R foot soft tissue infection, now s/p R BKA (12/11/23), course c/b CDiff infection w/ completion of oral vanc on 12/25, discharged to [facility] on 12/26 and found to be COVID-19 positive, [facility] unable to accept COVID+ patients, so he returned to [facility] for further monitoring. Patient denies current symptoms of cough, runny nose, headache, chills, sob, and wheezing. He says he had no signs that he had COVID-19, and his main complaint is his right sided leg pain. He received Oxy 5mg prior to conversation, and he currently states his pain is an 11/10, as he was going to the bathroom and he hit his stump on the wall. He says Dilaudid was helping his pain the best during his admission. His amputation site was recently wrapped/bandage changed this morning when he got to the [facility]. He had a recent bowel movement in the ED that was formed stool, denies current diarrhea, abd pain.     On admission:  Initial vital sign: Temp 100.3 -> 101.7, HR 78, /78, Sp02 94% on room air  Labs significant for: Hgb 8.8, BUN 36,  glucose 114, COVID-19 positive  Imaging: None  Interventions: Tylenol 650mg x1, Oxycodone 5mg x1  Consults: none   (26 Dec 2023 23:10)                            8.4    6.89  )-----------( 304      ( 30 Dec 2023 05:30 )             26.8       12-30    136  |  103  |  38<H>  ----------------------------<  142<H>  4.0   |  28  |  1.33<H>    Ca    8.4      30 Dec 2023 05:30  Phos  3.1     12-30  Mg     1.9     12-30      Vital Signs Last 24 Hrs  T(C): 37 (30 Dec 2023 05:43), Max: 37.1 (29 Dec 2023 22:00)  T(F): 98.6 (30 Dec 2023 05:43), Max: 98.7 (29 Dec 2023 22:00)  HR: 76 (30 Dec 2023 13:10) (67 - 76)  BP: 130/66 (30 Dec 2023 13:10) (130/66 - 179/69)  BP(mean): --  RR: 19 (30 Dec 2023 05:43) (18 - 19)  SpO2: 97% (30 Dec 2023 05:43) (95% - 97%)    Parameters below as of 30 Dec 2023 05:43  Patient On (Oxygen Delivery Method): room air        MEDICATIONS  (STANDING):  aspirin  chewable 81 milliGRAM(s) Oral every 24 hours  atorvastatin 80 milliGRAM(s) Oral at bedtime  carvedilol 25 milliGRAM(s) Oral every 12 hours  dextrose 5%. 1000 milliLiter(s) (50 mL/Hr) IV Continuous <Continuous>  dextrose 5%. 1000 milliLiter(s) (100 mL/Hr) IV Continuous <Continuous>  dextrose 50% Injectable 25 Gram(s) IV Push once  dextrose 50% Injectable 25 Gram(s) IV Push once  dextrose 50% Injectable 12.5 Gram(s) IV Push once  gabapentin 600 milliGRAM(s) Oral every 8 hours  glucagon  Injectable 1 milliGRAM(s) IntraMuscular once  heparin   Injectable 5000 Unit(s) SubCutaneous every 8 hours  hydrALAZINE 100 milliGRAM(s) Oral every 8 hours  insulin glargine Injectable (LANTUS) 12 Unit(s) SubCutaneous at bedtime  insulin lispro (ADMELOG) corrective regimen sliding scale   SubCutaneous Before meals and at bedtime  insulin lispro Injectable (ADMELOG) 8 Unit(s) SubCutaneous three times a day before meals  lisinopril 40 milliGRAM(s) Oral every 24 hours  NIFEdipine XL 90 milliGRAM(s) Oral every 24 hours  remdesivir  IVPB 100 milliGRAM(s) IV Intermittent once  spironolactone 25 milliGRAM(s) Oral every 24 hours  torsemide 40 milliGRAM(s) Oral every 24 hours    MEDICATIONS  (PRN):  acetaminophen     Tablet .. 650 milliGRAM(s) Oral every 6 hours PRN Temp greater or equal to 38C (100.4F), Mild Pain (1 - 3)  dextrose Oral Gel 15 Gram(s) Oral once PRN Blood Glucose LESS THAN 70 milliGRAM(s)/deciliter  oxyCODONE    IR 5 milliGRAM(s) Oral every 6 hours PRN Moderate Pain (4 - 6)  oxyCODONE    IR 10 milliGRAM(s) Oral every 6 hours PRN Severe Pain (7 - 10)        12/29/2023 Functional Status Assessment :       Pain Assessment/Number Scale (0-10) Adult  Presence of Pain: complains of pain/discomfort  Body Location: headache  Pain Rating (0-10): Rest: 8 (severe pain)  Pain Rating (0-10): Activity: 8 (severe pain)    Re-assessment (Number Scale)  Pain Response to Interventions: no change in pain    Safety      AM-PAC Functional Assessment: Basic Mobility  Type of Assessment: Daily assessment  Turning from your back to your side while in a flat bed without using bedrails?: 4 = No assist / stand by assistance  Moving from lying on your back to sitting on the flat side of a flat bed without using bedrails?: 4 = No assist / stand by assistance  Moving to and from a bed to a chair (including a wheelchair)?: 1 = Total assistance  Standing up from a chair using your arms (e.g. wheelchair or bedside chair)?: 1 = Total assistance  Walking in hospital room?: 1 = Total assistance  Climbing 3-5 steps with a railing?: 1 = Total assistance  Score: 12   Row Comment: Ask the patient "How much help from another person do you currently need? (If the patient hasn't done an activity recently, how much help from another person do you think he/she needs if he/she tried?)    Cognitive/Neuro      Cognitive/Neuro/Behavioral  Cognitive/Neuro/Behavioral [WDL Definition: Alert; opens eyes spontaneously; arouses to voice or touch; oriented x 4; follows commands; speech spontaneous, logical; purposeful motor response; behavior appropriate to situation]: WDL  Level of Consciousness: alert  Arousal Level: arouses to voice  Orientation: oriented x 4  Speech: clear  Mood/Behavior: angry    Language Assistance  Preferred Language to Address Healthcare Preferred Language to Address Healthcare: English    Therapeutic Interventions      Bed Mobility  Bed Mobility Training Bridging: supervsion;  1 person assist  Bed Mobility Training Sit-to-Supine: supervsion;  1 person assist  Bed Mobility Training Supine-to-Sit: supervsion;  1 person assist  Bed Mobility Training Limitations: decreased ability to use legs for bridging/pushing;  decreased strength;  pain;  impaired postural control            PM&R Impression : as above    Current disposition plan recommendation :    subacute rehab placement             Physical Medicine and Rehabilitation Progress Note :       Patient is a 68y old  Male who presents with a chief complaint of COVID-19     (28 Dec 2023 14:02)      HPI:  69yo M w/ PMHx CAD (2 stents 2020), HFmrEF (45-50%), HTN, PAD w/ remote RLE angioplasty, R 4th toe OM s/p partial R 4th ray amputation on 10/24, uncontrolled IDDM (a1c 12 in Oct 2023), recent admission 11/8-11/10 to cardiology service for hypertensive emergency (left AMA), recently admitted (12/01-12/26/23) for R foot soft tissue infection, now s/p R BKA (12/11/23), course c/b CDiff infection w/ completion of oral vanc on 12/25, discharged to [facility] on 12/26 and found to be COVID-19 positive, [facility] unable to accept COVID+ patients, so he returned to [facility] for further monitoring. Patient denies current symptoms of cough, runny nose, headache, chills, sob, and wheezing. He says he had no signs that he had COVID-19, and his main complaint is his right sided leg pain. He received Oxy 5mg prior to conversation, and he currently states his pain is an 11/10, as he was going to the bathroom and he hit his stump on the wall. He says Dilaudid was helping his pain the best during his admission. His amputation site was recently wrapped/bandage changed this morning when he got to the [facility]. He had a recent bowel movement in the ED that was formed stool, denies current diarrhea, abd pain.     On admission:  Initial vital sign: Temp 100.3 -> 101.7, HR 78, /78, Sp02 94% on room air  Labs significant for: Hgb 8.8, BUN 36,  glucose 114, COVID-19 positive  Imaging: None  Interventions: Tylenol 650mg x1, Oxycodone 5mg x1  Consults: none   (26 Dec 2023 23:10)                            8.4    6.89  )-----------( 304      ( 30 Dec 2023 05:30 )             26.8       12-30    136  |  103  |  38<H>  ----------------------------<  142<H>  4.0   |  28  |  1.33<H>    Ca    8.4      30 Dec 2023 05:30  Phos  3.1     12-30  Mg     1.9     12-30      Vital Signs Last 24 Hrs  T(C): 37 (30 Dec 2023 05:43), Max: 37.1 (29 Dec 2023 22:00)  T(F): 98.6 (30 Dec 2023 05:43), Max: 98.7 (29 Dec 2023 22:00)  HR: 76 (30 Dec 2023 13:10) (67 - 76)  BP: 130/66 (30 Dec 2023 13:10) (130/66 - 179/69)  BP(mean): --  RR: 19 (30 Dec 2023 05:43) (18 - 19)  SpO2: 97% (30 Dec 2023 05:43) (95% - 97%)    Parameters below as of 30 Dec 2023 05:43  Patient On (Oxygen Delivery Method): room air        MEDICATIONS  (STANDING):  aspirin  chewable 81 milliGRAM(s) Oral every 24 hours  atorvastatin 80 milliGRAM(s) Oral at bedtime  carvedilol 25 milliGRAM(s) Oral every 12 hours  dextrose 5%. 1000 milliLiter(s) (50 mL/Hr) IV Continuous <Continuous>  dextrose 5%. 1000 milliLiter(s) (100 mL/Hr) IV Continuous <Continuous>  dextrose 50% Injectable 25 Gram(s) IV Push once  dextrose 50% Injectable 25 Gram(s) IV Push once  dextrose 50% Injectable 12.5 Gram(s) IV Push once  gabapentin 600 milliGRAM(s) Oral every 8 hours  glucagon  Injectable 1 milliGRAM(s) IntraMuscular once  heparin   Injectable 5000 Unit(s) SubCutaneous every 8 hours  hydrALAZINE 100 milliGRAM(s) Oral every 8 hours  insulin glargine Injectable (LANTUS) 12 Unit(s) SubCutaneous at bedtime  insulin lispro (ADMELOG) corrective regimen sliding scale   SubCutaneous Before meals and at bedtime  insulin lispro Injectable (ADMELOG) 8 Unit(s) SubCutaneous three times a day before meals  lisinopril 40 milliGRAM(s) Oral every 24 hours  NIFEdipine XL 90 milliGRAM(s) Oral every 24 hours  remdesivir  IVPB 100 milliGRAM(s) IV Intermittent once  spironolactone 25 milliGRAM(s) Oral every 24 hours  torsemide 40 milliGRAM(s) Oral every 24 hours    MEDICATIONS  (PRN):  acetaminophen     Tablet .. 650 milliGRAM(s) Oral every 6 hours PRN Temp greater or equal to 38C (100.4F), Mild Pain (1 - 3)  dextrose Oral Gel 15 Gram(s) Oral once PRN Blood Glucose LESS THAN 70 milliGRAM(s)/deciliter  oxyCODONE    IR 5 milliGRAM(s) Oral every 6 hours PRN Moderate Pain (4 - 6)  oxyCODONE    IR 10 milliGRAM(s) Oral every 6 hours PRN Severe Pain (7 - 10)        12/29/2023 Functional Status Assessment :       Pain Assessment/Number Scale (0-10) Adult  Presence of Pain: complains of pain/discomfort  Body Location: headache  Pain Rating (0-10): Rest: 8 (severe pain)  Pain Rating (0-10): Activity: 8 (severe pain)    Re-assessment (Number Scale)  Pain Response to Interventions: no change in pain    Safety      AM-PAC Functional Assessment: Basic Mobility  Type of Assessment: Daily assessment  Turning from your back to your side while in a flat bed without using bedrails?: 4 = No assist / stand by assistance  Moving from lying on your back to sitting on the flat side of a flat bed without using bedrails?: 4 = No assist / stand by assistance  Moving to and from a bed to a chair (including a wheelchair)?: 1 = Total assistance  Standing up from a chair using your arms (e.g. wheelchair or bedside chair)?: 1 = Total assistance  Walking in hospital room?: 1 = Total assistance  Climbing 3-5 steps with a railing?: 1 = Total assistance  Score: 12   Row Comment: Ask the patient "How much help from another person do you currently need? (If the patient hasn't done an activity recently, how much help from another person do you think he/she needs if he/she tried?)    Cognitive/Neuro      Cognitive/Neuro/Behavioral  Cognitive/Neuro/Behavioral [WDL Definition: Alert; opens eyes spontaneously; arouses to voice or touch; oriented x 4; follows commands; speech spontaneous, logical; purposeful motor response; behavior appropriate to situation]: WDL  Level of Consciousness: alert  Arousal Level: arouses to voice  Orientation: oriented x 4  Speech: clear  Mood/Behavior: angry    Language Assistance  Preferred Language to Address Healthcare Preferred Language to Address Healthcare: English    Therapeutic Interventions      Bed Mobility  Bed Mobility Training Bridging: supervsion;  1 person assist  Bed Mobility Training Sit-to-Supine: supervsion;  1 person assist  Bed Mobility Training Supine-to-Sit: supervsion;  1 person assist  Bed Mobility Training Limitations: decreased ability to use legs for bridging/pushing;  decreased strength;  pain;  impaired postural control            PM&R Impression : as above    Current disposition plan recommendation :    subacute rehab placement

## 2023-12-30 NOTE — PROGRESS NOTE ADULT - PROBLEM SELECTOR PLAN 1
Patient d/c to [facility] on 12/26, found to be COVID-19+ at [facility], returned to [facility] as rehab can't accept patients w/ COVID. He denies cough, runny nose, chills, sob, Found to have fever 101.6 degrees F in ED, no other SIRS criteria. Currently saturating 94-95% on room air, not hypoxic. Fever likely due to COVID-19 infection.     • C/w Tylenol 650mg Q6hrs prn fever/mild pain   • C/w remdesivir for three days (last day: 12/29) Patient d/c to [facility] on 12/26, found to be COVID-19+ at [facility], returned to [facility] as rehab can't accept patients w/ COVID. He denies cough, runny nose, chills, sob, Found to have fever 101.6 degrees F in ED, no other SIRS criteria. Currently saturating 94-95% on room air, not hypoxic. Fever likely due to COVID-19 infection.     • C/w Tylenol 650mg Q6hrs prn fever/mild pain   • C/w remdesivir for three days (last day: 12/29)

## 2023-12-30 NOTE — PROGRESS NOTE ADULT - PROBLEM SELECTOR PLAN 5
Hx of ICM HF mildly reduced EF (45-50% 11/09), cardiology following during last admission for HF. Patient d/c on Torsemide 40mg PO qd today, now returning from [facility]   - prior recs: IV lasix 80mg q12h w/ strict I/O while inpatient, switch to torsemide 40mg PO QD on d/c  - GDMT for HFrEF: Coreg 25mg BID, lisinopril 40mg QD, aldactone 25mg QD; avoid SGLT2i given PAD w/ delayed wound healing    Plan:  - c/w Torsemide 40mg po qd as patient was d/c ready, returning for new rehab placement due to incidental COVID infection finding  - Strict I/Os  - c/w Coreg 25mg BID, lisinopril 40mg qd, aldactone 25mg qd  - if starts to become volume overloaded can transition back to IV Lasix 80mg BID Hx of ICM HF mildly reduced EF (45-50% 11/09), cardiology following during last admission for HF. Patient d/c on Torsemide 40mg PO qd today, now returning from [facility]   - prior recs: IV lasix 80mg q12h w/ strict I/O while inpatient, switch to torsemide 40mg PO QD on d/c  - GDMT for HFrEF: Coreg 25mg BID, lisinopril 40mg QD, aldactone 25mg QD; avoid SGLT2i given PAD w/ delayed wound healing    Plan:  - c/w Torsemide 40mg po qd as patient was d/c ready, returning for new rehab placement due to incidental COVID infection finding  - Strict I/Os  - c/w Coreg 25mg BID, lisinopril 40mg qd, aldactone 25mg qd  - if starts to become volume overloaded can transition back to IV Lasix 80mg BID

## 2023-12-30 NOTE — PROGRESS NOTE ADULT - ASSESSMENT
I M    68 y o M w/ PMHx CAD (2 stents 2020), HFmrEF (45-50%), HTN, PAD w/ remote RLE angioplasty, R 4th toe OM s/p partial R 4th ray amputation on 10/24, uncontrolled IDDM (a1c 12 in Oct 2023), recent admission 11/8-11/10 to cardiology service for hypertensive emergency (left AMA), recently admitted (12/01-12/26/23) for R foot soft tissue infection, now s/p R BKA (12/11/23), course c/b CDiff infection w/ completion of oral vanc on 12/25, discharged to [facility] on 12/26 and found to be COVID-19 positive, [facility] unable to accept COVID+ patients, so he returned to [facility] for further monitoring.     Pending [facility]    Problem/Plan - 1:  ·  Problem: 2019 novel coronavirus disease (COVID-19).   ·  Plan: Patient d/c to [facility] on 12/26, found to be COVID-19+ at [facility], returned to [facility] as rehab can't accept patients w/ COVID. He denies cough, runny nose, chills, sob, Found to have fever 101.6 degrees F in ED, no other SIRS criteria. Currently saturating 94-95% on room air, not hypoxic. Fever likely due to COVID-19 infection.     • C/w Tylenol 650mg Q6hrs prn fever/mild pain   • C/w remdesivir for three days (last day: 12/29).    Problem/Plan - 2:  ·  Problem: Systemic inflammatory response syndrome (SIRS).   ·  Plan: Patient w/ fever 101.6 degrees F, no other SIRS criteria met. Found to be COVID-19 positive. Fevers likely 2/2 COVID-19 infection.  - BCx x1: sterile     • Continue to monitor off antibiotics.    Problem/Plan - 3:  ·  Problem: Status post below knee amputation, right.   ·  Plan: s/p R BKA on 12/11/23 and s/p BKA closure on 12/18/23 by vascular surgery at [facility]     • Pain regimen as follows:        - Tylenol 650mg po q6h PRN for mild pain/fever       - Oxy 5mg po q6h PRN for mod pain       - Oxy 10mg po q6h PRN for sev pain   • Has outpt f/u apt w/ vascular: Dr. Freed on 1/18/24 at 10 A.M.    Problem/Plan - 4:  ·  Problem: HTN (hypertension).   ·  Plan: D/c meds: Hydralazine 100mg q8h and nifedipine XL 60mg QD     • Start nifedipine XL 90mg QD   • C/w hydral 100mg TID.    Problem/Plan - 5:  ·  Problem: Heart failure with mildly reduced ejection fraction.   ·  Plan: Hx of ICM HF mildly reduced EF (45-50% 11/09), cardiology following during last admission for HF. Patient d/c on Torsemide 40mg PO qd today, now returning from [facility]   - prior recs: IV lasix 80mg q12h w/ strict I/O while inpatient, switch to torsemide 40mg PO QD on d/c  - GDMT for HFrEF: Coreg 25mg BID, lisinopril 40mg QD, aldactone 25mg QD; avoid SGLT2i given PAD w/ delayed wound healing    Plan:  - c/w Torsemide 40mg po qd as patient was d/c ready, returning for new rehab placement due to incidental COVID infection finding  - Strict I/Os  - c/w Coreg 25mg BID, lisinopril 40mg qd, aldactone 25mg qd  - if starts to become volume overloaded can transition back to IV Lasix 80mg BID.    Problem/Plan - 6:  ·  Problem: Normocytic anemia.   ·  Plan: Hgb 8.8, MCV 91, stable from Hgb 8.4 (12/22). No signs of active bleeding.  - active type and screen.   - transfuse for Hgb <8 given cardiac hx  - continue to monitor for signs of anemia.    Problem/Plan - 7:  ·  Problem: CAD S/P percutaneous coronary angioplasty.   ·  Plan: d/c meds: ASA 81mg QD  and atorvastatin 80mg qhs  - c/w aspirin 81mg qd and atorvastatin 80mg qhs.    Problem/Plan - 8:  ·  Problem: PAD (peripheral artery disease).   ·  Plan: d/c meds: ASA 81mg QD  and atorvastatin 80mg qhs  - c/w aspirin 81mg qd and atorvastatin 80mg qhs.    Problem/Plan - 9:  ·  Problem: IDDM (insulin dependent diabetes mellitus).   ·  Plan: Hx of Type 2 Diabetes (A1c 13.5% 12/05) c/b PAD, diabetic foot infection. D/c on Lantus 12units qhs and Lispro 8units TID w/ meals. Endocrine consulted last admission w/ rec for Lantus 12units qhs and Lispro 8units TID on discharge.  - c/w Lantus 12units qhs, Lispro 8units TID, and moderate sliding scale w/ meals and bedtime  - carb consistent diet  - Patient's fingerstick glucose goal 100-180 mg/dL.    Problem/Plan - 10:  ·  Problem: History of Clostridium difficile infection.   ·  Plan; Hx of CDIff infection last admission. Completed txt w/ oral Vanc on 12/25. Now reporting formed bowel movements.  - c/w contact precautions.    Problem/Plan - 11:  ·  Problem: Neuropathy.   ·  Plan: Home med: Gabapentin 800mg TID  - due to renal function will decrease Gabapentin to 600mg TID.    Problem/Plan - 12:  ·  Problem: Preventive measure.   ·  Plan: F: None  E: replete as necessary  N: Carb consistent diet  DVT: Heparin Subq  Dispo: RMF.    Attestation Statements:   Attestation Statements:  I have personally seen and examined the patient.  I fully participated in the care of this patient.  I have made amendments to the documentation where necessary, and agree with the history, physical exam, and plan as documented by the Resident.     68 YOM with PMH of CAD s/p PCI (x 2 in 2020), HTN, IDDM (A1c 12%, 10/2023) c/b peripheral neuropathy, HFmrEF, PAD s/p R BKA recent admission for R foot gangrene s/p RBKA (12/11) and closure (12/18) c/b c-diff colitis s/p PO vancomycin (EOT 12/25) discharged to [facility] who returns due to being found to be incidentally COVID-19 positive at [facility].     COVID-19 - asymptomatic without hypoxia, no indication for steroids. Cont remdisivir due to being elevated risk for progression to severe disease.     Dispo: pending [facility] acceptance.         I M    68 y o M w/ PMHx CAD (2 stents 2020), HFmrEF (45-50%), HTN, PAD w/ remote RLE angioplasty, R 4th toe OM s/p partial R 4th ray amputation on 10/24, uncontrolled IDDM (a1c 12 in Oct 2023), recent admission 11/8-11/10 to cardiology service for hypertensive emergency (left AMA), recently admitted (12/01-12/26/23) for R foot soft tissue infection, now s/p R BKA (12/11/23), course c/b CDiff infection w/ completion of oral vanc on 12/25, discharged to [facility] on 12/26 and found to be COVID-19 positive, [facility] unable to accept COVID+ patients, so he returned to [facility] for further monitoring.     Pending [facility]    Problem/Plan - 1:  ·  Problem: 2019 novel coronavirus disease (COVID-19).   ·  Plan: Patient d/c to [facility] on 12/26, found to be COVID-19+ at [facility], returned to [facility] as rehab can't accept patients w/ COVID. He denies cough, runny nose, chills, sob, Found to have fever 101.6 degrees F in ED, no other SIRS criteria. Currently saturating 94-95% on room air, not hypoxic. Fever likely due to COVID-19 infection.     • C/w Tylenol 650mg Q6hrs prn fever/mild pain   • C/w remdesivir for three days (last day: 12/29).    Problem/Plan - 2:  ·  Problem: Systemic inflammatory response syndrome (SIRS).   ·  Plan: Patient w/ fever 101.6 degrees F, no other SIRS criteria met. Found to be COVID-19 positive. Fevers likely 2/2 COVID-19 infection.  - BCx x1: sterile     • Continue to monitor off antibiotics.    Problem/Plan - 3:  ·  Problem: Status post below knee amputation, right.   ·  Plan: s/p R BKA on 12/11/23 and s/p BKA closure on 12/18/23 by vascular surgery at [facility]     • Pain regimen as follows:        - Tylenol 650mg po q6h PRN for mild pain/fever       - Oxy 5mg po q6h PRN for mod pain       - Oxy 10mg po q6h PRN for sev pain   • Has outpt f/u apt w/ vascular: Dr. Freed on 1/18/24 at 10 A.M.    Problem/Plan - 4:  ·  Problem: HTN (hypertension).   ·  Plan: D/c meds: Hydralazine 100mg q8h and nifedipine XL 60mg QD     • Start nifedipine XL 90mg QD   • C/w hydral 100mg TID.    Problem/Plan - 5:  ·  Problem: Heart failure with mildly reduced ejection fraction.   ·  Plan: Hx of ICM HF mildly reduced EF (45-50% 11/09), cardiology following during last admission for HF. Patient d/c on Torsemide 40mg PO qd today, now returning from [facility]   - prior recs: IV lasix 80mg q12h w/ strict I/O while inpatient, switch to torsemide 40mg PO QD on d/c  - GDMT for HFrEF: Coreg 25mg BID, lisinopril 40mg QD, aldactone 25mg QD; avoid SGLT2i given PAD w/ delayed wound healing    Plan:  - c/w Torsemide 40mg po qd as patient was d/c ready, returning for new rehab placement due to incidental COVID infection finding  - Strict I/Os  - c/w Coreg 25mg BID, lisinopril 40mg qd, aldactone 25mg qd  - if starts to become volume overloaded can transition back to IV Lasix 80mg BID.    Problem/Plan - 6:  ·  Problem: Normocytic anemia.   ·  Plan: Hgb 8.8, MCV 91, stable from Hgb 8.4 (12/22). No signs of active bleeding.  - active type and screen.   - transfuse for Hgb <8 given cardiac hx  - continue to monitor for signs of anemia.    Problem/Plan - 7:  ·  Problem: CAD S/P percutaneous coronary angioplasty.   ·  Plan: d/c meds: ASA 81mg QD  and atorvastatin 80mg qhs  - c/w aspirin 81mg qd and atorvastatin 80mg qhs.    Problem/Plan - 8:  ·  Problem: PAD (peripheral artery disease).   ·  Plan: d/c meds: ASA 81mg QD  and atorvastatin 80mg qhs  - c/w aspirin 81mg qd and atorvastatin 80mg qhs.    Problem/Plan - 9:  ·  Problem: IDDM (insulin dependent diabetes mellitus).   ·  Plan: Hx of Type 2 Diabetes (A1c 13.5% 12/05) c/b PAD, diabetic foot infection. D/c on Lantus 12units qhs and Lispro 8units TID w/ meals. Endocrine consulted last admission w/ rec for Lantus 12units qhs and Lispro 8units TID on discharge.  - c/w Lantus 12units qhs, Lispro 8units TID, and moderate sliding scale w/ meals and bedtime  - carb consistent diet  - Patient's fingerstick glucose goal 100-180 mg/dL.    Problem/Plan - 10:  ·  Problem: History of Clostridium difficile infection.   ·  Plan; Hx of CDIff infection last admission. Completed txt w/ oral Vanc on 12/25. Now reporting formed bowel movements.  - c/w contact precautions.    Problem/Plan - 11:  ·  Problem: Neuropathy.   ·  Plan: Home med: Gabapentin 800mg TID  - due to renal function will decrease Gabapentin to 600mg TID.    Problem/Plan - 12:  ·  Problem: Preventive measure.   ·  Plan: F: None  E: replete as necessary  N: Carb consistent diet  DVT: Heparin Subq  Dispo: RMF.    Attestation Statements:   Attestation Statements:  I have personally seen and examined the patient.  I fully participated in the care of this patient.  I have made amendments to the documentation where necessary, and agree with the history, physical exam, and plan as documented by the Resident.     68 YOM with PMH of CAD s/p PCI (x 2 in 2020), HTN, IDDM (A1c 12%, 10/2023) c/b peripheral neuropathy, HFmrEF, PAD s/p R BKA recent admission for R foot gangrene s/p RBKA (12/11) and closure (12/18) c/b c-diff colitis s/p PO vancomycin (EOT 12/25) discharged to [facility] who returns due to being found to be incidentally COVID-19 positive at [facility].     COVID-19 - asymptomatic without hypoxia, no indication for steroids. Cont remdisivir due to being elevated risk for progression to severe disease.     Dispo: pending [facility] acceptance.

## 2023-12-30 NOTE — PROGRESS NOTE ADULT - SUBJECTIVE AND OBJECTIVE BOX
Medicine Progress Note    INTERVAL EVENTS:   No acute events overnight.    SUBJECTIVE:   Patient seen and examined at bedside. Condition largely unchanged from yesterday. No acute complaints at this time.    ROS:  Negative unless otherwise stated above.    PHYSICAL EXAM:  Noted in "Physical Exam" section below.    VITAL SIGNS:  Vital Signs Last 24 Hrs  T(C): 37 (30 Dec 2023 21:05), Max: 37.1 (29 Dec 2023 22:00)  T(F): 98.6 (30 Dec 2023 21:05), Max: 98.7 (29 Dec 2023 22:00)  HR: 72 (30 Dec 2023 21:05) (69 - 76)  BP: 135/73 (30 Dec 2023 21:05) (130/66 - 179/69)  BP(mean): --  RR: 18 (30 Dec 2023 21:05) (18 - 19)  SpO2: 97% (30 Dec 2023 21:05) (95% - 97%)    Parameters below as of 30 Dec 2023 21:05  Patient On (Oxygen Delivery Method): room air      <INS & OUTS:    12-29-23 @ 07:01  -  12-30-23 @ 07:00  --------------------------------------------------------  IN: 0 mL / OUT: 800 mL / NET: -800 mL      INPATIENT MEDICATIONS:   MEDICATIONS  (STANDING):  aspirin  chewable 81 milliGRAM(s) Oral every 24 hours  atorvastatin 80 milliGRAM(s) Oral at bedtime  carvedilol 25 milliGRAM(s) Oral every 12 hours  dextrose 5%. 1000 milliLiter(s) (50 mL/Hr) IV Continuous <Continuous>  dextrose 5%. 1000 milliLiter(s) (100 mL/Hr) IV Continuous <Continuous>  dextrose 50% Injectable 25 Gram(s) IV Push once  dextrose 50% Injectable 25 Gram(s) IV Push once  dextrose 50% Injectable 12.5 Gram(s) IV Push once  gabapentin 600 milliGRAM(s) Oral every 8 hours  glucagon  Injectable 1 milliGRAM(s) IntraMuscular once  heparin   Injectable 5000 Unit(s) SubCutaneous every 8 hours  hydrALAZINE 100 milliGRAM(s) Oral every 8 hours  insulin glargine Injectable (LANTUS) 12 Unit(s) SubCutaneous at bedtime  insulin lispro (ADMELOG) corrective regimen sliding scale   SubCutaneous Before meals and at bedtime  insulin lispro Injectable (ADMELOG) 8 Unit(s) SubCutaneous three times a day before meals  lisinopril 40 milliGRAM(s) Oral every 24 hours  NIFEdipine XL 90 milliGRAM(s) Oral every 24 hours  spironolactone 25 milliGRAM(s) Oral every 24 hours  torsemide 40 milliGRAM(s) Oral every 24 hours    MEDICATIONS  (PRN):  acetaminophen     Tablet .. 650 milliGRAM(s) Oral every 6 hours PRN Temp greater or equal to 38C (100.4F), Mild Pain (1 - 3)  dextrose Oral Gel 15 Gram(s) Oral once PRN Blood Glucose LESS THAN 70 milliGRAM(s)/deciliter  oxyCODONE    IR 10 milliGRAM(s) Oral every 6 hours PRN Severe Pain (7 - 10)  oxyCODONE    IR 5 milliGRAM(s) Oral every 6 hours PRN Moderate Pain (4 - 6)    ALLERGIES:  Allergies    No Known Allergies    Intolerances    LABS:                       8.4    6.89  )-----------( 304      ( 30 Dec 2023 05:30 )             26.8     12-30    136  |  103  |  38<H>  ----------------------------<  142<H>  4.0   |  28  |  1.33<H>    Ca    8.4      30 Dec 2023 05:30  Phos  3.1     12-30  Mg     1.9     12-30        Urinalysis Basic - ( 30 Dec 2023 05:30 )    Color: x / Appearance: x / SG: x / pH: x  Gluc: 142 mg/dL / Ketone: x  / Bili: x / Urobili: x   Blood: x / Protein: x / Nitrite: x   Leuk Esterase: x / RBC: x / WBC x   Sq Epi: x / Non Sq Epi: x / Bacteria: x      CAPILLARY BLOOD GLUCOSE      POCT Blood Glucose.: 352 mg/dL (30 Dec 2023 18:07)    RADIOLOGY & ADDITIONAL TESTS: Reviewed.

## 2023-12-31 LAB
GLUCOSE BLDC GLUCOMTR-MCNC: 114 MG/DL — HIGH (ref 70–99)
GLUCOSE BLDC GLUCOMTR-MCNC: 114 MG/DL — HIGH (ref 70–99)
GLUCOSE BLDC GLUCOMTR-MCNC: 175 MG/DL — HIGH (ref 70–99)
GLUCOSE BLDC GLUCOMTR-MCNC: 175 MG/DL — HIGH (ref 70–99)
GLUCOSE BLDC GLUCOMTR-MCNC: 184 MG/DL — HIGH (ref 70–99)
GLUCOSE BLDC GLUCOMTR-MCNC: 184 MG/DL — HIGH (ref 70–99)
GLUCOSE BLDC GLUCOMTR-MCNC: 284 MG/DL — HIGH (ref 70–99)
GLUCOSE BLDC GLUCOMTR-MCNC: 284 MG/DL — HIGH (ref 70–99)

## 2023-12-31 PROCEDURE — 99232 SBSQ HOSP IP/OBS MODERATE 35: CPT

## 2023-12-31 PROCEDURE — 99233 SBSQ HOSP IP/OBS HIGH 50: CPT | Mod: GC,24

## 2023-12-31 RX ADMIN — HEPARIN SODIUM 5000 UNIT(S): 5000 INJECTION INTRAVENOUS; SUBCUTANEOUS at 07:06

## 2023-12-31 RX ADMIN — Medication 100 MILLIGRAM(S): at 16:44

## 2023-12-31 RX ADMIN — GABAPENTIN 600 MILLIGRAM(S): 400 CAPSULE ORAL at 07:05

## 2023-12-31 RX ADMIN — INSULIN GLARGINE 12 UNIT(S): 100 INJECTION, SOLUTION SUBCUTANEOUS at 00:01

## 2023-12-31 RX ADMIN — INSULIN GLARGINE 12 UNIT(S): 100 INJECTION, SOLUTION SUBCUTANEOUS at 22:14

## 2023-12-31 RX ADMIN — Medication 8 UNIT(S): at 12:47

## 2023-12-31 RX ADMIN — Medication 6: at 09:17

## 2023-12-31 RX ADMIN — Medication 2: at 22:14

## 2023-12-31 RX ADMIN — Medication 90 MILLIGRAM(S): at 07:22

## 2023-12-31 RX ADMIN — Medication 40 MILLIGRAM(S): at 07:04

## 2023-12-31 RX ADMIN — SPIRONOLACTONE 25 MILLIGRAM(S): 25 TABLET, FILM COATED ORAL at 18:56

## 2023-12-31 RX ADMIN — OXYCODONE HYDROCHLORIDE 10 MILLIGRAM(S): 5 TABLET ORAL at 08:05

## 2023-12-31 RX ADMIN — OXYCODONE HYDROCHLORIDE 10 MILLIGRAM(S): 5 TABLET ORAL at 18:56

## 2023-12-31 RX ADMIN — HEPARIN SODIUM 5000 UNIT(S): 5000 INJECTION INTRAVENOUS; SUBCUTANEOUS at 00:00

## 2023-12-31 RX ADMIN — Medication 2: at 18:35

## 2023-12-31 RX ADMIN — Medication 8 UNIT(S): at 09:41

## 2023-12-31 RX ADMIN — OXYCODONE HYDROCHLORIDE 10 MILLIGRAM(S): 5 TABLET ORAL at 19:56

## 2023-12-31 RX ADMIN — OXYCODONE HYDROCHLORIDE 10 MILLIGRAM(S): 5 TABLET ORAL at 07:05

## 2023-12-31 RX ADMIN — Medication 100 MILLIGRAM(S): at 23:02

## 2023-12-31 RX ADMIN — Medication 100 MILLIGRAM(S): at 07:05

## 2023-12-31 RX ADMIN — Medication 8 UNIT(S): at 18:35

## 2023-12-31 RX ADMIN — ATORVASTATIN CALCIUM 80 MILLIGRAM(S): 80 TABLET, FILM COATED ORAL at 23:02

## 2023-12-31 RX ADMIN — GABAPENTIN 600 MILLIGRAM(S): 400 CAPSULE ORAL at 23:03

## 2023-12-31 RX ADMIN — Medication 81 MILLIGRAM(S): at 07:05

## 2023-12-31 RX ADMIN — GABAPENTIN 600 MILLIGRAM(S): 400 CAPSULE ORAL at 16:44

## 2023-12-31 RX ADMIN — HEPARIN SODIUM 5000 UNIT(S): 5000 INJECTION INTRAVENOUS; SUBCUTANEOUS at 23:04

## 2023-12-31 RX ADMIN — LISINOPRIL 40 MILLIGRAM(S): 2.5 TABLET ORAL at 13:10

## 2023-12-31 RX ADMIN — Medication 2: at 00:00

## 2023-12-31 RX ADMIN — HEPARIN SODIUM 5000 UNIT(S): 5000 INJECTION INTRAVENOUS; SUBCUTANEOUS at 16:44

## 2023-12-31 RX ADMIN — CARVEDILOL PHOSPHATE 25 MILLIGRAM(S): 80 CAPSULE, EXTENDED RELEASE ORAL at 23:02

## 2023-12-31 RX ADMIN — CARVEDILOL PHOSPHATE 25 MILLIGRAM(S): 80 CAPSULE, EXTENDED RELEASE ORAL at 09:48

## 2023-12-31 NOTE — PROGRESS NOTE ADULT - ATTENDING COMMENTS
I have personally seen and examined this patient. I have fully participated in the care of this patient. I have reviewed all pertinent clinical information, including history, physical exam, plan and the Resident's, PA's and NP's note and agree except as noted. This is a patient originally known to Dr. Corky Oneal, but I was asked to perform the guillotine R BKA on 12/11/23 and take over his care rest of his recent admission. 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 He was transferred from the medical service to vascular postop and underwent staged R BKA w/ closure (12/18/23). He was discharged to [facility] on 12/26/23 but was sent back to the ED because he tested positive for COVID and the [facility] doesn't accept COVID patients.     On 12/31, he does not endorse new major complaints.The R BKA stump remains fine. Staple line clean, dry and intact. Awaiting dispo    PLAN & RECOMMENDATIONS  - COVID management per primary medicine team  - ASA/SQH  - Physical therapy  - COVID management per primary medical team  - Daily dressing changes with dry gauze, kerlix and ace-wrap (ace-wrap from stump to thigh)  - Outpatient vascular surgery follow up with me on 1/18/24    Thank you,    Michelet Freed MD  Attending Vascular Surgeon  [facility] at [address] [place] [address]  Office: [phone]; Fax: [phone]  [email or web address] I have personally seen and examined this patient. I have fully participated in the care of this patient. I have reviewed all pertinent clinical information, including history, physical exam, plan and the Resident's, PA's and NP's note and agree except as noted. This is a patient originally known to Dr. Corky Oneal, but I was asked to perform the guillotine R BKA on 12/11/23 and take over his care rest of his recent admission. 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 He was transferred from the medical service to vascular postop and underwent staged R BKA w/ closure (12/18/23). He was discharged to [facility] on 12/26/23 but was sent back to the ED because he tested positive for COVID and the [facility] doesn't accept COVID patients.     On 12/31, he does not endorse new major complaints.The R BKA stump remains fine. Staple line clean, dry and intact. Awaiting dispo    PLAN & RECOMMENDATIONS  - COVID management per primary medicine team  - ASA/SQH  - Physical therapy  - COVID management per primary medical team  - Daily dressing changes with dry gauze, kerlix and ace-wrap (ace-wrap from stump to thigh)  - Outpatient vascular surgery follow up with me on 1/18/24    Thank you,    Michelet Freed MD  Attending Vascular Surgeon  [facility] at [address] [place] [address]  Office: [phone]; Fax: [phone]  [email or web address]

## 2023-12-31 NOTE — PROGRESS NOTE ADULT - SUBJECTIVE AND OBJECTIVE BOX
S:  Patient dressing changed beside. VSS, NAC.    O: Examined in bed resting comfortably     ROS: Denies headache, blurred vision, chest pain, SOB, abdominal pain, nausea or vomiting.         aspirin  chewable 81  carvedilol 25  heparin   Injectable 5000  hydrALAZINE 100  lisinopril 40  NIFEdipine XL 90  spironolactone 25  torsemide 40      Allergies    No Known Allergies    Intolerances        Vital Signs Last 24 Hrs  T(C): 37.2 (31 Dec 2023 09:00), Max: 37.2 (30 Dec 2023 23:53)  T(F): 98.9 (31 Dec 2023 09:00), Max: 99 (31 Dec 2023 06:58)  HR: 76 (31 Dec 2023 09:00) (69 - 76)  BP: 167/77 (31 Dec 2023 06:58) (130/66 - 169/81)  BP(mean): --  RR: 18 (31 Dec 2023 09:00) (18 - 18)  SpO2: 94% (31 Dec 2023 09:00) (94% - 97%)    Parameters below as of 31 Dec 2023 09:00  Patient On (Oxygen Delivery Method): room air      I&O's Summary    30 Dec 2023 07:01  -  31 Dec 2023 07:00  --------------------------------------------------------  IN: 0 mL / OUT: 1400 mL / NET: -1400 mL    31 Dec 2023 07:01  -  31 Dec 2023 11:03  --------------------------------------------------------  IN: 0 mL / OUT: 300 mL / NET: -300 mL        PHYSICAL EXAM:   Gen: NAD, resting comfortably   CV: NSR  Pulm: no respiratory distress on RA  Abd: soft, ND, NTTP, no rebound or guarding   Ext: WWP; R BKA stump incision c/d/i, no hematoma, superficial blister with clean base and no erythema or purulence; dressing changed-xerofoam, kerlix, ace.  Neuro: motor/sensory grossly intact       LABS:                        8.4    6.89  )-----------( 304      ( 30 Dec 2023 05:30 )             26.8     12-30    136  |  103  |  38<H>  ----------------------------<  142<H>  4.0   |  28  |  1.33<H>    Ca    8.4      30 Dec 2023 05:30  Phos  3.1     12-30  Mg     1.9     12-30          Radiology and Additional Studies:

## 2023-12-31 NOTE — PROGRESS NOTE ADULT - ASSESSMENT
A/P: 69yo M PMH CAD (2 stents 2020), HFmrEF (45-50%), HTN, PAD w/ remote RLE angioplasty, R 4th toe OM s/p partial R 4th ray amputation on 10/24, RLE angiogram with AT lithotripsy and AT/peroneal balloon angioplasty 10/27, uncontrolled T2DM recently admitted for R foot gas gangrene s/p R BKA (12/11) and closure (12/18) c/b c. diff infection s/p treatment now returns from [facility] for incidental finding of COVID. Vascular surgery consulted for management of BKA stump.    -ASA/SQH  - Physical therapy  - COVID management per primary medical team  - Daily dressing changes by vascular with dry gauze, kerlix and ace-wrap (ace-wrap from stump to thigh); BKA stump appears to be healing well  - Outpatient vascular surgery follow up with Dr. Freed on 1/18/24 A/P: 67yo M PMH CAD (2 stents 2020), HFmrEF (45-50%), HTN, PAD w/ remote RLE angioplasty, R 4th toe OM s/p partial R 4th ray amputation on 10/24, RLE angiogram with AT lithotripsy and AT/peroneal balloon angioplasty 10/27, uncontrolled T2DM recently admitted for R foot gas gangrene s/p R BKA (12/11) and closure (12/18) c/b c. diff infection s/p treatment now returns from [facility] for incidental finding of COVID. Vascular surgery consulted for management of BKA stump.    -ASA/SQH  - Physical therapy  - COVID management per primary medical team  - Daily dressing changes by vascular with dry gauze, kerlix and ace-wrap (ace-wrap from stump to thigh); BKA stump appears to be healing well  - Outpatient vascular surgery follow up with Dr. Freed on 1/18/24

## 2023-12-31 NOTE — PROGRESS NOTE ADULT - PROBLEM SELECTOR PLAN 5
Hx of ICM HF mildly reduced EF (45-50% 11/09), cardiology following during last admission for HF. Patient d/c on Torsemide 40mg PO qd today, now returning from [facility]   - prior recs: IV lasix 80mg q12h w/ strict I/O while inpatient, switch to torsemide 40mg PO QD on d/c  - GDMT for HFrEF: Coreg 25mg BID, lisinopril 40mg QD, aldactone 25mg QD; avoid SGLT2i given PAD w/ delayed wound healing    Plan:  - c/w Torsemide 40mg po qd as patient was d/c ready, returning for new rehab placement due to incidental COVID infection finding  - Strict I/Os  - c/w Coreg 25mg BID, lisinopril 40mg qd, aldactone 25mg qd  - if starts to become volume overloaded can transition back to IV Lasix 80mg BID Hx of ICM HF mildly reduced EF (45-50% 11/09), cardiology following during last admission for HF. Patient d/c on Torsemide 40mg PO qd today, now returning from [facility]   - prior recs: IV lasix 80mg q12h w/ strict I/O while inpatient, switch to torsemide 40mg PO QD on d/c  - GDMT for HFrEF: Coreg 25mg BID, lisinopril 40mg QD, aldactone 25mg QD; avoid SGLT2i given PAD w/ delayed wound healing    Plan:  - c/w Torsemide 40mg po qd as patient was d/c ready, returning for new rehab placement due to incidental COVID infection finding  - Strict I/Os  - c/w Coreg 25mg BID, lisinopril 40mg qd, aldactone 25mg qd  - if starts to become volume overloaded can transition back to IV Lasix 80mg BID

## 2023-12-31 NOTE — PROGRESS NOTE ADULT - PROBLEM SELECTOR PLAN 2
Patient w/ fever 101.6 degrees F, no other SIRS criteria met. Found to be COVID-19 positive. Fevers likely 2/2 COVID-19 infection.  - BCx x1: sterile     • Continue to monitor off antibiotics, afebrile

## 2023-12-31 NOTE — PROGRESS NOTE ADULT - PROBLEM SELECTOR PLAN 1
Patient d/c to [facility] on 12/26, found to be COVID-19+ at [facility], returned to [facility] as rehab can't accept patients w/ COVID. He denies cough, runny nose, chills, sob, Found to have fever 101.6 degrees F in ED, no other SIRS criteria. Currently saturating 94-95% on room air, not hypoxic. Fever likely due to COVID-19 infection.     • C/w Tylenol 650mg Q6hrs prn fever/mild pain   • C/w remdesivir for three days (last day: 12/29)  - DC once KERRY placement confirmed Patient d/c to [facility] on 12/26, found to be COVID-19+ at [facility], returned to [facility] as rehab can't accept patients w/ COVID. He denies cough, runny nose, chills, sob, Found to have fever 101.6 degrees F in ED, no other SIRS criteria. Currently saturating 94-95% on room air, not hypoxic. Fever likely due to COVID-19 infection.     • C/w Tylenol 650mg Q6hrs prn fever/mild pain   • C/w remdesivir for three days (last day: 12/29)  - DC once KERRY placement confirmed

## 2023-12-31 NOTE — PROGRESS NOTE ADULT - ASSESSMENT
69yo M w/ PMHx CAD (2 stents 2020), HFmrEF (45-50%), HTN, PAD w/ remote RLE angioplasty, R 4th toe OM s/p partial R 4th ray amputation on 10/24, uncontrolled IDDM (a1c 12 in Oct 2023), recent admission 11/8-11/10 to cardiology service for hypertensive emergency (left AMA), recently admitted (12/01-12/26/23) for R foot soft tissue infection, now s/p R BKA (12/11/23), course c/b CDiff infection w/ completion of oral vanc on 12/25, discharged to [facility] on 12/26 and found to be COVID-19 positive, [facility] unable to accept COVID+ patients, so he returned to [facility] for further monitoring.     Pending KERRY 69yo M w/ PMHx CAD (2 stents 2020), HFmrEF (45-50%), HTN, PAD w/ remote RLE angioplasty, R 4th toe OM s/p partial R 4th ray amputation on 10/24, uncontrolled IDDM (a1c 12 in Oct 2023), recent admission 11/8-11/10 to cardiology service for hypertensive emergency (left AMA), recently admitted (12/01-12/26/23) for R foot soft tissue infection, now s/p R BKA (12/11/23), course c/b CDiff infection w/ completion of oral vanc on 12/25, discharged to [facility] on 12/26 and found to be COVID-19 positive, [facility] unable to accept COVID+ patients, so he returned to [facility] for further monitoring.     Pending KERRY

## 2023-12-31 NOTE — PROGRESS NOTE ADULT - PROBLEM SELECTOR PLAN 3
s/p R BKA on 12/11/23 and s/p BKA closure on 12/18/23 by vascular surgery at [facility]     • Pain regimen as follows:        - Tylenol 650mg po q6h PRN for mild pain/fever       - Oxy 5mg po q6h PRN for mod pain       - Oxy 10mg po q6h PRN for sev pain   • Has outpt f/u apt w/ vascular: Dr. Freed on 1/18/24 at 10 A.M. s/p R BKA on 12/11/23 and s/p BKA closure on 12/18/23 by vascular surgery at [facility]     • Pain regimen as follows:        - Tylenol 650mg po q6h PRN for mild pain/fever       - Oxy 5mg po q6h PRN for mod pain       - Oxy 10mg po q6h PRN for sev pain   • Has outpt f/u apt w/ vascular: Dr. Freed on 1/18/24 at 10 A.M.

## 2023-12-31 NOTE — PROGRESS NOTE ADULT - SUBJECTIVE AND OBJECTIVE BOX
Medicine Progress Note    INTERVAL EVENTS:   No acute events overnight.    SUBJECTIVE:   Patient seen and examined at bedside. Condition largely unchanged from yesterday. Continuing treatment for COVID. Disinterested in talking to me today told me to leave and "I want to watch football." No acute complaints at this time.    ROS:  Negative unless otherwise stated above.    PHYSICAL EXAM:  GENERAL: NAD, A&Ox3  HEENT: Atraumatic, no icterus, EOMI, no gross thyromegaly  CARDIAC: NSR, +S1, +S2, no orthopnea  PULM: CTA B/L  ABD: NTND, no easily palpable organomegaly  SKIN: No significant ecchymosis, petechiae or other skin abnormalities  EXTREMITIES: No LE edema  MSK: No acute issues, no obvious pain, strength preserved throughout  NEURO: No focal neurologic signs        VITAL SIGNS:  T(F): 99 (12-31-23 @ 15:57)  HR: 75 (12-31-23 @ 15:57)  BP: 149/73 (12-31-23 @ 15:57)  RR: 17 (12-31-23 @ 15:57)  SpO2: 99% (12-31-23 @ 15:57)  Wt(kg): --        MEDICATIONS  (STANDING):  aspirin  chewable 81 milliGRAM(s) Oral every 24 hours  atorvastatin 80 milliGRAM(s) Oral at bedtime  carvedilol 25 milliGRAM(s) Oral every 12 hours  dextrose 5%. 1000 milliLiter(s) (50 mL/Hr) IV Continuous <Continuous>  dextrose 5%. 1000 milliLiter(s) (100 mL/Hr) IV Continuous <Continuous>  dextrose 50% Injectable 25 Gram(s) IV Push once  dextrose 50% Injectable 25 Gram(s) IV Push once  dextrose 50% Injectable 12.5 Gram(s) IV Push once  gabapentin 600 milliGRAM(s) Oral every 8 hours  glucagon  Injectable 1 milliGRAM(s) IntraMuscular once  heparin   Injectable 5000 Unit(s) SubCutaneous every 8 hours  hydrALAZINE 100 milliGRAM(s) Oral every 8 hours  insulin glargine Injectable (LANTUS) 12 Unit(s) SubCutaneous at bedtime  insulin lispro (ADMELOG) corrective regimen sliding scale   SubCutaneous Before meals and at bedtime  insulin lispro Injectable (ADMELOG) 8 Unit(s) SubCutaneous three times a day before meals  lisinopril 40 milliGRAM(s) Oral every 24 hours  NIFEdipine XL 90 milliGRAM(s) Oral every 24 hours  spironolactone 25 milliGRAM(s) Oral every 24 hours  torsemide 40 milliGRAM(s) Oral every 24 hours    MEDICATIONS  (PRN):  acetaminophen     Tablet .. 650 milliGRAM(s) Oral every 6 hours PRN Temp greater or equal to 38C (100.4F), Mild Pain (1 - 3)  dextrose Oral Gel 15 Gram(s) Oral once PRN Blood Glucose LESS THAN 70 milliGRAM(s)/deciliter  oxyCODONE    IR 5 milliGRAM(s) Oral every 6 hours PRN Moderate Pain (4 - 6)  oxyCODONE    IR 10 milliGRAM(s) Oral every 6 hours PRN Severe Pain (7 - 10)      Allergies    No Known Allergies    Intolerances        LABS:                        8.4    6.89  )-----------( 304      ( 30 Dec 2023 05:30 )             26.8     12-30    136  |  103  |  38<H>  ----------------------------<  142<H>  4.0   |  28  |  1.33<H>    Ca    8.4      30 Dec 2023 05:30  Phos  3.1     12-30  Mg     1.9     12-30        Urinalysis Basic - ( 30 Dec 2023 05:30 )    Color: x / Appearance: x / SG: x / pH: x  Gluc: 142 mg/dL / Ketone: x  / Bili: x / Urobili: x   Blood: x / Protein: x / Nitrite: x   Leuk Esterase: x / RBC: x / WBC x   Sq Epi: x / Non Sq Epi: x / Bacteria: x

## 2024-01-01 LAB
CULTURE RESULTS: SIGNIFICANT CHANGE UP
GLUCOSE BLDC GLUCOMTR-MCNC: 143 MG/DL — HIGH (ref 70–99)
GLUCOSE BLDC GLUCOMTR-MCNC: 143 MG/DL — HIGH (ref 70–99)
GLUCOSE BLDC GLUCOMTR-MCNC: 154 MG/DL — HIGH (ref 70–99)
GLUCOSE BLDC GLUCOMTR-MCNC: 154 MG/DL — HIGH (ref 70–99)
GLUCOSE BLDC GLUCOMTR-MCNC: 226 MG/DL — HIGH (ref 70–99)
GLUCOSE BLDC GLUCOMTR-MCNC: 226 MG/DL — HIGH (ref 70–99)
GLUCOSE BLDC GLUCOMTR-MCNC: 311 MG/DL — HIGH (ref 70–99)
GLUCOSE BLDC GLUCOMTR-MCNC: 311 MG/DL — HIGH (ref 70–99)
GLUCOSE BLDC GLUCOMTR-MCNC: 74 MG/DL — SIGNIFICANT CHANGE UP (ref 70–99)
GLUCOSE BLDC GLUCOMTR-MCNC: 74 MG/DL — SIGNIFICANT CHANGE UP (ref 70–99)
SPECIMEN SOURCE: SIGNIFICANT CHANGE UP

## 2024-01-01 PROCEDURE — 99233 SBSQ HOSP IP/OBS HIGH 50: CPT

## 2024-01-01 RX ADMIN — OXYCODONE HYDROCHLORIDE 10 MILLIGRAM(S): 5 TABLET ORAL at 12:30

## 2024-01-01 RX ADMIN — OXYCODONE HYDROCHLORIDE 10 MILLIGRAM(S): 5 TABLET ORAL at 19:21

## 2024-01-01 RX ADMIN — SPIRONOLACTONE 25 MILLIGRAM(S): 25 TABLET, FILM COATED ORAL at 15:15

## 2024-01-01 RX ADMIN — Medication 4: at 18:24

## 2024-01-01 RX ADMIN — OXYCODONE HYDROCHLORIDE 10 MILLIGRAM(S): 5 TABLET ORAL at 18:23

## 2024-01-01 RX ADMIN — GABAPENTIN 600 MILLIGRAM(S): 400 CAPSULE ORAL at 22:30

## 2024-01-01 RX ADMIN — Medication 8: at 09:59

## 2024-01-01 RX ADMIN — Medication 81 MILLIGRAM(S): at 06:39

## 2024-01-01 RX ADMIN — Medication 40 MILLIGRAM(S): at 06:38

## 2024-01-01 RX ADMIN — HEPARIN SODIUM 5000 UNIT(S): 5000 INJECTION INTRAVENOUS; SUBCUTANEOUS at 22:31

## 2024-01-01 RX ADMIN — OXYCODONE HYDROCHLORIDE 10 MILLIGRAM(S): 5 TABLET ORAL at 13:30

## 2024-01-01 RX ADMIN — HEPARIN SODIUM 5000 UNIT(S): 5000 INJECTION INTRAVENOUS; SUBCUTANEOUS at 15:15

## 2024-01-01 RX ADMIN — Medication 650 MILLIGRAM(S): at 03:09

## 2024-01-01 RX ADMIN — Medication 8 UNIT(S): at 09:58

## 2024-01-01 RX ADMIN — INSULIN GLARGINE 12 UNIT(S): 100 INJECTION, SOLUTION SUBCUTANEOUS at 22:30

## 2024-01-01 RX ADMIN — GABAPENTIN 600 MILLIGRAM(S): 400 CAPSULE ORAL at 06:40

## 2024-01-01 RX ADMIN — OXYCODONE HYDROCHLORIDE 10 MILLIGRAM(S): 5 TABLET ORAL at 00:24

## 2024-01-01 RX ADMIN — Medication 100 MILLIGRAM(S): at 15:16

## 2024-01-01 RX ADMIN — GABAPENTIN 600 MILLIGRAM(S): 400 CAPSULE ORAL at 15:15

## 2024-01-01 RX ADMIN — Medication 90 MILLIGRAM(S): at 06:39

## 2024-01-01 RX ADMIN — HEPARIN SODIUM 5000 UNIT(S): 5000 INJECTION INTRAVENOUS; SUBCUTANEOUS at 06:39

## 2024-01-01 RX ADMIN — CARVEDILOL PHOSPHATE 25 MILLIGRAM(S): 80 CAPSULE, EXTENDED RELEASE ORAL at 22:30

## 2024-01-01 RX ADMIN — LISINOPRIL 40 MILLIGRAM(S): 2.5 TABLET ORAL at 12:19

## 2024-01-01 RX ADMIN — ATORVASTATIN CALCIUM 80 MILLIGRAM(S): 80 TABLET, FILM COATED ORAL at 22:30

## 2024-01-01 RX ADMIN — Medication 100 MILLIGRAM(S): at 06:38

## 2024-01-01 RX ADMIN — Medication 2: at 22:31

## 2024-01-01 RX ADMIN — Medication 8 UNIT(S): at 18:24

## 2024-01-01 RX ADMIN — CARVEDILOL PHOSPHATE 25 MILLIGRAM(S): 80 CAPSULE, EXTENDED RELEASE ORAL at 09:59

## 2024-01-01 RX ADMIN — OXYCODONE HYDROCHLORIDE 10 MILLIGRAM(S): 5 TABLET ORAL at 06:38

## 2024-01-01 RX ADMIN — Medication 100 MILLIGRAM(S): at 22:30

## 2024-01-01 NOTE — PROGRESS NOTE ADULT - PROBLEM SELECTOR PLAN 5
Hx of ICM HF mildly reduced EF (45-50% 11/09), cardiology following during last admission for HF. Patient d/c on Torsemide 40mg PO qd today, now returning from [facility]   - prior recs: IV lasix 80mg q12h w/ strict I/O while inpatient, switch to torsemide 40mg PO QD on d/c  - GDMT for HFrEF: Coreg 25mg BID, lisinopril 40mg QD, aldactone 25mg QD; avoid SGLT2i given PAD w/ delayed wound healing    Plan:  - c/w Torsemide 40mg po qd as patient was d/c ready, returning for new rehab placement due to incidental COVID infection finding  - C/w spironolactone 25mg q24h  - Strict I/Os  - c/w Coreg 25mg BID, lisinopril 40mg qd, aldactone 25mg qd  - if starts to become volume overloaded can transition back to IV Lasix 80mg BID

## 2024-01-01 NOTE — PROGRESS NOTE ADULT - PROBLEM SELECTOR PLAN 3
s/p R BKA on 12/11/23 and s/p BKA closure on 12/18/23 by vascular surgery at [facility]     • Pain regimen as follows:        - Tylenol 650mg po q6h PRN for mild pain/fever       - Oxy 5mg po q6h PRN for mod pain       - Oxy 10mg po q6h PRN for sev pain   • Has outpt f/u apt w/ vascular: Dr. Freed on 1/18/24 at 10 A.M. s/p R BKA on 12/11/23 and s/p BKA closure on 12/18/23 by vascular surgery at [facility]     • Pain regimen as follows:        - Tylenol 650mg po q6h PRN for mild pain/fever       - Oxy 5mg po q6h PRN for mod pain       - Oxy 10mg po q6h PRN for sev pain   • Has outpt f/u apt w/ vascular: Dr. Freed on 1/18/24 at 10 A.M.

## 2024-01-01 NOTE — PROGRESS NOTE ADULT - ASSESSMENT
67yo M w/ PMHx CAD (2 stents 2020), HFmrEF (45-50%), HTN, PAD w/ remote RLE angioplasty, R 4th toe OM s/p partial R 4th ray amputation on 10/24, uncontrolled IDDM (a1c 12 in Oct 2023), recent admission 11/8-11/10 to cardiology service for hypertensive emergency (left AMA), recently admitted (12/01-12/26/23) for R foot soft tissue infection, now s/p R BKA (12/11/23), course c/b CDiff infection w/ completion of oral vanc on 12/25, discharged to [facility] on 12/26 and found to be COVID-19 positive, [facility] unable to accept COVID+ patients, so he returned to [facility] for further monitoring.     Pending KERRY 69yo M w/ PMHx CAD (2 stents 2020), HFmrEF (45-50%), HTN, PAD w/ remote RLE angioplasty, R 4th toe OM s/p partial R 4th ray amputation on 10/24, uncontrolled IDDM (a1c 12 in Oct 2023), recent admission 11/8-11/10 to cardiology service for hypertensive emergency (left AMA), recently admitted (12/01-12/26/23) for R foot soft tissue infection, now s/p R BKA (12/11/23), course c/b CDiff infection w/ completion of oral vanc on 12/25, discharged to [facility] on 12/26 and found to be COVID-19 positive, [facility] unable to accept COVID+ patients, so he returned to [facility] for further monitoring.     Pending KERRY

## 2024-01-01 NOTE — PROGRESS NOTE ADULT - SUBJECTIVE AND OBJECTIVE BOX
None Subjective:  Patient examined bedside. Refused dressing change this AM.    ROS:   Denies Headache, blurred vision, Chest Pain, SOB, Abdominal pain, nausea or vomiting     Social   aspirin  chewable 81  carvedilol 25  heparin   Injectable 5000  hydrALAZINE 100  lisinopril 40  NIFEdipine XL 90  spironolactone 25  torsemide 40      Allergies    No Known Allergies    Intolerances        Vital Signs Last 24 Hrs  T(C): 36.8 (01 Jan 2024 09:57), Max: 37.2 (31 Dec 2023 15:57)  T(F): 98.3 (01 Jan 2024 09:57), Max: 99 (31 Dec 2023 15:57)  HR: 69 (01 Jan 2024 09:57) (69 - 78)  BP: 133/70 (01 Jan 2024 09:57) (133/70 - 175/84)  BP(mean): 75 (01 Jan 2024 06:34) (75 - 115)  RR: 18 (01 Jan 2024 09:57) (17 - 18)  SpO2: 96% (01 Jan 2024 09:57) (95% - 99%)    Parameters below as of 01 Jan 2024 09:57  Patient On (Oxygen Delivery Method): room air      I&O's Summary    31 Dec 2023 07:01  -  01 Jan 2024 07:00  --------------------------------------------------------  IN: 0 mL / OUT: 1300 mL / NET: -1300 mL    01 Jan 2024 07:01  -  01 Jan 2024 10:53  --------------------------------------------------------  IN: 0 mL / OUT: 550 mL / NET: -550 mL        Physical Exam:  Gen: NAD, resting comfortably   CV: NSR  Pulm: no respiratory distress on RA  Abd: soft, ND, NTTP, no rebound or guarding   Ext: R BKA stump incision dressed with kerlix, ace. Could not examine stump because of patient refusal to take down dressing.  Neuro: motor/sensory grossly intact       LABS: None this AM      A/P:     A/P: 69yo M PMH CAD (2 stents 2020), HFmrEF (45-50%), HTN, PAD w/ remote RLE angioplasty, R 4th toe OM s/p partial R 4th ray amputation on 10/24, RLE angiogram with AT lithotripsy and AT/peroneal balloon angioplasty 10/27, uncontrolled T2DM recently admitted for R foot gas gangrene s/p R BKA (12/11) and closure (12/18) c/b c. diff infection s/p treatment now returns from [facility] for incidental finding of COVID. Vascular surgery consulted for management of BKA stump.    -ASA/SQH  - Physical therapy  - COVID management per primary medical team  - Daily dressing changes by vascular with dry gauze, kerlix and ace-wrap (ace-wrap from stump to thigh); BKA stump appears to be healing well(last examined 12/31/23)  - Outpatient vascular surgery follow up with Dr. Freed on 1/18/24 Subjective:  Patient examined bedside. Refused dressing change this AM.    ROS:   Denies Headache, blurred vision, Chest Pain, SOB, Abdominal pain, nausea or vomiting     Social   aspirin  chewable 81  carvedilol 25  heparin   Injectable 5000  hydrALAZINE 100  lisinopril 40  NIFEdipine XL 90  spironolactone 25  torsemide 40      Allergies    No Known Allergies    Intolerances        Vital Signs Last 24 Hrs  T(C): 36.8 (01 Jan 2024 09:57), Max: 37.2 (31 Dec 2023 15:57)  T(F): 98.3 (01 Jan 2024 09:57), Max: 99 (31 Dec 2023 15:57)  HR: 69 (01 Jan 2024 09:57) (69 - 78)  BP: 133/70 (01 Jan 2024 09:57) (133/70 - 175/84)  BP(mean): 75 (01 Jan 2024 06:34) (75 - 115)  RR: 18 (01 Jan 2024 09:57) (17 - 18)  SpO2: 96% (01 Jan 2024 09:57) (95% - 99%)    Parameters below as of 01 Jan 2024 09:57  Patient On (Oxygen Delivery Method): room air      I&O's Summary    31 Dec 2023 07:01  -  01 Jan 2024 07:00  --------------------------------------------------------  IN: 0 mL / OUT: 1300 mL / NET: -1300 mL    01 Jan 2024 07:01  -  01 Jan 2024 10:53  --------------------------------------------------------  IN: 0 mL / OUT: 550 mL / NET: -550 mL        Physical Exam:  Gen: NAD, resting comfortably   CV: NSR  Pulm: no respiratory distress on RA  Abd: soft, ND, NTTP, no rebound or guarding   Ext: R BKA stump incision dressed with kerlix, ace. Could not examine stump because of patient refusal to take down dressing.  Neuro: motor/sensory grossly intact       LABS: None this AM      A/P:     A/P: 69yo M PMH CAD (2 stents 2020), HFmrEF (45-50%), HTN, PAD w/ remote RLE angioplasty, R 4th toe OM s/p partial R 4th ray amputation on 10/24, RLE angiogram with AT lithotripsy and AT/peroneal balloon angioplasty 10/27, uncontrolled T2DM recently admitted for R foot gas gangrene s/p R BKA (12/11) and closure (12/18) c/b c. diff infection s/p treatment now returns from [facility] for incidental finding of COVID. Vascular surgery consulted for management of BKA stump.    -ASA/SQH  - Physical therapy  - COVID management per primary medical team  - Daily dressing changes by vascular with dry gauze, kerlix and ace-wrap (ace-wrap from stump to thigh); BKA stump appears to be healing well(last examined 12/31/23)  - Outpatient vascular surgery follow up with Dr. Freed on 1/18/24

## 2024-01-01 NOTE — PROGRESS NOTE ADULT - ATTENDING COMMENTS
HPI:  Patient seen and examined at bedside.  Disinterested in talking to me. Says he feels well. Awaiting placement.      REVIEW OF SYSTEMS:  Negative except as indicated in the HPI.      PHYSICAL EXAM:  GENERAL: NAD, A&Ox3  HEENT: Atraumatic, no icterus, EOMI, no gross thyromegaly  CARDIAC: NSR, +S1, +S2, no orthopnea  PULM: CTA B/L  ABD: NTND, no easily palpable organomegaly  SKIN: No significant ecchymosis, petechiae or other skin abnormalities  EXTREMITIES: No LE edema, R BKA in dressings  MSK: No acute issues, no obvious pain, strength preserved throughout  NEURO: No focal neurologic signs      PLAN:  Vascular following and happy with wound closure. COVID treated and respiratory status normalized. Awaiting KERRY placement.  All patient questions and concerns addressed. Rest of management as outlined above in the medical resident note.

## 2024-01-01 NOTE — PROGRESS NOTE ADULT - SUBJECTIVE AND OBJECTIVE BOX
Medicine Progress Note    INTERVAL EVENTS:   No acute events overnight.    SUBJECTIVE:   Patient seen and examined at bedside. Condition largely unchanged from yesterday. No acute complaints at this time.    ROS:  Negative unless otherwise stated above.    PHYSICAL EXAM:  Noted in "Physical Exam" section below.    VITAL SIGNS:  Vital Signs Last 24 Hrs  T(C): 36.8 (01 Jan 2024 09:57), Max: 37.2 (31 Dec 2023 15:57)  T(F): 98.3 (01 Jan 2024 09:57), Max: 99 (31 Dec 2023 15:57)  HR: 69 (01 Jan 2024 09:57) (69 - 78)  BP: 133/70 (01 Jan 2024 09:57) (133/70 - 175/84)  BP(mean): 75 (01 Jan 2024 06:34) (75 - 115)  RR: 18 (01 Jan 2024 09:57) (17 - 18)  SpO2: 96% (01 Jan 2024 09:57) (95% - 99%)    Parameters below as of 01 Jan 2024 09:57  Patient On (Oxygen Delivery Method): room air      <INS & OUTS:    12-31-23 @ 07:01 - 01-01-24 @ 07:00  --------------------------------------------------------  IN: 0 mL / OUT: 1300 mL / NET: -1300 mL    01-01-24 @ 07:01 - 01-01-24 @ 12:58  --------------------------------------------------------  IN: 0 mL / OUT: 550 mL / NET: -550 mL      INPATIENT MEDICATIONS:   MEDICATIONS  (STANDING):  aspirin  chewable 81 milliGRAM(s) Oral every 24 hours  atorvastatin 80 milliGRAM(s) Oral at bedtime  carvedilol 25 milliGRAM(s) Oral every 12 hours  dextrose 5%. 1000 milliLiter(s) (50 mL/Hr) IV Continuous <Continuous>  dextrose 5%. 1000 milliLiter(s) (100 mL/Hr) IV Continuous <Continuous>  dextrose 50% Injectable 25 Gram(s) IV Push once  dextrose 50% Injectable 25 Gram(s) IV Push once  dextrose 50% Injectable 12.5 Gram(s) IV Push once  gabapentin 600 milliGRAM(s) Oral every 8 hours  glucagon  Injectable 1 milliGRAM(s) IntraMuscular once  heparin   Injectable 5000 Unit(s) SubCutaneous every 8 hours  hydrALAZINE 100 milliGRAM(s) Oral every 8 hours  insulin glargine Injectable (LANTUS) 12 Unit(s) SubCutaneous at bedtime  insulin lispro (ADMELOG) corrective regimen sliding scale   SubCutaneous Before meals and at bedtime  insulin lispro Injectable (ADMELOG) 8 Unit(s) SubCutaneous three times a day before meals  lisinopril 40 milliGRAM(s) Oral every 24 hours  NIFEdipine XL 90 milliGRAM(s) Oral every 24 hours  spironolactone 25 milliGRAM(s) Oral every 24 hours  torsemide 40 milliGRAM(s) Oral every 24 hours    MEDICATIONS  (PRN):  acetaminophen     Tablet .. 650 milliGRAM(s) Oral every 6 hours PRN Temp greater or equal to 38C (100.4F), Mild Pain (1 - 3)  dextrose Oral Gel 15 Gram(s) Oral once PRN Blood Glucose LESS THAN 70 milliGRAM(s)/deciliter  oxyCODONE    IR 5 milliGRAM(s) Oral every 6 hours PRN Moderate Pain (4 - 6)  oxyCODONE    IR 10 milliGRAM(s) Oral every 6 hours PRN Severe Pain (7 - 10)    ALLERGIES:  Allergies    No Known Allergies    Intolerances    LABS:             CAPILLARY BLOOD GLUCOSE      POCT Blood Glucose.: 143 mg/dL (01 Jan 2024 12:08)    RADIOLOGY & ADDITIONAL TESTS: Reviewed.

## 2024-01-01 NOTE — PROGRESS NOTE ADULT - PROBLEM SELECTOR PLAN 1
Patient d/c to [facility] on 12/26, found to be COVID-19+ at [facility], returned to [facility] as rehab can't accept patients w/ COVID. He denies cough, runny nose, chills, sob, Found to have fever 101.6 degrees F in ED, no other SIRS criteria. Currently saturating 94-95% on room air, not hypoxic. Fever likely due to COVID-19 infection.  - Remdesivir for three days (last day: 12/29)     • C/w sx treatment as below Patient d/c to [facility] on 12/26, found to be COVID-19+ at [facility], returned to [facility] as rehab can't accept patients w/ COVID. He denies cough, runny nose, chills, sob, Found to have fever 101.6 degrees F in ED, no other SIRS criteria. Currently saturating 94-95% on room air, not hypoxic. Fever likely due to COVID-19 infection.  - Remdesivir for three days (last day: 12/29)     • C/w sx treatment as below

## 2024-01-02 LAB
ALBUMIN SERPL ELPH-MCNC: 2.3 G/DL — LOW (ref 3.3–5)
ALBUMIN SERPL ELPH-MCNC: 2.3 G/DL — LOW (ref 3.3–5)
ALP SERPL-CCNC: 199 U/L — HIGH (ref 40–120)
ALP SERPL-CCNC: 199 U/L — HIGH (ref 40–120)
ALT FLD-CCNC: 32 U/L — SIGNIFICANT CHANGE UP (ref 10–45)
ALT FLD-CCNC: 32 U/L — SIGNIFICANT CHANGE UP (ref 10–45)
ANION GAP SERPL CALC-SCNC: 7 MMOL/L — SIGNIFICANT CHANGE UP (ref 5–17)
ANION GAP SERPL CALC-SCNC: 7 MMOL/L — SIGNIFICANT CHANGE UP (ref 5–17)
AST SERPL-CCNC: 26 U/L — SIGNIFICANT CHANGE UP (ref 10–40)
AST SERPL-CCNC: 26 U/L — SIGNIFICANT CHANGE UP (ref 10–40)
BILIRUB SERPL-MCNC: 0.2 MG/DL — SIGNIFICANT CHANGE UP (ref 0.2–1.2)
BILIRUB SERPL-MCNC: 0.2 MG/DL — SIGNIFICANT CHANGE UP (ref 0.2–1.2)
BUN SERPL-MCNC: 37 MG/DL — HIGH (ref 7–23)
BUN SERPL-MCNC: 37 MG/DL — HIGH (ref 7–23)
CALCIUM SERPL-MCNC: 8.7 MG/DL — SIGNIFICANT CHANGE UP (ref 8.4–10.5)
CALCIUM SERPL-MCNC: 8.7 MG/DL — SIGNIFICANT CHANGE UP (ref 8.4–10.5)
CHLORIDE SERPL-SCNC: 102 MMOL/L — SIGNIFICANT CHANGE UP (ref 96–108)
CHLORIDE SERPL-SCNC: 102 MMOL/L — SIGNIFICANT CHANGE UP (ref 96–108)
CO2 SERPL-SCNC: 28 MMOL/L — SIGNIFICANT CHANGE UP (ref 22–31)
CO2 SERPL-SCNC: 28 MMOL/L — SIGNIFICANT CHANGE UP (ref 22–31)
CREAT SERPL-MCNC: 1.39 MG/DL — HIGH (ref 0.5–1.3)
CREAT SERPL-MCNC: 1.39 MG/DL — HIGH (ref 0.5–1.3)
EGFR: 56 ML/MIN/1.73M2 — LOW
EGFR: 56 ML/MIN/1.73M2 — LOW
GLUCOSE BLDC GLUCOMTR-MCNC: 116 MG/DL — HIGH (ref 70–99)
GLUCOSE BLDC GLUCOMTR-MCNC: 116 MG/DL — HIGH (ref 70–99)
GLUCOSE BLDC GLUCOMTR-MCNC: 128 MG/DL — HIGH (ref 70–99)
GLUCOSE BLDC GLUCOMTR-MCNC: 128 MG/DL — HIGH (ref 70–99)
GLUCOSE BLDC GLUCOMTR-MCNC: 140 MG/DL — HIGH (ref 70–99)
GLUCOSE BLDC GLUCOMTR-MCNC: 140 MG/DL — HIGH (ref 70–99)
GLUCOSE BLDC GLUCOMTR-MCNC: 152 MG/DL — HIGH (ref 70–99)
GLUCOSE BLDC GLUCOMTR-MCNC: 152 MG/DL — HIGH (ref 70–99)
GLUCOSE BLDC GLUCOMTR-MCNC: 306 MG/DL — HIGH (ref 70–99)
GLUCOSE BLDC GLUCOMTR-MCNC: 306 MG/DL — HIGH (ref 70–99)
GLUCOSE SERPL-MCNC: 307 MG/DL — HIGH (ref 70–99)
GLUCOSE SERPL-MCNC: 307 MG/DL — HIGH (ref 70–99)
HCT VFR BLD CALC: 27.2 % — LOW (ref 39–50)
HCT VFR BLD CALC: 27.2 % — LOW (ref 39–50)
HGB BLD-MCNC: 8.4 G/DL — LOW (ref 13–17)
HGB BLD-MCNC: 8.4 G/DL — LOW (ref 13–17)
MAGNESIUM SERPL-MCNC: 2 MG/DL — SIGNIFICANT CHANGE UP (ref 1.6–2.6)
MAGNESIUM SERPL-MCNC: 2 MG/DL — SIGNIFICANT CHANGE UP (ref 1.6–2.6)
MCHC RBC-ENTMCNC: 28.8 PG — SIGNIFICANT CHANGE UP (ref 27–34)
MCHC RBC-ENTMCNC: 28.8 PG — SIGNIFICANT CHANGE UP (ref 27–34)
MCHC RBC-ENTMCNC: 30.9 GM/DL — LOW (ref 32–36)
MCHC RBC-ENTMCNC: 30.9 GM/DL — LOW (ref 32–36)
MCV RBC AUTO: 93.2 FL — SIGNIFICANT CHANGE UP (ref 80–100)
MCV RBC AUTO: 93.2 FL — SIGNIFICANT CHANGE UP (ref 80–100)
NRBC # BLD: 0 /100 WBCS — SIGNIFICANT CHANGE UP (ref 0–0)
NRBC # BLD: 0 /100 WBCS — SIGNIFICANT CHANGE UP (ref 0–0)
PHOSPHATE SERPL-MCNC: 3.3 MG/DL — SIGNIFICANT CHANGE UP (ref 2.5–4.5)
PHOSPHATE SERPL-MCNC: 3.3 MG/DL — SIGNIFICANT CHANGE UP (ref 2.5–4.5)
PLATELET # BLD AUTO: 371 K/UL — SIGNIFICANT CHANGE UP (ref 150–400)
PLATELET # BLD AUTO: 371 K/UL — SIGNIFICANT CHANGE UP (ref 150–400)
POTASSIUM SERPL-MCNC: 4.3 MMOL/L — SIGNIFICANT CHANGE UP (ref 3.5–5.3)
POTASSIUM SERPL-MCNC: 4.3 MMOL/L — SIGNIFICANT CHANGE UP (ref 3.5–5.3)
POTASSIUM SERPL-SCNC: 4.3 MMOL/L — SIGNIFICANT CHANGE UP (ref 3.5–5.3)
POTASSIUM SERPL-SCNC: 4.3 MMOL/L — SIGNIFICANT CHANGE UP (ref 3.5–5.3)
PROT SERPL-MCNC: 6.6 G/DL — SIGNIFICANT CHANGE UP (ref 6–8.3)
PROT SERPL-MCNC: 6.6 G/DL — SIGNIFICANT CHANGE UP (ref 6–8.3)
RBC # BLD: 2.92 M/UL — LOW (ref 4.2–5.8)
RBC # BLD: 2.92 M/UL — LOW (ref 4.2–5.8)
RBC # FLD: 18.3 % — HIGH (ref 10.3–14.5)
RBC # FLD: 18.3 % — HIGH (ref 10.3–14.5)
SODIUM SERPL-SCNC: 137 MMOL/L — SIGNIFICANT CHANGE UP (ref 135–145)
SODIUM SERPL-SCNC: 137 MMOL/L — SIGNIFICANT CHANGE UP (ref 135–145)
WBC # BLD: 8.83 K/UL — SIGNIFICANT CHANGE UP (ref 3.8–10.5)
WBC # BLD: 8.83 K/UL — SIGNIFICANT CHANGE UP (ref 3.8–10.5)
WBC # FLD AUTO: 8.83 K/UL — SIGNIFICANT CHANGE UP (ref 3.8–10.5)
WBC # FLD AUTO: 8.83 K/UL — SIGNIFICANT CHANGE UP (ref 3.8–10.5)

## 2024-01-02 PROCEDURE — 99233 SBSQ HOSP IP/OBS HIGH 50: CPT | Mod: GC,24

## 2024-01-02 PROCEDURE — 99232 SBSQ HOSP IP/OBS MODERATE 35: CPT | Mod: GC

## 2024-01-02 RX ORDER — OXYCODONE HYDROCHLORIDE 5 MG/1
10 TABLET ORAL EVERY 6 HOURS
Refills: 0 | Status: DISCONTINUED | OUTPATIENT
Start: 2024-01-02 | End: 2024-01-05

## 2024-01-02 RX ADMIN — Medication 81 MILLIGRAM(S): at 06:31

## 2024-01-02 RX ADMIN — Medication 8 UNIT(S): at 18:10

## 2024-01-02 RX ADMIN — GABAPENTIN 600 MILLIGRAM(S): 400 CAPSULE ORAL at 22:05

## 2024-01-02 RX ADMIN — CARVEDILOL PHOSPHATE 25 MILLIGRAM(S): 80 CAPSULE, EXTENDED RELEASE ORAL at 09:48

## 2024-01-02 RX ADMIN — GABAPENTIN 600 MILLIGRAM(S): 400 CAPSULE ORAL at 15:04

## 2024-01-02 RX ADMIN — OXYCODONE HYDROCHLORIDE 5 MILLIGRAM(S): 5 TABLET ORAL at 22:14

## 2024-01-02 RX ADMIN — INSULIN GLARGINE 12 UNIT(S): 100 INJECTION, SOLUTION SUBCUTANEOUS at 22:52

## 2024-01-02 RX ADMIN — ATORVASTATIN CALCIUM 80 MILLIGRAM(S): 80 TABLET, FILM COATED ORAL at 22:06

## 2024-01-02 RX ADMIN — GABAPENTIN 600 MILLIGRAM(S): 400 CAPSULE ORAL at 06:30

## 2024-01-02 RX ADMIN — Medication 8: at 09:44

## 2024-01-02 RX ADMIN — Medication 100 MILLIGRAM(S): at 22:05

## 2024-01-02 RX ADMIN — CARVEDILOL PHOSPHATE 25 MILLIGRAM(S): 80 CAPSULE, EXTENDED RELEASE ORAL at 22:06

## 2024-01-02 RX ADMIN — HEPARIN SODIUM 5000 UNIT(S): 5000 INJECTION INTRAVENOUS; SUBCUTANEOUS at 15:02

## 2024-01-02 RX ADMIN — Medication 8 UNIT(S): at 09:44

## 2024-01-02 RX ADMIN — Medication 100 MILLIGRAM(S): at 06:30

## 2024-01-02 RX ADMIN — OXYCODONE HYDROCHLORIDE 10 MILLIGRAM(S): 5 TABLET ORAL at 18:10

## 2024-01-02 RX ADMIN — OXYCODONE HYDROCHLORIDE 10 MILLIGRAM(S): 5 TABLET ORAL at 07:30

## 2024-01-02 RX ADMIN — OXYCODONE HYDROCHLORIDE 10 MILLIGRAM(S): 5 TABLET ORAL at 13:19

## 2024-01-02 RX ADMIN — Medication 100 MILLIGRAM(S): at 15:05

## 2024-01-02 RX ADMIN — OXYCODONE HYDROCHLORIDE 10 MILLIGRAM(S): 5 TABLET ORAL at 19:10

## 2024-01-02 RX ADMIN — OXYCODONE HYDROCHLORIDE 10 MILLIGRAM(S): 5 TABLET ORAL at 00:26

## 2024-01-02 RX ADMIN — Medication 2: at 18:10

## 2024-01-02 RX ADMIN — HEPARIN SODIUM 5000 UNIT(S): 5000 INJECTION INTRAVENOUS; SUBCUTANEOUS at 06:30

## 2024-01-02 RX ADMIN — Medication 8 UNIT(S): at 12:59

## 2024-01-02 RX ADMIN — Medication 650 MILLIGRAM(S): at 03:41

## 2024-01-02 RX ADMIN — OXYCODONE HYDROCHLORIDE 10 MILLIGRAM(S): 5 TABLET ORAL at 00:50

## 2024-01-02 RX ADMIN — SPIRONOLACTONE 25 MILLIGRAM(S): 25 TABLET, FILM COATED ORAL at 15:06

## 2024-01-02 RX ADMIN — OXYCODONE HYDROCHLORIDE 10 MILLIGRAM(S): 5 TABLET ORAL at 06:31

## 2024-01-02 RX ADMIN — Medication 40 MILLIGRAM(S): at 06:30

## 2024-01-02 RX ADMIN — OXYCODONE HYDROCHLORIDE 5 MILLIGRAM(S): 5 TABLET ORAL at 23:15

## 2024-01-02 RX ADMIN — OXYCODONE HYDROCHLORIDE 10 MILLIGRAM(S): 5 TABLET ORAL at 12:19

## 2024-01-02 RX ADMIN — Medication 90 MILLIGRAM(S): at 06:31

## 2024-01-02 NOTE — PROGRESS NOTE ADULT - GASTROINTESTINAL
normal/soft/nontender/nondistended/normal active bowel sounds

## 2024-01-02 NOTE — PROGRESS NOTE ADULT - PROBLEM SELECTOR PLAN 3
s/p R BKA on 12/11/23 and s/p BKA closure on 12/18/23 by vascular surgery at [facility]     • Pain regimen as follows:        - Tylenol 650mg po q6h PRN for mild pain/fever       - Oxy 5mg po q6h PRN for mod pain       - Oxy 10mg po q6h PRN for sev pain   • Has outpt f/u apt w/ vascular: Dr. Freed on 1/18/24 at 10 A.M. s/p R BKA on 12/11/23 and s/p BKA closure on 12/18/23 by vascular surgery at [facility]     • Pain regimen as follows:        - Tylenol 650mg po q6h PRN for mild pain/fever       - Oxy 5mg po q6h PRN for mod pain       - Oxy 10mg po q6h PRN for sev pain   • Has outpt f/u apt w/ vascular: Dr. Freed on 1/18/24 at 10 A.M.

## 2024-01-02 NOTE — PROGRESS NOTE ADULT - ASSESSMENT
67yo M w/ PMHx CAD (2 stents 2020), HFmrEF (45-50%), HTN, PAD w/ remote RLE angioplasty, R 4th toe OM s/p partial R 4th ray amputation on 10/24, uncontrolled IDDM (a1c 12 in Oct 2023), recent admission 11/8-11/10 to cardiology service for hypertensive emergency (left AMA), recently admitted (12/01-12/26/23) for R foot soft tissue infection, now s/p R BKA (12/11/23), course c/b CDiff infection w/ completion of oral vanc on 12/25, discharged to [facility] on 12/26 and found to be COVID-19 positive, [facility] unable to accept COVID+ patients, so he returned to [facility] for further monitoring.     Pending KERRY 69yo M w/ PMHx CAD (2 stents 2020), HFmrEF (45-50%), HTN, PAD w/ remote RLE angioplasty, R 4th toe OM s/p partial R 4th ray amputation on 10/24, uncontrolled IDDM (a1c 12 in Oct 2023), recent admission 11/8-11/10 to cardiology service for hypertensive emergency (left AMA), recently admitted (12/01-12/26/23) for R foot soft tissue infection, now s/p R BKA (12/11/23), course c/b CDiff infection w/ completion of oral vanc on 12/25, discharged to [facility] on 12/26 and found to be COVID-19 positive, [facility] unable to accept COVID+ patients, so he returned to [facility] for further monitoring.     Pending KERRY

## 2024-01-02 NOTE — PROGRESS NOTE ADULT - ATTENDING COMMENTS
I have personally seen and examined this patient. I have fully participated in the care of this patient. I have reviewed all pertinent clinical information, including history, physical exam, plan and the Resident's, PA's and NP's note and agree except as noted. This is a patient originally known to Dr. Corky Oneal, but I was asked to perform the guillotine R BKA on 12/11/23 and take over his care rest of his recent admission. 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 He was transferred from the medical service to vascular postop and underwent staged R BKA w/ closure (12/18/23). He was discharged to [facility] on 12/26/23 but was sent back to the ED because he tested positive for COVID and the [facility] doesn't accept COVID patients.     On 1/2, he does not endorse new major complaints. Feels OK. The R BKA stump remains fine. Staple line clean, dry and intact. Awaiting dispo    PLAN & RECOMMENDATIONS  - COVID management per primary medicine team  - ASA/SQH  - Physical therapy  - COVID management per primary medical team  - Daily dressing changes with dry gauze, kerlix and ace-wrap (ace-wrap from stump to thigh)  - Outpatient vascular surgery follow up with me on 1/18/24    Thank you,    Michelet Freed MD  Attending Vascular Surgeon  [facility] at [address] [place] [address]  Office: [phone]; Fax: [phone]  [email or web address] I have personally seen and examined this patient. I have fully participated in the care of this patient. I have reviewed all pertinent clinical information, including history, physical exam, plan and the Resident's, PA's and NP's note and agree except as noted. This is a patient originally known to Dr. Corky Oneal, but I was asked to perform the guillotine R BKA on 12/11/23 and take over his care rest of his recent admission. 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 He was transferred from the medical service to vascular postop and underwent staged R BKA w/ closure (12/18/23). He was discharged to [facility] on 12/26/23 but was sent back to the ED because he tested positive for COVID and the [facility] doesn't accept COVID patients.     On 1/2, he does not endorse new major complaints. Feels OK. The R BKA stump remains fine. Staple line clean, dry and intact. Awaiting dispo    PLAN & RECOMMENDATIONS  - COVID management per primary medicine team  - ASA/SQH  - Physical therapy  - COVID management per primary medical team  - Daily dressing changes with dry gauze, kerlix and ace-wrap (ace-wrap from stump to thigh)  - Outpatient vascular surgery follow up with me on 1/18/24    Thank you,    Michelet Freed MD  Attending Vascular Surgeon  [facility] at [address] [place] [address]  Office: [phone]; Fax: [phone]  [email or web address]

## 2024-01-02 NOTE — PROGRESS NOTE ADULT - SUBJECTIVE AND OBJECTIVE BOX
Medicine Progress Note    INTERVAL EVENTS:   No acute events overnight.    SUBJECTIVE:   Patient seen and examined at bedside. Condition largely unchanged from yesterday. No acute complaints at this time.    ROS:  Negative unless otherwise stated above.    PHYSICAL EXAM:  Noted in "Physical Exam" section below.    VITAL SIGNS:  Vital Signs Last 24 Hrs  T(C): 36.7 (02 Jan 2024 09:47), Max: 37.1 (01 Jan 2024 21:18)  T(F): 98.1 (02 Jan 2024 09:47), Max: 98.8 (01 Jan 2024 21:18)  HR: 74 (02 Jan 2024 15:02) (67 - 74)  BP: 132/69 (02 Jan 2024 15:02) (110/52 - 156/69)  BP(mean): --  RR: 18 (02 Jan 2024 09:47) (17 - 18)  SpO2: 95% (02 Jan 2024 09:47) (95% - 99%)    Parameters below as of 02 Jan 2024 09:47  Patient On (Oxygen Delivery Method): room air      <INS & OUTS:    01-01-24 @ 07:01 - 01-02-24 @ 07:00  --------------------------------------------------------  IN: 0 mL / OUT: 550 mL / NET: -550 mL    01-02-24 @ 07:01  -  01-02-24 @ 19:12  --------------------------------------------------------  IN: 0 mL / OUT: 2000 mL / NET: -2000 mL      INPATIENT MEDICATIONS:   MEDICATIONS  (STANDING):  aspirin  chewable 81 milliGRAM(s) Oral every 24 hours  atorvastatin 80 milliGRAM(s) Oral at bedtime  carvedilol 25 milliGRAM(s) Oral every 12 hours  dextrose 5%. 1000 milliLiter(s) (50 mL/Hr) IV Continuous <Continuous>  dextrose 5%. 1000 milliLiter(s) (100 mL/Hr) IV Continuous <Continuous>  dextrose 50% Injectable 25 Gram(s) IV Push once  dextrose 50% Injectable 25 Gram(s) IV Push once  dextrose 50% Injectable 12.5 Gram(s) IV Push once  gabapentin 600 milliGRAM(s) Oral every 8 hours  glucagon  Injectable 1 milliGRAM(s) IntraMuscular once  heparin   Injectable 5000 Unit(s) SubCutaneous every 8 hours  hydrALAZINE 100 milliGRAM(s) Oral every 8 hours  insulin glargine Injectable (LANTUS) 12 Unit(s) SubCutaneous at bedtime  insulin lispro (ADMELOG) corrective regimen sliding scale   SubCutaneous Before meals and at bedtime  insulin lispro Injectable (ADMELOG) 8 Unit(s) SubCutaneous three times a day before meals  lisinopril 40 milliGRAM(s) Oral every 24 hours  NIFEdipine XL 90 milliGRAM(s) Oral every 24 hours  spironolactone 25 milliGRAM(s) Oral every 24 hours  torsemide 40 milliGRAM(s) Oral every 24 hours    MEDICATIONS  (PRN):  acetaminophen     Tablet .. 650 milliGRAM(s) Oral every 6 hours PRN Temp greater or equal to 38C (100.4F), Mild Pain (1 - 3)  dextrose Oral Gel 15 Gram(s) Oral once PRN Blood Glucose LESS THAN 70 milliGRAM(s)/deciliter  oxyCODONE    IR 5 milliGRAM(s) Oral every 6 hours PRN Moderate Pain (4 - 6)  oxyCODONE    IR 10 milliGRAM(s) Oral every 6 hours PRN Severe Pain (7 - 10)    ALLERGIES:  Allergies    No Known Allergies    Intolerances    LABS:                       8.4    8.83  )-----------( 371      ( 02 Jan 2024 05:30 )             27.2     01-02    137  |  102  |  37<H>  ----------------------------<  307<H>  4.3   |  28  |  1.39<H>    Ca    8.7      02 Jan 2024 05:30  Phos  3.3     01-02  Mg     2.0     01-02    TPro  6.6  /  Alb  2.3<L>  /  TBili  0.2  /  DBili  x   /  AST  26  /  ALT  32  /  AlkPhos  199<H>  01-02      Urinalysis Basic - ( 02 Jan 2024 05:30 )    Color: x / Appearance: x / SG: x / pH: x  Gluc: 307 mg/dL / Ketone: x  / Bili: x / Urobili: x   Blood: x / Protein: x / Nitrite: x   Leuk Esterase: x / RBC: x / WBC x   Sq Epi: x / Non Sq Epi: x / Bacteria: x      CAPILLARY BLOOD GLUCOSE      POCT Blood Glucose.: 152 mg/dL (02 Jan 2024 17:33)    RADIOLOGY & ADDITIONAL TESTS: Reviewed.

## 2024-01-02 NOTE — PROGRESS NOTE ADULT - PROBLEM SELECTOR PLAN 1
Patient d/c to [facility] on 12/26, found to be COVID-19+ at [facility], returned to [facility] as rehab can't accept patients w/ COVID. He denies cough, runny nose, chills, sob, Found to have fever 101.6 degrees F in ED, no other SIRS criteria. Currently saturating 94-95% on room air, not hypoxic. Fever likely due to COVID-19 infection.  - Remdesivir for three days (last day: 12/29)     • C/w sx treatment as below Patient d/c to [facility] on 12/26, found to be COVID-19+ at [facility], returned to [facility] as rehab can't accept patients w/ COVID. He denies cough, runny nose, chills, sob, Found to have fever 101.6 degrees F in ED, no other SIRS criteria. Currently saturating 94-95% on room air, not hypoxic. Fever likely due to COVID-19 infection.  - Remdesivir for three days (last day: 12/29)     • C/w sx treatment as below

## 2024-01-02 NOTE — PROGRESS NOTE ADULT - PROBLEM SELECTOR PLAN 5
Hx of ICM HF mildly reduced EF (45-50% 11/09), cardiology following during last admission for HF. Patient d/c on Torsemide 40mg PO qd today, now returning from [facility]   - prior recs: IV lasix 80mg q12h w/ strict I/O while inpatient, switch to torsemide 40mg PO QD on d/c  - GDMT for HFrEF: Coreg 25mg BID, lisinopril 40mg QD, aldactone 25mg QD; avoid SGLT2i given PAD w/ delayed wound healing    Plan:  - c/w Torsemide 40mg po qd as patient was d/c ready, returning for new rehab placement due to incidental COVID infection finding  - C/w spironolactone 25mg q24h  - Strict I/Os  - c/w Coreg 25mg BID, lisinopril 40mg qd, aldactone 25mg qd  - if starts to become volume overloaded can transition back to IV Lasix 80mg BID Hx of ICM HF mildly reduced EF (45-50% 11/09), cardiology following during last admission for HF. Patient d/c on Torsemide 40mg PO qd today, now returning from [facility]   - prior recs: IV lasix 80mg q12h w/ strict I/O while inpatient, switch to torsemide 40mg PO QD on d/c  - GDMT for HFrEF: Coreg 25mg BID, lisinopril 40mg QD, aldactone 25mg QD; avoid SGLT2i given PAD w/ delayed wound healing    Plan:  - c/w Torsemide 40mg po qd as patient was d/c ready, returning for new rehab placement due to incidental COVID infection finding  - C/w spironolactone 25mg q24h  - Strict I/Os  - c/w Coreg 25mg BID, lisinopril 40mg qd, aldactone 25mg qd  - if starts to become volume overloaded can transition back to IV Lasix 80mg BID

## 2024-01-02 NOTE — PROGRESS NOTE ADULT - SUBJECTIVE AND OBJECTIVE BOX
Subjective: Pt seen and examined bedside. Complaining of pain with dressing change.     ROS:   Denies Headache, blurred vision, Chest Pain, SOB, Abdominal pain, nausea or vomiting     Social   aspirin  chewable 81  carvedilol 25  heparin   Injectable 5000  hydrALAZINE 100  lisinopril 40  NIFEdipine XL 90  spironolactone 25  torsemide 40      Allergies    No Known Allergies    Intolerances        Vital Signs Last 24 Hrs  T(C): 37 (02 Jan 2024 05:44), Max: 37.1 (01 Jan 2024 21:18)  T(F): 98.6 (02 Jan 2024 05:44), Max: 98.8 (01 Jan 2024 21:18)  HR: 67 (02 Jan 2024 05:44) (67 - 72)  BP: 155/77 (02 Jan 2024 05:44) (104/61 - 155/77)  BP(mean): --  RR: 18 (02 Jan 2024 05:44) (17 - 18)  SpO2: 98% (02 Jan 2024 05:44) (95% - 99%)    Parameters below as of 02 Jan 2024 05:44  Patient On (Oxygen Delivery Method): room air      I&O's Summary    01 Jan 2024 07:01  -  02 Jan 2024 07:00  --------------------------------------------------------  IN: 0 mL / OUT: 550 mL / NET: -550 mL        Physical Exam:  Gen: NAD, resting comfortably   CV: NSR  Pulm: no respiratory distress on RA  Abd: soft, ND, NTTP, no rebound or guarding   Ext: R BKA stump incision with staples in tact, no evidence of dehiscence sanguinous drainage on dressing change   Neuro: motor/sensory grossly intact       LABS: None this AM      A/P:     A/P: 69yo M PMH CAD (2 stents 2020), HFmrEF (45-50%), HTN, PAD w/ remote RLE angioplasty, R 4th toe OM s/p partial R 4th ray amputation on 10/24, RLE angiogram with AT lithotripsy and AT/peroneal balloon angioplasty 10/27, uncontrolled T2DM recently admitted for R foot gas gangrene s/p R BKA (12/11) and closure (12/18) c/b c. diff infection s/p treatment now returns from [facility] for incidental finding of COVID. Vascular surgery consulted for management of BKA stump.    -ASA/SQH  - Physical therapy  - COVID management per primary medical team  - Daily dressing changes by vascular with dry gauze, kerlix and ace-wrap (ace-wrap from stump to thigh); BKA stump appears to be healing well  - Outpatient vascular surgery follow up with Dr. Freed on 1/18/24   Subjective: Pt seen and examined bedside. Complaining of pain with dressing change.     ROS:   Denies Headache, blurred vision, Chest Pain, SOB, Abdominal pain, nausea or vomiting     Social   aspirin  chewable 81  carvedilol 25  heparin   Injectable 5000  hydrALAZINE 100  lisinopril 40  NIFEdipine XL 90  spironolactone 25  torsemide 40      Allergies    No Known Allergies    Intolerances        Vital Signs Last 24 Hrs  T(C): 37 (02 Jan 2024 05:44), Max: 37.1 (01 Jan 2024 21:18)  T(F): 98.6 (02 Jan 2024 05:44), Max: 98.8 (01 Jan 2024 21:18)  HR: 67 (02 Jan 2024 05:44) (67 - 72)  BP: 155/77 (02 Jan 2024 05:44) (104/61 - 155/77)  BP(mean): --  RR: 18 (02 Jan 2024 05:44) (17 - 18)  SpO2: 98% (02 Jan 2024 05:44) (95% - 99%)    Parameters below as of 02 Jan 2024 05:44  Patient On (Oxygen Delivery Method): room air      I&O's Summary    01 Jan 2024 07:01  -  02 Jan 2024 07:00  --------------------------------------------------------  IN: 0 mL / OUT: 550 mL / NET: -550 mL        Physical Exam:  Gen: NAD, resting comfortably   CV: NSR  Pulm: no respiratory distress on RA  Abd: soft, ND, NTTP, no rebound or guarding   Ext: R BKA stump incision with staples in tact, no evidence of dehiscence sanguinous drainage on dressing change   Neuro: motor/sensory grossly intact       LABS: None this AM      A/P:     A/P: 67yo M PMH CAD (2 stents 2020), HFmrEF (45-50%), HTN, PAD w/ remote RLE angioplasty, R 4th toe OM s/p partial R 4th ray amputation on 10/24, RLE angiogram with AT lithotripsy and AT/peroneal balloon angioplasty 10/27, uncontrolled T2DM recently admitted for R foot gas gangrene s/p R BKA (12/11) and closure (12/18) c/b c. diff infection s/p treatment now returns from [facility] for incidental finding of COVID. Vascular surgery consulted for management of BKA stump.    -ASA/SQH  - Physical therapy  - COVID management per primary medical team  - Daily dressing changes by vascular with dry gauze, kerlix and ace-wrap (ace-wrap from stump to thigh); BKA stump appears to be healing well  - Outpatient vascular surgery follow up with Dr. Freed on 1/18/24

## 2024-01-02 NOTE — PROGRESS NOTE ADULT - ATTENDING COMMENTS
67yo M w/ PMHx CAD (2 stents 2020), HFmrEF (45-50%), HTN, PAD w/ remote RLE angioplasty, R 4th toe OM s/p partial R 4th ray amputation on 10/24, uncontrolled IDDM (a1c 12 in Oct 2023), recent admission 11/8-11/10 to cardiology service for hypertensive emergency (left AMA), recently admitted (12/01-12/26/23) for R foot soft tissue infection, now s/p R BKA (12/11/23), course c/b CDiff infection w/ completion of oral vanc on 12/25, discharged to [facility] on 12/26 and found to be COVID-19 positive  Medically stable, pending return to [facility] 67yo M w/ PMHx CAD (2 stents 2020), HFmrEF (45-50%), HTN, PAD w/ remote RLE angioplasty, R 4th toe OM s/p partial R 4th ray amputation on 10/24, uncontrolled IDDM (a1c 12 in Oct 2023), recent admission 11/8-11/10 to cardiology service for hypertensive emergency (left AMA), recently admitted (12/01-12/26/23) for R foot soft tissue infection, now s/p R BKA (12/11/23), course c/b CDiff infection w/ completion of oral vanc on 12/25, discharged to [facility] on 12/26 and found to be COVID-19 positive  Medically stable, pending return to [facility]

## 2024-01-03 DIAGNOSIS — D64.9 ANEMIA, UNSPECIFIED: ICD-10-CM

## 2024-01-03 DIAGNOSIS — Z79.82 LONG TERM (CURRENT) USE OF ASPIRIN: ICD-10-CM

## 2024-01-03 DIAGNOSIS — I25.5 ISCHEMIC CARDIOMYOPATHY: ICD-10-CM

## 2024-01-03 DIAGNOSIS — E11.52 TYPE 2 DIABETES MELLITUS WITH DIABETIC PERIPHERAL ANGIOPATHY WITH GANGRENE: ICD-10-CM

## 2024-01-03 DIAGNOSIS — I11.0 HYPERTENSIVE HEART DISEASE WITH HEART FAILURE: ICD-10-CM

## 2024-01-03 DIAGNOSIS — I25.10 ATHEROSCLEROTIC HEART DISEASE OF NATIVE CORONARY ARTERY WITHOUT ANGINA PECTORIS: ICD-10-CM

## 2024-01-03 DIAGNOSIS — M17.0 BILATERAL PRIMARY OSTEOARTHRITIS OF KNEE: ICD-10-CM

## 2024-01-03 DIAGNOSIS — B95.7 OTHER STAPHYLOCOCCUS AS THE CAUSE OF DISEASES CLASSIFIED ELSEWHERE: ICD-10-CM

## 2024-01-03 DIAGNOSIS — D62 ACUTE POSTHEMORRHAGIC ANEMIA: ICD-10-CM

## 2024-01-03 DIAGNOSIS — Z91.199 PATIENT'S NONCOMPLIANCE WITH OTHER MEDICAL TREATMENT AND REGIMEN DUE TO UNSPECIFIED REASON: ICD-10-CM

## 2024-01-03 DIAGNOSIS — L97.529 NON-PRESSURE CHRONIC ULCER OF OTHER PART OF LEFT FOOT WITH UNSPECIFIED SEVERITY: ICD-10-CM

## 2024-01-03 DIAGNOSIS — Z89.421 ACQUIRED ABSENCE OF OTHER RIGHT TOE(S): ICD-10-CM

## 2024-01-03 DIAGNOSIS — E11.65 TYPE 2 DIABETES MELLITUS WITH HYPERGLYCEMIA: ICD-10-CM

## 2024-01-03 DIAGNOSIS — M86.9 OSTEOMYELITIS, UNSPECIFIED: ICD-10-CM

## 2024-01-03 DIAGNOSIS — A04.72 ENTEROCOLITIS DUE TO CLOSTRIDIUM DIFFICILE, NOT SPECIFIED AS RECURRENT: ICD-10-CM

## 2024-01-03 DIAGNOSIS — E78.5 HYPERLIPIDEMIA, UNSPECIFIED: ICD-10-CM

## 2024-01-03 DIAGNOSIS — E11.621 TYPE 2 DIABETES MELLITUS WITH FOOT ULCER: ICD-10-CM

## 2024-01-03 DIAGNOSIS — E11.40 TYPE 2 DIABETES MELLITUS WITH DIABETIC NEUROPATHY, UNSPECIFIED: ICD-10-CM

## 2024-01-03 DIAGNOSIS — F43.29 ADJUSTMENT DISORDER WITH OTHER SYMPTOMS: ICD-10-CM

## 2024-01-03 DIAGNOSIS — Z95.5 PRESENCE OF CORONARY ANGIOPLASTY IMPLANT AND GRAFT: ICD-10-CM

## 2024-01-03 DIAGNOSIS — R60.9 EDEMA, UNSPECIFIED: ICD-10-CM

## 2024-01-03 DIAGNOSIS — A48.0 GAS GANGRENE: ICD-10-CM

## 2024-01-03 DIAGNOSIS — I50.22 CHRONIC SYSTOLIC (CONGESTIVE) HEART FAILURE: ICD-10-CM

## 2024-01-03 DIAGNOSIS — I35.0 NONRHEUMATIC AORTIC (VALVE) STENOSIS: ICD-10-CM

## 2024-01-03 DIAGNOSIS — E11.69 TYPE 2 DIABETES MELLITUS WITH OTHER SPECIFIED COMPLICATION: ICD-10-CM

## 2024-01-03 DIAGNOSIS — Z79.4 LONG TERM (CURRENT) USE OF INSULIN: ICD-10-CM

## 2024-01-03 LAB
GLUCOSE BLDC GLUCOMTR-MCNC: 122 MG/DL — HIGH (ref 70–99)
GLUCOSE BLDC GLUCOMTR-MCNC: 122 MG/DL — HIGH (ref 70–99)
GLUCOSE BLDC GLUCOMTR-MCNC: 125 MG/DL — HIGH (ref 70–99)
GLUCOSE BLDC GLUCOMTR-MCNC: 125 MG/DL — HIGH (ref 70–99)
GLUCOSE BLDC GLUCOMTR-MCNC: 182 MG/DL — HIGH (ref 70–99)
GLUCOSE BLDC GLUCOMTR-MCNC: 182 MG/DL — HIGH (ref 70–99)

## 2024-01-03 PROCEDURE — 99232 SBSQ HOSP IP/OBS MODERATE 35: CPT | Mod: GC

## 2024-01-03 PROCEDURE — 99233 SBSQ HOSP IP/OBS HIGH 50: CPT | Mod: GC,24

## 2024-01-03 RX ORDER — OXYCODONE HYDROCHLORIDE 5 MG/1
5 TABLET ORAL EVERY 6 HOURS
Refills: 0 | Status: DISCONTINUED | OUTPATIENT
Start: 2024-01-03 | End: 2024-01-05

## 2024-01-03 RX ADMIN — GABAPENTIN 600 MILLIGRAM(S): 400 CAPSULE ORAL at 06:10

## 2024-01-03 RX ADMIN — GABAPENTIN 600 MILLIGRAM(S): 400 CAPSULE ORAL at 19:02

## 2024-01-03 RX ADMIN — Medication 8 UNIT(S): at 14:53

## 2024-01-03 RX ADMIN — Medication 90 MILLIGRAM(S): at 06:10

## 2024-01-03 RX ADMIN — OXYCODONE HYDROCHLORIDE 10 MILLIGRAM(S): 5 TABLET ORAL at 12:20

## 2024-01-03 RX ADMIN — GABAPENTIN 600 MILLIGRAM(S): 400 CAPSULE ORAL at 22:41

## 2024-01-03 RX ADMIN — CARVEDILOL PHOSPHATE 25 MILLIGRAM(S): 80 CAPSULE, EXTENDED RELEASE ORAL at 10:24

## 2024-01-03 RX ADMIN — SPIRONOLACTONE 25 MILLIGRAM(S): 25 TABLET, FILM COATED ORAL at 19:02

## 2024-01-03 RX ADMIN — ATORVASTATIN CALCIUM 80 MILLIGRAM(S): 80 TABLET, FILM COATED ORAL at 22:10

## 2024-01-03 RX ADMIN — HEPARIN SODIUM 5000 UNIT(S): 5000 INJECTION INTRAVENOUS; SUBCUTANEOUS at 22:41

## 2024-01-03 RX ADMIN — Medication 40 MILLIGRAM(S): at 06:10

## 2024-01-03 RX ADMIN — HEPARIN SODIUM 5000 UNIT(S): 5000 INJECTION INTRAVENOUS; SUBCUTANEOUS at 06:10

## 2024-01-03 RX ADMIN — OXYCODONE HYDROCHLORIDE 10 MILLIGRAM(S): 5 TABLET ORAL at 00:10

## 2024-01-03 RX ADMIN — LISINOPRIL 40 MILLIGRAM(S): 2.5 TABLET ORAL at 12:20

## 2024-01-03 RX ADMIN — OXYCODONE HYDROCHLORIDE 10 MILLIGRAM(S): 5 TABLET ORAL at 06:11

## 2024-01-03 RX ADMIN — Medication 2: at 10:23

## 2024-01-03 RX ADMIN — OXYCODONE HYDROCHLORIDE 10 MILLIGRAM(S): 5 TABLET ORAL at 19:02

## 2024-01-03 RX ADMIN — CARVEDILOL PHOSPHATE 25 MILLIGRAM(S): 80 CAPSULE, EXTENDED RELEASE ORAL at 22:10

## 2024-01-03 RX ADMIN — Medication 81 MILLIGRAM(S): at 06:11

## 2024-01-03 RX ADMIN — Medication 8 UNIT(S): at 19:02

## 2024-01-03 RX ADMIN — Medication 8 UNIT(S): at 10:24

## 2024-01-03 RX ADMIN — OXYCODONE HYDROCHLORIDE 10 MILLIGRAM(S): 5 TABLET ORAL at 01:10

## 2024-01-03 RX ADMIN — Medication 100 MILLIGRAM(S): at 22:41

## 2024-01-03 RX ADMIN — Medication 100 MILLIGRAM(S): at 06:10

## 2024-01-03 NOTE — CHART NOTE - NSCHARTNOTEFT_GEN_A_CORE
Admitting Diagnosis:   Patient is a 68y old  Male who presents with a chief complaint of COVID+ (01 Jan 2024 10:52)      PAST MEDICAL & SURGICAL HISTORY:  IDDM (insulin dependent diabetes mellitus)  HTN (hypertension)  CAD S/P percutaneous coronary angioplasty  Neuropathy  PAD (peripheral artery disease)  PNA (pneumonia)  Gangrene of toe of right foot  Moderate aortic stenosis  Acute on chronic diastolic congestive heart failure  Hyperlipidemia  History of partial ray amputation of fourth toe of right foot      Current Nutrition Order: consistent carbohydrates       PO Intake: Good (%) [  x ]  Fair (50-75%) [   ] Poor (<25%) [   ]    GI Issues: none documented/reported    Skin Integrity: no pressure injuries or edema documented    Labs:   01-02    137  |  102  |  37<H>  ----------------------------<  307<H>  4.3   |  28  |  1.39<H>    Ca    8.7      02 Jan 2024 05:30  Phos  3.3     01-02  Mg     2.0     01-02    TPro  6.6  /  Alb  2.3<L>  /  TBili  0.2  /  DBili  x   /  AST  26  /  ALT  32  /  AlkPhos  199<H>  01-02    CAPILLARY BLOOD GLUCOSE      POCT Blood Glucose.: 122 mg/dL (03 Jan 2024 13:58)  POCT Blood Glucose.: 182 mg/dL (03 Jan 2024 09:40)  POCT Blood Glucose.: 140 mg/dL (02 Jan 2024 22:36)  POCT Blood Glucose.: 128 mg/dL (02 Jan 2024 21:19)  POCT Blood Glucose.: 152 mg/dL (02 Jan 2024 17:33)      Medications:  MEDICATIONS  (STANDING):  aspirin  chewable 81 milliGRAM(s) Oral every 24 hours  atorvastatin 80 milliGRAM(s) Oral at bedtime  carvedilol 25 milliGRAM(s) Oral every 12 hours  dextrose 5%. 1000 milliLiter(s) (100 mL/Hr) IV Continuous <Continuous>  dextrose 5%. 1000 milliLiter(s) (50 mL/Hr) IV Continuous <Continuous>  dextrose 50% Injectable 25 Gram(s) IV Push once  dextrose 50% Injectable 25 Gram(s) IV Push once  dextrose 50% Injectable 12.5 Gram(s) IV Push once  gabapentin 600 milliGRAM(s) Oral every 8 hours  glucagon  Injectable 1 milliGRAM(s) IntraMuscular once  heparin   Injectable 5000 Unit(s) SubCutaneous every 8 hours  hydrALAZINE 100 milliGRAM(s) Oral every 8 hours  insulin glargine Injectable (LANTUS) 12 Unit(s) SubCutaneous at bedtime  insulin lispro (ADMELOG) corrective regimen sliding scale   SubCutaneous Before meals and at bedtime  insulin lispro Injectable (ADMELOG) 8 Unit(s) SubCutaneous three times a day before meals  lisinopril 40 milliGRAM(s) Oral every 24 hours  NIFEdipine XL 90 milliGRAM(s) Oral every 24 hours  spironolactone 25 milliGRAM(s) Oral every 24 hours  torsemide 40 milliGRAM(s) Oral every 24 hours    MEDICATIONS  (PRN):  acetaminophen     Tablet .. 650 milliGRAM(s) Oral every 6 hours PRN Temp greater or equal to 38C (100.4F), Mild Pain (1 - 3)  dextrose Oral Gel 15 Gram(s) Oral once PRN Blood Glucose LESS THAN 70 milliGRAM(s)/deciliter  oxyCODONE    IR 10 milliGRAM(s) Oral every 6 hours PRN Severe Pain (7 - 10)  oxyCODONE    IR 5 milliGRAM(s) Oral every 6 hours PRN Moderate Pain (4 - 6)      Weight:  Height for BMI (FEET)	5 Feet  Height for BMI (INCHES)	7 Inch(s)  Height for BMI (CENTIMETERS)	170.18 Centimeter(s)  Weight for BMI (lbs)	160 lb  Weight for BMI (kg)	72.6 kg  Body Mass Index	26.4 adjusted for BKA    Weight Change: no new weights to assess    Estimated energy needs:   Estimated Energy Needs Weight (lbs)	160 lb  Estimated Energy Needs Weight (kg)	72.5 kg  Estimated Energy Needs From (carrie/kg)	25  Estimated Energy Needs To (carrie/kg)	30  Estimated Energy Needs Calculated From (carrie/kg)	1812  Estimated Energy Needs Calculated To (carrie/kg)	2175    Estimated Protein Needs Weight (lbs)	160 lb  Estimated Protein Needs Weight (kg)	72.5 kg  Estimated Protein Needs From (g/kg)	1  Estimated Protein Needs To (g/kg)	1.2  Estimated Protein Needs Calculated From (g/kg)	72.5  Estimated Protein Needs Calculated To (g/kg)	87  Other Calculations	Based on Standards of Care pt within % IBW (114%) using IBW adjusted for BKA, thus actual body weight used for all calculations. Needs adjusted for CHF. Fluids per team.    Subjective: 67yo M w/ PMHx CAD (2 stents 2020), HFmrEF (45-50%), HTN, PAD w/ remote RLE angioplasty, R 4th toe OM s/p partial R 4th ray amputation on 10/24, uncontrolled IDDM (a1c 12 in Oct 2023), recent admission 11/8-11/10 to cardiology service for hypertensive emergency (left AMA), recently admitted (12/01-12/26/23) for R foot soft tissue infection, now s/p R BKA (12/11/23), course c/b CDiff infection w/ completion of oral vanc on 12/25, discharged to [facility] on 12/26 and found to be COVID-19 positive, [facility] unable to accept COVID+ patients, so he returned to [facility] for further monitoring. Patient denies current symptoms of cough, runny nose, headache, chills, sob, and wheezing. He says he had no signs that he had COVID-19, and his main complaint is his right sided leg pain.     Patient seen at bedside for follow up assessment. Current diet order: consistent carbohydrates. Patient consumed eggs and home fries at breakfast. Requesting additional food at meals, discussed adding double portions of protein. Denies NVDC. Elevated BUN, Cr, blood sugar 116-307 x24 hours. Meds: insulin, spironolactone, torsemide. RD to f/u prn.     Previous Nutrition Diagnosis: Altered nutrition related lab values related to DM diagnosis as evidenced by HgbA1c 13.5    Active [ x  ]  Resolved [   ]    Goal: Pt diet to align with nutrition recommendations while consistently meeting >75% nutrient needs.     Recommendations:  1. Continue with consistent carbohydrate diet. Will allow for double portions of protein with meals.  2. Encourage pt to meet nutritional needs as able   3. Monitor labs: electrolytes, cmp  4. Monitor weights   5. Pain and bowel regimen per team   6. Will continue to assess/honor food preferences as able    Risk Level: High [   ] Moderate [ x  ] Low [   ] Admitting Diagnosis:   Patient is a 68y old  Male who presents with a chief complaint of COVID+ (01 Jan 2024 10:52)      PAST MEDICAL & SURGICAL HISTORY:  IDDM (insulin dependent diabetes mellitus)  HTN (hypertension)  CAD S/P percutaneous coronary angioplasty  Neuropathy  PAD (peripheral artery disease)  PNA (pneumonia)  Gangrene of toe of right foot  Moderate aortic stenosis  Acute on chronic diastolic congestive heart failure  Hyperlipidemia  History of partial ray amputation of fourth toe of right foot      Current Nutrition Order: consistent carbohydrates       PO Intake: Good (%) [  x ]  Fair (50-75%) [   ] Poor (<25%) [   ]    GI Issues: none documented/reported    Skin Integrity: no pressure injuries or edema documented    Labs:   01-02    137  |  102  |  37<H>  ----------------------------<  307<H>  4.3   |  28  |  1.39<H>    Ca    8.7      02 Jan 2024 05:30  Phos  3.3     01-02  Mg     2.0     01-02    TPro  6.6  /  Alb  2.3<L>  /  TBili  0.2  /  DBili  x   /  AST  26  /  ALT  32  /  AlkPhos  199<H>  01-02    CAPILLARY BLOOD GLUCOSE      POCT Blood Glucose.: 122 mg/dL (03 Jan 2024 13:58)  POCT Blood Glucose.: 182 mg/dL (03 Jan 2024 09:40)  POCT Blood Glucose.: 140 mg/dL (02 Jan 2024 22:36)  POCT Blood Glucose.: 128 mg/dL (02 Jan 2024 21:19)  POCT Blood Glucose.: 152 mg/dL (02 Jan 2024 17:33)      Medications:  MEDICATIONS  (STANDING):  aspirin  chewable 81 milliGRAM(s) Oral every 24 hours  atorvastatin 80 milliGRAM(s) Oral at bedtime  carvedilol 25 milliGRAM(s) Oral every 12 hours  dextrose 5%. 1000 milliLiter(s) (100 mL/Hr) IV Continuous <Continuous>  dextrose 5%. 1000 milliLiter(s) (50 mL/Hr) IV Continuous <Continuous>  dextrose 50% Injectable 25 Gram(s) IV Push once  dextrose 50% Injectable 25 Gram(s) IV Push once  dextrose 50% Injectable 12.5 Gram(s) IV Push once  gabapentin 600 milliGRAM(s) Oral every 8 hours  glucagon  Injectable 1 milliGRAM(s) IntraMuscular once  heparin   Injectable 5000 Unit(s) SubCutaneous every 8 hours  hydrALAZINE 100 milliGRAM(s) Oral every 8 hours  insulin glargine Injectable (LANTUS) 12 Unit(s) SubCutaneous at bedtime  insulin lispro (ADMELOG) corrective regimen sliding scale   SubCutaneous Before meals and at bedtime  insulin lispro Injectable (ADMELOG) 8 Unit(s) SubCutaneous three times a day before meals  lisinopril 40 milliGRAM(s) Oral every 24 hours  NIFEdipine XL 90 milliGRAM(s) Oral every 24 hours  spironolactone 25 milliGRAM(s) Oral every 24 hours  torsemide 40 milliGRAM(s) Oral every 24 hours    MEDICATIONS  (PRN):  acetaminophen     Tablet .. 650 milliGRAM(s) Oral every 6 hours PRN Temp greater or equal to 38C (100.4F), Mild Pain (1 - 3)  dextrose Oral Gel 15 Gram(s) Oral once PRN Blood Glucose LESS THAN 70 milliGRAM(s)/deciliter  oxyCODONE    IR 10 milliGRAM(s) Oral every 6 hours PRN Severe Pain (7 - 10)  oxyCODONE    IR 5 milliGRAM(s) Oral every 6 hours PRN Moderate Pain (4 - 6)      Weight:  Height for BMI (FEET)	5 Feet  Height for BMI (INCHES)	7 Inch(s)  Height for BMI (CENTIMETERS)	170.18 Centimeter(s)  Weight for BMI (lbs)	160 lb  Weight for BMI (kg)	72.6 kg  Body Mass Index	26.4 adjusted for BKA    Weight Change: no new weights to assess    Estimated energy needs:   Estimated Energy Needs Weight (lbs)	160 lb  Estimated Energy Needs Weight (kg)	72.5 kg  Estimated Energy Needs From (carrie/kg)	25  Estimated Energy Needs To (carrie/kg)	30  Estimated Energy Needs Calculated From (carrie/kg)	1812  Estimated Energy Needs Calculated To (carrie/kg)	2175    Estimated Protein Needs Weight (lbs)	160 lb  Estimated Protein Needs Weight (kg)	72.5 kg  Estimated Protein Needs From (g/kg)	1  Estimated Protein Needs To (g/kg)	1.2  Estimated Protein Needs Calculated From (g/kg)	72.5  Estimated Protein Needs Calculated To (g/kg)	87  Other Calculations	Based on Standards of Care pt within % IBW (114%) using IBW adjusted for BKA, thus actual body weight used for all calculations. Needs adjusted for CHF. Fluids per team.    Subjective: 67yo M w/ PMHx CAD (2 stents 2020), HFmrEF (45-50%), HTN, PAD w/ remote RLE angioplasty, R 4th toe OM s/p partial R 4th ray amputation on 10/24, uncontrolled IDDM (a1c 12 in Oct 2023), recent admission 11/8-11/10 to cardiology service for hypertensive emergency (left AMA), recently admitted (12/01-12/26/23) for R foot soft tissue infection, now s/p R BKA (12/11/23), course c/b CDiff infection w/ completion of oral vanc on 12/25, discharged to [facility] on 12/26 and found to be COVID-19 positive, [facility] unable to accept COVID+ patients, so he returned to [facility] for further monitoring. Patient denies current symptoms of cough, runny nose, headache, chills, sob, and wheezing. He says he had no signs that he had COVID-19, and his main complaint is his right sided leg pain.     Patient seen at bedside for follow up assessment. Current diet order: consistent carbohydrates. Patient consumed eggs and home fries at breakfast. Requesting additional food at meals, discussed adding double portions of protein. Denies NVDC. Elevated BUN, Cr, blood sugar 116-307 x24 hours. Meds: insulin, spironolactone, torsemide. RD to f/u prn.     Previous Nutrition Diagnosis: Altered nutrition related lab values related to DM diagnosis as evidenced by HgbA1c 13.5    Active [ x  ]  Resolved [   ]    Goal: Pt diet to align with nutrition recommendations while consistently meeting >75% nutrient needs.     Recommendations:  1. Continue with consistent carbohydrate diet. Will allow for double portions of protein with meals.  2. Encourage pt to meet nutritional needs as able   3. Monitor labs: electrolytes, cmp  4. Monitor weights   5. Pain and bowel regimen per team   6. Will continue to assess/honor food preferences as able    Risk Level: High [   ] Moderate [ x  ] Low [   ]

## 2024-01-03 NOTE — PROGRESS NOTE ADULT - ASSESSMENT
I M    68 y o M w/ PMHx CAD (2 stents 2020), HFmrEF (45-50%), HTN, PAD w/ remote RLE angioplasty, R 4th toe OM s/p partial R 4th ray amputation on 10/24, uncontrolled IDDM (a1c 12 in Oct 2023), recent admission 11/8-11/10 to cardiology service for hypertensive emergency (left AMA), recently admitted (12/01-12/26/23) for R foot soft tissue infection, now s/p R BKA (12/11/23), course c/b CDiff infection w/ completion of oral vanc on 12/25, discharged to [facility] on 12/26 and found to be COVID-19 positive, [facility] unable to accept COVID+ patients, so he returned to [facility] for further monitoring.     Pending [facility]    Problem/Plan - 1:  ·  Problem: 2019 novel coronavirus disease (COVID-19).   ·  Plan: Patient d/c to [facility] on 12/26, found to be COVID-19+ at [facility], returned to [facility] as rehab can't accept patients w/ COVID. He denies cough, runny nose, chills, sob, Found to have fever 101.6 degrees F in ED, no other SIRS criteria. Currently saturating 94-95% on room air, not hypoxic. Fever likely due to COVID-19 infection.  - Remdesivir for three days (last day: 12/29)     • C/w sx treatment as below.    Problem/Plan - 2:  ·  Problem: Systemic inflammatory response syndrome (SIRS).   ·  Plan: Patient w/ fever 101.6 degrees F, no other SIRS criteria met. Found to be COVID-19 positive. Fevers likely 2/2 COVID-19 infection.  - BCx x1: sterile     • Continue to monitor off antibiotics.    Problem/Plan - 3:  ·  Problem: Status post below knee amputation, right.   ·  Plan: s/p R BKA on 12/11/23 and s/p BKA closure on 12/18/23 by vascular surgery at [facility]     • Pain regimen as follows:        - Tylenol 650mg po q6h PRN for mild pain/fever       - Oxy 5mg po q6h PRN for mod pain       - Oxy 10mg po q6h PRN for sev pain   • Has outpt f/u apt w/ vascular: Dr. Freed on 1/18/24 at 10 A.M.    Problem/Plan - 4:  ·  Problem: HTN (hypertension).   ·  Plan: D/c meds: Hydralazine 100mg q8h and nifedipine XL 60mg QD     • Start nifedipine XL 90mg QD   • C/w hydral 100mg TID.    Problem/Plan - 5:  ·  Problem: Heart failure with mildly reduced ejection fraction.   ·  Plan: Hx of ICM HF mildly reduced EF (45-50% 11/09), cardiology following during last admission for HF. Patient d/c on Torsemide 40mg PO qd today, now returning from [facility]   - prior recs: IV lasix 80mg q12h w/ strict I/O while inpatient, switch to torsemide 40mg PO QD on d/c  - GDMT for HFrEF: Coreg 25mg BID, lisinopril 40mg QD, aldactone 25mg QD; avoid SGLT2i given PAD w/ delayed wound healing    Plan:  - c/w Torsemide 40mg po qd as patient was d/c ready, returning for new rehab placement due to incidental COVID infection finding  - C/w spironolactone 25mg q24h  - Strict I/Os  - c/w Coreg 25mg BID, lisinopril 40mg qd, aldactone 25mg qd  - if starts to become volume overloaded can transition back to IV Lasix 80mg BID.    Problem/Plan - 6:  ·  Problem: Normocytic anemia.   ·  Plan: Hgb 8.8, MCV 91, stable from Hgb 8.4 (12/22). No signs of active bleeding.  - active type and screen.   - transfuse for Hgb <8 given cardiac hx  - continue to monitor for signs of anemia.    Problem/Plan - 7:  ·  Problem: CAD S/P percutaneous coronary angioplasty.   ·  Plan: d/c meds: ASA 81mg QD  and atorvastatin 80mg qhs  - c/w aspirin 81mg qd and atorvastatin 80mg qhs.    Problem/Plan - 8:  ·  Problem: PAD (peripheral artery disease).   ·  Plan: d/c meds: ASA 81mg QD  and atorvastatin 80mg qhs  - c/w aspirin 81mg qd and atorvastatin 80mg qhs.    Problem/Plan - 9:  ·  Problem: IDDM (insulin dependent diabetes mellitus).   ·  Plan: Hx of Type 2 Diabetes (A1c 13.5% 12/05) c/b PAD, diabetic foot infection. D/c on Lantus 12units qhs and Lispro 8units TID w/ meals. Endocrine consulted last admission w/ rec for Lantus 12units qhs and Lispro 8units TID on discharge.  - c/w Lantus 12units qhs, Lispro 8units TID, and moderate sliding scale w/ meals and bedtime  - carb consistent diet  - Patient's fingerstick glucose goal 100-180 mg/dL.    Problem/Plan - 10:  ·  Problem: History of Clostridium difficile infection.   ·  Plan; Hx of CDIff infection last admission. Completed txt w/ oral Vanc on 12/25. Now reporting formed bowel movements.  - c/w contact precautions.    Problem/Plan - 11:  ·  Problem: Neuropathy.   ·  Plan: Home med: Gabapentin 800mg TID  - due to renal function will decrease Gabapentin to 600mg TID.    Problem/Plan - 12:  ·  Problem: Preventive measure.   ·  Plan: F: None  E: replete as necessary  N: Carb consistent diet  DVT: Heparin Subq  Dispo: RMF.    Attestation Statements:   Attestation Statements:  I have personally seen and examined the patient.  I fully participated in the care of this patient.  I have made amendments to the documentation where necessary, and agree with the history, physical exam, and plan as documented by the Resident.     67yo M w/ PMHx CAD (2 stents 2020), HFmrEF (45-50%), HTN, PAD w/ remote RLE angioplasty, R 4th toe OM s/p partial R 4th ray amputation on 10/24, uncontrolled IDDM (a1c 12 in Oct 2023), recent admission 11/8-11/10 to cardiology service for hypertensive emergency (left AMA), recently admitted (12/01-12/26/23) for R foot soft tissue infection, now s/p R BKA (12/11/23), course c/b CDiff infection w/ completion of oral vanc on 12/25, discharged to [facility] on 12/26 and found to be COVID-19 positive  Medically stable, pending return to [facility] .      I M    68 y o M w/ PMHx CAD (2 stents 2020), HFmrEF (45-50%), HTN, PAD w/ remote RLE angioplasty, R 4th toe OM s/p partial R 4th ray amputation on 10/24, uncontrolled IDDM (a1c 12 in Oct 2023), recent admission 11/8-11/10 to cardiology service for hypertensive emergency (left AMA), recently admitted (12/01-12/26/23) for R foot soft tissue infection, now s/p R BKA (12/11/23), course c/b CDiff infection w/ completion of oral vanc on 12/25, discharged to [facility] on 12/26 and found to be COVID-19 positive, [facility] unable to accept COVID+ patients, so he returned to [facility] for further monitoring.     Pending [facility]    Problem/Plan - 1:  ·  Problem: 2019 novel coronavirus disease (COVID-19).   ·  Plan: Patient d/c to [facility] on 12/26, found to be COVID-19+ at [facility], returned to [facility] as rehab can't accept patients w/ COVID. He denies cough, runny nose, chills, sob, Found to have fever 101.6 degrees F in ED, no other SIRS criteria. Currently saturating 94-95% on room air, not hypoxic. Fever likely due to COVID-19 infection.  - Remdesivir for three days (last day: 12/29)     • C/w sx treatment as below.    Problem/Plan - 2:  ·  Problem: Systemic inflammatory response syndrome (SIRS).   ·  Plan: Patient w/ fever 101.6 degrees F, no other SIRS criteria met. Found to be COVID-19 positive. Fevers likely 2/2 COVID-19 infection.  - BCx x1: sterile     • Continue to monitor off antibiotics.    Problem/Plan - 3:  ·  Problem: Status post below knee amputation, right.   ·  Plan: s/p R BKA on 12/11/23 and s/p BKA closure on 12/18/23 by vascular surgery at [facility]     • Pain regimen as follows:        - Tylenol 650mg po q6h PRN for mild pain/fever       - Oxy 5mg po q6h PRN for mod pain       - Oxy 10mg po q6h PRN for sev pain   • Has outpt f/u apt w/ vascular: Dr. Freed on 1/18/24 at 10 A.M.    Problem/Plan - 4:  ·  Problem: HTN (hypertension).   ·  Plan: D/c meds: Hydralazine 100mg q8h and nifedipine XL 60mg QD     • Start nifedipine XL 90mg QD   • C/w hydral 100mg TID.    Problem/Plan - 5:  ·  Problem: Heart failure with mildly reduced ejection fraction.   ·  Plan: Hx of ICM HF mildly reduced EF (45-50% 11/09), cardiology following during last admission for HF. Patient d/c on Torsemide 40mg PO qd today, now returning from [facility]   - prior recs: IV lasix 80mg q12h w/ strict I/O while inpatient, switch to torsemide 40mg PO QD on d/c  - GDMT for HFrEF: Coreg 25mg BID, lisinopril 40mg QD, aldactone 25mg QD; avoid SGLT2i given PAD w/ delayed wound healing    Plan:  - c/w Torsemide 40mg po qd as patient was d/c ready, returning for new rehab placement due to incidental COVID infection finding  - C/w spironolactone 25mg q24h  - Strict I/Os  - c/w Coreg 25mg BID, lisinopril 40mg qd, aldactone 25mg qd  - if starts to become volume overloaded can transition back to IV Lasix 80mg BID.    Problem/Plan - 6:  ·  Problem: Normocytic anemia.   ·  Plan: Hgb 8.8, MCV 91, stable from Hgb 8.4 (12/22). No signs of active bleeding.  - active type and screen.   - transfuse for Hgb <8 given cardiac hx  - continue to monitor for signs of anemia.    Problem/Plan - 7:  ·  Problem: CAD S/P percutaneous coronary angioplasty.   ·  Plan: d/c meds: ASA 81mg QD  and atorvastatin 80mg qhs  - c/w aspirin 81mg qd and atorvastatin 80mg qhs.    Problem/Plan - 8:  ·  Problem: PAD (peripheral artery disease).   ·  Plan: d/c meds: ASA 81mg QD  and atorvastatin 80mg qhs  - c/w aspirin 81mg qd and atorvastatin 80mg qhs.    Problem/Plan - 9:  ·  Problem: IDDM (insulin dependent diabetes mellitus).   ·  Plan: Hx of Type 2 Diabetes (A1c 13.5% 12/05) c/b PAD, diabetic foot infection. D/c on Lantus 12units qhs and Lispro 8units TID w/ meals. Endocrine consulted last admission w/ rec for Lantus 12units qhs and Lispro 8units TID on discharge.  - c/w Lantus 12units qhs, Lispro 8units TID, and moderate sliding scale w/ meals and bedtime  - carb consistent diet  - Patient's fingerstick glucose goal 100-180 mg/dL.    Problem/Plan - 10:  ·  Problem: History of Clostridium difficile infection.   ·  Plan; Hx of CDIff infection last admission. Completed txt w/ oral Vanc on 12/25. Now reporting formed bowel movements.  - c/w contact precautions.    Problem/Plan - 11:  ·  Problem: Neuropathy.   ·  Plan: Home med: Gabapentin 800mg TID  - due to renal function will decrease Gabapentin to 600mg TID.    Problem/Plan - 12:  ·  Problem: Preventive measure.   ·  Plan: F: None  E: replete as necessary  N: Carb consistent diet  DVT: Heparin Subq  Dispo: RMF.    Attestation Statements:   Attestation Statements:  I have personally seen and examined the patient.  I fully participated in the care of this patient.  I have made amendments to the documentation where necessary, and agree with the history, physical exam, and plan as documented by the Resident.     67yo M w/ PMHx CAD (2 stents 2020), HFmrEF (45-50%), HTN, PAD w/ remote RLE angioplasty, R 4th toe OM s/p partial R 4th ray amputation on 10/24, uncontrolled IDDM (a1c 12 in Oct 2023), recent admission 11/8-11/10 to cardiology service for hypertensive emergency (left AMA), recently admitted (12/01-12/26/23) for R foot soft tissue infection, now s/p R BKA (12/11/23), course c/b CDiff infection w/ completion of oral vanc on 12/25, discharged to [facility] on 12/26 and found to be COVID-19 positive  Medically stable, pending return to [facility] .

## 2024-01-03 NOTE — PROGRESS NOTE ADULT - ATTENDING COMMENTS
69yo M w/ PMHx CAD (2 stents 2020), HFmrEF (45-50%), HTN, PAD w/ remote RLE angioplasty, R 4th toe OM s/p partial R 4th ray amputation on 10/24, uncontrolled IDDM (a1c 12 in Oct 2023), recent admission 11/8-11/10 to cardiology service for hypertensive emergency (left AMA), recently admitted (12/01-12/26/23) for R foot soft tissue infection, now s/p R BKA (12/11/23), course c/b CDiff infection w/ completion of oral vanc on 12/25, discharged to [facility] on 12/26 and found to be COVID-19 positive  Medically stable, pending return to [facility] 67yo M w/ PMHx CAD (2 stents 2020), HFmrEF (45-50%), HTN, PAD w/ remote RLE angioplasty, R 4th toe OM s/p partial R 4th ray amputation on 10/24, uncontrolled IDDM (a1c 12 in Oct 2023), recent admission 11/8-11/10 to cardiology service for hypertensive emergency (left AMA), recently admitted (12/01-12/26/23) for R foot soft tissue infection, now s/p R BKA (12/11/23), course c/b CDiff infection w/ completion of oral vanc on 12/25, discharged to [facility] on 12/26 and found to be COVID-19 positive  Medically stable, pending return to [facility]

## 2024-01-03 NOTE — PROGRESS NOTE ADULT - GENITOURINARY MALE
no mass
normal external genitalia/no hernia/no discharge/no mass/no tenderness/no ulcer/normal/no penile lesion/no palpable testicular mass/no scrotal mass

## 2024-01-03 NOTE — PROGRESS NOTE ADULT - ATTENDING COMMENTS
I have personally seen and examined this patient. I have fully participated in the care of this patient. I have reviewed all pertinent clinical information, including history, physical exam, plan and the Resident's, PA's and NP's note and agree except as noted. This is a patient originally known to Dr. Corky Oneal, but I was asked to perform the guillotine R BKA on 12/11/23 and take over his care rest of his recent admission. 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 He was transferred from the medical service to vascular postop and underwent staged R BKA w/ closure (12/18/23). He was discharged to [facility] on 12/26/23 but was sent back to the ED because he tested positive for COVID and the [facility] doesn't accept COVID patients.     On 1/3, he does not endorse new major complaints. Yesterday WBC 8.8. Remains AVSS. The R BKA stump remains fine. Staple line overall clean, dry and intact. Awaiting dispo    PLAN & RECOMMENDATIONS  - COVID management per primary medicine team  - ASA/SQH  - Physical therapy  - Daily dressing changes with dry gauze, kerlix and ace-wrap (ace-wrap from stump to thigh)  - Outpatient vascular surgery follow up with me on 1/18/24    Thank you,    Michelet Freed MD  Attending Vascular Surgeon  [facility] at [address], [address] [place] [id number]  Office: [phone]; Fax: [phone]  [email or web address] I have personally seen and examined this patient. I have fully participated in the care of this patient. I have reviewed all pertinent clinical information, including history, physical exam, plan and the Resident's, PA's and NP's note and agree except as noted. This is a patient originally known to Dr. Corky Oneal, but I was asked to perform the guillotine R BKA on 12/11/23 and take over his care rest of his recent admission. 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 He was transferred from the medical service to vascular postop and underwent staged R BKA w/ closure (12/18/23). He was discharged to [facility] on 12/26/23 but was sent back to the ED because he tested positive for COVID and the [facility] doesn't accept COVID patients.     On 1/3, he does not endorse new major complaints. Yesterday WBC 8.8. Remains AVSS. The R BKA stump remains fine. Staple line overall clean, dry and intact. Awaiting dispo    PLAN & RECOMMENDATIONS  - COVID management per primary medicine team  - ASA/SQH  - Physical therapy  - Daily dressing changes with dry gauze, kerlix and ace-wrap (ace-wrap from stump to thigh)  - Outpatient vascular surgery follow up with me on 1/18/24    Thank you,    Michelet Freed MD  Attending Vascular Surgeon  [facility] at [address], [address] [place] [address]  Office: [phone]; Fax: [phone]  [email or web address]

## 2024-01-03 NOTE — PROGRESS NOTE ADULT - PROBLEM SELECTOR PLAN 1
Patient d/c to [facility] on 12/26, found to be COVID-19+ at [facility], returned to [facility] as rehab can't accept patients w/ COVID. He denies cough, runny nose, chills, sob, Found to have fever 101.6 degrees F in ED, no other SIRS criteria. Currently saturating 94-95% on room air, not hypoxic. Fever likely due to COVID-19 infection.  - Remdesivir for three days (last day: 12/29)     • C/w sx treatment as below Patient d/c to [facility] on 12/26, found to be COVID-19+ at [facility], returned to [facility] as rehab can't accept patients w/ COVID. He denies cough, runny nose, chills, sob, Found to have fever 101.6 degrees F in ED, no other SIRS criteria. Currently saturating 94-95% on room air, not hypoxic. Fever likely due to COVID-19 infection.  - Remdesivir for three days (last day: 12/29)     • C/w sx treatment as below

## 2024-01-03 NOTE — PROGRESS NOTE ADULT - ASSESSMENT
67yo M w/ PMHx CAD (2 stents 2020), HFmrEF (45-50%), HTN, PAD w/ remote RLE angioplasty, R 4th toe OM s/p partial R 4th ray amputation on 10/24, uncontrolled IDDM (a1c 12 in Oct 2023), recent admission 11/8-11/10 to cardiology service for hypertensive emergency (left AMA), recently admitted (12/01-12/26/23) for R foot soft tissue infection, now s/p R BKA (12/11/23), course c/b CDiff infection w/ completion of oral vanc on 12/25, discharged to [facility] on 12/26 and found to be COVID-19 positive, [facility] unable to accept COVID+ patients, so he returned to [facility] for further monitoring.     Pending KERRY 67yo M w/ PMHx CAD (2 stents 2020), HFmrEF (45-50%), HTN, PAD w/ remote RLE angioplasty, R 4th toe OM s/p partial R 4th ray amputation on 10/24, uncontrolled IDDM (a1c 12 in Oct 2023), recent admission 11/8-11/10 to cardiology service for hypertensive emergency (left AMA), recently admitted (12/01-12/26/23) for R foot soft tissue infection, now s/p R BKA (12/11/23), course c/b CDiff infection w/ completion of oral vanc on 12/25, discharged to [facility] on 12/26 and found to be COVID-19 positive, [facility] unable to accept COVID+ patients, so he returned to [facility] for further monitoring.     Pending KERRY

## 2024-01-03 NOTE — PROGRESS NOTE ADULT - SUBJECTIVE AND OBJECTIVE BOX
Physical Medicine and Rehabilitation Progress Note :       Patient is a 68y old  Male who presents with a chief complaint of COVID+ (01 Jan 2024 10:52)      HPI:  69yo M w/ PMHx CAD (2 stents 2020), HFmrEF (45-50%), HTN, PAD w/ remote RLE angioplasty, R 4th toe OM s/p partial R 4th ray amputation on 10/24, uncontrolled IDDM (a1c 12 in Oct 2023), recent admission 11/8-11/10 to cardiology service for hypertensive emergency (left AMA), recently admitted (12/01-12/26/23) for R foot soft tissue infection, now s/p R BKA (12/11/23), course c/b CDiff infection w/ completion of oral vanc on 12/25, discharged to [facility] on 12/26 and found to be COVID-19 positive, [facility] unable to accept COVID+ patients, so he returned to [facility] for further monitoring. Patient denies current symptoms of cough, runny nose, headache, chills, sob, and wheezing. He says he had no signs that he had COVID-19, and his main complaint is his right sided leg pain. He received Oxy 5mg prior to conversation, and he currently states his pain is an 11/10, as he was going to the bathroom and he hit his stump on the wall. He says Dilaudid was helping his pain the best during his admission. His amputation site was recently wrapped/bandage changed this morning when he got to the [facility]. He had a recent bowel movement in the ED that was formed stool, denies current diarrhea, abd pain.     On admission:  Initial vital sign: Temp 100.3 -> 101.7, HR 78, /78, Sp02 94% on room air  Labs significant for: Hgb 8.8, BUN 36,  glucose 114, COVID-19 positive  Imaging: None  Interventions: Tylenol 650mg x1, Oxycodone 5mg x1  Consults: none   (26 Dec 2023 23:10)                            8.4    8.83  )-----------( 371      ( 02 Jan 2024 05:30 )             27.2       01-02    137  |  102  |  37<H>  ----------------------------<  307<H>  4.3   |  28  |  1.39<H>    Ca    8.7      02 Jan 2024 05:30  Phos  3.3     01-02  Mg     2.0     01-02    TPro  6.6  /  Alb  2.3<L>  /  TBili  0.2  /  DBili  x   /  AST  26  /  ALT  32  /  AlkPhos  199<H>  01-02    Vital Signs Last 24 Hrs  T(C): 37 (03 Jan 2024 09:17), Max: 37.4 (03 Jan 2024 05:57)  T(F): 98.6 (03 Jan 2024 09:17), Max: 99.3 (03 Jan 2024 05:57)  HR: 71 (03 Jan 2024 09:17) (69 - 74)  BP: 163/78 (03 Jan 2024 09:17) (118/70 - 163/78)  BP(mean): --  RR: 18 (03 Jan 2024 09:17) (18 - 19)  SpO2: 96% (03 Jan 2024 09:17) (96% - 99%)    Parameters below as of 03 Jan 2024 09:17  Patient On (Oxygen Delivery Method): room air        MEDICATIONS  (STANDING):  aspirin  chewable 81 milliGRAM(s) Oral every 24 hours  atorvastatin 80 milliGRAM(s) Oral at bedtime  carvedilol 25 milliGRAM(s) Oral every 12 hours  dextrose 5%. 1000 milliLiter(s) (50 mL/Hr) IV Continuous <Continuous>  dextrose 5%. 1000 milliLiter(s) (100 mL/Hr) IV Continuous <Continuous>  dextrose 50% Injectable 25 Gram(s) IV Push once  dextrose 50% Injectable 25 Gram(s) IV Push once  dextrose 50% Injectable 12.5 Gram(s) IV Push once  gabapentin 600 milliGRAM(s) Oral every 8 hours  glucagon  Injectable 1 milliGRAM(s) IntraMuscular once  heparin   Injectable 5000 Unit(s) SubCutaneous every 8 hours  hydrALAZINE 100 milliGRAM(s) Oral every 8 hours  insulin glargine Injectable (LANTUS) 12 Unit(s) SubCutaneous at bedtime  insulin lispro (ADMELOG) corrective regimen sliding scale   SubCutaneous Before meals and at bedtime  insulin lispro Injectable (ADMELOG) 8 Unit(s) SubCutaneous three times a day before meals  lisinopril 40 milliGRAM(s) Oral every 24 hours  NIFEdipine XL 90 milliGRAM(s) Oral every 24 hours  spironolactone 25 milliGRAM(s) Oral every 24 hours  torsemide 40 milliGRAM(s) Oral every 24 hours    MEDICATIONS  (PRN):  acetaminophen     Tablet .. 650 milliGRAM(s) Oral every 6 hours PRN Temp greater or equal to 38C (100.4F), Mild Pain (1 - 3)  dextrose Oral Gel 15 Gram(s) Oral once PRN Blood Glucose LESS THAN 70 milliGRAM(s)/deciliter  oxyCODONE    IR 10 milliGRAM(s) Oral every 6 hours PRN Severe Pain (7 - 10)  oxyCODONE    IR 5 milliGRAM(s) Oral every 6 hours PRN Moderate Pain (4 - 6)        1/2/2024 Functional Status Assessment :       Pain Assessment/Number Scale (0-10) Adult  Presence of Pain: complains of pain/discomfort  Body Location: R leg  Pain Rating (0-10): Rest: 6 (moderate pain)  Pain Rating (0-10): Activity: 6 (moderate pain)    Cognitive/Neuro      Cognitive/Neuro/Behavioral  Cognitive/Neuro/Behavioral [WDL Definition: Alert; opens eyes spontaneously; arouses to voice or touch; oriented x 4; follows commands; speech spontaneous, logical; purposeful motor response; behavior appropriate to situation]: WDL    Language Assistance  Preferred Language to Address Healthcare Preferred Language to Address Healthcare: English    Therapeutic Interventions      Bed Mobility  Bed Mobility Training Rolling/Turning: supervsion  Bed Mobility Training Sit-to-Supine: supervsion;  verbal cues  Bed Mobility Training Supine-to-Sit: supervsion;  verbal cues  Bed Mobility Training Limitations: impaired ability to control trunk for mobility;  impaired postural control;  decreased ROM            PM&R Impression : as above    Current disposition plan recommendation :    subacute rehab placement             Physical Medicine and Rehabilitation Progress Note :       Patient is a 68y old  Male who presents with a chief complaint of COVID+ (01 Jan 2024 10:52)      HPI:  67yo M w/ PMHx CAD (2 stents 2020), HFmrEF (45-50%), HTN, PAD w/ remote RLE angioplasty, R 4th toe OM s/p partial R 4th ray amputation on 10/24, uncontrolled IDDM (a1c 12 in Oct 2023), recent admission 11/8-11/10 to cardiology service for hypertensive emergency (left AMA), recently admitted (12/01-12/26/23) for R foot soft tissue infection, now s/p R BKA (12/11/23), course c/b CDiff infection w/ completion of oral vanc on 12/25, discharged to [facility] on 12/26 and found to be COVID-19 positive, [facility] unable to accept COVID+ patients, so he returned to [facility] for further monitoring. Patient denies current symptoms of cough, runny nose, headache, chills, sob, and wheezing. He says he had no signs that he had COVID-19, and his main complaint is his right sided leg pain. He received Oxy 5mg prior to conversation, and he currently states his pain is an 11/10, as he was going to the bathroom and he hit his stump on the wall. He says Dilaudid was helping his pain the best during his admission. His amputation site was recently wrapped/bandage changed this morning when he got to the [facility]. He had a recent bowel movement in the ED that was formed stool, denies current diarrhea, abd pain.     On admission:  Initial vital sign: Temp 100.3 -> 101.7, HR 78, /78, Sp02 94% on room air  Labs significant for: Hgb 8.8, BUN 36,  glucose 114, COVID-19 positive  Imaging: None  Interventions: Tylenol 650mg x1, Oxycodone 5mg x1  Consults: none   (26 Dec 2023 23:10)                            8.4    8.83  )-----------( 371      ( 02 Jan 2024 05:30 )             27.2       01-02    137  |  102  |  37<H>  ----------------------------<  307<H>  4.3   |  28  |  1.39<H>    Ca    8.7      02 Jan 2024 05:30  Phos  3.3     01-02  Mg     2.0     01-02    TPro  6.6  /  Alb  2.3<L>  /  TBili  0.2  /  DBili  x   /  AST  26  /  ALT  32  /  AlkPhos  199<H>  01-02    Vital Signs Last 24 Hrs  T(C): 37 (03 Jan 2024 09:17), Max: 37.4 (03 Jan 2024 05:57)  T(F): 98.6 (03 Jan 2024 09:17), Max: 99.3 (03 Jan 2024 05:57)  HR: 71 (03 Jan 2024 09:17) (69 - 74)  BP: 163/78 (03 Jan 2024 09:17) (118/70 - 163/78)  BP(mean): --  RR: 18 (03 Jan 2024 09:17) (18 - 19)  SpO2: 96% (03 Jan 2024 09:17) (96% - 99%)    Parameters below as of 03 Jan 2024 09:17  Patient On (Oxygen Delivery Method): room air        MEDICATIONS  (STANDING):  aspirin  chewable 81 milliGRAM(s) Oral every 24 hours  atorvastatin 80 milliGRAM(s) Oral at bedtime  carvedilol 25 milliGRAM(s) Oral every 12 hours  dextrose 5%. 1000 milliLiter(s) (50 mL/Hr) IV Continuous <Continuous>  dextrose 5%. 1000 milliLiter(s) (100 mL/Hr) IV Continuous <Continuous>  dextrose 50% Injectable 25 Gram(s) IV Push once  dextrose 50% Injectable 25 Gram(s) IV Push once  dextrose 50% Injectable 12.5 Gram(s) IV Push once  gabapentin 600 milliGRAM(s) Oral every 8 hours  glucagon  Injectable 1 milliGRAM(s) IntraMuscular once  heparin   Injectable 5000 Unit(s) SubCutaneous every 8 hours  hydrALAZINE 100 milliGRAM(s) Oral every 8 hours  insulin glargine Injectable (LANTUS) 12 Unit(s) SubCutaneous at bedtime  insulin lispro (ADMELOG) corrective regimen sliding scale   SubCutaneous Before meals and at bedtime  insulin lispro Injectable (ADMELOG) 8 Unit(s) SubCutaneous three times a day before meals  lisinopril 40 milliGRAM(s) Oral every 24 hours  NIFEdipine XL 90 milliGRAM(s) Oral every 24 hours  spironolactone 25 milliGRAM(s) Oral every 24 hours  torsemide 40 milliGRAM(s) Oral every 24 hours    MEDICATIONS  (PRN):  acetaminophen     Tablet .. 650 milliGRAM(s) Oral every 6 hours PRN Temp greater or equal to 38C (100.4F), Mild Pain (1 - 3)  dextrose Oral Gel 15 Gram(s) Oral once PRN Blood Glucose LESS THAN 70 milliGRAM(s)/deciliter  oxyCODONE    IR 10 milliGRAM(s) Oral every 6 hours PRN Severe Pain (7 - 10)  oxyCODONE    IR 5 milliGRAM(s) Oral every 6 hours PRN Moderate Pain (4 - 6)        1/2/2024 Functional Status Assessment :       Pain Assessment/Number Scale (0-10) Adult  Presence of Pain: complains of pain/discomfort  Body Location: R leg  Pain Rating (0-10): Rest: 6 (moderate pain)  Pain Rating (0-10): Activity: 6 (moderate pain)    Cognitive/Neuro      Cognitive/Neuro/Behavioral  Cognitive/Neuro/Behavioral [WDL Definition: Alert; opens eyes spontaneously; arouses to voice or touch; oriented x 4; follows commands; speech spontaneous, logical; purposeful motor response; behavior appropriate to situation]: WDL    Language Assistance  Preferred Language to Address Healthcare Preferred Language to Address Healthcare: English    Therapeutic Interventions      Bed Mobility  Bed Mobility Training Rolling/Turning: supervsion  Bed Mobility Training Sit-to-Supine: supervsion;  verbal cues  Bed Mobility Training Supine-to-Sit: supervsion;  verbal cues  Bed Mobility Training Limitations: impaired ability to control trunk for mobility;  impaired postural control;  decreased ROM            PM&R Impression : as above    Current disposition plan recommendation :    subacute rehab placement

## 2024-01-03 NOTE — PROGRESS NOTE ADULT - SUBJECTIVE AND OBJECTIVE BOX
Subjective: Pt seen and examined bedside. Endorsing pain with dressing change.     ROS:   Denies Headache, blurred vision, Chest Pain, SOB, Abdominal pain, nausea or vomiting     Social   aspirin  chewable 81  carvedilol 25  heparin   Injectable 5000  hydrALAZINE 100  lisinopril 40  NIFEdipine XL 90  spironolactone 25  torsemide 40      Allergies    No Known Allergies    Intolerances        Vital Signs Last 24 Hrs  T(C): 37.4 (03 Jan 2024 05:57), Max: 37.4 (03 Jan 2024 05:57)  T(F): 99.3 (03 Jan 2024 05:57), Max: 99.3 (03 Jan 2024 05:57)  HR: 69 (03 Jan 2024 05:57) (69 - 74)  BP: 155/81 (03 Jan 2024 05:57) (110/52 - 156/69)  BP(mean): --  RR: 18 (03 Jan 2024 05:57) (18 - 19)  SpO2: 99% (03 Jan 2024 05:57) (95% - 99%)    Parameters below as of 03 Jan 2024 05:57  Patient On (Oxygen Delivery Method): room air      I&O's Summary    02 Jan 2024 07:01  -  03 Jan 2024 07:00  --------------------------------------------------------  IN: 0 mL / OUT: 2600 mL / NET: -2600 mL        Physical Exam:  Gen: NAD, resting comfortably   CV: NSR  Pulm: no respiratory distress on RA  Abd: soft, ND, NTTP, no rebound or guarding   Ext: R BKA stump incision with staples in tact, no evidence of dehiscence sanguinous drainage on dressing change   Neuro: motor/sensory grossly intact       LABS:                        8.4    8.83  )-----------( 371      ( 02 Jan 2024 05:30 )             27.2     01-02    137  |  102  |  37<H>  ----------------------------<  307<H>  4.3   |  28  |  1.39<H>    Ca    8.7      02 Jan 2024 05:30  Phos  3.3     01-02  Mg     2.0     01-02    TPro  6.6  /  Alb  2.3<L>  /  TBili  0.2  /  DBili  x   /  AST  26  /  ALT  32  /  AlkPhos  199<H>  01-02          A/P: 69yo M PMH CAD (2 stents 2020), HFmrEF (45-50%), HTN, PAD w/ remote RLE angioplasty, R 4th toe OM s/p partial R 4th ray amputation on 10/24, RLE angiogram with AT lithotripsy and AT/peroneal balloon angioplasty 10/27, uncontrolled T2DM recently admitted for R foot gas gangrene s/p R BKA (12/11) and closure (12/18) c/b c. diff infection s/p treatment now returns from [facility] for incidental finding of COVID. Vascular surgery consulted for management of BKA stump.    -ASA/SQH  - Physical therapy  - COVID management per primary medical team  - Daily dressing changes by vascular with betadine, dry gauze, kerlix and ace-wrap (ace-wrap from stump to thigh); BKA stump appears to be healing well  - Outpatient vascular surgery follow up with Dr. Freed on 1/18/24    Vascular will continue to follow.  Subjective: Pt seen and examined bedside. Endorsing pain with dressing change.     ROS:   Denies Headache, blurred vision, Chest Pain, SOB, Abdominal pain, nausea or vomiting     Social   aspirin  chewable 81  carvedilol 25  heparin   Injectable 5000  hydrALAZINE 100  lisinopril 40  NIFEdipine XL 90  spironolactone 25  torsemide 40      Allergies    No Known Allergies    Intolerances        Vital Signs Last 24 Hrs  T(C): 37.4 (03 Jan 2024 05:57), Max: 37.4 (03 Jan 2024 05:57)  T(F): 99.3 (03 Jan 2024 05:57), Max: 99.3 (03 Jan 2024 05:57)  HR: 69 (03 Jan 2024 05:57) (69 - 74)  BP: 155/81 (03 Jan 2024 05:57) (110/52 - 156/69)  BP(mean): --  RR: 18 (03 Jan 2024 05:57) (18 - 19)  SpO2: 99% (03 Jan 2024 05:57) (95% - 99%)    Parameters below as of 03 Jan 2024 05:57  Patient On (Oxygen Delivery Method): room air      I&O's Summary    02 Jan 2024 07:01  -  03 Jan 2024 07:00  --------------------------------------------------------  IN: 0 mL / OUT: 2600 mL / NET: -2600 mL        Physical Exam:  Gen: NAD, resting comfortably   CV: NSR  Pulm: no respiratory distress on RA  Abd: soft, ND, NTTP, no rebound or guarding   Ext: R BKA stump incision with staples in tact, no evidence of dehiscence sanguinous drainage on dressing change   Neuro: motor/sensory grossly intact       LABS:                        8.4    8.83  )-----------( 371      ( 02 Jan 2024 05:30 )             27.2     01-02    137  |  102  |  37<H>  ----------------------------<  307<H>  4.3   |  28  |  1.39<H>    Ca    8.7      02 Jan 2024 05:30  Phos  3.3     01-02  Mg     2.0     01-02    TPro  6.6  /  Alb  2.3<L>  /  TBili  0.2  /  DBili  x   /  AST  26  /  ALT  32  /  AlkPhos  199<H>  01-02          A/P: 69yo M PMH CAD (2 stents 2020), HFmrEF (45-50%), HTN, PAD w/ remote RLE angioplasty, R 4th toe OM s/p partial R 4th ray amputation on 10/24, RLE angiogram with AT lithotripsy and AT/peroneal balloon angioplasty 10/27, uncontrolled T2DM recently admitted for R foot gas gangrene s/p R BKA (12/11) and closure (12/18) c/b c. diff infection s/p treatment now returns from [facility] for incidental finding of COVID. Vascular surgery consulted for management of BKA stump.    -ASA/SQH  - Physical therapy  - COVID management per primary medical team  - Daily dressing changes by vascular with betadine, dry gauze, kerlix and ace-wrap (ace-wrap from stump to thigh); BKA stump appears to be healing well  - Outpatient vascular surgery follow up with Dr. Freed on 1/18/24    Vascular will continue to follow.

## 2024-01-03 NOTE — PROGRESS NOTE ADULT - PROBLEM SELECTOR PLAN 3
s/p R BKA on 12/11/23 and s/p BKA closure on 12/18/23 by vascular surgery at [facility]     • Pain regimen as follows:        - Tylenol 650mg po q6h PRN for mild pain/fever       - Oxy 5mg po q6h PRN for mod pain       - Oxy 10mg po q6h PRN for sev pain   • Has outpt f/u apt w/ vascular: Dr. Freed on 1/18/24 at 10 A.M. s/p R BKA on 12/11/23 and s/p BKA closure on 12/18/23 by vascular surgery at [facility]     • Pain regimen as follows:        - Tylenol 650mg po q6h PRN for mild pain/fever       - Oxy 5mg po q6h PRN for mod pain       - Oxy 10mg po q6h PRN for sev pain   • Has outpt f/u apt w/ vascular: Dr. Freed on 1/18/24 at 10 A.M.

## 2024-01-03 NOTE — PROGRESS NOTE ADULT - SUBJECTIVE AND OBJECTIVE BOX
INTERVAL HPI/OVERNIGHT EVENTS:    VITAL SIGNS:  T(F): 98.2 (01-03-24 @ 15:50)  HR: 70 (01-03-24 @ 15:50)  BP: 116/67 (01-03-24 @ 15:50)  RR: 17 (01-03-24 @ 15:50)  SpO2: 96% (01-03-24 @ 15:50)  Wt(kg): --    PHYSICAL EXAM:    Constitutional: NAD, well-groomed, well-developed  HEENT: PERRLA, EOMI, Normal Hearing, MMM  Neck: No LAD, No JVD  Back: Normal spine flexure, No CVA tenderness  Respiratory: CTAB   Cardiovascular: S1 and S2, RRR, no M/G/R  Gastrointestinal: BS+, soft, NT/ND  Extremities: No peripheral edema  Vascular: 2+ peripheral pulses  Neurological: A/O x 3, no focal deficits  Psychiatric: Normal mood, normal affect  Musculoskeletal: 5/5 strength b/l upper and lower extremities  Skin: No rashes      MEDICATIONS  (STANDING):  aspirin  chewable 81 milliGRAM(s) Oral every 24 hours  atorvastatin 80 milliGRAM(s) Oral at bedtime  carvedilol 25 milliGRAM(s) Oral every 12 hours  dextrose 5%. 1000 milliLiter(s) (50 mL/Hr) IV Continuous <Continuous>  dextrose 5%. 1000 milliLiter(s) (100 mL/Hr) IV Continuous <Continuous>  dextrose 50% Injectable 25 Gram(s) IV Push once  dextrose 50% Injectable 25 Gram(s) IV Push once  dextrose 50% Injectable 12.5 Gram(s) IV Push once  gabapentin 600 milliGRAM(s) Oral every 8 hours  glucagon  Injectable 1 milliGRAM(s) IntraMuscular once  heparin   Injectable 5000 Unit(s) SubCutaneous every 8 hours  hydrALAZINE 100 milliGRAM(s) Oral every 8 hours  insulin glargine Injectable (LANTUS) 12 Unit(s) SubCutaneous at bedtime  insulin lispro (ADMELOG) corrective regimen sliding scale   SubCutaneous Before meals and at bedtime  insulin lispro Injectable (ADMELOG) 8 Unit(s) SubCutaneous three times a day before meals  lisinopril 40 milliGRAM(s) Oral every 24 hours  NIFEdipine XL 90 milliGRAM(s) Oral every 24 hours  spironolactone 25 milliGRAM(s) Oral every 24 hours  torsemide 40 milliGRAM(s) Oral every 24 hours    MEDICATIONS  (PRN):  acetaminophen     Tablet .. 650 milliGRAM(s) Oral every 6 hours PRN Temp greater or equal to 38C (100.4F), Mild Pain (1 - 3)  dextrose Oral Gel 15 Gram(s) Oral once PRN Blood Glucose LESS THAN 70 milliGRAM(s)/deciliter  oxyCODONE    IR 5 milliGRAM(s) Oral every 6 hours PRN Moderate Pain (4 - 6)  oxyCODONE    IR 10 milliGRAM(s) Oral every 6 hours PRN Severe Pain (7 - 10)      Allergies    No Known Allergies    Intolerances        LABS:                        8.4    8.83  )-----------( 371      ( 02 Jan 2024 05:30 )             27.2     01-02    137  |  102  |  37<H>  ----------------------------<  307<H>  4.3   |  28  |  1.39<H>    Ca    8.7      02 Jan 2024 05:30  Phos  3.3     01-02  Mg     2.0     01-02    TPro  6.6  /  Alb  2.3<L>  /  TBili  0.2  /  DBili  x   /  AST  26  /  ALT  32  /  AlkPhos  199<H>  01-02      Urinalysis Basic - ( 02 Jan 2024 05:30 )    Color: x / Appearance: x / SG: x / pH: x  Gluc: 307 mg/dL / Ketone: x  / Bili: x / Urobili: x   Blood: x / Protein: x / Nitrite: x   Leuk Esterase: x / RBC: x / WBC x   Sq Epi: x / Non Sq Epi: x / Bacteria: x        RADIOLOGY & ADDITIONAL TESTS:     INTERVAL HPI/OVERNIGHT EVENTS: None    Patient seen and examined at bedside. Condition largely unchanged from yesterday. No acute complaints at this time.    VITAL SIGNS:  T(F): 98.2 (01-03-24 @ 15:50)  HR: 70 (01-03-24 @ 15:50)  BP: 116/67 (01-03-24 @ 15:50)  RR: 17 (01-03-24 @ 15:50)  SpO2: 96% (01-03-24 @ 15:50)  Wt(kg): --    PHYSICAL EXAM:  Constitutional: NAD, well-groomed, well-developed  HEENT: PERRLA, EOMI, Normal Hearing, MMM  Neck: No LAD, No JVD  Back: Normal spine flexure, No CVA tenderness  Respiratory: CTAB   Cardiovascular: S1 and S2, RRR, no M/G/R  Gastrointestinal: BS+, soft, NT/ND  Extremities: S/p BKA. No peripheral edema  Vascular: 2+ peripheral pulses  Neurological: A/O x 3, no focal deficits  Psychiatric: Normal mood, normal affect  Musculoskeletal: 5/5 strength b/l upper and lower extremities  Skin: No rashes      MEDICATIONS  (STANDING):  aspirin  chewable 81 milliGRAM(s) Oral every 24 hours  atorvastatin 80 milliGRAM(s) Oral at bedtime  carvedilol 25 milliGRAM(s) Oral every 12 hours  dextrose 5%. 1000 milliLiter(s) (50 mL/Hr) IV Continuous <Continuous>  dextrose 5%. 1000 milliLiter(s) (100 mL/Hr) IV Continuous <Continuous>  dextrose 50% Injectable 25 Gram(s) IV Push once  dextrose 50% Injectable 25 Gram(s) IV Push once  dextrose 50% Injectable 12.5 Gram(s) IV Push once  gabapentin 600 milliGRAM(s) Oral every 8 hours  glucagon  Injectable 1 milliGRAM(s) IntraMuscular once  heparin   Injectable 5000 Unit(s) SubCutaneous every 8 hours  hydrALAZINE 100 milliGRAM(s) Oral every 8 hours  insulin glargine Injectable (LANTUS) 12 Unit(s) SubCutaneous at bedtime  insulin lispro (ADMELOG) corrective regimen sliding scale   SubCutaneous Before meals and at bedtime  insulin lispro Injectable (ADMELOG) 8 Unit(s) SubCutaneous three times a day before meals  lisinopril 40 milliGRAM(s) Oral every 24 hours  NIFEdipine XL 90 milliGRAM(s) Oral every 24 hours  spironolactone 25 milliGRAM(s) Oral every 24 hours  torsemide 40 milliGRAM(s) Oral every 24 hours    MEDICATIONS  (PRN):  acetaminophen     Tablet .. 650 milliGRAM(s) Oral every 6 hours PRN Temp greater or equal to 38C (100.4F), Mild Pain (1 - 3)  dextrose Oral Gel 15 Gram(s) Oral once PRN Blood Glucose LESS THAN 70 milliGRAM(s)/deciliter  oxyCODONE    IR 5 milliGRAM(s) Oral every 6 hours PRN Moderate Pain (4 - 6)  oxyCODONE    IR 10 milliGRAM(s) Oral every 6 hours PRN Severe Pain (7 - 10)      Allergies    No Known Allergies    Intolerances        LABS:                        8.4    8.83  )-----------( 371      ( 02 Jan 2024 05:30 )             27.2     01-02    137  |  102  |  37<H>  ----------------------------<  307<H>  4.3   |  28  |  1.39<H>    Ca    8.7      02 Jan 2024 05:30  Phos  3.3     01-02  Mg     2.0     01-02    TPro  6.6  /  Alb  2.3<L>  /  TBili  0.2  /  DBili  x   /  AST  26  /  ALT  32  /  AlkPhos  199<H>  01-02      Urinalysis Basic - ( 02 Jan 2024 05:30 )    Color: x / Appearance: x / SG: x / pH: x  Gluc: 307 mg/dL / Ketone: x  / Bili: x / Urobili: x   Blood: x / Protein: x / Nitrite: x   Leuk Esterase: x / RBC: x / WBC x   Sq Epi: x / Non Sq Epi: x / Bacteria: x        RADIOLOGY & ADDITIONAL TESTS:

## 2024-01-03 NOTE — PROGRESS NOTE ADULT - PROBLEM SELECTOR PLAN 4
D/c meds: Hydralazine 100mg q8h and nifedipine XL 60mg QD     • Start nifedipine XL 90mg QD   • C/w hydral 100mg TID D/c meds: Hydralazine 100mg q8h and nifedipine XL 60mg QD     • C/w nifedipine XL 90mg QD   • C/w hydral 100mg TID

## 2024-01-03 NOTE — PROGRESS NOTE ADULT - PROBLEM SELECTOR PLAN 5
Hx of ICM HF mildly reduced EF (45-50% 11/09), cardiology following during last admission for HF. Patient d/c on Torsemide 40mg PO qd today, now returning from [facility]   - prior recs: IV lasix 80mg q12h w/ strict I/O while inpatient, switch to torsemide 40mg PO QD on d/c  - GDMT for HFrEF: Coreg 25mg BID, lisinopril 40mg QD, aldactone 25mg QD; avoid SGLT2i given PAD w/ delayed wound healing    Plan:  - c/w Torsemide 40mg po qd as patient was d/c ready, returning for new rehab placement due to incidental COVID infection finding  - C/w spironolactone 25mg q24h  - Strict I/Os  - c/w Coreg 25mg BID, lisinopril 40mg qd, aldactone 25mg qd  - if starts to become volume overloaded can transition back to IV Lasix 80mg BID Hx of ICM HF mildly reduced EF (45-50% 11/09), cardiology following during last admission for HF. Patient d/c on Torsemide 40mg PO qd today, now returning from [facility]   - prior recs: IV lasix 80mg q12h w/ strict I/O while inpatient, switch to torsemide 40mg PO QD on d/c  - GDMT for HFrEF: Coreg 25mg BID, lisinopril 40mg QD, aldactone 25mg QD; avoid SGLT2i given PAD w/ delayed wound healing    Plan:  - c/w Torsemide 40mg po qd as patient was d/c ready, returning for new rehab placement due to incidental COVID infection finding  - C/w spironolactone 25mg q24h  - Strict I/Os  - c/w Coreg 25mg BID, lisinopril 40mg qd, aldactone 25mg qd  - if starts to become volume overloaded can transition back to IV Lasix 80mg BID

## 2024-01-04 ENCOUNTER — TRANSCRIPTION ENCOUNTER (OUTPATIENT)
Age: 69
End: 2024-01-04

## 2024-01-04 LAB
GLUCOSE BLDC GLUCOMTR-MCNC: 126 MG/DL — HIGH (ref 70–99)
GLUCOSE BLDC GLUCOMTR-MCNC: 126 MG/DL — HIGH (ref 70–99)
GLUCOSE BLDC GLUCOMTR-MCNC: 184 MG/DL — HIGH (ref 70–99)
GLUCOSE BLDC GLUCOMTR-MCNC: 184 MG/DL — HIGH (ref 70–99)
GLUCOSE BLDC GLUCOMTR-MCNC: 189 MG/DL — HIGH (ref 70–99)
GLUCOSE BLDC GLUCOMTR-MCNC: 189 MG/DL — HIGH (ref 70–99)
GLUCOSE BLDC GLUCOMTR-MCNC: 241 MG/DL — HIGH (ref 70–99)
GLUCOSE BLDC GLUCOMTR-MCNC: 241 MG/DL — HIGH (ref 70–99)
GLUCOSE BLDC GLUCOMTR-MCNC: 252 MG/DL — HIGH (ref 70–99)
GLUCOSE BLDC GLUCOMTR-MCNC: 252 MG/DL — HIGH (ref 70–99)

## 2024-01-04 PROCEDURE — 99233 SBSQ HOSP IP/OBS HIGH 50: CPT | Mod: GC,24

## 2024-01-04 PROCEDURE — 99232 SBSQ HOSP IP/OBS MODERATE 35: CPT | Mod: GC

## 2024-01-04 RX ADMIN — Medication 8 UNIT(S): at 18:34

## 2024-01-04 RX ADMIN — OXYCODONE HYDROCHLORIDE 10 MILLIGRAM(S): 5 TABLET ORAL at 13:27

## 2024-01-04 RX ADMIN — Medication 6: at 01:02

## 2024-01-04 RX ADMIN — ATORVASTATIN CALCIUM 80 MILLIGRAM(S): 80 TABLET, FILM COATED ORAL at 23:06

## 2024-01-04 RX ADMIN — OXYCODONE HYDROCHLORIDE 10 MILLIGRAM(S): 5 TABLET ORAL at 01:02

## 2024-01-04 RX ADMIN — Medication 100 MILLIGRAM(S): at 06:43

## 2024-01-04 RX ADMIN — CARVEDILOL PHOSPHATE 25 MILLIGRAM(S): 80 CAPSULE, EXTENDED RELEASE ORAL at 09:42

## 2024-01-04 RX ADMIN — INSULIN GLARGINE 12 UNIT(S): 100 INJECTION, SOLUTION SUBCUTANEOUS at 23:06

## 2024-01-04 RX ADMIN — OXYCODONE HYDROCHLORIDE 10 MILLIGRAM(S): 5 TABLET ORAL at 14:27

## 2024-01-04 RX ADMIN — Medication 100 MILLIGRAM(S): at 15:28

## 2024-01-04 RX ADMIN — Medication 81 MILLIGRAM(S): at 06:42

## 2024-01-04 RX ADMIN — Medication 100 MILLIGRAM(S): at 23:06

## 2024-01-04 RX ADMIN — Medication 90 MILLIGRAM(S): at 06:43

## 2024-01-04 RX ADMIN — HEPARIN SODIUM 5000 UNIT(S): 5000 INJECTION INTRAVENOUS; SUBCUTANEOUS at 23:07

## 2024-01-04 RX ADMIN — HEPARIN SODIUM 5000 UNIT(S): 5000 INJECTION INTRAVENOUS; SUBCUTANEOUS at 06:43

## 2024-01-04 RX ADMIN — Medication 2: at 13:29

## 2024-01-04 RX ADMIN — Medication 650 MILLIGRAM(S): at 16:30

## 2024-01-04 RX ADMIN — Medication 8 UNIT(S): at 09:43

## 2024-01-04 RX ADMIN — GABAPENTIN 600 MILLIGRAM(S): 400 CAPSULE ORAL at 23:06

## 2024-01-04 RX ADMIN — OXYCODONE HYDROCHLORIDE 10 MILLIGRAM(S): 5 TABLET ORAL at 01:32

## 2024-01-04 RX ADMIN — GABAPENTIN 600 MILLIGRAM(S): 400 CAPSULE ORAL at 15:28

## 2024-01-04 RX ADMIN — OXYCODONE HYDROCHLORIDE 10 MILLIGRAM(S): 5 TABLET ORAL at 20:05

## 2024-01-04 RX ADMIN — Medication 8 UNIT(S): at 13:28

## 2024-01-04 RX ADMIN — HEPARIN SODIUM 5000 UNIT(S): 5000 INJECTION INTRAVENOUS; SUBCUTANEOUS at 15:28

## 2024-01-04 RX ADMIN — OXYCODONE HYDROCHLORIDE 10 MILLIGRAM(S): 5 TABLET ORAL at 07:56

## 2024-01-04 RX ADMIN — SPIRONOLACTONE 25 MILLIGRAM(S): 25 TABLET, FILM COATED ORAL at 15:28

## 2024-01-04 RX ADMIN — Medication 4: at 09:43

## 2024-01-04 RX ADMIN — GABAPENTIN 600 MILLIGRAM(S): 400 CAPSULE ORAL at 06:44

## 2024-01-04 RX ADMIN — Medication 2: at 18:34

## 2024-01-04 RX ADMIN — OXYCODONE HYDROCHLORIDE 10 MILLIGRAM(S): 5 TABLET ORAL at 19:05

## 2024-01-04 RX ADMIN — CARVEDILOL PHOSPHATE 25 MILLIGRAM(S): 80 CAPSULE, EXTENDED RELEASE ORAL at 21:35

## 2024-01-04 RX ADMIN — Medication 650 MILLIGRAM(S): at 15:36

## 2024-01-04 RX ADMIN — INSULIN GLARGINE 12 UNIT(S): 100 INJECTION, SOLUTION SUBCUTANEOUS at 01:02

## 2024-01-04 RX ADMIN — OXYCODONE HYDROCHLORIDE 10 MILLIGRAM(S): 5 TABLET ORAL at 07:26

## 2024-01-04 RX ADMIN — Medication 40 MILLIGRAM(S): at 06:43

## 2024-01-04 NOTE — DISCHARGE NOTE PROVIDER - PROVIDER TOKENS
PROVIDER:[TOKEN:[[id number]],SCHEDULEDAPPT:[01/18/2024]] PROVIDER:[TOKEN:[[id number]],SCHEDULEDAPPT:[01/18/2024]]

## 2024-01-04 NOTE — PROGRESS NOTE ADULT - ATTENDING COMMENTS
69yo M w/ PMHx CAD (2 stents 2020), HFmrEF (45-50%), HTN, PAD w/ remote RLE angioplasty, R 4th toe OM s/p partial R 4th ray amputation on 10/24, uncontrolled IDDM (a1c 12 in Oct 2023), recent admission 11/8-11/10 to cardiology service for hypertensive emergency (left AMA), recently admitted (12/01-12/26/23) for R foot soft tissue infection, now s/p R BKA (12/11/23), course c/b CDiff infection w/ completion of oral vanc on 12/25, discharged to [facility] on 12/26 and found to be COVID-19 positive    Medically stable for discharge and has authorization for [facility].  Patient made aware and refusing discharge, will provide DC notice.  Verbally abusive to staff.  Patient has safe discharge plan of [facility] if not agreeable can be discharged to home 69yo M w/ PMHx CAD (2 stents 2020), HFmrEF (45-50%), HTN, PAD w/ remote RLE angioplasty, R 4th toe OM s/p partial R 4th ray amputation on 10/24, uncontrolled IDDM (a1c 12 in Oct 2023), recent admission 11/8-11/10 to cardiology service for hypertensive emergency (left AMA), recently admitted (12/01-12/26/23) for R foot soft tissue infection, now s/p R BKA (12/11/23), course c/b CDiff infection w/ completion of oral vanc on 12/25, discharged to [facility] on 12/26 and found to be COVID-19 positive    Medically stable for discharge and has authorization for [facility].  Patient made aware and refusing discharge, will provide DC notice.  Verbally abusive to staff.  Patient has safe discharge plan of [facility] if not agreeable can be discharged to home

## 2024-01-04 NOTE — DISCHARGE NOTE PROVIDER - HOSPITAL COURSE
#Discharge: do not delete    Ward Avina is a 67yo M PMH CAD (2 stents 2020), HFmrEF (45-50%), HTN, PAD w/ remote RLE angioplasty, R 4th toe OM s/p partial R 4th ray amputation on 10/24, RLE angiogram with AT lithotripsy and AT/peroneal balloon angioplasty 10/27, uncontrolled IDDM (a1c 12 in Oct 2023), recent on   Hospital course (by problem):     Patient was discharged to: (home/[facility]/acute rehab/hospice, etc, and with what services – home health PT/RN? Home O2?)    New medications:   Changes to old medications:  Medications that were stopped:    Items to follow up as outpatient:    Physical exam at the time of discharge:    Patient was medically optimized, stable and ready for discharge. Plan of care and return precautions were discussed with the patient who verbally stated understanding. On the day of discharge, the patient was seen and examined. Symptoms improved. Vital signs are stable. Labs and imaging reviewed. Patient is medically optimized and hemodynamically stable. Return precautions discussed, medication teach back done, and importance of physician follow up emphasized. The patient verbalized understanding. #Discharge: do not delete    Ward Avina is a 69yo M PMH CAD (2 stents 2020), HFmrEF (45-50%), HTN, PAD w/ remote RLE angioplasty, R 4th toe OM s/p partial R 4th ray amputation on 10/24, RLE angiogram with AT lithotripsy and AT/peroneal balloon angioplasty 10/27, uncontrolled IDDM (a1c 12 in Oct 2023), recent on   Hospital course (by problem):     Patient was discharged to: (home/[facility]/acute rehab/hospice, etc, and with what services – home health PT/RN? Home O2?)    New medications:   Changes to old medications:  Medications that were stopped:    Items to follow up as outpatient:    Physical exam at the time of discharge:    Patient was medically optimized, stable and ready for discharge. Plan of care and return precautions were discussed with the patient who verbally stated understanding. On the day of discharge, the patient was seen and examined. Symptoms improved. Vital signs are stable. Labs and imaging reviewed. Patient is medically optimized and hemodynamically stable. Return precautions discussed, medication teach back done, and importance of physician follow up emphasized. The patient verbalized understanding. #Discharge: do not delete    Ward Avina is a 69yo M PMH CAD (2 stents 2020), HFmrEF (45-50%), HTN, PAD w/ remote RLE angioplasty, R 4th toe OM s/p partial R 4th ray amputation on 10/24, RLE angiogram with AT lithotripsy and AT/peroneal balloon angioplasty 10/27, uncontrolled IDDM (a1c 12 in Oct 2023), recent admission for gas gangrene s/p R BKA - d/c to [facility], returned to [facility] after being found +COVID. D/c'd to [facility] after completing quarantine period and Remdesivir x3d. Patient's respiratory and HD status stable without further interventions or tx throughout admission. No changed made to his medications.     Patient was discharged to: [facility]    New medications: NO  Changes to old medications: NO  Medications that were stopped: NO    Items to follow up as outpatient: Vasc sx.    Physical exam at the time of discharge:  : AAOx3, Right BKA w/ kerlix bandaging. LLE w/ +1 pitting edema, lungs cta-b/l, heart rrr, abd NT/ND    Patient was medically optimized, stable and ready for discharge. Plan of care and return precautions were discussed with the patient who verbally stated understanding. On the day of discharge, the patient was seen and examined. Symptoms improved. Vital signs are stable. Labs and imaging reviewed. Patient is medically optimized and hemodynamically stable. Return precautions discussed, medication teach back done, and importance of physician follow up emphasized. The patient verbalized understanding. #Discharge: do not delete    Ward Avina is a 67yo M PMH CAD (2 stents 2020), HFmrEF (45-50%), HTN, PAD w/ remote RLE angioplasty, R 4th toe OM s/p partial R 4th ray amputation on 10/24, RLE angiogram with AT lithotripsy and AT/peroneal balloon angioplasty 10/27, uncontrolled IDDM (a1c 12 in Oct 2023), recent admission for gas gangrene s/p R BKA - d/c to [facility], returned to [facility] after being found +COVID. D/c'd to [facility] after completing quarantine period and Remdesivir x3d. Patient's respiratory and HD status stable without further interventions or tx throughout admission. No changed made to his medications.     Patient was discharged to: [facility]    New medications: NO  Changes to old medications: NO  Medications that were stopped: NO    Items to follow up as outpatient: Vasc sx.    Physical exam at the time of discharge:  : AAOx3, Right BKA w/ kerlix bandaging. LLE w/ +1 pitting edema, lungs cta-b/l, heart rrr, abd NT/ND    Patient was medically optimized, stable and ready for discharge. Plan of care and return precautions were discussed with the patient who verbally stated understanding. On the day of discharge, the patient was seen and examined. Symptoms improved. Vital signs are stable. Labs and imaging reviewed. Patient is medically optimized and hemodynamically stable. Return precautions discussed, medication teach back done, and importance of physician follow up emphasized. The patient verbalized understanding.

## 2024-01-04 NOTE — DISCHARGE NOTE PROVIDER - CARE PROVIDER_API CALL
Michelet Freed  Vascular Surgery  [address] [address]  Phone: [phone]  Fax: [phone]  Scheduled Appointment: 01/18/2024   Michelet Freed  Vascular Surgery  [address] [address]  Phone: [phone]  Fax: [phone]  Scheduled Appointment: 01/18/2024

## 2024-01-04 NOTE — PROGRESS NOTE ADULT - PROBLEM SELECTOR PLAN 3
s/p R BKA on 12/11/23 and s/p BKA closure on 12/18/23 by vascular surgery at [facility]     • Pain regimen as follows:        - Tylenol 650mg po q6h PRN for mild pain/fever       - Oxy 5mg po q6h PRN for mod pain       - Oxy 10mg po q6h PRN for sev pain   • Has outpt f/u apt w/ vascular: Dr. Freed on 1/18/24 at 10 A.M. s/p R BKA on 12/11/23 and s/p BKA closure on 12/18/23 by vascular surgery at [facility]     • Pain regimen as follows:        - Tylenol 650mg po q6h PRN for mild pain/fever       - Oxy 5mg po q6h PRN for mod pain       - Oxy 10mg po q6h PRN for sev pain   • Has outpt f/u apt w/ vascular: Dr. Freed on 1/18/24 at 10 A.M.

## 2024-01-04 NOTE — PROGRESS NOTE ADULT - PROBLEM SELECTOR PLAN 4
D/c meds: Hydralazine 100mg q8h and nifedipine XL 60mg QD     • C/w nifedipine XL 90mg QD   • C/w hydral 100mg TID

## 2024-01-04 NOTE — DISCHARGE NOTE PROVIDER - ATTENDING DISCHARGE PHYSICAL EXAMINATION:
I have read and agree with the resident Discharge Note above.  Patient seen and discussed with resident team on the day of discharge.     Briefly, 67yo M w/ PMHx CAD (2 stents 2020), HFmrEF (45-50%), HTN, PAD w/ remote RLE angioplasty, R 4th toe OM s/p partial R 4th ray amputation on 10/24, uncontrolled IDDM (a1c 12 in Oct 2023), recent admission 11/8-11/10 to cardiology service for hypertensive emergency (left AMA), recently admitted (12/01-12/26/23) for R foot soft tissue infection, now s/p R BKA (12/11/23), course c/b CDiff infection w/ completion of oral vanc on 12/25, discharged to [facility] on 12/26 and found to be COVID-19 positive was treated with remdesivir, never required O2 or steroids.    Attending exam on day of discharge:   Gen: NAD, sitting upright in bed   HEENT: NCAT, MMM  Neck: supple, trachea at midline  CV: RRR  Pulm: normal work of breathing  Abd: soft, NTND  : deferred  Ext: s/p R BKA  Neuro: AOx3, no change from baseline    I was physically present for the evaluation and management services provided. I agree with the included history, physical, and plan which I reviewed and edited where appropriate. I spent > 30 minutes with the patient on direct patient care and discharge planning with more than 50% of the visit spent on counseling and/or coordination of care.     Thomas Galloway  [phone] I have read and agree with the resident Discharge Note above.  Patient seen and discussed with resident team on the day of discharge.     Briefly, 67yo M w/ PMHx CAD (2 stents 2020), HFmrEF (45-50%), HTN, PAD w/ remote RLE angioplasty, R 4th toe OM s/p partial R 4th ray amputation on 10/24, uncontrolled IDDM (a1c 12 in Oct 2023), recent admission 11/8-11/10 to cardiology service for hypertensive emergency (left AMA), recently admitted (12/01-12/26/23) for R foot soft tissue infection, now s/p R BKA (12/11/23), course c/b CDiff infection w/ completion of oral vanc on 12/25, discharged to [facility] on 12/26 and found to be COVID-19 positive was treated with remdesivir, never required O2 or steroids.    Attending exam on day of discharge:   Gen: NAD, sitting upright in bed   HEENT: NCAT, MMM  Neck: supple, trachea at midline  CV: RRR  Pulm: normal work of breathing  Abd: soft, NTND  : deferred  Ext: s/p R BKA  Neuro: AOx3, no change from baseline    I was physically present for the evaluation and management services provided. I agree with the included history, physical, and plan which I reviewed and edited where appropriate. I spent > 30 minutes with the patient on direct patient care and discharge planning with more than 50% of the visit spent on counseling and/or coordination of care.     Thomas Galloway  [phone]

## 2024-01-04 NOTE — PROGRESS NOTE ADULT - PROBLEM SELECTOR PLAN 1
Patient d/c to [facility] on 12/26, found to be COVID-19+ at [facility], returned to [facility] as rehab can't accept patients w/ COVID. He denies cough, runny nose, chills, sob, Found to have fever 101.6 degrees F in ED, no other SIRS criteria. Currently saturating 94-95% on room air, not hypoxic. Fever likely due to COVID-19 infection.  - Remdesivir for three days (last day: 12/29)     • C/w sx treatment as below Patient d/c to [facility] on 12/26, found to be COVID-19+ at [facility], returned to [facility] as rehab can't accept patients w/ COVID. He denies cough, runny nose, chills, sob, Found to have fever 101.6 degrees F in ED, no other SIRS criteria. Currently saturating 94-95% on room air, not hypoxic. Fever likely due to COVID-19 infection.  - Remdesivir for three days (last day: 12/29)     • C/w sx treatment as below

## 2024-01-04 NOTE — PROGRESS NOTE ADULT - ATTENDING COMMENTS
I have personally seen and examined this patient. I have fully participated in the care of this patient. I have reviewed all pertinent clinical information, including history, physical exam, plan and the Resident's, PA's and NP's note and agree except as noted. This is a patient originally known to Dr. Corky Oneal, but I was asked to perform the guillotine R BKA on 12/11/23 and take over his care rest of his recent admission. 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 He was transferred from the medical service to vascular postop and underwent staged R BKA w/ closure (12/18/23). He was discharged to [facility] on 12/26/23 but was sent back to the ED because he tested positive for COVID and the [facility] doesn't accept COVID patients.     On 1/4, he continues to feel fine. No leg pain. Remains AVSS. The R BKA stump remains fine. Staple line overall clean, dry and intact. Awaiting dispo    PLAN & RECOMMENDATIONS  - COVID management per primary medicine team  - ASA/SQH  - Physical therapy  - Daily dressing changes with dry gauze, kerlix and ace-wrap (ace-wrap from stump to thigh)  - Outpatient vascular surgery follow up with me on 1/18/24    Thank you,    Michelet Freed MD  Attending Vascular Surgeon  [facility] at [address] [place] [address]  Office: [phone]; Fax: [phone]  [email or web address] I have personally seen and examined this patient. I have fully participated in the care of this patient. I have reviewed all pertinent clinical information, including history, physical exam, plan and the Resident's, PA's and NP's note and agree except as noted. This is a patient originally known to Dr. Corky Oneal, but I was asked to perform the guillotine R BKA on 12/11/23 and take over his care rest of his recent admission. 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 He was transferred from the medical service to vascular postop and underwent staged R BKA w/ closure (12/18/23). He was discharged to [facility] on 12/26/23 but was sent back to the ED because he tested positive for COVID and the [facility] doesn't accept COVID patients.     On 1/4, he continues to feel fine. No leg pain. Remains AVSS. The R BKA stump remains fine. Staple line overall clean, dry and intact. Awaiting dispo    PLAN & RECOMMENDATIONS  - COVID management per primary medicine team  - ASA/SQH  - Physical therapy  - Daily dressing changes with dry gauze, kerlix and ace-wrap (ace-wrap from stump to thigh)  - Outpatient vascular surgery follow up with me on 1/18/24    Thank you,    Michelet Freed MD  Attending Vascular Surgeon  [facility] at [address] [place] [address]  Office: [phone]; Fax: [phone]  [email or web address]

## 2024-01-04 NOTE — DISCHARGE NOTE PROVIDER - NSDCCPCAREPLAN_GEN_ALL_CORE_FT
PRINCIPAL DISCHARGE DIAGNOSIS  Diagnosis: COVID-19  Assessment and Plan of Treatment: You were admitted to the hospital to treat a COVID infection in order for you to leave for [place]. You were treated with the antiviral medication Remdesivir for 3d. You did not have any respiratory symptoms during your stay.      SECONDARY DISCHARGE DIAGNOSES  Diagnosis: S/P BKA (below knee amputation), right  Assessment and Plan of Treatment: During your last admission, you underwent R leg amputation below the knee. You had the amputation due to a life-threatening infection called gas gangrene.   ---   Please follow up with VASCULAR SURGERY at the listed appointment time for your BKA.

## 2024-01-04 NOTE — PROGRESS NOTE ADULT - ASSESSMENT
69yo M w/ PMHx CAD (2 stents 2020), HFmrEF (45-50%), HTN, PAD w/ remote RLE angioplasty, R 4th toe OM s/p partial R 4th ray amputation on 10/24, uncontrolled IDDM (a1c 12 in Oct 2023), recent admission 11/8-11/10 to cardiology service for hypertensive emergency (left AMA), recently admitted (12/01-12/26/23) for R foot soft tissue infection, now s/p R BKA (12/11/23), course c/b CDiff infection w/ completion of oral vanc on 12/25, discharged to [facility] on 12/26 and found to be COVID-19 positive, [facility] unable to accept COVID+ patients, so he returned to [facility] for further monitoring.     Pending [facility] 67yo M w/ PMHx CAD (2 stents 2020), HFmrEF (45-50%), HTN, PAD w/ remote RLE angioplasty, R 4th toe OM s/p partial R 4th ray amputation on 10/24, uncontrolled IDDM (a1c 12 in Oct 2023), recent admission 11/8-11/10 to cardiology service for hypertensive emergency (left AMA), recently admitted (12/01-12/26/23) for R foot soft tissue infection, now s/p R BKA (12/11/23), course c/b CDiff infection w/ completion of oral vanc on 12/25, discharged to [facility] on 12/26 and found to be COVID-19 positive, [facility] unable to accept COVID+ patients, so he returned to [facility] for further monitoring.     Pending [facility]

## 2024-01-04 NOTE — PROGRESS NOTE ADULT - SUBJECTIVE AND OBJECTIVE BOX
Subjective: Pt seen and examined bedside. Still complaining of pain around his BKA.     ROS:   Denies Headache, blurred vision, Chest Pain, SOB, Abdominal pain, nausea or vomiting     Social   aspirin  chewable 81  carvedilol 25  heparin   Injectable 5000  hydrALAZINE 100  lisinopril 40  NIFEdipine XL 90  spironolactone 25  torsemide 40      Allergies    No Known Allergies    Intolerances        Vital Signs Last 24 Hrs  T(C): 37.2 (04 Jan 2024 06:06), Max: 37.2 (04 Jan 2024 06:06)  T(F): 99 (04 Jan 2024 06:06), Max: 99 (04 Jan 2024 06:06)  HR: 70 (04 Jan 2024 06:06) (70 - 75)  BP: 136/75 (04 Jan 2024 06:06) (116/67 - 175/77)  BP(mean): 110 (03 Jan 2024 22:05) (110 - 110)  RR: 18 (04 Jan 2024 06:06) (17 - 18)  SpO2: 94% (04 Jan 2024 06:06) (94% - 96%)    Parameters below as of 04 Jan 2024 06:06  Patient On (Oxygen Delivery Method): room air      I&O's Summary    03 Jan 2024 07:01  -  04 Jan 2024 07:00  --------------------------------------------------------  IN: 0 mL / OUT: 1000 mL / NET: -1000 mL        Physical Exam:  Gen: NAD, resting comfortably   CV: NSR  Pulm: no respiratory distress on RA  Abd: soft, ND, NTTP, no rebound or guarding   Ext: R BKA stump incision with staples in tact, with mild dehisence centrally with granular base, no signs of infection, no drainage,         LABS:  No labs yet drawn today              A/P:  69yo M PMH CAD (2 stents 2020), HFmrEF (45-50%), HTN, PAD w/ remote RLE angioplasty, R 4th toe OM s/p partial R 4th ray amputation on 10/24, RLE angiogram with AT lithotripsy and AT/peroneal balloon angioplasty 10/27, uncontrolled T2DM recently admitted for R foot gas gangrene s/p R BKA (12/11) and closure (12/18) c/b c. diff infection s/p treatment now returns from [facility] for incidental finding of COVID. Vascular surgery consulted for management of BKA stump.    -ASA/SQH  - Physical therapy  - COVID management per primary medical team  - Daily dressing changes by vascular with betadine, dry gauze, kerlix and ace-wrap (ace-wrap from stump to thigh); BKA stump appears to be healing well  - Outpatient vascular surgery follow up with Dr. Freed on 1/18/24    Vascular will continue to follow.        Subjective: Pt seen and examined bedside. Still complaining of pain around his BKA.     ROS:   Denies Headache, blurred vision, Chest Pain, SOB, Abdominal pain, nausea or vomiting     Social   aspirin  chewable 81  carvedilol 25  heparin   Injectable 5000  hydrALAZINE 100  lisinopril 40  NIFEdipine XL 90  spironolactone 25  torsemide 40      Allergies    No Known Allergies    Intolerances        Vital Signs Last 24 Hrs  T(C): 37.2 (04 Jan 2024 06:06), Max: 37.2 (04 Jan 2024 06:06)  T(F): 99 (04 Jan 2024 06:06), Max: 99 (04 Jan 2024 06:06)  HR: 70 (04 Jan 2024 06:06) (70 - 75)  BP: 136/75 (04 Jan 2024 06:06) (116/67 - 175/77)  BP(mean): 110 (03 Jan 2024 22:05) (110 - 110)  RR: 18 (04 Jan 2024 06:06) (17 - 18)  SpO2: 94% (04 Jan 2024 06:06) (94% - 96%)    Parameters below as of 04 Jan 2024 06:06  Patient On (Oxygen Delivery Method): room air      I&O's Summary    03 Jan 2024 07:01  -  04 Jan 2024 07:00  --------------------------------------------------------  IN: 0 mL / OUT: 1000 mL / NET: -1000 mL        Physical Exam:  Gen: NAD, resting comfortably   CV: NSR  Pulm: no respiratory distress on RA  Abd: soft, ND, NTTP, no rebound or guarding   Ext: R BKA stump incision with staples in tact, with mild dehisence centrally with granular base, no signs of infection, no drainage,         LABS:  No labs yet drawn today              A/P:  67yo M PMH CAD (2 stents 2020), HFmrEF (45-50%), HTN, PAD w/ remote RLE angioplasty, R 4th toe OM s/p partial R 4th ray amputation on 10/24, RLE angiogram with AT lithotripsy and AT/peroneal balloon angioplasty 10/27, uncontrolled T2DM recently admitted for R foot gas gangrene s/p R BKA (12/11) and closure (12/18) c/b c. diff infection s/p treatment now returns from [facility] for incidental finding of COVID. Vascular surgery consulted for management of BKA stump.    -ASA/SQH  - Physical therapy  - COVID management per primary medical team  - Daily dressing changes by vascular with betadine, dry gauze, kerlix and ace-wrap (ace-wrap from stump to thigh); BKA stump appears to be healing well  - Outpatient vascular surgery follow up with Dr. Freed on 1/18/24    Vascular will continue to follow.

## 2024-01-04 NOTE — PROGRESS NOTE ADULT - SUBJECTIVE AND OBJECTIVE BOX
Medicine Progress Note  INCOMPLETE NOTE    INTERVAL EVENTS:   No acute events overnight.    SUBJECTIVE:   Patient seen and examined at bedside. Condition largely unchanged from yesterday. No acute complaints at this time.    ROS:  Negative unless otherwise stated above.    PHYSICAL EXAM:  General: Alert and oriented x 3. No acute distress. Well-nourished.  Eyes: EOMI. Anicteric.  HEENT: Moist mucous membranes. No scleral icterus. No cervical lymphadenopathy.  Lungs: Clear to auscultation bilaterally. No accessory muscle use.  Cardiovascular: Regular rate and rhythm. No murmur. No JVD.  Abdomen: Soft, non-tender and non-distended. No palpable masses.  Extremities: No edema. Non-tender.  Skin: No rashes or lesions. Warm.  Neurologic: No focal neurological deficits. CN II-XII grossly intact, but not individually tested.  Psychiatric: Cooperative. Appropriate mood and affect.    VITAL SIGNS:  Vital Signs Last 24 Hrs  T(C): 37.2 (04 Jan 2024 06:06), Max: 37.2 (04 Jan 2024 06:06)  T(F): 99 (04 Jan 2024 06:06), Max: 99 (04 Jan 2024 06:06)  HR: 70 (04 Jan 2024 06:06) (70 - 75)  BP: 136/75 (04 Jan 2024 06:06) (116/67 - 175/77)  BP(mean): 110 (03 Jan 2024 22:05) (110 - 110)  RR: 18 (04 Jan 2024 06:06) (17 - 18)  SpO2: 94% (04 Jan 2024 06:06) (94% - 96%)    Parameters below as of 04 Jan 2024 06:06  Patient On (Oxygen Delivery Method): room air      <INS & OUTS:    01-02-24 @ 07:01  -  01-03-24 @ 07:00  --------------------------------------------------------  IN: 0 mL / OUT: 2600 mL / NET: -2600 mL      INPATIENT MEDICATIONS:   MEDICATIONS  (STANDING):  aspirin  chewable 81 milliGRAM(s) Oral every 24 hours  atorvastatin 80 milliGRAM(s) Oral at bedtime  carvedilol 25 milliGRAM(s) Oral every 12 hours  dextrose 5%. 1000 milliLiter(s) (50 mL/Hr) IV Continuous <Continuous>  dextrose 5%. 1000 milliLiter(s) (100 mL/Hr) IV Continuous <Continuous>  dextrose 50% Injectable 25 Gram(s) IV Push once  dextrose 50% Injectable 12.5 Gram(s) IV Push once  dextrose 50% Injectable 25 Gram(s) IV Push once  gabapentin 600 milliGRAM(s) Oral every 8 hours  glucagon  Injectable 1 milliGRAM(s) IntraMuscular once  heparin   Injectable 5000 Unit(s) SubCutaneous every 8 hours  hydrALAZINE 100 milliGRAM(s) Oral every 8 hours  insulin glargine Injectable (LANTUS) 12 Unit(s) SubCutaneous at bedtime  insulin lispro (ADMELOG) corrective regimen sliding scale   SubCutaneous Before meals and at bedtime  insulin lispro Injectable (ADMELOG) 8 Unit(s) SubCutaneous three times a day before meals  lisinopril 40 milliGRAM(s) Oral every 24 hours  NIFEdipine XL 90 milliGRAM(s) Oral every 24 hours  spironolactone 25 milliGRAM(s) Oral every 24 hours  torsemide 40 milliGRAM(s) Oral every 24 hours    MEDICATIONS  (PRN):  acetaminophen     Tablet .. 650 milliGRAM(s) Oral every 6 hours PRN Temp greater or equal to 38C (100.4F), Mild Pain (1 - 3)  dextrose Oral Gel 15 Gram(s) Oral once PRN Blood Glucose LESS THAN 70 milliGRAM(s)/deciliter  oxyCODONE    IR 10 milliGRAM(s) Oral every 6 hours PRN Severe Pain (7 - 10)  oxyCODONE    IR 5 milliGRAM(s) Oral every 6 hours PRN Moderate Pain (4 - 6)    ALLERGIES:  Allergies    No Known Allergies    Intolerances    LABS:             CAPILLARY BLOOD GLUCOSE      POCT Blood Glucose.: 252 mg/dL (04 Jan 2024 00:59)    RADIOLOGY & ADDITIONAL TESTS: Reviewed. Medicine Progress Note    INTERVAL EVENTS:   No acute events overnight.    SUBJECTIVE:   Patient seen and examined at bedside. Condition largely unchanged from yesterday. No acute complaints at this time.    ROS:  Negative unless otherwise stated above.    PHYSICAL EXAM:  General: Alert and oriented x 3. No acute distress. Well-nourished.  Eyes: EOMI. Anicteric.  HEENT: Moist mucous membranes. No scleral icterus. No cervical lymphadenopathy.  Lungs: Clear to auscultation bilaterally. No accessory muscle use.  Cardiovascular: Regular rate and rhythm. No murmur. No JVD.  Abdomen: Soft, non-tender and non-distended. No palpable masses.  Extremities: No edema. Non-tender.  Skin: No rashes or lesions. Warm.  Neurologic: No focal neurological deficits. CN II-XII grossly intact, but not individually tested.  Psychiatric: Cooperative. Appropriate mood and affect.    VITAL SIGNS:  Vital Signs Last 24 Hrs  T(C): 37.2 (04 Jan 2024 06:06), Max: 37.2 (04 Jan 2024 06:06)  T(F): 99 (04 Jan 2024 06:06), Max: 99 (04 Jan 2024 06:06)  HR: 70 (04 Jan 2024 06:06) (70 - 75)  BP: 136/75 (04 Jan 2024 06:06) (116/67 - 175/77)  BP(mean): 110 (03 Jan 2024 22:05) (110 - 110)  RR: 18 (04 Jan 2024 06:06) (17 - 18)  SpO2: 94% (04 Jan 2024 06:06) (94% - 96%)    Parameters below as of 04 Jan 2024 06:06  Patient On (Oxygen Delivery Method): room air      <INS & OUTS:    01-02-24 @ 07:01  -  01-03-24 @ 07:00  --------------------------------------------------------  IN: 0 mL / OUT: 2600 mL / NET: -2600 mL      INPATIENT MEDICATIONS:   MEDICATIONS  (STANDING):  aspirin  chewable 81 milliGRAM(s) Oral every 24 hours  atorvastatin 80 milliGRAM(s) Oral at bedtime  carvedilol 25 milliGRAM(s) Oral every 12 hours  dextrose 5%. 1000 milliLiter(s) (50 mL/Hr) IV Continuous <Continuous>  dextrose 5%. 1000 milliLiter(s) (100 mL/Hr) IV Continuous <Continuous>  dextrose 50% Injectable 25 Gram(s) IV Push once  dextrose 50% Injectable 12.5 Gram(s) IV Push once  dextrose 50% Injectable 25 Gram(s) IV Push once  gabapentin 600 milliGRAM(s) Oral every 8 hours  glucagon  Injectable 1 milliGRAM(s) IntraMuscular once  heparin   Injectable 5000 Unit(s) SubCutaneous every 8 hours  hydrALAZINE 100 milliGRAM(s) Oral every 8 hours  insulin glargine Injectable (LANTUS) 12 Unit(s) SubCutaneous at bedtime  insulin lispro (ADMELOG) corrective regimen sliding scale   SubCutaneous Before meals and at bedtime  insulin lispro Injectable (ADMELOG) 8 Unit(s) SubCutaneous three times a day before meals  lisinopril 40 milliGRAM(s) Oral every 24 hours  NIFEdipine XL 90 milliGRAM(s) Oral every 24 hours  spironolactone 25 milliGRAM(s) Oral every 24 hours  torsemide 40 milliGRAM(s) Oral every 24 hours    MEDICATIONS  (PRN):  acetaminophen     Tablet .. 650 milliGRAM(s) Oral every 6 hours PRN Temp greater or equal to 38C (100.4F), Mild Pain (1 - 3)  dextrose Oral Gel 15 Gram(s) Oral once PRN Blood Glucose LESS THAN 70 milliGRAM(s)/deciliter  oxyCODONE    IR 10 milliGRAM(s) Oral every 6 hours PRN Severe Pain (7 - 10)  oxyCODONE    IR 5 milliGRAM(s) Oral every 6 hours PRN Moderate Pain (4 - 6)    ALLERGIES:  Allergies    No Known Allergies    Intolerances    LABS:             CAPILLARY BLOOD GLUCOSE      POCT Blood Glucose.: 252 mg/dL (04 Jan 2024 00:59)    RADIOLOGY & ADDITIONAL TESTS: Reviewed.

## 2024-01-04 NOTE — PROGRESS NOTE ADULT - PROBLEM SELECTOR PLAN 5
Hx of ICM HF mildly reduced EF (45-50% 11/09), cardiology following during last admission for HF. Patient d/c on Torsemide 40mg PO qd today, now returning from [facility]   - prior recs: IV lasix 80mg q12h w/ strict I/O while inpatient, switch to torsemide 40mg PO QD on d/c  - GDMT for HFrEF: Coreg 25mg BID, lisinopril 40mg QD, aldactone 25mg QD; avoid SGLT2i given PAD w/ delayed wound healing    Plan:  - c/w Torsemide 40mg po qd as patient was d/c ready, returning for new rehab placement due to incidental COVID infection finding  - C/w spironolactone 25mg q24h  - Strict I/Os  - c/w Coreg 25mg BID, lisinopril 40mg qd, aldactone 25mg qd  - if starts to become volume overloaded can transition back to IV Lasix 80mg BID Hx of ICM HF mildly reduced EF (45-50% 11/09), cardiology following during last admission for HF. Patient d/c on Torsemide 40mg PO qd today, now returning from [facility]   - prior recs: IV lasix 80mg q12h w/ strict I/O while inpatient, switch to torsemide 40mg PO QD on d/c  - GDMT for HFrEF: Coreg 25mg BID, lisinopril 40mg QD, aldactone 25mg QD; avoid SGLT2i given PAD w/ delayed wound healing    Plan:  - c/w Torsemide 40mg po qd as patient was d/c ready, returning for new rehab placement due to incidental COVID infection finding  - C/w spironolactone 25mg q24h  - Strict I/Os  - c/w Coreg 25mg BID, lisinopril 40mg qd, aldactone 25mg qd  - if starts to become volume overloaded can transition back to IV Lasix 80mg BID

## 2024-01-04 NOTE — DISCHARGE NOTE PROVIDER - NSDCMRMEDTOKEN_GEN_ALL_CORE_FT
aspirin 81 mg oral tablet, chewable: 1 tab(s) orally once a day  atorvastatin 80 mg oral tablet: 1 tab(s) orally once a day (at bedtime)  carvedilol 25 mg oral tablet: 1 tab(s) orally every 12 hours  gabapentin 400 mg oral capsule: 2 cap(s) orally 3 times a day  hydrALAZINE 100 mg oral tablet: 1 tab(s) orally 3 times a day  insulin lispro 100 units/mL injectable solution: 8 unit(s) injectable 3 times a day (with meals)  Lantus 100 units/mL subcutaneous solution: 12 unit(s) subcutaneous once a day (at bedtime)  lisinopril 40 mg oral tablet: 1 tab(s) orally once a day  naloxone 4 mg/0.1 mL nasal spray: 4 milligram(s) intranasally once a day as needed for overdose  NIFEdipine 60 mg oral tablet, extended release: 1 tab(s) orally once a day  oxyCODONE 10 mg oral tablet: 1 tab(s) orally every 6 hours as needed for  severe pain MDD: 4  oxyCODONE 5 mg oral tablet: 1 tab(s) orally every 6 hours as needed for Moderate Pain (4 - 6) MDD: 4 tab  spironolactone 25 mg oral tablet: 1 tab(s) orally once a day  torsemide 40 mg oral tablet: 1 tab(s) orally once a day

## 2024-01-05 ENCOUNTER — TRANSCRIPTION ENCOUNTER (OUTPATIENT)
Age: 69
End: 2024-01-05

## 2024-01-05 VITALS
OXYGEN SATURATION: 97 % | HEART RATE: 89 BPM | TEMPERATURE: 98 F | SYSTOLIC BLOOD PRESSURE: 133 MMHG | RESPIRATION RATE: 18 BRPM | DIASTOLIC BLOOD PRESSURE: 66 MMHG

## 2024-01-05 LAB
GLUCOSE BLDC GLUCOMTR-MCNC: 142 MG/DL — HIGH (ref 70–99)
GLUCOSE BLDC GLUCOMTR-MCNC: 142 MG/DL — HIGH (ref 70–99)
GLUCOSE BLDC GLUCOMTR-MCNC: 143 MG/DL — HIGH (ref 70–99)
GLUCOSE BLDC GLUCOMTR-MCNC: 143 MG/DL — HIGH (ref 70–99)
GLUCOSE BLDC GLUCOMTR-MCNC: 97 MG/DL — SIGNIFICANT CHANGE UP (ref 70–99)
GLUCOSE BLDC GLUCOMTR-MCNC: 97 MG/DL — SIGNIFICANT CHANGE UP (ref 70–99)

## 2024-01-05 PROCEDURE — 86900 BLOOD TYPING SEROLOGIC ABO: CPT

## 2024-01-05 PROCEDURE — 97162 PT EVAL MOD COMPLEX 30 MIN: CPT

## 2024-01-05 PROCEDURE — 97530 THERAPEUTIC ACTIVITIES: CPT

## 2024-01-05 PROCEDURE — 85025 COMPLETE CBC W/AUTO DIFF WBC: CPT

## 2024-01-05 PROCEDURE — 83735 ASSAY OF MAGNESIUM: CPT

## 2024-01-05 PROCEDURE — 80053 COMPREHEN METABOLIC PANEL: CPT

## 2024-01-05 PROCEDURE — 99285 EMERGENCY DEPT VISIT HI MDM: CPT

## 2024-01-05 PROCEDURE — 80048 BASIC METABOLIC PNL TOTAL CA: CPT

## 2024-01-05 PROCEDURE — 99239 HOSP IP/OBS DSCHRG MGMT >30: CPT | Mod: GC

## 2024-01-05 PROCEDURE — 84100 ASSAY OF PHOSPHORUS: CPT

## 2024-01-05 PROCEDURE — 99233 SBSQ HOSP IP/OBS HIGH 50: CPT | Mod: GC,24

## 2024-01-05 PROCEDURE — 86850 RBC ANTIBODY SCREEN: CPT

## 2024-01-05 PROCEDURE — 82962 GLUCOSE BLOOD TEST: CPT

## 2024-01-05 PROCEDURE — 86901 BLOOD TYPING SEROLOGIC RH(D): CPT

## 2024-01-05 PROCEDURE — 97110 THERAPEUTIC EXERCISES: CPT

## 2024-01-05 PROCEDURE — 87635 SARS-COV-2 COVID-19 AMP PRB: CPT

## 2024-01-05 PROCEDURE — 36415 COLL VENOUS BLD VENIPUNCTURE: CPT

## 2024-01-05 PROCEDURE — 87040 BLOOD CULTURE FOR BACTERIA: CPT

## 2024-01-05 PROCEDURE — 85027 COMPLETE CBC AUTOMATED: CPT

## 2024-01-05 PROCEDURE — 90935 HEMODIALYSIS ONE EVALUATION: CPT

## 2024-01-05 RX ADMIN — OXYCODONE HYDROCHLORIDE 10 MILLIGRAM(S): 5 TABLET ORAL at 01:10

## 2024-01-05 RX ADMIN — CARVEDILOL PHOSPHATE 25 MILLIGRAM(S): 80 CAPSULE, EXTENDED RELEASE ORAL at 10:14

## 2024-01-05 RX ADMIN — GABAPENTIN 600 MILLIGRAM(S): 400 CAPSULE ORAL at 14:35

## 2024-01-05 RX ADMIN — Medication 8 UNIT(S): at 14:33

## 2024-01-05 RX ADMIN — Medication 81 MILLIGRAM(S): at 06:06

## 2024-01-05 RX ADMIN — OXYCODONE HYDROCHLORIDE 10 MILLIGRAM(S): 5 TABLET ORAL at 07:53

## 2024-01-05 RX ADMIN — OXYCODONE HYDROCHLORIDE 10 MILLIGRAM(S): 5 TABLET ORAL at 15:34

## 2024-01-05 RX ADMIN — Medication 40 MILLIGRAM(S): at 06:06

## 2024-01-05 RX ADMIN — SPIRONOLACTONE 25 MILLIGRAM(S): 25 TABLET, FILM COATED ORAL at 18:49

## 2024-01-05 RX ADMIN — HEPARIN SODIUM 5000 UNIT(S): 5000 INJECTION INTRAVENOUS; SUBCUTANEOUS at 06:07

## 2024-01-05 RX ADMIN — Medication 90 MILLIGRAM(S): at 06:07

## 2024-01-05 RX ADMIN — OXYCODONE HYDROCHLORIDE 10 MILLIGRAM(S): 5 TABLET ORAL at 08:53

## 2024-01-05 RX ADMIN — OXYCODONE HYDROCHLORIDE 10 MILLIGRAM(S): 5 TABLET ORAL at 14:34

## 2024-01-05 RX ADMIN — OXYCODONE HYDROCHLORIDE 10 MILLIGRAM(S): 5 TABLET ORAL at 02:10

## 2024-01-05 RX ADMIN — GABAPENTIN 600 MILLIGRAM(S): 400 CAPSULE ORAL at 06:07

## 2024-01-05 RX ADMIN — HEPARIN SODIUM 5000 UNIT(S): 5000 INJECTION INTRAVENOUS; SUBCUTANEOUS at 14:35

## 2024-01-05 RX ADMIN — Medication 8 UNIT(S): at 10:14

## 2024-01-05 RX ADMIN — Medication 100 MILLIGRAM(S): at 06:07

## 2024-01-05 RX ADMIN — Medication 8 UNIT(S): at 18:39

## 2024-01-05 RX ADMIN — LISINOPRIL 40 MILLIGRAM(S): 2.5 TABLET ORAL at 14:35

## 2024-01-05 NOTE — PROGRESS NOTE ADULT - PROBLEM SELECTOR PLAN 12
F: None  E: replete as necessary  N: Carb consistent diet  DVT: Heparin Subq  Dispo: RMF

## 2024-01-05 NOTE — PROGRESS NOTE ADULT - ATTENDING COMMENTS
I have personally seen and examined this patient. I have fully participated in the care of this patient. I have reviewed all pertinent clinical information, including history, physical exam, plan and the Resident's, PA's and NP's note and agree except as noted. This is a patient originally known to Dr. Corky Oneal, but I was asked to perform the guillotine R BKA on 12/11/23 and take over his care rest of his recent admission. 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 He was transferred from the medical service to vascular postop and underwent staged R BKA w/ closure (12/18/23). He was discharged to [facility] on 12/26/23 but was sent back to the ED because he tested positive for COVID and the [facility] doesn't accept COVID patients.     No major complaints. he continues to feel fine. No leg pain. Remains AVSS. The R BKA stump remains fine. Staple line overall clean, dry and intact. Awaiting dispo    PLAN & RECOMMENDATIONS  - COVID management per primary medicine team  - ASA/SQH  - Physical therapy  - Daily dressing changes with dry gauze, kerlix and ace-wrap (ace-wrap from stump to thigh)  - Outpatient vascular surgery follow up with me on 1/18/24    Thank you,    Michelet Freed MD  Attending Vascular Surgeon  [facility] at [address], [address] [place] [address]  Office: [phone]; Fax: [phone]  [email or web address] I have personally seen and examined this patient. I have fully participated in the care of this patient. I have reviewed all pertinent clinical information, including history, physical exam, plan and the Resident's, PA's and NP's note and agree except as noted. This is a patient originally known to Dr. Corky Oneal, but I was asked to perform the guillotine R BKA on 12/11/23 and take over his care rest of his recent admission. 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 He was transferred from the medical service to vascular postop and underwent staged R BKA w/ closure (12/18/23). He was discharged to [facility] on 12/26/23 but was sent back to the ED because he tested positive for COVID and the [facility] doesn't accept COVID patients.     No major complaints. he continues to feel fine. No leg pain. Remains AVSS. The R BKA stump remains fine. Staple line overall clean, dry and intact. Awaiting dispo    PLAN & RECOMMENDATIONS  - COVID management per primary medicine team  - ASA/SQH  - Physical therapy  - Daily dressing changes with dry gauze, kerlix and ace-wrap (ace-wrap from stump to thigh)  - Outpatient vascular surgery follow up with me on 1/18/24    Thank you,    Michelet Freed MD  Attending Vascular Surgeon  [facility] at [address] [place] [address]  Office: [phone]; Fax: [phone]  [email or web address]

## 2024-01-05 NOTE — PROGRESS NOTE ADULT - ASSESSMENT
67yo M w/ PMHx CAD (2 stents 2020), HFmrEF (45-50%), HTN, PAD w/ remote RLE angioplasty, R 4th toe OM s/p partial R 4th ray amputation on 10/24, uncontrolled IDDM (a1c 12 in Oct 2023), recent admission 11/8-11/10 to cardiology service for hypertensive emergency (left AMA), recently admitted (12/01-12/26/23) for R foot soft tissue infection, now s/p R BKA (12/11/23), course c/b CDiff infection w/ completion of oral vanc on 12/25, discharged to [facility] on 12/26 and found to be COVID-19 positive, [facility] unable to accept COVID+ patients, so he returned to [facility] for further monitoring.     Accepted to [facility]  D/c notice given 01/05 67yo M w/ PMHx CAD (2 stents 2020), HFmrEF (45-50%), HTN, PAD w/ remote RLE angioplasty, R 4th toe OM s/p partial R 4th ray amputation on 10/24, uncontrolled IDDM (a1c 12 in Oct 2023), recent admission 11/8-11/10 to cardiology service for hypertensive emergency (left AMA), recently admitted (12/01-12/26/23) for R foot soft tissue infection, now s/p R BKA (12/11/23), course c/b CDiff infection w/ completion of oral vanc on 12/25, discharged to [facility] on 12/26 and found to be COVID-19 positive, [facility] unable to accept COVID+ patients, so he returned to [facility] for further monitoring.     Accepted to [facility]  D/c notice given 01/05

## 2024-01-05 NOTE — PROGRESS NOTE ADULT - SUBJECTIVE AND OBJECTIVE BOX
Medicine Progress Note    INTERVAL EVENTS:   No acute events overnight.    SUBJECTIVE:   Patient seen and examined at bedside. Condition largely unchanged from yesterday. No acute complaints at this time.    ROS:  Negative unless otherwise stated above.    PHYSICAL EXAM:  General: Alert and oriented x 3. No acute distress. Well-nourished.  Eyes: EOMI. Anicteric.  HEENT: Moist mucous membranes. No scleral icterus. No cervical lymphadenopathy.  Lungs: Clear to auscultation bilaterally. No accessory muscle use.  Cardiovascular: Regular rate and rhythm. No murmur. No JVD.  Abdomen: Soft, non-tender and non-distended. No palpable masses.  Extremities: No edema. Non-tender.  Skin: No rashes or lesions. Warm.  Neurologic: No focal neurological deficits. CN II-XII grossly intact, but not individually tested.  Psychiatric: Cooperative. Appropriate mood and affect.    VITAL SIGNS:  Vital Signs Last 24 Hrs  T(C): 36.6 (05 Jan 2024 10:19), Max: 37.2 (04 Jan 2024 23:04)  T(F): 97.9 (05 Jan 2024 10:19), Max: 98.9 (04 Jan 2024 23:04)  HR: 89 (05 Jan 2024 10:19) (69 - 89)  BP: 133/66 (05 Jan 2024 10:19) (114/66 - 150/85)  BP(mean): --  RR: 18 (05 Jan 2024 10:19) (18 - 18)  SpO2: 97% (05 Jan 2024 10:19) (96% - 97%)    Parameters below as of 05 Jan 2024 10:19  Patient On (Oxygen Delivery Method): room air      <INS & OUTS:    INPATIENT MEDICATIONS:   MEDICATIONS  (STANDING):  aspirin  chewable 81 milliGRAM(s) Oral every 24 hours  atorvastatin 80 milliGRAM(s) Oral at bedtime  carvedilol 25 milliGRAM(s) Oral every 12 hours  dextrose 5%. 1000 milliLiter(s) (50 mL/Hr) IV Continuous <Continuous>  dextrose 5%. 1000 milliLiter(s) (100 mL/Hr) IV Continuous <Continuous>  dextrose 50% Injectable 25 Gram(s) IV Push once  dextrose 50% Injectable 12.5 Gram(s) IV Push once  dextrose 50% Injectable 25 Gram(s) IV Push once  gabapentin 600 milliGRAM(s) Oral every 8 hours  glucagon  Injectable 1 milliGRAM(s) IntraMuscular once  heparin   Injectable 5000 Unit(s) SubCutaneous every 8 hours  hydrALAZINE 100 milliGRAM(s) Oral every 8 hours  insulin glargine Injectable (LANTUS) 12 Unit(s) SubCutaneous at bedtime  insulin lispro (ADMELOG) corrective regimen sliding scale   SubCutaneous Before meals and at bedtime  insulin lispro Injectable (ADMELOG) 8 Unit(s) SubCutaneous three times a day before meals  lisinopril 40 milliGRAM(s) Oral every 24 hours  NIFEdipine XL 90 milliGRAM(s) Oral every 24 hours  spironolactone 25 milliGRAM(s) Oral every 24 hours  torsemide 40 milliGRAM(s) Oral every 24 hours    MEDICATIONS  (PRN):  acetaminophen     Tablet .. 650 milliGRAM(s) Oral every 6 hours PRN Temp greater or equal to 38C (100.4F), Mild Pain (1 - 3)  dextrose Oral Gel 15 Gram(s) Oral once PRN Blood Glucose LESS THAN 70 milliGRAM(s)/deciliter  oxyCODONE    IR 10 milliGRAM(s) Oral every 6 hours PRN Severe Pain (7 - 10)  oxyCODONE    IR 5 milliGRAM(s) Oral every 6 hours PRN Moderate Pain (4 - 6)    ALLERGIES:  Allergies    No Known Allergies    Intolerances    LABS:             CAPILLARY BLOOD GLUCOSE      POCT Blood Glucose.: 97 mg/dL (05 Jan 2024 13:47)    RADIOLOGY & ADDITIONAL TESTS: Reviewed.

## 2024-01-05 NOTE — DISCHARGE NOTE NURSING/CASE MANAGEMENT/SOCIAL WORK - PATIENT PORTAL LINK FT
You can access the FollowMyHealth Patient Portal offered by [facility] by registering at the following website: [email or web address]. By joining [employer]’s FollowMyHealth portal, you will also be able to view your health information using other applications (apps) compatible with our system. You can access the FollowMyHealth Patient Portal offered by [facility] by registering at the following website: [email or web address]. By joining [employer]’s FollowMyHealth portal, you will also be able to view your health information using other applications (apps) compatible with our system.

## 2024-01-05 NOTE — PROGRESS NOTE ADULT - PROBLEM SELECTOR PLAN 1
Patient d/c to [facility] on 12/26, found to be COVID-19+ at [facility], returned to [facility] as rehab can't accept patients w/ COVID. He denies cough, runny nose, chills, sob, Found to have fever 101.6 degrees F in ED, no other SIRS criteria. Currently saturating 94-95% on room air, not hypoxic. Fever likely due to COVID-19 infection.  - Remdesivir for three days (last day: 12/29)     • C/w sx treatment as below Patient d/c to [facility] on 12/26, found to be COVID-19+ at [facility], returned to [facility] as rehab can't accept patients w/ COVID. He denies cough, runny nose, chills, sob, Found to have fever 101.6 degrees F in ED, no other SIRS criteria. Currently saturating 94-95% on room air, not hypoxic. Fever likely due to COVID-19 infection.  - Remdesivir for three days (last day: 12/29)     • C/w sx treatment as below

## 2024-01-05 NOTE — PROGRESS NOTE ADULT - PROBLEM SELECTOR PROBLEM 11
Neuropathy

## 2024-01-05 NOTE — PROGRESS NOTE ADULT - PROBLEM SELECTOR PROBLEM 7
CAD S/P percutaneous coronary angioplasty

## 2024-01-05 NOTE — PROGRESS NOTE ADULT - PROBLEM SELECTOR PROBLEM 3
Status post below knee amputation, right

## 2024-01-05 NOTE — PROGRESS NOTE ADULT - PROBLEM SELECTOR PLAN 8
d/c meds: ASA 81mg QD  and atorvastatin 80mg qhs  - c/w aspirin 81mg qd and atorvastatin 80mg qhs

## 2024-01-05 NOTE — PROGRESS NOTE ADULT - PROVIDER SPECIALTY LIST ADULT
Internal Medicine
Rehab Medicine
Rehab Medicine
Vascular Surgery
Rehab Medicine
Vascular Surgery
Internal Medicine

## 2024-01-05 NOTE — PROGRESS NOTE ADULT - ASSESSMENT
67yo M PMH CAD (2 stents 2020), HFmrEF (45-50%), HTN, PAD w/ remote RLE angioplasty, R 4th toe OM s/p partial R 4th ray amputation on 10/24, RLE angiogram with AT lithotripsy and AT/peroneal balloon angioplasty 10/27, uncontrolled T2DM recently admitted for R foot gas gangrene s/p R BKA (12/11) and closure (12/18) c/b c. diff infection s/p treatment now returns from [facility] for incidental finding of COVID. Vascular surgery consulted for management of BKA stump.    -ASA/SQH  - Physical therapy  - COVID management per primary medical team  - Daily dressing changes by vascular with betadine, dry gauze, Kerlix and ace-wrap (ace-wrap from stump to thigh); BKA stump appears to be healing well   - Dispo to [facility]   - Outpatient vascular surgery follow up with Dr. Freed on 1/18/24   - Care coordination per primary team     Vascular will sign off  69yo M PMH CAD (2 stents 2020), HFmrEF (45-50%), HTN, PAD w/ remote RLE angioplasty, R 4th toe OM s/p partial R 4th ray amputation on 10/24, RLE angiogram with AT lithotripsy and AT/peroneal balloon angioplasty 10/27, uncontrolled T2DM recently admitted for R foot gas gangrene s/p R BKA (12/11) and closure (12/18) c/b c. diff infection s/p treatment now returns from [facility] for incidental finding of COVID. Vascular surgery consulted for management of BKA stump.    -ASA/SQH  - Physical therapy  - COVID management per primary medical team  - Daily dressing changes by vascular with betadine, dry gauze, Kerlix and ace-wrap (ace-wrap from stump to thigh); BKA stump appears to be healing well   - Dispo to [facility]   - Outpatient vascular surgery follow up with Dr. Freed on 1/18/24   - Care coordination per primary team     Vascular will sign off

## 2024-01-05 NOTE — PROGRESS NOTE ADULT - TIME BILLING
More than 50 percent of which was spent on counseling and coordination of care.
Review of hospital course, labs, vitals, medical records.   Bedside exam and interview    Discussed plan of care with housestaff  Documenting the encounter

## 2024-01-05 NOTE — PROGRESS NOTE ADULT - PROBLEM SELECTOR PLAN 5
Hx of ICM HF mildly reduced EF (45-50% 11/09), cardiology following during last admission for HF. Patient d/c on Torsemide 40mg PO qd today, now returning from [facility]   - prior recs: IV lasix 80mg q12h w/ strict I/O while inpatient, switch to torsemide 40mg PO QD on d/c  - GDMT for HFrEF: Coreg 25mg BID, lisinopril 40mg QD, aldactone 25mg QD; avoid SGLT2i given PAD w/ delayed wound healing    Plan:  - c/w Torsemide 40mg po qd as patient was d/c ready, returning for new rehab placement due to incidental COVID infection finding  - C/w spironolactone 25mg q24h  - Strict I/Os  - c/w Coreg 25mg BID, lisinopril 40mg qd, aldactone 25mg qd  - if starts to become volume overloaded can transition back to IV Lasix 80mg BID Hx of ICM HF mildly reduced EF (45-50% 11/09), cardiology following during last admission for HF. Patient d/c on Torsemide 40mg PO qd today, now returning from [facility]   - prior recs: IV lasix 80mg q12h w/ strict I/O while inpatient, switch to torsemide 40mg PO QD on d/c  - GDMT for HFrEF: Coreg 25mg BID, lisinopril 40mg QD, aldactone 25mg QD; avoid SGLT2i given PAD w/ delayed wound healing    Plan:  - c/w Torsemide 40mg po qd as patient was d/c ready, returning for new rehab placement due to incidental COVID infection finding  - C/w spironolactone 25mg q24h  - Strict I/Os  - c/w Coreg 25mg BID, lisinopril 40mg qd, aldactone 25mg qd  - if starts to become volume overloaded can transition back to IV Lasix 80mg BID

## 2024-01-05 NOTE — PROGRESS NOTE ADULT - SUBJECTIVE AND OBJECTIVE BOX
SUBJECTIVE:   Patient seen and examined at bedside this AM   No acute overnight events   No complaints or concerns   Discharge order in place       MEDICATIONS  (STANDING):  aspirin  chewable 81 milliGRAM(s) Oral every 24 hours  atorvastatin 80 milliGRAM(s) Oral at bedtime  carvedilol 25 milliGRAM(s) Oral every 12 hours  dextrose 5%. 1000 milliLiter(s) (50 mL/Hr) IV Continuous <Continuous>  dextrose 5%. 1000 milliLiter(s) (100 mL/Hr) IV Continuous <Continuous>  dextrose 50% Injectable 25 Gram(s) IV Push once  dextrose 50% Injectable 25 Gram(s) IV Push once  dextrose 50% Injectable 12.5 Gram(s) IV Push once  gabapentin 600 milliGRAM(s) Oral every 8 hours  glucagon  Injectable 1 milliGRAM(s) IntraMuscular once  heparin   Injectable 5000 Unit(s) SubCutaneous every 8 hours  hydrALAZINE 100 milliGRAM(s) Oral every 8 hours  insulin glargine Injectable (LANTUS) 12 Unit(s) SubCutaneous at bedtime  insulin lispro (ADMELOG) corrective regimen sliding scale   SubCutaneous Before meals and at bedtime  insulin lispro Injectable (ADMELOG) 8 Unit(s) SubCutaneous three times a day before meals  lisinopril 40 milliGRAM(s) Oral every 24 hours  NIFEdipine XL 90 milliGRAM(s) Oral every 24 hours  spironolactone 25 milliGRAM(s) Oral every 24 hours  torsemide 40 milliGRAM(s) Oral every 24 hours    MEDICATIONS  (PRN):  acetaminophen     Tablet .. 650 milliGRAM(s) Oral every 6 hours PRN Temp greater or equal to 38C (100.4F), Mild Pain (1 - 3)  dextrose Oral Gel 15 Gram(s) Oral once PRN Blood Glucose LESS THAN 70 milliGRAM(s)/deciliter  oxyCODONE    IR 5 milliGRAM(s) Oral every 6 hours PRN Moderate Pain (4 - 6)  oxyCODONE    IR 10 milliGRAM(s) Oral every 6 hours PRN Severe Pain (7 - 10)      Vital Signs Last 24 Hrs  T(C): 36.8 (05 Jan 2024 05:51), Max: 38.1 (04 Jan 2024 15:25)  T(F): 98.2 (05 Jan 2024 05:51), Max: 100.6 (04 Jan 2024 15:25)  HR: 69 (05 Jan 2024 05:51) (69 - 83)  BP: 150/85 (05 Jan 2024 05:51) (114/66 - 150/85)  BP(mean): --  RR: 18 (05 Jan 2024 05:51) (17 - 18)  SpO2: 97% (05 Jan 2024 05:51) (95% - 97%)    Parameters below as of 05 Jan 2024 05:51  Patient On (Oxygen Delivery Method): room air        Physical Exam:  Gen: NAD, resting comfortably   CV: NSR  Pulm: no respiratory distress on RA  Abd: soft, ND, NTTP, no rebound or guarding   Ext: R BKA stump incision with staples in tact, with mild dehisence centrally with granular base, no signs of infection, no drainage   Neuro: A&Ox3, no gross deficits       I&O's Summary      LABS:              CAPILLARY BLOOD GLUCOSE      POCT Blood Glucose.: 126 mg/dL (04 Jan 2024 22:15)  POCT Blood Glucose.: 184 mg/dL (04 Jan 2024 18:00)  POCT Blood Glucose.: 189 mg/dL (04 Jan 2024 13:27)  POCT Blood Glucose.: 241 mg/dL (04 Jan 2024 09:39)        RADIOLOGY & ADDITIONAL STUDIES:

## 2024-01-05 NOTE — DISCHARGE NOTE NURSING/CASE MANAGEMENT/SOCIAL WORK - NSDCPEFALRISK_GEN_ALL_CORE
For information on Fall & Injury Prevention, visit: [email or web address] OR  [email or web address] OR  https://www.cdc.gov/steadi/patient.html For information on Fall & Injury Prevention, visit: [email or web address] OR  [email or web address] OR  https://www.cdc.gov/steadi/patient.html

## 2024-01-05 NOTE — PROGRESS NOTE ADULT - PROBLEM SELECTOR PROBLEM 9
IDDM (insulin dependent diabetes mellitus)

## 2024-01-05 NOTE — PROGRESS NOTE ADULT - PROBLEM SELECTOR PLAN 3
s/p R BKA on 12/11/23 and s/p BKA closure on 12/18/23 by vascular surgery at [facility]     • Pain regimen as follows:        - Tylenol 650mg po q6h PRN for mild pain/fever       - Oxy 5mg po q6h PRN for mod pain       - Oxy 10mg po q6h PRN for sev pain   • Has outpt f/u apt w/ vascular: Dr. Freed on 1/18/24 at 10 A.M. s/p R BKA on 12/11/23 and s/p BKA closure on 12/18/23 by vascular surgery at [facility]     • Pain regimen as follows:        - Tylenol 650mg po q6h PRN for mild pain/fever       - Oxy 5mg po q6h PRN for mod pain       - Oxy 10mg po q6h PRN for sev pain   • Has outpt f/u apt w/ vascular: Dr. Freed on 1/18/24 at 10 A.M.

## 2024-01-05 NOTE — PROGRESS NOTE ADULT - PROBLEM SELECTOR PROBLEM 10
History of Clostridium difficile infection

## 2024-01-09 NOTE — ED PROVIDER NOTE - WR ORDER STATUS 1
Report given to Nick POSADA, pt to be moved to yellow 55 while waiting for inpatient bed   Performed

## 2024-01-11 DIAGNOSIS — D64.9 ANEMIA, UNSPECIFIED: ICD-10-CM

## 2024-01-11 DIAGNOSIS — I50.33 ACUTE ON CHRONIC DIASTOLIC (CONGESTIVE) HEART FAILURE: ICD-10-CM

## 2024-01-11 DIAGNOSIS — Z86.19 PERSONAL HISTORY OF OTHER INFECTIOUS AND PARASITIC DISEASES: ICD-10-CM

## 2024-01-11 DIAGNOSIS — Z79.4 LONG TERM (CURRENT) USE OF INSULIN: ICD-10-CM

## 2024-01-11 DIAGNOSIS — Z79.82 LONG TERM (CURRENT) USE OF ASPIRIN: ICD-10-CM

## 2024-01-11 DIAGNOSIS — U07.1 COVID-19: ICD-10-CM

## 2024-01-11 DIAGNOSIS — I11.0 HYPERTENSIVE HEART DISEASE WITH HEART FAILURE: ICD-10-CM

## 2024-01-11 DIAGNOSIS — F12.90 CANNABIS USE, UNSPECIFIED, UNCOMPLICATED: ICD-10-CM

## 2024-01-11 DIAGNOSIS — Z79.891 LONG TERM (CURRENT) USE OF OPIATE ANALGESIC: ICD-10-CM

## 2024-01-11 DIAGNOSIS — I35.0 NONRHEUMATIC AORTIC (VALVE) STENOSIS: ICD-10-CM

## 2024-01-11 DIAGNOSIS — I25.10 ATHEROSCLEROTIC HEART DISEASE OF NATIVE CORONARY ARTERY WITHOUT ANGINA PECTORIS: ICD-10-CM

## 2024-01-11 DIAGNOSIS — Z89.511 ACQUIRED ABSENCE OF RIGHT LEG BELOW KNEE: ICD-10-CM

## 2024-01-11 DIAGNOSIS — F14.90 COCAINE USE, UNSPECIFIED, UNCOMPLICATED: ICD-10-CM

## 2024-01-11 DIAGNOSIS — Z89.421 ACQUIRED ABSENCE OF OTHER RIGHT TOE(S): ICD-10-CM

## 2024-01-11 DIAGNOSIS — E11.51 TYPE 2 DIABETES MELLITUS WITH DIABETIC PERIPHERAL ANGIOPATHY WITHOUT GANGRENE: ICD-10-CM

## 2024-01-11 DIAGNOSIS — E11.40 TYPE 2 DIABETES MELLITUS WITH DIABETIC NEUROPATHY, UNSPECIFIED: ICD-10-CM

## 2024-01-11 DIAGNOSIS — E78.5 HYPERLIPIDEMIA, UNSPECIFIED: ICD-10-CM

## 2024-01-11 DIAGNOSIS — Z95.5 PRESENCE OF CORONARY ANGIOPLASTY IMPLANT AND GRAFT: ICD-10-CM

## 2024-01-12 LAB
SURGICAL PATHOLOGY STUDY: SIGNIFICANT CHANGE UP
SURGICAL PATHOLOGY STUDY: SIGNIFICANT CHANGE UP

## 2024-01-18 ENCOUNTER — APPOINTMENT (OUTPATIENT)
Dept: VASCULAR SURGERY | Facility: CLINIC | Age: 69
End: 2024-01-18
Payer: MEDICARE

## 2024-01-18 DIAGNOSIS — Z78.9 OTHER SPECIFIED HEALTH STATUS: ICD-10-CM

## 2024-01-18 DIAGNOSIS — E11.59 TYPE 2 DIABETES MELLITUS WITH OTHER CIRCULATORY COMPLICATIONS: ICD-10-CM

## 2024-01-18 DIAGNOSIS — I10 ESSENTIAL (PRIMARY) HYPERTENSION: ICD-10-CM

## 2024-01-18 DIAGNOSIS — Z89.519 ACQUIRED ABSENCE OF UNSPECIFIED LEG BELOW KNEE: ICD-10-CM

## 2024-01-18 DIAGNOSIS — I25.10 ATHEROSCLEROTIC HEART DISEASE OF NATIVE CORONARY ARTERY W/OUT ANGINA PECTORIS: ICD-10-CM

## 2024-01-18 PROCEDURE — 99024 POSTOP FOLLOW-UP VISIT: CPT

## 2024-01-19 PROBLEM — E11.59 TYPE 2 DIABETES MELLITUS WITH OTHER CIRCULATORY COMPLICATION: Status: RESOLVED | Noted: 2024-01-19 | Resolved: 2024-01-19

## 2024-01-19 PROBLEM — I25.10 CAD IN NATIVE ARTERY: Status: RESOLVED | Noted: 2024-01-19 | Resolved: 2024-01-19

## 2024-01-19 PROBLEM — Z78.9 NON-SMOKER: Status: ACTIVE | Noted: 2024-01-19

## 2024-01-19 PROBLEM — I10 HYPERTENSION, BENIGN: Status: RESOLVED | Noted: 2024-01-19 | Resolved: 2024-01-19

## 2024-01-19 PROBLEM — Z89.519: Status: ACTIVE | Noted: 2024-01-19

## 2024-01-19 RX ORDER — HYDRALAZINE HYDROCHLORIDE 50 MG/1
TABLET ORAL
Refills: 0 | Status: ACTIVE | COMMUNITY

## 2024-01-19 RX ORDER — LISINOPRIL 30 MG/1
TABLET ORAL
Refills: 0 | Status: ACTIVE | COMMUNITY

## 2024-01-19 RX ORDER — NIFEDIPINE 20 MG/1
CAPSULE ORAL
Refills: 0 | Status: ACTIVE | COMMUNITY

## 2024-01-19 RX ORDER — ATORVASTATIN CALCIUM 80 MG/1
TABLET, FILM COATED ORAL
Refills: 0 | Status: ACTIVE | COMMUNITY

## 2024-01-19 RX ORDER — TORSEMIDE 5 MG/1
TABLET ORAL
Refills: 0 | Status: ACTIVE | COMMUNITY

## 2024-01-19 RX ORDER — SPIRONOLACTONE 50 MG/1
TABLET ORAL
Refills: 0 | Status: ACTIVE | COMMUNITY

## 2024-01-19 RX ORDER — CARVEDILOL 3.12 MG/1
TABLET, FILM COATED ORAL
Refills: 0 | Status: ACTIVE | COMMUNITY

## 2024-01-19 RX ORDER — INSULIN LISPRO 100 [IU]/ML
INJECTION, SOLUTION INTRAVENOUS; SUBCUTANEOUS
Refills: 0 | Status: ACTIVE | COMMUNITY

## 2024-01-19 RX ORDER — GABAPENTIN 100 MG/1
100 CAPSULE ORAL
Refills: 0 | Status: ACTIVE | COMMUNITY

## 2024-01-19 NOTE — PHYSICAL EXAM
[Respiratory Effort] : normal respiratory effort [Normal Heart Sounds] : normal heart sounds [2+] : left 2+ [Alert] : alert [Calm] : calm [Abdomen Tenderness] : ~T ~M No abdominal tenderness [de-identified] : WN/WD, NAD [de-identified] : NC/AT [de-identified] : supple [de-identified] : +FROM 5/5x4 [de-identified] : R BKA stump with well healed incision, staples in place, no signs of infection, non-tender

## 2024-01-19 NOTE — ADDENDUM
[FreeTextEntry1] : I agree with the above. Well-healed R BKA. Staples removed. Can undergo prosthetic fitting. F/u in 6 months.  I, Dr. Michelet Freed, personally performed the evaluation and management (E/M) services for this established patient who presents today with (a) new problem(s)/exacerbation of (an) existing condition(s).  That E/M includes conducting the examination, assessing all new/exacerbated conditions, and establishing a new plan of care.  Today, my ACP, Chandni ANDERSON, was here to observe my evaluation and management services for this new problem/exacerbated condition to be followed going forward.

## 2024-01-19 NOTE — ASSESSMENT
[Arterial/Venous Disease] : arterial/venous disease [Foot care/Footwear] : foot care/footwear [FreeTextEntry1] : 68yoM with multiple medical conditions including PAD w/ remote RLE angioplasty, R 4th toe OM s/p partial R 4th ray amputation on 10/24/23, RLE angiogram with AT lithotripsy and AT/peroneal balloon angioplasty 10/27/23 who was recently admitted with R foot gas gangrene, s/p R BKA on 12/18/23 returns today for a follow up. Patient feels well, denies pain, fever, chills. R BKA stump with well healed incision, staples in place, no signs of infection Staples were removed, the incision was cleansed with Hydrogen peroxide, Bacitracin was applied, stump was wrapped with kerlix/ace. Patient was referred to a prosthetic company and may start wearing a . He will f/u as needed.

## 2024-01-19 NOTE — HISTORY OF PRESENT ILLNESS
[FreeTextEntry1] : 68yoM with CAD (2 stents 2020), HFmrEF (45-50%), HTN, PAD w/ remote RLE angioplasty, R 4th toe OM s/p partial R 4th ray amputation on 10/24/23, RLE angiogram with AT lithotripsy and AT/peroneal balloon angioplasty 10/27/23, uncontrolled IDDM who presented to [facility] after a fall onto his knees and was having b/l knee pain. Found to have increased soft tissue gas in R foot on CT scan. Patient was started on IV vancomycin and zosyn. On 12/11/23 patient underwent a right guillotine below the knee amputation. On 12/18/23 patient underwent closure of the BKA. Today he returns for a follow up. He is at a rehab now where he receives PT and wound care. Denies R BKA stump pain, incision dehiscence, fever, chills.

## 2024-02-04 NOTE — DIETITIAN INITIAL EVALUATION ADULT - PERTINENT MEDS FT
Discharged in stable condition, alert and oriented, via wheelchair. Prescribed medication and follow up appointment instructed. Health education imparted. Instructed to come back once symptoms worsened. Pt verbalized understanding of the information given. Removed IV line and applied pressure.    MEDICATIONS  (STANDING):  aspirin enteric coated 81 milliGRAM(s) Oral daily  atorvastatin 80 milliGRAM(s) Oral at bedtime  cefTRIAXone   IVPB 2000 milliGRAM(s) IV Intermittent every 24 hours  chlorhexidine 2% Cloths 1 Application(s) Topical <User Schedule>  clopidogrel Tablet 75 milliGRAM(s) Oral daily  collagenase Ointment 1 Application(s) Topical daily  DAPTOmycin IVPB 500 milliGRAM(s) IV Intermittent every 24 hours  dextrose 5%. 1000 milliLiter(s) (100 mL/Hr) IV Continuous <Continuous>  dextrose 5%. 1000 milliLiter(s) (50 mL/Hr) IV Continuous <Continuous>  dextrose 50% Injectable 12.5 Gram(s) IV Push once  dextrose 50% Injectable 25 Gram(s) IV Push once  dextrose 50% Injectable 25 Gram(s) IV Push once  enoxaparin Injectable 40 milliGRAM(s) SubCutaneous every 24 hours  furosemide Infusion 10 mG/Hr (5 mL/Hr) IV Continuous <Continuous>  gabapentin 800 milliGRAM(s) Oral every 8 hours  glucagon  Injectable 1 milliGRAM(s) IntraMuscular once  hydrALAZINE 25 milliGRAM(s) Oral every 8 hours  insulin glargine Injectable (LANTUS) 6 Unit(s) SubCutaneous at bedtime  insulin lispro (ADMELOG) corrective regimen sliding scale   SubCutaneous Before meals and at bedtime  insulin lispro Injectable (ADMELOG) 5 Unit(s) SubCutaneous three times a day before meals  potassium chloride    Tablet ER 40 milliEquivalent(s) Oral once    MEDICATIONS  (PRN):  acetaminophen     Tablet .. 650 milliGRAM(s) Oral every 6 hours PRN Temp greater or equal to 38C (100.4F), Mild Pain (1 - 3)  dextrose Oral Gel 15 Gram(s) Oral once PRN Blood Glucose LESS THAN 70 milliGRAM(s)/deciliter

## 2024-02-23 NOTE — ED ADULT NURSE NOTE - NSSISCREENINGQ3_ED_A_ED
[FreeTextEntry1] : In summary this is a 71 yr old male with H/O HIV who is on HAART, but not very complaint has a detectable HIV viral load being eval for normochromic normocytic anemia. Anemia is likely multifactorial causes incl nutritional deficiency, CKD, Chronic inflammation, HIV. The abnormal SPEP and FLC is reflective of polyclonal gammopathy.  RECS I sent w/u today. Pt will follow in 2-3 weeks and then we can decide if BM bioosy is necessary. A Rheum eval is suggested. Pt advised to stay compliant with HAART.
No

## 2024-03-08 NOTE — ANESTHESIA FOLLOW-UP NOTE - NSEVALATION_GEN_ALL_CORE
Problem: Occupational Therapy  Goal: Occupational Therapy Goal  Description: Goals to be met by: 4/7/24     Patient will increase functional independence with ADLs by performing:    LE Dressing with Minimal Assistance.  Grooming while standing with Contact Guard Assistance.  Toileting from toilet with Minimal Assistance for hygiene and clothing management.   Supine to sit with Contact Guard Assistance.  Step transfer with Contact Guard Assistance  Toilet transfer to toilet with Contact Guard Assistance.  Increased functional strength to WFL for self care skills and functional mobility.  Upper extremity exercise program x10 reps per handout, with assist as needed .    Outcome: Ongoing, Progressing   Harrison Haywood is a 72 y.o. male with a medical diagnosis of CVA (cerebral vascular accident).   Performance deficits affecting function are weakness, impaired endurance, impaired self care skills, impaired functional mobility, gait instability, impaired balance, impaired cognition, decreased lower extremity function, decreased safety awareness, decreased ROM, impaired coordination, impaired fine motor, decreased upper extremity function, impaired skin, impaired cardiopulmonary response to activity.    Pt found in bed, agreeable to therapy.  Pt with improved LUE strength.  He is progressing towards goals. Continue OT services to address functional goals, progressing as able.      
No apparent complications or complaints regarding anesthesia care at this time/All questions were answered
No apparent complications or complaints regarding anesthesia care at this time

## 2024-04-26 NOTE — PRE-ANESTHESIA EVALUATION ADULT - NSANTHAPLANRD_GEN_ALL_CORE
This document is complete and the patient is ready for discharge.
monitored anesthesia care (MAC)
monitored anesthesia care (MAC)

## 2024-07-18 ENCOUNTER — APPOINTMENT (OUTPATIENT)
Dept: VASCULAR SURGERY | Facility: CLINIC | Age: 69
End: 2024-07-18

## 2024-07-19 NOTE — PRE-ANESTHESIA EVALUATION ADULT - NSANTHOSAYNRD_GEN_A_CORE
No. SAE screening performed.  STOP BANG Legend: 0-2 = LOW Risk; 3-4 = INTERMEDIATE Risk; 5-8 = HIGH Risk
Syncope and collapse    
No. SAE screening performed.  STOP BANG Legend: 0-2 = LOW Risk; 3-4 = INTERMEDIATE Risk; 5-8 = HIGH Risk

## 2024-12-26 NOTE — DISCHARGE NOTE NURSING/CASE MANAGEMENT/SOCIAL WORK - PATIENT PORTAL LINK FT
Followed by Endocrinology Dr. Alexander  Last appt with Endocrinology 11/20/201  Synthroid Tablet, 200 MCG, 1 tablet in the morning on an empty stomach, Oral, 1 tab M- Sat and 1/2 on Sunday, 90 days, 90, Refills 3.        Notes: stable  1. ON Synthroid 200 mcg daily - MALOU, M- Sat and 1/2 on Sunday  2. discussed how to take  3. monitor free t4     4.  Repeat labs and follow up in 6 months.   You can access the FollowMyHealth Patient Portal offered by [facility] by registering at the following website: [email or web address]. By joining [employer]’s FollowMyHealth portal, you will also be able to view your health information using other applications (apps) compatible with our system. You can access the FollowMyHealth Patient Portal offered by [facility] by registering at the following website: [email or web address]. By joining [employer]’s FollowMyHealth portal, you will also be able to view your health information using other applications (apps) compatible with our system.

## 2025-06-12 NOTE — ED PROVIDER NOTE - NS ED MD DISPO DISCHARGE CCDA
What Type Of Note Output Would You Prefer (Optional)?: Standard Output
How Severe Is Your Skin Lesion?: moderate
Has Your Skin Lesion Been Treated?: not been treated
Is This A New Presentation, Or A Follow-Up?: Skin Lesion
Patient/Caregiver provided printed discharge information.

## (undated) DEVICE — GLV 6.5 PROTEXIS (WHITE)

## (undated) DEVICE — DRSG COBAN 6"

## (undated) DEVICE — PREP BETADINE KIT

## (undated) DEVICE — MARKING PEN W RULER

## (undated) DEVICE — DRSG ADAPTIC 3 X 8"

## (undated) DEVICE — TOURNIQUET ESMARK 4"

## (undated) DEVICE — POSITIONER FOAM EGG CRATE ULNAR 2PCS (PINK)

## (undated) DEVICE — TOURNIQUET CUFF 34" DUAL PORT W PLC

## (undated) DEVICE — GLV 7.5 PROTEXIS (WHITE)

## (undated) DEVICE — GLV 8 PROTEXIS (WHITE)

## (undated) DEVICE — WARMING BLANKET UPPER ADULT

## (undated) DEVICE — IRR SYS BONE CLNNG INTERPULSE

## (undated) DEVICE — SLV COMPRESSION KNEE MED

## (undated) DEVICE — SUCTION YANKAUER BULBOUS TIP W VENT

## (undated) DEVICE — DRSG WEBRIL 6"

## (undated) DEVICE — SUT VICRYL 4-0 18" P-2 UNDYED

## (undated) DEVICE — GOWN XL

## (undated) DEVICE — DRAPE TOWEL BLUE 17" X 24"

## (undated) DEVICE — SUT VICRYL 2-0 27" CT-1 UNDYED

## (undated) DEVICE — DRSG KERLIX ROLL 4.5"

## (undated) DEVICE — SUT ETHIBOND 2-0 30" CT-1

## (undated) DEVICE — SAW BLADE STRYKER SAGITTAL WIDE MED

## (undated) DEVICE — DRSG COBAN 3" LF NONSTERILE

## (undated) DEVICE — DRAPE 1/2 SHEET 40X57"

## (undated) DEVICE — DRSG ACE BANDAGE 6"

## (undated) DEVICE — SUT VICRYL 0 18" CT-1 UNDYED (POP-OFF)

## (undated) DEVICE — DRSG ACE BANDAGE 4"

## (undated) DEVICE — SUT VICRYL PLUS 2-0 18" CT-1 (POP-OFF)

## (undated) DEVICE — SUT ETHIBOND 2-0 18" TIES

## (undated) DEVICE — VENODYNE/SCD SLEEVE CALF MEDIUM

## (undated) DEVICE — POLISHER OR CAUTERY TIP

## (undated) DEVICE — LAP PAD 18 X 18"

## (undated) DEVICE — DRAPE C ARM MINI PACK FOR 6800

## (undated) DEVICE — SUT VICRYL 3-0 18" PS-2 UNDYED

## (undated) DEVICE — SUT SILK 2-0 18" SH (POP-OFF)

## (undated) DEVICE — TOURNIQUET CUFF 18" DUAL PORT SINGLE BLADDER W PLC  (BLACK)

## (undated) DEVICE — SUT NYLON 5-0 18" R-5

## (undated) DEVICE — DRSG STOCKINETTE UNDERCAST SYNTHETIC 4"

## (undated) DEVICE — SAW BLADE LINVATEC SAGITTAL MIC 9.5X25.5X0.4MM

## (undated) DEVICE — PACK UPPER BODY

## (undated) DEVICE — PACK ORTHO FOOT ANKLE

## (undated) DEVICE — SUT MONOCRYL 5-0 18" PS-2

## (undated) DEVICE — DRSG KLING 4"

## (undated) DEVICE — STAPLER SKIN PROXIMATE

## (undated) DEVICE — ELCTR STRYKER NEPTUNE SMOKE EVACUATION PENCIL (GREEN)

## (undated) DEVICE — DRSG XEROFORM 5 X 9"

## (undated) DEVICE — BUR BRASSELER OVAL 4 X 8MM